# Patient Record
Sex: MALE | Race: WHITE | NOT HISPANIC OR LATINO | Employment: OTHER | URBAN - METROPOLITAN AREA
[De-identification: names, ages, dates, MRNs, and addresses within clinical notes are randomized per-mention and may not be internally consistent; named-entity substitution may affect disease eponyms.]

---

## 2017-01-09 ENCOUNTER — OFFICE VISIT (OUTPATIENT)
Dept: FAMILY MEDICINE | Facility: CLINIC | Age: 72
End: 2017-01-09
Payer: COMMERCIAL

## 2017-01-09 VITALS
HEART RATE: 88 BPM | DIASTOLIC BLOOD PRESSURE: 92 MMHG | BODY MASS INDEX: 33.68 KG/M2 | OXYGEN SATURATION: 94 % | SYSTOLIC BLOOD PRESSURE: 132 MMHG | TEMPERATURE: 97.1 F | WEIGHT: 224.8 LBS

## 2017-01-09 DIAGNOSIS — T50.905A ADVERSE DRUG EFFECT, INITIAL ENCOUNTER: ICD-10-CM

## 2017-01-09 DIAGNOSIS — J06.9 ACUTE URI: Primary | ICD-10-CM

## 2017-01-09 PROCEDURE — 99214 OFFICE O/P EST MOD 30 MIN: CPT | Performed by: PHYSICIAN ASSISTANT

## 2017-01-09 RX ORDER — BENZONATATE 100 MG/1
100 CAPSULE ORAL 3 TIMES DAILY PRN
Qty: 16 CAPSULE | Refills: 0 | Status: SHIPPED | OUTPATIENT
Start: 2017-01-09 | End: 2017-01-16

## 2017-01-09 ASSESSMENT — ENCOUNTER SYMPTOMS
DIARRHEA: 0
SORE THROAT: 1
ABDOMINAL PAIN: 0
NAUSEA: 0
HEADACHES: 0
FOCAL WEAKNESS: 0
CHILLS: 0
LOSS OF CONSCIOUSNESS: 0
SENSORY CHANGE: 0
VOMITING: 0
SHORTNESS OF BREATH: 0
FEVER: 0
COUGH: 1

## 2017-01-09 ASSESSMENT — PAIN SCALES - GENERAL: PAINLEVEL: MILD PAIN (2)

## 2017-01-09 NOTE — PROGRESS NOTES
SUBJECTIVE:                                                    Zurdo Dash is a 71 year old male who presents to clinic today for the following health issues:      RESPIRATORY SYMPTOMS      Duration: 1 week    Description  rhinorrhea, sore throat and productive cough    Severity: mild    Accompanying signs and symptoms: lightheadedness (not vertigo), especially when standing too fast    History (predisposing factors):  none    Precipitating or alleviating factors: None    Therapies tried and outcome:  oral decongestant and guaifenesin-not effective     Patient recently changed his medication for lantus to victoza- patient states his food isn't tasting right, loss of appetite     Additional complaints:  Patient recently changed his medication for lantus to victoza 5 days ago- patient states his food isn't tasting right, loss of appetite. He attributes this to the medication change but also notes it may be due to his illness      HPI additional notes: Zurdo presents today with   Chief Complaint   Patient presents with     URI   Denies CP, SOB, palpitations, syncope, constipation, N/V/D, abd pain, fever/chills, sore throat, ear pain/drainage, vision changes, focal weakness, and any other symptoms.       Chart Review:  History   Smoking status     Former Smoker -- 40 years     Types: Cigarettes     Quit date: 03/16/2007   Smokeless tobacco     Never Used     PHQ-9 SCORE 10/13/2011   Total Score 13     No flowsheet data found.         Patient Active Problem List   Diagnosis     Pain in limb     Diabetic neuropathy (H)     Hyperlipidemia LDL goal <100     Hypertension goal BP (blood pressure) < 140/90     obesity     ED (erectile dysfunction)     Hypogonadism     Advanced directives, counseling/discussion     Health Care Home     Type 2 diabetes mellitus, uncontrolled, with neuropathy (H)     RBBB (right bundle branch block)     Lumbago     Ex-smoker     Cataracts, both eyes     Family history of esophageal cancer      Gastroesophageal reflux disease, esophagitis presence not specified     Rheumatoid arthritis involving multiple sites with positive rheumatoid factor (H)     Past Surgical History   Procedure Laterality Date     Tonsillectomy       Hc incision tendon sheath finger  4/2009     r hand ring finger     Problem list, Medication list, Allergies, Medical/Social/Surg hx reviewed in [facility], updated as appropriate.   HPI      Review of Systems   Constitutional: Negative for fever and chills.        Appetite change   HENT: Positive for sore throat.         Rhinorrhea   Respiratory: Positive for cough. Negative for shortness of breath.    Cardiovascular: Negative for chest pain.   Gastrointestinal: Negative for nausea, vomiting, abdominal pain and diarrhea.   Musculoskeletal: Negative for joint pain.   Neurological: Negative for sensory change, focal weakness, loss of consciousness and headaches.        Lightheadedness   All other systems reviewed and are negative.        Physical Exam   Constitutional: He is oriented to person, place, and time and well-developed, well-nourished, and in no distress.   HENT:   Head: Normocephalic and atraumatic.   Right Ear: Tympanic membrane, external ear and ear canal normal.   Left Ear: Tympanic membrane, external ear and ear canal normal.   Nose: Rhinorrhea present. No sinus tenderness.   Mouth/Throat: Uvula is midline, oropharynx is clear and moist and mucous membranes are normal.   Eyes: EOM are normal. Pupils are equal, round, and reactive to light.   Cardiovascular: Normal rate, regular rhythm and normal heart sounds.    Pulmonary/Chest: Effort normal and breath sounds normal.   Musculoskeletal: Normal range of motion.   Neurological: He is alert and oriented to person, place, and time. He has normal motor skills, normal strength and intact cranial nerves. Gait normal. Gait normal.   Skin: Skin is warm and dry.   Nursing note and vitals reviewed.    Vital Signs  /92 mmHg  Pulse  88  Temp(Src) 97.1  F (36.2  C) (Oral)  Wt 224 lb 12.8 oz (101.969 kg)  SpO2 94%   Body mass index is 33.68 kg/(m^2).    Diagnostic Test Results:  none     ASSESSMENT/PLAN:                                                          ICD-10-CM    1. Acute URI J06.9 benzonatate (TESSALON) 100 MG capsule   2. Adverse drug effect, initial encounter T88.7XXA    3. Type 2 diabetes mellitus, uncontrolled, with neuropathy (H) E11.40     E11.65    Lungs CTAB, afebrile, NAD. Unremarkable neuro exam. BP mildly elevated today, likely secondary to decongestant use. Suspect viral URI, supportive treatments discussed. Rx Tessalon perles.   Decreased appetite may be secondary to viral illness, may also be SE of Victoza. No abdominal pain, N/V, or constipation. Recommended pt f/u with PCP if decreased appetite persists once URI sx resolve.    I have discussed any lab or imaging results, the patient's diagnosis, and my plan of treatment with the patient and/or family. Patient is aware to come back in if with worsening symptoms or if no relief despite treatment plan.  Patient voiced understanding and had no further questions.       Follow Up: Return if symptoms worsen or fail to improve; if decreased appetite continues.    WATSON Mcadams, PA-C  [facility]

## 2017-01-09 NOTE — NURSING NOTE
"Chief Complaint   Patient presents with     URI       Initial /92 mmHg  Pulse 88  Temp(Src) 97.1  F (36.2  C) (Oral)  Wt 224 lb 12.8 oz (101.969 kg)  SpO2 94% Estimated body mass index is 33.68 kg/(m^2) as calculated from the following:    Height as of 12/2/16: 5' 8.5\" (1.74 m).    Weight as of this encounter: 224 lb 12.8 oz (101.969 kg).  BP completed using cuff size: usama Lazo CMA    "

## 2017-01-09 NOTE — MR AVS SNAPSHOT
After Visit Summary   1/9/2017    Zurdo Dash    MRN: [id number]           Patient Information     Date Of Birth          1945        Visit Information        Provider Department      1/9/2017 1:30 PM Loni Gibson PA-C [facility]        Today's Diagnoses     Acute URI    -  1     Adverse drug effect, initial encounter         Type 2 diabetes mellitus, uncontrolled, with neuropathy (H)           Care Instructions      Follow up with primary care in 3-4 days if symptoms have not improved. Also follow up with PCP if decreased appetite continues once your URI is improved. Return to clinic or go to ER if symptoms worsen.    Viral Upper Respiratory Illness (Adult)  You have a viral upper respiratory illness (URI), which is another term for the common cold. This illness is contagious during the first few days. It is spread through the air by coughing and sneezing. It may also be spread by direct contact (touching the sick person and then touching your own eyes, nose, or mouth). Frequent handwashing will decrease risk of spread. Most viral illnesses go away within 7 to 10 days with rest and simple home remedies. Sometimes the illness may last for several weeks. Antibiotics will not kill a virus, and they are generally not prescribed for this condition.    Home care    If symptoms are severe, rest at home for the first 2 to 3 days. When you resume activity, don't let yourself get too tired.    Avoid being exposed to cigarette smoke (yours or others ).    You may use acetaminophen or ibuprofen to control pain and fever, unless another medicine was prescribed. (Note: If you have chronic liver or kidney disease, have ever had a stomach ulcer or gastrointestinal bleeding, or are taking blood-thinning medicines, talk with your healthcare provider before using these medicines.) Aspirin should never be given to anyone under 18 years of age who is ill with a viral infection or fever.  It may cause severe liver or brain damage.    Your appetite may be poor, so a light diet is fine. Avoid dehydration by drinking 6 to 8 glasses of fluids per day (water, soft drinks, juices, tea, or soup). Extra fluids will help loosen secretions in the nose and lungs.    Over-the-counter cold medicines will not shorten the length of time you re sick, but they may be helpful for the following symptoms: cough, sore throat, and nasal and sinus congestion. (Note: Do not use decongestants if you have high blood pressure.)  Follow-up care  Follow up with your healthcare provider, or as advised.  When to seek medical advice  Call your healthcare provider right away if any of these occur:    Cough with lots of colored sputum (mucus)    Severe headache; face, neck, or ear pain    Difficulty swallowing due to throat pain    Fever of 100.4 F (38 C)  Call 911, or get immediate medical care  Call emergency services right away if any of these occur:    Chest pain, shortness of breath, wheezing, or difficulty breathing    Coughing up blood    Inability to swallow due to throat pain    6800-4132 [employer]. [address] [place] [address]. All rights reserved. This information is not intended as a substitute for professional medical care. Always follow your healthcare professional's instructions.              Follow-ups after your visit        Follow-up notes from your care team     Return if symptoms worsen or fail to improve; if decreased appetite continues.      Your next 10 appointments already scheduled     Jan 11, 2017 11:40 AM   Return Visit with MD [facility] [place] ([facility] [place])    [address]  [place] [address] [phone]              Who to contact     If you have questions or need follow up information about today's clinic visit or your schedule please contact [facility] [place] directly at [phone].  Normal or non-critical lab and  "imaging results will be communicated to you by MyChart, letter or phone within 4 business days after the clinic has received the results. If you do not hear from us within 7 days, please contact the clinic through [employer] or phone. If you have a critical or abnormal lab result, we will notify you by phone as soon as possible.  Submit refill requests through [employer] or call your pharmacy and they will forward the refill request to us. Please allow 3 business days for your refill to be completed.          Additional Information About Your Visit        [employer] Information     [employer]ient lets you send messages to your doctor, view your test results, renew your prescriptions, schedule appointments and more. To sign up, go to [email or web address] . Click on \"Log in\" on the left side of the screen, which will take you to the Welcome page. Then click on \"Sign up Now\" on the right side of the page.     You will be asked to enter the access code listed below, as well as some personal information. Please follow the directions to create your username and password.     Your access code is: [email or web address]  Expires: 1/10/2017 11:25 AM     Your access code will  in 90 days. If you need help or a new code, please call your [facility] clinic or [phone].        Care EveryWhere ID     This is your Care EveryWhere ID. This could be used by other organizations to access your [facility] medical records  [id number]        Your Vitals Were     Pulse Temperature Pulse Oximetry             88 97.1  F (36.2  C) (Oral) 94%          Blood Pressure from Last 3 Encounters:   17 132/92   16 124/78   16 160/94    Weight from Last 3 Encounters:   17 224 lb 12.8 oz (101.969 kg)   16 230 lb (104.327 kg)   16 228 lb 12.8 oz (103.783 kg)              Today, you had the following     No orders found for display         Today's Medication Changes          These changes are accurate as of: 17  2:07 PM.  If you " have any questions, ask your nurse or doctor.               Start taking these medicines.        Dose/Directions    benzonatate 100 MG capsule   Commonly known as:  TESSALON   Used for:  Acute URI   Started by:  Loni Gibson PA-C        Dose:  100 mg   Take 1 capsule (100 mg) by mouth 3 times daily as needed for cough   Quantity:  16 capsule   Refills:  0            Where to get your medicines      These medications were sent to [facility] [place], [place] - [address]  [address], [facility] [address]     Phone:  [phone]    - benzonatate 100 MG capsule             Primary Care Provider Office Phone # Fax #    Kapil Duckworth [phone]       [facility] [address] [address]        Thank you!     Thank you for choosing [facility]  for your care. Our goal is always to provide you with excellent care. Hearing back from our patients is one way we can continue to improve our services. Please take a few minutes to complete the written survey that you may receive in the mail after your visit with us. Thank you!             Your Updated Medication List - Protect others around you: Learn how to safely use, store and throw away your medicines at www.disposemymeds.org.          This list is accurate as of: 1/9/17  2:07 PM.  Always use your most recent med list.                   Brand Name Dispense Instructions for use    ACE/ARB NOT PRESCRIBED (INTENTIONAL)          aspirin 81 MG tablet      Take 1 tablet by mouth daily.       INDIA CONTOUR NEXT test strip   Generic drug:  blood glucose monitoring     100 each    TEST THREE TIMES A DAY OR AS DIRECTED       benzonatate 100 MG capsule    TESSALON    16 capsule    Take 1 capsule (100 mg) by mouth 3 times daily as needed for cough       CALCIUM 500 + D 500-200 MG-IU Tabs      take 2 tabs daily       clotrimazole-betamethasone cream    LOTRISONE    30 g     Apply topically 2 times daily       folic acid 1 MG tablet    FOLVITE    100 tablet    Take 1 tablet (1 mg) by mouth daily       glimepiride 4 MG tablet    AMARYL    90 tablet    Take 1 tablet (4 mg) by mouth every morning (before breakfast)       GLUCOSAMINE SULFATE PO          hydrochlorothiazide 25 MG tablet    HYDRODIURIL    90 tablet    TAKE ONE TABLET BY MOUTH EVERY DAY       HYDROcodone-acetaminophen 5-325 MG per tablet    NORCO    30 tablet    Take 1 tablet by mouth every 6 hours as needed for pain       hydroxychloroquine 200 MG tablet    PLAQUENIL    180 tablet    Take 1 tablet (200 mg) by mouth 2 times daily       insulin pen needle 31G X 6 MM    ULTICARE MINI    100 each    Use 1 daily or as directed.       liraglutide 18 MG/3ML soln    VICTOZA    6 mL    Inject 1.2 mg Subcutaneous daily       metFORMIN 500 MG tablet    GLUCOPHAGE    360 tablet    TAKE TWO TABLETS BY MOUTH TWICE A DAY WITH MEALS       methotrexate sodium 2.5 MG Tabs     120 tablet    Take 25 mg by mouth once a week . Take all 10 tablets on the same day of each week.       metoprolol 25 MG 24 hr tablet    TOPROL-XL    90 tablet    TAKE ONE TABLET BY MOUTH EVERY DAY       MICROLET LANCETS Misc     100 each    1 Device daily       MULTI-VITAMIN PO      None Entered       omeprazole 20 MG CR capsule    priLOSEC    90 capsule    Take 1 capsule (20 mg) by mouth daily       pravastatin 40 MG tablet    PRAVACHOL    90 tablet    TAKE ONE TABLET BY MOUTH EVERY DAY       sildenafil 100 MG cap/tab    VIAGRA    3 tablet    Take 0.5-1 tablets ( mg) by mouth daily as needed for erectile dysfunction Take 30 min to 4 hours before intercourse.  Never use with nitroglycerin, terazosin or doxazosin.       vitamin E 1000 UNIT capsule    TOCOPHEROL     1 tablet daily

## 2017-01-09 NOTE — Clinical Note
Edgar Ivey, It probably is the victoza causing the appetite changes-- it's one of the ways that it works, by decreasing appetite, less food in means less glucose to deal with, weight loss, lower sugars. I agree with your management plan-- I usually try to have patients stick with it for awhile-- it often improves Darleen

## 2017-01-09 NOTE — PATIENT INSTRUCTIONS
Follow up with primary care in 3-4 days if symptoms have not improved. Also follow up with PCP if decreased appetite continues once your URI is improved. Return to clinic or go to ER if symptoms worsen.    Viral Upper Respiratory Illness (Adult)  You have a viral upper respiratory illness (URI), which is another term for the common cold. This illness is contagious during the first few days. It is spread through the air by coughing and sneezing. It may also be spread by direct contact (touching the sick person and then touching your own eyes, nose, or mouth). Frequent handwashing will decrease risk of spread. Most viral illnesses go away within 7 to 10 days with rest and simple home remedies. Sometimes the illness may last for several weeks. Antibiotics will not kill a virus, and they are generally not prescribed for this condition.    Home care    If symptoms are severe, rest at home for the first 2 to 3 days. When you resume activity, don't let yourself get too tired.    Avoid being exposed to cigarette smoke (yours or others ).    You may use acetaminophen or ibuprofen to control pain and fever, unless another medicine was prescribed. (Note: If you have chronic liver or kidney disease, have ever had a stomach ulcer or gastrointestinal bleeding, or are taking blood-thinning medicines, talk with your healthcare provider before using these medicines.) Aspirin should never be given to anyone under 18 years of age who is ill with a viral infection or fever. It may cause severe liver or brain damage.    Your appetite may be poor, so a light diet is fine. Avoid dehydration by drinking 6 to 8 glasses of fluids per day (water, soft drinks, juices, tea, or soup). Extra fluids will help loosen secretions in the nose and lungs.    Over-the-counter cold medicines will not shorten the length of time you re sick, but they may be helpful for the following symptoms: cough, sore throat, and nasal and sinus congestion. (Note: Do  not use decongestants if you have high blood pressure.)  Follow-up care  Follow up with your healthcare provider, or as advised.  When to seek medical advice  Call your healthcare provider right away if any of these occur:    Cough with lots of colored sputum (mucus)    Severe headache; face, neck, or ear pain    Difficulty swallowing due to throat pain    Fever of 100.4 F (38 C)  Call 911, or get immediate medical care  Call emergency services right away if any of these occur:    Chest pain, shortness of breath, wheezing, or difficulty breathing    Coughing up blood    Inability to swallow due to throat pain    9551-7256 [employer]. [address] [place] [address]. All rights reserved. This information is not intended as a substitute for professional medical care. Always follow your healthcare professional's instructions.

## 2017-01-10 DIAGNOSIS — I10 HYPERTENSION GOAL BP (BLOOD PRESSURE) < 140/90: Primary | ICD-10-CM

## 2017-01-10 NOTE — TELEPHONE ENCOUNTER
HYDROCHLOROTHIAZIDE 25MG TABS      Last Written Prescription Date: 10/14/2016  Last Fill Quantity: 90, # refills: 0  Last Office Visit with G, P or [facility] prescribing provider: 01/09/2017   Next 5 appointments (look out 90 days)     Jan 11, 2017 11:40 AM   Return Visit with Albert Tompkins MD   [facility]    [address] [address]   [phone]                   POTASSIUM   Date Value Ref Range Status   05/03/2016 3.7 3.4 - 5.3 mmol/L Final     CREATININE   Date Value Ref Range Status   10/12/2016 0.98 0.66 - 1.25 mg/dL Final     BP Readings from Last 3 Encounters:   01/09/17 132/92   12/02/16 124/78   11/26/16 160/94     Suri Bell MA

## 2017-01-11 ENCOUNTER — TELEPHONE (OUTPATIENT)
Dept: RHEUMATOLOGY | Facility: CLINIC | Age: 72
End: 2017-01-11

## 2017-01-12 RX ORDER — HYDROCHLOROTHIAZIDE 25 MG/1
TABLET ORAL
Qty: 90 TABLET | Refills: 1 | Status: SHIPPED | OUTPATIENT
Start: 2017-01-12 | End: 2017-03-20

## 2017-01-12 NOTE — TELEPHONE ENCOUNTER
Prescription approved per [facility] Refill Protocol.  Sandhya Vasquse RN

## 2017-01-16 ENCOUNTER — RADIANT APPOINTMENT (OUTPATIENT)
Dept: GENERAL RADIOLOGY | Facility: CLINIC | Age: 72
End: 2017-01-16
Attending: FAMILY MEDICINE
Payer: COMMERCIAL

## 2017-01-16 ENCOUNTER — OFFICE VISIT (OUTPATIENT)
Dept: FAMILY MEDICINE | Facility: CLINIC | Age: 72
End: 2017-01-16
Payer: COMMERCIAL

## 2017-01-16 VITALS
DIASTOLIC BLOOD PRESSURE: 83 MMHG | SYSTOLIC BLOOD PRESSURE: 127 MMHG | WEIGHT: 222 LBS | TEMPERATURE: 99.1 F | HEART RATE: 83 BPM | OXYGEN SATURATION: 93 % | BODY MASS INDEX: 32.88 KG/M2 | HEIGHT: 69 IN

## 2017-01-16 DIAGNOSIS — Z87.891 PERSONAL HISTORY OF TOBACCO USE, PRESENTING HAZARDS TO HEALTH: ICD-10-CM

## 2017-01-16 DIAGNOSIS — R05.9 COUGHING: ICD-10-CM

## 2017-01-16 DIAGNOSIS — E11.42 DIABETIC POLYNEUROPATHY ASSOCIATED WITH TYPE 2 DIABETES MELLITUS (H): ICD-10-CM

## 2017-01-16 DIAGNOSIS — M05.79 RHEUMATOID ARTHRITIS INVOLVING MULTIPLE SITES WITH POSITIVE RHEUMATOID FACTOR (H): ICD-10-CM

## 2017-01-16 DIAGNOSIS — J01.90 ACUTE SINUSITIS, RECURRENCE NOT SPECIFIED, UNSPECIFIED LOCATION: Primary | ICD-10-CM

## 2017-01-16 LAB
FEF 25/75: NORMAL
FEF 25/75: NORMAL
FEV-1: NORMAL
FEV-1: NORMAL
FEV1/FVC: NORMAL
FEV1/FVC: NORMAL
FVC: NORMAL
FVC: NORMAL

## 2017-01-16 PROCEDURE — 99214 OFFICE O/P EST MOD 30 MIN: CPT | Mod: 25 | Performed by: FAMILY MEDICINE

## 2017-01-16 PROCEDURE — 71020 XR CHEST 2 VW: CPT

## 2017-01-16 PROCEDURE — 94010 BREATHING CAPACITY TEST: CPT | Performed by: FAMILY MEDICINE

## 2017-01-16 NOTE — PROGRESS NOTES
Quick Note:    Mail letter:  Your breathing test shows a restrictive pattern but no emphysema. Call or return to clinic as needed if these symptoms worsen or fail to improve as anticipated.   Dedra Romero MD    ______

## 2017-01-16 NOTE — Clinical Note
[address] [place] [address]    January 16, 2017    Zurdo Dash  [address]  [place] [address]          Dear Zurdo,  Your chest x-ray may show early pneumonia. Take the antibiotic as we discussed and follow-up in 1 to 2 weeks, or sooner if your symptoms are not improving.   Enclosed is a copy of your results.     Results for orders placed or performed in visit on 01/16/17   XR Chest 2 Views    Narrative    XR CHEST 2 VW 1/16/2017 2:48 PM    COMPARISON: 10/3/2016    HISTORY: Cough      Impression    IMPRESSION: Mildly enlarged cardiac silhouette is again seen. Mild  left retrocardiac airspace opacity, atelectasis versus consolidation.  No significant pleural effusion. No pneumothorax. Old healed left rib  fracture.    ROBERTO JOHNSON       If you have any questions or concerns, please call myself or my nurse at [phone].      Sincerely,      Dedra Romero MD /reynold

## 2017-01-16 NOTE — PATIENT INSTRUCTIONS
Call the imaging center at [phone] or [phone] to schedule your yearly lung cancer screening CT at your convenience.       [facility]    If you have any questions regarding to your visit please contact your care team:       Team Red:   Clinic Hours Telephone Number   Dr. Dedra Hu, NP   7am-7pm  Monday - Thursday   7am-5pm  Fridays  [phone]  (Appointment scheduling available 24/7)    Questions about your visit?   Team Line  [phone]   [facility] [place] [facility] 11am-9pm Monday-Friday Saturday-Sunday- 9am-5pm   [place] - 5pm-9pm Monday-Friday Saturday-Sunday- 9am-5pm  [phone] - [place]   [phone] - [place]       What options do I have for visits at the clinic other than the traditional office visit?  To expand how we care for you, many of our providers are utilizing electronic visits (e-visits) and telephone visits, when medically appropriate, for interactions with their patients rather than a visit in the clinic.   We also offer nurse visits for many medical concerns. Just like any other service, we will bill your insurance company for this type of visit based on time spent on the phone with your provider. Not all insurance companies cover these visits. Please check with your medical insurance if this type of visit is covered. You will be responsible for any charges that are not paid by your insurance.      E-visits via [employer]:  generally incur a $35.00 fee.  Telephone visits:  Time spent on the phone: *charged based on time that is spent on the phone in increments of 10 minutes. Estimated cost:   5-10 mins $30.00   11-20 mins. $59.00   21-30 mins. $85.00     Use [employer]t (secure email communication and access to your chart) to send your primary care provider a message or make an appointment. Ask someone on your Team how to sign up for [employer].  For a Price Quote for your services,  please call our Consumer Price Line at [phone].      As always, Thank you for trusting us with your health care needs!  Alyse LYMAN MA

## 2017-01-16 NOTE — MR AVS SNAPSHOT
After Visit Summary   1/16/2017    Zurdo Dash    MRN: [id number]           Patient Information     Date Of Birth          1945        Visit Information        Provider Department      1/16/2017 2:00 PM Dedra Romero MD [facility]        Today's Diagnoses     Acute sinusitis, recurrence not specified, unspecified location    -  1     Type 2 diabetes mellitus, uncontrolled, with neuropathy (H)         Rheumatoid arthritis involving multiple sites with positive rheumatoid factor (H)         Diabetic polyneuropathy associated with type 2 diabetes mellitus (H)         Personal history of tobacco use, presenting hazards to health         Coughing           Care Instructions    Call the imaging center at [phone] or [phone] to schedule your yearly lung cancer screening CT at your convenience.       [facility]    If you have any questions regarding to your visit please contact your care team:       Team Red:   Clinic Hours Telephone Number   Dr. Dedra Hu, NP   7am-7pm  Monday - Thursday   7am-5pm  Fridays  [phone]  (Appointment scheduling available 24/7)    Questions about your visit?   Team Line  [phone]   Urgent Care - [place] and [place] - 11am-9pm Monday-Friday Saturday-Sunday- 9am-5pm   [place] - 5pm-9pm Monday-Friday Saturday-Sunday- 9am-5pm  [phone] - [place]   [phone] - [place]       What options do I have for visits at the clinic other than the traditional office visit?  To expand how we care for you, many of our providers are utilizing electronic visits (e-visits) and telephone visits, when medically appropriate, for interactions with their patients rather than a visit in the clinic.   We also offer nurse visits for many medical concerns. Just like any other service, we will bill your insurance company for this type of visit based on time spent on the  phone with your provider. Not all insurance companies cover these visits. Please check with your medical insurance if this type of visit is covered. You will be responsible for any charges that are not paid by your insurance.      E-visits via Qritiqrt:  generally incur a $35.00 fee.  Telephone visits:  Time spent on the phone: *charged based on time that is spent on the phone in increments of 10 minutes. Estimated cost:   5-10 mins $30.00   11-20 mins. $59.00   21-30 mins. $85.00     Use Gogobeans (secure email communication and access to your chart) to send your primary care provider a message or make an appointment. Ask someone on your Team how to sign up for Gogobeans.  For a Price Quote for your services, please call our Deep-Secure Line at [phone].      As always, Thank you for trusting us with your health care needs!  Alyse LYMAN MA          Follow-ups after your visit        Follow-up notes from your care team     Return in about 6 weeks (around 2/27/2017) for diabetes (fasting labs up to one week prior).      Future tests that were ordered for you today     Open Future Orders        Priority Expected Expires Ordered    CT Chest Lung Cancer Scrn Low Dose wo Routine  1/16/2018 1/16/2017    XR Chest 2 Views Routine 1/16/2017 1/16/2018 1/16/2017    Spirometry, Breathing Capacity Routine  3/2/2017 1/16/2017            Who to contact     If you have questions or need follow up information about today's clinic visit or your schedule please contact [facility] directly at [phone].  Normal or non-critical lab and imaging results will be communicated to you by Xiami Radiohart, letter or phone within 4 business days after the clinic has received the results. If you do not hear from us within 7 days, please contact the clinic through Xiami Radiohart or phone. If you have a critical or abnormal lab result, we will notify you by phone as soon as possible.  Submit refill requests through Gogobeans or call your pharmacy  "and they will forward the refill request to us. Please allow 3 business days for your refill to be completed.          Additional Information About Your Visit        MyChart Information     MyToons lets you send messages to your doctor, view your test results, renew your prescriptions, schedule appointments and more. To sign up, go to [email or web address] . Click on \"Log in\" on the left side of the screen, which will take you to the Welcome page. Then click on \"Sign up Now\" on the right side of the page.     You will be asked to enter the access code listed below, as well as some personal information. Please follow the directions to create your username and password.     Your access code is: [email or web address]  Expires: 2017  2:30 PM     Your access code will  in 90 days. If you need help or a new code, please call your [facility] clinic or [phone].        Care EveryWhere ID     This is your Care EveryWhere ID. This could be used by other organizations to access your [facility] medical records  [id number]        Your Vitals Were     Pulse Temperature Height BMI (Body Mass Index) Pulse Oximetry       83 99.1  F (37.3  C) (Oral) 5' 8.5\" (1.74 m) 33.26 kg/m2 93%        Blood Pressure from Last 3 Encounters:   17 127/83   17 132/92   16 124/78    Weight from Last 3 Encounters:   17 222 lb (100.699 kg)   17 224 lb 12.8 oz (101.969 kg)   16 230 lb (104.327 kg)                 Today's Medication Changes          These changes are accurate as of: 17  2:30 PM.  If you have any questions, ask your nurse or doctor.               Start taking these medicines.        Dose/Directions    amoxicillin-clavulanate 875-125 MG per tablet   Commonly known as:  AUGMENTIN   Used for:  Acute sinusitis, recurrence not specified, unspecified location, Coughing   Started by:  Dedra Romero MD        Dose:  1 tablet   Take 1 tablet by mouth 2 times daily for 10 days   Quantity:  20 tablet "   Refills:  0            Where to get your medicines      These medications were sent to [facility] - [place], [place] - [address]  [address], [place] [address]     Phone:  [phone]    - amoxicillin-clavulanate 875-125 MG per tablet             Primary Care Provider Office Phone # Fax #    Kapil Duckworth [phone]       [facility] [address]  [place] [address]        Thank you!     Thank you for choosing [facility]  for your care. Our goal is always to provide you with excellent care. Hearing back from our patients is one way we can continue to improve our services. Please take a few minutes to complete the written survey that you may receive in the mail after your visit with us. Thank you!             Your Updated Medication List - Protect others around you: Learn how to safely use, store and throw away your medicines at www.disposemymeds.org.          This list is accurate as of: 1/16/17  2:30 PM.  Always use your most recent med list.                   Brand Name Dispense Instructions for use    ACE/ARB NOT PRESCRIBED (INTENTIONAL)          amoxicillin-clavulanate 875-125 MG per tablet    AUGMENTIN    20 tablet    Take 1 tablet by mouth 2 times daily for 10 days       aspirin 81 MG tablet      Take 1 tablet by mouth daily.       INDIA CONTOUR NEXT test strip   Generic drug:  blood glucose monitoring     100 each    TEST THREE TIMES A DAY OR AS DIRECTED       CALCIUM 500 + D 500-200 MG-IU Tabs      take 2 tabs daily       folic acid 1 MG tablet    FOLVITE    100 tablet    Take 1 tablet (1 mg) by mouth daily       glimepiride 4 MG tablet    AMARYL    90 tablet    Take 1 tablet (4 mg) by mouth every morning (before breakfast)       hydrochlorothiazide 25 MG tablet    HYDRODIURIL    90 tablet    TAKE ONE TABLET BY MOUTH EVERY DAY       HYDROcodone-acetaminophen 5-325 MG per tablet    NORCO    30 tablet    Take 1  tablet by mouth every 6 hours as needed for pain       hydroxychloroquine 200 MG tablet    PLAQUENIL    180 tablet    Take 1 tablet (200 mg) by mouth 2 times daily       insulin pen needle 31G X 6 MM    ULTICARE MINI    100 each    Use 1 daily or as directed.       liraglutide 18 MG/3ML soln    VICTOZA    6 mL    Inject 1.2 mg Subcutaneous daily       metFORMIN 500 MG tablet    GLUCOPHAGE    360 tablet    TAKE TWO TABLETS BY MOUTH TWICE A DAY WITH MEALS       methotrexate sodium 2.5 MG Tabs     120 tablet    Take 25 mg by mouth once a week . Take all 10 tablets on the same day of each week.       metoprolol 25 MG 24 hr tablet    TOPROL-XL    90 tablet    TAKE ONE TABLET BY MOUTH EVERY DAY       MICROLET LANCETS Misc     100 each    1 Device daily       MULTI-VITAMIN PO      None Entered       omeprazole 20 MG CR capsule    priLOSEC    90 capsule    Take 1 capsule (20 mg) by mouth daily       pravastatin 40 MG tablet    PRAVACHOL    90 tablet    TAKE ONE TABLET BY MOUTH EVERY DAY       vitamin E 1000 UNIT capsule    TOCOPHEROL     1 tablet daily

## 2017-01-16 NOTE — NURSING NOTE
"Chief Complaint   Patient presents with     URI       Initial /83 mmHg  Pulse 83  Temp(Src) 99.1  F (37.3  C) (Oral)  Ht 5' 8.5\" (1.74 m)  Wt 222 lb (100.699 kg)  BMI 33.26 kg/m2  SpO2 93% Estimated body mass index is 33.26 kg/(m^2) as calculated from the following:    Height as of this encounter: 5' 8.5\" (1.74 m).    Weight as of this encounter: 222 lb (100.699 kg).  BP completed using cuff size: jerome LYMAN MA      "

## 2017-01-16 NOTE — PROGRESS NOTES
Quick Note:    Mail letter:  Your chest x-ray may show early pneumonia. Take the antibiotic as we discussed and follow-up in 1 to 2 weeks, or sooner if your symptoms are not improving.     Dedra Romero MD    ______

## 2017-01-16 NOTE — Clinical Note
[facility]  [address] [place] [address]    January 16, 2017    Zurdo Dash  [address] [facility] [address]          Dear Zurdo,  Your breathing test shows a restrictive pattern but no emphysema. Call or return to clinic as needed if these symptoms worsen or fail to improve as anticipated.     Enclosed is a copy of your results.         If you have any questions or concerns, please call myself or my nurse at [phone].      Sincerely,        Dedra Romero MD/pb

## 2017-01-16 NOTE — PROGRESS NOTES
"  SUBJECTIVE:                                                    Zurdo Dash is a 71 year old male diabetic with history of tobacco abuse on DMARD for RA who presents to clinic today for the following health issues:    Acute Illness   Acute illness concerns: cough - productive - moderately severe   Onset: 2 1/2 weeks     Fever: no    Chills/Sweats: no    Headache (location?): no    Sinus Pressure:no    Conjunctivitis:  no    Ear Pain: no    Rhinorrhea: YES    Congestion: YES    Sore Throat: no     Cough: YES    Wheeze: YES    Decreased Appetite: YES    Nausea: no    Vomiting: no    Diarrhea:  no    Dysuria/Freq.: no    Fatigue/Achiness: YES - weak    Sick/Strep Exposure: no     Therapies Tried and outcome: mucinex  / tessalon      He denies history of COPD but has used an inhaler on occasion in the past. No exposures, chest pain, facial pain or pressure.     I have reviewed the patient's medical history in detail and updated the computerized patient record.     ROS:  C: NEGATIVE for fever, chills, change in weight  I: NEGATIVE for worrisome rashes, moles or lesions  E: NEGATIVE for vision changes or irritation  ENT/MOUTH: see HPI   RESP:as above  CV: NEGATIVE for chest pain, palpitations or peripheral edema  MUSCULOSKELETAL: RA   ENDOCRINE: diabetes     OBJECTIVE:                                                    /83 mmHg  Pulse 83  Temp(Src) 99.1  F (37.3  C) (Oral)  Ht 5' 8.5\" (1.74 m)  Wt 222 lb (100.699 kg)  BMI 33.26 kg/m2  SpO2 93%  Body mass index is 33.26 kg/(m^2).  GENERAL: alert, no distress and obese  EYES: Eyes grossly normal to inspection, PERRL and conjunctivae and sclerae normal  HENT: ear canals and TM's normal, nose and mouth without ulcers or lesions  NECK: no adenopathy, no asymmetry, masses, or scars and thyroid normal to palpation  RESP: lungs clear to auscultation - no rales, rhonchi or wheezes  CV: regular rate and rhythm, normal S1 S2, no S3 or S4, no murmur, click or rub, no " peripheral edema and peripheral pulses strong  MS: no gross musculoskeletal defects noted, no edema  PSYCH: mentation appears normal, affect normal/bright    Diagnostic Test Results:  Results for orders placed or performed in visit on 01/16/17   Spirometry, Breathing Capacity   Result Value Ref Range    FEV-1      FVC      FEV1/FVC      FEF 25/75     Spirometry, Breathing Capacity   Result Value Ref Range    FEV-1      FVC      FEV1/FVC      FEF 25/75          ASSESSMENT/PLAN:                                                    (J01.90) Acute sinusitis, recurrence not specified, unspecified location  (primary encounter diagnosis)  Comment: Differential diagnoses would include: pneumonia, post-viral     Plan: amoxicillin-clavulanate (AUGMENTIN) 875-125 MG         per tablet        Discussed risks and benefits of this medication. Call or return to clinic as needed if these symptoms worsen or fail to improve as anticipated.     (E11.40,  E11.65) Type 2 diabetes mellitus, uncontrolled, with neuropathy (H)  (E11.42) Diabetic polyneuropathy associated with type 2 diabetes mellitus (H)  Comment: recent medication change from Lantus to Victoza going well but too soon to see full results   Plan: follow-up with PCP in 4 to 6 weeks     (M05.79) Rheumatoid arthritis involving multiple sites with positive rheumatoid factor (H)  Comment: Well controlled with medications without side effects.   Plan: follow-up with rheumatology soon as planned     (Z87.891) Personal history of tobacco use, presenting hazards to health  Plan: CT Chest Lung Cancer Scrn Low Dose wo                     See Patient Instructions    Dedra Romero MD  [facility]

## 2017-01-18 NOTE — PROGRESS NOTES
Chart reviewed.  Encounter was reviewed with provider.  Patient was not examined by me.  Mia Sherwood MD

## 2017-01-23 DIAGNOSIS — E78.5 HYPERLIPIDEMIA LDL GOAL <100: Primary | ICD-10-CM

## 2017-01-23 RX ORDER — PRAVASTATIN SODIUM 40 MG
40 TABLET ORAL DAILY
Qty: 90 TABLET | Refills: 3 | Status: SHIPPED | OUTPATIENT
Start: 2017-01-23 | End: 2017-11-07

## 2017-01-23 NOTE — TELEPHONE ENCOUNTER
pravastatin (PRAVACHOL) 40 MG tablet     Last Written Prescription Date: 10/14/2016  Last Fill Quantity: 90, # refills: 0  Last Office Visit with [facility], [facility] or [facility] prescribing provider: 1/16/2017       CHOL      174   12/2/2016  HDL       39   12/2/2016  LDL       79   12/2/2016  LDL       89   9/30/2015  TRIG      282   12/2/2016  CHOLHDLRATIO      3.4   6/29/2015

## 2017-01-24 ENCOUNTER — RADIANT APPOINTMENT (OUTPATIENT)
Dept: CT IMAGING | Facility: CLINIC | Age: 72
End: 2017-01-24
Attending: FAMILY MEDICINE
Payer: COMMERCIAL

## 2017-01-24 DIAGNOSIS — Z87.891 PERSONAL HISTORY OF TOBACCO USE, PRESENTING HAZARDS TO HEALTH: ICD-10-CM

## 2017-01-24 DIAGNOSIS — R05.9 COUGHING: ICD-10-CM

## 2017-01-24 PROCEDURE — G0297 LDCT FOR LUNG CA SCREEN: HCPCS | Mod: TC

## 2017-01-24 NOTE — Clinical Note
[address] [place] [address]    January 25, 2017    Zurdo Dash  [address]  [place] [address]          Dear Zurdo,  Your results are good. No lung cancer was seen. Repeat CT is recommended in 1 year.   Enclosed is a copy of your results.     Results for orders placed or performed in visit on 01/24/17   CT Chest Lung Cancer Scrn Low Dose wo    Narrative    CT CHEST LUNG CANCER  SCREEN LOW DOSE  WITHOUT CONTRAST January 24, 2017 12:17 PM    HISTORY: Personal history of nicotine dependence. Cough.    TECHNIQUE: Scans obtained from the apices through the diaphragm  without IV contrast. Low dose CT chest technique was utilized.  Radiation dose for this scan was reduced using automated exposure  control, adjustment of the mA and/or kV according to patient size, or  iterative reconstruction technique.    COMPARISON: None.    FINDINGS:    Lungs: Calcified granuloma lateral right upper lobe is present on  series 3, image 109. Another is present in the medial right upper lobe  on image 140. A cluster of calcified granulomas is noted in the  posterior right lower lobe with the largest on image 299. Another is  present in the posterior left lower lobe on image 234. Lungs are  otherwise clear. No suspicious lung nodules. No infiltrate or  consolidation.    Additional findings: No pleural or pericardial fluid. Heart is normal  in size. Prominent pericardial fat pads are present. Esophagus is  unremarkable. Calcified mediastinal and right hilar lymph nodes are  present consistent with old granulomatous disease. No enlarged lymph  nodes are otherwise evident. Limited images upper abdomen are degraded  by beam hardening artifact due to the low dose technique. No gross  abnormality is appreciated. Degenerative spine changes are present.  Old healed lateral mid left rib fracture is present.      Impression    IMPRESSION:   1. ACR  "Assessment Category:  1: Negative. Recommend continued  screening low-dose chest CT in 12 months. .   2. Significant Incidental Finding(s):  Category S: No.   3. Evidence of old granulomatous disease.      Download the \"LungRADS Assessment Categories\" table at this site:   http://www.acr.org/Quality-Safety/Resources/LungRADS     DOLORES DOMINGUEZ MD       If you have any questions or concerns, please call myself or my nurse at [phone].      Sincerely,      Dedra Romero MD /pb    "

## 2017-01-25 ENCOUNTER — TELEPHONE (OUTPATIENT)
Dept: RHEUMATOLOGY | Facility: CLINIC | Age: 72
End: 2017-01-25

## 2017-01-25 NOTE — PROGRESS NOTES
Quick Note:    Mail letter:    Your results are good. No lung cancer was seen. Repeat CT is recommended in 1 year.     Dedra Romero MD    ______

## 2017-01-25 NOTE — TELEPHONE ENCOUNTER
Pt's right knee is swelling. Pt would like to discuss with a nurse. Please call pt at the above #.

## 2017-01-25 NOTE — TELEPHONE ENCOUNTER
Called and spoke to patient regarding right knee. Patient reports that he woke up this am and his right knee is painful to walk on and swollen. Patient reports that he usually sees Dr. Tompkins every 3 months but was unable to come to his last appointment d/t personal matters at home. He has an appointment on 2/16/17 with Dr. Tompkins. RN advised patient to see his PCP  Until he can get in with Dr. Tompkins if symptoms worsen. Also advised patient to take ibuprofen every 6 hours PRN and continue to ice as needed.. Patient agrees with plan.  Nel Swain RN

## 2017-01-26 ENCOUNTER — TELEPHONE (OUTPATIENT)
Dept: FAMILY MEDICINE | Facility: CLINIC | Age: 72
End: 2017-01-26

## 2017-01-26 NOTE — TELEPHONE ENCOUNTER
Reason for Call:  Other call back    Detailed comments: Patient states he is having problems with walking due to his right knee pains and some swelling, Please contact the patient to discuss further    Phone Number Patient can be reached at: Home number on file [phone] (home)    Best Time: today    Can we leave a detailed message on this number? YES    Call taken on 1/26/2017 at 9:02 AM by Low Barrios

## 2017-01-27 ENCOUNTER — OFFICE VISIT (OUTPATIENT)
Dept: FAMILY MEDICINE | Facility: CLINIC | Age: 72
End: 2017-01-27
Payer: COMMERCIAL

## 2017-01-27 ENCOUNTER — RADIANT APPOINTMENT (OUTPATIENT)
Dept: GENERAL RADIOLOGY | Facility: CLINIC | Age: 72
End: 2017-01-27
Attending: INTERNAL MEDICINE
Payer: COMMERCIAL

## 2017-01-27 VITALS
OXYGEN SATURATION: 92 % | HEART RATE: 100 BPM | WEIGHT: 217 LBS | TEMPERATURE: 96.7 F | HEIGHT: 69 IN | BODY MASS INDEX: 32.14 KG/M2

## 2017-01-27 DIAGNOSIS — M13.10 ACUTE MONOARTHRITIS: Primary | ICD-10-CM

## 2017-01-27 DIAGNOSIS — M13.10 ACUTE MONOARTHRITIS: ICD-10-CM

## 2017-01-27 LAB — URATE SERPL-MCNC: 5.6 MG/DL (ref 3.5–7.2)

## 2017-01-27 PROCEDURE — 36415 COLL VENOUS BLD VENIPUNCTURE: CPT | Performed by: INTERNAL MEDICINE

## 2017-01-27 PROCEDURE — 99213 OFFICE O/P EST LOW 20 MIN: CPT | Performed by: INTERNAL MEDICINE

## 2017-01-27 PROCEDURE — 73562 X-RAY EXAM OF KNEE 3: CPT | Mod: RT

## 2017-01-27 PROCEDURE — 84550 ASSAY OF BLOOD/URIC ACID: CPT | Performed by: INTERNAL MEDICINE

## 2017-01-27 PROCEDURE — 86140 C-REACTIVE PROTEIN: CPT | Performed by: INTERNAL MEDICINE

## 2017-01-27 RX ORDER — COLCHICINE 0.6 MG/1
0.6 CAPSULE ORAL
Qty: 14 CAPSULE | Refills: 1 | Status: SHIPPED | OUTPATIENT
Start: 2017-01-27 | End: 2017-02-09

## 2017-01-27 RX ORDER — HYDROCODONE BITARTRATE AND ACETAMINOPHEN 5; 325 MG/1; MG/1
1 TABLET ORAL EVERY 6 HOURS PRN
Qty: 30 TABLET | Refills: 0 | Status: SHIPPED | OUTPATIENT
Start: 2017-01-27 | End: 2017-06-23

## 2017-01-27 NOTE — PROGRESS NOTES
SUBJECTIVE:                                                    Zurdo Dash is a 71 year old male who presents to clinic today for the following health issues:    Acute monoarthritis     Wt Readings from Last 5 Encounters:   01/27/17 217 lb (98.431 kg)   01/16/17 222 lb (100.699 kg)   01/09/17 224 lb 12.8 oz (101.969 kg)   12/02/16 230 lb (104.327 kg)   11/26/16 228 lb 12.8 oz (103.783 kg)     Briefly we reviewed the new successful treatment with Liraglutide [ Victoza ] , since the last month . He's having weight loss and better blood glucose readings. He's not yet due for the hemoglobin a1c  [ diabetes test ] but will follow up for this down the road.    The reason for today's appointment is a new and focal problem, of an acute right knee mono-arthropathy. There's no history of trauma of any kind here. He woke up with a red and hot and swollen knee and his pain was severe, hard to walk on. Gradually over the last 4 days the pain has steadily subsided, but is still notable , his pain has come down from a 9-10/10 to a 5/10 pain. He has rheumatoid arthritis and sees Dr. Albert Tompkins, rheumatologist with [facility]  And is on methotrexate and hydroxychloroquine (PLAQUENIL) , but also has significant bilateral osteoarthritis with his knees and has been getting steroid injections regularly to his knees with Dr. Tompkins.    So there's a good question here which is what's the mechanism here? He has no fever or chills and no convincing evidence of a septic joint, I did not do a joint aspiration today although that could have been done to exclude gout flare-ups or infection, with his pain steadily improving this seemed unwarranted.  Musculoskeletal problem/pain      Duration: x 3-4 days     Description  Location: Right knee pain     Intensity:  moderate, severe    Accompanying signs and symptoms: weakness of the right knee, warmth, swelling and redness    History  Previous similar problem: no   Previous  evaluation:  none    Precipitating or alleviating factors:  Trauma or overuse: no   Aggravating factors include: standing, walking, climbing stairs, lifting, exercise and overuse    Therapies tried and outcome: cortisone shots in the knee in the past by [place]        Problem list and histories reviewed & adjusted, as indicated.  Additional history: as documented    Patient Active Problem List   Diagnosis     Pain in limb     Diabetic neuropathy (H)     Hyperlipidemia LDL goal <100     Hypertension goal BP (blood pressure) < 140/90     obesity     ED (erectile dysfunction)     Hypogonadism     Advanced directives, counseling/discussion     Health Care Home     Type 2 diabetes mellitus, uncontrolled, with neuropathy (H)     RBBB (right bundle branch block)     Lumbago     Ex-smoker     Cataracts, both eyes     Family history of esophageal cancer     Gastroesophageal reflux disease, esophagitis presence not specified     Rheumatoid arthritis involving multiple sites with positive rheumatoid factor (H)     Past Surgical History   Procedure Laterality Date     Tonsillectomy       Hc incision tendon sheath finger  4/2009     r hand ring finger       Social History   Substance Use Topics     Smoking status: Former Smoker -- 1.00 packs/day for 40 years     Types: Cigarettes     Quit date: 03/16/2007     Smokeless tobacco: Never Used     Alcohol Use: No     Family History   Problem Relation Age of Onset     CEREBROVASCULAR DISEASE Mother      Arthritis Mother      OSTEOPOROSIS Mother      Alzheimer Disease Father      Arthritis Father      CANCER Father      DIABETES Maternal Grandmother      Cardiovascular Maternal Grandmother      Hypertension No family hx of      Thyroid Disease No family hx of      Glaucoma No family hx of      Macular Degeneration No family hx of      CANCER Paternal Aunt      Other Cancer Brother          Current Outpatient Prescriptions   Medication Sig Dispense Refill     HYDROcodone-acetaminophen  (NORCO) 5-325 MG per tablet Take 1 tablet by mouth every 6 hours as needed for pain 30 tablet 0     Colchicine (MITIGARE) 0.6 MG CAPS Take 0.6 mg by mouth 2 times daily 14 capsule 1     pravastatin (PRAVACHOL) 40 MG tablet Take 1 tablet (40 mg) by mouth daily 90 tablet 3     hydrochlorothiazide (HYDRODIURIL) 25 MG tablet TAKE ONE TABLET BY MOUTH EVERY DAY 90 tablet 1     glimepiride (AMARYL) 4 MG tablet Take 1 tablet (4 mg) by mouth every morning (before breakfast) 90 tablet 1     liraglutide (VICTOZA) 18 MG/3ML soln Inject 1.2 mg Subcutaneous daily 6 mL 0     metFORMIN (GLUCOPHAGE) 500 MG tablet TAKE TWO TABLETS BY MOUTH TWICE A DAY WITH MEALS 360 tablet 0     insulin pen needle (ULTICARE MINI) 31G X 6 MM Use 1 daily or as directed. 100 each 1     hydroxychloroquine (PLAQUENIL) 200 MG tablet Take 1 tablet (200 mg) by mouth 2 times daily 180 tablet 1     methotrexate sodium 2.5 MG TABS Take 25 mg by mouth once a week . Take all 10 tablets on the same day of each week. 120 tablet 1     metoprolol (TOPROL-XL) 25 MG 24 hr tablet TAKE ONE TABLET BY MOUTH EVERY DAY 90 tablet 3     omeprazole (PRILOSEC) 20 MG capsule Take 1 capsule (20 mg) by mouth daily 90 capsule 2     INDIA CONTOUR NEXT test strip TEST THREE TIMES A DAY OR AS DIRECTED 100 each 3     folic acid (FOLVITE) 1 MG tablet Take 1 tablet (1 mg) by mouth daily 100 tablet 3     ACE/ARB NOT PRESCRIBED, INTENTIONAL,        MICROLET LANCETS MISC 1 Device daily 100 each 4     aspirin 81 MG tablet Take 1 tablet by mouth daily.  3     CALCIUM 500 + D 500-200 MG-IU OR TABS take 2 tabs daily       MULTI-VITAMIN OR None Entered       VITAMIN E 1000 UNIT OR CAPS 1 tablet daily       No Known Allergies  Recent Labs   Lab Test  12/02/16   1252  10/12/16   1227  08/05/16   1237  07/13/16   1231  06/24/16   1101  05/03/16   1147  04/07/16   1149  03/18/16   1511  09/30/15   1302   06/29/15   1213   08/07/14   1249   07/23/13   1016   A1C  9.8*   --   9.0*   --    --   6.9*    "--   8.6*  9.7*   --   11.8*   < >  7.0*   < >  7.0*   LDL  79   --    --    --    --    --    --    --   89   --   31   --   29   --   62   HDL  39*   --    --    --    --    --    --    --    --    --   37*   --   46   --   41   TRIG  282*   --    --    --    --    --    --    --    --    --   286*   --   270*   --   248*   ALT   --   40   --   44   --    --   35   --    --    < >   --    < >  52   < >   --    CR   --   0.98   --   1.02   --   1.07  1.38*  1.21   --    < >   --    < >  1.17   < >   --    GFRESTIMATED   --   75   --   72   --   68  51*  59*   --    < >   --    < >  62   < >   --    GFRESTBLACK   --   >90   GFR Calc     --   87   --   82  61  71   --    < >   --    < >  75   < >   --    POTASSIUM   --    --    --    --    --   3.7   --   3.9   --    < >   --    < >  3.6   < >   --    TSH   --    --    --    --   1.07   --    --    --    --    --    --    --    --    --   0.55    < > = values in this interval not displayed.      BP Readings from Last 3 Encounters:   01/16/17 127/83   01/09/17 132/92   12/02/16 124/78    Wt Readings from Last 3 Encounters:   01/27/17 217 lb (98.431 kg)   01/16/17 222 lb (100.699 kg)   01/09/17 224 lb 12.8 oz (101.969 kg)                  Labs reviewed in EPIC  Problem list, Medication list, Allergies, and Medical/Social/Surgical histories reviewed in [facility] and updated as appropriate.    ROS:  Constitutional, HEENT, cardiovascular, pulmonary, gi and gu systems are negative, except as otherwise noted.    OBJECTIVE:                                                    Pulse 100  Temp(Src) 96.7  F (35.9  C) (Oral)  Ht 5' 8.5\" (1.74 m)  Wt 217 lb (98.431 kg)  BMI 32.51 kg/m2  SpO2 92%  Body mass index is 32.51 kg/(m^2).  GENERAL APPEARANCE: healthy, alert and no distress  MS: the right knee is swollen compared to the left side and has tenderness to palpation , there's warmth to touch and this looks most consistent with gout although there's certainly " other possibilities   SKIN: no suspicious lesions or rashes, reddish discoloration but a lot less prominent now I am told the color has returned towards normal.    Diagnostic test results:  Diagnostic Test Results:  Orders Placed This Encounter   Procedures     XR Knee Right 3 Views     Uric acid     Erythrocyte sedimentation rate auto     CRP inflammation     CBC with platelets differential          ASSESSMENT/PLAN:                                                    1. Acute monoarthritis  -clinically I suspected gout to be most likely as he recalls being prescribed colchicine (COLCRYS) at a point in the distant past. However other possibilities including Calcium pyrophosphate dihydrate (CPPD) crystal deposition disease / pseudogout , versus osteoarthritis flare-up versus rheumatoid arthritis are all within the differential diagnosis. Patient has agreed to a short course of prednisone which is going to be helpful for virtually all of these diagnoses and he has follow up coming with Dr. Tompkins.  - Uric acid  - HYDROcodone-acetaminophen (NORCO) 5-325 MG per tablet; Take 1 tablet by mouth every 6 hours as needed for pain  Dispense: 30 tablet; Refill: 0  - XR Knee Right 3 Views; Future  - Colchicine (MITIGARE) 0.6 MG CAPS; Take 0.6 mg by mouth 2 times daily  Dispense: 14 capsule; Refill: 1      Follow up with Provider - approximately 2 months , when due for diabetes mellitus follow up      Kapil Duckworth MD  [facility]

## 2017-01-27 NOTE — MR AVS SNAPSHOT
After Visit Summary   1/27/2017    Zurdo Dash    MRN: [id number]           Patient Information     Date Of Birth          1945        Visit Information        Provider Department      1/27/2017 10:30 AM Kapil Duckworth MD [facility]        Today's Diagnoses     Acute monoarthritis    -  1     Rib pain on right side           Care Instructions    [facility]    If you have any questions regarding to your visit please contact your care team:       Team Red:   Clinic Hours Telephone Number   Dr. Dedra Hu, NP   7am-7pm  Monday - Thursday   7am-5pm  Fridays  [phone]  (Appointment scheduling available 24/7)    Questions about your visit?   Team Line  [phone]   Urgent Care - [place] and [facility] - 11am-9pm Monday-Friday Saturday-Sunday- 9am-5pm   [place] - 5pm-9pm Monday-Friday Saturday-Sunday- 9am-5pm  [phone] - [facility]  [phone] - [place]       What options do I have for visits at the clinic other than the traditional office visit?  To expand how we care for you, many of our providers are utilizing electronic visits (e-visits) and telephone visits, when medically appropriate, for interactions with their patients rather than a visit in the clinic.   We also offer nurse visits for many medical concerns. Just like any other service, we will bill your insurance company for this type of visit based on time spent on the phone with your provider. Not all insurance companies cover these visits. Please check with your medical insurance if this type of visit is covered. You will be responsible for any charges that are not paid by your insurance.      E-visits via SmartRx:  generally incur a $35.00 fee.  Telephone visits:  Time spent on the phone: *charged based on time that is spent on the phone in increments of 10 minutes. Estimated cost:   5-10 mins $30.00   11-20 mins. $59.00  "  21-30 mins. $85.00     Use [email or web address] (secure email communication and access to your chart) to send your primary care provider a message or make an appointment. Ask someone on your Team how to sign up for [employer].  For a Price Quote for your services, please call our Consumer Price Line at [phone].      As always, Thank you for trusting us with your health care needs!  Tim Hurtado          Follow-ups after your visit        Your next 10 appointments already scheduled     Feb 16, 2017  3:20 PM   Return Visit with Albert Tompkins MD   [facility]    [address] [address] [phone]              Who to contact     If you have questions or need follow up information about today's clinic visit or your schedule please contact [facility] directly at [phone].  Normal or non-critical lab and imaging results will be communicated to you by [email or web address], letter or phone within 4 business days after the clinic has received the results. If you do not hear from us within 7 days, please contact the clinic through [email or web address] or phone. If you have a critical or abnormal lab result, we will notify you by phone as soon as possible.  Submit refill requests through Bot[employer] or call your pharmacy and they will forward the refill request to us. Please allow 3 business days for your refill to be completed.          Additional Information About Your Visit        BotanoCap Information     [employer]Cap lets you send messages to your doctor, view your test results, renew your prescriptions, schedule appointments and more. To sign up, go to [email or web address] . Click on \"Log in\" on the left side of the screen, which will take you to the Welcome page. Then click on \"Sign up Now\" on the right side of the page.     You will be asked to enter the access code listed below, as well as some personal information. Please follow the directions to create your username and " "password.     Your access code is: [email or web address]  Expires: 2017  2:30 PM     Your access code will  in 90 days. If you need help or a new code, please call your [place] clinic or [phone].        Care EveryWhere ID     This is your Care EveryWhere ID. This could be used by other organizations to access your [place] medical records  [id number]        Your Vitals Were     Pulse Temperature Height BMI (Body Mass Index) Pulse Oximetry       100 96.7  F (35.9  C) (Oral) 5' 8.5\" (1.74 m) 32.51 kg/m2 92%        Blood Pressure from Last 3 Encounters:   17 127/83   17 132/92   16 124/78    Weight from Last 3 Encounters:   17 217 lb (98.431 kg)   17 222 lb (100.699 kg)   17 224 lb 12.8 oz (101.969 kg)              We Performed the Following     Uric acid          Where to get your medicines      Some of these will need a paper prescription and others can be bought over the counter.  Ask your nurse if you have questions.     Bring a paper prescription for each of these medications    - HYDROcodone-acetaminophen 5-325 MG per tablet       Primary Care Provider Office Phone # Fax #    Kapil Duckworth [phone]       [address] [address]        Thank you!     Thank you for choosing [facility]  for your care. Our goal is always to provide you with excellent care. Hearing back from our patients is one way we can continue to improve our services. Please take a few minutes to complete the written survey that you may receive in the mail after your visit with us. Thank you!             Your Updated Medication List - Protect others around you: Learn how to safely use, store and throw away your medicines at www.disposemymeds.org.          This list is accurate as of: 17 11:10 AM.  Always use your most recent med list.                   Brand Name Dispense Instructions for use    ACE/ARB NOT PRESCRIBED " (INTENTIONAL)          aspirin 81 MG tablet      Take 1 tablet by mouth daily.       INDIA CONTOUR NEXT test strip   Generic drug:  blood glucose monitoring     100 each    TEST THREE TIMES A DAY OR AS DIRECTED       CALCIUM 500 + D 500-200 MG-IU Tabs      take 2 tabs daily       folic acid 1 MG tablet    FOLVITE    100 tablet    Take 1 tablet (1 mg) by mouth daily       glimepiride 4 MG tablet    AMARYL    90 tablet    Take 1 tablet (4 mg) by mouth every morning (before breakfast)       hydrochlorothiazide 25 MG tablet    HYDRODIURIL    90 tablet    TAKE ONE TABLET BY MOUTH EVERY DAY       HYDROcodone-acetaminophen 5-325 MG per tablet    NORCO    30 tablet    Take 1 tablet by mouth every 6 hours as needed for pain       hydroxychloroquine 200 MG tablet    PLAQUENIL    180 tablet    Take 1 tablet (200 mg) by mouth 2 times daily       insulin pen needle 31G X 6 MM    ULTICARE MINI    100 each    Use 1 daily or as directed.       liraglutide 18 MG/3ML soln    VICTOZA    6 mL    Inject 1.2 mg Subcutaneous daily       metFORMIN 500 MG tablet    GLUCOPHAGE    360 tablet    TAKE TWO TABLETS BY MOUTH TWICE A DAY WITH MEALS       methotrexate sodium 2.5 MG Tabs     120 tablet    Take 25 mg by mouth once a week . Take all 10 tablets on the same day of each week.       metoprolol 25 MG 24 hr tablet    TOPROL-XL    90 tablet    TAKE ONE TABLET BY MOUTH EVERY DAY       MICROLET LANCETS Misc     100 each    1 Device daily       MULTI-VITAMIN PO      None Entered       omeprazole 20 MG CR capsule    priLOSEC    90 capsule    Take 1 capsule (20 mg) by mouth daily       pravastatin 40 MG tablet    PRAVACHOL    90 tablet    Take 1 tablet (40 mg) by mouth daily       vitamin E 1000 UNIT capsule    TOCOPHEROL     1 tablet daily

## 2017-01-27 NOTE — NURSING NOTE
"Chief Complaint   Patient presents with     Knee Pain       Initial Pulse 100  Temp(Src) 96.7  F (35.9  C) (Oral)  Ht 5' 8.5\" (1.74 m)  Wt 217 lb (98.431 kg)  BMI 32.51 kg/m2  SpO2 92% Estimated body mass index is 32.51 kg/(m^2) as calculated from the following:    Height as of this encounter: 5' 8.5\" (1.74 m).    Weight as of this encounter: 217 lb (98.431 kg).  BP completed using cuff size: jerome Hurtado      "

## 2017-01-27 NOTE — PATIENT INSTRUCTIONS
[facility]    If you have any questions regarding to your visit please contact your care team:       Team Red:   Clinic Hours Telephone Number   Dr. Dedra Hu, NP   7am-7pm  Monday - Thursday   7am-5pm  Fridays  [phone]  (Appointment scheduling available 24/7)    Questions about your visit?   Team Line  [phone]   Urgent Care - [place] - 11am-9pm Monday-Friday Saturday-Sunday- 9am-5pm   [place] - 5pm-9pm Monday-Friday Saturday-Sunday- 9am-5pm  [phone] - [place]   [phone] - [place]       What options do I have for visits at the clinic other than the traditional office visit?  To expand how we care for you, many of our providers are utilizing electronic visits (e-visits) and telephone visits, when medically appropriate, for interactions with their patients rather than a visit in the clinic.   We also offer nurse visits for many medical concerns. Just like any other service, we will bill your insurance company for this type of visit based on time spent on the phone with your provider. Not all insurance companies cover these visits. Please check with your medical insurance if this type of visit is covered. You will be responsible for any charges that are not paid by your insurance.      E-visits via PerfectSearch:  generally incur a $35.00 fee.  Telephone visits:  Time spent on the phone: *charged based on time that is spent on the phone in increments of 10 minutes. Estimated cost:   5-10 mins $30.00   11-20 mins. $59.00   21-30 mins. $85.00     Use Compumatrixt (secure email communication and access to your chart) to send your primary care provider a message or make an appointment. Ask someone on your Team how to sign up for [employer]Search.  For a Price Quote for your services, please call our Consumer Price Line at [phone].      As always, Thank you for trusting us with your health care needs!  Tim GARCIA  [place]

## 2017-01-30 LAB — CRP SERPL-MCNC: 189 MG/L (ref 0–8)

## 2017-01-30 NOTE — TELEPHONE ENCOUNTER
liraglutide (VICTOZA) 18 MG/3ML soln         Last Written Prescription Date: 12/9/2016  Last Fill Quantity: 6 mL, # refills: 0  Last Office Visit with G, P or [facility] prescribing provider:  1/27/2017   Next 5 appointments (look out 90 days)     Feb 16, 2017  3:20 PM   Return Visit with Albert Tompkins MD   [facility] ([place] [id number] [facility] [address]   [phone]                   BP Readings from Last 3 Encounters:   01/16/17 127/83   01/09/17 132/92   12/02/16 124/78     MICROL       59   6/24/2016  No results found for this basename: microalbumin  CREATININE   Date Value Ref Range Status   10/12/2016 0.98 0.66 - 1.25 mg/dL Final   ]  GFR ESTIMATE   Date Value Ref Range Status   10/12/2016 75 >60 mL/min/1.7m2 Final     Comment:     Non  GFR Calc   07/13/2016 72 >60 mL/min/1.7m2 Final     Comment:     Non  GFR Calc   05/03/2016 68 >60 mL/min/1.7m2 Final     Comment:     Non  GFR Calc     GFR ESTIMATE IF BLACK   Date Value Ref Range Status   10/12/2016 >90   GFR Calc   >60 mL/min/1.7m2 Final   07/13/2016 87 >60 mL/min/1.7m2 Final     Comment:      GFR Calc   05/03/2016 82 >60 mL/min/1.7m2 Final     Comment:      GFR Calc     CHOL      174   12/2/2016  HDL       39   12/2/2016  LDL       79   12/2/2016  LDL       89   9/30/2015  TRIG      282   12/2/2016  CHOLHDLRATIO      3.4   6/29/2015  AST       32   10/12/2016  ALT       40   10/12/2016  A1C      9.8   12/2/2016  A1C      9.0   8/5/2016  A1C      6.9   5/3/2016  A1C      8.6   3/18/2016  A1C      9.7   9/30/2015  POTASSIUM   Date Value Ref Range Status   05/03/2016 3.7 3.4 - 5.3 mmol/L Final

## 2017-01-31 RX ORDER — LIRAGLUTIDE 6 MG/ML
INJECTION SUBCUTANEOUS
Qty: 6 ML | Refills: 0 | Status: SHIPPED | OUTPATIENT
Start: 2017-01-31 | End: 2017-03-16

## 2017-01-31 NOTE — TELEPHONE ENCOUNTER
Prescription approved per [facility] Refill Protocol.  Patient due for visit in March.     Carline More RN

## 2017-02-01 NOTE — TELEPHONE ENCOUNTER
Bd pen needles         Last Written Prescription Date: 10/26/2016  Last Fill Quantity: 100, # refills: 1  Last Office Visit with [facility], [facility] or  Health prescribing provider:  01/27/2017    Next 5 appointments (look out 90 days)     Feb 16, 2017  3:20 PM   Return Visit with Albert Tompkins MD   [facility] ([facility])    [address] [id number] [phone]                   BP Readings from Last 3 Encounters:   01/16/17 127/83   01/09/17 132/92   12/02/16 124/78     MICROL       59   6/24/2016  No results found for this basename: microalbumin  CREATININE   Date Value Ref Range Status   10/12/2016 0.98 0.66 - 1.25 mg/dL Final   ]  GFR ESTIMATE   Date Value Ref Range Status   10/12/2016 75 >60 mL/min/1.7m2 Final     Comment:     Non  GFR Calc   07/13/2016 72 >60 mL/min/1.7m2 Final     Comment:     Non  GFR Calc   05/03/2016 68 >60 mL/min/1.7m2 Final     Comment:     Non  GFR Calc     GFR ESTIMATE IF BLACK   Date Value Ref Range Status   10/12/2016 >90   GFR Calc   >60 mL/min/1.7m2 Final   07/13/2016 87 >60 mL/min/1.7m2 Final     Comment:      GFR Calc   05/03/2016 82 >60 mL/min/1.7m2 Final     Comment:      GFR Calc     CHOL      174   12/2/2016  HDL       39   12/2/2016  LDL       79   12/2/2016  LDL       89   9/30/2015  TRIG      282   12/2/2016  CHOLHDLRATIO      3.4   6/29/2015  AST       32   10/12/2016  ALT       40   10/12/2016  A1C      9.8   12/2/2016  A1C      9.0   8/5/2016  A1C      6.9   5/3/2016  A1C      8.6   3/18/2016  A1C      9.7   9/30/2015  POTASSIUM   Date Value Ref Range Status   05/03/2016 3.7 3.4 - 5.3 mmol/L Final     Gladys Corcoran  Pharmacy Technician  [facility]  Ph# [phone]  Fax# [phone]

## 2017-02-01 NOTE — TELEPHONE ENCOUNTER
Prescription approved per [facility] Refill Protocol.  Jacobo Del Castillo RN

## 2017-02-06 ENCOUNTER — ALLIED HEALTH/NURSE VISIT (OUTPATIENT)
Dept: INTERNAL MEDICINE | Facility: CLINIC | Age: 72
End: 2017-02-06
Payer: COMMERCIAL

## 2017-02-06 ENCOUNTER — ALLIED HEALTH/NURSE VISIT (OUTPATIENT)
Dept: NURSING | Facility: CLINIC | Age: 72
End: 2017-02-06
Payer: COMMERCIAL

## 2017-02-06 VITALS — SYSTOLIC BLOOD PRESSURE: 132 MMHG | DIASTOLIC BLOOD PRESSURE: 88 MMHG | HEART RATE: 80 BPM

## 2017-02-06 VITALS — HEART RATE: 88 BPM | DIASTOLIC BLOOD PRESSURE: 98 MMHG | SYSTOLIC BLOOD PRESSURE: 138 MMHG

## 2017-02-06 DIAGNOSIS — I10 HTN (HYPERTENSION): Primary | ICD-10-CM

## 2017-02-06 DIAGNOSIS — I10 HYPERTENSION GOAL BP (BLOOD PRESSURE) < 140/90: Primary | ICD-10-CM

## 2017-02-06 PROCEDURE — 99207 ZZC NO CHARGE NURSE ONLY: CPT

## 2017-02-06 PROCEDURE — 99207 ZZC NO CHARGE NURSE ONLY: CPT | Performed by: INTERNAL MEDICINE

## 2017-02-06 NOTE — PATIENT INSTRUCTIONS
Zurdo Dash is a 71 year old male who comes in today for a Blood Pressure check because of elevated BP at dental appt.    BP Readings from Last 3 Encounters:   02/06/17 132/88   01/16/17 127/83   01/09/17 132/92          Patient is taking medication as prescribed  Patient is tolerating medications well.  Patient is not monitoring Blood Pressure at home.  Average readings if yes are n/a    Current complaints: none    Disposition: results routed to MD/ALTA Del Castillo RN

## 2017-02-06 NOTE — MR AVS SNAPSHOT
After Visit Summary   2/6/2017    Zurdo Dash    MRN: [id number]           Patient Information     Date Of Birth          1945        Visit Information        Provider Department      2/6/2017 3:00 PM CAR MANRIQUEZ [facility]        Care Instructions    Zurdo Dash is a 71 year old male who comes in today for a Blood Pressure check because of elevated BP at dental appt.    BP Readings from Last 3 Encounters:   02/06/17 132/88   01/16/17 127/83   01/09/17 132/92          Patient is taking medication as prescribed  Patient is tolerating medications well.  Patient is not monitoring Blood Pressure at home.  Average readings if yes are n/a    Current complaints: none    Disposition: results routed to MD/ALTA Del Castillo RN            Follow-ups after your visit        Your next 10 appointments already scheduled     Feb 09, 2017  4:10 PM   Office Visit with Kapil Duckworth MD   [facility] ([facility])    [address] [place] [id number] [phone]           Bring a current list of meds and any records pertaining to this visit.  For Physicals, please bring immunization records and any forms needing to be filled out.  Please arrive 10 minutes early to complete paperwork.            Feb 16, 2017  3:20 PM   Return Visit with Albert Tompkins MD   [facility] [facility] [id number] [phone]              Who to contact     If you have questions or need follow up information about today's clinic visit or your schedule please contact [facility] directly at [phone].  Normal or non-critical lab and imaging results will be communicated to you by MyChart, letter or phone within 4 business days after the clinic has received the results. If you do not hear from us within 7 days, please contact the clinic through MyChart or phone. If you have a critical or abnormal lab  "result, we will notify you by phone as soon as possible.  Submit refill requests through reQwip or call your pharmacy and they will forward the refill request to us. Please allow 3 business days for your refill to be completed.          Additional Information About Your Visit        [employer] Information     reQwip lets you send messages to your doctor, view your test results, renew your prescriptions, schedule appointments and more. To sign up, go to [email or web address] . Click on \"Log in\" on the left side of the screen, which will take you to the Welcome page. Then click on \"Sign up Now\" on the right side of the page.     You will be asked to enter the access code listed below, as well as some personal information. Please follow the directions to create your username and password.     Your access code is: [email or web address]  Expires: 2017  2:30 PM     Your access code will  in 90 days. If you need help or a new code, please call your [place] clinic or [phone].        Care EveryWhere ID     This is your Care EveryWhere ID. This could be used by other organizations to access your [place] medical records  [id number]        Your Vitals Were     Pulse                   80            Blood Pressure from Last 3 Encounters:   17 132/88   17 127/83   17 132/92    Weight from Last 3 Encounters:   17 217 lb (98.431 kg)   17 222 lb (100.699 kg)   17 224 lb 12.8 oz (101.969 kg)              Today, you had the following     No orders found for display       Primary Care Provider Office Phone # Fax #    Kapil Duckworth [phone]       [facility] [address] [place] [address]        Thank you!     Thank you for choosing [facility]  for your care. Our goal is always to provide you with excellent care. Hearing back from our patients is one way we can continue to improve our services. Please take a few minutes to complete the " written survey that you may receive in the mail after your visit with us. Thank you!             Your Updated Medication List - Protect others around you: Learn how to safely use, store and throw away your medicines at www.disposemymeds.org.          This list is accurate as of: 2/6/17  3:18 PM.  Always use your most recent med list.                   Brand Name Dispense Instructions for use    ACE/ARB NOT PRESCRIBED (INTENTIONAL)          aspirin 81 MG tablet      Take 1 tablet by mouth daily.       INDIA CONTOUR NEXT test strip   Generic drug:  blood glucose monitoring     100 each    TEST THREE TIMES A DAY OR AS DIRECTED       CALCIUM 500 + D 500-200 MG-IU Tabs      take 2 tabs daily       Colchicine 0.6 MG Caps    MITIGARE    14 capsule    Take 0.6 mg by mouth 2 times daily       folic acid 1 MG tablet    FOLVITE    100 tablet    Take 1 tablet (1 mg) by mouth daily       glimepiride 4 MG tablet    AMARYL    90 tablet    Take 1 tablet (4 mg) by mouth every morning (before breakfast)       hydrochlorothiazide 25 MG tablet    HYDRODIURIL    90 tablet    TAKE ONE TABLET BY MOUTH EVERY DAY       HYDROcodone-acetaminophen 5-325 MG per tablet    NORCO    30 tablet    Take 1 tablet by mouth every 6 hours as needed for pain       hydroxychloroquine 200 MG tablet    PLAQUENIL    180 tablet    Take 1 tablet (200 mg) by mouth 2 times daily       insulin pen needle 31G X 6 MM    ULTICARE MINI    100 each    Use 1 daily or as directed.       metFORMIN 500 MG tablet    GLUCOPHAGE    360 tablet    TAKE TWO TABLETS BY MOUTH TWICE A DAY WITH MEALS       methotrexate sodium 2.5 MG Tabs     120 tablet    Take 25 mg by mouth once a week . Take all 10 tablets on the same day of each week.       metoprolol 25 MG 24 hr tablet    TOPROL-XL    90 tablet    TAKE ONE TABLET BY MOUTH EVERY DAY       MICROLET LANCETS Misc     100 each    1 Device daily       MULTI-VITAMIN PO      None Entered       omeprazole 20 MG CR capsule    priLOSEC     90 capsule    Take 1 capsule (20 mg) by mouth daily       pravastatin 40 MG tablet    PRAVACHOL    90 tablet    Take 1 tablet (40 mg) by mouth daily       VICTOZA PEN 18 MG/3ML soln   Generic drug:  liraglutide     6 mL    INJECT 1.2MG SUBCUTANEOUSLY DAILY       vitamin E 1000 UNIT capsule    TOCOPHEROL     1 tablet daily

## 2017-02-06 NOTE — PROGRESS NOTES
Zurdo Dash is enrolled/participating in the retail pharmacy Blood Pressure Goals Achievement Program (BPGAP).  Zurdo Dash was evaluated at [facility] on February 6, 2017 at which time his blood pressure was:    BP Readings from Last 3 Encounters:   02/06/17 138/98   02/06/17 132/88   01/16/17 127/83     Reviewed lifestyle modifications for blood pressure control and reduction: including making healthy food choices, managing weight, getting regular exercise, smoking cessation, reducing alcohol consumption, monitoring blood pressure regularly.     Zurdo Dash is not experiencing symptoms.    Follow-Up: BP is not at goal.  Escorted to clinic for ancillary appointment.    Completed by: Sree Jane RPH  [facility]  [phone]

## 2017-02-06 NOTE — Clinical Note
BP check 138/98 and pulse of 88. He was seen at clinic for ancillary appoint,ham.  Needs to have BP under control for dental appointment in 2 weeks.  Will resched back in pharmacy 3/6/17.

## 2017-02-06 NOTE — NURSING NOTE
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    *Document pulse and BP  *Use new set of vitals button for multiple readings.  *Use extended vitals for orthostatic    Vitals as recorded, a regular cuff was used.    Patient is taking medication as prescribed  Patient is tolerating medications well.  Patient is not monitoring Blood Pressure at home.  Average readings if yes are n/a    Current complaints: none    Disposition: results routed to MD/AP    BP was good when rechecked in clinic. 130/92 rechecked 132/88 pulse 80  Wrote down BP for patient to bring to his dental appointment as requested    Jacobo Del Castillo RN

## 2017-02-09 ENCOUNTER — OFFICE VISIT (OUTPATIENT)
Dept: FAMILY MEDICINE | Facility: CLINIC | Age: 72
End: 2017-02-09
Payer: COMMERCIAL

## 2017-02-09 VITALS
OXYGEN SATURATION: 96 % | TEMPERATURE: 97.1 F | DIASTOLIC BLOOD PRESSURE: 102 MMHG | SYSTOLIC BLOOD PRESSURE: 150 MMHG | HEART RATE: 99 BPM | BODY MASS INDEX: 33.11 KG/M2 | WEIGHT: 221 LBS

## 2017-02-09 DIAGNOSIS — E11.42 DIABETIC POLYNEUROPATHY ASSOCIATED WITH TYPE 2 DIABETES MELLITUS (H): ICD-10-CM

## 2017-02-09 DIAGNOSIS — Z79.899 HIGH RISK MEDICATIONS (NOT ANTICOAGULANTS) LONG-TERM USE: ICD-10-CM

## 2017-02-09 LAB — HBA1C MFR BLD: 9.2 % (ref 4.3–6)

## 2017-02-09 PROCEDURE — 80048 BASIC METABOLIC PNL TOTAL CA: CPT | Performed by: INTERNAL MEDICINE

## 2017-02-09 PROCEDURE — 83036 HEMOGLOBIN GLYCOSYLATED A1C: CPT | Performed by: INTERNAL MEDICINE

## 2017-02-09 PROCEDURE — 36415 COLL VENOUS BLD VENIPUNCTURE: CPT | Performed by: INTERNAL MEDICINE

## 2017-02-09 PROCEDURE — 99213 OFFICE O/P EST LOW 20 MIN: CPT | Performed by: INTERNAL MEDICINE

## 2017-02-09 RX ORDER — HYDROXYCHLOROQUINE SULFATE 200 MG/1
200 TABLET, FILM COATED ORAL 2 TIMES DAILY
Qty: 180 TABLET | Refills: 1 | Status: SHIPPED | OUTPATIENT
Start: 2017-02-09 | End: 2017-05-18

## 2017-02-09 RX ORDER — LIRAGLUTIDE 6 MG/ML
INJECTION SUBCUTANEOUS
Qty: 6 ML | Refills: 0 | Status: CANCELLED | OUTPATIENT
Start: 2017-02-09

## 2017-02-09 RX ORDER — LISINOPRIL 5 MG/1
5 TABLET ORAL DAILY
Qty: 90 TABLET | Refills: 1 | Status: SHIPPED | OUTPATIENT
Start: 2017-02-09 | End: 2017-03-16

## 2017-02-09 RX ORDER — METHOTREXATE 2.5 MG/1
25 TABLET ORAL WEEKLY
Qty: 120 TABLET | Refills: 1 | Status: SHIPPED | OUTPATIENT
Start: 2017-02-09 | End: 2017-05-18

## 2017-02-09 RX ORDER — LIRAGLUTIDE 6 MG/ML
1.8 INJECTION SUBCUTANEOUS DAILY
Qty: 9 ML | Refills: 3 | Status: SHIPPED | OUTPATIENT
Start: 2017-02-09 | End: 2017-03-16

## 2017-02-09 ASSESSMENT — PAIN SCALES - GENERAL: PAINLEVEL: NO PAIN (0)

## 2017-02-09 NOTE — Clinical Note
Lets please make sure the patient was scheduled for an ancillary staff [medical assistant] office visit for a hypertension recheck in 2 weeks to a month.     Also he needs a copy of this office visit note

## 2017-02-09 NOTE — MR AVS SNAPSHOT
After Visit Summary   2/9/2017    Zurdo Dash    MRN: [id number]           Patient Information     Date Of Birth          1945        Visit Information        Provider Department      2/9/2017 4:10 PM Kapil Duckworth MD [facility]        Today's Diagnoses     Type 2 diabetes mellitus, uncontrolled, with neuropathy (H)    -  1     High risk medications (not anticoagulants) long-term use         Diabetic polyneuropathy associated with type 2 diabetes mellitus (H)           Care Instructions    [facility]    If you have any questions regarding to your visit please contact your care team:     Team Pink:   Clinic Hours Telephone Number   Internal Medicine:  Dr. Suze Zapata NP       7am-7pm  Monday - Thursday   7am-5pm  Fridays  [phone]  (Appointment scheduling available 24/7)    Questions about your visit?  Team Line  [phone]   [facility] [place] [facility] 11am-9pm Monday-Friday Saturday-Sunday- 9am-5pm   [place] - 5pm-9pm Monday-Friday Saturday-Sunday- 9am-5pm  [phone] - [place]   [phone] - [place]       What options do I have for visits at the clinic other than the traditional office visit?  To expand how we care for you, many of our providers are utilizing electronic visits (e-visits) and telephone visits, when medically appropriate, for interactions with their patients rather than a visit in the clinic.   We also offer nurse visits for many medical concerns. Just like any other service, we will bill your insurance company for this type of visit based on time spent on the phone with your provider. Not all insurance companies cover these visits. Please check with your medical insurance if this type of visit is covered. You will be responsible for any charges that are not paid by your insurance.      E-visits via [employer]:  generally incur a $35.00 fee.  Telephone visits:  Time  "spent on the phone: *charged based on time that is spent on the phone in increments of 10 minutes. Estimated cost:   5-10 mins $30.00   11-20 mins. $59.00   21-30 mins. $85.00   Use [employer] (secure email communication and access to your chart) to send your primary care provider a message or make an appointment. Ask someone on your Team how to sign up for The Training Room (TTR).    For a Price Quote for your services, please call our [employer] Price Line at [phone].    As always, Thank you for trusting us with your health care needs!    Amalia Murillo CMA          Follow-ups after your visit        Your next 10 appointments already scheduled     Feb 16, 2017  3:20 PM   Return Visit with Albert Tompkins MD   [facility] ([address] [id number] [phone]              Who to contact     If you have questions or need follow up information about today's clinic visit or your schedule please contact [facility] directly at [phone].  Normal or non-critical lab and imaging results will be communicated to you by MyChart, letter or phone within 4 business days after the clinic has received the results. If you do not hear from us within 7 days, please contact the clinic through [employer] or phone. If you have a critical or abnormal lab result, we will notify you by phone as soon as possible.  Submit refill requests through The Training Room (TTR) or call your pharmacy and they will forward the refill request to us. Please allow 3 business days for your refill to be completed.          Additional Information About Your Visit        The Training Room (TTR) Information     The Training Room (TTR) lets you send messages to your doctor, view your test results, renew your prescriptions, schedule appointments and more. To sign up, go to [email or web address] . Click on \"Log in\" on the left side of the screen, which will take you to the Welcome page. Then click on \"Sign up Now\" on the right side of the page. "     You will be asked to enter the access code listed below, as well as some personal information. Please follow the directions to create your username and password.     Your access code is: [email or web address]  Expires: 2017  2:30 PM     Your access code will  in 90 days. If you need help or a new code, please call your [place] clinic or [phone].        Care EveryWhere ID     This is your Care EveryWhere ID. This could be used by other organizations to access your [place] medical records  [id number]        Your Vitals Were     Pulse Temperature Pulse Oximetry             99 97.1  F (36.2  C) (Oral) 96%          Blood Pressure from Last 3 Encounters:   17 150/102   17 138/98   17 132/88    Weight from Last 3 Encounters:   17 221 lb (100.245 kg)   17 217 lb (98.431 kg)   17 222 lb (100.699 kg)              We Performed the Following     Basic metabolic panel     HEMOGLOBIN A1C          Today's Medication Changes          These changes are accurate as of: 17  5:08 PM.  If you have any questions, ask your nurse or doctor.               Start taking these medicines.        Dose/Directions    lisinopril 5 MG tablet   Commonly known as:  PRINIVIL/ZESTRIL   Used for:  Type 2 diabetes mellitus, uncontrolled, with neuropathy (H)   Started by:  Kapil Duckworth MD        Dose:  5 mg   Take 1 tablet (5 mg) by mouth daily   Quantity:  90 tablet   Refills:  1         These medicines have changed or have updated prescriptions.        Dose/Directions    metFORMIN 500 MG tablet   Commonly known as:  GLUCOPHAGE   This may have changed:  See the new instructions.   Used for:  Type 2 diabetes mellitus, uncontrolled, with neuropathy (H)   Changed by:  Kapil Duckworth MD        TAKE TWO TABLETS BY MOUTH TWICE A DAY WITH MEALS   Quantity:  360 tablet   Refills:  0       * VICTOZA PEN 18 MG/3ML soln   This may have changed:  Another medication with the same name was added. Make sure you  understand how and when to take each.   Used for:  Type 2 diabetes mellitus, uncontrolled, with neuropathy (H)   Generic drug:  liraglutide   Changed by:  Kapil Duckworth MD        INJECT 1.2MG SUBCUTANEOUSLY DAILY   Quantity:  6 mL   Refills:  0       * liraglutide 18 MG/3ML soln   Commonly known as:  VICTOZA   This may have changed:  You were already taking a medication with the same name, and this prescription was added. Make sure you understand how and when to take each.   Used for:  Type 2 diabetes mellitus, uncontrolled, with neuropathy (H)   Changed by:  Kapil Duckworth MD        Dose:  1.8 mg   Inject 1.8 mg Subcutaneous daily   Quantity:  9 mL   Refills:  3       * Notice:  This list has 2 medication(s) that are the same as other medications prescribed for you. Read the directions carefully, and ask your doctor or other care provider to review them with you.         Where to get your medicines      These medications were sent to [facility] [address] [address]     Phone:  [phone]    - hydroxychloroquine 200 MG tablet  - liraglutide 18 MG/3ML soln  - lisinopril 5 MG tablet  - metFORMIN 500 MG tablet  - methotrexate sodium 2.5 MG Tabs             Primary Care Provider Office Phone # Fax #    Kapil Duckworth [phone]       [address] [address]        Thank you!     Thank you for choosing [facility]  for your care. Our goal is always to provide you with excellent care. Hearing back from our patients is one way we can continue to improve our services. Please take a few minutes to complete the written survey that you may receive in the mail after your visit with us. Thank you!             Your Updated Medication List - Protect others around you: Learn how to safely use, store and throw away your medicines at www.disposemymeds.org.          This list is  accurate as of: 2/9/17  5:08 PM.  Always use your most recent med list.                   Brand Name Dispense Instructions for use    ACE/ARB NOT PRESCRIBED (INTENTIONAL)          aspirin 81 MG tablet      Take 1 tablet by mouth daily.       INDIA CONTOUR NEXT test strip   Generic drug:  blood glucose monitoring     100 each    TEST THREE TIMES A DAY OR AS DIRECTED       CALCIUM 500 + D 500-200 MG-IU Tabs      take 2 tabs daily       folic acid 1 MG tablet    FOLVITE    100 tablet    Take 1 tablet (1 mg) by mouth daily       glimepiride 4 MG tablet    AMARYL    90 tablet    Take 1 tablet (4 mg) by mouth every morning (before breakfast)       hydrochlorothiazide 25 MG tablet    HYDRODIURIL    90 tablet    TAKE ONE TABLET BY MOUTH EVERY DAY       HYDROcodone-acetaminophen 5-325 MG per tablet    NORCO    30 tablet    Take 1 tablet by mouth every 6 hours as needed for pain       hydroxychloroquine 200 MG tablet    PLAQUENIL    180 tablet    Take 1 tablet (200 mg) by mouth 2 times daily       insulin pen needle 31G X 6 MM    ULTICARE MINI    100 each    Use 1 daily or as directed.       lisinopril 5 MG tablet    PRINIVIL/ZESTRIL    90 tablet    Take 1 tablet (5 mg) by mouth daily       metFORMIN 500 MG tablet    GLUCOPHAGE    360 tablet    TAKE TWO TABLETS BY MOUTH TWICE A DAY WITH MEALS       methotrexate sodium 2.5 MG Tabs     120 tablet    Take 25 mg by mouth once a week . Take all 10 tablets on the same day of each week.       metoprolol 25 MG 24 hr tablet    TOPROL-XL    90 tablet    TAKE ONE TABLET BY MOUTH EVERY DAY       MICROLET LANCETS Misc     100 each    1 Device daily       MULTI-VITAMIN PO      None Entered       omeprazole 20 MG CR capsule    priLOSEC    90 capsule    Take 1 capsule (20 mg) by mouth daily       pravastatin 40 MG tablet    PRAVACHOL    90 tablet    Take 1 tablet (40 mg) by mouth daily       * VICTOZA PEN 18 MG/3ML soln   Generic drug:  liraglutide     6 mL    INJECT 1.2MG SUBCUTANEOUSLY  DAILY       * liraglutide 18 MG/3ML soln    VICTOZA    9 mL    Inject 1.8 mg Subcutaneous daily       vitamin E 1000 UNIT capsule    TOCOPHEROL     1 tablet daily       * Notice:  This list has 2 medication(s) that are the same as other medications prescribed for you. Read the directions carefully, and ask your doctor or other care provider to review them with you.

## 2017-02-09 NOTE — PATIENT INSTRUCTIONS
[facility]    If you have any questions regarding to your visit please contact your care team:     Team Pink:   Clinic Hours Telephone Number   Internal Medicine:  Dr. Suze Zapata NP       7am-7pm  Monday - Thursday   7am-5pm  Fridays  [phone]  (Appointment scheduling available 24/7)    Questions about your visit?  Team Line  [phone]   Urgent [facility] - [place] [facility] - 11am-9pm Monday-Friday Saturday-Sunday- 9am-5pm   [place] - 5pm-9pm Monday-Friday Saturday-Sunday- 9am-5pm  [phone] - [place]   [phone] - [place]       What options do I have for visits at the clinic other than the traditional office visit?  To expand how we care for you, many of our providers are utilizing electronic visits (e-visits) and telephone visits, when medically appropriate, for interactions with their patients rather than a visit in the clinic.   We also offer nurse visits for many medical concerns. Just like any other service, we will bill your insurance company for this type of visit based on time spent on the phone with your provider. Not all insurance companies cover these visits. Please check with your medical insurance if this type of visit is covered. You will be responsible for any charges that are not paid by your insurance.      E-visits via [employer]:  generally incur a $35.00 fee.  Telephone visits:  Time spent on the phone: *charged based on time that is spent on the phone in increments of 10 minutes. Estimated cost:   5-10 mins $30.00   11-20 mins. $59.00   21-30 mins. $85.00   Use [email or web address] (secure email communication and access to your chart) to send your primary care provider a message or make an appointment. Ask someone on your Team how to sign up for Interventional Imaging.    For a Price Quote for your services, please call our Consumer Price Line at [phone].    As always, Thank you for trusting us with your health care  needs!    Amalia Murillo, CMA

## 2017-02-09 NOTE — PROGRESS NOTES
SUBJECTIVE:                                                    Zurdo Dash is a 71 year old male who presents to clinic today for the following health issues:      Chief Complaint   Patient presents with     Other     Patient requesting Orthotic shoes for diabetes and arthritis.     Patient has been using specialized shoes for diabetic neuropathy for about 4 years now.We did a DIABETIC FOOT EXAM today and we noted that patient has anatomic abnormalities consistent with hammertoe bilateral and decreased  sensation consistent with diabetes neuropathy [ mild ], affected digits include all 5 bilateral , because of these problems and diagnoses he definitely needs specialized shoes for diabetic neuropathy.    Also he is concerned with his blood pressure, he went for the dental care and was turned away, because his blood pressure reading was too high.    Finally he has tolerated the Liraglutide [ Victoza ] without difficult and we agreed to push him up to the full 1.8 milligram dose today. He is leaving soon for [place] and will be gone for approximately 6 weeks or more.    BP Readings from Last 6 Encounters:   02/09/17 150/102   02/06/17 138/98   02/06/17 132/88   01/16/17 127/83   01/09/17 132/92   12/02/16 124/78         Problem list and histories reviewed & adjusted, as indicated.  Additional history: as documented    Patient Active Problem List   Diagnosis     Pain in limb     Diabetic neuropathy (H)     Hyperlipidemia LDL goal <100     Hypertension goal BP (blood pressure) < 140/90     obesity     ED (erectile dysfunction)     Hypogonadism     Advanced directives, counseling/discussion     Health Care Home     Type 2 diabetes mellitus, uncontrolled, with neuropathy (H)     RBBB (right bundle branch block)     Lumbago     Ex-smoker     Cataracts, both eyes     Family history of esophageal cancer     Gastroesophageal reflux disease, esophagitis presence not specified     Rheumatoid arthritis involving multiple  sites with positive rheumatoid factor (H)     Past Surgical History   Procedure Laterality Date     Tonsillectomy       Hc incision tendon sheath finger  4/2009     r hand ring finger       Social History   Substance Use Topics     Smoking status: Former Smoker -- 1.00 packs/day for 40 years     Types: Cigarettes     Quit date: 03/16/2007     Smokeless tobacco: Never Used     Alcohol Use: No     Family History   Problem Relation Age of Onset     CEREBROVASCULAR DISEASE Mother      Arthritis Mother      OSTEOPOROSIS Mother      Alzheimer Disease Father      Arthritis Father      CANCER Father      DIABETES Maternal Grandmother      Cardiovascular Maternal Grandmother      Hypertension No family hx of      Thyroid Disease No family hx of      Glaucoma No family hx of      Macular Degeneration No family hx of      CANCER Paternal Aunt      Other Cancer Brother          Current Outpatient Prescriptions   Medication Sig Dispense Refill     metFORMIN (GLUCOPHAGE) 500 MG tablet TAKE TWO TABLETS BY MOUTH TWICE A DAY WITH MEALS 360 tablet 0     hydroxychloroquine (PLAQUENIL) 200 MG tablet Take 1 tablet (200 mg) by mouth 2 times daily 180 tablet 1     methotrexate sodium 2.5 MG TABS Take 25 mg by mouth once a week . Take all 10 tablets on the same day of each week. 120 tablet 1     lisinopril (PRINIVIL/ZESTRIL) 5 MG tablet Take 1 tablet (5 mg) by mouth daily 90 tablet 1     liraglutide (VICTOZA) 18 MG/3ML soln Inject 1.8 mg Subcutaneous daily 9 mL 3     insulin pen needle (ULTICARE MINI) 31G X 6 MM Use 1 daily or as directed. 100 each 1     VICTOZA PEN 18 MG/3ML soln INJECT 1.2MG SUBCUTANEOUSLY DAILY 6 mL 0     HYDROcodone-acetaminophen (NORCO) 5-325 MG per tablet Take 1 tablet by mouth every 6 hours as needed for pain 30 tablet 0     pravastatin (PRAVACHOL) 40 MG tablet Take 1 tablet (40 mg) by mouth daily 90 tablet 3     hydrochlorothiazide (HYDRODIURIL) 25 MG tablet TAKE ONE TABLET BY MOUTH EVERY DAY 90 tablet 1      glimepiride (AMARYL) 4 MG tablet Take 1 tablet (4 mg) by mouth every morning (before breakfast) 90 tablet 1     metoprolol (TOPROL-XL) 25 MG 24 hr tablet TAKE ONE TABLET BY MOUTH EVERY DAY 90 tablet 3     omeprazole (PRILOSEC) 20 MG capsule Take 1 capsule (20 mg) by mouth daily 90 capsule 2     INDIA CONTOUR NEXT test strip TEST THREE TIMES A DAY OR AS DIRECTED 100 each 3     folic acid (FOLVITE) 1 MG tablet Take 1 tablet (1 mg) by mouth daily 100 tablet 3     MICROLET LANCETS MISC 1 Device daily 100 each 4     aspirin 81 MG tablet Take 1 tablet by mouth daily.  3     CALCIUM 500 + D 500-200 MG-IU OR TABS take 2 tabs daily       MULTI-VITAMIN OR None Entered       VITAMIN E 1000 UNIT OR CAPS 1 tablet daily       [DISCONTINUED] metFORMIN (GLUCOPHAGE) 500 MG tablet TAKE TWO TABLETS BY MOUTH TWICE A DAY WITH MEALS 360 tablet 0     [DISCONTINUED] methotrexate sodium 2.5 MG TABS Take 25 mg by mouth once a week . Take all 10 tablets on the same day of each week. 120 tablet 1     ACE/ARB NOT PRESCRIBED, INTENTIONAL,        No Known Allergies  Recent Labs   Lab Test  12/02/16   1252  10/12/16   1227  08/05/16   1237  07/13/16   1231  06/24/16   1101  05/03/16   1147  04/07/16   1149  03/18/16   1511  09/30/15   1302   06/29/15   1213   08/07/14   1249   07/23/13   1016   A1C  9.8*   --   9.0*   --    --   6.9*   --   8.6*  9.7*   --   11.8*   < >  7.0*   < >  7.0*   LDL  79   --    --    --    --    --    --    --   89   --   31   --   29   --   62   HDL  39*   --    --    --    --    --    --    --    --    --   37*   --   46   --   41   TRIG  282*   --    --    --    --    --    --    --    --    --   286*   --   270*   --   248*   ALT   --   40   --   44   --    --   35   --    --    < >   --    < >  52   < >   --    CR   --   0.98   --   1.02   --   1.07  1.38*  1.21   --    < >   --    < >  1.17   < >   --    GFRESTIMATED   --   75   --   72   --   68  51*  59*   --    < >   --    < >  62   < >   --    GFRESTBLACK   --    >90   GFR Calc     --   87   --   82  61  71   --    < >   --    < >  75   < >   --    POTASSIUM   --    --    --    --    --   3.7   --   3.9   --    < >   --    < >  3.6   < >   --    TSH   --    --    --    --   1.07   --    --    --    --    --    --    --    --    --   0.55    < > = values in this interval not displayed.      BP Readings from Last 3 Encounters:   02/09/17 150/102   02/06/17 138/98   02/06/17 132/88    Wt Readings from Last 3 Encounters:   02/09/17 221 lb (100.245 kg)   01/27/17 217 lb (98.431 kg)   01/16/17 222 lb (100.699 kg)                  Labs reviewed in EPIC  Problem list, Medication list, Allergies, and Medical/Social/Surgical histories reviewed in [facility] and updated as appropriate.    ROS:  Constitutional, HEENT, cardiovascular, pulmonary, gi and gu systems are negative, except as otherwise noted.    OBJECTIVE:                                                    /102 mmHg  Pulse 99  Temp(Src) 97.1  F (36.2  C) (Oral)  Wt 221 lb (100.245 kg)  SpO2 96%  Body mass index is 33.11 kg/(m^2).  GENERAL APPEARANCE: healthy, alert and no distress  DIABETIC FOOT EXAM: normal DP and PT pulses, no trophic changes or ulcerative lesions and reduced sensation at the tip of both feet, generally the end of the toes and a bit onto the tops. He also has anatomic pathology consistent with hammer toes bilateral.     Diagnostic test results:  Diagnostic Test Results:       ASSESSMENT/PLAN:                                                    1. Type 2 diabetes mellitus, uncontrolled, with neuropathy (H)  Increased dose of Liraglutide [ Victoza ] as detailed above   - metFORMIN (GLUCOPHAGE) 500 MG tablet; TAKE TWO TABLETS BY MOUTH TWICE A DAY WITH MEALS  Dispense: 360 tablet; Refill: 0  - HEMOGLOBIN A1C  - lisinopril (PRINIVIL/ZESTRIL) 5 MG tablet; Take 1 tablet (5 mg) by mouth daily  Dispense: 90 tablet; Refill: 1  - Basic metabolic panel  - liraglutide (VICTOZA) 18 MG/3ML soln; Inject  1.8 mg Subcutaneous daily  Dispense: 9 mL; Refill: 3  - we restarted low dosage of lisinopril today for improved blood pressure control and to meet quality of care for his kidneys / diabetes mellitus benchmarks      2. High risk medications (not anticoagulants) long-term use  Per rheumatologist   - hydroxychloroquine (PLAQUENIL) 200 MG tablet; Take 1 tablet (200 mg) by mouth 2 times daily  Dispense: 180 tablet; Refill: 1  - methotrexate sodium 2.5 MG TABS; Take 25 mg by mouth once a week . Take all 10 tablets on the same day of each week.  Dispense: 120 tablet; Refill: 1    3. Diabetic polyneuropathy associated with type 2 diabetes mellitus (H)  Indications for specialized shoes for diabetic neuropathy are present without question.       Follow up with Provider - follow up 6-12 weeks     Kapil Duckworth MD  [facility]

## 2017-02-09 NOTE — NURSING NOTE
"Chief Complaint   Patient presents with     Other     Patient requesting Orthotic shoes for diabetes and arthritis.       Initial /102 mmHg  Pulse 99  Temp(Src) 97.1  F (36.2  C) (Oral)  Wt 221 lb (100.245 kg)  SpO2 96% Estimated body mass index is 33.11 kg/(m^2) as calculated from the following:    Height as of 1/27/17: 5' 8.5\" (1.74 m).    Weight as of this encounter: 221 lb (100.245 kg).  Medication Reconciliation: complete        Amalia Murillo, BRANDI    "

## 2017-02-10 ENCOUNTER — TELEPHONE (OUTPATIENT)
Dept: FAMILY MEDICINE | Facility: CLINIC | Age: 72
End: 2017-02-10

## 2017-02-10 ENCOUNTER — TELEPHONE (OUTPATIENT)
Dept: INTERNAL MEDICINE | Facility: CLINIC | Age: 72
End: 2017-02-10

## 2017-02-10 LAB
ANION GAP SERPL CALCULATED.3IONS-SCNC: 10 MMOL/L (ref 3–14)
BUN SERPL-MCNC: 19 MG/DL (ref 7–30)
CALCIUM SERPL-MCNC: 8.8 MG/DL (ref 8.5–10.1)
CHLORIDE SERPL-SCNC: 103 MMOL/L (ref 94–109)
CO2 SERPL-SCNC: 23 MMOL/L (ref 20–32)
CREAT SERPL-MCNC: 1.2 MG/DL (ref 0.66–1.25)
GFR SERPL CREATININE-BSD FRML MDRD: 59 ML/MIN/1.7M2
GLUCOSE SERPL-MCNC: 181 MG/DL (ref 70–99)
POTASSIUM SERPL-SCNC: 4 MMOL/L (ref 3.4–5.3)
SODIUM SERPL-SCNC: 136 MMOL/L (ref 133–144)

## 2017-02-10 NOTE — TELEPHONE ENCOUNTER
Prior authorization required for : Methotrexate 2.5mg  Insurance plan: [employer]  Patient Id: [id number]  Help Desk Number: [phone]    Please fax over an alternative medication to the pharmacy or if you are going to pursue a prior authorization please contact the pharmacy and patient when approved. Thanks!    Thank you  Vy Pickett [facility] [place]  Phone: [phone]  Fax: [phone]

## 2017-02-10 NOTE — TELEPHONE ENCOUNTER
Reason for Call:  Other call back    Detailed comments: Patient would like the office notes from the visit dated 02/9/17 today at the clinic's  , please contact the patient discuss further,    Phone Number Patient can be reached at: Home number on file [phone] (home)    Best Time: today    Can we leave a detailed message on this number? YES    Call taken on 2/10/2017 at 1:52 PM by Low Barrios

## 2017-02-10 NOTE — TELEPHONE ENCOUNTER
Please contact the patient at [phone] as he will not be available at the home phone number    Thank you    Low Couch

## 2017-02-10 NOTE — TELEPHONE ENCOUNTER
See previous message.  Copy was mailed to patient.  Patient called and informed.  He is seeing the foot doctor on Tuesday and is afraid he won't receive this in time.  Copy of last office note at  for pick-up. Caren Telles,

## 2017-02-13 NOTE — TELEPHONE ENCOUNTER
Methotrexate is a disease modifying anti-rheumatologic drugs and prescribed by the rheumatologist. Please forward refill request     Kapil Duckworth MD

## 2017-02-13 NOTE — TELEPHONE ENCOUNTER
Received fax from pharmacy stating patient requires Prior Authorization for methotrexate sodium 2.5 MG TABS.     Insurance information:   Name: [employer]  Phone number: [phone]  ID number: [id number]    Initiate prior authorization or change medication?    If a prior authorization is to be initiated, please list the following:    -any medications the patient has tried and failed or any contraindications.  -is the patient currently on this medication, or has tried before?  -What is the diagnosis?  -Justification or other information that may be helpful.

## 2017-02-14 ENCOUNTER — OFFICE VISIT (OUTPATIENT)
Dept: FAMILY MEDICINE | Facility: CLINIC | Age: 72
End: 2017-02-14
Payer: COMMERCIAL

## 2017-02-14 ENCOUNTER — TELEPHONE (OUTPATIENT)
Dept: FAMILY MEDICINE | Facility: CLINIC | Age: 72
End: 2017-02-14

## 2017-02-14 VITALS
DIASTOLIC BLOOD PRESSURE: 64 MMHG | SYSTOLIC BLOOD PRESSURE: 92 MMHG | BODY MASS INDEX: 32.54 KG/M2 | WEIGHT: 217.2 LBS | OXYGEN SATURATION: 95 % | HEART RATE: 81 BPM | TEMPERATURE: 98.9 F

## 2017-02-14 DIAGNOSIS — I95.9 HYPOTENSION, UNSPECIFIED HYPOTENSION TYPE: ICD-10-CM

## 2017-02-14 DIAGNOSIS — R55 SYNCOPE, UNSPECIFIED SYNCOPE TYPE: Primary | ICD-10-CM

## 2017-02-14 PROCEDURE — 99214 OFFICE O/P EST MOD 30 MIN: CPT | Performed by: INTERNAL MEDICINE

## 2017-02-14 RX ORDER — OXYCODONE AND ACETAMINOPHEN 5; 325 MG/1; MG/1
1-2 TABLET ORAL
COMMUNITY
Start: 2017-02-13 | End: 2017-03-16

## 2017-02-14 ASSESSMENT — PAIN SCALES - GENERAL: PAINLEVEL: NO PAIN (0)

## 2017-02-14 NOTE — PROGRESS NOTES
SUBJECTIVE:                                                    Zurdo Dash is a 71 year old male who presents to clinic today for the following health issues:       Syncope, unspecified syncope type  Hypotension, unspecified hypotension type     Today is a post emergency room evaluation follow up office visit . For complete details please see care everywhere.     ED/UC Followup:    Facility:  [facility]  Date of visit: February 13, 2017  Reason for visit: slipped on ice than after getting up a walking fainted.  Current Status: Feeling weak, nausea and vomiting unsure if it related to hitting face or from percocet, and having rib pain.     The history is obtained both from patient, spouse and from review of documentation on care everywhere from [facility].    This all happened initially from having slipped on the ice , lost his balance and fell onto some grass. After his fall he got up and leaned against a car due to an ensuing \ feeling of mild lightheadedness , he then had also some slight nausea and grayed out and then fainted. Then they called 911. He had a good evaluation at the emergency room and they did a full workup with a negative head CT scan. There was no seizure and no focal neurological deficits. He had only a brief loss of consciousness . Because he hit his head and was in pain he was given some Percocet 5/325 tablets but he has avoided these except for a few yesterday, these bothered him with nausea and a clamminess and sweatiness feeling which suspected to be secondary to the opioid pain medication.     He continues to be not right and this took some puzzling over his history to get a sense of what's going on. His spouse feels he's talking slower then normal. He's got persistent rib pain from the fall. With sleep he tossed and turned and had poor quality of sleeping and he feels tired today. His initial blood pressure for the medical assistant was 92/64 which I repeated and got 90/60 so  this is definitely out of character for this patient.    BP Readings from Last 6 Encounters:   02/14/17 92/64   02/09/17 (!) 150/102   02/06/17 (!) 138/98   02/06/17 132/88   01/16/17 127/83   01/09/17 (!) 132/92     Recheck blood pressure is 90/60    Wt Readings from Last 5 Encounters:   02/14/17 217 lb 3.2 oz (98.5 kg)   02/09/17 221 lb (100.2 kg)   01/27/17 217 lb (98.4 kg)   01/16/17 222 lb (100.7 kg)   01/09/17 224 lb 12.8 oz (102 kg)     He has not had recent diarrhea or excessive sweating or apparent insensate loss of fluid / volume issues. However, he's been recently started on Liraglutide [ Victoza ] and only recently went up to the full therapeutic dose of 1.8 milligrams. This has really affected him with a drop in his appetite and he's lost 4 pounds in 4 days ! He's also not made an effort to drink enough fluid.  Otherwise again, no fever or chills or coughing or chest pain or other findings on review of systems.    Problem list and histories reviewed & adjusted, as indicated.  Additional history: as documented    Patient Active Problem List   Diagnosis     Pain in limb     Diabetic neuropathy (H)     Hyperlipidemia LDL goal <100     Hypertension goal BP (blood pressure) < 140/90     obesity     ED (erectile dysfunction)     Hypogonadism     Advanced directives, counseling/discussion     Health Care Home     Type 2 diabetes mellitus, uncontrolled, with neuropathy (H)     RBBB (right bundle branch block)     Lumbago     Ex-smoker     Cataracts, both eyes     Family history of esophageal cancer     Gastroesophageal reflux disease, esophagitis presence not specified     Rheumatoid arthritis involving multiple sites with positive rheumatoid factor (H)     Past Surgical History   Procedure Laterality Date     Tonsillectomy       Hc incision tendon sheath finger  4/2009     r hand ring finger       Social History   Substance Use Topics     Smoking status: Former Smoker     Packs/day: 1.00     Years: 40.00      Types: Cigarettes     Quit date: 3/16/2007     Smokeless tobacco: Never Used     Alcohol use No     Family History   Problem Relation Age of Onset     CEREBROVASCULAR DISEASE Mother      Arthritis Mother      OSTEOPOROSIS Mother      Alzheimer Disease Father      Arthritis Father      CANCER Father      DIABETES Maternal Grandmother      Cardiovascular Maternal Grandmother      Hypertension No family hx of      Thyroid Disease No family hx of      Glaucoma No family hx of      Macular Degeneration No family hx of      CANCER Paternal Aunt      Other Cancer Brother          Current Outpatient Prescriptions   Medication Sig Dispense Refill     oxyCODONE-acetaminophen (PERCOCET) 5-325 MG per tablet Take 1-2 tablets by mouth       metFORMIN (GLUCOPHAGE) 500 MG tablet TAKE TWO TABLETS BY MOUTH TWICE A DAY WITH MEALS 360 tablet 0     hydroxychloroquine (PLAQUENIL) 200 MG tablet Take 1 tablet (200 mg) by mouth 2 times daily 180 tablet 1     methotrexate sodium 2.5 MG TABS Take 25 mg by mouth once a week . Take all 10 tablets on the same day of each week. 120 tablet 1     lisinopril (PRINIVIL/ZESTRIL) 5 MG tablet Take 1 tablet (5 mg) by mouth daily 90 tablet 1     liraglutide (VICTOZA) 18 MG/3ML soln Inject 1.8 mg Subcutaneous daily 9 mL 3     insulin pen needle (ULTICARE MINI) 31G X 6 MM Use 1 daily or as directed. 100 each 1     VICTOZA PEN 18 MG/3ML soln INJECT 1.2MG SUBCUTANEOUSLY DAILY 6 mL 0     HYDROcodone-acetaminophen (NORCO) 5-325 MG per tablet Take 1 tablet by mouth every 6 hours as needed for pain 30 tablet 0     pravastatin (PRAVACHOL) 40 MG tablet Take 1 tablet (40 mg) by mouth daily 90 tablet 3     hydrochlorothiazide (HYDRODIURIL) 25 MG tablet TAKE ONE TABLET BY MOUTH EVERY DAY 90 tablet 1     glimepiride (AMARYL) 4 MG tablet Take 1 tablet (4 mg) by mouth every morning (before breakfast) 90 tablet 1     metoprolol (TOPROL-XL) 25 MG 24 hr tablet TAKE ONE TABLET BY MOUTH EVERY DAY 90 tablet 3     omeprazole  (PRILOSEC) 20 MG capsule Take 1 capsule (20 mg) by mouth daily 90 capsule 2     INDIA CONTOUR NEXT test strip TEST THREE TIMES A DAY OR AS DIRECTED 100 each 3     folic acid (FOLVITE) 1 MG tablet Take 1 tablet (1 mg) by mouth daily 100 tablet 3     ACE/ARB NOT PRESCRIBED, INTENTIONAL,        MICROLET LANCETS MISC 1 Device daily 100 each 4     aspirin 81 MG tablet Take 1 tablet by mouth daily.  3     CALCIUM 500 + D 500-200 MG-IU OR TABS take 2 tabs daily       MULTI-VITAMIN OR None Entered       VITAMIN E 1000 UNIT OR CAPS 1 tablet daily       No Known Allergies  Recent Labs   Lab Test  02/09/17   1710  12/02/16   1252  10/12/16   1227  08/05/16   1237  07/13/16   1231  06/24/16   1101  05/03/16   1147  04/07/16   1149   09/30/15   1302   06/29/15   1213   08/07/14   1249   07/23/13   1016   A1C  9.2*  9.8*   --   9.0*   --    --   6.9*   --    < >  9.7*   --   11.8*   < >  7.0*   < >  7.0*   LDL   --   79   --    --    --    --    --    --    --   89   --   31   --   29   --   62   HDL   --   39*   --    --    --    --    --    --    --    --    --   37*   --   46   --   41   TRIG   --   282*   --    --    --    --    --    --    --    --    --   286*   --   270*   --   248*   ALT   --    --   40   --   44   --    --   35   --    --    < >   --    < >  52   < >   --    CR  1.20   --   0.98   --   1.02   --   1.07  1.38*   < >   --    < >   --    < >  1.17   < >   --    GFRESTIMATED  59*   --   75   --   72   --   68  51*   < >   --    < >   --    < >  62   < >   --    GFRESTBLACK  72   --   >90   GFR Calc     --   87   --   82  61   < >   --    < >   --    < >  75   < >   --    POTASSIUM  4.0   --    --    --    --    --   3.7   --    < >   --    < >   --    < >  3.6   < >   --    TSH   --    --    --    --    --   1.07   --    --    --    --    --    --    --    --    --   0.55    < > = values in this interval not displayed.      BP Readings from Last 3 Encounters:   02/14/17 92/64   02/09/17 (!)  150/102   02/06/17 (!) 138/98    Wt Readings from Last 3 Encounters:   02/14/17 217 lb 3.2 oz (98.5 kg)   02/09/17 221 lb (100.2 kg)   01/27/17 217 lb (98.4 kg)                  Labs reviewed in EPIC  Problem list, Medication list, Allergies, and Medical/Social/Surgical histories reviewed in [facility] and updated as appropriate.    ROS:  Constitutional, neuro, ENT, endocrine, pulmonary, cardiac, gastrointestinal, genitourinary, musculoskeletal, integument and psychiatric systems are negative, except as otherwise noted.    OBJECTIVE:                                                    BP 92/64 (BP Location: Right arm)  Pulse 81  Temp 98.9  F (37.2  C) (Oral)  Wt 217 lb 3.2 oz (98.5 kg)  SpO2 95%  BMI 32.54 kg/m2  Body mass index is 32.54 kg/(m^2).  GENERAL APPEARANCE: healthy, alert and no distress, completely talkative and comfortable , appears his stated age, answers all questions appropriately, normal grooming and affect   EYES: Eyes grossly normal to inspection, PERRL and conjunctivae and sclerae normal  HENT: nose and mouth without ulcers or lesions  RESP: lungs clear to auscultation - no rales, rhonchi or wheezes  CV: regular rates and rhythm, normal S1 S2, no S3 or S4 and no murmur, click or rub  MS: extremities normal- no gross deformities noted  SKIN: no suspicious lesions or rashes  NEURO: Normal strength and tone, mentation intact and speech normal  PSYCH: mentation appears normal and affect normal/bright    Diagnostic test results:  Diagnostic Test Results:  No orders of the defined types were placed in this encounter.         ASSESSMENT/PLAN:                                                      (R55) Syncope, unspecified syncope type  (primary encounter diagnosis)  Comment: this patient had a syncopal episode which has all the earmarks of a blood pressure related problem and this would seem to be most likely secondary to fluid / volume issues , with his acute weight loss and a contributing factor of 3  medications for blood pressure , a recent adjustment from some elevated blood pressure readings numbers he had.  Plan: discussion. We stopped both the hydrochlorothiazide and his metoprolol today . We strongly encouraged increased fluid, 4-6 glasses of liquid per day . Recommended follow up depending on how things go, recheck blood pressure in a week    (I95.9) Hypotension, unspecified hypotension type  Comment: further follow up depending on how things go   Plan: follow up if any further episodes of fainting and / or dependent upon blood pressure readings     Follow up with Provider - as detailed above      Kapil Duckworth MD  [facility]    25 minutes was spent with the patient with greater than 50% in face-to-face discussion of disease process, treatment options and medicine management.

## 2017-02-14 NOTE — TELEPHONE ENCOUNTER
Zurdo Dash is a 71 year old male who calls with head injury.    NURSING ASSESSMENT:  Description:  Per patient, he slipped on ice yesterday and fell down. He was then feeling faint and passed out. Per patient, he fell face forward next to a car. Was seen at [facility] ED where they did some testing and imaging. No concerns found at the time. Patient was sent home with Percocet for the pain from injuries. He has been taking the percocet. Experienced Nausea this morning, no vomiting this morning. Nausea with vomiting x 2 after eating. He feels better after vomiting. He believes it is d/t the percocet and will stop taking this.  Onset/duration:  yesterday  Precip. factors:  fall  Associated symptoms:  See above. Denies any other symptoms such as vision changes, HA, confusion, lethargy, etc. Feels better now  Improves/worsens symptoms:  n/a  Pain scale (0-10)   0/10  LMP/preg/breast feeding:  n/a  Last exam/Treatment:  2/9/17  Allergies: No Known Allergies    MEDICATIONS:   Taking medication(s) as prescribed? N/A  Taking over the counter medication(s?) N/A  Any medication side effects? Not Applicable    Any barriers to taking medication(s) as prescribed?  N/A  Medication(s) improving/managing symptoms?  N/A  Medication reconciliation completed: N/A      NURSING PLAN: Nursing advice to patient to be seen today. Go to ED if sx suddenly worsens or new sx occur    RECOMMENDED DISPOSITION:  See above. Appointment made with Dr. Duckworth today  Will comply with recommendation: Yes  If further questions/concerns or if symptoms do not improve, worsen or new symptoms develop, call your PCP or [facility] as soon as possible.      Guideline used:  Telephone Triage Protocols for Nurses, Fourth Edition, Elisabeth Del Castillo RN

## 2017-02-14 NOTE — MR AVS SNAPSHOT
After Visit Summary   2/14/2017    Zurdo Dash    MRN: [id number]           Patient Information     Date Of Birth          1945        Visit Information        Provider Department      2/14/2017 2:50 PM Kapil Duckworth MD [facility] Instructions    Stop hydrochlorothiazide and the metoprolol  right now, and we need a blood pressure recheck in one week.    [facility]    If you have any questions regarding to your visit please contact your care team:     Team Pink:   Clinic Hours Telephone Number   Internal Medicine:  Dr. Suze Zapata NP       7am-7pm  Monday - Thursday   7am-5pm  Fridays  [phone]  (Appointment scheduling available 24/7)    Questions about your visit?  Team Line  [phone]   Urgent Care - [place] [facility] - 11am-9pm Monday-Friday Saturday-Sunday- 9am-5pm   [place] - 5pm-9pm Monday-Friday Saturday-Sunday- 9am-5pm  [phone] - [facility]  [phone] - [place]       What options do I have for visits at the clinic other than the traditional office visit?  To expand how we care for you, many of our providers are utilizing electronic visits (e-visits) and telephone visits, when medically appropriate, for interactions with their patients rather than a visit in the clinic.   We also offer nurse visits for many medical concerns. Just like any other service, we will bill your insurance company for this type of visit based on time spent on the phone with your provider. Not all insurance companies cover these visits. Please check with your medical insurance if this type of visit is covered. You will be responsible for any charges that are not paid by your insurance.      E-visits via Inbilin:  generally incur a $35.00 fee.  Telephone visits:  Time spent on the phone: *charged based on time that is spent on the phone in increments of 10 minutes. Estimated cost:   5-10 mins  $30.00   11-20 mins. $59.00   21-30 mins. $85.00   Use [employer]hart (secure email communication and access to your chart) to send your primary care provider a message or make an appointment. Ask someone on your Team how to sign up for Markkit.    For a Price Quote for your services, please call our [employer] Price Line at [phone].    As always, Thank you for trusting us with your health care needs!    Discharged by Conchita SANTILLAN CMA ([facility])          Follow-ups after your visit        Your next 10 appointments already scheduled     Feb 16, 2017  3:20 PM CST   Return Visit with Albert Tompkins MD   [facility] ([facility]    [address] [place], [place] [address] [address] [phone]            Feb 17, 2017 11:50 AM CST   Office Visit with Kapil Duckworth MD   [facility] ([facility])    [address] [place] [address]  [place] [address] [phone]           Bring a current list of meds and any records pertaining to this visit.  For Physicals, please bring immunization records and any forms needing to be filled out.  Please arrive 10 minutes early to complete paperwork.            Feb 22, 2017  1:30 PM CST   Nurse Only with FZ ANCILLARY   [facility] ([facility])    [address] [place] [place] [address] [address]   [phone]              Who to contact     If you have questions or need follow up information about today's clinic visit or your schedule please contact [facility] directly at [phone].  Normal or non-critical lab and imaging results will be communicated to you by [employer]hart, letter or phone within 4 business days after the clinic has received the results. If you do not hear from us within 7 days, please contact the clinic through [employer]hart or phone. If you have a critical or abnormal lab result, we will notify you by phone as soon as possible.  Submit refill requests through [employer] or call your pharmacy and they will  "forward the refill request to us. Please allow 3 business days for your refill to be completed.          Additional Information About Your Visit        MyChart Information     [email or web address] lets you send messages to your doctor, view your test results, renew your prescriptions, schedule appointments and more. To sign up, go to [email or web address] . Click on \"Log in\" on the left side of the screen, which will take you to the Welcome page. Then click on \"Sign up Now\" on the right side of the page.     You will be asked to enter the access code listed below, as well as some personal information. Please follow the directions to create your username and password.     Your access code is: [email or web address]  Expires: 2017  2:30 PM     Your access code will  in 90 days. If you need help or a new code, please call your [place] clinic or [phone].        Care EveryWhere ID     This is your Care EveryWhere ID. This could be used by other organizations to access your [place] medical records  [id number]        Your Vitals Were     Pulse Temperature Pulse Oximetry BMI (Body Mass Index)          81 98.9  F (37.2  C) (Oral) 95% 32.54 kg/m2         Blood Pressure from Last 3 Encounters:   17 92/64   17 (!) 150/102   17 (!) 138/98    Weight from Last 3 Encounters:   17 217 lb 3.2 oz (98.5 kg)   17 221 lb (100.2 kg)   17 217 lb (98.4 kg)              Today, you had the following     No orders found for display       Primary Care Provider Office Phone # Fax #    Kapil Duckworth [phone]       [facility] [address] [address]        Thank you!     Thank you for choosing [facility]  for your care. Our goal is always to provide you with excellent care. Hearing back from our patients is one way we can continue to improve our services. Please take a few minutes to complete the written survey that you may receive in the mail after " your visit with us. Thank you!             Your Updated Medication List - Protect others around you: Learn how to safely use, store and throw away your medicines at www.disposemymeds.org.          This list is accurate as of: 2/14/17  3:34 PM.  Always use your most recent med list.                   Brand Name Dispense Instructions for use    ACE/ARB NOT PRESCRIBED (INTENTIONAL)          aspirin 81 MG tablet      Take 1 tablet by mouth daily.       INDIA CONTOUR NEXT test strip   Generic drug:  blood glucose monitoring     100 each    TEST THREE TIMES A DAY OR AS DIRECTED       CALCIUM 500 + D 500-200 MG-IU Tabs      take 2 tabs daily       folic acid 1 MG tablet    FOLVITE    100 tablet    Take 1 tablet (1 mg) by mouth daily       glimepiride 4 MG tablet    AMARYL    90 tablet    Take 1 tablet (4 mg) by mouth every morning (before breakfast)       hydrochlorothiazide 25 MG tablet    HYDRODIURIL    90 tablet    TAKE ONE TABLET BY MOUTH EVERY DAY       HYDROcodone-acetaminophen 5-325 MG per tablet    NORCO    30 tablet    Take 1 tablet by mouth every 6 hours as needed for pain       hydroxychloroquine 200 MG tablet    PLAQUENIL    180 tablet    Take 1 tablet (200 mg) by mouth 2 times daily       insulin pen needle 31G X 6 MM    ULTICARE MINI    100 each    Use 1 daily or as directed.       lisinopril 5 MG tablet    PRINIVIL/ZESTRIL    90 tablet    Take 1 tablet (5 mg) by mouth daily       metFORMIN 500 MG tablet    GLUCOPHAGE    360 tablet    TAKE TWO TABLETS BY MOUTH TWICE A DAY WITH MEALS       methotrexate sodium 2.5 MG Tabs     120 tablet    Take 25 mg by mouth once a week . Take all 10 tablets on the same day of each week.       metoprolol 25 MG 24 hr tablet    TOPROL-XL    90 tablet    TAKE ONE TABLET BY MOUTH EVERY DAY       MICROLET LANCETS Misc     100 each    1 Device daily       MULTI-VITAMIN PO      None Entered       omeprazole 20 MG CR capsule    priLOSEC    90 capsule    Take 1 capsule (20 mg) by mouth  daily       oxyCODONE-acetaminophen 5-325 MG per tablet    PERCOCET     Take 1-2 tablets by mouth       pravastatin 40 MG tablet    PRAVACHOL    90 tablet    Take 1 tablet (40 mg) by mouth daily       * VICTOZA PEN 18 MG/3ML soln   Generic drug:  liraglutide     6 mL    INJECT 1.2MG SUBCUTANEOUSLY DAILY       * liraglutide 18 MG/3ML soln    VICTOZA    9 mL    Inject 1.8 mg Subcutaneous daily       vitamin E 1000 UNIT capsule    TOCOPHEROL     1 tablet daily       * Notice:  This list has 2 medication(s) that are the same as other medications prescribed for you. Read the directions carefully, and ask your doctor or other care provider to review them with you.

## 2017-02-14 NOTE — NURSING NOTE
"Chief Complaint   Patient presents with     ER F/U     [place] February 13, 2017, Syncope and facial injury       Initial BP 92/64 (BP Location: Right arm)  Pulse 81  Temp 98.9  F (37.2  C) (Oral)  Wt 217 lb 3.2 oz (98.5 kg)  SpO2 95%  BMI 32.54 kg/m2 Estimated body mass index is 32.54 kg/(m^2) as calculated from the following:    Height as of 1/27/17: 5' 8.5\" (1.74 m).    Weight as of this encounter: 217 lb 3.2 oz (98.5 kg).  Medication Reconciliation: complete    "

## 2017-02-14 NOTE — TELEPHONE ENCOUNTER
Reason for call:  Patient reporting a symptom    Symptom or request: Per Yoli patient had a fell face forward in the parking lot, fainted and now vomiting. Yoli states patient has a bruise on the (R) side of his face and was taking to the hospital, but she is concern that he's vomiting and possible concussion.    Duration (how long have symptoms been present): Started yesterday    Have you been treated for this before? Yes    Additional comments: na    Phone Number patient can be reached at:  Home number on file [phone] (home)    Best Time:  Triaging to RN    Can we leave a detailed message on this number:  YES    Call taken on 2/14/2017 at 12:48 PM by Renea Nolasco

## 2017-02-14 NOTE — PATIENT INSTRUCTIONS
Stop hydrochlorothiazide and the metoprolol  right now, and we need a blood pressure recheck in one week.    [facility]    If you have any questions regarding to your visit please contact your care team:     Team Pink:   Clinic Hours Telephone Number   Internal Medicine:  Dr. Suze Zapata NP       7am-7pm  Monday - Thursday   7am-5pm  Fridays  [phone]  (Appointment scheduling available 24/7)    Questions about your visit?  Team Line  [phone]   Urgent Care - [place] - 11am-9pm Monday-Friday Saturday-Sunday- 9am-5pm   [place] - 5pm-9pm Monday-Friday Saturday-Sunday- 9am-5pm  [phone] - [place]   [phone] - [place]       What options do I have for visits at the clinic other than the traditional office visit?  To expand how we care for you, many of our providers are utilizing electronic visits (e-visits) and telephone visits, when medically appropriate, for interactions with their patients rather than a visit in the clinic.   We also offer nurse visits for many medical concerns. Just like any other service, we will bill your insurance company for this type of visit based on time spent on the phone with your provider. Not all insurance companies cover these visits. Please check with your medical insurance if this type of visit is covered. You will be responsible for any charges that are not paid by your insurance.      E-visits via [employer]:  generally incur a $35.00 fee.  Telephone visits:  Time spent on the phone: *charged based on time that is spent on the phone in increments of 10 minutes. Estimated cost:   5-10 mins $30.00   11-20 mins. $59.00   21-30 mins. $85.00   Use [employer] (secure email communication and access to your chart) to send your primary care provider a message or make an appointment. Ask someone on your Team how to sign up for [employer].    For a Price Quote for your services, please call our  [employer] Line at [phone].    As always, Thank you for trusting us with your health care needs!    Discharged by Conchita SANTILLAN CMA ([facility])

## 2017-02-14 NOTE — TELEPHONE ENCOUNTER
Methotrexate has been approved through covermymeds from 1/15/2017 to 2/14/2020. Pharmacy has been called and notified. Eryn Espinoza CMA

## 2017-02-16 ENCOUNTER — OFFICE VISIT (OUTPATIENT)
Dept: RHEUMATOLOGY | Facility: CLINIC | Age: 72
End: 2017-02-16
Payer: COMMERCIAL

## 2017-02-16 ENCOUNTER — MEDICAL CORRESPONDENCE (OUTPATIENT)
Dept: HEALTH INFORMATION MANAGEMENT | Facility: CLINIC | Age: 72
End: 2017-02-16

## 2017-02-16 VITALS
WEIGHT: 214.8 LBS | DIASTOLIC BLOOD PRESSURE: 86 MMHG | HEART RATE: 122 BPM | HEIGHT: 69 IN | BODY MASS INDEX: 31.81 KG/M2 | SYSTOLIC BLOOD PRESSURE: 118 MMHG

## 2017-02-16 DIAGNOSIS — M05.79 RHEUMATOID ARTHRITIS INVOLVING MULTIPLE SITES WITH POSITIVE RHEUMATOID FACTOR (H): Primary | ICD-10-CM

## 2017-02-16 DIAGNOSIS — M17.0 PRIMARY OSTEOARTHRITIS OF BOTH KNEES: ICD-10-CM

## 2017-02-16 DIAGNOSIS — Z79.899 HIGH RISK MEDICATIONS (NOT ANTICOAGULANTS) LONG-TERM USE: ICD-10-CM

## 2017-02-16 PROCEDURE — 99213 OFFICE O/P EST LOW 20 MIN: CPT | Performed by: INTERNAL MEDICINE

## 2017-02-16 RX ORDER — FOLIC ACID 1 MG/1
1 TABLET ORAL DAILY
Qty: 100 TABLET | Refills: 3 | Status: SHIPPED | OUTPATIENT
Start: 2017-02-16 | End: 2017-05-18

## 2017-02-16 NOTE — NURSING NOTE
"Chief Complaint   Patient presents with     Arthritis     RA and osteroarthritis. No arthritic pain today. Fell in 2/13/17 and having body aches from fall.       Initial BP (!) 129/94 (BP Location: Left arm, Patient Position: Chair, Cuff Size: Adult Regular)  Pulse 122  Ht 1.74 m (5' 8.5\")  Wt 97.4 kg (214 lb 12.8 oz)  BMI 32.19 kg/m2 Estimated body mass index is 32.19 kg/(m^2) as calculated from the following:    Height as of this encounter: 1.74 m (5' 8.5\").    Weight as of this encounter: 97.4 kg (214 lb 12.8 oz).  BP completed using cuff size: regular         RAPID3 (0-30) Cumulative Score  20.7          RAPID3 Weighted Score (divide #4 by 3 and that is the weighted score)  6.9         "

## 2017-02-16 NOTE — MR AVS SNAPSHOT
After Visit Summary   2/16/2017    Zurdo Dash    MRN: [id number]           Patient Information     Date Of Birth          1945        Visit Information        Provider Department      2/16/2017 3:20 PM Albert Tompkins MD [facility]        Today's Diagnoses     Rheumatoid arthritis involving multiple sites with positive rheumatoid factor (H)    -  1    Primary osteoarthritis of both knees        High risk medications (not anticoagulants) long-term use           Follow-ups after your visit        Your next 10 appointments already scheduled     Feb 22, 2017  1:30 PM CST   Nurse Only with [facility]Z ANCILLARY   [facility]    [address] [address]   [phone]            May 15, 2017 11:00 AM CDT   LAB with FZ LAB   [facility])    [address] [address]   [phone]           Patient must bring picture ID.  Patient should be prepared to give a urine specimen  Please do not eat 10-12 hours before your appointment if you are coming in fasting for labs on lipids, cholesterol, or glucose (sugar).  Pregnant women should follow their Care Team instructions. Water with medications is okay. Do not drink coffee or other fluids.   If you have concerns about taking  your medications, please ask at office or if scheduling via [employer], send a message by clicking on Secure Messaging, Message Your Care Team.            May 18, 2017 11:40 AM CDT   Return Visit with Albert Tompkins MD   [facility]    [address] [address]   [phone]              Future tests that were ordered for you today     Open Future Orders        Priority Expected Expires Ordered    CBC with platelets differential Routine 5/13/2017 6/1/2017 2/16/2017    Creatinine Routine 5/13/2017 6/1/2017 2/16/2017    CRP inflammation Routine 5/13/2017  "2017    Erythrocyte sedimentation rate auto Routine 2017    Hepatic panel Routine 2017    Uric acid Routine 2017            Who to contact     If you have questions or need follow up information about today's clinic visit or your schedule please contact [facility] directly at [phone].  Normal or non-critical lab and imaging results will be communicated to you by MyChart, letter or phone within 4 business days after the clinic has received the results. If you do not hear from us within 7 days, please contact the clinic through [employer] or phone. If you have a critical or abnormal lab result, we will notify you by phone as soon as possible.  Submit refill requests through [employer] or call your pharmacy and they will forward the refill request to us. Please allow 3 business days for your refill to be completed.          Additional Information About Your Visit        [employer] Information     [employer] lets you send messages to your doctor, view your test results, renew your prescriptions, schedule appointments and more. To sign up, go to [email or web address] . Click on \"Log in\" on the left side of the screen, which will take you to the Welcome page. Then click on \"Sign up Now\" on the right side of the page.     You will be asked to enter the access code listed below, as well as some personal information. Please follow the directions to create your username and password.     Your access code is: [email or web address]  Expires: 2017  2:30 PM     Your access code will  in 90 days. If you need help or a new code, please call your [place] clinic or [phone].        Care EveryWhere ID     This is your Care EveryWhere ID. This could be used by other organizations to access your [place] medical records  [id number]        Your Vitals Were     Pulse Height BMI (Body Mass Index)             122 1.74 m (5' 8.5\") 32.19 " kg/m2          Blood Pressure from Last 3 Encounters:   02/16/17 118/86   02/14/17 92/64   02/09/17 (!) 150/102    Weight from Last 3 Encounters:   02/16/17 97.4 kg (214 lb 12.8 oz)   02/14/17 98.5 kg (217 lb 3.2 oz)   02/09/17 100.2 kg (221 lb)                 Where to get your medicines      These medications were sent to [facility]  [address]  [address], [place] [address]     Phone:  [phone]     folic acid 1 MG tablet          Primary Care Provider Office Phone # Fax #    Kapil Duckworth [phone]       [facility] [address] [address]        Thank you!     Thank you for choosing [facility]  for your care. Our goal is always to provide you with excellent care. Hearing back from our patients is one way we can continue to improve our services. Please take a few minutes to complete the written survey that you may receive in the mail after your visit with us. Thank you!             Your Updated Medication List - Protect others around you: Learn how to safely use, store and throw away your medicines at www.disposemymeds.org.          This list is accurate as of: 2/16/17  3:51 PM.  Always use your most recent med list.                   Brand Name Dispense Instructions for use    ACE/ARB NOT PRESCRIBED (INTENTIONAL)          aspirin 81 MG tablet      Take 1 tablet by mouth daily.       INDIA CONTOUR NEXT test strip   Generic drug:  blood glucose monitoring     100 each    TEST THREE TIMES A DAY OR AS DIRECTED       CALCIUM 500 + D 500-200 MG-IU Tabs      take 2 tabs daily       folic acid 1 MG tablet    FOLVITE    100 tablet    Take 1 tablet (1 mg) by mouth daily       glimepiride 4 MG tablet    AMARYL    90 tablet    Take 1 tablet (4 mg) by mouth every morning (before breakfast)       hydrochlorothiazide 25 MG tablet    HYDRODIURIL    90 tablet    TAKE ONE TABLET BY MOUTH EVERY DAY        HYDROcodone-acetaminophen 5-325 MG per tablet    NORCO    30 tablet    Take 1 tablet by mouth every 6 hours as needed for pain       hydroxychloroquine 200 MG tablet    PLAQUENIL    180 tablet    Take 1 tablet (200 mg) by mouth 2 times daily       insulin pen needle 31G X 6 MM    ULTICARE MINI    100 each    Use 1 daily or as directed.       lisinopril 5 MG tablet    PRINIVIL/ZESTRIL    90 tablet    Take 1 tablet (5 mg) by mouth daily       metFORMIN 500 MG tablet    GLUCOPHAGE    360 tablet    TAKE TWO TABLETS BY MOUTH TWICE A DAY WITH MEALS       methotrexate sodium 2.5 MG Tabs     120 tablet    Take 25 mg by mouth once a week . Take all 10 tablets on the same day of each week.       metoprolol 25 MG 24 hr tablet    TOPROL-XL    90 tablet    TAKE ONE TABLET BY MOUTH EVERY DAY       MICROLET LANCETS Misc     100 each    1 Device daily       MULTI-VITAMIN PO      None Entered       omeprazole 20 MG CR capsule    priLOSEC    90 capsule    Take 1 capsule (20 mg) by mouth daily       oxyCODONE-acetaminophen 5-325 MG per tablet    PERCOCET     Take 1-2 tablets by mouth Reported on 2/16/2017       pravastatin 40 MG tablet    PRAVACHOL    90 tablet    Take 1 tablet (40 mg) by mouth daily       * VICTOZA PEN 18 MG/3ML soln   Generic drug:  liraglutide     6 mL    INJECT 1.2MG SUBCUTANEOUSLY DAILY       * liraglutide 18 MG/3ML soln    VICTOZA    9 mL    Inject 1.8 mg Subcutaneous daily       vitamin E 1000 UNIT capsule    TOCOPHEROL     1 tablet daily       * Notice:  This list has 2 medication(s) that are the same as other medications prescribed for you. Read the directions carefully, and ask your doctor or other care provider to review them with you.

## 2017-02-16 NOTE — Clinical Note
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  I wish I was in town to drain it!    Albert

## 2017-02-16 NOTE — PROGRESS NOTES
Rheumatology Clinic Visit      Zurdo Dash MRN# [id number]   YOB: 1945 Age: 71 year old      Date of visit: 2/16/17   PCP: Dr. Kapil Duckworth       Chief Complaint   Patient presents with:  Arthritis: RA and osteroarthritis. No arthritic pain today. Fell in 2/13/17 and having body aches from fall.      Assessment and Plan     1. Seropositive nonerosive rheumatoid arthritis (, CCP 64): Currently on methotrexate 25 mg once weekly, folic acid 1 mg daily, and hydroxychloroquine 200 mg twice a day. Doing well today.   - Continue methotrexate 25 mg once weekly  - Continue folic acid 1 mg daily  - Continue hydroxychloroquine 200 mg twice a day; (ophthalmology tox monitoring to be done in November 2016, per patient)  - Labs 2-3 days prior to the next rheumatology clinic visit: CBC, Creatinine, Hepatic Panel, ESR, CRP, uric acid    2. Bilateral knee osteoarthritis: He has been receiving steroid injections approximately every 3-6 months with good control of his symptoms.  He has previously not interested in receiving injections such as Synvisc or Euflexxa, but he is now planning to go to an arthritis clinic where they do that injection with hopes that his knees will feel better.      3. 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 He was evaluated by Dr. Duckworth at that time. Reportedly he used colchicine in the past. I advised him to come in for joint arthrocentesis if this occurs again. Plan to recheck uric acid as noted in #1.    4. Hypertersion: Blood pressure checked this clinic visit. This is a reasonable blood pressure.    4. BMI 32.19, obesity    5. Vaccinations:   - Influenza: up to date  - [id number]: up to date  - [id number]: up to date  - Zostavax: up to date    Mr. Dash verbalized agreement with and understanding of the rational for the diagnosis and treatment plan.  All questions were answered to best of my ability and the patient's  satisfaction. Mr. Dash was advised to contact the clinic with any questions that may arise after the clinic visit.    Thank you for involving me in the care of the patient    Return to clinic: 3 months      HPI   Zurdo Dash is a 71 year old male with a medical history significant for hypertension, hyperlipidemia, diabetes, diabetic neuropathy, GERD, and rheumatoid arthritis who presents for f/u of RA and bilateral knee OA.     Today, Mr. Dash reports that he is doing well on his current medication regimen.   However, January was a rough month for him. In late January he had sudden onset unilateral knee swelling and pain that made ambulation difficult. He was seen by his PCP who was concerned for a crystalline arthropathy. Uric acid was normal at that time. The patient does not recall what was used to treat the flare, but records indicate that he was prescribed colchicine. He tells me that whenever the medication was, it worked for him. Then he had a terrible cold that took weeks to improve. Then more recently she fell, and scratched his arms, hands, and face. He still feels sore from the fall and is getting narcotics for pain relief. He plans to go to an arthritis clinic to have different types of medication injected into his knee, with hopes that it will help.    Denies fevers, chills, nausea, vomiting, constipation, diarrhea. No abdominal pain. No chest pain/pressure, palpitations, or shortness of breath. No oral or nasal sores. No neck pain. No rash. No LE swelling.      Tobacco: None  EtOH: None  Drugs: None    ROS   GEN: No fevers, chills, night sweats, fatigue, or weight change  SKIN: No rash. Sores from a recent fall.  HEENT:No epistaxis. No oral or nasal ulcers.  CV: No chest pain, pressure, palpitations, or dyspnea on exertion.  PULM: No SOB, wheeze, cough.  GI: No nausea, vomiting, constipation, diarrhea. No blood in stool. No abdominal pain.  : No blood in urine.  MSK: See HPI.  NEURO: No  numbness, tingling, or weakness.  ENDO: No heat/cold intolerance.  EXT: No LE swelling    Active Problem List     Patient Active Problem List   Diagnosis     Pain in limb     Diabetic neuropathy (H)     Hyperlipidemia LDL goal <100     Hypertension goal BP (blood pressure) < 140/90     obesity     ED (erectile dysfunction)     Hypogonadism     Advanced directives, counseling/discussion     Health Care Home     Type 2 diabetes mellitus, uncontrolled, with neuropathy (H)     RBBB (right bundle branch block)     Lumbago     Ex-smoker     Cataracts, both eyes     Family history of esophageal cancer     Gastroesophageal reflux disease, esophagitis presence not specified     Rheumatoid arthritis involving multiple sites with positive rheumatoid factor (H)     Past Medical History     Past Medical History   Diagnosis Date     Abnormal CT scan 03/2004     calcified lung granuloma     C. difficile diarrhea      H/O     Cataract 11/18/2011     Diabetic neuropathy (H)      mild, mostly soles and distal forefeet, worse on the left side.     Diverticulitis      ED (erectile dysfunction)      Ex-smoker      QUIT SMOKING FEB 2007     History of ETOH abuse      recovering, sober since 1997     Hyperlipidemia LDL goal <100      Hypertension goal BP (blood pressure) < 140/90      Hypogonadism      Obesity      PAD (peripheral artery disease) (H)      leg cramps, with exertion, no formal diagnosis of PAD and minimal if any symptoms at all.     RA (rheumatoid arthritis) (H)      Dr Bailon     Type 2 diabetes, HbA1c goal < 7% (H) 10/2008     A1C of 7.1 %      Past Surgical History     Past Surgical History   Procedure Laterality Date     Tonsillectomy       Hc incision tendon sheath finger  4/2009     r hand ring finger     Allergy   No Known Allergies  Current Medication List     Current Outpatient Prescriptions   Medication Sig     metFORMIN (GLUCOPHAGE) 500 MG tablet TAKE TWO TABLETS BY MOUTH TWICE A DAY WITH MEALS      hydroxychloroquine (PLAQUENIL) 200 MG tablet Take 1 tablet (200 mg) by mouth 2 times daily     methotrexate sodium 2.5 MG TABS Take 25 mg by mouth once a week . Take all 10 tablets on the same day of each week.     liraglutide (VICTOZA) 18 MG/3ML soln Inject 1.8 mg Subcutaneous daily     insulin pen needle (ULTICARE MINI) 31G X 6 MM Use 1 daily or as directed.     VICTOZA PEN 18 MG/3ML soln INJECT 1.2MG SUBCUTANEOUSLY DAILY     HYDROcodone-acetaminophen (NORCO) 5-325 MG per tablet Take 1 tablet by mouth every 6 hours as needed for pain     pravastatin (PRAVACHOL) 40 MG tablet Take 1 tablet (40 mg) by mouth daily     hydrochlorothiazide (HYDRODIURIL) 25 MG tablet TAKE ONE TABLET BY MOUTH EVERY DAY     glimepiride (AMARYL) 4 MG tablet Take 1 tablet (4 mg) by mouth every morning (before breakfast)     omeprazole (PRILOSEC) 20 MG capsule Take 1 capsule (20 mg) by mouth daily     INDIA CONTOUR NEXT test strip TEST THREE TIMES A DAY OR AS DIRECTED     folic acid (FOLVITE) 1 MG tablet Take 1 tablet (1 mg) by mouth daily     MICROLET LANCETS MISC 1 Device daily     aspirin 81 MG tablet Take 1 tablet by mouth daily.     CALCIUM 500 + D 500-200 MG-IU OR TABS take 2 tabs daily     MULTI-VITAMIN OR None Entered     VITAMIN E 1000 UNIT OR CAPS 1 tablet daily     oxyCODONE-acetaminophen (PERCOCET) 5-325 MG per tablet Take 1-2 tablets by mouth Reported on 2/16/2017     lisinopril (PRINIVIL/ZESTRIL) 5 MG tablet Take 1 tablet (5 mg) by mouth daily (Patient not taking: Reported on 2/16/2017)     metoprolol (TOPROL-XL) 25 MG 24 hr tablet TAKE ONE TABLET BY MOUTH EVERY DAY (Patient not taking: Reported on 2/16/2017)     ACE/ARB NOT PRESCRIBED, INTENTIONAL,      No current facility-administered medications for this visit.          Social History   See HPI    Family History     Family History   Problem Relation Age of Onset     CEREBROVASCULAR DISEASE Mother      Arthritis Mother      OSTEOPOROSIS Mother      Alzheimer Disease Father   "    Arthritis Father      CANCER Father      DIABETES Maternal Grandmother      Cardiovascular Maternal Grandmother      Hypertension No family hx of      Thyroid Disease No family hx of      Glaucoma No family hx of      Macular Degeneration No family hx of      CANCER Paternal Aunt      Other Cancer Brother        Physical Exam     Temp Readings from Last 3 Encounters:   02/14/17 98.9  F (37.2  C) (Oral)   02/09/17 97.1  F (36.2  C) (Oral)   01/27/17 96.7  F (35.9  C) (Oral)     BP Readings from Last 5 Encounters:   02/16/17 118/86   02/14/17 92/64   02/09/17 (!) 150/102   02/06/17 (!) 138/98   02/06/17 132/88     Pulse Readings from Last 1 Encounters:   02/16/17 122     Resp Readings from Last 1 Encounters:   04/07/16 16     Estimated body mass index is 32.19 kg/(m^2) as calculated from the following:    Height as of this encounter: 1.74 m (5' 8.5\").    Weight as of this encounter: 97.4 kg (214 lb 12.8 oz).    GEN: NAD, obese  HEENT: MMM. No oral lesions. EOMI. Anicteric, noninjected sclera  CV: S1, S2. RRR. No m/r/g.  PULM: CTA bilaterally. No w/c.  MSK: Bilateral second and third MCPs with increased prominence, but no tenderness to palpation or synovial swelling. Wrists and elbows without synovial swelling, increased warmth, tenderness to palpation, or overlying erythema.  Negative MCP squeeze.  Normal  strength. Bilateral shoulders nontender to palpation and without swelling. Hips nontender to direct palpation. Bilateral knees without effusion or tenderness to palpation; crepitation bilaterally. Ankles and feet without swelling or tenderness to palpation.. Negative MTP squeeze.    NEURO: UE and LE strengths 5/5 and equal bilaterally.   SKIN: No rash. Scratches/bruises on his upper extremities, nose, and just above his right eye  EXT: No LE edema  PSYCH: Alert. Appropriate.    Labs / Imaging (select studies)     9/28/1999  ([facility])    10/17/2013 Left knee synovial fluid at [facility]: WBC " 336, N 3%, No crystals    RF/CCP  Recent Labs   Lab Test  04/07/16   1149   CCPIGG  64*     CBC  Recent Labs   Lab Test  10/12/16   1227  07/13/16   1231  04/07/16   1149   WBC  6.2  7.1  6.9   RBC  4.66  4.48  4.77   HGB  14.5  14.1  14.6   HCT  42.9  40.4  42.6   MCV  92  90  89   RDW  14.0  15.1*  13.4   PLT  271  216  294   MCH  31.1  31.5  30.6   MCHC  33.8  34.9  34.3   NEUTROPHIL  72.2  80.0  65.1   LYMPH  16.0  11.7  16.8   MONOCYTE  9.4  6.2  14.9   EOSINOPHIL  1.3  1.4  2.2   BASOPHIL  1.1  0.7  1.0   ANEU  4.5  5.7  4.5   ALYM  1.0  0.8  1.2   SMITH  0.6  0.4  1.0   AEOS  0.1  0.1  0.2   ABAS  0.1  0.1  0.1     CMP  Recent Labs   Lab Test  02/09/17   1710  10/12/16   1227  07/13/16   1231  05/03/16   1147  04/07/16   1149  03/18/16   1511   NA  136   --    --   145*   --   141   POTASSIUM  4.0   --    --   3.7   --   3.9   CHLORIDE  103   --    --   112*   --   105   CO2  23   --    --   24   --   27   ANIONGAP  10   --    --   9   --   9   GLC  181*   --    --   148*   --   114*   BUN  19   --    --   18   --   23   CR  1.20  0.98  1.02  1.07  1.38*  1.21   GFRESTIMATED  59*  75  72  68  51*  59*   GFRESTBLACK  72  >90   GFR Calc    87  82  61  71   NEW  8.8   --    --   8.6   --   9.4   BILITOTAL   --   0.6  0.5   --   0.6   --    ALBUMIN   --   3.5  3.7   --   3.7   --    PROTTOTAL   --   7.2  6.9   --   7.3   --    ALKPHOS   --   54  43   --   55   --    AST   --   32  29   --   27   --    ALT   --   40  44   --   35   --      HgA1c  Recent Labs   Lab Test  02/09/17   1710  12/02/16   1252  08/05/16   1237   A1C  9.2*  9.8*  9.0*     Uric Acid  Recent Labs   Lab Test  01/27/17   1113   URIC  5.6     Iron Studies  Recent Labs   Lab Test  12/05/11   1200   IRON  30*   FEB  236*   IRONSAT  13*     Calcium/VitaminD  Recent Labs   Lab Test  02/09/17   1710  05/03/16   1147  03/18/16   1511   07/23/13   1016   12/05/11   1200   NEW  8.8  8.6  9.4   < >   --    < >  9.3   D3VIT   --    --     "--    --    --    --   30   VITDT   --    --    --    --   28*   --    --     < > = values in this interval not displayed.     ESR/CRP  Recent Labs   Lab Test  01/27/17   1113  10/12/16   1227  07/13/16   1231  04/07/16   1149   SED   --   13  10  24*   CRP  189.0*  10.0*  7.6  15.2*     TSH/T4  Recent Labs   Lab Test  06/24/16   1101  07/23/13   1016  02/03/11   1254   TSH  1.07  0.55  0.71     Lipid Panel  Recent Labs   Lab Test  12/02/16   1252  09/30/15   1302  06/29/15   1213  08/07/14   1249  07/23/13   1016   CHOL  174   --   125  129  153   TRIG  282*   --   286*  270*  248*   HDL  39*   --   37*  46  41   LDL  79  89  31  29  62   VLDL   --    --   57*  54*  50*   CHOLHDLRATIO   --    --   3.4  2.8  3.7   NHDL  135*   --    --    --    --      Hepatitis B  Recent Labs   Lab Test  04/07/16   1149   HBCAB  Nonreactive   HEPBANG  Nonreactive     Hepatitis C  Recent Labs   Lab Test  04/07/16   1149   HCVAB  Nonreactive   Assay performance characteristics have not been established for newborns,   infants, and children       HIV Screening  Recent Labs   Lab Test  04/07/16   1149   HIAGAB  Nonreactive   HIV-1 p24 Ag & HIV-1/HIV-2 Ab Not Detected       UA  Recent Labs   Lab Test  06/29/15   1206  07/24/12   1506   COLOR  Yellow  Yellow   APPEARANCE  Clear  Clear   URINEGLC  500*  >=1000*   URINEBILI  Negative  Negative   SG  >1.030  1.020   URINEPH  5.0  5.0   PROTEIN  Trace*  Negative   UROBILINOGEN  0.2  0.2   NITRITE  Negative  Negative   UBLD  Trace*  Negative   LEUKEST  Negative  Negative   WBCU  O - 2  O - 2   RBCU  O - 2  O - 2     Urine Microscopic  Recent Labs   Lab Test  06/29/15   1206  07/24/12   1506   WBCU  O - 2  O - 2   RBCU  O - 2  O - 2     \"MRI LEFT KNEE  10/08/2013    INDICATION: Left knee pain. Locking, catching and snapping. Weakness.  TECHNIQUE: Routine.  COMPARISON: None.    FINDINGS:  MEDIAL COMPARTMENT: Linear tearing involving the posterior horn and body of   the medial meniscus as seen " "on series 4: image 6-8. This is also seen on   series 5: image 9-10. Cartilage is thinned peripherally.    LATERAL COMPARTMENT: Lateral meniscus also demonstrates tearing in the   posterior horn on series 4: image 22. There is diffuse tearing in the body   of the meniscus which is horizontal as seen on series 5: image 9 and this   extends into the anterior horn. Cartilage is thinned.    PATELLOFEMORAL COMPARTMENT: Retropatellar cartilage demonstrates   full-thickness loss of cartilage over portions of the median ridge, lateral   facet and medial facet. Cartilage is better preserved over the lower pole   centrally. There is also full-thickness loss of cartilage in the lateral   and central trochlear groove. Patella is normally aligned. Retinaculum are   intact.    LIGAMENTS AND TENDONS: The anterior cruciate and posterior cruciate   ligaments are intact. The medial collateral ligament is intact. Lateral   collateral ligamentous complex and popliteus insertion are intact.   Quadriceps tendon and patellar tendon are intact.    BONES AND SOFT TISSUES: Moderate-sized joint effusion. No popliteal cyst.   No muscle edema or fracture.    CONCLUSION:  1. There is tearing of the medial meniscus involving the posterior horn and   body. Cartilage is thinned peripherally.  2. There is also tearing in the posterior horn and body of the lateral   meniscus which also extends into the anterior horn. Cartilage is also   thinned in the lateral compartment.  3. Moderate to severe osteoarthritis in the patellofemoral compartment with   full-thickness loss of cartilage over large portions of the retropatellar   surface and trochlear surface.  4. Joint effusion.    IF YOU ARE A PHYSICIAN AND HAVE QUESTIONS REGARDING THIS REPORT, PLEASE   CALL [phone].\"    \"X-RAY KNEES BILATERAL AP W/LAT/SUN/PA LEFT   Oct 08, 2013 09:51:59 AM     INDICATION: Pain and swelling   COMPARISON: None.      FINDINGS: Moderate narrowing lateral aspect of the " "patellofemoral   compartment with slight lateral subluxation of the patella. Medial and   lateral compartments are preserved. Moderate knee joint effusion and/or   synovitis. Enthesophyte formation distal quadriceps and proximal patellar   tendons. Extensive vascular calcifications.     AP view right knee demonstrates trace narrowing medial compartment joint   Space.\"    Immunization History     Immunization History   Administered Date(s) Administered     Influenza (High Dose) 3 valent vaccine 09/20/2012, 10/20/2015, 09/20/2016     Influenza (IIV3) 10/05/1999, 10/30/2008, 09/28/2011, 10/14/2013, 10/03/2014     Pneumococcal (PCV 13) 06/29/2015     Pneumococcal 23 valent 08/19/2010     TD (ADULT, 7+) 08/05/1997, 02/03/2011     Zoster vaccine, live 04/19/2010            Chart documentation done in part with Dragon Voice recognition Software. Although reviewed after completion, some word and grammatical error may remain.    Albert Tompkins MD  "

## 2017-02-16 NOTE — TELEPHONE ENCOUNTER
Prior Authorization Approval    Authorization Effective Date: 1/15/2017  Authorization Expiration Date: 2/14/2020  Medication: Methotrexate approved  Approved Dose/Quantity:   Reference #: [id number]   Insurance Company: [employer] [place] [phone] Fax [phone]  Expected CoPay: n/a     CoPay Card Available:      Foundation Assistance Needed:    Which Pharmacy is filling the prescription (Not needed for infusion/clinic administered):    Pharmacy Notified:    Patient Notified:

## 2017-02-22 ENCOUNTER — ALLIED HEALTH/NURSE VISIT (OUTPATIENT)
Dept: NURSING | Facility: CLINIC | Age: 72
End: 2017-02-22

## 2017-02-22 VITALS — SYSTOLIC BLOOD PRESSURE: 122 MMHG | HEART RATE: 84 BPM | DIASTOLIC BLOOD PRESSURE: 80 MMHG

## 2017-02-22 DIAGNOSIS — I10 HYPERTENSION GOAL BP (BLOOD PRESSURE) < 140/90: Primary | ICD-10-CM

## 2017-02-22 NOTE — Clinical Note
Appointment scheduled for BP check in one week. Patient only stopped medication one day ago. Per last office visit patient should return for BP check 1-2 weeks after stopping medication. Please call patient if this appointment is not needed.

## 2017-02-22 NOTE — MR AVS SNAPSHOT
After Visit Summary   2/22/2017    Zurdo Dash    MRN: [id number]           Patient Information     Date Of Birth          1945        Visit Information        Provider Department      2/22/2017 1:30 PM [facility]Z ANCILLARY [facility] [place]        Today's Diagnoses     Hypertension goal BP (blood pressure) < 140/90    -  1       Follow-ups after your visit        Your next 10 appointments already scheduled     Feb 28, 2017 12:00 PM CST   Nurse Only with  ANCILLARY   [facility]    [address] [place] [address]  [place] [address] [phone]            May 15, 2017 11:00 AM CDT   LAB with FZ LAB   [facility]    [address] [address] [phone]           Patient must bring picture ID.  Patient should be prepared to give a urine specimen  Please do not eat 10-12 hours before your appointment if you are coming in fasting for labs on lipids, cholesterol, or glucose (sugar).  Pregnant women should follow their Care Team instructions. Water with medications is okay. Do not drink coffee or other fluids.   If you have concerns about taking  your medications, please ask at office or if scheduling via [employer], send a message by clicking on Secure Messaging, Message Your Care Team.            May 18, 2017 11:40 AM CDT   Return Visit with Albert Tompkins MD   [facility] [place] ([facility])    [address]  [place] [address]   [phone]              Who to contact     If you have questions or need follow up information about today's clinic visit or your schedule please contact [facility] directly at [phone].  Normal or non-critical lab and imaging results will be communicated to you by MyChart, letter or phone within 4 business days after the clinic has received the results. If you do not hear from us within 7 days, please contact the clinic  "through [employer] or phone. If you have a critical or abnormal lab result, we will notify you by phone as soon as possible.  Submit refill requests through [employer] or call your pharmacy and they will forward the refill request to us. Please allow 3 business days for your refill to be completed.          Additional Information About Your Visit        [employer] Information     [employer] lets you send messages to your doctor, view your test results, renew your prescriptions, schedule appointments and more. To sign up, go to [email or web address] . Click on \"Log in\" on the left side of the screen, which will take you to the Welcome page. Then click on \"Sign up Now\" on the right side of the page.     You will be asked to enter the access code listed below, as well as some personal information. Please follow the directions to create your username and password.     Your access code is: [email or web address]  Expires: 2017  2:30 PM     Your access code will  in 90 days. If you need help or a new code, please call your [place] clinic or [phone].        Care EveryWhere ID     This is your Care EveryWhere ID. This could be used by other organizations to access your [place] medical records  [id number]        Your Vitals Were     Pulse                   84            Blood Pressure from Last 3 Encounters:   17 122/80   17 118/86   17 92/64    Weight from Last 3 Encounters:   17 214 lb 12.8 oz (97.4 kg)   17 217 lb 3.2 oz (98.5 kg)   17 221 lb (100.2 kg)              Today, you had the following     No orders found for display       Primary Care Provider Office Phone # Fax #    Kapil Duckworth [phone]       [facility] [address] [facility] [address]        Thank you!     Thank you for choosing [facility]  for your care. Our goal is always to provide you with excellent care. Hearing back from our patients is one way we can continue to " improve our services. Please take a few minutes to complete the written survey that you may receive in the mail after your visit with us. Thank you!             Your Updated Medication List - Protect others around you: Learn how to safely use, store and throw away your medicines at www.disposemymeds.org.          This list is accurate as of: 2/22/17  1:38 PM.  Always use your most recent med list.                   Brand Name Dispense Instructions for use    ACE/ARB NOT PRESCRIBED (INTENTIONAL)          aspirin 81 MG tablet      Take 1 tablet by mouth daily.       INDIA CONTOUR NEXT test strip   Generic drug:  blood glucose monitoring     100 each    TEST THREE TIMES A DAY OR AS DIRECTED       CALCIUM 500 + D 500-200 MG-IU Tabs      take 2 tabs daily       folic acid 1 MG tablet    FOLVITE    100 tablet    Take 1 tablet (1 mg) by mouth daily       glimepiride 4 MG tablet    AMARYL    90 tablet    Take 1 tablet (4 mg) by mouth every morning (before breakfast)       hydrochlorothiazide 25 MG tablet    HYDRODIURIL    90 tablet    TAKE ONE TABLET BY MOUTH EVERY DAY       HYDROcodone-acetaminophen 5-325 MG per tablet    NORCO    30 tablet    Take 1 tablet by mouth every 6 hours as needed for pain       hydroxychloroquine 200 MG tablet    PLAQUENIL    180 tablet    Take 1 tablet (200 mg) by mouth 2 times daily       insulin pen needle 31G X 6 MM    ULTICARE MINI    100 each    Use 1 daily or as directed.       lisinopril 5 MG tablet    PRINIVIL/ZESTRIL    90 tablet    Take 1 tablet (5 mg) by mouth daily       metFORMIN 500 MG tablet    GLUCOPHAGE    360 tablet    TAKE TWO TABLETS BY MOUTH TWICE A DAY WITH MEALS       methotrexate sodium 2.5 MG Tabs     120 tablet    Take 25 mg by mouth once a week . Take all 10 tablets on the same day of each week.       metoprolol 25 MG 24 hr tablet    TOPROL-XL    90 tablet    TAKE ONE TABLET BY MOUTH EVERY DAY       MICROLET LANCETS Misc     100 each    1 Device daily        MULTI-VITAMIN PO      None Entered       omeprazole 20 MG CR capsule    priLOSEC    90 capsule    Take 1 capsule (20 mg) by mouth daily       oxyCODONE-acetaminophen 5-325 MG per tablet    PERCOCET     Take 1-2 tablets by mouth Reported on 2/22/2017       pravastatin 40 MG tablet    PRAVACHOL    90 tablet    Take 1 tablet (40 mg) by mouth daily       * VICTOZA PEN 18 MG/3ML soln   Generic drug:  liraglutide     6 mL    INJECT 1.2MG SUBCUTANEOUSLY DAILY       * liraglutide 18 MG/3ML soln    VICTOZA    9 mL    Inject 1.8 mg Subcutaneous daily       vitamin E 1000 UNIT capsule    TOCOPHEROL     1 tablet daily       * Notice:  This list has 2 medication(s) that are the same as other medications prescribed for you. Read the directions carefully, and ask your doctor or other care provider to review them with you.

## 2017-02-22 NOTE — NURSING NOTE
Zurdo Dash is a 71 year old male who comes in today for a Blood Pressure check because patient stopped blood pressure medication yesterday.    Appointment scheduled for BP check in one week. Patient only stopped medication one day ago. Per last office visit patient should return for BP check 1-2 weeks after stopping medication. Please call patient if this appointment is not needed.    Vitals as recorded, a large cuff was used.  Patient is not monitoring Blood Pressure at home.    Current complaints: none    Disposition: results routed to MD/AP

## 2017-02-28 ENCOUNTER — ALLIED HEALTH/NURSE VISIT (OUTPATIENT)
Dept: NURSING | Facility: CLINIC | Age: 72
End: 2017-02-28

## 2017-02-28 VITALS — HEART RATE: 84 BPM | SYSTOLIC BLOOD PRESSURE: 132 MMHG | DIASTOLIC BLOOD PRESSURE: 92 MMHG

## 2017-02-28 DIAGNOSIS — I10 HYPERTENSION GOAL BP (BLOOD PRESSURE) < 140/90: Primary | ICD-10-CM

## 2017-02-28 NOTE — MR AVS SNAPSHOT
After Visit Summary   2/28/2017    Zurdo Dash    MRN: [id number]           Patient Information     Date Of Birth          1945        Visit Information        Provider Department      2/28/2017 12:00 PM CAR ANCILLARY [facility]        Today's Diagnoses     Hypertension goal BP (blood pressure) < 140/90    -  1      Care Instructions    Zurdo Dash is a 71 year old male who comes in today for a Blood Pressure check because of medication change.    Vitals as recorded, a regular cuff was used.    Patient is tolerating medications well.  Patient is not monitoring Blood Pressure at home.   Current complaints: none    Per last office visit patient was suppose to stop HCTZ and metoprolol, patient thinks that he may still be taking these and he stopped his Lisinopril instead. Patient will try to check his medication when he gets home. He is unsure if he will be able to tell which pills are which, he uses a pill box.  Patient can be reached today after 3 pm at [phone].    Disposition: results routed to MD/AP            Follow-ups after your visit        Your next 10 appointments already scheduled     May 15, 2017 11:00 AM CDT   LAB with CAR LAB   [facility] [place] ([facility])    [address] [id number] [phone]           Patient must bring picture ID.  Patient should be prepared to give a urine specimen  Please do not eat 10-12 hours before your appointment if you are coming in fasting for labs on lipids, cholesterol, or glucose (sugar).  Pregnant women should follow their Care Team instructions. Water with medications is okay. Do not drink coffee or other fluids.   If you have concerns about taking  your medications, please ask at office or if scheduling via [email or web address], send a message by clicking on Secure Messaging, Message Your Care Team.            May 18, 2017 11:40 AM CDT   Return Visit with Albert Tompkins MD   [facility]  [place] ([facility])    [address]  [place] [address] [phone]              Who to contact     If you have questions or need follow up information about today's clinic visit or your schedule please contact [facility] directly at [phone].  Normal or non-critical lab and imaging results will be communicated to you by MyChart, letter or phone within 4 business days after the clinic has received the results. If you do not hear from us within 7 days, please contact the clinic through MyChart or phone. If you have a critical or abnormal lab result, we will notify you by phone as soon as possible.  Submit refill requests through [employer] or call your pharmacy and they will forward the refill request to us. Please allow 3 business days for your refill to be completed.          Additional Information About Your Visit        [employer] Information     [employer] lets you send messages to your doctor, view your test results, renew your prescriptions, schedule appointments and more. To sign up, go to [email or web address] . Click on \"Log in\" on the left side of the screen, which will take you to the Welcome page. Then click on \"Sign up Now\" on the right side of the page.     You will be asked to enter the access code listed below, as well as some personal information. Please follow the directions to create your username and password.     Your access code is: [email or web address]  Expires: 2017  2:30 PM     Your access code will  in 90 days. If you need help or a new code, please call your [facility] or [phone].        Care EveryWhere ID     This is your Care EveryWhere ID. This could be used by other organizations to access your [place] medical records  [id number]        Your Vitals Were     Pulse                   84            Blood Pressure from Last 3 Encounters:   17 (!) 132/92   17 122/80   17 118/86    Weight from Last 3 Encounters: "   02/16/17 214 lb 12.8 oz (97.4 kg)   02/14/17 217 lb 3.2 oz (98.5 kg)   02/09/17 221 lb (100.2 kg)              Today, you had the following     No orders found for display       Primary Care Provider Office Phone # Fax #    Kapil Duckworth [phone]       [address] [address]        Thank you!     Thank you for choosing [facility]  for your care. Our goal is always to provide you with excellent care. Hearing back from our patients is one way we can continue to improve our services. Please take a few minutes to complete the written survey that you may receive in the mail after your visit with us. Thank you!             Your Updated Medication List - Protect others around you: Learn how to safely use, store and throw away your medicines at www.disposemymeds.org.          This list is accurate as of: 2/28/17  2:28 PM.  Always use your most recent med list.                   Brand Name Dispense Instructions for use    ACE/ARB NOT PRESCRIBED (INTENTIONAL)          aspirin 81 MG tablet      Take 1 tablet by mouth daily.       INDIA CONTOUR NEXT test strip   Generic drug:  blood glucose monitoring     100 each    TEST THREE TIMES A DAY OR AS DIRECTED       CALCIUM 500 + D 500-200 MG-IU Tabs      take 2 tabs daily       folic acid 1 MG tablet    FOLVITE    100 tablet    Take 1 tablet (1 mg) by mouth daily       glimepiride 4 MG tablet    AMARYL    90 tablet    Take 1 tablet (4 mg) by mouth every morning (before breakfast)       hydrochlorothiazide 25 MG tablet    HYDRODIURIL    90 tablet    TAKE ONE TABLET BY MOUTH EVERY DAY       HYDROcodone-acetaminophen 5-325 MG per tablet    NORCO    30 tablet    Take 1 tablet by mouth every 6 hours as needed for pain       hydroxychloroquine 200 MG tablet    PLAQUENIL    180 tablet    Take 1 tablet (200 mg) by mouth 2 times daily       insulin pen needle 31G X 6 MM    ULTICARE MINI    100 each    Use 1 daily  or as directed.       lisinopril 5 MG tablet    PRINIVIL/ZESTRIL    90 tablet    Take 1 tablet (5 mg) by mouth daily       metFORMIN 500 MG tablet    GLUCOPHAGE    360 tablet    TAKE TWO TABLETS BY MOUTH TWICE A DAY WITH MEALS       methotrexate sodium 2.5 MG Tabs     120 tablet    Take 25 mg by mouth once a week . Take all 10 tablets on the same day of each week.       metoprolol 25 MG 24 hr tablet    TOPROL-XL    90 tablet    TAKE ONE TABLET BY MOUTH EVERY DAY       MICROLET LANCETS Misc     100 each    1 Device daily       MULTI-VITAMIN PO      None Entered       omeprazole 20 MG CR capsule    priLOSEC    90 capsule    Take 1 capsule (20 mg) by mouth daily       oxyCODONE-acetaminophen 5-325 MG per tablet    PERCOCET     Take 1-2 tablets by mouth Reported on 2/22/2017       pravastatin 40 MG tablet    PRAVACHOL    90 tablet    Take 1 tablet (40 mg) by mouth daily       * VICTOZA PEN 18 MG/3ML soln   Generic drug:  liraglutide     6 mL    INJECT 1.2MG SUBCUTANEOUSLY DAILY       * liraglutide 18 MG/3ML soln    VICTOZA    9 mL    Inject 1.8 mg Subcutaneous daily       vitamin E 1000 UNIT capsule    TOCOPHEROL     1 tablet daily       * Notice:  This list has 2 medication(s) that are the same as other medications prescribed for you. Read the directions carefully, and ask your doctor or other care provider to review them with you.

## 2017-02-28 NOTE — PATIENT INSTRUCTIONS
Zurdo Dash is a 71 year old male who comes in today for a Blood Pressure check because of medication change.    Vitals as recorded, a regular cuff was used.    Patient is tolerating medications well.  Patient is not monitoring Blood Pressure at home.   Current complaints: none    Per last office visit patient was suppose to stop HCTZ and metoprolol, patient thinks that he may still be taking these and he stopped his Lisinopril instead. Patient will try to check his medication when he gets home. He is unsure if he will be able to tell which pills are which, he uses a pill box.  Patient can be reached today after 3 pm at [phone].    Disposition: results routed to MD/ALTA

## 2017-02-28 NOTE — Clinical Note
Zurdo Dash is a 71 year old male who comes in today for a Blood Pressure check because of medication change.  Vitals as recorded, a regular cuff was used.  Patient is tolerating medications well. Patient is not monitoring Blood Pressure at home.  Current complaints: none  Per last office visit patient was suppose to stop HCTZ and metoprolol, patient thinks that he may still be taking these and he stopped his Lisinopril instead. Patient will try to check his medication when he gets home. He is unsure if he will be able to tell which pills are which, he uses a pill box.  Patient can be reached today after 3 pm at [phone].  Disposition: results routed to MD/ALTA

## 2017-03-03 ENCOUNTER — OFFICE VISIT (OUTPATIENT)
Dept: FAMILY MEDICINE | Facility: CLINIC | Age: 72
End: 2017-03-03
Payer: COMMERCIAL

## 2017-03-03 VITALS
SYSTOLIC BLOOD PRESSURE: 120 MMHG | WEIGHT: 215 LBS | TEMPERATURE: 96.7 F | HEART RATE: 98 BPM | DIASTOLIC BLOOD PRESSURE: 82 MMHG | OXYGEN SATURATION: 99 % | BODY MASS INDEX: 31.84 KG/M2 | HEIGHT: 69 IN

## 2017-03-03 DIAGNOSIS — R07.89 CHEST WALL TENDERNESS: ICD-10-CM

## 2017-03-03 DIAGNOSIS — S00.81XA FACIAL ABRASION, INITIAL ENCOUNTER: ICD-10-CM

## 2017-03-03 DIAGNOSIS — R55 SYNCOPE, UNSPECIFIED SYNCOPE TYPE: Primary | ICD-10-CM

## 2017-03-03 DIAGNOSIS — R91.1 PULMONARY NODULE: ICD-10-CM

## 2017-03-03 DIAGNOSIS — R10.9 RIGHT SIDED ABDOMINAL PAIN: ICD-10-CM

## 2017-03-03 DIAGNOSIS — Z79.899 HIGH RISK MEDICATION USE: ICD-10-CM

## 2017-03-03 DIAGNOSIS — I10 HYPERTENSION GOAL BP (BLOOD PRESSURE) < 140/90: ICD-10-CM

## 2017-03-03 PROCEDURE — 99214 OFFICE O/P EST MOD 30 MIN: CPT | Performed by: FAMILY MEDICINE

## 2017-03-03 NOTE — NURSING NOTE
"Chief Complaint   Patient presents with     Finger     complains of pain, swelling, scabbed left 3rd finger / skinned 2 weeks ago        Initial /82 (BP Location: Left arm, Patient Position: Chair, Cuff Size: Adult Large)  Pulse 98  Temp 96.7  F (35.9  C) (Oral)  Ht 5' 8.5\" (1.74 m)  Wt 215 lb (97.5 kg)  SpO2 99%  BMI 32.22 kg/m2 Estimated body mass index is 32.22 kg/(m^2) as calculated from the following:    Height as of this encounter: 5' 8.5\" (1.74 m).    Weight as of this encounter: 215 lb (97.5 kg).  Medication Reconciliation: complete     Radha Walker MA      "

## 2017-03-03 NOTE — PROGRESS NOTES
SUBJECTIVE:                                                    Zurdo Dash is a 71 year old male who presents to clinic today for the following health issues:      Chief Complaint   Patient presents with     Finger     complains of pain, swelling, scabbed left 3rd finger / skinned 2 weeks ago          ED/UC Followup:    Facility:  [facility]  Date of visit: 2-14-17  Reason for visit: follow up   Current Status: stable      Fall from slipping on ice, initial encounter W00.9XXA   2. Syncope, unspecified syncope type R55   3. Facial abrasion, initial encounter S00.81XA   multiple   4. Chest wall tenderness R07.89 oxyCODONE-acetaminophen, 5-325 mg, (PERCOCET) 5-325 mg per tablet   5. Right sided abdominal pain R10.9    6-Pulmonary nodule  Problem list and histories reviewed & adjusted, as indicated.  Additional history: as documented  Pt is doing well  No Further syncope  Still has Right sided chest wall pain with movement  No cough or sob  Finger is healing  Face has healed up  Blood sugars are stable     Patient Active Problem List   Diagnosis     Pain in limb     Diabetic neuropathy (H)     Hyperlipidemia LDL goal <100     Hypertension goal BP (blood pressure) < 140/90     obesity     ED (erectile dysfunction)     Hypogonadism     Advanced directives, counseling/discussion     Health Care Home     Type 2 diabetes mellitus, uncontrolled, with neuropathy (H)     RBBB (right bundle branch block)     Lumbago     Ex-smoker     Cataracts, both eyes     Family history of esophageal cancer     Gastroesophageal reflux disease, esophagitis presence not specified     Rheumatoid arthritis involving multiple sites with positive rheumatoid factor (H)     Pulmonary nodule     Past Surgical History   Procedure Laterality Date     Tonsillectomy       Hc incision tendon sheath finger  4/2009     r hand ring finger       Social History   Substance Use Topics     Smoking status: Former Smoker     Packs/day: 1.00     Years:  40.00     Types: Cigarettes     Quit date: 3/16/2007     Smokeless tobacco: Never Used     Alcohol use No     Family History   Problem Relation Age of Onset     CEREBROVASCULAR DISEASE Mother      Arthritis Mother      OSTEOPOROSIS Mother      Alzheimer Disease Father      Arthritis Father      CANCER Father      DIABETES Maternal Grandmother      Cardiovascular Maternal Grandmother      Other Cancer Brother      CANCER Paternal Aunt      Hypertension No family hx of      Thyroid Disease No family hx of      Glaucoma No family hx of      Macular Degeneration No family hx of          Current Outpatient Prescriptions   Medication Sig Dispense Refill     folic acid (FOLVITE) 1 MG tablet Take 1 tablet (1 mg) by mouth daily 100 tablet 3     oxyCODONE-acetaminophen (PERCOCET) 5-325 MG per tablet Take 1-2 tablets by mouth Reported on 2/22/2017       metFORMIN (GLUCOPHAGE) 500 MG tablet TAKE TWO TABLETS BY MOUTH TWICE A DAY WITH MEALS 360 tablet 0     hydroxychloroquine (PLAQUENIL) 200 MG tablet Take 1 tablet (200 mg) by mouth 2 times daily 180 tablet 1     methotrexate sodium 2.5 MG TABS Take 25 mg by mouth once a week . Take all 10 tablets on the same day of each week. 120 tablet 1     lisinopril (PRINIVIL/ZESTRIL) 5 MG tablet Take 1 tablet (5 mg) by mouth daily 90 tablet 1     liraglutide (VICTOZA) 18 MG/3ML soln Inject 1.8 mg Subcutaneous daily 9 mL 3     insulin pen needle (ULTICARE MINI) 31G X 6 MM Use 1 daily or as directed. 100 each 1     VICTOZA PEN 18 MG/3ML soln INJECT 1.2MG SUBCUTANEOUSLY DAILY 6 mL 0     HYDROcodone-acetaminophen (NORCO) 5-325 MG per tablet Take 1 tablet by mouth every 6 hours as needed for pain 30 tablet 0     pravastatin (PRAVACHOL) 40 MG tablet Take 1 tablet (40 mg) by mouth daily 90 tablet 3     hydrochlorothiazide (HYDRODIURIL) 25 MG tablet TAKE ONE TABLET BY MOUTH EVERY DAY 90 tablet 1     glimepiride (AMARYL) 4 MG tablet Take 1 tablet (4 mg) by mouth every morning (before breakfast) 90  tablet 1     metoprolol (TOPROL-XL) 25 MG 24 hr tablet TAKE ONE TABLET BY MOUTH EVERY DAY 90 tablet 3     omeprazole (PRILOSEC) 20 MG capsule Take 1 capsule (20 mg) by mouth daily 90 capsule 2     INDIA CONTOUR NEXT test strip TEST THREE TIMES A DAY OR AS DIRECTED 100 each 3     ACE/ARB NOT PRESCRIBED, INTENTIONAL,        MICROLET LANCETS MISC 1 Device daily 100 each 4     aspirin 81 MG tablet Take 1 tablet by mouth daily.  3     CALCIUM 500 + D 500-200 MG-IU OR TABS take 2 tabs daily       MULTI-VITAMIN OR None Entered       VITAMIN E 1000 UNIT OR CAPS 1 tablet daily       No Known Allergies  Recent Labs   Lab Test  02/09/17   1710  12/02/16   1252  10/12/16   1227  08/05/16   1237  07/13/16   1231  06/24/16   1101  05/03/16   1147  04/07/16   1149   09/30/15   1302   06/29/15   1213   08/07/14   1249   07/23/13   1016   A1C  9.2*  9.8*   --   9.0*   --    --   6.9*   --    < >  9.7*   --   11.8*   < >  7.0*   < >  7.0*   LDL   --   79   --    --    --    --    --    --    --   89   --   31   --   29   --   62   HDL   --   39*   --    --    --    --    --    --    --    --    --   37*   --   46   --   41   TRIG   --   282*   --    --    --    --    --    --    --    --    --   286*   --   270*   --   248*   ALT   --    --   40   --   44   --    --   35   --    --    < >   --    < >  52   < >   --    CR  1.20   --   0.98   --   1.02   --   1.07  1.38*   < >   --    < >   --    < >  1.17   < >   --    GFRESTIMATED  59*   --   75   --   72   --   68  51*   < >   --    < >   --    < >  62   < >   --    GFRESTBLACK  72   --   >90   GFR Calc     --   87   --   82  61   < >   --    < >   --    < >  75   < >   --    POTASSIUM  4.0   --    --    --    --    --   3.7   --    < >   --    < >   --    < >  3.6   < >   --    TSH   --    --    --    --    --   1.07   --    --    --    --    --    --    --    --    --   0.55    < > = values in this interval not displayed.      BP Readings from Last 3 Encounters:  "  03/03/17 120/82   02/28/17 (!) 132/92   02/22/17 122/80    Wt Readings from Last 3 Encounters:   03/03/17 215 lb (97.5 kg)   02/16/17 214 lb 12.8 oz (97.4 kg)   02/14/17 217 lb 3.2 oz (98.5 kg)                  Labs reviewed in EPIC    Reviewed and updated as needed this visit by clinical staff  Tobacco  Allergies  Meds  Med Hx  Surg Hx  Fam Hx  Soc Hx      Reviewed and updated as needed this visit by Provider         ROS:  C: NEGATIVE for fever, chills, change in weight  E/M: NEGATIVE for ear, mouth and throat problems  R: NEGATIVE for significant cough or SOB  CV: NEGATIVE for chest pain, palpitations or peripheral edema  GI: NEGATIVE for nausea, abdominal pain, heartburn, or change in bowel habits  MUSCULOSKELETAL: still has some pain Right chest wall area  PSYCHIATRIC: NEGATIVE for changes in mood or affect    OBJECTIVE:                                                    /82 (BP Location: Left arm, Patient Position: Chair, Cuff Size: Adult Large)  Pulse 98  Temp 96.7  F (35.9  C) (Oral)  Ht 5' 8.5\" (1.74 m)  Wt 215 lb (97.5 kg)  SpO2 99%  BMI 32.22 kg/m2  Body mass index is 32.22 kg/(m^2).  GENERAL: healthy, alert and no distress  EYES: Eyes grossly normal to inspection, PERRL and conjunctivae and sclerae normal  HENT: ear canals and TM's normal, nose and mouth without ulcers or lesions  NECK: no adenopathy, no asymmetry, masses, or scars and thyroid normal to palpation  RESP: lungs clear to auscultation - no rales, rhonchi or wheezes  CV: regular rate and rhythm, normal S1 S2, no S3 or S4, no murmur, click or rub, no peripheral edema and peripheral pulses strong  ABDOMEN: soft, nontender, no hepatosplenomegaly, no masses and bowel sounds normal  MS: no gross musculoskeletal defects noted, no edema  SKIN: no suspicious lesions or rashes  NEURO: Normal strength and tone, mentation intact and speech normal  PSYCH: mentation appears normal, affect normal/bright  Mild tenderness Right chest wall " area  Finger is healing  ER notes and scans reviewed     Diagnostic Test Results:  Reviewed ER     ASSESSMENT/PLAN:                                                        1. Syncope, unspecified syncope type  No further episodes  Advised follow up if any further symptoms    2. Facial abrasion, initial encounter  healed    3. Chest wall tenderness  Advised Tylenol otc  It is getting better    4. Pulmonary nodule  Advised follow up CT scan chest 1 year  Pt is a ex smoker  Advised follow up Lung Nodule clinic as recommended in ER    5. Right sided abdominal pain  better    6. Type 2 diabetes mellitus, uncontrolled, with neuropathy (H)  Stable     7. Hypertension goal BP (blood pressure) < 140/90  controlled    8. High risk medication use  Pt has RA and doing well        Nichelle Hernandez MD  [facility]

## 2017-03-03 NOTE — MR AVS SNAPSHOT
After Visit Summary   3/3/2017    Zurdo Dash    MRN: [id number]           Patient Information     Date Of Birth          1945        Visit Information        Provider Department      3/3/2017 9:40 AM Nichelle Hernandez MD [facility]        Today's Diagnoses     Syncope, unspecified syncope type    -  1    Facial abrasion, initial encounter        Chest wall tenderness        Pulmonary nodule        Right sided abdominal pain          Care Instructions    You should have a follow up CT chest 1 year for a follow up Lung Nodule  Take care  Nichelle Hernandez MD    [facility]    If you have any questions regarding to your visit please contact your care team:       Team Red:   Clinic Hours Telephone Number   Dr. Dedra Hu, NP   7am-7pm  Monday - Thursday   7am-5pm  Fridays  [phone]  (Appointment scheduling available 24/7)    Questions about your visit?   Team Line  [phone]   [facility] [place] [facility] - 11am-9pm Monday-Friday Saturday-Sunday- 9am-5pm   [place] - 5pm-9pm Monday-Friday Saturday-Sunday- 9am-5pm  [phone] - [place]   [phone] - [place]       What options do I have for visits at the clinic other than the traditional office visit?  To expand how we care for you, many of our providers are utilizing electronic visits (e-visits) and telephone visits, when medically appropriate, for interactions with their patients rather than a visit in the clinic.   We also offer nurse visits for many medical concerns. Just like any other service, we will bill your insurance company for this type of visit based on time spent on the phone with your provider. Not all insurance companies cover these visits. Please check with your medical insurance if this type of visit is covered. You will be responsible for any charges that are not paid by your insurance.      E-visits via [employer]:   generally incur a $35.00 fee.  Telephone visits:  Time spent on the phone: *charged based on time that is spent on the phone in increments of 10 minutes. Estimated cost:   5-10 mins $30.00   11-20 mins. $59.00   21-30 mins. $85.00     Use [employer] (secure email communication and access to your chart) to send your primary care provider a message or make an appointment. Ask someone on your Team how to sign up for IPPLEXt.  For a Price Quote for your services, please call our HW Line at [phone].      As always, Thank you for trusting us with your health care needs!  Tim Hurtado          Follow-ups after your visit        Your next 10 appointments already scheduled     May 15, 2017 11:00 AM CDT   LAB with  LAB   [place] [facility] ([facility])    [address] [place], [place] [address] [address] [phone]           Patient must bring picture ID.  Patient should be prepared to give a urine specimen  Please do not eat 10-12 hours before your appointment if you are coming in fasting for labs on lipids, cholesterol, or glucose (sugar).  Pregnant women should follow their Care Team instructions. Water with medications is okay. Do not drink coffee or other fluids.   If you have concerns about taking  your medications, please ask at office or if scheduling via IPPLEXt, send a message by clicking on Secure Messaging, Message Your Care Team.            May 18, 2017 11:40 AM CDT   Return Visit with [facility] [place] [facility] ([facility]    [address] [place] [place] [address] [id number] [phone]              Who to contact     If you have questions or need follow up information about today's clinic visit or your schedule please contact [place] [facility] directly at [phone].  Normal or non-critical lab and imaging results will be communicated to you by MyChart, letter or phone within 4 business days after the clinic has received the  "results. If you do not hear from us within 7 days, please contact the clinic through [employer] or phone. If you have a critical or abnormal lab result, we will notify you by phone as soon as possible.  Submit refill requests through [employer] or call your pharmacy and they will forward the refill request to us. Please allow 3 business days for your refill to be completed.          Additional Information About Your Visit        CommitChangeharOligasis Information     Moo[employer]ella lets you send messages to your doctor, view your test results, renew your prescriptions, schedule appointments and more. To sign up, go to [email or web address] . Click on \"Log in\" on the left side of the screen, which will take you to the Welcome page. Then click on \"Sign up Now\" on the right side of the page.     You will be asked to enter the access code listed below, as well as some personal information. Please follow the directions to create your username and password.     Your access code is: [email or web address]  Expires: 2017  2:30 PM     Your access code will  in 90 days. If you need help or a new code, please call your [place] clinic or [phone].        Care EveryWhere ID     This is your Care EveryWhere ID. This could be used by other organizations to access your [place] medical records  [id number]        Your Vitals Were     Pulse Temperature Height Pulse Oximetry BMI (Body Mass Index)       98 96.7  F (35.9  C) (Oral) 5' 8.5\" (1.74 m) 99% 32.22 kg/m2        Blood Pressure from Last 3 Encounters:   17 120/82   17 (!) 132/92   17 122/80    Weight from Last 3 Encounters:   17 215 lb (97.5 kg)   17 214 lb 12.8 oz (97.4 kg)   17 217 lb 3.2 oz (98.5 kg)              Today, you had the following     No orders found for display       Primary Care Provider Office Phone # Fax #    Kapil Duckworth [phone]       [facility] [address] [place] [address]        Thank you!     " Thank you for choosing [facility]  for your care. Our goal is always to provide you with excellent care. Hearing back from our patients is one way we can continue to improve our services. Please take a few minutes to complete the written survey that you may receive in the mail after your visit with us. Thank you!             Your Updated Medication List - Protect others around you: Learn how to safely use, store and throw away your medicines at www.disposemymeds.org.          This list is accurate as of: 3/3/17 10:19 AM.  Always use your most recent med list.                   Brand Name Dispense Instructions for use    ACE/ARB NOT PRESCRIBED (INTENTIONAL)          aspirin 81 MG tablet      Take 1 tablet by mouth daily.       INDIA CONTOUR NEXT test strip   Generic drug:  blood glucose monitoring     100 each    TEST THREE TIMES A DAY OR AS DIRECTED       CALCIUM 500 + D 500-200 MG-IU Tabs      take 2 tabs daily       folic acid 1 MG tablet    FOLVITE    100 tablet    Take 1 tablet (1 mg) by mouth daily       glimepiride 4 MG tablet    AMARYL    90 tablet    Take 1 tablet (4 mg) by mouth every morning (before breakfast)       hydrochlorothiazide 25 MG tablet    HYDRODIURIL    90 tablet    TAKE ONE TABLET BY MOUTH EVERY DAY       HYDROcodone-acetaminophen 5-325 MG per tablet    NORCO    30 tablet    Take 1 tablet by mouth every 6 hours as needed for pain       hydroxychloroquine 200 MG tablet    PLAQUENIL    180 tablet    Take 1 tablet (200 mg) by mouth 2 times daily       insulin pen needle 31G X 6 MM    ULTICARE MINI    100 each    Use 1 daily or as directed.       lisinopril 5 MG tablet    PRINIVIL/ZESTRIL    90 tablet    Take 1 tablet (5 mg) by mouth daily       metFORMIN 500 MG tablet    GLUCOPHAGE    360 tablet    TAKE TWO TABLETS BY MOUTH TWICE A DAY WITH MEALS       methotrexate sodium 2.5 MG Tabs     120 tablet    Take 25 mg by mouth once a week . Take all 10 tablets on the same day of each  week.       metoprolol 25 MG 24 hr tablet    TOPROL-XL    90 tablet    TAKE ONE TABLET BY MOUTH EVERY DAY       MICROLET LANCETS Misc     100 each    1 Device daily       MULTI-VITAMIN PO      None Entered       omeprazole 20 MG CR capsule    priLOSEC    90 capsule    Take 1 capsule (20 mg) by mouth daily       oxyCODONE-acetaminophen 5-325 MG per tablet    PERCOCET     Take 1-2 tablets by mouth Reported on 2/22/2017       pravastatin 40 MG tablet    PRAVACHOL    90 tablet    Take 1 tablet (40 mg) by mouth daily       * VICTOZA PEN 18 MG/3ML soln   Generic drug:  liraglutide     6 mL    INJECT 1.2MG SUBCUTANEOUSLY DAILY       * liraglutide 18 MG/3ML soln    VICTOZA    9 mL    Inject 1.8 mg Subcutaneous daily       vitamin E 1000 UNIT capsule    TOCOPHEROL     1 tablet daily       * Notice:  This list has 2 medication(s) that are the same as other medications prescribed for you. Read the directions carefully, and ask your doctor or other care provider to review them with you.

## 2017-03-03 NOTE — PATIENT INSTRUCTIONS
You should have a follow up CT chest 1 year for a follow up Lung Nodule  Take care  Nichelle Hernandez MD    [facility]    If you have any questions regarding to your visit please contact your care team:       Team Red:   Clinic Hours Telephone Number   Dr. Dedra Hu, NP   7am-7pm  Monday - Thursday   7am-5pm  Fridays  [phone]  (Appointment scheduling available 24/7)    Questions about your visit?   Team Line  [phone]   Urgent Care - [place] - 11am-9pm Monday-Friday Saturday-Sunday- 9am-5pm   [place] - 5pm-9pm Monday-Friday Saturday-Sunday- 9am-5pm  [phone] - [facility]  [phone] - [place]       What options do I have for visits at the clinic other than the traditional office visit?  To expand how we care for you, many of our providers are utilizing electronic visits (e-visits) and telephone visits, when medically appropriate, for interactions with their patients rather than a visit in the clinic.   We also offer nurse visits for many medical concerns. Just like any other service, we will bill your insurance company for this type of visit based on time spent on the phone with your provider. Not all insurance companies cover these visits. Please check with your medical insurance if this type of visit is covered. You will be responsible for any charges that are not paid by your insurance.      E-visits via TBS:  generally incur a $35.00 fee.  Telephone visits:  Time spent on the phone: *charged based on time that is spent on the phone in increments of 10 minutes. Estimated cost:   5-10 mins $30.00   11-20 mins. $59.00   21-30 mins. $85.00     Use [employer] (secure email communication and access to your chart) to send your primary care provider a message or make an appointment. Ask someone on your Team how to sign up for TBS.  For a Price Quote for your services, please call our Consumer Price  Line at [phone].      As always, Thank you for trusting us with your health care needs!  Tim Hurtado

## 2017-03-08 DIAGNOSIS — S20.219S: Primary | ICD-10-CM

## 2017-03-08 RX ORDER — HYDROCODONE BITARTRATE AND ACETAMINOPHEN 5; 325 MG/1; MG/1
1 TABLET ORAL EVERY 6 HOURS PRN
Qty: 30 TABLET | Refills: 0 | Status: CANCELLED | OUTPATIENT
Start: 2017-03-08

## 2017-03-08 NOTE — TELEPHONE ENCOUNTER
Norco has been prescribed/refilled by Dr. Duckworth in the past.  Most recently written on 1/27/17 for monoarthritis    Left message on voicemail for patient to return call to RN hotline at # [phone].  Will need to ask patient what he is using the Norco for currently    Jacobo Del Castillo RN

## 2017-03-08 NOTE — TELEPHONE ENCOUNTER
RX [place] ([place]) reviewed:  reviewed- no concerns (did receive a 2 day supply of OXYCODONE- ACETAMINOPHEN 5- 325 from [facility].  This is listed as historical in [facility] (Received from: [facility])      [place] profile:  [email or web address]  Jacobo Del Castillo RN

## 2017-03-08 NOTE — TELEPHONE ENCOUNTER
I understand he is requesting Norco 5/325 (Hydrocodone Bitartrate and Acetaminophen) but we have no Chronic Pain Contract and I don't know where he has pain or why, that's what I meant by clinical context !    Kapil Duckworth MD

## 2017-03-08 NOTE — TELEPHONE ENCOUNTER
Reason for Call:  Other prescription    Detailed comments: Requesting refill on hydrocodone. Please call patient .     Phone Number Patient can be reached at: Home number on file [phone] (home)    Best Time: any    Can we leave a detailed message on this number? YES    Call taken on 3/8/2017 at 9:16 AM by Colleen Nunez

## 2017-03-08 NOTE — TELEPHONE ENCOUNTER
Spoke with patient he had a fall 3 weeks ago and was prescribed Percocet.   Patient states the Percocet made him sick and he had Norco left so he took that.  Patient states he fell face first and his ribs are very bruised.  Patient has pain with any movement rating 8/10 in his ribs.    Please advise   Carline More RN

## 2017-03-08 NOTE — TELEPHONE ENCOUNTER
Completing the chronic pain care package for this pain would be inappropriate since we have no chronic pain package with this patient.    We need more data and what is he requesting , please get more clinincal context    Kapil Duckworth MD

## 2017-03-08 NOTE — TELEPHONE ENCOUNTER
Controlled Substance Refill Request for HYDROcodone-acetaminophen (NORCO) 5-325 MG per tablet  Problem List Complete:  No     PROVIDER TO CONSIDER COMPLETION OF PROBLEM LIST AND OVERVIEW/CONTROLLED SUBSTANCE AGREEMENT    Last Written Prescription Date:  1/27/2017  Last Fill Quantity: 90,   # refills: 0    [facility] Office Visit with [facility] care provider: 3/3/2017    Future Office visit:   Next 5 appointments (look out 90 days)     May 18, 2017 11:40 AM CDT   Return Visit with Albert Tompkins MD   [facility] ([place] [id number] [facility]  [place] [address]   [phone]                  Controlled substance agreement on file: No.     Processing:  Patient will  in clinic   checked in past 6 months?  No, route to RN

## 2017-03-09 RX ORDER — HYDROCODONE BITARTRATE AND ACETAMINOPHEN 5; 325 MG/1; MG/1
1-2 TABLET ORAL EVERY 4 HOURS PRN
Qty: 28 TABLET | Refills: 0 | Status: SHIPPED | OUTPATIENT
Start: 2017-03-09 | End: 2018-03-12

## 2017-03-09 NOTE — TELEPHONE ENCOUNTER
Patient states he has a few tabs left, and would like a call back when this is addressed.   Carline More RN

## 2017-03-10 NOTE — TELEPHONE ENCOUNTER
Next 5 appointments (look out 90 days)     Mar 16, 2017  4:10 PM CDT   [facility]il [facility]    [address] [place] [id number]   [phone]            May 18, 2017 11:40 AM CDT   [facility] Visit with Albert Tompkins MD   [facility]    [address] [facility] [address]   [phone]                Walked paper prescription down to [facility]. Caitlin Antoine,

## 2017-03-15 NOTE — PROGRESS NOTES
SUBJECTIVE:                                                    Zurdo Dash is a 71 year old male who presents to clinic today for the following health issues:       Type 2 diabetes mellitus, uncontrolled, with neuropathy (H)  Rib pain on right side     Patient came in today to review status with diabetes mellitus and with requesting some pain medication     He's just continued to deal with rib pain from the fall he had, as well as an ecchymotic face roughly a month ago. This was a hard blow . This was February 13th , see care everywhere for emergency room evaluation. He has mostly mended at this point but still feels a poor sense of balance. His loss of consciousness remains unexplained . He did fall on the ice. He had some low blood pressure readings at that time and we temporarily stopped his anti-hypertensive medication but as I suspected, over last few weeks his blood pressure readings have returned to pretty much elevated and we have restarted these medications.     We also reviewed his home blood glucose monitoring ; since he was started on Liraglutide [ Victoza ] he's seen some fairly notable blood glucose reading improvements. He's very excited by this . There's not been any weight loss but he's only been on the medication for about 6 weeks. He's leaving for [place] and will be gone at the very least a month. Wants all refills to be set up.    Wt Readings from Last 5 Encounters:   03/16/17 221 lb 9.6 oz (100.5 kg)   03/03/17 215 lb (97.5 kg)   02/16/17 214 lb 12.8 oz (97.4 kg)   02/14/17 217 lb 3.2 oz (98.5 kg)   02/09/17 221 lb (100.2 kg)     No major weight loss however his appetite decreased greatly.    He takes a vitamin E supplement. Pros and cons reviewed       Lab Results   Component Value Date    A1C 9.2 02/09/2017    A1C 9.8 12/02/2016    A1C 9.0 08/05/2016    A1C 6.9 05/03/2016    A1C 8.6 03/18/2016     He started Liraglutide [ Victoza ] 1/2/2017 but only got to the full dose more recently      Blood pressure situation - he remained off of the lisinopril and the metoprolol ever since the 2/14/low blood pressure reading.    Needs every single medication refilled with Not to be refilled until patient calls      BP Readings from Last 6 Encounters:   03/16/17 (!) 148/92   03/03/17 120/82   02/28/17 (!) 132/92   02/22/17 122/80   02/16/17 118/86   02/14/17 92/64       Problem list and histories reviewed & adjusted, as indicated.  Additional history: as documented    Patient Active Problem List   Diagnosis     Pain in limb     Diabetic neuropathy (H)     Hyperlipidemia LDL goal <100     Hypertension goal BP (blood pressure) < 140/90     obesity     ED (erectile dysfunction)     Hypogonadism     Advanced directives, counseling/discussion     Health Care Home     Type 2 diabetes mellitus, uncontrolled, with neuropathy (H)     RBBB (right bundle branch block)     Lumbago     Ex-smoker     Cataracts, both eyes     Family history of esophageal cancer     Gastroesophageal reflux disease, esophagitis presence not specified     Rheumatoid arthritis involving multiple sites with positive rheumatoid factor (H)     Pulmonary nodule     High risk medication use     Past Surgical History   Procedure Laterality Date     Tonsillectomy       Hc incision tendon sheath finger  4/2009     r hand ring finger       Social History   Substance Use Topics     Smoking status: Former Smoker     Packs/day: 1.00     Years: 40.00     Types: Cigarettes     Quit date: 3/16/2007     Smokeless tobacco: Never Used     Alcohol use No     Family History   Problem Relation Age of Onset     CEREBROVASCULAR DISEASE Mother      Arthritis Mother      OSTEOPOROSIS Mother      Alzheimer Disease Father      Arthritis Father      CANCER Father      DIABETES Maternal Grandmother      Cardiovascular Maternal Grandmother      Other Cancer Brother      CANCER Paternal Aunt      Hypertension No family hx of      Thyroid Disease No family hx of      Glaucoma  No family hx of      Macular Degeneration No family hx of          Current Outpatient Prescriptions   Medication Sig Dispense Refill     liraglutide (VICTOZA PEN) 18 MG/3ML soln Inject 1.8 mg Subcutaneous daily 27 mL 1     metFORMIN (GLUCOPHAGE) 500 MG tablet TAKE TWO TABLETS BY MOUTH TWICE A DAY WITH MEALS 360 tablet 1     insulin pen needle (ULTICARE MINI) 31G X 6 MM Use 1 daily or as directed. 100 each 1     glimepiride (AMARYL) 4 MG tablet Take 1 tablet (4 mg) by mouth every morning (before breakfast) 90 tablet 1     lisinopril (PRINIVIL/ZESTRIL) 5 MG tablet Take 1 tablet (5 mg) by mouth daily 1 tablet 0     HYDROcodone-acetaminophen (NORCO) 5-325 MG per tablet Take 1-2 tablets by mouth every 4 hours as needed for moderate to severe pain maximum 4 tablet(s) per day 28 tablet 0     folic acid (FOLVITE) 1 MG tablet Take 1 tablet (1 mg) by mouth daily 100 tablet 3     hydroxychloroquine (PLAQUENIL) 200 MG tablet Take 1 tablet (200 mg) by mouth 2 times daily 180 tablet 1     methotrexate sodium 2.5 MG TABS Take 25 mg by mouth once a week . Take all 10 tablets on the same day of each week. 120 tablet 1     HYDROcodone-acetaminophen (NORCO) 5-325 MG per tablet Take 1 tablet by mouth every 6 hours as needed for pain 30 tablet 0     pravastatin (PRAVACHOL) 40 MG tablet Take 1 tablet (40 mg) by mouth daily 90 tablet 3     hydrochlorothiazide (HYDRODIURIL) 25 MG tablet TAKE ONE TABLET BY MOUTH EVERY DAY 90 tablet 1     omeprazole (PRILOSEC) 20 MG capsule Take 1 capsule (20 mg) by mouth daily 90 capsule 2     INDIA CONTOUR NEXT test strip TEST THREE TIMES A DAY OR AS DIRECTED 100 each 3     MICROLET LANCETS MISC 1 Device daily 100 each 4     aspirin 81 MG tablet Take 1 tablet by mouth daily.  3     CALCIUM 500 + D 500-200 MG-IU OR TABS take 2 tabs daily       MULTI-VITAMIN OR None Entered       VITAMIN E 1000 UNIT OR CAPS 1 tablet daily       ACE/ARB NOT PRESCRIBED, INTENTIONAL, Reported on 3/16/2017       No Known  Allergies  Recent Labs   Lab Test  02/09/17   1710  12/02/16   1252  10/12/16   1227  08/05/16   1237  07/13/16   1231  06/24/16   1101  05/03/16   1147  04/07/16   1149   09/30/15   1302   06/29/15   1213   08/07/14   1249   07/23/13   1016   A1C  9.2*  9.8*   --   9.0*   --    --   6.9*   --    < >  9.7*   --   11.8*   < >  7.0*   < >  7.0*   LDL   --   79   --    --    --    --    --    --    --   89   --   31   --   29   --   62   HDL   --   39*   --    --    --    --    --    --    --    --    --   37*   --   46   --   41   TRIG   --   282*   --    --    --    --    --    --    --    --    --   286*   --   270*   --   248*   ALT   --    --   40   --   44   --    --   35   --    --    < >   --    < >  52   < >   --    CR  1.20   --   0.98   --   1.02   --   1.07  1.38*   < >   --    < >   --    < >  1.17   < >   --    GFRESTIMATED  59*   --   75   --   72   --   68  51*   < >   --    < >   --    < >  62   < >   --    GFRESTBLACK  72   --   >90   GFR Calc     --   87   --   82  61   < >   --    < >   --    < >  75   < >   --    POTASSIUM  4.0   --    --    --    --    --   3.7   --    < >   --    < >   --    < >  3.6   < >   --    TSH   --    --    --    --    --   1.07   --    --    --    --    --    --    --    --    --   0.55    < > = values in this interval not displayed.      BP Readings from Last 3 Encounters:   03/16/17 (!) 148/92   03/03/17 120/82   02/28/17 (!) 132/92    Wt Readings from Last 3 Encounters:   03/16/17 221 lb 9.6 oz (100.5 kg)   03/03/17 215 lb (97.5 kg)   02/16/17 214 lb 12.8 oz (97.4 kg)                  Labs reviewed in EPIC    Reviewed and updated as needed this visit by clinical staff       Reviewed and updated as needed this visit by Provider         ROS:  Constitutional, HEENT, cardiovascular, pulmonary, gi and gu systems are negative, except as otherwise noted.    OBJECTIVE:                                                    BP (!) 148/92 (BP Location: Left arm,  Cuff Size: Adult Regular)  Pulse 99  Temp 98.6  F (37  C) (Oral)  Wt 221 lb 9.6 oz (100.5 kg)  SpO2 97%  BMI 33.2 kg/m2  Body mass index is 33.2 kg/(m^2).  GENERAL APPEARANCE: healthy, alert and no distress  EYES: Eyes grossly normal to inspection, PERRL and conjunctivae and sclerae normal  MS: extremities normal- no gross deformities noted  NEURO: Normal strength and tone, mentation intact and speech normal  PSYCH: mentation appears normal and affect normal/bright    Diagnostic test results:  Diagnostic Test Results:  No orders of the defined types were placed in this encounter.         ASSESSMENT/PLAN:                                                    1. Type 2 diabetes mellitus, uncontrolled, with neuropathy (H)  Medications refilled. Recheck in 3 months   - liraglutide (VICTOZA PEN) 18 MG/3ML soln; Inject 1.8 mg Subcutaneous daily  Dispense: 27 mL; Refill: 1  - metFORMIN (GLUCOPHAGE) 500 MG tablet; TAKE TWO TABLETS BY MOUTH TWICE A DAY WITH MEALS  Dispense: 360 tablet; Refill: 1  - insulin pen needle (ULTICARE MINI) 31G X 6 MM; Use 1 daily or as directed.  Dispense: 100 each; Refill: 1  - glimepiride (AMARYL) 4 MG tablet; Take 1 tablet (4 mg) by mouth every morning (before breakfast)  Dispense: 90 tablet; Refill: 1  - lisinopril (PRINIVIL/ZESTRIL) 5 MG tablet; Take 1 tablet (5 mg) by mouth daily  Dispense: 1 tablet; Refill: 0    2. Rib pain on right side  Additional pain medication was dispensed already. See previous telephone encounter. We reviewed just some basic principles about pain medication and why use of it needs to be limited as much as possible.      Follow up with Provider - as above      Kapil Duckworth MD  [facility]

## 2017-03-16 ENCOUNTER — OFFICE VISIT (OUTPATIENT)
Dept: FAMILY MEDICINE | Facility: CLINIC | Age: 72
End: 2017-03-16
Payer: COMMERCIAL

## 2017-03-16 VITALS
DIASTOLIC BLOOD PRESSURE: 92 MMHG | WEIGHT: 221.6 LBS | SYSTOLIC BLOOD PRESSURE: 148 MMHG | OXYGEN SATURATION: 97 % | BODY MASS INDEX: 33.2 KG/M2 | TEMPERATURE: 98.6 F | HEART RATE: 99 BPM

## 2017-03-16 DIAGNOSIS — R07.81 RIB PAIN ON RIGHT SIDE: Primary | ICD-10-CM

## 2017-03-16 PROCEDURE — 99214 OFFICE O/P EST MOD 30 MIN: CPT | Performed by: INTERNAL MEDICINE

## 2017-03-16 RX ORDER — GLIMEPIRIDE 4 MG/1
4 TABLET ORAL
Qty: 90 TABLET | Refills: 1 | Status: SHIPPED | OUTPATIENT
Start: 2017-03-16 | End: 2017-07-17

## 2017-03-16 RX ORDER — LIRAGLUTIDE 6 MG/ML
1.8 INJECTION SUBCUTANEOUS DAILY
Qty: 27 ML | Refills: 1 | Status: SHIPPED | OUTPATIENT
Start: 2017-03-16 | End: 2017-10-23

## 2017-03-16 RX ORDER — LISINOPRIL 5 MG/1
5 TABLET ORAL DAILY
Qty: 1 TABLET | Refills: 0
Start: 2017-03-16 | End: 2017-03-20

## 2017-03-16 ASSESSMENT — PAIN SCALES - GENERAL: PAINLEVEL: NO PAIN (0)

## 2017-03-16 NOTE — Clinical Note
"1. I need you to carefully go through his medication list and make certain Every single one of these medications is refilled, good for three months or more. We should add \" Not to be refilled until patient calls  \". If any questions Please huddle with me on this case on Monday.    2. please make sure the patient was scheduled for an ancillary staff [medical assistant] office visit for a hypertension recheck in 2 weeks to a month.   Well it is recommended  "

## 2017-03-16 NOTE — NURSING NOTE
"Chief Complaint   Patient presents with     Recheck Medication     Refills. Requesting 3 month supply     RECHECK     follow-up for right side rib pain due to fall       Initial BP (!) 148/92 (BP Location: Left arm, Cuff Size: Adult Regular)  Pulse 99  Temp 98.6  F (37  C) (Oral)  Wt 221 lb 9.6 oz (100.5 kg)  SpO2 97%  BMI 33.2 kg/m2 Estimated body mass index is 33.2 kg/(m^2) as calculated from the following:    Height as of 3/3/17: 5' 8.5\" (1.74 m).    Weight as of this encounter: 221 lb 9.6 oz (100.5 kg).  Medication Reconciliation: complete       Amalia Murillo, BRANDI      "

## 2017-03-16 NOTE — MR AVS SNAPSHOT
After Visit Summary   3/16/2017    Zurdo Dash    MRN: [id number]           Patient Information     Date Of Birth          1945        Visit Information        Provider Department      3/16/2017 4:10 PM Kapil Duckworth MD [facility]        Today's Diagnoses     Type 2 diabetes mellitus, uncontrolled, with neuropathy (H)          Care Instructions    [facility] ([facility])    If you have any questions regarding to your visit please contact your care team:     Team Pink:   Clinic Hours Telephone Number   Internal Medicine:  Dr. Suze Zapata NP       7am-7pm  Monday - Thursday   7am-5pm  Fridays  [phone]  (Appointment scheduling available 24/7)    Questions about your visit?  Team Line  [phone]   Urgent Care - [place] and [place] - 11am-9pm Monday-Friday Saturday-Sunday- 9am-5pm   [place] - 5pm-9pm Monday-Friday Saturday-Sunday- 9am-5pm  [phone] - [place]   [phone] - [place]       What options do I have for visits at the clinic other than the traditional office visit?  To expand how we care for you, many of our providers are utilizing electronic visits (e-visits) and telephone visits, when medically appropriate, for interactions with their patients rather than a visit in the clinic.   We also offer nurse visits for many medical concerns. Just like any other service, we will bill your insurance company for this type of visit based on time spent on the phone with your provider. Not all insurance companies cover these visits. Please check with your medical insurance if this type of visit is covered. You will be responsible for any charges that are not paid by your insurance.      E-visits via DocSea:  generally incur a $35.00 fee.  Telephone visits:  Time spent on the phone: *charged based on time that is spent on the phone in increments of 10 minutes. Estimated cost:   5-10 mins $30.00   11-20 mins.  $59.00   21-30 mins. $85.00   Use TicketForEvent (secure email communication and access to your chart) to send your primary care provider a message or make an appointment. Ask someone on your Team how to sign up for TicketForEvent.    For a Price Quote for your services, please call our Consumer Price Line at [phone].    As always, Thank you for trusting us with your health care needs!    Amalia Muirllo CMA          Follow-ups after your visit        Your next 10 appointments already scheduled     May 15, 2017 11:00 AM CDT   LAB with  LAB   [facility] ([facility]    [address] [place] [id number] [phone]           Patient must bring picture ID.  Patient should be prepared to give a urine specimen  Please do not eat 10-12 hours before your appointment if you are coming in fasting for labs on lipids, cholesterol, or glucose (sugar).  Pregnant women should follow their Care Team instructions. Water with medications is okay. Do not drink coffee or other fluids.   If you have concerns about taking  your medications, please ask at office or if scheduling via TicketForEvent, send a message by clicking on Secure Messaging, Message Your Care Team.            May 18, 2017 11:40 AM CDT   Return Visit with Albert Tompkins MD   [facility] ([facility])    [address] [place] [id number] [phone]              Who to contact     If you have questions or need follow up information about today's clinic visit or your schedule please contact [facility] directly at [phone].  Normal or non-critical lab and imaging results will be communicated to you by MyChart, letter or phone within 4 business days after the clinic has received the results. If you do not hear from us within 7 days, please contact the clinic through MyChart or phone. If you have a critical or abnormal lab result, we will notify you by phone as soon as possible.  Submit  "refill requests through Dumbstruck or call your pharmacy and they will forward the refill request to us. Please allow 3 business days for your refill to be completed.          Additional Information About Your Visit        FinalCADharAdvise Only Information     Dumbstruck lets you send messages to your doctor, view your test results, renew your prescriptions, schedule appointments and more. To sign up, go to [email or web address] . Click on \"Log in\" on the left side of the screen, which will take you to the Welcome page. Then click on \"Sign up Now\" on the right side of the page.     You will be asked to enter the access code listed below, as well as some personal information. Please follow the directions to create your username and password.     Your access code is: [email or web address]  Expires: 2017  3:30 PM     Your access code will  in 90 days. If you need help or a new code, please call your [place] clinic or [phone].        Care EveryWhere ID     This is your Care EveryWhere ID. This could be used by other organizations to access your [place] medical records  [id number]        Your Vitals Were     Pulse Temperature Pulse Oximetry BMI (Body Mass Index)          99 98.6  F (37  C) (Oral) 97% 33.2 kg/m2         Blood Pressure from Last 3 Encounters:   17 (!) 148/92   17 120/82   17 (!) 132/92    Weight from Last 3 Encounters:   17 221 lb 9.6 oz (100.5 kg)   17 215 lb (97.5 kg)   17 214 lb 12.8 oz (97.4 kg)              Today, you had the following     No orders found for display         Today's Medication Changes          These changes are accurate as of: 3/16/17  5:12 PM.  If you have any questions, ask your nurse or doctor.               These medicines have changed or have updated prescriptions.        Dose/Directions    liraglutide 18 MG/3ML soln   Commonly known as:  VICTOZA PEN   This may have changed:  See the new instructions.   Used for:  Type 2 diabetes mellitus, " uncontrolled, with neuropathy (H)   Changed by:  Kapil Duckworth MD        Dose:  1.8 mg   Inject 1.8 mg Subcutaneous daily   Quantity:  27 mL   Refills:  1            Where to get your medicines      These medications were sent to [facility] [place], [place] - [address]  [address], [place] [address]     Phone:  [phone]     glimepiride 4 MG tablet    insulin pen needle 31G X 6 MM    liraglutide 18 MG/3ML soln    metFORMIN 500 MG tablet         Some of these will need a paper prescription and others can be bought over the counter.  Ask your nurse if you have questions.     You don't need a prescription for these medications     lisinopril 5 MG tablet                Primary Care Provider Office Phone # Fax #    Kapil Duckworth [phone]       [facility] [address] [address]        Thank you!     Thank you for choosing [facility]  for your care. Our goal is always to provide you with excellent care. Hearing back from our patients is one way we can continue to improve our services. Please take a few minutes to complete the written survey that you may receive in the mail after your visit with us. Thank you!             Your Updated Medication List - Protect others around you: Learn how to safely use, store and throw away your medicines at www.disposemymeds.org.          This list is accurate as of: 3/16/17  5:12 PM.  Always use your most recent med list.                   Brand Name Dispense Instructions for use    ACE/ARB NOT PRESCRIBED (INTENTIONAL)      Reported on 3/16/2017       aspirin 81 MG tablet      Take 1 tablet by mouth daily.       INDIA CONTOUR NEXT test strip   Generic drug:  blood glucose monitoring     100 each    TEST THREE TIMES A DAY OR AS DIRECTED       CALCIUM 500 + D 500-200 MG-IU Tabs      take 2 tabs daily       folic acid 1 MG tablet    FOLVITE    100 tablet    Take 1 tablet (1 mg)  by mouth daily       glimepiride 4 MG tablet    AMARYL    90 tablet    Take 1 tablet (4 mg) by mouth every morning (before breakfast)       hydrochlorothiazide 25 MG tablet    HYDRODIURIL    90 tablet    TAKE ONE TABLET BY MOUTH EVERY DAY       * HYDROcodone-acetaminophen 5-325 MG per tablet    NORCO    30 tablet    Take 1 tablet by mouth every 6 hours as needed for pain       * HYDROcodone-acetaminophen 5-325 MG per tablet    NORCO    28 tablet    Take 1-2 tablets by mouth every 4 hours as needed for moderate to severe pain maximum 4 tablet(s) per day       hydroxychloroquine 200 MG tablet    PLAQUENIL    180 tablet    Take 1 tablet (200 mg) by mouth 2 times daily       insulin pen needle 31G X 6 MM    ULTICARE MINI    100 each    Use 1 daily or as directed.       liraglutide 18 MG/3ML soln    VICTOZA PEN    27 mL    Inject 1.8 mg Subcutaneous daily       lisinopril 5 MG tablet    PRINIVIL/ZESTRIL    1 tablet    Take 1 tablet (5 mg) by mouth daily       metFORMIN 500 MG tablet    GLUCOPHAGE    360 tablet    TAKE TWO TABLETS BY MOUTH TWICE A DAY WITH MEALS       methotrexate sodium 2.5 MG Tabs     120 tablet    Take 25 mg by mouth once a week . Take all 10 tablets on the same day of each week.       MICROLET LANCETS Misc     100 each    1 Device daily       MULTI-VITAMIN PO      None Entered       omeprazole 20 MG CR capsule    priLOSEC    90 capsule    Take 1 capsule (20 mg) by mouth daily       pravastatin 40 MG tablet    PRAVACHOL    90 tablet    Take 1 tablet (40 mg) by mouth daily       vitamin E 1000 UNIT capsule    TOCOPHEROL     1 tablet daily       * Notice:  This list has 2 medication(s) that are the same as other medications prescribed for you. Read the directions carefully, and ask your doctor or other care provider to review them with you.

## 2017-03-16 NOTE — PATIENT INSTRUCTIONS
[facility]    If you have any questions regarding to your visit please contact your care team:     Team Pink:   Clinic Hours Telephone Number   Internal Medicine:  Dr. Suze Zapata NP       7am-7pm  Monday - Thursday   7am-5pm  Fridays  [phone]  (Appointment scheduling available 24/7)    Questions about your visit?  Team Line  [phone]   Urgent Care - [place] and [facility] - 11am-9pm Monday-Friday Saturday-Sunday- 9am-5pm   [place] - 5pm-9pm Monday-Friday Saturday-Sunday- 9am-5pm  [phone] - [place]   [phone] - [place]       What options do I have for visits at the clinic other than the traditional office visit?  To expand how we care for you, many of our providers are utilizing electronic visits (e-visits) and telephone visits, when medically appropriate, for interactions with their patients rather than a visit in the clinic.   We also offer nurse visits for many medical concerns. Just like any other service, we will bill your insurance company for this type of visit based on time spent on the phone with your provider. Not all insurance companies cover these visits. Please check with your medical insurance if this type of visit is covered. You will be responsible for any charges that are not paid by your insurance.      E-visits via Comeet:  generally incur a $35.00 fee.  Telephone visits:  Time spent on the phone: *charged based on time that is spent on the phone in increments of 10 minutes. Estimated cost:   5-10 mins $30.00   11-20 mins. $59.00   21-30 mins. $85.00   Use Zeugma Systemst (secure email communication and access to your chart) to send your primary care provider a message or make an appointment. Ask someone on your Team how to sign up for Comeet.    For a Price Quote for your services, please call our Consumer Price Line at [phone].    As always, Thank you for trusting us with your health care  needs!    Amalia Murillo, CMA

## 2017-03-20 ENCOUNTER — TELEPHONE (OUTPATIENT)
Dept: FAMILY MEDICINE | Facility: CLINIC | Age: 72
End: 2017-03-20

## 2017-03-20 DIAGNOSIS — K21.9 GASTROESOPHAGEAL REFLUX DISEASE WITHOUT ESOPHAGITIS: ICD-10-CM

## 2017-03-20 DIAGNOSIS — I10 HYPERTENSION GOAL BP (BLOOD PRESSURE) < 140/90: ICD-10-CM

## 2017-03-20 RX ORDER — HYDROCHLOROTHIAZIDE 25 MG/1
25 TABLET ORAL DAILY
Qty: 90 TABLET | Refills: 1 | Status: SHIPPED | OUTPATIENT
Start: 2017-03-20 | End: 2017-08-14

## 2017-03-20 RX ORDER — LISINOPRIL 5 MG/1
5 TABLET ORAL DAILY
Qty: 90 TABLET | Refills: 1 | Status: SHIPPED | OUTPATIENT
Start: 2017-03-20 | End: 2017-08-14

## 2017-03-20 NOTE — TELEPHONE ENCOUNTER
Patient called.  They will be gone the entire month of April, leaving March 29th.  Ancillary blood pressure appointment scheduled for Friday, May 5th, at 11:30 a.m. Caren Telles,

## 2017-03-20 NOTE — TELEPHONE ENCOUNTER
"1. I need you to carefully go through his medication list and make certain every single one of these medications is refilled, good for three months or more. We should add \" not to be refilled until patient calls  \". If any questions please huddle with me on this case.      2. Please make sure the patient was scheduled for an ancillary staff (medical assistant) office visit for a hypertension recheck in 2 weeks to a month. Well it is recommended.  Kapil Duckworth MD  "

## 2017-04-27 ENCOUNTER — TELEPHONE (OUTPATIENT)
Dept: NURSING | Facility: CLINIC | Age: 72
End: 2017-04-27

## 2017-05-16 DIAGNOSIS — I10 ESSENTIAL HYPERTENSION WITH GOAL BLOOD PRESSURE LESS THAN 140/90: ICD-10-CM

## 2017-05-17 RX ORDER — HYDROCHLOROTHIAZIDE 25 MG/1
TABLET ORAL
Qty: 90 TABLET | Refills: 0
Start: 2017-05-17

## 2017-05-17 NOTE — TELEPHONE ENCOUNTER
Duplicate. Called and verified with pharmacy. Please close encounter.    Medication Detail         Disp Refills Start End ANITA     hydrochlorothiazide (HYDRODIURIL) 25 MG tablet 90 tablet 1 3/20/2017  No     Sig: Take 1 tablet (25 mg) by mouth daily     Class: E-Prescribe     Notes to Pharmacy: Do not fill until patient calls     Route: Oral     Order: [id number]     E-Prescribing Status: Receipt confirmed by pharmacy (3/20/2017  5:02 PM CDT)       Printout Tracking      External Result Report       Pharmacy      [facility] - [address]       Associated Diagnoses      Hypertension goal BP (blood pressure) < 140/90 [I10]         Suri Bell MA

## 2017-05-18 ENCOUNTER — OFFICE VISIT (OUTPATIENT)
Dept: RHEUMATOLOGY | Facility: CLINIC | Age: 72
End: 2017-05-18
Payer: COMMERCIAL

## 2017-05-18 VITALS
HEART RATE: 77 BPM | BODY MASS INDEX: 33.44 KG/M2 | TEMPERATURE: 96.3 F | SYSTOLIC BLOOD PRESSURE: 115 MMHG | DIASTOLIC BLOOD PRESSURE: 80 MMHG | OXYGEN SATURATION: 97 % | WEIGHT: 223.2 LBS

## 2017-05-18 DIAGNOSIS — Z79.899 HIGH RISK MEDICATIONS (NOT ANTICOAGULANTS) LONG-TERM USE: ICD-10-CM

## 2017-05-18 DIAGNOSIS — M05.79 RHEUMATOID ARTHRITIS INVOLVING MULTIPLE SITES WITH POSITIVE RHEUMATOID FACTOR (H): Primary | ICD-10-CM

## 2017-05-18 LAB
ALBUMIN SERPL-MCNC: 4.1 G/DL (ref 3.4–5)
ALP SERPL-CCNC: 44 U/L (ref 40–150)
ALT SERPL W P-5'-P-CCNC: 45 U/L (ref 0–70)
AST SERPL W P-5'-P-CCNC: 23 U/L (ref 0–45)
BASOPHILS # BLD AUTO: 0.1 10E9/L (ref 0–0.2)
BASOPHILS NFR BLD AUTO: 1 %
BILIRUB DIRECT SERPL-MCNC: 0.2 MG/DL (ref 0–0.2)
BILIRUB SERPL-MCNC: 0.7 MG/DL (ref 0.2–1.3)
CREAT SERPL-MCNC: 1.42 MG/DL (ref 0.66–1.25)
CRP SERPL-MCNC: 10.7 MG/L (ref 0–8)
DIFFERENTIAL METHOD BLD: NORMAL
EOSINOPHIL # BLD AUTO: 0.1 10E9/L (ref 0–0.7)
EOSINOPHIL NFR BLD AUTO: 0.7 %
ERYTHROCYTE [DISTWIDTH] IN BLOOD BY AUTOMATED COUNT: 14.4 % (ref 10–15)
ERYTHROCYTE [SEDIMENTATION RATE] IN BLOOD BY WESTERGREN METHOD: 10 MM/H (ref 0–20)
GFR SERPL CREATININE-BSD FRML MDRD: 49 ML/MIN/1.7M2
HCT VFR BLD AUTO: 43.9 % (ref 40–53)
HGB BLD-MCNC: 15.3 G/DL (ref 13.3–17.7)
LYMPHOCYTES # BLD AUTO: 1.2 10E9/L (ref 0.8–5.3)
LYMPHOCYTES NFR BLD AUTO: 13.3 %
MCH RBC QN AUTO: 31.7 PG (ref 26.5–33)
MCHC RBC AUTO-ENTMCNC: 34.9 G/DL (ref 31.5–36.5)
MCV RBC AUTO: 91 FL (ref 78–100)
MONOCYTES # BLD AUTO: 0.7 10E9/L (ref 0–1.3)
MONOCYTES NFR BLD AUTO: 8 %
NEUTROPHILS # BLD AUTO: 6.7 10E9/L (ref 1.6–8.3)
NEUTROPHILS NFR BLD AUTO: 77 %
PLATELET # BLD AUTO: 266 10E9/L (ref 150–450)
PROT SERPL-MCNC: 7.5 G/DL (ref 6.8–8.8)
RBC # BLD AUTO: 4.82 10E12/L (ref 4.4–5.9)
URATE SERPL-MCNC: 9.3 MG/DL (ref 3.5–7.2)
WBC # BLD AUTO: 8.7 10E9/L (ref 4–11)

## 2017-05-18 PROCEDURE — 36415 COLL VENOUS BLD VENIPUNCTURE: CPT | Performed by: INTERNAL MEDICINE

## 2017-05-18 PROCEDURE — 80076 HEPATIC FUNCTION PANEL: CPT | Performed by: INTERNAL MEDICINE

## 2017-05-18 PROCEDURE — 85025 COMPLETE CBC W/AUTO DIFF WBC: CPT | Performed by: INTERNAL MEDICINE

## 2017-05-18 PROCEDURE — 82565 ASSAY OF CREATININE: CPT | Performed by: INTERNAL MEDICINE

## 2017-05-18 PROCEDURE — 85652 RBC SED RATE AUTOMATED: CPT | Performed by: INTERNAL MEDICINE

## 2017-05-18 PROCEDURE — 84550 ASSAY OF BLOOD/URIC ACID: CPT | Performed by: INTERNAL MEDICINE

## 2017-05-18 PROCEDURE — 86140 C-REACTIVE PROTEIN: CPT | Performed by: INTERNAL MEDICINE

## 2017-05-18 PROCEDURE — 99213 OFFICE O/P EST LOW 20 MIN: CPT | Performed by: INTERNAL MEDICINE

## 2017-05-18 RX ORDER — METHOTREXATE 2.5 MG/1
25 TABLET ORAL WEEKLY
Qty: 120 TABLET | Refills: 1 | Status: SHIPPED | OUTPATIENT
Start: 2017-05-18 | End: 2017-11-08

## 2017-05-18 RX ORDER — FOLIC ACID 1 MG/1
1 TABLET ORAL DAILY
Qty: 100 TABLET | Refills: 3 | Status: SHIPPED | OUTPATIENT
Start: 2017-05-18 | End: 2017-11-08

## 2017-05-18 RX ORDER — HYDROXYCHLOROQUINE SULFATE 200 MG/1
200 TABLET, FILM COATED ORAL 2 TIMES DAILY
Qty: 180 TABLET | Refills: 1 | Status: SHIPPED | OUTPATIENT
Start: 2017-05-18 | End: 2017-11-08

## 2017-05-18 NOTE — NURSING NOTE
"Chief Complaint   Patient presents with     RECHECK     3mo follow up, pt states nothing has been bothering him lately.        Initial /80  Pulse 77  Temp 96.3  F (35.7  C) (Oral)  Wt 101.2 kg (223 lb 3.2 oz)  SpO2 97%  BMI 33.44 kg/m2 Estimated body mass index is 33.44 kg/(m^2) as calculated from the following:    Height as of 3/3/17: 1.74 m (5' 8.5\").    Weight as of this encounter: 101.2 kg (223 lb 3.2 oz).  BP completed using cuff size: regular         RAPID3 (0-30) Cumulative Score  5.3          RAPID3 Weighted Score (divide #4 by 3 and that is the weighted score)  1.8         "

## 2017-05-18 NOTE — MR AVS SNAPSHOT
After Visit Summary   5/18/2017    Zurdo Dash    MRN: [id number]           Patient Information     Date Of Birth          1945        Visit Information        Provider Department      5/18/2017 11:40 AM Albert Tompkins MD [facility]        Today's Diagnoses     Rheumatoid arthritis involving multiple sites with positive rheumatoid factor (H)    -  1    High risk medications (not anticoagulants) long-term use           Follow-ups after your visit        Your next 10 appointments already scheduled     Aug 09, 2017  1:20 PM CDT   Return Visit with MD [facility] ([facility] [place])    [address]  [place] [address]   [phone]              Future tests that were ordered for you today     Open Future Orders        Priority Expected Expires Ordered    CBC with platelets differential Routine 8/12/2017 8/31/2017 5/18/2017    Creatinine Routine 8/12/2017 8/31/2017 5/18/2017    Erythrocyte sedimentation rate auto Routine 8/12/2017 8/31/2017 5/18/2017    CRP inflammation Routine 8/12/2017 8/31/2017 5/18/2017    Hepatic panel Routine 8/12/2017 8/31/2017 5/18/2017            Who to contact     If you have questions or need follow up information about today's clinic visit or your schedule please contact [facility] directly at [phone].  Normal or non-critical lab and imaging results will be communicated to you by MyChart, letter or phone within 4 business days after the clinic has received the results. If you do not hear from us within 7 days, please contact the clinic through MyChart or phone. If you have a critical or abnormal lab result, we will notify you by phone as soon as possible.  Submit refill requests through [employer] or call your pharmacy and they will forward the refill request to us. Please allow 3 business days for your refill to be completed.          Additional Information About Your Visit        MyChart  "Information     [employer] lets you send messages to your doctor, view your test results, renew your prescriptions, schedule appointments and more. To sign up, go to [email or web address] . Click on \"Log in\" on the left side of the screen, which will take you to the Welcome page. Then click on \"Sign up Now\" on the right side of the page.     You will be asked to enter the access code listed below, as well as some personal information. Please follow the directions to create your username and password.     Your access code is: [email or web address]  Expires: 8/3/2017  1:21 PM     Your access code will  in 90 days. If you need help or a new code, please call your [place] clinic or [phone].        Care EveryWhere ID     This is your Care EveryWhere ID. This could be used by other organizations to access your [facility] medical records  [id number]        Your Vitals Were     Pulse Temperature Pulse Oximetry BMI (Body Mass Index)          77 96.3  F (35.7  C) (Oral) 97% 33.44 kg/m2         Blood Pressure from Last 3 Encounters:   17 115/80   17 (!) 148/92   17 120/82    Weight from Last 3 Encounters:   17 101.2 kg (223 lb 3.2 oz)   17 100.5 kg (221 lb 9.6 oz)   17 97.5 kg (215 lb)              We Performed the Following     CBC with platelets differential     Creatinine     CRP inflammation     Erythrocyte sedimentation rate auto     Hepatic panel     Uric acid          Where to get your medicines      These medications were sent to [facility]  [address] [place] [address]  [address], [place] [address]     Phone:  [phone]     folic acid 1 MG tablet    hydroxychloroquine 200 MG tablet    methotrexate sodium 2.5 MG Tabs          Primary Care Provider Office Phone # Fax #    Kapil Duckworth [phone]       [facility] [address]  [place] [address]        Thank you!     Thank you for choosing " [facility]  for your care. Our goal is always to provide you with excellent care. Hearing back from our patients is one way we can continue to improve our services. Please take a few minutes to complete the written survey that you may receive in the mail after your visit with us. Thank you!             Your Updated Medication List - Protect others around you: Learn how to safely use, store and throw away your medicines at www.disposemymeds.org.          This list is accurate as of: 5/18/17 12:17 PM.  Always use your most recent med list.                   Brand Name Dispense Instructions for use    ACE/ARB NOT PRESCRIBED (INTENTIONAL)      Reported on 3/16/2017       aspirin 81 MG tablet      Take 1 tablet by mouth daily.       blood glucose monitoring test strip    INDIA CONTOUR NEXT    300 each    TEST THREE TIMES A DAY OR AS DIRECTED       CALCIUM 500 + D 500-200 MG-IU Tabs      take 2 tabs daily       folic acid 1 MG tablet    FOLVITE    100 tablet    Take 1 tablet (1 mg) by mouth daily       glimepiride 4 MG tablet    AMARYL    90 tablet    Take 1 tablet (4 mg) by mouth every morning (before breakfast)       hydrochlorothiazide 25 MG tablet    HYDRODIURIL    90 tablet    Take 1 tablet (25 mg) by mouth daily       * HYDROcodone-acetaminophen 5-325 MG per tablet    NORCO    30 tablet    Take 1 tablet by mouth every 6 hours as needed for pain       * HYDROcodone-acetaminophen 5-325 MG per tablet    NORCO    28 tablet    Take 1-2 tablets by mouth every 4 hours as needed for moderate to severe pain maximum 4 tablet(s) per day       hydroxychloroquine 200 MG tablet    PLAQUENIL    180 tablet    Take 1 tablet (200 mg) by mouth 2 times daily       insulin pen needle 31G X 6 MM    ULTICARE MINI    100 each    Use 1 daily or as directed.       liraglutide 18 MG/3ML soln    VICTOZA PEN    27 mL    Inject 1.8 mg Subcutaneous daily       lisinopril 5 MG tablet    PRINIVIL/ZESTRIL    90 tablet    Take 1 tablet  (5 mg) by mouth daily       metFORMIN 500 MG tablet    GLUCOPHAGE    360 tablet    TAKE TWO TABLETS BY MOUTH TWICE A DAY WITH MEALS       methotrexate sodium 2.5 MG Tabs     120 tablet    Take 25 mg by mouth once a week . Take all 10 tablets on the same day of each week.       MICROLET LANCETS Misc     100 each    1 Device daily       MULTI-VITAMIN PO      None Entered       omeprazole 20 MG CR capsule    priLOSEC    90 capsule    Take 1 capsule (20 mg) by mouth daily       pravastatin 40 MG tablet    PRAVACHOL    90 tablet    Take 1 tablet (40 mg) by mouth daily       vitamin E 1000 UNIT capsule    TOCOPHEROL     1 tablet daily       * Notice:  This list has 2 medication(s) that are the same as other medications prescribed for you. Read the directions carefully, and ask your doctor or other care provider to review them with you.

## 2017-05-18 NOTE — PROGRESS NOTES
Rheumatology Clinic Visit      Zurdo Dash MRN# [id number]   YOB: 1945 Age: 72 year old      Date of visit: 5/18/17   PCP: Dr. Kapil Duckworth       Chief Complaint   Patient presents with:  RECHECK: 3mo follow up, pt states nothing has been bothering him lately.       Assessment and Plan     1. Seropositive nonerosive rheumatoid arthritis (, CCP 64): Currently on methotrexate 25 mg once weekly, folic acid 1 mg daily, and hydroxychloroquine 200 mg twice a day. Doing well today.   - Continue methotrexate 25 mg once weekly  - Continue folic acid 1 mg daily  - Continue hydroxychloroquine 200 mg twice a day; (ophthalmology tox monitoring to be done in November 2016, per patient)  - Labs today and 2-3 days prior to the next rheumatology clinic visit: CBC, Creatinine, Hepatic Panel, ESR, CRP    2. Bilateral knee osteoarthritis: He has been receiving steroid injections approximately every 3-6 months with good control of his symptoms.  He then went to an arthritis clinic where he received 3 injections in each knee that was helpful.  Doing well today.     3. 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 He was evaluated by Dr. Duckworth at that time. Reportedly he used colchicine in the past. I advised him to come in for joint arthrocentesis if this occurs again.   - Lab: uric acid    Mr. Dash verbalized agreement with and understanding of the rational for the diagnosis and treatment plan.  All questions were answered to best of my ability and the patient's satisfaction. Mr. Dash was advised to contact the clinic with any questions that may arise after the clinic visit.    Thank you for involving me in the care of the patient    Return to clinic: 3 months      HPI   Zurdo Dash is a 72 year old male with a medical history significant for hypertension, hyperlipidemia, diabetes, diabetic neuropathy, GERD, and rheumatoid arthritis who presents for f/u  of RA and bilateral knee OA.     Today, Mr. Dash reports that his arthritis is doing well. Some knee pain when he goes up and down stairs. He went to an arthritis clinic where they gave him 3 injections in each knee, but he does not know what medication was injected. No morning stiffness. He went to "[employer] and had a great time in his joints felt great while there.    Denies fevers, chills, nausea, vomiting, constipation, diarrhea. No abdominal pain. No chest pain/pressure, palpitations, or shortness of breath. No oral or nasal sores. No neck pain. No rash. No LE swelling.      Tobacco: None  EtOH: None  Drugs: None    ROS   GEN: No fevers, chills, night sweats, fatigue, or weight change  SKIN: No rash. Sores from a recent fall.  HEENT:No epistaxis. No oral or nasal ulcers.  CV: No chest pain, pressure, palpitations, or dyspnea on exertion.  PULM: No SOB, wheeze, cough.  GI: No nausea, vomiting, constipation, diarrhea. No blood in stool. No abdominal pain.  : No blood in urine.  MSK: See HPI.  NEURO: No numbness, tingling, or weakness.  ENDO: No heat/cold intolerance.  EXT: No LE swelling    Active Problem List     Patient Active Problem List   Diagnosis     Pain in limb     Diabetic neuropathy (H)     Hyperlipidemia LDL goal <100     Hypertension goal BP (blood pressure) < 140/90     obesity     ED (erectile dysfunction)     Hypogonadism     Advanced directives, counseling/discussion     Health Care Home     Type 2 diabetes mellitus, uncontrolled, with neuropathy (H)     RBBB (right bundle branch block)     Lumbago     Ex-smoker     Cataracts, both eyes     Family history of esophageal cancer     Gastroesophageal reflux disease, esophagitis presence not specified     Rheumatoid arthritis involving multiple sites with positive rheumatoid factor (H)     Pulmonary nodule     High risk medication use     Past Medical History     Past Medical History:   Diagnosis Date     Abnormal CT scan 03/2004    calcified lung  granuloma     C. difficile diarrhea     H/O     Cataract 11/18/2011     Diabetic neuropathy (H)     mild, mostly soles and distal forefeet, worse on the left side.     Diverticulitis      ED (erectile dysfunction)      Ex-smoker     QUIT SMOKING FEB 2007     History of ETOH abuse     recovering, sober since 1997     Hyperlipidemia LDL goal <100      Hypertension goal BP (blood pressure) < 140/90      Hypogonadism      Obesity      PAD (peripheral artery disease) (H)     leg cramps, with exertion, no formal diagnosis of PAD and minimal if any symptoms at all.     RA (rheumatoid arthritis) (H)     Dr Bailon     Type 2 diabetes, HbA1c goal < 7% (H) 10/2008    A1C of 7.1 %      Past Surgical History     Past Surgical History:   Procedure Laterality Date     HC INCISION TENDON SHEATH FINGER  4/2009    r hand ring finger     TONSILLECTOMY       Allergy   No Known Allergies  Current Medication List     Current Outpatient Prescriptions   Medication Sig     lisinopril (PRINIVIL/ZESTRIL) 5 MG tablet Take 1 tablet (5 mg) by mouth daily     blood glucose monitoring (INDIA CONTOUR NEXT) test strip TEST THREE TIMES A DAY OR AS DIRECTED     hydrochlorothiazide (HYDRODIURIL) 25 MG tablet Take 1 tablet (25 mg) by mouth daily     omeprazole (PRILOSEC) 20 MG CR capsule Take 1 capsule (20 mg) by mouth daily     liraglutide (VICTOZA PEN) 18 MG/3ML soln Inject 1.8 mg Subcutaneous daily     metFORMIN (GLUCOPHAGE) 500 MG tablet TAKE TWO TABLETS BY MOUTH TWICE A DAY WITH MEALS     insulin pen needle (ULTICARE MINI) 31G X 6 MM Use 1 daily or as directed.     glimepiride (AMARYL) 4 MG tablet Take 1 tablet (4 mg) by mouth every morning (before breakfast)     HYDROcodone-acetaminophen (NORCO) 5-325 MG per tablet Take 1-2 tablets by mouth every 4 hours as needed for moderate to severe pain maximum 4 tablet(s) per day     folic acid (FOLVITE) 1 MG tablet Take 1 tablet (1 mg) by mouth daily     hydroxychloroquine (PLAQUENIL) 200 MG tablet Take 1  "tablet (200 mg) by mouth 2 times daily     methotrexate sodium 2.5 MG TABS Take 25 mg by mouth once a week . Take all 10 tablets on the same day of each week.     HYDROcodone-acetaminophen (NORCO) 5-325 MG per tablet Take 1 tablet by mouth every 6 hours as needed for pain     pravastatin (PRAVACHOL) 40 MG tablet Take 1 tablet (40 mg) by mouth daily     ACE/ARB NOT PRESCRIBED, INTENTIONAL, Reported on 3/16/2017     MICROLET LANCETS MISC 1 Device daily     aspirin 81 MG tablet Take 1 tablet by mouth daily.     CALCIUM 500 + D 500-200 MG-IU OR TABS take 2 tabs daily     MULTI-VITAMIN OR None Entered     VITAMIN E 1000 UNIT OR CAPS 1 tablet daily     No current facility-administered medications for this visit.          Social History   See HPI    Family History     Family History   Problem Relation Age of Onset     CEREBROVASCULAR DISEASE Mother      Arthritis Mother      OSTEOPOROSIS Mother      Alzheimer Disease Father      Arthritis Father      CANCER Father      DIABETES Maternal Grandmother      Cardiovascular Maternal Grandmother      Other Cancer Brother      CANCER Paternal Aunt      Hypertension No family hx of      Thyroid Disease No family hx of      Glaucoma No family hx of      Macular Degeneration No family hx of        Physical Exam     Temp Readings from Last 3 Encounters:   05/18/17 96.3  F (35.7  C) (Oral)   03/16/17 98.6  F (37  C) (Oral)   03/03/17 96.7  F (35.9  C) (Oral)     BP Readings from Last 5 Encounters:   05/18/17 115/80   03/16/17 (!) 148/92   03/03/17 120/82   02/28/17 (!) 132/92   02/22/17 122/80     Pulse Readings from Last 1 Encounters:   05/18/17 77     Resp Readings from Last 1 Encounters:   04/07/16 16     Estimated body mass index is 33.44 kg/(m^2) as calculated from the following:    Height as of 3/3/17: 1.74 m (5' 8.5\").    Weight as of this encounter: 101.2 kg (223 lb 3.2 oz).    GEN: NAD, obese  HEENT: MMM. No oral lesions. EOMI. Anicteric, noninjected sclera  CV: S1, S2. RRR. " No m/r/g.  PULM: CTA bilaterally. No w/c.  MSK: Bilateral second and third MCPs with increased prominence, but no tenderness to palpation or synovial swelling. Wrists and elbows without synovial swelling, increased warmth, tenderness to palpation, or overlying erythema.  Negative MCP squeeze.  Normal  strength. Bilateral shoulders nontender to palpation and without swelling. Hips nontender to direct palpation. Bilateral knees without effusion or tenderness to palpation; crepitation bilaterally. Ankles and feet without swelling or tenderness to palpation. Negative MTP squeeze.    SKIN: No rash. bruises on his upper extremities  EXT: No LE edema  PSYCH: Alert. Appropriate.    Labs / Imaging (select studies)     9/28/1999  ([facility])    10/17/2013 Left knee synovial fluid at [facility]: , N 3%, No crystals  RF/CCP  Recent Labs   Lab Test  04/07/16   1149   CCPIGG  64*     CBC  Recent Labs   Lab Test  10/12/16   1227  07/13/16   1231  04/07/16   1149   WBC  6.2  7.1  6.9   RBC  4.66  4.48  4.77   HGB  14.5  14.1  14.6   HCT  42.9  40.4  42.6   MCV  92  90  89   RDW  14.0  15.1*  13.4   PLT  271  216  294   MCH  31.1  31.5  30.6   MCHC  33.8  34.9  34.3   NEUTROPHIL  72.2  80.0  65.1   LYMPH  16.0  11.7  16.8   MONOCYTE  9.4  6.2  14.9   EOSINOPHIL  1.3  1.4  2.2   BASOPHIL  1.1  0.7  1.0   ANEU  4.5  5.7  4.5   ALYM  1.0  0.8  1.2   SMITH  0.6  0.4  1.0   AEOS  0.1  0.1  0.2   ABAS  0.1  0.1  0.1     CMP  Recent Labs   Lab Test  02/09/17   1710  10/12/16   1227  07/13/16   1231  05/03/16   1147  04/07/16   1149  03/18/16   1511   NA  136   --    --   145*   --   141   POTASSIUM  4.0   --    --   3.7   --   3.9   CHLORIDE  103   --    --   112*   --   105   CO2  23   --    --   24   --   27   ANIONGAP  10   --    --   9   --   9   GLC  181*   --    --   148*   --   114*   BUN  19   --    --   18   --   23   CR  1.20  0.98  1.02  1.07  1.38*  1.21   GFRESTIMATED  59*  75  72  68  51*  59*    GFRESTBLACK  72  >90   GFR Calc    87  82  61  71   NEW  8.8   --    --   8.6   --   9.4   BILITOTAL   --   0.6  0.5   --   0.6   --    ALBUMIN   --   3.5  3.7   --   3.7   --    PROTTOTAL   --   7.2  6.9   --   7.3   --    ALKPHOS   --   54  43   --   55   --    AST   --   32  29   --   27   --    ALT   --   40  44   --   35   --      HgA1c  Recent Labs   Lab Test  02/09/17   1710  12/02/16   1252  08/05/16   1237   A1C  9.2*  9.8*  9.0*     Uric Acid  Recent Labs   Lab Test  01/27/17   1113   URIC  5.6     Iron Studies  Recent Labs   Lab Test  12/05/11   1200   IRON  30*   FEB  236*   IRONSAT  13*     Calcium/VitaminD  Recent Labs   Lab Test  02/09/17   1710  05/03/16   1147  03/18/16   1511   07/23/13   1016   12/05/11   1200   NEW  8.8  8.6  9.4   < >   --    < >  9.3   D3VIT   --    --    --    --    --    --   30   VITDT   --    --    --    --   28*   --    --     < > = values in this interval not displayed.     ESR/CRP  Recent Labs   Lab Test  01/27/17   1113  10/12/16   1227  07/13/16   1231  04/07/16   1149   SED   --   13  10  24*   CRP  189.0*  10.0*  7.6  15.2*     TSH/T4  Recent Labs   Lab Test  06/24/16   1101  07/23/13   1016  02/03/11   1254   TSH  1.07  0.55  0.71     Lipid Panel  Recent Labs   Lab Test  12/02/16   1252  09/30/15   1302  06/29/15   1213  08/07/14   1249  07/23/13   1016   CHOL  174   --   125  129  153   TRIG  282*   --   286*  270*  248*   HDL  39*   --   37*  46  41   LDL  79  89  31  29  62   VLDL   --    --   57*  54*  50*   CHOLHDLRATIO   --    --   3.4  2.8  3.7   NHDL  135*   --    --    --    --      Hepatitis B  Recent Labs   Lab Test  04/07/16   1149   HBCAB  Nonreactive   HEPBANG  Nonreactive     Hepatitis C  Recent Labs   Lab Test  04/07/16   1149   HCVAB  Nonreactive   Assay performance characteristics have not been established for newborns,   infants, and children       HIV Screening  Recent Labs   Lab Test  04/07/16   1149   HIAGAB  Nonreactive    "HIV-1 p24 Ag & HIV-1/HIV-2 Ab Not Detected       \"MRI LEFT KNEE  10/08/2013    INDICATION: Left knee pain. Locking, catching and snapping. Weakness.  TECHNIQUE: Routine.  COMPARISON: None.    FINDINGS:  MEDIAL COMPARTMENT: Linear tearing involving the posterior horn and body of   the medial meniscus as seen on series 4: image 6-8. This is also seen on   series 5: image 9-10. Cartilage is thinned peripherally.    LATERAL COMPARTMENT: Lateral meniscus also demonstrates tearing in the   posterior horn on series 4: image 22. There is diffuse tearing in the body   of the meniscus which is horizontal as seen on series 5: image 9 and this   extends into the anterior horn. Cartilage is thinned.    PATELLOFEMORAL COMPARTMENT: Retropatellar cartilage demonstrates   full-thickness loss of cartilage over portions of the median ridge, lateral   facet and medial facet. Cartilage is better preserved over the lower pole   centrally. There is also full-thickness loss of cartilage in the lateral   and central trochlear groove. Patella is normally aligned. Retinaculum are   intact.    LIGAMENTS AND TENDONS: The anterior cruciate and posterior cruciate   ligaments are intact. The medial collateral ligament is intact. Lateral   collateral ligamentous complex and popliteus insertion are intact.   Quadriceps tendon and patellar tendon are intact.    BONES AND SOFT TISSUES: Moderate-sized joint effusion. No popliteal cyst.   No muscle edema or fracture.    CONCLUSION:  1. There is tearing of the medial meniscus involving the posterior horn and   body. Cartilage is thinned peripherally.  2. There is also tearing in the posterior horn and body of the lateral   meniscus which also extends into the anterior horn. Cartilage is also   thinned in the lateral compartment.  3. Moderate to severe osteoarthritis in the patellofemoral compartment with   full-thickness loss of cartilage over large portions of the retropatellar   surface and trochlear " "surface.  4. Joint effusion.    IF YOU ARE A PHYSICIAN AND HAVE QUESTIONS REGARDING THIS REPORT, PLEASE   CALL [phone].\"    \"X-RAY KNEES BILATERAL AP W/LAT/SUN/PA LEFT   Oct 08, 2013 09:51:59 AM     INDICATION: Pain and swelling   COMPARISON: None.      FINDINGS: Moderate narrowing lateral aspect of the patellofemoral   compartment with slight lateral subluxation of the patella. Medial and   lateral compartments are preserved. Moderate knee joint effusion and/or   synovitis. Enthesophyte formation distal quadriceps and proximal patellar   tendons. Extensive vascular calcifications.     AP view right knee demonstrates trace narrowing medial compartment joint   Space.\"    Immunization History     Immunization History   Administered Date(s) Administered     Influenza (High Dose) 3 valent vaccine 09/20/2012, 10/20/2015, 09/20/2016     Influenza (IIV3) 10/05/1999, 10/30/2008, 09/28/2011, 10/14/2013, 10/03/2014     Pneumococcal (PCV 13) 06/29/2015     Pneumococcal 23 valent 08/19/2010     TD (ADULT, 7+) 08/05/1997, 02/03/2011     Zoster vaccine, live 04/19/2010            Chart documentation done in part with Dragon Voice recognition Software. Although reviewed after completion, some word and grammatical error may remain.    Albert Tompkins MD  "

## 2017-05-19 ENCOUNTER — TELEPHONE (OUTPATIENT)
Dept: NURSING | Facility: CLINIC | Age: 72
End: 2017-05-19

## 2017-05-23 ENCOUNTER — TELEPHONE (OUTPATIENT)
Dept: NURSING | Facility: CLINIC | Age: 72
End: 2017-05-23

## 2017-05-25 ENCOUNTER — OFFICE VISIT (OUTPATIENT)
Dept: OPTOMETRY | Facility: CLINIC | Age: 72
End: 2017-05-25
Payer: COMMERCIAL

## 2017-05-25 DIAGNOSIS — H43.812 PVD (POSTERIOR VITREOUS DETACHMENT), LEFT EYE: ICD-10-CM

## 2017-05-25 DIAGNOSIS — H52.03 HYPEROPIA OF BOTH EYES WITH ASTIGMATISM AND PRESBYOPIA: ICD-10-CM

## 2017-05-25 DIAGNOSIS — H52.203 HYPEROPIA OF BOTH EYES WITH ASTIGMATISM AND PRESBYOPIA: ICD-10-CM

## 2017-05-25 DIAGNOSIS — E11.9 TYPE 2 DIABETES MELLITUS WITHOUT RETINOPATHY (H): ICD-10-CM

## 2017-05-25 DIAGNOSIS — H26.9 CATARACTS, BOTH EYES: Primary | ICD-10-CM

## 2017-05-25 DIAGNOSIS — H52.4 HYPEROPIA OF BOTH EYES WITH ASTIGMATISM AND PRESBYOPIA: ICD-10-CM

## 2017-05-25 PROCEDURE — 99213 OFFICE O/P EST LOW 20 MIN: CPT | Performed by: OPTOMETRIST

## 2017-05-25 ASSESSMENT — SLIT LAMP EXAM - LIDS
COMMENTS: PAPILLOMA LOWER LID
COMMENTS: NORMAL

## 2017-05-25 ASSESSMENT — REFRACTION_WEARINGRX
OD_CYLINDER: +0.25
OS_VPRISM: 1.0
OD_AXIS: 170
OS_CYLINDER: +0.50
OS_VBASE: UP
OS_SPHERE: +1.50
OS_ADD: +2.50
OD_ADD: +2.50
OD_SPHERE: +1.75
OS_AXIS: 010

## 2017-05-25 ASSESSMENT — REFRACTION_MANIFEST
OS_AXIS: 010
OS_VPRISM: 1.0
OS_SPHERE: +1.50
OS_ADD: +2.50
OD_CYLINDER: +0.25
OD_ADD: +2.50
OS_CYLINDER: +0.50
OD_SPHERE: +1.75
OD_AXIS: 170

## 2017-05-25 ASSESSMENT — VISUAL ACUITY
OS_CC+: -1
METHOD: SNELLEN - LINEAR
CORRECTION_TYPE: GLASSES
OD_CC: 20/50
OS_CC: 20/20
OD_CC+: -1

## 2017-05-25 ASSESSMENT — EXTERNAL EXAM - RIGHT EYE: OD_EXAM: NORMAL

## 2017-05-25 ASSESSMENT — TONOMETRY
OS_IOP_MMHG: 17
IOP_METHOD: TONOPEN
OD_IOP_MMHG: 17

## 2017-05-25 ASSESSMENT — EXTERNAL EXAM - LEFT EYE: OS_EXAM: NORMAL

## 2017-05-25 ASSESSMENT — CUP TO DISC RATIO
OS_RATIO: 0.4
OD_RATIO: 0.4

## 2017-05-25 NOTE — PROGRESS NOTES
Chief Complaint   Patient presents with     Eye Problem           HPI    Affected eye(s):  Both   Symptoms:     Blurred vision      Duration:  1 day         Comments:  Bright genie day in [facility] patient had hard time seeing and when he put his sunglasses on it seemed difficult to focus. Patient didn't see any flashes of light. Patient says his eyes seem to be better now. Patient doesn't know if it was just the bright sun. Patient said the blurriness only lasted only a few hours then seemed back to normal.             Amy Arceo, OD     See Review Of Systems     HPI and ROS performed by Optometrist  scribed by Stacy Vega, Optometric Tech    Medical, surgical and family histories reviewed and updated 5/25/2017.         OBJECTIVE: See Ophthalmology exam    ASSESSMENT:    ICD-10-CM    1. Cataracts, both eyes H26.9    2. Type 2 diabetes mellitus without retinopathy (H) E11.9    3. PVD (posterior vitreous detachment), left eye H43.812    4. Hyperopia of both eyes with astigmatism and presbyopia H52.03     H52.203     H52.4       PLAN:  Rechecked the refraction and got the same prescription    I think the cataracts by have gotten a little worse and that does affect the vision somewhat, but they're still not bad enough where they need to be removed.    A posterior vitreous detachment is nothing to worry about.  You have a jelly like substance that fills the inside of the eye, as you get older, that gel shrinks a little and when it shrinks, it pulls away from the back of the eye.  When it pulls away you can see a floater, as time goes on, the floater may shrink down out of your line of sight and you won't notice it so often.  There is a little greater risk of developing a retinal detachment since there's nothing pressing against the retina, so if you start to notice flashes of light or sudden vision changes, return to the clinic as soon as possible, otherwise return as needed.    Return to clinic 1 year for  Comprehensive Vision Exam       Amy Arceo O.D.  [address]  [address]    [phone]

## 2017-05-25 NOTE — PATIENT INSTRUCTIONS
Rechecked the refraction and got the same prescription    I think the cataracts by have gotten a little worse and that does affect the vision somewhat, but they're still not bad enough where they need to be removed.    A posterior vitreous detachment is nothing to worry about.  You have a jelly like substance that fills the inside of the eye, as you get older, that gel shrinks a little and when it shrinks, it pulls away from the back of the eye.  When it pulls away you can see a floater, as time goes on, the floater may shrink down out of your line of sight and you won't notice it so often.  There is a little greater risk of developing a retinal detachment since there's nothing pressing against the retina, so if you start to notice flashes of light or sudden vision changes, return to the clinic as soon as possible, otherwise return as needed.    Return to clinic 1 year for Comprehensive Vision Exam       Amy Arceo O.D.  [facility] [place]  [place]  [address]    [phone]

## 2017-05-25 NOTE — MR AVS SNAPSHOT
After Visit Summary   5/25/2017    Zurdo Dash    MRN: [id number]           Patient Information     Date Of Birth          1945        Visit Information        Provider Department      5/25/2017 12:00 PM Amy Arceo OD [facility]        Today's Diagnoses     Cataracts, both eyes    -  1    Type 2 diabetes mellitus without retinopathy (H)        PVD (posterior vitreous detachment), left eye        Hyperopia of both eyes with astigmatism and presbyopia          Care Instructions    Rechecked the refraction and got the same prescription    I think the cataracts by have gotten a little worse and that does affect the vision somewhat, but they're still not bad enough where they need to be removed.    A posterior vitreous detachment is nothing to worry about.  You have a jelly like substance that fills the inside of the eye, as you get older, that gel shrinks a little and when it shrinks, it pulls away from the back of the eye.  When it pulls away you can see a floater, as time goes on, the floater may shrink down out of your line of sight and you won't notice it so often.  There is a little greater risk of developing a retinal detachment since there's nothing pressing against the retina, so if you start to notice flashes of light or sudden vision changes, return to the clinic as soon as possible, otherwise return as needed.    Return to clinic 1 year for Comprehensive Vision Exam       Amy Arceo O.D.  [facility]  [address]  [place]  [id number] [phone]              Follow-ups after your visit        Follow-up notes from your care team     Return in about 1 year (around 5/25/2018) for Eye Exam.      Your next 10 appointments already scheduled     Aug 09, 2017  1:20 PM CDT   Return Visit with Albert Tompkins MD   [facility] ([facility])    [address] [place] [address]  [place] [id number] [phone]             "  Who to contact     If you have questions or need follow up information about today's clinic visit or your schedule please contact [facility] [place] directly at [phone].  Normal or non-critical lab and imaging results will be communicated to you by MyChart, letter or phone within 4 business days after the clinic has received the results. If you do not hear from us within 7 days, please contact the clinic through MyChart or phone. If you have a critical or abnormal lab result, we will notify you by phone as soon as possible.  Submit refill requests through [employer] or call your pharmacy and they will forward the refill request to us. Please allow 3 business days for your refill to be completed.          Additional Information About Your Visit        [employer] Information     [employer] lets you send messages to your doctor, view your test results, renew your prescriptions, schedule appointments and more. To sign up, go to [email or web address] . Click on \"Log in\" on the left side of the screen, which will take you to the Welcome page. Then click on \"Sign up Now\" on the right side of the page.     You will be asked to enter the access code listed below, as well as some personal information. Please follow the directions to create your username and password.     Your access code is: [email or web address]  Expires: 8/3/2017  1:21 PM     Your access code will  in 90 days. If you need help or a new code, please call your [place] clinic or [phone].        Care EveryWhere ID     This is your Care EveryWhere ID. This could be used by other organizations to access your [place] medical records  [id number]         Blood Pressure from Last 3 Encounters:   17 115/80   17 (!) 148/92   17 120/82    Weight from Last 3 Encounters:   17 101.2 kg (223 lb 3.2 oz)   17 100.5 kg (221 lb 9.6 oz)   17 97.5 kg (215 lb)              Today, you had the following     No orders found for display "       Primary Care Provider Office Phone # Fax #    Kapil Duckworth [phone]       [address]  [place] [address]        Thank you!     Thank you for choosing [facility]  for your care. Our goal is always to provide you with excellent care. Hearing back from our patients is one way we can continue to improve our services. Please take a few minutes to complete the written survey that you may receive in the mail after your visit with us. Thank you!             Your Updated Medication List - Protect others around you: Learn how to safely use, store and throw away your medicines at www.disposemymeds.org.          This list is accurate as of: 5/25/17  1:24 PM.  Always use your most recent med list.                   Brand Name Dispense Instructions for use    ACE/ARB NOT PRESCRIBED (INTENTIONAL)      Reported on 3/16/2017       aspirin 81 MG tablet      Take 1 tablet by mouth daily.       blood glucose monitoring test strip    INDIA CONTOUR NEXT    300 each    TEST THREE TIMES A DAY OR AS DIRECTED       CALCIUM 500 + D 500-200 MG-IU Tabs      take 2 tabs daily       folic acid 1 MG tablet    FOLVITE    100 tablet    Take 1 tablet (1 mg) by mouth daily       glimepiride 4 MG tablet    AMARYL    90 tablet    Take 1 tablet (4 mg) by mouth every morning (before breakfast)       hydrochlorothiazide 25 MG tablet    HYDRODIURIL    90 tablet    Take 1 tablet (25 mg) by mouth daily       * HYDROcodone-acetaminophen 5-325 MG per tablet    NORCO    30 tablet    Take 1 tablet by mouth every 6 hours as needed for pain       * HYDROcodone-acetaminophen 5-325 MG per tablet    NORCO    28 tablet    Take 1-2 tablets by mouth every 4 hours as needed for moderate to severe pain maximum 4 tablet(s) per day       hydroxychloroquine 200 MG tablet    PLAQUENIL    180 tablet    Take 1 tablet (200 mg) by mouth 2 times daily       insulin pen needle 31G X 6 MM    TICA MINI     100 each    Use 1 daily or as directed.       liraglutide 18 MG/3ML soln    VICTOZA PEN    27 mL    Inject 1.8 mg Subcutaneous daily       lisinopril 5 MG tablet    PRINIVIL/ZESTRIL    90 tablet    Take 1 tablet (5 mg) by mouth daily       metFORMIN 500 MG tablet    GLUCOPHAGE    360 tablet    TAKE TWO TABLETS BY MOUTH TWICE A DAY WITH MEALS       methotrexate sodium 2.5 MG Tabs     120 tablet    Take 25 mg by mouth once a week . Take all 10 tablets on the same day of each week.       MICROLET LANCETS Misc     100 each    1 Device daily       MULTI-VITAMIN PO      None Entered       omeprazole 20 MG CR capsule    priLOSEC    90 capsule    Take 1 capsule (20 mg) by mouth daily       pravastatin 40 MG tablet    PRAVACHOL    90 tablet    Take 1 tablet (40 mg) by mouth daily       vitamin E 1000 UNIT capsule    TOCOPHEROL     1 tablet daily       * Notice:  This list has 2 medication(s) that are the same as other medications prescribed for you. Read the directions carefully, and ask your doctor or other care provider to review them with you.

## 2017-05-28 NOTE — PROGRESS NOTES
Rheumatology team: Please call to notify Mr. Dash that his labs showed mild renal insufficiency (refuced function).  Also, his uric acid was elevated at 9.3; and therefore the flare of his knee that he had previously was very possibly due to crystalline disease such gout. If he has recurrent flares due to gout, then other treatments may be considered for it; but for now we just monitor.      Albert Tompkins MD  5/28/2017 2:36 PM

## 2017-06-13 ENCOUNTER — TELEPHONE (OUTPATIENT)
Dept: FAMILY MEDICINE | Facility: CLINIC | Age: 72
End: 2017-06-13

## 2017-06-13 RX ORDER — LANCING DEVICE
EACH MISCELLANEOUS
Qty: 1 EACH | Refills: 0 | Status: SHIPPED | OUTPATIENT
Start: 2017-06-13 | End: 2018-03-12

## 2017-06-13 NOTE — TELEPHONE ENCOUNTER
Patient needs a new lancing device.  It is covered at no cost to the patient so could you please send a prescription for it?    Thank you  Vy Pickett [facility]  Phone: [phone]  Fax: [phone]

## 2017-06-14 ENCOUNTER — TELEPHONE (OUTPATIENT)
Dept: FAMILY MEDICINE | Facility: CLINIC | Age: 72
End: 2017-06-14

## 2017-06-14 NOTE — TELEPHONE ENCOUNTER
We just switched the patient's lancing device as he thought he was using the OneTouch Delica.  Patient is okay to switch to Delica but we need to get him some lancets as well.  Could you please send a prescription for OneTouch Delica Lancets?      Thank you  Vy Pickett [facility]  Phone: [phone]  Fax: [phone]

## 2017-06-19 ENCOUNTER — TELEPHONE (OUTPATIENT)
Dept: FAMILY MEDICINE | Facility: CLINIC | Age: 72
End: 2017-06-19

## 2017-06-19 NOTE — TELEPHONE ENCOUNTER
Reason for call:  Patient reporting a symptom    Symptom or request: Patient called to report that his blood sugar level couple of hours this morning was 40. Patient states he self-medicated and brought it up to 70.  Patient states he is lightheaded and would like a call back.    Duration (how long have symptoms been present): today    Have you been treated for this before? Yes    Additional comments: Na    Phone Number patient can be reached at:  Home number on file [phone] (home)    Best Time:  ASAP    Can we leave a detailed message on this number:  YES    Call taken on 6/19/2017 at 9:16 AM by Renea Nolasco

## 2017-06-19 NOTE — TELEPHONE ENCOUNTER
Have patient hold his glimepiride until he sees Kapil Duckworth MD.  He should call if he continues to have low blood sugars.    Elli Zapata, CNP

## 2017-06-19 NOTE — TELEPHONE ENCOUNTER
"Patient notified of Provider's message as written.  Patient verbalized understanding.  He stated that he has not eaten anything yet, \"I am going in for a weigh in, in 30 minutes, I'll eat something afterwards.\"  Explained that he will need to keep an eye on his BG and eat something if BG continues to be low as his health is more important.  He verbalized understanding      Jacobo Del Castillo RN    "

## 2017-06-19 NOTE — PROGRESS NOTES
"  SUBJECTIVE:                                                    Zurdo Dash is a 72-year-old male who presents to clinic today accompanied by their wife for the following health issues:    Patient presents with:  Recheck Medication: victoza,(any different options).   Weight Problem: Diet options    The patient is doing well today and expresses concern for DM2 symptoms. They recently joined the [employer] diet plan with a DM2 specific diet plan. The patient keeps vigilant track of blood sugars with spikes of high blood sugars in the 200's. Recent episode of blurry vision. Discussed high blood sugars as a putative cause of vision disturbances. Additionally the patient feels \"shaky\" on occasion attributed to DM2. Discussed speaking to diabetes mellitus specialists /  And / or Iban Borden, medication therapy management pharmacist for the patient as the medication that has been helping the patient is too expensive. We are going to try to get Liraglutide [ Victoza ] as a covered benefit      Patient is interested in generic Viagra for ED. No complaints with arthritis in hands, seeing Dr. Albert Tompkins, rheumatologist with [facility]   for his rheumatoid arthritis     Additionally the patient's wife asks about her blood sugars and medication changes that may be appropriate.     Lab Results   Component Value Date    A1C 9.2 02/09/2017    A1C 9.8 12/02/2016    A1C 9.0 08/05/2016    A1C 6.9 05/03/2016    A1C 8.6 03/18/2016     Problem list and histories reviewed & adjusted, as indicated.  Additional history: as documented    Patient Active Problem List   Diagnosis     Pain in limb     Diabetic neuropathy (H)     Hyperlipidemia LDL goal <100     Hypertension goal BP (blood pressure) < 140/90     obesity     ED (erectile dysfunction)     Hypogonadism     Advanced directives, counseling/discussion     Health Care Home     Type 2 diabetes mellitus, uncontrolled, with neuropathy (H)     RBBB (right bundle branch " block)     Lumbago     Ex-smoker     Cataracts, both eyes     Family history of esophageal cancer     Gastroesophageal reflux disease, esophagitis presence not specified     Rheumatoid arthritis involving multiple sites with positive rheumatoid factor (H)     Pulmonary nodule     High risk medication use     Past Surgical History:   Procedure Laterality Date     HC INCISION TENDON SHEATH FINGER  4/2009    r hand ring finger     TONSILLECTOMY         Social History   Substance Use Topics     Smoking status: Former Smoker     Packs/day: 1.00     Years: 40.00     Types: Cigarettes     Quit date: 3/16/2007     Smokeless tobacco: Never Used     Alcohol use No     Family History   Problem Relation Age of Onset     CEREBROVASCULAR DISEASE Mother      Arthritis Mother      OSTEOPOROSIS Mother      Alzheimer Disease Father      Arthritis Father      CANCER Father      DIABETES Maternal Grandmother      Cardiovascular Maternal Grandmother      Other Cancer Brother      CANCER Paternal Aunt      Hypertension No family hx of      Thyroid Disease No family hx of      Glaucoma No family hx of      Macular Degeneration No family hx of          Current Outpatient Prescriptions   Medication Sig Dispense Refill     blood glucose monitoring (ONE TOUCH DELICA) lancets Use to test blood sugars 3 times daily or as directed. 300 each 3     blood glucose (NO BRAND SPECIFIED) lancing device Use to test blood sugars 3 times daily or as directed. 1 each 0     folic acid (FOLVITE) 1 MG tablet Take 1 tablet (1 mg) by mouth daily 100 tablet 3     hydroxychloroquine (PLAQUENIL) 200 MG tablet Take 1 tablet (200 mg) by mouth 2 times daily 180 tablet 1     methotrexate sodium 2.5 MG TABS Take 25 mg by mouth once a week . Take all 10 tablets on the same day of each week. 120 tablet 1     lisinopril (PRINIVIL/ZESTRIL) 5 MG tablet Take 1 tablet (5 mg) by mouth daily 90 tablet 1     blood glucose monitoring (INDIA CONTOUR NEXT) test strip TEST THREE  TIMES A DAY OR AS DIRECTED 300 each 3     hydrochlorothiazide (HYDRODIURIL) 25 MG tablet Take 1 tablet (25 mg) by mouth daily 90 tablet 1     omeprazole (PRILOSEC) 20 MG CR capsule Take 1 capsule (20 mg) by mouth daily 90 capsule 3     liraglutide (VICTOZA PEN) 18 MG/3ML soln Inject 1.8 mg Subcutaneous daily 27 mL 1     metFORMIN (GLUCOPHAGE) 500 MG tablet TAKE TWO TABLETS BY MOUTH TWICE A DAY WITH MEALS 360 tablet 1     insulin pen needle (ULTIC[employer] MINI) 31G X 6 MM Use 1 daily or as directed. 100 each 1     HYDROcodone-acetaminophen (NORCO) 5-325 MG per tablet Take 1-2 tablets by mouth every 4 hours as needed for moderate to severe pain maximum 4 tablet(s) per day 28 tablet 0     pravastatin (PRAVACHOL) 40 MG tablet Take 1 tablet (40 mg) by mouth daily 90 tablet 3     MICROLET LANCETS MISC 1 Device daily 100 each 4     aspirin 81 MG tablet Take 1 tablet by mouth daily.  3     CALCIUM 500 + D 500-200 MG-IU OR TABS take 2 tabs daily       MULTI-VITAMIN OR None Entered       VITAMIN E 1000 UNIT OR CAPS 1 tablet daily       glimepiride (AMARYL) 4 MG tablet Take 1 tablet (4 mg) by mouth every morning (before breakfast) (Patient not taking: Reported on 6/23/2017) 90 tablet 1     Labs reviewed in EPIC    Reviewed and updated as needed this visit by clinical staff  Reviewed and updated as needed this visit by Provider    ROS:  Constitutional, HEENT, cardiovascular, pulmonary, GI, , musculoskeletal, neuro, skin, endocrine and psych systems are negative, except as otherwise noted.    This document serves as a record of the services and decisions personally performed and made by Kapil Duckworth MD. It was created on their behalf by Tyrone Ortez, a trained medical scribe. The creation of this document is based the provider's statements to the medical scribe.  Tyrone Ortez June 23, 2017 11:21 AM      OBJECTIVE:                                                    /64 (BP Location: Left arm, Patient Position: Chair, Cuff  Size: Adult Regular)  Pulse 63  Temp 97.1  F (36.2  C) (Oral)  Wt 97.3 kg (214 lb 9.6 oz)  SpO2 96%  BMI 32.16 kg/m2  Body mass index is 32.16 kg/(m^2).  GENERAL: healthy, alert and no distress, obese  EYES: Eyes grossly normal to inspection, PERRL and conjunctivae and sclerae normal  SKIN: no visible suspicious lesions or rashes  NEURO: mentation intact and speech normal  PSYCH: mentation appears normal, affect normal/bright    Diagnostic Test Results:  No results found for this or any previous visit (from the past 24 hour(s)).     ASSESSMENT/PLAN:                                                    (E11.40,  E11.65) Type 2 diabetes mellitus, uncontrolled, with neuropathy (H)  (primary encounter diagnosis)  Comment: I am highly focused to try to get Liraglutide [ Victoza ] as a covered benefit by any means necessary   Plan: HEMOGLOBIN A1C, Albumin Random Urine         Quantitative, Basic metabolic panel        Further follow up depending on the test results . Much of this appointment was in counseling and discussion  , 25 minutes + was spent with the patient with greater than 50% in face-to-face discussion of disease process, treatment options and medicine management.        Other issues reviewed     The information in this document, created by the medical scribe for me, accurately reflects the services I personally performed and the decisions made by me. I have reviewed and approved this document for accuracy.   MD Kapil Crane MD  [facility]    Time Estimate:  Time in: 11:23 AM  Time out: 11:50 AM

## 2017-06-19 NOTE — TELEPHONE ENCOUNTER
"Per patient, Dr. Duckworth switched his Lantus to Victoza.  He has an appointment with Dr. Duckworth on Friday 6/23/17 to discuss meds.  He stated that the Victoza is just getting too expensive.  He is currently on the Itzel Leo DM 2 diet to lose weight  Per patient, the combination of the Victoza 1.8mg and the Itzel Leo Diet is causing his BG to drop too low.  BG this morning was 40. Was feeling lightheaded  He took a couple of glucose tabs, rechecked BG after 1 hour was 70.  He then ate [employer] breakfast bar, ready mix shake, and 2 clementines for breakfast.  BG after breakfast was 60.  He asked if he should keep taking his glucose tabs.  Explained to him that this will only bring up his sugar temporarily.  Advised him to disregard the Itzel Leo diet today and eat something else.  Patient stated that he has some cereal and eggs he can eat.  Advised him to discuss his Itzel Leo diet with Dr. Duckworth as a consult is needed before he continues the diet    Patient wondering if he should drop his Victoza dose down until seen by Dr. Duckworth to discuss further  Please advise.  Not sure that patient will hold off on the Itzel Leo diet as he stated \"I have to order their food regardless\"    Jacobo Del Castillo RN                  "

## 2017-06-22 ENCOUNTER — ALLIED HEALTH/NURSE VISIT (OUTPATIENT)
Dept: INTERNAL MEDICINE | Facility: CLINIC | Age: 72
End: 2017-06-22
Payer: COMMERCIAL

## 2017-06-22 VITALS — DIASTOLIC BLOOD PRESSURE: 82 MMHG | SYSTOLIC BLOOD PRESSURE: 126 MMHG

## 2017-06-22 DIAGNOSIS — I10 HYPERTENSION GOAL BP (BLOOD PRESSURE) < 140/90: Primary | ICD-10-CM

## 2017-06-22 PROCEDURE — 99207 ZZC NO CHARGE NURSE ONLY: CPT | Performed by: INTERNAL MEDICINE

## 2017-06-22 NOTE — PROGRESS NOTES
Zurdo Dash is enrolled/participating in the retail pharmacy Blood Pressure Goals Achievement Program (BPGAP).  Zurdo Dash was evaluated at [facility] on June 22, 2017 at which time his blood pressure was:    BP Readings from Last 3 Encounters:   06/22/17 126/82   05/18/17 115/80   03/16/17 (!) 148/92     Reviewed lifestyle modifications for blood pressure control and reduction: including making healthy food choices, managing weight, getting regular exercise, smoking cessation, reducing alcohol consumption, monitoring blood pressure regularly.     Zurdo Dash is not experiencing symptoms.    Follow-Up: BP is at goal of < 140/90mmHg (patient 18+ years of age with or without diabetes).  Recommended follow-up at pharmacy in 6 months.           Zurdo Dash was evaluated for enrollment into the PGEN study today.    Patient eligible for enrollment:  No  Patient interested in enrollment:  No    Completed by: Aguilar Jones, PharmD.  [facility]

## 2017-06-22 NOTE — MR AVS SNAPSHOT
After Visit Summary   6/22/2017    Zurdo Dash    MRN: [id number]           Patient Information     Date Of Birth          1945        Visit Information        Provider Department      6/22/2017 12:38 PM Kapil Duckworth MD [facility]        Today's Diagnoses     Hypertension goal BP (blood pressure) < 140/90    -  1       Follow-ups after your visit        Your next 10 appointments already scheduled     Jun 23, 2017 11:10 AM CDT   Office Visit with Kapil Duckworth MD   [facility] ([facility])    [address]  [place] [address]   [phone]           Bring a current list of meds and any records pertaining to this visit.  For Physicals, please bring immunization records and any forms needing to be filled out.  Please arrive 10 minutes early to complete paperwork.            Jul 19, 2017 10:15 AM CDT   Diabetic Education with  DIABETIC ED RESOURCE   [facility] [place] ([facility])    [address] [address]   [phone]            Aug 09, 2017  1:20 PM CDT   Return Visit with Albert Tompkins MD   [facility] ([facility])    [address] [address]   [phone]              Who to contact     If you have questions or need follow up information about today's clinic visit or your schedule please contact [facility] directly at [phone].  Normal or non-critical lab and imaging results will be communicated to you by MyChart, letter or phone within 4 business days after the clinic has received the results. If you do not hear from us within 7 days, please contact the clinic through MyChart or phone. If you have a critical or abnormal lab result, we will notify you by phone as soon as possible.  Submit refill requests through [email or web address] or call your pharmacy and they will forward the refill request to us. Please allow 3 business days for your  "refill to be completed.          Additional Information About Your Visit        Reading Rainbowhart Information     UmBio lets you send messages to your doctor, view your test results, renew your prescriptions, schedule appointments and more. To sign up, go to [email or web address] . Click on \"Log in\" on the left side of the screen, which will take you to the Welcome page. Then click on \"Sign up Now\" on the right side of the page.     You will be asked to enter the access code listed below, as well as some personal information. Please follow the directions to create your username and password.     Your access code is: [email or web address]  Expires: 8/3/2017  1:21 PM     Your access code will  in 90 days. If you need help or a new code, please call your [place] clinic or [phone].        Care EveryWhere ID     This is your Care EveryWhere ID. This could be used by other organizations to access your [place] medical records  [id number]         Blood Pressure from Last 3 Encounters:   17 126/82   17 115/80   17 (!) 148/92    Weight from Last 3 Encounters:   17 223 lb 3.2 oz (101.2 kg)   17 221 lb 9.6 oz (100.5 kg)   17 215 lb (97.5 kg)              Today, you had the following     No orders found for display       Primary Care Provider Office Phone # Fax #    Kapil Duckworth [phone]       [address] [address]        Equal Access to Services     [facility] : Hadii art ku hadasho Sopremaali, waaxda luqadaha, qaybta kaalmada aranza becerra. So [facility] [phone].    ATENCIÓN: Si habla español, tiene a diallo disposición servicios gratuitos de asistencia lingüística. Llame al [phone].    We comply with applicable federal civil rights laws and [place] laws. We do not discriminate on the basis of race, color, national origin, age, disability sex, sexual orientation or gender identity.       "      Thank you!     Thank you for choosing [facility] [place]  for your care. Our goal is always to provide you with excellent care. Hearing back from our patients is one way we can continue to improve our services. Please take a few minutes to complete the written survey that you may receive in the mail after your visit with us. Thank you!             Your Updated Medication List - Protect others around you: Learn how to safely use, store and throw away your medicines at www.disposemymeds.org.          This list is accurate as of: 6/22/17 12:42 PM.  Always use your most recent med list.                   Brand Name Dispense Instructions for use Diagnosis    ACE/ARB NOT PRESCRIBED (INTENTIONAL)      Reported on 3/16/2017        aspirin 81 MG tablet      Take 1 tablet by mouth daily.    Type 2 diabetes, HbA1c goal < 7% (H)       blood glucose lancing device    no brand specified    1 each    Use to test blood sugars 3 times daily or as directed.    Type 2 diabetes mellitus, uncontrolled, with neuropathy (H)       blood glucose monitoring test strip    INDIA CONTOUR NEXT    300 each    TEST THREE TIMES A DAY OR AS DIRECTED    Type 2 diabetes mellitus, uncontrolled, with neuropathy (H)       CALCIUM 500 + D 500-200 MG-IU Tabs      take 2 tabs daily        folic acid 1 MG tablet    FOLVITE    100 tablet    Take 1 tablet (1 mg) by mouth daily    High risk medications (not anticoagulants) long-term use       glimepiride 4 MG tablet    AMARYL    90 tablet    Take 1 tablet (4 mg) by mouth every morning (before breakfast)    Type 2 diabetes mellitus, uncontrolled, with neuropathy (H)       hydrochlorothiazide 25 MG tablet    HYDRODIURIL    90 tablet    Take 1 tablet (25 mg) by mouth daily    Hypertension goal BP (blood pressure) < 140/90       * HYDROcodone-acetaminophen 5-325 MG per tablet    NORCO    30 tablet    Take 1 tablet by mouth every 6 hours as needed for pain    Acute monoarthritis       *  HYDROcodone-acetaminophen 5-325 MG per tablet    NORCO    28 tablet    Take 1-2 tablets by mouth every 4 hours as needed for moderate to severe pain maximum 4 tablet(s) per day    Bruised ribs, unspecified laterality, sequela       hydroxychloroquine 200 MG tablet    PLAQUENIL    180 tablet    Take 1 tablet (200 mg) by mouth 2 times daily    High risk medications (not anticoagulants) long-term use       insulin pen needle 31G X 6 MM    ULTICARE MINI    100 each    Use 1 daily or as directed.    Type 2 diabetes mellitus, uncontrolled, with neuropathy (H)       liraglutide 18 MG/3ML soln    VICTOZA PEN    27 mL    Inject 1.8 mg Subcutaneous daily    Type 2 diabetes mellitus, uncontrolled, with neuropathy (H)       lisinopril 5 MG tablet    PRINIVIL/ZESTRIL    90 tablet    Take 1 tablet (5 mg) by mouth daily    Type 2 diabetes mellitus, uncontrolled, with neuropathy (H)       metFORMIN 500 MG tablet    GLUCOPHAGE    360 tablet    TAKE TWO TABLETS BY MOUTH TWICE A DAY WITH MEALS    Type 2 diabetes mellitus, uncontrolled, with neuropathy (H)       methotrexate sodium 2.5 MG Tabs     120 tablet    Take 25 mg by mouth once a week . Take all 10 tablets on the same day of each week.    High risk medications (not anticoagulants) long-term use       * MICROLET LANCETS Misc     100 each    1 Device daily    Type 2 diabetes, HbA1C goal < 8% (H)       * blood glucose monitoring lancets     300 each    Use to test blood sugars 3 times daily or as directed.    Type 2 diabetes mellitus, uncontrolled, with neuropathy (H)       MULTI-VITAMIN PO      None Entered        omeprazole 20 MG CR capsule    priLOSEC    90 capsule    Take 1 capsule (20 mg) by mouth daily    Gastroesophageal reflux disease without esophagitis       pravastatin 40 MG tablet    PRAVACHOL    90 tablet    Take 1 tablet (40 mg) by mouth daily    Hyperlipidemia LDL goal <100       vitamin E 1000 UNIT capsule    TOCOPHEROL     1 tablet daily        * Notice:  This  list has 4 medication(s) that are the same as other medications prescribed for you. Read the directions carefully, and ask your doctor or other care provider to review them with you.

## 2017-06-23 ENCOUNTER — OFFICE VISIT (OUTPATIENT)
Dept: FAMILY MEDICINE | Facility: CLINIC | Age: 72
End: 2017-06-23
Payer: COMMERCIAL

## 2017-06-23 VITALS
TEMPERATURE: 97.1 F | SYSTOLIC BLOOD PRESSURE: 104 MMHG | OXYGEN SATURATION: 96 % | DIASTOLIC BLOOD PRESSURE: 64 MMHG | WEIGHT: 214.6 LBS | BODY MASS INDEX: 32.16 KG/M2 | HEART RATE: 63 BPM

## 2017-06-23 LAB
ANION GAP SERPL CALCULATED.3IONS-SCNC: 13 MMOL/L (ref 3–14)
BUN SERPL-MCNC: 35 MG/DL (ref 7–30)
CALCIUM SERPL-MCNC: 9.6 MG/DL (ref 8.5–10.1)
CHLORIDE SERPL-SCNC: 105 MMOL/L (ref 94–109)
CO2 SERPL-SCNC: 21 MMOL/L (ref 20–32)
CREAT SERPL-MCNC: 1.41 MG/DL (ref 0.66–1.25)
CREAT UR-MCNC: 146 MG/DL
GFR SERPL CREATININE-BSD FRML MDRD: 49 ML/MIN/1.7M2
GLUCOSE SERPL-MCNC: 101 MG/DL (ref 70–99)
HBA1C MFR BLD: 5.6 % (ref 4.3–6)
MICROALBUMIN UR-MCNC: 13 MG/L
MICROALBUMIN/CREAT UR: 8.63 MG/G CR (ref 0–17)
POTASSIUM SERPL-SCNC: 4.3 MMOL/L (ref 3.4–5.3)
SODIUM SERPL-SCNC: 139 MMOL/L (ref 133–144)

## 2017-06-23 PROCEDURE — 83036 HEMOGLOBIN GLYCOSYLATED A1C: CPT | Performed by: INTERNAL MEDICINE

## 2017-06-23 PROCEDURE — 99214 OFFICE O/P EST MOD 30 MIN: CPT | Performed by: INTERNAL MEDICINE

## 2017-06-23 PROCEDURE — 80048 BASIC METABOLIC PNL TOTAL CA: CPT | Performed by: INTERNAL MEDICINE

## 2017-06-23 PROCEDURE — 82043 UR ALBUMIN QUANTITATIVE: CPT | Performed by: INTERNAL MEDICINE

## 2017-06-23 PROCEDURE — 36415 COLL VENOUS BLD VENIPUNCTURE: CPT | Performed by: INTERNAL MEDICINE

## 2017-06-23 NOTE — LETTER
[address] [place] [address]    June 27, 2017    Zurdoaj Dash  [address] [facility]  [place] [address]          Dear Gretta Renner is a copy of your results. This is phenomenal. Things look great ! There remains persistent chronic kidney disease stage 3 without significant change from prior measurement.     Results for orders placed or performed in visit on 06/23/17   HEMOGLOBIN A1C   Result Value Ref Range    Hemoglobin A1C 5.6 4.3 - 6.0 %   Albumin Random Urine Quantitative   Result Value Ref Range    Creatinine Urine 146 mg/dL    Albumin Urine mg/L 13 mg/L    Albumin Urine mg/g Cr 8.63 0 - 17 mg/g Cr   Basic metabolic panel   Result Value Ref Range    Sodium 139 133 - 144 mmol/L    Potassium 4.3 3.4 - 5.3 mmol/L    Chloride 105 94 - 109 mmol/L    Carbon Dioxide 21 20 - 32 mmol/L    Anion Gap 13 3 - 14 mmol/L    Glucose 101 (H) 70 - 99 mg/dL    Urea Nitrogen 35 (H) 7 - 30 mg/dL    Creatinine 1.41 (H) 0.66 - 1.25 mg/dL    GFR Estimate 49 (L) >60 mL/min/1.7m2    GFR Estimate If Black 60 (L) >60 mL/min/1.7m2    Calcium 9.6 8.5 - 10.1 mg/dL     If you have any questions or concerns, please call myself or my nurse at [phone].    Sincerely,      Kapil Duckworth MD/inocencia

## 2017-06-23 NOTE — MR AVS SNAPSHOT
After Visit Summary   6/23/2017    Zurdo Dash    MRN: [id number]           Patient Information     Date Of Birth          1945        Visit Information        Provider Department      6/23/2017 11:10 AM Kapil Duckworth MD [facility]        Today's Diagnoses     Type 2 diabetes mellitus, uncontrolled, with neuropathy (H)    -  1      Care Instructions    [facility]    If you have any questions regarding to your visit please contact your care team:     Team Pink:   Clinic Hours Telephone Number   Internal Medicine:  Dr. Suze Zapata NP       7am-7pm  Monday - Thursday   7am-5pm  Fridays  [phone]  (Appointment scheduling available 24/7)    Questions about your visit?  Team Line  [phone]   Urgent Care - [place] and [place] - 11am-9pm Monday-Friday Saturday-Sunday- 9am-5pm   [place] - 5pm-9pm Monday-Friday Saturday-Sunday- 9am-5pm  [phone] - [place]   [phone] - [place]       What options do I have for visits at the clinic other than the traditional office visit?  To expand how we care for you, many of our providers are utilizing electronic visits (e-visits) and telephone visits, when medically appropriate, for interactions with their patients rather than a visit in the clinic.   We also offer nurse visits for many medical concerns. Just like any other service, we will bill your insurance company for this type of visit based on time spent on the phone with your provider. Not all insurance companies cover these visits. Please check with your medical insurance if this type of visit is covered. You will be responsible for any charges that are not paid by your insurance.      E-visits via Fitnet:  generally incur a $35.00 fee.  Telephone visits:  Time spent on the phone: *charged based on time that is spent on the phone in increments of 10 minutes. Estimated cost:   5-10 mins $30.00   11-20  "mins. $59.00   21-30 mins. $85.00   Use KXENhart (secure email communication and access to your chart) to send your primary care provider a message or make an appointment. Ask someone on your Team how to sign up for CroquetteLand.    For a Price Quote for your services, please call our Consumer Price Line at [phone].    As always, Thank you for trusting us with your health care needs!    LIZY/MA            Follow-ups after your visit        Your next 10 appointments already scheduled     Jul 19, 2017 10:15 AM CDT   Diabetic Education with  DIABETIC ED RESOURCE   [facility]    [address] [address]   [phone]            Aug 09, 2017  1:20 PM CDT   Return Visit with Albert Tompkins MD   [facility]    [address] [address]   [phone]              Who to contact     If you have questions or need follow up information about today's clinic visit or your schedule please contact [facility] directly at [phone].  Normal or non-critical lab and imaging results will be communicated to you by KXENhart, letter or phone within 4 business days after the clinic has received the results. If you do not hear from us within 7 days, please contact the clinic through KXENhart or phone. If you have a critical or abnormal lab result, we will notify you by phone as soon as possible.  Submit refill requests through CroquetteLand or call your pharmacy and they will forward the refill request to us. Please allow 3 business days for your refill to be completed.          Additional Information About Your Visit        CroquetteLand Information     CroquetteLand lets you send messages to your doctor, view your test results, renew your prescriptions, schedule appointments and more. To sign up, go to [email or web address] . Click on \"Log in\" on the left side of the screen, which will take you to the Welcome " "page. Then click on \"Sign up Now\" on the right side of the page.     You will be asked to enter the access code listed below, as well as some personal information. Please follow the directions to create your username and password.     Your access code is: [email or web address]  Expires: 8/3/2017  1:21 PM     Your access code will  in 90 days. If you need help or a new code, please call your [place] clinic or [phone].        Care EveryWhere ID     This is your Care EveryWhere ID. This could be used by other organizations to access your [place] medical records  [id number]        Your Vitals Were     Pulse Temperature Pulse Oximetry BMI (Body Mass Index)          63 97.1  F (36.2  C) (Oral) 96% 32.16 kg/m2         Blood Pressure from Last 3 Encounters:   17 104/64   17 126/82   17 115/80    Weight from Last 3 Encounters:   17 214 lb 9.6 oz (97.3 kg)   17 223 lb 3.2 oz (101.2 kg)   17 221 lb 9.6 oz (100.5 kg)              We Performed the Following     Albumin Random Urine Quantitative     Basic metabolic panel     HEMOGLOBIN A1C          Today's Medication Changes          These changes are accurate as of: 17 11:47 AM.  If you have any questions, ask your nurse or doctor.               These medicines have changed or have updated prescriptions.        Dose/Directions    HYDROcodone-acetaminophen 5-325 MG per tablet   Commonly known as:  NORCO   This may have changed:  Another medication with the same name was removed. Continue taking this medication, and follow the directions you see here.   Used for:  Bruised ribs, unspecified laterality, sequela   Changed by:  Kapil Duckworth MD        Dose:  1-2 tablet   Take 1-2 tablets by mouth every 4 hours as needed for moderate to severe pain maximum 4 tablet(s) per day   Quantity:  28 tablet   Refills:  0         Stop taking these medicines if you haven't already. Please contact your care team if you have questions.     ACE/ARB NOT " PRESCRIBED (INTENTIONAL)   Stopped by:  Kapil Duckworth MD                    Primary Care Provider Office Phone # Fax #    Kapil Duckworth [phone]       [address] [address]        Equal Access to Services     BRIALIONEL YEHUDA : Hadii aad ku hadasho Soomaali, waaxda luqadaha, qaybta kaalmada adeegyada, waxay idiin hayaan hola khayakaizabela laphyllis mckeon. So [facility] [phone].    ATENCIÓN: Si habla español, tiene a diallo disposición servicios gratuitos de asistencia lingüística. Llame al [phone].    We comply with applicable federal civil rights laws and [place] laws. We do not discriminate on the basis of race, color, national origin, age, disability sex, sexual orientation or gender identity.            Thank you!     Thank you for choosing [facility]  for your care. Our goal is always to provide you with excellent care. Hearing back from our patients is one way we can continue to improve our services. Please take a few minutes to complete the written survey that you may receive in the mail after your visit with us. Thank you!             Your Updated Medication List - Protect others around you: Learn how to safely use, store and throw away your medicines at www.disposemymeds.org.          This list is accurate as of: 6/23/17 11:47 AM.  Always use your most recent med list.                   Brand Name Dispense Instructions for use Diagnosis    aspirin 81 MG tablet      Take 1 tablet by mouth daily.    Type 2 diabetes, HbA1c goal < 7% (H)       blood glucose lancing device    no brand specified    1 each    Use to test blood sugars 3 times daily or as directed.    Type 2 diabetes mellitus, uncontrolled, with neuropathy (H)       blood glucose monitoring test strip    INDIA CONTOUR NEXT    300 each    TEST THREE TIMES A DAY OR AS DIRECTED    Type 2 diabetes mellitus, uncontrolled, with neuropathy (H)       CALCIUM 500 + D 500-200 MG-IU Tabs      take  2 tabs daily        folic acid 1 MG tablet    FOLVITE    100 tablet    Take 1 tablet (1 mg) by mouth daily    High risk medications (not anticoagulants) long-term use       glimepiride 4 MG tablet    AMARYL    90 tablet    Take 1 tablet (4 mg) by mouth every morning (before breakfast)    Type 2 diabetes mellitus, uncontrolled, with neuropathy (H)       hydrochlorothiazide 25 MG tablet    HYDRODIURIL    90 tablet    Take 1 tablet (25 mg) by mouth daily    Hypertension goal BP (blood pressure) < 140/90       HYDROcodone-acetaminophen 5-325 MG per tablet    NORCO    28 tablet    Take 1-2 tablets by mouth every 4 hours as needed for moderate to severe pain maximum 4 tablet(s) per day    Bruised ribs, unspecified laterality, sequela       hydroxychloroquine 200 MG tablet    PLAQUENIL    180 tablet    Take 1 tablet (200 mg) by mouth 2 times daily    High risk medications (not anticoagulants) long-term use       insulin pen needle 31G X 6 MM    ULTICARE MINI    100 each    Use 1 daily or as directed.    Type 2 diabetes mellitus, uncontrolled, with neuropathy (H)       liraglutide 18 MG/3ML soln    VICTOZA PEN    27 mL    Inject 1.8 mg Subcutaneous daily    Type 2 diabetes mellitus, uncontrolled, with neuropathy (H)       lisinopril 5 MG tablet    PRINIVIL/ZESTRIL    90 tablet    Take 1 tablet (5 mg) by mouth daily    Type 2 diabetes mellitus, uncontrolled, with neuropathy (H)       metFORMIN 500 MG tablet    GLUCOPHAGE    360 tablet    TAKE TWO TABLETS BY MOUTH TWICE A DAY WITH MEALS    Type 2 diabetes mellitus, uncontrolled, with neuropathy (H)       methotrexate sodium 2.5 MG Tabs     120 tablet    Take 25 mg by mouth once a week . Take all 10 tablets on the same day of each week.    High risk medications (not anticoagulants) long-term use       * MICROLET LANCETS Misc     100 each    1 Device daily    Type 2 diabetes, HbA1C goal < 8% (H)       * blood glucose monitoring lancets     300 each    Use to test blood sugars 3  times daily or as directed.    Type 2 diabetes mellitus, uncontrolled, with neuropathy (H)       MULTI-VITAMIN PO      None Entered        omeprazole 20 MG CR capsule    priLOSEC    90 capsule    Take 1 capsule (20 mg) by mouth daily    Gastroesophageal reflux disease without esophagitis       pravastatin 40 MG tablet    PRAVACHOL    90 tablet    Take 1 tablet (40 mg) by mouth daily    Hyperlipidemia LDL goal <100       vitamin E 1000 UNIT capsule    TOCOPHEROL     1 tablet daily        * Notice:  This list has 2 medication(s) that are the same as other medications prescribed for you. Read the directions carefully, and ask your doctor or other care provider to review them with you.

## 2017-06-23 NOTE — PATIENT INSTRUCTIONS
[facility] [place]    If you have any questions regarding to your visit please contact your care team:     Team Pink:   Clinic Hours Telephone Number   Internal Medicine:  Dr. Suze Zapata NP       7am-7pm  Monday - Thursday   7am-5pm  Fridays  [phone]  (Appointment scheduling available 24/7)    Questions about your visit?  Team Line  [phone]   [facility] [place] [facility] - 11am-9pm Monday-Friday Saturday-Sunday- 9am-5pm   [place] - 5pm-9pm Monday-Friday Saturday-Sunday- 9am-5pm  [phone] - [place]   [phone] - [place]       What options do I have for visits at the clinic other than the traditional office visit?  To expand how we care for you, many of our providers are utilizing electronic visits (e-visits) and telephone visits, when medically appropriate, for interactions with their patients rather than a visit in the clinic.   We also offer nurse visits for many medical concerns. Just like any other service, we will bill your insurance company for this type of visit based on time spent on the phone with your provider. Not all insurance companies cover these visits. Please check with your medical insurance if this type of visit is covered. You will be responsible for any charges that are not paid by your insurance.      E-visits via kubo financiero:  generally incur a $35.00 fee.  Telephone visits:  Time spent on the phone: *charged based on time that is spent on the phone in increments of 10 minutes. Estimated cost:   5-10 mins $30.00   11-20 mins. $59.00   21-30 mins. $85.00   Use ContaAzult (secure email communication and access to your chart) to send your primary care provider a message or make an appointment. Ask someone on your Team how to sign up for kubo financiero.    For a Price Quote for your services, please call our Consumer Price Line at [phone].    As always, Thank you for trusting us with your health care  needs!    AMAURI

## 2017-06-23 NOTE — NURSING NOTE
"Chief Complaint   Patient presents with     Recheck Medication     victoza,(any different options).      Weight Problem     Diet options       Initial /64 (BP Location: Left arm, Patient Position: Chair, Cuff Size: Adult Regular)  Pulse 63  Temp 97.1  F (36.2  C) (Oral)  Wt 214 lb 9.6 oz (97.3 kg)  SpO2 96%  BMI 32.16 kg/m2 Estimated body mass index is 32.16 kg/(m^2) as calculated from the following:    Height as of 3/3/17: 5' 8.5\" (1.74 m).    Weight as of this encounter: 214 lb 9.6 oz (97.3 kg).  Medication Reconciliation: complete   Conchita SANTILLAN CMA ([facility])      "

## 2017-07-17 ENCOUNTER — TELEPHONE (OUTPATIENT)
Dept: FAMILY MEDICINE | Facility: CLINIC | Age: 72
End: 2017-07-17

## 2017-07-17 ENCOUNTER — OFFICE VISIT (OUTPATIENT)
Dept: FAMILY MEDICINE | Facility: CLINIC | Age: 72
End: 2017-07-17
Payer: COMMERCIAL

## 2017-07-17 VITALS
BODY MASS INDEX: 31.32 KG/M2 | HEART RATE: 70 BPM | TEMPERATURE: 99.2 F | SYSTOLIC BLOOD PRESSURE: 90 MMHG | WEIGHT: 209 LBS | DIASTOLIC BLOOD PRESSURE: 64 MMHG | OXYGEN SATURATION: 97 %

## 2017-07-17 DIAGNOSIS — M54.2 NECK PAIN: ICD-10-CM

## 2017-07-17 DIAGNOSIS — I95.9 HYPOTENSION, UNSPECIFIED HYPOTENSION TYPE: ICD-10-CM

## 2017-07-17 DIAGNOSIS — M47.812 SPONDYLOSIS OF CERVICAL REGION WITHOUT MYELOPATHY OR RADICULOPATHY: ICD-10-CM

## 2017-07-17 DIAGNOSIS — M54.2 NECK PAIN: Primary | ICD-10-CM

## 2017-07-17 PROCEDURE — 99214 OFFICE O/P EST MOD 30 MIN: CPT | Performed by: INTERNAL MEDICINE

## 2017-07-17 RX ORDER — TRAMADOL HYDROCHLORIDE 50 MG/1
50-100 TABLET ORAL EVERY 6 HOURS PRN
Qty: 20 TABLET | Refills: 1 | Status: SHIPPED | OUTPATIENT
Start: 2017-07-17 | End: 2017-11-07

## 2017-07-17 RX ORDER — CYCLOBENZAPRINE HCL 10 MG
10 TABLET ORAL 3 TIMES DAILY PRN
Qty: 30 TABLET | Refills: 1 | Status: SHIPPED | OUTPATIENT
Start: 2017-07-17 | End: 2018-02-20

## 2017-07-17 RX ORDER — CYCLOBENZAPRINE HCL 10 MG
10 TABLET ORAL 3 TIMES DAILY PRN
Qty: 30 TABLET | Refills: 1 | Status: SHIPPED | OUTPATIENT
Start: 2017-07-17 | End: 2017-07-17

## 2017-07-17 RX ORDER — LISINOPRIL 2.5 MG/1
2.5 TABLET ORAL DAILY
Qty: 90 TABLET | Refills: 3 | Status: SHIPPED | OUTPATIENT
Start: 2017-07-17 | End: 2018-10-19

## 2017-07-17 ASSESSMENT — PAIN SCALES - GENERAL: PAINLEVEL: MODERATE PAIN (4)

## 2017-07-17 NOTE — MR AVS SNAPSHOT
After Visit Summary   7/17/2017    Zurdo Dash    MRN: [id number]           Patient Information     Date Of Birth          1945        Visit Information        Provider Department      7/17/2017 2:30 PM Kapil Duckworth MD [facility]        Today's Diagnoses     Neck pain    -  1    Hypotension, unspecified hypotension type          Care Instructions    - You can Follow up for a free blood pressure recheck in 2 weeks with a MA.    - Cut Lisinopril in half, to 2.5 MG.    - Do not take Hydrochlorothiazide or Glimepiride.    - Rest and push fluids.    - Claritin, Allegra, or Zyrtec for Allergies.    - Flonase for runny nose.    - For pain: ice, cream, Tylenol, Advil is okay for short term.        [facility]    If you have any questions regarding to your visit please contact your care team:     Team Pink:   Clinic Hours Telephone Number   Internal Medicine:  Dr. Suze Zapata NP       7am-7pm  Monday - Thursday   7am-5pm  Fridays  [phone]  (Appointment scheduling available 24/7)    Questions about your visit?  Team Line  [phone]   Urgent Care - [place] - 11am-9pm Monday-Friday Saturday-Sunday- 9am-5pm   [place] - 5pm-9pm Monday-Friday Saturday-Sunday- 9am-5pm  [phone] - [place]   [phone] - [place]       What options do I have for visits at the clinic other than the traditional office visit?  To expand how we care for you, many of our providers are utilizing electronic visits (e-visits) and telephone visits, when medically appropriate, for interactions with their patients rather than a visit in the clinic.   We also offer nurse visits for many medical concerns. Just like any other service, we will bill your insurance company for this type of visit based on time spent on the phone with your provider. Not all insurance companies cover these visits. Please check with your medical  insurance if this type of visit is covered. You will be responsible for any charges that are not paid by your insurance.      E-visits via [employer]:  generally incur a $35.00 fee.  Telephone visits:  Time spent on the phone: *charged based on time that is spent on the phone in increments of 10 minutes. Estimated cost:   5-10 mins $30.00   11-20 mins. $59.00   21-30 mins. $85.00   Use [employer] (secure email communication and access to your chart) to send your primary care provider a message or make an appointment. Ask someone on your Team how to sign up for [employer].    For a Price Quote for your services, please call our [employer] Line at [phone].    As always, Thank you for trusting us with your health care needs!    Discharged by Conchita SANTILLAN CMA ([facility])            Follow-ups after your visit        Your next 10 appointments already scheduled     Jul 19, 2017 10:15 AM CDT   Diabetic Education with  DIABETIC ED RESOURCE   [facility] [place] ([facility])    [address] [place] [id number]   [phone]            Aug 09, 2017  1:20 PM CDT   Return Visit with Albert Tompkins MD   [facility] ([facility])    [address] [place] [id number]   [phone]              Who to contact     If you have questions or need follow up information about today's clinic visit or your schedule please [facility] at [phone].  Normal or non-critical lab and imaging results will be communicated to you by [employer], letter or phone within 4 business days after the clinic has received the results. If you do not hear from us within 7 days, please contact the clinic through [employer] or phone. If you have a critical or abnormal lab result, we will notify you by phone as soon as possible.  Submit refill requests through [employer] or call your pharmacy and they will forward the refill request to us. Please allow 3 business days for  "your refill to be completed.          Additional Information About Your Visit        [employer] Information     [employer] lets you send messages to your doctor, view your test results, renew your prescriptions, schedule appointments and more. To sign up, go to [email or web address] . Click on \"Log in\" on the left side of the screen, which will take you to the Welcome page. Then click on \"Sign up Now\" on the right side of the page.     You will be asked to enter the access code listed below, as well as some personal information. Please follow the directions to create your username and password.     Your access code is: [email or web address]  Expires: 8/3/2017  1:21 PM     Your access code will  in 90 days. If you need help or a new code, please call your [facility] clinic or [phone].        Care EveryWhere ID     This is your Care EveryWhere ID. This could be used by other organizations to access your [facility] medical records  [id number]        Your Vitals Were     Pulse Temperature Pulse Oximetry BMI (Body Mass Index)          70 99.2  F (37.3  C) (Oral) 97% 31.32 kg/m2         Blood Pressure from Last 3 Encounters:   17 90/64   17 104/64   17 126/82    Weight from Last 3 Encounters:   17 209 lb (94.8 kg)   17 214 lb 9.6 oz (97.3 kg)   17 223 lb 3.2 oz (101.2 kg)              Today, you had the following     No orders found for display         Today's Medication Changes          These changes are accurate as of: 17  3:15 PM.  If you have any questions, ask your nurse or doctor.               Start taking these medicines.        Dose/Directions    cyclobenzaprine 10 MG tablet   Commonly known as:  FLEXERIL   Used for:  Neck pain   Started by:  Kapil Duckworth MD        Dose:  10 mg   Take 1 tablet (10 mg) by mouth 3 times daily as needed for muscle spasms   Quantity:  30 tablet   Refills:  1       traMADol 50 MG tablet   Commonly known as:  ULTRAM   Used for:  Neck pain "   Started by:  Kapil Duckworth MD        Dose:   mg   Take 1-2 tablets ( mg) by mouth every 6 hours as needed for pain maximum 3 tablet(s) per day   Quantity:  20 tablet   Refills:  1         These medicines have changed or have updated prescriptions.        Dose/Directions    * lisinopril 5 MG tablet   Commonly known as:  PRINIVIL/ZESTRIL   This may have changed:  Another medication with the same name was added. Make sure you understand how and when to take each.   Used for:  Type 2 diabetes mellitus, uncontrolled, with neuropathy (H)   Changed by:  Kapil Duckworth MD        Dose:  5 mg   Take 1 tablet (5 mg) by mouth daily   Quantity:  90 tablet   Refills:  1       * lisinopril 2.5 MG tablet   Commonly known as:  PRINIVIL/Zestril   This may have changed:  You were already taking a medication with the same name, and this prescription was added. Make sure you understand how and when to take each.   Used for:  Hypotension, unspecified hypotension type   Changed by:  Kapil Duckworth MD        Dose:  2.5 mg   Take 1 tablet (2.5 mg) by mouth daily   Quantity:  90 tablet   Refills:  3       * Notice:  This list has 2 medication(s) that are the same as other medications prescribed for you. Read the directions carefully, and ask your doctor or other care provider to review them with you.         Where to get your medicines      These medications were sent to [facility] - [address] [facility]  [address], [place] [address]     Phone:  [phone]     lisinopril 2.5 MG tablet         Some of these will need a paper prescription and others can be bought over the counter.  Ask your nurse if you have questions.     Bring a paper prescription for each of these medications     traMADol 50 MG tablet         Call your pharmacy to confirm that your medication is ready for pickup. It may take up to 24 hours for them to receive the prescription. If the prescription is not  ready within 3 business days, please contact your clinic or your provider.     We will let you know when these medications are ready. If you don't hear back within 3 business days, please contact us.     cyclobenzaprine 10 MG tablet                Primary Care Provider Office Phone # Fax #    Kapil Duckworth [phone]       [facility] [address] [facility] [address]        Equal Access to Services     TASHI BLACKMAN : Hadii aad ku hadasho Soomaali, waaxda luqadaha, qaybta kaalmada adeegyada, waxay idiin hayaan adeeg kharash la'nithin . So [facility] [phone].    ATENCIÓN: Si habla español, tiene a diallo disposición servicios gratuitos de asistencia lingüística. Llame al [phone].    We comply with applicable federal civil rights laws and [place] laws. We do not discriminate on the basis of race, color, national origin, age, disability sex, sexual orientation or gender identity.            Thank you!     Thank you for choosing [facility]  for your care. Our goal is always to provide you with excellent care. Hearing back from our patients is one way we can continue to improve our services. Please take a few minutes to complete the written survey that you may receive in the mail after your visit with us. Thank you!             Your Updated Medication List - Protect others around you: Learn how to safely use, store and throw away your medicines at www.disposemymeds.org.          This list is accurate as of: 7/17/17  3:15 PM.  Always use your most recent med list.                   Brand Name Dispense Instructions for use Diagnosis    aspirin 81 MG tablet      Take 1 tablet by mouth daily.    Type 2 diabetes, HbA1c goal < 7% (H)       blood glucose lancing device    no brand specified    1 each    Use to test blood sugars 3 times daily or as directed.    Type 2 diabetes mellitus, uncontrolled, with neuropathy (H)       blood glucose monitoring test strip    [employer] CONTOUR  NEXT    300 each    TEST THREE TIMES A DAY OR AS DIRECTED    Type 2 diabetes mellitus, uncontrolled, with neuropathy (H)       CALCIUM 500 + D 500-200 MG-IU Tabs      take 2 tabs daily        cyclobenzaprine 10 MG tablet    FLEXERIL    30 tablet    Take 1 tablet (10 mg) by mouth 3 times daily as needed for muscle spasms    Neck pain       folic acid 1 MG tablet    FOLVITE    100 tablet    Take 1 tablet (1 mg) by mouth daily    High risk medications (not anticoagulants) long-term use       glimepiride 4 MG tablet    AMARYL    90 tablet    Take 1 tablet (4 mg) by mouth every morning (before breakfast)    Type 2 diabetes mellitus, uncontrolled, with neuropathy (H)       hydrochlorothiazide 25 MG tablet    HYDRODIURIL    90 tablet    Take 1 tablet (25 mg) by mouth daily    Hypertension goal BP (blood pressure) < 140/90       HYDROcodone-acetaminophen 5-325 MG per tablet    NORCO    28 tablet    Take 1-2 tablets by mouth every 4 hours as needed for moderate to severe pain maximum 4 tablet(s) per day    Bruised ribs, unspecified laterality, sequela       hydroxychloroquine 200 MG tablet    PLAQUENIL    180 tablet    Take 1 tablet (200 mg) by mouth 2 times daily    High risk medications (not anticoagulants) long-term use       insulin pen needle 31G X 6 MM    ULTICARE MINI    100 each    Use 1 daily or as directed.    Type 2 diabetes mellitus, uncontrolled, with neuropathy (H)       liraglutide 18 MG/3ML soln    VICTOZA PEN    27 mL    Inject 1.8 mg Subcutaneous daily    Type 2 diabetes mellitus, uncontrolled, with neuropathy (H)       * lisinopril 5 MG tablet    PRINIVIL/ZESTRIL    90 tablet    Take 1 tablet (5 mg) by mouth daily    Type 2 diabetes mellitus, uncontrolled, with neuropathy (H)       * lisinopril 2.5 MG tablet    PRINIVIL/Zestril    90 tablet    Take 1 tablet (2.5 mg) by mouth daily    Hypotension, unspecified hypotension type       metFORMIN 500 MG tablet    GLUCOPHAGE    360 tablet    TAKE TWO TABLETS BY  MOUTH TWICE A DAY WITH MEALS    Type 2 diabetes mellitus, uncontrolled, with neuropathy (H)       methotrexate sodium 2.5 MG Tabs     120 tablet    Take 25 mg by mouth once a week . Take all 10 tablets on the same day of each week.    High risk medications (not anticoagulants) long-term use       * MICROLET LANCETS Misc     100 each    1 Device daily    Type 2 diabetes, HbA1C goal < 8% (H)       * blood glucose monitoring lancets     300 each    Use to test blood sugars 3 times daily or as directed.    Type 2 diabetes mellitus, uncontrolled, with neuropathy (H)       MULTI-VITAMIN PO      None Entered        omeprazole 20 MG CR capsule    priLOSEC    90 capsule    Take 1 capsule (20 mg) by mouth daily    Gastroesophageal reflux disease without esophagitis       pravastatin 40 MG tablet    PRAVACHOL    90 tablet    Take 1 tablet (40 mg) by mouth daily    Hyperlipidemia LDL goal <100       traMADol 50 MG tablet    ULTRAM    20 tablet    Take 1-2 tablets ( mg) by mouth every 6 hours as needed for pain maximum 3 tablet(s) per day    Neck pain       vitamin E 1000 UNIT capsule    TOCOPHEROL     1 tablet daily        * Notice:  This list has 4 medication(s) that are the same as other medications prescribed for you. Read the directions carefully, and ask your doctor or other care provider to review them with you.

## 2017-07-17 NOTE — TELEPHONE ENCOUNTER
"Patient was seen today in appt.  In [facility] the prescription is stuck in the system saying \"waiting for payer response\" so the prescription didn't send to us.  Could you please either send the prescription without the insurance interaction?  [employer] usually doesn't pay for muscle relaxers.  The patient would like to pick it up as soon as possible.     Thanks,    Thank you,  Sabrina Hicks, PharmD  [facility]  [phone]    "

## 2017-07-17 NOTE — TELEPHONE ENCOUNTER
Hard copy prescription generated  , please sent down to [facility] as soon as possible     Kapil Duckworth MD

## 2017-07-17 NOTE — PATIENT INSTRUCTIONS
- You can Follow up for a free blood pressure recheck in 2 weeks with a MA.    - Cut Lisinopril in half, to 2.5 MG.    - Do not take Hydrochlorothiazide or Glimepiride.    - Rest and push fluids.    - Claritin, Allegra, or Zyrtec for Allergies.    - Flonase for runny nose.    - For pain: ice, cream, Tylenol, Advil is okay for short term.        [facility]    If you have any questions regarding to your visit please contact your care team:     Team Pink:   Clinic Hours Telephone Number   Internal Medicine:  Dr. Suze Zapata, NP       7am-7pm  Monday - Thursday   7am-5pm  Fridays  [phone]  (Appointment scheduling available 24/7)    Questions about your visit?  Team Line  [phone]   Urgent Care - [place] - 11am-9pm Monday-Friday Saturday-Sunday- 9am-5pm   [place] - 5pm-9pm Monday-Friday Saturday-Sunday- 9am-5pm  [phone] - [place]   [phone] - [place]       What options do I have for visits at the clinic other than the traditional office visit?  To expand how we care for you, many of our providers are utilizing electronic visits (e-visits) and telephone visits, when medically appropriate, for interactions with their patients rather than a visit in the clinic.   We also offer nurse visits for many medical concerns. Just like any other service, we will bill your insurance company for this type of visit based on time spent on the phone with your provider. Not all insurance companies cover these visits. Please check with your medical insurance if this type of visit is covered. You will be responsible for any charges that are not paid by your insurance.      E-visits via [employer]:  generally incur a $35.00 fee.  Telephone visits:  Time spent on the phone: *charged based on time that is spent on the phone in increments of 10 minutes. Estimated cost:   5-10 mins $30.00   11-20 mins. $59.00   21-30 mins. $85.00   Use  MyChart (secure email communication and access to your chart) to send your primary care provider a message or make an appointment. Ask someone on your Team how to sign up for [employer].    For a Price Quote for your services, please call our [employer] Price Line at [phone].    As always, Thank you for trusting us with your health care needs!    Discharged by Conchita SANTILLAN CMA ([facility])

## 2017-07-17 NOTE — TELEPHONE ENCOUNTER
Flexeril prescription walked down to the [facility] pharmacy. Caren Telles,

## 2017-07-17 NOTE — PROGRESS NOTES
SUBJECTIVE:                                                    Zurdo Dash is a 72 year old male who presents to clinic today with his wife for the following health issues:    Diabetes/Hypoglycemia -- Patient notes he has had frequent low blood sugar readings over the last few weeks. He had a reading of 44 recently and ate ice cream and had glucose pills to get his sugars back up. He reports he has lost 20 pounds according to his scale at home. Started using Victoza recently which is the primary cause of weight loss .  Lab Results   Component Value Date    A1C 5.6 06/23/2017    A1C 9.2 02/09/2017    A1C 9.8 12/02/2016    A1C 9.0 08/05/2016    A1C 6.9 05/03/2016     Wt Readings from Last 5 Encounters:   07/17/17 94.8 kg (209 lb)   06/23/17 97.3 kg (214 lb 9.6 oz)   05/18/17 101.2 kg (223 lb 3.2 oz)   03/16/17 100.5 kg (221 lb 9.6 oz)   03/03/17 97.5 kg (215 lb)     Neck Pain -- Patient states he has had pain in the left base of his neck for the last week and a half. He cannot think of anything that would have caused this pain. He has tried Hydrocodone for pain, which has worked well for him. No clear precipitant     Hypotension -- He has felt dizzy over the last few days.  BP Readings from Last 3 Encounters:   07/17/17 90/64   06/23/17 104/64   06/22/17 126/82     Fatigue -- His wife notes he has been sleeping more recently. She also states he has had a cold for a few days. Reports he has had some post-nasal drip    Problem list and histories reviewed & adjusted, as indicated.  Additional history: as documented    Patient Active Problem List   Diagnosis     Pain in limb     Diabetic neuropathy (H)     Hyperlipidemia LDL goal <100     Hypertension goal BP (blood pressure) < 140/90     obesity     ED (erectile dysfunction)     Hypogonadism     Advanced directives, counseling/discussion     Health Care Home     Type 2 diabetes mellitus, uncontrolled, with neuropathy (H)     RBBB (right bundle branch block)      Lumbago     Ex-smoker     Cataracts, both eyes     Family history of esophageal cancer     Gastroesophageal reflux disease, esophagitis presence not specified     Rheumatoid arthritis involving multiple sites with positive rheumatoid factor (H)     Pulmonary nodule     High risk medication use     Past Surgical History:   Procedure Laterality Date     HC INCISION TENDON SHEATH FINGER  4/2009    r hand ring finger     TONSILLECTOMY         Social History   Substance Use Topics     Smoking status: Former Smoker     Packs/day: 1.00     Years: 40.00     Types: Cigarettes     Quit date: 3/16/2007     Smokeless tobacco: Never Used     Alcohol use No     Family History   Problem Relation Age of Onset     CEREBROVASCULAR DISEASE Mother      Arthritis Mother      OSTEOPOROSIS Mother      Alzheimer Disease Father      Arthritis Father      CANCER Father      DIABETES Maternal Grandmother      Cardiovascular Maternal Grandmother      Other Cancer Brother      CANCER Paternal Aunt      Hypertension No family hx of      Thyroid Disease No family hx of      Glaucoma No family hx of      Macular Degeneration No family hx of          Current Outpatient Prescriptions   Medication Sig Dispense Refill     lisinopril (PRINIVIL/ZESTRIL) 2.5 MG tablet Take 1 tablet (2.5 mg) by mouth daily 90 tablet 3     traMADol (ULTRAM) 50 MG tablet Take 1-2 tablets ( mg) by mouth every 6 hours as needed for pain maximum 3 tablet(s) per day 20 tablet 1     cyclobenzaprine (FLEXERIL) 10 MG tablet Take 1 tablet (10 mg) by mouth 3 times daily as needed for muscle spasms 30 tablet 1     blood glucose monitoring (ONE TOUCH DELICA) lancets Use to test blood sugars 3 times daily or as directed. 300 each 3     blood glucose (NO BRAND SPECIFIED) lancing device Use to test blood sugars 3 times daily or as directed. 1 each 0     folic acid (FOLVITE) 1 MG tablet Take 1 tablet (1 mg) by mouth daily 100 tablet 3     hydroxychloroquine (PLAQUENIL) 200 MG tablet  Take 1 tablet (200 mg) by mouth 2 times daily 180 tablet 1     methotrexate sodium 2.5 MG TABS Take 25 mg by mouth once a week . Take all 10 tablets on the same day of each week. 120 tablet 1     lisinopril (PRINIVIL/ZESTRIL) 5 MG tablet Take 1 tablet (5 mg) by mouth daily 90 tablet 1     blood glucose monitoring (INDIA CONTOUR NEXT) test strip TEST THREE TIMES A DAY OR AS DIRECTED 300 each 3     hydrochlorothiazide (HYDRODIURIL) 25 MG tablet Take 1 tablet (25 mg) by mouth daily 90 tablet 1     omeprazole (PRILOSEC) 20 MG CR capsule Take 1 capsule (20 mg) by mouth daily 90 capsule 3     liraglutide (VICTOZA PEN) 18 MG/3ML soln Inject 1.8 mg Subcutaneous daily 27 mL 1     metFORMIN (GLUCOPHAGE) 500 MG tablet TAKE TWO TABLETS BY MOUTH TWICE A DAY WITH MEALS 360 tablet 1     insulin pen needle (ULTICARE MINI) 31G X 6 MM Use 1 daily or as directed. 100 each 1     glimepiride (AMARYL) 4 MG tablet Take 1 tablet (4 mg) by mouth every morning (before breakfast) 90 tablet 1     HYDROcodone-acetaminophen (NORCO) 5-325 MG per tablet Take 1-2 tablets by mouth every 4 hours as needed for moderate to severe pain maximum 4 tablet(s) per day 28 tablet 0     pravastatin (PRAVACHOL) 40 MG tablet Take 1 tablet (40 mg) by mouth daily 90 tablet 3     MICROLET LANCETS MISC 1 Device daily 100 each 4     aspirin 81 MG tablet Take 1 tablet by mouth daily.  3     CALCIUM 500 + D 500-200 MG-IU OR TABS take 2 tabs daily       MULTI-VITAMIN OR None Entered       VITAMIN E 1000 UNIT OR CAPS 1 tablet daily       Labs reviewed in EPIC    Reviewed and updated as needed this visit by clinical staff  Tobacco  Allergies  Med Hx  Surg Hx  Fam Hx  Soc Hx      Reviewed and updated as needed this visit by Provider         ROS:  Constitutional, HEENT, cardiovascular, pulmonary, GI, , musculoskeletal, neuro, skin, endocrine and psych systems are negative, except as otherwise noted.    This document serves as a record of the services and decisions  personally performed and made by Kapil Duckworth MD. It was created on their behalf by Minh Coats, a trained medical scribe. The creation of this document is based the provider's statements to the medical scribe.  Minh Coats July 17, 2017 3:03 PM    OBJECTIVE:     BP 90/64  Pulse 70  Temp 99.2  F (37.3  C) (Oral)  Wt 94.8 kg (209 lb)  SpO2 97%  BMI 31.32 kg/m2  Body mass index is 31.32 kg/(m^2).  GENERAL: obese, alert and no distress, answers all questions appropriately, appropriate grooming and affect, appears stated age , appears washed out / fatigued.   EYES: Eyes grossly normal to inspection, PERRL and conjunctivae and sclerae normal  NECK: neck ROM R > L, better with rightward lateral rotation, significant with leftward, 5 degrees of left lateral rotation  , 10 degrees of right lateral rotation . Significant loss of range of motion bilateral with lateral bending . Dramatic decreased with neck extension , better with flexion  SKIN: no suspicious lesions or rashes to visible skin  NEURO: mentation intact and speech normal  PSYCH: mentation appears normal, affect normal/bright  LUNGS clear to auscultation bilateral    HEART within normal limits     Diagnostic Test Results:  Results for orders placed or performed in visit on 06/23/17   HEMOGLOBIN A1C   Result Value Ref Range    Hemoglobin A1C 5.6 4.3 - 6.0 %   Albumin Random Urine Quantitative   Result Value Ref Range    Creatinine Urine 146 mg/dL    Albumin Urine mg/L 13 mg/L    Albumin Urine mg/g Cr 8.63 0 - 17 mg/g Cr   Basic metabolic panel   Result Value Ref Range    Sodium 139 133 - 144 mmol/L    Potassium 4.3 3.4 - 5.3 mmol/L    Chloride 105 94 - 109 mmol/L    Carbon Dioxide 21 20 - 32 mmol/L    Anion Gap 13 3 - 14 mmol/L    Glucose 101 (H) 70 - 99 mg/dL    Urea Nitrogen 35 (H) 7 - 30 mg/dL    Creatinine 1.41 (H) 0.66 - 1.25 mg/dL    GFR Estimate 49 (L) >60 mL/min/1.7m2    GFR Estimate If Black 60 (L) >60 mL/min/1.7m2    Calcium 9.6 8.5 - 10.1  mg/dL     ASSESSMENT/PLAN:     (M54.2) Neck pain  (primary encounter diagnosis)  Comment: this patient has a flare-up of neck pain on top of evidence of significant cervical degenerative joint disease . No features of cervical radiculopathy fortunately  . Treatment is supportive care including ice, heat, acetaminophen as opposed to non-steroidal anti-inflammatory drugs and limited use of opioid pain medication, and muscle relaxers   Plan: traMADol (ULTRAM) 50 MG tablet, DISCONTINUED:         cyclobenzaprine (FLEXERIL) 10 MG tablet        Considered xrays but held off for now    (I95.9) Hypotension, unspecified hypotension type  Comment: he's run down. 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    - Cut Lisinopril in half, to 2.5 MG.    - Do not take Hydrochlorothiazide or Glimepiride.    - Rest and push fluids.    - Claritin, Allegra, or Zyrtec for Allergies.    - Flonase for runny nose.    - For pain: ice, cream, Tylenol, Advil is okay for short term.    The information in this document, created by the medical scribe for me, accurately reflects the services I personally performed and the decisions made by me. I have reviewed and approved this document for accuracy.   MD Kapil Crane MD  [facility]

## 2017-07-17 NOTE — NURSING NOTE
"Chief Complaint   Patient presents with     Pain     x 1.5 weeks left side base of neck     Diabetes     Low blood sugars x 2 weeks     Dizziness     x 2 days       Initial BP 90/64  Pulse 70  Temp 99.2  F (37.3  C) (Oral)  Wt 209 lb (94.8 kg)  SpO2 97%  BMI 31.32 kg/m2 Estimated body mass index is 31.32 kg/(m^2) as calculated from the following:    Height as of 3/3/17: 5' 8.5\" (1.74 m).    Weight as of this encounter: 209 lb (94.8 kg).  Medication Reconciliation: complete   LIZY/MA          "

## 2017-07-19 ENCOUNTER — ALLIED HEALTH/NURSE VISIT (OUTPATIENT)
Dept: EDUCATION SERVICES | Facility: CLINIC | Age: 72
End: 2017-07-19
Payer: COMMERCIAL

## 2017-07-19 ENCOUNTER — ALLIED HEALTH/NURSE VISIT (OUTPATIENT)
Dept: FAMILY MEDICINE | Facility: CLINIC | Age: 72
End: 2017-07-19

## 2017-07-19 VITALS — BODY MASS INDEX: 31.09 KG/M2 | WEIGHT: 207.5 LBS

## 2017-07-19 VITALS — DIASTOLIC BLOOD PRESSURE: 80 MMHG | HEART RATE: 61 BPM | SYSTOLIC BLOOD PRESSURE: 115 MMHG

## 2017-07-19 DIAGNOSIS — I10 HYPERTENSION GOAL BP (BLOOD PRESSURE) < 140/90: Primary | ICD-10-CM

## 2017-07-19 PROCEDURE — G0108 DIAB MANAGE TRN  PER INDIV: HCPCS

## 2017-07-19 NOTE — MR AVS SNAPSHOT
After Visit Summary   7/19/2017    Zurdo Dash    MRN: [id number]           Patient Information     Date Of Birth          1945        Visit Information        Provider Department      7/19/2017 2:31 PM [facility]        Today's Diagnoses     Hypertension goal BP (blood pressure) < 140/90    -  1       Follow-ups after your visit        Your next 10 appointments already scheduled     Aug 28, 2017  2:40 PM CDT   [facility] C[facility]   [facility]    [address]  [place] [address] [phone]           Bring a current list of meds and any records pertaining to this visit. For Physicals, please bring immunization records and any forms needing to be filled out. Please arrive 10 minutes early to complete paperwork.            Oct 25, 2017 10:15 AM CDT   Diabetic Education with  DIABETIC ED RESOURCE   [facility]    [address]  [place] [address]   [phone]            Oct 31, 2017 11:00 AM CDT   LAB with [facility]   [facility]    [address] [address] [phone]           Patient must bring picture ID. Patient should be prepared to give a urine specimen  Please do not eat 10-12 hours before your appointment if you are coming in fasting for labs on lipids, cholesterol, or glucose (sugar). Pregnant women should follow their Care Team instructions. Water with medications is okay. Do not drink coffee or other fluids. If you have concerns about taking  your medications, please ask at office or if scheduling via [email or web address], send a message by clicking on Secure Messaging, Message Your Care Team.            Nov 08, 2017  1:00 PM CST   Return Visit with Albert Tompkins MD   [facility] ([facility])    [address] [address]  "  [phone]              Who to contact     If you have questions or need follow up information about today's clinic visit or your schedule please contact [facility] directly at [phone].  Normal or non-critical lab and imaging results will be communicated to you by MyChart, letter or phone within 4 business days after the clinic has received the results. If you do not hear from us within 7 days, please contact the clinic through MyChart or phone. If you have a critical or abnormal lab result, we will notify you by phone as soon as possible.  Submit refill requests through [employer] or call your pharmacy and they will forward the refill request to us. Please allow 3 business days for your refill to be completed.          Additional Information About Your Visit        [email or web address] Information     [employer] lets you send messages to your doctor, view your test results, renew your prescriptions, schedule appointments and more. To sign up, go to [email or web address] . Click on \"Log in\" on the left side of the screen, which will take you to the Welcome page. Then click on \"Sign up Now\" on the right side of the page.     You will be asked to enter the access code listed below, as well as some personal information. Please follow the directions to create your username and password.     Your access code is: [email or web address]  Expires: 2017  2:00 PM     Your access code will  in 90 days. If you need help or a new code, please call your [place] clinic or [phone].        Care EveryWhere ID     This is your Care EveryWhere ID. This could be used by other organizations to access your [place] medical records  [id number]        Your Vitals Were     Pulse                   61            Blood Pressure from Last 3 Encounters:   17 108/66   17 109/72   17 115/80    Weight from Last 3 Encounters:   17 199 lb (90.3 kg)   17 200 lb (90.7 kg)   17 207 lb 8 oz (94.1 kg)    "           Today, you had the following     No orders found for display       Primary Care Provider Office Phone # Fax #    Kapil Duckworth [phone] [address] [address]        Equal Access to Services     BRIALIONEL YEHUDA : Hadii art ku hadladio Soomaali, waaxda luqadaha, qaybta kaalmada adeegyada, aranza guerreron hola del angel lastanjovanni mckeon. So [facility] [phone].    ATENCIÓN: Si habla español, tiene a diallo disposición servicios gratuitos de asistencia lingüística. Llame al [phone].    We comply with applicable federal civil rights laws and [place] laws. We do not discriminate on the basis of race, color, national origin, age, disability sex, sexual orientation or gender identity.            Thank you!     Thank you for choosing [facility]  for your care. Our goal is always to provide you with excellent care. Hearing back from our patients is one way we can continue to improve our services. Please take a few minutes to complete the written survey that you may receive in the mail after your visit with us. Thank you!             Your Updated Medication List - Protect others around you: Learn how to safely use, store and throw away your medicines at www.disposemymeds.org.          This list is accurate as of: 7/19/17 11:59 PM.  Always use your most recent med list.                   Brand Name Dispense Instructions for use Diagnosis    aspirin 81 MG tablet      Take 1 tablet by mouth daily.    Type 2 diabetes, HbA1c goal < 7% (H)       blood glucose lancing device    no brand specified    1 each    Use to test blood sugars 3 times daily or as directed.    Type 2 diabetes mellitus, uncontrolled, with neuropathy (H)       blood glucose monitoring test strip    INDIA CONTOUR NEXT    300 each    TEST THREE TIMES A DAY OR AS DIRECTED    Type 2 diabetes mellitus, uncontrolled, with neuropathy (H)       CALCIUM 500 + D 500-200 MG-IU Tabs      take 2 tabs daily         cyclobenzaprine 10 MG tablet    FLEXERIL    30 tablet    Take 1 tablet (10 mg) by mouth 3 times daily as needed for muscle spasms    Neck pain       folic acid 1 MG tablet    FOLVITE    100 tablet    Take 1 tablet (1 mg) by mouth daily    High risk medications (not anticoagulants) long-term use       hydrochlorothiazide 25 MG tablet    HYDRODIURIL    90 tablet    Take 1 tablet (25 mg) by mouth daily    Hypertension goal BP (blood pressure) < 140/90       HYDROcodone-acetaminophen 5-325 MG per tablet    NORCO    28 tablet    Take 1-2 tablets by mouth every 4 hours as needed for moderate to severe pain maximum 4 tablet(s) per day    Bruised ribs, unspecified laterality, sequela       hydroxychloroquine 200 MG tablet    PLAQUENIL    180 tablet    Take 1 tablet (200 mg) by mouth 2 times daily    High risk medications (not anticoagulants) long-term use       insulin pen needle 31G X 6 MM    ULTICARE MINI    100 each    Use 1 daily or as directed.    Type 2 diabetes mellitus, uncontrolled, with neuropathy (H)       liraglutide 18 MG/3ML soln    VICTOZA PEN    27 mL    Inject 1.8 mg Subcutaneous daily    Type 2 diabetes mellitus, uncontrolled, with neuropathy (H)       * lisinopril 5 MG tablet    PRINIVIL/ZESTRIL    90 tablet    Take 1 tablet (5 mg) by mouth daily    Type 2 diabetes mellitus, uncontrolled, with neuropathy (H)       * lisinopril 2.5 MG tablet    PRINIVIL/Zestril    90 tablet    Take 1 tablet (2.5 mg) by mouth daily    Hypotension, unspecified hypotension type       metFORMIN 500 MG tablet    GLUCOPHAGE    360 tablet    TAKE TWO TABLETS BY MOUTH TWICE A DAY WITH MEALS    Type 2 diabetes mellitus, uncontrolled, with neuropathy (H)       methotrexate sodium 2.5 MG Tabs     120 tablet    Take 25 mg by mouth once a week . Take all 10 tablets on the same day of each week.    High risk medications (not anticoagulants) long-term use       * MICROLET LANCETS Misc     100 each    1 Device daily    Type 2 diabetes,  HbA1C goal < 8% (H)       * blood glucose monitoring lancets     300 each    Use to test blood sugars 3 times daily or as directed.    Type 2 diabetes mellitus, uncontrolled, with neuropathy (H)       MULTI-VITAMIN PO      None Entered        omeprazole 20 MG CR capsule    priLOSEC    90 capsule    Take 1 capsule (20 mg) by mouth daily    Gastroesophageal reflux disease without esophagitis       pravastatin 40 MG tablet    PRAVACHOL    90 tablet    Take 1 tablet (40 mg) by mouth daily    Hyperlipidemia LDL goal <100       traMADol 50 MG tablet    ULTRAM    20 tablet    Take 1-2 tablets ( mg) by mouth every 6 hours as needed for pain maximum 3 tablet(s) per day    Neck pain       vitamin E 1000 UNIT capsule    TOCOPHEROL     1 tablet daily        * Notice:  This list has 4 medication(s) that are the same as other medications prescribed for you. Read the directions carefully, and ask your doctor or other care provider to review them with you.

## 2017-07-19 NOTE — Clinical Note
LILIANA, Blood pressure in pharmacy today 115/80 pulse 61, pt reports he recently d/c'd lisinopril and hctz due to hypotension

## 2017-07-19 NOTE — PROGRESS NOTES
Zurdo Dash is enrolled/participating in the retail pharmacy Blood Pressure Goals Achievement Program (BPGAP).  Zurdo Dash was evaluated at [facility] on July 19, 2017 at which time his blood pressure was:    BP Readings from Last 3 Encounters:   07/19/17 115/80   07/17/17 90/64   06/23/17 104/64     Reviewed lifestyle modifications for blood pressure control and reduction: including making healthy food choices, managing weight, getting regular exercise, smoking cessation, reducing alcohol consumption, monitoring blood pressure regularly.     Zurdo Dash is not experiencing symptoms.    Follow-Up: BP is at goal of < 140/90mmHg (patient 18+ years of age with or without diabetes).  Recommended follow-up at pharmacy in 6 months.     Recommendation to Provider pt reports:recently discontinued lisinopril and hctz due to hypotension    Zurdo Dash was evaluated for enrollment into the PGEN study today.    Patient eligible for enrollment:  No  Patient interested in enrollment:  No    Completed by: Bertha Lei RPh  [facility]  Phone [phone]  Fax      [phone]

## 2017-07-19 NOTE — MR AVS SNAPSHOT
After Visit Summary   7/19/2017    Zurdo Dash    MRN: [id number]           Patient Information     Date Of Birth          1945        Visit Information        Provider Department      7/19/2017 10:15 AM  DIABETIC ED RESOURCE [facility]        Care Instructions    Continue your Itzel Leo and exercise daily.  Call if concerns.          Follow-ups after your visit        Your next 10 appointments already scheduled     Aug 01, 2017 11:00 AM CDT   Nurse Only with [facility] ANCILLARY   [facility] ([facility]    [address]  [place] [address]   [phone]            Aug 09, 2017  1:20 PM CDT   Return Visit with Albert Tompkins MD   [facility] ([facility])    [address]  [place] [address]   [phone]            Oct 25, 2017 10:15 AM CDT   Diabetic Education with  DIABETIC ED RESOURCE   [facility] ([facility])    [address]  [place] [address]   [phone]              Who to contact     If you have questions or need follow up information about today's clinic visit or your schedule please contact [facility] directly at [phone].  Normal or non-critical lab and imaging results will be communicated to you by Fantrotterhart, letter or phone within 4 business days after the clinic has received the results. If you do not hear from us within 7 days, please contact the clinic through Fantrotterhart or phone. If you have a critical or abnormal lab result, we will notify you by phone as soon as possible.  Submit refill requests through [facility] [employer] or call your pharmacy and they will forward the refill request to us. Please allow 3 business days for your refill to be completed.          Additional Information About Your Visit        Offsite Care Resources Information     [facility] lets you send messages to your doctor, view your test results, renew your prescriptions, schedule  "appointments and more. To sign up, go to [email or web address] . Click on \"Log in\" on the left side of the screen, which will take you to the Welcome page. Then click on \"Sign up Now\" on the right side of the page.     You will be asked to enter the access code listed below, as well as some personal information. Please follow the directions to create your username and password.     Your access code is: [email or web address]  Expires: 8/3/2017  1:21 PM     Your access code will  in 90 days. If you need help or a new code, please call your [facility] clinic or [phone].        Care EveryWhere ID     This is your Care EveryWhere ID. This could be used by other organizations to access your [facility] medical records  [id number]        Your Vitals Were     BMI (Body Mass Index)                   31.09 kg/m2            Blood Pressure from Last 3 Encounters:   17 90/64   17 104/64   17 126/82    Weight from Last 3 Encounters:   17 207 lb 8 oz (94.1 kg)   17 209 lb (94.8 kg)   17 214 lb 9.6 oz (97.3 kg)              Today, you had the following     No orders found for display       Primary Care Provider Office Phone # Fax #    Kapil Duckworth [phone]       [address] [address]        Equal Access to Services     TASHI BLACKMAN : Hadii aad ku hadasho Soomaali, waaxda luqadaha, qaybta kaalmada adeegyada, waxay vipul zhong . So [facility] [phone].    ATENCIÓN: Si habla español, tiene a diallo disposición servicios gratuitos de asistencia lingüística. Llame al [phone].    We comply with applicable federal civil rights laws and [place] laws. We do not discriminate on the basis of race, color, national origin, age, disability sex, sexual orientation or gender identity.            Thank you!     Thank you for choosing [facility]  for your care. Our goal is always to provide you with excellent " care. Hearing back from our patients is one way we can continue to improve our services. Please take a few minutes to complete the written survey that you may receive in the mail after your visit with us. Thank you!             Your Updated Medication List - Protect others around you: Learn how to safely use, store and throw away your medicines at www.disposemymeds.org.          This list is accurate as of: 7/19/17 11:08 AM.  Always use your most recent med list.                   Brand Name Dispense Instructions for use Diagnosis    aspirin 81 MG tablet      Take 1 tablet by mouth daily.    Type 2 diabetes, HbA1c goal < 7% (H)       blood glucose lancing device    no brand specified    1 each    Use to test blood sugars 3 times daily or as directed.    Type 2 diabetes mellitus, uncontrolled, with neuropathy (H)       blood glucose monitoring test strip    INDIA CONTOUR NEXT    300 each    TEST THREE TIMES A DAY OR AS DIRECTED    Type 2 diabetes mellitus, uncontrolled, with neuropathy (H)       CALCIUM 500 + D 500-200 MG-IU Tabs      take 2 tabs daily        cyclobenzaprine 10 MG tablet    FLEXERIL    30 tablet    Take 1 tablet (10 mg) by mouth 3 times daily as needed for muscle spasms    Neck pain       folic acid 1 MG tablet    FOLVITE    100 tablet    Take 1 tablet (1 mg) by mouth daily    High risk medications (not anticoagulants) long-term use       hydrochlorothiazide 25 MG tablet    HYDRODIURIL    90 tablet    Take 1 tablet (25 mg) by mouth daily    Hypertension goal BP (blood pressure) < 140/90       HYDROcodone-acetaminophen 5-325 MG per tablet    NORCO    28 tablet    Take 1-2 tablets by mouth every 4 hours as needed for moderate to severe pain maximum 4 tablet(s) per day    Bruised ribs, unspecified laterality, sequela       hydroxychloroquine 200 MG tablet    PLAQUENIL    180 tablet    Take 1 tablet (200 mg) by mouth 2 times daily    High risk medications (not anticoagulants) long-term use       insulin  pen needle 31G X 6 MM    ULTICARE MINI    100 each    Use 1 daily or as directed.    Type 2 diabetes mellitus, uncontrolled, with neuropathy (H)       liraglutide 18 MG/3ML soln    VICTOZA PEN    27 mL    Inject 1.8 mg Subcutaneous daily    Type 2 diabetes mellitus, uncontrolled, with neuropathy (H)       * lisinopril 5 MG tablet    PRINIVIL/ZESTRIL    90 tablet    Take 1 tablet (5 mg) by mouth daily    Type 2 diabetes mellitus, uncontrolled, with neuropathy (H)       * lisinopril 2.5 MG tablet    PRINIVIL/Zestril    90 tablet    Take 1 tablet (2.5 mg) by mouth daily    Hypotension, unspecified hypotension type       metFORMIN 500 MG tablet    GLUCOPHAGE    360 tablet    TAKE TWO TABLETS BY MOUTH TWICE A DAY WITH MEALS    Type 2 diabetes mellitus, uncontrolled, with neuropathy (H)       methotrexate sodium 2.5 MG Tabs     120 tablet    Take 25 mg by mouth once a week . Take all 10 tablets on the same day of each week.    High risk medications (not anticoagulants) long-term use       * MICROLET LANCETS Misc     100 each    1 Device daily    Type 2 diabetes, HbA1C goal < 8% (H)       * blood glucose monitoring lancets     300 each    Use to test blood sugars 3 times daily or as directed.    Type 2 diabetes mellitus, uncontrolled, with neuropathy (H)       MULTI-VITAMIN PO      None Entered        omeprazole 20 MG CR capsule    priLOSEC    90 capsule    Take 1 capsule (20 mg) by mouth daily    Gastroesophageal reflux disease without esophagitis       pravastatin 40 MG tablet    PRAVACHOL    90 tablet    Take 1 tablet (40 mg) by mouth daily    Hyperlipidemia LDL goal <100       traMADol 50 MG tablet    ULTRAM    20 tablet    Take 1-2 tablets ( mg) by mouth every 6 hours as needed for pain maximum 3 tablet(s) per day    Neck pain       vitamin E 1000 UNIT capsule    TOCOPHEROL     1 tablet daily        * Notice:  This list has 4 medication(s) that are the same as other medications prescribed for you. Read the  directions carefully, and ask your doctor or other care provider to review them with you.

## 2017-07-19 NOTE — PROGRESS NOTES
Diabetes Self Management Training: Individual Review Visit    Zurdo Dash presents today for education and evaluation of glucose control related to Type 2 diabetes.    He is accompanied by spouse    Patient's diabetes management related comments/concerns: My blood glucose levels were too low as well as my BP.  My medications were decreased.    Patient's emotional response to diabetes: expresses readiness to learn    Patient would like this visit to be focused around the following diabetes-related behaviors and goals: Assistance with making lifestyle changes, Taking Medication and Problem Solving    ASSESSMENT:  Patient Problem List and Family Medical History reviewed for relevant medical history, current medical status, and diabetes risk factors.  Patient has been following the Room n House meal plan since 5/16/17 and has lost 23 lbs since last Oct.  He started Victoza in March 2017.  He has not had any low blood glucose levels since stopping his Amaryl last Monday.  He continues to be active with walking the dog daily.    Current Diabetes Management per Patient:  Taking diabetes medications?   yes:     Diabetes Medication(s)     Biguanides Sig    metFORMIN (GLUCOPHAGE) 500 MG tablet TAKE TWO TABLETS BY MOUTH TWICE A DAY WITH MEALS    Incretin Mimetic Agents (GLP-1 Receptor Agonists) Sig    liraglutide (VICTOZA PEN) 18 MG/3ML soln Inject 1.8 mg Subcutaneous daily          *Abbreviated insulin dose documentation key: Insulin Trade Name (skkyemtja-rtisf-jedjnf-bedtime) - i.e. Humalog 5-5-5-0 (Humalog 5 units at breakfast, 5 units at lunch, and 5 units at dinner).    Past Diabetes Education: Yes    Patient glucose self monitoring as follows: one time daily.   BG meter: Contour Next EZ meter  BG results: fasting glucose- 97,86, 62,58,104,93     BG values are: In goal or too low for the last 1 week.  Patient's most recent   Lab Results   Component Value Date    A1C 5.6 06/23/2017    is meeting goal of  "<7.0    Nutrition:  Patient 1500 calories of Itzel Bailey meal plan    Breakfast - 8 am  Lunch - 1 pm   Dinner - 6-7 pm   Snacks - between meals and hs    Beverages: Milk 1% or skim and Water 4 bottles per day    Cultural/Episcopal diet restrictions: No     Biggest Challenge to Healthy Eating: doing well with this meal plan as food is frozen and it just has to be put it in the microwave and they are not hungry between meals.  He is doing this with his wife.  Also states that enough sweets are provided in the meal plan to meet his desire for sweets.    Physical Activity:    Type: walks 3/4 mile each day  Duration: 20-30 minutes    Diabetes Complications:  Is off of his Amaryl and blood glucose levels are not too low any longer.    Vitals:  Wt 207 lb 8 oz (94.1 kg)  BMI 31.09 kg/m2  Estimated body mass index is 31.32 kg/(m^2) as calculated from the following:    Height as of 3/3/17: 5' 8.5\" (1.74 m).    Weight as of 7/17/17: 209 lb (94.8 kg).   Last 3 BP:   BP Readings from Last 3 Encounters:   07/17/17 90/64   06/23/17 104/64   06/22/17 126/82       History   Smoking Status     Former Smoker     Packs/day: 1.00     Years: 40.00     Types: Cigarettes     Quit date: 3/16/2007   Smokeless Tobacco     Never Used       Labs:  Lab Results   Component Value Date    A1C 5.6 06/23/2017     Lab Results   Component Value Date     06/23/2017     Lab Results   Component Value Date    LDL 79 12/02/2016     HDL Cholesterol   Date Value Ref Range Status   12/02/2016 39 (L) >39 mg/dL Final   ]  GFR Estimate   Date Value Ref Range Status   06/23/2017 49 (L) >60 mL/min/1.7m2 Final     Comment:     Non  GFR Calc     GFR Estimate If Black   Date Value Ref Range Status   06/23/2017 60 (L) >60 mL/min/1.7m2 Final     Comment:      GFR Calc     Lab Results   Component Value Date    CR 1.41 06/23/2017     No results found for: MICROALBUMIN    Socio/Economic Considerations:    Support system: " spouse/significant other    Health Beliefs and Attitudes:   Patient Activation Measure Survey Score:  HATTIE Score (Last Two) 7/23/2013   HATTIE Raw Score 35   Activation Score 45.2   HATTIE Level 1       Stage of Change: ACTION (Actively working towards change)      Diabetes knowledge and skills assessment:     Patient is knowledgeable in diabetes management concepts related to: Monitoring    Patient needs further education on the following diabetes management concepts: Monitoring, Taking Medication and Problem Solving    Barriers to Learning Assessment: No Barriers identified    Based on learning assessment above, most appropriate setting for further diabetes education would be: Individual setting.    INTERVENTION:   Education provided today on:  AADE Self-Care Behaviors:  Being Active: relationship to blood glucose  Monitoring: log and interpret results and individual blood glucose targets  Taking Medication: action of prescribed medication  Problem Solving: low blood glucose - causes, signs/symptoms, treatment and prevention    Opportunities for ongoing education and support in diabetes-self management were discussed.    Pt verbalized understanding of concepts discussed and recommendations provided today.       Education Materials Provided:  BG log book   Patient given new Kisha lancing device as his was not working.    PLAN:  See Patient Instructions for co-developed, patient-stated behavior change goals.  AVS printed and provided to patient today.    FOLLOW-UP:  Chart routed to referring provider.  Follow up with diabetes educator in 3 months for blood glucose review and prevention of complications.  Ongoing plan for education and support: follow up with pharmacy today for BP check.    Gogo Shaver RN  BSN CDE    Time Spent: 60 minutes  Encounter Type: Individual    Any diabetes medication dose changes were made via the CDE Protocol and Collaborative Practice Agreement with the patient's referring provider. A copy of  this encounter was shared with the provider.

## 2017-08-09 ENCOUNTER — OFFICE VISIT (OUTPATIENT)
Dept: RHEUMATOLOGY | Facility: CLINIC | Age: 72
End: 2017-08-09
Payer: COMMERCIAL

## 2017-08-09 VITALS
TEMPERATURE: 97.9 F | DIASTOLIC BLOOD PRESSURE: 72 MMHG | BODY MASS INDEX: 29.62 KG/M2 | SYSTOLIC BLOOD PRESSURE: 109 MMHG | HEIGHT: 69 IN | WEIGHT: 200 LBS | HEART RATE: 59 BPM | OXYGEN SATURATION: 96 %

## 2017-08-09 DIAGNOSIS — Z79.899 HIGH RISK MEDICATIONS (NOT ANTICOAGULANTS) LONG-TERM USE: ICD-10-CM

## 2017-08-09 DIAGNOSIS — M05.79 RHEUMATOID ARTHRITIS INVOLVING MULTIPLE SITES WITH POSITIVE RHEUMATOID FACTOR (H): Primary | ICD-10-CM

## 2017-08-09 LAB
ALBUMIN SERPL-MCNC: 3.7 G/DL (ref 3.4–5)
ALP SERPL-CCNC: 49 U/L (ref 40–150)
ALT SERPL W P-5'-P-CCNC: 29 U/L (ref 0–70)
AST SERPL W P-5'-P-CCNC: 18 U/L (ref 0–45)
BASOPHILS # BLD AUTO: 0.1 10E9/L (ref 0–0.2)
BASOPHILS NFR BLD AUTO: 0.7 %
BILIRUB DIRECT SERPL-MCNC: 0.2 MG/DL (ref 0–0.2)
BILIRUB SERPL-MCNC: 0.6 MG/DL (ref 0.2–1.3)
CREAT SERPL-MCNC: 1.17 MG/DL (ref 0.66–1.25)
CRP SERPL-MCNC: 9.7 MG/L (ref 0–8)
DIFFERENTIAL METHOD BLD: ABNORMAL
EOSINOPHIL # BLD AUTO: 0.1 10E9/L (ref 0–0.7)
EOSINOPHIL NFR BLD AUTO: 1 %
ERYTHROCYTE [DISTWIDTH] IN BLOOD BY AUTOMATED COUNT: 14.2 % (ref 10–15)
ERYTHROCYTE [SEDIMENTATION RATE] IN BLOOD BY WESTERGREN METHOD: 19 MM/H (ref 0–20)
GFR SERPL CREATININE-BSD FRML MDRD: 61 ML/MIN/1.7M2
HCT VFR BLD AUTO: 39.3 % (ref 40–53)
HGB BLD-MCNC: 13.6 G/DL (ref 13.3–17.7)
LYMPHOCYTES # BLD AUTO: 0.9 10E9/L (ref 0.8–5.3)
LYMPHOCYTES NFR BLD AUTO: 11.9 %
MCH RBC QN AUTO: 32.1 PG (ref 26.5–33)
MCHC RBC AUTO-ENTMCNC: 34.6 G/DL (ref 31.5–36.5)
MCV RBC AUTO: 93 FL (ref 78–100)
MONOCYTES # BLD AUTO: 0.7 10E9/L (ref 0–1.3)
MONOCYTES NFR BLD AUTO: 10.4 %
NEUTROPHILS # BLD AUTO: 5.4 10E9/L (ref 1.6–8.3)
NEUTROPHILS NFR BLD AUTO: 76 %
PLATELET # BLD AUTO: 257 10E9/L (ref 150–450)
PROT SERPL-MCNC: 7.1 G/DL (ref 6.8–8.8)
RBC # BLD AUTO: 4.24 10E12/L (ref 4.4–5.9)
WBC # BLD AUTO: 7.1 10E9/L (ref 4–11)

## 2017-08-09 PROCEDURE — 86140 C-REACTIVE PROTEIN: CPT | Performed by: INTERNAL MEDICINE

## 2017-08-09 PROCEDURE — 36415 COLL VENOUS BLD VENIPUNCTURE: CPT | Performed by: INTERNAL MEDICINE

## 2017-08-09 PROCEDURE — 82565 ASSAY OF CREATININE: CPT | Performed by: INTERNAL MEDICINE

## 2017-08-09 PROCEDURE — 85025 COMPLETE CBC W/AUTO DIFF WBC: CPT | Performed by: INTERNAL MEDICINE

## 2017-08-09 PROCEDURE — 80076 HEPATIC FUNCTION PANEL: CPT | Performed by: INTERNAL MEDICINE

## 2017-08-09 PROCEDURE — 85652 RBC SED RATE AUTOMATED: CPT | Performed by: INTERNAL MEDICINE

## 2017-08-09 PROCEDURE — 99213 OFFICE O/P EST LOW 20 MIN: CPT | Performed by: INTERNAL MEDICINE

## 2017-08-09 NOTE — NURSING NOTE
"Chief Complaint   Patient presents with     Arthritis     RA, patient states his knees have been bothering him       Initial /72 (BP Location: Left arm, Patient Position: Chair, Cuff Size: Adult Regular)  Pulse 59  Temp 97.9  F (36.6  C) (Oral)  Ht 1.74 m (5' 8.5\")  Wt 90.7 kg (200 lb)  SpO2 96%  BMI 29.97 kg/m2 Estimated body mass index is 29.97 kg/(m^2) as calculated from the following:    Height as of this encounter: 1.74 m (5' 8.5\").    Weight as of this encounter: 90.7 kg (200 lb).  BP completed using cuff size: regular         RAPID3 (0-30) Cumulative Score            RAPID3 Weighted Score (divide #4 by 3 and that is the weighted score)           "

## 2017-08-09 NOTE — MR AVS SNAPSHOT
After Visit Summary   8/9/2017    Zurdo Dash    MRN: [id number]           Patient Information     Date Of Birth          1945        Visit Information        Provider Department      8/9/2017 1:20 PM Albert Tompkins MD [facility]        Today's Diagnoses     Rheumatoid arthritis involving multiple sites with positive rheumatoid factor (H)    -  1    High risk medications (not anticoagulants) long-term use           Follow-ups after your visit        Your next 10 appointments already scheduled     Oct 25, 2017 10:15 AM CDT   Diabetic Education with  DIABETIC ED RESOURCE   [facility] ([facility])    [address] [place] [address] [address]   [phone]            Oct 31, 2017 11:00 AM CDT   LAB with  LAB   [facility])    [address] [place] [address]   [phone]           Patient must bring picture ID. Patient should be prepared to give a urine specimen  Please do not eat 10-12 hours before your appointment if you are coming in fasting for labs on lipids, cholesterol, or glucose (sugar). Pregnant women should follow their Care Team instructions. Water with medications is okay. Do not drink coffee or other fluids. If you have concerns about taking  your medications, please ask at office or if scheduling via [employer], send a message by clicking on Secure Messaging, Message Your Care Team.            Nov 08, 2017  1:00 PM CST   Return Visit with Albert Tompkins MD   [facility] ([facility])    [address] [address]   [phone]              Future tests that were ordered for you today     Open Future Orders        Priority Expected Expires Ordered    CBC with platelets differential Routine 11/3/2017 11/22/2017 8/9/2017    Creatinine Routine 11/3/2017 11/22/2017 8/9/2017    Hepatic panel Routine 11/3/2017 11/22/2017 8/9/2017    CRP  "inflammation Routine 11/3/2017 2017 2017    Erythrocyte sedimentation rate auto Routine 11/3/2017 2017 2017            Who to contact     If you have questions or need follow up information about today's clinic visit or your schedule please contact [facility] directly at [phone].  Normal or non-critical lab and imaging results will be communicated to you by MyChart, letter or phone within 4 business days after the clinic has received the results. If you do not hear from us within 7 days, please contact the clinic through MapHazardlyhart or phone. If you have a critical or abnormal lab result, we will notify you by phone as soon as possible.  Submit refill requests through [employer]rose or call your pharmacy and they will forward the refill request to us. Please allow 3 business days for your refill to be completed.          Additional Information About Your Visit        centrose Information     centrose lets you send messages to your doctor, view your test results, renew your prescriptions, schedule appointments and more. To sign up, go to [email or web address] . Click on \"Log in\" on the left side of the screen, which will take you to the Welcome page. Then click on \"Sign up Now\" on the right side of the page.     You will be asked to enter the access code listed below, as well as some personal information. Please follow the directions to create your username and password.     Your access code is: [email or web address]  Expires: 2017  2:00 PM     Your access code will  in 90 days. If you need help or a new code, please call your [facility] clinic or [phone].        Care EveryWhere ID     This is your Care EveryWhere ID. This could be used by other organizations to access your [facility] medical records  [id number]        Your Vitals Were     Pulse Temperature Height Pulse Oximetry BMI (Body Mass Index)       59 97.9  F (36.6  C) (Oral) 1.74 m (5' 8.5\") 96% 29.97 kg/m2        Blood " Pressure from Last 3 Encounters:   08/09/17 109/72   07/19/17 115/80   07/17/17 90/64    Weight from Last 3 Encounters:   08/09/17 90.7 kg (200 lb)   07/19/17 94.1 kg (207 lb 8 oz)   07/17/17 94.8 kg (209 lb)              We Performed the Following     CBC with platelets differential     Creatinine     CRP inflammation     Erythrocyte sedimentation rate auto     Hepatic panel        Primary Care Provider Office Phone # Fax #    Kapil Duckworth [phone] [address] [facility] [address]        Equal Access to Services     [facility]: Hadii aad ku hadasho Soomaali, waaxda luqadaha, qaybta kaalmada adeegyada, aranza zhong . So [facility] [phone].    ATENCIÓN: Si habla español, tiene a diallo disposición servicios gratuitos de asistencia lingüística. Llame al [phone].    We comply with applicable federal civil rights laws and [place] laws. We do not discriminate on the basis of race, color, national origin, age, disability sex, sexual orientation or gender identity.            Thank you!     Thank you for choosing [facility]  for your care. Our goal is always to provide you with excellent care. Hearing back from our patients is one way we can continue to improve our services. Please take a few minutes to complete the written survey that you may receive in the mail after your visit with us. Thank you!             Your Updated Medication List - Protect others around you: Learn how to safely use, store and throw away your medicines at www.disposemymeds.org.          This list is accurate as of: 8/9/17  2:00 PM.  Always use your most recent med list.                   Brand Name Dispense Instructions for use Diagnosis    aspirin 81 MG tablet      Take 1 tablet by mouth daily.    Type 2 diabetes, HbA1c goal < 7% (H)       blood glucose lancing device    no brand specified    1 each    Use to test blood sugars 3 times daily or as directed.    Type 2  diabetes mellitus, uncontrolled, with neuropathy (H)       blood glucose monitoring test strip    INDIA CONTOUR NEXT    300 each    TEST THREE TIMES A DAY OR AS DIRECTED    Type 2 diabetes mellitus, uncontrolled, with neuropathy (H)       CALCIUM 500 + D 500-200 MG-IU Tabs      take 2 tabs daily        cyclobenzaprine 10 MG tablet    FLEXERIL    30 tablet    Take 1 tablet (10 mg) by mouth 3 times daily as needed for muscle spasms    Neck pain       folic acid 1 MG tablet    FOLVITE    100 tablet    Take 1 tablet (1 mg) by mouth daily    High risk medications (not anticoagulants) long-term use       hydrochlorothiazide 25 MG tablet    HYDRODIURIL    90 tablet    Take 1 tablet (25 mg) by mouth daily    Hypertension goal BP (blood pressure) < 140/90       HYDROcodone-acetaminophen 5-325 MG per tablet    NORCO    28 tablet    Take 1-2 tablets by mouth every 4 hours as needed for moderate to severe pain maximum 4 tablet(s) per day    Bruised ribs, unspecified laterality, sequela       hydroxychloroquine 200 MG tablet    PLAQUENIL    180 tablet    Take 1 tablet (200 mg) by mouth 2 times daily    High risk medications (not anticoagulants) long-term use       insulin pen needle 31G X 6 MM    ULTICARE MINI    100 each    Use 1 daily or as directed.    Type 2 diabetes mellitus, uncontrolled, with neuropathy (H)       liraglutide 18 MG/3ML soln    VICTOZA PEN    27 mL    Inject 1.8 mg Subcutaneous daily    Type 2 diabetes mellitus, uncontrolled, with neuropathy (H)       * lisinopril 5 MG tablet    PRINIVIL/ZESTRIL    90 tablet    Take 1 tablet (5 mg) by mouth daily    Type 2 diabetes mellitus, uncontrolled, with neuropathy (H)       * lisinopril 2.5 MG tablet    PRINIVIL/Zestril    90 tablet    Take 1 tablet (2.5 mg) by mouth daily    Hypotension, unspecified hypotension type       metFORMIN 500 MG tablet    GLUCOPHAGE    360 tablet    TAKE TWO TABLETS BY MOUTH TWICE A DAY WITH MEALS    Type 2 diabetes mellitus, uncontrolled,  with neuropathy (H)       methotrexate sodium 2.5 MG Tabs     120 tablet    Take 25 mg by mouth once a week . Take all 10 tablets on the same day of each week.    High risk medications (not anticoagulants) long-term use       * MICROLET LANCETS Misc     100 each    1 Device daily    Type 2 diabetes, HbA1C goal < 8% (H)       * blood glucose monitoring lancets     300 each    Use to test blood sugars 3 times daily or as directed.    Type 2 diabetes mellitus, uncontrolled, with neuropathy (H)       MULTI-VITAMIN PO      None Entered        omeprazole 20 MG CR capsule    priLOSEC    90 capsule    Take 1 capsule (20 mg) by mouth daily    Gastroesophageal reflux disease without esophagitis       pravastatin 40 MG tablet    PRAVACHOL    90 tablet    Take 1 tablet (40 mg) by mouth daily    Hyperlipidemia LDL goal <100       traMADol 50 MG tablet    ULTRAM    20 tablet    Take 1-2 tablets ( mg) by mouth every 6 hours as needed for pain maximum 3 tablet(s) per day    Neck pain       vitamin E 1000 UNIT capsule    TOCOPHEROL     1 tablet daily        * Notice:  This list has 4 medication(s) that are the same as other medications prescribed for you. Read the directions carefully, and ask your doctor or other care provider to review them with you.

## 2017-08-09 NOTE — PROGRESS NOTES
Rheumatology Clinic Visit      Zurdo Dash MRN# [id number]   YOB: 1945 Age: 72 year old      Date of visit: 8/09/17   PCP: Dr. Kapil Duckworth       Chief Complaint   Patient presents with:  Arthritis: RA, patient states his knees have been bothering him      Assessment and Plan     1. Seropositive nonerosive rheumatoid arthritis (, CCP 64): Currently on methotrexate 25 mg once weekly, folic acid 1 mg daily, and hydroxychloroquine 200 mg twice a day. Doing well today.   - Continue methotrexate 25 mg once weekly  - Continue folic acid 1 mg daily  - Continue hydroxychloroquine 200 mg twice a day; (ophthalmology tox monitoring to be done in November 2016, per patient)  - Labs today and 2-3 days prior to the next rheumatology clinic visit: CBC, Creatinine, Hepatic Panel, ESR, CRP    2. Bilateral knee osteoarthritis: He has been receiving steroid injections approximately every 3-6 months with good control of his symptoms.  He then went to an arthritis clinic where he received 3 injections in each knee that was helpful.  Doing well today.     3. 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 He was evaluated by Dr. Duckworth at that time. Reportedly he used colchicine in the past. I advised him to come in for joint arthrocentesis if this occurs again. Recheck of uric acid previously was elevated.    Mr. Dash verbalized agreement with and understanding of the rational for the diagnosis and treatment plan.  All questions were answered to best of my ability and the patient's satisfaction. Mr. Dash was advised to contact the clinic with any questions that may arise after the clinic visit.    Thank you for involving me in the care of the patient    Return to clinic: 3 months      HPI   Zurdo Dash is a 72 year old male with a medical history significant for hypertension, hyperlipidemia, diabetes, diabetic neuropathy, GERD, and rheumatoid arthritis who  presents for f/u of RA and bilateral knee OA.     Today, Mr. Dash reports that his arthritis is doing well. Some knee pain when he goes up and down the stairs; the injections given at an outside arthritis clinic were helpful but he still has knee pain. He plans to follow up with the arthritis clinic for this issue. No morning stiffness. No gelling phenomenon.     Denies fevers, chills, nausea, vomiting, constipation, diarrhea. No abdominal pain. No chest pain/pressure, palpitations, or shortness of breath. No oral or nasal sores. No neck pain. No rash. No LE swelling.      Tobacco: None  EtOH: None  Drugs: None    ROS   GEN: No fevers, chills, night sweats, fatigue, or weight change  SKIN: No rash. Sores from a recent fall.  HEENT:No epistaxis. No oral or nasal ulcers.  CV: No chest pain, pressure, palpitations, or dyspnea on exertion.  PULM: No SOB, wheeze, cough.  GI: No nausea, vomiting, constipation, diarrhea. No blood in stool. No abdominal pain.  : No blood in urine.  MSK: See HPI.  NEURO: No numbness, tingling, or weakness.  ENDO: No heat/cold intolerance.  EXT: No LE swelling    Active Problem List     Patient Active Problem List   Diagnosis     Pain in limb     Diabetic neuropathy (H)     Hyperlipidemia LDL goal <100     Hypertension goal BP (blood pressure) < 140/90     obesity     ED (erectile dysfunction)     Hypogonadism     Advanced directives, counseling/discussion     Health Care Home     Type 2 diabetes mellitus, uncontrolled, with neuropathy (H)     RBBB (right bundle branch block)     Lumbago     Ex-smoker     Cataracts, both eyes     Family history of esophageal cancer     Gastroesophageal reflux disease, esophagitis presence not specified     Rheumatoid arthritis involving multiple sites with positive rheumatoid factor (H)     Pulmonary nodule     High risk medication use     Spondylosis of cervical region without myelopathy or radiculopathy     Past Medical History     Past Medical History:    Diagnosis Date     Abnormal CT scan 03/2004    calcified lung granuloma     C. difficile diarrhea     H/O     Cataract 11/18/2011     Diabetic neuropathy (H)     mild, mostly soles and distal forefeet, worse on the left side.     Diverticulitis      ED (erectile dysfunction)      Ex-smoker     QUIT SMOKING FEB 2007     History of ETOH abuse     recovering, sober since 1997     Hyperlipidemia LDL goal <100      Hypertension goal BP (blood pressure) < 140/90      Hypogonadism      Obesity      PAD (peripheral artery disease) (H)     leg cramps, with exertion, no formal diagnosis of PAD and minimal if any symptoms at all.     RA (rheumatoid arthritis) (H)     Dr Bailon     Type 2 diabetes, HbA1c goal < 7% (H) 10/2008    A1C of 7.1 %      Past Surgical History     Past Surgical History:   Procedure Laterality Date     HC INCISION TENDON SHEATH FINGER  4/2009    r hand ring finger     TONSILLECTOMY       Allergy   No Known Allergies  Current Medication List     Current Outpatient Prescriptions   Medication Sig     lisinopril (PRINIVIL/ZESTRIL) 2.5 MG tablet Take 1 tablet (2.5 mg) by mouth daily     traMADol (ULTRAM) 50 MG tablet Take 1-2 tablets ( mg) by mouth every 6 hours as needed for pain maximum 3 tablet(s) per day     cyclobenzaprine (FLEXERIL) 10 MG tablet Take 1 tablet (10 mg) by mouth 3 times daily as needed for muscle spasms     blood glucose monitoring (ONE TOUCH DELICA) lancets Use to test blood sugars 3 times daily or as directed.     blood glucose (NO BRAND SPECIFIED) lancing device Use to test blood sugars 3 times daily or as directed.     folic acid (FOLVITE) 1 MG tablet Take 1 tablet (1 mg) by mouth daily     hydroxychloroquine (PLAQUENIL) 200 MG tablet Take 1 tablet (200 mg) by mouth 2 times daily     methotrexate sodium 2.5 MG TABS Take 25 mg by mouth once a week . Take all 10 tablets on the same day of each week.     blood glucose monitoring (Graceful Tables CONTOUR NEXT) test strip TEST THREE TIMES A  DAY OR AS DIRECTED     omeprazole (PRILOSEC) 20 MG CR capsule Take 1 capsule (20 mg) by mouth daily     liraglutide (VICTOZA PEN) 18 MG/3ML soln Inject 1.8 mg Subcutaneous daily     metFORMIN (GLUCOPHAGE) 500 MG tablet TAKE TWO TABLETS BY MOUTH TWICE A DAY WITH MEALS     insulin pen needle (ULTICARE MINI) 31G X 6 MM Use 1 daily or as directed.     HYDROcodone-acetaminophen (NORCO) 5-325 MG per tablet Take 1-2 tablets by mouth every 4 hours as needed for moderate to severe pain maximum 4 tablet(s) per day     pravastatin (PRAVACHOL) 40 MG tablet Take 1 tablet (40 mg) by mouth daily     MICROLET LANCETS MISC 1 Device daily     aspirin 81 MG tablet Take 1 tablet by mouth daily.     CALCIUM 500 + D 500-200 MG-IU OR TABS take 2 tabs daily     MULTI-VITAMIN OR None Entered     lisinopril (PRINIVIL/ZESTRIL) 5 MG tablet Take 1 tablet (5 mg) by mouth daily (Patient not taking: Reported on 7/19/2017)     hydrochlorothiazide (HYDRODIURIL) 25 MG tablet Take 1 tablet (25 mg) by mouth daily (Patient not taking: Reported on 7/19/2017)     VITAMIN E 1000 UNIT OR CAPS 1 tablet daily     No current facility-administered medications for this visit.          Social History   See HPI    Family History     Family History   Problem Relation Age of Onset     CEREBROVASCULAR DISEASE Mother      Arthritis Mother      OSTEOPOROSIS Mother      Alzheimer Disease Father      Arthritis Father      CANCER Father      DIABETES Maternal Grandmother      Cardiovascular Maternal Grandmother      Other Cancer Brother      CANCER Paternal Aunt      Hypertension No family hx of      Thyroid Disease No family hx of      Glaucoma No family hx of      Macular Degeneration No family hx of        Physical Exam     Temp Readings from Last 3 Encounters:   08/09/17 97.9  F (36.6  C) (Oral)   07/17/17 99.2  F (37.3  C) (Oral)   06/23/17 97.1  F (36.2  C) (Oral)     BP Readings from Last 5 Encounters:   08/09/17 109/72   07/19/17 115/80   07/17/17 90/64   06/23/17  "104/64   06/22/17 126/82     Pulse Readings from Last 1 Encounters:   08/09/17 59     Resp Readings from Last 1 Encounters:   04/07/16 16     Estimated body mass index is 29.97 kg/(m^2) as calculated from the following:    Height as of this encounter: 1.74 m (5' 8.5\").    Weight as of this encounter: 90.7 kg (200 lb).    GEN: NAD, obese  HEENT: MMM. No oral lesions. EOMI. Anicteric, noninjected sclera  CV: S1, S2. RRR. No m/r/g.  PULM: CTA bilaterally. No w/c.  MSK: Bilateral second and third MCPs with increased prominence, but no tenderness to palpation or synovial swelling. Wrists, elbows, shoulders, ankles, and MTPs without swelling or tenderness to palpation. Bilateral knees with medial joint line tenderness and crepitation but no effusion or increased warmth. Hips nontender to direct palpation.   SKIN: No rash. bruises on his upper extremities that are covered with cosmetics  EXT: No LE edema  PSYCH: Alert. Appropriate.    Labs / Imaging (select studies)     [facility]    10/17/2013 Left knee synovial fluid at [facility]: , N 3%, No crystals    RF/CCP  Recent Labs   Lab Test  04/07/16   1149   CCPIGG  64*     CBC  Recent Labs   Lab Test  05/18/17   1218  10/12/16   1227  07/13/16   1231   WBC  8.7  6.2  7.1   RBC  4.82  4.66  4.48   HGB  15.3  14.5  14.1   HCT  43.9  42.9  40.4   MCV  91  92  90   RDW  14.4  14.0  15.1*   PLT  266  271  216   MCH  31.7  31.1  31.5   MCHC  34.9  33.8  34.9   NEUTROPHIL  77.0  72.2  80.0   LYMPH  13.3  16.0  11.7   MONOCYTE  8.0  9.4  6.2   EOSINOPHIL  0.7  1.3  1.4   BASOPHIL  1.0  1.1  0.7   ANEU  6.7  4.5  5.7   ALYM  1.2  1.0  0.8   SMITH  0.7  0.6  0.4   AEOS  0.1  0.1  0.1   ABAS  0.1  0.1  0.1     CMP  Recent Labs   Lab Test  06/23/17   1156  05/18/17   1218  02/09/17   1710  10/12/16   1227  07/13/16   1231  05/03/16   1147  04/07/16   1149  03/18/16   1511   08/27/15   1230   03/30/15   1506   NA  139   --   136   --    --   145*   --   " 141   --   139   --   133   POTASSIUM  4.3   --   4.0   --    --   3.7   --   3.9   --   4.1   --   4.0   CHLORIDE  105   --   103   --    --   112*   --   105   --   104   --   100   CO2  21   --   23   --    --   24   --   27   --   27   --   27   ANIONGAP  13   --   10   --    --   9   --   9   --   8   --   6   GLC  101*   --   181*   --    --   148*   --   114*   --   149*   --   374*   BUN  35*   --   19   --    --   18   --   23   --   20   --   23   CR  1.41*  1.42*  1.20  0.98  1.02  1.07  1.38*  1.21   < >  1.04   < >  1.23   GFRESTIMATED  49*  49*  59*  75  72  68  51*  59*   < >  70   < >  58*   GFRESTBLACK  60*  59*  72  >90   GFR Calc    87  82  61  71   < >  85   < >  70   NEW  9.6   --   8.8   --    --   8.6   --   9.4   --   9.2   --   9.0   BILITOTAL   --   0.7   --   0.6  0.5   --   0.6   --    --   0.6   --    --    ALBUMIN   --   4.1   --   3.5  3.7   --   3.7   --    --   3.6   --    --    PROTTOTAL   --   7.5   --   7.2  6.9   --   7.3   --    --   6.8   --    --    ALKPHOS   --   44   --   54  43   --   55   --    --   44   --    --    AST   --   23   --   32  29   --   27   --    --   22   < >   --    ALT   --   45   --   40  44   --   35   --    --   36   < >   --     < > = values in this interval not displayed.     HgA1c  Recent Labs   Lab Test  06/23/17   1156  02/09/17   1710  12/02/16   1252   A1C  5.6  9.2*  9.8*     Uric Acid  Recent Labs   Lab Test  05/18/17   1218  01/27/17   1113   URIC  9.3*  5.6     Calcium/VitaminD  Recent Labs   Lab Test  06/23/17   1156  02/09/17   1710  05/03/16   1147   07/23/13   1016   12/05/11   1200   NEW  9.6  8.8  8.6   < >   --    < >  9.3   D3VIT   --    --    --    --    --    --   30   VITDT   --    --    --    --   28*   --    --     < > = values in this interval not displayed.     ESR/CRP  Recent Labs   Lab Test  05/18/17   1218  01/27/17   1113  10/12/16   1227  07/13/16   1231   SED  10   --   13  10   CRP  10.7*  189.0*  10.0*   "7.6     TSH/T4  Recent Labs   Lab Test  06/24/16   1101  07/23/13   1016  02/03/11   1254   TSH  1.07  0.55  0.71     Lipid Panel  Recent Labs   Lab Test  12/02/16   1252  09/30/15   1302  06/29/15   1213  08/07/14   1249  07/23/13   1016   CHOL  174   --   125  129  153   TRIG  282*   --   286*  270*  248*   HDL  39*   --   37*  46  41   LDL  79  89  31  29  62   VLDL   --    --   57*  54*  50*   CHOLHDLRATIO   --    --   3.4  2.8  3.7   NHDL  135*   --    --    --    --      Hepatitis B  Recent Labs   Lab Test  04/07/16   1149   HBCAB  Nonreactive   HEPBANG  Nonreactive     Hepatitis C  Recent Labs   Lab Test  04/07/16   1149   HCVAB  Nonreactive   Assay performance characteristics have not been established for newborns,   infants, and children       HIV Screening  Recent Labs   Lab Test  04/07/16   1149   HIAGAB  Nonreactive   HIV-1 p24 Ag & HIV-1/HIV-2 Ab Not Detected       \"MRI LEFT KNEE  10/08/2013    INDICATION: Left knee pain. Locking, catching and snapping. Weakness.  TECHNIQUE: Routine.  COMPARISON: None.    FINDINGS:  MEDIAL COMPARTMENT: Linear tearing involving the posterior horn and body of   the medial meniscus as seen on series 4: image 6-8. This is also seen on   series 5: image 9-10. Cartilage is thinned peripherally.    LATERAL COMPARTMENT: Lateral meniscus also demonstrates tearing in the   posterior horn on series 4: image 22. There is diffuse tearing in the body   of the meniscus which is horizontal as seen on series 5: image 9 and this   extends into the anterior horn. Cartilage is thinned.    PATELLOFEMORAL COMPARTMENT: Retropatellar cartilage demonstrates   full-thickness loss of cartilage over portions of the median ridge, lateral   facet and medial facet. Cartilage is better preserved over the lower pole   centrally. There is also full-thickness loss of cartilage in the lateral   and central trochlear groove. Patella is normally aligned. Retinaculum are   intact.    LIGAMENTS AND TENDONS: " "The anterior cruciate and posterior cruciate   ligaments are intact. The medial collateral ligament is intact. Lateral   collateral ligamentous complex and popliteus insertion are intact.   Quadriceps tendon and patellar tendon are intact.    BONES AND SOFT TISSUES: Moderate-sized joint effusion. No popliteal cyst.   No muscle edema or fracture.    CONCLUSION:  1. There is tearing of the medial meniscus involving the posterior horn and   body. Cartilage is thinned peripherally.  2. There is also tearing in the posterior horn and body of the lateral   meniscus which also extends into the anterior horn. Cartilage is also   thinned in the lateral compartment.  3. Moderate to severe osteoarthritis in the patellofemoral compartment with   full-thickness loss of cartilage over large portions of the retropatellar   surface and trochlear surface.  4. Joint effusion.    IF YOU ARE A PHYSICIAN AND HAVE QUESTIONS REGARDING THIS REPORT, PLEASE   CALL [phone].\"    \"X-RAY KNEES BILATERAL AP W/LAT/SUN/PA LEFT   Oct 08, 2013 09:51:59 AM     INDICATION: Pain and swelling   COMPARISON: None.      FINDINGS: Moderate narrowing lateral aspect of the patellofemoral   compartment with slight lateral subluxation of the patella. Medial and   lateral compartments are preserved. Moderate knee joint effusion and/or   synovitis. Enthesophyte formation distal quadriceps and proximal patellar   tendons. Extensive vascular calcifications.     AP view right knee demonstrates trace narrowing medial compartment joint   Space.\"    Immunization History     Immunization History   Administered Date(s) Administered     Influenza (High Dose) 3 valent vaccine 09/20/2012, 10/20/2015, 09/20/2016     Influenza (IIV3) 10/05/1999, 10/30/2008, 09/28/2011, 10/14/2013, 10/03/2014     Pneumococcal (PCV 13) 06/29/2015     Pneumococcal 23 valent 08/19/2010     TD (ADULT, 7+) 08/05/1997, 02/03/2011     Zoster vaccine, live 04/19/2010            Chart documentation " done in part with Dragon Voice recognition Software. Although reviewed after completion, some word and grammatical error may remain.    Albert Tompkins MD

## 2017-08-10 NOTE — PROGRESS NOTES
Rheumatology team: Please call to inform Mr. Dash that his labs do not show evidence for medication toxicity.    Albert Tompkins MD  8/10/2017 5:56 PM

## 2017-08-14 ENCOUNTER — OFFICE VISIT (OUTPATIENT)
Dept: INTERNAL MEDICINE | Facility: CLINIC | Age: 72
End: 2017-08-14
Payer: COMMERCIAL

## 2017-08-14 VITALS
DIASTOLIC BLOOD PRESSURE: 66 MMHG | TEMPERATURE: 97.3 F | WEIGHT: 199 LBS | SYSTOLIC BLOOD PRESSURE: 108 MMHG | HEART RATE: 63 BPM | OXYGEN SATURATION: 97 % | BODY MASS INDEX: 29.82 KG/M2

## 2017-08-14 DIAGNOSIS — J20.9 ACUTE BRONCHITIS, UNSPECIFIED ORGANISM: Primary | ICD-10-CM

## 2017-08-14 PROCEDURE — 99213 OFFICE O/P EST LOW 20 MIN: CPT | Performed by: INTERNAL MEDICINE

## 2017-08-14 RX ORDER — AZITHROMYCIN 250 MG/1
TABLET, FILM COATED ORAL
Qty: 6 TABLET | Refills: 0 | Status: SHIPPED | OUTPATIENT
Start: 2017-08-14 | End: 2017-08-28

## 2017-08-14 RX ORDER — PREDNISONE 20 MG/1
20 TABLET ORAL 2 TIMES DAILY
Qty: 10 TABLET | Refills: 0 | Status: SHIPPED | OUTPATIENT
Start: 2017-08-14 | End: 2017-08-28

## 2017-08-14 NOTE — PROGRESS NOTES
"  SUBJECTIVE:                                                    Zurdo Dash is a 72 year old male who presents to clinic today for the following health issues:    Cough -- Patient states he has had a cough and a \"tickle\" in his throat for the last 3 weeks. Describes his cough as productive and intermittent. He does not believe it is getting better. He has been compliant with 2.5 MG Lisinopril. Has history of smoking. Patient has been using Lozenges and Ricola, which help mildly. Denies wheezing,SOB, and recent illness. With some additional discussions it is apparent to me that this patient absolutely has a classic recurrence of bronchitis generally 3 times a year and this is into the territory of a chronic bronchitis clinical presentation . This is an ex-smoker and while a set of spirometry was done less then a year ago and was not clearly a chronic obstructive pulmonary disease picture, I continue to suspect a component of smoking related lung disease     Recurring symptoms, we may see the patient about this 2-3 times a year.    Diabetes -- He notes he has been compliant with Victoza. He has also been doing a Itzel Leo diet. His weight loss is fantastic and his hemoglobin a1c  [ diabetes test ] is superb along with blood glucose readings   Lab Results   Component Value Date    A1C 5.6 06/23/2017    A1C 9.2 02/09/2017    A1C 9.8 12/02/2016    A1C 9.0 08/05/2016    A1C 6.9 05/03/2016     Hypertension -- No longer taking Hydrochlorothiazide.   BP Readings from Last 3 Encounters:   08/14/17 108/66   08/09/17 109/72   07/19/17 115/80     Problem list and histories reviewed & adjusted, as indicated.  Additional history: as documented    Patient Active Problem List   Diagnosis     Pain in limb     Diabetic neuropathy (H)     Hyperlipidemia LDL goal <100     Hypertension goal BP (blood pressure) < 140/90     obesity     ED (erectile dysfunction)     Hypogonadism     Advanced directives, counseling/discussion     " Health Care Home     Type 2 diabetes mellitus, uncontrolled, with neuropathy (H)     RBBB (right bundle branch block)     Lumbago     Ex-smoker     Cataracts, both eyes     Family history of esophageal cancer     Gastroesophageal reflux disease, esophagitis presence not specified     Rheumatoid arthritis involving multiple sites with positive rheumatoid factor (H)     Pulmonary nodule     High risk medication use     Spondylosis of cervical region without myelopathy or radiculopathy     Past Surgical History:   Procedure Laterality Date     HC INCISION TENDON SHEATH FINGER  4/2009    r hand ring finger     TONSILLECTOMY         Social History   Substance Use Topics     Smoking status: Former Smoker     Packs/day: 1.00     Years: 40.00     Types: Cigarettes     Quit date: 3/16/2007     Smokeless tobacco: Never Used     Alcohol use No     Family History   Problem Relation Age of Onset     CEREBROVASCULAR DISEASE Mother      Arthritis Mother      OSTEOPOROSIS Mother      Alzheimer Disease Father      Arthritis Father      CANCER Father      DIABETES Maternal Grandmother      Cardiovascular Maternal Grandmother      Other Cancer Brother      CANCER Paternal Aunt      Hypertension No family hx of      Thyroid Disease No family hx of      Glaucoma No family hx of      Macular Degeneration No family hx of          Current Outpatient Prescriptions   Medication Sig Dispense Refill     azithromycin (ZITHROMAX) 250 MG tablet Two tablets first day, then one tablet daily for four days. 6 tablet 0     predniSONE (DELTASONE) 20 MG tablet Take 1 tablet (20 mg) by mouth 2 times daily 10 tablet 0     traMADol (ULTRAM) 50 MG tablet Take 1-2 tablets ( mg) by mouth every 6 hours as needed for pain maximum 3 tablet(s) per day 20 tablet 1     cyclobenzaprine (FLEXERIL) 10 MG tablet Take 1 tablet (10 mg) by mouth 3 times daily as needed for muscle spasms 30 tablet 1     blood glucose monitoring (ONE TOUCH DELICA) lancets Use to test  blood sugars 3 times daily or as directed. 300 each 3     blood glucose (NO BRAND SPECIFIED) lancing device Use to test blood sugars 3 times daily or as directed. 1 each 0     folic acid (FOLVITE) 1 MG tablet Take 1 tablet (1 mg) by mouth daily 100 tablet 3     hydroxychloroquine (PLAQUENIL) 200 MG tablet Take 1 tablet (200 mg) by mouth 2 times daily 180 tablet 1     methotrexate sodium 2.5 MG TABS Take 25 mg by mouth once a week . Take all 10 tablets on the same day of each week. 120 tablet 1     blood glucose monitoring (INDIA CONTOUR NEXT) test strip TEST THREE TIMES A DAY OR AS DIRECTED 300 each 3     omeprazole (PRILOSEC) 20 MG CR capsule Take 1 capsule (20 mg) by mouth daily 90 capsule 3     liraglutide (VICTOZA PEN) 18 MG/3ML soln Inject 1.8 mg Subcutaneous daily 27 mL 1     metFORMIN (GLUCOPHAGE) 500 MG tablet TAKE TWO TABLETS BY MOUTH TWICE A DAY WITH MEALS 360 tablet 1     insulin pen needle (ULTICARE MINI) 31G X 6 MM Use 1 daily or as directed. 100 each 1     HYDROcodone-acetaminophen (NORCO) 5-325 MG per tablet Take 1-2 tablets by mouth every 4 hours as needed for moderate to severe pain maximum 4 tablet(s) per day 28 tablet 0     pravastatin (PRAVACHOL) 40 MG tablet Take 1 tablet (40 mg) by mouth daily 90 tablet 3     MICROLET LANCETS MISC 1 Device daily 100 each 4     aspirin 81 MG tablet Take 1 tablet by mouth daily.  3     CALCIUM 500 + D 500-200 MG-IU OR TABS take 2 tabs daily       MULTI-VITAMIN OR None Entered       VITAMIN E 1000 UNIT OR CAPS 1 tablet daily       lisinopril (PRINIVIL/ZESTRIL) 2.5 MG tablet Take 1 tablet (2.5 mg) by mouth daily (Patient not taking: Reported on 8/14/2017) 90 tablet 3     [DISCONTINUED] lisinopril (PRINIVIL/ZESTRIL) 5 MG tablet Take 1 tablet (5 mg) by mouth daily (Patient not taking: Reported on 8/14/2017) 90 tablet 1     Labs reviewed in EPIC      Reviewed and updated as needed this visit by clinical staffTobacco  Allergies  Meds  Med Hx  Surg Hx  Fam Hx  Soc  Hx      Reviewed and updated as needed this visit by Provider         ROS:  Constitutional, HEENT, cardiovascular, pulmonary, GI, , musculoskeletal, neuro, skin, endocrine and psych systems are negative, except as otherwise noted.    This document serves as a record of the services and decisions personally performed and made by Kapil Duckworth MD. It was created on their behalf by Minh Coats, a trained medical scribe. The creation of this document is based the provider's statements to the medical scribe.  Minh Coats August 14, 2017 12:07 PM    OBJECTIVE:   /66  Pulse 63  Temp 97.3  F (36.3  C) (Oral)  Wt 90.3 kg (199 lb)  SpO2 97%  BMI 29.82 kg/m2  Body mass index is 29.82 kg/(m^2).  GENERAL: healthy, alert and no distress, answers all questions appropriately, appropriate grooming and affect, appears stated age  EYES: Eyes grossly normal to inspection, EOMI, conjunctivae and sclerae normal  HENT: ear canals and TM's normal, nose and mouth without ulcers or lesions  NECK: no adenopathy, no asymmetry, masses, or scars and thyroid normal to palpation  RESP: lungs clear to auscultation - no rales, rhonchi or wheezes, mild congestion   CV: regular rate and rhythm, normal S1 S2, no S3 or S4, no murmur, click or rub, no peripheral edema and peripheral pulses strong  SKIN: no suspicious lesions or rashes to visible skin   NEURO: mentation intact and speech normal  PSYCH: mentation appears normal, affect normal/bright    Diagnostic Test Results:  Results for orders placed or performed in visit on 08/09/17   CBC with platelets differential   Result Value Ref Range    WBC 7.1 4.0 - 11.0 10e9/L    RBC Count 4.24 (L) 4.4 - 5.9 10e12/L    Hemoglobin 13.6 13.3 - 17.7 g/dL    Hematocrit 39.3 (L) 40.0 - 53.0 %    MCV 93 78 - 100 fl    MCH 32.1 26.5 - 33.0 pg    MCHC 34.6 31.5 - 36.5 g/dL    RDW 14.2 10.0 - 15.0 %    Platelet Count 257 150 - 450 10e9/L    Diff Method Automated Method     % Neutrophils 76.0 %    %  Lymphocytes 11.9 %    % Monocytes 10.4 %    % Eosinophils 1.0 %    % Basophils 0.7 %    Absolute Neutrophil 5.4 1.6 - 8.3 10e9/L    Absolute Lymphocytes 0.9 0.8 - 5.3 10e9/L    Absolute Monocytes 0.7 0.0 - 1.3 10e9/L    Absolute Eosinophils 0.1 0.0 - 0.7 10e9/L    Absolute Basophils 0.1 0.0 - 0.2 10e9/L   Creatinine   Result Value Ref Range    Creatinine 1.17 0.66 - 1.25 mg/dL    GFR Estimate 61 >60 mL/min/1.7m2    GFR Estimate If Black 74 >60 mL/min/1.7m2   Erythrocyte sedimentation rate auto   Result Value Ref Range    Sed Rate 19 0 - 20 mm/h   CRP inflammation   Result Value Ref Range    CRP Inflammation 9.7 (H) 0.0 - 8.0 mg/L   Hepatic panel   Result Value Ref Range    Bilirubin Direct 0.2 0.0 - 0.2 mg/dL    Bilirubin Total 0.6 0.2 - 1.3 mg/dL    Albumin 3.7 3.4 - 5.0 g/dL    Protein Total 7.1 6.8 - 8.8 g/dL    Alkaline Phosphatase 49 40 - 150 U/L    ALT 29 0 - 70 U/L    AST 18 0 - 45 U/L     ASSESSMENT/PLAN:     (J20.9) Acute bronchitis, unspecified organism  (primary encounter diagnosis)  Comment: I am highly suspicious this is a chronic bronchitis and I feel the benefit from prednisone is something we want to pay close attention to. Further follow up depending on how things go   Plan: azithromycin (ZITHROMAX) 250 MG tablet,         predniSONE (DELTASONE) 20 MG tablet             Patient Instructions     - Let me know how you are feeling in about a week.    - Let me know if these symptoms reoccur.    - You can also try Mucinex.      [facility]    If you have any questions regarding to your visit please contact your care team:     Team Pink:   Clinic Hours Telephone Number   Internal Medicine:  Dr. Suze Zapata NP       7am-7pm  Monday - Thursday   7am-5pm  Fridays  [phone]  (Appointment scheduling available 24/7)    Questions about your visit?  Team Line  [phone]   Urgent Care - [place] and [facility] - 11am-9pm  Monday-Friday Saturday-Sunday- 9am-5pm   [place] - 5pm-9pm Monday-Friday Saturday-Sunday- 9am-5pm  [phone] - [place]   [phone] - [place]       What options do I have for visits at the clinic other than the traditional office visit?  To expand how we care for you, many of our providers are utilizing electronic visits (e-visits) and telephone visits, when medically appropriate, for interactions with their patients rather than a visit in the clinic.   We also offer nurse visits for many medical concerns. Just like any other service, we will bill your insurance company for this type of visit based on time spent on the phone with your provider. Not all insurance companies cover these visits. Please check with your medical insurance if this type of visit is covered. You will be responsible for any charges that are not paid by your insurance.      E-visits via [employer]:  generally incur a $35.00 fee.  Telephone visits:  Time spent on the phone: *charged based on time that is spent on the phone in increments of 10 minutes. Estimated cost:   5-10 mins $30.00   11-20 mins. $59.00   21-30 mins. $85.00   Use BLOVEShart (secure email communication and access to your chart) to send your primary care provider a message or make an appointment. Ask someone on your Team how to sign up for [employer].    For a Price Quote for your services, please call our Consumer Price Line at [phone].    As always, Thank you for trusting us with your health care needs!    Discharged by Conchita SANTILLAN CMA ([facility])      The information in this document, created by the medical scribe for me, accurately reflects the services I personally performed and the decisions made by me. I have reviewed and approved this document for accuracy.   MD Kapil Crane MD  [facility]

## 2017-08-14 NOTE — MR AVS SNAPSHOT
After Visit Summary   8/14/2017    Zurdo Dash    MRN: [id number]           Patient Information     Date Of Birth          1945        Visit Information        Provider Department      8/14/2017 11:50 AM Kapil Duckworth MD [facility]        Today's Diagnoses     Acute bronchitis, unspecified organism    -  1      Care Instructions    - Let me know how you are feeling in about a week.    - Let me know if these symptoms reoccur.    - You can also try Mucinex.      [facility]    If you have any questions regarding to your visit please contact your care team:     Team Pink:   Clinic Hours Telephone Number   Internal Medicine:  Dr. Suze Zapata NP       7am-7pm  Monday - Thursday   7am-5pm  Fridays  [phone]  (Appointment scheduling available 24/7)    Questions about your visit?  Team Line  [phone]   Urgent Care - [place] - 11am-9pm Monday-Friday Saturday-Sunday- 9am-5pm   [place] - 5pm-9pm Monday-Friday Saturday-Sunday- 9am-5pm  [phone] - [place]   [phone] - [place]       What options do I have for visits at the clinic other than the traditional office visit?  To expand how we care for you, many of our providers are utilizing electronic visits (e-visits) and telephone visits, when medically appropriate, for interactions with their patients rather than a visit in the clinic.   We also offer nurse visits for many medical concerns. Just like any other service, we will bill your insurance company for this type of visit based on time spent on the phone with your provider. Not all insurance companies cover these visits. Please check with your medical insurance if this type of visit is covered. You will be responsible for any charges that are not paid by your insurance.      E-visits via [employer]:  generally incur a $35.00 fee.  Telephone visits:  Time spent on the phone: *charged  based on time that is spent on the phone in increments of 10 minutes. Estimated cost:   5-10 mins $30.00   11-20 mins. $59.00   21-30 mins. $85.00   Use Well[employer] (secure email communication and access to your chart) to send your primary care provider a message or make an appointment. Ask someone on your Team how to sign up for [employer].    For a Price Quote for your services, please call our Consumer Price Line at [phone].    As always, Thank you for trusting us with your health care needs!    Discharged by Conchita SANTILLAN CMA ([facility])            Follow-ups after your visit        Your next 10 appointments already scheduled     Oct 25, 2017 10:15 AM CDT   Diabetic Education with  DIABETIC ED RESOURCE   [facility] ([facility])    [address]  [place] [address]   [phone]            Oct 31, 2017 11:00 AM CDT   LAB with  LAB   [facility] ([facility])    [address]  [place] [address] [phone]           Patient must bring picture ID. Patient should be prepared to give a urine specimen  Please do not eat 10-12 hours before your appointment if you are coming in fasting for labs on lipids, cholesterol, or glucose (sugar). Pregnant women should follow their Care Team instructions. Water with medications is okay. Do not drink coffee or other fluids. If you have concerns about taking  your medications, please ask at office or if scheduling via Xola, send a message by clicking on Secure Messaging, Message Your Care Team.            Nov 08, 2017  1:00 PM CST   Return Visit with MD [facility] ([facility])    [address]  [place] [address] [phone]              Who to contact     If you have questions or need follow up information about today's clinic visit or your schedule please contact [facility] directly at [phone].  Normal or non-critical lab and  "imaging results will be communicated to you by MyChart, letter or phone within 4 business days after the clinic has received the results. If you do not hear from us within 7 days, please contact the clinic through [employer] or phone. If you have a critical or abnormal lab result, we will notify you by phone as soon as possible.  Submit refill requests through Stripe or call your pharmacy and they will forward the refill request to us. Please allow 3 business days for your refill to be completed.          Additional Information About Your Visit        [employer] Information     Stripe lets you send messages to your doctor, view your test results, renew your prescriptions, schedule appointments and more. To sign up, go to [email or web address] . Click on \"Log in\" on the left side of the screen, which will take you to the Welcome page. Then click on \"Sign up Now\" on the right side of the page.     You will be asked to enter the access code listed below, as well as some personal information. Please follow the directions to create your username and password.     Your access code is: [email or web address]  Expires: 2017  2:00 PM     Your access code will  in 90 days. If you need help or a new code, please call your [facility] clinic or [phone].        Care EveryWhere ID     This is your Care EveryWhere ID. This could be used by other organizations to access your [facility] medical records  [id number]        Your Vitals Were     Pulse Temperature Pulse Oximetry BMI (Body Mass Index)          63 97.3  F (36.3  C) (Oral) 97% 29.82 kg/m2         Blood Pressure from Last 3 Encounters:   17 108/66   17 109/72   17 115/80    Weight from Last 3 Encounters:   17 199 lb (90.3 kg)   17 200 lb (90.7 kg)   17 207 lb 8 oz (94.1 kg)              Today, you had the following     No orders found for display         Today's Medication Changes          These changes are accurate as of: 17 12:17 " PM.  If you have any questions, ask your nurse or doctor.               Start taking these medicines.        Dose/Directions    azithromycin 250 MG tablet   Commonly known as:  ZITHROMAX   Used for:  Acute bronchitis, unspecified organism   Started by:  Kapil Duckworth MD        Two tablets first day, then one tablet daily for four days.   Quantity:  6 tablet   Refills:  0       predniSONE 20 MG tablet   Commonly known as:  DELTASONE   Used for:  Acute bronchitis, unspecified organism   Started by:  Kapil Duckworth MD        Dose:  20 mg   Take 1 tablet (20 mg) by mouth 2 times daily   Quantity:  10 tablet   Refills:  0         These medicines have changed or have updated prescriptions.        Dose/Directions    lisinopril 2.5 MG tablet   Commonly known as:  PRINIVIL/Zestril   This may have changed:  Another medication with the same name was removed. Continue taking this medication, and follow the directions you see here.   Used for:  Hypotension, unspecified hypotension type   Changed by:  Kapil Duckworth MD        Dose:  2.5 mg   Take 1 tablet (2.5 mg) by mouth daily   Quantity:  90 tablet   Refills:  3         Stop taking these medicines if you haven't already. Please contact your care team if you have questions.     hydrochlorothiazide 25 MG tablet   Commonly known as:  HYDRODIURIL   Stopped by:  Kapil Duckworth MD                Where to get your medicines      [facility], [facility] [address]     Phone:  [phone]     azithromycin 250 MG tablet    predniSONE 20 MG tablet                Primary Care Provider Office Phone # Fax #    Kapil Duckworth [phone]       [address] [place] [address]        Equal Access to Services     LIONEL BLACKMAN : Deniz Garcia, jens melo, aranza padilla. So [facility]  [phone].    ATENCIÓN: Si kalia goss, tiene a diallo disposición servicios gratuitos de asistencia lingüística. Nitesh sheldon [phone].    We comply with applicable federal civil rights laws and [place] laws. We do not discriminate on the basis of race, color, national origin, age, disability sex, sexual orientation or gender identity.            Thank you!     Thank you for choosing [facility]  for your care. Our goal is always to provide you with excellent care. Hearing back from our patients is one way we can continue to improve our services. Please take a few minutes to complete the written survey that you may receive in the mail after your visit with us. Thank you!             Your Updated Medication List - Protect others around you: Learn how to safely use, store and throw away your medicines at www.disposemymeds.org.          This list is accurate as of: 8/14/17 12:17 PM.  Always use your most recent med list.                   Brand Name Dispense Instructions for use Diagnosis    aspirin 81 MG tablet      Take 1 tablet by mouth daily.    Type 2 diabetes, HbA1c goal < 7% (H)       azithromycin 250 MG tablet    ZITHROMAX    6 tablet    Two tablets first day, then one tablet daily for four days.    Acute bronchitis, unspecified organism       blood glucose lancing device    no brand specified    1 each    Use to test blood sugars 3 times daily or as directed.    Type 2 diabetes mellitus, uncontrolled, with neuropathy (H)       blood glucose monitoring test strip    INDIA CONTOUR NEXT    300 each    TEST THREE TIMES A DAY OR AS DIRECTED    Type 2 diabetes mellitus, uncontrolled, with neuropathy (H)       CALCIUM 500 + D 500-200 MG-IU Tabs      take 2 tabs daily        cyclobenzaprine 10 MG tablet    FLEXERIL    30 tablet    Take 1 tablet (10 mg) by mouth 3 times daily as needed for muscle spasms    Neck pain       folic acid 1 MG tablet    FOLVITE    100 tablet    Take 1 tablet (1 mg) by mouth  daily    High risk medications (not anticoagulants) long-term use       HYDROcodone-acetaminophen 5-325 MG per tablet    NORCO    28 tablet    Take 1-2 tablets by mouth every 4 hours as needed for moderate to severe pain maximum 4 tablet(s) per day    Bruised ribs, unspecified laterality, sequela       hydroxychloroquine 200 MG tablet    PLAQUENIL    180 tablet    Take 1 tablet (200 mg) by mouth 2 times daily    High risk medications (not anticoagulants) long-term use       insulin pen needle 31G X 6 MM    ULTICARE MINI    100 each    Use 1 daily or as directed.    Type 2 diabetes mellitus, uncontrolled, with neuropathy (H)       liraglutide 18 MG/3ML soln    VICTOZA PEN    27 mL    Inject 1.8 mg Subcutaneous daily    Type 2 diabetes mellitus, uncontrolled, with neuropathy (H)       lisinopril 2.5 MG tablet    PRINIVIL/Zestril    90 tablet    Take 1 tablet (2.5 mg) by mouth daily    Hypotension, unspecified hypotension type       metFORMIN 500 MG tablet    GLUCOPHAGE    360 tablet    TAKE TWO TABLETS BY MOUTH TWICE A DAY WITH MEALS    Type 2 diabetes mellitus, uncontrolled, with neuropathy (H)       methotrexate sodium 2.5 MG Tabs     120 tablet    Take 25 mg by mouth once a week . Take all 10 tablets on the same day of each week.    High risk medications (not anticoagulants) long-term use       * MICROLET LANCETS Misc     100 each    1 Device daily    Type 2 diabetes, HbA1C goal < 8% (H)       * blood glucose monitoring lancets     300 each    Use to test blood sugars 3 times daily or as directed.    Type 2 diabetes mellitus, uncontrolled, with neuropathy (H)       MULTI-VITAMIN PO      None Entered        omeprazole 20 MG CR capsule    priLOSEC    90 capsule    Take 1 capsule (20 mg) by mouth daily    Gastroesophageal reflux disease without esophagitis       pravastatin 40 MG tablet    PRAVACHOL    90 tablet    Take 1 tablet (40 mg) by mouth daily    Hyperlipidemia LDL goal <100       predniSONE 20 MG tablet     DELTASONE    10 tablet    Take 1 tablet (20 mg) by mouth 2 times daily    Acute bronchitis, unspecified organism       traMADol 50 MG tablet    ULTRAM    20 tablet    Take 1-2 tablets ( mg) by mouth every 6 hours as needed for pain maximum 3 tablet(s) per day    Neck pain       vitamin E 1000 UNIT capsule    TOCOPHEROL     1 tablet daily        * Notice:  This list has 2 medication(s) that are the same as other medications prescribed for you. Read the directions carefully, and ask your doctor or other care provider to review them with you.

## 2017-08-14 NOTE — NURSING NOTE
"Chief Complaint   Patient presents with     URI     x3 weeks, not going away        Initial Pulse 63  Temp 97.3  F (36.3  C) (Oral)  Wt 199 lb (90.3 kg)  SpO2 97%  BMI 29.82 kg/m2 Estimated body mass index is 29.82 kg/(m^2) as calculated from the following:    Height as of 8/9/17: 5' 8.5\" (1.74 m).    Weight as of this encounter: 199 lb (90.3 kg).  Medication Reconciliation: complete   Conchita SANTILLAN CMA ([facility])      "

## 2017-08-14 NOTE — PATIENT INSTRUCTIONS
- Let me know how you are feeling in about a week.    - Let me know if these symptoms reoccur.    - You can also try Mucinex.      [facility]    If you have any questions regarding to your visit please contact your care team:     Team Pink:   Clinic Hours Telephone Number   Internal Medicine:  Dr. Suze Zapata, NP       7am-7pm  Monday - Thursday   7am-5pm  Fridays  [phone]  (Appointment scheduling available 24/7)    Questions about your visit?  Team Line  [phone]   [facility] [place] [facility] 11am-9pm Monday-Friday Saturday-Sunday- 9am-5pm   [place] - 5pm-9pm Monday-Friday Saturday-Sunday- 9am-5pm  [phone] - [place]   [phone] - [place]       What options do I have for visits at the clinic other than the traditional office visit?  To expand how we care for you, many of our providers are utilizing electronic visits (e-visits) and telephone visits, when medically appropriate, for interactions with their patients rather than a visit in the clinic.   We also offer nurse visits for many medical concerns. Just like any other service, we will bill your insurance company for this type of visit based on time spent on the phone with your provider. Not all insurance companies cover these visits. Please check with your medical insurance if this type of visit is covered. You will be responsible for any charges that are not paid by your insurance.      E-visits via TourRadar:  generally incur a $35.00 fee.  Telephone visits:  Time spent on the phone: *charged based on time that is spent on the phone in increments of 10 minutes. Estimated cost:   5-10 mins $30.00   11-20 mins. $59.00   21-30 mins. $85.00   Use TourRadar (secure email communication and access to your chart) to send your primary care provider a message or make an appointment. Ask someone on your Team how to sign up for TourRadar.    For a Price Quote for your  services, please call our Consumer Price Line at [phone].    As always, Thank you for trusting us with your health care needs!    Discharged by Conchita SANTILLAN CMA ([facility])

## 2017-08-28 ENCOUNTER — RADIANT APPOINTMENT (OUTPATIENT)
Dept: GENERAL RADIOLOGY | Facility: CLINIC | Age: 72
End: 2017-08-28
Attending: NURSE PRACTITIONER
Payer: COMMERCIAL

## 2017-08-28 ENCOUNTER — OFFICE VISIT (OUTPATIENT)
Dept: INTERNAL MEDICINE | Facility: CLINIC | Age: 72
End: 2017-08-28
Payer: COMMERCIAL

## 2017-08-28 VITALS
OXYGEN SATURATION: 97 % | HEART RATE: 65 BPM | WEIGHT: 197.4 LBS | TEMPERATURE: 97.6 F | BODY MASS INDEX: 29.58 KG/M2 | SYSTOLIC BLOOD PRESSURE: 90 MMHG | DIASTOLIC BLOOD PRESSURE: 70 MMHG

## 2017-08-28 DIAGNOSIS — I10 HYPERTENSION GOAL BP (BLOOD PRESSURE) < 140/90: ICD-10-CM

## 2017-08-28 DIAGNOSIS — E11.42 TYPE 2 DIABETES MELLITUS WITH DIABETIC POLYNEUROPATHY, WITH LONG-TERM CURRENT USE OF INSULIN (H): ICD-10-CM

## 2017-08-28 DIAGNOSIS — J01.90 ACUTE SINUSITIS WITH SYMPTOMS > 10 DAYS: Primary | ICD-10-CM

## 2017-08-28 DIAGNOSIS — J20.9 ACUTE BRONCHITIS WITH SYMPTOMS > 10 DAYS: ICD-10-CM

## 2017-08-28 DIAGNOSIS — Z79.4 TYPE 2 DIABETES MELLITUS WITH DIABETIC POLYNEUROPATHY, WITH LONG-TERM CURRENT USE OF INSULIN (H): ICD-10-CM

## 2017-08-28 PROCEDURE — 71020 XR CHEST 2 VW: CPT

## 2017-08-28 PROCEDURE — 99213 OFFICE O/P EST LOW 20 MIN: CPT | Performed by: NURSE PRACTITIONER

## 2017-08-28 RX ORDER — DOXYCYCLINE 100 MG/1
100 CAPSULE ORAL 2 TIMES DAILY
Qty: 14 CAPSULE | Refills: 0 | Status: SHIPPED | OUTPATIENT
Start: 2017-08-28 | End: 2017-09-04

## 2017-08-28 ASSESSMENT — PAIN SCALES - GENERAL: PAINLEVEL: NO PAIN (0)

## 2017-08-28 NOTE — PROGRESS NOTES
SUBJECTIVE:   Zurdo Dash is a 72 year old male who presents to clinic today for the following health issues:    Chief Complaint   Patient presents with     URI     intermittent ongoing issue for x2 weeks.     Forms     DMV       ENT Symptoms             Symptoms: cc Present Absent Comment   Fever/Chills   x    Fatigue   x    Muscle Aches   x    Eye Irritation   x    Sneezing  x     Nasal Blair/Drg  x     Sinus Pressure/Pain  x  Headache when coughing   Loss of smell   x    Dental pain   x    Sore Throat  x     Swollen Glands   x    Ear Pain/Fullness  x  Left ear   Cough  x  Minimally productive   Wheeze   x    Chest Pain  x  With coughing.   Shortness of breath   x    Rash   x    Other   x      Symptom duration:  x2 weeks   Symptom severity:  moderate-severe   Treatments tried:  zpack, cough drops   Contacts:  none     Symptoms initially improved after azithromycin, prednisone for a week.  Patient now estimates he's had symptoms for 3-4 days.    Patient forgot his form for the DMV.  Patient denies any episodes of syncope, hypoglycemia.    Patient notes his blood pressure has been low at home intermittently.   He denies any symptoms of dizziness, chest pain.    Problem list and histories reviewed & adjusted, as indicated.  Additional history: as documented    Patient Active Problem List   Diagnosis     Pain in limb     Diabetic neuropathy (H)     Hyperlipidemia LDL goal <100     Hypertension goal BP (blood pressure) < 140/90     obesity     ED (erectile dysfunction)     Hypogonadism     Advanced directives, counseling/discussion     Health Care Home     Type 2 diabetes mellitus with diabetic polyneuropathy, with long-term current use of insulin (H)     RBBB (right bundle branch block)     Lumbago     Ex-smoker     Cataracts, both eyes     Family history of esophageal cancer     Gastroesophageal reflux disease, esophagitis presence not specified     Rheumatoid arthritis involving multiple sites with positive  rheumatoid factor (H)     Pulmonary nodule     High risk medication use     Spondylosis of cervical region without myelopathy or radiculopathy     Past Surgical History:   Procedure Laterality Date     HC INCISION TENDON SHEATH FINGER  4/2009    r hand ring finger     TONSILLECTOMY         Social History   Substance Use Topics     Smoking status: Former Smoker     Packs/day: 1.00     Years: 40.00     Types: Cigarettes     Quit date: 3/16/2007     Smokeless tobacco: Never Used     Alcohol use No     Family History   Problem Relation Age of Onset     CEREBROVASCULAR DISEASE Mother      Arthritis Mother      OSTEOPOROSIS Mother      Alzheimer Disease Father      Arthritis Father      CANCER Father      DIABETES Maternal Grandmother      Cardiovascular Maternal Grandmother      Other Cancer Brother      CANCER Paternal Aunt      Hypertension No family hx of      Thyroid Disease No family hx of      Glaucoma No family hx of      Macular Degeneration No family hx of          Current Outpatient Prescriptions   Medication Sig Dispense Refill     metFORMIN (GLUCOPHAGE) 500 MG tablet TAKE TWO TABLETS BY MOUTH TWICE A DAY WITH MEALS 360 tablet 1     doxycycline Monohydrate 100 MG CAPS Take 1 capsule (100 mg) by mouth 2 times daily for 7 days 14 capsule 0     lisinopril (PRINIVIL/ZESTRIL) 2.5 MG tablet Take 1 tablet (2.5 mg) by mouth daily 90 tablet 3     traMADol (ULTRAM) 50 MG tablet Take 1-2 tablets ( mg) by mouth every 6 hours as needed for pain maximum 3 tablet(s) per day 20 tablet 1     blood glucose monitoring (ONE TOUCH DELICA) lancets Use to test blood sugars 3 times daily or as directed. 300 each 3     blood glucose (NO BRAND SPECIFIED) lancing device Use to test blood sugars 3 times daily or as directed. 1 each 0     folic acid (FOLVITE) 1 MG tablet Take 1 tablet (1 mg) by mouth daily 100 tablet 3     hydroxychloroquine (PLAQUENIL) 200 MG tablet Take 1 tablet (200 mg) by mouth 2 times daily 180 tablet 1      methotrexate sodium 2.5 MG TABS Take 25 mg by mouth once a week . Take all 10 tablets on the same day of each week. 120 tablet 1     blood glucose monitoring (INDIA CONTOUR NEXT) test strip TEST THREE TIMES A DAY OR AS DIRECTED 300 each 3     omeprazole (PRILOSEC) 20 MG CR capsule Take 1 capsule (20 mg) by mouth daily 90 capsule 3     liraglutide (VICTOZA PEN) 18 MG/3ML soln Inject 1.8 mg Subcutaneous daily 27 mL 1     insulin pen needle (ULTICARE MINI) 31G X 6 MM Use 1 daily or as directed. 100 each 1     HYDROcodone-acetaminophen (NORCO) 5-325 MG per tablet Take 1-2 tablets by mouth every 4 hours as needed for moderate to severe pain maximum 4 tablet(s) per day 28 tablet 0     pravastatin (PRAVACHOL) 40 MG tablet Take 1 tablet (40 mg) by mouth daily 90 tablet 3     MICROLET LANCETS MISC 1 Device daily 100 each 4     aspirin 81 MG tablet Take 1 tablet by mouth daily.  3     CALCIUM 500 + D 500-200 MG-IU OR TABS take 2 tabs daily       MULTI-VITAMIN OR None Entered       cyclobenzaprine (FLEXERIL) 10 MG tablet Take 1 tablet (10 mg) by mouth 3 times daily as needed for muscle spasms 30 tablet 1     [DISCONTINUED] metFORMIN (GLUCOPHAGE) 500 MG tablet TAKE TWO TABLETS BY MOUTH TWICE A DAY WITH MEALS 360 tablet 1     No Known Allergies  BP Readings from Last 3 Encounters:   08/28/17 90/70   08/14/17 108/66   08/09/17 109/72    Wt Readings from Last 3 Encounters:   08/28/17 197 lb 6.4 oz (89.5 kg)   08/14/17 199 lb (90.3 kg)   08/09/17 200 lb (90.7 kg)                  Labs reviewed in EPIC        Reviewed and updated as needed this visit by clinical staff     Reviewed and updated as needed this visit by Provider         ROS:  Constitutional, HEENT, cardiovascular, pulmonary, gi and gu systems are negative, except as otherwise noted.      OBJECTIVE:   BP 90/70 (BP Location: Left arm, Cuff Size: Adult Regular)  Pulse 65  Temp 97.6  F (36.4  C) (Oral)  Wt 197 lb 6.4 oz (89.5 kg)  SpO2 97%  BMI 29.58 kg/m2  Body mass  index is 29.58 kg/(m^2).  GENERAL: healthy, alert and no distress  EYES: Eyes grossly normal to inspection, PERRL and conjunctivae and sclerae normal  HENT: ear canals and TM's normal, nose and mouth without ulcers or lesions  NECK: no adenopathy, no asymmetry, masses, or scars and thyroid normal to palpation  RESP: rhonchi bibasilar  CV: regular rate and rhythm, normal S1 S2, no S3 or S4, no murmur, click or rub, no peripheral edema and peripheral pulses strong  MS: no gross musculoskeletal defects noted, no edema    Diagnostic Test Results:  pending    ASSESSMENT/PLAN:     1. Acute sinusitis with symptoms > 10 days    - doxycycline Monohydrate 100 MG CAPS; Take 1 capsule (100 mg) by mouth 2 times daily for 7 days  Dispense: 14 capsule; Refill: 0    2. Acute bronchitis with symptoms > 10 days  Will check CXR given length of symptoms.  - doxycycline Monohydrate 100 MG CAPS; Take 1 capsule (100 mg) by mouth 2 times daily for 7 days  Dispense: 14 capsule; Refill: 0  - XR Chest 2 Views; Future    3. Type 2 diabetes mellitus with diabetic polyneuropathy, with long-term current use of insulin (H)  Will await DMV form.  - metFORMIN (GLUCOPHAGE) 500 MG tablet; TAKE TWO TABLETS BY MOUTH TWICE A DAY WITH MEALS  Dispense: 360 tablet; Refill: 1    4. Hypertension goal BP (blood pressure) < 140/90  Patient to monitor at home and if SBP is consistently less than 100, he should contact clinic to have lisinopril discontinued.      FUTURE APPOINTMENTS:       - Follow-up for annual visit or as needed    MANASA Ventura CNP  [facility]

## 2017-08-28 NOTE — NURSING NOTE
"Chief Complaint   Patient presents with     URI     intermittent ongoing issue for x2 weeks.     Forms     DMV       Initial BP 90/70 (BP Location: Left arm, Cuff Size: Adult Regular)  Pulse 65  Temp 97.6  F (36.4  C) (Oral)  Wt 197 lb 6.4 oz (89.5 kg)  SpO2 97%  BMI 29.58 kg/m2 Estimated body mass index is 29.58 kg/(m^2) as calculated from the following:    Height as of 8/9/17: 5' 8.5\" (1.74 m).    Weight as of this encounter: 197 lb 6.4 oz (89.5 kg).  Medication Reconciliation: complete       Amalia Murillo CMA      "

## 2017-08-28 NOTE — MR AVS SNAPSHOT
After Visit Summary   8/28/2017    Zurdo Dash    MRN: [id number]           Patient Information     Date Of Birth          1945        Visit Information        Provider Department      8/28/2017 2:40 PM Elli Zapata APRN [facility]3]        Today's Diagnoses     Acute sinusitis with symptoms > 10 days    -  1    Acute bronchitis with symptoms > 10 days        Type 2 diabetes mellitus with diabetic polyneuropathy, with long-term current use of insulin (H)        Hypertension goal BP (blood pressure) < 140/90          Care Instructions    [facility]    If you have any questions regarding to your visit please contact your care team:     Team Pink:   Clinic Hours Telephone Number   Internal Medicine:  Dr. Suze Zapata, NP       7am-7pm  Monday - Thursday   7am-5pm  Fridays  [phone]  (Appointment scheduling available 24/7)    Questions about your visit?  Team Line  [phone]   Urgent Care - [place] - 11am-9pm Monday-Friday Saturday-Sunday- 9am-5pm   [place] - 5pm-9pm Monday-Friday Saturday-Sunday- 9am-5pm  [phone] - [place]   [phone] - [place]       What options do I have for visits at the clinic other than the traditional office visit?  To expand how we care for you, many of our providers are utilizing electronic visits (e-visits) and telephone visits, when medically appropriate, for interactions with their patients rather than a visit in the clinic.   We also offer nurse visits for many medical concerns. Just like any other service, we will bill your insurance company for this type of visit based on time spent on the phone with your provider. Not all insurance companies cover these visits. Please check with your medical insurance if this type of visit is covered. You will be responsible for any charges that are not paid by your insurance.      E-visits via  Foodorohart:  generally incur a $35.00 fee.  Telephone visits:  Time spent on the phone: *charged based on time that is spent on the phone in increments of 10 minutes. Estimated cost:   5-10 mins $30.00   11-20 mins. $59.00   21-30 mins. $85.00   Use Foodorohart (secure email communication and access to your chart) to send your primary care provider a message or make an appointment. Ask someone on your Team how to sign up for [employer] Store.    For a Price Quote for your services, please call our Consumer Price Line at [phone].    As always, Thank you for trusting us with your health care needs!    Amalia Murillo CMA            Follow-ups after your visit        Your next 10 appointments already scheduled     Oct 25, 2017 10:15 AM CDT   Diabetic Education with  DIABETIC ED RESOURCE   [facility]    [address] [place] [id number]   [phone]            Oct 31, 2017 11:00 AM CDT   LAB with  LAB   [facility] ([facility])    [address] [place] [id number]   [phone]           Patient must bring picture ID. Patient should be prepared to give a urine specimen  Please do not eat 10-12 hours before your appointment if you are coming in fasting for labs on lipids, cholesterol, or glucose (sugar). Pregnant women should follow their Care Team instructions. Water with medications is okay. Do not drink coffee or other fluids. If you have concerns about taking  your medications, please ask at office or if scheduling via Genesantt, send a message by clicking on Secure Messaging, Message Your Care Team.            Nov 08, 2017  1:00 PM CST   Return Visit with Albert Tompkins MD   [facility] ([facility])    [address] [address]   [phone]              Future tests that were ordered for you today     Open Future Orders        Priority Expected Expires Ordered    XR Chest 2 Views Routine  "2017            Who to contact     If you have questions or need follow up information about today's clinic visit or your schedule please contact [facility] directly at [phone].  Normal or non-critical lab and imaging results will be communicated to you by MyChart, letter or phone within 4 business days after the clinic has received the results. If you do not hear from us within 7 days, please contact the clinic through TVU Networkshart or phone. If you have a critical or abnormal lab result, we will notify you by phone as soon as possible.  Submit refill requests through CoderBuddy or call your pharmacy and they will forward the refill request to us. Please allow 3 business days for your refill to be completed.          Additional Information About Your Visit        [employer] Information     CoderBuddy lets you send messages to your doctor, view your test results, renew your prescriptions, schedule appointments and more. To sign up, go to [email or web address] . Click on \"Log in\" on the left side of the screen, which will take you to the Welcome page. Then click on \"Sign up Now\" on the right side of the page.     You will be asked to enter the access code listed below, as well as some personal information. Please follow the directions to create your username and password.     Your access code is: [email or web address]  Expires: 2017  2:00 PM     Your access code will  in 90 days. If you need help or a new code, please call your [place] clinic or [phone].        Care EveryWhere ID     This is your Care EveryWhere ID. This could be used by other organizations to access your [place] medical records  [id number]        Your Vitals Were     Pulse Temperature Pulse Oximetry BMI (Body Mass Index)          65 97.6  F (36.4  C) (Oral) 97% 29.58 kg/m2         Blood Pressure from Last 3 Encounters:   17 90/70   17 108/66   17 109/72    Weight from Last 3 Encounters: "   08/28/17 197 lb 6.4 oz (89.5 kg)   08/14/17 199 lb (90.3 kg)   08/09/17 200 lb (90.7 kg)                 Today's Medication Changes          These changes are accurate as of: 8/28/17  3:19 PM.  If you have any questions, ask your nurse or doctor.               Start taking these medicines.        Dose/Directions    doxycycline Monohydrate 100 MG Caps   Used for:  Acute sinusitis with symptoms > 10 days, Acute bronchitis with symptoms > 10 days   Started by:  Elli Zapata APRN CNP        Dose:  100 mg   Take 1 capsule (100 mg) by mouth 2 times daily for 7 days   Quantity:  14 capsule   Refills:  0            Where to get your medicines      These medications were sent to [facility] [place]  [place] - [address]  [address], [facility] [address]     Phone:  [phone]     doxycycline Monohydrate 100 MG Caps    metFORMIN 500 MG tablet                Primary Care Provider Office Phone # Fax #    Kapil Duckworth [phone] [address] [address]        Equal Access to Services     [facility]: Hadii art ku hadasho Soomaali, waaxda luqadaha, qaybta kaalmada adeegyada, aranza zhong . So [facility] [phone].    ATENCIÓN: Si habla español, tiene a diallo disposición servicios gratuitos de asistencia lingüística. Llame al [phone].    We comply with applicable federal civil rights laws and [place] laws. We do not discriminate on the basis of race, color, national origin, age, disability sex, sexual orientation or gender identity.            Thank you!     Thank you for choosing [facility]  for your care. Our goal is always to provide you with excellent care. Hearing back from our patients is one way we can continue to improve our services. Please take a few minutes to complete the written survey that you may receive in the mail after your visit with us. Thank you!             Your  Updated Medication List - Protect others around you: Learn how to safely use, store and throw away your medicines at www.disposemymeds.org.          This list is accurate as of: 8/28/17  3:19 PM.  Always use your most recent med list.                   Brand Name Dispense Instructions for use Diagnosis    aspirin 81 MG tablet      Take 1 tablet by mouth daily.    Type 2 diabetes, HbA1c goal < 7% (H)       blood glucose lancing device    no brand specified    1 each    Use to test blood sugars 3 times daily or as directed.    Type 2 diabetes mellitus, uncontrolled, with neuropathy (H)       blood glucose monitoring test strip    INDIA CONTOUR NEXT    300 each    TEST THREE TIMES A DAY OR AS DIRECTED    Type 2 diabetes mellitus, uncontrolled, with neuropathy (H)       CALCIUM 500 + D 500-200 MG-IU Tabs      take 2 tabs daily        cyclobenzaprine 10 MG tablet    FLEXERIL    30 tablet    Take 1 tablet (10 mg) by mouth 3 times daily as needed for muscle spasms    Neck pain       doxycycline Monohydrate 100 MG Caps     14 capsule    Take 1 capsule (100 mg) by mouth 2 times daily for 7 days    Acute sinusitis with symptoms > 10 days, Acute bronchitis with symptoms > 10 days       folic acid 1 MG tablet    FOLVITE    100 tablet    Take 1 tablet (1 mg) by mouth daily    High risk medications (not anticoagulants) long-term use       HYDROcodone-acetaminophen 5-325 MG per tablet    NORCO    28 tablet    Take 1-2 tablets by mouth every 4 hours as needed for moderate to severe pain maximum 4 tablet(s) per day    Bruised ribs, unspecified laterality, sequela       hydroxychloroquine 200 MG tablet    PLAQUENIL    180 tablet    Take 1 tablet (200 mg) by mouth 2 times daily    High risk medications (not anticoagulants) long-term use       insulin pen needle 31G X 6 MM    ULTICARE MINI    100 each    Use 1 daily or as directed.    Type 2 diabetes mellitus, uncontrolled, with neuropathy (H)       liraglutide 18 MG/3ML soln     VICTOZA PEN    27 mL    Inject 1.8 mg Subcutaneous daily    Type 2 diabetes mellitus, uncontrolled, with neuropathy (H)       lisinopril 2.5 MG tablet    PRINIVIL/Zestril    90 tablet    Take 1 tablet (2.5 mg) by mouth daily    Hypotension, unspecified hypotension type       metFORMIN 500 MG tablet    GLUCOPHAGE    360 tablet    TAKE TWO TABLETS BY MOUTH TWICE A DAY WITH MEALS    Type 2 diabetes mellitus with diabetic polyneuropathy, with long-term current use of insulin (H)       methotrexate sodium 2.5 MG Tabs     120 tablet    Take 25 mg by mouth once a week . Take all 10 tablets on the same day of each week.    High risk medications (not anticoagulants) long-term use       * MICROLET LANCETS Misc     100 each    1 Device daily    Type 2 diabetes, HbA1C goal < 8% (H)       * blood glucose monitoring lancets     300 each    Use to test blood sugars 3 times daily or as directed.    Type 2 diabetes mellitus, uncontrolled, with neuropathy (H)       MULTI-VITAMIN PO      None Entered        omeprazole 20 MG CR capsule    priLOSEC    90 capsule    Take 1 capsule (20 mg) by mouth daily    Gastroesophageal reflux disease without esophagitis       pravastatin 40 MG tablet    PRAVACHOL    90 tablet    Take 1 tablet (40 mg) by mouth daily    Hyperlipidemia LDL goal <100       traMADol 50 MG tablet    ULTRAM    20 tablet    Take 1-2 tablets ( mg) by mouth every 6 hours as needed for pain maximum 3 tablet(s) per day    Neck pain       * Notice:  This list has 2 medication(s) that are the same as other medications prescribed for you. Read the directions carefully, and ask your doctor or other care provider to review them with you.

## 2017-08-28 NOTE — LETTER
[facility]  [address] [place] [address]    August 31, 2017    Zurdo Dash  [address]  [place] [address]          Dear Zurdo,    No acute findings on x-ray.  It does look like you may have actually suffered rib fractures last October to your right ribs, which were not picked up on previous x-rays.  The appear to be healed at this time.     If you have any questions please feel free to contact [phone] or myself via Embedded Chat    Enclosed is a copy of your results.     Results for orders placed or performed in visit on 08/28/17   XR Chest 2 Views    Narrative    CHEST TWO VIEWS  8/28/2017 3:53 PM     HISTORY: 72-year-old with acute bronchitis.       Impression    IMPRESSION: Since January 16, 2017, heart size remains normal. Opacity  adjacent to the left heart border thought to be pericardial fat pad.  No pleural effusion, pneumothorax, or abnormal area of consolidation.  Calcified lymph nodes in the right hilum as well as right lung.  Suspect healed lateral right rib fractures, new since previous exam.    PRASANTH MORALES MD       If you have any questions or concerns, please call myself or my nurse at [phone].      Sincerely,        Elli Zapata CNP/FIGUEROA

## 2017-08-29 ENCOUNTER — TELEPHONE (OUTPATIENT)
Dept: INTERNAL MEDICINE | Facility: CLINIC | Age: 72
End: 2017-08-29

## 2017-08-29 NOTE — TELEPHONE ENCOUNTER
[place] Department of Public Safety-Insulin Treated Diabetes Mellitus Report received and given to Dr. Duckworth to complete and sign. Caren Telles,

## 2017-08-29 NOTE — TELEPHONE ENCOUNTER
Reason for Call:  Form, our goal is to have forms completed with 72 hours, however, some forms may require a visit or additional information.    Type of letter, form or note:  [place] Dept of Public [employer]    Who is the form from?: Patient    Where did the form come from: Patient or family brought in       What clinic location was the form placed at?: [facility]    Where the form was placed: 's Box    What number is listed as a contact on the form?: [phone] Fax       Additional comments: Please call Zurdo when done [phone]    Call taken on 8/29/2017 at 12:21 PM by Anika Espinoza

## 2017-08-30 NOTE — TELEPHONE ENCOUNTER
Form completed, signed and faxed to [phone].  Patient called and wife informed. Copy mailed to patient's home.  Caren Telles,

## 2017-08-30 NOTE — PROGRESS NOTES
Dear Zurdo,    Your recent test results are attached.      No acute findings on x-ray.  It does look like you may have actually suffered rib fractures last October to your right ribs, which were not picked up on previous x-rays.  The appear to be healed at this time.    If you have any questions please feel free to contact [phone] or myself via [employer].    Sincerely,  Elli Zapata, CNP
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
## 2017-09-06 ENCOUNTER — OFFICE VISIT (OUTPATIENT)
Dept: INTERNAL MEDICINE | Facility: CLINIC | Age: 72
End: 2017-09-06
Payer: COMMERCIAL

## 2017-09-06 VITALS
SYSTOLIC BLOOD PRESSURE: 110 MMHG | BODY MASS INDEX: 28.58 KG/M2 | HEIGHT: 69 IN | OXYGEN SATURATION: 97 % | HEART RATE: 69 BPM | TEMPERATURE: 96.9 F | DIASTOLIC BLOOD PRESSURE: 82 MMHG | WEIGHT: 193 LBS

## 2017-09-06 DIAGNOSIS — J20.9 ACUTE BRONCHITIS, UNSPECIFIED ORGANISM: ICD-10-CM

## 2017-09-06 DIAGNOSIS — J01.90 ACUTE SINUSITIS WITH SYMPTOMS > 10 DAYS: Primary | ICD-10-CM

## 2017-09-06 PROCEDURE — 99213 OFFICE O/P EST LOW 20 MIN: CPT | Performed by: NURSE PRACTITIONER

## 2017-09-06 PROCEDURE — 99207 C PAF COMPLETED  NO CHARGE: CPT | Performed by: NURSE PRACTITIONER

## 2017-09-06 RX ORDER — CEFDINIR 300 MG/1
300 CAPSULE ORAL 2 TIMES DAILY
Qty: 20 CAPSULE | Refills: 0 | Status: SHIPPED | OUTPATIENT
Start: 2017-09-06 | End: 2017-11-07

## 2017-09-06 RX ORDER — ALBUTEROL SULFATE 90 UG/1
2 AEROSOL, METERED RESPIRATORY (INHALATION) EVERY 4 HOURS PRN
Qty: 1 INHALER | Refills: 0 | Status: SHIPPED | OUTPATIENT
Start: 2017-09-06 | End: 2018-03-09

## 2017-09-06 NOTE — MR AVS SNAPSHOT
"              After Visit Summary   9/6/2017    Zurdo Dash    MRN: [id number]           Patient Information     Date Of Birth          1945        Visit Information        Provider Department      9/6/2017 1:40 PM Elli Zapata APRN [facility]        Today's Diagnoses     Acute sinusitis with symptoms > 10 days    -  1    Acute bronchitis, unspecified organism          Care Instructions    Hold methotrexate while on antibiotic.      Using an Inhaler  Your healthcare provider may prescribe medicine that you breathe in using a metered-dose inhaler (MDI). An inhaler sends a measured amount of medicine in a fine mist.  Step 1:    Shake the inhaler and remove the cap.    Take a deep breath and let it out.  Step 2:    Close your lips around the end of the inhaler mouthpiece. Or if you were told to use the \"open-mouth\" method, hold the inhaler 1 to 2 inches from your mouth.  Step 3:    Breathe in slowly and deeply as you press down on the inhaler to release the medicine.    Inhale fully.  Step 4:    Hold your breath for a count of 10, or as long as you can comfortably.    Then breathe out slowly through your mouth.    Repeat these steps for each puff of medicine prescribed.             Important    If the inhaler is being used for the first time or has not been used for a while, prime it as directed by the product maker. You can find important information about the medicine in the package insert. This is the paper that comes with the medicine.    If you use more than one inhaler, make sure you know which one to use first.    Your healthcare provider or pharmacist can show you how to use your inhaler the right way. Even if you think you are using it the right way, it is still a good idea to check.   Date Last Reviewed: 10/1/2016    9889-9421 [employer] [address] [place] [address], [place] [address]. All rights reserved. This information is not intended as a substitute for " professional medical care. Always follow your healthcare professional's instructions.      [facility]    If you have any questions regarding to your visit please contact your care team:     Team Pink:   Clinic Hours Telephone Number   Internal Medicine:  Dr. Suze Zapata, NP       7am-7pm  Monday - Thursday   7am-5pm  Fridays  [phone]  (Appointment scheduling available 24/7)    Questions about your visit?  Team Line  [phone]   Urgent Care - [place] - 11am-9pm Monday-Friday Saturday-Sunday- 9am-5pm   [place] - 5pm-9pm Monday-Friday Saturday-Sunday- 9am-5pm  [phone] - [place]   [phone] - [place]       What options do I have for visits at the clinic other than the traditional office visit?  To expand how we care for you, many of our providers are utilizing electronic visits (e-visits) and telephone visits, when medically appropriate, for interactions with their patients rather than a visit in the clinic.   We also offer nurse visits for many medical concerns. Just like any other service, we will bill your insurance company for this type of visit based on time spent on the phone with your provider. Not all insurance companies cover these visits. Please check with your medical insurance if this type of visit is covered. You will be responsible for any charges that are not paid by your insurance.      E-visits via stylemarks:  generally incur a $35.00 fee.  Telephone visits:  Time spent on the phone: *charged based on time that is spent on the phone in increments of 10 minutes. Estimated cost:   5-10 mins $30.00   11-20 mins. $59.00   21-30 mins. $85.00   Use [employer] (secure email communication and access to your chart) to send your primary care provider a message or make an appointment. Ask someone on your Team how to sign up for stylemarks.    For a Price Quote for your services, please call our Consumer Price Line at  [phone].    As always, Thank you for trusting us with your health care needs!    Discharged by Conchita SANTILLAN CMA ([facility])            Follow-ups after your visit        Your next 10 appointments already scheduled     Oct 25, 2017 10:15 AM CDT   Diabetic Education with [facility] ED RESOURCE   [facility]    [address] [place] [address]  [place] [address]   [phone]            Oct 31, 2017 11:00 AM CDT   LAB with [facility] LAB   [facility]    [address] [place] [id number]   [phone]           Patient must bring picture ID. Patient should be prepared to give a urine specimen  Please do not eat 10-12 hours before your appointment if you are coming in fasting for labs on lipids, cholesterol, or glucose (sugar). Pregnant women should follow their Care Team instructions. Water with medications is okay. Do not drink coffee or other fluids. If you have concerns about taking  your medications, please ask at office or if scheduling via MOG, send a message by clicking on Secure Messaging, Message Your Care Team.            Nov 08, 2017  1:00 PM CST   [facility] Visit with Albert Tompkins MD   [facility]    [address] [place] [id number]   [phone]              Who to contact     If you have questions or need follow up information about today's clinic visit or your schedule please contact [facility] directly at [phone].  Normal or non-critical lab and imaging results will be communicated to you by MyChart, letter or phone within 4 business days after the clinic has received the results. If you do not hear from us within 7 days, please contact the clinic through [employer] or phone. If you have a critical or abnormal lab result, we will notify you by phone as soon as possible.  Submit refill requests through MOG or call your pharmacy and they will forward the  "refill request to us. Please allow 3 business days for your refill to be completed.          Additional Information About Your Visit        [facility] Information     [employer] lets you send messages to your doctor, view your test results, renew your prescriptions, schedule appointments and more. To sign up, go to [email or web address] . Click on \"Log in\" on the left side of the screen, which will take you to the Welcome page. Then click on \"Sign up Now\" on the right side of the page.     You will be asked to enter the access code listed below, as well as some personal information. Please follow the directions to create your username and password.     Your access code is: [email or web address]  Expires: 2017  2:00 PM     Your access code will  in 90 days. If you need help or a new code, please call your [place] clinic or [phone].        Care EveryWhere ID     This is your Care EveryWhere ID. This could be used by other organizations to access your [place] medical records  [id number]        Your Vitals Were     Pulse Temperature Height Pulse Oximetry BMI (Body Mass Index)       69 96.9  F (36.1  C) (Oral) 5' 8.5\" (1.74 m) 97% 28.92 kg/m2        Blood Pressure from Last 3 Encounters:   17 110/82   17 90/70   17 108/66    Weight from Last 3 Encounters:   17 193 lb (87.5 kg)   17 197 lb 6.4 oz (89.5 kg)   17 199 lb (90.3 kg)              We Performed the Following     PAF COMPLETED          Today's Medication Changes          These changes are accurate as of: 17  2:13 PM.  If you have any questions, ask your nurse or doctor.               Start taking these medicines.        Dose/Directions    albuterol 108 (90 BASE) MCG/ACT Inhaler   Commonly known as:  PROAIR HFA/PROVENTIL HFA/VENTOLIN HFA   Used for:  Acute bronchitis, unspecified organism   Started by:  Elli Zapata APRN CNP        Dose:  2 puff   Inhale 2 puffs into the lungs every 4 hours as needed for " other (coughing)   Quantity:  1 Inhaler   Refills:  0       cefdinir 300 MG capsule   Commonly known as:  OMNICEF   Used for:  Acute sinusitis with symptoms > 10 days, Acute bronchitis, unspecified organism   Started by:  Elli Zapata APRN CNP        Dose:  300 mg   Take 1 capsule (300 mg) by mouth 2 times daily   Quantity:  20 capsule   Refills:  0            Where to get your medicines      These medications were sent [place] [facility] [place] - [address]  [address], [facility] [address]     Phone:  [phone]     albuterol 108 (90 BASE) MCG/ACT Inhaler    cefdinir 300 MG capsule                Primary Care Provider Office Phone # Fax #    Kapil Duckworth [phone] [address] [address]        Equal Access to Services     [facility]: Hadii art brown hadasho Soomaali, waaxda luqadaha, qaybta kaalmada adeegyada, waxsena matthew haynithin zhong . So [facility] [phone].    ATENCIÓN: Si habla español, tiene a diallo disposición servicios gratuitos de asistencia lingüística. Llame al [phone].    We comply with applicable federal civil rights laws and [place] laws. We do not discriminate on the basis of race, color, national origin, age, disability sex, sexual orientation or gender identity.            Thank you!     Thank you for choosing [facility]  for your care. Our goal is always to provide you with excellent care. Hearing back from our patients is one way we can continue to improve our services. Please take a few minutes to complete the written survey that you may receive in the mail after your visit with us. Thank you!             Your Updated Medication List - Protect others around you: Learn how to safely use, store and throw away your medicines at www.disposemymeds.org.          This list is accurate as of: 9/6/17  2:13 PM.  Always use your most recent med list.                    Brand Name Dispense Instructions for use Diagnosis    albuterol 108 (90 BASE) MCG/ACT Inhaler    PROAIR HFA/PROVENTIL HFA/VENTOLIN HFA    1 Inhaler    Inhale 2 puffs into the lungs every 4 hours as needed for other (coughing)    Acute bronchitis, unspecified organism       aspirin 81 MG tablet      Take 1 tablet by mouth daily.    Type 2 diabetes, HbA1c goal < 7% (H)       blood glucose lancing device    no brand specified    1 each    Use to test blood sugars 3 times daily or as directed.    Type 2 diabetes mellitus, uncontrolled, with neuropathy (H)       blood glucose monitoring test strip    INDIA CONTOUR NEXT    300 each    TEST THREE TIMES A DAY OR AS DIRECTED    Type 2 diabetes mellitus, uncontrolled, with neuropathy (H)       CALCIUM 500 + D 500-200 MG-IU Tabs      take 2 tabs daily        cefdinir 300 MG capsule    OMNICEF    20 capsule    Take 1 capsule (300 mg) by mouth 2 times daily    Acute sinusitis with symptoms > 10 days, Acute bronchitis, unspecified organism       cyclobenzaprine 10 MG tablet    FLEXERIL    30 tablet    Take 1 tablet (10 mg) by mouth 3 times daily as needed for muscle spasms    Neck pain       folic acid 1 MG tablet    FOLVITE    100 tablet    Take 1 tablet (1 mg) by mouth daily    High risk medications (not anticoagulants) long-term use       HYDROcodone-acetaminophen 5-325 MG per tablet    NORCO    28 tablet    Take 1-2 tablets by mouth every 4 hours as needed for moderate to severe pain maximum 4 tablet(s) per day    Bruised ribs, unspecified laterality, sequela       hydroxychloroquine 200 MG tablet    PLAQUENIL    180 tablet    Take 1 tablet (200 mg) by mouth 2 times daily    High risk medications (not anticoagulants) long-term use       insulin pen needle 31G X 6 MM    ULTICARE MINI    100 each    Use 1 daily or as directed.    Type 2 diabetes mellitus, uncontrolled, with neuropathy (H)       liraglutide 18 MG/3ML soln    VICTOZA PEN    27 mL    Inject 1.8 mg Subcutaneous  daily    Type 2 diabetes mellitus, uncontrolled, with neuropathy (H)       lisinopril 2.5 MG tablet    PRINIVIL/Zestril    90 tablet    Take 1 tablet (2.5 mg) by mouth daily    Hypotension, unspecified hypotension type       metFORMIN 500 MG tablet    GLUCOPHAGE    360 tablet    TAKE TWO TABLETS BY MOUTH TWICE A DAY WITH MEALS    Type 2 diabetes mellitus with diabetic polyneuropathy, with long-term current use of insulin (H)       methotrexate sodium 2.5 MG Tabs     120 tablet    Take 25 mg by mouth once a week . Take all 10 tablets on the same day of each week.    High risk medications (not anticoagulants) long-term use       * MICROLET LANCETS Misc     100 each    1 Device daily    Type 2 diabetes, HbA1C goal < 8% (H)       * blood glucose monitoring lancets     300 each    Use to test blood sugars 3 times daily or as directed.    Type 2 diabetes mellitus, uncontrolled, with neuropathy (H)       MULTI-VITAMIN PO      None Entered        omeprazole 20 MG CR capsule    priLOSEC    90 capsule    Take 1 capsule (20 mg) by mouth daily    Gastroesophageal reflux disease without esophagitis       pravastatin 40 MG tablet    PRAVACHOL    90 tablet    Take 1 tablet (40 mg) by mouth daily    Hyperlipidemia LDL goal <100       traMADol 50 MG tablet    ULTRAM    20 tablet    Take 1-2 tablets ( mg) by mouth every 6 hours as needed for pain maximum 3 tablet(s) per day    Neck pain       * Notice:  This list has 2 medication(s) that are the same as other medications prescribed for you. Read the directions carefully, and ask your doctor or other care provider to review them with you.

## 2017-09-06 NOTE — PATIENT INSTRUCTIONS
"Hold methotrexate while on antibiotic.      Using an Inhaler  Your healthcare provider may prescribe medicine that you breathe in using a metered-dose inhaler (MDI). An inhaler sends a measured amount of medicine in a fine mist.  Step 1:    Shake the inhaler and remove the cap.    Take a deep breath and let it out.  Step 2:    Close your lips around the end of the inhaler mouthpiece. Or if you were told to use the \"open-mouth\" method, hold the inhaler 1 to 2 inches from your mouth.  Step 3:    Breathe in slowly and deeply as you press down on the inhaler to release the medicine.    Inhale fully.  Step 4:    Hold your breath for a count of 10, or as long as you can comfortably.    Then breathe out slowly through your mouth.    Repeat these steps for each puff of medicine prescribed.             Important    If the inhaler is being used for the first time or has not been used for a while, prime it as directed by the product maker. You can find important information about the medicine in the package insert. This is the paper that comes with the medicine.    If you use more than one inhaler, make sure you know which one to use first.    Your healthcare provider or pharmacist can show you how to use your inhaler the right way. Even if you think you are using it the right way, it is still a good idea to check.   Date Last Reviewed: 10/1/2016    2341-8337 [employer]. [address] [place] [address]. All rights reserved. This information is not intended as a substitute for professional medical care. Always follow your healthcare professional's instructions.      [facility]    If you have any questions regarding to your visit please contact your care team:     Team Pink:   Clinic Hours Telephone Number   Internal Medicine:  Dr. Suze Zapata NP       7am-7pm  Monday - Thursday   7am-5pm  Fridays  [phone]  (Appointment scheduling available " 24/7)    Questions about your visit?  Team Line  [phone]   Urgent Care - [place] and [place] - 11am-9pm Monday-Friday Saturday-Sunday- 9am-5pm   [place] - 5pm-9pm Monday-Friday Saturday-Sunday- 9am-5pm  [phone] - [place]   [phone] - [place]       What options do I have for visits at the clinic other than the traditional office visit?  To expand how we care for you, many of our providers are utilizing electronic visits (e-visits) and telephone visits, when medically appropriate, for interactions with their patients rather than a visit in the clinic.   We also offer nurse visits for many medical concerns. Just like any other service, we will bill your insurance company for this type of visit based on time spent on the phone with your provider. Not all insurance companies cover these visits. Please check with your medical insurance if this type of visit is covered. You will be responsible for any charges that are not paid by your insurance.      E-visits via GasBuddyhart:  generally incur a $35.00 fee.  Telephone visits:  Time spent on the phone: *charged based on time that is spent on the phone in increments of 10 minutes. Estimated cost:   5-10 mins $30.00   11-20 mins. $59.00   21-30 mins. $85.00   Use GasBuddyhart (secure email communication and access to your chart) to send your primary care provider a message or make an appointment. Ask someone on your Team how to sign up for Net 263.    For a Price Quote for your services, please call our Consumer Price Line at [phone].    As always, Thank you for trusting us with your health care needs!    Discharged by Conchita SANTILLAN CMA ([facility])

## 2017-09-06 NOTE — NURSING NOTE
"Chief Complaint   Patient presents with     RECHECK     cough, congestion, sore throat, will not go away        Initial Pulse 69  Temp 96.9  F (36.1  C) (Oral)  Ht 5' 8.5\" (1.74 m)  Wt 193 lb (87.5 kg)  SpO2 97%  BMI 28.92 kg/m2 Estimated body mass index is 28.92 kg/(m^2) as calculated from the following:    Height as of this encounter: 5' 8.5\" (1.74 m).    Weight as of this encounter: 193 lb (87.5 kg).  Medication Reconciliation: complete   Conchita SANTILLAN CMA ([facility])      "

## 2017-09-27 ENCOUNTER — TELEPHONE (OUTPATIENT)
Dept: INTERNAL MEDICINE | Facility: CLINIC | Age: 72
End: 2017-09-27

## 2017-09-27 NOTE — TELEPHONE ENCOUNTER
Received message from PCP to update med list- discontinue Amaryl.  Unable to locate on med list.  Sandhya Vasques RN

## 2017-10-23 DIAGNOSIS — E78.5 HYPERLIPIDEMIA LDL GOAL <100: Primary | ICD-10-CM

## 2017-10-23 NOTE — TELEPHONE ENCOUNTER
Reason for Call:  Other prescription    Detailed comments: Patient called to request a refill for the prescription Victoza   Please call the [facility] @ [phone] as the patient is on an payment assistance program, A call has to be made in order to refill this script, if any questions please contact the patient,    Phone Number Patient can be reached at: Home number on file [phone] (home)    Best Time: today     Can we leave a detailed message on this number? YES    Call taken on 10/23/2017 at 12:35 PM by Low Barrios

## 2017-10-25 ENCOUNTER — ALLIED HEALTH/NURSE VISIT (OUTPATIENT)
Dept: EDUCATION SERVICES | Facility: CLINIC | Age: 72
End: 2017-10-25
Payer: COMMERCIAL

## 2017-10-25 VITALS — BODY MASS INDEX: 28.17 KG/M2 | WEIGHT: 188 LBS

## 2017-10-25 DIAGNOSIS — Z79.4 TYPE 2 DIABETES MELLITUS WITH DIABETIC POLYNEUROPATHY, WITH LONG-TERM CURRENT USE OF INSULIN (H): Primary | ICD-10-CM

## 2017-10-25 DIAGNOSIS — E11.42 TYPE 2 DIABETES MELLITUS WITH DIABETIC POLYNEUROPATHY, WITH LONG-TERM CURRENT USE OF INSULIN (H): Primary | ICD-10-CM

## 2017-10-25 PROCEDURE — G0108 DIAB MANAGE TRN  PER INDIV: HCPCS

## 2017-10-25 RX ORDER — LIRAGLUTIDE 6 MG/ML
1.8 INJECTION SUBCUTANEOUS DAILY
Qty: 27 ML | Refills: 0 | Status: SHIPPED | OUTPATIENT
Start: 2017-10-25 | End: 2017-11-07

## 2017-10-25 NOTE — MR AVS SNAPSHOT
After Visit Summary   10/25/2017    Zurdo Dash    MRN: [id number]           Patient Information     Date Of Birth          1945        Visit Information        Provider Department      10/25/2017 10:15 AM  DIABETIC ED RESOURCE [facility]        Care Instructions    Recommend to continue your walking as you have been and increase gradually as you are able.  Follow up with MD as he requests.  Call if concerns.              Follow-ups after your visit        Your next 10 appointments already scheduled     Oct 31, 2017 11:00 AM CDT   LAB with [facility]   [facility] ([facility])    [address] [address]   [phone]           Patient must bring picture ID. Patient should be prepared to give a urine specimen  Please do not eat 10-12 hours before your appointment if you are coming in fasting for labs on lipids, cholesterol, or glucose (sugar). Pregnant women should follow their Care Team instructions. Water with medications is okay. Do not drink coffee or other fluids. If you have concerns about taking  your medications, please ask at office or if scheduling via [employer], send a message by clicking on Secure Messaging, Message Your Care Team.            Nov 08, 2017  1:00 PM CST   Return Visit with Albert Tompkins MD   [facility] ([facility])    [address] [id number] [phone]              Who to contact     If you have questions or need follow up information about today's clinic visit or your schedule please contact [facility] directly at [phone].  Normal or non-critical lab and imaging results will be communicated to you by MyChart, letter or phone within 4 business days after the clinic has received the results. If you do not hear from us within 7 days, please contact the clinic through 4Soilshart or phone. If you have a critical or abnormal lab result, we will  "notify you by phone as soon as possible.  Submit refill requests through Eat or call your pharmacy and they will forward the refill request to us. Please allow 3 business days for your refill to be completed.          Additional Information About Your Visit        ShomoLivehart Information     Eat lets you send messages to your doctor, view your test results, renew your prescriptions, schedule appointments and more. To sign up, go to [email or web address] . Click on \"Log in\" on the left side of the screen, which will take you to the Welcome page. Then click on \"Sign up Now\" on the right side of the page.     You will be asked to enter the access code listed below, as well as some personal information. Please follow the directions to create your username and password.     Your access code is: [email or web address]  Expires: 2017  2:00 PM     Your access code will  in 90 days. If you need help or a new code, please call your [facility] clinic or [phone].        Care EveryWhere ID     This is your Care EveryWhere ID. This could be used by other organizations to access your [facility] medical records  [id number]        Your Vitals Were     BMI (Body Mass Index)                   28.17 kg/m2            Blood Pressure from Last 3 Encounters:   17 110/82   17 90/70   17 108/66    Weight from Last 3 Encounters:   10/25/17 188 lb (85.3 kg)   17 193 lb (87.5 kg)   17 197 lb 6.4 oz (89.5 kg)              Today, you had the following     No orders found for display       Primary Care Provider Office Phone # Fax #    Kapil Duckworth [phone] [address] [facility] [place] [address]        Equal Access to Services     [facility] YEHUDA : Deniz Garcia, watorida luqadaha, qaybta kaalmadenver becerra, aranza mckeon. So [facility] [phone].    ATENCIÓN: Si habla español, tiene a diallo disposición servicios gratuitos de asistencia " lingüísticaYessica Dowling al [phone].    We comply with applicable federal civil rights laws and [place] laws. We do not discriminate on the basis of race, color, national origin, age, disability, sex, sexual orientation, or gender identity.            Thank you!     Thank you for choosing [facility]  for your care. Our goal is always to provide you with excellent care. Hearing back from our patients is one way we can continue to improve our services. Please take a few minutes to complete the written survey that you may receive in the mail after your visit with us. Thank you!             Your Updated Medication List - Protect others around you: Learn how to safely use, store and throw away your medicines at www.disposemymeds.org.          This list is accurate as of: 10/25/17 11:13 AM.  Always use your most recent med list.                   Brand Name Dispense Instructions for use Diagnosis    albuterol 108 (90 BASE) MCG/ACT Inhaler    PROAIR HFA/PROVENTIL HFA/VENTOLIN HFA    1 Inhaler    Inhale 2 puffs into the lungs every 4 hours as needed for other (coughing)    Acute bronchitis, unspecified organism       aspirin 81 MG tablet      Take 1 tablet by mouth daily.    Type 2 diabetes, HbA1c goal < 7% (H)       blood glucose lancing device    no brand specified    1 each    Use to test blood sugars 3 times daily or as directed.    Type 2 diabetes mellitus, uncontrolled, with neuropathy (H)       blood glucose monitoring test strip    INDIA CONTOUR NEXT    300 each    TEST THREE TIMES A DAY OR AS DIRECTED    Type 2 diabetes mellitus, uncontrolled, with neuropathy (H)       CALCIUM 500 + D 500-200 MG-IU Tabs      take 2 tabs daily        cefdinir 300 MG capsule    OMNICEF    20 capsule    Take 1 capsule (300 mg) by mouth 2 times daily    Acute sinusitis with symptoms > 10 days, Acute bronchitis, unspecified organism       cyclobenzaprine 10 MG tablet    FLEXERIL    30 tablet    Take 1 tablet (10 mg) by  mouth 3 times daily as needed for muscle spasms    Neck pain       folic acid 1 MG tablet    FOLVITE    100 tablet    Take 1 tablet (1 mg) by mouth daily    High risk medications (not anticoagulants) long-term use       HYDROcodone-acetaminophen 5-325 MG per tablet    NORCO    28 tablet    Take 1-2 tablets by mouth every 4 hours as needed for moderate to severe pain maximum 4 tablet(s) per day    Bruised ribs, unspecified laterality, sequela       hydroxychloroquine 200 MG tablet    PLAQUENIL    180 tablet    Take 1 tablet (200 mg) by mouth 2 times daily    High risk medications (not anticoagulants) long-term use       insulin pen needle 31G X 6 MM    ULTICARE MINI    100 each    Use 1 daily or as directed.    Type 2 diabetes mellitus, uncontrolled, with neuropathy (H)       liraglutide 18 MG/3ML soln    VICTOZA PEN    27 mL    Inject 1.8 mg Subcutaneous daily    Type 2 diabetes mellitus, uncontrolled, with neuropathy (H)       lisinopril 2.5 MG tablet    PRINIVIL/Zestril    90 tablet    Take 1 tablet (2.5 mg) by mouth daily    Hypotension, unspecified hypotension type       metFORMIN 500 MG tablet    GLUCOPHAGE    360 tablet    TAKE TWO TABLETS BY MOUTH TWICE A DAY WITH MEALS    Type 2 diabetes mellitus with diabetic polyneuropathy, with long-term current use of insulin (H)       methotrexate sodium 2.5 MG Tabs     120 tablet    Take 25 mg by mouth once a week . Take all 10 tablets on the same day of each week.    High risk medications (not anticoagulants) long-term use       * MICROLET LANCETS Misc     100 each    1 Device daily    Type 2 diabetes, HbA1C goal < 8% (H)       * blood glucose monitoring lancets     300 each    Use to test blood sugars 3 times daily or as directed.    Type 2 diabetes mellitus, uncontrolled, with neuropathy (H)       MULTI-VITAMIN PO      None Entered        omeprazole 20 MG CR capsule    priLOSEC    90 capsule    Take 1 capsule (20 mg) by mouth daily    Gastroesophageal reflux disease  without esophagitis       pravastatin 40 MG tablet    PRAVACHOL    90 tablet    Take 1 tablet (40 mg) by mouth daily    Hyperlipidemia LDL goal <100       traMADol 50 MG tablet    ULTRAM    20 tablet    Take 1-2 tablets ( mg) by mouth every 6 hours as needed for pain maximum 3 tablet(s) per day    Neck pain       * Notice:  This list has 2 medication(s) that are the same as other medications prescribed for you. Read the directions carefully, and ask your doctor or other care provider to review them with you.

## 2017-10-25 NOTE — PROGRESS NOTES
Diabetes Self Management Training: Follow-up Visit    Zurdo Dash presents today for education and evaluation of glucose control related to Type 2 diabetes.    He is accompanied by self    Patient's diabetes management related comments/concerns: I have lost quite a bit of weight since you saw me last.    Patient would like this visit to be focused around the following diabetes-related behaviors and goals: Reducing Risks and Healthy Coping    ASSESSMENT:  Patient Problem List reviewed for relevant medical history and current medical status.  I feel great.  I think that when I was so heavy, it made me less stable when I was walking.  I was so top heavy. I fell often. I think I will be more stable now.  I am happy that I am losing weight.  Patient discussed that maybe he could stop the Victoza at the beginning of 2018 as he is eating sensibly and having blood glucose values in the 90's.  He will discuss this with his PCP  He would like to continue Victoza thru 2017.  He reports that Itzel Bailey has set a weight goal for him of 175 lbs.    Current Diabetes Management per Patient:  Taking diabetes medications?   yes:     Diabetes Medication(s)     Biguanides Sig    metFORMIN (GLUCOPHAGE) 500 MG tablet TAKE TWO TABLETS BY MOUTH TWICE A DAY WITH MEALS    Incretin Mimetic Agents (GLP-1 Receptor Agonists) Sig    liraglutide (VICTOZA PEN) 18 MG/3ML soln Inject 1.8 mg Subcutaneous daily          *Abbreviated insulin dose documentation key: Insulin Trade Name (gordzhbke-jedyg-txmbga-bedtime) - i.e. Humalog 5-5-5-0 (Humalog 5 units at breakfast, 5 units at lunch, and 5 units at dinner).    Patient glucose self monitoring as follows: 1 times daily.   BG meter: Contour Next EZ meter  BG results: fasting glucose- 91, 109, 92, 94, 112, 95      BG values are: In goal  Patient's most recent   Lab Results   Component Value Date    A1C 5.6 06/23/2017    is meeting goal of <7.0    Nutrition:  Patient is following Itzel Bailey diet  "since 5/15/17.    Physical Activity:    Type: walking the dogs daily.  Plans to add an additional walk each day with [place]    Diabetes Complications:  discussed  Acute Complications: At risk for hypoglycemia? no  Chronic Complications:   nephropathy  Neuropathy- patient wears shoes until he goes to bed. Reports he has hammer toes and gets new shoes yearly with a deep toe box.  Chronic Complication Prevention: discussed yearly eye exam, q 6 months dental visit, daily foot check, yearly cholesterol panel, yearly micro albumin, B/P goals, A1C goals     Vitals:  Wt 188 lb (85.3 kg)  BMI 28.17 kg/m2  Estimated body mass index is 28.92 kg/(m^2) as calculated from the following:    Height as of 9/6/17: 5' 8.5\" (1.74 m).    Weight as of 9/6/17: 193 lb (87.5 kg).   Last 3 BP:   BP Readings from Last 3 Encounters:   09/06/17 110/82   08/28/17 90/70   08/14/17 108/66       History   Smoking Status     Former Smoker     Packs/day: 1.00     Years: 40.00     Types: Cigarettes     Quit date: 3/16/2007   Smokeless Tobacco     Never Used       Labs:  Lab Results   Component Value Date    A1C 5.6 06/23/2017     Lab Results   Component Value Date     06/23/2017     Lab Results   Component Value Date    LDL 79 12/02/2016     HDL Cholesterol   Date Value Ref Range Status   12/02/2016 39 (L) >39 mg/dL Final   ]  GFR Estimate   Date Value Ref Range Status   08/09/2017 61 >60 mL/min/1.7m2 Final     Comment:     Non  GFR Calc     GFR Estimate If Black   Date Value Ref Range Status   08/09/2017 74 >60 mL/min/1.7m2 Final     Comment:      GFR Calc     Lab Results   Component Value Date    CR 1.17 08/09/2017     No results found for: MICROALBUMIN    Health Beliefs and Attitudes:   Patient Activation Measure Survey Score:  HATTIE Score (Last Two) 7/23/2013   HATTIE Raw Score 35   Activation Score 45.2   HATTIE Level 1       Stage of Change: MAINTENANCE (Working to maintain " change, with risk of relapse)    Diabetes knowledge and skills assessment:     Patient is knowledgeable in diabetes management concepts related to: Healthy Eating, Being Active, Monitoring and Taking Medication    Patient needs further education on the following diabetes management concepts: Problem Solving, Reducing Risks and Healthy Coping    Barriers to Learning Assessment: No Barriers identified    Based on learning assessment above, most appropriate setting for further diabetes education would be: Individual setting.    INTERVENTION:    Education provided today on:  AADE Self-Care Behaviors:  Problem Solving: high blood glucose - causes, signs/symptoms, treatment and prevention and when to call health care provider  Reducing Risks: major complications of diabetes, prevention, early diagnostic measures and treatment of complications, foot care, appropriate dental care, annual eye exam, A1C - goals, relating to blood glucose levels, how often to check, lipids levels and goals and blood pressure and goals  Healthy Coping: benefits of making appropriate lifestyle changes    Opportunities for ongoing education and support in diabetes-self management were discussed.    Pt verbalized understanding of concepts discussed and recommendations provided today.       Education Materials Provided:  No new materials provided today    PLAN:  See Patient Instructions for co-developed, patient-stated behavior change goals.  AVS printed and provided to patient today.    FOLLOW-UP:  Follow-up as needed.   Chart routed to referring provider.    Ongoing plan for education and support: Follow-up with primary care provider    Gogo Shaver RN  BSN CDE    Time Spent: 60 minutes  Encounter Type: Individual    Any diabetes medication dose changes were made via the CDE Protocol and Collaborative Practice Agreement with the patient's referring provider. A copy of this encounter was shared with the provider.

## 2017-10-25 NOTE — PATIENT INSTRUCTIONS
Recommend to continue your walking as you have been and increase gradually as you are able.  Follow up with MD as he requests.  Call if concerns.

## 2017-10-25 NOTE — TELEPHONE ENCOUNTER
RN placed call to [facility]. Let them know patient needs a refill of Victoza. They can fill for #90day supply and patients enrollment ends on 11/30/2017.    Milla Myles RN

## 2017-10-25 NOTE — TELEPHONE ENCOUNTER
RN called and discussed information and instructions below with patient. Patient verbalized understanding and agreed to plan. Patient will be coming in on October 31st for lab work for , would alos like to do any lab work per . Will be seeing  on 11/7/17 for DM check. Would like cholesterol checked. Due for A1c. Orders pended.    Any other labs needed? Routign to  to order.     Milla Myles RN

## 2017-10-31 DIAGNOSIS — E78.5 HYPERLIPIDEMIA LDL GOAL <100: ICD-10-CM

## 2017-10-31 DIAGNOSIS — M05.79 RHEUMATOID ARTHRITIS INVOLVING MULTIPLE SITES WITH POSITIVE RHEUMATOID FACTOR (H): ICD-10-CM

## 2017-10-31 LAB
ALBUMIN SERPL-MCNC: 3.9 G/DL (ref 3.4–5)
ALP SERPL-CCNC: 51 U/L (ref 40–150)
ALT SERPL W P-5'-P-CCNC: 27 U/L (ref 0–70)
AST SERPL W P-5'-P-CCNC: 17 U/L (ref 0–45)
BASOPHILS # BLD AUTO: 0 10E9/L (ref 0–0.2)
BASOPHILS NFR BLD AUTO: 0.6 %
BILIRUB DIRECT SERPL-MCNC: 0.1 MG/DL (ref 0–0.2)
BILIRUB SERPL-MCNC: 0.5 MG/DL (ref 0.2–1.3)
CHOLEST SERPL-MCNC: 127 MG/DL
CREAT SERPL-MCNC: 1.08 MG/DL (ref 0.66–1.25)
CRP SERPL-MCNC: 4.6 MG/L (ref 0–8)
DIFFERENTIAL METHOD BLD: NORMAL
EOSINOPHIL # BLD AUTO: 0.1 10E9/L (ref 0–0.7)
EOSINOPHIL NFR BLD AUTO: 1.4 %
ERYTHROCYTE [DISTWIDTH] IN BLOOD BY AUTOMATED COUNT: 13.9 % (ref 10–15)
ERYTHROCYTE [SEDIMENTATION RATE] IN BLOOD BY WESTERGREN METHOD: 12 MM/H (ref 0–20)
GFR SERPL CREATININE-BSD FRML MDRD: 67 ML/MIN/1.7M2
HBA1C MFR BLD: 5.1 % (ref 4.3–6)
HCT VFR BLD AUTO: 43.3 % (ref 40–53)
HDLC SERPL-MCNC: 48 MG/DL
HGB BLD-MCNC: 14.8 G/DL (ref 13.3–17.7)
LDLC SERPL CALC-MCNC: 47 MG/DL
LYMPHOCYTES # BLD AUTO: 1 10E9/L (ref 0.8–5.3)
LYMPHOCYTES NFR BLD AUTO: 15 %
MCH RBC QN AUTO: 31.1 PG (ref 26.5–33)
MCHC RBC AUTO-ENTMCNC: 34.2 G/DL (ref 31.5–36.5)
MCV RBC AUTO: 91 FL (ref 78–100)
MONOCYTES # BLD AUTO: 0.9 10E9/L (ref 0–1.3)
MONOCYTES NFR BLD AUTO: 13 %
NEUTROPHILS # BLD AUTO: 4.6 10E9/L (ref 1.6–8.3)
NEUTROPHILS NFR BLD AUTO: 70 %
NONHDLC SERPL-MCNC: 79 MG/DL
PLATELET # BLD AUTO: 227 10E9/L (ref 150–450)
PROT SERPL-MCNC: 7.1 G/DL (ref 6.8–8.8)
RBC # BLD AUTO: 4.76 10E12/L (ref 4.4–5.9)
TRIGL SERPL-MCNC: 159 MG/DL
WBC # BLD AUTO: 6.5 10E9/L (ref 4–11)

## 2017-10-31 PROCEDURE — 86140 C-REACTIVE PROTEIN: CPT | Performed by: INTERNAL MEDICINE

## 2017-10-31 PROCEDURE — 80076 HEPATIC FUNCTION PANEL: CPT | Performed by: INTERNAL MEDICINE

## 2017-10-31 PROCEDURE — 85025 COMPLETE CBC W/AUTO DIFF WBC: CPT | Performed by: INTERNAL MEDICINE

## 2017-10-31 PROCEDURE — 80061 LIPID PANEL: CPT | Performed by: INTERNAL MEDICINE

## 2017-10-31 PROCEDURE — 82565 ASSAY OF CREATININE: CPT | Performed by: INTERNAL MEDICINE

## 2017-10-31 PROCEDURE — 36415 COLL VENOUS BLD VENIPUNCTURE: CPT | Performed by: INTERNAL MEDICINE

## 2017-10-31 PROCEDURE — 85652 RBC SED RATE AUTOMATED: CPT | Performed by: INTERNAL MEDICINE

## 2017-10-31 PROCEDURE — 83036 HEMOGLOBIN GLYCOSYLATED A1C: CPT | Performed by: INTERNAL MEDICINE

## 2017-11-02 NOTE — PROGRESS NOTES
SUBJECTIVE:   Zurdo Dash is a 72 year old male who presents to clinic today for the following health issues:      Diabetes -- Patient states he feels generally well currently. He notes his bilateral halluces are hypersensitive and this is consistent with a diabetes neuropathy condition. This symptom has been present for a long time. He saw Dr. Aaron Dent and had a portion of his toenail cut off which seemed to help briefly. When he gets in bed the sensation bothers him but it does not wake him up while sleeping. He is curious about what his blood glucose levels would look like without Victoza. His blood glucose levels are normally in the 90s, but occasionally hit the 100s.  Lab Results   Component Value Date    A1C 5.1 10/31/2017    A1C 5.6 06/23/2017    A1C 9.2 02/09/2017    A1C 9.8 12/02/2016    A1C 9.0 08/05/2016     Erectile Dysfunction -- Patient is curious if there is a generic medication for erectile dysfunction. We discussed this in detail. Even with the new Revatio (Sildenafil Citrate) he can't afford this    Rheumatoid Arthritis -- He notes he follows up with Dr. Albert Tompkins tomorrow.    Hypertension --  BP Readings from Last 3 Encounters:   11/07/17 102/68   09/06/17 110/82   08/28/17 90/70     Hyperlipidemia --  Recent Labs   Lab Test  10/31/17   1105  12/02/16   1252   06/29/15   1213  08/07/14   1249   CHOL  127  174   --   125  129   HDL  48  39*   --   37*  46   LDL  47  79   < >  31  29   TRIG  159*  282*   --   286*  270*   CHOLHDLRATIO   --    --    --   3.4  2.8    < > = values in this interval not displayed.     Problem list and histories reviewed & adjusted, as indicated.  Additional history: as documented    Patient Active Problem List   Diagnosis     Pain in limb     Hyperlipidemia LDL goal <100     Hypertension goal BP (blood pressure) < 140/90     obesity     Hypogonadism     Advanced directives, counseling/discussion     Health Care Home     Type 2 diabetes mellitus with  "diabetic polyneuropathy, with long-term current use of insulin (H)     RBBB (right bundle branch block)     Lumbago     Ex-smoker     Cataracts, both eyes     Family history of esophageal cancer     Gastroesophageal reflux disease, esophagitis presence not specified     Rheumatoid arthritis involving multiple sites with positive rheumatoid factor (H)     Pulmonary nodule     High risk medication use     Spondylosis of cervical region without myelopathy or radiculopathy     Erectile dysfunction, unspecified erectile dysfunction type     Past Surgical History:   Procedure Laterality Date     HC INCISION TENDON SHEATH FINGER  4/2009    r hand ring finger     TONSILLECTOMY         Social History   Substance Use Topics     Smoking status: Former Smoker     Packs/day: 1.00     Years: 40.00     Types: Cigarettes     Quit date: 3/16/2007     Smokeless tobacco: Never Used     Alcohol use No     Family History   Problem Relation Age of Onset     CEREBROVASCULAR DISEASE Mother      Arthritis Mother      OSTEOPOROSIS Mother      Alzheimer Disease Father      Arthritis Father      CANCER Father      DIABETES Maternal Grandmother      Cardiovascular Maternal Grandmother      Other Cancer Brother      CANCER Paternal Aunt      Hypertension No family hx of      Thyroid Disease No family hx of      Glaucoma No family hx of      Macular Degeneration No family hx of          Current Outpatient Prescriptions   Medication Sig Dispense Refill     metFORMIN (GLUCOPHAGE) 500 MG tablet Take 1 tablet (500 mg) by mouth 2 times daily (with meals) TAKE TWO TABLETS BY MOUTH TWICE A DAY WITH MEALS 1 tablet 0     pravastatin (PRAVACHOL) 40 MG tablet Take 0.5 tablets (20 mg) by mouth every other day 1 tablet 0     sildenafil (REVATIO) 20 MG tablet \" take between 2-4 tablets at a time for planned sexual activity\" , maximum 5 pills per day 30 tablet 0     liraglutide (VICTOZA PEN) 18 MG/3ML soln Inject 1.8 mg Subcutaneous daily 27 mL 0     albuterol " (PROAIR HFA/PROVENTIL HFA/VENTOLIN HFA) 108 (90 BASE) MCG/ACT Inhaler Inhale 2 puffs into the lungs every 4 hours as needed for other (coughing) 1 Inhaler 0     cyclobenzaprine (FLEXERIL) 10 MG tablet Take 1 tablet (10 mg) by mouth 3 times daily as needed for muscle spasms 30 tablet 1     blood glucose monitoring (ONE TOUCH DELICA) lancets Use to test blood sugars 3 times daily or as directed. 300 each 3     blood glucose (NO BRAND SPECIFIED) lancing device Use to test blood sugars 3 times daily or as directed. 1 each 0     folic acid (FOLVITE) 1 MG tablet Take 1 tablet (1 mg) by mouth daily 100 tablet 3     hydroxychloroquine (PLAQUENIL) 200 MG tablet Take 1 tablet (200 mg) by mouth 2 times daily 180 tablet 1     methotrexate sodium 2.5 MG TABS Take 25 mg by mouth once a week . Take all 10 tablets on the same day of each week. 120 tablet 1     blood glucose monitoring (INDIA CONTOUR NEXT) test strip TEST THREE TIMES A DAY OR AS DIRECTED 300 each 3     omeprazole (PRILOSEC) 20 MG CR capsule Take 1 capsule (20 mg) by mouth daily 90 capsule 3     insulin pen needle (ULTICARE MINI) 31G X 6 MM Use 1 daily or as directed. 100 each 1     HYDROcodone-acetaminophen (NORCO) 5-325 MG per tablet Take 1-2 tablets by mouth every 4 hours as needed for moderate to severe pain maximum 4 tablet(s) per day 28 tablet 0     MICROLET LANCETS MISC 1 Device daily 100 each 4     aspirin 81 MG tablet Take 1 tablet by mouth daily.  3     [DISCONTINUED] metFORMIN (GLUCOPHAGE) 500 MG tablet TAKE TWO TABLETS BY MOUTH TWICE A DAY WITH MEALS 360 tablet 1     lisinopril (PRINIVIL/ZESTRIL) 2.5 MG tablet Take 1 tablet (2.5 mg) by mouth daily (Patient not taking: Reported on 11/7/2017) 90 tablet 3     [DISCONTINUED] pravastatin (PRAVACHOL) 40 MG tablet Take 1 tablet (40 mg) by mouth daily 90 tablet 3     CALCIUM 500 + D 500-200 MG-IU OR TABS take 2 tabs daily       MULTI-VITAMIN OR None Entered       Labs reviewed in EPIC      Reviewed and updated as  "needed this visit by clinical staffTobacco  Allergies  Meds  Med Hx  Surg Hx  Fam Hx  Soc Hx      Reviewed and updated as needed this visit by Provider         ROS:  Constitutional, HEENT, cardiovascular, pulmonary, GI, , musculoskeletal, neuro, skin, endocrine and psych systems are negative, except as otherwise noted.    This document serves as a record of the services and decisions personally performed and made by Kapil Duckworth MD. It was created on their behalf by Minh Coats, a trained medical scribe. The creation of this document is based the provider's statements to the medical scribe.  Minh Coats November 7, 2017 11:21 AM    OBJECTIVE:   /68  Pulse 57  Temp 98  F (36.7  C) (Oral)  Ht 1.74 m (5' 8.5\")  Wt 85.3 kg (188 lb)  SpO2 100%  BMI 28.17 kg/m2  Body mass index is 28.17 kg/(m^2).  GENERAL: healthy, alert and no distress  EYES: Eyes grossly normal to inspection, EOMI, conjunctivae and sclerae normal  SKIN: no suspicious lesions or rashes to visible skin   NEURO: mentation intact and speech normal  PSYCH: mentation appears normal, affect normal/bright    Diagnostic Test Results:  Results for orders placed or performed in visit on 10/31/17   CBC with platelets differential   Result Value Ref Range    WBC 6.5 4.0 - 11.0 10e9/L    RBC Count 4.76 4.4 - 5.9 10e12/L    Hemoglobin 14.8 13.3 - 17.7 g/dL    Hematocrit 43.3 40.0 - 53.0 %    MCV 91 78 - 100 fl    MCH 31.1 26.5 - 33.0 pg    MCHC 34.2 31.5 - 36.5 g/dL    RDW 13.9 10.0 - 15.0 %    Platelet Count 227 150 - 450 10e9/L    Diff Method Automated Method     % Neutrophils 70.0 %    % Lymphocytes 15.0 %    % Monocytes 13.0 %    % Eosinophils 1.4 %    % Basophils 0.6 %    Absolute Neutrophil 4.6 1.6 - 8.3 10e9/L    Absolute Lymphocytes 1.0 0.8 - 5.3 10e9/L    Absolute Monocytes 0.9 0.0 - 1.3 10e9/L    Absolute Eosinophils 0.1 0.0 - 0.7 10e9/L    Absolute Basophils 0.0 0.0 - 0.2 10e9/L   Creatinine   Result Value Ref Range    " Creatinine 1.08 0.66 - 1.25 mg/dL    GFR Estimate 67 >60 mL/min/1.7m2    GFR Estimate If Black 81 >60 mL/min/1.7m2   Hepatic panel   Result Value Ref Range    Bilirubin Direct 0.1 0.0 - 0.2 mg/dL    Bilirubin Total 0.5 0.2 - 1.3 mg/dL    Albumin 3.9 3.4 - 5.0 g/dL    Protein Total 7.1 6.8 - 8.8 g/dL    Alkaline Phosphatase 51 40 - 150 U/L    ALT 27 0 - 70 U/L    AST 17 0 - 45 U/L   CRP inflammation   Result Value Ref Range    CRP Inflammation 4.6 0.0 - 8.0 mg/L   Erythrocyte sedimentation rate auto   Result Value Ref Range    Sed Rate 12 0 - 20 mm/h   Lipid panel reflex to direct LDL Fasting   Result Value Ref Range    Cholesterol 127 <200 mg/dL    Triglycerides 159 (H) <150 mg/dL    HDL Cholesterol 48 >39 mg/dL    LDL Cholesterol Calculated 47 <100 mg/dL    Non HDL Cholesterol 79 <130 mg/dL   **A1C FUTURE anytime   Result Value Ref Range    Hemoglobin A1C 5.1 4.3 - 6.0 %     ASSESSMENT/PLAN:     (Z23) Need for prophylactic vaccination and inoculation against influenza  (primary encounter diagnosis)  Comment: administered   Plan: as above     (E78.5) Hyperlipidemia LDL goal <100  Comment: we reduced his dose to a half dose every other day   Plan: pravastatin (PRAVACHOL) 40 MG tablet            (E11.42) Diabetic polyneuropathy associated with type 2 diabetes mellitus (H)  Comment: stable phase of chronic illness   Plan: Self foot examinations are emphasized , treatment reviewed     (I10) Hypertension goal BP (blood pressure) < 140/90  Comment: controlled within acceptable limits   Plan: continue current plan of care      (E11.42,  Z79.4) Type 2 diabetes mellitus with diabetic polyneuropathy, with long-term current use of insulin (H)  Comment: I decreased metformin ( Glucophage) down to just 500 milligrams 2 times a day   Plan: metFORMIN (GLUCOPHAGE) 500 MG tablet        The other separate issue was his Liraglutide [ Victoza ] . There is absolutely without a doubt a patient here who had a miracle outcome from this  medication. Now with his weight way way down and his metabolic syndrome virtually cured, the question becomes what should we do at this point ? Initially with this patient I considered stopping completely the medication but after curbside consult with an obesity medicine specialist MD I now feel we should continue this medication at a lower dose, going down from the 1.2 milligram to the 0.6 milligram dose for maintainence of weight loss . Will review new information with patient     (N52.9) Erectile dysfunction, unspecified erectile dysfunction type  Comment: he will consider getting the new medication   Plan: sildenafil (REVATIO) 20 MG tablet              Patient Instructions   - I will discuss your case with Cathryn Ware.    - You can stop Victoza for 3 months.    - Decrease Glucophage [Metformin] to 2 tablets a day (1000 MG per day).    - Look up Neurontin [Gabapentin].    - Follow up with me in 3-6 months.    The information in this document, created by the medical scribe for me, accurately reflects the services I personally performed and the decisions made by me. I have reviewed and approved this document for accuracy.   MD Kapil Crane MD  [facility]

## 2017-11-07 ENCOUNTER — TELEPHONE (OUTPATIENT)
Dept: INTERNAL MEDICINE | Facility: CLINIC | Age: 72
End: 2017-11-07

## 2017-11-07 ENCOUNTER — OFFICE VISIT (OUTPATIENT)
Dept: INTERNAL MEDICINE | Facility: CLINIC | Age: 72
End: 2017-11-07
Payer: COMMERCIAL

## 2017-11-07 VITALS
HEIGHT: 69 IN | TEMPERATURE: 98 F | OXYGEN SATURATION: 100 % | DIASTOLIC BLOOD PRESSURE: 68 MMHG | WEIGHT: 188 LBS | HEART RATE: 57 BPM | BODY MASS INDEX: 27.85 KG/M2 | SYSTOLIC BLOOD PRESSURE: 102 MMHG

## 2017-11-07 DIAGNOSIS — Z79.4 TYPE 2 DIABETES MELLITUS WITH DIABETIC POLYNEUROPATHY, WITH LONG-TERM CURRENT USE OF INSULIN (H): ICD-10-CM

## 2017-11-07 DIAGNOSIS — I10 HYPERTENSION GOAL BP (BLOOD PRESSURE) < 140/90: ICD-10-CM

## 2017-11-07 DIAGNOSIS — E11.42 DIABETIC POLYNEUROPATHY ASSOCIATED WITH TYPE 2 DIABETES MELLITUS (H): ICD-10-CM

## 2017-11-07 DIAGNOSIS — Z23 NEED FOR PROPHYLACTIC VACCINATION AND INOCULATION AGAINST INFLUENZA: Primary | ICD-10-CM

## 2017-11-07 DIAGNOSIS — E11.42 TYPE 2 DIABETES MELLITUS WITH DIABETIC POLYNEUROPATHY, WITH LONG-TERM CURRENT USE OF INSULIN (H): ICD-10-CM

## 2017-11-07 DIAGNOSIS — E78.5 HYPERLIPIDEMIA LDL GOAL <100: ICD-10-CM

## 2017-11-07 DIAGNOSIS — N52.9 ERECTILE DYSFUNCTION, UNSPECIFIED ERECTILE DYSFUNCTION TYPE: ICD-10-CM

## 2017-11-07 PROCEDURE — 99214 OFFICE O/P EST MOD 30 MIN: CPT | Performed by: INTERNAL MEDICINE

## 2017-11-07 RX ORDER — PRAVASTATIN SODIUM 40 MG
20 TABLET ORAL EVERY OTHER DAY
Qty: 1 TABLET | Refills: 0
Start: 2017-11-07 | End: 2019-02-14

## 2017-11-07 RX ORDER — LIRAGLUTIDE 6 MG/ML
1.2 INJECTION SUBCUTANEOUS DAILY
Qty: 9 ML | Refills: 1 | Status: SHIPPED | OUTPATIENT
Start: 2017-11-07 | End: 2018-02-09

## 2017-11-07 RX ORDER — SILDENAFIL CITRATE 20 MG/1
TABLET ORAL
Qty: 30 TABLET | Refills: 0 | Status: SHIPPED | OUTPATIENT
Start: 2017-11-07 | End: 2018-03-29

## 2017-11-07 ASSESSMENT — PAIN SCALES - GENERAL: PAINLEVEL: NO PAIN (0)

## 2017-11-07 NOTE — NURSING NOTE
"Chief Complaint   Patient presents with     Diabetes       Initial /68  Pulse 57  Temp 98  F (36.7  C) (Oral)  Ht 5' 8.5\" (1.74 m)  Wt 188 lb (85.3 kg)  SpO2 100%  BMI 28.17 kg/m2 Estimated body mass index is 28.17 kg/(m^2) as calculated from the following:    Height as of this encounter: 5' 8.5\" (1.74 m).    Weight as of this encounter: 188 lb (85.3 kg).  Medication Reconciliation: complete     Orquidea Whitlock MA       "

## 2017-11-07 NOTE — PATIENT INSTRUCTIONS
- I will discuss your case with Cathrynfabrizio Wrae.    - You can stop Victoza for 3 months.    - Decrease Glucophage [Metformin] to 2 tablets a day (1000 MG per day).    - Look up Neurontin [Gabapentin].    - Follow up with me in 3-6 months.    [facility]    If you have any questions regarding to your visit please contact your care team:     Team Pink:   Clinic Hours Telephone Number   Internal Medicine:  Dr. Suze Zapata NP       7am-7pm  Monday - Thursday   7am-5pm  Fridays  [phone]  (Appointment scheduling available 24/7)    Questions about your visit?  Team Line  [phone]   Urgent Care - [place] and [place] - 11am-9pm Monday-Friday Saturday-Sunday- 9am-5pm   [place] - 5pm-9pm Monday-Friday Saturday-Sunday- 9am-5pm  [phone] - [place]   [phone] - [place]       What options do I have for visits at the clinic other than the traditional office visit?  To expand how we care for you, many of our providers are utilizing electronic visits (e-visits) and telephone visits, when medically appropriate, for interactions with their patients rather than a visit in the clinic.   We also offer nurse visits for many medical concerns. Just like any other service, we will bill your insurance company for this type of visit based on time spent on the phone with your provider. Not all insurance companies cover these visits. Please check with your medical insurance if this type of visit is covered. You will be responsible for any charges that are not paid by your insurance.      E-visits via amprice:  generally incur a $35.00 fee.  Telephone visits:  Time spent on the phone: *charged based on time that is spent on the phone in increments of 10 minutes. Estimated cost:   5-10 mins $30.00   11-20 mins. $59.00   21-30 mins. $85.00   Use amprice (secure email communication and access to your chart) to send your primary care provider a message or make  an appointment. Ask someone on your Team how to sign up for [employer].    For a Price Quote for your services, please call our Consumer Price Line at [phone].    As always, Thank you for trusting us with your health care needs!    Discharged By:  RUPA DYE

## 2017-11-07 NOTE — TELEPHONE ENCOUNTER
Message  Received: Today       Kapil Duckworth MD  P Fz Rn Triage Pool                   I want patient to know that I reviewed his case with an obesity medicine specialist MD who suggests that without a doubt the Liraglutide [ Victoza ] is the reason this patient lost the weight and if we stop the Liraglutide [ Victoza ] completely it's almost certain that weight will come back and we absolutely do not want this. What is suggested is to keep patient on a reduced dosage of Liraglutide [ Victoza ] for weight loss. It's no longer used just for diabetes mellitus but also just for weight loss. We can go down to the 1.2 for a month and then down to the 0.6 dose. I want to help patient get these supplies. Please let me know if patient has questions for me or how to proceed. Maybe involve Cathryn Ware, [facility], the [facility] pharmacist

## 2017-11-07 NOTE — Clinical Note
I want patient to know that I reviewed his case with an obesity medicine specialist MD who suggests that without a doubt the Liraglutide [ Victoza ] is the reason this patient lost the weight and if we stop the Liraglutide [ Victoza ] completely it's almost certain that weight will come back and we absolutely do not want this. What is suggested is to keep patient on a reduced dosage of Liraglutide [ Victoza ] for weight loss. It's no longer used just for diabetes mellitus but also just for weight loss. We can go down to the 1.2 for a month and then down to the 0.6 dose. I want to help patient get these supplies. Please let me know if patient has questions for me or how to proceed. Maybe involve Cathryn Ware RPH, the [facility] pharmacist

## 2017-11-07 NOTE — MR AVS SNAPSHOT
After Visit Summary   11/7/2017    Zurdo Dash    MRN: [id number]           Patient Information     Date Of Birth          1945        Visit Information        Provider Department      11/7/2017 10:50 AM Kapil Duckworth MD [facility]        Today's Diagnoses     Need for prophylactic vaccination and inoculation against influenza    -  1    Hyperlipidemia LDL goal <100        Diabetic polyneuropathy associated with type 2 diabetes mellitus (H)        Hypertension goal BP (blood pressure) < 140/90        Type 2 diabetes mellitus with diabetic polyneuropathy, with long-term current use of insulin (H)        Erectile dysfunction, unspecified erectile dysfunction type          Care Instructions    - I will discuss your case with Cathryn Ware.    - You can stop Victoza for 3 months.    - Decrease Glucophage [Metformin] to 2 tablets a day (1000 MG per day).    - Look up Neurontin [Gabapentin].    - Follow up with me in 3-6 months.    [facility]    If you have any questions regarding to your visit please contact your care team:     Team Pink:   Clinic Hours Telephone Number   Internal Medicine:  Dr. Suze Zapata NP       7am-7pm  Monday - Thursday   7am-5pm  Fridays  [phone]  (Appointment scheduling available 24/7)    Questions about your visit?  Team Line  [phone]   Urgent Care - [place] and [place] - 11am-9pm Monday-Friday Saturday-Sunday- 9am-5pm   [place] - 5pm-9pm Monday-Friday Saturday-Sunday- 9am-5pm  [phone] - Sujey CHAPMAN  [phone] - [place]       What options do I have for visits at the clinic other than the traditional office visit?  To expand how we care for you, many of our providers are utilizing electronic visits (e-visits) and telephone visits, when medically appropriate, for interactions with their patients rather than a visit in the clinic.   We also offer nurse visits for many  medical concerns. Just like any other service, we will bill your insurance company for this type of visit based on time spent on the phone with your provider. Not all insurance companies cover these visits. Please check with your medical insurance if this type of visit is covered. You will be responsible for any charges that are not paid by your insurance.      E-visits via [employer]:  generally incur a $35.00 fee.  Telephone visits:  Time spent on the phone: *charged based on time that is spent on the phone in increments of 10 minutes. Estimated cost:   5-10 mins $30.00   11-20 mins. $59.00   21-30 mins. $85.00   Use Me[employer] (secure email communication and access to your chart) to send your primary care provider a message or make an appointment. Ask someone on your Team how to sign up for MeUndies.    For a Price Quote for your services, please call our [employer] Line at [phone].    As always, Thank you for trusting us with your health care needs!    Discharged By:  RUPA DYE            Follow-ups after your visit        Your next 10 appointments already scheduled     Nov 08, 2017  1:00 PM CST   Return Visit with Albert Tompkins MD   [facility] ([facility])    [address] [place] [id number] [phone]              Who to contact     If you have questions or need follow up information about today's clinic visit or your schedule please contact [facility] directly at [phone].  Normal or non-critical lab and imaging results will be communicated to you by MyChart, letter or phone within 4 business days after the clinic has received the results. If you do not hear from us within 7 days, please contact the clinic through [employer] or phone. If you have a critical or abnormal lab result, we will notify you by phone as soon as possible.  Submit refill requests through [employer] or call your pharmacy and they will forward the refill request to us. Please allow 3  "business days for your refill to be completed.          Additional Information About Your Visit        MyChart Information     [employer] lets you send messages to your doctor, view your test results, renew your prescriptions, schedule appointments and more. To sign up, go to [email or web address] . Click on \"Log in\" on the left side of the screen, which will take you to the Welcome page. Then click on \"Sign up Now\" on the right side of the page.     You will be asked to enter the access code listed below, as well as some personal information. Please follow the directions to create your username and password.     Your access code is: [email or web address]  Expires: 2017  1:00 PM     Your access code will  in 90 days. If you need help or a new code, please call your [place] clinic or [phone].        Care EveryWhere ID     This is your Care EveryWhere ID. This could be used by other organizations to access your [place] medical records  [id number]        Your Vitals Were     Pulse Temperature Height Pulse Oximetry BMI (Body Mass Index)       57 98  F (36.7  C) (Oral) 5' 8.5\" (1.74 m) 100% 28.17 kg/m2        Blood Pressure from Last 3 Encounters:   17 102/68   17 110/82   17 90/70    Weight from Last 3 Encounters:   17 188 lb (85.3 kg)   10/25/17 188 lb (85.3 kg)   17 193 lb (87.5 kg)              Today, you had the following     No orders found for display         Today's Medication Changes          These changes are accurate as of: 17 11:38 AM.  If you have any questions, ask your nurse or doctor.               Start taking these medicines.        Dose/Directions    sildenafil 20 MG tablet   Commonly known as:  REVATIO   Used for:  Erectile dysfunction, unspecified erectile dysfunction type   Started by:  Kapil Duckworth MD        \" take between 2-4 tablets at a time for planned sexual activity\" , maximum 5 pills per day   Quantity:  30 tablet   Refills:  0         These " medicines have changed or have updated prescriptions.        Dose/Directions    metFORMIN 500 MG tablet   Commonly known as:  GLUCOPHAGE   This may have changed:    - how much to take  - how to take this  - when to take this   Used for:  Type 2 diabetes mellitus with diabetic polyneuropathy, with long-term current use of insulin (H)   Changed by:  Kapil Duckworth MD        Dose:  500 mg   Take 1 tablet (500 mg) by mouth 2 times daily (with meals) TAKE TWO TABLETS BY MOUTH TWICE A DAY WITH MEALS   Quantity:  1 tablet   Refills:  0       pravastatin 40 MG tablet   Commonly known as:  PRAVACHOL   This may have changed:    - how much to take  - when to take this   Used for:  Hyperlipidemia LDL goal <100   Changed by:  Kapil Duckworth MD        Dose:  20 mg   Take 0.5 tablets (20 mg) by mouth every other day   Quantity:  1 tablet   Refills:  0         Stop taking these medicines if you haven't already. Please contact your care team if you have questions.     cefdinir 300 MG capsule   Commonly known as:  OMNICEF   Stopped by:  Kapil Duckworth MD           traMADol 50 MG tablet   Commonly known as:  ULTRAM   Stopped by:  Kapil Duckworth MD                Where to get your medicines      Some of these will need a paper prescription and others can be bought over the counter.  Ask your nurse if you have questions.     Bring a paper prescription for each of these medications     sildenafil 20 MG tablet       You don't need a prescription for these medications     metFORMIN 500 MG tablet    pravastatin 40 MG tablet                Primary Care Provider Office Phone # Fax #    Kapil Duckworth [phone] [address] [facility] [address]        Equal Access to Services     [facility] : Deniz Garcia, waaxda luqadaha, qaybta kaalmada hernan, aranza mckeon. So [facility] [phone].    ATENCIÓN: Si habla español, tiene a diallo disposición servicios gratuitos de  asistencia lingüística. Nitesh al [phone].    We comply with applicable federal civil rights laws and [place] laws. We do not discriminate on the basis of race, color, national origin, age, disability, sex, sexual orientation, or gender identity.            Thank you!     Thank you for choosing [facility]  for your care. Our goal is always to provide you with excellent care. Hearing back from our patients is one way we can continue to improve our services. Please take a few minutes to complete the written survey that you may receive in the mail after your visit with us. Thank you!             Your Updated Medication List - Protect others around you: Learn how to safely use, store and throw away your medicines at www.disposemymeds.org.          This list is accurate as of: 11/7/17 11:38 AM.  Always use your most recent med list.                   Brand Name Dispense Instructions for use Diagnosis    albuterol 108 (90 BASE) MCG/ACT Inhaler    PROAIR HFA/PROVENTIL HFA/VENTOLIN HFA    1 Inhaler    Inhale 2 puffs into the lungs every 4 hours as needed for other (coughing)    Acute bronchitis, unspecified organism       aspirin 81 MG tablet      Take 1 tablet by mouth daily.    Type 2 diabetes, HbA1c goal < 7% (H)       blood glucose lancing device    no brand specified    1 each    Use to test blood sugars 3 times daily or as directed.    Type 2 diabetes mellitus, uncontrolled, with neuropathy (H)       blood glucose monitoring test strip    INDIA CONTOUR NEXT    300 each    TEST THREE TIMES A DAY OR AS DIRECTED    Type 2 diabetes mellitus, uncontrolled, with neuropathy (H)       CALCIUM 500 + D 500-200 MG-IU Tabs      take 2 tabs daily        cyclobenzaprine 10 MG tablet    FLEXERIL    30 tablet    Take 1 tablet (10 mg) by mouth 3 times daily as needed for muscle spasms    Neck pain       folic acid 1 MG tablet    FOLVITE    100 tablet    Take 1 tablet (1 mg) by mouth daily    High risk medications  (not anticoagulants) long-term use       HYDROcodone-acetaminophen 5-325 MG per tablet    NORCO    28 tablet    Take 1-2 tablets by mouth every 4 hours as needed for moderate to severe pain maximum 4 tablet(s) per day    Bruised ribs, unspecified laterality, sequela       hydroxychloroquine 200 MG tablet    PLAQUENIL    180 tablet    Take 1 tablet (200 mg) by mouth 2 times daily    High risk medications (not anticoagulants) long-term use       insulin pen needle 31G X 6 MM    ULTICARE MINI    100 each    Use 1 daily or as directed.    Type 2 diabetes mellitus, uncontrolled, with neuropathy (H)       liraglutide 18 MG/3ML soln    VICTOZA PEN    27 mL    Inject 1.8 mg Subcutaneous daily    Type 2 diabetes mellitus, uncontrolled, with neuropathy (H)       lisinopril 2.5 MG tablet    PRINIVIL/Zestril    90 tablet    Take 1 tablet (2.5 mg) by mouth daily    Hypotension, unspecified hypotension type       metFORMIN 500 MG tablet    GLUCOPHAGE    1 tablet    Take 1 tablet (500 mg) by mouth 2 times daily (with meals) TAKE TWO TABLETS BY MOUTH TWICE A DAY WITH MEALS    Type 2 diabetes mellitus with diabetic polyneuropathy, with long-term current use of insulin (H)       methotrexate sodium 2.5 MG Tabs     120 tablet    Take 25 mg by mouth once a week . Take all 10 tablets on the same day of each week.    High risk medications (not anticoagulants) long-term use       * MICROLET LANCETS Misc     100 each    1 Device daily    Type 2 diabetes, HbA1C goal < 8% (H)       * blood glucose monitoring lancets     300 each    Use to test blood sugars 3 times daily or as directed.    Type 2 diabetes mellitus, uncontrolled, with neuropathy (H)       MULTI-VITAMIN PO      None Entered        omeprazole 20 MG CR capsule    priLOSEC    90 capsule    Take 1 capsule (20 mg) by mouth daily    Gastroesophageal reflux disease without esophagitis       pravastatin 40 MG tablet    PRAVACHOL    1 tablet    Take 0.5 tablets (20 mg) by mouth every  "other day    Hyperlipidemia LDL goal <100       sildenafil 20 MG tablet    REVATIO    30 tablet    \" take between 2-4 tablets at a time for planned sexual activity\" , maximum 5 pills per day    Erectile dysfunction, unspecified erectile dysfunction type       * Notice:  This list has 2 medication(s) that are the same as other medications prescribed for you. Read the directions carefully, and ask your doctor or other care provider to review them with you.      "

## 2017-11-07 NOTE — TELEPHONE ENCOUNTER
Patient notified of Provider's message as written.  He stated that he and his wife had joined [employer] weight loss program.  His wife had lost weight too and she is not on Victoza.    Explained to him that it could be a combination of the med and his weight loss program.  Explained that Victoza is a med commonly used by our weight management doctor.  Patient verbalized understanding.  He agreed to try decreasing dose and staying on it for weight management    Prescription sent    Jacobo Del Castillo RN

## 2017-11-08 ENCOUNTER — OFFICE VISIT (OUTPATIENT)
Dept: RHEUMATOLOGY | Facility: CLINIC | Age: 72
End: 2017-11-08
Payer: COMMERCIAL

## 2017-11-08 VITALS
BODY MASS INDEX: 28.11 KG/M2 | SYSTOLIC BLOOD PRESSURE: 124 MMHG | HEIGHT: 69 IN | WEIGHT: 189.8 LBS | DIASTOLIC BLOOD PRESSURE: 72 MMHG | HEART RATE: 72 BPM

## 2017-11-08 DIAGNOSIS — M05.79 RHEUMATOID ARTHRITIS INVOLVING MULTIPLE SITES WITH POSITIVE RHEUMATOID FACTOR (H): Primary | ICD-10-CM

## 2017-11-08 DIAGNOSIS — Z79.899 HIGH RISK MEDICATIONS (NOT ANTICOAGULANTS) LONG-TERM USE: ICD-10-CM

## 2017-11-08 PROCEDURE — 99213 OFFICE O/P EST LOW 20 MIN: CPT | Performed by: INTERNAL MEDICINE

## 2017-11-08 RX ORDER — HYDROXYCHLOROQUINE SULFATE 200 MG/1
200 TABLET, FILM COATED ORAL 2 TIMES DAILY
Qty: 180 TABLET | Refills: 1 | Status: SHIPPED | OUTPATIENT
Start: 2017-11-08 | End: 2018-05-17

## 2017-11-08 RX ORDER — FOLIC ACID 1 MG/1
1 TABLET ORAL DAILY
Qty: 100 TABLET | Refills: 3 | Status: SHIPPED | OUTPATIENT
Start: 2017-11-08 | End: 2018-05-17

## 2017-11-08 RX ORDER — METHOTREXATE 2.5 MG/1
25 TABLET ORAL WEEKLY
Qty: 120 TABLET | Refills: 1 | Status: SHIPPED | OUTPATIENT
Start: 2017-11-08 | End: 2018-02-07

## 2017-11-08 NOTE — NURSING NOTE
"Chief Complaint   Patient presents with     Arthritis     RA, patient states he is feeling ok, not to much pain or stiffness. Both big toes hurt.       Initial Pulse 72  Ht 1.74 m (5' 8.5\")  Wt 86.1 kg (189 lb 12.8 oz)  BMI 28.44 kg/m2 Estimated body mass index is 28.44 kg/(m^2) as calculated from the following:    Height as of this encounter: 1.74 m (5' 8.5\").    Weight as of this encounter: 86.1 kg (189 lb 12.8 oz).  BP completed using cuff size: large         RAPID3 (0-30) Cumulative Score  2.3          RAPID3 Weighted Score (divide #4 by 3 and that is the weighted score)  0.77         "

## 2017-11-08 NOTE — PROGRESS NOTES
Rheumatology Clinic Visit      Zurdo Dash MRN# [id number]   YOB: 1945 Age: 72 year old      Date of visit: 11/08/17   PCP: Dr. Kapil Duckworth       Chief Complaint   Patient presents with:  Arthritis: RA, patient states he is feeling ok, not to much pain or stiffness. Both big toes hurt.      Assessment and Plan     1. Seropositive nonerosive rheumatoid arthritis (, CCP 64): Currently on methotrexate 25 mg once weekly, folic acid 1 mg daily, and hydroxychloroquine 200 mg twice a day. Doing well today.   - Continue methotrexate 25 mg once weekly  - Continue folic acid 1 mg daily  - Continue hydroxychloroquine 200 mg twice a day; ophthalmology tox monitoring still needs to be done and a referral to ophthalmology is being given today.  - Labs 2-3 days prior to the next rheumatology clinic visit: CBC, Creatinine, Hepatic Panel, ESR, CRP    2. Bilateral knee osteoarthritis: He has been receiving steroid injections approximately every 3-6 months with good control of his symptoms.  He then went to an arthritis clinic where he received 3 injections in each knee that was helpful.  Symptoms are consistent with patellofemoral syndrome and he is considering having Synvisc injections. He may come to this clinic or to the other clinic where he received the other injections; he is going to check with his insurance if it is covered     3. 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 He was evaluated by Dr. Duckworth at that time. Reportedly he used colchicine in the past. I advised him to come in for joint arthrocentesis if this occurs again. Recheck of uric acid previously was elevated.    Mr. Dash verbalized agreement with and understanding of the rational for the diagnosis and treatment plan.  All questions were answered to best of my ability and the patient's satisfaction. Mr. Dash was advised to contact the clinic with any questions that may  arise after the clinic visit.    Thank you for involving me in the care of the patient    Return to clinic: 3 months      HPI   Zurdo Dash is a 72 year old male with a medical history significant for hypertension, hyperlipidemia, diabetes, diabetic neuropathy, GERD, and rheumatoid arthritis who presents for f/u of RA and bilateral knee OA.     Today, Mr. Dash reports that his arthritis is doing well. Knee pain when he goes up and down the stairs; the injections given at an outside arthritis clinic were helpful but have lost their effect. He is considering getting Synvisc injections but is not sure if it is covered by his insurance. No gelling phenomenon. No morning stiffness.     Denies fevers, chills, nausea, vomiting, constipation, diarrhea. No abdominal pain. No chest pain/pressure, palpitations, or shortness of breath. No oral or nasal sores. No neck pain. No rash. No LE swelling.      Tobacco: None  EtOH: None  Drugs: None    ROS   GEN: No fevers, chills, or night sweats; intentionally losing weight on the Elysia diet  SKIN: No rash. Sores from a recent fall.  HEENT:No epistaxis. No oral or nasal ulcers.  CV: No chest pain, pressure, palpitations, or dyspnea on exertion.  PULM: No SOB, wheeze, cough.  GI: No nausea, vomiting, constipation, diarrhea. No blood in stool. No abdominal pain.  : No blood in urine.  MSK: See HPI.  NEURO: No numbness, tingling, or weakness.  EXT: No LE swelling    Active Problem List     Patient Active Problem List   Diagnosis     Pain in limb     Hyperlipidemia LDL goal <100     Hypertension goal BP (blood pressure) < 140/90     obesity     Hypogonadism     Advanced directives, counseling/discussion     Health Care Home     Type 2 diabetes mellitus with diabetic polyneuropathy, with long-term current use of insulin (H)     RBBB (right bundle branch block)     Lumbago     Ex-smoker     Cataracts, both eyes     Family history of esophageal cancer     Gastroesophageal reflux  disease, esophagitis presence not specified     Rheumatoid arthritis involving multiple sites with positive rheumatoid factor (H)     Pulmonary nodule     High risk medication use     Spondylosis of cervical region without myelopathy or radiculopathy     Erectile dysfunction, unspecified erectile dysfunction type     Past Medical History     Past Medical History:   Diagnosis Date     Abnormal CT scan 03/2004    calcified lung granuloma     C. difficile diarrhea     H/O     Cataract 11/18/2011     Diabetic neuropathy (H)     mild, mostly soles and distal forefeet, worse on the left side.     Diverticulitis      ED (erectile dysfunction)      Ex-smoker     QUIT SMOKING FEB 2007     History of ETOH abuse     recovering, sober since 1997     Hyperlipidemia LDL goal <100      Hypertension goal BP (blood pressure) < 140/90      Hypogonadism      Obesity      PAD (peripheral artery disease) (H)     leg cramps, with exertion, no formal diagnosis of PAD and minimal if any symptoms at all.     RA (rheumatoid arthritis) (H)     Dr Bailon     Type 2 diabetes, HbA1c goal < 7% (H) 10/2008    A1C of 7.1 %      Past Surgical History     Past Surgical History:   Procedure Laterality Date     HC INCISION TENDON SHEATH FINGER  4/2009    r hand ring finger     TONSILLECTOMY       Allergy   No Known Allergies  Current Medication List     Current Outpatient Prescriptions   Medication Sig     hydroxychloroquine (PLAQUENIL) 200 MG tablet Take 1 tablet (200 mg) by mouth 2 times daily     methotrexate sodium 2.5 MG TABS Take 25 mg by mouth once a week . Take all 10 tablets on the same day of each week.     folic acid (FOLVITE) 1 MG tablet Take 1 tablet (1 mg) by mouth daily     metFORMIN (GLUCOPHAGE) 500 MG tablet Take 1 tablet (500 mg) by mouth 2 times daily (with meals) TAKE TWO TABLETS BY MOUTH TWICE A DAY WITH MEALS     pravastatin (PRAVACHOL) 40 MG tablet Take 0.5 tablets (20 mg) by mouth every other day     sildenafil (REVATIO) 20 MG  "tablet \" take between 2-4 tablets at a time for planned sexual activity\" , maximum 5 pills per day     liraglutide (VICTOZA PEN) 18 MG/3ML soln Inject 1.2 mg Subcutaneous daily For 1 month, then decrease dose to 0.6mg daily and stay on this dose     insulin pen needle (ULTICARE MINI) 31G X 6 MM Use 1 daily or as directed.     albuterol (PROAIR HFA/PROVENTIL HFA/VENTOLIN HFA) 108 (90 BASE) MCG/ACT Inhaler Inhale 2 puffs into the lungs every 4 hours as needed for other (coughing)     lisinopril (PRINIVIL/ZESTRIL) 2.5 MG tablet Take 1 tablet (2.5 mg) by mouth daily     cyclobenzaprine (FLEXERIL) 10 MG tablet Take 1 tablet (10 mg) by mouth 3 times daily as needed for muscle spasms     blood glucose monitoring (ONE TOUCH DELICA) lancets Use to test blood sugars 3 times daily or as directed.     blood glucose (NO BRAND SPECIFIED) lancing device Use to test blood sugars 3 times daily or as directed.     blood glucose monitoring (intelworks CONTOUR NEXT) test strip TEST THREE TIMES A DAY OR AS DIRECTED     omeprazole (PRILOSEC) 20 MG CR capsule Take 1 capsule (20 mg) by mouth daily     HYDROcodone-acetaminophen (NORCO) 5-325 MG per tablet Take 1-2 tablets by mouth every 4 hours as needed for moderate to severe pain maximum 4 tablet(s) per day     MICROLET LANCETS MISC 1 Device daily     aspirin 81 MG tablet Take 1 tablet by mouth daily.     CALCIUM 500 + D 500-200 MG-IU OR TABS take 2 tabs daily     MULTI-VITAMIN OR None Entered     [DISCONTINUED] methotrexate sodium 2.5 MG TABS Take 25 mg by mouth once a week . Take all 10 tablets on the same day of each week.     No current facility-administered medications for this visit.          Social History   See HPI    Family History     Family History   Problem Relation Age of Onset     CEREBROVASCULAR DISEASE Mother      Arthritis Mother      OSTEOPOROSIS Mother      Alzheimer Disease Father      Arthritis Father      CANCER Father      DIABETES Maternal Grandmother      " "Cardiovascular Maternal Grandmother      Other Cancer Brother      CANCER Paternal Aunt      Hypertension No family hx of      Thyroid Disease No family hx of      Glaucoma No family hx of      Macular Degeneration No family hx of        Physical Exam     Temp Readings from Last 3 Encounters:   11/07/17 98  F (36.7  C) (Oral)   09/06/17 96.9  F (36.1  C) (Oral)   08/28/17 97.6  F (36.4  C) (Oral)     BP Readings from Last 5 Encounters:   11/08/17 124/72   11/07/17 102/68   09/06/17 110/82   08/28/17 90/70   08/14/17 108/66     Pulse Readings from Last 1 Encounters:   11/08/17 72     Resp Readings from Last 1 Encounters:   04/07/16 16     Estimated body mass index is 28.44 kg/(m^2) as calculated from the following:    Height as of this encounter: 1.74 m (5' 8.5\").    Weight as of this encounter: 86.1 kg (189 lb 12.8 oz).    GEN: NAD, obese  HEENT: MMM. No oral lesions. Anicteric, noninjected sclera  CV: S1, S2. RRR. No m/r/g.  PULM: CTA bilaterally. No w/c.  MSK: Bilateral second and third MCPs with increased prominence, but no tenderness to palpation or synovial swelling. Wrists, elbows, shoulders, ankles, and MTPs without swelling or tenderness to palpation. Bilateral knees with medial joint line tenderness and crepitation but no effusion or increased warmth. Hips nontender to direct palpation.   SKIN: No rash.   EXT: No LE edema  PSYCH: Alert. Appropriate.    Labs / Imaging (select studies)     9/28/1999  ([facility])    10/17/2013 Left knee synovial fluid at [facility]: , N 3%, No crystals    RF/CCP  Recent Labs   Lab Test  04/07/16   1149   CCPIGG  64*     CBC  Recent Labs   Lab Test  10/31/17   1105  08/09/17   1408  05/18/17   1218   WBC  6.5  7.1  8.7   RBC  4.76  4.24*  4.82   HGB  14.8  13.6  15.3   HCT  43.3  39.3*  43.9   MCV  91  93  91   RDW  13.9  14.2  14.4   PLT  227  257  266   MCH  31.1  32.1  31.7   MCHC  34.2  34.6  34.9   NEUTROPHIL  70.0  76.0  77.0   LYMPH  15.0  11.9  " 13.3   MONOCYTE  13.0  10.4  8.0   EOSINOPHIL  1.4  1.0  0.7   BASOPHIL  0.6  0.7  1.0   ANEU  4.6  5.4  6.7   ALYM  1.0  0.9  1.2   SMITH  0.9  0.7  0.7   AEOS  0.1  0.1  0.1   ABAS  0.0  0.1  0.1     CMP  Recent Labs   Lab Test  10/31/17   1105  08/09/17   1408  06/23/17   1156  05/18/17   1218  02/09/17   1710  10/12/16   1227   05/03/16   1147   03/18/16   1511   08/27/15   1230   03/30/15   1506   NA   --    --   139   --   136   --    --   145*   --   141   --   139   --   133   POTASSIUM   --    --   4.3   --   4.0   --    --   3.7   --   3.9   --   4.1   --   4.0   CHLORIDE   --    --   105   --   103   --    --   112*   --   105   --   104   --   100   CO2   --    --   21   --   23   --    --   24   --   27   --   27   --   27   ANIONGAP   --    --   13   --   10   --    --   9   --   9   --   8   --   6   GLC   --    --   101*   --   181*   --    --   148*   --   114*   --   149*   --   374*   BUN   --    --   35*   --   19   --    --   18   --   23   --   20   --   23   CR  1.08  1.17  1.41*  1.42*  1.20  0.98   < >  1.07   < >  1.21   < >  1.04   < >  1.23   GFRESTIMATED  67  61  49*  49*  59*  75   < >  68   < >  59*   < >  70   < >  58*   GFRESTBLACK  81  74  60*  59*  72  >90   GFR Calc     < >  82   < >  71   < >  85   < >  70   NEW   --    --   9.6   --   8.8   --    --   8.6   --   9.4   --   9.2   --   9.0   BILITOTAL  0.5  0.6   --   0.7   --   0.6   < >   --    < >   --    --   0.6   --    --    ALBUMIN  3.9  3.7   --   4.1   --   3.5   < >   --    < >   --    --   3.6   --    --    PROTTOTAL  7.1  7.1   --   7.5   --   7.2   < >   --    < >   --    --   6.8   --    --    ALKPHOS  51  49   --   44   --   54   < >   --    < >   --    --   44   --    --    AST  17  18   --   23   --   32   < >   --    < >   --    --   22   < >   --    ALT  27  29   --   45   --   40   < >   --    < >   --    --   36   < >   --     < > = values in this interval not displayed.     HgA1c  Recent Labs    Lab Test  10/31/17   1105  06/23/17   1156  02/09/17   1710   A1C  5.1  5.6  9.2*     Uric Acid  Recent Labs   Lab Test  05/18/17   1218  01/27/17   1113   URIC  9.3*  5.6     Calcium/VitaminD  Recent Labs   Lab Test  06/23/17   1156  02/09/17   1710  05/03/16   1147   07/23/13   1016   12/05/11   1200   NEW  9.6  8.8  8.6   < >   --    < >  9.3   D3VIT   --    --    --    --    --    --   30   VITDT   --    --    --    --   28*   --    --     < > = values in this interval not displayed.     ESR/CRP  Recent Labs   Lab Test  10/31/17   1105  08/09/17   1408  05/18/17   1218   SED  12  19  10   CRP  4.6  9.7*  10.7*     TSH/T4  Recent Labs   Lab Test  06/24/16   1101  07/23/13   1016  02/03/11   1254   TSH  1.07  0.55  0.71     Lipid Panel  Recent Labs   Lab Test  10/31/17   1105  12/02/16   1252  09/30/15   1302  06/29/15   1213  08/07/14   1249  07/23/13   1016   CHOL  127  174   --   125  129  153   TRIG  159*  282*   --   286*  270*  248*   HDL  48  39*   --   37*  46  41   LDL  47  79  89  31  29  62   VLDL   --    --    --   57*  54*  50*   CHOLHDLRATIO   --    --    --   3.4  2.8  3.7   NHDL  79  135*   --    --    --    --      Hepatitis B  Recent Labs   Lab Test  04/07/16   1149   HBCAB  Nonreactive   HEPBANG  Nonreactive     Hepatitis C  Recent Labs   Lab Test  04/07/16   1149   HCVAB  Nonreactive   Assay performance characteristics have not been established for newborns,   infants, and children       HIV Screening  Recent Labs   Lab Test  04/07/16   1149   HIAGAB  Nonreactive   HIV-1 p24 Ag & HIV-1/HIV-2 Ab Not Detected       UA  Recent Labs   Lab Test  06/29/15   1206  07/24/12   1506   COLOR  Yellow  Yellow   APPEARANCE  Clear  Clear   URINEGLC  500*  >=1000*   URINEBILI  Negative  Negative   SG  >1.030  1.020   URINEPH  5.0  5.0   PROTEIN  Trace*  Negative   UROBILINOGEN  0.2  0.2   NITRITE  Negative  Negative   UBLD  Trace*  Negative   LEUKEST  Negative  Negative   WBCU  O - 2  O - 2   RBCU  O - 2  O  "- 2     Urine Microscopic  Recent Labs   Lab Test  06/29/15   1206  07/24/12   1506   WBCU  O - 2  O - 2   RBCU  O - 2  O - 2     Urine Protein  Recent Labs   Lab Test  06/23/17   1150  06/24/16   1110  06/29/15   1206   UCRR  146  125  62     \"MRI LEFT KNEE  10/08/2013    INDICATION: Left knee pain. Locking, catching and snapping. Weakness.  TECHNIQUE: Routine.  COMPARISON: None.    FINDINGS:  MEDIAL COMPARTMENT: Linear tearing involving the posterior horn and body of   the medial meniscus as seen on series 4: image 6-8. This is also seen on   series 5: image 9-10. Cartilage is thinned peripherally.    LATERAL COMPARTMENT: Lateral meniscus also demonstrates tearing in the   posterior horn on series 4: image 22. There is diffuse tearing in the body   of the meniscus which is horizontal as seen on series 5: image 9 and this   extends into the anterior horn. Cartilage is thinned.    PATELLOFEMORAL COMPARTMENT: Retropatellar cartilage demonstrates   full-thickness loss of cartilage over portions of the median ridge, lateral   facet and medial facet. Cartilage is better preserved over the lower pole   centrally. There is also full-thickness loss of cartilage in the lateral   and central trochlear groove. Patella is normally aligned. Retinaculum are   intact.    LIGAMENTS AND TENDONS: The anterior cruciate and posterior cruciate   ligaments are intact. The medial collateral ligament is intact. Lateral   collateral ligamentous complex and popliteus insertion are intact.   Quadriceps tendon and patellar tendon are intact.    BONES AND SOFT TISSUES: Moderate-sized joint effusion. No popliteal cyst.   No muscle edema or fracture.    CONCLUSION:  1. There is tearing of the medial meniscus involving the posterior horn and   body. Cartilage is thinned peripherally.  2. There is also tearing in the posterior horn and body of the lateral   meniscus which also extends into the anterior horn. Cartilage is also   thinned in the " "lateral compartment.  3. Moderate to severe osteoarthritis in the patellofemoral compartment with   full-thickness loss of cartilage over large portions of the retropatellar   surface and trochlear surface.  4. Joint effusion.    IF YOU ARE A PHYSICIAN AND HAVE QUESTIONS REGARDING THIS REPORT, PLEASE   CALL [phone].\"    \"X-RAY KNEES BILATERAL AP W/LAT/SUN/PA LEFT   Oct 08, 2013 09:51:59 AM     INDICATION: Pain and swelling   COMPARISON: None.      FINDINGS: Moderate narrowing lateral aspect of the patellofemoral   compartment with slight lateral subluxation of the patella. Medial and   lateral compartments are preserved. Moderate knee joint effusion and/or   synovitis. Enthesophyte formation distal quadriceps and proximal patellar   tendons. Extensive vascular calcifications.     AP view right knee demonstrates trace narrowing medial compartment joint   Space.\"    Immunization History     Immunization History   Administered Date(s) Administered     Influenza (High Dose) 3 valent vaccine 09/20/2012, 10/20/2015, 09/20/2016, 08/28/2017     Influenza (IIV3) 10/05/1999, 10/30/2008, 09/28/2011, 10/14/2013, 10/03/2014     Pneumococcal (PCV 13) 06/29/2015     Pneumococcal 23 valent 08/19/2010     TD (ADULT, 7+) 08/05/1997, 02/03/2011     Zoster vaccine, live 04/19/2010            Chart documentation done in part with Dragon Voice recognition Software. Although reviewed after completion, some word and grammatical error may remain.    Albert Tompkins MD  "

## 2017-11-08 NOTE — MR AVS SNAPSHOT
After Visit Summary   11/8/2017    Zurdo Dash    MRN: [id number]           Patient Information     Date Of Birth          1945        Visit Information        Provider Department      11/8/2017 1:00 PM Albert Tompkins MD [facility]        Today's Diagnoses     Rheumatoid arthritis involving multiple sites with positive rheumatoid factor (H)    -  1    High risk medications (not anticoagulants) long-term use           Follow-ups after your visit        Additional Services     OPHTHALMOLOGY ADULT REFERRAL       Your provider has referred you to:  FMG: [facility] [phone]   [email or web address]    Reason for referral: Hydroxychloroquine (plaquenil) toxicity monitoring.     Please be aware that coverage of these services is subject to the terms and limitations of your health insurance plan.  Call member services at your health plan with any benefit or coverage questions.      Please bring the following to your appointment:  >>   Any x-rays, CTs or MRIs which have been performed.  Contact the facility where they were done to arrange for  prior to your scheduled appointment.  Any new CT, MRI or other procedures ordered by your specialist must be performed at a [place] facility or coordinated by your clinic's referral office.    >>   List of current medications   >>   This referral request   >>   Any documents/labs given to you for this referral                  Follow-up notes from your care team     Return in about 3 months (around 2/8/2018) for f/u RA.      Your next 10 appointments already scheduled     Feb 06, 2018 10:30 AM CST   LAB with FZ LAB   [facility] ([facility])    [address]  [place] [address] [phone]           Please do not eat 10-12 hours before your appointment if you are coming in fasting for labs on lipids, cholesterol, or glucose (sugar). This does not apply to  "pregnant women. Water, hot tea and black coffee (with nothing added) are okay. Do not drink other fluids, diet soda or chew gum.            Feb 07, 2018  1:00 PM CST   Return Visit with Albert Tompkins MD   [facility] [place] ([facility] [place])    [address] [place] [address]  [place] [address]   [phone]              Future tests that were ordered for you today     Open Future Orders        Priority Expected Expires Ordered    CBC with platelets differential Routine 2/2/2018 2/21/2018 11/8/2017    Creatinine Routine 2/2/2018 2/21/2018 11/8/2017    Erythrocyte sedimentation rate auto Routine 2/2/2018 2/21/2018 11/8/2017    CRP inflammation Routine 2/2/2018 2/21/2018 11/8/2017    Hepatic panel Routine 2/2/2018 2/21/2018 11/8/2017            Who to contact     If you have questions or need follow up information about today's clinic visit or your schedule please contact [facility] directly at [phone].  Normal or non-critical lab and imaging results will be communicated to you by [employer], letter or phone within 4 business days after the clinic has received the results. If you do not hear from us within 7 days, please contact the clinic through Oncodesignt or phone. If you have a critical or abnormal lab result, we will notify you by phone as soon as possible.  Submit refill requests through Pieceable or call your pharmacy and they will forward the refill request to us. Please allow 3 business days for your refill to be completed.          Additional Information About Your Visit        Pieceable Information     Pieceable lets you send messages to your doctor, view your test results, renew your prescriptions, schedule appointments and more. To sign up, go to [email or web address] . Click on \"Log in\" on the left side of the screen, which will take you to the Welcome page. Then click on \"Sign up Now\" on the right side of the page.     You will be asked to enter the access code listed below, as well " "as some personal information. Please follow the directions to create your username and password.     Your access code is: [email or web address]  Expires: 2018  1:56 PM     Your access code will  in 90 days. If you need help or a new code, please call your [place] clinic or [phone].        Care EveryWhere ID     This is your Care EveryWhere ID. This could be used by other organizations to access your [facility] medical records  [id number]        Your Vitals Were     Pulse Height BMI (Body Mass Index)             72 1.74 m (5' 8.5\") 28.44 kg/m2          Blood Pressure from Last 3 Encounters:   17 102/68   17 110/82   17 90/70    Weight from Last 3 Encounters:   17 86.1 kg (189 lb 12.8 oz)   17 85.3 kg (188 lb)   10/25/17 85.3 kg (188 lb)              We Performed the Following     OPHTHALMOLOGY ADULT REFERRAL          Where to get your medicines      These medications were sent to [facility] - [address] [place] [address]  [address], [place] [address]     Phone:  [phone]     folic acid 1 MG tablet    hydroxychloroquine 200 MG tablet    methotrexate sodium 2.5 MG Tabs          Primary Care Provider Office Phone # Fax #    Kapil Duckworth [phone] [address]  [facility] [address]        Equal Access to Services     [facility]: Hadii aad ku hadasho Soomaali, waaxda luqadaha, qaybta kaalmada adeegyada, waxay idikeith mckeon. So [facility] [phone].    ATENCIÓN: Si habla español, tiene a diallo disposición servicios gratuitos de asistencia lingüística. Llame al [phone].    We comply with applicable federal civil rights laws and [place] laws. We do not discriminate on the basis of race, color, national origin, age, disability, sex, sexual orientation, or gender identity.            Thank you!     Thank you for choosing [facility]  for your care. Our goal is always to " provide you with excellent care. Hearing back from our patients is one way we can continue to improve our services. Please take a few minutes to complete the written survey that you may receive in the mail after your visit with us. Thank you!             Your Updated Medication List - Protect others around you: Learn how to safely use, store and throw away your medicines at www.disposemymeds.org.          This list is accurate as of: 11/8/17  1:56 PM.  Always use your most recent med list.                   Brand Name Dispense Instructions for use Diagnosis    albuterol 108 (90 BASE) MCG/ACT Inhaler    PROAIR HFA/PROVENTIL HFA/VENTOLIN HFA    1 Inhaler    Inhale 2 puffs into the lungs every 4 hours as needed for other (coughing)    Acute bronchitis, unspecified organism       aspirin 81 MG tablet      Take 1 tablet by mouth daily.    Type 2 diabetes, HbA1c goal < 7% (H)       blood glucose lancing device    no brand specified    1 each    Use to test blood sugars 3 times daily or as directed.    Type 2 diabetes mellitus, uncontrolled, with neuropathy (H)       blood glucose monitoring test strip    INDIA CONTOUR NEXT    300 each    TEST THREE TIMES A DAY OR AS DIRECTED    Type 2 diabetes mellitus, uncontrolled, with neuropathy (H)       CALCIUM 500 + D 500-200 MG-IU Tabs      take 2 tabs daily        cyclobenzaprine 10 MG tablet    FLEXERIL    30 tablet    Take 1 tablet (10 mg) by mouth 3 times daily as needed for muscle spasms    Neck pain       folic acid 1 MG tablet    FOLVITE    100 tablet    Take 1 tablet (1 mg) by mouth daily    Rheumatoid arthritis involving multiple sites with positive rheumatoid factor (H)       HYDROcodone-acetaminophen 5-325 MG per tablet    NORCO    28 tablet    Take 1-2 tablets by mouth every 4 hours as needed for moderate to severe pain maximum 4 tablet(s) per day    Bruised ribs, unspecified laterality, sequela       hydroxychloroquine 200 MG tablet    PLAQUENIL    180 tablet    Take  "1 tablet (200 mg) by mouth 2 times daily    Rheumatoid arthritis involving multiple sites with positive rheumatoid factor (H)       insulin pen needle 31G X 6 MM    ULTICARE MINI    100 each    Use 1 daily or as directed.    Type 2 diabetes mellitus, uncontrolled, with neuropathy (H)       liraglutide 18 MG/3ML soln    VICTOZA PEN    9 mL    Inject 1.2 mg Subcutaneous daily For 1 month, then decrease dose to 0.6mg daily and stay on this dose    Type 2 diabetes mellitus, uncontrolled, with neuropathy (H)       lisinopril 2.5 MG tablet    PRINIVIL/Zestril    90 tablet    Take 1 tablet (2.5 mg) by mouth daily    Hypotension, unspecified hypotension type       metFORMIN 500 MG tablet    GLUCOPHAGE    1 tablet    Take 1 tablet (500 mg) by mouth 2 times daily (with meals) TAKE TWO TABLETS BY MOUTH TWICE A DAY WITH MEALS    Type 2 diabetes mellitus with diabetic polyneuropathy, with long-term current use of insulin (H)       methotrexate sodium 2.5 MG Tabs     120 tablet    Take 25 mg by mouth once a week . Take all 10 tablets on the same day of each week.    Rheumatoid arthritis involving multiple sites with positive rheumatoid factor (H)       * MICROLET LANCETS Misc     100 each    1 Device daily    Type 2 diabetes, HbA1C goal < 8% (H)       * blood glucose monitoring lancets     300 each    Use to test blood sugars 3 times daily or as directed.    Type 2 diabetes mellitus, uncontrolled, with neuropathy (H)       MULTI-VITAMIN PO      None Entered        omeprazole 20 MG CR capsule    priLOSEC    90 capsule    Take 1 capsule (20 mg) by mouth daily    Gastroesophageal reflux disease without esophagitis       pravastatin 40 MG tablet    PRAVACHOL    1 tablet    Take 0.5 tablets (20 mg) by mouth every other day    Hyperlipidemia LDL goal <100       sildenafil 20 MG tablet    REVATIO    30 tablet    \" take between 2-4 tablets at a time for planned sexual activity\" , maximum 5 pills per day    Erectile dysfunction, " unspecified erectile dysfunction type       * Notice:  This list has 2 medication(s) that are the same as other medications prescribed for you. Read the directions carefully, and ask your doctor or other care provider to review them with you.

## 2017-12-11 DIAGNOSIS — I10 HYPERTENSION GOAL BP (BLOOD PRESSURE) < 140/90: ICD-10-CM

## 2017-12-11 RX ORDER — METOPROLOL SUCCINATE 25 MG/1
TABLET, EXTENDED RELEASE ORAL
Qty: 90 TABLET | Refills: 3 | Status: SHIPPED | OUTPATIENT
Start: 2017-12-11 | End: 2018-12-27

## 2017-12-11 NOTE — TELEPHONE ENCOUNTER
metoprolol (TOPROL-XL) 25 MG 24 hr tablet       Last Written Prescription Date:  9/15/2016  Last Fill Quantity: 90,   # refills: 3  Last Office Visit: 11/7/2017  Future Office visit:    Next 5 appointments (look out 90 days)     Feb 07, 2018  1:00 PM CST   Return Visit with Albert Tompkins MD   [facility] [place] ([facility])    [address]  [place] [address]   [phone]                   Routing refill request to provider for review/approval because:  Drug not active on patient's medication list

## 2018-01-12 ENCOUNTER — OFFICE VISIT (OUTPATIENT)
Dept: OPTOMETRY | Facility: CLINIC | Age: 73
End: 2018-01-12
Payer: COMMERCIAL

## 2018-01-12 DIAGNOSIS — H52.4 HYPEROPIA OF BOTH EYES WITH ASTIGMATISM AND PRESBYOPIA: ICD-10-CM

## 2018-01-12 DIAGNOSIS — E11.9 TYPE 2 DIABETES MELLITUS WITHOUT RETINOPATHY (H): Primary | ICD-10-CM

## 2018-01-12 DIAGNOSIS — H43.812 PVD (POSTERIOR VITREOUS DETACHMENT), LEFT EYE: ICD-10-CM

## 2018-01-12 DIAGNOSIS — H25.813 COMBINED FORMS OF AGE-RELATED CATARACT OF BOTH EYES: ICD-10-CM

## 2018-01-12 DIAGNOSIS — H52.03 HYPEROPIA OF BOTH EYES WITH ASTIGMATISM AND PRESBYOPIA: ICD-10-CM

## 2018-01-12 DIAGNOSIS — H52.203 HYPEROPIA OF BOTH EYES WITH ASTIGMATISM AND PRESBYOPIA: ICD-10-CM

## 2018-01-12 PROCEDURE — 92014 COMPRE OPH EXAM EST PT 1/>: CPT | Performed by: OPTOMETRIST

## 2018-01-12 PROCEDURE — 92015 DETERMINE REFRACTIVE STATE: CPT | Performed by: OPTOMETRIST

## 2018-01-12 ASSESSMENT — VISUAL ACUITY
OD_SC: 20/60
OS_CC: 20/30 -2
OD_CC+: -1
CORRECTION_TYPE: GLASSES
OS_SC: 20/200
OD_CC: 20/30 -2
OD_SC: 20/200
OD_CC: 20/20
OD_SC+: -1
OS_SC: 20/60
OS_CC: 20/20
METHOD: SNELLEN - LINEAR

## 2018-01-12 ASSESSMENT — REFRACTION_WEARINGRX
OD_SPHERE: +1.75
OS_VBASE: UP
SPECS_TYPE: PAL
OS_SPHERE: +1.50
OS_AXIS: 022
OD_ADD: +2.50
OD_AXIS: 152
OS_CYLINDER: +0.50
OD_CYLINDER: +0.50
OS_VPRISM: 1.0
OS_ADD: +2.50

## 2018-01-12 ASSESSMENT — SLIT LAMP EXAM - LIDS
COMMENTS: NORMAL
COMMENTS: PAPILLOMA LOWER LID

## 2018-01-12 ASSESSMENT — REFRACTION_MANIFEST
OD_ADD: +2.50
OD_AXIS: 015
OS_SPHERE: +2.00
OS_VPRISM: 1.0
OS_AXIS: 010
OD_SPHERE: +2.25
OS_ADD: +2.50
OS_CYLINDER: +0.25
OD_CYLINDER: +0.50

## 2018-01-12 ASSESSMENT — REFRACTION_FINALRX: OS_VPRISM: 1.0

## 2018-01-12 ASSESSMENT — CONF VISUAL FIELD
OD_NORMAL: 1
OS_NORMAL: 1

## 2018-01-12 ASSESSMENT — EXTERNAL EXAM - LEFT EYE: OS_EXAM: NORMAL

## 2018-01-12 ASSESSMENT — CUP TO DISC RATIO
OD_RATIO: 0.3
OS_RATIO: 0.4

## 2018-01-12 ASSESSMENT — TONOMETRY
IOP_METHOD: APPLANATION
OS_IOP_MMHG: 20
OD_IOP_MMHG: 20

## 2018-01-12 ASSESSMENT — EXTERNAL EXAM - RIGHT EYE: OD_EXAM: NORMAL

## 2018-01-12 NOTE — MR AVS SNAPSHOT
After Visit Summary   1/12/2018    Zurdo Dash    MRN: [id number]           Patient Information     Date Of Birth          1945        Visit Information        Provider Department      1/12/2018 11:20 AM Amy Arceo OD [facility]        Today's Diagnoses     Type 2 diabetes mellitus without retinopathy (H)    -  1    Combined forms of age-related cataract of both eyes        PVD (posterior vitreous detachment), left eye        Hyperopia of both eyes with astigmatism and presbyopia          Care Instructions        Monitor, Keep blood sugar under control  Sent letter to primary care provider regarding diabetes    Monitor cataracts by having yearly exams.  Wear sunglasses when outside.    A posterior vitreous detachment is nothing to worry about.  You have a jelly like substance that fills the inside of the eye, as you get older, that gel shrinks a little and when it shrinks, it pulls away from the back of the eye.  When it pulls away you can see a floater, as time goes on, the floater may shrink down out of your line of sight and you won't notice it so often.  There is a little greater risk of developing a retinal detachment since there's nothing pressing against the retina, so if you start to notice flashes of light or sudden vision changes, return to the clinic as soon as possible, otherwise return as needed.    A final glasses prescription was given.  Allow time for adaptation.  The glasses may cause dizziness and affect depth perception for awhile.  Return to clinic 1 year for Comprehensive Vision Exam      Amy Arceo O.D  [address]  [place]  [address]    [phone]                Follow-ups after your visit        Follow-up notes from your care team     Return in about 1 year (around 1/12/2019) for Eye Exam.      Your next 10 appointments already scheduled     Feb 06, 2018 10:30 AM CST   LAB with CAR LAB   Jose [facility]  "[facility] [place] ([facility])    [address]  [place] [address]   [phone]           Please do not eat 10-12 hours before your appointment if you are coming in fasting for labs on lipids, cholesterol, or glucose (sugar). This does not apply to pregnant women. Water, hot tea and black coffee (with nothing added) are okay. Do not drink other fluids, diet soda or chew gum.            Feb 07, 2018  1:00 PM CST   Return Visit with Albert Tompkins MD   [facility] ([facility])    [address]  [place] [address]   [phone]              Who to contact     If you have questions or need follow up information about today's clinic visit or your schedule please contact [facility] directly at [phone].  Normal or non-critical lab and imaging results will be communicated to you by MyChart, letter or phone within 4 business days after the clinic has received the results. If you do not hear from us within 7 days, please contact the clinic through IV Diagnosticshart or phone. If you have a critical or abnormal lab result, we will notify you by phone as soon as possible.  Submit refill requests through [employer] or call your pharmacy and they will forward the refill request to us. Please allow 3 business days for your refill to be completed.          Additional Information About Your Visit        [employer] Information     [employer] lets you send messages to your doctor, view your test results, renew your prescriptions, schedule appointments and more. To sign up, go to [email or web address] . Click on \"Log in\" on the left side of the screen, which will take you to the Welcome page. Then click on \"Sign up Now\" on the right side of the page.     You will be asked to enter the access code listed below, as well as some personal information. Please follow the directions to create your username and password.     Your access code is: [email or web address]  Expires: 2/6/2018  " 1:56 PM     Your access code will  in 90 days. If you need help or a new code, please call your [place] clinic or [phone].        Care EveryWhere ID     This is your Care EveryWhere ID. This could be used by other organizations to access your [place] medical records  [id number]         Blood Pressure from Last 3 Encounters:   17 124/72   17 102/68   17 110/82    Weight from Last 3 Encounters:   17 86.1 kg (189 lb 12.8 oz)   17 85.3 kg (188 lb)   10/25/17 85.3 kg (188 lb)              We Performed the Following     EYE EXAM (SIMPLE-NONBILLABLE)     REFRACTION        Primary Care Provider Office Phone # Fax #    Kapil Duckworth [phone] [address] [place] [address]        Equal Access to Services     [facility] : Hadii aad ku hadasho Soomaali, waaxda luqadaha, qaybta kaalmada adeegyada, waxay idiin haynorahn hola zhong . So [facility] [phone].    ATENCIÓN: Si habla español, tiene a diallo disposición servicios gratuitos de asistencia lingüística. Llame al [phone].    We comply with applicable federal civil rights laws and [place] laws. We do not discriminate on the basis of race, color, national origin, age, disability, sex, sexual orientation, or gender identity.            Thank you!     Thank you for choosing [facility]  for your care. Our goal is always to provide you with excellent care. Hearing back from our patients is one way we can continue to improve our services. Please take a few minutes to complete the written survey that you may receive in the mail after your visit with us. Thank you!             Your Updated Medication List - Protect others around you: Learn how to safely use, store and throw away your medicines at www.disposemymeds.org.          This list is accurate as of: 18 12:41 PM.  Always use your most recent med list.                   Brand Name Dispense Instructions for use Diagnosis     albuterol 108 (90 BASE) MCG/ACT Inhaler    PROAIR HFA/PROVENTIL HFA/VENTOLIN HFA    1 Inhaler    Inhale 2 puffs into the lungs every 4 hours as needed for other (coughing)    Acute bronchitis, unspecified organism       aspirin 81 MG tablet      Take 1 tablet by mouth daily.    Type 2 diabetes, HbA1c goal < 7% (H)       blood glucose lancing device    no brand specified    1 each    Use to test blood sugars 3 times daily or as directed.    Type 2 diabetes mellitus, uncontrolled, with neuropathy (H)       blood glucose monitoring test strip    INDIA CONTOUR NEXT    300 each    TEST THREE TIMES A DAY OR AS DIRECTED    Type 2 diabetes mellitus, uncontrolled, with neuropathy (H)       CALCIUM 500 + D 500-200 MG-IU Tabs      take 2 tabs daily        cyclobenzaprine 10 MG tablet    FLEXERIL    30 tablet    Take 1 tablet (10 mg) by mouth 3 times daily as needed for muscle spasms    Neck pain       folic acid 1 MG tablet    FOLVITE    100 tablet    Take 1 tablet (1 mg) by mouth daily    Rheumatoid arthritis involving multiple sites with positive rheumatoid factor (H)       HYDROcodone-acetaminophen 5-325 MG per tablet    NORCO    28 tablet    Take 1-2 tablets by mouth every 4 hours as needed for moderate to severe pain maximum 4 tablet(s) per day    Bruised ribs, unspecified laterality, sequela       hydroxychloroquine 200 MG tablet    PLAQUENIL    180 tablet    Take 1 tablet (200 mg) by mouth 2 times daily    Rheumatoid arthritis involving multiple sites with positive rheumatoid factor (H)       insulin pen needle 31G X 6 MM    ULTICARE MINI    100 each    Use 1 daily or as directed.    Type 2 diabetes mellitus, uncontrolled, with neuropathy (H)       liraglutide 18 MG/3ML soln    VICTOZA PEN    9 mL    Inject 1.2 mg Subcutaneous daily For 1 month, then decrease dose to 0.6mg daily and stay on this dose    Type 2 diabetes mellitus, uncontrolled, with neuropathy (H)       lisinopril 2.5 MG tablet    PRINIVIL/Zestril     "90 tablet    Take 1 tablet (2.5 mg) by mouth daily    Hypotension, unspecified hypotension type       metFORMIN 500 MG tablet    GLUCOPHAGE    1 tablet    Take 1 tablet (500 mg) by mouth 2 times daily (with meals) TAKE TWO TABLETS BY MOUTH TWICE A DAY WITH MEALS    Type 2 diabetes mellitus with diabetic polyneuropathy, with long-term current use of insulin (H)       methotrexate sodium 2.5 MG Tabs     120 tablet    Take 25 mg by mouth once a week . Take all 10 tablets on the same day of each week.    Rheumatoid arthritis involving multiple sites with positive rheumatoid factor (H)       metoprolol succinate 25 MG 24 hr tablet    TOPROL-XL    90 tablet    TAKE ONE TABLET BY MOUTH EVERY DAY    Hypertension goal BP (blood pressure) < 140/90       * MICROLET LANCETS Misc     100 each    1 Device daily    Type 2 diabetes, HbA1C goal < 8% (H)       * blood glucose monitoring lancets     300 each    Use to test blood sugars 3 times daily or as directed.    Type 2 diabetes mellitus, uncontrolled, with neuropathy (H)       MULTI-VITAMIN PO      None Entered        omeprazole 20 MG CR capsule    priLOSEC    90 capsule    Take 1 capsule (20 mg) by mouth daily    Gastroesophageal reflux disease without esophagitis       pravastatin 40 MG tablet    PRAVACHOL    1 tablet    Take 0.5 tablets (20 mg) by mouth every other day    Hyperlipidemia LDL goal <100       sildenafil 20 MG tablet    REVATIO    30 tablet    \" take between 2-4 tablets at a time for planned sexual activity\" , maximum 5 pills per day    Erectile dysfunction, unspecified erectile dysfunction type       * Notice:  This list has 2 medication(s) that are the same as other medications prescribed for you. Read the directions carefully, and ask your doctor or other care provider to review them with you.      "

## 2018-01-12 NOTE — PATIENT INSTRUCTIONS
Monitor, Keep blood sugar under control  Sent letter to primary care provider regarding diabetes    Monitor cataracts by having yearly exams.  Wear sunglasses when outside.    A posterior vitreous detachment is nothing to worry about.  You have a jelly like substance that fills the inside of the eye, as you get older, that gel shrinks a little and when it shrinks, it pulls away from the back of the eye.  When it pulls away you can see a floater, as time goes on, the floater may shrink down out of your line of sight and you won't notice it so often.  There is a little greater risk of developing a retinal detachment since there's nothing pressing against the retina, so if you start to notice flashes of light or sudden vision changes, return to the clinic as soon as possible, otherwise return as needed.    A final glasses prescription was given.  Allow time for adaptation.  The glasses may cause dizziness and affect depth perception for awhile.  Return to clinic 1 year for Comprehensive Vision Exam      Amy Arceo O.D  [facility]  [address] [place] [address]. [place]  [place]  [address]    [phone]

## 2018-01-12 NOTE — LETTER
[facility]  Optometry Department      1/12/2018    Re:  Zurdo Dash  1945   [id number]    Dear Dr. Duckworth,    Your patient was seen in our office on 1/12/2018 for a dilated diabetic eye exam.      Findings:    No Retinopathy  OU - Both  Cataracts both eyes   Posterior Vitreous Detachment left eye     Comments:  Zurdo should return for a comprehensive eye exam in 1 year.    Sincerely,        Amy Arceo O.D  [facility] - [address]  [place]  [address]    [phone]

## 2018-01-12 NOTE — PROGRESS NOTES
Chief Complaint   Patient presents with     Eye Exam For Diabetes     Accompanied by self  Lab Results   Component Value Date    A1C 5.1 10/31/2017    A1C 5.6 06/23/2017    A1C 9.2 02/09/2017    A1C 9.8 12/02/2016    A1C 9.0 08/05/2016       Last Eye Exam: 1 year ago  Dilated Previously: Yes    What are you currently using to see?  glasses    Distance Vision Acuity: Satisfied with vision    Near Vision Acuity: Satisfied with vision while reading  with glasses    Eye Comfort: good  Do you use eye drops? : No  Occupation or Hobbies: retired    Stacy Vega, ScaleMP     Medical, surgical and family histories reviewed and updated 1/12/2018.       OBJECTIVE: See Ophthalmology exam    ASSESSMENT:    ICD-10-CM    1. Type 2 diabetes mellitus without retinopathy (H) E11.9 EYE EXAM (SIMPLE-NONBILLABLE)   2. Combined forms of age-related cataract of both eyes H25.813 EYE EXAM (SIMPLE-NONBILLABLE)   3. PVD (posterior vitreous detachment), left eye H43.812 EYE EXAM (SIMPLE-NONBILLABLE)   4. Hyperopia of both eyes with astigmatism and presbyopia H52.03 EYE EXAM (SIMPLE-NONBILLABLE)    H52.203 REFRACTION    H52.4       PLAN:  Monitor, Keep blood sugar under control  Sent letter to primary care provider regarding diabetes.  Zurdo Dash aware  eye exam results will be sent to Kapil Duckworth.    Monitor cataracts by having yearly exams.  Wear sunglasses when outside.    A posterior vitreous detachment is nothing to worry about.  You have a jelly like substance that fills the inside of the eye, as you get older, that gel shrinks a little and when it shrinks, it pulls away from the back of the eye.  When it pulls away you can see a floater, as time goes on, the floater may shrink down out of your line of sight and you won't notice it so often.  There is a little greater risk of developing a retinal detachment since there's nothing pressing against the retina, so if you start to notice flashes of light or sudden vision  changes, return to the clinic as soon as possible, otherwise return as needed.    A final glasses prescription was given.  Allow time for adaptation.  The glasses may cause dizziness and affect depth perception for awhile.  Return to clinic 1 year for Comprehensive Vision Exam      Amy Arceo O.D  [address]  [place]  [address]    [phone]

## 2018-02-06 DIAGNOSIS — Z79.899 HIGH RISK MEDICATIONS (NOT ANTICOAGULANTS) LONG-TERM USE: ICD-10-CM

## 2018-02-06 DIAGNOSIS — M05.79 RHEUMATOID ARTHRITIS INVOLVING MULTIPLE SITES WITH POSITIVE RHEUMATOID FACTOR (H): ICD-10-CM

## 2018-02-06 LAB
ALBUMIN SERPL-MCNC: 3.6 G/DL (ref 3.4–5)
ALP SERPL-CCNC: 53 U/L (ref 40–150)
ALT SERPL W P-5'-P-CCNC: 22 U/L (ref 0–70)
AST SERPL W P-5'-P-CCNC: 10 U/L (ref 0–45)
BASOPHILS # BLD AUTO: 0 10E9/L (ref 0–0.2)
BASOPHILS NFR BLD AUTO: 0.4 %
BILIRUB DIRECT SERPL-MCNC: 0.1 MG/DL (ref 0–0.2)
BILIRUB SERPL-MCNC: 0.4 MG/DL (ref 0.2–1.3)
CREAT SERPL-MCNC: 1.07 MG/DL (ref 0.66–1.25)
CRP SERPL-MCNC: 4.7 MG/L (ref 0–8)
DIFFERENTIAL METHOD BLD: NORMAL
EOSINOPHIL # BLD AUTO: 0.1 10E9/L (ref 0–0.7)
EOSINOPHIL NFR BLD AUTO: 1.1 %
ERYTHROCYTE [DISTWIDTH] IN BLOOD BY AUTOMATED COUNT: 14.4 % (ref 10–15)
ERYTHROCYTE [SEDIMENTATION RATE] IN BLOOD BY WESTERGREN METHOD: 11 MM/H (ref 0–20)
GFR SERPL CREATININE-BSD FRML MDRD: 68 ML/MIN/1.7M2
HCT VFR BLD AUTO: 43.1 % (ref 40–53)
HGB BLD-MCNC: 14.4 G/DL (ref 13.3–17.7)
LYMPHOCYTES # BLD AUTO: 0.9 10E9/L (ref 0.8–5.3)
LYMPHOCYTES NFR BLD AUTO: 11 %
MCH RBC QN AUTO: 30.9 PG (ref 26.5–33)
MCHC RBC AUTO-ENTMCNC: 33.4 G/DL (ref 31.5–36.5)
MCV RBC AUTO: 93 FL (ref 78–100)
MONOCYTES # BLD AUTO: 0.8 10E9/L (ref 0–1.3)
MONOCYTES NFR BLD AUTO: 10.3 %
NEUTROPHILS # BLD AUTO: 6.1 10E9/L (ref 1.6–8.3)
NEUTROPHILS NFR BLD AUTO: 77.2 %
PLATELET # BLD AUTO: 218 10E9/L (ref 150–450)
PROT SERPL-MCNC: 6.7 G/DL (ref 6.8–8.8)
RBC # BLD AUTO: 4.66 10E12/L (ref 4.4–5.9)
WBC # BLD AUTO: 7.9 10E9/L (ref 4–11)

## 2018-02-06 PROCEDURE — 80076 HEPATIC FUNCTION PANEL: CPT | Performed by: INTERNAL MEDICINE

## 2018-02-06 PROCEDURE — 82565 ASSAY OF CREATININE: CPT | Performed by: INTERNAL MEDICINE

## 2018-02-06 PROCEDURE — 86140 C-REACTIVE PROTEIN: CPT | Performed by: INTERNAL MEDICINE

## 2018-02-06 PROCEDURE — 36415 COLL VENOUS BLD VENIPUNCTURE: CPT | Performed by: INTERNAL MEDICINE

## 2018-02-06 PROCEDURE — 85652 RBC SED RATE AUTOMATED: CPT | Performed by: INTERNAL MEDICINE

## 2018-02-06 PROCEDURE — 85025 COMPLETE CBC W/AUTO DIFF WBC: CPT | Performed by: INTERNAL MEDICINE

## 2018-02-07 ENCOUNTER — OFFICE VISIT (OUTPATIENT)
Dept: RHEUMATOLOGY | Facility: CLINIC | Age: 73
End: 2018-02-07
Payer: COMMERCIAL

## 2018-02-07 VITALS
SYSTOLIC BLOOD PRESSURE: 110 MMHG | BODY MASS INDEX: 28.71 KG/M2 | HEIGHT: 69 IN | OXYGEN SATURATION: 95 % | HEART RATE: 57 BPM | WEIGHT: 193.8 LBS | DIASTOLIC BLOOD PRESSURE: 67 MMHG

## 2018-02-07 DIAGNOSIS — M22.2X1 PATELLOFEMORAL SYNDROME OF BOTH KNEES: ICD-10-CM

## 2018-02-07 DIAGNOSIS — M05.79 RHEUMATOID ARTHRITIS INVOLVING MULTIPLE SITES WITH POSITIVE RHEUMATOID FACTOR (H): Primary | ICD-10-CM

## 2018-02-07 DIAGNOSIS — M22.2X2 PATELLOFEMORAL SYNDROME OF BOTH KNEES: ICD-10-CM

## 2018-02-07 DIAGNOSIS — Z79.899 HIGH RISK MEDICATIONS (NOT ANTICOAGULANTS) LONG-TERM USE: ICD-10-CM

## 2018-02-07 PROCEDURE — 99213 OFFICE O/P EST LOW 20 MIN: CPT | Mod: 25 | Performed by: INTERNAL MEDICINE

## 2018-02-07 PROCEDURE — 20610 DRAIN/INJ JOINT/BURSA W/O US: CPT | Mod: 50 | Performed by: INTERNAL MEDICINE

## 2018-02-07 RX ORDER — TRIAMCINOLONE ACETONIDE 40 MG/ML
40 INJECTION, SUSPENSION INTRA-ARTICULAR; INTRAMUSCULAR ONCE
Qty: 1 ML | Refills: 0 | OUTPATIENT
Start: 2018-02-07 | End: 2018-02-07

## 2018-02-07 RX ORDER — METHOTREXATE 2.5 MG/1
25 TABLET ORAL WEEKLY
Qty: 120 TABLET | Refills: 2 | Status: SHIPPED | OUTPATIENT
Start: 2018-02-07 | End: 2018-09-13

## 2018-02-07 NOTE — NURSING NOTE
"Chief Complaint   Patient presents with     Arthritis     RA, patient states his toes hurt.       Initial /67 (BP Location: Left arm, Patient Position: Chair, Cuff Size: Adult Regular)  Pulse 57  Ht 1.74 m (5' 8.5\")  Wt 87.9 kg (193 lb 12.8 oz)  SpO2 95%  BMI 29.04 kg/m2 Estimated body mass index is 29.04 kg/(m^2) as calculated from the following:    Height as of this encounter: 1.74 m (5' 8.5\").    Weight as of this encounter: 87.9 kg (193 lb 12.8 oz).  BP completed using cuff size: regular         RAPID3 (0-30) Cumulative Score  4.8          RAPID3 Weighted Score (divide #4 by 3 and that is the weighted score)  1.6         "

## 2018-02-07 NOTE — PROGRESS NOTES
Rheumatology Clinic Visit      Zurdo Dash MRN# [id number]   YOB: 1945 Age: 72 year old      Date of visit: 2/07/18   PCP: Dr. Kapil Duckworth     Chief Complaint   Patient presents with:  Arthritis: RA, patient states his toes hurt.    Assessment and Plan     1. Seropositive nonerosive rheumatoid arthritis (, CCP 64): Currently on methotrexate 25 mg once weekly, folic acid 1 mg daily, and hydroxychloroquine 200 mg twice a day. Doing well today.  Continue current medications.  Encouraged him to get his eye exam for Plaquenil toxicity monitoring.  - Continue methotrexate 25 mg once weekly  - Continue folic acid 1 mg daily  - Continue hydroxychloroquine 200 mg twice a day; ophthalmology exam pending  - Labs 2-3 days prior to the next rheumatology clinic visit: CBC, Creatinine, Hepatic Panel, ESR, CRP    2. Bilateral knee osteoarthritis: Was receiving steroid injections approximately every 3-6 months with good control of his symptoms.  He then went to an outside arthritis clinic where he received 3 injections in the knee that was helpful but did not return to the clinic.  He would like to have a steroid injection of his knee again today.  Note that he has not had a steroid injection of his knees for at least 6 months.    3. 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 He was evaluated by Dr. Duckworth at that time. Reportedly he used colchicine in the past. I advised him to come in for joint arthrocentesis if this occurs again. Recheck of uric acid previously was elevated.    Mr. Dash verbalized agreement with and understanding of the rational for the diagnosis and treatment plan.  All questions were answered to best of my ability and the patient's satisfaction. Mr. Dash was advised to contact the clinic with any questions that may arise after the clinic visit.    Thank you for involving me in the care of the patient    Return to clinic:  3 months      HPI   Zurdo Dash is a 72 year old male with a medical history significant for hypertension, hyperlipidemia, diabetes, diabetic neuropathy, GERD, and rheumatoid arthritis who presents for f/u of RA and bilateral knee OA.     Today, Mr. Dash reports that his arthritis is doing well.  Bilateral knee pain if he goes up or down the stairs.  He would like to restart having steroid injections here as they have been helpful for 3-6 months each time that he gets them.  Occasional pain in his MCPs but otherwise doing well.  Currently without morning stiffness.  No gelling phenomenon.       Denies fevers, chills, nausea, vomiting, constipation, diarrhea. No abdominal pain. No chest pain/pressure, palpitations, or shortness of breath. No oral or nasal sores. No neck pain. No rash. No LE swelling.      Tobacco: None  EtOH: None  Drugs: None    ROS   GEN: No fevers, chills, or night sweats; intentionally losing weight on the [place] diet  SKIN: No rash. Sores from a recent fall.  HEENT:No epistaxis. No oral or nasal ulcers.  CV: No chest pain, pressure, palpitations, or dyspnea on exertion.  PULM: No SOB, wheeze, cough.  GI: No nausea, vomiting, constipation, diarrhea. No blood in stool. No abdominal pain.  : No blood in urine.  MSK: See HPI.  NEURO: No numbness, tingling, or weakness.  EXT: No LE swelling    Active Problem List     Patient Active Problem List   Diagnosis     Pain in limb     Hyperlipidemia LDL goal <100     Hypertension goal BP (blood pressure) < 140/90     obesity     Hypogonadism     Advanced directives, counseling/discussion     Health Care Home     Type 2 diabetes mellitus with diabetic polyneuropathy, with long-term current use of insulin (H)     RBBB (right bundle branch block)     Lumbago     Ex-smoker     Cataracts, both eyes     Family history of esophageal cancer     Gastroesophageal reflux disease, esophagitis presence not specified     Rheumatoid arthritis involving multiple  sites with positive rheumatoid factor (H)     Pulmonary nodule     High risk medication use     Spondylosis of cervical region without myelopathy or radiculopathy     Erectile dysfunction, unspecified erectile dysfunction type     Type 2 diabetes mellitus without retinopathy (H)     Combined forms of age-related cataract of both eyes     PVD (posterior vitreous detachment), left eye     Past Medical History     Past Medical History:   Diagnosis Date     Abnormal CT scan 03/2004    calcified lung granuloma     C. difficile diarrhea     H/O     Cataract 11/18/2011     Diabetic neuropathy (H)     mild, mostly soles and distal forefeet, worse on the left side.     Diverticulitis      ED (erectile dysfunction)      Ex-smoker     QUIT SMOKING FEB 2007     History of ETOH abuse     recovering, sober since 1997     Hyperlipidemia LDL goal <100      Hypertension goal BP (blood pressure) < 140/90      Hypogonadism      Obesity      PAD (peripheral artery disease) (H)     leg cramps, with exertion, no formal diagnosis of PAD and minimal if any symptoms at all.     RA (rheumatoid arthritis) (H)     Dr Bailon     Type 2 diabetes, HbA1c goal < 7% (H) 10/2008    A1C of 7.1 %      Past Surgical History     Past Surgical History:   Procedure Laterality Date     HC INCISION TENDON SHEATH FINGER  4/2009    r hand ring finger     TONSILLECTOMY       Allergy   No Known Allergies  Current Medication List     Current Outpatient Prescriptions   Medication Sig     methotrexate sodium 2.5 MG TABS Take 25 mg by mouth once a week . Take all 10 tablets on the same day of each week.     hydroxychloroquine (PLAQUENIL) 200 MG tablet Take 1 tablet (200 mg) by mouth 2 times daily     folic acid (FOLVITE) 1 MG tablet Take 1 tablet (1 mg) by mouth daily     metFORMIN (GLUCOPHAGE) 500 MG tablet Take 1 tablet (500 mg) by mouth 2 times daily (with meals) TAKE TWO TABLETS BY MOUTH TWICE A DAY WITH MEALS     pravastatin (PRAVACHOL) 40 MG tablet Take 0.5  "tablets (20 mg) by mouth every other day     sildenafil (REVATIO) 20 MG tablet \" take between 2-4 tablets at a time for planned sexual activity\" , maximum 5 pills per day     liraglutide (VICTOZA PEN) 18 MG/3ML soln Inject 1.2 mg Subcutaneous daily For 1 month, then decrease dose to 0.6mg daily and stay on this dose     albuterol (PROAIR HFA/PROVENTIL HFA/VENTOLIN HFA) 108 (90 BASE) MCG/ACT Inhaler Inhale 2 puffs into the lungs every 4 hours as needed for other (coughing)     lisinopril (PRINIVIL/ZESTRIL) 2.5 MG tablet Take 1 tablet (2.5 mg) by mouth daily     omeprazole (PRILOSEC) 20 MG CR capsule Take 1 capsule (20 mg) by mouth daily     HYDROcodone-acetaminophen (NORCO) 5-325 MG per tablet Take 1-2 tablets by mouth every 4 hours as needed for moderate to severe pain maximum 4 tablet(s) per day     aspirin 81 MG tablet Take 1 tablet by mouth daily.     metoprolol (TOPROL-XL) 25 MG 24 hr tablet TAKE ONE TABLET BY MOUTH EVERY DAY     [DISCONTINUED] methotrexate sodium 2.5 MG TABS Take 25 mg by mouth once a week . Take all 10 tablets on the same day of each week.     insulin pen needle (ULTICARE MINI) 31G X 6 MM Use 1 daily or as directed.     cyclobenzaprine (FLEXERIL) 10 MG tablet Take 1 tablet (10 mg) by mouth 3 times daily as needed for muscle spasms (Patient not taking: Reported on 2/7/2018)     blood glucose monitoring (ONE TOUCH DELICA) lancets Use to test blood sugars 3 times daily or as directed.     blood glucose (NO BRAND SPECIFIED) lancing device Use to test blood sugars 3 times daily or as directed.     blood glucose monitoring (INDIA CONTOUR NEXT) test strip TEST THREE TIMES A DAY OR AS DIRECTED     MICROLET LANCETS MISC 1 Device daily     CALCIUM 500 + D 500-200 MG-IU OR TABS take 2 tabs daily     MULTI-VITAMIN OR None Entered     No current facility-administered medications for this visit.          Social History   See HPI    Family History     Family History   Problem Relation Age of Onset     " "CEREBROVASCULAR DISEASE Mother      Arthritis Mother      OSTEOPOROSIS Mother      Alzheimer Disease Father      Arthritis Father      CANCER Father      DIABETES Maternal Grandmother      Cardiovascular Maternal Grandmother      Other Cancer Brother      CANCER Paternal Aunt      Hypertension No family hx of      Thyroid Disease No family hx of      Glaucoma No family hx of      Macular Degeneration No family hx of        Physical Exam     Temp Readings from Last 3 Encounters:   11/07/17 98  F (36.7  C) (Oral)   09/06/17 96.9  F (36.1  C) (Oral)   08/28/17 97.6  F (36.4  C) (Oral)     BP Readings from Last 5 Encounters:   02/07/18 110/67   11/08/17 124/72   11/07/17 102/68   09/06/17 110/82   08/28/17 90/70     Pulse Readings from Last 1 Encounters:   02/07/18 57     Resp Readings from Last 1 Encounters:   04/07/16 16     Estimated body mass index is 29.04 kg/(m^2) as calculated from the following:    Height as of this encounter: 1.74 m (5' 8.5\").    Weight as of this encounter: 87.9 kg (193 lb 12.8 oz).    GEN: NAD, obese  HEENT: MMM. No oral lesions. Anicteric, noninjected sclera  CV: S1, S2. RRR. No m/r/g.  PULM: CTA bilaterally. No w/c.  MSK: Bilateral second and third MCPs with increased prominence, but no tenderness to palpation or synovial swelling. Wrists, elbows, shoulders, ankles, and MTPs without swelling or tenderness to palpation. Bilateral knees with medial joint line tenderness and crepitation but no effusion or increased warmth. Hips nontender to direct palpation.   SKIN: No rash.   EXT: No LE edema  PSYCH: Alert. Appropriate.    Labs / Imaging (select studies)     9/28/1999  ([facility])    10/17/[facility] , N 3%, No crystals  RF/CCP  Recent Labs   Lab Test  04/07/16   1149   CCPIGG  64*     CBC  Recent Labs   Lab Test  02/06/18   1037  10/31/17   1105  08/09/17   1408   WBC  7.9  6.5  7.1   RBC  4.66  4.76  4.24*   HGB  14.4  14.8  13.6 "   HCT  43.1  43.3  39.3*   MCV  93  91  93   RDW  14.4  13.9  14.2   PLT  218  227  257   MCH  30.9  31.1  32.1   MCHC  33.4  34.2  34.6   NEUTROPHIL  77.2  70.0  76.0   LYMPH  11.0  15.0  11.9   MONOCYTE  10.3  13.0  10.4   EOSINOPHIL  1.1  1.4  1.0   BASOPHIL  0.4  0.6  0.7   ANEU  6.1  4.6  5.4   ALYM  0.9  1.0  0.9   SMITH  0.8  0.9  0.7   AEOS  0.1  0.1  0.1   ABAS  0.0  0.0  0.1     CMP  Recent Labs   Lab Test  02/06/18   1037  10/31/17   1105  08/09/17   1408  06/23/17   1156  05/18/17   1218  02/09/17   1710   05/03/16   1147   03/18/16   1511   08/27/15   1230   03/30/15   1506   NA   --    --    --   139   --   136   --   145*   --   141   --   139   --   133   POTASSIUM   --    --    --   4.3   --   4.0   --   3.7   --   3.9   --   4.1   --   4.0   CHLORIDE   --    --    --   105   --   103   --   112*   --   105   --   104   --   100   CO2   --    --    --   21   --   23   --   24   --   27   --   27   --   27   ANIONGAP   --    --    --   13   --   10   --   9   --   9   --   8   --   6   GLC   --    --    --   101*   --   181*   --   148*   --   114*   --   149*   --   374*   BUN   --    --    --   35*   --   19   --   18   --   23   --   20   --   23   CR  1.07  1.08  1.17  1.41*  1.42*  1.20   < >  1.07   < >  1.21   < >  1.04   < >  1.23   GFRESTIMATED  68  67  61  49*  49*  59*   < >  68   < >  59*   < >  70   < >  58*   GFRESTBLACK  82  81  74  60*  59*  72   < >  82   < >  71   < >  85   < >  70   NEW   --    --    --   9.6   --   8.8   --   8.6   --   9.4   --   9.2   --   9.0   BILITOTAL  0.4  0.5  0.6   --   0.7   --    < >   --    < >   --    --   0.6   --    --    ALBUMIN  3.6  3.9  3.7   --   4.1   --    < >   --    < >   --    --   3.6   --    --    PROTTOTAL  6.7*  7.1  7.1   --   7.5   --    < >   --    < >   --    --   6.8   --    --    ALKPHOS  53  51  49   --   44   --    < >   --    < >   --    --   44   --    --    AST  10  17  18   --   23   --    < >   --    < >   --    --   22   <  ">   --    ALT  22  27  29   --   45   --    < >   --    < >   --    --   36   < >   --     < > = values in this interval not displayed.     HgA1c  Recent Labs   Lab Test  10/31/17   1105  06/23/17   1156  02/09/17   1710   A1C  5.1  5.6  9.2*     Uric Acid  Recent Labs   Lab Test  05/18/17   1218  01/27/17   1113   URIC  9.3*  5.6     Calcium/VitaminD  Recent Labs   Lab Test  06/23/17   1156  02/09/17   1710  05/03/16   1147   07/23/13   1016   12/05/11   1200   NEW  9.6  8.8  8.6   < >   --    < >  9.3   D3VIT   --    --    --    --    --    --   30   VITDT   --    --    --    --   28*   --    --     < > = values in this interval not displayed.     ESR/CRP  Recent Labs   Lab Test  02/06/18   1037  10/31/17   1105  08/09/17   1408   SED  11  12  19   CRP  4.7  4.6  9.7*     TSH/T4  Recent Labs   Lab Test  06/24/16   1101  07/23/13   1016  02/03/11   1254   TSH  1.07  0.55  0.71     Hepatitis B  Recent Labs   Lab Test  04/07/16   1149   HBCAB  Nonreactive   HEPBANG  Nonreactive     Hepatitis C  Recent Labs   Lab Test  04/07/16   1149   HCVAB  Nonreactive   Assay performance characteristics have not been established for newborns,   infants, and children       HIV Screening  Recent Labs   Lab Test  04/07/16   1149   HIAGAB  Nonreactive   HIV-1 p24 Ag & HIV-1/HIV-2 Ab Not Detected       \"MRI LEFT KNEE  10/08/2013    INDICATION: Left knee pain. Locking, catching and snapping. Weakness.  TECHNIQUE: Routine.  COMPARISON: None.    FINDINGS:  MEDIAL COMPARTMENT: Linear tearing involving the posterior horn and body of   the medial meniscus as seen on series 4: image 6-8. This is also seen on   series 5: image 9-10. Cartilage is thinned peripherally.    LATERAL COMPARTMENT: Lateral meniscus also demonstrates tearing in the   posterior horn on series 4: image 22. There is diffuse tearing in the body   of the meniscus which is horizontal as seen on series 5: image 9 and this   extends into the anterior horn. Cartilage is " "thinned.    PATELLOFEMORAL COMPARTMENT: Retropatellar cartilage demonstrates   full-thickness loss of cartilage over portions of the median ridge, lateral   facet and medial facet. Cartilage is better preserved over the lower pole   centrally. There is also full-thickness loss of cartilage in the lateral   and central trochlear groove. Patella is normally aligned. Retinaculum are   intact.    LIGAMENTS AND TENDONS: The anterior cruciate and posterior cruciate   ligaments are intact. The medial collateral ligament is intact. Lateral   collateral ligamentous complex and popliteus insertion are intact.   Quadriceps tendon and patellar tendon are intact.    BONES AND SOFT TISSUES: Moderate-sized joint effusion. No popliteal cyst.   No muscle edema or fracture.    CONCLUSION:  1. There is tearing of the medial meniscus involving the posterior horn and   body. Cartilage is thinned peripherally.  2. There is also tearing in the posterior horn and body of the lateral   meniscus which also extends into the anterior horn. Cartilage is also   thinned in the lateral compartment.  3. Moderate to severe osteoarthritis in the patellofemoral compartment with   full-thickness loss of cartilage over large portions of the retropatellar   surface and trochlear surface.  4. Joint effusion.    IF YOU ARE A PHYSICIAN AND HAVE QUESTIONS REGARDING THIS REPORT, PLEASE   CALL [phone].\"    \"X-RAY KNEES BILATERAL AP W/LAT/SUN/PA LEFT   Oct 08, 2013 09:51:59 AM     INDICATION: Pain and swelling   COMPARISON: None.      FINDINGS: Moderate narrowing lateral aspect of the patellofemoral   compartment with slight lateral subluxation of the patella. Medial and   lateral compartments are preserved. Moderate knee joint effusion and/or   synovitis. Enthesophyte formation distal quadriceps and proximal patellar   tendons. Extensive vascular calcifications.     AP view right knee demonstrates trace narrowing medial compartment joint " "  Space.\"    Immunization History     Immunization History   Administered Date(s) Administered     Influenza (High Dose) 3 valent vaccine 09/20/2012, 10/20/2015, 09/20/2016, 08/28/2017     Influenza (IIV3) PF 10/05/1999, 10/30/2008, 09/28/2011, 10/14/2013, 10/03/2014     Pneumo Conj 13-V (2010&after) 06/29/2015     Pneumococcal 23 valent 08/19/2010     TD (ADULT, 7+) 08/05/1997, 02/03/2011     Zoster vaccine, live 04/19/2010       Procedure     Procedure: Steroid injections of the bilateral knees  Indication: Pain, bilateral patellofemoral syndrome    The procedure was explained in detail. Risks including infection, pain, structural damage such as cartilage damage and tendon rupture, fat atrophy, skin hyper-/hypo-pigmentation, and medication reaction was explained. The need for rest of the affected joint for one week after the procedure was explained.  The option of not doing the procedure was also provided. All questions were answered and the patient consented to the procedure.     A time-out was performed and the correct patient, procedure, and laterality were verified.    The right knee was examined and location for injection was identified - anterior medial. The area was cleaned with chlorhexidine, twice.  Ethyl chloride was then used for topical anaesthetic.  Then a mixture of lidocaine 1% 2 mL and Kenalog 40mg was injected into the intra-articular space.     The left knee was examined and location for injection was identified - anterior medial. The area was cleaned with chlorhexidine, twice.  Ethyl chloride was then used for topical anaesthetic.  Then a mixture of lidocaine 1% 2 mL and Kenalog 40mg was injected into the intra-articular space.     The patient tolerated the procedure well. No complications.    MEDICATION: Kenalog 40 mg  LOT #: [id number]  : [employer]  EXPIRATION DATE: 02/01/2019  NDC#: 2260-3279-08     MEDICATION: Kenalog 40 mg  LOT #: [id number]  : [employer]  EXPIRATION DATE: 02/01/2019  NDC#: 6168-1665-99    1% Lidocaine  : [employer]  Lot #: [id number]  EXPIRATION DATE: 05/01/2019  NDC: 5419-1371-43         Chart documentation done in part with Dragon Voice recognition Software. Although reviewed after completion, some word and grammatical error may remain.    Albert Tompkins MD

## 2018-02-07 NOTE — NURSING NOTE
The following medication was given:     MEDICATION: Kenalog 40 mg  SITE: Right knee  DOSE: 1 ml  LOT #: [id number]  :  [employer]  EXPIRATION DATE:  02/01/2019  NDC#: 5017-1982-75     MEDICATION: Kenalog 40 mg  SITE: Right knee  DOSE: 1 ml  LOT #: [id number]  :  [employer]  EXPIRATION DATE:  02/01/2019  NDC#: 2479-8460-37    1% Lidocaine  : [employer]  Lot #: [id number]  EXPIRATION DATE: 05/01/2019  NDC: 7261-2993-51

## 2018-02-07 NOTE — MR AVS SNAPSHOT
After Visit Summary   2/7/2018    Zurdo Dash    MRN: [id number]           Patient Information     Date Of Birth          1945        Visit Information        Provider Department      2/7/2018 1:00 PM Albert Tompkins MD [facility]        Today's Diagnoses     Rheumatoid arthritis involving multiple sites with positive rheumatoid factor (H)    -  1    Patellofemoral syndrome of both knees        High risk medications (not anticoagulants) long-term use           Follow-ups after your visit        Your next 10 appointments already scheduled     May 15, 2018  1:15 PM CDT   LAB with  LAB   [facility] [facility] ([facility])    [address] [facility] [address]   [phone]           Please do not eat 10-12 hours before your appointment if you are coming in fasting for labs on lipids, cholesterol, or glucose (sugar). This does not apply to pregnant women. Water, hot tea and black coffee (with nothing added) are okay. Do not drink other fluids, diet soda or chew gum.            May 17, 2018  1:40 PM CDT   Return Visit with MD [facility] ([facility])    [address] [address]   [phone]              Future tests that were ordered for you today     Open Future Orders        Priority Expected Expires Ordered    CBC with platelets differential Routine 5/4/2018 5/23/2018 2/7/2018    Creatinine Routine 5/4/2018 5/23/2018 2/7/2018    Hepatic panel Routine 5/4/2018 5/23/2018 2/7/2018            Who to contact     If you have questions or need follow up information about today's clinic visit or your schedule please contact [facility] directly at [phone].  Normal or non-critical lab and imaging results will be communicated to you by MyChart, letter or phone within 4 business days after the clinic has received the results. If you do not hear from us within 7 days, please contact  "the clinic through DEMANDIThart or phone. If you have a critical or abnormal lab result, we will notify you by phone as soon as possible.  Submit refill requests through "[employer]" or call your pharmacy and they will forward the refill request to us. Please allow 3 business days for your refill to be completed.          Additional Information About Your Visit        DEMANDIThart Information     "[employer]" lets you send messages to your doctor, view your test results, renew your prescriptions, schedule appointments and more. To sign up, go to [email or web address]" . Click on \"Log in\" on the left side of the screen, which will take you to the Welcome page. Then click on \"Sign up Now\" on the right side of the page.     You will be asked to enter the access code listed below, as well as some personal information. Please follow the directions to create your username and password.     Your access code is: [email or web address]  Expires: 2018  2:08 PM     Your access code will  in 90 days. If you need help or a new code, please call your [facility] clinic or [phone].        Care EveryWhere ID     This is your Care EveryWhere ID. This could be used by other organizations to access your [facility] medical records  [id number]        Your Vitals Were     Pulse Height Pulse Oximetry BMI (Body Mass Index)          57 1.74 m (5' 8.5\") 95% 29.04 kg/m2         Blood Pressure from Last 3 Encounters:   18 110/67   17 124/72   17 102/68    Weight from Last 3 Encounters:   18 87.9 kg (193 lb 12.8 oz)   17 86.1 kg (189 lb 12.8 oz)   17 85.3 kg (188 lb)                 Where to get your medicines      These medications were sent to [facility] - [address]  [address], [place] [address]     Phone:  [phone]     methotrexate sodium 2.5 MG Tabs          Primary Care Provider Office Phone # Fax #    Kapil Duckworth [phone]       " [address] [facility] [address]        Equal Access to Services     BRIALIONEL YEHUDA : Hadii art brown haddajuan Sophil, waaxda luqadaha, qaybta kasegundodenver simentalcricketdenver, waxay idiin haynorahjovanni barryayakaizabela mckeon. So [facility] [phone].    ATENCIÓN: Si habla español, tiene a diallo disposición servicios gratuitos de asistencia lingüística. [facility] [phone].    We comply with applicable federal civil rights laws and [place] laws. We do not discriminate on the basis of race, color, national origin, age, disability, sex, sexual orientation, or gender identity.            Thank you!     Thank you for choosing [facility]  for your care. Our goal is always to provide you with excellent care. Hearing back from our patients is one way we can continue to improve our services. Please take a few minutes to complete the written survey that you may receive in the mail after your visit with us. Thank you!             Your Updated Medication List - Protect others around you: Learn how to safely use, store and throw away your medicines at www.disposemymeds.org.          This list is accurate as of 2/7/18  2:08 PM.  Always use your most recent med list.                   Brand Name Dispense Instructions for use Diagnosis    albuterol 108 (90 BASE) MCG/ACT Inhaler    PROAIR HFA/PROVENTIL HFA/VENTOLIN HFA    1 Inhaler    Inhale 2 puffs into the lungs every 4 hours as needed for other (coughing)    Acute bronchitis, unspecified organism       aspirin 81 MG tablet      Take 1 tablet by mouth daily.    Type 2 diabetes, HbA1c goal < 7% (H)       blood glucose lancing device    no brand specified    1 each    Use to test blood sugars 3 times daily or as directed.    Type 2 diabetes mellitus, uncontrolled, with neuropathy (H)       blood glucose monitoring test strip    INDIA CONTOUR NEXT    300 each    TEST THREE TIMES A DAY OR AS DIRECTED    Type 2 diabetes mellitus, uncontrolled, with neuropathy (H)       CALCIUM 500 + D  500-200 MG-IU Tabs      take 2 tabs daily        cyclobenzaprine 10 MG tablet    FLEXERIL    30 tablet    Take 1 tablet (10 mg) by mouth 3 times daily as needed for muscle spasms    Neck pain       folic acid 1 MG tablet    FOLVITE    100 tablet    Take 1 tablet (1 mg) by mouth daily    Rheumatoid arthritis involving multiple sites with positive rheumatoid factor (H)       HYDROcodone-acetaminophen 5-325 MG per tablet    NORCO    28 tablet    Take 1-2 tablets by mouth every 4 hours as needed for moderate to severe pain maximum 4 tablet(s) per day    Bruised ribs, unspecified laterality, sequela       hydroxychloroquine 200 MG tablet    PLAQUENIL    180 tablet    Take 1 tablet (200 mg) by mouth 2 times daily    Rheumatoid arthritis involving multiple sites with positive rheumatoid factor (H)       insulin pen needle 31G X 6 MM    ULTICARE MINI    100 each    Use 1 daily or as directed.    Type 2 diabetes mellitus, uncontrolled, with neuropathy (H)       liraglutide 18 MG/3ML soln    VICTOZA PEN    9 mL    Inject 1.2 mg Subcutaneous daily For 1 month, then decrease dose to 0.6mg daily and stay on this dose    Type 2 diabetes mellitus, uncontrolled, with neuropathy (H)       lisinopril 2.5 MG tablet    PRINIVIL/Zestril    90 tablet    Take 1 tablet (2.5 mg) by mouth daily    Hypotension, unspecified hypotension type       metFORMIN 500 MG tablet    GLUCOPHAGE    1 tablet    Take 1 tablet (500 mg) by mouth 2 times daily (with meals) TAKE TWO TABLETS BY MOUTH TWICE A DAY WITH MEALS    Type 2 diabetes mellitus with diabetic polyneuropathy, with long-term current use of insulin (H)       methotrexate sodium 2.5 MG Tabs     120 tablet    Take 25 mg by mouth once a week . Take all 10 tablets on the same day of each week.    Rheumatoid arthritis involving multiple sites with positive rheumatoid factor (H)       metoprolol succinate 25 MG 24 hr tablet    TOPROL-XL    90 tablet    TAKE ONE TABLET BY MOUTH EVERY DAY     "Hypertension goal BP (blood pressure) < 140/90       * MICROLET LANCETS Misc     100 each    1 Device daily    Type 2 diabetes, HbA1C goal < 8% (H)       * blood glucose monitoring lancets     300 each    Use to test blood sugars 3 times daily or as directed.    Type 2 diabetes mellitus, uncontrolled, with neuropathy (H)       MULTI-VITAMIN PO      None Entered        omeprazole 20 MG CR capsule    priLOSEC    90 capsule    Take 1 capsule (20 mg) by mouth daily    Gastroesophageal reflux disease without esophagitis       pravastatin 40 MG tablet    PRAVACHOL    1 tablet    Take 0.5 tablets (20 mg) by mouth every other day    Hyperlipidemia LDL goal <100       sildenafil 20 MG tablet    REVATIO    30 tablet    \" take between 2-4 tablets at a time for planned sexual activity\" , maximum 5 pills per day    Erectile dysfunction, unspecified erectile dysfunction type       * Notice:  This list has 2 medication(s) that are the same as other medications prescribed for you. Read the directions carefully, and ask your doctor or other care provider to review them with you.      "

## 2018-02-08 ENCOUNTER — TELEPHONE (OUTPATIENT)
Dept: INTERNAL MEDICINE | Facility: CLINIC | Age: 73
End: 2018-02-08

## 2018-02-08 NOTE — TELEPHONE ENCOUNTER
Actually , a lisinopril dose of 1.25 milligrams is going to be difficult to create. 2.5 is the smallest dosage form they make.    I suggest staying at 2.5 milligram dose !    Kapil Duckworth MD

## 2018-02-08 NOTE — TELEPHONE ENCOUNTER
Patient reports he has been instructed to decrease his lisinopril 2.5mg daily, to 1/2 tablet (1.25mg) daily. He believes this was in November. Can you confirm? We are working with adherence with our patients, and this helps us keep the pt on track.  Thank you  Bertha Lei, [facility]  Phone [phone]  Fax      [phone]

## 2018-02-09 RX ORDER — LIRAGLUTIDE 6 MG/ML
0.6 INJECTION SUBCUTANEOUS DAILY
Qty: 9 ML | Refills: 0 | Status: SHIPPED | OUTPATIENT
Start: 2018-02-09 | End: 2018-09-27

## 2018-02-20 ENCOUNTER — OFFICE VISIT (OUTPATIENT)
Dept: FAMILY MEDICINE | Facility: CLINIC | Age: 73
End: 2018-02-20
Payer: COMMERCIAL

## 2018-02-20 VITALS
OXYGEN SATURATION: 95 % | TEMPERATURE: 96.9 F | BODY MASS INDEX: 27.25 KG/M2 | HEART RATE: 73 BPM | RESPIRATION RATE: 16 BRPM | SYSTOLIC BLOOD PRESSURE: 106 MMHG | DIASTOLIC BLOOD PRESSURE: 72 MMHG | HEIGHT: 69 IN | WEIGHT: 184 LBS

## 2018-02-20 DIAGNOSIS — I10 HYPERTENSION GOAL BP (BLOOD PRESSURE) < 140/90: ICD-10-CM

## 2018-02-20 DIAGNOSIS — E11.42 TYPE 2 DIABETES MELLITUS WITH DIABETIC POLYNEUROPATHY, WITH LONG-TERM CURRENT USE OF INSULIN (H): Primary | ICD-10-CM

## 2018-02-20 DIAGNOSIS — R91.1 PULMONARY NODULE: ICD-10-CM

## 2018-02-20 DIAGNOSIS — D12.6 TUBULOVILLOUS ADENOMA OF COLON: ICD-10-CM

## 2018-02-20 DIAGNOSIS — M54.2 NECK PAIN: ICD-10-CM

## 2018-02-20 DIAGNOSIS — Z79.4 TYPE 2 DIABETES MELLITUS WITH DIABETIC POLYNEUROPATHY, WITH LONG-TERM CURRENT USE OF INSULIN (H): Primary | ICD-10-CM

## 2018-02-20 DIAGNOSIS — E78.5 HYPERLIPIDEMIA LDL GOAL <100: ICD-10-CM

## 2018-02-20 DIAGNOSIS — R05.9 COUGH: ICD-10-CM

## 2018-02-20 DIAGNOSIS — E66.01 MORBID OBESITY (H): ICD-10-CM

## 2018-02-20 PROCEDURE — 99207 C FOOT EXAM  NO CHARGE: CPT | Performed by: INTERNAL MEDICINE

## 2018-02-20 PROCEDURE — 99214 OFFICE O/P EST MOD 30 MIN: CPT | Performed by: INTERNAL MEDICINE

## 2018-02-20 RX ORDER — CODEINE PHOSPHATE AND GUAIFENESIN 10; 100 MG/5ML; MG/5ML
1 SOLUTION ORAL EVERY 4 HOURS PRN
Qty: 120 ML | Refills: 0 | Status: SHIPPED | OUTPATIENT
Start: 2018-02-20 | End: 2019-02-14

## 2018-02-20 RX ORDER — TIZANIDINE 2 MG/1
2 TABLET ORAL 3 TIMES DAILY PRN
Qty: 90 TABLET | Refills: 0 | Status: SHIPPED | OUTPATIENT
Start: 2018-02-20 | End: 2018-03-09

## 2018-02-20 NOTE — PATIENT INSTRUCTIONS
At your visit today, we discussed your risk for falls and preventive options.    Fall Prevention  Falls often occur due to slipping, tripping or losing your balance. Millions of people fall every year and injure themselves. Here are ways to reduce your risk of falling again.    Think about your fall, was there anything that caused your fall that can be fixed, removed, or replaced?    Make your home safe by keeping walkways clear of objects you may trip over.    Use non-slip pads under rugs. Do not use area rugs or small throw rugs.    Use non-slip mats in bathtubs and showers.    Install handrails and lights on staircases.    Do not walk in poorly lit areas.    Do not stand on chairs or wobbly ladders.    Use caution when reaching overhead or looking upward. This position can cause a loss of balance.    Be sure your shoes fit properly, have non-slip bottoms and are in good condition.     Wear shoes both inside and out. Avoid going barefoot or wearing slippers.    Be cautious when going up and down stairs, curbs, and when walking on uneven sidewalks.    If your balance is poor, consider using a cane or walker.    If your fall was related to alcohol use, stop or limit alcohol intake.     If your fall was related to use of sleeping medicines, talk to your doctor about this. You may need to reduce your dosage at bedtime if you awaken during the night to go to the bathroom.      To reduce the need for nighttime bathroom trips:    Avoid drinking fluids for several hours before going to bed    Empty your bladder before going to bed    Men can keep a urinal at the bedside    Stay as active as you can. Balance, flexibility, strength, and endurance all come from exercise. They all play a role in preventing falls. Ask your healthcare provider which types of activity are right for you.    Get your vision checked on a regular basis.    If you have pets, know where they are before you stand up or walk so you don't trip over  them.    Use night lights.  Date Last Reviewed: 11/5/2015 2000-2017 [employer]. [address] [place] [address], [place] [address]. All rights reserved. This information is not intended as a substitute for professional medical care. Always follow your healthcare professional's instructions.      Wt Readings from Last 5 Encounters:   02/20/18 184 lb (83.5 kg)   02/07/18 193 lb 12.8 oz (87.9 kg)   11/08/17 189 lb 12.8 oz (86.1 kg)   11/07/17 188 lb (85.3 kg)   10/25/17 188 lb (85.3 kg)     Recommended using Zyrtec and Mucinex. Take Mucinex twice a day, and Zyrtec once a day. These will decrease the amount of mucous draining into your upper airway. Prescribed a cough medicine with Codeine. Recommended to take this at bedtime, but can take during the day as needed    Schedule your colonoscopy.     Follow-up for diabetes re-check in 1 month. Have your blood work done a few days before.     [facility]    If you have any questions regarding to your visit please contact your care team:     Team Pink:   Clinic Hours Telephone Number   Internal Medicine:  Dr. Suze Zapata, NP       7am-7pm  Monday - Thursday   7am-5pm  Fridays  [phone]  (Appointment scheduling available 24/7)    Questions about your visit?  Team Line  [phone]   Urgent Care - [place] - 11am-9pm Monday-Friday Saturday-Sunday- 9am-5pm   [place] - 5pm-9pm Monday-Friday Saturday-Sunday- 9am-5pm  [phone] - [place]   [phone] - [place]       What options do I have for visits at the clinic other than the traditional office visit?  To expand how we care for you, many of our providers are utilizing electronic visits (e-visits) and telephone visits, when medically appropriate, for interactions with their patients rather than a visit in the clinic.   We also offer nurse visits for many medical concerns. Just like any other service, we will bill your  insurance company for this type of visit based on time spent on the phone with your provider. Not all insurance companies cover these visits. Please check with your medical insurance if this type of visit is covered. You will be responsible for any charges that are not paid by your insurance.      E-visits via [employer]tic LLChart:  generally incur a $35.00 fee.  Telephone visits:  Time spent on the phone: *charged based on time that is spent on the phone in increments of 10 minutes. Estimated cost:   5-10 mins $30.00   11-20 mins. $59.00   21-30 mins. $85.00   Use Tablo (secure email communication and access to your chart) to send your primary care provider a message or make an appointment. Ask someone on your Team how to sign up for Tablo.    For a Price Quote for your services, please call our Consumer Price Line at [phone].    As always, Thank you for trusting us with your health care needs!    Conchita SANTILLAN CMA ([facility])

## 2018-02-20 NOTE — PROGRESS NOTES
SUBJECTIVE:     Zurdo Dash is a 72 year old male who presents to clinic today for the following health issues:    Medication Follow-up  His Flexeril is not strong enough for his current musculoskeletal pain. He currently is taking that along with some OTC pain medications and it resolves his pain enough.     He is also looking to get some diabetic shoes made. They help him with his diabetic neuropathy, and he also has associated foot deformities that would be benefited by wearing special shoes. He is currently using Victoza, which has been controlling his diabetes very well as well as causing pronounced weight loss     His orthotics will be made by [employer], needs to have this note / prescription for durable medical equipment or supplies sent over to them. (Fax)    Tim Jernigan, Certified   [email or web address]  Phone: [phone]  Fax: [phone]  [address] [place] [address]      Hemoglobin A1C   Date Value Ref Range Status   10/31/2017 5.1 4.3 - 6.0 % Final   06/23/2017 5.6 4.3 - 6.0 % Final   02/09/2017 9.2 (H) 4.3 - 6.0 % Final   12/02/2016 9.8 (H) 4.3 - 6.0 % Final     Wt Readings from Last 5 Encounters:   02/20/18 83.5 kg (184 lb)   02/07/18 87.9 kg (193 lb 12.8 oz)   11/08/17 86.1 kg (189 lb 12.8 oz)   11/07/17 85.3 kg (188 lb)   10/25/17 85.3 kg (188 lb)       RESPIRATORY SYMPTOMS      Duration: 2 weeks    Description  cough    Severity: severe    Accompanying signs and symptoms: Coughing wakes him up at night    History (predisposing factors):  Denies any influenza exposure    Precipitating or alleviating factors: Worsened when laying down    Therapies tried and outcome:  none    He has had bronchitis in the past and is worried that this may be bronchitis again. The cough has been severe for about 2 weeks now.       Problem list and histories reviewed & adjusted, as indicated.  Additional history: as documented    Patient Active Problem List   Diagnosis     Pain  in limb     Hyperlipidemia LDL goal <100     Hypertension goal BP (blood pressure) < 140/90     obesity     Hypogonadism     Advanced directives, counseling/discussion     Health Care Home     Type 2 diabetes mellitus with diabetic polyneuropathy, with long-term current use of insulin (H)     RBBB (right bundle branch block)     Lumbago     Ex-smoker     Cataracts, both eyes     Family history of esophageal cancer     Gastroesophageal reflux disease, esophagitis presence not specified     Rheumatoid arthritis involving multiple sites with positive rheumatoid factor (H)     Pulmonary nodule     High risk medication use     Spondylosis of cervical region without myelopathy or radiculopathy     Erectile dysfunction, unspecified erectile dysfunction type     Type 2 diabetes mellitus without retinopathy (H)     Combined forms of age-related cataract of both eyes     PVD (posterior vitreous detachment), left eye     Past Surgical History:   Procedure Laterality Date     HC INCISION TENDON SHEATH FINGER  4/2009    r hand ring finger     TONSILLECTOMY         Social History   Substance Use Topics     Smoking status: Former Smoker     Packs/day: 1.00     Years: 40.00     Types: Cigarettes     Quit date: 3/16/2007     Smokeless tobacco: Never Used     Alcohol use No     Family History   Problem Relation Age of Onset     CEREBROVASCULAR DISEASE Mother      Arthritis Mother      OSTEOPOROSIS Mother      Alzheimer Disease Father      Arthritis Father      CANCER Father      DIABETES Maternal Grandmother      Cardiovascular Maternal Grandmother      Other Cancer Brother      CANCER Paternal Aunt      Hypertension No family hx of      Thyroid Disease No family hx of      Glaucoma No family hx of      Macular Degeneration No family hx of          Current Outpatient Prescriptions   Medication Sig Dispense Refill     liraglutide (VICTOZA PEN) 18 MG/3ML soln Inject 0.6 mg Subcutaneous daily 9 mL 0     methotrexate sodium 2.5 MG TABS  "Take 25 mg by mouth once a week . Take all 10 tablets on the same day of each week. 120 tablet 2     metoprolol (TOPROL-XL) 25 MG 24 hr tablet TAKE ONE TABLET BY MOUTH EVERY DAY 90 tablet 3     hydroxychloroquine (PLAQUENIL) 200 MG tablet Take 1 tablet (200 mg) by mouth 2 times daily 180 tablet 1     folic acid (FOLVITE) 1 MG tablet Take 1 tablet (1 mg) by mouth daily 100 tablet 3     metFORMIN (GLUCOPHAGE) 500 MG tablet Take 1 tablet (500 mg) by mouth 2 times daily (with meals) TAKE TWO TABLETS BY MOUTH TWICE A DAY WITH MEALS 1 tablet 0     pravastatin (PRAVACHOL) 40 MG tablet Take 0.5 tablets (20 mg) by mouth every other day 1 tablet 0     sildenafil (REVATIO) 20 MG tablet \" take between 2-4 tablets at a time for planned sexual activity\" , maximum 5 pills per day 30 tablet 0     insulin pen needle (ULTICARE MINI) 31G X 6 MM Use 1 daily or as directed. 100 each 1     albuterol (PROAIR HFA/PROVENTIL HFA/VENTOLIN HFA) 108 (90 BASE) MCG/ACT Inhaler Inhale 2 puffs into the lungs every 4 hours as needed for other (coughing) 1 Inhaler 0     lisinopril (PRINIVIL/ZESTRIL) 2.5 MG tablet Take 1 tablet (2.5 mg) by mouth daily 90 tablet 3     cyclobenzaprine (FLEXERIL) 10 MG tablet Take 1 tablet (10 mg) by mouth 3 times daily as needed for muscle spasms 30 tablet 1     blood glucose monitoring (ONE TOUCH DELICA) lancets Use to test blood sugars 3 times daily or as directed. 300 each 3     blood glucose (NO BRAND SPECIFIED) lancing device Use to test blood sugars 3 times daily or as directed. 1 each 0     blood glucose monitoring (INDIA CONTOUR NEXT) test strip TEST THREE TIMES A DAY OR AS DIRECTED 300 each 3     omeprazole (PRILOSEC) 20 MG CR capsule Take 1 capsule (20 mg) by mouth daily 90 capsule 3     HYDROcodone-acetaminophen (NORCO) 5-325 MG per tablet Take 1-2 tablets by mouth every 4 hours as needed for moderate to severe pain maximum 4 tablet(s) per day 28 tablet 0     MICROLET LANCETS MISC 1 Device daily 100 each 4 " "    aspirin 81 MG tablet Take 1 tablet by mouth daily.  3     CALCIUM 500 + D 500-200 MG-IU OR TABS take 2 tabs daily       MULTI-VITAMIN OR None Entered         Reviewed and updated as needed this visit by clinical staff  Tobacco  Allergies  Meds  Med Hx  Surg Hx  Fam Hx  Soc Hx      Reviewed and updated as needed this visit by Provider       ROS:  Constitutional, HEENT, cardiovascular, pulmonary, GI, , musculoskeletal, neuro, skin, endocrine and psych systems are negative, except as otherwise noted.    This document serves as a record of the services and decisions personally performed and made by Kapil Duckworth MD. It was created on his behalf by Poly Arenas, a trained medical scribe. The creation of this document is based on the provider's statements to the medical scribe.  Poly Arenas February 20, 2018 12:05 PM      OBJECTIVE:     /72  Pulse 73  Temp 96.9  F (36.1  C) (Oral)  Resp 16  Ht 1.74 m (5' 8.5\")  Wt 83.5 kg (184 lb)  SpO2 95%  BMI 27.57 kg/m2  Body mass index is 27.57 kg/(m^2).  GENERAL: healthy, alert and no distress, appears stated age, well groomed  HENT: ear canals and TM's normal, nose and mouth without ulcers or lesions, no sinus/facial pressure, post nasal drip  NECK: no adenopathy, no asymmetry, masses, or scars and thyroid normal to palpation  RESP: patient coughs occaisionally, lungs clear to auscultation - no rales, rhonchi or wheezes  CV: regular rate and rhythm, normal S1 S2, no S3 or S4, no murmur, click or rub, no peripheral edema and peripheral pulses strong  DIABETIC FOOT: Evidence of mild diabetic neuropathy. Circulation normal, sensation normal to 10 gm monofilament, skin warm and dry, arthritic changes of feet evident.   MS: no gross musculoskeletal defects noted, no edema  SKIN: no suspicious lesions or rashes to visible skin  NEURO: Normal strength and tone, mentation intact and speech normal  PSYCH: mentation appears normal, affect normal/bright    Diagnostic " Test Results:  No results found for this or any previous visit (from the past 24 hour(s)).    ASSESSMENT/PLAN:       (E11.42,  Z79.4) Type 2 diabetes mellitus with diabetic polyneuropathy, with long-term current use of insulin (H)  (primary encounter diagnosis)  Comment: he continues only with 0.6 milligrams of Liraglutide [ Victoza ]   Plan: FOOT EXAM        Follow-up for diabetes re-check in 1 month. Have blood work done a few days before.     (R91.1) Pulmonary nodule  Comment: this patient has according to Fleischner society pulmonary nodule recommendations , no need for routine annual low dose CT for scanning high risk pt for lung cancer   Plan: as above     (M54.2) Neck pain  Comment: we will try adding Zanaflex (Tizanidine)   Plan: tiZANidine (ZANAFLEX) 2 MG tablet            (R05) Cough  Comment: Recommended starting Zyrtec once a day and Mucinex twice a day. Prescribed a Robitussin cough suppressant with codeine, take at bedtime and during the day if needed.   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  Plan: GASTROENTEROLOGY ADULT REF PROCEDURE ONLY         Other; [facility] [phone]            (E78.5) Hyperlipidemia LDL goal <100  Comment:   Lab Results   Component Value Date    CHOL 127 10/31/2017     Lab Results   Component Value Date    HDL 48 10/31/2017     Lab Results   Component Value Date    LDL 47 10/31/2017     Lab Results   Component Value Date    TRIG 159 10/31/2017     Lab Results   Component Value Date    CHOLHDLRATIO 3.4 06/29/2015       Plan: not due for recheck right now    (I10) Hypertension goal BP (blood pressure) < 140/90  Comment:   BP Readings from Last 3 Encounters:   02/20/18 106/72   02/07/18 110/67   11/08/17 124/72     Plan: continue current plan of care       (E66.01) obesity  Comment:  as detailed above   Plan: as above       The information in this document, created by the medical scribe for me, accurately reflects the services I personally performed and the decisions made by me. I have reviewed and approved this document for accuracy.   MD Kapil Crane MD  [facility]    Chart note addendum : I am speaking with this patient during his wife's office visit. He's interested in Ozempic  [semaglutide] and I agreed to send in a prescription for this medication so patient can check on    1. Coverage through health insurance    2. What is his cost ?    Kapil Duckworth MD

## 2018-02-20 NOTE — MR AVS SNAPSHOT
After Visit Summary   2/20/2018    Zurdo Dash    MRN: [id number]           Patient Information     Date Of Birth          1945        Visit Information        Provider Department      2/20/2018 11:30 AM Kapil Duckworth MD [facility]        Today's Diagnoses     Type 2 diabetes mellitus with diabetic polyneuropathy, with long-term current use of insulin (H)    -  1    Pulmonary nodule        Neck pain        Cough        Tubulovillous adenoma of colon        Hyperlipidemia LDL goal <100        Hypertension goal BP (blood pressure) < 140/90        obesity          Care Instructions      At your visit today, we discussed your risk for falls and preventive options.    Fall Prevention  Falls often occur due to slipping, tripping or losing your balance. Millions of people fall every year and injure themselves. Here are ways to reduce your risk of falling again.    Think about your fall, was there anything that caused your fall that can be fixed, removed, or replaced?    Make your home safe by keeping walkways clear of objects you may trip over.    Use non-slip pads under rugs. Do not use area rugs or small throw rugs.    Use non-slip mats in bathtubs and showers.    Install handrails and lights on staircases.    Do not walk in poorly lit areas.    Do not stand on chairs or wobbly ladders.    Use caution when reaching overhead or looking upward. This position can cause a loss of balance.    Be sure your shoes fit properly, have non-slip bottoms and are in good condition.     Wear shoes both inside and out. Avoid going barefoot or wearing slippers.    Be cautious when going up and down stairs, curbs, and when walking on uneven sidewalks.    If your balance is poor, consider using a cane or walker.    If your fall was related to alcohol use, stop or limit alcohol intake.     If your fall was related to use of sleeping medicines, talk to your doctor about this. You may need to reduce your  dosage at bedtime if you awaken during the night to go to the bathroom.      To reduce the need for nighttime bathroom trips:    Avoid drinking fluids for several hours before going to bed    Empty your bladder before going to bed    Men can keep a urinal at the bedside    Stay as active as you can. Balance, flexibility, strength, and endurance all come from exercise. They all play a role in preventing falls. Ask your healthcare provider which types of activity are right for you.    Get your vision checked on a regular basis.    If you have pets, know where they are before you stand up or walk so you don't trip over them.    Use night lights.  Date Last Reviewed: 11/5/2015 2000-2017 [employer]. [address] [place] [address], [place] [id number]. All rights reserved. This information is not intended as a substitute for professional medical care. Always follow your healthcare professional's instructions.      Wt Readings from Last 5 Encounters:   02/20/18 184 lb (83.5 kg)   02/07/18 193 lb 12.8 oz (87.9 kg)   11/08/17 189 lb 12.8 oz (86.1 kg)   11/07/17 188 lb (85.3 kg)   10/25/17 188 lb (85.3 kg)     Recommended using Zyrtec and Mucinex. Take Mucinex twice a day, and Zyrtec once a day. These will decrease the amount of mucous draining into your upper airway. Prescribed a cough medicine with Codeine. Recommended to take this at bedtime, but can take during the     Schedule your colonoscopy.     Follow-up for diabetes re-check in 1 month. Have your blood work down a few days before.     [facility]    If you have any questions regarding to your visit please contact your care team:     Team Pink:   Clinic Hours Telephone Number   Internal Medicine:  Dr. Suze Zapata NP       7am-7pm  Monday - Thursday   7am-5pm  Fridays  [phone]  (Appointment scheduling available 24/7)    Questions about your visit?  Team Line  [phone]   Urgent Care - [place]  and [place] - 11am-9pm Monday-Friday Saturday-Sunday- 9am-5pm   [place] - 5pm-9pm Monday-Friday Saturday-Sunday- 9am-5pm  [phone] - Su[place]  [phone] - [place]       What options do I have for visits at the clinic other than the traditional office visit?  To expand how we care for you, many of our providers are utilizing electronic visits (e-visits) and telephone visits, when medically appropriate, for interactions with their patients rather than a visit in the clinic.   We also offer nurse visits for many medical concerns. Just like any other service, we will bill your insurance company for this type of visit based on time spent on the phone with your provider. Not all insurance companies cover these visits. Please check with your medical insurance if this type of visit is covered. You will be responsible for any charges that are not paid by your insurance.      E-visits via [employer]:  generally incur a $35.00 fee.  Telephone visits:  Time spent on the phone: *charged based on time that is spent on the phone in increments of 10 minutes. Estimated cost:   5-10 mins $30.00   11-20 mins. $59.00   21-30 mins. $85.00   Use [employer] (secure email communication and access to your chart) to send your primary care provider a message or make an appointment. Ask someone on your Team how to sign up for [employer].    For a Price Quote for your services, please call our Consumer Price Line at [phone].    As always, Thank you for trusting us with your health care needs!    Conchita SANTILLAN CMA ([facility])            Follow-ups after your visit        Additional Services     GASTROENTEROLOGY ADULT REF PROCEDURE ONLY Other; [place] [facility] [phone]       Last Lab Result: Creatinine (mg/dL)       Date                     Value                 02/06/2018               1.07             ----------  Body mass index is 27.57 kg/(m^2).     Needed:  No  Language:  English    Patient will be contacted to schedule  procedure.     Please be aware that coverage of these services is subject to the terms and limitations of your health insurance plan.  Call member services at your health plan with any benefit or coverage questions.  Any procedures must be performed at a [facility] facility OR coordinated by your clinic's referral office.    Please bring the following with you to your appointment:    (1) Any X-Rays, CTs or MRIs which have been performed.  Contact the facility where they were done to arrange for  prior to your scheduled appointment.    (2) List of current medications   (3) This referral request   (4) Any documents/labs given to you for this referral                  Your next 10 appointments already scheduled     Mar 12, 2018 10:50 AM CDT   Office Visit with Kapil Duckworth MD   [facility] ([facility]    [address] [place] [place] [address] [address] [phone]           Bring a current list of meds and any records pertaining to this visit. For Physicals, please bring immunization records and any forms needing to be filled out. Please arrive 10 minutes early to complete paperwork.            May 15, 2018  1:15 PM CDT   LAB with CAR LAB   [facility] ([facility])    [address] [facility] [id number] [phone]           Please do not eat 10-12 hours before your appointment if you are coming in fasting for labs on lipids, cholesterol, or glucose (sugar). This does not apply to pregnant women. Water, hot tea and black coffee (with nothing added) are okay. Do not drink other fluids, diet soda or chew gum.            May 17, 2018  1:40 PM CDT   Return Visit with Albert Tompkins MD   [facility] ([facility])    [address] [facility] [address]   [phone]              Who to contact     If you have questions or need follow up information about today's clinic visit or your schedule please contact [facility] directly at [phone].  Normal or non-critical lab and imaging results will be communicated to you by MyChart, letter or phone within 4 business days after the clinic has received the results. If you do not hear from us within 7 days, please contact the clinic through [employer] or phone. If you have a critical or abnormal lab result, we will notify you by phone as soon as possible.  Submit refill requests through [employer] or call your pharmacy and they will forward the refill request to us. Please allow 3 business days for your refill to be completed.          Additional Information About Your Visit        [employer] Information     [employer] lets you send messages to your doctor, view your test results, renew your prescriptions, schedule appointments and more. To sign up, go to [email or web address] . Click on \"Log in\" on the left side of the screen, which will take you to the Welcome page. Then click on \"Sign up Now\" on the right side of the page.     You will be asked to enter the access code listed below, as well as some personal information. Please follow the directions to create your username and password.     Your access code is: [email or web address]  Expires: 2018  2:08 PM     Your access code will  in 90 days. If you need help or a new code, please call your [place] clinic or [phone].        Care EveryWhere ID     This is your Care EveryWhere ID. This could be used by other organizations to access your [place] medical records  [id number]        Your Vitals Were     Pulse Temperature Respirations Height Pulse Oximetry BMI (Body Mass Index)    73 96.9  F (36.1  C) (Oral) 16 5' 8.5\" (1.74 m) 95% 27.57 kg/m2       Blood Pressure from Last 3 Encounters:   18 106/72   18 110/67   17 124/72    Weight from Last 3 Encounters:   18 184 lb (83.5 kg)   18 193 lb 12.8 oz (87.9 kg)   17 189 lb 12.8 oz (86.1 kg)              We Performed the Following     FOOT " EXAM     GASTROENTEROLOGY ADULT REF PROCEDURE ONLY Other; [facility] [phone]          Today's Medication Changes          These changes are accurate as of 2/20/18 12:28 PM.  If you have any questions, ask your nurse or doctor.               Start taking these medicines.        Dose/Directions    guaiFENesin-codeine 100-10 MG/5ML Soln solution   Commonly known as:  ROBITUSSIN AC   Used for:  Cough   Started by:  Kapil Duckworth MD        Dose:  1 tsp.   Take 5 mLs by mouth every 4 hours as needed for cough   Quantity:  120 mL   Refills:  0       tiZANidine 2 MG tablet   Commonly known as:  ZANAFLEX   Used for:  Neck pain   Started by:  Kapil Duckworth MD        Dose:  2 mg   Take 1 tablet (2 mg) by mouth 3 times daily as needed for muscle spasms   Quantity:  90 tablet   Refills:  0         Stop taking these medicines if you haven't already. Please contact your care team if you have questions.     cyclobenzaprine 10 MG tablet   Commonly known as:  FLEXERIL   Stopped by:  Kapil Duckworth MD                Where to get your medicines      These medications were sent to [facility]  GORDY Couch  [address] [place] [address]  [address] [facility] [address], [place] [address]     Phone:  [phone]     tiZANidine 2 MG tablet         Some of these will need a paper prescription and others can be bought over the counter.  Ask your nurse if you have questions.     Bring a paper prescription for each of these medications     guaiFENesin-codeine 100-10 MG/5ML Soln solution                Primary Care Provider Office Phone # Fax #    Kapil Duckworth [phone] [address] [facility]  [facility] [address]        Equal Access to Services     [facility]: Hadii art brown hadasho Sophil, waaxda luqadaha, qaybta kaalmada adeegyada, aranza zhong . So [facility] [phone].    ATENCIÓN: Si habla español, tiene a diallo disposición servicios gratuitos de asistencia lingüística. Llame al  [phone].    We comply with applicable federal civil rights laws and [place] laws. We do not discriminate on the basis of race, color, national origin, age, disability, sex, sexual orientation, or gender identity.            Thank you!     Thank you for choosing [facility]  for your care. Our goal is always to provide you with excellent care. Hearing back from our patients is one way we can continue to improve our services. Please take a few minutes to complete the written survey that you may receive in the mail after your visit with us. Thank you!             Your Updated Medication List - Protect others around you: Learn how to safely use, store and throw away your medicines at www.disposemymeds.org.          This list is accurate as of 2/20/18 12:28 PM.  Always use your most recent med list.                   Brand Name Dispense Instructions for use Diagnosis    albuterol 108 (90 BASE) MCG/ACT Inhaler    PROAIR HFA/PROVENTIL HFA/VENTOLIN HFA    1 Inhaler    Inhale 2 puffs into the lungs every 4 hours as needed for other (coughing)    Acute bronchitis, unspecified organism       aspirin 81 MG tablet      Take 1 tablet by mouth daily.    Type 2 diabetes, HbA1c goal < 7% (H)       blood glucose lancing device    no brand specified    1 each    Use to test blood sugars 3 times daily or as directed.    Type 2 diabetes mellitus, uncontrolled, with neuropathy (H)       blood glucose monitoring test strip    INDIA CONTOUR NEXT    300 each    TEST THREE TIMES A DAY OR AS DIRECTED    Type 2 diabetes mellitus, uncontrolled, with neuropathy (H)       CALCIUM 500 + D 500-200 MG-IU Tabs      take 2 tabs daily        folic acid 1 MG tablet    FOLVITE    100 tablet    Take 1 tablet (1 mg) by mouth daily    Rheumatoid arthritis involving multiple sites with positive rheumatoid factor (H)       guaiFENesin-codeine 100-10 MG/5ML Soln solution    ROBITUSSIN AC    120 mL    Take 5 mLs by mouth every 4 hours as  needed for cough    Cough       HYDROcodone-acetaminophen 5-325 MG per tablet    NORCO    28 tablet    Take 1-2 tablets by mouth every 4 hours as needed for moderate to severe pain maximum 4 tablet(s) per day    Bruised ribs, unspecified laterality, sequela       hydroxychloroquine 200 MG tablet    PLAQUENIL    180 tablet    Take 1 tablet (200 mg) by mouth 2 times daily    Rheumatoid arthritis involving multiple sites with positive rheumatoid factor (H)       insulin pen needle 31G X 6 MM    ULTICARE MINI    100 each    Use 1 daily or as directed.    Type 2 diabetes mellitus, uncontrolled, with neuropathy (H)       liraglutide 18 MG/3ML soln    VICTOZA PEN    9 mL    Inject 0.6 mg Subcutaneous daily    Type 2 diabetes mellitus, uncontrolled, with neuropathy (H)       lisinopril 2.5 MG tablet    PRINIVIL/Zestril    90 tablet    Take 1 tablet (2.5 mg) by mouth daily    Hypotension, unspecified hypotension type       metFORMIN 500 MG tablet    GLUCOPHAGE    1 tablet    Take 1 tablet (500 mg) by mouth 2 times daily (with meals) TAKE TWO TABLETS BY MOUTH TWICE A DAY WITH MEALS    Type 2 diabetes mellitus with diabetic polyneuropathy, with long-term current use of insulin (H)       methotrexate sodium 2.5 MG Tabs     120 tablet    Take 25 mg by mouth once a week . Take all 10 tablets on the same day of each week.    Rheumatoid arthritis involving multiple sites with positive rheumatoid factor (H)       metoprolol succinate 25 MG 24 hr tablet    TOPROL-XL    90 tablet    TAKE ONE TABLET BY MOUTH EVERY DAY    Hypertension goal BP (blood pressure) < 140/90       * MICROLET LANCETS Misc     100 each    1 Device daily    Type 2 diabetes, HbA1C goal < 8% (H)       * blood glucose monitoring lancets     300 each    Use to test blood sugars 3 times daily or as directed.    Type 2 diabetes mellitus, uncontrolled, with neuropathy (H)       MULTI-VITAMIN PO      None Entered        omeprazole 20 MG CR capsule    priLOSEC    90  "capsule    Take 1 capsule (20 mg) by mouth daily    Gastroesophageal reflux disease without esophagitis       pravastatin 40 MG tablet    PRAVACHOL    1 tablet    Take 0.5 tablets (20 mg) by mouth every other day    Hyperlipidemia LDL goal <100       sildenafil 20 MG tablet    REVATIO    30 tablet    \" take between 2-4 tablets at a time for planned sexual activity\" , maximum 5 pills per day    Erectile dysfunction, unspecified erectile dysfunction type       tiZANidine 2 MG tablet    ZANAFLEX    90 tablet    Take 1 tablet (2 mg) by mouth 3 times daily as needed for muscle spasms    Neck pain       * Notice:  This list has 2 medication(s) that are the same as other medications prescribed for you. Read the directions carefully, and ask your doctor or other care provider to review them with you.      "

## 2018-02-28 ENCOUNTER — TELEPHONE (OUTPATIENT)
Dept: INTERNAL MEDICINE | Facility: CLINIC | Age: 73
End: 2018-02-28

## 2018-02-28 DIAGNOSIS — E11.42 TYPE 2 DIABETES MELLITUS WITH DIABETIC POLYNEUROPATHY, WITH LONG-TERM CURRENT USE OF INSULIN (H): Primary | ICD-10-CM

## 2018-02-28 DIAGNOSIS — E11.42 DIABETIC POLYNEUROPATHY ASSOCIATED WITH TYPE 2 DIABETES MELLITUS (H): ICD-10-CM

## 2018-02-28 DIAGNOSIS — Z79.4 TYPE 2 DIABETES MELLITUS WITH DIABETIC POLYNEUROPATHY, WITH LONG-TERM CURRENT USE OF INSULIN (H): Primary | ICD-10-CM

## 2018-02-28 NOTE — TELEPHONE ENCOUNTER
Patient stopped by  and was asking about order for orthotics.    Per Dr. Duckworth's office note on 2-20-18:    His orthotics will be made by [facility], needs to have this note / prescription for durable medical equipment or supplies sent over to them. (Fax)     Tim Jernigan, Certified   [email or web address]  Phone: [phone]  Fax: [phone]  [address] [place] [address]    Routing to Dr. Duckworth to write up DME. Once faxed, patient would like call to let him know it has been done.  Caren Telles,

## 2018-03-01 NOTE — TELEPHONE ENCOUNTER
Prescription for durable medical equipment or supplies completed, Prescription sent to team pink huddle room       Kapil Duckworth MD

## 2018-03-05 ENCOUNTER — DOCUMENTATION ONLY (OUTPATIENT)
Dept: LAB | Facility: CLINIC | Age: 73
End: 2018-03-05

## 2018-03-05 DIAGNOSIS — Z79.4 TYPE 2 DIABETES MELLITUS WITH DIABETIC POLYNEUROPATHY, WITH LONG-TERM CURRENT USE OF INSULIN (H): ICD-10-CM

## 2018-03-05 DIAGNOSIS — Z79.4 TYPE 2 DIABETES MELLITUS WITH DIABETIC POLYNEUROPATHY, WITH LONG-TERM CURRENT USE OF INSULIN (H): Primary | ICD-10-CM

## 2018-03-05 DIAGNOSIS — E11.42 TYPE 2 DIABETES MELLITUS WITH DIABETIC POLYNEUROPATHY, WITH LONG-TERM CURRENT USE OF INSULIN (H): Primary | ICD-10-CM

## 2018-03-05 DIAGNOSIS — E11.42 TYPE 2 DIABETES MELLITUS WITH DIABETIC POLYNEUROPATHY, WITH LONG-TERM CURRENT USE OF INSULIN (H): ICD-10-CM

## 2018-03-05 LAB
ANION GAP SERPL CALCULATED.3IONS-SCNC: 9 MMOL/L (ref 3–14)
BUN SERPL-MCNC: 21 MG/DL (ref 7–30)
CALCIUM SERPL-MCNC: 9.5 MG/DL (ref 8.5–10.1)
CHLORIDE SERPL-SCNC: 108 MMOL/L (ref 94–109)
CO2 SERPL-SCNC: 23 MMOL/L (ref 20–32)
CREAT SERPL-MCNC: 0.96 MG/DL (ref 0.66–1.25)
GFR SERPL CREATININE-BSD FRML MDRD: 77 ML/MIN/1.7M2
GLUCOSE SERPL-MCNC: 72 MG/DL (ref 70–99)
HBA1C MFR BLD: 5.9 % (ref 4.3–6)
POTASSIUM SERPL-SCNC: 5.5 MMOL/L (ref 3.4–5.3)
SODIUM SERPL-SCNC: 140 MMOL/L (ref 133–144)

## 2018-03-05 PROCEDURE — 80048 BASIC METABOLIC PNL TOTAL CA: CPT | Performed by: INTERNAL MEDICINE

## 2018-03-05 PROCEDURE — 36415 COLL VENOUS BLD VENIPUNCTURE: CPT | Performed by: INTERNAL MEDICINE

## 2018-03-05 PROCEDURE — 83036 HEMOGLOBIN GLYCOSYLATED A1C: CPT | Performed by: INTERNAL MEDICINE

## 2018-03-05 NOTE — PROGRESS NOTES
Please review, associate diagnosis and sign laboratory orders for patient upcoming appointment on 3/05/18. Thank you.      CAR LAZARO

## 2018-03-06 ENCOUNTER — TELEPHONE (OUTPATIENT)
Dept: INTERNAL MEDICINE | Facility: CLINIC | Age: 73
End: 2018-03-06

## 2018-03-06 NOTE — TELEPHONE ENCOUNTER
Spoke with patient.   Results note read as written.   Patient verbalized understanding.   No further questions at this time.     Joana Moraes RN

## 2018-03-06 NOTE — TELEPHONE ENCOUNTER
Voice mail left for patient to call RN hotline at [phone].      Notes Recorded by Kapil Duckworth MD on 3/5/2018 at 8:58 PM  This slightly elevated potassium is not right but it's not enough to get worried yet. We need patient to avoid high potassium foods and push fluids.    A partial list of foods rich in potassium include       Apricots, Avocados, Bananas, Beets, Brussel sprouts, Cantaloupe, Dates and Figs, Kiwi, Lima beans, honeydew Melons, Milk, Nectarines, Oranges or Orange juice, pears,  peanuts, potatoes, prunes, raisins, spinach, tomatoes or tomato products, squash, or yogurt    Carline More RN

## 2018-03-08 ENCOUNTER — TRANSFERRED RECORDS (OUTPATIENT)
Dept: HEALTH INFORMATION MANAGEMENT | Facility: CLINIC | Age: 73
End: 2018-03-08

## 2018-03-09 RX ORDER — HYDROXYCHLOROQUINE SULFATE 200 MG/1
200 TABLET, FILM COATED ORAL 2 TIMES DAILY
Qty: 180 TABLET | Refills: 1 | Status: CANCELLED | OUTPATIENT
Start: 2018-03-09

## 2018-03-09 NOTE — PROGRESS NOTES
SUBJECTIVE:   Zurdo Dash is a 72 year old male who presents to clinic today with his wife for the following health issues:    Patient presents to clinic today for diabetes check although at this point he's on nothing for treatment then half dose of metformin ( Glucophage) and his hemoglobin a1c  [ diabetes test ] was below 6.5% for almost a year .    Colonoscopy - He had a colonoscopy done on Thursday with [facility] Coreb[facility] and found two small polyps. They originally told him to follow up in 5 years but changed it to 3 years after looking at the results more. Last time he went was 3 years ago.    Diabetes - Patient brought in his blood glucose log. His blood glucose has been in the 130s. Over a year ago, his numbers were in the 300s. Needs a different glucometer and test strips because [employer] does not cover it. Wonders if he can cut down on Metformin eventually because he has decreased in the past.    Lab Results   Component Value Date    A1C 5.9 03/05/2018    A1C 5.1 10/31/2017    A1C 5.6 06/23/2017    A1C 9.2 02/09/2017    A1C 9.8 12/02/2016     Wt Readings from Last 5 Encounters:   03/12/18 86.6 kg (191 lb)   02/20/18 83.5 kg (184 lb)   02/07/18 87.9 kg (193 lb 12.8 oz)   11/08/17 86.1 kg (189 lb 12.8 oz)   11/07/17 85.3 kg (188 lb)     Elevated potassium - His labs showed a marginally elevated potassium. Wonders if he should eat less bananas and oranges because he likes eating those fruits.    Arthritis - He says he needs Norco refilled for pain in his hands from arthritis. Acetaminophen does not alleviate his pain and he was told not to take ibuprofen. He does not like using Norco because it makes him itchy. His use of this medication is exceedingly limited and he does not need a Chronic Pain Contract     Additional notes  He is not taking multivitamins and calcium anymore.   Uses his inhaler once in a while.  He has a CT for lung cancer screening this week.  Continues walking every day.  He has had a reported  heart murmur since childhood.       Problem list and histories reviewed & adjusted, as indicated.  Additional history: as documented    Patient Active Problem List   Diagnosis     Pain in limb     Hyperlipidemia LDL goal <100     Hypertension goal BP (blood pressure) < 140/90     obesity     Hypogonadism     Advanced directives, counseling/discussion     Health Care Home     Type 2 diabetes mellitus with diabetic polyneuropathy, with long-term current use of insulin (H)     RBBB (right bundle branch block)     Lumbago     Ex-smoker     Cataracts, both eyes     Family history of esophageal cancer     Gastroesophageal reflux disease, esophagitis presence not specified     Rheumatoid arthritis involving multiple sites with positive rheumatoid factor (H)     Pulmonary nodule     High risk medication use     Spondylosis of cervical region without myelopathy or radiculopathy     Erectile dysfunction, unspecified erectile dysfunction type     Type 2 diabetes mellitus without retinopathy (H)     Combined forms of age-related cataract of both eyes     PVD (posterior vitreous detachment), left eye     Tubulovillous adenoma of colon     Diabetic polyneuropathy associated with type 2 diabetes mellitus (H)     Past Surgical History:   Procedure Laterality Date     HC INCISION TENDON SHEATH FINGER  4/2009    r hand ring finger     TONSILLECTOMY         Social History   Substance Use Topics     Smoking status: Former Smoker     Packs/day: 1.00     Years: 40.00     Types: Cigarettes     Quit date: 3/16/2007     Smokeless tobacco: Never Used     Alcohol use No     Family History   Problem Relation Age of Onset     CEREBROVASCULAR DISEASE Mother      Arthritis Mother      OSTEOPOROSIS Mother      Alzheimer Disease Father      Arthritis Father      CANCER Father      DIABETES Maternal Grandmother      Cardiovascular Maternal Grandmother      Other Cancer Brother      CANCER Paternal Aunt      Hypertension No family hx of      Thyroid  "Disease No family hx of      Glaucoma No family hx of      Macular Degeneration No family hx of          Current Outpatient Prescriptions   Medication Sig Dispense Refill     order for DME Equipment being ordered: specialized shoes for diabetic neuropathy     His orthotics will be made by [employer]      Tim Jernigan, Certified   [email or web address]  Phone: [phone]  Fax: [phone]  [address] [place] [address] 1 Device 0     tiZANidine (ZANAFLEX) 2 MG tablet Take 1 tablet (2 mg) by mouth 3 times daily as needed for muscle spasms 90 tablet 0     guaiFENesin-codeine (ROBITUSSIN AC) 100-10 MG/5ML SOLN solution Take 5 mLs by mouth every 4 hours as needed for cough 120 mL 0     liraglutide (VICTOZA PEN) 18 MG/3ML soln Inject 0.6 mg Subcutaneous daily 9 mL 0     methotrexate sodium 2.5 MG TABS Take 25 mg by mouth once a week . Take all 10 tablets on the same day of each week. 120 tablet 2     metoprolol (TOPROL-XL) 25 MG 24 hr tablet TAKE ONE TABLET BY MOUTH EVERY DAY 90 tablet 3     hydroxychloroquine (PLAQUENIL) 200 MG tablet Take 1 tablet (200 mg) by mouth 2 times daily 180 tablet 1     folic acid (FOLVITE) 1 MG tablet Take 1 tablet (1 mg) by mouth daily 100 tablet 3     metFORMIN (GLUCOPHAGE) 500 MG tablet Take 1 tablet (500 mg) by mouth 2 times daily (with meals) TAKE TWO TABLETS BY MOUTH TWICE A DAY WITH MEALS 1 tablet 0     pravastatin (PRAVACHOL) 40 MG tablet Take 0.5 tablets (20 mg) by mouth every other day 1 tablet 0     sildenafil (REVATIO) 20 MG tablet \" take between 2-4 tablets at a time for planned sexual activity\" , maximum 5 pills per day 30 tablet 0     insulin pen needle (ULTICARE MINI) 31G X 6 MM Use 1 daily or as directed. 100 each 1     albuterol (PROAIR HFA/PROVENTIL HFA/VENTOLIN HFA) 108 (90 BASE) MCG/ACT Inhaler Inhale 2 puffs into the lungs every 4 hours as needed for other (coughing) 1 Inhaler 0     lisinopril (PRINIVIL/ZESTRIL) 2.5 MG tablet Take 1 " "tablet (2.5 mg) by mouth daily 90 tablet 3     blood glucose monitoring (ONE TOUCH DELICA) lancets Use to test blood sugars 3 times daily or as directed. 300 each 3     blood glucose (NO BRAND SPECIFIED) lancing device Use to test blood sugars 3 times daily or as directed. 1 each 0     blood glucose monitoring (INDIA CONTOUR NEXT) test strip TEST THREE TIMES A DAY OR AS DIRECTED 300 each 3     omeprazole (PRILOSEC) 20 MG CR capsule Take 1 capsule (20 mg) by mouth daily 90 capsule 3     HYDROcodone-acetaminophen (NORCO) 5-325 MG per tablet Take 1-2 tablets by mouth every 4 hours as needed for moderate to severe pain maximum 4 tablet(s) per day 28 tablet 0     MICROLET LANCETS MISC 1 Device daily 100 each 4     aspirin 81 MG tablet Take 1 tablet by mouth daily.  3     CALCIUM 500 + D 500-200 MG-IU OR TABS take 2 tabs daily       MULTI-VITAMIN OR None Entered       Labs reviewed in EPIC    Reviewed and updated as needed this visit by clinical staff  Tobacco  Allergies  Meds       Reviewed and updated as needed this visit by Provider         ROS:  Constitutional, HEENT, cardiovascular, pulmonary, GI, , musculoskeletal, neuro, skin, endocrine and psych systems are negative, except as otherwise noted.    This document serves as a record of the services and decisions personally performed and made by Kapil Duckworth MD. It was created on his/her behalf by Brandon Boyd, trained medical scribe. The creation of this document is based the provider's statements to the medical scribes.    Margaret Boyd 11:12 AM, March 12, 2018    OBJECTIVE:     /74 (BP Location: Left arm, Patient Position: Chair, Cuff Size: Adult Large)  Pulse 53  Temp 96.5  F (35.8  C) (Oral)  Resp 18  Ht 1.74 m (5' 8.5\")  Wt 86.6 kg (191 lb)  SpO2 98%  BMI 28.62 kg/m2  Body mass index is 28.62 kg/(m^2).  GENERAL: healthy, alert and no distress, appears his stated age, answers all questions appropriately, talkative and appropriate, normal " grooming and affect   RESP: lungs clear to auscultation - no rales, rhonchi or wheezes  CV: regular rate and rhythm, normal S1 S2, no S3 or S4, no murmur that I appreciated, no click or rub, no significant peripheral edema and peripheral pulses strong  NEURO: Normal strength and tone, mentation intact and speech normal  PSYCH: mentation appears normal, affect normal/bright    Diagnostic Test Results:  Results for orders placed or performed in visit on 03/05/18   Hemoglobin A1c   Result Value Ref Range    Hemoglobin A1C 5.9 4.3 - 6.0 %   **Basic metabolic panel FUTURE anytime   Result Value Ref Range    Sodium 140 133 - 144 mmol/L    Potassium 5.5 (H) 3.4 - 5.3 mmol/L    Chloride 108 94 - 109 mmol/L    Carbon Dioxide 23 20 - 32 mmol/L    Anion Gap 9 3 - 14 mmol/L    Glucose 72 70 - 99 mg/dL    Urea Nitrogen 21 7 - 30 mg/dL    Creatinine 0.96 0.66 - 1.25 mg/dL    GFR Estimate 77 >60 mL/min/1.7m2    GFR Estimate If Black >90 >60 mL/min/1.7m2    Calcium 9.5 8.5 - 10.1 mg/dL       ASSESSMENT/PLAN:   1. Type 2 diabetes mellitus, uncontrolled, with neuropathy  He's had the hemoglobin a1c  [ diabetes test ] climb up from 5.1% to 5.9% which is not worrisome however I don't feel we should choose to completely stop the metformin ( Glucophage) yet. Recheck everything in 6 months.  - blood glucose monitoring (INDIA CONTOUR NEXT) test strip; TEST THREE TIMES A DAY OR AS DIRECTED, dispense covered brand  Dispense: 300 each; Refill: 1  - insulin pen needle (ULTICARE MINI) 31G X 6 MM; Use 1 daily or as directed.  Dispense: 100 each; Refill: 1  - **Basic metabolic panel FUTURE anytime; Future    2. Acute bronchitis, unspecified organism  Occasional use of beta agonist therapy   - albuterol (PROAIR HFA/PROVENTIL HFA/VENTOLIN HFA) 108 (90 BASE) MCG/ACT Inhaler; Inhale 2 puffs into the lungs every 4 hours as needed for other (coughing)  Dispense: 1 Inhaler; Refill: 1    3. Rheumatoid arthritis involving multiple sites with positive  rheumatoid factor (H)  Limited refill   - HYDROcodone-acetaminophen (NORCO) 5-325 MG per tablet; Take 1-2 tablets by mouth every 4 hours as needed for moderate to severe pain maximum 4 tablet(s) per day  Dispense: 28 tablet; Refill: 0    4. Gastroesophageal reflux disease without esophagitis  Refills provided   - omeprazole (PRILOSEC) 20 MG CR capsule; Take 1 capsule (20 mg) by mouth daily  Dispense: 90 capsule; Refill: 1    5. Neck pain  Refills provided   - tiZANidine (ZANAFLEX) 2 MG tablet; Take 1 tablet (2 mg) by mouth 3 times daily as needed for muscle spasms  Dispense: 90 tablet; Refill: 1    6. Screen for colon cancer  Noted as a point of historical importance , see documentation from [facility] scanned into [facility] electronic medical records      7. High risk medication use  He is not prohibited from using non-steroidal anti-inflammatory drugs because he's not on coumadin and his gfr [ glomerular filtration rate ] is greater then 60    8. Pulmonary nodule  Will get annual low dose CT for scanning high risk patient  for lung cancer       Patient Instructions   Morning blood glucose should be less than 125. Two hours after a meal, blood glucose should be below 180.    Have your potassium rechecked in 2 weeks.        The information in this document, created by the medical scribe, Brandon Boyd, for me, accurately reflects the services I personally performed and the decisions made by me. I have reviewed and approved this document for accuracy prior to leaving the patient care area.    Kapil Duckworth MD  [facility]

## 2018-03-12 ENCOUNTER — OFFICE VISIT (OUTPATIENT)
Dept: FAMILY MEDICINE | Facility: CLINIC | Age: 73
End: 2018-03-12
Payer: COMMERCIAL

## 2018-03-12 ENCOUNTER — TELEPHONE (OUTPATIENT)
Dept: FAMILY MEDICINE | Facility: CLINIC | Age: 73
End: 2018-03-12

## 2018-03-12 VITALS
SYSTOLIC BLOOD PRESSURE: 130 MMHG | DIASTOLIC BLOOD PRESSURE: 74 MMHG | OXYGEN SATURATION: 98 % | HEIGHT: 69 IN | WEIGHT: 191 LBS | RESPIRATION RATE: 18 BRPM | TEMPERATURE: 96.5 F | HEART RATE: 53 BPM | BODY MASS INDEX: 28.29 KG/M2

## 2018-03-12 DIAGNOSIS — R91.1 PULMONARY NODULE: ICD-10-CM

## 2018-03-12 DIAGNOSIS — J20.9 ACUTE BRONCHITIS, UNSPECIFIED ORGANISM: ICD-10-CM

## 2018-03-12 DIAGNOSIS — Z12.11 SCREEN FOR COLON CANCER: ICD-10-CM

## 2018-03-12 DIAGNOSIS — M54.2 NECK PAIN: ICD-10-CM

## 2018-03-12 DIAGNOSIS — K21.9 GASTROESOPHAGEAL REFLUX DISEASE WITHOUT ESOPHAGITIS: ICD-10-CM

## 2018-03-12 DIAGNOSIS — Z79.899 HIGH RISK MEDICATION USE: ICD-10-CM

## 2018-03-12 DIAGNOSIS — M05.79 RHEUMATOID ARTHRITIS INVOLVING MULTIPLE SITES WITH POSITIVE RHEUMATOID FACTOR (H): ICD-10-CM

## 2018-03-12 PROCEDURE — 99214 OFFICE O/P EST MOD 30 MIN: CPT | Performed by: INTERNAL MEDICINE

## 2018-03-12 RX ORDER — TIZANIDINE 2 MG/1
2 TABLET ORAL 3 TIMES DAILY PRN
Qty: 90 TABLET | Refills: 1 | Status: SHIPPED | OUTPATIENT
Start: 2018-03-12 | End: 2022-03-17

## 2018-03-12 RX ORDER — HYDROCODONE BITARTRATE AND ACETAMINOPHEN 5; 325 MG/1; MG/1
1-2 TABLET ORAL EVERY 4 HOURS PRN
Qty: 28 TABLET | Refills: 0 | Status: SHIPPED | OUTPATIENT
Start: 2018-03-12 | End: 2018-09-25

## 2018-03-12 RX ORDER — LANCING DEVICE
EACH MISCELLANEOUS
Qty: 1 EACH | Refills: 0 | Status: SHIPPED | OUTPATIENT
Start: 2018-03-12 | End: 2021-02-17

## 2018-03-12 RX ORDER — ALBUTEROL SULFATE 90 UG/1
2 AEROSOL, METERED RESPIRATORY (INHALATION) EVERY 4 HOURS PRN
Qty: 1 INHALER | Refills: 1 | Status: SHIPPED | OUTPATIENT
Start: 2018-03-12 | End: 2020-12-15

## 2018-03-12 ASSESSMENT — PAIN SCALES - GENERAL: PAINLEVEL: NO PAIN (0)

## 2018-03-12 NOTE — TELEPHONE ENCOUNTER
We can do a prior authorization or prescribe a new meter, lancets and test strips for accu-chek       Prior Authorization Retail Medication Request    Medication/Dose: sheree contour next test strips  ICD code (if different than what is on RX): E11.40 and E11.65  Previously Tried and Failed:  Rationale:    Insurance Name: [employer] part d   Insurance ID: [id number]  [phone]      Pharmacy Information (if different than what is on RX)  Name: [facility]   Phone:[phone]

## 2018-03-12 NOTE — MR AVS SNAPSHOT
After Visit Summary   3/12/2018    Zurdo Dash    MRN: [id number]           Patient Information     Date Of Birth          1945        Visit Information        Provider Department      3/12/2018 10:50 AM Kapil Duckworth MD [facility]        Today's Diagnoses     Type 2 diabetes mellitus, uncontrolled, with neuropathy    -  1    Acute bronchitis, unspecified organism        Rheumatoid arthritis involving multiple sites with positive rheumatoid factor (H)        Gastroesophageal reflux disease without esophagitis        Neck pain        Screen for colon cancer        High risk medication use        Pulmonary nodule          Care Instructions    Morning blood glucose should be less than 125. Two hours after a meal, blood glucose should be below 180.    Have your potassium rechecked in 2 weeks.      Brooke Vo [facility]             Follow-ups after your visit        Your next 10 appointments already scheduled     May 15, 2018  1:15 PM CDT   LAB with  LAB   [facility] ([facility]    [address] [place] [id number]   [phone]           Please do not eat 10-12 hours before your appointment if you are coming in fasting for labs on lipids, cholesterol, or glucose (sugar). This does not apply to pregnant women. Water, hot tea and black coffee (with nothing added) are okay. Do not drink other fluids, diet soda or chew gum.            May 17, 2018  1:40 PM CDT   Return Visit with Albert Tompkins MD   [facility] ([facility]    [address] [place] [id number]   [phone]              Future tests that were ordered for you today     Open Future Orders        Priority Expected Expires Ordered    **Basic metabolic panel FUTURE anytime Routine 3/12/2018 3/12/2019 3/12/2018            Who to contact     If you have questions or need follow up information about today's clinic visit or your schedule please  "contact [facility] directly at [phone].  Normal or non-critical lab and imaging results will be communicated to you by MyChart, letter or phone within 4 business days after the clinic has received the results. If you do not hear from us within 7 days, please contact the clinic through MyChart or phone. If you have a critical or abnormal lab result, we will notify you by phone as soon as possible.  Submit refill requests through [employer] or call your pharmacy and they will forward the refill request to us. Please allow 3 business days for your refill to be completed.          Additional Information About Your Visit        [employer] Information     [employer]band[employer] lets you send messages to your doctor, view your test results, renew your prescriptions, schedule appointments and more. To sign up, go to [email or web address] . Click on \"Log in\" on the left side of the screen, which will take you to the Welcome page. Then click on \"Sign up Now\" on the right side of the page.     You will be asked to enter the access code listed below, as well as some personal information. Please follow the directions to create your username and password.     Your access code is: [email or web address]  Expires: 2018  3:08 PM     Your access code will  in 90 days. If you need help or a new code, please call your [facility] clinic or [phone].        Care EveryWhere ID     This is your Care EveryWhere ID. This could be used by other organizations to access your [facility] medical records  [id number]        Your Vitals Were     Pulse Temperature Respirations Height Pulse Oximetry BMI (Body Mass Index)    53 96.5  F (35.8  C) (Oral) 18 5' 8.5\" (1.74 m) 98% 28.62 kg/m2       Blood Pressure from Last 3 Encounters:   18 130/74   18 106/72   18 110/67    Weight from Last 3 Encounters:   18 191 lb (86.6 kg)   18 184 lb (83.5 kg)   18 193 lb 12.8 oz (87.9 kg)                 Today's Medication Changes "          These changes are accurate as of 3/12/18 11:31 AM.  If you have any questions, ask your nurse or doctor.               These medicines have changed or have updated prescriptions.        Dose/Directions    blood glucose monitoring test strip   Commonly known as:  INDIA CONTOUR NEXT   This may have changed:  additional instructions   Used for:  Type 2 diabetes mellitus, uncontrolled, with neuropathy (H)   Changed by:  Kapil Duckworth MD        TEST THREE TIMES A DAY OR AS DIRECTED, dispense covered brand   Quantity:  300 each   Refills:  1            Where to get your medicines      These medications were sent to [facility] [place] - [address]  [address], [place] [address]     Phone:  [phone]     albuterol 108 (90 BASE) MCG/ACT Inhaler    blood glucose monitoring test strip    insulin pen needle 31G X 6 MM    omeprazole 20 MG CR capsule    tiZANidine 2 MG tablet         Some of these will need a paper prescription and others can be bought over the counter.  Ask your nurse if you have questions.     Bring a paper prescription for each of these medications     HYDROcodone-acetaminophen 5-325 MG per tablet                Primary Care Provider Office Phone # Fax #    Kapil Duckworth [phone] [address] [address]        Equal Access to Services     LIONEL BLACKMAN : Hadii art ku hadasho Soomaali, waaxda luqadaha, qaybta kaalmada adeegyada, waxay vipul haynithin mckeon. So [facility] [phone].    ATENCIÓN: Si habla español, tiene a diallo disposición servicios gratuitos de asistencia lingüística. Llame al [phone].    We comply with applicable federal civil rights laws and [place] laws. We do not discriminate on the basis of race, color, national origin, age, disability, sex, sexual orientation, or gender identity.            Thank you!     Thank you for choosing [facility]  for your care.  Our goal is always to provide you with excellent care. Hearing back from our patients is one way we can continue to improve our services. Please take a few minutes to complete the written survey that you may receive in the mail after your visit with us. Thank you!             Your Updated Medication List - Protect others around you: Learn how to safely use, store and throw away your medicines at www.disposemymeds.org.          This list is accurate as of 3/12/18 11:31 AM.  Always use your most recent med list.                   Brand Name Dispense Instructions for use Diagnosis    albuterol 108 (90 BASE) MCG/ACT Inhaler    PROAIR HFA/PROVENTIL HFA/VENTOLIN HFA    1 Inhaler    Inhale 2 puffs into the lungs every 4 hours as needed for other (coughing)    Acute bronchitis, unspecified organism       aspirin 81 MG tablet      Take 1 tablet by mouth daily.    Type 2 diabetes, HbA1c goal < 7% (H)       blood glucose lancing device    no brand specified    1 each    Use to test blood sugars 3 times daily or as directed.    Type 2 diabetes mellitus, uncontrolled, with neuropathy (H)       blood glucose monitoring test strip    INDIA CONTOUR NEXT    300 each    TEST THREE TIMES A DAY OR AS DIRECTED, dispense covered brand    Type 2 diabetes mellitus, uncontrolled, with neuropathy (H)       CALCIUM 500 + D 500-200 MG-IU Tabs      take 2 tabs daily        folic acid 1 MG tablet    FOLVITE    100 tablet    Take 1 tablet (1 mg) by mouth daily    Rheumatoid arthritis involving multiple sites with positive rheumatoid factor (H)       guaiFENesin-codeine 100-10 MG/5ML Soln solution    ROBITUSSIN AC    120 mL    Take 5 mLs by mouth every 4 hours as needed for cough    Cough       HYDROcodone-acetaminophen 5-325 MG per tablet    NORCO    28 tablet    Take 1-2 tablets by mouth every 4 hours as needed for moderate to severe pain maximum 4 tablet(s) per day    Rheumatoid arthritis involving multiple sites with positive rheumatoid factor  (H)       hydroxychloroquine 200 MG tablet    PLAQUENIL    180 tablet    Take 1 tablet (200 mg) by mouth 2 times daily    Rheumatoid arthritis involving multiple sites with positive rheumatoid factor (H)       insulin pen needle 31G X 6 MM    ULTICARE MINI    100 each    Use 1 daily or as directed.    Type 2 diabetes mellitus, uncontrolled, with neuropathy (H)       liraglutide 18 MG/3ML soln    VICTOZA PEN    9 mL    Inject 0.6 mg Subcutaneous daily    Type 2 diabetes mellitus, uncontrolled, with neuropathy (H)       lisinopril 2.5 MG tablet    PRINIVIL/Zestril    90 tablet    Take 1 tablet (2.5 mg) by mouth daily    Hypotension, unspecified hypotension type       metFORMIN 500 MG tablet    GLUCOPHAGE    1 tablet    Take 1 tablet (500 mg) by mouth 2 times daily (with meals) TAKE TWO TABLETS BY MOUTH TWICE A DAY WITH MEALS    Type 2 diabetes mellitus with diabetic polyneuropathy, with long-term current use of insulin (H)       methotrexate sodium 2.5 MG Tabs     120 tablet    Take 25 mg by mouth once a week . Take all 10 tablets on the same day of each week.    Rheumatoid arthritis involving multiple sites with positive rheumatoid factor (H)       metoprolol succinate 25 MG 24 hr tablet    TOPROL-XL    90 tablet    TAKE ONE TABLET BY MOUTH EVERY DAY    Hypertension goal BP (blood pressure) < 140/90       * MICROLET LANCETS Misc     100 each    1 Device daily    Type 2 diabetes, HbA1C goal < 8% (H)       * blood glucose monitoring lancets     300 each    Use to test blood sugars 3 times daily or as directed.    Type 2 diabetes mellitus, uncontrolled, with neuropathy (H)       MULTI-VITAMIN PO      None Entered        omeprazole 20 MG CR capsule    priLOSEC    90 capsule    Take 1 capsule (20 mg) by mouth daily    Gastroesophageal reflux disease without esophagitis       order for DME     1 Device    Equipment being ordered: specialized shoes for diabetic neuropathy   His orthotics will be made by [employer] ??  "Tim Jernigan, Certified  [email or web address] Phone: [phone] Fax: [phone] [address] [place] [address]    Type 2 diabetes mellitus with diabetic polyneuropathy, with long-term current use of insulin (H), Diabetic polyneuropathy associated with type 2 diabetes mellitus (H)       pravastatin 40 MG tablet    PRAVACHOL    1 tablet    Take 0.5 tablets (20 mg) by mouth every other day    Hyperlipidemia LDL goal <100       sildenafil 20 MG tablet    REVATIO    30 tablet    \" take between 2-4 tablets at a time for planned sexual activity\" , maximum 5 pills per day    Erectile dysfunction, unspecified erectile dysfunction type       tiZANidine 2 MG tablet    ZANAFLEX    90 tablet    Take 1 tablet (2 mg) by mouth 3 times daily as needed for muscle spasms    Neck pain       * Notice:  This list has 2 medication(s) that are the same as other medications prescribed for you. Read the directions carefully, and ask your doctor or other care provider to review them with you.      "

## 2018-03-12 NOTE — PATIENT INSTRUCTIONS
Morning blood glucose should be less than 125. Two hours after a meal, blood glucose should be below 180.    Have your potassium rechecked in 2 weeks.      Brooke Vo CMA

## 2018-03-16 NOTE — TELEPHONE ENCOUNTER
Prior Authorization Not Needed per Insurance    Medication: blood glucose monitoring (INDIA CONTOUR NEXT) test strip-PA not needed  Insurance Company: [employer] - Phone [phone] Fax [phone]  Expected CoPay:      Pharmacy Filling the Rx: [facility] [place] [place] - [address]  Pharmacy Notified: Yes  Patient Notified: No    Per call to pharmacy, they filled the Accu-Chek brand on 3/12/18.

## 2018-03-26 LAB
ANION GAP SERPL CALCULATED.3IONS-SCNC: 9 MMOL/L (ref 3–14)
BUN SERPL-MCNC: 20 MG/DL (ref 7–30)
CALCIUM SERPL-MCNC: 8.8 MG/DL (ref 8.5–10.1)
CHLORIDE SERPL-SCNC: 107 MMOL/L (ref 94–109)
CO2 SERPL-SCNC: 23 MMOL/L (ref 20–32)
CREAT SERPL-MCNC: 1.01 MG/DL (ref 0.66–1.25)
GFR SERPL CREATININE-BSD FRML MDRD: 72 ML/MIN/1.7M2
GLUCOSE SERPL-MCNC: 84 MG/DL (ref 70–99)
POTASSIUM SERPL-SCNC: 4.6 MMOL/L (ref 3.4–5.3)
SODIUM SERPL-SCNC: 139 MMOL/L (ref 133–144)

## 2018-03-26 PROCEDURE — 80048 BASIC METABOLIC PNL TOTAL CA: CPT | Performed by: INTERNAL MEDICINE

## 2018-03-26 PROCEDURE — 36415 COLL VENOUS BLD VENIPUNCTURE: CPT | Performed by: INTERNAL MEDICINE

## 2018-03-29 ENCOUNTER — OFFICE VISIT (OUTPATIENT)
Dept: FAMILY MEDICINE | Facility: CLINIC | Age: 73
End: 2018-03-29
Payer: COMMERCIAL

## 2018-03-29 VITALS
RESPIRATION RATE: 18 BRPM | DIASTOLIC BLOOD PRESSURE: 80 MMHG | TEMPERATURE: 97.1 F | SYSTOLIC BLOOD PRESSURE: 124 MMHG | BODY MASS INDEX: 29.52 KG/M2 | HEART RATE: 71 BPM | OXYGEN SATURATION: 96 % | WEIGHT: 197 LBS

## 2018-03-29 DIAGNOSIS — E11.42 DIABETIC POLYNEUROPATHY ASSOCIATED WITH TYPE 2 DIABETES MELLITUS (H): ICD-10-CM

## 2018-03-29 DIAGNOSIS — M05.79 RHEUMATOID ARTHRITIS INVOLVING MULTIPLE SITES WITH POSITIVE RHEUMATOID FACTOR (H): ICD-10-CM

## 2018-03-29 DIAGNOSIS — E11.621 DIABETIC ULCER OF TOE OF LEFT FOOT ASSOCIATED WITH TYPE 2 DIABETES MELLITUS, LIMITED TO BREAKDOWN OF SKIN (H): Primary | ICD-10-CM

## 2018-03-29 DIAGNOSIS — L97.521 DIABETIC ULCER OF TOE OF LEFT FOOT ASSOCIATED WITH TYPE 2 DIABETES MELLITUS, LIMITED TO BREAKDOWN OF SKIN (H): Primary | ICD-10-CM

## 2018-03-29 DIAGNOSIS — Z12.11 SCREEN FOR COLON CANCER: ICD-10-CM

## 2018-03-29 PROCEDURE — 99214 OFFICE O/P EST MOD 30 MIN: CPT | Performed by: INTERNAL MEDICINE

## 2018-03-29 RX ORDER — CETIRIZINE HYDROCHLORIDE 10 MG/1
10 TABLET ORAL AT BEDTIME
COMMUNITY
End: 2023-08-03

## 2018-03-29 RX ORDER — HYDROXYCHLOROQUINE SULFATE 200 MG/1
200 TABLET, FILM COATED ORAL 2 TIMES DAILY
Qty: 180 TABLET | Refills: 1 | Status: CANCELLED | OUTPATIENT
Start: 2018-03-29

## 2018-03-29 RX ORDER — CEPHALEXIN 500 MG/1
500 CAPSULE ORAL 3 TIMES DAILY
Qty: 30 CAPSULE | Refills: 0 | Status: SHIPPED | OUTPATIENT
Start: 2018-03-29 | End: 2018-06-07

## 2018-03-29 RX ORDER — MUPIROCIN 20 MG/G
OINTMENT TOPICAL 3 TIMES DAILY
Qty: 22 G | Refills: 1 | Status: SHIPPED | OUTPATIENT
Start: 2018-03-29 | End: 2019-02-14

## 2018-03-29 ASSESSMENT — PAIN SCALES - GENERAL: PAINLEVEL: MODERATE PAIN (4)

## 2018-03-29 NOTE — NURSING NOTE
"Chief Complaint   Patient presents with     Infection     possible infection on middle left toenail x1 week     Other     update that patient had colonoscopy 3/1/18.       Initial /80 (BP Location: Left arm, Cuff Size: Adult Regular)  Pulse 71  Temp 97.1  F (36.2  C) (Oral)  Resp 18  Wt 197 lb (89.4 kg)  SpO2 96%  BMI 29.52 kg/m2 Estimated body mass index is 29.52 kg/(m^2) as calculated from the following:    Height as of 3/12/18: 5' 8.5\" (1.74 m).    Weight as of this encounter: 197 lb (89.4 kg).  Medication Reconciliation: complete       Amalia Murillo CMA    "

## 2018-03-29 NOTE — PROGRESS NOTES
SUBJECTIVE:   Zurdo Dash is a 73 year old male who presents to clinic today for the following health issues:    Patient presents to clinic today for toe pain.    Toe pain - His left middle toe is sore. One week ago after he showered, he cut his toenail but think he might have clipped into his toe. It is painful when he walks. Wants to know if his toe is infected. He has history of neuropathy and follows up with Dr. Dent yearly. He is getting new diabetic shoes tomorrow and has not had serious foot problems for a year.     Additional notes:  Wants to know if his potassium levels are normal after the recheck.      Problem list and histories reviewed & adjusted, as indicated.  Additional history: as documented    Patient Active Problem List   Diagnosis     Pain in limb     Hyperlipidemia LDL goal <100     Hypertension goal BP (blood pressure) < 140/90     obesity     Hypogonadism     Advanced directives, counseling/discussion     Health Care Home     Type 2 diabetes mellitus with diabetic polyneuropathy, with long-term current use of insulin (H)     RBBB (right bundle branch block)     Lumbago     Ex-smoker     Cataracts, both eyes     Family history of esophageal cancer     Gastroesophageal reflux disease, esophagitis presence not specified     Rheumatoid arthritis involving multiple sites with positive rheumatoid factor (H)     Pulmonary nodule     High risk medication use     Spondylosis of cervical region without myelopathy or radiculopathy     Erectile dysfunction, unspecified erectile dysfunction type     Type 2 diabetes mellitus without retinopathy (H)     Combined forms of age-related cataract of both eyes     PVD (posterior vitreous detachment), left eye     Tubulovillous adenoma of colon     Diabetic polyneuropathy associated with type 2 diabetes mellitus (H)     Past Surgical History:   Procedure Laterality Date     COLONOSCOPY  03/01/2018    [facility]     HC INCISION TENDON SHEATH FINGER  4/2009    bakari  hand ring finger     TONSILLECTOMY         Social History   Substance Use Topics     Smoking status: Former Smoker     Packs/day: 1.00     Years: 40.00     Types: Cigarettes     Quit date: 3/16/2007     Smokeless tobacco: Never Used     Alcohol use No     Family History   Problem Relation Age of Onset     CEREBROVASCULAR DISEASE Mother      Arthritis Mother      OSTEOPOROSIS Mother      Alzheimer Disease Father      Arthritis Father      CANCER Father      DIABETES Maternal Grandmother      Cardiovascular Maternal Grandmother      Other Cancer Brother      CANCER Paternal Aunt      Hypertension No family hx of      Thyroid Disease No family hx of      Glaucoma No family hx of      Macular Degeneration No family hx of          Current Outpatient Prescriptions   Medication Sig Dispense Refill     cetirizine (ZYRTEC) 10 MG tablet Take 10 mg by mouth At Bedtime       cephALEXin (KEFLEX) 500 MG capsule Take 1 capsule (500 mg) by mouth 3 times daily 30 capsule 0     mupirocin (BACTROBAN) 2 % ointment Apply topically 3 times daily for 5 days 22 g 1     albuterol (PROAIR HFA/PROVENTIL HFA/VENTOLIN HFA) 108 (90 BASE) MCG/ACT Inhaler Inhale 2 puffs into the lungs every 4 hours as needed for other (coughing) 1 Inhaler 1     blood glucose monitoring (INDIA CONTOUR NEXT) test strip TEST THREE TIMES A DAY OR AS DIRECTED, dispense covered brand 300 each 1     insulin pen needle (ULTICARE MINI) 31G X 6 MM Use 1 daily or as directed. 100 each 1     omeprazole (PRILOSEC) 20 MG CR capsule Take 1 capsule (20 mg) by mouth daily 90 capsule 1     tiZANidine (ZANAFLEX) 2 MG tablet Take 1 tablet (2 mg) by mouth 3 times daily as needed for muscle spasms 90 tablet 1     HYDROcodone-acetaminophen (NORCO) 5-325 MG per tablet Take 1-2 tablets by mouth every 4 hours as needed for moderate to severe pain maximum 4 tablet(s) per day 28 tablet 0     blood glucose (NO BRAND SPECIFIED) lancing device Use to test blood sugars 3 times daily or as  directed. 1 each 0     order for DME Equipment being ordered: glucometer 1 Device 0     order for DME Equipment being ordered: specialized shoes for diabetic neuropathy     His orthotics will be made by [employer]      Tim Jernigan, Certified   [email or web address]  Phone: [phone]  Fax: [phone]  [address] [place] [address] 1 Device 0     guaiFENesin-codeine (ROBITUSSIN AC) 100-10 MG/5ML SOLN solution Take 5 mLs by mouth every 4 hours as needed for cough 120 mL 0     liraglutide (VICTOZA PEN) 18 MG/3ML soln Inject 0.6 mg Subcutaneous daily 9 mL 0     methotrexate sodium 2.5 MG TABS Take 25 mg by mouth once a week . Take all 10 tablets on the same day of each week. 120 tablet 2     metoprolol (TOPROL-XL) 25 MG 24 hr tablet TAKE ONE TABLET BY MOUTH EVERY DAY 90 tablet 3     hydroxychloroquine (PLAQUENIL) 200 MG tablet Take 1 tablet (200 mg) by mouth 2 times daily 180 tablet 1     folic acid (FOLVITE) 1 MG tablet Take 1 tablet (1 mg) by mouth daily 100 tablet 3     metFORMIN (GLUCOPHAGE) 500 MG tablet Take 1 tablet (500 mg) by mouth 2 times daily (with meals) TAKE TWO TABLETS BY MOUTH TWICE A DAY WITH MEALS 1 tablet 0     pravastatin (PRAVACHOL) 40 MG tablet Take 0.5 tablets (20 mg) by mouth every other day 1 tablet 0     lisinopril (PRINIVIL/ZESTRIL) 2.5 MG tablet Take 1 tablet (2.5 mg) by mouth daily 90 tablet 3     blood glucose monitoring (ONE TOUCH DELICA) lancets Use to test blood sugars 3 times daily or as directed. 300 each 3     MICROLET LANCETS MISC 1 Device daily 100 each 4     aspirin 81 MG tablet Take 1 tablet by mouth daily.  3     Labs reviewed in EPIC    Reviewed and updated as needed this visit by clinical staff  Tobacco  Allergies  Meds  Med Hx  Surg Hx  Fam Hx  Soc Hx      Reviewed and updated as needed this visit by Provider         ROS:  Constitutional, HEENT, cardiovascular, pulmonary, GI, , musculoskeletal, neuro, skin, endocrine and psych  systems are negative, except as otherwise noted.    This document serves as a record of the services and decisions personally performed and made by Kapil Duckworth MD. It was created on his/her behalf by Tim Artis, trained medical scribe. The creation of this document is based the provider's statements to the medical scribes.    Scribe Tim Artis 3:48 PM, March 29, 2018    OBJECTIVE:   /80 (BP Location: Left arm, Cuff Size: Adult Regular)  Pulse 71  Temp 97.1  F (36.2  C) (Oral)  Resp 18  Wt 89.4 kg (197 lb)  SpO2 96%  BMI 29.52 kg/m2  Body mass index is 29.52 kg/(m^2).  GENERAL: healthy, alert and no distress  FOOT: He has a circular ulcer 5mm in diameter on his left second toe that is not purulent or warm to touch.   Other portion of the toe shows redness without any convincing evidence of extension of cellulitis . Positive dorsalis pedis pulses present , evidence of peripheral neuropathy .   NEURO: Normal strength and tone, mentation intact and speech normal  PSYCH: mentation appears normal, affect normal/bright    Diagnostic Test Results:  Orders Placed This Encounter   Procedures     PODIATRY/FOOT & ANKLE SURGERY REFERRAL         ASSESSMENT/PLAN:   1. Diabetic ulcer of toe of left foot associated with type 2 diabetes mellitus, limited to breakdown of skin (H)  This diabetic foot ulcer is indeed quite small but it is still concerning to me . Diabetic foot ulceration can quickly escalate into huge problems. Treatment aggressively with antibiotics and mupirocin 2% cream [ bactroban ] and follow up a Dr. Aaron Dent, podiatrist   - PODIATRY/FOOT & ANKLE SURGERY REFERRAL  - cephALEXin (KEFLEX) 500 MG capsule; Take 1 capsule (500 mg) by mouth 3 times daily  Dispense: 30 capsule; Refill: 0  - mupirocin (BACTROBAN) 2 % ointment; Apply topically 3 times daily for 5 days  Dispense: 22 g; Refill: 1    2. Rheumatoid arthritis involving multiple sites with positive rheumatoid factor (H)      3. Screen  for colon cancer      4. Diabetic polyneuropathy associated with type 2 diabetes mellitus (H)  A contributing factor to diabetic foot ulceration       Patient Instructions   Follow up with Dr. Dent in 10 days.    Apply bacitracin and Bactroban twice daily and keep it covered. Monitor your toe for oozing, more pain, or more swelling.              The information in this document, created by the medical scribe, Tim Artis, for me, accurately reflects the services I personally performed and the decisions made by me. I have reviewed and approved this document for accuracy prior to leaving the patient care area.    Kapil Duckworth MD  [facility]

## 2018-03-29 NOTE — MR AVS SNAPSHOT
After Visit Summary   3/29/2018    Zurdo Dash    MRN: [id number]           Patient Information     Date Of Birth          1945        Visit Information        Provider Department      3/29/2018 3:30 PM Kapil Duckworth MD [facility]        Today's Diagnoses     Diabetic ulcer of toe of left foot associated with type 2 diabetes mellitus, limited to breakdown of skin (H)    -  1    Rheumatoid arthritis involving multiple sites with positive rheumatoid factor (H)        Screen for colon cancer        Diabetic polyneuropathy associated with type 2 diabetes mellitus (H)          Care Instructions    Follow up with Dr. Dent in 10 days.    Apply bacitracin and Bactroban twice daily and keep it covered. Monitor your toe for oozing, more pain, or more swelling.      [facility]    If you have any questions regarding to your visit please contact your care team:     Team Pink:   Clinic Hours Telephone Number   Internal Medicine:  Dr. Suze Zapata NP       7am-7pm  Monday - Thursday   7am-5pm  Fridays  [phone]  (Appointment scheduling available 24/7)    Questions about your visit?  Team Line  [phone]   Urgent Care - [facility] - 11am-9pm Monday-Friday Saturday-Sunday- 9am-5pm   [facility] - 5pm-9pm Monday-Friday Saturday-Sunday- 9am-5pm  [phone] - [facility]   [phone] - [facility]       What options do I have for visits at the clinic other than the traditional office visit?  To expand how we care for you, many of our providers are utilizing electronic visits (e-visits) and telephone visits, when medically appropriate, for interactions with their patients rather than a visit in the clinic.   We also offer nurse visits for many medical concerns. Just like any other service, we will bill your insurance company for this type of visit based on time spent on the phone with your provider. Not all  insurance companies cover these visits. Please check with your medical insurance if this type of visit is covered. You will be responsible for any charges that are not paid by your insurance.      E-visits via [employer]:  generally incur a $35.00 fee.  Telephone visits:  Time spent on the phone: *charged based on time that is spent on the phone in increments of 10 minutes. Estimated cost:   5-10 mins $30.00   11-20 mins. $59.00   21-30 mins. $85.00   Use [employer] (secure email communication and access to your chart) to send your primary care provider a message or make an appointment. Ask someone on your Team how to sign up for [employer].    For a Price Quote for your services, please call our [employer] Price Line at [phone].    As always, Thank you for trusting us with your health care needs!    Amalia Murillo CMA          Follow-ups after your visit        Additional Services     PODIATRY/FOOT & ANKLE SURGERY REFERRAL       Your provider has referred you to: [facility]: [facility] [phone]   [email or web address]    Please be aware that coverage of these services is subject to the terms and limitations of your health insurance plan.  Call member services at your health plan with any benefit or coverage questions.      Please bring the following to your appointment:  >>   Any x-rays, CTs or MRIs which have been performed.  Contact the facility where they were done to arrange for  prior to your scheduled appointment.    >>   List of current medications   >>   This referral request   >>   Any documents/labs given to you for this referral                  Your next 10 appointments already scheduled     May 15, 2018  1:15 PM CDT   LAB with [facility]   [facility]    [address] [facility] [id number] [phone]           Please do not eat 10-12 hours before your appointment if you are coming in fasting for labs on lipids,  "cholesterol, or glucose (sugar). This does not apply to pregnant women. Water, hot tea and black coffee (with nothing added) are okay. Do not drink other fluids, diet soda or chew gum.            May 17, 2018  1:40 PM CDT   Return Visit with Albert Tompkins MD   [facility] [place] ([facility])    [address]  [place] [id number] [phone]              Who to contact     If you have questions or need follow up information about today's clinic visit or your schedule please contact [facility] directly at [phone].  Normal or non-critical lab and imaging results will be communicated to you by MyChart, letter or phone within 4 business days after the clinic has received the results. If you do not hear from us within 7 days, please contact the clinic through MyChart or phone. If you have a critical or abnormal lab result, we will notify you by phone as soon as possible.  Submit refill requests through [employer] or call your pharmacy and they will forward the refill request to us. Please allow 3 business days for your refill to be completed.          Additional Information About Your Visit        MyChart Information     [employer] lets you send messages to your doctor, view your test results, renew your prescriptions, schedule appointments and more. To sign up, go to [email or web address] . Click on \"Log in\" on the left side of the screen, which will take you to the Welcome page. Then click on \"Sign up Now\" on the right side of the page.     You will be asked to enter the access code listed below, as well as some personal information. Please follow the directions to create your username and password.     Your access code is: [email or web address]  Expires: 2018  3:08 PM     Your access code will  in 90 days. If you need help or a new code, please call your [place] clinic or [phone].        Care EveryWhere ID     This is your Care EveryWhere ID. This could be used by " other organizations to access your [place] medical records  [id number]        Your Vitals Were     Pulse Temperature Respirations Pulse Oximetry BMI (Body Mass Index)       71 97.1  F (36.2  C) (Oral) 18 96% 29.52 kg/m2        Blood Pressure from Last 3 Encounters:   03/29/18 124/80   03/12/18 130/74   02/20/18 106/72    Weight from Last 3 Encounters:   03/29/18 197 lb (89.4 kg)   03/12/18 191 lb (86.6 kg)   02/20/18 184 lb (83.5 kg)              We Performed the Following     PODIATRY/FOOT & ANKLE SURGERY REFERRAL          Today's Medication Changes          These changes are accurate as of 3/29/18  4:14 PM.  If you have any questions, ask your nurse or doctor.               Start taking these medicines.        Dose/Directions    cephALEXin 500 MG capsule   Commonly known as:  KEFLEX   Used for:  Diabetic ulcer of toe of left foot associated with type 2 diabetes mellitus, limited to breakdown of skin (H)   Started by:  Kapil Duckworth MD        Dose:  500 mg   Take 1 capsule (500 mg) by mouth 3 times daily   Quantity:  30 capsule   Refills:  0       mupirocin 2 % ointment   Commonly known as:  BACTROBAN   Used for:  Diabetic ulcer of toe of left foot associated with type 2 diabetes mellitus, limited to breakdown of skin (H)   Started by:  Kapil Duckworth MD        Apply topically 3 times daily for 5 days   Quantity:  22 g   Refills:  1            Where to get your medicines      These medications were sent to [facility] [place]  [facility] - [address]  [address], [place] [address]     Phone:  [phone]     cephALEXin 500 MG capsule    mupirocin 2 % ointment                Primary Care Provider Office Phone # Fax #    Kapil Duckworth [phone] [address]  [place] [address]        Equal Access to Services     TASHI BLACKMAN AH: Deniz sawanto Soomaali, waaxda luqadaha, qaybta kaalmada adedevang, aranza zhong  ah. So [facility] [phone].    ATENCIÓN: Si kalia goss, tiene a diallo disposición servicios gratuitos de asistencia lingüística. Nitesh sheldon [phone].    We comply with applicable federal civil rights laws and [place] laws. We do not discriminate on the basis of race, color, national origin, age, disability, sex, sexual orientation, or gender identity.            Thank you!     Thank you for choosing [facility]  for your care. Our goal is always to provide you with excellent care. Hearing back from our patients is one way we can continue to improve our services. Please take a few minutes to complete the written survey that you may receive in the mail after your visit with us. Thank you!             Your Updated Medication List - Protect others around you: Learn how to safely use, store and throw away your medicines at www.disposemymeds.org.          This list is accurate as of 3/29/18  4:14 PM.  Always use your most recent med list.                   Brand Name Dispense Instructions for use Diagnosis    albuterol 108 (90 BASE) MCG/ACT Inhaler    PROAIR HFA/PROVENTIL HFA/VENTOLIN HFA    1 Inhaler    Inhale 2 puffs into the lungs every 4 hours as needed for other (coughing)    Acute bronchitis, unspecified organism       aspirin 81 MG tablet      Take 1 tablet by mouth daily.    Type 2 diabetes, HbA1c goal < 7% (H)       blood glucose lancing device    no brand specified    1 each    Use to test blood sugars 3 times daily or as directed.    Type 2 diabetes mellitus, uncontrolled, with neuropathy (H)       blood glucose monitoring test strip    INDIA CONTOUR NEXT    300 each    TEST THREE TIMES A DAY OR AS DIRECTED, dispense covered brand    Type 2 diabetes mellitus, uncontrolled, with neuropathy (H)       cephALEXin 500 MG capsule    KEFLEX    30 capsule    Take 1 capsule (500 mg) by mouth 3 times daily    Diabetic ulcer of toe of left foot associated with type 2 diabetes mellitus, limited to breakdown  of skin (H)       cetirizine 10 MG tablet    zyrTEC     Take 10 mg by mouth At Bedtime        folic acid 1 MG tablet    FOLVITE    100 tablet    Take 1 tablet (1 mg) by mouth daily    Rheumatoid arthritis involving multiple sites with positive rheumatoid factor (H)       guaiFENesin-codeine 100-10 MG/5ML Soln solution    ROBITUSSIN AC    120 mL    Take 5 mLs by mouth every 4 hours as needed for cough    Cough       HYDROcodone-acetaminophen 5-325 MG per tablet    NORCO    28 tablet    Take 1-2 tablets by mouth every 4 hours as needed for moderate to severe pain maximum 4 tablet(s) per day    Rheumatoid arthritis involving multiple sites with positive rheumatoid factor (H)       hydroxychloroquine 200 MG tablet    PLAQUENIL    180 tablet    Take 1 tablet (200 mg) by mouth 2 times daily    Rheumatoid arthritis involving multiple sites with positive rheumatoid factor (H)       insulin pen needle 31G X 6 MM    ULTICARE MINI    100 each    Use 1 daily or as directed.    Type 2 diabetes mellitus, uncontrolled, with neuropathy (H)       liraglutide 18 MG/3ML soln    VICTOZA PEN    9 mL    Inject 0.6 mg Subcutaneous daily    Type 2 diabetes mellitus, uncontrolled, with neuropathy (H)       lisinopril 2.5 MG tablet    PRINIVIL/Zestril    90 tablet    Take 1 tablet (2.5 mg) by mouth daily    Hypotension, unspecified hypotension type       metFORMIN 500 MG tablet    GLUCOPHAGE    1 tablet    Take 1 tablet (500 mg) by mouth 2 times daily (with meals) TAKE TWO TABLETS BY MOUTH TWICE A DAY WITH MEALS    Type 2 diabetes mellitus with diabetic polyneuropathy, with long-term current use of insulin (H)       methotrexate sodium 2.5 MG Tabs     120 tablet    Take 25 mg by mouth once a week . Take all 10 tablets on the same day of each week.    Rheumatoid arthritis involving multiple sites with positive rheumatoid factor (H)       metoprolol succinate 25 MG 24 hr tablet    TOPROL-XL    90 tablet    TAKE ONE TABLET BY MOUTH EVERY DAY     Hypertension goal BP (blood pressure) < 140/90       * MICROLET LANCETS Misc     100 each    1 Device daily    Type 2 diabetes, HbA1C goal < 8% (H)       * blood glucose monitoring lancets     300 each    Use to test blood sugars 3 times daily or as directed.    Type 2 diabetes mellitus, uncontrolled, with neuropathy (H)       mupirocin 2 % ointment    BACTROBAN    22 g    Apply topically 3 times daily for 5 days    Diabetic ulcer of toe of left foot associated with type 2 diabetes mellitus, limited to breakdown of skin (H)       omeprazole 20 MG CR capsule    priLOSEC    90 capsule    Take 1 capsule (20 mg) by mouth daily    Gastroesophageal reflux disease without esophagitis       * order for DME     1 Device    Equipment being ordered: specialized shoes for diabetic neuropathy   His orthotics will be made by [employer] ?? Tim Jernigan, Certified  [email or web address] Phone: [phone] Fax: [phone] [address] [place] [address]    Type 2 diabetes mellitus with diabetic polyneuropathy, with long-term current use of insulin (H), Diabetic polyneuropathy associated with type 2 diabetes mellitus (H)       * order for DME     1 Device    Equipment being ordered: glucometer    Type 2 diabetes mellitus, uncontrolled, with neuropathy (H)       pravastatin 40 MG tablet    PRAVACHOL    1 tablet    Take 0.5 tablets (20 mg) by mouth every other day    Hyperlipidemia LDL goal <100       tiZANidine 2 MG tablet    ZANAFLEX    90 tablet    Take 1 tablet (2 mg) by mouth 3 times daily as needed for muscle spasms    Neck pain       * Notice:  This list has 4 medication(s) that are the same as other medications prescribed for you. Read the directions carefully, and ask your doctor or other care provider to review them with you.

## 2018-03-29 NOTE — PROGRESS NOTES
Wants to know if his potassium levels are normal after the recheck.    His left middle toe is sore. One week ago after he showered, he cut his toenail but think he might have clipped into his toe. It is painful when he walks. Wants to know if his toe is infected. He has history of neuropathy and follows up with Dr. Dent yearly. He is getting new diabetic shoes tomorrow and has not had serious foot problems for a year.     He has a circular ulcer 5mm in diameter on his left second toe that is not purulent or warm to touch.   Other toe shows redness.

## 2018-03-29 NOTE — PATIENT INSTRUCTIONS
Follow up with Dr. Dent in 10 days.    Apply bacitracin and Bactroban twice daily and keep it covered. Monitor your toe for oozing, more pain, or more swelling.      [facility]    If you have any questions regarding to your visit please contact your care team:     Team Pink:   Clinic Hours Telephone Number   Internal Medicine:  Dr. Suze Zapata NP       7am-7pm  Monday - Thursday   7am-5pm  Fridays  [phone]  (Appointment scheduling available 24/7)    Questions about your visit?  Team Line  [phone]   [facility] [place] [facility] [facility] 11am-9pm Monday-Friday Saturday-Sunday- 9am-5pm   [place] - 5pm-9pm Monday-Friday Saturday-Sunday- 9am-5pm  [phone] - [place]   [phone] - [place]       What options do I have for visits at the clinic other than the traditional office visit?  To expand how we care for you, many of our providers are utilizing electronic visits (e-visits) and telephone visits, when medically appropriate, for interactions with their patients rather than a visit in the clinic.   We also offer nurse visits for many medical concerns. Just like any other service, we will bill your insurance company for this type of visit based on time spent on the phone with your provider. Not all insurance companies cover these visits. Please check with your medical insurance if this type of visit is covered. You will be responsible for any charges that are not paid by your insurance.      E-visits via [employer]:  generally incur a $35.00 fee.  Telephone visits:  Time spent on the phone: *charged based on time that is spent on the phone in increments of 10 minutes. Estimated cost:   5-10 mins $30.00   11-20 mins. $59.00   21-30 mins. $85.00   Use [employer] (secure email communication and access to your chart) to send your primary care provider a message or make an appointment. Ask someone on your Team how to sign up for  [place].    For a Price Quote for your services, please call our Consumer Price Line at [phone].    As always, Thank you for trusting us with your health care needs!    Amalia Murillo CMA

## 2018-04-05 ENCOUNTER — OFFICE VISIT (OUTPATIENT)
Dept: PODIATRY | Facility: CLINIC | Age: 73
End: 2018-04-05
Payer: COMMERCIAL

## 2018-04-05 VITALS
DIASTOLIC BLOOD PRESSURE: 76 MMHG | WEIGHT: 197 LBS | HEART RATE: 52 BPM | BODY MASS INDEX: 29.52 KG/M2 | SYSTOLIC BLOOD PRESSURE: 136 MMHG

## 2018-04-05 DIAGNOSIS — B35.1 DERMATOPHYTOSIS OF NAIL: ICD-10-CM

## 2018-04-05 DIAGNOSIS — E11.42 DIABETIC POLYNEUROPATHY ASSOCIATED WITH TYPE 2 DIABETES MELLITUS (H): Primary | ICD-10-CM

## 2018-04-05 DIAGNOSIS — E11.621 DIABETIC ULCER OF TOE OF LEFT FOOT ASSOCIATED WITH TYPE 2 DIABETES MELLITUS, UNSPECIFIED ULCER STAGE (H): ICD-10-CM

## 2018-04-05 DIAGNOSIS — L97.529 DIABETIC ULCER OF TOE OF LEFT FOOT ASSOCIATED WITH TYPE 2 DIABETES MELLITUS, UNSPECIFIED ULCER STAGE (H): ICD-10-CM

## 2018-04-05 DIAGNOSIS — L84 CORNS AND CALLOSITIES: ICD-10-CM

## 2018-04-05 PROCEDURE — 11720 DEBRIDE NAIL 1-5: CPT | Mod: 59 | Performed by: PODIATRIST

## 2018-04-05 PROCEDURE — 11055 PARING/CUTG B9 HYPRKER LES 1: CPT | Mod: 51 | Performed by: PODIATRIST

## 2018-04-05 PROCEDURE — 99214 OFFICE O/P EST MOD 30 MIN: CPT | Mod: 25 | Performed by: PODIATRIST

## 2018-04-05 NOTE — MR AVS SNAPSHOT
After Visit Summary   4/5/2018    Zurdo Dash    MRN: [id number]           Patient Information     Date Of Birth          1945        Visit Information        Provider Department      4/5/2018 12:15 PM Aaron Dent DPM [facility]        Today's Diagnoses     Diabetic polyneuropathy associated with type 2 diabetes mellitus (H)    -  1    Diabetic ulcer of toe of left foot associated with type 2 diabetes mellitus, unspecified ulcer stage (H)        Corns and callosities        Dermatophytosis of nail          Care Instructions    Weight management plan: Patient was referred to their PCP to discuss a diet and exercise plan.            Follow-ups after your visit        Your next 10 appointments already scheduled     Apr 12, 2018 12:15 PM CDT   Return Visit with Aaron Dent DPM   [facility] [place] [facility]    [address]  [place] [address] [phone]            May 15, 2018  1:15 PM CDT   LAB with  LAB   [facility] [place] ([facility] [place])    [address]  [place] [address] [phone]           Please do not eat 10-12 hours before your appointment if you are coming in fasting for labs on lipids, cholesterol, or glucose (sugar). This does not apply to pregnant women. Water, hot tea and black coffee (with nothing added) are okay. Do not drink other fluids, diet soda or chew gum.            May 17, 2018  1:40 PM CDT   Return Visit with Albert Tompkins MD   [facility] [place] ([facility] [place])    [address]  [place] [address] [phone]              Who to contact     If you have questions or need follow up information about today's clinic visit or your schedule please contact [facility] [place] directly at [phone].  Normal or non-critical lab and imaging results will be communicated to you by MyChart, letter or phone within 4 business days after the clinic  "has received the results. If you do not hear from us within 7 days, please contact the clinic through [employer] or phone. If you have a critical or abnormal lab result, we will notify you by phone as soon as possible.  Submit refill requests through [employer] or call your pharmacy and they will forward the refill request to us. Please allow 3 business days for your refill to be completed.          Additional Information About Your Visit        LTG Exam Prep [employer] Information     [employer] lets you send messages to your doctor, view your test results, renew your prescriptions, schedule appointments and more. To sign up, go to [email or web address] . Click on \"Log in\" on the left side of the screen, which will take you to the Welcome page. Then click on \"Sign up Now\" on the right side of the page.     You will be asked to enter the access code listed below, as well as some personal information. Please follow the directions to create your username and password.     Your access code is: [email or web address]  Expires: 2018  3:08 PM     Your access code will  in 90 days. If you need help or a new code, please call your [facility] clinic or [phone].        Care EveryWhere ID     This is your Care EveryWhere ID. This could be used by other organizations to access your [facility] medical records  [id number]        Your Vitals Were     Pulse BMI (Body Mass Index)                52 29.52 kg/m2           Blood Pressure from Last 3 Encounters:   18 136/76   18 124/80   18 130/74    Weight from Last 3 Encounters:   18 197 lb (89.4 kg)   18 197 lb (89.4 kg)   18 191 lb (86.6 kg)              We Performed the Following     DEBRIDEMENT OF NAIL(S), 1-5     TRIM HYPERKERATOTIC SKIN LESION, ONE        Primary Care Provider Office Phone # Fax #    Kapil Duckworth [phone] [address] [place] [address]        Equal Access to Services     TASHI BLACKMAN AH: Deniz nuñez " Stephaniephil, loanda luqadaha, qaybta kaмарина becerra, aranza simental jhonnymarii lastanjovanni linda. So [facility] [phone].    ATENCIÓN: Si kalia goss, tiene a diallo disposición servicios gratuitos de asistencia lingüística. Nitesh al [phone].    We comply with applicable federal civil rights laws and [place] laws. We do not discriminate on the basis of race, color, national origin, age, disability, sex, sexual orientation, or gender identity.            Thank you!     Thank you for choosing [facility]  for your care. Our goal is always to provide you with excellent care. Hearing back from our patients is one way we can continue to improve our services. Please take a few minutes to complete the written survey that you may receive in the mail after your visit with us. Thank you!             Your Updated Medication List - Protect others around you: Learn how to safely use, store and throw away your medicines at www.disposemymeds.org.          This list is accurate as of 4/5/18  2:37 PM.  Always use your most recent med list.                   Brand Name Dispense Instructions for use Diagnosis    albuterol 108 (90 BASE) MCG/ACT Inhaler    PROAIR HFA/PROVENTIL HFA/VENTOLIN HFA    1 Inhaler    Inhale 2 puffs into the lungs every 4 hours as needed for other (coughing)    Acute bronchitis, unspecified organism       aspirin 81 MG tablet      Take 1 tablet by mouth daily.    Type 2 diabetes, HbA1c goal < 7% (H)       blood glucose lancing device    no brand specified    1 each    Use to test blood sugars 3 times daily or as directed.    Type 2 diabetes mellitus, uncontrolled, with neuropathy (H)       blood glucose monitoring test strip    INDIA CONTOUR NEXT    300 each    TEST THREE TIMES A DAY OR AS DIRECTED, dispense covered brand    Type 2 diabetes mellitus, uncontrolled, with neuropathy (H)       cephALEXin 500 MG capsule    KEFLEX    30 capsule    Take 1 capsule (500 mg) by mouth 3 times daily     Diabetic ulcer of toe of left foot associated with type 2 diabetes mellitus, limited to breakdown of skin (H)       cetirizine 10 MG tablet    zyrTEC     Take 10 mg by mouth At Bedtime        folic acid 1 MG tablet    FOLVITE    100 tablet    Take 1 tablet (1 mg) by mouth daily    Rheumatoid arthritis involving multiple sites with positive rheumatoid factor (H)       guaiFENesin-codeine 100-10 MG/5ML Soln solution    ROBITUSSIN AC    120 mL    Take 5 mLs by mouth every 4 hours as needed for cough    Cough       HYDROcodone-acetaminophen 5-325 MG per tablet    NORCO    28 tablet    Take 1-2 tablets by mouth every 4 hours as needed for moderate to severe pain maximum 4 tablet(s) per day    Rheumatoid arthritis involving multiple sites with positive rheumatoid factor (H)       hydroxychloroquine 200 MG tablet    PLAQUENIL    180 tablet    Take 1 tablet (200 mg) by mouth 2 times daily    Rheumatoid arthritis involving multiple sites with positive rheumatoid factor (H)       insulin pen needle 31G X 6 MM    ULTICARE MINI    100 each    Use 1 daily or as directed.    Type 2 diabetes mellitus, uncontrolled, with neuropathy (H)       liraglutide 18 MG/3ML soln    VICTOZA PEN    9 mL    Inject 0.6 mg Subcutaneous daily    Type 2 diabetes mellitus, uncontrolled, with neuropathy (H)       lisinopril 2.5 MG tablet    PRINIVIL/Zestril    90 tablet    Take 1 tablet (2.5 mg) by mouth daily    Hypotension, unspecified hypotension type       metFORMIN 500 MG tablet    GLUCOPHAGE    1 tablet    Take 1 tablet (500 mg) by mouth 2 times daily (with meals) TAKE TWO TABLETS BY MOUTH TWICE A DAY WITH MEALS    Type 2 diabetes mellitus with diabetic polyneuropathy, with long-term current use of insulin (H)       methotrexate sodium 2.5 MG Tabs     120 tablet    Take 25 mg by mouth once a week . Take all 10 tablets on the same day of each week.    Rheumatoid arthritis involving multiple sites with positive rheumatoid factor (H)        metoprolol succinate 25 MG 24 hr tablet    TOPROL-XL    90 tablet    TAKE ONE TABLET BY MOUTH EVERY DAY    Hypertension goal BP (blood pressure) < 140/90       * MICROLET LANCETS Misc     100 each    1 Device daily    Type 2 diabetes, HbA1C goal < 8% (H)       * blood glucose monitoring lancets     300 each    Use to test blood sugars 3 times daily or as directed.    Type 2 diabetes mellitus, uncontrolled, with neuropathy (H)       omeprazole 20 MG CR capsule    priLOSEC    90 capsule    Take 1 capsule (20 mg) by mouth daily    Gastroesophageal reflux disease without esophagitis       * order for DME     1 Device    Equipment being ordered: specialized shoes for diabetic neuropathy   His orthotics will be made by [employer] ?? Tim Jernigan, Certified  [email or web address] Phone: [phone] Fax: [phone] [address] [place] [address]    Type 2 diabetes mellitus with diabetic polyneuropathy, with long-term current use of insulin (H), Diabetic polyneuropathy associated with type 2 diabetes mellitus (H)       * order for DME     1 Device    Equipment being ordered: glucometer    Type 2 diabetes mellitus, uncontrolled, with neuropathy (H)       pravastatin 40 MG tablet    PRAVACHOL    1 tablet    Take 0.5 tablets (20 mg) by mouth every other day    Hyperlipidemia LDL goal <100       tiZANidine 2 MG tablet    ZANAFLEX    90 tablet    Take 1 tablet (2 mg) by mouth 3 times daily as needed for muscle spasms    Neck pain       * Notice:  This list has 4 medication(s) that are the same as other medications prescribed for you. Read the directions carefully, and ask your doctor or other care provider to review them with you.

## 2018-04-05 NOTE — LETTER
4/5/2018         RE: Zurdo Dash  [address] [facility] [address]        Dear Colleague,    Thank you for referring your patient, Zurdo Dash, to the [facility]. Please see a copy of my visit note below.    Subjective:    Pt is seen today in consult from Dr. Duckworth with the c/c of an ulcer left third toe.  This has been problematic for 2 week(s).   States this started when he trimmed his toenail and he thinks he may have cut the end of the toe.  He has some pain with this.  Denies drainage, erythema or edema.  Slightly tender upon long periods of standing/ambulating.  Aggravated by activity and relieved by rest.  He is retired.  40 pack year history of smoking and quit in 2007.  He has diabetes and numbness and tingling in his feet.  Rheumatoid arthritis.  He was started on Keflex by Dr. Voss.  Primary CARE last seen 3/26/2018.    ROS:  A 10-point review of systems was performed and is positive for that noted in the HPI and as seen below.  All other areas are negative.        No Known Allergies    Current Outpatient Prescriptions   Medication Sig Dispense Refill     cetirizine (ZYRTEC) 10 MG tablet Take 10 mg by mouth At Bedtime       cephALEXin (KEFLEX) 500 MG capsule Take 1 capsule (500 mg) by mouth 3 times daily 30 capsule 0     albuterol (PROAIR HFA/PROVENTIL HFA/VENTOLIN HFA) 108 (90 BASE) MCG/ACT Inhaler Inhale 2 puffs into the lungs every 4 hours as needed for other (coughing) 1 Inhaler 1     blood glucose monitoring (INDIA CONTOUR NEXT) test strip TEST THREE TIMES A DAY OR AS DIRECTED, dispense covered brand 300 each 1     insulin pen needle (ULTICARE MINI) 31G X 6 MM Use 1 daily or as directed. 100 each 1     omeprazole (PRILOSEC) 20 MG CR capsule Take 1 capsule (20 mg) by mouth daily 90 capsule 1     tiZANidine (ZANAFLEX) 2 MG tablet Take 1 tablet (2 mg) by mouth 3 times daily as needed for muscle spasms 90 tablet 1     HYDROcodone-acetaminophen (NORCO) 5-325 MG per tablet  Take 1-2 tablets by mouth every 4 hours as needed for moderate to severe pain maximum 4 tablet(s) per day 28 tablet 0     blood glucose (NO BRAND SPECIFIED) lancing device Use to test blood sugars 3 times daily or as directed. 1 each 0     order for DME Equipment being ordered: glucometer 1 Device 0     order for DME Equipment being ordered: specialized shoes for diabetic neuropathy     His orthotics will be made by [employer]      Tim Jernigan, Certified   [email or web address]  Phone: [phone]  Fax: [phone]  [address] [place] [address] 1 Device 0     guaiFENesin-codeine (ROBITUSSIN AC) 100-10 MG/5ML SOLN solution Take 5 mLs by mouth every 4 hours as needed for cough 120 mL 0     liraglutide (VICTOZA PEN) 18 MG/3ML soln Inject 0.6 mg Subcutaneous daily 9 mL 0     methotrexate sodium 2.5 MG TABS Take 25 mg by mouth once a week . Take all 10 tablets on the same day of each week. 120 tablet 2     metoprolol (TOPROL-XL) 25 MG 24 hr tablet TAKE ONE TABLET BY MOUTH EVERY DAY 90 tablet 3     hydroxychloroquine (PLAQUENIL) 200 MG tablet Take 1 tablet (200 mg) by mouth 2 times daily 180 tablet 1     folic acid (FOLVITE) 1 MG tablet Take 1 tablet (1 mg) by mouth daily 100 tablet 3     metFORMIN (GLUCOPHAGE) 500 MG tablet Take 1 tablet (500 mg) by mouth 2 times daily (with meals) TAKE TWO TABLETS BY MOUTH TWICE A DAY WITH MEALS 1 tablet 0     pravastatin (PRAVACHOL) 40 MG tablet Take 0.5 tablets (20 mg) by mouth every other day 1 tablet 0     lisinopril (PRINIVIL/ZESTRIL) 2.5 MG tablet Take 1 tablet (2.5 mg) by mouth daily 90 tablet 3     blood glucose monitoring (ONE TOUCH DELICA) lancets Use to test blood sugars 3 times daily or as directed. 300 each 3     MICROLET LANCETS MISC 1 Device daily 100 each 4     aspirin 81 MG tablet Take 1 tablet by mouth daily.  3       Patient Active Problem List   Diagnosis     Pain in limb     Hyperlipidemia LDL goal <100     Hypertension goal BP  (blood pressure) < 140/90     obesity     Hypogonadism     Advanced directives, counseling/discussion     Health Care Home     Type 2 diabetes mellitus with diabetic polyneuropathy, with long-term current use of insulin (H)     RBBB (right bundle branch block)     Lumbago     Ex-smoker     Cataracts, both eyes     Family history of esophageal cancer     Gastroesophageal reflux disease, esophagitis presence not specified     Rheumatoid arthritis involving multiple sites with positive rheumatoid factor (H)     Pulmonary nodule     High risk medication use     Spondylosis of cervical region without myelopathy or radiculopathy     Erectile dysfunction, unspecified erectile dysfunction type     Type 2 diabetes mellitus without retinopathy (H)     Combined forms of age-related cataract of both eyes     PVD (posterior vitreous detachment), left eye     Tubulovillous adenoma of colon     Diabetic polyneuropathy associated with type 2 diabetes mellitus (H)       Past Medical History:   Diagnosis Date     Abnormal CT scan 03/2004    calcified lung granuloma     C. difficile diarrhea     H/O     Cataract 11/18/2011     Diabetic neuropathy (H)     mild, mostly soles and distal forefeet, worse on the left side.     Diverticulitis      ED (erectile dysfunction)      Ex-smoker     QUIT SMOKING FEB 2007     History of ETOH abuse     recovering, sober since 1997     Hyperlipidemia LDL goal <100      Hypertension goal BP (blood pressure) < 140/90      Hypogonadism      Obesity      PAD (peripheral artery disease) (H)     leg cramps, with exertion, no formal diagnosis of PAD and minimal if any symptoms at all.     RA (rheumatoid arthritis) (H)     Dr Bailon     Type 2 diabetes, HbA1c goal < 7% (H) 10/2008    A1C of 7.1 %        Past Surgical History:   Procedure Laterality Date     COLONOSCOPY  03/01/2018    [place] [facility]     HC INCISION TENDON SHEATH FINGER  4/2009    r hand ring finger     TONSILLECTOMY         Family History   Problem  Relation Age of Onset     CEREBROVASCULAR DISEASE Mother      Arthritis Mother      OSTEOPOROSIS Mother      Alzheimer Disease Father      Arthritis Father      CANCER Father      DIABETES Maternal Grandmother      Cardiovascular Maternal Grandmother      Other Cancer Brother      CANCER Paternal Aunt      Hypertension No family hx of      Thyroid Disease No family hx of      Glaucoma No family hx of      Macular Degeneration No family hx of        Social History   Substance Use Topics     Smoking status: Former Smoker     Packs/day: 1.00     Years: 40.00     Types: Cigarettes     Quit date: 3/16/2007     Smokeless tobacco: Never Used     Alcohol use No         Exam:    Vitals: /76  Pulse 52  Wt 197 lb (89.4 kg)  BMI 29.52 kg/m2  BMI: Body mass index is 29.52 kg/(m^2).  Height: Data Unavailable    Constitutional/ general:  Pt is in no apparent distress, appears well-nourished.  Cooperative with history and physical exam.     Psych:  The patient answered questions appropriately.  Normal affect.  Seems to have reasonable expectations, in terms of treatment.     Eyes:  Visual scanning/ tracking without deficit.     Ears:  Response to auditory stimuli is normal.  negative hearing aid devices.  Auricles in proper alignment.     Lymphatic:  Popliteal lymph nodes not enlarged.     Lungs:  Non labored breathing, non labored speech. No cough.  No audible wheezing. Even, quiet breathing.       Vascular: Lateral ankle edema and varicosities noted.  Difficult to palpate tibial pulses.  Dorsalis pedis weakly palpable.  Capillary refill time less than 3 seconds in all digits.  No hair growth noted on skin.    Neuro:  Alert and oriented x 3. Coordinated gait.  Light touch sensation is intact to the L4, L5, S1 distributions. No obvious deficits.  No evidence of neurological-based weakness, spasticity, or contracture in the lower extremities.  Monofilament absent to midfoot.      Derm: Skin thin shiny atrophic with no hair  growth.     Musculoskeletal:    Lower extremity muscle strength is normal.  Patient is ambulatory without an assistive device or brace.  Pronated arch with weightbearing.  All lesser toes are hammered and stiff.  Generally his feet are stiff with a decreased range of motion bilaterally.    An ulcer is located on the end of the left third toe.  Measures 5 x 4 mm in diameter.  Base is granulation tissue.  It is surrounded by callus and close to the thick mycotic nail.  No surrounding erythema, edema, or crepitus with palpation.  No sinus tracts, purulence or odor.  No drainage noted.    A:  Diabetes mellitus with peripheral neuropathy and LOPS  Left third toe ulcer  Callus  Onychomycosis    P:  Discussed the cause of this with the patient.  Wound care instructions given.  Dispensed budin splint to offload this.  Callus debrided with a fifteen blade.  Mycotic nail manually debrided with a .  Patient to minimize activities.  Had long talk about how too much walking or pressure could make this worse.  He will minimize activities until this is healed.  Discussed signs of infection the patient to call me asap if he sees any of these.  Thank you for allowing me participate in the care of this patient.    .    Aaron Dent DPM, FACFAS            Again, thank you for allowing me to participate in the care of your patient.        Sincerely,        Aaron Dent DPM

## 2018-04-05 NOTE — PROGRESS NOTES
Subjective:    Pt is seen today in consult from Dr. Duckworth with the c/c of an ulcer left third toe.  This has been problematic for 2 week(s).   States this started when he trimmed his toenail and he thinks he may have cut the end of the toe.  He has some pain with this.  Denies drainage, erythema or edema.  Slightly tender upon long periods of standing/ambulating.  Aggravated by activity and relieved by rest.  He is retired.  40 pack year history of smoking and quit in 2007.  He has diabetes and numbness and tingling in his feet.  Rheumatoid arthritis.  He was started on Keflex by Dr. Voss.  Primary CARE last seen 3/26/2018.    ROS:  A 10-point review of systems was performed and is positive for that noted in the HPI and as seen below.  All other areas are negative.        No Known Allergies    Current Outpatient Prescriptions   Medication Sig Dispense Refill     cetirizine (ZYRTEC) 10 MG tablet Take 10 mg by mouth At Bedtime       cephALEXin (KEFLEX) 500 MG capsule Take 1 capsule (500 mg) by mouth 3 times daily 30 capsule 0     albuterol (PROAIR HFA/PROVENTIL HFA/VENTOLIN HFA) 108 (90 BASE) MCG/ACT Inhaler Inhale 2 puffs into the lungs every 4 hours as needed for other (coughing) 1 Inhaler 1     blood glucose monitoring (INDIA CONTOUR NEXT) test strip TEST THREE TIMES A DAY OR AS DIRECTED, dispense covered brand 300 each 1     insulin pen needle (ULTICARE MINI) 31G X 6 MM Use 1 daily or as directed. 100 each 1     omeprazole (PRILOSEC) 20 MG CR capsule Take 1 capsule (20 mg) by mouth daily 90 capsule 1     tiZANidine (ZANAFLEX) 2 MG tablet Take 1 tablet (2 mg) by mouth 3 times daily as needed for muscle spasms 90 tablet 1     HYDROcodone-acetaminophen (NORCO) 5-325 MG per tablet Take 1-2 tablets by mouth every 4 hours as needed for moderate to severe pain maximum 4 tablet(s) per day 28 tablet 0     blood glucose (NO BRAND SPECIFIED) lancing device Use to test blood sugars 3 times daily or as directed. 1 each 0      order for DME Equipment being ordered: glucometer 1 Device 0     order for DME Equipment being ordered: specialized shoes for diabetic neuropathy     His orthotics will be made by [facility]      Tim Jernigan, Certified   [email or web address]  Phone: [phone]  Fax: [phone]  [address] [place] [address] 1 Device 0     guaiFENesin-codeine (ROBITUSSIN AC) 100-10 MG/5ML SOLN solution Take 5 mLs by mouth every 4 hours as needed for cough 120 mL 0     liraglutide (VICTOZA PEN) 18 MG/3ML soln Inject 0.6 mg Subcutaneous daily 9 mL 0     methotrexate sodium 2.5 MG TABS Take 25 mg by mouth once a week . Take all 10 tablets on the same day of each week. 120 tablet 2     metoprolol (TOPROL-XL) 25 MG 24 hr tablet TAKE ONE TABLET BY MOUTH EVERY DAY 90 tablet 3     hydroxychloroquine (PLAQUENIL) 200 MG tablet Take 1 tablet (200 mg) by mouth 2 times daily 180 tablet 1     folic acid (FOLVITE) 1 MG tablet Take 1 tablet (1 mg) by mouth daily 100 tablet 3     metFORMIN (GLUCOPHAGE) 500 MG tablet Take 1 tablet (500 mg) by mouth 2 times daily (with meals) TAKE TWO TABLETS BY MOUTH TWICE A DAY WITH MEALS 1 tablet 0     pravastatin (PRAVACHOL) 40 MG tablet Take 0.5 tablets (20 mg) by mouth every other day 1 tablet 0     lisinopril (PRINIVIL/ZESTRIL) 2.5 MG tablet Take 1 tablet (2.5 mg) by mouth daily 90 tablet 3     blood glucose monitoring (ONE TOUCH DELICA) lancets Use to test blood sugars 3 times daily or as directed. 300 each 3     MICROLET LANCETS MISC 1 Device daily 100 each 4     aspirin 81 MG tablet Take 1 tablet by mouth daily.  3       Patient Active Problem List   Diagnosis     Pain in limb     Hyperlipidemia LDL goal <100     Hypertension goal BP (blood pressure) < 140/90     obesity     Hypogonadism     Advanced directives, counseling/discussion     Health Care Home     Type 2 diabetes mellitus with diabetic polyneuropathy, with long-term current use of insulin (H)     RBBB (right  bundle branch block)     Lumbago     Ex-smoker     Cataracts, both eyes     Family history of esophageal cancer     Gastroesophageal reflux disease, esophagitis presence not specified     Rheumatoid arthritis involving multiple sites with positive rheumatoid factor (H)     Pulmonary nodule     High risk medication use     Spondylosis of cervical region without myelopathy or radiculopathy     Erectile dysfunction, unspecified erectile dysfunction type     Type 2 diabetes mellitus without retinopathy (H)     Combined forms of age-related cataract of both eyes     PVD (posterior vitreous detachment), left eye     Tubulovillous adenoma of colon     Diabetic polyneuropathy associated with type 2 diabetes mellitus (H)       Past Medical History:   Diagnosis Date     Abnormal CT scan 03/2004    calcified lung granuloma     C. difficile diarrhea     H/O     Cataract 11/18/2011     Diabetic neuropathy (H)     mild, mostly soles and distal forefeet, worse on the left side.     Diverticulitis      ED (erectile dysfunction)      Ex-smoker     QUIT SMOKING FEB 2007     History of ETOH abuse     recovering, sober since 1997     Hyperlipidemia LDL goal <100      Hypertension goal BP (blood pressure) < 140/90      Hypogonadism      Obesity      PAD (peripheral artery disease) (H)     leg cramps, with exertion, no formal diagnosis of PAD and minimal if any symptoms at all.     RA (rheumatoid arthritis) (H)     Dr Bailon     Type 2 diabetes, HbA1c goal < 7% (H) 10/2008    A1C of 7.1 %        Past Surgical History:   Procedure Laterality Date     COLONOSCOPY  03/01/2018    [facility]     HC INCISION TENDON SHEATH FINGER  4/2009    r hand ring finger     TONSILLECTOMY         Family History   Problem Relation Age of Onset     CEREBROVASCULAR DISEASE Mother      Arthritis Mother      OSTEOPOROSIS Mother      Alzheimer Disease Father      Arthritis Father      CANCER Father      DIABETES Maternal Grandmother      Cardiovascular Maternal  Grandmother      Other Cancer Brother      CANCER Paternal Aunt      Hypertension No family hx of      Thyroid Disease No family hx of      Glaucoma No family hx of      Macular Degeneration No family hx of        Social History   Substance Use Topics     Smoking status: Former Smoker     Packs/day: 1.00     Years: 40.00     Types: Cigarettes     Quit date: 3/16/2007     Smokeless tobacco: Never Used     Alcohol use No         Exam:    Vitals: /76  Pulse 52  Wt 197 lb (89.4 kg)  BMI 29.52 kg/m2  BMI: Body mass index is 29.52 kg/(m^2).  Height: Data Unavailable    Constitutional/ general:  Pt is in no apparent distress, appears well-nourished.  Cooperative with history and physical exam.     Psych:  The patient answered questions appropriately.  Normal affect.  Seems to have reasonable expectations, in terms of treatment.     Eyes:  Visual scanning/ tracking without deficit.     Ears:  Response to auditory stimuli is normal.  negative hearing aid devices.  Auricles in proper alignment.     Lymphatic:  Popliteal lymph nodes not enlarged.     Lungs:  Non labored breathing, non labored speech. No cough.  No audible wheezing. Even, quiet breathing.       Vascular: Lateral ankle edema and varicosities noted.  Difficult to palpate tibial pulses.  Dorsalis pedis weakly palpable.  Capillary refill time less than 3 seconds in all digits.  No hair growth noted on skin.    Neuro:  Alert and oriented x 3. Coordinated gait.  Light touch sensation is intact to the L4, L5, S1 distributions. No obvious deficits.  No evidence of neurological-based weakness, spasticity, or contracture in the lower extremities.  Monofilament absent to midfoot.      Derm: Skin thin shiny atrophic with no hair growth.     Musculoskeletal:    Lower extremity muscle strength is normal.  Patient is ambulatory without an assistive device or brace.  Pronated arch with weightbearing.  All lesser toes are hammered and stiff.  Generally his feet are  stiff with a decreased range of motion bilaterally.    An ulcer is located on the end of the left third toe.  Measures 5 x 4 mm in diameter.  Base is granulation tissue.  It is surrounded by callus and close to the thick mycotic nail.  No surrounding erythema, edema, or crepitus with palpation.  No sinus tracts, purulence or odor.  No drainage noted.    A:  Diabetes mellitus with peripheral neuropathy and LOPS  Left third toe ulcer  Callus  Onychomycosis    P:  Discussed the cause of this with the patient.  Wound care instructions given.  Dispensed budin splint to offload this.  Callus debrided with a fifteen blade.  Mycotic nail manually debrided with a .  Patient to minimize activities.  Had long talk about how too much walking or pressure could make this worse.  He will minimize activities until this is healed.  Discussed signs of infection the patient to call me asap if he sees any of these.  Thank you for allowing me participate in the care of this patient.    .    Aaron Dent DPM, FACFAS

## 2018-04-12 ENCOUNTER — OFFICE VISIT (OUTPATIENT)
Dept: PODIATRY | Facility: CLINIC | Age: 73
End: 2018-04-12
Payer: COMMERCIAL

## 2018-04-12 VITALS
SYSTOLIC BLOOD PRESSURE: 116 MMHG | HEART RATE: 56 BPM | BODY MASS INDEX: 29.52 KG/M2 | WEIGHT: 197 LBS | DIASTOLIC BLOOD PRESSURE: 70 MMHG

## 2018-04-12 DIAGNOSIS — L97.529 DIABETIC ULCER OF TOE OF LEFT FOOT ASSOCIATED WITH TYPE 2 DIABETES MELLITUS, UNSPECIFIED ULCER STAGE (H): ICD-10-CM

## 2018-04-12 DIAGNOSIS — E11.621 DIABETIC ULCER OF TOE OF LEFT FOOT ASSOCIATED WITH TYPE 2 DIABETES MELLITUS, UNSPECIFIED ULCER STAGE (H): ICD-10-CM

## 2018-04-12 DIAGNOSIS — E11.42 DIABETIC POLYNEUROPATHY ASSOCIATED WITH TYPE 2 DIABETES MELLITUS (H): Primary | ICD-10-CM

## 2018-04-12 PROCEDURE — 99213 OFFICE O/P EST LOW 20 MIN: CPT | Performed by: PODIATRIST

## 2018-04-12 NOTE — LETTER
4/12/2018         RE: Zurdo Dash  [address] [place] [id number]        Dear Colleague,    Thank you for referring your patient, Zurdo Dash, to the [facility]. Please see a copy of my visit note below.    Subjective:    4/5/18  Pt is seen today in consult from Dr. Duckworth with the c/c of an ulcer left third toe.  This has been problematic for 2 week(s).   States this started when he trimmed his toenail and he thinks he may have cut the end of the toe.  He has some pain with this.  Denies drainage, erythema or edema.  Slightly tender upon long periods of standing/ambulating.  Aggravated by activity and relieved by rest.  He is retired.  40 pack year history of smoking and quit in 2007.  He has diabetes and numbness and tingling in his feet.  Rheumatoid arthritis.  He was started on Keflex by Dr. Voss.  Primary CARE last seen 3/26/2018.    4/12/18 patient dressing foot twice a day with antibiotic ointment and Band-Aid.  Is using the crest pad at all times.  He is wearing a shoe on his foot at all times and just got new shoes.  A slight drainage on the Band-Aid.  He will finish his Keflex and a few days.  He denies any purulence or odor fever chills    ROS:  See above.         No Known Allergies    Current Outpatient Prescriptions   Medication Sig Dispense Refill     cetirizine (ZYRTEC) 10 MG tablet Take 10 mg by mouth At Bedtime       cephALEXin (KEFLEX) 500 MG capsule Take 1 capsule (500 mg) by mouth 3 times daily 30 capsule 0     albuterol (PROAIR HFA/PROVENTIL HFA/VENTOLIN HFA) 108 (90 BASE) MCG/ACT Inhaler Inhale 2 puffs into the lungs every 4 hours as needed for other (coughing) 1 Inhaler 1     blood glucose monitoring (INDIA CONTOUR NEXT) test strip TEST THREE TIMES A DAY OR AS DIRECTED, dispense covered brand 300 each 1     insulin pen needle (ULTICARE MINI) 31G X 6 MM Use 1 daily or as directed. 100 each 1     omeprazole (PRILOSEC) 20 MG CR capsule Take 1 capsule (20 mg) by  mouth daily 90 capsule 1     tiZANidine (ZANAFLEX) 2 MG tablet Take 1 tablet (2 mg) by mouth 3 times daily as needed for muscle spasms 90 tablet 1     HYDROcodone-acetaminophen (NORCO) 5-325 MG per tablet Take 1-2 tablets by mouth every 4 hours as needed for moderate to severe pain maximum 4 tablet(s) per day 28 tablet 0     blood glucose (NO BRAND SPECIFIED) lancing device Use to test blood sugars 3 times daily or as directed. 1 each 0     order for DME Equipment being ordered: glucometer 1 Device 0     order for DME Equipment being ordered: specialized shoes for diabetic neuropathy     His orthotics will be made by [employer]      Tim Jernigan, Certified   [email or web address]  Phone: [phone]  Fax: [phone]  [address] [place] [address] 1 Device 0     guaiFENesin-codeine (ROBITUSSIN AC) 100-10 MG/5ML SOLN solution Take 5 mLs by mouth every 4 hours as needed for cough 120 mL 0     liraglutide (VICTOZA PEN) 18 MG/3ML soln Inject 0.6 mg Subcutaneous daily 9 mL 0     methotrexate sodium 2.5 MG TABS Take 25 mg by mouth once a week . Take all 10 tablets on the same day of each week. 120 tablet 2     metoprolol (TOPROL-XL) 25 MG 24 hr tablet TAKE ONE TABLET BY MOUTH EVERY DAY 90 tablet 3     hydroxychloroquine (PLAQUENIL) 200 MG tablet Take 1 tablet (200 mg) by mouth 2 times daily 180 tablet 1     folic acid (FOLVITE) 1 MG tablet Take 1 tablet (1 mg) by mouth daily 100 tablet 3     metFORMIN (GLUCOPHAGE) 500 MG tablet Take 1 tablet (500 mg) by mouth 2 times daily (with meals) TAKE TWO TABLETS BY MOUTH TWICE A DAY WITH MEALS 1 tablet 0     pravastatin (PRAVACHOL) 40 MG tablet Take 0.5 tablets (20 mg) by mouth every other day 1 tablet 0     lisinopril (PRINIVIL/ZESTRIL) 2.5 MG tablet Take 1 tablet (2.5 mg) by mouth daily 90 tablet 3     blood glucose monitoring (ONE TOUCH DELICA) lancets Use to test blood sugars 3 times daily or as directed. 300 each 3     MICROLET LANCETS MIS 1  Device daily 100 each 4     aspirin 81 MG tablet Take 1 tablet by mouth daily.  3       Patient Active Problem List   Diagnosis     Pain in limb     Hyperlipidemia LDL goal <100     Hypertension goal BP (blood pressure) < 140/90     obesity     Hypogonadism     Advanced directives, counseling/discussion     Health Care Home     Type 2 diabetes mellitus with diabetic polyneuropathy, with long-term current use of insulin (H)     RBBB (right bundle branch block)     Lumbago     Ex-smoker     Cataracts, both eyes     Family history of esophageal cancer     Gastroesophageal reflux disease, esophagitis presence not specified     Rheumatoid arthritis involving multiple sites with positive rheumatoid factor (H)     Pulmonary nodule     High risk medication use     Spondylosis of cervical region without myelopathy or radiculopathy     Erectile dysfunction, unspecified erectile dysfunction type     Type 2 diabetes mellitus without retinopathy (H)     Combined forms of age-related cataract of both eyes     PVD (posterior vitreous detachment), left eye     Tubulovillous adenoma of colon     Diabetic polyneuropathy associated with type 2 diabetes mellitus (H)       Past Medical History:   Diagnosis Date     Abnormal CT scan 03/2004    calcified lung granuloma     C. difficile diarrhea     H/O     Cataract 11/18/2011     Diabetic neuropathy (H)     mild, mostly soles and distal forefeet, worse on the left side.     Diverticulitis      ED (erectile dysfunction)      Ex-smoker     QUIT SMOKING FEB 2007     History of ETOH abuse     recovering, sober since 1997     Hyperlipidemia LDL goal <100      Hypertension goal BP (blood pressure) < 140/90      Hypogonadism      Obesity      PAD (peripheral artery disease) (H)     leg cramps, with exertion, no formal diagnosis of PAD and minimal if any symptoms at all.     RA (rheumatoid arthritis) (H)     Dr Bailon     Type 2 diabetes, HbA1c goal < 7% (H) 10/2008    A1C of 7.1 %        Past  Surgical History:   Procedure Laterality Date     COLONOSCOPY  03/01/2018    [facility]     HC INCISION TENDON SHEATH FINGER  4/2009    r hand ring finger     TONSILLECTOMY         Family History   Problem Relation Age of Onset     CEREBROVASCULAR DISEASE Mother      Arthritis Mother      OSTEOPOROSIS Mother      Alzheimer Disease Father      Arthritis Father      CANCER Father      DIABETES Maternal Grandmother      Cardiovascular Maternal Grandmother      Other Cancer Brother      CANCER Paternal Aunt      Hypertension No family hx of      Thyroid Disease No family hx of      Glaucoma No family hx of      Macular Degeneration No family hx of        Social History   Substance Use Topics     Smoking status: Former Smoker     Packs/day: 1.00     Years: 40.00     Types: Cigarettes     Quit date: 3/16/2007     Smokeless tobacco: Never Used     Alcohol use No         Exam:    Vitals: /70  Pulse 56  Wt 197 lb (89.4 kg)  BMI 29.52 kg/m2  BMI: Body mass index is 29.52 kg/(m^2).  Height: Data Unavailable    Constitutional/ general:  Pt is in no apparent distress, appears well-nourished.  Cooperative with history and physical exam.     Psych:  The patient answered questions appropriately.  Normal affect.  Seems to have reasonable expectations, in terms of treatment.     Eyes:  Visual scanning/ tracking without deficit.     Ears:  Response to auditory stimuli is normal.  negative hearing aid devices.  Auricles in proper alignment.     Lymphatic:  Popliteal lymph nodes not enlarged.     Lungs:  Non labored breathing, non labored speech. No cough.  No audible wheezing. Even, quiet breathing.       Vascular: Lateral ankle edema and varicosities noted.  Difficult to palpate tibial pulses.  Dorsalis pedis weakly palpable.  Capillary refill time less than 3 seconds in all digits.  No hair growth noted on skin.    Neuro:  Alert and oriented x 3. Coordinated gait.  Light touch sensation is intact to the L4, L5, S1 distributions.  No obvious deficits.  No evidence of neurological-based weakness, spasticity, or contracture in the lower extremities.  Monofilament absent to midfoot.      Derm: Skin thin shiny atrophic with no hair growth.     Musculoskeletal:    Lower extremity muscle strength is normal.  Patient is ambulatory without an assistive device or brace.  Pronated arch with weightbearing.  All lesser toes are hammered and stiff.  Generally his feet are stiff with a decreased range of motion bilaterally.    An ulcer is located on the end of the left third toe.  In the past this measured 5 x 4 mm in diameter and today it measures 4 x 3 mm.  Base is granulation tissue.    No surrounding erythema, edema, or crepitus with palpation.  No sinus tracts, purulence or odor.  No drainage noted.    A:  Diabetes mellitus with peripheral neuropathy and LOPS  Left third toe ulcer      P: Explained to patient that wound is smaller.  No clinical signs of infection and will stop his antibiotics when done.  The augmented the crest pad.  He will remove this if problematic.  Patient to minimize activities.  Had long talk about how too much walking or pressure could make this worse.    Discussed signs of infection the patient to call me asap if he sees any of these.  RTC in 1 week.  .    Aaron Dent DPM, FACFAS            Again, thank you for allowing me to participate in the care of your patient.        Sincerely,        Aaron Dent DPM

## 2018-04-12 NOTE — MR AVS SNAPSHOT
After Visit Summary   4/12/2018    Zurdo Dash    MRN: [id number]           Patient Information     Date Of Birth          1945        Visit Information        Provider Department      4/12/2018 12:15 PM Aaron Dent DPM [facility] [place]        Today's Diagnoses     Diabetic polyneuropathy associated with type 2 diabetes mellitus (H)    -  1    Diabetic ulcer of toe of left foot associated with type 2 diabetes mellitus, unspecified ulcer stage (H)          Care Instructions    Weight management plan: Patient was referred to their PCP to discuss a diet and exercise plan.            Follow-ups after your visit        Your next 10 appointments already scheduled     Apr 19, 2018 12:15 PM CDT   Return Visit with Aaron Dent DPM   [facility] [place] ([facility])    [address]  [place] [address] [phone]            May 15, 2018  1:15 PM CDT   LAB with [facility]   [facility] [place] ([facility])    [address]  [place] [address]   [phone]           Please do not eat 10-12 hours before your appointment if you are coming in fasting for labs on lipids, cholesterol, or glucose (sugar). This does not apply to pregnant women. Water, hot tea and black coffee (with nothing added) are okay. Do not drink other fluids, diet soda or chew gum.            May 17, 2018  1:40 PM CDT   Return Visit with Albert Tompkins MD   [facility] [place] ([facility])    [address]  [place] [address]   [phone]              Who to contact     If you have questions or need follow up information about today's clinic visit or your schedule please contact [facility] [place] directly at [phone].  Normal or non-critical lab and imaging results will be communicated to you by MyChart, letter or phone within 4 business days after the clinic has received the results. If you do not hear from us within  "7 days, please contact the clinic through [employer] or phone. If you have a critical or abnormal lab result, we will notify you by phone as soon as possible.  Submit refill requests through [employer] or call your pharmacy and they will forward the refill request to us. Please allow 3 business days for your refill to be completed.          Additional Information About Your Visit        [employer] Information     [employer] lets you send messages to your doctor, view your test results, renew your prescriptions, schedule appointments and more. To sign up, go to [email or web address] . Click on \"Log in\" on the left side of the screen, which will take you to the Welcome page. Then click on \"Sign up Now\" on the right side of the page.     You will be asked to enter the access code listed below, as well as some personal information. Please follow the directions to create your username and password.     Your access code is: [email or web address]  Expires: 2018  3:08 PM     Your access code will  in 90 days. If you need help or a new code, please call your [place] clinic or [phone].        Care EveryWhere ID     This is your Care EveryWhere ID. This could be used by other organizations to access your [place] medical records  [id number]        Your Vitals Were     Pulse BMI (Body Mass Index)                56 29.52 kg/m2           Blood Pressure from Last 3 Encounters:   18 116/70   18 136/76   18 124/80    Weight from Last 3 Encounters:   18 197 lb (89.4 kg)   18 197 lb (89.4 kg)   18 197 lb (89.4 kg)              Today, you had the following     No orders found for display       Primary Care Provider Office Phone # Fax #    Kapil Duckworth [phone] [address] [place] [address]        Equal Access to Services     TASHI BLACKMAN AH: Hadii aad ku hadasho Soomaali, waaxda luqadaha, qaybta kaalmada adeegyadenver, aranza zhong . So [facility] " [phone].    ATENCIÓN: Si kalia goss, tiene a diallo disposición servicios gratuitos de asistencia lingüística. Nitesh sheldon [phone].    We comply with applicable federal civil rights laws and [place] laws. We do not discriminate on the basis of race, color, national origin, age, disability, sex, sexual orientation, or gender identity.            Thank you!     Thank you for choosing [facility] [place]  for your care. Our goal is always to provide you with excellent care. Hearing back from our patients is one way we can continue to improve our services. Please take a few minutes to complete the written survey that you may receive in the mail after your visit with us. Thank you!             Your Updated Medication List - Protect others around you: Learn how to safely use, store and throw away your medicines at www.disposemymeds.org.          This list is accurate as of 4/12/18  2:14 PM.  Always use your most recent med list.                   Brand Name Dispense Instructions for use Diagnosis    albuterol 108 (90 Base) MCG/ACT Inhaler    PROAIR HFA/PROVENTIL HFA/VENTOLIN HFA    1 Inhaler    Inhale 2 puffs into the lungs every 4 hours as needed for other (coughing)    Acute bronchitis, unspecified organism       aspirin 81 MG tablet      Take 1 tablet by mouth daily.    Type 2 diabetes, HbA1c goal < 7% (H)       blood glucose lancing device    no brand specified    1 each    Use to test blood sugars 3 times daily or as directed.    Type 2 diabetes mellitus, uncontrolled, with neuropathy (H)       blood glucose monitoring test strip    INDIA CONTOUR NEXT    300 each    TEST THREE TIMES A DAY OR AS DIRECTED, dispense covered brand    Type 2 diabetes mellitus, uncontrolled, with neuropathy (H)       cephALEXin 500 MG capsule    KEFLEX    30 capsule    Take 1 capsule (500 mg) by mouth 3 times daily    Diabetic ulcer of toe of left foot associated with type 2 diabetes mellitus, limited to breakdown of skin (H)        cetirizine 10 MG tablet    zyrTEC     Take 10 mg by mouth At Bedtime        folic acid 1 MG tablet    FOLVITE    100 tablet    Take 1 tablet (1 mg) by mouth daily    Rheumatoid arthritis involving multiple sites with positive rheumatoid factor (H)       guaiFENesin-codeine 100-10 MG/5ML Soln solution    ROBITUSSIN AC    120 mL    Take 5 mLs by mouth every 4 hours as needed for cough    Cough       HYDROcodone-acetaminophen 5-325 MG per tablet    NORCO    28 tablet    Take 1-2 tablets by mouth every 4 hours as needed for moderate to severe pain maximum 4 tablet(s) per day    Rheumatoid arthritis involving multiple sites with positive rheumatoid factor (H)       hydroxychloroquine 200 MG tablet    PLAQUENIL    180 tablet    Take 1 tablet (200 mg) by mouth 2 times daily    Rheumatoid arthritis involving multiple sites with positive rheumatoid factor (H)       insulin pen needle 31G X 6 MM    ULTICARE MINI    100 each    Use 1 daily or as directed.    Type 2 diabetes mellitus, uncontrolled, with neuropathy (H)       liraglutide 18 MG/3ML soln    VICTOZA PEN    9 mL    Inject 0.6 mg Subcutaneous daily    Type 2 diabetes mellitus, uncontrolled, with neuropathy (H)       lisinopril 2.5 MG tablet    PRINIVIL/Zestril    90 tablet    Take 1 tablet (2.5 mg) by mouth daily    Hypotension, unspecified hypotension type       metFORMIN 500 MG tablet    GLUCOPHAGE    1 tablet    Take 1 tablet (500 mg) by mouth 2 times daily (with meals) TAKE TWO TABLETS BY MOUTH TWICE A DAY WITH MEALS    Type 2 diabetes mellitus with diabetic polyneuropathy, with long-term current use of insulin (H)       methotrexate sodium 2.5 MG Tabs     120 tablet    Take 25 mg by mouth once a week . Take all 10 tablets on the same day of each week.    Rheumatoid arthritis involving multiple sites with positive rheumatoid factor (H)       metoprolol succinate 25 MG 24 hr tablet    TOPROL-XL    90 tablet    TAKE ONE TABLET BY MOUTH EVERY DAY     Hypertension goal BP (blood pressure) < 140/90       * MICROLET LANCETS Misc     100 each    1 Device daily    Type 2 diabetes, HbA1C goal < 8% (H)       * blood glucose monitoring lancets     300 each    Use to test blood sugars 3 times daily or as directed.    Type 2 diabetes mellitus, uncontrolled, with neuropathy (H)       omeprazole 20 MG CR capsule    priLOSEC    90 capsule    Take 1 capsule (20 mg) by mouth daily    Gastroesophageal reflux disease without esophagitis       * order for DME     1 Device    Equipment being ordered: specialized shoes for diabetic neuropathy   His orthotics will be made by [employer] ?? Tim Jernigan, Certified  [email or web address] Phone: [phone] Fax: [phone] [address] [place] [address]    Type 2 diabetes mellitus with diabetic polyneuropathy, with long-term current use of insulin (H), Diabetic polyneuropathy associated with type 2 diabetes mellitus (H)       * order for DME     1 Device    Equipment being ordered: glucometer    Type 2 diabetes mellitus, uncontrolled, with neuropathy (H)       pravastatin 40 MG tablet    PRAVACHOL    1 tablet    Take 0.5 tablets (20 mg) by mouth every other day    Hyperlipidemia LDL goal <100       tiZANidine 2 MG tablet    ZANAFLEX    90 tablet    Take 1 tablet (2 mg) by mouth 3 times daily as needed for muscle spasms    Neck pain       * Notice:  This list has 4 medication(s) that are the same as other medications prescribed for you. Read the directions carefully, and ask your doctor or other care provider to review them with you.

## 2018-04-12 NOTE — PROGRESS NOTES
Subjective:    4/5/18  Pt is seen today in consult from Dr. Duckworth with the c/c of an ulcer left third toe.  This has been problematic for 2 week(s).   States this started when he trimmed his toenail and he thinks he may have cut the end of the toe.  He has some pain with this.  Denies drainage, erythema or edema.  Slightly tender upon long periods of standing/ambulating.  Aggravated by activity and relieved by rest.  He is retired.  40 pack year history of smoking and quit in 2007.  He has diabetes and numbness and tingling in his feet.  Rheumatoid arthritis.  He was started on Keflex by Dr. Voss.  Primary CARE last seen 3/26/2018.    4/12/18 patient dressing foot twice a day with antibiotic ointment and Band-Aid.  Is using the crest pad at all times.  He is wearing a shoe on his foot at all times and just got new shoes.  A slight drainage on the Band-Aid.  He will finish his Keflex and a few days.  He denies any purulence or odor fever chills    ROS:  See above.         No Known Allergies    Current Outpatient Prescriptions   Medication Sig Dispense Refill     cetirizine (ZYRTEC) 10 MG tablet Take 10 mg by mouth At Bedtime       cephALEXin (KEFLEX) 500 MG capsule Take 1 capsule (500 mg) by mouth 3 times daily 30 capsule 0     albuterol (PROAIR HFA/PROVENTIL HFA/VENTOLIN HFA) 108 (90 BASE) MCG/ACT Inhaler Inhale 2 puffs into the lungs every 4 hours as needed for other (coughing) 1 Inhaler 1     blood glucose monitoring (INDIA CONTOUR NEXT) test strip TEST THREE TIMES A DAY OR AS DIRECTED, dispense covered brand 300 each 1     insulin pen needle (ULTICARE MINI) 31G X 6 MM Use 1 daily or as directed. 100 each 1     omeprazole (PRILOSEC) 20 MG CR capsule Take 1 capsule (20 mg) by mouth daily 90 capsule 1     tiZANidine (ZANAFLEX) 2 MG tablet Take 1 tablet (2 mg) by mouth 3 times daily as needed for muscle spasms 90 tablet 1     HYDROcodone-acetaminophen (NORCO) 5-325 MG per tablet Take 1-2 tablets by mouth every 4  hours as needed for moderate to severe pain maximum 4 tablet(s) per day 28 tablet 0     blood glucose (NO BRAND SPECIFIED) lancing device Use to test blood sugars 3 times daily or as directed. 1 each 0     order for DME Equipment being ordered: glucometer 1 Device 0     order for DME Equipment being ordered: specialized shoes for diabetic neuropathy     His orthotics will be made by [employer]      Tim Jernigan, Certified   [email or web address]  Phone: [phone]  Fax: [phone]  [address] [place] [address] 1 Device 0     guaiFENesin-codeine (ROBITUSSIN AC) 100-10 MG/5ML SOLN solution Take 5 mLs by mouth every 4 hours as needed for cough 120 mL 0     liraglutide (VICTOZA PEN) 18 MG/3ML soln Inject 0.6 mg Subcutaneous daily 9 mL 0     methotrexate sodium 2.5 MG TABS Take 25 mg by mouth once a week . Take all 10 tablets on the same day of each week. 120 tablet 2     metoprolol (TOPROL-XL) 25 MG 24 hr tablet TAKE ONE TABLET BY MOUTH EVERY DAY 90 tablet 3     hydroxychloroquine (PLAQUENIL) 200 MG tablet Take 1 tablet (200 mg) by mouth 2 times daily 180 tablet 1     folic acid (FOLVITE) 1 MG tablet Take 1 tablet (1 mg) by mouth daily 100 tablet 3     metFORMIN (GLUCOPHAGE) 500 MG tablet Take 1 tablet (500 mg) by mouth 2 times daily (with meals) TAKE TWO TABLETS BY MOUTH TWICE A DAY WITH MEALS 1 tablet 0     pravastatin (PRAVACHOL) 40 MG tablet Take 0.5 tablets (20 mg) by mouth every other day 1 tablet 0     lisinopril (PRINIVIL/ZESTRIL) 2.5 MG tablet Take 1 tablet (2.5 mg) by mouth daily 90 tablet 3     blood glucose monitoring (ONE TOUCH DELICA) lancets Use to test blood sugars 3 times daily or as directed. 300 each 3     MICROLET LANCETS MISC 1 Device daily 100 each 4     aspirin 81 MG tablet Take 1 tablet by mouth daily.  3       Patient Active Problem List   Diagnosis     Pain in limb     Hyperlipidemia LDL goal <100     Hypertension goal BP (blood pressure) < 140/90     obesity      Hypogonadism     Advanced directives, counseling/discussion     Health Care Home     Type 2 diabetes mellitus with diabetic polyneuropathy, with long-term current use of insulin (H)     RBBB (right bundle branch block)     Lumbago     Ex-smoker     Cataracts, both eyes     Family history of esophageal cancer     Gastroesophageal reflux disease, esophagitis presence not specified     Rheumatoid arthritis involving multiple sites with positive rheumatoid factor (H)     Pulmonary nodule     High risk medication use     Spondylosis of cervical region without myelopathy or radiculopathy     Erectile dysfunction, unspecified erectile dysfunction type     Type 2 diabetes mellitus without retinopathy (H)     Combined forms of age-related cataract of both eyes     PVD (posterior vitreous detachment), left eye     Tubulovillous adenoma of colon     Diabetic polyneuropathy associated with type 2 diabetes mellitus (H)       Past Medical History:   Diagnosis Date     Abnormal CT scan 03/2004    calcified lung granuloma     C. difficile diarrhea     H/O     Cataract 11/18/2011     Diabetic neuropathy (H)     mild, mostly soles and distal forefeet, worse on the left side.     Diverticulitis      ED (erectile dysfunction)      Ex-smoker     QUIT SMOKING FEB 2007     History of ETOH abuse     recovering, sober since 1997     Hyperlipidemia LDL goal <100      Hypertension goal BP (blood pressure) < 140/90      Hypogonadism      Obesity      PAD (peripheral artery disease) (H)     leg cramps, with exertion, no formal diagnosis of PAD and minimal if any symptoms at all.     RA (rheumatoid arthritis) (H)     Dr Bailon     Type 2 diabetes, HbA1c goal < 7% (H) 10/2008    A1C of 7.1 %        Past Surgical History:   Procedure Laterality Date     COLONOSCOPY  03/01/2018    [place] [facility]     HC INCISION TENDON SHEATH FINGER  4/2009    r hand ring finger     TONSILLECTOMY         Family History   Problem Relation Age of Onset      CEREBROVASCULAR DISEASE Mother      Arthritis Mother      OSTEOPOROSIS Mother      Alzheimer Disease Father      Arthritis Father      CANCER Father      DIABETES Maternal Grandmother      Cardiovascular Maternal Grandmother      Other Cancer Brother      CANCER Paternal Aunt      Hypertension No family hx of      Thyroid Disease No family hx of      Glaucoma No family hx of      Macular Degeneration No family hx of        Social History   Substance Use Topics     Smoking status: Former Smoker     Packs/day: 1.00     Years: 40.00     Types: Cigarettes     Quit date: 3/16/2007     Smokeless tobacco: Never Used     Alcohol use No         Exam:    Vitals: /70  Pulse 56  Wt 197 lb (89.4 kg)  BMI 29.52 kg/m2  BMI: Body mass index is 29.52 kg/(m^2).  Height: Data Unavailable    Constitutional/ general:  Pt is in no apparent distress, appears well-nourished.  Cooperative with history and physical exam.     Psych:  The patient answered questions appropriately.  Normal affect.  Seems to have reasonable expectations, in terms of treatment.     Eyes:  Visual scanning/ tracking without deficit.     Ears:  Response to auditory stimuli is normal.  negative hearing aid devices.  Auricles in proper alignment.     Lymphatic:  Popliteal lymph nodes not enlarged.     Lungs:  Non labored breathing, non labored speech. No cough.  No audible wheezing. Even, quiet breathing.       Vascular: Lateral ankle edema and varicosities noted.  Difficult to palpate tibial pulses.  Dorsalis pedis weakly palpable.  Capillary refill time less than 3 seconds in all digits.  No hair growth noted on skin.    Neuro:  Alert and oriented x 3. Coordinated gait.  Light touch sensation is intact to the L4, L5, S1 distributions. No obvious deficits.  No evidence of neurological-based weakness, spasticity, or contracture in the lower extremities.  Monofilament absent to midfoot.      Derm: Skin thin shiny atrophic with no hair growth.      Musculoskeletal:    Lower extremity muscle strength is normal.  Patient is ambulatory without an assistive device or brace.  Pronated arch with weightbearing.  All lesser toes are hammered and stiff.  Generally his feet are stiff with a decreased range of motion bilaterally.    An ulcer is located on the end of the left third toe.  In the past this measured 5 x 4 mm in diameter and today it measures 4 x 3 mm.  Base is granulation tissue.    No surrounding erythema, edema, or crepitus with palpation.  No sinus tracts, purulence or odor.  No drainage noted.    A:  Diabetes mellitus with peripheral neuropathy and LOPS  Left third toe ulcer      P: Explained to patient that wound is smaller.  No clinical signs of infection and will stop his antibiotics when done.  The augmented the crest pad.  He will remove this if problematic.  Patient to minimize activities.  Had long talk about how too much walking or pressure could make this worse.    Discussed signs of infection the patient to call me asap if he sees any of these.  RTC in 1 week.  .    Aaron Dent DPM, FACFAS

## 2018-04-19 ENCOUNTER — OFFICE VISIT (OUTPATIENT)
Dept: PODIATRY | Facility: CLINIC | Age: 73
End: 2018-04-19
Payer: COMMERCIAL

## 2018-04-19 VITALS
SYSTOLIC BLOOD PRESSURE: 136 MMHG | HEART RATE: 58 BPM | DIASTOLIC BLOOD PRESSURE: 78 MMHG | BODY MASS INDEX: 29.82 KG/M2 | WEIGHT: 199 LBS

## 2018-04-19 DIAGNOSIS — E11.9 TYPE 2 DIABETES MELLITUS WITHOUT RETINOPATHY (H): Primary | ICD-10-CM

## 2018-04-19 DIAGNOSIS — L97.529 DIABETIC ULCER OF TOE OF LEFT FOOT ASSOCIATED WITH TYPE 2 DIABETES MELLITUS, UNSPECIFIED ULCER STAGE (H): ICD-10-CM

## 2018-04-19 DIAGNOSIS — E11.621 DIABETIC ULCER OF TOE OF LEFT FOOT ASSOCIATED WITH TYPE 2 DIABETES MELLITUS, UNSPECIFIED ULCER STAGE (H): ICD-10-CM

## 2018-04-19 PROCEDURE — 99213 OFFICE O/P EST LOW 20 MIN: CPT | Performed by: PODIATRIST

## 2018-04-19 NOTE — PATIENT INSTRUCTIONS
We wish you continued good healing. If you have any questions or concerns, please do not hesitate to contact us at [phone]    Please remember to call and schedule a follow up appointment if one was recommended at your earliest convenience.   PODIATRY CLINIC HOURS  TELEPHONE NUMBER    Dr. Aaron Dent D.P.M [facility]    Clinics:  [facility]    [facility] Pat[facility]   Tuesday 1PM-6PM  [place]  Wednesday 7AM-2PM  [facility]  Thursday 10AM-6PM  [place]  Friday 7AM-3PM  [place]  Specialty schedulers:   [phone] to make an appointment with any Specialty Provider.        Urgent Care locations:    [facility] Monday-Friday 5 pm - 9 pm. Saturday-Sunday 9 am -5pm    Monday-Friday 11 am - 9 pm Saturday 9 am - 5 pm     Monday-Sunday 12 noon-8PM [phone]     If you need a medication refill, please contact us you may need lab work and/or a follow up visit prior to your refill (i.e. Antifungal medications).    [employer] (secure e-mail communication and access to your chart) to send a message or to make an appointment.    If MRI needed please call [facility] Zheng at [phone]        Weight management plan: Patient was referred to their PCP to discuss a diet and exercise plan.     Patient to follow up with Primary Care provider regarding elevated blood pressure.

## 2018-04-19 NOTE — PROGRESS NOTES
Subjective:    4/5/18  Pt is seen today in consult from Dr. Duckworth with the c/c of an ulcer left third toe.  This has been problematic for 2 week(s).   States this started when he trimmed his toenail and he thinks he may have cut the end of the toe.  He has some pain with this.  Denies drainage, erythema or edema.  Slightly tender upon long periods of standing/ambulating.  Aggravated by activity and relieved by rest.  He is retired.  40 pack year history of smoking and quit in 2007.  He has diabetes and numbness and tingling in his feet.  Rheumatoid arthritis.  He was started on Keflex by Dr. Voss.  Primary CARE last seen 3/26/2018.    4/12/18 patient dressing foot twice a day with antibiotic ointment and Band-Aid.  Is using the crest pad at all times.  He is wearing a shoe on his foot at all times and just got new shoes.  A slight drainage on the Band-Aid.  He will finish his Keflex and a few days.  He denies any purulence or odor fever chills    4/19/18 for the last 2 days no drainage from toe on dressing.  He is wearing a crest pad at all times while walking.  He is dressing this daily with a Band-Aid and antibiotic ointment.  He denies any purulence odor or erythema.    ROS:  See above.         No Known Allergies    Current Outpatient Prescriptions   Medication Sig Dispense Refill     albuterol (PROAIR HFA/PROVENTIL HFA/VENTOLIN HFA) 108 (90 BASE) MCG/ACT Inhaler Inhale 2 puffs into the lungs every 4 hours as needed for other (coughing) 1 Inhaler 1     aspirin 81 MG tablet Take 1 tablet by mouth daily.  3     blood glucose (NO BRAND SPECIFIED) lancing device Use to test blood sugars 3 times daily or as directed. 1 each 0     blood glucose monitoring (INDIA CONTOUR NEXT) test strip TEST THREE TIMES A DAY OR AS DIRECTED, dispense covered brand 300 each 1     blood glucose monitoring (ONE TOUCH DELICA) lancets Use to test blood sugars 3 times daily or as directed. 300 each 3     cephALEXin (KEFLEX) 500 MG capsule  Take 1 capsule (500 mg) by mouth 3 times daily 30 capsule 0     cetirizine (ZYRTEC) 10 MG tablet Take 10 mg by mouth At Bedtime       folic acid (FOLVITE) 1 MG tablet Take 1 tablet (1 mg) by mouth daily 100 tablet 3     guaiFENesin-codeine (ROBITUSSIN AC) 100-10 MG/5ML SOLN solution Take 5 mLs by mouth every 4 hours as needed for cough 120 mL 0     HYDROcodone-acetaminophen (NORCO) 5-325 MG per tablet Take 1-2 tablets by mouth every 4 hours as needed for moderate to severe pain maximum 4 tablet(s) per day 28 tablet 0     hydroxychloroquine (PLAQUENIL) 200 MG tablet Take 1 tablet (200 mg) by mouth 2 times daily 180 tablet 1     insulin pen needle (ULTICARE MINI) 31G X 6 MM Use 1 daily or as directed. 100 each 1     liraglutide (VICTOZA PEN) 18 MG/3ML soln Inject 0.6 mg Subcutaneous daily 9 mL 0     lisinopril (PRINIVIL/ZESTRIL) 2.5 MG tablet Take 1 tablet (2.5 mg) by mouth daily 90 tablet 3     metFORMIN (GLUCOPHAGE) 500 MG tablet Take 1 tablet (500 mg) by mouth 2 times daily (with meals) TAKE TWO TABLETS BY MOUTH TWICE A DAY WITH MEALS 1 tablet 0     methotrexate sodium 2.5 MG TABS Take 25 mg by mouth once a week . Take all 10 tablets on the same day of each week. 120 tablet 2     metoprolol (TOPROL-XL) 25 MG 24 hr tablet TAKE ONE TABLET BY MOUTH EVERY DAY 90 tablet 3     MICROLET LANCETS MISC 1 Device daily 100 each 4     omeprazole (PRILOSEC) 20 MG CR capsule Take 1 capsule (20 mg) by mouth daily 90 capsule 1     order for DME Equipment being ordered: specialized shoes for diabetic neuropathy     His orthotics will be made by [employer]      Tim Jernigan, Certified   [email or web address]  Phone: [phone]  Fax: [phone]  [address] [place] [address] 1 Device 0     order for DME Equipment being ordered: glucometer 1 Device 0     pravastatin (PRAVACHOL) 40 MG tablet Take 0.5 tablets (20 mg) by mouth every other day 1 tablet 0     tiZANidine (ZANAFLEX) 2 MG tablet Take 1 tablet  (2 mg) by mouth 3 times daily as needed for muscle spasms 90 tablet 1       Patient Active Problem List   Diagnosis     Pain in limb     Hyperlipidemia LDL goal <100     Hypertension goal BP (blood pressure) < 140/90     obesity     Hypogonadism     Advanced directives, counseling/discussion     Health Care Home     Type 2 diabetes mellitus with diabetic polyneuropathy, with long-term current use of insulin (H)     RBBB (right bundle branch block)     Lumbago     Ex-smoker     Cataracts, both eyes     Family history of esophageal cancer     Gastroesophageal reflux disease, esophagitis presence not specified     Rheumatoid arthritis involving multiple sites with positive rheumatoid factor (H)     Pulmonary nodule     High risk medication use     Spondylosis of cervical region without myelopathy or radiculopathy     Erectile dysfunction, unspecified erectile dysfunction type     Type 2 diabetes mellitus without retinopathy (H)     Combined forms of age-related cataract of both eyes     PVD (posterior vitreous detachment), left eye     Tubulovillous adenoma of colon     Diabetic polyneuropathy associated with type 2 diabetes mellitus (H)       Past Medical History:   Diagnosis Date     Abnormal CT scan 03/2004    calcified lung granuloma     C. difficile diarrhea     H/O     Cataract 11/18/2011     Diabetic neuropathy (H)     mild, mostly soles and distal forefeet, worse on the left side.     Diverticulitis      ED (erectile dysfunction)      Ex-smoker     QUIT SMOKING FEB 2007     History of ETOH abuse     recovering, sober since 1997     Hyperlipidemia LDL goal <100      Hypertension goal BP (blood pressure) < 140/90      Hypogonadism      Obesity      PAD (peripheral artery disease) (H)     leg cramps, with exertion, no formal diagnosis of PAD and minimal if any symptoms at all.     RA (rheumatoid arthritis) (H)     Dr Bailon     Type 2 diabetes, HbA1c goal < 7% (H) 10/2008    A1C of 7.1 %        Past Surgical  History:   Procedure Laterality Date     COLONOSCOPY  03/01/2018    [facility]     HC INCISION TENDON SHEATH FINGER  4/2009    r hand ring finger     TONSILLECTOMY         Family History   Problem Relation Age of Onset     CEREBROVASCULAR DISEASE Mother      Arthritis Mother      OSTEOPOROSIS Mother      Alzheimer Disease Father      Arthritis Father      CANCER Father      DIABETES Maternal Grandmother      Cardiovascular Maternal Grandmother      Other Cancer Brother      CANCER Paternal Aunt      Hypertension No family hx of      Thyroid Disease No family hx of      Glaucoma No family hx of      Macular Degeneration No family hx of        Social History   Substance Use Topics     Smoking status: Former Smoker     Packs/day: 1.00     Years: 40.00     Types: Cigarettes     Quit date: 3/16/2007     Smokeless tobacco: Never Used     Alcohol use No         Exam:    Vitals: /78  Pulse 58  Wt 199 lb (90.3 kg)  BMI 29.82 kg/m2  BMI: Body mass index is 29.82 kg/(m^2).  Height: Data Unavailable    Constitutional/ general:  Pt is in no apparent distress, appears well-nourished.  Cooperative with history and physical exam.     Psych:  The patient answered questions appropriately.  Normal affect.  Seems to have reasonable expectations, in terms of treatment.     Eyes:  Visual scanning/ tracking without deficit.     Ears:  Response to auditory stimuli is normal.  negative hearing aid devices.  Auricles in proper alignment.     Lymphatic:  Popliteal lymph nodes not enlarged.     Lungs:  Non labored breathing, non labored speech. No cough.  No audible wheezing. Even, quiet breathing.       Vascular: Lateral ankle edema and varicosities noted.  Difficult to palpate tibial pulses.  Dorsalis pedis weakly palpable.  Capillary refill time less than 3 seconds in all digits.  No hair growth noted on skin.    Neuro:  Alert and oriented x 3. Coordinated gait.  Light touch sensation is intact to the L4, L5, S1 distributions. No  obvious deficits.  No evidence of neurological-based weakness, spasticity, or contracture in the lower extremities.  Monofilament absent to midfoot.      Derm: Skin thin shiny atrophic with no hair growth.     Musculoskeletal:    Lower extremity muscle strength is normal.  Patient is ambulatory without an assistive device or brace.  Pronated arch with weightbearing.  All lesser toes are hammered and stiff.  Generally his feet are stiff with a decreased range of motion bilaterally.    An ulcer is located on the end of the left third toe.  In the past this measured 5 x 4 mm, 4 x 3 mm in diameter and today it measures 1 x 0.5 mm.  Base is granulation tissue.    No surrounding erythema, edema, or crepitus with palpation.  No sinus tracts, purulence or odor.  No drainage noted.    A:  Diabetes mellitus with peripheral neuropathy and LOPS  Left third toe ulcer      P: Explained to patient that wound is smaller and is almost healed.  Once there is no drainage he will stop dressing this.  He will continue to wear the augmented the crest pad or at least a month.  RTC as needed  .    Aaron Dent DPM, FACFAS

## 2018-04-19 NOTE — MR AVS SNAPSHOT
After Visit Summary   4/19/2018    Zurdo Dash    MRN: [id number]           Patient Information     Date Of Birth          1945        Visit Information        Provider Department      4/19/2018 12:15 PM Aaron Dent, DPM [facility]        Care Instructions    We wish you continued good healing. If you have any questions or concerns, please do not hesitate to contact us at [phone]    Please remember to call and schedule a follow up appointment if one was recommended at your earliest convenience.   PODIATRY CLINIC HOURS  TELEPHONE NUMBER    Dr. Aaron DAVISONPPEDRITO FAC FAS    Clinics:  [facility]    [facility]   Tuesday 1PM-6PM  [place]  Wednesday 7AM-2PM  [place]  Thursday 10AM-6PM  [place]  Friday 7AM-3PM  [place]  Specialty schedulers:   [phone] to make an appointment with any Specialty Provider.        Urgent Care locations:    [facility] Monday-Friday 5 pm - 9 pm. Saturday-Sunday 9 am -5pm    Monday-Friday 11 am - 9 pm Saturday 9 am - 5 pm     Monday-Sunday 12 noon-8PM [phone]     If you need a medication refill, please contact us you may need lab work and/or a follow up visit prior to your refill (i.e. Antifungal medications).    MyChart (secure e-mail communication and access to your chart) to send a message or to make an appointment.    If MRI needed please call [place] [facility] at [phone]        Weight management plan: Patient was referred to their PCP to discuss a diet and exercise plan.     Patient to follow up with Primary Care provider regarding elevated blood pressure.              Follow-ups after your visit        Your next 10 appointments already scheduled     May 15, 2018  1:15 PM CDT   LAB with CAR LAB   [facility] ([facility]    [address] [facility] [address]  "[place] [address]   [phone]           Please do not eat 10-12 hours before your appointment if you are coming in fasting for labs on lipids, cholesterol, or glucose (sugar). This does not apply to pregnant women. Water, hot tea and black coffee (with nothing added) are okay. Do not drink other fluids, diet soda or chew gum.            May 17, 2018  1:40 PM CDT   Return Visit with Albert Tompkins MD   [facility] ([facility])    [address] [place] [address] [place] [address]   [phone]              Who to contact     If you have questions or need follow up information about today's clinic visit or your schedule please contact [facility] directly at [phone].  Normal or non-critical lab and imaging results will be communicated to you by MyChart, letter or phone within 4 business days after the clinic has received the results. If you do not hear from us within 7 days, please contact the clinic through MyChart or phone. If you have a critical or abnormal lab result, we will notify you by phone as soon as possible.  Submit refill requests through [employer] or call your pharmacy and they will forward the refill request to us. Please allow 3 business days for your refill to be completed.          Additional Information About Your Visit        MyChart Information     [employer] lets you send messages to your doctor, view your test results, renew your prescriptions, schedule appointments and more. To sign up, go to [email or web address] . Click on \"Log in\" on the left side of the screen, which will take you to the Welcome page. Then click on \"Sign up Now\" on the right side of the page.     You will be asked to enter the access code listed below, as well as some personal information. Please follow the directions to create your username and password.     Your access code is: [email or web address]  Expires: 2018  3:08 PM     Your access code will  in 90 days. If you need " help or a new code, please call your [facility] clinic or [phone].        Care EveryWhere ID     This is your Care EveryWhere ID. This could be used by other organizations to access your [facility] medical records  [id number]        Your Vitals Were     Pulse BMI (Body Mass Index)                58 29.82 kg/m2           Blood Pressure from Last 3 Encounters:   04/19/18 136/78   04/12/18 116/70   04/05/18 136/76    Weight from Last 3 Encounters:   04/19/18 199 lb (90.3 kg)   04/12/18 197 lb (89.4 kg)   04/05/18 197 lb (89.4 kg)              Today, you had the following     No orders found for display       Primary Care Provider Office Phone # Fax #    Kapil Duckworth [phone]       [address] [facility] [address]        Equal Access to Services     [facility]: Hadii aad ku hadasho Sophil, waaxda luqadaha, qaybta kaalmada adeegyada, aranza matthew haynithin zhong . So [facility] [phone].    ATENCIÓN: Si habla español, tiene a diallo disposición servicios gratuitos de asistencia lingüística. Angelame al [phone].    We comply with applicable federal civil rights laws and [place] laws. We do not discriminate on the basis of race, color, national origin, age, disability, sex, sexual orientation, or gender identity.            Thank you!     Thank you for choosing [facility]  for your care. Our goal is always to provide you with excellent care. Hearing back from our patients is one way we can continue to improve our services. Please take a few minutes to complete the written survey that you may receive in the mail after your visit with us. Thank you!             Your Updated Medication List - Protect others around you: Learn how to safely use, store and throw away your medicines at www.disposemymeds.org.          This list is accurate as of 4/19/18 12:26 PM.  Always use your most recent med list.                   Brand Name Dispense Instructions for use Diagnosis     albuterol 108 (90 Base) MCG/ACT Inhaler    PROAIR HFA/PROVENTIL HFA/VENTOLIN HFA    1 Inhaler    Inhale 2 puffs into the lungs every 4 hours as needed for other (coughing)    Acute bronchitis, unspecified organism       aspirin 81 MG tablet      Take 1 tablet by mouth daily.    Type 2 diabetes, HbA1c goal < 7% (H)       blood glucose lancing device    no brand specified    1 each    Use to test blood sugars 3 times daily or as directed.    Type 2 diabetes mellitus, uncontrolled, with neuropathy (H)       blood glucose monitoring test strip    INDIA CONTOUR NEXT    300 each    TEST THREE TIMES A DAY OR AS DIRECTED, dispense covered brand    Type 2 diabetes mellitus, uncontrolled, with neuropathy (H)       cephALEXin 500 MG capsule    KEFLEX    30 capsule    Take 1 capsule (500 mg) by mouth 3 times daily    Diabetic ulcer of toe of left foot associated with type 2 diabetes mellitus, limited to breakdown of skin (H)       cetirizine 10 MG tablet    zyrTEC     Take 10 mg by mouth At Bedtime        folic acid 1 MG tablet    FOLVITE    100 tablet    Take 1 tablet (1 mg) by mouth daily    Rheumatoid arthritis involving multiple sites with positive rheumatoid factor (H)       guaiFENesin-codeine 100-10 MG/5ML Soln solution    ROBITUSSIN AC    120 mL    Take 5 mLs by mouth every 4 hours as needed for cough    Cough       HYDROcodone-acetaminophen 5-325 MG per tablet    NORCO    28 tablet    Take 1-2 tablets by mouth every 4 hours as needed for moderate to severe pain maximum 4 tablet(s) per day    Rheumatoid arthritis involving multiple sites with positive rheumatoid factor (H)       hydroxychloroquine 200 MG tablet    PLAQUENIL    180 tablet    Take 1 tablet (200 mg) by mouth 2 times daily    Rheumatoid arthritis involving multiple sites with positive rheumatoid factor (H)       insulin pen needle 31G X 6 MM    ULTICARE MINI    100 each    Use 1 daily or as directed.    Type 2 diabetes mellitus, uncontrolled, with  neuropathy (H)       liraglutide 18 MG/3ML soln    VICTOZA PEN    9 mL    Inject 0.6 mg Subcutaneous daily    Type 2 diabetes mellitus, uncontrolled, with neuropathy (H)       lisinopril 2.5 MG tablet    PRINIVIL/Zestril    90 tablet    Take 1 tablet (2.5 mg) by mouth daily    Hypotension, unspecified hypotension type       metFORMIN 500 MG tablet    GLUCOPHAGE    1 tablet    Take 1 tablet (500 mg) by mouth 2 times daily (with meals) TAKE TWO TABLETS BY MOUTH TWICE A DAY WITH MEALS    Type 2 diabetes mellitus with diabetic polyneuropathy, with long-term current use of insulin (H)       methotrexate sodium 2.5 MG Tabs     120 tablet    Take 25 mg by mouth once a week . Take all 10 tablets on the same day of each week.    Rheumatoid arthritis involving multiple sites with positive rheumatoid factor (H)       metoprolol succinate 25 MG 24 hr tablet    TOPROL-XL    90 tablet    TAKE ONE TABLET BY MOUTH EVERY DAY    Hypertension goal BP (blood pressure) < 140/90       * MICROLET LANCETS Misc     100 each    1 Device daily    Type 2 diabetes, HbA1C goal < 8% (H)       * blood glucose monitoring lancets     300 each    Use to test blood sugars 3 times daily or as directed.    Type 2 diabetes mellitus, uncontrolled, with neuropathy (H)       omeprazole 20 MG CR capsule    priLOSEC    90 capsule    Take 1 capsule (20 mg) by mouth daily    Gastroesophageal reflux disease without esophagitis       * order for DME     1 Device    Equipment being ordered: specialized shoes for diabetic neuropathy   His orthotics will be made by [employer] ?? Tim Jernigan, Certified  [email or web address] Phone: [phone] Fax: [phone] [address] [place] [address]    Type 2 diabetes mellitus with diabetic polyneuropathy, with long-term current use of insulin (H), Diabetic polyneuropathy associated with type 2 diabetes mellitus (H)       * order for DME     1 Device    Equipment being ordered: glucometer     Type 2 diabetes mellitus, uncontrolled, with neuropathy (H)       pravastatin 40 MG tablet    PRAVACHOL    1 tablet    Take 0.5 tablets (20 mg) by mouth every other day    Hyperlipidemia LDL goal <100       tiZANidine 2 MG tablet    ZANAFLEX    90 tablet    Take 1 tablet (2 mg) by mouth 3 times daily as needed for muscle spasms    Neck pain       * Notice:  This list has 4 medication(s) that are the same as other medications prescribed for you. Read the directions carefully, and ask your doctor or other care provider to review them with you.

## 2018-04-19 NOTE — LETTER
4/19/2018         RE: Zurdo Dash  [address] [place] [id number]        Dear Colleague,    Thank you for referring your patient, Zurdo Dash, to the [facility]. Please see a copy of my visit note below.    Subjective:    4/5/18  Pt is seen today in consult from Dr. Duckworth with the c/c of an ulcer left third toe.  This has been problematic for 2 week(s).   States this started when he trimmed his toenail and he thinks he may have cut the end of the toe.  He has some pain with this.  Denies drainage, erythema or edema.  Slightly tender upon long periods of standing/ambulating.  Aggravated by activity and relieved by rest.  He is retired.  40 pack year history of smoking and quit in 2007.  He has diabetes and numbness and tingling in his feet.  Rheumatoid arthritis.  He was started on Keflex by Dr. Voss.  Primary CARE last seen 3/26/2018.    4/12/18 patient dressing foot twice a day with antibiotic ointment and Band-Aid.  Is using the crest pad at all times.  He is wearing a shoe on his foot at all times and just got new shoes.  A slight drainage on the Band-Aid.  He will finish his Keflex and a few days.  He denies any purulence or odor fever chills    4/19/18 for the last 2 days no drainage from toe on dressing.  He is wearing a crest pad at all times while walking.  He is dressing this daily with a Band-Aid and antibiotic ointment.  He denies any purulence odor or erythema.    ROS:  See above.         No Known Allergies    Current Outpatient Prescriptions   Medication Sig Dispense Refill     albuterol (PROAIR HFA/PROVENTIL HFA/VENTOLIN HFA) 108 (90 BASE) MCG/ACT Inhaler Inhale 2 puffs into the lungs every 4 hours as needed for other (coughing) 1 Inhaler 1     aspirin 81 MG tablet Take 1 tablet by mouth daily.  3     blood glucose (NO BRAND SPECIFIED) lancing device Use to test blood sugars 3 times daily or as directed. 1 each 0     blood glucose monitoring (INDIA CONTOUR NEXT) test  strip TEST THREE TIMES A DAY OR AS DIRECTED, dispense covered brand 300 each 1     blood glucose monitoring (ONE TOUCH DELICA) lancets Use to test blood sugars 3 times daily or as directed. 300 each 3     cephALEXin (KEFLEX) 500 MG capsule Take 1 capsule (500 mg) by mouth 3 times daily 30 capsule 0     cetirizine (ZYRTEC) 10 MG tablet Take 10 mg by mouth At Bedtime       folic acid (FOLVITE) 1 MG tablet Take 1 tablet (1 mg) by mouth daily 100 tablet 3     guaiFENesin-codeine (ROBITUSSIN AC) 100-10 MG/5ML SOLN solution Take 5 mLs by mouth every 4 hours as needed for cough 120 mL 0     HYDROcodone-acetaminophen (NORCO) 5-325 MG per tablet Take 1-2 tablets by mouth every 4 hours as needed for moderate to severe pain maximum 4 tablet(s) per day 28 tablet 0     hydroxychloroquine (PLAQUENIL) 200 MG tablet Take 1 tablet (200 mg) by mouth 2 times daily 180 tablet 1     insulin pen needle (ULTICARE MINI) 31G X 6 MM Use 1 daily or as directed. 100 each 1     liraglutide (VICTOZA PEN) 18 MG/3ML soln Inject 0.6 mg Subcutaneous daily 9 mL 0     lisinopril (PRINIVIL/ZESTRIL) 2.5 MG tablet Take 1 tablet (2.5 mg) by mouth daily 90 tablet 3     metFORMIN (GLUCOPHAGE) 500 MG tablet Take 1 tablet (500 mg) by mouth 2 times daily (with meals) TAKE TWO TABLETS BY MOUTH TWICE A DAY WITH MEALS 1 tablet 0     methotrexate sodium 2.5 MG TABS Take 25 mg by mouth once a week . Take all 10 tablets on the same day of each week. 120 tablet 2     metoprolol (TOPROL-XL) 25 MG 24 hr tablet TAKE ONE TABLET BY MOUTH EVERY DAY 90 tablet 3     MICROLET LANCETS MISC 1 Device daily 100 each 4     omeprazole (PRILOSEC) 20 MG CR capsule Take 1 capsule (20 mg) by mouth daily 90 capsule 1     order for DME Equipment being ordered: specialized shoes for diabetic neuropathy     His orthotics will be made by [employer]      Tim Jernigan, Certified   [email or web address]  Phone: [phone]  Fax: [phone]  [address] [facility] [place] [address] 1 Device 0     order for DME Equipment being ordered: glucometer 1 Device 0     pravastatin (PRAVACHOL) 40 MG tablet Take 0.5 tablets (20 mg) by mouth every other day 1 tablet 0     tiZANidine (ZANAFLEX) 2 MG tablet Take 1 tablet (2 mg) by mouth 3 times daily as needed for muscle spasms 90 tablet 1       Patient Active Problem List   Diagnosis     Pain in limb     Hyperlipidemia LDL goal <100     Hypertension goal BP (blood pressure) < 140/90     obesity     Hypogonadism     Advanced directives, counseling/discussion     Health Care Home     Type 2 diabetes mellitus with diabetic polyneuropathy, with long-term current use of insulin (H)     RBBB (right bundle branch block)     Lumbago     Ex-smoker     Cataracts, both eyes     Family history of esophageal cancer     Gastroesophageal reflux disease, esophagitis presence not specified     Rheumatoid arthritis involving multiple sites with positive rheumatoid factor (H)     Pulmonary nodule     High risk medication use     Spondylosis of cervical region without myelopathy or radiculopathy     Erectile dysfunction, unspecified erectile dysfunction type     Type 2 diabetes mellitus without retinopathy (H)     Combined forms of age-related cataract of both eyes     PVD (posterior vitreous detachment), left eye     Tubulovillous adenoma of colon     Diabetic polyneuropathy associated with type 2 diabetes mellitus (H)       Past Medical History:   Diagnosis Date     Abnormal CT scan 03/2004    calcified lung granuloma     C. difficile diarrhea     H/O     Cataract 11/18/2011     Diabetic neuropathy (H)     mild, mostly soles and distal forefeet, worse on the left side.     Diverticulitis      ED (erectile dysfunction)      Ex-smoker     QUIT SMOKING FEB 2007     History of ETOH abuse     recovering, sober since 1997     Hyperlipidemia LDL goal <100      Hypertension goal BP (blood pressure) < 140/90      Hypogonadism      Obesity      PAD (peripheral artery  disease) (H)     leg cramps, with exertion, no formal diagnosis of PAD and minimal if any symptoms at all.     RA (rheumatoid arthritis) (H)     Dr Bailon     Type 2 diabetes, HbA1c goal < 7% (H) 10/2008    A1C of 7.1 %        Past Surgical History:   Procedure Laterality Date     COLONOSCOPY  03/01/2018    [facility]     HC INCISION TENDON SHEATH FINGER  4/2009    r hand ring finger     TONSILLECTOMY         Family History   Problem Relation Age of Onset     CEREBROVASCULAR DISEASE Mother      Arthritis Mother      OSTEOPOROSIS Mother      Alzheimer Disease Father      Arthritis Father      CANCER Father      DIABETES Maternal Grandmother      Cardiovascular Maternal Grandmother      Other Cancer Brother      CANCER Paternal Aunt      Hypertension No family hx of      Thyroid Disease No family hx of      Glaucoma No family hx of      Macular Degeneration No family hx of        Social History   Substance Use Topics     Smoking status: Former Smoker     Packs/day: 1.00     Years: 40.00     Types: Cigarettes     Quit date: 3/16/2007     Smokeless tobacco: Never Used     Alcohol use No         Exam:    Vitals: /78  Pulse 58  Wt 199 lb (90.3 kg)  BMI 29.82 kg/m2  BMI: Body mass index is 29.82 kg/(m^2).  Height: Data Unavailable    Constitutional/ general:  Pt is in no apparent distress, appears well-nourished.  Cooperative with history and physical exam.     Psych:  The patient answered questions appropriately.  Normal affect.  Seems to have reasonable expectations, in terms of treatment.     Eyes:  Visual scanning/ tracking without deficit.     Ears:  Response to auditory stimuli is normal.  negative hearing aid devices.  Auricles in proper alignment.     Lymphatic:  Popliteal lymph nodes not enlarged.     Lungs:  Non labored breathing, non labored speech. No cough.  No audible wheezing. Even, quiet breathing.       Vascular: Lateral ankle edema and varicosities noted.  Difficult to palpate tibial pulses.   Dorsalis pedis weakly palpable.  Capillary refill time less than 3 seconds in all digits.  No hair growth noted on skin.    Neuro:  Alert and oriented x 3. Coordinated gait.  Light touch sensation is intact to the L4, L5, S1 distributions. No obvious deficits.  No evidence of neurological-based weakness, spasticity, or contracture in the lower extremities.  Monofilament absent to midfoot.      Derm: Skin thin shiny atrophic with no hair growth.     Musculoskeletal:    Lower extremity muscle strength is normal.  Patient is ambulatory without an assistive device or brace.  Pronated arch with weightbearing.  All lesser toes are hammered and stiff.  Generally his feet are stiff with a decreased range of motion bilaterally.    An ulcer is located on the end of the left third toe.  In the past this measured 5 x 4 mm, 4 x 3 mm in diameter and today it measures 1 x 0.5 mm.  Base is granulation tissue.    No surrounding erythema, edema, or crepitus with palpation.  No sinus tracts, purulence or odor.  No drainage noted.    A:  Diabetes mellitus with peripheral neuropathy and LOPS  Left third toe ulcer      P: Explained to patient that wound is smaller and is almost healed.  Once there is no drainage he will stop dressing this.  He will continue to wear the augmented the crest pad or at least a month.  RTC as needed  .    Aaron Dent DPM, FACFAS            Again, thank you for allowing me to participate in the care of your patient.        Sincerely,        Aaron Dent DPM

## 2018-05-15 DIAGNOSIS — Z79.899 HIGH RISK MEDICATIONS (NOT ANTICOAGULANTS) LONG-TERM USE: ICD-10-CM

## 2018-05-15 DIAGNOSIS — M05.79 RHEUMATOID ARTHRITIS INVOLVING MULTIPLE SITES WITH POSITIVE RHEUMATOID FACTOR (H): ICD-10-CM

## 2018-05-15 LAB
ALBUMIN SERPL-MCNC: 3.6 G/DL (ref 3.4–5)
ALP SERPL-CCNC: 49 U/L (ref 40–150)
ALT SERPL W P-5'-P-CCNC: 30 U/L (ref 0–70)
AST SERPL W P-5'-P-CCNC: 27 U/L (ref 0–45)
BASOPHILS # BLD AUTO: 0 10E9/L (ref 0–0.2)
BASOPHILS NFR BLD AUTO: 0.5 %
BILIRUB DIRECT SERPL-MCNC: 0.2 MG/DL (ref 0–0.2)
BILIRUB SERPL-MCNC: 0.6 MG/DL (ref 0.2–1.3)
CREAT SERPL-MCNC: 1.04 MG/DL (ref 0.66–1.25)
DIFFERENTIAL METHOD BLD: ABNORMAL
EOSINOPHIL # BLD AUTO: 0.1 10E9/L (ref 0–0.7)
EOSINOPHIL NFR BLD AUTO: 1.7 %
ERYTHROCYTE [DISTWIDTH] IN BLOOD BY AUTOMATED COUNT: 15.5 % (ref 10–15)
GFR SERPL CREATININE-BSD FRML MDRD: 70 ML/MIN/1.7M2
HCT VFR BLD AUTO: 41.4 % (ref 40–53)
HGB BLD-MCNC: 14.1 G/DL (ref 13.3–17.7)
LYMPHOCYTES # BLD AUTO: 1 10E9/L (ref 0.8–5.3)
LYMPHOCYTES NFR BLD AUTO: 12.8 %
MCH RBC QN AUTO: 31.2 PG (ref 26.5–33)
MCHC RBC AUTO-ENTMCNC: 34.1 G/DL (ref 31.5–36.5)
MCV RBC AUTO: 92 FL (ref 78–100)
MONOCYTES # BLD AUTO: 1.1 10E9/L (ref 0–1.3)
MONOCYTES NFR BLD AUTO: 14.5 %
NEUTROPHILS # BLD AUTO: 5.6 10E9/L (ref 1.6–8.3)
NEUTROPHILS NFR BLD AUTO: 70.5 %
PLATELET # BLD AUTO: 225 10E9/L (ref 150–450)
PROT SERPL-MCNC: 6.6 G/DL (ref 6.8–8.8)
RBC # BLD AUTO: 4.52 10E12/L (ref 4.4–5.9)
WBC # BLD AUTO: 7.9 10E9/L (ref 4–11)

## 2018-05-15 PROCEDURE — 85025 COMPLETE CBC W/AUTO DIFF WBC: CPT | Performed by: INTERNAL MEDICINE

## 2018-05-15 PROCEDURE — 36415 COLL VENOUS BLD VENIPUNCTURE: CPT | Performed by: INTERNAL MEDICINE

## 2018-05-15 PROCEDURE — 82565 ASSAY OF CREATININE: CPT | Performed by: INTERNAL MEDICINE

## 2018-05-15 PROCEDURE — 80076 HEPATIC FUNCTION PANEL: CPT | Performed by: INTERNAL MEDICINE

## 2018-05-17 ENCOUNTER — OFFICE VISIT (OUTPATIENT)
Dept: RHEUMATOLOGY | Facility: CLINIC | Age: 73
End: 2018-05-17
Payer: COMMERCIAL

## 2018-05-17 VITALS
WEIGHT: 204 LBS | RESPIRATION RATE: 16 BRPM | HEART RATE: 53 BPM | BODY MASS INDEX: 30.21 KG/M2 | HEIGHT: 69 IN | SYSTOLIC BLOOD PRESSURE: 132 MMHG | OXYGEN SATURATION: 96 % | DIASTOLIC BLOOD PRESSURE: 80 MMHG

## 2018-05-17 DIAGNOSIS — M17.0 PRIMARY OSTEOARTHRITIS OF BOTH KNEES: ICD-10-CM

## 2018-05-17 DIAGNOSIS — Z79.899 HIGH RISK MEDICATION USE: ICD-10-CM

## 2018-05-17 DIAGNOSIS — M05.79 RHEUMATOID ARTHRITIS INVOLVING MULTIPLE SITES WITH POSITIVE RHEUMATOID FACTOR (H): Primary | ICD-10-CM

## 2018-05-17 PROCEDURE — 99213 OFFICE O/P EST LOW 20 MIN: CPT | Mod: 25 | Performed by: INTERNAL MEDICINE

## 2018-05-17 PROCEDURE — 20610 DRAIN/INJ JOINT/BURSA W/O US: CPT | Mod: 50 | Performed by: INTERNAL MEDICINE

## 2018-05-17 RX ORDER — HYDROXYCHLOROQUINE SULFATE 200 MG/1
200 TABLET, FILM COATED ORAL 2 TIMES DAILY
Qty: 180 TABLET | Refills: 1 | Status: SHIPPED | OUTPATIENT
Start: 2018-05-17 | End: 2018-09-13

## 2018-05-17 RX ORDER — TRIAMCINOLONE ACETONIDE 40 MG/ML
40 INJECTION, SUSPENSION INTRA-ARTICULAR; INTRAMUSCULAR ONCE
Qty: 1 ML | Refills: 0 | OUTPATIENT
Start: 2018-05-17 | End: 2019-02-14

## 2018-05-17 RX ORDER — FOLIC ACID 1 MG/1
1 TABLET ORAL DAILY
Qty: 100 TABLET | Refills: 3 | Status: SHIPPED | OUTPATIENT
Start: 2018-05-17 | End: 2019-07-26

## 2018-05-17 NOTE — PATIENT INSTRUCTIONS
Rheumatology    Dr. Albert Tompkins         [facility]   (Monday)  [address]  [place] [address]       [facility]   (Tuesday)  [address]  [place] [address]    [facility]   (Wed., Thurs., and Friday)  [address] [place] [address]    Phone number: [phone]  Thank you for choosing [facility].  Eryn Espinoza CMA

## 2018-05-17 NOTE — PROGRESS NOTES
Rheumatology Clinic Visit      Zurdo Dash MRN# [id number]   YOB: 1945 Age: 73 year old      Date of visit: 5/17/18   PCP: Dr. Kapil Duckworth     Chief Complaint   Patient presents with:  Arthritis: RA, patient is doing pretty good. Patient has stiff neck once in awhile. Sometimes neck does not want to turn. Knees are hurting, would like injections today. last done on 2/7/2017    Assessment and Plan     1. Seropositive nonerosive rheumatoid arthritis (, CCP 64): Currently on methotrexate 25 mg once weekly, folic acid 1 mg daily, and hydroxychloroquine 200 mg twice a day. Doing well today.  Continue current medications.  Encouraged him to get his eye exam for Plaquenil toxicity monitoring.  - Continue methotrexate 25 mg once weekly  - Continue folic acid 1 mg daily  - Continue hydroxychloroquine 200 mg twice a day; ophthalmology exam pending  - Ophthalmology referral for hydroxychloroquine toxicity monitoring; asked that he call today to schedule  - Labs 2-3 days prior to the next rheumatology clinic visit: CBC, Creatinine, Hepatic Panel, ESR, CRP    2. Bilateral knee osteoarthritis: Was receiving steroid injections approximately every 3-6 months with good control of his symptoms.  Repeat steroid injections today as documented in the procedure section.    3. 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 He was evaluated by Dr. Duckworth at that time. Reportedly he used colchicine in the past. I advised him to come in for joint arthrocentesis if this occurs again. Recheck of uric acid previously was elevated.    Mr. Dash verbalized agreement with and understanding of the rational for the diagnosis and treatment plan.  All questions were answered to best of my ability and the patient's satisfaction. Mr. Dash was advised to contact the clinic with any questions that may arise after the clinic visit.    Thank you for involving me in the  care of the patient    Return to clinic: 3 months      HPI   Zurdo Dash is a 73 year old male with a medical history significant for hypertension, hyperlipidemia, diabetes, diabetic neuropathy, GERD, and rheumatoid arthritis who presents for f/u of RA and bilateral knee OA.     Today, Mr. Dash reports that his arthritis is doing well.  Bilateral knee pain if he goes up or down the stairs and he would like to have repeat steroid injections today.  No morning stiffness.  No gelling phenomenon.  No joint swelling.       Denies fevers, chills, nausea, vomiting, constipation, diarrhea. No abdominal pain. No chest pain/pressure, palpitations, or shortness of breath. No oral or nasal sores. No neck pain. No rash. No LE swelling.      Tobacco: None  EtOH: None  Drugs: None    ROS   GEN: No fevers, chills, or night sweats; intentionally losing weight on the [employer] diet  SKIN: No rash. Sores from a recent fall.  HEENT: No oral or nasal ulcers.  CV: No chest pain, pressure, palpitations, or dyspnea on exertion.  PULM: No SOB, wheeze, cough.  GI: No nausea, vomiting, constipation, diarrhea. No blood in stool. No abdominal pain.  : No blood in urine.  MSK: See HPI.  NEURO: No numbness, tingling, or weakness.  EXT: No LE swelling    Active Problem List     Patient Active Problem List   Diagnosis     Pain in limb     Hyperlipidemia LDL goal <100     Hypertension goal BP (blood pressure) < 140/90     obesity     Hypogonadism     Advanced directives, counseling/discussion     Health Care Home     Type 2 diabetes mellitus with diabetic polyneuropathy, with long-term current use of insulin (H)     RBBB (right bundle branch block)     Lumbago     Ex-smoker     Cataracts, both eyes     Family history of esophageal cancer     Gastroesophageal reflux disease, esophagitis presence not specified     Rheumatoid arthritis involving multiple sites with positive rheumatoid factor (H)     Pulmonary nodule     High risk medication  use     Spondylosis of cervical region without myelopathy or radiculopathy     Erectile dysfunction, unspecified erectile dysfunction type     Type 2 diabetes mellitus without retinopathy (H)     Combined forms of age-related cataract of both eyes     PVD (posterior vitreous detachment), left eye     Tubulovillous adenoma of colon     Diabetic polyneuropathy associated with type 2 diabetes mellitus (H)     Past Medical History     Past Medical History:   Diagnosis Date     Abnormal CT scan 03/2004    calcified lung granuloma     C. difficile diarrhea     H/O     Cataract 11/18/2011     Diabetic neuropathy (H)     mild, mostly soles and distal forefeet, worse on the left side.     Diverticulitis      ED (erectile dysfunction)      Ex-smoker     QUIT SMOKING FEB 2007     History of ETOH abuse     recovering, sober since 1997     Hyperlipidemia LDL goal <100      Hypertension goal BP (blood pressure) < 140/90      Hypogonadism      Obesity      PAD (peripheral artery disease) (H)     leg cramps, with exertion, no formal diagnosis of PAD and minimal if any symptoms at all.     RA (rheumatoid arthritis) (H)     Dr Bailon     Type 2 diabetes, HbA1c goal < 7% (H) 10/2008    A1C of 7.1 %      Past Surgical History     Past Surgical History:   Procedure Laterality Date     COLONOSCOPY  03/01/2018    [facility]     HC INCISION TENDON SHEATH FINGER  4/2009    r hand ring finger     TONSILLECTOMY       Allergy   No Known Allergies  Current Medication List     Current Outpatient Prescriptions   Medication Sig     albuterol (PROAIR HFA/PROVENTIL HFA/VENTOLIN HFA) 108 (90 BASE) MCG/ACT Inhaler Inhale 2 puffs into the lungs every 4 hours as needed for other (coughing)     aspirin 81 MG tablet Take 1 tablet by mouth daily.     blood glucose (NO BRAND SPECIFIED) lancing device Use to test blood sugars 3 times daily or as directed.     blood glucose monitoring (INDIA CONTOUR NEXT) test strip TEST THREE TIMES A DAY OR AS DIRECTED,  dispense covered brand     blood glucose monitoring (ONE TOUCH DELICA) lancets Use to test blood sugars 3 times daily or as directed.     cephALEXin (KEFLEX) 500 MG capsule Take 1 capsule (500 mg) by mouth 3 times daily     cetirizine (ZYRTEC) 10 MG tablet Take 10 mg by mouth At Bedtime     folic acid (FOLVITE) 1 MG tablet Take 1 tablet (1 mg) by mouth daily     guaiFENesin-codeine (ROBITUSSIN AC) 100-10 MG/5ML SOLN solution Take 5 mLs by mouth every 4 hours as needed for cough     HYDROcodone-acetaminophen (NORCO) 5-325 MG per tablet Take 1-2 tablets by mouth every 4 hours as needed for moderate to severe pain maximum 4 tablet(s) per day     hydroxychloroquine (PLAQUENIL) 200 MG tablet Take 1 tablet (200 mg) by mouth 2 times daily     insulin pen needle (ULTICARE MINI) 31G X 6 MM Use 1 daily or as directed.     liraglutide (VICTOZA PEN) 18 MG/3ML soln Inject 0.6 mg Subcutaneous daily     lisinopril (PRINIVIL/ZESTRIL) 2.5 MG tablet Take 1 tablet (2.5 mg) by mouth daily     metFORMIN (GLUCOPHAGE) 500 MG tablet Take 1 tablet (500 mg) by mouth 2 times daily (with meals) TAKE TWO TABLETS BY MOUTH TWICE A DAY WITH MEALS     methotrexate sodium 2.5 MG TABS Take 25 mg by mouth once a week . Take all 10 tablets on the same day of each week.     metoprolol (TOPROL-XL) 25 MG 24 hr tablet TAKE ONE TABLET BY MOUTH EVERY DAY     MICROLET LANCETS MISC 1 Device daily     omeprazole (PRILOSEC) 20 MG CR capsule Take 1 capsule (20 mg) by mouth daily     order for DME Equipment being ordered: specialized shoes for diabetic neuropathy     His orthotics will be made by [employer]      Tim Jernigan, Certified   [email or web address]  Phone: [phone]  Fax: [phone]  [address] [place] [address]     order for DME Equipment being ordered: glucometer     pravastatin (PRAVACHOL) 40 MG tablet Take 0.5 tablets (20 mg) by mouth every other day     tiZANidine (ZANAFLEX) 2 MG tablet Take 1 tablet (2 mg) by  "mouth 3 times daily as needed for muscle spasms     No current facility-administered medications for this visit.          Social History   See HPI    Family History     Family History   Problem Relation Age of Onset     CEREBROVASCULAR DISEASE Mother      Arthritis Mother      OSTEOPOROSIS Mother      Alzheimer Disease Father      Arthritis Father      CANCER Father      DIABETES Maternal Grandmother      Cardiovascular Maternal Grandmother      Other Cancer Brother      CANCER Paternal Aunt      Hypertension No family hx of      Thyroid Disease No family hx of      Glaucoma No family hx of      Macular Degeneration No family hx of        Physical Exam     Temp Readings from Last 3 Encounters:   03/29/18 97.1  F (36.2  C) (Oral)   03/12/18 96.5  F (35.8  C) (Oral)   02/20/18 96.9  F (36.1  C) (Oral)     BP Readings from Last 5 Encounters:   05/17/18 132/80   04/19/18 136/78   04/12/18 116/70   04/05/18 136/76   03/29/18 124/80     Pulse Readings from Last 1 Encounters:   05/17/18 53     Resp Readings from Last 1 Encounters:   05/17/18 16     Estimated body mass index is 30.57 kg/(m^2) as calculated from the following:    Height as of this encounter: 1.74 m (5' 8.5\").    Weight as of this encounter: 92.5 kg (204 lb).    GEN: NAD, obese  HEENT: MMM. No oral lesions. Anicteric, noninjected sclera  CV: S1, S2. RRR. No m/r/g.  PULM: CTA bilaterally. No w/c.  MSK: Bilateral second and third MCPs with increased prominence, but no tenderness to palpation or synovial swelling. Wrists, elbows, shoulders, ankles, and MTPs without swelling or tenderness to palpation. Bilateral knees with medial joint line tenderness and crepitation but no effusion or increased warmth. Hips nontender to direct palpation.   SKIN: No rash.  A few cuts and bruises on his forearms bilaterally  EXT: No LE edema  PSYCH: Alert. Appropriate.    Labs / Imaging (select studies)     9/28/1999  ([facility])    10/17/2013 Left knee synovial fluid " at [facility]: , N 3%, No crystals    RF/CCP  Recent Labs   Lab Test  04/07/16   1149   CCPIGG  64*     CBC  Recent Labs   Lab Test  05/15/18   1136  02/06/18   1037  10/31/17   1105   WBC  7.9  7.9  6.5   RBC  4.52  4.66  4.76   HGB  14.1  14.4  14.8   HCT  41.4  43.1  43.3   MCV  92  93  91   RDW  15.5*  14.4  13.9   PLT  225  218  227   MCH  31.2  30.9  31.1   MCHC  34.1  33.4  34.2   NEUTROPHIL  70.5  77.2  70.0   LYMPH  12.8  11.0  15.0   MONOCYTE  14.5  10.3  13.0   EOSINOPHIL  1.7  1.1  1.4   BASOPHIL  0.5  0.4  0.6   ANEU  5.6  6.1  4.6   ALYM  1.0  0.9  1.0   SMITH  1.1  0.8  0.9   AEOS  0.1  0.1  0.1   ABAS  0.0  0.0  0.0     CMP  Recent Labs   Lab Test  05/15/18   1136  03/26/18   1130  03/05/18   1315  02/06/18   1037  10/31/17   1105   06/23/17   1156   NA   --   139  140   --    --    --   139   POTASSIUM   --   4.6  5.5*   --    --    --   4.3   CHLORIDE   --   107  108   --    --    --   105   CO2   --   23  23   --    --    --   21   ANIONGAP   --   9  9   --    --    --   13   GLC   --   84  72   --    --    --   101*   BUN   --   20  21   --    --    --   35*   CR  1.04  1.01  0.96  1.07  1.08   < >  1.41*   GFRESTIMATED  70  72  77  68  67   < >  49*   GFRESTBLACK  84  87  >90  82  81   < >  60*   NEW   --   8.8  9.5   --    --    --   9.6   BILITOTAL  0.6   --    --   0.4  0.5   < >   --    ALBUMIN  3.6   --    --   3.6  3.9   < >   --    PROTTOTAL  6.6*   --    --   6.7*  7.1   < >   --    ALKPHOS  49   --    --   53  51   < >   --    AST  27   --    --   10  17   < >   --    ALT  30   --    --   22  27   < >   --     < > = values in this interval not displayed.     Calcium/VitaminD  Recent Labs   Lab Test  03/26/18   1130  03/05/18   1315  06/23/17   1156   07/23/13   1016   12/05/11   1200   NEW  8.8  9.5  9.6   < >   --    < >  9.3   D3VIT   --    --    --    --    --    --   30   VITDT   --    --    --    --   28*   --    --     < > = values in this interval not displayed.  "    ESR/CRP  Recent Labs   Lab Test  02/06/18   1037  10/31/17   1105  08/09/17   1408   SED  11  12  19   CRP  4.7  4.6  9.7*     Hepatitis B  Recent Labs   Lab Test  04/07/16   1149   HBCAB  Nonreactive   HEPBANG  Nonreactive     Hepatitis C  Recent Labs   Lab Test  04/07/16   1149   HCVAB  Nonreactive   Assay performance characteristics have not been established for newborns,   infants, and children       HIV Screening  Recent Labs   Lab Test  04/07/16   1149   HIAGAB  Nonreactive   HIV-1 p24 Ag & HIV-1/HIV-2 Ab Not Detected         \"MRI LEFT KNEE  10/08/2013    INDICATION: Left knee pain. Locking, catching and snapping. Weakness.  TECHNIQUE: Routine.  COMPARISON: None.    FINDINGS:  MEDIAL COMPARTMENT: Linear tearing involving the posterior horn and body of   the medial meniscus as seen on series 4: image 6-8. This is also seen on   series 5: image 9-10. Cartilage is thinned peripherally.    LATERAL COMPARTMENT: Lateral meniscus also demonstrates tearing in the   posterior horn on series 4: image 22. There is diffuse tearing in the body   of the meniscus which is horizontal as seen on series 5: image 9 and this   extends into the anterior horn. Cartilage is thinned.    PATELLOFEMORAL COMPARTMENT: Retropatellar cartilage demonstrates   full-thickness loss of cartilage over portions of the median ridge, lateral   facet and medial facet. Cartilage is better preserved over the lower pole   centrally. There is also full-thickness loss of cartilage in the lateral   and central trochlear groove. Patella is normally aligned. Retinaculum are   intact.    LIGAMENTS AND TENDONS: The anterior cruciate and posterior cruciate   ligaments are intact. The medial collateral ligament is intact. Lateral   collateral ligamentous complex and popliteus insertion are intact.   Quadriceps tendon and patellar tendon are intact.    BONES AND SOFT TISSUES: Moderate-sized joint effusion. No popliteal cyst.   No muscle edema or " "fracture.    CONCLUSION:  1. There is tearing of the medial meniscus involving the posterior horn and   body. Cartilage is thinned peripherally.  2. There is also tearing in the posterior horn and body of the lateral   meniscus which also extends into the anterior horn. Cartilage is also   thinned in the lateral compartment.  3. Moderate to severe osteoarthritis in the patellofemoral compartment with   full-thickness loss of cartilage over large portions of the retropatellar   surface and trochlear surface.  4. Joint effusion.    IF YOU ARE A PHYSICIAN AND HAVE QUESTIONS REGARDING THIS REPORT, PLEASE   CALL [phone].\"    \"X-RAY KNEES BILATERAL AP W/LAT/SUN/PA LEFT   Oct 08, 2013 09:51:59 AM     INDICATION: Pain and swelling   COMPARISON: None.      FINDINGS: Moderate narrowing lateral aspect of the patellofemoral   compartment with slight lateral subluxation of the patella. Medial and   lateral compartments are preserved. Moderate knee joint effusion and/or   synovitis. Enthesophyte formation distal quadriceps and proximal patellar   tendons. Extensive vascular calcifications.     AP view right knee demonstrates trace narrowing medial compartment joint   Space.\"    Immunization History     Immunization History   Administered Date(s) Administered     Influenza (High Dose) 3 valent vaccine 09/20/2012, 10/20/2015, 09/20/2016, 08/28/2017     Influenza (IIV3) PF 10/05/1999, 10/30/2008, 09/28/2011, 10/14/2013, 10/03/2014     Pneumo Conj 13-V (2010&after) 06/29/2015     Pneumococcal 23 valent 08/19/2010     TD (ADULT, 7+) 08/05/1997, 02/03/2011     Zoster vaccine, live 04/19/2010       Procedure     Procedure: Steroid injections of the bilateral knees  Indication: Pain, bilateral patellofemoral syndrome    The procedure was explained in detail. Risks including infection, pain, structural damage such as cartilage damage and tendon rupture, fat atrophy, skin hyper-/hypo-pigmentation, and medication reaction was " explained. The need for rest of the affected joint for one week after the procedure was explained.  The option of not doing the procedure was also provided. All questions were answered and the patient consented to the procedure.     A time-out was performed and the correct patient, procedure, and laterality were verified.    The right knee was examined and location for injection was identified - anterior medial. The area was cleaned with chlorhexidine, twice.  Ethyl chloride was then used for topical anaesthetic.  Then a mixture of lidocaine 1% 2 mL and Kenalog 40mg was injected into the intra-articular space.     The left knee was examined and location for injection was identified - anterior medial. The area was cleaned with chlorhexidine, twice.  Ethyl chloride was then used for topical anaesthetic.  Then a mixture of lidocaine 1% 2 mL and Kenalog 40mg was injected into the intra-articular space.     The patient tolerated the procedure well. No complications.    MEDICATION: Kenalog 40 mg  LOT #: [id number]  : [employer]  EXPIRATION DATE: 04/01/2019  NDC#: 9359-2990-57     MEDICATION: Kenalog 40 mg  LOT #: [id number]  : [employer]  EXPIRATION DATE: 04/01/2019  NDC#: 8513-0872-08     1% Lidocaine  : Hospira  Lot #: [id number]  EXPIRATION DATE: 10/01/2019  NDC: 2029-8088-54         Chart documentation done in part with Dragon Voice recognition Software. Although reviewed after completion, some word and grammatical error may remain.    Albert Tompkins MD

## 2018-05-17 NOTE — NURSING NOTE
The following medication was given:     MEDICATION: Kenalog 40 mg  SITE: Right knee  DOSE: 1 ml  LOT #: [id number]  :  Brand Thunder  EXPIRATION DATE:  04/01/2019  NDC#: 5428-6698-96     MEDICATION: Kenalog 40 mg  SITE: Left knee  DOSE: 1 ml  LOT #: [id number]  :  Brand Thunder  EXPIRATION DATE:  04/01/2019  NDC#: 1206-1720-34     1% Lidocaine  : Hospira  Lot #: [id number]  EXPIRATION DATE: 10/01/2019  NDC: 7716-6416-91

## 2018-05-17 NOTE — MR AVS SNAPSHOT
After Visit Summary   5/17/2018    Zurdo Dash    MRN: [id number]           Patient Information     Date Of Birth          1945        Visit Information        Provider Department      5/17/2018 1:40 PM Albert Tompkins MD [facility]        Today's Diagnoses     Rheumatoid arthritis involving multiple sites with positive rheumatoid factor (H)    -  1    High risk medication use        Primary osteoarthritis of both knees          Care Instructions    Rheumatology    Dr. Albert Lanier [facility]   (Monday)  [address]  [place] [address]       [facility]   (Tuesday)  [address]  [place] [address]    [facility]   (Wed., Thurs., and Friday)  [address] [place] [address]    Phone number: [phone]  Thank you for choosing [place].  Eryn Espinoza CMA            Follow-ups after your visit        Additional Services     OPHTHALMOLOGY ADULT REFERRAL       Your provider has referred you to:  FMG: [facility] [phone]   [email or web address]    Reason for referral: Hydroxychloroquine (plaquenil) toxicity monitoring.     Please be aware that coverage of these services is subject to the terms and limitations of your health insurance plan.  Call member services at your health plan with any benefit or coverage questions.      Please bring the following to your appointment:  >>   Any x-rays, CTs or MRIs which have been performed.  Contact the facility where they were done to arrange for  prior to your scheduled appointment.  Any new CT, MRI or other procedures ordered by your specialist must be performed at a [facility] or coordinated by your clinic's referral office.    >>   List of current medications   >>   This referral request   >>   Any documents/labs given to you for this referral                  Your next 10 appointments already scheduled     Sep 10, 2018 11:15  AM CDT   LAB with [facility]   [facility] [place] ([facility])    [address]  [place] [address] [phone]           Please do not eat 10-12 hours before your appointment if you are coming in fasting for labs on lipids, cholesterol, or glucose (sugar). This does not apply to pregnant women. Water, hot tea and black coffee (with nothing added) are okay. Do not drink other fluids, diet soda or chew gum.            Sep 13, 2018 11:20 AM CDT   Return Visit with Albert Tompkins MD   [facility] [place] ([facility])    [address]  [place] [address] [phone]              Future tests that were ordered for you today     Open Future Orders        Priority Expected Expires Ordered    CBC with platelets differential Routine 8/11/2018 9/14/2018 5/17/2018    Creatinine Routine 8/11/2018 9/14/2018 5/17/2018    Erythrocyte sedimentation rate auto Routine 8/11/2018 9/14/2018 5/17/2018    CRP inflammation Routine 8/11/2018 9/14/2018 5/17/2018    Hepatic panel Routine 8/11/2018 9/14/2018 5/17/2018            Who to contact     If you have questions or need follow up information about today's clinic visit or your schedule please contact [facility] directly at [phone].  Normal or non-critical lab and imaging results will be communicated to you by Way2Payhart, letter or phone within 4 business days after the clinic has received the results. If you do not hear from us within 7 days, please contact the clinic through [employer] or phone. If you have a critical or abnormal lab result, we will notify you by phone as soon as possible.  Submit refill requests through ZipZap or call your pharmacy and they will forward the refill request to us. Please allow 3 business days for your refill to be completed.          Additional Information About Your Visit        ZipZap Information     ZipZap lets you send messages to your doctor, view your test results, renew  "your prescriptions, schedule appointments and more. To sign up, go to [email or web address] . Click on \"Log in\" on the left side of the screen, which will take you to the Welcome page. Then click on \"Sign up Now\" on the right side of the page.     You will be asked to enter the access code listed below, as well as some personal information. Please follow the directions to create your username and password.     Your access code is: [email or web address]  Expires: 8/15/2018  2:54 PM     Your access code will  in 90 days. If you need help or a new code, please call your [place] clinic or [phone].        Care EveryWhere ID     This is your Care EveryWhere ID. This could be used by other organizations to access your [place] medical records  [id number]        Your Vitals Were     Pulse Respirations Height Pulse Oximetry BMI (Body Mass Index)       53 16 1.74 m (5' 8.5\") 96% 30.57 kg/m2        Blood Pressure from Last 3 Encounters:   18 132/80   18 136/78   18 116/70    Weight from Last 3 Encounters:   18 92.5 kg (204 lb)   18 90.3 kg (199 lb)   18 89.4 kg (197 lb)              We Performed the Following     OPHTHALMOLOGY ADULT REFERRAL          Where to get your medicines      These medications were sent to [facility] [place]  GORDY Couch - [address]  [address], [place] [address]     Phone:  [phone]     folic acid 1 MG tablet    hydroxychloroquine 200 MG tablet          Primary Care Provider Office Phone # Fax #    Kapil Duckworth [phone] [address]  [place] [address]        Equal Access to Services     TASHI BLACKMAN : Deniz Garcia, jens melo, qalindata kaaranza lloyd. [facility] [phone].    ATENCIÓN: Si habla español, tiene a diallo disposición servicios gratuitos de asistencia lingüística. Llame al [phone].    We comply with " applicable federal civil rights laws and [place] laws. We do not discriminate on the basis of race, color, national origin, age, disability, sex, sexual orientation, or gender identity.            Thank you!     Thank you for choosing [facility]  for your care. Our goal is always to provide you with excellent care. Hearing back from our patients is one way we can continue to improve our services. Please take a few minutes to complete the written survey that you may receive in the mail after your visit with us. Thank you!             Your Updated Medication List - Protect others around you: Learn how to safely use, store and throw away your medicines at www.disposemymeds.org.          This list is accurate as of 5/17/18  2:55 PM.  Always use your most recent med list.                   Brand Name Dispense Instructions for use Diagnosis    albuterol 108 (90 Base) MCG/ACT Inhaler    PROAIR HFA/PROVENTIL HFA/VENTOLIN HFA    1 Inhaler    Inhale 2 puffs into the lungs every 4 hours as needed for other (coughing)    Acute bronchitis, unspecified organism       aspirin 81 MG tablet      Take 1 tablet by mouth daily.    Type 2 diabetes, HbA1c goal < 7% (H)       blood glucose lancing device    no brand specified    1 each    Use to test blood sugars 3 times daily or as directed.    Type 2 diabetes mellitus, uncontrolled, with neuropathy (H)       blood glucose monitoring test strip    INDIA CONTOUR NEXT    300 each    TEST THREE TIMES A DAY OR AS DIRECTED, dispense covered brand    Type 2 diabetes mellitus, uncontrolled, with neuropathy (H)       cephALEXin 500 MG capsule    KEFLEX    30 capsule    Take 1 capsule (500 mg) by mouth 3 times daily    Diabetic ulcer of toe of left foot associated with type 2 diabetes mellitus, limited to breakdown of skin (H)       cetirizine 10 MG tablet    zyrTEC     Take 10 mg by mouth At Bedtime        folic acid 1 MG tablet    FOLVITE    100 tablet    Take 1 tablet (1 mg) by  mouth daily    Rheumatoid arthritis involving multiple sites with positive rheumatoid factor (H)       guaiFENesin-codeine 100-10 MG/5ML Soln solution    ROBITUSSIN AC    120 mL    Take 5 mLs by mouth every 4 hours as needed for cough    Cough       HYDROcodone-acetaminophen 5-325 MG per tablet    NORCO    28 tablet    Take 1-2 tablets by mouth every 4 hours as needed for moderate to severe pain maximum 4 tablet(s) per day    Rheumatoid arthritis involving multiple sites with positive rheumatoid factor (H)       hydroxychloroquine 200 MG tablet    PLAQUENIL    180 tablet    Take 1 tablet (200 mg) by mouth 2 times daily    Rheumatoid arthritis involving multiple sites with positive rheumatoid factor (H)       insulin pen needle 31G X 6 MM    ULTICARE MINI    100 each    Use 1 daily or as directed.    Type 2 diabetes mellitus, uncontrolled, with neuropathy (H)       liraglutide 18 MG/3ML soln    VICTOZA PEN    9 mL    Inject 0.6 mg Subcutaneous daily    Type 2 diabetes mellitus, uncontrolled, with neuropathy (H)       lisinopril 2.5 MG tablet    PRINIVIL/Zestril    90 tablet    Take 1 tablet (2.5 mg) by mouth daily    Hypotension, unspecified hypotension type       metFORMIN 500 MG tablet    GLUCOPHAGE    1 tablet    Take 1 tablet (500 mg) by mouth 2 times daily (with meals) TAKE TWO TABLETS BY MOUTH TWICE A DAY WITH MEALS    Type 2 diabetes mellitus with diabetic polyneuropathy, with long-term current use of insulin (H)       methotrexate sodium 2.5 MG Tabs     120 tablet    Take 25 mg by mouth once a week . Take all 10 tablets on the same day of each week.    Rheumatoid arthritis involving multiple sites with positive rheumatoid factor (H)       metoprolol succinate 25 MG 24 hr tablet    TOPROL-XL    90 tablet    TAKE ONE TABLET BY MOUTH EVERY DAY    Hypertension goal BP (blood pressure) < 140/90       * MICROLET LANCETS Misc     100 each    1 Device daily    Type 2 diabetes, HbA1C goal < 8% (H)       * blood  glucose monitoring lancets     300 each    Use to test blood sugars 3 times daily or as directed.    Type 2 diabetes mellitus, uncontrolled, with neuropathy (H)       omeprazole 20 MG CR capsule    priLOSEC    90 capsule    Take 1 capsule (20 mg) by mouth daily    Gastroesophageal reflux disease without esophagitis       * order for DME     1 Device    Equipment being ordered: specialized shoes for diabetic neuropathy   His orthotics will be made by [employer] ?? Tim Jernigan, Certified  [email or web address] Phone: [phone] Fax: [phone] [address] [place] [address]    Type 2 diabetes mellitus with diabetic polyneuropathy, with long-term current use of insulin (H), Diabetic polyneuropathy associated with type 2 diabetes mellitus (H)       * order for DME     1 Device    Equipment being ordered: glucometer    Type 2 diabetes mellitus, uncontrolled, with neuropathy (H)       pravastatin 40 MG tablet    PRAVACHOL    1 tablet    Take 0.5 tablets (20 mg) by mouth every other day    Hyperlipidemia LDL goal <100       tiZANidine 2 MG tablet    ZANAFLEX    90 tablet    Take 1 tablet (2 mg) by mouth 3 times daily as needed for muscle spasms    Neck pain       * Notice:  This list has 4 medication(s) that are the same as other medications prescribed for you. Read the directions carefully, and ask your doctor or other care provider to review them with you.

## 2018-05-17 NOTE — NURSING NOTE
"Chief Complaint   Patient presents with     Arthritis     RA, patient is doing pretty good. Patient has stiff neck once in awhile. Sometimes neck does not want to turn. Knees are hurting, would like injections today. last done on 2/7/2017       Initial /80  Pulse 53  Resp 16  Ht 1.74 m (5' 8.5\")  Wt 92.5 kg (204 lb)  SpO2 96%  BMI 30.57 kg/m2 Estimated body mass index is 30.57 kg/(m^2) as calculated from the following:    Height as of this encounter: 1.74 m (5' 8.5\").    Weight as of this encounter: 92.5 kg (204 lb).  BP completed using cuff size: large         RAPID3 (0-30) Cumulative Score  1.3          RAPID3 Weighted Score (divide #4 by 3 and that is the weighted score)  0.43         "

## 2018-06-07 ENCOUNTER — OFFICE VISIT (OUTPATIENT)
Dept: PODIATRY | Facility: CLINIC | Age: 73
End: 2018-06-07
Payer: COMMERCIAL

## 2018-06-07 VITALS
WEIGHT: 214 LBS | SYSTOLIC BLOOD PRESSURE: 114 MMHG | HEART RATE: 56 BPM | BODY MASS INDEX: 32.07 KG/M2 | DIASTOLIC BLOOD PRESSURE: 74 MMHG

## 2018-06-07 DIAGNOSIS — E11.621 DIABETIC ULCER OF TOE OF LEFT FOOT ASSOCIATED WITH TYPE 2 DIABETES MELLITUS, UNSPECIFIED ULCER STAGE (H): ICD-10-CM

## 2018-06-07 DIAGNOSIS — E11.9 TYPE 2 DIABETES MELLITUS WITHOUT RETINOPATHY (H): Primary | ICD-10-CM

## 2018-06-07 DIAGNOSIS — L97.529 DIABETIC ULCER OF TOE OF LEFT FOOT ASSOCIATED WITH TYPE 2 DIABETES MELLITUS, UNSPECIFIED ULCER STAGE (H): ICD-10-CM

## 2018-06-07 PROCEDURE — 99213 OFFICE O/P EST LOW 20 MIN: CPT | Performed by: PODIATRIST

## 2018-06-07 NOTE — PROGRESS NOTES
Subjective:    4/5/18  Pt is seen today in consult from Dr. Duckworth with the c/c of an ulcer left third toe.  This has been problematic for 2 week(s).   States this started when he trimmed his toenail and he thinks he may have cut the end of the toe.  He has some pain with this.  Denies drainage, erythema or edema.  Slightly tender upon long periods of standing/ambulating.  Aggravated by activity and relieved by rest.  He is retired.  40 pack year history of smoking and quit in 2007.  He has diabetes and numbness and tingling in his feet.  Rheumatoid arthritis.  He was started on Keflex by Dr. Voss.  Primary CARE last seen 3/26/2018.    4/12/18 patient dressing foot twice a day with antibiotic ointment and Band-Aid.  Is using the crest pad at all times.  He is wearing a shoe on his foot at all times and just got new shoes.  A slight drainage on the Band-Aid.  He will finish his Keflex and a few days.  He denies any purulence or odor fever chills    4/19/18 for the last 2 days no drainage from toe on dressing.  He is wearing a crest pad at all times while walking.  He is dressing this daily with a Band-Aid and antibiotic ointment.  He denies any purulence odor or erythema.    6/7/18 he is using crest pad.  States he has a hard time keeping this under his third toe.  He has rare drainage from the end of his left third toe.  Dressing with a Band-Aid.  States if he tightens crest pad to much toe gets red and painful.    ROS: Denies purulence odor fever and chills.       No Known Allergies    Current Outpatient Prescriptions   Medication Sig Dispense Refill     albuterol (PROAIR HFA/PROVENTIL HFA/VENTOLIN HFA) 108 (90 BASE) MCG/ACT Inhaler Inhale 2 puffs into the lungs every 4 hours as needed for other (coughing) 1 Inhaler 1     aspirin 81 MG tablet Take 1 tablet by mouth daily.  3     blood glucose (NO BRAND SPECIFIED) lancing device Use to test blood sugars 3 times daily or as directed. 1 each 0     blood glucose  monitoring (INDIA CONTOUR NEXT) test strip TEST THREE TIMES A DAY OR AS DIRECTED, dispense covered brand 300 each 1     blood glucose monitoring (ONE TOUCH DELICA) lancets Use to test blood sugars 3 times daily or as directed. 300 each 3     cetirizine (ZYRTEC) 10 MG tablet Take 10 mg by mouth At Bedtime       folic acid (FOLVITE) 1 MG tablet Take 1 tablet (1 mg) by mouth daily 100 tablet 3     guaiFENesin-codeine (ROBITUSSIN AC) 100-10 MG/5ML SOLN solution Take 5 mLs by mouth every 4 hours as needed for cough 120 mL 0     HYDROcodone-acetaminophen (NORCO) 5-325 MG per tablet Take 1-2 tablets by mouth every 4 hours as needed for moderate to severe pain maximum 4 tablet(s) per day 28 tablet 0     hydroxychloroquine (PLAQUENIL) 200 MG tablet Take 1 tablet (200 mg) by mouth 2 times daily 180 tablet 1     insulin pen needle (ULTICARE MINI) 31G X 6 MM Use 1 daily or as directed. 100 each 1     liraglutide (VICTOZA PEN) 18 MG/3ML soln Inject 0.6 mg Subcutaneous daily 9 mL 0     lisinopril (PRINIVIL/ZESTRIL) 2.5 MG tablet Take 1 tablet (2.5 mg) by mouth daily 90 tablet 3     metFORMIN (GLUCOPHAGE) 500 MG tablet Take 1 tablet (500 mg) by mouth 2 times daily (with meals) TAKE TWO TABLETS BY MOUTH TWICE A DAY WITH MEALS 1 tablet 0     methotrexate sodium 2.5 MG TABS Take 25 mg by mouth once a week . Take all 10 tablets on the same day of each week. 120 tablet 2     metoprolol (TOPROL-XL) 25 MG 24 hr tablet TAKE ONE TABLET BY MOUTH EVERY DAY 90 tablet 3     MICROLET LANCETS MISC 1 Device daily 100 each 4     omeprazole (PRILOSEC) 20 MG CR capsule Take 1 capsule (20 mg) by mouth daily 90 capsule 1     order for DME Equipment being ordered: glucometer 1 Device 0     order for DME Equipment being ordered: specialized shoes for diabetic neuropathy     His orthotics will be made by [employer]      Tim Jernigan, Certified   [email or web address]  Phone: [phone]  Fax: [phone]  [address] [place] [address] 1 Device 0     pravastatin (PRAVACHOL) 40 MG tablet Take 0.5 tablets (20 mg) by mouth every other day 1 tablet 0     tiZANidine (ZANAFLEX) 2 MG tablet Take 1 tablet (2 mg) by mouth 3 times daily as needed for muscle spasms 90 tablet 1       Patient Active Problem List   Diagnosis     Pain in limb     Hyperlipidemia LDL goal <100     Hypertension goal BP (blood pressure) < 140/90     obesity     Hypogonadism     Advanced directives, counseling/discussion     Health Care Home     Type 2 diabetes mellitus with diabetic polyneuropathy, with long-term current use of insulin (H)     RBBB (right bundle branch block)     Lumbago     Ex-smoker     Cataracts, both eyes     Family history of esophageal cancer     Gastroesophageal reflux disease, esophagitis presence not specified     Rheumatoid arthritis involving multiple sites with positive rheumatoid factor (H)     Pulmonary nodule     High risk medication use     Spondylosis of cervical region without myelopathy or radiculopathy     Erectile dysfunction, unspecified erectile dysfunction type     Type 2 diabetes mellitus without retinopathy (H)     Combined forms of age-related cataract of both eyes     PVD (posterior vitreous detachment), left eye     Tubulovillous adenoma of colon     Diabetic polyneuropathy associated with type 2 diabetes mellitus (H)       Past Medical History:   Diagnosis Date     Abnormal CT scan 03/2004    calcified lung granuloma     C. difficile diarrhea     H/O     Cataract 11/18/2011     Diabetic neuropathy (H)     mild, mostly soles and distal forefeet, worse on the left side.     Diverticulitis      ED (erectile dysfunction)      Ex-smoker     QUIT SMOKING FEB 2007     History of ETOH abuse     recovering, sober since 1997     Hyperlipidemia LDL goal <100      Hypertension goal BP (blood pressure) < 140/90      Hypogonadism      Obesity      PAD (peripheral artery disease) (H)     leg cramps, with exertion, no formal diagnosis  of PAD and minimal if any symptoms at all.     RA (rheumatoid arthritis) (H)     Dr Bailno     Type 2 diabetes, HbA1c goal < 7% (H) 10/2008    A1C of 7.1 %        Past Surgical History:   Procedure Laterality Date     COLONOSCOPY  03/01/2018    [place] [facility]     HC INCISION TENDON SHEATH FINGER  4/2009    r hand ring finger     TONSILLECTOMY         Family History   Problem Relation Age of Onset     CEREBROVASCULAR DISEASE Mother      Arthritis Mother      OSTEOPOROSIS Mother      Alzheimer Disease Father      Arthritis Father      CANCER Father      DIABETES Maternal Grandmother      Cardiovascular Maternal Grandmother      Other Cancer Brother      CANCER Paternal Aunt      Hypertension No family hx of      Thyroid Disease No family hx of      Glaucoma No family hx of      Macular Degeneration No family hx of        Social History   Substance Use Topics     Smoking status: Former Smoker     Packs/day: 1.00     Years: 40.00     Types: Cigarettes     Quit date: 3/16/2007     Smokeless tobacco: Never Used     Alcohol use No         Exam:    Vitals: /74  Pulse 56  Wt 214 lb (97.1 kg)  BMI 32.07 kg/m2  BMI: Body mass index is 32.07 kg/(m^2).  Height: Data Unavailable    Constitutional/ general:  Pt is in no apparent distress, appears well-nourished.  Cooperative with history and physical exam.     Psych:  The patient answered questions appropriately.  Normal affect.  Seems to have reasonable expectations, in terms of treatment.     Eyes:  Visual scanning/ tracking without deficit.     Ears:  Response to auditory stimuli is normal.  negative hearing aid devices.  Auricles in proper alignment.     Lymphatic:  Popliteal lymph nodes not enlarged.     Lungs:  Non labored breathing, non labored speech. No cough.  No audible wheezing. Even, quiet breathing.       Vascular: Lateral ankle edema and varicosities noted.  Difficult to palpate tibial pulses.  Dorsalis pedis weakly palpable.  Capillary refill time less than 3  seconds in all digits.  No hair growth noted on skin.    Neuro:  Alert and oriented x 3. Coordinated gait.  Light touch sensation is intact to the L4, L5, S1 distributions. No obvious deficits.  No evidence of neurological-based weakness, spasticity, or contracture in the lower extremities.  Monofilament absent to midfoot.      Derm: Skin thin shiny atrophic with no hair growth.     Musculoskeletal:    Lower extremity muscle strength is normal.  Patient is ambulatory without an assistive device or brace.  Pronated arch with weightbearing.  All lesser toes are hammered and stiff.  Generally his feet are stiff with a decreased range of motion bilaterally.    An ulcer is located on the end of the left third toe.  In the past this measured 5 x 4 mm, 4 x 3 mm in diameter and today it measures 0.5 x 0.5 mm.  Wound very small and almost healed.    No surrounding erythema, edema, or crepitus with palpation.  No sinus tracts, purulence or odor.  No drainage noted.    A:  Diabetes mellitus with peripheral neuropathy and LOPS  Left third toe ulcer  Left third hammertoe      P: Explained to patient that wound is smaller and is almost healed.   He will continue to wear the augmented the crest pad, but will sling this to 4th toe to keep mole skin on crest pad under third toe.  RTC prn.    .    Aaron Dent DPM, FACFAS

## 2018-06-07 NOTE — PATIENT INSTRUCTIONS
We wish you continued good healing. If you have any questions or concerns, please do not hesitate to contact us at [phone]    Please remember to call and schedule a follow up appointment if one was recommended at your earliest convenience.   PODIATRY CLINIC HOURS  TELEPHONE NUMBER    Dr. Aaron Dent D.P.[facility]    Clinics:  [facility]    Fay Patel [facility]   Tuesday 1PM-6PM  [place]  Wednesday 7AM-2PM  [facility]  Thursday 10AM-6PM  [place]  Friday 7AM-3PM  [place]  Specialty schedulers:   [phone] to make an appointment with any Specialty Provider.        Urgent Care locations:    [facility] Monday-Friday 5 pm - 9 pm. Saturday-Sunday 9 am -5pm    Monday-Friday 11 am - 9 pm Saturday 9 am - 5 pm     Monday-Sunday 12 noon-8PM [phone]     If you need a medication refill, please contact us you may need lab work and/or a follow up visit prior to your refill (i.e. Antifungal medications).    Zyncdt (secure e-mail communication and access to your chart) to send a message or to make an appointment.    If MRI needed please call [place] [facility] at [phone]        Weight management plan: Patient was referred to their PCP to discuss a diet and exercise plan.     Patient to follow up with Primary Care provider regarding elevated blood pressure.

## 2018-06-07 NOTE — LETTER
6/7/2018         RE: Zurdo Dash  [address] [place] [id number]        Dear Colleague,    Thank you for referring your patient, Zurdo Dash, to the [facility]. Please see a copy of my visit note below.    Subjective:    4/5/18  Pt is seen today in consult from Dr. Duckworth with the c/c of an ulcer left third toe.  This has been problematic for 2 week(s).   States this started when he trimmed his toenail and he thinks he may have cut the end of the toe.  He has some pain with this.  Denies drainage, erythema or edema.  Slightly tender upon long periods of standing/ambulating.  Aggravated by activity and relieved by rest.  He is retired.  40 pack year history of smoking and quit in 2007.  He has diabetes and numbness and tingling in his feet.  Rheumatoid arthritis.  He was started on Keflex by Dr. Voss.  Primary CARE last seen 3/26/2018.    4/12/18 patient dressing foot twice a day with antibiotic ointment and Band-Aid.  Is using the crest pad at all times.  He is wearing a shoe on his foot at all times and just got new shoes.  A slight drainage on the Band-Aid.  He will finish his Keflex and a few days.  He denies any purulence or odor fever chills    4/19/18 for the last 2 days no drainage from toe on dressing.  He is wearing a crest pad at all times while walking.  He is dressing this daily with a Band-Aid and antibiotic ointment.  He denies any purulence odor or erythema.    6/7/18 he is using crest pad.  States he has a hard time keeping this under his third toe.  He has rare drainage from the end of his left third toe.  Dressing with a Band-Aid.  States if he tightens crest pad to much toe gets red and painful.    ROS: Denies purulence odor fever and chills.       No Known Allergies    Current Outpatient Prescriptions   Medication Sig Dispense Refill     albuterol (PROAIR HFA/PROVENTIL HFA/VENTOLIN HFA) 108 (90 BASE) MCG/ACT Inhaler Inhale 2 puffs into the lungs every 4 hours as  needed for other (coughing) 1 Inhaler 1     aspirin 81 MG tablet Take 1 tablet by mouth daily.  3     blood glucose (NO BRAND SPECIFIED) lancing device Use to test blood sugars 3 times daily or as directed. 1 each 0     blood glucose monitoring (INDIA CONTOUR NEXT) test strip TEST THREE TIMES A DAY OR AS DIRECTED, dispense covered brand 300 each 1     blood glucose monitoring (ONE TOUCH DELICA) lancets Use to test blood sugars 3 times daily or as directed. 300 each 3     cetirizine (ZYRTEC) 10 MG tablet Take 10 mg by mouth At Bedtime       folic acid (FOLVITE) 1 MG tablet Take 1 tablet (1 mg) by mouth daily 100 tablet 3     guaiFENesin-codeine (ROBITUSSIN AC) 100-10 MG/5ML SOLN solution Take 5 mLs by mouth every 4 hours as needed for cough 120 mL 0     HYDROcodone-acetaminophen (NORCO) 5-325 MG per tablet Take 1-2 tablets by mouth every 4 hours as needed for moderate to severe pain maximum 4 tablet(s) per day 28 tablet 0     hydroxychloroquine (PLAQUENIL) 200 MG tablet Take 1 tablet (200 mg) by mouth 2 times daily 180 tablet 1     insulin pen needle (ULTICARE MINI) 31G X 6 MM Use 1 daily or as directed. 100 each 1     liraglutide (VICTOZA PEN) 18 MG/3ML soln Inject 0.6 mg Subcutaneous daily 9 mL 0     lisinopril (PRINIVIL/ZESTRIL) 2.5 MG tablet Take 1 tablet (2.5 mg) by mouth daily 90 tablet 3     metFORMIN (GLUCOPHAGE) 500 MG tablet Take 1 tablet (500 mg) by mouth 2 times daily (with meals) TAKE TWO TABLETS BY MOUTH TWICE A DAY WITH MEALS 1 tablet 0     methotrexate sodium 2.5 MG TABS Take 25 mg by mouth once a week . Take all 10 tablets on the same day of each week. 120 tablet 2     metoprolol (TOPROL-XL) 25 MG 24 hr tablet TAKE ONE TABLET BY MOUTH EVERY DAY 90 tablet 3     MICROLET LANCETS MISC 1 Device daily 100 each 4     omeprazole (PRILOSEC) 20 MG CR capsule Take 1 capsule (20 mg) by mouth daily 90 capsule 1     order for DME Equipment being ordered: glucometer 1 Device 0     order for DME Equipment being  ordered: specialized shoes for diabetic neuropathy     His orthotics will be made by [employer]      Tim Jernigan, Certified   [email or web address]  Phone: [phone]  Fax: [phone]  [address] [place] [address] 1 Device 0     pravastatin (PRAVACHOL) 40 MG tablet Take 0.5 tablets (20 mg) by mouth every other day 1 tablet 0     tiZANidine (ZANAFLEX) 2 MG tablet Take 1 tablet (2 mg) by mouth 3 times daily as needed for muscle spasms 90 tablet 1       Patient Active Problem List   Diagnosis     Pain in limb     Hyperlipidemia LDL goal <100     Hypertension goal BP (blood pressure) < 140/90     obesity     Hypogonadism     Advanced directives, counseling/discussion     Health Care Home     Type 2 diabetes mellitus with diabetic polyneuropathy, with long-term current use of insulin (H)     RBBB (right bundle branch block)     Lumbago     Ex-smoker     Cataracts, both eyes     Family history of esophageal cancer     Gastroesophageal reflux disease, esophagitis presence not specified     Rheumatoid arthritis involving multiple sites with positive rheumatoid factor (H)     Pulmonary nodule     High risk medication use     Spondylosis of cervical region without myelopathy or radiculopathy     Erectile dysfunction, unspecified erectile dysfunction type     Type 2 diabetes mellitus without retinopathy (H)     Combined forms of age-related cataract of both eyes     PVD (posterior vitreous detachment), left eye     Tubulovillous adenoma of colon     Diabetic polyneuropathy associated with type 2 diabetes mellitus (H)       Past Medical History:   Diagnosis Date     Abnormal CT scan 03/2004    calcified lung granuloma     C. difficile diarrhea     H/O     Cataract 11/18/2011     Diabetic neuropathy (H)     mild, mostly soles and distal forefeet, worse on the left side.     Diverticulitis      ED (erectile dysfunction)      Ex-smoker     QUIT SMOKING FEB 2007     History of ETOH abuse      recovering, sober since 1997     Hyperlipidemia LDL goal <100      Hypertension goal BP (blood pressure) < 140/90      Hypogonadism      Obesity      PAD (peripheral artery disease) (H)     leg cramps, with exertion, no formal diagnosis of PAD and minimal if any symptoms at all.     RA (rheumatoid arthritis) (H)     Dr Bailon     Type 2 diabetes, HbA1c goal < 7% (H) 10/2008    A1C of 7.1 %        Past Surgical History:   Procedure Laterality Date     COLONOSCOPY  03/01/2018    [facility]     HC INCISION TENDON SHEATH FINGER  4/2009    r hand ring finger     TONSILLECTOMY         Family History   Problem Relation Age of Onset     CEREBROVASCULAR DISEASE Mother      Arthritis Mother      OSTEOPOROSIS Mother      Alzheimer Disease Father      Arthritis Father      CANCER Father      DIABETES Maternal Grandmother      Cardiovascular Maternal Grandmother      Other Cancer Brother      CANCER Paternal Aunt      Hypertension No family hx of      Thyroid Disease No family hx of      Glaucoma No family hx of      Macular Degeneration No family hx of        Social History   Substance Use Topics     Smoking status: Former Smoker     Packs/day: 1.00     Years: 40.00     Types: Cigarettes     Quit date: 3/16/2007     Smokeless tobacco: Never Used     Alcohol use No         Exam:    Vitals: /74  Pulse 56  Wt 214 lb (97.1 kg)  BMI 32.07 kg/m2  BMI: Body mass index is 32.07 kg/(m^2).  Height: Data Unavailable    Constitutional/ general:  Pt is in no apparent distress, appears well-nourished.  Cooperative with history and physical exam.     Psych:  The patient answered questions appropriately.  Normal affect.  Seems to have reasonable expectations, in terms of treatment.     Eyes:  Visual scanning/ tracking without deficit.     Ears:  Response to auditory stimuli is normal.  negative hearing aid devices.  Auricles in proper alignment.     Lymphatic:  Popliteal lymph nodes not enlarged.     Lungs:  Non labored breathing, non  labored speech. No cough.  No audible wheezing. Even, quiet breathing.       Vascular: Lateral ankle edema and varicosities noted.  Difficult to palpate tibial pulses.  Dorsalis pedis weakly palpable.  Capillary refill time less than 3 seconds in all digits.  No hair growth noted on skin.    Neuro:  Alert and oriented x 3. Coordinated gait.  Light touch sensation is intact to the L4, L5, S1 distributions. No obvious deficits.  No evidence of neurological-based weakness, spasticity, or contracture in the lower extremities.  Monofilament absent to midfoot.      Derm: Skin thin shiny atrophic with no hair growth.     Musculoskeletal:    Lower extremity muscle strength is normal.  Patient is ambulatory without an assistive device or brace.  Pronated arch with weightbearing.  All lesser toes are hammered and stiff.  Generally his feet are stiff with a decreased range of motion bilaterally.    An ulcer is located on the end of the left third toe.  In the past this measured 5 x 4 mm, 4 x 3 mm in diameter and today it measures 0.5 x 0.5 mm.  Wound very small and almost healed.    No surrounding erythema, edema, or crepitus with palpation.  No sinus tracts, purulence or odor.  No drainage noted.    A:  Diabetes mellitus with peripheral neuropathy and LOPS  Left third toe ulcer  Left third hammertoe      P: Explained to patient that wound is smaller and is almost healed.   He will continue to wear the augmented the crest pad, but will sling this to 4th toe to keep mole skin on crest pad under third toe.  RTC prn.    .    Aaron Dent DPM, FACFAS            Again, thank you for allowing me to participate in the care of your patient.        Sincerely,        Aaron Dent DPM

## 2018-06-07 NOTE — MR AVS SNAPSHOT
After Visit Summary   6/7/2018    Zurdo Dash    MRN: [id number]           Patient Information     Date Of Birth          1945        Visit Information        Provider Department      6/7/2018 12:45 PM Aaron Dent, DPM [facility]        Care Instructions    We wish you continued good healing. If you have any questions or concerns, please do not hesitate to contact us at [phone]    Please remember to call and schedule a follow up appointment if one was recommended at your earliest convenience.   PODIATRY CLINIC HOURS  TELEPHONE NUMBER    Dr. Aaron DAVISONPPEDRITO FAC FAS    Clinics:  [facility]    Fay Patel [facility]   Tuesday 1PM-6PM  [place]  Wednesday 7AM-2PM  [place]  Thursday 10AM-6PM  [place]  Friday 7AM-3PM  [place]  Specialty schedulers:   [phone] to make an appointment with any Specialty Provider.        Urgent Care locations:    [facility] Monday-Friday 5 pm - 9 pm. Saturday-Sunday 9 am -5pm    Monday-Friday 11 am - 9 pm Saturday 9 am - 5 pm     Monday-Sunday 12 noon-8PM [phone]     If you need a medication refill, please contact us you may need lab work and/or a follow up visit prior to your refill (i.e. Antifungal medications).    MyChart (secure e-mail communication and access to your chart) to send a message or to make an appointment.    If MRI needed please call [place] Zheng at [phone]        Weight management plan: Patient was referred to their PCP to discuss a diet and exercise plan.     Patient to follow up with Primary Care provider regarding elevated blood pressure.              Follow-ups after your visit        Your next 10 appointments already scheduled     Sylvain 15, 2018  1:15 PM CDT   New Visit with Vicki Zheng MD   [facility] ([facility] [address]  [place] [address]   [phone]            Sep 10, 2018 11:15 AM CDT   LAB with [facility]   [facility] ([facility])    [address] [facility] [facility]  [place] [address]   [phone]           Please do not eat 10-12 hours before your appointment if you are coming in fasting for labs on lipids, cholesterol, or glucose (sugar). This does not apply to pregnant women. Water, hot tea and black coffee (with nothing added) are okay. Do not drink other fluids, diet soda or chew gum.            Sep 13, 2018 11:20 AM CDT   Return Visit with Albert Tompkins MD   [facility] ([facility])    [address] [facility] [facility]  [place] [address]   [phone]              Who to contact     If you have questions or need follow up information about today's clinic visit or your schedule please contact [facility] directly at [phone].  Normal or non-critical lab and imaging results will be communicated to you by Marinelayerhart, letter or phone within 4 business days after the clinic has received the results. If you do not hear from us within 7 days, please contact the clinic through [employer] or phone. If you have a critical or abnormal lab result, we will notify you by phone as soon as possible.  Submit refill requests through [employer] or call your pharmacy and they will forward the refill request to us. Please allow 3 business days for your refill to be completed.          Additional Information About Your Visit        [employer] Information     [employer] lets you send messages to your doctor, view your test results, renew your prescriptions, schedule appointments and more. To sign up, go to [email or web address] . Click on \"Log in\" on the left side of the screen, which will take you to the Welcome page. Then click on \"Sign up Now\" on the right side of the page.     You will be asked to enter the access code listed below, as well as some personal information. " Please follow the directions to create your username and password.     Your access code is: [email or web address]  Expires: 8/15/2018  2:54 PM     Your access code will  in 90 days. If you need help or a new code, please call your [place] clinic or [phone].        Care EveryWhere ID     This is your Care EveryWhere ID. This could be used by other organizations to access your [facility] medical records  [id number]        Your Vitals Were     Pulse BMI (Body Mass Index)                56 32.07 kg/m2           Blood Pressure from Last 3 Encounters:   18 114/74   18 132/80   18 136/78    Weight from Last 3 Encounters:   18 214 lb (97.1 kg)   18 204 lb (92.5 kg)   18 199 lb (90.3 kg)              Today, you had the following     No orders found for display       Primary Care Provider Office Phone # Fax #    Kapil Duckworth [phone]       [address] [facility] [address]        Equal Access to Services     [facility] HospitalMARCOS : Hadii aad ku hadasho Soomaali, waaxda luqadaha, qaybta kaalmada adenailayada, aranza zhong . So [facility] [phone].    ATENCIÓN: Si habla español, tiene a diallo disposición servicios gratuitos de asistencia lingüística. [facility] [phone].    We comply with applicable federal civil rights laws and [place] laws. We do not discriminate on the basis of race, color, national origin, age, disability, sex, sexual orientation, or gender identity.            Thank you!     Thank you for choosing [facility]  for your care. Our goal is always to provide you with excellent care. Hearing back from our patients is one way we can continue to improve our services. Please take a few minutes to complete the written survey that you may receive in the mail after your visit with us. Thank you!             Your Updated Medication List - Protect others around you: Learn how to safely use, store and throw away your medicines  at www.disposemymeds.org.          This list is accurate as of 6/7/18 12:51 PM.  Always use your most recent med list.                   Brand Name Dispense Instructions for use Diagnosis    albuterol 108 (90 Base) MCG/ACT Inhaler    PROAIR HFA/PROVENTIL HFA/VENTOLIN HFA    1 Inhaler    Inhale 2 puffs into the lungs every 4 hours as needed for other (coughing)    Acute bronchitis, unspecified organism       aspirin 81 MG tablet      Take 1 tablet by mouth daily.    Type 2 diabetes, HbA1c goal < 7% (H)       blood glucose lancing device    no brand specified    1 each    Use to test blood sugars 3 times daily or as directed.    Type 2 diabetes mellitus, uncontrolled, with neuropathy (H)       blood glucose monitoring test strip    INDIA CONTOUR NEXT    300 each    TEST THREE TIMES A DAY OR AS DIRECTED, dispense covered brand    Type 2 diabetes mellitus, uncontrolled, with neuropathy (H)       cetirizine 10 MG tablet    zyrTEC     Take 10 mg by mouth At Bedtime        folic acid 1 MG tablet    FOLVITE    100 tablet    Take 1 tablet (1 mg) by mouth daily    Rheumatoid arthritis involving multiple sites with positive rheumatoid factor (H)       guaiFENesin-codeine 100-10 MG/5ML Soln solution    ROBITUSSIN AC    120 mL    Take 5 mLs by mouth every 4 hours as needed for cough    Cough       HYDROcodone-acetaminophen 5-325 MG per tablet    NORCO    28 tablet    Take 1-2 tablets by mouth every 4 hours as needed for moderate to severe pain maximum 4 tablet(s) per day    Rheumatoid arthritis involving multiple sites with positive rheumatoid factor (H)       hydroxychloroquine 200 MG tablet    PLAQUENIL    180 tablet    Take 1 tablet (200 mg) by mouth 2 times daily    Rheumatoid arthritis involving multiple sites with positive rheumatoid factor (H)       insulin pen needle 31G X 6 MM    ULTICARE MINI    100 each    Use 1 daily or as directed.    Type 2 diabetes mellitus, uncontrolled, with neuropathy (H)       liraglutide 18  MG/3ML soln    VICTOZA PEN    9 mL    Inject 0.6 mg Subcutaneous daily    Type 2 diabetes mellitus, uncontrolled, with neuropathy (H)       lisinopril 2.5 MG tablet    PRINIVIL/Zestril    90 tablet    Take 1 tablet (2.5 mg) by mouth daily    Hypotension, unspecified hypotension type       metFORMIN 500 MG tablet    GLUCOPHAGE    1 tablet    Take 1 tablet (500 mg) by mouth 2 times daily (with meals) TAKE TWO TABLETS BY MOUTH TWICE A DAY WITH MEALS    Type 2 diabetes mellitus with diabetic polyneuropathy, with long-term current use of insulin (H)       methotrexate sodium 2.5 MG Tabs     120 tablet    Take 25 mg by mouth once a week . Take all 10 tablets on the same day of each week.    Rheumatoid arthritis involving multiple sites with positive rheumatoid factor (H)       metoprolol succinate 25 MG 24 hr tablet    TOPROL-XL    90 tablet    TAKE ONE TABLET BY MOUTH EVERY DAY    Hypertension goal BP (blood pressure) < 140/90       * MICROLET LANCETS Misc     100 each    1 Device daily    Type 2 diabetes, HbA1C goal < 8% (H)       * blood glucose monitoring lancets     300 each    Use to test blood sugars 3 times daily or as directed.    Type 2 diabetes mellitus, uncontrolled, with neuropathy (H)       omeprazole 20 MG CR capsule    priLOSEC    90 capsule    Take 1 capsule (20 mg) by mouth daily    Gastroesophageal reflux disease without esophagitis       * order for DME     1 Device    Equipment being ordered: specialized shoes for diabetic neuropathy   His orthotics will be made by [employer] ?? Tim Jernigan, Certified  [email or web address] Phone: [phone] Fax: [phone] [address] [place] [address]    Type 2 diabetes mellitus with diabetic polyneuropathy, with long-term current use of insulin (H), Diabetic polyneuropathy associated with type 2 diabetes mellitus (H)       * order for DME     1 Device    Equipment being ordered: glucometer    Type 2 diabetes mellitus,  uncontrolled, with neuropathy (H)       pravastatin 40 MG tablet    PRAVACHOL    1 tablet    Take 0.5 tablets (20 mg) by mouth every other day    Hyperlipidemia LDL goal <100       tiZANidine 2 MG tablet    ZANAFLEX    90 tablet    Take 1 tablet (2 mg) by mouth 3 times daily as needed for muscle spasms    Neck pain       * Notice:  This list has 4 medication(s) that are the same as other medications prescribed for you. Read the directions carefully, and ask your doctor or other care provider to review them with you.

## 2018-06-15 ENCOUNTER — OFFICE VISIT (OUTPATIENT)
Dept: OPHTHALMOLOGY | Facility: CLINIC | Age: 73
End: 2018-06-15
Payer: COMMERCIAL

## 2018-06-15 DIAGNOSIS — H25.813 COMBINED FORMS OF AGE-RELATED CATARACT OF BOTH EYES: ICD-10-CM

## 2018-06-15 DIAGNOSIS — M05.79 RHEUMATOID ARTHRITIS INVOLVING MULTIPLE SITES WITH POSITIVE RHEUMATOID FACTOR (H): ICD-10-CM

## 2018-06-15 DIAGNOSIS — Z79.899 HIGH RISK MEDICATION USE: Primary | ICD-10-CM

## 2018-06-15 PROCEDURE — 92134 CPTRZ OPH DX IMG PST SGM RTA: CPT | Performed by: STUDENT IN AN ORGANIZED HEALTH CARE EDUCATION/TRAINING PROGRAM

## 2018-06-15 PROCEDURE — 92083 EXTENDED VISUAL FIELD XM: CPT | Performed by: STUDENT IN AN ORGANIZED HEALTH CARE EDUCATION/TRAINING PROGRAM

## 2018-06-15 PROCEDURE — 92012 INTRM OPH EXAM EST PATIENT: CPT | Performed by: STUDENT IN AN ORGANIZED HEALTH CARE EDUCATION/TRAINING PROGRAM

## 2018-06-15 ASSESSMENT — SLIT LAMP EXAM - LIDS
COMMENTS: PAPILLOMA LOWER LID
COMMENTS: NORMAL

## 2018-06-15 ASSESSMENT — VISUAL ACUITY
METHOD: SNELLEN - LINEAR
OS_CC: 20/25
OD_CC+: -1
CORRECTION_TYPE: GLASSES
OS_CC+: -3
OD_CC: 20/25

## 2018-06-15 ASSESSMENT — EXTERNAL EXAM - LEFT EYE: OS_EXAM: NORMAL

## 2018-06-15 ASSESSMENT — EXTERNAL EXAM - RIGHT EYE: OD_EXAM: NORMAL

## 2018-06-15 NOTE — LETTER
6/15/2018         RE: Zurdo Dash  [address] [place] [id number]        Dear Colleague,    Thank you for referring your patient, Zurdo Dash, to the [facility].    Normal Plaquenil eye tests today.  Please see a copy of my visit note below.     Current Eye Medications:  None     Subjective:  Here for VF and OCT today for plaquenil 1-12-18 was last complete with Dr. Arceo.  Has been taking Plaquenil 200 mg BID, but unsure how long he has been taking it. Maybe 2012?  Was told by Dr. Arceo that he has cataracts wondering if they are bad enough.  Sometimes vision will blur and clear with blink.   Also wife is with today, would like to know if you received a note from Dr. Jerad dolan about herself.  Yoli Dash      Objective:  See Ophthalmology Exam.      Assessment:  Zurdo Dash is a 73 year old male who presents with:   Encounter Diagnoses   Name Primary?     High risk medication use Macular OCT and Kidd visual field (HVF) 10-2 within normal limits both eyes   About 6 years on Plaquenil      Rheumatoid arthritis involving multiple sites with positive rheumatoid factor (H)      Combined forms of age-related cataract of both eyes        Plan:  Continuing monitoring for Plaquenil eye issues annually.    Vicki Zheng MD  [phone]           Again, thank you for allowing me to participate in the care of your patient.        Sincerely,        Vicki Zheng MD

## 2018-06-15 NOTE — MR AVS SNAPSHOT
After Visit Summary   6/15/2018    Zurdo Dash    MRN: [id number]           Patient Information     Date Of Birth          1945        Visit Information        Provider Department      6/15/2018 1:15 PM Vicki Zheng MD [facility] [place]        Today's Diagnoses     Combined forms of age-related cataract of both eyes    -  1      Care Instructions    Continuing monitoring for Plaquenil eye issues annually.    Vicki Zheng MD  [phone]                Follow-ups after your visit        Follow-up notes from your care team     Return in about 7 months (around 1/15/2019) for Complete Exam.      Your next 10 appointments already scheduled     Sep 10, 2018 11:15 AM CDT   LAB with  LAB   [facility] [place] ([facility])    [address]  [place] [address] [phone]           Please do not eat 10-12 hours before your appointment if you are coming in fasting for labs on lipids, cholesterol, or glucose (sugar). This does not apply to pregnant women. Water, hot tea and black coffee (with nothing added) are okay. Do not drink other fluids, diet soda or chew gum.            Sep 13, 2018 11:20 AM CDT   Return Visit with Albert Tompkins MD   [facility] [place] ([facility])    [address] [place] [address]  [place] [id number] [phone]              Who to contact     If you have questions or need follow up information about today's clinic visit or your schedule please contact [facility] [place] directly at [phone].  Normal or non-critical lab and imaging results will be communicated to you by MyChart, letter or phone within 4 business days after the clinic has received the results. If you do not hear from us within 7 days, please contact the clinic through MyChart or phone. If you have a critical or abnormal lab result, we will notify you by phone as soon as possible.  Submit refill requests through [employer] or call your  "pharmacy and they will forward the refill request to us. Please allow 3 business days for your refill to be completed.          Additional Information About Your Visit        MyChart Information     [employer] lets you send messages to your doctor, view your test results, renew your prescriptions, schedule appointments and more. To sign up, go to [email or web address] . Click on \"Log in\" on the left side of the screen, which will take you to the Welcome page. Then click on \"Sign up Now\" on the right side of the page.     You will be asked to enter the access code listed below, as well as some personal information. Please follow the directions to create your username and password.     Your access code is: [email or web address]  Expires: 8/15/2018  2:54 PM     Your access code will  in 90 days. If you need help or a new code, please call your [facility] clinic or [phone].        Care EveryWhere ID     This is your Care EveryWhere ID. This could be used by other organizations to access your [facility] medical records  [id number]         Blood Pressure from Last 3 Encounters:   18 114/74   18 132/80   18 136/78    Weight from Last 3 Encounters:   18 97.1 kg (214 lb)   18 92.5 kg (204 lb)   18 90.3 kg (199 lb)              Today, you had the following     No orders found for display       Primary Care Provider Office Phone # Fax #    Kapil Duckworth [phone] [address] [address]        Equal Access to Services     [facility] : Hadii art Garcia, waaxda luqadaha, qaybta raymondalaranza ochoa. So [facility] [phone].    ATENCIÓN: Si habla español, tiene a diallo disposición servicios gratuitos de asistencia lingüística. Llame al [phone].    We comply with applicable federal civil rights laws and [place] laws. We do not discriminate on the basis of race, color, national origin, age, " disability, sex, sexual orientation, or gender identity.            Thank you!     Thank you for choosing [facility]  for your care. Our goal is always to provide you with excellent care. Hearing back from our patients is one way we can continue to improve our services. Please take a few minutes to complete the written survey that you may receive in the mail after your visit with us. Thank you!             Your Updated Medication List - Protect others around you: Learn how to safely use, store and throw away your medicines at www.disposemymeds.org.          This list is accurate as of 6/15/18  2:34 PM.  Always use your most recent med list.                   Brand Name Dispense Instructions for use Diagnosis    albuterol 108 (90 Base) MCG/ACT Inhaler    PROAIR HFA/PROVENTIL HFA/VENTOLIN HFA    1 Inhaler    Inhale 2 puffs into the lungs every 4 hours as needed for other (coughing)    Acute bronchitis, unspecified organism       aspirin 81 MG tablet      Take 1 tablet by mouth daily.    Type 2 diabetes, HbA1c goal < 7% (H)       blood glucose lancing device    no brand specified    1 each    Use to test blood sugars 3 times daily or as directed.    Type 2 diabetes mellitus, uncontrolled, with neuropathy (H)       blood glucose monitoring test strip    INDIA CONTOUR NEXT    300 each    TEST THREE TIMES A DAY OR AS DIRECTED, dispense covered brand    Type 2 diabetes mellitus, uncontrolled, with neuropathy (H)       cetirizine 10 MG tablet    zyrTEC     Take 10 mg by mouth At Bedtime        folic acid 1 MG tablet    FOLVITE    100 tablet    Take 1 tablet (1 mg) by mouth daily    Rheumatoid arthritis involving multiple sites with positive rheumatoid factor (H)       guaiFENesin-codeine 100-10 MG/5ML Soln solution    ROBITUSSIN AC    120 mL    Take 5 mLs by mouth every 4 hours as needed for cough    Cough       HYDROcodone-acetaminophen 5-325 MG per tablet    NORCO    28 tablet    Take 1-2 tablets by mouth  every 4 hours as needed for moderate to severe pain maximum 4 tablet(s) per day    Rheumatoid arthritis involving multiple sites with positive rheumatoid factor (H)       hydroxychloroquine 200 MG tablet    PLAQUENIL    180 tablet    Take 1 tablet (200 mg) by mouth 2 times daily    Rheumatoid arthritis involving multiple sites with positive rheumatoid factor (H)       insulin pen needle 31G X 6 MM    ULTICARE MINI    100 each    Use 1 daily or as directed.    Type 2 diabetes mellitus, uncontrolled, with neuropathy (H)       liraglutide 18 MG/3ML soln    VICTOZA PEN    9 mL    Inject 0.6 mg Subcutaneous daily    Type 2 diabetes mellitus, uncontrolled, with neuropathy (H)       lisinopril 2.5 MG tablet    PRINIVIL/Zestril    90 tablet    Take 1 tablet (2.5 mg) by mouth daily    Hypotension, unspecified hypotension type       metFORMIN 500 MG tablet    GLUCOPHAGE    1 tablet    Take 1 tablet (500 mg) by mouth 2 times daily (with meals) TAKE TWO TABLETS BY MOUTH TWICE A DAY WITH MEALS    Type 2 diabetes mellitus with diabetic polyneuropathy, with long-term current use of insulin (H)       methotrexate sodium 2.5 MG Tabs     120 tablet    Take 25 mg by mouth once a week . Take all 10 tablets on the same day of each week.    Rheumatoid arthritis involving multiple sites with positive rheumatoid factor (H)       metoprolol succinate 25 MG 24 hr tablet    TOPROL-XL    90 tablet    TAKE ONE TABLET BY MOUTH EVERY DAY    Hypertension goal BP (blood pressure) < 140/90       * MICROLET LANCETS Misc     100 each    1 Device daily    Type 2 diabetes, HbA1C goal < 8% (H)       * blood glucose monitoring lancets     300 each    Use to test blood sugars 3 times daily or as directed.    Type 2 diabetes mellitus, uncontrolled, with neuropathy (H)       omeprazole 20 MG CR capsule    priLOSEC    90 capsule    Take 1 capsule (20 mg) by mouth daily    Gastroesophageal reflux disease without esophagitis       * order for DME     1 Device     Equipment being ordered: specialized shoes for diabetic neuropathy   His orthotics will be made by [employer] ?? Tim Jernigan, Certified  [email or web address] Phone: [phone] Fax: [phone] [address] [place] [address]    Type 2 diabetes mellitus with diabetic polyneuropathy, with long-term current use of insulin (H), Diabetic polyneuropathy associated with type 2 diabetes mellitus (H)       * order for DME     1 Device    Equipment being ordered: glucometer    Type 2 diabetes mellitus, uncontrolled, with neuropathy (H)       pravastatin 40 MG tablet    PRAVACHOL    1 tablet    Take 0.5 tablets (20 mg) by mouth every other day    Hyperlipidemia LDL goal <100       tiZANidine 2 MG tablet    ZANAFLEX    90 tablet    Take 1 tablet (2 mg) by mouth 3 times daily as needed for muscle spasms    Neck pain       * Notice:  This list has 4 medication(s) that are the same as other medications prescribed for you. Read the directions carefully, and ask your doctor or other care provider to review them with you.

## 2018-06-15 NOTE — PROGRESS NOTES
Current Eye Medications:  None     Subjective:  Here for VF and OCT today for plaquenil 1-12-18 was last complete with Dr. Arceo.  Has been taking Plaquenil 200 mg BID, but unsure how long he has been taking it. Maybe 2012?  Was told by Dr. Arceo that he has cataracts wondering if they are bad enough.  Sometimes vision will blur and clear with blink.   Also wife is with today, would like to know if you received a note from Dr. Jerad dolan about herself.  Yoli Dash      Objective:  See Ophthalmology Exam.      Assessment:  Zurdo Dash is a 73 year old male who presents with:   Encounter Diagnoses   Name Primary?     High risk medication use Macular OCT and Kidd visual field (HVF) 10-2 within normal limits both eyes   About 6 years on Plaquenil      Rheumatoid arthritis involving multiple sites with positive rheumatoid factor (H)      Combined forms of age-related cataract of both eyes        Plan:  Continuing monitoring for Plaquenil eye issues annually.    Vicki Zheng MD  [phone]

## 2018-07-12 ENCOUNTER — OFFICE VISIT (OUTPATIENT)
Dept: PODIATRY | Facility: CLINIC | Age: 73
End: 2018-07-12
Payer: COMMERCIAL

## 2018-07-12 VITALS
DIASTOLIC BLOOD PRESSURE: 80 MMHG | HEART RATE: 58 BPM | SYSTOLIC BLOOD PRESSURE: 150 MMHG | WEIGHT: 215 LBS | BODY MASS INDEX: 32.22 KG/M2

## 2018-07-12 DIAGNOSIS — E11.9 TYPE 2 DIABETES MELLITUS WITHOUT RETINOPATHY (H): Primary | ICD-10-CM

## 2018-07-12 DIAGNOSIS — L60.0 INGROWING NAIL: ICD-10-CM

## 2018-07-12 DIAGNOSIS — B35.1 DERMATOPHYTOSIS OF NAIL: ICD-10-CM

## 2018-07-12 DIAGNOSIS — L84 CORNS AND CALLOSITIES: ICD-10-CM

## 2018-07-12 PROCEDURE — 11720 DEBRIDE NAIL 1-5: CPT | Mod: 51 | Performed by: PODIATRIST

## 2018-07-12 PROCEDURE — 99214 OFFICE O/P EST MOD 30 MIN: CPT | Mod: 25 | Performed by: PODIATRIST

## 2018-07-12 PROCEDURE — 11055 PARING/CUTG B9 HYPRKER LES 1: CPT | Mod: 59 | Performed by: PODIATRIST

## 2018-07-12 PROCEDURE — 11730 AVULSION NAIL PLATE SIMPLE 1: CPT | Mod: TA | Performed by: PODIATRIST

## 2018-07-12 NOTE — PROGRESS NOTES
Subjective:    Pt is seen today with the chief complaint of painful left hallux lateral border.  This has been problematic for 2 week(s).   Scribes as burning pain.  Aggravated by activity and relieved by rest.  No history of trauma.  Denies drainage, erythema or edema.  Slightly tender upon long periods of standing/ambulating.  Aggravated by activity and relieved by rest.  He is retired.  40 pack year history of smoking and quit in 2007.  He has diabetes and numbness and tingling in his feet.  Rheumatoid arthritis.    Primary care is Dr. Duckworth last seen 3/29/2018.      ROS: Denies purulence odor fever and chills.       No Known Allergies    Current Outpatient Prescriptions   Medication Sig Dispense Refill     albuterol (PROAIR HFA/PROVENTIL HFA/VENTOLIN HFA) 108 (90 BASE) MCG/ACT Inhaler Inhale 2 puffs into the lungs every 4 hours as needed for other (coughing) 1 Inhaler 1     aspirin 81 MG tablet Take 1 tablet by mouth daily.  3     blood glucose (NO BRAND SPECIFIED) lancing device Use to test blood sugars 3 times daily or as directed. 1 each 0     blood glucose monitoring (INDIA CONTOUR NEXT) test strip TEST THREE TIMES A DAY OR AS DIRECTED, dispense covered brand 300 each 1     blood glucose monitoring (ONE TOUCH DELICA) lancets Use to test blood sugars 3 times daily or as directed. 300 each 3     cetirizine (ZYRTEC) 10 MG tablet Take 10 mg by mouth At Bedtime       folic acid (FOLVITE) 1 MG tablet Take 1 tablet (1 mg) by mouth daily 100 tablet 3     guaiFENesin-codeine (ROBITUSSIN AC) 100-10 MG/5ML SOLN solution Take 5 mLs by mouth every 4 hours as needed for cough 120 mL 0     HYDROcodone-acetaminophen (NORCO) 5-325 MG per tablet Take 1-2 tablets by mouth every 4 hours as needed for moderate to severe pain maximum 4 tablet(s) per day 28 tablet 0     hydroxychloroquine (PLAQUENIL) 200 MG tablet Take 1 tablet (200 mg) by mouth 2 times daily 180 tablet 1     insulin pen needle (ULTICARE MINI) 31G X 6 MM Use 1  daily or as directed. 100 each 1     INSUPEN ULTRAFIN 31G X 6 MM USE TO TEST BLOOD SUGAR ONCE A DAY OR AS DIRECTED 100 each 0     liraglutide (VICTOZA PEN) 18 MG/3ML soln Inject 0.6 mg Subcutaneous daily 9 mL 0     lisinopril (PRINIVIL/ZESTRIL) 2.5 MG tablet Take 1 tablet (2.5 mg) by mouth daily 90 tablet 3     metFORMIN (GLUCOPHAGE) 500 MG tablet Take 1 tablet (500 mg) by mouth 2 times daily (with meals) TAKE TWO TABLETS BY MOUTH TWICE A DAY WITH MEALS 1 tablet 0     methotrexate sodium 2.5 MG TABS Take 25 mg by mouth once a week . Take all 10 tablets on the same day of each week. 120 tablet 2     metoprolol (TOPROL-XL) 25 MG 24 hr tablet TAKE ONE TABLET BY MOUTH EVERY DAY 90 tablet 3     MICROLET LANCETS MISC 1 Device daily 100 each 4     omeprazole (PRILOSEC) 20 MG CR capsule Take 1 capsule (20 mg) by mouth daily 90 capsule 1     order for DME Equipment being ordered: glucometer 1 Device 0     order for DME Equipment being ordered: specialized shoes for diabetic neuropathy     His orthotics will be made by [employer]      Tim Jernigan, Certified   [email or web address]  Phone: [phone]  Fax: [phone]  [address] [place] [address] 1 Device 0     pravastatin (PRAVACHOL) 40 MG tablet Take 0.5 tablets (20 mg) by mouth every other day 1 tablet 0     tiZANidine (ZANAFLEX) 2 MG tablet Take 1 tablet (2 mg) by mouth 3 times daily as needed for muscle spasms 90 tablet 1       Patient Active Problem List   Diagnosis     Pain in limb     Hyperlipidemia LDL goal <100     Hypertension goal BP (blood pressure) < 140/90     obesity     Hypogonadism     Advanced directives, counseling/discussion     Health Care Home     Type 2 diabetes mellitus with diabetic polyneuropathy, with long-term current use of insulin (H)     RBBB (right bundle branch block)     Lumbago     Ex-smoker     Cataracts, both eyes     Family history of esophageal cancer     Gastroesophageal reflux disease, esophagitis  presence not specified     Rheumatoid arthritis involving multiple sites with positive rheumatoid factor (H)     Pulmonary nodule     High risk medication use     Spondylosis of cervical region without myelopathy or radiculopathy     Erectile dysfunction, unspecified erectile dysfunction type     Type 2 diabetes mellitus without retinopathy (H)     Combined forms of age-related cataract of both eyes     PVD (posterior vitreous detachment), left eye     Tubulovillous adenoma of colon     Diabetic polyneuropathy associated with type 2 diabetes mellitus (H)       Past Medical History:   Diagnosis Date     Abnormal CT scan 03/2004    calcified lung granuloma     C. difficile diarrhea     H/O     Cataract 11/18/2011     Diabetic neuropathy (H)     mild, mostly soles and distal forefeet, worse on the left side.     Diverticulitis      ED (erectile dysfunction)      Ex-smoker     QUIT SMOKING FEB 2007     History of ETOH abuse     recovering, sober since 1997     Hyperlipidemia LDL goal <100      Hypertension goal BP (blood pressure) < 140/90      Hypogonadism      Obesity      PAD (peripheral artery disease) (H)     leg cramps, with exertion, no formal diagnosis of PAD and minimal if any symptoms at all.     RA (rheumatoid arthritis) (H)     Dr Bailon     Type 2 diabetes, HbA1c goal < 7% (H) 10/2008    A1C of 7.1 %        Past Surgical History:   Procedure Laterality Date     COLONOSCOPY  03/01/2018    [facility]     HC INCISION TENDON SHEATH FINGER  4/2009    r hand ring finger     TONSILLECTOMY         Family History   Problem Relation Age of Onset     Cerebrovascular Disease Mother      Arthritis Mother      Osteoperosis Mother      Alzheimer Disease Father      Arthritis Father      Cancer Father      Diabetes Maternal Grandmother      Cardiovascular Maternal Grandmother      Other Cancer Brother      Cancer Paternal Aunt      Hypertension No family hx of      Thyroid Disease No family hx of      Glaucoma No family hx of       Macular Degeneration No family hx of        Social History   Substance Use Topics     Smoking status: Former Smoker     Packs/day: 1.00     Years: 40.00     Types: Cigarettes     Quit date: 3/16/2007     Smokeless tobacco: Never Used     Alcohol use No         Exam:    Vitals: Noted    Constitutional/ general:  Pt is in no apparent distress, appears well-nourished.  Cooperative with history and physical exam.     Psych:  The patient answered questions appropriately.  Normal affect.  Seems to have reasonable expectations, in terms of treatment.     Eyes:  Visual scanning/ tracking without deficit.     Ears:  Response to auditory stimuli is normal.  negative hearing aid devices.  Auricles in proper alignment.     Lymphatic:  Popliteal lymph nodes not enlarged.     Lungs:  Non labored breathing, non labored speech. No cough.  No audible wheezing. Even, quiet breathing.       Vascular: Lateral ankle edema and varicosities noted.  Difficult to palpate tibial pulses.  Dorsalis pedis weakly palpable.  Capillary refill time less than 3 seconds in all digits.  No hair growth noted on skin.    Neuro:  Alert and oriented x 3. Coordinated gait.  Light touch sensation is intact to the L4, L5, S1 distributions. No obvious deficits.  No evidence of neurological-based weakness, spasticity, or contracture in the lower extremities.  Monofilament absent to midfoot.      Derm: Skin thin shiny atrophic with no hair growth.     Musculoskeletal:    Lower extremity muscle strength is normal.  Patient is ambulatory without an assistive device or brace.  Pronated arch with weightbearing.  All lesser toes are hammered and stiff.  Generally his feet are stiff with a decreased range of motion bilaterally.    A callus is noted at the end of the left third toe.  All left foot nails mycotic.  Left hallux lateral border has erythema edema and slight drainage.  With trimming out nail underlying tissue slightly irritated.  No purulence odor or  sinus tracts.      A:  Diabetes mellitus with peripheral neuropathy and LOPS  Left third toe callus  Onychomycosis  Left hallux ingrown nail      P:   Calluses debrided with a fifteen blade.  Mycotic nails manually debrided with a .   Discussed etiology and treatment options with the patient regarding ingrown nail.  Risks and benefits of partial temporary nail removal were discussed in detail.  Obtained verbal consent and no block needed.   Sterile prep and avulsed the lateral border and placed dry sterile dressing.  Cappillary refill time wnl.  Pt tolerated procedure well.  Wound care instructions given and RTC PRN.    .    Aaron Dent DPM, FACFAS

## 2018-07-12 NOTE — LETTER
7/12/2018         RE: Zurdo Dash  [address] [place] [id number]        Dear Colleague,    Thank you for referring your patient, Zurdo Dash, to the [facility]. Please see a copy of my visit note below.    Subjective:    4/5/18  Pt is seen today in consult from Dr. Duckworth with the c/c of a painful left hallux.  This has been problematic for 2 week(s).   Scribes as burning pain.  Aggravated by activity and relieved by rest.  No history of trauma.  Denies drainage, erythema or edema.  Slightly tender upon long periods of standing/ambulating.  Aggravated by activity and relieved by rest.  He is retired.  40 pack year history of smoking and quit in 2007.  He has diabetes and numbness and tingling in his feet.  Rheumatoid arthritis.    Primary care is Dr. Duckworth last seen 3/29/2018.      ROS: Denies purulence odor fever and chills.       No Known Allergies    Current Outpatient Prescriptions   Medication Sig Dispense Refill     albuterol (PROAIR HFA/PROVENTIL HFA/VENTOLIN HFA) 108 (90 BASE) MCG/ACT Inhaler Inhale 2 puffs into the lungs every 4 hours as needed for other (coughing) 1 Inhaler 1     aspirin 81 MG tablet Take 1 tablet by mouth daily.  3     blood glucose (NO BRAND SPECIFIED) lancing device Use to test blood sugars 3 times daily or as directed. 1 each 0     blood glucose monitoring (INDIA CONTOUR NEXT) test strip TEST THREE TIMES A DAY OR AS DIRECTED, dispense covered brand 300 each 1     blood glucose monitoring (ONE TOUCH DELICA) lancets Use to test blood sugars 3 times daily or as directed. 300 each 3     cetirizine (ZYRTEC) 10 MG tablet Take 10 mg by mouth At Bedtime       folic acid (FOLVITE) 1 MG tablet Take 1 tablet (1 mg) by mouth daily 100 tablet 3     guaiFENesin-codeine (ROBITUSSIN AC) 100-10 MG/5ML SOLN solution Take 5 mLs by mouth every 4 hours as needed for cough 120 mL 0     HYDROcodone-acetaminophen (NORCO) 5-325 MG per tablet Take 1-2 tablets by mouth every 4  hours as needed for moderate to severe pain maximum 4 tablet(s) per day 28 tablet 0     hydroxychloroquine (PLAQUENIL) 200 MG tablet Take 1 tablet (200 mg) by mouth 2 times daily 180 tablet 1     insulin pen needle (ULTICARE MINI) 31G X 6 MM Use 1 daily or as directed. 100 each 1     INSUPEN ULTRAFIN 31G X 6 MM USE TO TEST BLOOD SUGAR ONCE A DAY OR AS DIRECTED 100 each 0     liraglutide (VICTOZA PEN) 18 MG/3ML soln Inject 0.6 mg Subcutaneous daily 9 mL 0     lisinopril (PRINIVIL/ZESTRIL) 2.5 MG tablet Take 1 tablet (2.5 mg) by mouth daily 90 tablet 3     metFORMIN (GLUCOPHAGE) 500 MG tablet Take 1 tablet (500 mg) by mouth 2 times daily (with meals) TAKE TWO TABLETS BY MOUTH TWICE A DAY WITH MEALS 1 tablet 0     methotrexate sodium 2.5 MG TABS Take 25 mg by mouth once a week . Take all 10 tablets on the same day of each week. 120 tablet 2     metoprolol (TOPROL-XL) 25 MG 24 hr tablet TAKE ONE TABLET BY MOUTH EVERY DAY 90 tablet 3     MICROLET LANCETS MISC 1 Device daily 100 each 4     omeprazole (PRILOSEC) 20 MG CR capsule Take 1 capsule (20 mg) by mouth daily 90 capsule 1     order for DME Equipment being ordered: glucometer 1 Device 0     order for DME Equipment being ordered: specialized shoes for diabetic neuropathy     His orthotics will be made by [employer]      Tim Jernigan, Certified   [email or web address]  Phone: [phone]  Fax: [phone]  [address] [place] [address] 1 Device 0     pravastatin (PRAVACHOL) 40 MG tablet Take 0.5 tablets (20 mg) by mouth every other day 1 tablet 0     tiZANidine (ZANAFLEX) 2 MG tablet Take 1 tablet (2 mg) by mouth 3 times daily as needed for muscle spasms 90 tablet 1       Patient Active Problem List   Diagnosis     Pain in limb     Hyperlipidemia LDL goal <100     Hypertension goal BP (blood pressure) < 140/90     obesity     Hypogonadism     Advanced directives, counseling/discussion     Health Care Home     Type 2 diabetes mellitus  with diabetic polyneuropathy, with long-term current use of insulin (H)     RBBB (right bundle branch block)     Lumbago     Ex-smoker     Cataracts, both eyes     Family history of esophageal cancer     Gastroesophageal reflux disease, esophagitis presence not specified     Rheumatoid arthritis involving multiple sites with positive rheumatoid factor (H)     Pulmonary nodule     High risk medication use     Spondylosis of cervical region without myelopathy or radiculopathy     Erectile dysfunction, unspecified erectile dysfunction type     Type 2 diabetes mellitus without retinopathy (H)     Combined forms of age-related cataract of both eyes     PVD (posterior vitreous detachment), left eye     Tubulovillous adenoma of colon     Diabetic polyneuropathy associated with type 2 diabetes mellitus (H)       Past Medical History:   Diagnosis Date     Abnormal CT scan 03/2004    calcified lung granuloma     C. difficile diarrhea     H/O     Cataract 11/18/2011     Diabetic neuropathy (H)     mild, mostly soles and distal forefeet, worse on the left side.     Diverticulitis      ED (erectile dysfunction)      Ex-smoker     QUIT SMOKING FEB 2007     History of ETOH abuse     recovering, sober since 1997     Hyperlipidemia LDL goal <100      Hypertension goal BP (blood pressure) < 140/90      Hypogonadism      Obesity      PAD (peripheral artery disease) (H)     leg cramps, with exertion, no formal diagnosis of PAD and minimal if any symptoms at all.     RA (rheumatoid arthritis) (H)     Dr Bailon     Type 2 diabetes, HbA1c goal < 7% (H) 10/2008    A1C of 7.1 %        Past Surgical History:   Procedure Laterality Date     COLONOSCOPY  03/01/2018    [facility]     HC INCISION TENDON SHEATH FINGER  4/2009    r hand ring finger     TONSILLECTOMY         Family History   Problem Relation Age of Onset     Cerebrovascular Disease Mother      Arthritis Mother      Osteoperosis Mother      Alzheimer Disease Father      Arthritis  Father      Cancer Father      Diabetes Maternal Grandmother      Cardiovascular Maternal Grandmother      Other Cancer Brother      Cancer Paternal Aunt      Hypertension No family hx of      Thyroid Disease No family hx of      Glaucoma No family hx of      Macular Degeneration No family hx of        Social History   Substance Use Topics     Smoking status: Former Smoker     Packs/day: 1.00     Years: 40.00     Types: Cigarettes     Quit date: 3/16/2007     Smokeless tobacco: Never Used     Alcohol use No         Exam:    Vitals: Noted    Constitutional/ general:  Pt is in no apparent distress, appears well-nourished.  Cooperative with history and physical exam.     Psych:  The patient answered questions appropriately.  Normal affect.  Seems to have reasonable expectations, in terms of treatment.     Eyes:  Visual scanning/ tracking without deficit.     Ears:  Response to auditory stimuli is normal.  negative hearing aid devices.  Auricles in proper alignment.     Lymphatic:  Popliteal lymph nodes not enlarged.     Lungs:  Non labored breathing, non labored speech. No cough.  No audible wheezing. Even, quiet breathing.       Vascular: Lateral ankle edema and varicosities noted.  Difficult to palpate tibial pulses.  Dorsalis pedis weakly palpable.  Capillary refill time less than 3 seconds in all digits.  No hair growth noted on skin.    Neuro:  Alert and oriented x 3. Coordinated gait.  Light touch sensation is intact to the L4, L5, S1 distributions. No obvious deficits.  No evidence of neurological-based weakness, spasticity, or contracture in the lower extremities.  Monofilament absent to midfoot.      Derm: Skin thin shiny atrophic with no hair growth.     Musculoskeletal:    Lower extremity muscle strength is normal.  Patient is ambulatory without an assistive device or brace.  Pronated arch with weightbearing.  All lesser toes are hammered and stiff.  Generally his feet are stiff with a decreased range of  motion bilaterally.    A callus is noted at the end of the left third toe.  All left foot nails mycotic.  Left hallux lateral border has erythema edema and slight drainage.  With trimming out nail underlying tissue slightly irritated.  No purulence odor or sinus tracts.      A:  Diabetes mellitus with peripheral neuropathy and LOPS  Left third toe callus  Onychomycosis  Left hallux ingrown nail      P:   Calluses debrided with a fifteen blade.  Mycotic nails manually debrided with a .   Discussed etiology and treatment options with the patient regarding ingrown nail.  Risks and benefits of partial temporary nail removal were discussed in detail.  Obtained verbal consent and no block needed.   Sterile prep and avulsed the lateral border and placed dry sterile dressing.  Cappillary refill time wnl.  Pt tolerated procedure well.  Wound care instructions given and RTC PRN.    .    Aaron Dent DPM, FACFAS            Again, thank you for allowing me to participate in the care of your patient.        Sincerely,        Aaron Dent DPM

## 2018-07-12 NOTE — PATIENT INSTRUCTIONS
We wish you continued good healing. If you have any questions or concerns, please do not hesitate to contact us at [phone]    Please remember to call and schedule a follow up appointment if one was recommended at your earliest convenience.   PODIATRY CLINIC HOURS  TELEPHONE NUMBER    Dr. Aaron Dent D.P.M [facility]    Clinics:  [facility]    Fay Patel [facility]   Tuesday 1PM-6PM  [place]  Wednesday 7AM-2PM  [facility]  Thursday 10AM-6PM  [place]  Friday 7AM-3PM  [place]  Specialty schedulers:   [phone] to make an appointment with any Specialty Provider.        Urgent Care locations:    [facility] Monday-Friday 5 pm - 9 pm. Saturday-Sunday 9 am -5pm    Monday-Friday 11 am - 9 pm Saturday 9 am - 5 pm     Monday-Sunday 12 noon-8PM [phone]     If you need a medication refill, please contact us you may need lab work and/or a follow up visit prior to your refill (i.e. Antifungal medications).    [employer] (secure e-mail communication and access to your chart) to send a message or to make an appointment.    If MRI needed please call Yannick Zheng at [phone]        Weight management plan: Patient was referred to their PCP to discuss a diet and exercise plan.   Patient to follow up with Primary Care provider regarding elevated blood pressure.

## 2018-07-12 NOTE — MR AVS SNAPSHOT
After Visit Summary   7/12/2018    Zurdo Dash    MRN: [id number]           Patient Information     Date Of Birth          1945        Visit Information        Provider Department      7/12/2018 1:00 PM Aaron Dent, DPM [facility]        Care Instructions    We wish you continued good healing. If you have any questions or concerns, please do not hesitate to contact us at [phone]    Please remember to call and schedule a follow up appointment if one was recommended at your earliest convenience.   PODIATRY CLINIC HOURS  TELEPHONE NUMBER    Dr. Aaron DAVISONPPEDRITO FAC FAS    Clinics:  [facility]    Fay Patel [facility]   Tuesday 1PM-6PM  [place]  Wednesday 7AM-2PM  [place]  Thursday 10AM-6PM  [place]  Friday 7AM-3PM  [place]  Specialty schedulers:   [phone] to make an appointment with any Specialty Provider.        Urgent Care locations:    [facility] Monday-Friday 5 pm - 9 pm. Saturday-Sunday 9 am -5pm    Monday-Friday 11 am - 9 pm Saturday 9 am - 5 pm     Monday-Sunday 12 noon-8PM [phone]     If you need a medication refill, please contact us you may need lab work and/or a follow up visit prior to your refill (i.e. Antifungal medications).    MyChart (secure e-mail communication and access to your chart) to send a message or to make an appointment.    If MRI needed please call Yannick Zheng at [phone]        Weight management plan: Patient was referred to their PCP to discuss a diet and exercise plan.   Patient to follow up with Primary Care provider regarding elevated blood pressure.              Follow-ups after your visit        Your next 10 appointments already scheduled     Sep 10, 2018 11:15 AM CDT   LAB with [facility]   [facility] ([facility]    [address]  [place] [address]   [phone]           Please do not eat 10-12 hours before your appointment if you are coming in fasting for labs on lipids, cholesterol, or glucose (sugar). This does not apply to pregnant women. Water, hot tea and black coffee (with nothing added) are okay. Do not drink other fluids, diet soda or chew gum.            Sep 13, 2018 11:20 AM CDT   Return Visit with Albert Tompkins MD   [facility] ([facility])    [address] [place] [address]  [place] [address] [phone]              Who to contact     If you have questions or need follow up information about today's clinic visit or your schedule please contact [facility] directly at [phone].  Normal or non-critical lab and imaging results will be communicated to you by MyChart, letter or phone within 4 business days after the clinic has received the results. If you do not hear from us within 7 days, please contact the clinic through MyChart or phone. If you have a critical or abnormal lab result, we will notify you by phone as soon as possible.  Submit refill requests through [employer] or call your pharmacy and they will forward the refill request to us. Please allow 3 business days for your refill to be completed.          Additional Information About Your Visit        Care EveryWhere ID     This is your Care EveryWhere ID. This could be used by other organizations to access your [facility] medical records  [id number]        Your Vitals Were     Pulse BMI (Body Mass Index)                58 32.22 kg/m2           Blood Pressure from Last 3 Encounters:   07/12/18 150/80   06/07/18 114/74   05/17/18 132/80    Weight from Last 3 Encounters:   07/12/18 215 lb (97.5 kg)   06/07/18 214 lb (97.1 kg)   05/17/18 204 lb (92.5 kg)              Today, you had the following     No orders found for display       Primary Care Provider Office Phone # Fax #    Kapil Duckworth [phone]        [address] [facility] [address]        Equal Access to Services     BRIALIONEL YEHUDA : Hadii art brown haddajuan Sophil, waaxda luqadaha, qaybta kaмарина holacricketdenver, aranza matthew yolandajovanni barryayakaizabela mckeon. So [facility] [phone].    ATENCIÓN: Si habla español, tiene a diallo disposición servicios gratuitos de asistencia lingüística. [facility] [phone].    We comply with applicable federal civil rights laws and [place] laws. We do not discriminate on the basis of race, color, national origin, age, disability, sex, sexual orientation, or gender identity.            Thank you!     Thank you for choosing [facility]  for your care. Our goal is always to provide you with excellent care. Hearing back from our patients is one way we can continue to improve our services. Please take a few minutes to complete the written survey that you may receive in the mail after your visit with us. Thank you!             Your Updated Medication List - Protect others around you: Learn how to safely use, store and throw away your medicines at www.disposemymeds.org.          This list is accurate as of 7/12/18  1:01 PM.  Always use your most recent med list.                   Brand Name Dispense Instructions for use Diagnosis    albuterol 108 (90 Base) MCG/ACT Inhaler    PROAIR HFA/PROVENTIL HFA/VENTOLIN HFA    1 Inhaler    Inhale 2 puffs into the lungs every 4 hours as needed for other (coughing)    Acute bronchitis, unspecified organism       aspirin 81 MG tablet      Take 1 tablet by mouth daily.    Type 2 diabetes, HbA1c goal < 7% (H)       blood glucose lancing device    no brand specified    1 each    Use to test blood sugars 3 times daily or as directed.    Type 2 diabetes mellitus, uncontrolled, with neuropathy (H)       blood glucose monitoring test strip    INDIA CONTOUR NEXT    300 each    TEST THREE TIMES A DAY OR AS DIRECTED, dispense covered brand    Type 2 diabetes mellitus, uncontrolled, with neuropathy (H)        cetirizine 10 MG tablet    zyrTEC     Take 10 mg by mouth At Bedtime        folic acid 1 MG tablet    FOLVITE    100 tablet    Take 1 tablet (1 mg) by mouth daily    Rheumatoid arthritis involving multiple sites with positive rheumatoid factor (H)       guaiFENesin-codeine 100-10 MG/5ML Soln solution    ROBITUSSIN AC    120 mL    Take 5 mLs by mouth every 4 hours as needed for cough    Cough       HYDROcodone-acetaminophen 5-325 MG per tablet    NORCO    28 tablet    Take 1-2 tablets by mouth every 4 hours as needed for moderate to severe pain maximum 4 tablet(s) per day    Rheumatoid arthritis involving multiple sites with positive rheumatoid factor (H)       hydroxychloroquine 200 MG tablet    PLAQUENIL    180 tablet    Take 1 tablet (200 mg) by mouth 2 times daily    Rheumatoid arthritis involving multiple sites with positive rheumatoid factor (H)       * insulin pen needle 31G X 6 MM    ULTICARE MINI    100 each    Use 1 daily or as directed.    Type 2 diabetes mellitus, uncontrolled, with neuropathy (H)       * INSUPEN ULTRAFIN 31G X 6 MM   Generic drug:  insulin pen needle     100 each    USE TO TEST BLOOD SUGAR ONCE A DAY OR AS DIRECTED    Type 2 diabetes mellitus, uncontrolled, with neuropathy (H)       liraglutide 18 MG/3ML soln    VICTOZA PEN    9 mL    Inject 0.6 mg Subcutaneous daily    Type 2 diabetes mellitus, uncontrolled, with neuropathy (H)       lisinopril 2.5 MG tablet    PRINIVIL/Zestril    90 tablet    Take 1 tablet (2.5 mg) by mouth daily    Hypotension, unspecified hypotension type       metFORMIN 500 MG tablet    GLUCOPHAGE    1 tablet    Take 1 tablet (500 mg) by mouth 2 times daily (with meals) TAKE TWO TABLETS BY MOUTH TWICE A DAY WITH MEALS    Type 2 diabetes mellitus with diabetic polyneuropathy, with long-term current use of insulin (H)       methotrexate sodium 2.5 MG Tabs     120 tablet    Take 25 mg by mouth once a week . Take all 10 tablets on the same day of each week.     Rheumatoid arthritis involving multiple sites with positive rheumatoid factor (H)       metoprolol succinate 25 MG 24 hr tablet    TOPROL-XL    90 tablet    TAKE ONE TABLET BY MOUTH EVERY DAY    Hypertension goal BP (blood pressure) < 140/90       * MICROLET LANCETS Misc     100 each    1 Device daily    Type 2 diabetes, HbA1C goal < 8% (H)       * blood glucose monitoring lancets     300 each    Use to test blood sugars 3 times daily or as directed.    Type 2 diabetes mellitus, uncontrolled, with neuropathy (H)       omeprazole 20 MG CR capsule    priLOSEC    90 capsule    Take 1 capsule (20 mg) by mouth daily    Gastroesophageal reflux disease without esophagitis       * order for DME     1 Device    Equipment being ordered: specialized shoes for diabetic neuropathy   His orthotics will be made by [employer] ?? Tim Jernigan, Certified  [email or web address] Phone: [phone] Fax: [phone] [address] [place] [address]    Type 2 diabetes mellitus with diabetic polyneuropathy, with long-term current use of insulin (H), Diabetic polyneuropathy associated with type 2 diabetes mellitus (H)       * order for DME     1 Device    Equipment being ordered: glucometer    Type 2 diabetes mellitus, uncontrolled, with neuropathy (H)       pravastatin 40 MG tablet    PRAVACHOL    1 tablet    Take 0.5 tablets (20 mg) by mouth every other day    Hyperlipidemia LDL goal <100       tiZANidine 2 MG tablet    ZANAFLEX    90 tablet    Take 1 tablet (2 mg) by mouth 3 times daily as needed for muscle spasms    Neck pain       * Notice:  This list has 6 medication(s) that are the same as other medications prescribed for you. Read the directions carefully, and ask your doctor or other care provider to review them with you.

## 2018-08-17 DIAGNOSIS — E11.42 TYPE 2 DIABETES MELLITUS WITH DIABETIC POLYNEUROPATHY, WITH LONG-TERM CURRENT USE OF INSULIN (H): ICD-10-CM

## 2018-08-17 DIAGNOSIS — Z79.4 TYPE 2 DIABETES MELLITUS WITH DIABETIC POLYNEUROPATHY, WITH LONG-TERM CURRENT USE OF INSULIN (H): ICD-10-CM

## 2018-08-17 NOTE — TELEPHONE ENCOUNTER
Patient states that he is down to one tablet twice daily instead of two tablets twice daily.     Thank you,  Sabrina Hicks, PharmD  [facility]  [phone]

## 2018-09-10 DIAGNOSIS — Z79.899 HIGH RISK MEDICATION USE: ICD-10-CM

## 2018-09-10 DIAGNOSIS — M05.79 RHEUMATOID ARTHRITIS INVOLVING MULTIPLE SITES WITH POSITIVE RHEUMATOID FACTOR (H): ICD-10-CM

## 2018-09-10 LAB
ALBUMIN SERPL-MCNC: 3.6 G/DL (ref 3.4–5)
ALP SERPL-CCNC: 47 U/L (ref 40–150)
ALT SERPL W P-5'-P-CCNC: 26 U/L (ref 0–70)
AST SERPL W P-5'-P-CCNC: 18 U/L (ref 0–45)
BASOPHILS # BLD AUTO: 0.1 10E9/L (ref 0–0.2)
BASOPHILS NFR BLD AUTO: 1.2 %
BILIRUB DIRECT SERPL-MCNC: 0.1 MG/DL (ref 0–0.2)
BILIRUB SERPL-MCNC: 0.6 MG/DL (ref 0.2–1.3)
CREAT SERPL-MCNC: 1.19 MG/DL (ref 0.66–1.25)
CRP SERPL-MCNC: 4.6 MG/L (ref 0–8)
DIFFERENTIAL METHOD BLD: NORMAL
EOSINOPHIL # BLD AUTO: 0.1 10E9/L (ref 0–0.7)
EOSINOPHIL NFR BLD AUTO: 1.8 %
ERYTHROCYTE [DISTWIDTH] IN BLOOD BY AUTOMATED COUNT: 14.2 % (ref 10–15)
ERYTHROCYTE [SEDIMENTATION RATE] IN BLOOD BY WESTERGREN METHOD: 9 MM/H (ref 0–20)
GFR SERPL CREATININE-BSD FRML MDRD: 60 ML/MIN/1.7M2
HCT VFR BLD AUTO: 40.7 % (ref 40–53)
HGB BLD-MCNC: 14 G/DL (ref 13.3–17.7)
LYMPHOCYTES # BLD AUTO: 1 10E9/L (ref 0.8–5.3)
LYMPHOCYTES NFR BLD AUTO: 17.2 %
MCH RBC QN AUTO: 31.5 PG (ref 26.5–33)
MCHC RBC AUTO-ENTMCNC: 34.4 G/DL (ref 31.5–36.5)
MCV RBC AUTO: 92 FL (ref 78–100)
MONOCYTES # BLD AUTO: 0.8 10E9/L (ref 0–1.3)
MONOCYTES NFR BLD AUTO: 14.1 %
NEUTROPHILS # BLD AUTO: 3.7 10E9/L (ref 1.6–8.3)
NEUTROPHILS NFR BLD AUTO: 65.7 %
PLATELET # BLD AUTO: 211 10E9/L (ref 150–450)
PROT SERPL-MCNC: 6.7 G/DL (ref 6.8–8.8)
RBC # BLD AUTO: 4.45 10E12/L (ref 4.4–5.9)
WBC # BLD AUTO: 5.7 10E9/L (ref 4–11)

## 2018-09-10 PROCEDURE — 85025 COMPLETE CBC W/AUTO DIFF WBC: CPT | Performed by: INTERNAL MEDICINE

## 2018-09-10 PROCEDURE — 82565 ASSAY OF CREATININE: CPT | Performed by: INTERNAL MEDICINE

## 2018-09-10 PROCEDURE — 86140 C-REACTIVE PROTEIN: CPT | Performed by: INTERNAL MEDICINE

## 2018-09-10 PROCEDURE — 80076 HEPATIC FUNCTION PANEL: CPT | Performed by: INTERNAL MEDICINE

## 2018-09-10 PROCEDURE — 36415 COLL VENOUS BLD VENIPUNCTURE: CPT | Performed by: INTERNAL MEDICINE

## 2018-09-10 PROCEDURE — 85652 RBC SED RATE AUTOMATED: CPT | Performed by: INTERNAL MEDICINE

## 2018-09-13 ENCOUNTER — OFFICE VISIT (OUTPATIENT)
Dept: RHEUMATOLOGY | Facility: CLINIC | Age: 73
End: 2018-09-13
Payer: COMMERCIAL

## 2018-09-13 VITALS
WEIGHT: 210.4 LBS | RESPIRATION RATE: 16 BRPM | SYSTOLIC BLOOD PRESSURE: 121 MMHG | OXYGEN SATURATION: 94 % | HEIGHT: 69 IN | HEART RATE: 57 BPM | DIASTOLIC BLOOD PRESSURE: 75 MMHG | BODY MASS INDEX: 31.16 KG/M2

## 2018-09-13 DIAGNOSIS — M17.0 PRIMARY OSTEOARTHRITIS OF BOTH KNEES: ICD-10-CM

## 2018-09-13 DIAGNOSIS — M05.79 RHEUMATOID ARTHRITIS INVOLVING MULTIPLE SITES WITH POSITIVE RHEUMATOID FACTOR (H): Primary | ICD-10-CM

## 2018-09-13 PROCEDURE — 20610 DRAIN/INJ JOINT/BURSA W/O US: CPT | Mod: 50 | Performed by: INTERNAL MEDICINE

## 2018-09-13 PROCEDURE — 99213 OFFICE O/P EST LOW 20 MIN: CPT | Mod: 25 | Performed by: INTERNAL MEDICINE

## 2018-09-13 RX ORDER — METHOTREXATE 2.5 MG/1
25 TABLET ORAL WEEKLY
Qty: 120 TABLET | Refills: 2 | Status: SHIPPED | OUTPATIENT
Start: 2018-09-13 | End: 2019-05-15

## 2018-09-13 RX ORDER — HYDROXYCHLOROQUINE SULFATE 200 MG/1
200 TABLET, FILM COATED ORAL 2 TIMES DAILY
Qty: 180 TABLET | Refills: 3 | Status: SHIPPED | OUTPATIENT
Start: 2018-09-13 | End: 2019-05-15

## 2018-09-13 NOTE — NURSING NOTE
The following medication was given:     MEDICATION: Triamcinolone 40 mg  SITE: Right knee  DOSE: 1 ml  LOT #: [id number]  :  Ex[employer]ia  EXPIRATION DATE:  01/01/2020  NDC#: [address]-8    MEDICATION: Triamcinolone 40 mg  SITE: Left knee  DOSE: 1 ml  LOT #: [id number]  :  [employer]ia  EXPIRATION DATE:  01/01/2020  NDC#: [address]-3    1% Lidocaine  : Hospira  Lot #:-[id number]   EXPIRATION DATE: 06/01/2020  NDC: 2911-1942-28

## 2018-09-13 NOTE — PROGRESS NOTES
Rheumatology Clinic Visit      Zurdo Dash MRN# [id number]   YOB: 1945 Age: 73 year old      Date of visit: 9/13/18   PCP: Dr. Kapil Duckworth     Chief Complaint   Patient presents with:  Arthritis: RA, would like injections in bilateral knee    Assessment and Plan     1. Seropositive nonerosive rheumatoid arthritis (, CCP 64): Currently on methotrexate 25 mg once weekly, folic acid 1 mg daily, and hydroxychloroquine 200 mg twice a day. Doing well today.  Continue current medications.  RA stable.  - Continue methotrexate 25 mg once weekly  - Continue folic acid 1 mg daily  - Continue hydroxychloroquine 200 mg twice a day (last eye exam: 6/15/18)  - Ophthalmology referral for hydroxychloroquine toxicity monitoring; asked that he call today to schedule  - Labs 2-3 days prior to the next rheumatology clinic visit: CBC, Creatinine, Hepatic Panel, ESR, CRP    2. Bilateral knee osteoarthritis: Was receiving steroid injections approximately every 3-6 months with good control of his symptoms.  Repeat steroid injections today as documented in the procedure section.  Encouraged quadricep strengthening.    3. 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 He was evaluated by Dr. Duckworth at that time. Reportedly he used colchicine in the past. I advised him to come in for joint arthrocentesis if this occurs again. Recheck of uric acid previously was elevated.    Mr. Dash verbalized agreement with and understanding of the rational for the diagnosis and treatment plan.  All questions were answered to best of my ability and the patient's satisfaction. Mr. Dash was advised to contact the clinic with any questions that may arise after the clinic visit.    Thank you for involving me in the care of the patient    Return to clinic: 3 months      HPI   Zurdo Dash is a 73 year old male with a medical history significant for hypertension,  hyperlipidemia, diabetes, diabetic neuropathy, GERD, and rheumatoid arthritis who presents for f/u of RA and bilateral knee OA.     Today, Mr. Dash reports that his arthritis is doing well.  Bilateral knee pain if he goes up or down the stairs and he would like to have repeat steroid injections today as they have been very effective in the past and he does not want to have knee replacement surgery yet.  No morning stiffness.  No gelling phenomenon.  No joint swelling.       Denies fevers, chills, nausea, vomiting, constipation, diarrhea. No abdominal pain. No chest pain/pressure, palpitations, or shortness of breath. No oral or nasal sores. No neck pain. No rash. No LE swelling.      Tobacco: None  EtOH: None  Drugs: None    ROS   GEN: No fevers, chills, or night sweats; intentionally losing weight on the Ruth Kunstadter â€“ [employer] diet  SKIN: No rash. Sores from a recent fall.  HEENT: No oral or nasal ulcers.  CV: No chest pain, pressure, palpitations, or dyspnea on exertion.  PULM: No SOB, wheeze, cough.  GI: No nausea, vomiting, constipation, diarrhea. No blood in stool. No abdominal pain.  : No blood in urine.  MSK: See HPI.  NEURO: No numbness, tingling, or weakness.  EXT: No LE swelling    Active Problem List     Patient Active Problem List   Diagnosis     Pain in limb     Hyperlipidemia LDL goal <100     Hypertension goal BP (blood pressure) < 140/90     obesity     Hypogonadism     Advanced directives, counseling/discussion     Health Care Home     Type 2 diabetes mellitus with diabetic polyneuropathy, with long-term current use of insulin (H)     RBBB (right bundle branch block)     Lumbago     Ex-smoker     Cataracts, both eyes     Family history of esophageal cancer     Gastroesophageal reflux disease, esophagitis presence not specified     Rheumatoid arthritis involving multiple sites with positive rheumatoid factor (H)     Pulmonary nodule     High risk medication use     Spondylosis of cervical region without  myelopathy or radiculopathy     Erectile dysfunction, unspecified erectile dysfunction type     Type 2 diabetes mellitus without retinopathy (H)     Combined forms of age-related cataract of both eyes     PVD (posterior vitreous detachment), left eye     Tubulovillous adenoma of colon     Diabetic polyneuropathy associated with type 2 diabetes mellitus (H)     Past Medical History     Past Medical History:   Diagnosis Date     Abnormal CT scan 03/2004    calcified lung granuloma     C. difficile diarrhea     H/O     Cataract 11/18/2011     Diabetic neuropathy (H)     mild, mostly soles and distal forefeet, worse on the left side.     Diverticulitis      ED (erectile dysfunction)      Ex-smoker     QUIT SMOKING FEB 2007     History of ETOH abuse     recovering, sober since 1997     Hyperlipidemia LDL goal <100      Hypertension goal BP (blood pressure) < 140/90      Hypogonadism      Obesity      PAD (peripheral artery disease) (H)     leg cramps, with exertion, no formal diagnosis of PAD and minimal if any symptoms at all.     RA (rheumatoid arthritis) (H)     Dr Bailon     Type 2 diabetes, HbA1c goal < 7% (H) 10/2008    A1C of 7.1 %      Past Surgical History     Past Surgical History:   Procedure Laterality Date     COLONOSCOPY  03/01/2018    [facility]     HC INCISION TENDON SHEATH FINGER  4/2009    r hand ring finger     TONSILLECTOMY       Allergy   No Known Allergies  Current Medication List     Current Outpatient Prescriptions   Medication Sig     albuterol (PROAIR HFA/PROVENTIL HFA/VENTOLIN HFA) 108 (90 BASE) MCG/ACT Inhaler Inhale 2 puffs into the lungs every 4 hours as needed for other (coughing)     aspirin 81 MG tablet Take 1 tablet by mouth daily.     cetirizine (ZYRTEC) 10 MG tablet Take 10 mg by mouth At Bedtime     folic acid (FOLVITE) 1 MG tablet Take 1 tablet (1 mg) by mouth daily     guaiFENesin-codeine (ROBITUSSIN AC) 100-10 MG/5ML SOLN solution Take 5 mLs by mouth every 4 hours as needed for cough      HYDROcodone-acetaminophen (NORCO) 5-325 MG per tablet Take 1-2 tablets by mouth every 4 hours as needed for moderate to severe pain maximum 4 tablet(s) per day     hydroxychloroquine (PLAQUENIL) 200 MG tablet Take 1 tablet (200 mg) by mouth 2 times daily     insulin pen needle (ULTICARE MINI) 31G X 6 MM Use 1 daily or as directed.     INSUPEN ULTRAFIN 31G X 6 MM USE TO TEST BLOOD SUGAR ONCE A DAY OR AS DIRECTED     liraglutide (VICTOZA PEN) 18 MG/3ML soln Inject 0.6 mg Subcutaneous daily     lisinopril (PRINIVIL/ZESTRIL) 2.5 MG tablet Take 1 tablet (2.5 mg) by mouth daily     metFORMIN (GLUCOPHAGE) 500 MG tablet Take 1 tablet (500 mg) by mouth 2 times daily (with meals)     metFORMIN (GLUCOPHAGE) 500 MG tablet Take 1 tablet (500 mg) by mouth 2 times daily (with meals) TAKE TWO TABLETS BY MOUTH TWICE A DAY WITH MEALS     methotrexate sodium 2.5 MG TABS Take 25 mg by mouth once a week . Take all 10 tablets on the same day of each week.     metoprolol (TOPROL-XL) 25 MG 24 hr tablet TAKE ONE TABLET BY MOUTH EVERY DAY     MICROLET LANCETS MISC 1 Device daily     omeprazole (PRILOSEC) 20 MG CR capsule Take 1 capsule (20 mg) by mouth daily     pravastatin (PRAVACHOL) 40 MG tablet Take 0.5 tablets (20 mg) by mouth every other day     tiZANidine (ZANAFLEX) 2 MG tablet Take 1 tablet (2 mg) by mouth 3 times daily as needed for muscle spasms     blood glucose (NO BRAND SPECIFIED) lancing device Use to test blood sugars 3 times daily or as directed.     blood glucose monitoring (INDIA CONTOUR NEXT) test strip TEST THREE TIMES A DAY OR AS DIRECTED, dispense covered brand     blood glucose monitoring (ONE TOUCH DELICA) lancets Use to test blood sugars 3 times daily or as directed.     order for DME Equipment being ordered: glucometer     order for DME Equipment being ordered: specialized shoes for diabetic neuropathy     His orthotics will be made by [facility]      Tim Jernigan, Certified  "[email or web address]  Phone: [phone]  Fax: [phone]  [address] [place] [address]     No current facility-administered medications for this visit.          Social History   See HPI    Family History     Family History   Problem Relation Age of Onset     Cerebrovascular Disease Mother      Arthritis Mother      Osteoporosis Mother      Alzheimer Disease Father      Arthritis Father      Cancer Father      Diabetes Maternal Grandmother      Cardiovascular Maternal Grandmother      Other Cancer Brother      Cancer Paternal Aunt      Hypertension No family hx of      Thyroid Disease No family hx of      Glaucoma No family hx of      Macular Degeneration No family hx of        Physical Exam     Temp Readings from Last 3 Encounters:   03/29/18 97.1  F (36.2  C) (Oral)   03/12/18 96.5  F (35.8  C) (Oral)   02/20/18 96.9  F (36.1  C) (Oral)     BP Readings from Last 5 Encounters:   09/13/18 121/75   07/12/18 150/80   06/07/18 114/74   05/17/18 132/80   04/19/18 136/78     Pulse Readings from Last 1 Encounters:   09/13/18 57     Resp Readings from Last 1 Encounters:   09/13/18 16     Estimated body mass index is 31.53 kg/(m^2) as calculated from the following:    Height as of this encounter: 1.74 m (5' 8.5\").    Weight as of this encounter: 95.4 kg (210 lb 6.4 oz).    GEN: NAD, obese  HEENT: MMM. No oral lesions. Anicteric, noninjected sclera  CV: S1, S2. RRR. No m/r/g.  PULM: CTA bilaterally. No w/c.  MSK: Bilateral second and third MCPs with increased prominence, but no tenderness to palpation or synovial swelling. Wrists, elbows, shoulders, ankles, and MTPs without swelling or tenderness to palpation. Bilateral knees with medial joint line tenderness and crepitation but no effusion or increased warmth. Hips nontender to direct palpation.   SKIN: No rash.  A few cuts and bruises on his forearms bilaterally  EXT: No LE edema  PSYCH: Alert. Appropriate.    Labs / Imaging (select studies) "     9/28/1999  ([facility])    10/17/2013 [facility] , N 3%, No crystals    RF/CCP  Recent Labs   Lab Test  04/07/16   1149   CCPIGG  64*     CBC  Recent Labs   Lab Test  09/10/18   1104  05/15/18   1136  02/06/18   1037   WBC  5.7  7.9  7.9   RBC  4.45  4.52  4.66   HGB  14.0  14.1  14.4   HCT  40.7  41.4  43.1   MCV  92  92  93   RDW  14.2  15.5*  14.4   PLT  211  225  218   MCH  31.5  31.2  30.9   MCHC  34.4  34.1  33.4   NEUTROPHIL  65.7  70.5  77.2   LYMPH  17.2  12.8  11.0   MONOCYTE  14.1  14.5  10.3   EOSINOPHIL  1.8  1.7  1.1   BASOPHIL  1.2  0.5  0.4   ANEU  3.7  5.6  6.1   ALYM  1.0  1.0  0.9   SMITH  0.8  1.1  0.8   AEOS  0.1  0.1  0.1   ABAS  0.1  0.0  0.0     CMP  Recent Labs   Lab Test  09/10/18   1104  05/15/18   1136  03/26/18   1130  03/05/18   1315  02/06/18   1037   06/23/17   1156   NA   --    --   139  140   --    --   139   POTASSIUM   --    --   4.6  5.5*   --    --   4.3   CHLORIDE   --    --   107  108   --    --   105   CO2   --    --   23  23   --    --   21   ANIONGAP   --    --   9  9   --    --   13   GLC   --    --   84  72   --    --   101*   BUN   --    --   20  21   --    --   35*   CR  1.19  1.04  1.01  0.96  1.07   < >  1.41*   GFRESTIMATED  60*  70  72  77  68   < >  49*   GFRESTBLACK  72  84  87  >90  82   < >  60*   NEW   --    --   8.8  9.5   --    --   9.6   BILITOTAL  0.6  0.6   --    --   0.4   < >   --    ALBUMIN  3.6  3.6   --    --   3.6   < >   --    PROTTOTAL  6.7*  6.6*   --    --   6.7*   < >   --    ALKPHOS  47  49   --    --   53   < >   --    AST  18  27   --    --   10   < >   --    ALT  26  30   --    --   22   < >   --     < > = values in this interval not displayed.     Calcium/VitaminD  Recent Labs   Lab Test  03/26/18   1130  03/05/18   1315  06/23/17   1156   07/23/13   1016   12/05/11   1200   NEW  8.8  9.5  9.6   < >   --    < >  9.3   D3VIT   --    --    --    --    --    --   30   VITDT   --    --    --  "   --   28*   --    --     < > = values in this interval not displayed.     ESR/CRP  Recent Labs   Lab Test  09/10/18   1104  02/06/18   1037  10/31/17   1105   SED  9  11  12   CRP  4.6  4.7  4.6     Hepatitis B  Recent Labs   Lab Test  04/07/16   1149   HBCAB  Nonreactive   HEPBANG  Nonreactive     Hepatitis C  Recent Labs   Lab Test  04/07/16   1149   HCVAB  Nonreactive   Assay performance characteristics have not been established for newborns,   infants, and children       HIV Screening  Recent Labs   Lab Test  04/07/16   1149   HIAGAB  Nonreactive   HIV-1 p24 Ag & HIV-1/HIV-2 Ab Not Detected         \"MRI LEFT KNEE  10/08/2013    INDICATION: Left knee pain. Locking, catching and snapping. Weakness.  TECHNIQUE: Routine.  COMPARISON: None.    FINDINGS:  MEDIAL COMPARTMENT: Linear tearing involving the posterior horn and body of   the medial meniscus as seen on series 4: image 6-8. This is also seen on   series 5: image 9-10. Cartilage is thinned peripherally.    LATERAL COMPARTMENT: Lateral meniscus also demonstrates tearing in the   posterior horn on series 4: image 22. There is diffuse tearing in the body   of the meniscus which is horizontal as seen on series 5: image 9 and this   extends into the anterior horn. Cartilage is thinned.    PATELLOFEMORAL COMPARTMENT: Retropatellar cartilage demonstrates   full-thickness loss of cartilage over portions of the median ridge, lateral   facet and medial facet. Cartilage is better preserved over the lower pole   centrally. There is also full-thickness loss of cartilage in the lateral   and central trochlear groove. Patella is normally aligned. Retinaculum are   intact.    LIGAMENTS AND TENDONS: The anterior cruciate and posterior cruciate   ligaments are intact. The medial collateral ligament is intact. Lateral   collateral ligamentous complex and popliteus insertion are intact.   Quadriceps tendon and patellar tendon are intact.    BONES AND SOFT TISSUES: " "Moderate-sized joint effusion. No popliteal cyst.   No muscle edema or fracture.    CONCLUSION:  1. There is tearing of the medial meniscus involving the posterior horn and   body. Cartilage is thinned peripherally.  2. There is also tearing in the posterior horn and body of the lateral   meniscus which also extends into the anterior horn. Cartilage is also   thinned in the lateral compartment.  3. Moderate to severe osteoarthritis in the patellofemoral compartment with   full-thickness loss of cartilage over large portions of the retropatellar   surface and trochlear surface.  4. Joint effusion.    IF YOU ARE A PHYSICIAN AND HAVE QUESTIONS REGARDING THIS REPORT, PLEASE   CALL [phone].\"    \"X-RAY KNEES BILATERAL AP W/LAT/SUN/PA LEFT   Oct 08, 2013 09:51:59 AM     INDICATION: Pain and swelling   COMPARISON: None.      FINDINGS: Moderate narrowing lateral aspect of the patellofemoral   compartment with slight lateral subluxation of the patella. Medial and   lateral compartments are preserved. Moderate knee joint effusion and/or   synovitis. Enthesophyte formation distal quadriceps and proximal patellar   tendons. Extensive vascular calcifications.     AP view right knee demonstrates trace narrowing medial compartment joint   Space.\"    Immunization History     Immunization History   Administered Date(s) Administered     Influenza (High Dose) 3 valent vaccine 09/20/2012, 10/20/2015, 09/20/2016, 08/28/2017     Influenza (IIV3) PF 10/05/1999, 10/30/2008, 09/28/2011, 10/14/2013, 10/03/2014     Pneumo Conj 13-V (2010&after) 06/29/2015     Pneumococcal 23 valent 08/19/2010     TD (ADULT, 7+) 08/05/1997, 02/03/2011     Zoster vaccine, live 04/19/2010       Procedure     Procedure: Steroid injections of the bilateral knees  Indication: Pain, bilateral patellofemoral syndrome    The procedure was explained in detail. Risks including infection, pain, structural damage such as cartilage damage and tendon rupture, fat " atrophy, skin hyper-/hypo-pigmentation, and medication reaction was explained. The need for rest of the affected joint for one week after the procedure was explained.  The option of not doing the procedure was also provided. All questions were answered and the patient consented to the procedure.     A time-out was performed and the correct patient, procedure, and laterality were verified.    The right knee was examined and location for injection was identified - anterior medial. The area was cleaned with chlorhexidine, twice.  Ethyl chloride was then used for topical anaesthetic.  Then a mixture of lidocaine 1% 2 mL and Kenalog 40mg was injected into the intra-articular space.     The left knee was examined and location for injection was identified - anterior medial. The area was cleaned with chlorhexidine, twice.  Ethyl chloride was then used for topical anaesthetic.  Then a mixture of lidocaine 1% 2 mL and Kenalog 40mg was injected into the intra-articular space.     The patient tolerated the procedure well. No complications.    MEDICATION: Triamcinolone 40 mg  LOT #: [id number]  : [employer]  EXPIRATION DATE: 01/01/2020  NDC#: [address]-3    MEDICATION: Triamcinolone 40 mg  LOT #: [id number]  : [employer]  EXPIRATION DATE: 01/01/2020  NDC#: [address]-1    1% Lidocaine  : Hosp[employer]  Lot #:-[id number]   EXPIRATION DATE: 06/01/2020  NDC: 5762-6460-16         Chart documentation done in part with Dragon Voice recognition Software. Although reviewed after completion, some word and grammatical error may remain.    Albert Tompkins MD

## 2018-09-13 NOTE — MR AVS SNAPSHOT
After Visit Summary   9/13/2018    Zurdo Dash    MRN: [id number]           Patient Information     Date Of Birth          1945        Visit Information        Provider Department      9/13/2018 11:20 AM Albert Tompkins MD [facility]        Today's Diagnoses     Rheumatoid arthritis involving multiple sites with positive rheumatoid factor (H)    -  1    Primary osteoarthritis of both knees          Care Instructions    Rheumatology    Dr. Albert Tompkins         [facility]   (Monday)  [address]  [place] [address]       [facility]   (Tuesday)  [address] [place] [address]    [facility]   (Wed., Thurs., and Friday)  [address]  [place] [address]    Phone number: [phone]  Thank you for choosing [facility].  Eryn Espinoza CMA            Follow-ups after your visit        Your next 10 appointments already scheduled     Dec 13, 2018 11:15 AM CST   LAB with  LAB   [facility] ([facility])    [address] [address] [phone]           Please do not eat 10-12 hours before your appointment if you are coming in fasting for labs on lipids, cholesterol, or glucose (sugar). This does not apply to pregnant women. Water, hot tea and black coffee (with nothing added) are okay. Do not drink other fluids, diet soda or chew gum.            Jan 23, 2019 11:40 AM CST   Return Visit with Albert Tompkins MD   [facility] ([facility])    [address] [address] [phone]              Future tests that were ordered for you today     Open Future Orders        Priority Expected Expires Ordered    CBC with platelets differential Routine 12/8/2018 1/11/2019 9/13/2018    Creatinine Routine 12/8/2018 1/11/2019 9/13/2018    Erythrocyte sedimentation rate auto Routine 12/8/2018 1/11/2019 9/13/2018    CRP inflammation Routine 12/8/2018 1/11/2019  "9/13/2018    Hepatic panel Routine 12/8/2018 1/11/2019 9/13/2018            Who to contact     If you have questions or need follow up information about today's clinic visit or your schedule please contact [facility] directly at [phone].  Normal or non-critical lab and imaging results will be communicated to you by MyChart, letter or phone within 4 business days after the clinic has received the results. If you do not hear from us within 7 days, please contact the clinic through MyChart or phone. If you have a critical or abnormal lab result, we will notify you by phone as soon as possible.  Submit refill requests through [employer] or call your pharmacy and they will forward the refill request to us. Please allow 3 business days for your refill to be completed.          Additional Information About Your Visit        Care EveryWhere ID     This is your Care EveryWhere ID. This could be used by other organizations to access your [facility] medical records  [id number]        Your Vitals Were     Pulse Respirations Height Pulse Oximetry BMI (Body Mass Index)       57 16 1.74 m (5' 8.5\") 94% 31.53 kg/m2        Blood Pressure from Last 3 Encounters:   09/13/18 121/75   07/12/18 150/80   06/07/18 114/74    Weight from Last 3 Encounters:   09/13/18 95.4 kg (210 lb 6.4 oz)   07/12/18 97.5 kg (215 lb)   06/07/18 97.1 kg (214 lb)                 Where to get your medicines      These medications were sent to [facility] - [address]  [address], [place] [address]     Phone:  [phone]     hydroxychloroquine 200 MG tablet    methotrexate sodium 2.5 MG Tabs          Primary Care Provider Office Phone # Fax #    Kapil Duckworth [phone] [address] [place] [address]        Equal Access to Services     TASHI BLACKMAN AH: Hadii art ku hadasho Soomaali, waaxda luqadaha, qaybta kaalchristopher becerra, aranza simental " bean zhong ah. So [facility] [phone].    ATENCIÓN: Si kalia goss, tiene a diallo disposición servicios gratuitos de asistencia lingüística. Nitesh sheldon [phone].    We comply with applicable federal civil rights laws and [place] laws. We do not discriminate on the basis of race, color, national origin, age, disability, sex, sexual orientation, or gender identity.            Thank you!     Thank you for choosing [facility]  for your care. Our goal is always to provide you with excellent care. Hearing back from our patients is one way we can continue to improve our services. Please take a few minutes to complete the written survey that you may receive in the mail after your visit with us. Thank you!             Your Updated Medication List - Protect others around you: Learn how to safely use, store and throw away your medicines at www.disposemymeds.org.          This list is accurate as of 9/13/18 12:00 PM.  Always use your most recent med list.                   Brand Name Dispense Instructions for use Diagnosis    albuterol 108 (90 Base) MCG/ACT inhaler    PROAIR HFA/PROVENTIL HFA/VENTOLIN HFA    1 Inhaler    Inhale 2 puffs into the lungs every 4 hours as needed for other (coughing)    Acute bronchitis, unspecified organism       aspirin 81 MG tablet      Take 1 tablet by mouth daily.    Type 2 diabetes, HbA1c goal < 7% (H)       blood glucose lancing device    no brand specified    1 each    Use to test blood sugars 3 times daily or as directed.    Type 2 diabetes mellitus, uncontrolled, with neuropathy (H)       blood glucose monitoring test strip    INDIA CONTOUR NEXT    300 each    TEST THREE TIMES A DAY OR AS DIRECTED, dispense covered brand    Type 2 diabetes mellitus, uncontrolled, with neuropathy (H)       cetirizine 10 MG tablet    zyrTEC     Take 10 mg by mouth At Bedtime        folic acid 1 MG tablet    FOLVITE    100 tablet    Take 1 tablet (1 mg) by mouth daily    Rheumatoid arthritis  involving multiple sites with positive rheumatoid factor (H)       guaiFENesin-codeine 100-10 MG/5ML Soln solution    ROBITUSSIN AC    120 mL    Take 5 mLs by mouth every 4 hours as needed for cough    Cough       HYDROcodone-acetaminophen 5-325 MG per tablet    NORCO    28 tablet    Take 1-2 tablets by mouth every 4 hours as needed for moderate to severe pain maximum 4 tablet(s) per day    Rheumatoid arthritis involving multiple sites with positive rheumatoid factor (H)       hydroxychloroquine 200 MG tablet    PLAQUENIL    180 tablet    Take 1 tablet (200 mg) by mouth 2 times daily    Rheumatoid arthritis involving multiple sites with positive rheumatoid factor (H)       * insulin pen needle 31G X 6 MM    ULTICARE MINI    100 each    Use 1 daily or as directed.    Type 2 diabetes mellitus, uncontrolled, with neuropathy (H)       * INSUPEN ULTRAFIN 31G X 6 MM   Generic drug:  insulin pen needle     100 each    USE TO TEST BLOOD SUGAR ONCE A DAY OR AS DIRECTED    Type 2 diabetes mellitus, uncontrolled, with neuropathy (H)       liraglutide 18 MG/3ML soln    VICTOZA PEN    9 mL    Inject 0.6 mg Subcutaneous daily    Type 2 diabetes mellitus, uncontrolled, with neuropathy (H)       lisinopril 2.5 MG tablet    PRINIVIL/Zestril    90 tablet    Take 1 tablet (2.5 mg) by mouth daily    Hypotension, unspecified hypotension type       * metFORMIN 500 MG tablet    GLUCOPHAGE    1 tablet    Take 1 tablet (500 mg) by mouth 2 times daily (with meals) TAKE TWO TABLETS BY MOUTH TWICE A DAY WITH MEALS    Type 2 diabetes mellitus with diabetic polyneuropathy, with long-term current use of insulin (H)       * metFORMIN 500 MG tablet    GLUCOPHAGE    180 tablet    Take 1 tablet (500 mg) by mouth 2 times daily (with meals)    Type 2 diabetes mellitus with diabetic polyneuropathy, with long-term current use of insulin (H)       methotrexate sodium 2.5 MG Tabs     120 tablet    Take 10 tablets (25 mg) by mouth once a week . Take all 10  tablets on the same day of each week.    Rheumatoid arthritis involving multiple sites with positive rheumatoid factor (H)       metoprolol succinate 25 MG 24 hr tablet    TOPROL-XL    90 tablet    TAKE ONE TABLET BY MOUTH EVERY DAY    Hypertension goal BP (blood pressure) < 140/90       * MICROLET LANCETS Misc     100 each    1 Device daily    Type 2 diabetes, HbA1C goal < 8% (H)       * blood glucose monitoring lancets     300 each    Use to test blood sugars 3 times daily or as directed.    Type 2 diabetes mellitus, uncontrolled, with neuropathy (H)       omeprazole 20 MG CR capsule    priLOSEC    90 capsule    Take 1 capsule (20 mg) by mouth daily    Gastroesophageal reflux disease without esophagitis       * order for DME     1 Device    Equipment being ordered: specialized shoes for diabetic neuropathy   His orthotics will be made by [employer] ?? Tim Jernigan, Certified  [email or web address] Phone: [phone] Fax: [phone] [address] [place] [address]    Type 2 diabetes mellitus with diabetic polyneuropathy, with long-term current use of insulin (H), Diabetic polyneuropathy associated with type 2 diabetes mellitus (H)       * order for DME     1 Device    Equipment being ordered: glucometer    Type 2 diabetes mellitus, uncontrolled, with neuropathy (H)       pravastatin 40 MG tablet    PRAVACHOL    1 tablet    Take 0.5 tablets (20 mg) by mouth every other day    Hyperlipidemia LDL goal <100       tiZANidine 2 MG tablet    ZANAFLEX    90 tablet    Take 1 tablet (2 mg) by mouth 3 times daily as needed for muscle spasms    Neck pain       * Notice:  This list has 8 medication(s) that are the same as other medications prescribed for you. Read the directions carefully, and ask your doctor or other care provider to review them with you.

## 2018-09-13 NOTE — NURSING NOTE
"Chief Complaint   Patient presents with     Arthritis     RA, would like injections in bilateral knee       Initial /75 (BP Location: Left arm, Patient Position: Sitting, Cuff Size: Adult Regular)  Pulse 57  Resp 16  Ht 1.74 m (5' 8.5\")  Wt 95.4 kg (210 lb 6.4 oz)  SpO2 94%  BMI 31.53 kg/m2 Estimated body mass index is 31.53 kg/(m^2) as calculated from the following:    Height as of this encounter: 1.74 m (5' 8.5\").    Weight as of this encounter: 95.4 kg (210 lb 6.4 oz).  BP completed using cuff size: regular         RAPID3 (0-30) Cumulative Score  1.0          RAPID3 Weighted Score (divide #4 by 3 and that is the weighted score)  0.3         "

## 2018-09-13 NOTE — PATIENT INSTRUCTIONS
Rheumatology    Dr. Albert Tompkins         [facility]   (Monday)  [address]  [place] [address]       [facility]   (Tuesday)  [address]  [place] [address]    [facility]   (Wed., Thurs., and Friday)  [address] [place] [address]    Phone number: [phone]  Thank you for choosing [facility].  Eryn Espinoza CMA

## 2018-09-19 ENCOUNTER — OFFICE VISIT (OUTPATIENT)
Dept: OPHTHALMOLOGY | Facility: CLINIC | Age: 73
End: 2018-09-19
Payer: COMMERCIAL

## 2018-09-19 DIAGNOSIS — H00.15 CHALAZION OF LEFT LOWER EYELID: Primary | ICD-10-CM

## 2018-09-19 PROCEDURE — 92012 INTRM OPH EXAM EST PATIENT: CPT | Performed by: STUDENT IN AN ORGANIZED HEALTH CARE EDUCATION/TRAINING PROGRAM

## 2018-09-19 ASSESSMENT — VISUAL ACUITY
OD_CC: 20/30
OS_CC+: -2
CORRECTION_TYPE: GLASSES
METHOD: SNELLEN - LINEAR
OD_CC+: -1
OS_CC: 20/30

## 2018-09-19 ASSESSMENT — EXTERNAL EXAM - RIGHT EYE: OD_EXAM: NORMAL

## 2018-09-19 ASSESSMENT — EXTERNAL EXAM - LEFT EYE: OS_EXAM: NORMAL

## 2018-09-19 ASSESSMENT — SLIT LAMP EXAM - LIDS: COMMENTS: NORMAL

## 2018-09-19 NOTE — LETTER
9/19/2018         RE: Zurdo Dash  [address] [place] [id number]        Dear Colleague,    Thank you for referring your patient, Zurdo Dash, to the [facility]. Please see a copy of my visit note below.     Current Eye Medications:  Visine three times a day left eye      Subjective:  Here for left eye bump on the lower lid.  Has been getting worse and more itchy for the last four days.  Has a black and blue lily under the eye as well. Wants to rub it and then it starts to hurt. (Here with wife Yoli and asked to have you look at the eye)     Objective:  See Ophthalmology Exam.      Assessment:  Zurdo Dash is a 73 year old male who presents with:   Encounter Diagnosis   Name Primary?     Chalazion of left lower eyelid        Plan:  1. Apply a hot compress for 15 minutes at least 4 times a day. Use a microwaveable rice or gel heat pack.  This will reduce the swelling and soften the hardened oils blocking the duct.   2. Massage the area gently after applying the compress to promote drainage.  Do not try to pop or squeeze the chalazion.  3. Once a day, with eyes closed, clean your eyelids with baby shampoo to help  reduce clogging of the duct, as well as help prevent recurrences.  If the bump does not resolve with hot packing after a few weeks, we may be able to  incise and drain the bump.  Vicki Zheng MD  [phone]                  Again, thank you for allowing me to participate in the care of your patient.        Sincerely,        Vicki Zheng MD

## 2018-09-19 NOTE — PATIENT INSTRUCTIONS
Chalazion  There are tiny oil glands in the upper eyelid near the eyelashes. They help lubricate the eyes.   If these glands become blocked, a small hard lump forms in the upper eyelid, called a chalazion.   The lump can take several weeks to grow, causing pain and tenderness, sensitivity to light, and increased tearing.    Home Care  1. Apply a hot compress for 15 minutes at least 4 times a day. Use a microwaveable rice or gel heat pack.  This will reduce the swelling and soften the hardened oils blocking the duct.   2. Massage the area gently after applying the compress to promote drainage.  Do not try to pop or squeeze the chalazion.  3. Once a day, with eyes closed, clean your eyelids with baby shampoo to help  reduce clogging of the duct, as well as help prevent recurrences.  If the bump does not resolve with hot packing after a few weeks, we may be able to  incise and drain the bump or inject steroids into the bump.

## 2018-09-19 NOTE — MR AVS SNAPSHOT
After Visit Summary   9/19/2018    Zurdo Dash    MRN: [id number]           Patient Information     Date Of Birth          1945        Visit Information        Provider Department      9/19/2018 1:30 PM Vicki Zheng MD [facility]        Care Instructions    Chalazion  There are tiny oil glands in the upper eyelid near the eyelashes. They help lubricate the eyes.   If these glands become blocked, a small hard lump forms in the upper eyelid, called a chalazion.   The lump can take several weeks to grow, causing pain and tenderness, sensitivity to light, and increased tearing.    Home Care  1. Apply a hot compress for 15 minutes at least 4 times a day. Use a microwaveable rice or gel heat pack.  This will reduce the swelling and soften the hardened oils blocking the duct.   2. Massage the area gently after applying the compress to promote drainage.  Do not try to pop or squeeze the chalazion.  3. Once a day, with eyes closed, clean your eyelids with baby shampoo to help  reduce clogging of the duct, as well as help prevent recurrences.  If the bump does not resolve with hot packing after a few weeks, we may be able to  incise and drain the bump or inject steroids into the bump.                    Follow-ups after your visit        Follow-up notes from your care team     Return if symptoms worsen or fail to improve, for Procedure - end of PM session.      Your next 10 appointments already scheduled     Dec 13, 2018 11:15 AM CST   LAB with FZ LAB   [facility] ([facility])    [address] [facility] [address] [address] [phone]           Please do not eat 10-12 hours before your appointment if you are coming in fasting for labs on lipids, cholesterol, or glucose (sugar). This does not apply to pregnant women. Water, hot tea and black coffee (with nothing added) are okay. Do not drink other fluids, diet soda or chew gum.            Jan 23, 2019  11:40 AM CST   Return Visit with Albert Tompkins MD   [facility] ([facility])    [address] [address] [phone]              Who to contact     If you have questions or need follow up information about today's clinic visit or your schedule please contact [facility] directly at [phone].  Normal or non-critical lab and imaging results will be communicated to you by MyChart, letter or phone within 4 business days after the clinic has received the results. If you do not hear from us within 7 days, please contact the clinic through MyChart or phone. If you have a critical or abnormal lab result, we will notify you by phone as soon as possible.  Submit refill requests through [employer] or call your pharmacy and they will forward the refill request to us. Please allow 3 business days for your refill to be completed.          Additional Information About Your Visit        Care EveryWhere ID     This is your Care EveryWhere ID. This could be used by other organizations to access your [facility] medical records  [id number]         Blood Pressure from Last 3 Encounters:   09/13/18 121/75   07/12/18 150/80   06/07/18 114/74    Weight from Last 3 Encounters:   09/13/18 95.4 kg (210 lb 6.4 oz)   07/12/18 97.5 kg (215 lb)   06/07/18 97.1 kg (214 lb)              Today, you had the following     No orders found for display       Primary Care Provider Office Phone # Fax #    Kapil Duckworth [phone]       [address]  [place] [address]        Equal Access to Services     [facility] : Hadii aad ku hadasho Soomaali, waaxda luqadaha, qaybta kaalmada adenailayada, aranza mckeon. So [facility] [phone].    ATENCIÓN: Si habla español, tiene a daillo disposición servicios gratuitos de asistencia lingüística. Angelame al [phone].    We comply with applicable federal civil rights laws and [place] laws. We do not  discriminate on the basis of race, color, national origin, age, disability, sex, sexual orientation, or gender identity.            Thank you!     Thank you for choosing [facility]  for your care. Our goal is always to provide you with excellent care. Hearing back from our patients is one way we can continue to improve our services. Please take a few minutes to complete the written survey that you may receive in the mail after your visit with us. Thank you!             Your Updated Medication List - Protect others around you: Learn how to safely use, store and throw away your medicines at www.disposemymeds.org.          This list is accurate as of 9/19/18  2:16 PM.  Always use your most recent med list.                   Brand Name Dispense Instructions for use Diagnosis    albuterol 108 (90 Base) MCG/ACT inhaler    PROAIR HFA/PROVENTIL HFA/VENTOLIN HFA    1 Inhaler    Inhale 2 puffs into the lungs every 4 hours as needed for other (coughing)    Acute bronchitis, unspecified organism       aspirin 81 MG tablet      Take 1 tablet by mouth daily.    Type 2 diabetes, HbA1c goal < 7% (H)       blood glucose lancing device    no brand specified    1 each    Use to test blood sugars 3 times daily or as directed.    Type 2 diabetes mellitus, uncontrolled, with neuropathy (H)       blood glucose monitoring test strip    INDIA CONTOUR NEXT    300 each    TEST THREE TIMES A DAY OR AS DIRECTED, dispense covered brand    Type 2 diabetes mellitus, uncontrolled, with neuropathy (H)       cetirizine 10 MG tablet    zyrTEC     Take 10 mg by mouth At Bedtime        folic acid 1 MG tablet    FOLVITE    100 tablet    Take 1 tablet (1 mg) by mouth daily    Rheumatoid arthritis involving multiple sites with positive rheumatoid factor (H)       guaiFENesin-codeine 100-10 MG/5ML Soln solution    ROBITUSSIN AC    120 mL    Take 5 mLs by mouth every 4 hours as needed for cough    Cough       HYDROcodone-acetaminophen 5-325 MG  per tablet    NORCO    28 tablet    Take 1-2 tablets by mouth every 4 hours as needed for moderate to severe pain maximum 4 tablet(s) per day    Rheumatoid arthritis involving multiple sites with positive rheumatoid factor (H)       hydroxychloroquine 200 MG tablet    PLAQUENIL    180 tablet    Take 1 tablet (200 mg) by mouth 2 times daily    Rheumatoid arthritis involving multiple sites with positive rheumatoid factor (H)       * insulin pen needle 31G X 6 MM    ULTICARE MINI    100 each    Use 1 daily or as directed.    Type 2 diabetes mellitus, uncontrolled, with neuropathy (H)       * INSUPEN ULTRAFIN 31G X 6 MM   Generic drug:  insulin pen needle     100 each    USE TO TEST BLOOD SUGAR ONCE A DAY OR AS DIRECTED    Type 2 diabetes mellitus, uncontrolled, with neuropathy (H)       liraglutide 18 MG/3ML soln    VICTOZA PEN    9 mL    Inject 0.6 mg Subcutaneous daily    Type 2 diabetes mellitus, uncontrolled, with neuropathy (H)       lisinopril 2.5 MG tablet    PRINIVIL/Zestril    90 tablet    Take 1 tablet (2.5 mg) by mouth daily    Hypotension, unspecified hypotension type       * metFORMIN 500 MG tablet    GLUCOPHAGE    1 tablet    Take 1 tablet (500 mg) by mouth 2 times daily (with meals) TAKE TWO TABLETS BY MOUTH TWICE A DAY WITH MEALS    Type 2 diabetes mellitus with diabetic polyneuropathy, with long-term current use of insulin (H)       * metFORMIN 500 MG tablet    GLUCOPHAGE    180 tablet    Take 1 tablet (500 mg) by mouth 2 times daily (with meals)    Type 2 diabetes mellitus with diabetic polyneuropathy, with long-term current use of insulin (H)       methotrexate sodium 2.5 MG Tabs     120 tablet    Take 10 tablets (25 mg) by mouth once a week . Take all 10 tablets on the same day of each week.    Rheumatoid arthritis involving multiple sites with positive rheumatoid factor (H)       metoprolol succinate 25 MG 24 hr tablet    TOPROL-XL    90 tablet    TAKE ONE TABLET BY MOUTH EVERY DAY    Hypertension  goal BP (blood pressure) < 140/90       * MICROLET LANCETS Misc     100 each    1 Device daily    Type 2 diabetes, HbA1C goal < 8% (H)       * blood glucose monitoring lancets     300 each    Use to test blood sugars 3 times daily or as directed.    Type 2 diabetes mellitus, uncontrolled, with neuropathy (H)       omeprazole 20 MG CR capsule    priLOSEC    90 capsule    Take 1 capsule (20 mg) by mouth daily    Gastroesophageal reflux disease without esophagitis       * order for DME     1 Device    Equipment being ordered: specialized shoes for diabetic neuropathy   His orthotics will be made by [employer] ?? Tim Jernigan, Certified  [email or web address] Phone: [phone] Fax: [phone] [address] [place] [address]    Type 2 diabetes mellitus with diabetic polyneuropathy, with long-term current use of insulin (H), Diabetic polyneuropathy associated with type 2 diabetes mellitus (H)       * order for DME     1 Device    Equipment being ordered: glucometer    Type 2 diabetes mellitus, uncontrolled, with neuropathy (H)       pravastatin 40 MG tablet    PRAVACHOL    1 tablet    Take 0.5 tablets (20 mg) by mouth every other day    Hyperlipidemia LDL goal <100       tiZANidine 2 MG tablet    ZANAFLEX    90 tablet    Take 1 tablet (2 mg) by mouth 3 times daily as needed for muscle spasms    Neck pain       * Notice:  This list has 8 medication(s) that are the same as other medications prescribed for you. Read the directions carefully, and ask your doctor or other care provider to review them with you.

## 2018-09-19 NOTE — PROGRESS NOTES
Current Eye Medications:  Visine three times a day left eye      Subjective:  Here for left eye bump on the lower lid.  Has been getting worse and more itchy for the last four days.  Has a black and blue lily under the eye as well. Wants to rub it and then it starts to hurt. (Here with wife Yoli and asked to have you look at the eye)     Objective:  See Ophthalmology Exam.      Assessment:  Zurdo Dash is a 73 year old male who presents with:   Encounter Diagnosis   Name Primary?     Chalazion of left lower eyelid        Plan:  1. Apply a hot compress for 15 minutes at least 4 times a day. Use a microwaveable rice or gel heat pack.  This will reduce the swelling and soften the hardened oils blocking the duct.   2. Massage the area gently after applying the compress to promote drainage.  Do not try to pop or squeeze the chalazion.  3. Once a day, with eyes closed, clean your eyelids with baby shampoo to help  reduce clogging of the duct, as well as help prevent recurrences.  If the bump does not resolve with hot packing after a few weeks, we may be able to  incise and drain the bump.  Vicki Zheng MD  [phone]

## 2018-09-24 ENCOUNTER — OFFICE VISIT (OUTPATIENT)
Dept: FAMILY MEDICINE | Facility: CLINIC | Age: 73
End: 2018-09-24
Payer: COMMERCIAL

## 2018-09-24 DIAGNOSIS — Z23 NEED FOR PROPHYLACTIC VACCINATION AND INOCULATION AGAINST INFLUENZA: Primary | ICD-10-CM

## 2018-09-24 PROCEDURE — G0008 ADMIN INFLUENZA VIRUS VAC: HCPCS | Performed by: INTERNAL MEDICINE

## 2018-09-24 PROCEDURE — 99207 ZZC NO CHARGE NURSE ONLY: CPT | Performed by: INTERNAL MEDICINE

## 2018-09-24 PROCEDURE — 90662 IIV NO PRSV INCREASED AG IM: CPT | Performed by: INTERNAL MEDICINE

## 2018-09-24 NOTE — PROGRESS NOTES
SUBJECTIVE:   Zurdo Dash is a 73 year old male who presents to clinic today for the following health issues:    Recurrent Lumbosacral Sprain without Sciatica  Zurdo reports that he often has a flare up of pain that lasts 4-5 days and then disappears. He says that when he was diagnosed with some sort of disk disease in his 20's. He describes it as an aching pain and sometimes a shooting pain. Sitting down mitigates his pain and its exacerbated by lying down and sitting up. He denies pain in his legs and buttocks. His most recent flare has not gone away, which he notes began when he reached down to grab something from his shopping cart. He believes he's had previous X-rays during flare ups with no explanations for his pain.     Diabetes  Zurdo is interested in using a different GLP1 inhibitor to help control diabetes and weight loss. He is interested to try Ozempic  [semaglutide]  He had been reducing his Liraglutide [ Victoza ] dose down to the 0.6 milligram dose and has noticed his blood sugars rising. Over the last year his diabetes has been under fabulous control, yet he has had issues with weight loss. He has since increased back to the 1.2 milligram dose of Liraglutide [ Victoza ]     Lab Results   Component Value Date    A1C 5.9 03/05/2018    A1C 5.1 10/31/2017    A1C 5.6 06/23/2017    A1C 9.2 02/09/2017    A1C 9.8 12/02/2016       Problem list and histories reviewed & adjusted, as indicated.  Additional history: as documented    Patient Active Problem List   Diagnosis     Pain in limb     Hyperlipidemia LDL goal <100     Hypertension goal BP (blood pressure) < 140/90     obesity     Hypogonadism     Advanced directives, counseling/discussion     Health Care Home     Type 2 diabetes mellitus with diabetic polyneuropathy, with long-term current use of insulin (H)     RBBB (right bundle branch block)     Lumbago     Ex-smoker     Cataracts, both eyes     Family history of esophageal cancer      Gastroesophageal reflux disease, esophagitis presence not specified     Rheumatoid arthritis involving multiple sites with positive rheumatoid factor (H)     Pulmonary nodule     High risk medication use     Spondylosis of cervical region without myelopathy or radiculopathy     Erectile dysfunction, unspecified erectile dysfunction type     Type 2 diabetes mellitus without retinopathy (H)     Combined forms of age-related cataract of both eyes     PVD (posterior vitreous detachment), left eye     Tubulovillous adenoma of colon     Diabetic polyneuropathy associated with type 2 diabetes mellitus (H)     Past Surgical History:   Procedure Laterality Date     COLONOSCOPY  03/01/2018    [facility]     HC INCISION TENDON SHEATH FINGER  4/2009    r hand ring finger     TONSILLECTOMY         Social History   Substance Use Topics     Smoking status: Former Smoker     Packs/day: 1.00     Years: 40.00     Types: Cigarettes     Quit date: 3/16/2007     Smokeless tobacco: Never Used     Alcohol use No     Family History   Problem Relation Age of Onset     Cerebrovascular Disease Mother      Arthritis Mother      Osteoporosis Mother      Alzheimer Disease Father      Arthritis Father      Cancer Father      Diabetes Maternal Grandmother      Cardiovascular Maternal Grandmother      Other Cancer Brother      Cancer Paternal Aunt      Hypertension No family hx of      Thyroid Disease No family hx of      Glaucoma No family hx of      Macular Degeneration No family hx of          BP Readings from Last 3 Encounters:   09/25/18 126/76   09/13/18 121/75   07/12/18 150/80    Wt Readings from Last 3 Encounters:   09/25/18 93.4 kg (206 lb)   09/13/18 95.4 kg (210 lb 6.4 oz)   07/12/18 97.5 kg (215 lb)        Reviewed and updated as needed this visit by clinical staff       Reviewed and updated as needed this visit by Provider       ROS:  Constitutional, HEENT, cardiovascular, pulmonary, GI, , musculoskeletal, neuro, skin, endocrine and  psych systems are negative, except as otherwise noted.    This document serves as a record of the services and decisions personally performed and made by Kapil Duckworth MD. It was created on his behalf by Marcos Ayon, a trained medical scribe. The creation of this document is based on the provider's statements to the medical scribe.  Marcos Ayon 12:25 PM September 25, 2018    OBJECTIVE:     /76  Pulse 77  Temp 97.2  F (36.2  C) (Oral)  Resp 16  Wt 93.4 kg (206 lb)  SpO2 96%  BMI 30.87 kg/m2  Body mass index is 30.87 kg/(m^2).  GENERAL: healthy, alert and no distress  EYES: Eyes grossly normal to inspection, PERRL and conjunctivae and sclerae normal  MS: negative straight leg rises, tenderness to palpation with the lumbar paraspinous muscles   SKIN: no suspicious lesions or rashes to visible skin  NEURO: Normal strength and tone, mentation intact and speech normal  PSYCH: mentation appears normal, affect normal/bright    Diagnostic Test Results:  No results found for this or any previous visit (from the past 24 hour(s)).    ASSESSMENT/PLAN:     (M54.5,  G89.29) Chronic bilateral low back pain without sciatica  (primary encounter diagnosis)  Comment: this is benign biomechanical musculoskeletal back pain with benign features   Plan: supportive measures reviewed . See patient after-visit summary     (Z23) Need for prophylactic vaccination and inoculation against influenza  Comment:   Plan:     (M05.79) Rheumatoid arthritis involving multiple sites with positive rheumatoid factor (H)  Comment: refills provided , limited number of pills  Plan: HYDROcodone-acetaminophen (NORCO) 5-325 MG per         tablet            (E11.42,  Z79.4) Type 2 diabetes mellitus with diabetic polyneuropathy, with long-term current use of insulin (H)  Comment: issues reviewed    Plan: HEMOGLOBIN A1C, Albumin Random Urine         Quantitative with Creat Ratio, TSH WITH FREE T4        REFLEX        Further follow up 3 months  regarding diabetes mellitus       See Patient Instructions    The information in this document, created by the medical scribe for me, accurately reflects the services I personally performed and the decisions made by me. I have reviewed and approved this document for accuracy prior to leaving the patient care area.  September 25, 2018 12:25 PM    Kapil Duckworth MD  [facility]

## 2018-09-24 NOTE — MR AVS SNAPSHOT
After Visit Summary   9/24/2018    Zurdo Dash    MRN: [id number]           Patient Information     Date Of Birth          1945        Visit Information        Provider Department      9/24/2018 10:30 AM Kapil Duckworth MD [facility]        Today's Diagnoses     Need for prophylactic vaccination and inoculation against influenza    -  1       Follow-ups after your visit        Your next 10 appointments already scheduled     Sep 25, 2018 12:10 PM CDT   Office Visit with Kapil Duckworth MD   [facility]    [address] [address]   [phone]           Bring a current list of meds and any records pertaining to this visit. For Physicals, please bring immunization records and any forms needing to be filled out. Please arrive 10 minutes early to complete paperwork.            Dec 13, 2018 11:15 AM CST   LAB with [facility]   [facility])    [address] [address]   [phone]           Please do not eat 10-12 hours before your appointment if you are coming in fasting for labs on lipids, cholesterol, or glucose (sugar). This does not apply to pregnant women. Water, hot tea and black coffee (with nothing added) are okay. Do not drink other fluids, diet soda or chew gum.            Jan 23, 2019 11:40 AM CST   Return Visit with Albert Tompkins MD   [facility] ([facility])    [address] [address]   [phone]              Who to contact     If you have questions or need follow up information about today's clinic visit or your schedule please contact [facility] directly at [phone].  Normal or non-critical lab and imaging results will be communicated to you by MyChart, letter or phone within 4 business days after the clinic has received the results. If you do not hear from us within 7 days, please contact  the clinic through [employer] or phone. If you have a critical or abnormal lab result, we will notify you by phone as soon as possible.  Submit refill requests through [employer] or call your pharmacy and they will forward the refill request to us. Please allow 3 business days for your refill to be completed.          Additional Information About Your Visit        Care EveryWhere ID     This is your Care EveryWhere ID. This could be used by other organizations to access your [facility] medical records  [id number]         Blood Pressure from Last 3 Encounters:   09/13/18 121/75   07/12/18 150/80   06/07/18 114/74    Weight from Last 3 Encounters:   09/13/18 210 lb 6.4 oz (95.4 kg)   07/12/18 215 lb (97.5 kg)   06/07/18 214 lb (97.1 kg)              We Performed the Following     FLU VACCINE, INCREASED ANTIGEN, PRESV FREE, AGE 65+ [08831]     Vaccine Administration, Initial [89354]        Primary Care Provider Office Phone # Fax #    Kapil Duckworth [phone] [address] [address]        Equal Access to Services     [facility]: Hadii aad ku hadasho Sopremaali, waaxda luqadaha, qaybta kaalmada adenailayadenver, aranza zhong . So [facility] [phone].    ATENCIÓN: Si habla español, tiene a diallo disposición servicios gratuitos de asistencia lingüística. [facility] [phone].    We comply with applicable federal civil rights laws and [place] laws. We do not discriminate on the basis of race, color, national origin, age, disability, sex, sexual orientation, or gender identity.            Thank you!     Thank you for choosing [facility]  for your care. Our goal is always to provide you with excellent care. Hearing back from our patients is one way we can continue to improve our services. Please take a few minutes to complete the written survey that you may receive in the mail after your visit with us. Thank you!             Your Updated Medication List -  Protect others around you: Learn how to safely use, store and throw away your medicines at www.disposemymeds.org.          This list is accurate as of 9/24/18  4:13 PM.  Always use your most recent med list.                   Brand Name Dispense Instructions for use Diagnosis    albuterol 108 (90 Base) MCG/ACT inhaler    PROAIR HFA/PROVENTIL HFA/VENTOLIN HFA    1 Inhaler    Inhale 2 puffs into the lungs every 4 hours as needed for other (coughing)    Acute bronchitis, unspecified organism       aspirin 81 MG tablet      Take 1 tablet by mouth daily.    Type 2 diabetes, HbA1c goal < 7% (H)       blood glucose lancing device    no brand specified    1 each    Use to test blood sugars 3 times daily or as directed.    Type 2 diabetes mellitus, uncontrolled, with neuropathy (H)       blood glucose monitoring test strip    INDIA CONTOUR NEXT    300 each    TEST THREE TIMES A DAY OR AS DIRECTED, dispense covered brand    Type 2 diabetes mellitus, uncontrolled, with neuropathy (H)       cetirizine 10 MG tablet    zyrTEC     Take 10 mg by mouth At Bedtime        folic acid 1 MG tablet    FOLVITE    100 tablet    Take 1 tablet (1 mg) by mouth daily    Rheumatoid arthritis involving multiple sites with positive rheumatoid factor (H)       guaiFENesin-codeine 100-10 MG/5ML Soln solution    ROBITUSSIN AC    120 mL    Take 5 mLs by mouth every 4 hours as needed for cough    Cough       HYDROcodone-acetaminophen 5-325 MG per tablet    NORCO    28 tablet    Take 1-2 tablets by mouth every 4 hours as needed for moderate to severe pain maximum 4 tablet(s) per day    Rheumatoid arthritis involving multiple sites with positive rheumatoid factor (H)       hydroxychloroquine 200 MG tablet    PLAQUENIL    180 tablet    Take 1 tablet (200 mg) by mouth 2 times daily    Rheumatoid arthritis involving multiple sites with positive rheumatoid factor (H)       * insulin pen needle 31G X 6 MM    ULTICARE MINI    100 each    Use 1 daily or as  directed.    Type 2 diabetes mellitus, uncontrolled, with neuropathy (H)       * INSUPEN ULTRAFIN 31G X 6 MM   Generic drug:  insulin pen needle     100 each    USE TO TEST BLOOD SUGAR ONCE A DAY OR AS DIRECTED    Type 2 diabetes mellitus, uncontrolled, with neuropathy (H)       liraglutide 18 MG/3ML soln    VICTOZA PEN    9 mL    Inject 0.6 mg Subcutaneous daily    Type 2 diabetes mellitus, uncontrolled, with neuropathy (H)       lisinopril 2.5 MG tablet    PRINIVIL/Zestril    90 tablet    Take 1 tablet (2.5 mg) by mouth daily    Hypotension, unspecified hypotension type       * metFORMIN 500 MG tablet    GLUCOPHAGE    1 tablet    Take 1 tablet (500 mg) by mouth 2 times daily (with meals) TAKE TWO TABLETS BY MOUTH TWICE A DAY WITH MEALS    Type 2 diabetes mellitus with diabetic polyneuropathy, with long-term current use of insulin (H)       * metFORMIN 500 MG tablet    GLUCOPHAGE    180 tablet    Take 1 tablet (500 mg) by mouth 2 times daily (with meals)    Type 2 diabetes mellitus with diabetic polyneuropathy, with long-term current use of insulin (H)       methotrexate sodium 2.5 MG Tabs     120 tablet    Take 10 tablets (25 mg) by mouth once a week . Take all 10 tablets on the same day of each week.    Rheumatoid arthritis involving multiple sites with positive rheumatoid factor (H)       metoprolol succinate 25 MG 24 hr tablet    TOPROL-XL    90 tablet    TAKE ONE TABLET BY MOUTH EVERY DAY    Hypertension goal BP (blood pressure) < 140/90       * MICROLET LANCETS Misc     100 each    1 Device daily    Type 2 diabetes, HbA1C goal < 8% (H)       * blood glucose monitoring lancets     300 each    Use to test blood sugars 3 times daily or as directed.    Type 2 diabetes mellitus, uncontrolled, with neuropathy (H)       omeprazole 20 MG CR capsule    priLOSEC    90 capsule    Take 1 capsule (20 mg) by mouth daily    Gastroesophageal reflux disease without esophagitis       * order for DME     1 Device    Equipment  being ordered: specialized shoes for diabetic neuropathy   His orthotics will be made by [employer] ?? Tim Jernigan, Certified  [email or web address] Phone: [phone] Fax: [phone] [address] [place] [address]    Type 2 diabetes mellitus with diabetic polyneuropathy, with long-term current use of insulin (H), Diabetic polyneuropathy associated with type 2 diabetes mellitus (H)       * order for DME     1 Device    Equipment being ordered: glucometer    Type 2 diabetes mellitus, uncontrolled, with neuropathy (H)       pravastatin 40 MG tablet    PRAVACHOL    1 tablet    Take 0.5 tablets (20 mg) by mouth every other day    Hyperlipidemia LDL goal <100       Semaglutide 0.25 or 0.5 MG/DOSE Sopn    OZEMPIC    1.5 mL    Inject 0.25 Doses Subcutaneous once a week    Type 2 diabetes mellitus with diabetic polyneuropathy, with long-term current use of insulin (H)       tiZANidine 2 MG tablet    ZANAFLEX    90 tablet    Take 1 tablet (2 mg) by mouth 3 times daily as needed for muscle spasms    Neck pain       * Notice:  This list has 8 medication(s) that are the same as other medications prescribed for you. Read the directions carefully, and ask your doctor or other care provider to review them with you.

## 2018-09-24 NOTE — PROGRESS NOTES
Injectable Influenza Immunization Documentation    1.  Is the person to be vaccinated sick today?   No    2. Does the person to be vaccinated have an allergy to a component   of the vaccine?   No  Egg Allergy Algorithm Link    3. Has the person to be vaccinated ever had a serious reaction   to influenza vaccine in the past?   No    4. Has the person to be vaccinated ever had Guillain-Barré syndrome?   No    Form completed by Conchita SANTILLAN CMA ([facility])

## 2018-09-25 ENCOUNTER — OFFICE VISIT (OUTPATIENT)
Dept: FAMILY MEDICINE | Facility: CLINIC | Age: 73
End: 2018-09-25
Payer: COMMERCIAL

## 2018-09-25 VITALS
OXYGEN SATURATION: 96 % | HEART RATE: 77 BPM | WEIGHT: 206 LBS | DIASTOLIC BLOOD PRESSURE: 76 MMHG | RESPIRATION RATE: 16 BRPM | SYSTOLIC BLOOD PRESSURE: 126 MMHG | BODY MASS INDEX: 30.87 KG/M2 | TEMPERATURE: 97.2 F

## 2018-09-25 DIAGNOSIS — M54.50 CHRONIC BILATERAL LOW BACK PAIN WITHOUT SCIATICA: Primary | ICD-10-CM

## 2018-09-25 DIAGNOSIS — G89.29 CHRONIC BILATERAL LOW BACK PAIN WITHOUT SCIATICA: Primary | ICD-10-CM

## 2018-09-25 DIAGNOSIS — M05.79 RHEUMATOID ARTHRITIS INVOLVING MULTIPLE SITES WITH POSITIVE RHEUMATOID FACTOR (H): ICD-10-CM

## 2018-09-25 DIAGNOSIS — Z79.4 TYPE 2 DIABETES MELLITUS WITH DIABETIC POLYNEUROPATHY, WITH LONG-TERM CURRENT USE OF INSULIN (H): ICD-10-CM

## 2018-09-25 DIAGNOSIS — Z23 NEED FOR PROPHYLACTIC VACCINATION AND INOCULATION AGAINST INFLUENZA: ICD-10-CM

## 2018-09-25 DIAGNOSIS — E11.42 TYPE 2 DIABETES MELLITUS WITH DIABETIC POLYNEUROPATHY, WITH LONG-TERM CURRENT USE OF INSULIN (H): ICD-10-CM

## 2018-09-25 LAB
CREAT UR-MCNC: 143 MG/DL
HBA1C MFR BLD: 8.4 % (ref 0–5.6)
MICROALBUMIN UR-MCNC: 72 MG/L
MICROALBUMIN/CREAT UR: 50.14 MG/G CR (ref 0–17)
TSH SERPL DL<=0.005 MIU/L-ACNC: 1.44 MU/L (ref 0.4–4)

## 2018-09-25 PROCEDURE — 36415 COLL VENOUS BLD VENIPUNCTURE: CPT | Performed by: INTERNAL MEDICINE

## 2018-09-25 PROCEDURE — 99213 OFFICE O/P EST LOW 20 MIN: CPT | Performed by: INTERNAL MEDICINE

## 2018-09-25 PROCEDURE — 84443 ASSAY THYROID STIM HORMONE: CPT | Performed by: INTERNAL MEDICINE

## 2018-09-25 PROCEDURE — 83036 HEMOGLOBIN GLYCOSYLATED A1C: CPT | Performed by: INTERNAL MEDICINE

## 2018-09-25 PROCEDURE — 82043 UR ALBUMIN QUANTITATIVE: CPT | Performed by: INTERNAL MEDICINE

## 2018-09-25 RX ORDER — TRIAMCINOLONE ACETONIDE 40 MG/ML
40 INJECTION, SUSPENSION INTRA-ARTICULAR; INTRAMUSCULAR ONCE
Qty: 1 ML | Refills: 0 | OUTPATIENT
Start: 2018-09-25 | End: 2019-02-14

## 2018-09-25 RX ORDER — HYDROCODONE BITARTRATE AND ACETAMINOPHEN 5; 325 MG/1; MG/1
1-2 TABLET ORAL EVERY 4 HOURS PRN
Qty: 28 TABLET | Refills: 0 | Status: SHIPPED | OUTPATIENT
Start: 2018-09-25 | End: 2020-02-27

## 2018-09-25 NOTE — MR AVS SNAPSHOT
After Visit Summary   9/25/2018    Zurdo Dash    MRN: [id number]           Patient Information     Date Of Birth          1945        Visit Information        Provider Department      9/25/2018 12:10 PM Kapil Duckworth MD [facility]        Today's Diagnoses     Chronic bilateral low back pain without sciatica    -  1    Need for prophylactic vaccination and inoculation against influenza        Rheumatoid arthritis involving multiple sites with positive rheumatoid factor (H)        Type 2 diabetes mellitus with diabetic polyneuropathy, with long-term current use of insulin (H)          Care Instructions    REST. Avoid any possible movements that would trigger back pain flare.    When lying down be in the most comfortable position to allow the muscle to heal.    Try ICE and alternate with heat.    Try a deep heating cream a few times a day.    Use ibuprofen or tylenol as needed.    [facility]    If you have any questions regarding to your visit please contact your care team:     Team Pink:   Clinic Hours Telephone Number   Internal Medicine:  Dr. Suze Zapata NP 7am-7pm  Monday - Thursday   7am-5pm  Fridays  [phone]  (Appointment scheduling available 24/7)   Urgent Care - [place] - 11am-9pm Monday-Friday Saturday-Sunday- 9am-5pm   [place] - 5pm-9pm Monday-Friday Saturday-Sunday- 9am-5pm  [phone] - [place]  [phone] - [place]       What options do I have for a visit other than an office visit? We offer electronic visits (e-visits) and telephone visits, when medically appropriate.  Please check with your medical insurance to see if these types of visits are covered, as you will be responsible for any charges that are not paid by your insurance.      You can use iPawn (secure electronic communication) to access to your chart, send your primary care provider a message, or make an  appointment. Ask a team member how to get started.     For a price quote for your services, please call our Consumer Price Line at [phone] or our Imaging Cost estimation line at [phone] (for imaging tests).  Conchita SANTILLAN CMA ([facility])            Follow-ups after your visit        Your next 10 appointments already scheduled     Dec 13, 2018 11:15 AM CST   [facility]    [address] [id number] [phone]           Please do not eat 10-12 hours before your appointment if you are coming in fasting for labs on lipids, cholesterol, or glucose (sugar). This does not apply to pregnant women. Water, hot tea and black coffee (with nothing added) are okay. Do not drink other fluids, diet soda or chew gum.            Jan 23, 2019 11:40 AM CST   Return Visit with Albert Tompkins MD   [facility]    [address] [id number] [phone]              Who to contact     If you have questions or need follow up information about today's clinic visit or your schedule please contact [facility] directly at [phone].  Normal or non-critical lab and imaging results will be communicated to you by MyChart, letter or phone within 4 business days after the clinic has received the results. If you do not hear from us within 7 days, please contact the clinic through MyChart or phone. If you have a critical or abnormal lab result, we will notify you by phone as soon as possible.  Submit refill requests through pfwaterworks or call your pharmacy and they will forward the refill request to us. Please allow 3 business days for your refill to be completed.          Additional Information About Your Visit        Care EveryWhere ID     This is your Care EveryWhere ID. This could be used by other organizations to access your [place] medical records  [id number]        Your Vitals Were      Pulse Temperature Respirations Pulse Oximetry BMI (Body Mass Index)       77 97.2  F (36.2  C) (Oral) 16 96% 30.87 kg/m2        Blood Pressure from Last 3 Encounters:   09/25/18 126/76   09/13/18 121/75   07/12/18 150/80    Weight from Last 3 Encounters:   09/25/18 206 lb (93.4 kg)   09/13/18 210 lb 6.4 oz (95.4 kg)   07/12/18 215 lb (97.5 kg)              We Performed the Following     Albumin Random Urine Quantitative with Creat Ratio     HEMOGLOBIN A1C     TSH WITH FREE T4 REFLEX          Where to get your medicines      Some of these will need a paper prescription and others can be bought over the counter.  Ask your nurse if you have questions.     Bring a paper prescription for each of these medications     HYDROcodone-acetaminophen 5-325 MG per tablet         Information about OPIOIDS     PRESCRIPTION OPIOIDS: WHAT YOU NEED TO KNOW   We gave you an opioid (narcotic) pain medicine. It is important to manage your pain, but opioids are not always the best choice. You should first try all the other options your care team gave you. Take this medicine for as short a time (and as few doses) as possible.    Some activities can increase your pain, such as bandage changes or therapy sessions. It may help to take your pain medicine 30 to 60 minutes before these activities. Reduce your stress by getting enough sleep, working on hobbies you enjoy and practicing relaxation or meditation. Talk to your care team about ways to manage your pain beyond prescription opioids.    These medicines have risks:    DO NOT drive when on new or higher doses of pain medicine. These medicines can affect your alertness and reaction times, and you could be arrested for driving under the influence (DUI). If you need to use opioids long-term, talk to your care team about driving.    DO NOT operate heavy machinery    DO NOT do any other dangerous activities while taking these medicines.    DO NOT drink any alcohol while taking these medicines.      If the opioid prescribed includes acetaminophen, DO NOT take with any other medicines that contain acetaminophen. Read all labels carefully. Look for the word  acetaminophen  or  Tylenol.  Ask your pharmacist if you have questions or are unsure.    You can get addicted to pain medicines, especially if you have a history of addiction (chemical, alcohol or substance dependence). Talk to your care team about ways to reduce this risk.    All opioids tend to cause constipation. Drink plenty of water and eat foods that have a lot of fiber, such as fruits, vegetables, prune juice, apple juice and high-fiber cereal. Take a laxative (Miralax, milk of magnesia, Colace, Senna) if you don t move your bowels at least every other day. Other side effects include upset stomach, sleepiness, dizziness, throwing up, tolerance (needing more of the medicine to have the same effect), physical dependence and slowed breathing.    Store your pills in a secure place, locked if possible. We will not replace any lost or stolen medicine. If you don t finish your medicine, please throw away (dispose) as directed by your pharmacist. The [place] Pollution Control Agency has more information about safe disposal: https://www.pca.[facility].[place].us/living-green/managing-unwanted-medications         Primary Care Provider Office Phone # Fax #    Kapil Duckworth [phone] [address] [address]        Equal Access to Services     TASHI BLACKMAN : Hadmor sawanto Sophil, waaxda luqadaha, qaybta kaalmada aranza becerra. So [facility] [phone].    ATENCIÓN: Si habla español, tiene a diallo disposición servicios gratuitos de asistencia lingüística. Nitesh sheldon [phone].    We comply with applicable federal civil rights laws and [place] laws. We do not discriminate on the basis of race, color, national origin, age, disability, sex, sexual orientation, or gender identity.             Thank you!     Thank you for choosing [facility]  for your care. Our goal is always to provide you with excellent care. Hearing back from our patients is one way we can continue to improve our services. Please take a few minutes to complete the written survey that you may receive in the mail after your visit with us. Thank you!             Your Updated Medication List - Protect others around you: Learn how to safely use, store and throw away your medicines at www.disposemymeds.org.          This list is accurate as of 9/25/18 12:40 PM.  Always use your most recent med list.                   Brand Name Dispense Instructions for use Diagnosis    albuterol 108 (90 Base) MCG/ACT inhaler    PROAIR HFA/PROVENTIL HFA/VENTOLIN HFA    1 Inhaler    Inhale 2 puffs into the lungs every 4 hours as needed for other (coughing)    Acute bronchitis, unspecified organism       aspirin 81 MG tablet      Take 1 tablet by mouth daily.    Type 2 diabetes, HbA1c goal < 7% (H)       blood glucose lancing device    no brand specified    1 each    Use to test blood sugars 3 times daily or as directed.    Type 2 diabetes mellitus, uncontrolled, with neuropathy (H)       blood glucose monitoring test strip    INDIA CONTOUR NEXT    300 each    TEST THREE TIMES A DAY OR AS DIRECTED, dispense covered brand    Type 2 diabetes mellitus, uncontrolled, with neuropathy (H)       cetirizine 10 MG tablet    zyrTEC     Take 10 mg by mouth At Bedtime        folic acid 1 MG tablet    FOLVITE    100 tablet    Take 1 tablet (1 mg) by mouth daily    Rheumatoid arthritis involving multiple sites with positive rheumatoid factor (H)       guaiFENesin-codeine 100-10 MG/5ML Soln solution    ROBITUSSIN AC    120 mL    Take 5 mLs by mouth every 4 hours as needed for cough    Cough       HYDROcodone-acetaminophen 5-325 MG per tablet    NORCO    28 tablet    Take 1-2 tablets by mouth every 4 hours as needed for moderate to severe pain maximum 4  tablet(s) per day    Rheumatoid arthritis involving multiple sites with positive rheumatoid factor (H)       hydroxychloroquine 200 MG tablet    PLAQUENIL    180 tablet    Take 1 tablet (200 mg) by mouth 2 times daily    Rheumatoid arthritis involving multiple sites with positive rheumatoid factor (H)       * insulin pen needle 31G X 6 MM    ULTICARE MINI    100 each    Use 1 daily or as directed.    Type 2 diabetes mellitus, uncontrolled, with neuropathy (H)       * INSUPEN ULTRAFIN 31G X 6 MM   Generic drug:  insulin pen needle     100 each    USE TO TEST BLOOD SUGAR ONCE A DAY OR AS DIRECTED    Type 2 diabetes mellitus, uncontrolled, with neuropathy (H)       liraglutide 18 MG/3ML soln    VICTOZA PEN    9 mL    Inject 0.6 mg Subcutaneous daily    Type 2 diabetes mellitus, uncontrolled, with neuropathy (H)       lisinopril 2.5 MG tablet    PRINIVIL/Zestril    90 tablet    Take 1 tablet (2.5 mg) by mouth daily    Hypotension, unspecified hypotension type       * metFORMIN 500 MG tablet    GLUCOPHAGE    1 tablet    Take 1 tablet (500 mg) by mouth 2 times daily (with meals) TAKE TWO TABLETS BY MOUTH TWICE A DAY WITH MEALS    Type 2 diabetes mellitus with diabetic polyneuropathy, with long-term current use of insulin (H)       * metFORMIN 500 MG tablet    GLUCOPHAGE    180 tablet    Take 1 tablet (500 mg) by mouth 2 times daily (with meals)    Type 2 diabetes mellitus with diabetic polyneuropathy, with long-term current use of insulin (H)       methotrexate sodium 2.5 MG Tabs     120 tablet    Take 10 tablets (25 mg) by mouth once a week . Take all 10 tablets on the same day of each week.    Rheumatoid arthritis involving multiple sites with positive rheumatoid factor (H)       metoprolol succinate 25 MG 24 hr tablet    TOPROL-XL    90 tablet    TAKE ONE TABLET BY MOUTH EVERY DAY    Hypertension goal BP (blood pressure) < 140/90       * MICROLET LANCETS Misc     100 each    1 Device daily    Type 2 diabetes, HbA1C  goal < 8% (H)       * blood glucose monitoring lancets     300 each    Use to test blood sugars 3 times daily or as directed.    Type 2 diabetes mellitus, uncontrolled, with neuropathy (H)       omeprazole 20 MG CR capsule    priLOSEC    90 capsule    Take 1 capsule (20 mg) by mouth daily    Gastroesophageal reflux disease without esophagitis       * order for DME     1 Device    Equipment being ordered: specialized shoes for diabetic neuropathy   His orthotics will be made by [employer] ?? Tim Jernigan, Certified  [email or web address] Phone: [phone] Fax: [phone] [address] [place] [address]    Type 2 diabetes mellitus with diabetic polyneuropathy, with long-term current use of insulin (H), Diabetic polyneuropathy associated with type 2 diabetes mellitus (H)       * order for DME     1 Device    Equipment being ordered: glucometer    Type 2 diabetes mellitus, uncontrolled, with neuropathy (H)       pravastatin 40 MG tablet    PRAVACHOL    1 tablet    Take 0.5 tablets (20 mg) by mouth every other day    Hyperlipidemia LDL goal <100       Semaglutide 0.25 or 0.5 MG/DOSE Sopn    OZEMPIC    1.5 mL    Inject 0.25 Doses Subcutaneous once a week    Type 2 diabetes mellitus with diabetic polyneuropathy, with long-term current use of insulin (H)       tiZANidine 2 MG tablet    ZANAFLEX    90 tablet    Take 1 tablet (2 mg) by mouth 3 times daily as needed for muscle spasms    Neck pain       * Notice:  This list has 8 medication(s) that are the same as other medications prescribed for you. Read the directions carefully, and ask your doctor or other care provider to review them with you.

## 2018-09-25 NOTE — PATIENT INSTRUCTIONS
REST. Avoid any possible movements that would trigger back pain flare.    When lying down be in the most comfortable position to allow the muscle to heal.    Try ICE and alternate with heat.    Try a deep heating cream a few times a day.    Use ibuprofen or tylenol as needed.    [facility]    If you have any questions regarding to your visit please contact your care team:     Team Pink:   Clinic Hours Telephone Number   Internal Medicine:  Dr. Suze Zapata NP 7am-7pm  Monday - Thursday   7am-5pm  Fridays  [phone]  (Appointment scheduling available 24/7)   Urgent Care - [place] and [facility] - 11am-9pm Monday-Friday Saturday-Sunday- 9am-5pm   [place] - 5pm-9pm Monday-Friday Saturday-Sunday- 9am-5pm  [phone] - [place]  [phone] - [place]       What options do I have for a visit other than an office visit? We offer electronic visits (e-visits) and telephone visits, when medically appropriate.  Please check with your medical insurance to see if these types of visits are covered, as you will be responsible for any charges that are not paid by your insurance.      You can use Flux (secure electronic communication) to access to your chart, send your primary care provider a message, or make an appointment. Ask a team member how to get started.     For a price quote for your services, please call our Consumer Price Line at [phone] or our Imaging Cost estimation line at [phone] (for imaging tests).  Conchita SANTILLAN CMA ([facility])

## 2018-09-26 ENCOUNTER — TELEPHONE (OUTPATIENT)
Dept: INTERNAL MEDICINE | Facility: CLINIC | Age: 73
End: 2018-09-26

## 2018-09-26 NOTE — TELEPHONE ENCOUNTER
Left message on voicemail for patient to return call to RN hotline at # [phone].    Notes Recorded by Kapil Duckworth MD on 9/25/2018 at 9:36 PM  Zurdo    You were absolutely correct that your diabetes mellitus is returned to it's prior level of activity and is now consistent with out of control diabetes mellitus. As we discussed at your appointment , it makes the most sense to me for you to get back onto the full dose of Liraglutide [ Victoza ] at the 1.8 milligram dose. You've said you wanted to go back to this dose, complete your reserves and then be switched to Ozempic  [semaglutide] . I am ok with this but technically wanted to make sure you know if you asked me my opinion about best would be to stay with what we know works for you. The other laboratory studies are ok.    If you do choose to go with Ozempic  [semaglutide] here's the way we start this up    Semaglutide dosing information  Usual Adult Dose of Ozempic for Diabetes Type 2:  Initial dose: 0.25 mg subcutaneously once a week for 4 weeks  -After 4 weeks, the dose should be increased to 0.5 mg subcutaneously once a week  Maintenance dose: If additional glycemic control is needed after 4 weeks of 0.5 mg once a week, may increase dose to 1 mg subcutaneously once a week  Maximum dose: 1 mg once a week    Please let me know if patient has questions for me or if he needs prescriptions sent in. Either way a recheck appointment in 3 months is recommended.    Kapil Del Castillo RN

## 2018-09-27 RX ORDER — LIRAGLUTIDE 6 MG/ML
1.8 INJECTION SUBCUTANEOUS DAILY
COMMUNITY
Start: 2018-09-27 | End: 2019-02-14

## 2018-09-27 NOTE — TELEPHONE ENCOUNTER
Patient notified of Provider's message as written.  Patient verbalized understanding.  He stated that he cannot afford Victoza so will need to switch to ozempic when done with victoza supply at home    Please review prescription alfie'd up for Ozempic (Semaglutide) and send to [facility] pharmacy  (will local print and need signature as the instruction is too long to eprescribe)    Jacobo Del Castillo RN

## 2018-10-12 ENCOUNTER — TELEPHONE (OUTPATIENT)
Dept: FAMILY MEDICINE | Facility: CLINIC | Age: 73
End: 2018-10-12

## 2018-10-12 NOTE — TELEPHONE ENCOUNTER
Panel Management Review      Patient has the following on his problem list:     Diabetes    ASA: Passed    Last A1C  Lab Results   Component Value Date    A1C 8.4 09/25/2018    A1C 5.9 03/05/2018    A1C 5.1 10/31/2017    A1C 5.6 06/23/2017    A1C 9.2 02/09/2017     A1C tested: FAILED    Last LDL:    Lab Results   Component Value Date    CHOL 127 10/31/2017     Lab Results   Component Value Date    HDL 48 10/31/2017     Lab Results   Component Value Date    LDL 47 10/31/2017     Lab Results   Component Value Date    TRIG 159 10/31/2017     Lab Results   Component Value Date    CHOLHDLRATIO 3.4 06/29/2015     Lab Results   Component Value Date    NHDL 79 10/31/2017       Is the patient on a Statin? YES             Is the patient on Aspirin? YES    Medications     HMG CoA Reductase Inhibitors    pravastatin (PRAVACHOL) 40 MG tablet    Salicylates    aspirin 81 MG tablet          Last three blood pressure readings:  BP Readings from Last 3 Encounters:   09/25/18 126/76   09/13/18 121/75   07/12/18 150/80       Date of last diabetes office visit: 09/25/2018     Tobacco History:     History   Smoking Status     Former Smoker     Packs/day: 1.00     Years: 40.00     Types: Cigarettes     Quit date: 3/16/2007   Smokeless Tobacco     Never Used           Composite cancer screening  Chart review shows that this patient is due/due soon for the following None  Summary:    Patient is due/failing the following:   A1C, LDL and PHQ9    Action needed:   Patient needs office visit for Diabetes and a1c check in 3 months.    Type of outreach:    none    Questions for provider review:    None                                                                                                                                    Conchita SANTILLAN CMA ([facility])       Chart routed to none .

## 2018-10-19 DIAGNOSIS — I95.9 HYPOTENSION, UNSPECIFIED HYPOTENSION TYPE: ICD-10-CM

## 2018-10-19 DIAGNOSIS — K21.9 GASTROESOPHAGEAL REFLUX DISEASE WITHOUT ESOPHAGITIS: ICD-10-CM

## 2018-10-19 RX ORDER — LISINOPRIL 2.5 MG/1
TABLET ORAL
Qty: 90 TABLET | Refills: 1 | Status: SHIPPED | OUTPATIENT
Start: 2018-10-19 | End: 2019-04-18

## 2018-10-19 NOTE — TELEPHONE ENCOUNTER
Prescription approved per [facility] Refill Protocol.    Carline More RN

## 2018-11-26 ENCOUNTER — TELEPHONE (OUTPATIENT)
Dept: FAMILY MEDICINE | Facility: CLINIC | Age: 73
End: 2018-11-26

## 2018-11-26 DIAGNOSIS — E11.42 TYPE 2 DIABETES MELLITUS WITH DIABETIC POLYNEUROPATHY, WITHOUT LONG-TERM CURRENT USE OF INSULIN (H): Primary | ICD-10-CM

## 2018-11-26 NOTE — TELEPHONE ENCOUNTER
Ozempic  [semaglutide] is a (glucagon-like peptide-1) GLP-1 agonist . We have covered all these issues and medication choices before    Alternative choices are    1. Exenatide [Byetta ] or Bydureon (exenatide)   2. Liraglutide [ Victoza ]   3. Trulicity (dulaglutide)     There are a few others that I know less about and I don't think those are any cheaper. He can also ask his pharmacy or consult his insurance plan terms to find out his left of coverage    Kapil Duckworth MD

## 2018-11-26 NOTE — TELEPHONE ENCOUNTER
Patient was on Victoza but that was too expensive so he switched to Ozempic    Per patient, Ozempic is covered, it is just his copay is too much.  He was on the Ozempic 0.25mg dose x 2 weeks then moved up to the 0.5mg dose for 2 weeks.   He was planning on going to the 1mg dose after 2 weeks but stated that would require him to double up on the 0.5mg which will double his copay  Advised him that he was to be on each dose for at least 4 weeks  Advised him that the 1mg dose would be a separate prescription for the 1mg pen and the increase to the 1mg dose would only be if additional glycemic control is needed  Patient stated that his BG results are finally under 200 but still in the high 100's   He believes he would need to go to the 1mg dose  Explained that the cost might be different since it is a different prescription.    Patient would like a prescription for the 1mg pens sent to check on cost  Prescription alfie'd up  Please advise    Jacobo Del Castillo RN

## 2018-11-26 NOTE — TELEPHONE ENCOUNTER
Reason for Call:  Other call back    Detailed comments: patient is calling regarding questions on his medication ( ozempic) patient is noting that this medication is too expense and would like something else ordered.Please call and advise Thank you     Phone Number Patient can be reached at: Home number on file [phone] (home)    Best Time: any    Can we leave a detailed message on this number? YES    Call taken on 11/26/2018 at 7:32 AM by Margaux Kemp

## 2018-11-28 DIAGNOSIS — E78.5 HYPERLIPIDEMIA LDL GOAL <100: ICD-10-CM

## 2018-11-28 RX ORDER — PRAVASTATIN SODIUM 40 MG
TABLET ORAL
Qty: 90 TABLET | Refills: 0 | Status: SHIPPED | OUTPATIENT
Start: 2018-11-28 | End: 2019-02-27

## 2018-11-28 NOTE — TELEPHONE ENCOUNTER
Routing refill request to provider for review/approval because:  Labs not current:  Lipid panel    Molly Martinez RN

## 2018-11-29 NOTE — TELEPHONE ENCOUNTER
Lab Results   Component Value Date    CHOL 127 10/31/2017     Lab Results   Component Value Date    HDL 48 10/31/2017     Lab Results   Component Value Date    LDL 47 10/31/2017     Lab Results   Component Value Date    TRIG 159 10/31/2017     Lab Results   Component Value Date    CHOLHDLRATIO 3.4 06/29/2015     Due for follow up  , this can safely wait until next office visit     Kapil Duckworth MD

## 2018-12-19 DIAGNOSIS — M05.79 RHEUMATOID ARTHRITIS INVOLVING MULTIPLE SITES WITH POSITIVE RHEUMATOID FACTOR (H): ICD-10-CM

## 2018-12-19 LAB
ALBUMIN SERPL-MCNC: 3.4 G/DL (ref 3.4–5)
ALP SERPL-CCNC: 49 U/L (ref 40–150)
ALT SERPL W P-5'-P-CCNC: 33 U/L (ref 0–70)
AST SERPL W P-5'-P-CCNC: 25 U/L (ref 0–45)
BASOPHILS # BLD AUTO: 0.1 10E9/L (ref 0–0.2)
BASOPHILS NFR BLD AUTO: 1.5 %
BILIRUB DIRECT SERPL-MCNC: 0.1 MG/DL (ref 0–0.2)
BILIRUB SERPL-MCNC: 0.6 MG/DL (ref 0.2–1.3)
CREAT SERPL-MCNC: 1.07 MG/DL (ref 0.66–1.25)
CRP SERPL-MCNC: 11.6 MG/L (ref 0–8)
DIFFERENTIAL METHOD BLD: ABNORMAL
EOSINOPHIL # BLD AUTO: 0.1 10E9/L (ref 0–0.7)
EOSINOPHIL NFR BLD AUTO: 1.3 %
ERYTHROCYTE [DISTWIDTH] IN BLOOD BY AUTOMATED COUNT: 14.9 % (ref 10–15)
ERYTHROCYTE [SEDIMENTATION RATE] IN BLOOD BY WESTERGREN METHOD: 10 MM/H (ref 0–20)
GFR SERPL CREATININE-BSD FRML MDRD: 68 ML/MIN/{1.73_M2}
HCT VFR BLD AUTO: 40.9 % (ref 40–53)
HGB BLD-MCNC: 13.9 G/DL (ref 13.3–17.7)
LYMPHOCYTES # BLD AUTO: 0.8 10E9/L (ref 0.8–5.3)
LYMPHOCYTES NFR BLD AUTO: 15.5 %
MCH RBC QN AUTO: 31.7 PG (ref 26.5–33)
MCHC RBC AUTO-ENTMCNC: 34 G/DL (ref 31.5–36.5)
MCV RBC AUTO: 93 FL (ref 78–100)
MONOCYTES # BLD AUTO: 0.6 10E9/L (ref 0–1.3)
MONOCYTES NFR BLD AUTO: 11.3 %
NEUTROPHILS # BLD AUTO: 3.7 10E9/L (ref 1.6–8.3)
NEUTROPHILS NFR BLD AUTO: 70.4 %
PLATELET # BLD AUTO: 240 10E9/L (ref 150–450)
PROT SERPL-MCNC: 6.6 G/DL (ref 6.8–8.8)
RBC # BLD AUTO: 4.39 10E12/L (ref 4.4–5.9)
WBC # BLD AUTO: 5.2 10E9/L (ref 4–11)

## 2018-12-19 PROCEDURE — 36415 COLL VENOUS BLD VENIPUNCTURE: CPT | Performed by: INTERNAL MEDICINE

## 2018-12-19 PROCEDURE — 82565 ASSAY OF CREATININE: CPT | Performed by: INTERNAL MEDICINE

## 2018-12-19 PROCEDURE — 85025 COMPLETE CBC W/AUTO DIFF WBC: CPT | Performed by: INTERNAL MEDICINE

## 2018-12-19 PROCEDURE — 80076 HEPATIC FUNCTION PANEL: CPT | Performed by: INTERNAL MEDICINE

## 2018-12-19 PROCEDURE — 86140 C-REACTIVE PROTEIN: CPT | Performed by: INTERNAL MEDICINE

## 2018-12-19 PROCEDURE — 85652 RBC SED RATE AUTOMATED: CPT | Performed by: INTERNAL MEDICINE

## 2018-12-21 ENCOUNTER — OFFICE VISIT (OUTPATIENT)
Dept: OPHTHALMOLOGY | Facility: CLINIC | Age: 73
End: 2018-12-21
Payer: COMMERCIAL

## 2018-12-21 DIAGNOSIS — H25.813 COMBINED FORMS OF AGE-RELATED CATARACT OF BOTH EYES: ICD-10-CM

## 2018-12-21 DIAGNOSIS — G43.109 MIGRAINE EQUIVALENT: Primary | ICD-10-CM

## 2018-12-21 PROCEDURE — 92012 INTRM OPH EXAM EST PATIENT: CPT | Performed by: OPHTHALMOLOGY

## 2018-12-21 ASSESSMENT — VISUAL ACUITY
OS_PH_CC: 20/25
OD_PH_CC: 20/25
CORRECTION_TYPE: GLASSES
OD_CC: 20/40
OD_CC+: -2
OS_PH_CC+: -1
OS_CC: 20/30
OD_PH_CC+: -2
METHOD: SNELLEN - LINEAR

## 2018-12-21 ASSESSMENT — TONOMETRY
OS_IOP_MMHG: 16
OD_IOP_MMHG: 14
IOP_METHOD: APPLANATION

## 2018-12-21 NOTE — LETTER
12/21/2018         RE: Zurdo Dash  [address] [place] [id number]        Dear Colleague,    Thank you for referring your patient, Zurdo Dash, to the [facility]. Please see a copy of my visit note below.     Current Eye Medications:  none     Subjective:  Here for left eye. This am, noticed some flashes off to the side, blurred spots on the temporal side. DM, didn't check sugars this am, had a caramel roll. They have been running in the 200s, currently switching DM meds from Victozia to Ozempic. Last A1c was 8.4 on 9-25-18    Describes probable migraine equivalent     Objective:  See Ophthalmology Exam.       Assessment:  Migraine equivalent.  Probably visually significant cataract left eye > right eye.      Plan:  Reassured regarding probable migraine equivalent.  Return visit one month for a complete exam with me.  Justin Avendano M.D.  [phone]        Again, thank you for allowing me to participate in the care of your patient.        Sincerely,        Justin Avendano MD

## 2018-12-21 NOTE — PROGRESS NOTES
Current Eye Medications:  none     Subjective:  Here for left eye. This am, noticed some flashes off to the side, blurred spots on the temporal side. DM, didn't check sugars this am, had a caramel roll. They have been running in the 200s, currently switching DM meds from Victozia to Ozempic. Last A1c was 8.4 on 9-25-18    Describes probable migraine equivalent     Objective:  See Ophthalmology Exam.       Assessment:  Migraine equivalent.  Probably visually significant cataract left eye > right eye.      Plan:  Reassured regarding probable migraine equivalent.  Return visit one month for a complete exam with me.  Justin Avendano M.D.  [phone]

## 2018-12-21 NOTE — PATIENT INSTRUCTIONS
Reassured regarding probable migraine equivalent.  Return visit one month for a complete exam with me.  Justin Avendano M.D.  [phone]

## 2018-12-22 PROBLEM — G43.109 MIGRAINE EQUIVALENT: Status: ACTIVE | Noted: 2018-12-22

## 2018-12-22 ASSESSMENT — EXTERNAL EXAM - LEFT EYE: OS_EXAM: NORMAL

## 2018-12-22 ASSESSMENT — CUP TO DISC RATIO
OD_RATIO: 0.3
OS_RATIO: 0.2

## 2018-12-22 ASSESSMENT — SLIT LAMP EXAM - LIDS: COMMENTS: NORMAL

## 2018-12-22 ASSESSMENT — EXTERNAL EXAM - RIGHT EYE: OD_EXAM: NORMAL

## 2018-12-27 DIAGNOSIS — I10 HYPERTENSION GOAL BP (BLOOD PRESSURE) < 140/90: ICD-10-CM

## 2018-12-28 RX ORDER — METOPROLOL SUCCINATE 25 MG/1
TABLET, EXTENDED RELEASE ORAL
Qty: 90 TABLET | Refills: 1 | Status: SHIPPED | OUTPATIENT
Start: 2018-12-28 | End: 2019-07-26

## 2019-01-07 DIAGNOSIS — I10 HYPERTENSION GOAL BP (BLOOD PRESSURE) < 140/90: ICD-10-CM

## 2019-01-07 RX ORDER — METOPROLOL SUCCINATE 25 MG/1
25 TABLET, EXTENDED RELEASE ORAL DAILY
Qty: 90 TABLET | Refills: 1 | Status: CANCELLED | OUTPATIENT
Start: 2019-01-07

## 2019-01-09 NOTE — TELEPHONE ENCOUNTER
Spoke to pharmacy and confirmed duplicate request.    metoprolol succinate ER (TOPROL-XL) 25 MG 24 hr tablet 90 tablet 1 12/28/2018  No   Sig: TAKE ONE TABLET BY MOUTH EVERY DAY   Sent to pharmacy as: metoprolol succinate ER (TOPROL-XL) 25 MG 24 hr tablet   Class: E-Prescribe   Order: [id number]   E-Prescribing Status: Receipt confirmed by pharmacy (12/28/2018 12:06 PM CST)       Mia RAMSEY CMA ([facility])

## 2019-01-14 ENCOUNTER — OFFICE VISIT (OUTPATIENT)
Dept: FAMILY MEDICINE | Facility: CLINIC | Age: 74
End: 2019-01-14
Payer: COMMERCIAL

## 2019-01-14 VITALS
RESPIRATION RATE: 18 BRPM | BODY MASS INDEX: 31.1 KG/M2 | SYSTOLIC BLOOD PRESSURE: 126 MMHG | WEIGHT: 210 LBS | TEMPERATURE: 97.1 F | OXYGEN SATURATION: 97 % | HEART RATE: 72 BPM | HEIGHT: 69 IN | DIASTOLIC BLOOD PRESSURE: 78 MMHG

## 2019-01-14 DIAGNOSIS — E78.5 HYPERLIPIDEMIA LDL GOAL <100: Primary | ICD-10-CM

## 2019-01-14 DIAGNOSIS — K21.9 GASTROESOPHAGEAL REFLUX DISEASE, ESOPHAGITIS PRESENCE NOT SPECIFIED: ICD-10-CM

## 2019-01-14 DIAGNOSIS — R14.2 FLATULENCE, ERUCTATION AND GAS PAIN: ICD-10-CM

## 2019-01-14 DIAGNOSIS — Z23 NEED FOR SHINGLES VACCINE: ICD-10-CM

## 2019-01-14 DIAGNOSIS — R09.82 POST-NASAL DRIP: ICD-10-CM

## 2019-01-14 DIAGNOSIS — E11.42 TYPE 2 DIABETES MELLITUS WITH DIABETIC POLYNEUROPATHY, WITHOUT LONG-TERM CURRENT USE OF INSULIN (H): ICD-10-CM

## 2019-01-14 DIAGNOSIS — R14.3 FLATULENCE, ERUCTATION AND GAS PAIN: ICD-10-CM

## 2019-01-14 DIAGNOSIS — Z80.0 FAMILY HISTORY OF ESOPHAGEAL CANCER: ICD-10-CM

## 2019-01-14 DIAGNOSIS — I10 HYPERTENSION GOAL BP (BLOOD PRESSURE) < 140/90: ICD-10-CM

## 2019-01-14 DIAGNOSIS — R14.1 FLATULENCE, ERUCTATION AND GAS PAIN: ICD-10-CM

## 2019-01-14 LAB
BASOPHILS # BLD AUTO: 0.1 10E9/L (ref 0–0.2)
BASOPHILS NFR BLD AUTO: 0.9 %
DIFFERENTIAL METHOD BLD: NORMAL
EOSINOPHIL # BLD AUTO: 0.1 10E9/L (ref 0–0.7)
EOSINOPHIL NFR BLD AUTO: 1 %
ERYTHROCYTE [DISTWIDTH] IN BLOOD BY AUTOMATED COUNT: 14.9 % (ref 10–15)
ERYTHROCYTE [SEDIMENTATION RATE] IN BLOOD BY WESTERGREN METHOD: 13 MM/H (ref 0–20)
HBA1C MFR BLD: 7.1 % (ref 0–5.6)
HCT VFR BLD AUTO: 44.3 % (ref 40–53)
HGB BLD-MCNC: 14.9 G/DL (ref 13.3–17.7)
LYMPHOCYTES # BLD AUTO: 1.3 10E9/L (ref 0.8–5.3)
LYMPHOCYTES NFR BLD AUTO: 14.5 %
MCH RBC QN AUTO: 31.4 PG (ref 26.5–33)
MCHC RBC AUTO-ENTMCNC: 33.6 G/DL (ref 31.5–36.5)
MCV RBC AUTO: 94 FL (ref 78–100)
MONOCYTES # BLD AUTO: 1.1 10E9/L (ref 0–1.3)
MONOCYTES NFR BLD AUTO: 11.8 %
NEUTROPHILS # BLD AUTO: 6.5 10E9/L (ref 1.6–8.3)
NEUTROPHILS NFR BLD AUTO: 71.8 %
PLATELET # BLD AUTO: 303 10E9/L (ref 150–450)
RBC # BLD AUTO: 4.74 10E12/L (ref 4.4–5.9)
WBC # BLD AUTO: 9 10E9/L (ref 4–11)

## 2019-01-14 PROCEDURE — 80061 LIPID PANEL: CPT | Performed by: INTERNAL MEDICINE

## 2019-01-14 PROCEDURE — 80053 COMPREHEN METABOLIC PANEL: CPT | Performed by: INTERNAL MEDICINE

## 2019-01-14 PROCEDURE — 85025 COMPLETE CBC W/AUTO DIFF WBC: CPT | Performed by: INTERNAL MEDICINE

## 2019-01-14 PROCEDURE — 36415 COLL VENOUS BLD VENIPUNCTURE: CPT | Performed by: INTERNAL MEDICINE

## 2019-01-14 PROCEDURE — 85652 RBC SED RATE AUTOMATED: CPT | Performed by: INTERNAL MEDICINE

## 2019-01-14 PROCEDURE — 83036 HEMOGLOBIN GLYCOSYLATED A1C: CPT | Performed by: INTERNAL MEDICINE

## 2019-01-14 PROCEDURE — 86140 C-REACTIVE PROTEIN: CPT | Performed by: INTERNAL MEDICINE

## 2019-01-14 PROCEDURE — 99214 OFFICE O/P EST MOD 30 MIN: CPT | Performed by: INTERNAL MEDICINE

## 2019-01-14 RX ORDER — OMEPRAZOLE 40 MG/1
40 CAPSULE, DELAYED RELEASE ORAL DAILY
Qty: 90 CAPSULE | Refills: 1 | Status: SHIPPED | OUTPATIENT
Start: 2019-01-14 | End: 2019-08-23

## 2019-01-14 ASSESSMENT — MIFFLIN-ST. JEOR: SCORE: 1674.99

## 2019-01-14 NOTE — PROGRESS NOTES
SUBJECTIVE:   Zurdo Dash is a 73 year old male who presents to clinic today for the following health issues:        Chief Complaint   Patient presents with     URI     x1 months, cough runny nose, cough      Gastrophageal Reflux     getting worse lately ever since decreasing dose down to 1 tab a daily     Overdue for lipids , good candidate for the new ShingRx vaccination against herpes zoster   Due for hemoglobin a1c  [ diabetes test ] . As visit progressed today many concerns arise       Hyperlipidemia LDL goal <100  Hypertension goal BP (blood pressure) < 140/90  Type 2 diabetes mellitus with diabetic polyneuropathy, without long-term current use of insulin (H)  Need for shingles vaccine  Gastroesophageal reflux disease, esophagitis presence not specified  Flatulence, eructation and gas pain  Family history of esophageal cancer  Post-nasal drip     Patient fills me in; his brother  from esophageal cancer and patient admits to concern. He's had new gastroesophageal reflux disease symptoms roughly for a month to 6 weeks primarily at bedtime. Using Tums chewable tablets and [place] Plus Cold [tylenol, chlorpheniramine, et al ] in addition to using Omeprazole [ Prilosec ] 20 milligrams a day. Never had an esphagogastroduodenoscopy done. His gastroesophageal reflux disease history stretches back as far back as 10+ years. No odynophagia or dysphagia. His abdomen is also feeling full and generalized soreness with excessive gassiness / abdominal bloating     He feels he has a festering cold. Not really sick though, mainly just postnasal drip with whitish sputum   And not other infection symptoms     Patient is taking Ozempic  [semaglutide] right now for diabetes mellitus. One additional possibility here is in point of fact, the Ozempic  [semaglutide] itself and side effects although patient took Liraglutide [ Victoza ] before for a long long time without difficulties     Wt Readings from Last 5 Encounters:    19 95.3 kg (210 lb)   18 93.4 kg (206 lb)   18 95.4 kg (210 lb 6.4 oz)   18 97.5 kg (215 lb)   18 97.1 kg (214 lb)         Problem list and histories reviewed & adjusted, as indicated.  Additional history: as documented    Patient Active Problem List   Diagnosis     Pain in limb     Hyperlipidemia LDL goal <100     Hypertension goal BP (blood pressure) < 140/90     obesity     Hypogonadism     Advanced directives, counseling/discussion     Health Care Home     Type 2 diabetes mellitus with diabetic polyneuropathy, without long-term current use of insulin (H)     RBBB (right bundle branch block)     Ex-smoker     Cataracts, both eyes     Family history of esophageal cancer     Gastroesophageal reflux disease, esophagitis presence not specified     Rheumatoid arthritis involving multiple sites with positive rheumatoid factor (H)     Pulmonary nodule     High risk medication use     Spondylosis of cervical region without myelopathy or radiculopathy     Erectile dysfunction, unspecified erectile dysfunction type     Type 2 diabetes mellitus without retinopathy (H)     Combined forms of age-related cataract of both eyes     PVD (posterior vitreous detachment), left eye     Tubulovillous adenoma of colon     Diabetic polyneuropathy associated with type 2 diabetes mellitus (H)     Chronic bilateral low back pain without sciatica     Migraine equivalent     Past Surgical History:   Procedure Laterality Date     COLONOSCOPY  2018    [facility]     HC INCISION TENDON SHEATH FINGER  2009    r hand ring finger     TONSILLECTOMY         Social History     Tobacco Use     Smoking status: Former Smoker     Packs/day: 1.00     Years: 40.00     Pack years: 40.00     Types: Cigarettes     Last attempt to quit: 3/16/2007     Years since quittin.8     Smokeless tobacco: Never Used   Substance Use Topics     Alcohol use: No     Alcohol/week: 0.0 oz     Family History   Problem Relation Age of  Onset     Cerebrovascular Disease Mother      Arthritis Mother      Osteoporosis Mother      Alzheimer Disease Father      Arthritis Father      Cancer Father      Diabetes Maternal Grandmother      Cardiovascular Maternal Grandmother      Other Cancer Brother      Cancer Paternal Aunt      Hypertension No family hx of      Thyroid Disease No family hx of      Glaucoma No family hx of      Macular Degeneration No family hx of          Current Outpatient Medications   Medication Sig Dispense Refill     albuterol (PROAIR HFA/PROVENTIL HFA/VENTOLIN HFA) 108 (90 BASE) MCG/ACT Inhaler Inhale 2 puffs into the lungs every 4 hours as needed for other (coughing) 1 Inhaler 1     aspirin 81 MG tablet Take 1 tablet by mouth daily.  3     blood glucose (NO BRAND SPECIFIED) lancing device Use to test blood sugars 3 times daily or as directed. 1 each 0     blood glucose monitoring (INDIA CONTOUR NEXT) test strip TEST THREE TIMES A DAY OR AS DIRECTED, dispense covered brand 300 each 1     blood glucose monitoring (ONE TOUCH DELICA) lancets Use to test blood sugars 3 times daily or as directed. 300 each 3     cetirizine (ZYRTEC) 10 MG tablet Take 10 mg by mouth At Bedtime       folic acid (FOLVITE) 1 MG tablet Take 1 tablet (1 mg) by mouth daily 100 tablet 3     guaiFENesin-codeine (ROBITUSSIN AC) 100-10 MG/5ML SOLN solution Take 5 mLs by mouth every 4 hours as needed for cough 120 mL 0     HYDROcodone-acetaminophen (NORCO) 5-325 MG per tablet Take 1-2 tablets by mouth every 4 hours as needed for moderate to severe pain maximum 4 tablet(s) per day 28 tablet 0     hydroxychloroquine (PLAQUENIL) 200 MG tablet Take 1 tablet (200 mg) by mouth 2 times daily 180 tablet 3     insulin pen needle (ULTICARE MINI) 31G X 6 MM Use 1 daily or as directed. 100 each 1     INSUPEN ULTRAFIN 31G X 6 MM USE TO TEST BLOOD SUGAR ONCE A DAY OR AS DIRECTED 100 each 0     liraglutide (VICTOZA PEN) 18 MG/3ML soln Inject 1.8 mg Subcutaneous daily Until out of  current supply then ok to switch to Ozempic (Semaglutide)       lisinopril (PRINIVIL/ZESTRIL) 2.5 MG tablet TAKE ONE TABLET BY MOUTH EVERY DAY 90 tablet 1     metFORMIN (GLUCOPHAGE) 500 MG tablet Take 1 tablet (500 mg) by mouth 2 times daily (with meals) 180 tablet 1     metFORMIN (GLUCOPHAGE) 500 MG tablet Take 1 tablet (500 mg) by mouth 2 times daily (with meals) TAKE TWO TABLETS BY MOUTH TWICE A DAY WITH MEALS 1 tablet 0     methotrexate sodium 2.5 MG TABS Take 10 tablets (25 mg) by mouth once a week . Take all 10 tablets on the same day of each week. 120 tablet 2     metoprolol succinate ER (TOPROL-XL) 25 MG 24 hr tablet TAKE ONE TABLET BY MOUTH EVERY DAY 90 tablet 1     MICROLET LANCETS MISC 1 Device daily 100 each 4     omeprazole (PRILOSEC) 40 MG DR capsule Take 1 capsule (40 mg) by mouth daily 90 capsule 1     order for DME Equipment being ordered: glucometer 1 Device 0     order for DME Equipment being ordered: specialized shoes for diabetic neuropathy     His orthotics will be made by [employer]      Tim Jernigan, Certified   [email or web address]  Phone: [phone]  Fax: [phone]  [address] [place] [address] 1 Device 0     pravastatin (PRAVACHOL) 40 MG tablet TAKE ONE TABLET BY MOUTH EVERY DAY 90 tablet 0     pravastatin (PRAVACHOL) 40 MG tablet Take 0.5 tablets (20 mg) by mouth every other day 1 tablet 0     Semaglutide (OZEMPIC) 0.25 or 0.5 MG/DOSE SOPN Inject 0.25 mg Subcutaneous once a week for 4 weeks. Then increased to 0.5 mg once a week. If additional glycemic control is needed after 4 weeks of 0.5 mg, may increase dose to 1 mg once a week. Max dose: 1 mg once a week 1.5 mL 2     semaglutide (OZEMPIC) 1 MG/DOSE pen Inject 1 mg Subcutaneous every 7 days 3 mL 1     tiZANidine (ZANAFLEX) 2 MG tablet Take 1 tablet (2 mg) by mouth 3 times daily as needed for muscle spasms 90 tablet 1     No Known Allergies  Recent Labs   Lab Test 12/19/18  1123 09/25/18  1246  "09/10/18  1104 05/15/18  1136 03/26/18  1130 03/05/18  1315  10/31/17  1105  12/02/16  1252  06/24/16  1101  09/30/15  1302  06/29/15  1213   A1C  --  8.4*  --   --   --  5.9  --  5.1   < > 9.8*   < >  --    < > 9.7*  --  11.8*   LDL  --   --   --   --   --   --   --  47  --  79  --   --   --  89  --  31   HDL  --   --   --   --   --   --   --  48  --  39*  --   --   --   --   --  37*   TRIG  --   --   --   --   --   --   --  159*  --  282*  --   --   --   --   --  286*   ALT 33  --  26 30  --   --    < > 27   < >  --    < >  --    < >  --    < >  --    CR 1.07  --  1.19 1.04 1.01 0.96   < > 1.08   < >  --    < >  --    < >  --    < >  --    GFRESTIMATED 68  --  60* 70 72 77   < > 67   < >  --    < >  --    < >  --    < >  --    GFRESTBLACK 78  --  72 84 87 >90   < > 81   < >  --    < >  --    < >  --    < >  --    POTASSIUM  --   --   --   --  4.6 5.5*  --   --    < >  --   --   --    < >  --    < >  --    TSH  --  1.44  --   --   --   --   --   --   --   --   --  1.07  --   --   --   --     < > = values in this interval not displayed.      BP Readings from Last 3 Encounters:   01/14/19 126/78   09/25/18 126/76   09/13/18 121/75    Wt Readings from Last 3 Encounters:   01/14/19 95.3 kg (210 lb)   09/25/18 93.4 kg (206 lb)   09/13/18 95.4 kg (210 lb 6.4 oz)                  Labs reviewed in EPIC    Reviewed and updated as needed this visit by clinical staff       Reviewed and updated as needed this visit by Provider         ROS:  Constitutional, HEENT, cardiovascular, pulmonary, gi and gu systems are negative, except as otherwise noted.    OBJECTIVE:                                                    /78   Pulse 72   Temp 97.1  F (36.2  C) (Oral)   Resp 18   Ht 1.74 m (5' 8.5\")   Wt 95.3 kg (210 lb)   SpO2 97%   BMI 31.47 kg/m    Body mass index is 31.47 kg/m .  GENERAL APPEARANCE: healthy, alert and no distress  EYES: Eyes grossly normal to inspection, PERRL and conjunctivae and sclerae normal  HENT: " ear canals and TM's normal and nose and mouth without ulcers or lesions  NECK: no adenopathy, no asymmetry, masses, or scars and thyroid normal to palpation  RESP: lungs clear to auscultation - no rales, rhonchi or wheezes  CV: regular rates and rhythm, normal S1 S2, no S3 or S4 and no murmur, click or rub  ABDOMEN: soft, nontender, without hepatosplenomegaly or masses and bowel sounds normal    Diagnostic test results:  Diagnostic Test Results:  Orders Placed This Encounter   Procedures     XR Upper GI     Hemoglobin A1c     Lipid panel reflex to direct LDL Non-fasting     Erythrocyte sedimentation rate auto     CRP inflammation     Comprehensive metabolic panel     CBC with platelets differential          ASSESSMENT/PLAN:                                                    1. Hyperlipidemia LDL goal <100  Recheck   - Lipid panel reflex to direct LDL Non-fasting    2. Hypertension goal BP (blood pressure) < 140/90  Controlled within acceptable limits     3. Type 2 diabetes mellitus with diabetic polyneuropathy, without long-term current use of insulin (H)  Recheck hemoglobin a1c  [ diabetes test ] , has only been on full dose Ozempic  [semaglutide] for 2 weeks  - Hemoglobin A1c    4. Need for shingles vaccine  Postponed     5. Gastroesophageal reflux disease, esophagitis presence not specified  Patient is very concerned and further workup is appropriate . Further follow up depending on the test results   - XR Upper GI; Future  - Erythrocyte sedimentation rate auto  - CRP inflammation  - Comprehensive metabolic panel  - CBC with platelets differential  - omeprazole (PRILOSEC) 40 MG DR capsule; Take 1 capsule (40 mg) by mouth daily  Dispense: 90 capsule; Refill: 1    6. Flatulence, eructation and gas pain    - XR Upper GI; Future  - Erythrocyte sedimentation rate auto  - CRP inflammation  - Comprehensive metabolic panel  - CBC with platelets differential    7. Family history of esophageal cancer  As above     8.  Post-nasal drip  Simply no evidence of infection and I did recommend a posture of observation  And further follow up depending on how things go over the next few weeks       Follow up with Provider - as above      Kapil Duckworth MD  [facility]

## 2019-01-14 NOTE — PROGRESS NOTES
Overdue for lipids , good candidate for the new ShingRx vaccination against herpes zoster   Due for hemoglobin a1c  [ diabetes test ]

## 2019-01-14 NOTE — LETTER
[address] [place] [address]    January 16, 2019    Zurdo Dash  [address] [facility]  [place] [address]          Dear Zurdo,    All of these tests are within acceptable limits. Things look OK !     Enclosed is a copy of your results.     Results for orders placed or performed in visit on 01/14/19   Hemoglobin A1c   Result Value Ref Range    Hemoglobin A1C 7.1 (H) 0 - 5.6 %   Lipid panel reflex to direct LDL Non-fasting   Result Value Ref Range    Cholesterol 147 <200 mg/dL    Triglycerides 253 (H) <150 mg/dL    HDL Cholesterol 33 (L) >39 mg/dL    LDL Cholesterol Calculated 63 <100 mg/dL    Non HDL Cholesterol 114 <130 mg/dL   Erythrocyte sedimentation rate auto   Result Value Ref Range    Sed Rate 13 0 - 20 mm/h   CRP inflammation   Result Value Ref Range    CRP Inflammation 9.8 (H) 0.0 - 8.0 mg/L   Comprehensive metabolic panel   Result Value Ref Range    Sodium 141 133 - 144 mmol/L    Potassium 4.2 3.4 - 5.3 mmol/L    Chloride 108 94 - 109 mmol/L    Carbon Dioxide 24 20 - 32 mmol/L    Anion Gap 9 3 - 14 mmol/L    Glucose 142 (H) 70 - 99 mg/dL    Urea Nitrogen 22 7 - 30 mg/dL    Creatinine 1.33 (H) 0.66 - 1.25 mg/dL    GFR Estimate 52 (L) >60 mL/min/[1.73_m2]    GFR Estimate If Black 60 (L) >60 mL/min/[1.73_m2]    Calcium 9.2 8.5 - 10.1 mg/dL    Bilirubin Total 0.3 0.2 - 1.3 mg/dL    Albumin 3.6 3.4 - 5.0 g/dL    Protein Total 7.0 6.8 - 8.8 g/dL    Alkaline Phosphatase 51 40 - 150 U/L    ALT 32 0 - 70 U/L    AST 22 0 - 45 U/L   CBC with platelets differential   Result Value Ref Range    WBC 9.0 4.0 - 11.0 10e9/L    RBC Count 4.74 4.4 - 5.9 10e12/L    Hemoglobin 14.9 13.3 - 17.7 g/dL    Hematocrit 44.3 40.0 - 53.0 %    MCV 94 78 - 100 fl    MCH 31.4 26.5 - 33.0 pg    MCHC 33.6 31.5 - 36.5 g/dL    RDW 14.9 10.0 - 15.0 %    Platelet Count 303 150 - 450 10e9/L    % Neutrophils 71.8 %    % Lymphocytes 14.5 %    % Monocytes 11.8  %    % Eosinophils 1.0 %    % Basophils 0.9 %    Absolute Neutrophil 6.5 1.6 - 8.3 10e9/L    Absolute Lymphocytes 1.3 0.8 - 5.3 10e9/L    Absolute Monocytes 1.1 0.0 - 1.3 10e9/L    Absolute Eosinophils 0.1 0.0 - 0.7 10e9/L    Absolute Basophils 0.1 0.0 - 0.2 10e9/L    Diff Method Automated Method        If you have any questions or concerns, please call myself or my nurse at [phone].      Sincerely,        Kapil Duckworth MD/ayesha

## 2019-01-15 LAB
ALBUMIN SERPL-MCNC: 3.6 G/DL (ref 3.4–5)
ALP SERPL-CCNC: 51 U/L (ref 40–150)
ALT SERPL W P-5'-P-CCNC: 32 U/L (ref 0–70)
ANION GAP SERPL CALCULATED.3IONS-SCNC: 9 MMOL/L (ref 3–14)
AST SERPL W P-5'-P-CCNC: 22 U/L (ref 0–45)
BILIRUB SERPL-MCNC: 0.3 MG/DL (ref 0.2–1.3)
BUN SERPL-MCNC: 22 MG/DL (ref 7–30)
CALCIUM SERPL-MCNC: 9.2 MG/DL (ref 8.5–10.1)
CHLORIDE SERPL-SCNC: 108 MMOL/L (ref 94–109)
CHOLEST SERPL-MCNC: 147 MG/DL
CO2 SERPL-SCNC: 24 MMOL/L (ref 20–32)
CREAT SERPL-MCNC: 1.33 MG/DL (ref 0.66–1.25)
CRP SERPL-MCNC: 9.8 MG/L (ref 0–8)
GFR SERPL CREATININE-BSD FRML MDRD: 52 ML/MIN/{1.73_M2}
GLUCOSE SERPL-MCNC: 142 MG/DL (ref 70–99)
HDLC SERPL-MCNC: 33 MG/DL
LDLC SERPL CALC-MCNC: 63 MG/DL
NONHDLC SERPL-MCNC: 114 MG/DL
POTASSIUM SERPL-SCNC: 4.2 MMOL/L (ref 3.4–5.3)
PROT SERPL-MCNC: 7 G/DL (ref 6.8–8.8)
SODIUM SERPL-SCNC: 141 MMOL/L (ref 133–144)
TRIGL SERPL-MCNC: 253 MG/DL

## 2019-01-23 ENCOUNTER — OFFICE VISIT (OUTPATIENT)
Dept: RHEUMATOLOGY | Facility: CLINIC | Age: 74
End: 2019-01-23
Payer: COMMERCIAL

## 2019-01-23 VITALS
SYSTOLIC BLOOD PRESSURE: 126 MMHG | OXYGEN SATURATION: 94 % | HEART RATE: 102 BPM | BODY MASS INDEX: 31.13 KG/M2 | DIASTOLIC BLOOD PRESSURE: 83 MMHG | HEIGHT: 69 IN | WEIGHT: 210.2 LBS

## 2019-01-23 DIAGNOSIS — Z79.899 HIGH RISK MEDICATION USE: ICD-10-CM

## 2019-01-23 DIAGNOSIS — M22.2X1 BILATERAL PATELLOFEMORAL SYNDROME: ICD-10-CM

## 2019-01-23 DIAGNOSIS — M22.2X2 BILATERAL PATELLOFEMORAL SYNDROME: ICD-10-CM

## 2019-01-23 DIAGNOSIS — M05.79 RHEUMATOID ARTHRITIS INVOLVING MULTIPLE SITES WITH POSITIVE RHEUMATOID FACTOR (H): Primary | ICD-10-CM

## 2019-01-23 PROCEDURE — 99213 OFFICE O/P EST LOW 20 MIN: CPT | Mod: 25 | Performed by: INTERNAL MEDICINE

## 2019-01-23 PROCEDURE — 20610 DRAIN/INJ JOINT/BURSA W/O US: CPT | Mod: 50 | Performed by: INTERNAL MEDICINE

## 2019-01-23 RX ORDER — TRIAMCINOLONE ACETONIDE 40 MG/ML
40 INJECTION, SUSPENSION INTRA-ARTICULAR; INTRAMUSCULAR ONCE
Status: COMPLETED | OUTPATIENT
Start: 2019-01-23 | End: 2019-01-23

## 2019-01-23 RX ADMIN — TRIAMCINOLONE ACETONIDE 40 MG: 40 INJECTION, SUSPENSION INTRA-ARTICULAR; INTRAMUSCULAR at 12:00

## 2019-01-23 ASSESSMENT — MIFFLIN-ST. JEOR: SCORE: 1675.9

## 2019-01-23 NOTE — PROGRESS NOTES
Rheumatology Clinic Visit      Zurdo Dash MRN# [id number]   YOB: 1945 Age: 73 year old      Date of visit: 1/23/19   PCP: Dr. Kapil Duckworth     Chief Complaint   Patient presents with:  Arthritis: RA, bilateral knee pain    Assessment and Plan     1. Seropositive nonerosive rheumatoid arthritis (, CCP 64): Currently on methotrexate 25 mg once weekly, folic acid 1 mg daily, and hydroxychloroquine 200 mg twice a day. Doing well today.  Continue current medications.  RA stable.  - Continue methotrexate 25 mg once weekly  - Continue folic acid 1 mg daily  - Continue hydroxychloroquine 200 mg twice a day (last eye exam: 6/15/18)  - Ophthalmology referral for hydroxychloroquine toxicity monitoring; asked that he call today to schedule  - Labs 2-3 days prior to the next rheumatology clinic visit: CBC, Creatinine, Hepatic Panel, ESR, CRP    2. Bilateral knee osteoarthritis / patellofemoral syndrome: Was receiving steroid injections approximately every 3-6 months with good control of his symptoms.  Repeat steroid injections today as documented in the procedure section. Physical therapy referral.  Advised that he could consider seeing orthopedic surgery as well.    - PT referral    3. 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 He was evaluated by Dr. Duckworth at that time. Reportedly he used colchicine in the past. I advised him to come in for joint arthrocentesis if this occurs again. Recheck of uric acid previously was elevated.    Mr. Dash verbalized agreement with and understanding of the rational for the diagnosis and treatment plan.  All questions were answered to best of my ability and the patient's satisfaction. Mr. Dash was advised to contact the clinic with any questions that may arise after the clinic visit.    Thank you for involving me in the care of the patient    Return to clinic: 3-4 months      HPI   Zurdo Dash is a  73 year old male with a medical history significant for hypertension, hyperlipidemia, diabetes, diabetic neuropathy, GERD, and rheumatoid arthritis who presents for f/u of RA and bilateral knee OA.     Today, Mr. Dash reports that his arthritis is doing well.  Still with bilateral knee pain he would like to have steroid injections again as they have been effective.  He would like to go to physical therapy.  The pain is worse with going up and down stairs, worse with activity, and improves with rest.  Other joints are doing great.  Tolerating medications well.  No other joint pain.  No morning stiffness.  No gelling phenomenon.       Denies fevers, chills, nausea, vomiting, constipation, diarrhea. No abdominal pain. No chest pain/pressure, palpitations, or shortness of breath. No oral or nasal sores. No neck pain. No rash. No LE swelling.      Tobacco: None  EtOH: None  Drugs: None    ROS   GEN: No fevers, chills, or night sweats; intentionally losing weight on the [employer] diet  SKIN: No rash. Sores from a recent fall.  HEENT: No oral or nasal ulcers.  CV: No chest pain, pressure, palpitations, or dyspnea on exertion.  PULM: No SOB, wheeze, cough.  GI: No nausea, vomiting, constipation, diarrhea. No blood in stool. No abdominal pain.  : No blood in urine.  MSK: See HPI.  NEURO: No numbness, tingling, or weakness.  EXT: No LE swelling    Active Problem List     Patient Active Problem List   Diagnosis     Pain in limb     Hyperlipidemia LDL goal <100     Hypertension goal BP (blood pressure) < 140/90     obesity     Hypogonadism     Advanced directives, counseling/discussion     Health Care Home     Type 2 diabetes mellitus with diabetic polyneuropathy, without long-term current use of insulin (H)     RBBB (right bundle branch block)     Ex-smoker     Cataracts, both eyes     Family history of esophageal cancer     Gastroesophageal reflux disease, esophagitis presence not specified     Rheumatoid arthritis  involving multiple sites with positive rheumatoid factor (H)     Pulmonary nodule     High risk medication use     Spondylosis of cervical region without myelopathy or radiculopathy     Erectile dysfunction, unspecified erectile dysfunction type     Type 2 diabetes mellitus without retinopathy (H)     Combined forms of age-related cataract of both eyes     PVD (posterior vitreous detachment), left eye     Tubulovillous adenoma of colon     Diabetic polyneuropathy associated with type 2 diabetes mellitus (H)     Chronic bilateral low back pain without sciatica     Migraine equivalent     Past Medical History     Past Medical History:   Diagnosis Date     Abnormal CT scan 03/2004    calcified lung granuloma     C. difficile diarrhea     H/O     Cataract 11/18/2011     Diabetic neuropathy (H)     mild, mostly soles and distal forefeet, worse on the left side.     Diverticulitis      ED (erectile dysfunction)      Ex-smoker     QUIT SMOKING FEB 2007     History of ETOH abuse     recovering, sober since 1997     Hyperlipidemia LDL goal <100      Hypertension goal BP (blood pressure) < 140/90      Hypogonadism      Obesity      PAD (peripheral artery disease) (H)     leg cramps, with exertion, no formal diagnosis of PAD and minimal if any symptoms at all.     RA (rheumatoid arthritis) (H)     Dr Bailon     Type 2 diabetes, HbA1c goal < 7% (H) 10/2008    A1C of 7.1 %      Past Surgical History     Past Surgical History:   Procedure Laterality Date     COLONOSCOPY  03/01/2018    [facility]     HC INCISION TENDON SHEATH FINGER  4/2009    r hand ring finger     TONSILLECTOMY       Allergy   No Known Allergies  Current Medication List     Current Outpatient Medications   Medication Sig     albuterol (PROAIR HFA/PROVENTIL HFA/VENTOLIN HFA) 108 (90 BASE) MCG/ACT Inhaler Inhale 2 puffs into the lungs every 4 hours as needed for other (coughing)     aspirin 81 MG tablet Take 1 tablet by mouth daily.     blood glucose (NO BRAND  SPECIFIED) lancing device Use to test blood sugars 3 times daily or as directed.     blood glucose monitoring (INDIA CONTOUR NEXT) test strip TEST THREE TIMES A DAY OR AS DIRECTED, dispense covered brand     blood glucose monitoring (ONE TOUCH DELICA) lancets Use to test blood sugars 3 times daily or as directed.     cetirizine (ZYRTEC) 10 MG tablet Take 10 mg by mouth At Bedtime     folic acid (FOLVITE) 1 MG tablet Take 1 tablet (1 mg) by mouth daily     guaiFENesin-codeine (ROBITUSSIN AC) 100-10 MG/5ML SOLN solution Take 5 mLs by mouth every 4 hours as needed for cough     HYDROcodone-acetaminophen (NORCO) 5-325 MG per tablet Take 1-2 tablets by mouth every 4 hours as needed for moderate to severe pain maximum 4 tablet(s) per day     hydroxychloroquine (PLAQUENIL) 200 MG tablet Take 1 tablet (200 mg) by mouth 2 times daily     insulin pen needle (ULTICARE MINI) 31G X 6 MM Use 1 daily or as directed.     INSUPEN ULTRAFIN 31G X 6 MM USE TO TEST BLOOD SUGAR ONCE A DAY OR AS DIRECTED     liraglutide (VICTOZA PEN) 18 MG/3ML soln Inject 1.8 mg Subcutaneous daily Until out of current supply then ok to switch to Ozempic (Semaglutide)     lisinopril (PRINIVIL/ZESTRIL) 2.5 MG tablet TAKE ONE TABLET BY MOUTH EVERY DAY     metFORMIN (GLUCOPHAGE) 500 MG tablet Take 1 tablet (500 mg) by mouth 2 times daily (with meals)     metFORMIN (GLUCOPHAGE) 500 MG tablet Take 1 tablet (500 mg) by mouth 2 times daily (with meals) TAKE TWO TABLETS BY MOUTH TWICE A DAY WITH MEALS     methotrexate sodium 2.5 MG TABS Take 10 tablets (25 mg) by mouth once a week . Take all 10 tablets on the same day of each week.     metoprolol succinate ER (TOPROL-XL) 25 MG 24 hr tablet TAKE ONE TABLET BY MOUTH EVERY DAY     MICROLET LANCETS MISC 1 Device daily     omeprazole (PRILOSEC) 40 MG DR capsule Take 1 capsule (40 mg) by mouth daily     order for DME Equipment being ordered: glucometer     order for DME Equipment being ordered: specialized shoes for  diabetic neuropathy     His orthotics will be made by [employer]      Tim Jernigan, Certified   [email or web address]  Phone: [phone]  Fax: [phone]  [address] [id number]     pravastatin (PRAVACHOL) 40 MG tablet TAKE ONE TABLET BY MOUTH EVERY DAY     pravastatin (PRAVACHOL) 40 MG tablet Take 0.5 tablets (20 mg) by mouth every other day     Semaglutide (OZEMPIC) 0.25 or 0.5 MG/DOSE SOPN Inject 0.25 mg Subcutaneous once a week for 4 weeks. Then increased to 0.5 mg once a week. If additional glycemic control is needed after 4 weeks of 0.5 mg, may increase dose to 1 mg once a week. Max dose: 1 mg once a week     semaglutide (OZEMPIC) 1 MG/DOSE pen Inject 1 mg Subcutaneous every 7 days     tiZANidine (ZANAFLEX) 2 MG tablet Take 1 tablet (2 mg) by mouth 3 times daily as needed for muscle spasms     No current facility-administered medications for this visit.          Social History   See HPI    Family History     Family History   Problem Relation Age of Onset     Cerebrovascular Disease Mother      Arthritis Mother      Osteoporosis Mother      Alzheimer Disease Father      Arthritis Father      Cancer Father      Diabetes Maternal Grandmother      Cardiovascular Maternal Grandmother      Other Cancer Brother      Cancer Paternal Aunt      Hypertension No family hx of      Thyroid Disease No family hx of      Glaucoma No family hx of      Macular Degeneration No family hx of        Physical Exam     Temp Readings from Last 3 Encounters:   01/14/19 97.1  F (36.2  C) (Oral)   09/25/18 97.2  F (36.2  C) (Oral)   03/29/18 97.1  F (36.2  C) (Oral)     BP Readings from Last 5 Encounters:   01/23/19 126/83   01/14/19 126/78   09/25/18 126/76   09/13/18 121/75   07/12/18 150/80     Pulse Readings from Last 1 Encounters:   01/23/19 102     Resp Readings from Last 1 Encounters:   01/14/19 18     Estimated body mass index is 31.5 kg/m  as calculated from the following:    Height as of  "this encounter: 1.74 m (5' 8.5\").    Weight as of this encounter: 95.3 kg (210 lb 3.2 oz).    GEN: NAD, obese  HEENT: MMM. No oral lesions. Anicteric, noninjected sclera  CV: S1, S2. RRR. No m/r/g.  PULM: CTA bilaterally. No w/c.  MSK: Bilateral second and third MCPs with increased prominence, but no tenderness to palpation or synovial swelling. Wrists, elbows, shoulders, ankles, and MTPs without swelling or tenderness to palpation. Bilateral knees with medial joint line tenderness and crepitation but no effusion or increased warmth. Hips nontender to direct palpation.   SKIN: No rash.  A few cuts and bruises on his forearms bilaterally  EXT: No LE edema  PSYCH: Alert. Appropriate.    Labs / Imaging (select studies)     9/28/1999  ([facility])    10/17/2013 Left knee syn[facility] fluid at [facility]: , N 3%, No crystals  RF/CCP  Recent Labs   Lab Test 04/07/16  1149   CCPIGG 64*     CBC  Recent Labs   Lab Test 01/14/19  1716 12/19/18  1123 09/10/18  1104   WBC 9.0 5.2 5.7   RBC 4.74 4.39* 4.45   HGB 14.9 13.9 14.0   HCT 44.3 40.9 40.7   MCV 94 93 92   RDW 14.9 14.9 14.2    240 211   MCH 31.4 31.7 31.5   MCHC 33.6 34.0 34.4   NEUTROPHIL 71.8 70.4 65.7   LYMPH 14.5 15.5 17.2   MONOCYTE 11.8 11.3 14.1   EOSINOPHIL 1.0 1.3 1.8   BASOPHIL 0.9 1.5 1.2   ANEU 6.5 3.7 3.7   ALYM 1.3 0.8 1.0   SMITH 1.1 0.6 0.8   AEOS 0.1 0.1 0.1   ABAS 0.1 0.1 0.1     CMP  Recent Labs   Lab Test 01/14/19  1716 12/19/18  1123 09/10/18  1104  03/26/18  1130 03/05/18  1315     --   --   --  139 140   POTASSIUM 4.2  --   --   --  4.6 5.5*   CHLORIDE 108  --   --   --  107 108   CO2 24  --   --   --  23 23   ANIONGAP 9  --   --   --  9 9   *  --   --   --  84 72   BUN 22  --   --   --  20 21   CR 1.33* 1.07 1.19   < > 1.01 0.96   GFRESTIMATED 52* 68 60*   < > 72 77   GFRESTBLACK 60* 78 72   < > 87 >90   NEW 9.2  --   --   --  8.8 9.5   BILITOTAL 0.3 0.6 0.6   < >  --   --    ALBUMIN 3.6 3.4 3.6   < >  --   -- " "   PROTTOTAL 7.0 6.6* 6.7*   < >  --   --    ALKPHOS 51 49 47   < >  --   --    AST 22 25 18   < >  --   --    ALT 32 33 26   < >  --   --     < > = values in this interval not displayed.     Calcium/VitaminD  Recent Labs   Lab Test 01/14/19  1716 03/26/18  1130 03/05/18  1315  07/23/13  1016  12/05/11  1200   NEW 9.2 8.8 9.5   < >  --    < > 9.3   D3VIT  --   --   --   --   --   --  30   VITDT  --   --   --   --  28*  --   --     < > = values in this interval not displayed.     ESR/CRP  Recent Labs   Lab Test 01/14/19  1716 12/19/18  1123 09/10/18  1104   SED 13 10 9   CRP 9.8* 11.6* 4.6     Hepatitis B  Recent Labs   Lab Test 04/07/16  1149   HBCAB Nonreactive   HEPBANG Nonreactive     Hepatitis C  Recent Labs   Lab Test 04/07/16  1149   HCVAB Nonreactive   Assay performance characteristics have not been established for newborns,   infants, and children       HIV Screening  Recent Labs   Lab Test 04/07/16  1149   HIAGAB Nonreactive   HIV-1 p24 Ag & HIV-1/HIV-2 Ab Not Detected       \"MRI LEFT KNEE  10/08/2013    INDICATION: Left knee pain. Locking, catching and snapping. Weakness.  TECHNIQUE: Routine.  COMPARISON: None.    FINDINGS:  MEDIAL COMPARTMENT: Linear tearing involving the posterior horn and body of   the medial meniscus as seen on series 4: image 6-8. This is also seen on   series 5: image 9-10. Cartilage is thinned peripherally.    LATERAL COMPARTMENT: Lateral meniscus also demonstrates tearing in the   posterior horn on series 4: image 22. There is diffuse tearing in the body   of the meniscus which is horizontal as seen on series 5: image 9 and this   extends into the anterior horn. Cartilage is thinned.    PATELLOFEMORAL COMPARTMENT: Retropatellar cartilage demonstrates   full-thickness loss of cartilage over portions of the median ridge, lateral   facet and medial facet. Cartilage is better preserved over the lower pole   centrally. There is also full-thickness loss of cartilage in the lateral   and " "central trochlear groove. Patella is normally aligned. Retinaculum are   intact.    LIGAMENTS AND TENDONS: The anterior cruciate and posterior cruciate   ligaments are intact. The medial collateral ligament is intact. Lateral   collateral ligamentous complex and popliteus insertion are intact.   Quadriceps tendon and patellar tendon are intact.    BONES AND SOFT TISSUES: Moderate-sized joint effusion. No popliteal cyst.   No muscle edema or fracture.    CONCLUSION:  1. There is tearing of the medial meniscus involving the posterior horn and   body. Cartilage is thinned peripherally.  2. There is also tearing in the posterior horn and body of the lateral   meniscus which also extends into the anterior horn. Cartilage is also   thinned in the lateral compartment.  3. Moderate to severe osteoarthritis in the patellofemoral compartment with   full-thickness loss of cartilage over large portions of the retropatellar   surface and trochlear surface.  4. Joint effusion.    IF YOU ARE A PHYSICIAN AND HAVE QUESTIONS REGARDING THIS REPORT, PLEASE   CALL [phone].\"    \"X-RAY KNEES BILATERAL AP W/LAT/SUN/PA LEFT   Oct 08, 2013 09:51:59 AM     INDICATION: Pain and swelling   COMPARISON: None.      FINDINGS: Moderate narrowing lateral aspect of the patellofemoral   compartment with slight lateral subluxation of the patella. Medial and   lateral compartments are preserved. Moderate knee joint effusion and/or   synovitis. Enthesophyte formation distal quadriceps and proximal patellar   tendons. Extensive vascular calcifications.     AP view right knee demonstrates trace narrowing medial compartment joint   Space.\"    Immunization History     Immunization History   Administered Date(s) Administered     Influenza (High Dose) 3 valent vaccine 09/20/2012, 10/20/2015, 09/20/2016, 08/28/2017, 09/24/2018     Influenza (IIV3) PF 10/05/1999, 10/30/2008, 09/28/2011, 10/14/2013, 10/03/2014     Pneumo Conj 13-V (2010&after) 06/29/2015     " Pneumococcal 23 valent 08/19/2010     TD (ADULT, 7+) 08/05/1997, 02/03/2011     Zoster vaccine, live 04/19/2010       Procedure     Procedure: Steroid injections of the bilateral knees  Indication: Pain, bilateral patellofemoral syndrome    The procedure was explained in detail. Risks including infection, pain, structural damage such as cartilage damage and tendon rupture, fat atrophy, skin hyper-/hypo-pigmentation, and medication reaction was explained. The need for rest of the affected joint for one week after the procedure was explained.  The option of not doing the procedure was also provided. All questions were answered and the patient consented to the procedure.     A time-out was performed and the correct patient, procedure, and laterality were verified.    The right knee was examined and location for injection was identified - anterior medial. The area was cleaned with chlorhexidine, twice.  Ethyl chloride was then used for topical anaesthetic.  Then a mixture of lidocaine 1% 2 mL and Kenalog 40mg was injected into the intra-articular space.     The left knee was examined and location for injection was identified - anterior medial. The area was cleaned with chlorhexidine, twice.  Ethyl chloride was then used for topical anaesthetic.  Then a mixture of lidocaine 1% 2 mL and Kenalog 40mg was injected into the intra-articular space.     The patient tolerated the procedure well. No complications.    MEDICATION: Triamcinolone 40 mg  LOT #: [id number]  :  [employer]  EXPIRATION DATE:  01/2020  NDC#: [address]-5     MEDICATION: Triamcinolone 40 mg  LOT #: [id number]  :  [employer]  EXPIRATION DATE:  01/2020  NDC#: [address]-9     1% Lidocaine  : [employer]  Lot #: -[id number]  EXPIRATION DATE: 1 Dec 2019  NDC: 0158-7917-98             Chart documentation done in part with Dragon Voice recognition Software. Although reviewed after completion, some word and grammatical error  may remain.    Albert Tompkins MD

## 2019-01-23 NOTE — NURSING NOTE
RAPID3 (0-30) Cumulative Score  6.8          RAPID3 Weighted Score (divide #4 by 3 and that is the weighted score)  2.26     Eryn Espinoza [facility] Rheumatology  1/23/2019 11:18 AM

## 2019-01-23 NOTE — NURSING NOTE
The following medication was given:     MEDICATION: Triamcinolone 40 mg  SITE: Right knee  DOSE: 1 ml  LOT #: [id number]  :  [employer]  EXPIRATION DATE:  01/2020  NDC#: [address]-5    MEDICATION: Triamcinolone 40 mg  SITE: Left knee  DOSE: 1 ml  LOT #: [id number]  :  [employer]  EXPIRATION DATE:  01/2020  NDC#: [address]-6    1% Lidocaine  : Hospira  Lot #: -[id number]  EXPIRATION DATE: 1 Dec 2019  NDC: 3981-9302-98

## 2019-01-25 ENCOUNTER — THERAPY VISIT (OUTPATIENT)
Dept: PHYSICAL THERAPY | Facility: CLINIC | Age: 74
End: 2019-01-25
Payer: COMMERCIAL

## 2019-01-25 DIAGNOSIS — M22.2X2 BILATERAL PATELLOFEMORAL SYNDROME: ICD-10-CM

## 2019-01-25 DIAGNOSIS — M22.2X1 BILATERAL PATELLOFEMORAL SYNDROME: ICD-10-CM

## 2019-01-25 PROCEDURE — 97161 PT EVAL LOW COMPLEX 20 MIN: CPT | Mod: GP | Performed by: PHYSICAL THERAPIST

## 2019-01-25 PROCEDURE — 97110 THERAPEUTIC EXERCISES: CPT | Mod: GP | Performed by: PHYSICAL THERAPIST

## 2019-01-25 PROCEDURE — G8979 MOBILITY GOAL STATUS: HCPCS | Mod: GP | Performed by: PHYSICAL THERAPIST

## 2019-01-25 PROCEDURE — G8978 MOBILITY CURRENT STATUS: HCPCS | Mod: GP | Performed by: PHYSICAL THERAPIST

## 2019-01-25 ASSESSMENT — ACTIVITIES OF DAILY LIVING (ADL)
HOW_WOULD_YOU_RATE_THE_CURRENT_FUNCTION_OF_YOUR_KNEE_DURING_YOUR_USUAL_DAILY_ACTIVITIES_ON_A_SCALE_FROM_0_TO_100_WITH_100_BEING_YOUR_LEVEL_OF_KNEE_FUNCTION_PRIOR_TO_YOUR_INJURY_AND_0_BEING_THE_INABILITY_TO_PERFORM_ANY_OF_YOUR_USUAL_DAILY_ACTIVITIES?: 50
RAW_SCORE: 37
GIVING WAY, BUCKLING OR SHIFTING OF KNEE: THE SYMPTOM AFFECTS MY ACTIVITY MODERATELY
WEAKNESS: THE SYMPTOM AFFECTS MY ACTIVITY MODERATELY
KNEEL ON THE FRONT OF YOUR KNEE: I AM UNABLE TO DO THE ACTIVITY
HOW_WOULD_YOU_RATE_THE_OVERALL_FUNCTION_OF_YOUR_KNEE_DURING_YOUR_USUAL_DAILY_ACTIVITIES?: ABNORMAL
SQUAT: I AM UNABLE TO DO THE ACTIVITY
SWELLING: I DO NOT HAVE THE SYMPTOM
KNEE_ACTIVITY_OF_DAILY_LIVING_SUM: 37
STAND: ACTIVITY IS NOT DIFFICULT
SIT WITH YOUR KNEE BENT: ACTIVITY IS NOT DIFFICULT
LIMPING: I DO NOT HAVE THE SYMPTOM
RISE FROM A CHAIR: ACTIVITY IS FAIRLY DIFFICULT
AS_A_RESULT_OF_YOUR_KNEE_INJURY,_HOW_WOULD_YOU_RATE_YOUR_CURRENT_LEVEL_OF_DAILY_ACTIVITY?: NEARLY NORMAL
GO UP STAIRS: ACTIVITY IS VERY DIFFICULT
WALK: ACTIVITY IS MINIMALLY DIFFICULT
GO DOWN STAIRS: ACTIVITY IS FAIRLY DIFFICULT
STIFFNESS: THE SYMPTOM AFFECTS MY ACTIVITY MODERATELY
PAIN: THE SYMPTOM AFFECTS MY ACTIVITY MODERATELY
KNEE_ACTIVITY_OF_DAILY_LIVING_SCORE: 52.86

## 2019-01-25 NOTE — PROGRESS NOTES
[facility] Initial Evaluation  Subjective:  Pt reports ongoing knee pain, both Left>Rt. He had injections this week w/ little help so far.  He wants exs and therapy to help avoid surgery.       The history is provided by the patient.   Zurdo Dash is a 73 year old male with a bilateral knees condition.  Condition occurred with:  Degenerative joint disease.  Condition occurred: at home.  This is a chronic condition     Patient reports pain:  In the joint, anterior and sub patellar.    Pain is described as stabbing, sharp and aching and is intermittent and reported as 4/10.  Associated symptoms:  Loss of motion/stiffness and loss of strength. Pain is the same all the time.  Symptoms are exacerbated by descending stairs, ascending stairs, bending/squatting, walking, transfers and kneeling and relieved by nothing.  Since onset symptoms are gradually worsening.  Special tests:  X-ray.      General health as reported by patient is good.  Pertinent medical history includes:  High blood pressure, overweight, osteoarthritis and diabetes.      Current medications:  High blood pressure medication and pain medication.  Current occupation is Retired .        Barriers include:  None as reported by the patient.    Red flags:  None as reported by the patient.                        Objective:  System                                                Knee Evaluation:  ROM:  Strength wnl knee: -4/5 unable to rise from chair   AROM      Extension:  Left: 0    Right:  0  Flexion: Left: +125    Right: +125          Ligament Testing:  Normal                Special Tests:   Left knee positive for the following special tests:  Patellar Compression; Azeem's and IT Band Friction  Right knee positive for the following tests:  Patellar Compression; Azeem's and IT Band Friction  Palpation:    Left knee tenderness present at:  Patellar Tendon; IT Band and Patellar Inferior  Right knee tenderness present at:  Patellar Tendon;  IT Band and Patellar Inferior  Edema:  Normal      Functional Testing:  : needed hands to rise from chair                   General     ROS    Assessment/Plan:    Patient is a 73 year old male with both sides knee complaints.    Patient has the following significant findings with corresponding treatment plan.                Diagnosis 1:  Knee OA   Pain -  hot/cold therapy, US, manual therapy, self management and home program  Decreased ROM/flexibility - manual therapy, therapeutic exercise and home program  Decreased strength - therapeutic exercise and therapeutic activities  Decreased proprioception - neuro re-education and therapeutic activities  Impaired gait - gait training  Impaired muscle performance - neuro re-education  Decreased function - therapeutic activities    Therapy Evaluation Codes:   1) History comprised of:   Personal factors that impact the plan of care:      Coping style, Overall behavior pattern and Past/current experiences.    Comorbidity factors that impact the plan of care are:      Diabetes, High blood pressure, Osteoarthritis, Overweight and Weakness.     Medications impacting care: High blood pressure and Pain.  2) Examination of Body Systems comprised of:   Body structures and functions that impact the plan of care:      Knee.   Activity limitations that impact the plan of care are:      Dressing, Lifting, Sitting, Squatting/kneeling, Standing and Walking.  3) Clinical presentation characteristics are:   Stable/Uncomplicated.  4) Decision-Making    Low complexity using standardized patient assessment instrument and/or measureable assessment of functional outcome.  Cumulative Therapy Evaluation is: Low complexity.    Previous and current functional limitations:  (See Goal Flow Sheet for this information)    Short term and Long term goals: (See Goal Flow Sheet for this information)     Communication ability:  Patient appears to be able to clearly communicate and understand verbal and  written communication and follow directions correctly.  Treatment Explanation - The following has been discussed with the patient:   RX ordered/plan of care  Anticipated outcomes  Possible risks and side effects  This patient would benefit from PT intervention to resume normal activities.   Rehab potential is good.    Frequency:  1 X week, once daily  Duration:  for 8 weeks  Discharge Plan:  Achieve all LTG.  Independent in home treatment program.  Reach maximal therapeutic benefit.    Please refer to the daily flowsheet for treatment today, total treatment time and time spent performing 1:1 timed codes.

## 2019-01-25 NOTE — LETTER
[facility]  [address] [facility]  [address]  [place] [address]  [phone]    2019    Re: Zurdo Dash   :   1945  MRN:  [id number]   REFERRING PHYSICIAN:   MD ANH Ramirez PT  Date of Initial Evaluation:  2019  Visits:  Rxs Used: 1  Reason for Referral:  Bilateral patellofemoral syndrome    EVALUATION SUMMARY    [facility] Initial Evaluation  Subjective:  Pt reports ongoing knee pain, both Left>Rt. He had injections this week w/ little help so far.  He wants exs and therapy to help avoid surgery.   The history is provided by the patient.   Zurdo Dash is a 73 year old male with a bilateral knees condition.  Condition occurred with:  Degenerative joint disease.  Condition occurred: at home.  This is a chronic condition     Patient reports pain:  In the joint, anterior and sub patellar.    Pain is described as stabbing, sharp and aching and is intermittent and reported as 4/10.  Associated symptoms:  Loss of motion/stiffness and loss of strength. Pain is the same all the time.  Symptoms are exacerbated by descending stairs, ascending stairs, bending/squatting, walking, transfers and kneeling and relieved by nothing.  Since onset symptoms are gradually worsening.  Special tests:  X-ray.      General health as reported by patient is good.  Pertinent medical history includes:  High blood pressure, overweight, osteoarthritis and diabetes.      Current medications:  High blood pressure medication and pain medication.  Current occupation is Retired .        Barriers include:  None as reported by the patient.  Red flags:  None as reported by the patient.    Objective:  Knee Evaluation:  ROM:  Strength wnl knee: -4/5 unable to rise from chair   AROM  Extension:  Left: 0    Right:  0  Flexion: Left: +125    Right: +125    Ligament Testing:  Normal            Re: Zurdo Dash   :   1945      Special Tests:   Left knee positive for the  following special tests:  Patellar Compression; Azeem's and IT Band Friction  Right knee positive for the following tests:  Patellar Compression; Azeem's and IT Band Friction    Palpation:    Left knee tenderness present at:  Patellar Tendon; IT Band and Patellar Inferior  Right knee tenderness present at:  Patellar Tendon; IT Band and Patellar Inferior    Edema:  Normal  Functional Testing:  : needed hands to rise from chair     Assessment/Plan:    Patient is a 73 year old male with both sides knee complaints.    Patient has the following significant findings with corresponding treatment plan.                Diagnosis 1:  Knee OA   Pain -  hot/cold therapy, US, manual therapy, self management and home program  Decreased ROM/flexibility - manual therapy, therapeutic exercise and home program  Decreased strength - therapeutic exercise and therapeutic activities  Decreased proprioception - neuro re-education and therapeutic activities  Impaired gait - gait training  Impaired muscle performance - neuro re-education  Decreased function - therapeutic activities    Therapy Evaluation Codes:   1) History comprised of:   Personal factors that impact the plan of care:      Coping style, Overall behavior pattern and Past/current experiences.    Comorbidity factors that impact the plan of care are:      Diabetes, High blood pressure, Osteoarthritis, Overweight and Weakness.     Medications impacting care: High blood pressure and Pain.  2) Examination of Body Systems comprised of:   Body structures and functions that impact the plan of care:      Knee.   Activity limitations that impact the plan of care are:      Dressing, Lifting, Sitting, Squatting/kneeling, Standing and Walking.  3) Clinical presentation characteristics are:   Stable/Uncomplicated.  4) Decision-Making    Low complexity using standardized patient assessment instrument and/or measureable assessment of functional outcome.  Cumulative Therapy Evaluation is: Low  complexity.    Previous and current functional limitations:  (See Goal Flow Sheet for this information)    Short term and Long term goals: (See Goal Flow Sheet for this information)     Communication ability:  Patient appears to be able to clearly communicate and understand verbal and written communication and follow directions correctly.  Re: Zurdo Dash   :   1945    Treatment Explanation - The following has been discussed with the patient:   RX ordered/plan of care  Anticipated outcomes  Possible risks and side effects  This patient would benefit from PT intervention to resume normal activities.   Rehab potential is good.    Frequency:  1 X week, once daily  Duration:  for 8 weeks  Discharge Plan:  Achieve all LTG.  Independent in home treatment program.  Reach maximal therapeutic benefit.    Please refer to the daily flowsheet for treatment today, total treatment time and time spent performing 1:1 timed codes.         Thank you for your referral.    INQUIRIES  Therapist: Oscar John PT   [facility]  [address] [facility]  [address]  [place] [address]  Phone: [phone]  Fax: [phone]

## 2019-01-25 NOTE — PROGRESS NOTES
[facility] Initial Evaluation  Subjective:  Pt reports years of gradually worsening knee pain and weakness. Pt is ref'd by Dr Tompkins.        The history is provided by the patient.   Zurdo Dash is a 73 year old male with a bilateral knees condition.  Condition occurred with:  Degenerative joint disease.  Condition occurred: at home.  This is a chronic condition                                                                         Objective:  System    Physical Exam    General     ROS    Assessment/Plan:    {REHAB NOTES:071302}

## 2019-01-28 ENCOUNTER — ANCILLARY PROCEDURE (OUTPATIENT)
Dept: GENERAL RADIOLOGY | Facility: CLINIC | Age: 74
End: 2019-01-28
Payer: COMMERCIAL

## 2019-01-28 DIAGNOSIS — R14.1 FLATULENCE, ERUCTATION AND GAS PAIN: ICD-10-CM

## 2019-01-28 DIAGNOSIS — R14.3 FLATULENCE, ERUCTATION AND GAS PAIN: ICD-10-CM

## 2019-01-28 DIAGNOSIS — K21.9 GASTROESOPHAGEAL REFLUX DISEASE, ESOPHAGITIS PRESENCE NOT SPECIFIED: ICD-10-CM

## 2019-01-28 DIAGNOSIS — R14.2 FLATULENCE, ERUCTATION AND GAS PAIN: ICD-10-CM

## 2019-01-28 PROCEDURE — 74240 X-RAY XM UPR GI TRC 1CNTRST: CPT

## 2019-02-01 ENCOUNTER — THERAPY VISIT (OUTPATIENT)
Dept: PHYSICAL THERAPY | Facility: CLINIC | Age: 74
End: 2019-02-01
Payer: COMMERCIAL

## 2019-02-01 DIAGNOSIS — M17.0 OSTEOARTHRITIS OF BOTH KNEES, UNSPECIFIED OSTEOARTHRITIS TYPE: Primary | ICD-10-CM

## 2019-02-01 PROCEDURE — 97530 THERAPEUTIC ACTIVITIES: CPT | Mod: GP | Performed by: PHYSICAL THERAPIST

## 2019-02-01 PROCEDURE — 97110 THERAPEUTIC EXERCISES: CPT | Mod: GP | Performed by: PHYSICAL THERAPIST

## 2019-02-07 ENCOUNTER — OFFICE VISIT (OUTPATIENT)
Dept: OPHTHALMOLOGY | Facility: CLINIC | Age: 74
End: 2019-02-07
Payer: COMMERCIAL

## 2019-02-07 ENCOUNTER — TELEPHONE (OUTPATIENT)
Dept: OPHTHALMOLOGY | Facility: CLINIC | Age: 74
End: 2019-02-07

## 2019-02-07 DIAGNOSIS — H52.4 PRESBYOPIA: ICD-10-CM

## 2019-02-07 DIAGNOSIS — E11.9 TYPE 2 DIABETES MELLITUS WITHOUT RETINOPATHY (H): Primary | ICD-10-CM

## 2019-02-07 DIAGNOSIS — H43.812 POSTERIOR VITREOUS DETACHMENT OF LEFT EYE: ICD-10-CM

## 2019-02-07 DIAGNOSIS — H25.813 COMBINED FORMS OF AGE-RELATED CATARACT OF BOTH EYES: ICD-10-CM

## 2019-02-07 PROCEDURE — 92014 COMPRE OPH EXAM EST PT 1/>: CPT | Performed by: OPHTHALMOLOGY

## 2019-02-07 PROCEDURE — 92015 DETERMINE REFRACTIVE STATE: CPT | Performed by: OPHTHALMOLOGY

## 2019-02-07 ASSESSMENT — SLIT LAMP EXAM - LIDS: COMMENTS: 1+ DERMATOCHALASIS

## 2019-02-07 ASSESSMENT — VISUAL ACUITY
OS_BAT_LOW: 20/40-1
OS_PH_CC+: -2
OD_BAT_LOW: 20/25-1
METHOD: SNELLEN - LINEAR
OS_CC: 20/60
OS_BAT_HIGH: 20/100
OD_BAT_HIGH: 20/60-1
OD_BAT_MED: 20/40
OS_BAT_MED: 20/50-1
OD_CC+: -2
CORRECTION_TYPE: GLASSES
OD_PH_CC: 20/25
OS_PH_CC: 20/30
OD_CC: 20/40

## 2019-02-07 ASSESSMENT — REFRACTION_MANIFEST
OD_ADD: +2.75
OD_AXIS: 029
OS_CYLINDER: +0.75
OS_ADD: +2.75
OD_SPHERE: +0.75
OD_CYLINDER: +1.00
OS_AXIS: 050
OS_SPHERE: +0.75

## 2019-02-07 ASSESSMENT — REFRACTION_WEARINGRX
OD_CYLINDER: +0.50
OD_ADD: +2.50
OD_SPHERE: +1.75
OS_SPHERE: +1.50
OD_AXIS: 152
OS_CYLINDER: +0.50
OS_AXIS: 022
OS_VBASE: UP
SPECS_TYPE: PAL
OS_VPRISM: 1.0
OS_ADD: +2.50

## 2019-02-07 ASSESSMENT — TONOMETRY
OS_IOP_MMHG: 18
IOP_METHOD: APPLANATION
OD_IOP_MMHG: 17

## 2019-02-07 ASSESSMENT — CUP TO DISC RATIO
OD_RATIO: 0.3
OS_RATIO: 0.2

## 2019-02-07 NOTE — PATIENT INSTRUCTIONS
Offered cataract surgery left eye at anytime. Call Jesus RANDOLPH @ [phone] to schedule.    Jsutin Avendano M.D.  [phone]

## 2019-02-07 NOTE — TELEPHONE ENCOUNTER
Type of surgery: Cataract Extraction with Intraocular Lens Implant Left Eye CPT code 78416  Combined forms of age-related cataract of both eyes [H25.813]   Location of surgery: Other: [facility]  Date and time of surgery: 02/18/2019 @ 2:00pm  Surgeon: Dr. Avendano  Pre-Op Appt Date: 02/14/2019  Post-Op Appt Date: 02/19/2019   Packet sent out: Yes  Pre-cert/Authorization completed:  [facility] for [facility].  No prior auth required per [facility] site list.     Date: 02/07/2019    Insurance is current.

## 2019-02-07 NOTE — PROGRESS NOTES
Current Eye Medications: none     Subjective:  Here for complete today. DM, last a1c was 7.1 on 1-14-19.  Would like to see if he qualifies for cataract surgery. Noticing lately that he is seeing shadows to the right and also misjudging curbs. Very hard for him to drive at night.  Also noticing that the upper eyelids are starting to droop.  Blood sugars are around 115-200.     RH but left eye dominant (per patient and demonstrated in clinic).  GFT.    Lab Results   Component Value Date    A1C 7.1 01/14/2019    A1C 8.4 09/25/2018    A1C 5.9 03/05/2018    A1C 5.1 10/31/2017    A1C 5.6 06/23/2017      Objective:  See Ophthalmology Exam.       Assessment:  Visually significant cataract left eye > right eye.  No diabetic retinopathy.      ICD-10-CM    1. Type 2 diabetes mellitus without retinopathy (H) E11.9 EYE EXAM (SIMPLE-NONBILLABLE)   2. Combined forms of age-related cataract, mod, of both eyes H25.813 Sonia-Operative Worksheet   3. Posterior vitreous detachment of left eye H43.812    4. Presbyopia H52.4 REFRACTIVE STATUS        Plan:  Will proceed with cataract surgery left eye first, probably right eye thereafter.  Patient also interested in eyelid surgery in future - mentioned AM.  Justin Avendano M.D.  [phone]    See Patient Instructions.

## 2019-02-07 NOTE — LETTER
2/7/2019         RE: Zurdo Dash  [address] [place] [id number]        Dear Colleague,    Thank you for referring your patient, Zurdo Dash, to the [facility]. Please see a copy of my visit note below.     Current Eye Medications: none     Subjective:  Here for complete today. DM, last a1c was 7.1 on 1-14-19.  Would like to see if he qualifies for cataract surgery. Noticing lately that he is seeing shadows to the right and also misjudging curbs. Very hard for him to drive at night.  Also noticing that the upper eyelids are starting to droop.  Blood sugars are around 115-200.     RH but left eye dominant (per patient and demonstrated in clinic).  GFT.    Lab Results   Component Value Date    A1C 7.1 01/14/2019    A1C 8.4 09/25/2018    A1C 5.9 03/05/2018    A1C 5.1 10/31/2017    A1C 5.6 06/23/2017      Objective:  See Ophthalmology Exam.       Assessment:  Visually significant cataract left eye > right eye.  No diabetic retinopathy.      ICD-10-CM    1. Type 2 diabetes mellitus without retinopathy (H) E11.9 EYE EXAM (SIMPLE-NONBILLABLE)   2. Combined forms of age-related cataract, mod, of both eyes H25.813 Sonia-Operative Worksheet   3. Posterior vitreous detachment of left eye H43.812    4. Presbyopia H52.4 REFRACTIVE STATUS        Plan:  Will proceed with cataract surgery left eye first, probably right eye thereafter.  Patient also interested in eyelid surgery in future - mentioned AM.  Justin Avendano M.D.  [phone]    See Patient Instructions.         Again, thank you for allowing me to participate in the care of your patient.        Sincerely,        Justin Avendano MD

## 2019-02-08 ENCOUNTER — THERAPY VISIT (OUTPATIENT)
Dept: PHYSICAL THERAPY | Facility: CLINIC | Age: 74
End: 2019-02-08
Payer: COMMERCIAL

## 2019-02-08 DIAGNOSIS — M17.0 OSTEOARTHRITIS OF BOTH KNEES, UNSPECIFIED OSTEOARTHRITIS TYPE: ICD-10-CM

## 2019-02-08 PROCEDURE — 97110 THERAPEUTIC EXERCISES: CPT | Mod: GP | Performed by: PHYSICAL THERAPIST

## 2019-02-08 PROCEDURE — 97530 THERAPEUTIC ACTIVITIES: CPT | Mod: GP | Performed by: PHYSICAL THERAPIST

## 2019-02-09 PROBLEM — H25.813 COMBINED FORMS OF AGE-RELATED CATARACT OF BOTH EYES: Status: ACTIVE | Noted: 2019-02-09

## 2019-02-09 PROBLEM — H43.812 POSTERIOR VITREOUS DETACHMENT OF LEFT EYE: Status: ACTIVE | Noted: 2019-02-09

## 2019-02-13 ENCOUNTER — OFFICE VISIT (OUTPATIENT)
Dept: OPHTHALMOLOGY | Facility: CLINIC | Age: 74
End: 2019-02-13
Payer: COMMERCIAL

## 2019-02-13 DIAGNOSIS — H25.813 COMBINED FORMS OF AGE-RELATED CATARACT OF BOTH EYES: Primary | ICD-10-CM

## 2019-02-13 DIAGNOSIS — E11.42 TYPE 2 DIABETES MELLITUS WITH DIABETIC POLYNEUROPATHY, WITHOUT LONG-TERM CURRENT USE OF INSULIN (H): ICD-10-CM

## 2019-02-13 PROCEDURE — 92012 INTRM OPH EXAM EST PATIENT: CPT | Performed by: OPHTHALMOLOGY

## 2019-02-13 PROCEDURE — 92136 OPHTHALMIC BIOMETRY: CPT | Mod: TC | Performed by: OPHTHALMOLOGY

## 2019-02-13 RX ORDER — PREDNISOLONE ACETATE 10 MG/ML
1 SUSPENSION/ DROPS OPHTHALMIC 3 TIMES DAILY
Qty: 5 ML | Refills: 0 | Status: SHIPPED | OUTPATIENT
Start: 2019-02-19 | End: 2019-04-09

## 2019-02-13 RX ORDER — KETOROLAC TROMETHAMINE 5 MG/ML
1 SOLUTION OPHTHALMIC 3 TIMES DAILY
Qty: 5 ML | Refills: 0 | Status: SHIPPED | OUTPATIENT
Start: 2019-02-19 | End: 2019-04-09

## 2019-02-13 ASSESSMENT — SLIT LAMP EXAM - LIDS: COMMENTS: 1+ DERMATOCHALASIS

## 2019-02-13 ASSESSMENT — VISUAL ACUITY
OD_CC+: -1
OS_CC: 20/60
METHOD: SNELLEN - LINEAR
OS_CC+: -1
CORRECTION_TYPE: GLASSES
OD_CC: 20/40

## 2019-02-13 ASSESSMENT — KERATOMETRY
OD_K2POWER_DIOPTERS: 43.75
OD_AXISANGLE_DEGREES: 092
OS_K2POWER_DIOPTERS: 43.25
OD_K1POWER_DIOPTERS: 43.12
OS_K1POWER_DIOPTERS: 42.12
OS_AXISANGLE_DEGREES: 092

## 2019-02-13 NOTE — PROGRESS NOTES
Current Eye Medications:       Subjective:  Patient here for pre-op cataract surgery, left eye, scheduled for 2-18-19.  History and Physical tomorrow.  Consent signed.      Objective:  See Ophthalmology Exam.       Assessment: Visually significant cataract left eye.      Plan: Plan Kelman phacoemulsification/ posterior chamber lens left eye local standby.  Risks, benefits, complications, and alternatives discussed with patient including possibility of limitations from coexistent eye disease and loss of vision.  Target refraction and multifocal lens options discussed.  Patient understands and consents.  Justin Avendano M.D.       See Patient Instructions.

## 2019-02-13 NOTE — LETTER
2/13/2019         RE: Zurdo Dash  [address] [place] [phone]        Dear Colleague,    Thank you for referring your patient, Zurdo Dash, to the [facility]. Please see a copy of my visit note below.     Current Eye Medications:       Subjective:  Patient here for pre-op cataract surgery, left eye, scheduled for 2-18-19.  History and Physical tomorrow.  Consent signed.      Objective:  See Ophthalmology Exam.       Assessment: Visually significant cataract left eye.      Plan: Plan Kelman phacoemulsification/ posterior chamber lens left eye local standby.  Risks, benefits, complications, and alternatives discussed with patient including possibility of limitations from coexistent eye disease and loss of vision.  Target refraction and multifocal lens options discussed.  Patient understands and consents.  Justin Avendano M.D.       See Patient Instructions.         Again, thank you for allowing me to participate in the care of your patient.        Sincerely,        Justin Avendano MD

## 2019-02-13 NOTE — PATIENT INSTRUCTIONS
PRE-OP CATARACT INSTRUCTIONS    *You will be picking up 2 eye drop bottles from your pharmacy.  You will use these the day after surgery.    *No solid food after midnight.    *Clear liquids: coffee (no cream), tea, water, and jello are OK before 8 A.M.  You may take your regular pills with this.    *If you are taking glaucoma drops, continue as usual.    Justin Avendano M.D.  [phone]

## 2019-02-14 ENCOUNTER — OFFICE VISIT (OUTPATIENT)
Dept: FAMILY MEDICINE | Facility: CLINIC | Age: 74
End: 2019-02-14
Payer: COMMERCIAL

## 2019-02-14 VITALS
DIASTOLIC BLOOD PRESSURE: 68 MMHG | BODY MASS INDEX: 30.9 KG/M2 | HEIGHT: 69 IN | TEMPERATURE: 96.6 F | RESPIRATION RATE: 20 BRPM | OXYGEN SATURATION: 96 % | HEART RATE: 89 BPM | WEIGHT: 208.6 LBS | SYSTOLIC BLOOD PRESSURE: 112 MMHG

## 2019-02-14 DIAGNOSIS — H25.813 COMBINED FORMS OF AGE-RELATED CATARACT OF BOTH EYES: ICD-10-CM

## 2019-02-14 DIAGNOSIS — Z01.818 PREOP GENERAL PHYSICAL EXAM: Primary | ICD-10-CM

## 2019-02-14 PROCEDURE — 99214 OFFICE O/P EST MOD 30 MIN: CPT | Performed by: NURSE PRACTITIONER

## 2019-02-14 ASSESSMENT — ANXIETY QUESTIONNAIRES
2. NOT BEING ABLE TO STOP OR CONTROL WORRYING: NOT AT ALL
7. FEELING AFRAID AS IF SOMETHING AWFUL MIGHT HAPPEN: NOT AT ALL
3. WORRYING TOO MUCH ABOUT DIFFERENT THINGS: NOT AT ALL
6. BECOMING EASILY ANNOYED OR IRRITABLE: NOT AT ALL
5. BEING SO RESTLESS THAT IT IS HARD TO SIT STILL: NOT AT ALL
1. FEELING NERVOUS, ANXIOUS, OR ON EDGE: NOT AT ALL
IF YOU CHECKED OFF ANY PROBLEMS ON THIS QUESTIONNAIRE, HOW DIFFICULT HAVE THESE PROBLEMS MADE IT FOR YOU TO DO YOUR WORK, TAKE CARE OF THINGS AT HOME, OR GET ALONG WITH OTHER PEOPLE: NOT DIFFICULT AT ALL
GAD7 TOTAL SCORE: 0

## 2019-02-14 ASSESSMENT — MIFFLIN-ST. JEOR: SCORE: 1668.64

## 2019-02-14 ASSESSMENT — PAIN SCALES - GENERAL: PAINLEVEL: NO PAIN (0)

## 2019-02-14 ASSESSMENT — PATIENT HEALTH QUESTIONNAIRE - PHQ9
5. POOR APPETITE OR OVEREATING: NOT AT ALL
SUM OF ALL RESPONSES TO PHQ QUESTIONS 1-9: 0

## 2019-02-14 NOTE — PATIENT INSTRUCTIONS
Stop aspirin 5-7 days before surgery.   Hold metformin the morning of surgery.  Okay to take your other normal medications with a small sip of water on the morning of surgery.    Before Your Surgery      Call your surgeon if there is any change in your health. This includes signs of a cold or flu (such as a sore throat, runny nose, cough, rash or fever).    Do not smoke, drink alcohol or take over the counter medicine (unless your surgeon or primary care doctor tells you to) for the 24 hours before and after surgery.    If you take prescribed drugs: Follow your doctor s orders about which medicines to take and which to stop until after surgery.    Eating and drinking prior to surgery: follow the instructions from your surgeon    Take a shower or bath the night before surgery. Use the soap your surgeon gave you to gently clean your skin. If you do not have soap from your surgeon, use your regular soap. Do not shave or scrub the surgery site.  Wear clean pajamas and have clean sheets on your bed.     [facility]    If you have any questions regarding to your visit please contact your care team:     Team Pink:   Clinic Hours Telephone Number   Internal Medicine:  Dr. Suze Zapata, NP 7am-7pm  Monday - Thursday   7am-5pm  Fridays  [phone]  (Appointment scheduling available 24/7)   Urgent Care - [place] - 11am-9pm Monday-Friday Saturday-Sunday- 9am-5pm   [place] - 5pm-9pm Monday-Friday Saturday-Sunday- 9am-5pm  [phone] - [place]  [phone] - [place]       What options do I have for a visit other than an office visit? We offer electronic visits (e-visits) and telephone visits, when medically appropriate.  Please check with your medical insurance to see if these types of visits are covered, as you will be responsible for any charges that are not paid by your insurance.      You can use MyChart (secure electronic  communication) to access to your chart, send your primary care provider a message, or make an appointment. Ask a team member how to get started.     For a price quote for your services, please call our Consumer Price Line at [phone] or our Imaging Cost estimation line at [phone] (for imaging tests).  Brooke LÓPEZ CMA

## 2019-02-14 NOTE — PROGRESS NOTES
[facility]  [address] [place] [id number]  [phone]  Dept: [phone]    PRE-OP EVALUATION:  Today's date: 2019    Zurdo Dash (: 1945) presents for pre-operative evaluation assessment as requested by Dr. Avendano.  He requires evaluation and anesthesia risk assessment prior to undergoing surgery/procedure for treatment of Both eyes .    Proposed Surgery/ Procedure: Cataract  Date of Surgery/ Procedure: 19  Time of Surgery/ Procedure: 2:00  Hospital/Surgical Facility: [facility]  Fax number for surgical facility:  Primary Physician: Kapil Duckworth  Type of Anesthesia Anticipated: General    Patient has a Health Care Directive or Living Will:  NO    1. NO - Do you have a history of heart attack, stroke, stent, bypass or surgery on an artery in the head, neck, heart or legs?  2. NO - Do you ever have any pain or discomfort in your chest?  3. NO - Do you have a history of  Heart Failure?  4. NO - Are you troubled by shortness of breath when: walking on the level, up a slight hill or at night?  5. YES - DO YOU CURRENTLY HAVE A COLD, BRONCHITIS OR OTHER RESPIRATORY INFECTION? A slight cold, improving  6. YES - DO YOU HAVE A COUGH, SHORTNESS OF BREATH OR WHEEZING? Cough, improving  7. NO - Do you sometimes get pains in the calves of your legs when you walk?  8. NO - Do you or anyone in your family have previous history of blood clots?  9. NO - Do you or does anyone in your family have a serious bleeding problem such as prolonged bleeding following surgeries or cuts?  10. NO - Have you ever had problems with anemia or been told to take iron pills?  11. NO - Have you had any abnormal blood loss such as black, tarry or bloody stools, or abnormal vaginal bleeding?  12. NO - Have you ever had a blood transfusion?  13. NO - Have you or any of your relatives ever had problems with anesthesia?  14. YES - DO YOU HAVE SLEEP APNEA, EXCESSIVE SNORING OR DAYTIME  DROWSINESS? snoring  15. NO - Do you have any prosthetic heart valves?  16. NO - Do you have prosthetic joints?  17. NO - Is there any chance that you may be pregnant?    Elli Zapata CNP     HPI:     HPI related to upcoming procedure: Patient will undergo left eye cataract removal to improve vision.      DIABETES - Patient has a longstanding history of DiabetesType Type II . Patient is being treated with oral agents and denies significant side effects. Control has been good. Complicating factors include but are not limited to: hypertension and hyperlipidemia.                                                                                                                            .  HYPERLIPIDEMIA - Patient has a long history of significant Hyperlipidemia requiring medication for treatment with recent good control. Patient reports no problems or side effects with the medication.                                                                                                                                                       .  HYPERTENSION - Patient has longstanding history of HTN , currently denies any symptoms referable to elevated blood pressure. Specifically denies chest pain, palpitations, dyspnea, orthopnea, PND or peripheral edema. Blood pressure readings have been in normal range. Current medication regimen is as listed below. Patient denies any side effects of medication.                                                                                                                                                                                          .    MEDICAL HISTORY:     Patient Active Problem List    Diagnosis Date Noted     Posterior vitreous detachment of left eye 02/09/2019     Priority: Medium     Combined forms of age-related cataract, mod, of both eyes 02/09/2019     Priority: Medium     Osteoarthritis of both knees, unspecified osteoarthritis type 02/08/2019     Priority: Medium      Migraine equivalent 12/22/2018     Priority: Medium     Chronic bilateral low back pain without sciatica 09/25/2018     Priority: Medium     Diabetic polyneuropathy associated with type 2 diabetes mellitus (H) 02/28/2018     Priority: Medium     Tubulovillous adenoma of colon 02/20/2018     Priority: Medium     Type 2 diabetes mellitus without retinopathy (H) 01/12/2018     Priority: Medium     Erectile dysfunction, unspecified erectile dysfunction type 11/07/2017     Priority: Medium     Spondylosis of cervical region without myelopathy or radiculopathy 07/17/2017     Priority: Medium     Pulmonary nodule 03/03/2017     Priority: Medium     Needs follow up CT chest 1 year 2/2018       High risk medication use 03/03/2017     Priority: Medium     Rheumatoid arthritis involving multiple sites with positive rheumatoid factor (H) 03/18/2016     Priority: Medium     Family history of esophageal cancer 12/14/2015     Priority: Medium     Gastroesophageal reflux disease, esophagitis presence not specified 12/14/2015     Priority: Medium     Ex-smoker 10/20/2015     Priority: Medium     RBBB (right bundle branch block) 10/24/2013     Priority: Medium     Noted on ECG 10/19/2013 . See medical record scanned into Epic electronic medical records         Type 2 diabetes mellitus with diabetic polyneuropathy, without long-term current use of insulin (H) 02/28/2013     Priority: Medium     A1C      6.9   5/3/2016  A1C      8.6   3/18/2016  A1C      9.7   9/30/2015  A1C     11.8   6/29/2015  A1C     10.7   3/30/2015         Health Care Home 12/12/2012     Priority: Medium     Alin Espinoza RN,[phone]   [facility]          DX V65.8 REPLACED WITH [id number] HEALTH CARE HOME (04/08/2013)       Advanced directives, counseling/discussion 03/29/2011     Priority: Medium     Patient states has Advance Directive and will bring in a copy to clinic.         Hypertension goal BP (blood pressure) < 140/90       Priority: Medium     obesity      Priority: Medium     Hypogonadism      Priority: Medium     Hyperlipidemia LDL goal <100 10/31/2010     Priority: Medium     Pain in limb      Priority: Medium     Had some leg cramps with features consistent with vascular claudication. See ankle brachial index tests and vascular surgery consultation from 2/16/2011 in media section of Epic electronic medical records ; the key point is that subsequently , evaluations found zero evidence of     Ischemic vascular disease          Past Medical History:   Diagnosis Date     Abnormal CT scan 03/2004    calcified lung granuloma     C. difficile diarrhea     H/O     Cataract 11/18/2011     Diabetic neuropathy (H)     mild, mostly soles and distal forefeet, worse on the left side.     Diverticulitis      ED (erectile dysfunction)      Ex-smoker     QUIT SMOKING FEB 2007     History of ETOH abuse     recovering, sober since 1997     Hyperlipidemia LDL goal <100      Hypertension goal BP (blood pressure) < 140/90      Hypogonadism      Obesity      PAD (peripheral artery disease) (H)     leg cramps, with exertion, no formal diagnosis of PAD and minimal if any symptoms at all.     RA (rheumatoid arthritis) (H)     Dr Bailon     Type 2 diabetes, HbA1c goal < 7% (H) 10/2008    A1C of 7.1 %      Past Surgical History:   Procedure Laterality Date     COLONOSCOPY  03/01/2018    [facility]     HC INCISION TENDON SHEATH FINGER  4/2009    r hand ring finger     TONSILLECTOMY       Current Outpatient Medications   Medication Sig Dispense Refill     albuterol (PROAIR HFA/PROVENTIL HFA/VENTOLIN HFA) 108 (90 BASE) MCG/ACT Inhaler Inhale 2 puffs into the lungs every 4 hours as needed for other (coughing) 1 Inhaler 1     aspirin 81 MG tablet Take 1 tablet by mouth daily.  3     blood glucose (NO BRAND SPECIFIED) lancing device Use to test blood sugars 3 times daily or as directed. 1 each 0     blood glucose monitoring (INDIA CONTOUR NEXT) test strip TEST  THREE TIMES A DAY OR AS DIRECTED, dispense covered brand 300 each 1     blood glucose monitoring (ONE TOUCH DELICA) lancets Use to test blood sugars 3 times daily or as directed. 300 each 3     cetirizine (ZYRTEC) 10 MG tablet Take 10 mg by mouth At Bedtime       folic acid (FOLVITE) 1 MG tablet Take 1 tablet (1 mg) by mouth daily 100 tablet 3     HYDROcodone-acetaminophen (NORCO) 5-325 MG per tablet Take 1-2 tablets by mouth every 4 hours as needed for moderate to severe pain maximum 4 tablet(s) per day 28 tablet 0     hydroxychloroquine (PLAQUENIL) 200 MG tablet Take 1 tablet (200 mg) by mouth 2 times daily 180 tablet 3     [START ON 2/19/2019] ketorolac (ACULAR) 0.5 % ophthalmic solution Place 1 drop Into the left eye 3 times daily 5 mL 0     lisinopril (PRINIVIL/ZESTRIL) 2.5 MG tablet TAKE ONE TABLET BY MOUTH EVERY DAY 90 tablet 1     metFORMIN (GLUCOPHAGE) 500 MG tablet Take 1 tablet (500 mg) by mouth 2 times daily (with meals) 180 tablet 1     methotrexate sodium 2.5 MG TABS Take 10 tablets (25 mg) by mouth once a week . Take all 10 tablets on the same day of each week. 120 tablet 2     metoprolol succinate ER (TOPROL-XL) 25 MG 24 hr tablet TAKE ONE TABLET BY MOUTH EVERY DAY 90 tablet 1     omeprazole (PRILOSEC) 40 MG DR capsule Take 1 capsule (40 mg) by mouth daily 90 capsule 1     order for DME Equipment being ordered: glucometer 1 Device 0     order for DME Equipment being ordered: specialized shoes for diabetic neuropathy     His orthotics will be made by [employer]      Tim Jernigan, Certified   [email or web address]  Phone: [phone]  Fax: [phone]  [address] [place] [address] 1 Device 0     pravastatin (PRAVACHOL) 40 MG tablet TAKE ONE TABLET BY MOUTH EVERY DAY 90 tablet 0     [START ON 2/19/2019] prednisoLONE acetate (PRED FORTE) 1 % ophthalmic suspension Place 1 drop Into the left eye 3 times daily 5 mL 0     semaglutide (OZEMPIC) 1 MG/DOSE pen Inject 1 mg  "Subcutaneous every 7 days 3 mL 2     tiZANidine (ZANAFLEX) 2 MG tablet Take 1 tablet (2 mg) by mouth 3 times daily as needed for muscle spasms 90 tablet 1     OTC products: None, except as noted above    No Known Allergies   Latex Allergy: NO    Social History     Tobacco Use     Smoking status: Former Smoker     Packs/day: 1.00     Years: 40.00     Pack years: 40.00     Types: Cigarettes     Last attempt to quit: 3/16/2007     Years since quittin.9     Smokeless tobacco: Never Used   Substance Use Topics     Alcohol use: No     Alcohol/week: 0.0 oz     History   Drug Use No       REVIEW OF SYSTEMS:   Constitutional, neuro, ENT, endocrine, pulmonary, cardiac, gastrointestinal, genitourinary, musculoskeletal, integument and psychiatric systems are negative, except as otherwise noted.    EXAM:   /68   Pulse 89   Temp 96.6  F (35.9  C) (Oral)   Resp 20   Ht 1.74 m (5' 8.5\")   Wt 94.6 kg (208 lb 9.6 oz)   SpO2 96%   BMI 31.26 kg/m      GENERAL APPEARANCE: healthy, alert and no distress     EYES: EOMI,  PERRL     HENT: ear canals and TM's normal and nose and mouth without ulcers or lesions     NECK: no adenopathy, no asymmetry, masses, or scars and thyroid normal to palpation     RESP: lungs clear to auscultation - no rales, rhonchi or wheezes     CV: regular rates and rhythm, normal S1 S2, no S3 or S4 and no murmur, click or rub     ABDOMEN:  soft, nontender, no HSM or masses and bowel sounds normal     MS: extremities normal- no gross deformities noted, no evidence of inflammation in joints, FROM in all extremities.     SKIN: no suspicious lesions or rashes     NEURO: Normal strength and tone, sensory exam grossly normal, mentation intact and speech normal     PSYCH: mentation appears normal. and affect normal/bright     LYMPHATICS: No cervical adenopathy    DIAGNOSTICS:   No labs or EKG required for low risk surgery (cataract, skin procedure, breast biopsy, etc)    Recent Labs   Lab Test " 01/14/19  1716 12/19/18  1123 09/25/18  1246  03/26/18  1130  02/03/11  1254   HGB 14.9 13.9  --    < >  --    < > 14.4    240  --    < >  --    < > 238   INR  --   --   --   --   --   --  1.00     --   --   --  139   < > 132*   POTASSIUM 4.2  --   --   --  4.6   < > 4.6   CR 1.33* 1.07  --    < > 1.01   < > 1.16   A1C 7.1*  --  8.4*  --   --    < > 8.9*    < > = values in this interval not displayed.        IMPRESSION:   Reason for surgery/procedure: Cataract removal  Diagnosis/reason for consult: Management of comorbid conditions and preoperative exam.      The proposed surgical procedure is considered LOW risk.    REVISED CARDIAC RISK INDEX  The patient has the following serious cardiovascular risks for perioperative complications such as (MI, PE, VFib and 3  AV Block):  No serious cardiac risks  INTERPRETATION: 0 risks: Class I (very low risk - 0.4% complication rate)    The patient has the following additional risks for perioperative complications:  High tolerance to opioid analgesics due to chronic hydrocodone use      ICD-10-CM    1. Preop general physical exam Z01.818    2. Combined forms of age-related cataract, mod, of both eyes H25.813        RECOMMENDATIONS:       Cardiovascular Risk  Performs 4 METs exercise without symptoms (Light housework (dusting, washing dishes) and Climb a flight of stairs) .   Patient is already on a Beta Blocker. Continue Betablocker therapy after surgery, using Beta blocker order set as necessary for NPO status.      --Patient is to take all scheduled medications on the day of surgery EXCEPT for modifications listed below.    Diabetes Medication Use  -----Hold usual oral and non-insulin diabetic meds (e.g. Metformin, Actos, Glipizide) while NPO.       Anticoagulant or Antiplatelet Medication Use  ASPIRIN: Discontinue ASA 7-10 days prior to procedure to reduce bleeding risk.  It should be resumed post-operatively.        APPROVAL GIVEN to proceed with proposed  procedure, without further diagnostic evaluation       Signed Electronically by: MANASA Ventura CNP    Copy of this evaluation report is provided to requesting physician.    [facility] Preop Guidelines    Revised Cardiac Risk Index

## 2019-02-15 ENCOUNTER — THERAPY VISIT (OUTPATIENT)
Dept: PHYSICAL THERAPY | Facility: CLINIC | Age: 74
End: 2019-02-15
Payer: COMMERCIAL

## 2019-02-15 DIAGNOSIS — M17.0 OSTEOARTHRITIS OF BOTH KNEES, UNSPECIFIED OSTEOARTHRITIS TYPE: ICD-10-CM

## 2019-02-15 PROCEDURE — 97530 THERAPEUTIC ACTIVITIES: CPT | Mod: GP | Performed by: PHYSICAL THERAPIST

## 2019-02-15 PROCEDURE — 97110 THERAPEUTIC EXERCISES: CPT | Mod: GP | Performed by: PHYSICAL THERAPIST

## 2019-02-15 ASSESSMENT — ANXIETY QUESTIONNAIRES: GAD7 TOTAL SCORE: 0

## 2019-02-18 DIAGNOSIS — E11.42 TYPE 2 DIABETES MELLITUS WITH DIABETIC POLYNEUROPATHY, WITH LONG-TERM CURRENT USE OF INSULIN (H): ICD-10-CM

## 2019-02-18 DIAGNOSIS — Z79.4 TYPE 2 DIABETES MELLITUS WITH DIABETIC POLYNEUROPATHY, WITH LONG-TERM CURRENT USE OF INSULIN (H): ICD-10-CM

## 2019-02-18 PROBLEM — Z96.1 PSEUDOPHAKIA: Status: ACTIVE | Noted: 2019-02-18

## 2019-02-18 PROBLEM — H25.811 COMBINED FORMS OF AGE-RELATED CATARACT OF RIGHT EYE: Status: ACTIVE | Noted: 2019-02-18

## 2019-02-19 ENCOUNTER — OFFICE VISIT (OUTPATIENT)
Dept: OPHTHALMOLOGY | Facility: CLINIC | Age: 74
End: 2019-02-19
Payer: COMMERCIAL

## 2019-02-19 DIAGNOSIS — Z96.1 PSEUDOPHAKIA: Primary | ICD-10-CM

## 2019-02-19 PROCEDURE — 99024 POSTOP FOLLOW-UP VISIT: CPT | Performed by: OPHTHALMOLOGY

## 2019-02-19 ASSESSMENT — SLIT LAMP EXAM - LIDS: COMMENTS: 1+ DERMATOCHALASIS

## 2019-02-19 ASSESSMENT — VISUAL ACUITY
OS_SC+: -1
OS_SC: 20/20
METHOD: SNELLEN - LINEAR

## 2019-02-19 ASSESSMENT — TONOMETRY
IOP_METHOD: APPLANATION
OS_IOP_MMHG: 24

## 2019-02-19 NOTE — PATIENT INSTRUCTIONS
POST-OP CATARACT INSTRUCTIONS    Use the following drops in the left eye:    Prednisolone (pink or white cap) 3 times a day  Ketorolac (gray cap) 3 times a day    ~Wait at least 5 minutes between drops.    ~Wear eye shield at night for one week.    ~Do not exert yourself to the point that you are red in the face for one week.    ~If your vision worsens, eye becomes increasingly red, or becomes painful, call [phone].    ~If you are taking glaucoma medications, continue as usual.    ~OK to resume aspirin and/or other blood thinners.    Justin Avendano M.D.

## 2019-02-19 NOTE — PROGRESS NOTES
Current Eye Medications: none     Subjective: here for PO1 left eye, very bright.  Told him not to shovel this week, he has grandson that can help him that lives two blocks away. Seeing double when the patch was still on the left eye today     Objective:  See Ophthalmology Exam.       Assessment:  Doing well status/post Kelman phacoemulsification/ posterior chamber lens left eye.      Plan:   See Patient Instructions.

## 2019-02-19 NOTE — LETTER
2/19/2019         RE: Zurdo Dash  [address] [place] [phone]        Dear Colleague,    Thank you for referring your patient, Zurdo Dash, to the [facility]. Please see a copy of my visit note below.     Current Eye Medications: none     Subjective: here for PO1 left eye, very bright.  Told him not to shovel this week, he has grandson that can help him that lives two blocks away. Seeing double when the patch was still on the left eye today     Objective:  See Ophthalmology Exam.       Assessment:  Doing well status/post Kelman phacoemulsification/ posterior chamber lens left eye.      Plan:   See Patient Instructions.         Again, thank you for allowing me to participate in the care of your patient.        Sincerely,        Justin Avendano MD

## 2019-02-22 ENCOUNTER — THERAPY VISIT (OUTPATIENT)
Dept: PHYSICAL THERAPY | Facility: CLINIC | Age: 74
End: 2019-02-22
Payer: COMMERCIAL

## 2019-02-22 DIAGNOSIS — M17.0 OSTEOARTHRITIS OF BOTH KNEES, UNSPECIFIED OSTEOARTHRITIS TYPE: ICD-10-CM

## 2019-02-22 PROCEDURE — 97530 THERAPEUTIC ACTIVITIES: CPT | Mod: GP | Performed by: PHYSICAL THERAPIST

## 2019-02-22 PROCEDURE — 97110 THERAPEUTIC EXERCISES: CPT | Mod: GP | Performed by: PHYSICAL THERAPIST

## 2019-02-26 ENCOUNTER — OFFICE VISIT (OUTPATIENT)
Dept: OPHTHALMOLOGY | Facility: CLINIC | Age: 74
End: 2019-02-26
Payer: COMMERCIAL

## 2019-02-26 ENCOUNTER — TELEPHONE (OUTPATIENT)
Dept: OPHTHALMOLOGY | Facility: CLINIC | Age: 74
End: 2019-02-26

## 2019-02-26 DIAGNOSIS — Z96.1 PSEUDOPHAKIA: Primary | ICD-10-CM

## 2019-02-26 DIAGNOSIS — H25.811 COMBINED FORMS OF AGE-RELATED CATARACT OF RIGHT EYE: ICD-10-CM

## 2019-02-26 PROCEDURE — 92012 INTRM OPH EXAM EST PATIENT: CPT | Mod: 24 | Performed by: OPHTHALMOLOGY

## 2019-02-26 PROCEDURE — 92136 OPHTHALMIC BIOMETRY: CPT | Mod: 26 | Performed by: OPHTHALMOLOGY

## 2019-02-26 RX ORDER — PREDNISOLONE ACETATE 10 MG/ML
1 SUSPENSION/ DROPS OPHTHALMIC 3 TIMES DAILY
Qty: 5 ML | Refills: 0 | Status: SHIPPED | OUTPATIENT
Start: 2019-03-12 | End: 2019-07-10

## 2019-02-26 RX ORDER — KETOROLAC TROMETHAMINE 5 MG/ML
1 SOLUTION OPHTHALMIC 3 TIMES DAILY
Qty: 5 ML | Refills: 0 | Status: SHIPPED | OUTPATIENT
Start: 2019-03-12 | End: 2019-07-10

## 2019-02-26 ASSESSMENT — VISUAL ACUITY
METHOD: SNELLEN - LINEAR
OS_SC: 20/15
OS_SC+: -1

## 2019-02-26 ASSESSMENT — TONOMETRY
IOP_METHOD: APPLANATION
OS_IOP_MMHG: 16

## 2019-02-26 ASSESSMENT — SLIT LAMP EXAM - LIDS: COMMENTS: 1+ DERMATOCHALASIS

## 2019-02-26 ASSESSMENT — REFRACTION_MANIFEST
OS_AXIS: 142
OS_CYLINDER: +0.50
OS_SPHERE: PLANO

## 2019-02-26 NOTE — LETTER
2/26/2019         RE: Zurdo Dash  [address] [place] [phone]        Dear Colleague,    Thank you for referring your patient, Zurdo Dash, to the [facility]. Please see a copy of my visit note below.     Current Eye Medications:  Ketorolac and Prednisolone 1% left eye three times a day.       Subjective:  Status/Post Kelman Phacoemulsification with Posterior Chamber Lens left eye:  2-18-19.  Vision is very bright and clear in his left eye.  He is looking forward to having surgery in his left eye.       Objective:  See Ophthalmology Exam.       Assessment:  Doing well status/post Kelman phacoemulsification/ posterior chamber lens left eye.  Visually significant cataract right eye.      Plan:  Patient wishes to proceed with cataract surgery right eye; will schedule.  Justin Avendano M.D.  [phone]    See Patient Instructions.         Again, thank you for allowing me to participate in the care of your patient.        Sincerely,        Justin Avendano MD

## 2019-02-26 NOTE — PROGRESS NOTES
Current Eye Medications:  Ketorolac and Prednisolone 1% left eye three times a day.       Subjective:  Status/Post Kelman Phacoemulsification with Posterior Chamber Lens left eye:  2-18-19.  Vision is very bright and clear in his left eye.  He is looking forward to having surgery in his left eye.       Objective:  See Ophthalmology Exam.       Assessment:  Doing well status/post Kelman phacoemulsification/ posterior chamber lens left eye.  Visually significant cataract right eye.      Plan:  Patient wishes to proceed with cataract surgery right eye; will schedule.  Justin Avendano M.D.  [phone]    See Patient Instructions.

## 2019-02-26 NOTE — PATIENT INSTRUCTIONS
POST-OP CATARACT INSTRUCTIONS LEFT EYE    1)   Continue Ketorolac (gray) and prednisolone (pink) three times daily until each is gone.    2)   Stop shield one week following surgery.    3)   No eye rubbing.    4)   Return one month for final exam.     PRE-OP CATARACT INSTRUCTIONS RIGHT EYE    *You will be picking up 2 eye drop bottles from your pharmacy.  You will use these the day after surgery.    *No solid food after midnight.    *Clear liquids: coffee (no cream), tea, water, and jello are OK before 7:45 A.M.  You may take your regular pills with this.    *If you are taking glaucoma drops, continue as usual.    Justin Avendano M.D.  [phone]

## 2019-02-26 NOTE — TELEPHONE ENCOUNTER
Type of surgery: Cataract Extraction with Intraocular Lens Implant Right Eye CPT code 29505  Combined forms of age-related cataract, mod, of right eye [H25.811]   Location of surgery: Other: [facility]  Date and time of surgery: 03/11/2019 @ 2:45pm  Surgeon: Dr. Avendano  Pre-Op Appt Date: 02/14/2019  Post-Op Appt Date: 03/12/2019   Packet sent out: Yes  Pre-cert/Authorization completed:  No prior auth required per [employer] list.   Date: 02/26/2019     Insurance valid.

## 2019-02-27 ENCOUNTER — TELEPHONE (OUTPATIENT)
Dept: FAMILY MEDICINE | Facility: CLINIC | Age: 74
End: 2019-02-27

## 2019-02-27 DIAGNOSIS — E78.5 HYPERLIPIDEMIA LDL GOAL <100: ICD-10-CM

## 2019-02-27 RX ORDER — PRAVASTATIN SODIUM 40 MG
20 TABLET ORAL EVERY OTHER DAY
Qty: 23 TABLET | Refills: 0 | Status: SHIPPED | OUTPATIENT
Start: 2019-02-27 | End: 2019-11-04

## 2019-02-27 NOTE — TELEPHONE ENCOUNTER
Lm on Kayce'e's VM with updates on prescription- ok for patient to continue on Pravachol 40mg 1/2 tab every other day  Prescription updated    Requested that she call back with questions    Jacobo Del Castillo RN

## 2019-02-27 NOTE — TELEPHONE ENCOUNTER
Reason for call:  Other   Patient called regarding (reason for call): call back  Additional comments:  from [facility] calling to verify dosage for medication - pravastatin (PRAVACHOL) 40 MG tablet please call back.    Phone number to reach patient:  Other phone number:  Kaur @ [phone]*    Best Time:  ASAP    Can we leave a detailed message on this number?  YES

## 2019-02-27 NOTE — TELEPHONE ENCOUNTER
Pt informed Kaur HARRIS with [employer] that he is taking Pravachol 40mg 1/2 tab every other day. Should he increase back up to the 40mg daily dose? No notes stating to go to the lower dose. Please advise.     Mia Artis RN  [facility]

## 2019-02-27 NOTE — TELEPHONE ENCOUNTER
Left message on Kaur's voicemail explaining that Pravachol is 40mg daily.  Asked that she return call to RN hotline at # [phone] if there were any questions/concerns    Jacobo Del Castillo RN

## 2019-03-01 ENCOUNTER — THERAPY VISIT (OUTPATIENT)
Dept: PHYSICAL THERAPY | Facility: CLINIC | Age: 74
End: 2019-03-01
Payer: COMMERCIAL

## 2019-03-01 DIAGNOSIS — M17.0 OSTEOARTHRITIS OF BOTH KNEES, UNSPECIFIED OSTEOARTHRITIS TYPE: ICD-10-CM

## 2019-03-01 PROCEDURE — 97110 THERAPEUTIC EXERCISES: CPT | Mod: GP | Performed by: PHYSICAL THERAPIST

## 2019-03-01 PROCEDURE — 97140 MANUAL THERAPY 1/> REGIONS: CPT | Mod: GP | Performed by: PHYSICAL THERAPIST

## 2019-03-01 PROCEDURE — 97530 THERAPEUTIC ACTIVITIES: CPT | Mod: GP | Performed by: PHYSICAL THERAPIST

## 2019-03-07 ENCOUNTER — THERAPY VISIT (OUTPATIENT)
Dept: PHYSICAL THERAPY | Facility: CLINIC | Age: 74
End: 2019-03-07
Payer: COMMERCIAL

## 2019-03-07 DIAGNOSIS — M17.0 OSTEOARTHRITIS OF BOTH KNEES, UNSPECIFIED OSTEOARTHRITIS TYPE: ICD-10-CM

## 2019-03-07 PROCEDURE — 97530 THERAPEUTIC ACTIVITIES: CPT | Mod: GP | Performed by: PHYSICAL THERAPY ASSISTANT

## 2019-03-07 PROCEDURE — 97110 THERAPEUTIC EXERCISES: CPT | Mod: GP | Performed by: PHYSICAL THERAPY ASSISTANT

## 2019-03-12 ENCOUNTER — OFFICE VISIT (OUTPATIENT)
Dept: OPHTHALMOLOGY | Facility: CLINIC | Age: 74
End: 2019-03-12
Payer: COMMERCIAL

## 2019-03-12 DIAGNOSIS — Z96.1 PSEUDOPHAKIA: Primary | ICD-10-CM

## 2019-03-12 PROCEDURE — 99024 POSTOP FOLLOW-UP VISIT: CPT | Performed by: OPHTHALMOLOGY

## 2019-03-12 RX ORDER — PREDNISOLONE ACETATE 1 %
1 SUSPENSION, DROPS(FINAL DOSAGE FORM)(ML) OPHTHALMIC (EYE) 3 TIMES DAILY
COMMUNITY
End: 2019-04-09

## 2019-03-12 RX ORDER — PREDNISOLONE ACETATE 10 MG/ML
1 SUSPENSION/ DROPS OPHTHALMIC 3 TIMES DAILY
COMMUNITY
End: 2019-04-09

## 2019-03-12 ASSESSMENT — REFRACTION_MANIFEST
OS_SPHERE: PLANO
OS_ADD: +2.50
OS_AXIS: 172
OS_CYLINDER: +0.50

## 2019-03-12 ASSESSMENT — TONOMETRY
OD_IOP_MMHG: 21
IOP_METHOD: APPLANATION
OS_IOP_MMHG: 14

## 2019-03-12 ASSESSMENT — VISUAL ACUITY
OD_SC: 20/20
METHOD: SNELLEN - LINEAR
OS_SC: 20/20

## 2019-03-12 ASSESSMENT — SLIT LAMP EXAM - LIDS: COMMENTS: 1+ DERMATOCHALASIS

## 2019-03-12 NOTE — PATIENT INSTRUCTIONS
POST-OP CATARACT INSTRUCTIONS    Use the following drops in the right eye:    Prednisolone (pink or white cap) 3 times a day  Ketorolac (gray cap) 3 times a day    ~Wait at least 5 minutes between drops.    ~Wear eye shield at night for one week.    ~Do not exert yourself to the point that you are red in the face for one week.    ~If your vision worsens, eye becomes increasingly red, or becomes painful, call [phone].    ~If you are taking glaucoma medications, continue as usual.    ~OK to resume aspirin and/or other blood thinners.    Continue same medication in the left eye until gone.

## 2019-03-12 NOTE — LETTER
3/12/2019         RE: Zurdo Dash  [address] [place] [phone]        Dear Colleague,    Thank you for referring your patient, Zurdo Dash, to the [facility]. Please see a copy of my visit note below.     Current Eye Medications:  Prednisolone and Ketorolac three times a day both eyes, will be starting right eye today.     Subjective:  Po1, KPE right eye 3/11/19. KPE left eye 2/18/19. Patient slept well last night. Vision is OK right eye, much brighter. Vision is doing well left eye. No eye pain or discomfort in either eye.      Objective:  See Ophthalmology Exam.       Assessment: Doing well status/post Kelman phacoemulsification/ posterior chamber lens right eye.      Plan:   See Patient Instructions.           Again, thank you for allowing me to participate in the care of your patient.        Sincerely,        Justin Avendano MD

## 2019-03-12 NOTE — PROGRESS NOTES
Current Eye Medications:  Prednisolone and Ketorolac three times a day both eyes, will be starting right eye today.     Subjective:  Po1, KPE right eye 3/11/19. KPE left eye 2/18/19. Patient slept well last night. Vision is OK right eye, much brighter. Vision is doing well left eye. No eye pain or discomfort in either eye.      Objective:  See Ophthalmology Exam.       Assessment: Doing well status/post Kelman phacoemulsification/ posterior chamber lens right eye.      Plan:   See Patient Instructions.

## 2019-03-13 NOTE — PROGRESS NOTES
SUBJECTIVE:   Zurdo Dash is a 73 year old male who presents to clinic today for the following health issues:       Screening for diabetic peripheral neuropathy  Type 2 diabetes mellitus with diabetic polyneuropathy, without long-term current use of insulin (H)  Diabetic polyneuropathy associated with type 2 diabetes mellitus (H)  Toe infection  Screening for prostate cancer     Last appointment with me was 1-  Preventative healthcare maintenance goals including urine toxicology screen, ShingRx vaccination against herpes zoster, Medicare Annual Wellness Visit , annual low dose CT for scanning high risk pt for lung cancer , DIABETIC FOOT EXAM     Lab Results   Component Value Date    A1C 7.1 01/14/2019    A1C 8.4 09/25/2018    A1C 5.9 03/05/2018    A1C 5.1 10/31/2017    A1C 5.6 06/23/2017     IMPRESSION: from chest CT scan 1-  1. ACR Assessment Category:  1: Negative. Recommend continued  screening low-dose chest CT in 12 months. .   2. Significant Incidental Finding(s):  Category S: No.   3. Evidence of old granulomatous disease.       Infection of left great toe  Patient reports symptoms of severe soreness to touch of the left big toe, which have been present for more than a year. This toe he says feels like something needs to burst. He plans to follow-up with scheduled podiatry consultation with Dr. Dent of podiatry next week. He is currently using diabetic shoes for diabetes neuropathy symptoms.     Incision and drainage: there was an obvious pus collection around the medial edge of the toenail. With the affected area cleaned with alcohol. We used a number 11 scapel used to make a stab incision.  Purlent drainage was removed.  Appropraite wound care dressing applied.  Pt tolerated preocedure well. Some evidence of infection present and so it is a timely thing that he is having further follow up with Dr. Aaron Dent, podiatrist . We also did formal monofilament testing and determine there's  definitely loss of peripheral sensation     Additional Information   Zurdo has requested to talk about prostate cancer screening. He has symptoms of nocturia x1 a night on average, and slower urine flow which began about 5 years ago. He has also requested to discuss screening options for pancreatic cancer. Denies family history of pancreatic cancer.     Problem list and histories reviewed & adjusted, as indicated.  Additional history: as documented    Patient Active Problem List   Diagnosis     Pain in limb     Hyperlipidemia LDL goal <100     Hypertension goal BP (blood pressure) < 140/90     obesity     Hypogonadism     Advanced directives, counseling/discussion     Health Care Home     Type 2 diabetes mellitus with diabetic polyneuropathy, without long-term current use of insulin (H)     RBBB (right bundle branch block)     Ex-smoker     Family history of esophageal cancer     Gastroesophageal reflux disease, esophagitis presence not specified     Rheumatoid arthritis involving multiple sites with positive rheumatoid factor (H)     Pulmonary nodule     High risk medication use     Spondylosis of cervical region without myelopathy or radiculopathy     Erectile dysfunction, unspecified erectile dysfunction type     Type 2 diabetes mellitus without retinopathy (H)     Tubulovillous adenoma of colon     Diabetic polyneuropathy associated with type 2 diabetes mellitus (H)     Chronic bilateral low back pain without sciatica     Migraine equivalent     Osteoarthritis of both knees, unspecified osteoarthritis type     Posterior vitreous detachment of left eye     Pseudophakia, ou     Past Surgical History:   Procedure Laterality Date     CATARACT IOL, RT/LT       COL[facility]OSCOPY  03/01/2018    [facility]     HC INCISION TENDON SHEATH FINGER  4/2009    r hand ring finger     PHACOEMULSIFICATION WITH STANDARD INTRAOCULAR LENS IMPLANT  02/2019; 3/2019    left eye; right eye     TONSILLECTOMY         Social History     Tobacco Use  "    Smoking status: Former Smoker     Packs/day: 1.00     Years: 40.00     Pack years: 40.00     Types: Cigarettes     Last attempt to quit: 3/16/2007     Years since quittin.0     Smokeless tobacco: Never Used   Substance Use Topics     Alcohol use: No     Alcohol/week: 0.0 oz     Family History   Problem Relation Age of Onset     Cerebrovascular Disease Mother      Arthritis Mother      Osteoporosis Mother      Alzheimer Disease Father      Arthritis Father      Cancer Father      Diabetes Maternal Grandmother      Cardiovascular Maternal Grandmother      Other Cancer Brother      Cancer Paternal Aunt      Hypertension No family hx of      Thyroid Disease No family hx of      Glaucoma No family hx of      Macular Degeneration No family hx of          BP Readings from Last 3 Encounters:   03/15/19 114/74   19 112/68   19 126/83    Wt Readings from Last 3 Encounters:   03/15/19 93.4 kg (206 lb)   19 94.6 kg (208 lb 9.6 oz)   19 95.3 kg (210 lb 3.2 oz)        Reviewed and updated as needed this visit by clinical staff       Reviewed and updated as needed this visit by Provider       ROS:  Constitutional, HEENT, cardiovascular, pulmonary, GI, , musculoskeletal, neuro, skin, endocrine and psych systems are negative, except as otherwise noted.    This document serves as a record of the services and decisions personally performed and made by Kapil Duckworth MD. It was created on his behalf by Marcos Ayon, a trained medical scribe. The creation of this document is based on the provider's statements to the medical scribe.  Marcos Ayon 10:06 AM March 15, 2019    OBJECTIVE:     /74   Pulse 76   Temp 97.8  F (36.6  C) (Oral)   Resp 20   Ht 1.74 m (5' 8.5\")   Wt 93.4 kg (206 lb)   SpO2 98%   BMI 30.87 kg/m    Body mass index is 30.87 kg/m .  GENERAL: healthy, alert and no distress  EYES: Eyes grossly normal to inspection, PERRL and conjunctivae and sclerae normal  MS: Left foot " big toe has a slight amount of warmth, but is clearly fluid congested, monofilament test performed and he has evidence of bilateral neuropathy, he has onychomycosis with both feet as well   SKIN: no suspicious lesions or rashes to visible skin   NEURO: Normal strength and tone, mentation intact and speech normal  PSYCH: mentation appears normal, affect normal/bright    Diagnostic Test Results:  No results found for this or any previous visit (from the past 24 hour(s)).    ASSESSMENT/PLAN:     (Z13.89) Screening for diabetic peripheral neuropathy  (primary encounter diagnosis)  Comment:   Plan: FOOT EXAM  NO CHARGE [34149.114]            (E11.42) Type 2 diabetes mellitus with diabetic polyneuropathy, without long-term current use of insulin (H)  Comment: a prescription for durable medical equipment or supplies for specialized shoes for diabetic neuropathy is provided. He has absolutely positive evidence of some diabetes neuropathy and qualifies for these shoes  Plan: order for DME            (E11.42) Diabetic polyneuropathy associated with type 2 diabetes mellitus (H)  Comment: as detailed above   Plan: order for DME            (L08.9) Toe infection  Comment: additional treatment with antibiotics and wound care instructions detailed   Plan: cephALEXin (KEFLEX) 500 MG capsule            (Z12.5) Screening for prostate cancer  Comment: routine screening   Plan: PSA, screen        Patient also had questions about screening  For pancreatic cancer which led to an interesting discussion . As far as I am aware there's no truly effective screening  Strategy for pancreatic cancer at this point       See Patient Instructions    The information in this document, created by the medical scribe for me, accurately reflects the services I personally performed and the decisions made by me. I have reviewed and approved this document for accuracy prior to leaving the patient care area.  March 15, 2019 10:06 AM    Kapil Duckworth  MD  [facility]

## 2019-03-15 ENCOUNTER — OFFICE VISIT (OUTPATIENT)
Dept: FAMILY MEDICINE | Facility: CLINIC | Age: 74
End: 2019-03-15
Payer: COMMERCIAL

## 2019-03-15 VITALS
WEIGHT: 206 LBS | SYSTOLIC BLOOD PRESSURE: 114 MMHG | OXYGEN SATURATION: 98 % | RESPIRATION RATE: 20 BRPM | DIASTOLIC BLOOD PRESSURE: 74 MMHG | TEMPERATURE: 97.8 F | HEIGHT: 69 IN | BODY MASS INDEX: 30.51 KG/M2 | HEART RATE: 76 BPM

## 2019-03-15 DIAGNOSIS — Z13.89 SCREENING FOR DIABETIC PERIPHERAL NEUROPATHY: Primary | ICD-10-CM

## 2019-03-15 DIAGNOSIS — Z12.5 SCREENING FOR PROSTATE CANCER: ICD-10-CM

## 2019-03-15 DIAGNOSIS — L08.9 TOE INFECTION: ICD-10-CM

## 2019-03-15 DIAGNOSIS — E11.42 DIABETIC POLYNEUROPATHY ASSOCIATED WITH TYPE 2 DIABETES MELLITUS (H): ICD-10-CM

## 2019-03-15 DIAGNOSIS — E11.42 TYPE 2 DIABETES MELLITUS WITH DIABETIC POLYNEUROPATHY, WITHOUT LONG-TERM CURRENT USE OF INSULIN (H): ICD-10-CM

## 2019-03-15 PROCEDURE — 99214 OFFICE O/P EST MOD 30 MIN: CPT | Performed by: INTERNAL MEDICINE

## 2019-03-15 PROCEDURE — 99207 C FOOT EXAM  NO CHARGE: CPT | Performed by: INTERNAL MEDICINE

## 2019-03-15 RX ORDER — CEPHALEXIN 500 MG/1
500 CAPSULE ORAL 3 TIMES DAILY
Qty: 30 CAPSULE | Refills: 0 | Status: SHIPPED | OUTPATIENT
Start: 2019-03-15 | End: 2019-08-06

## 2019-03-15 ASSESSMENT — PAIN SCALES - GENERAL: PAINLEVEL: MODERATE PAIN (5)

## 2019-03-15 ASSESSMENT — MIFFLIN-ST. JEOR: SCORE: 1656.85

## 2019-03-15 NOTE — PATIENT INSTRUCTIONS
1) We did an incision and drainage today of the left big toe, we are prescribing antibiotic. Keep an eye on this toe for lingering signs of infection    2) Follow-up with Dr. Dent of podiatry

## 2019-03-18 ENCOUNTER — THERAPY VISIT (OUTPATIENT)
Dept: PHYSICAL THERAPY | Facility: CLINIC | Age: 74
End: 2019-03-18
Payer: COMMERCIAL

## 2019-03-18 DIAGNOSIS — M17.0 OSTEOARTHRITIS OF BOTH KNEES, UNSPECIFIED OSTEOARTHRITIS TYPE: ICD-10-CM

## 2019-03-18 PROCEDURE — 97530 THERAPEUTIC ACTIVITIES: CPT | Mod: GP | Performed by: PHYSICAL THERAPIST

## 2019-03-18 PROCEDURE — 97110 THERAPEUTIC EXERCISES: CPT | Mod: GP | Performed by: PHYSICAL THERAPIST

## 2019-03-19 ENCOUNTER — OFFICE VISIT (OUTPATIENT)
Dept: OPHTHALMOLOGY | Facility: CLINIC | Age: 74
End: 2019-03-19
Payer: COMMERCIAL

## 2019-03-19 DIAGNOSIS — Z96.1 PSEUDOPHAKIA: Primary | ICD-10-CM

## 2019-03-19 PROCEDURE — 99024 POSTOP FOLLOW-UP VISIT: CPT | Performed by: OPHTHALMOLOGY

## 2019-03-19 ASSESSMENT — SLIT LAMP EXAM - LIDS: COMMENTS: 1+ DERMATOCHALASIS

## 2019-03-19 ASSESSMENT — TONOMETRY
IOP_METHOD: APPLANATION
OD_IOP_MMHG: 15

## 2019-03-19 ASSESSMENT — REFRACTION_MANIFEST
OD_SPHERE: -0.50
OD_CYLINDER: SPHERE

## 2019-03-19 ASSESSMENT — VISUAL ACUITY
METHOD: SNELLEN - LINEAR
OS_SC+: -3
OD_SC+: +2
OD_SC: 20/20
OS_SC: 20/15

## 2019-03-19 NOTE — PROGRESS NOTES
Current Eye Medications:  Ketorolac and Prednisolone 1% both eyes three times a day.       Subjective:  Status/Post Kelman Phacoemulsification with Posterior Chamber Lens right eye:  3-11-19.   Distance vision is good, but he is having some difficulty seeing small print (without correction).  Colors are very vivid.      Objective:  See Ophthalmology Exam.       Assessment:  Doing well status/post Kelman phacoemulsification/ posterior chamber lens right eye.      Plan:   See Patient Instructions.

## 2019-03-19 NOTE — PATIENT INSTRUCTIONS
1) Continue all drops three times daily until gone.    2)  Stop using shield after one week one week after surgery.    3)  Return for final exam as scheduled in about one month.    4)  Can try using some over the counter half-readers (+2.25) for up close vision prior to final exam.     Justin Avendano M.D.  [phone]

## 2019-03-19 NOTE — LETTER
3/19/2019         RE: Zurdo Dash  [address] [place] [id number]        Dear Colleague,    Thank you for referring your patient, Zurdo Dash, to the [facility]. Please see a copy of my visit note below.     Current Eye Medications:  Ketorolac and Prednisolone 1% both eyes three times a day.       Subjective:  Status/Post Kelman Phacoemulsification with Posterior Chamber Lens right eye:  3-11-19.   Distance vision is good, but he is having some difficulty seeing small print (without correction).  Colors are very vivid.      Objective:  See Ophthalmology Exam.       Assessment:  Doing well status/post Kelman phacoemulsification/ posterior chamber lens right eye.      Plan:   See Patient Instructions.         Again, thank you for allowing me to participate in the care of your patient.        Sincerely,        Justin Avendano MD

## 2019-03-21 ENCOUNTER — OFFICE VISIT (OUTPATIENT)
Dept: PODIATRY | Facility: CLINIC | Age: 74
End: 2019-03-21
Payer: COMMERCIAL

## 2019-03-21 ENCOUNTER — MEDICAL CORRESPONDENCE (OUTPATIENT)
Dept: HEALTH INFORMATION MANAGEMENT | Facility: CLINIC | Age: 74
End: 2019-03-21

## 2019-03-21 ENCOUNTER — TELEPHONE (OUTPATIENT)
Dept: FAMILY MEDICINE | Facility: CLINIC | Age: 74
End: 2019-03-21

## 2019-03-21 VITALS
OXYGEN SATURATION: 98 % | DIASTOLIC BLOOD PRESSURE: 101 MMHG | WEIGHT: 208.2 LBS | HEIGHT: 69 IN | RESPIRATION RATE: 16 BRPM | SYSTOLIC BLOOD PRESSURE: 162 MMHG | BODY MASS INDEX: 30.84 KG/M2 | HEART RATE: 59 BPM

## 2019-03-21 DIAGNOSIS — L60.0 INGROWING NAIL: ICD-10-CM

## 2019-03-21 DIAGNOSIS — E11.9 TYPE 2 DIABETES MELLITUS WITHOUT RETINOPATHY (H): Primary | ICD-10-CM

## 2019-03-21 DIAGNOSIS — L84 CORNS AND CALLOSITIES: ICD-10-CM

## 2019-03-21 DIAGNOSIS — B35.1 DERMATOPHYTOSIS OF NAIL: ICD-10-CM

## 2019-03-21 PROCEDURE — 11721 DEBRIDE NAIL 6 OR MORE: CPT | Mod: 59 | Performed by: PODIATRIST

## 2019-03-21 PROCEDURE — 11055 PARING/CUTG B9 HYPRKER LES 1: CPT | Mod: Q8 | Performed by: PODIATRIST

## 2019-03-21 PROCEDURE — 99213 OFFICE O/P EST LOW 20 MIN: CPT | Mod: 25 | Performed by: PODIATRIST

## 2019-03-21 ASSESSMENT — PAIN SCALES - GENERAL: PAINLEVEL: MILD PAIN (3)

## 2019-03-21 ASSESSMENT — MIFFLIN-ST. JEOR: SCORE: 1666.83

## 2019-03-21 NOTE — LETTER
3/21/2019         RE: Zurdo Dash  [address] [place] [id number]        Dear Colleague,    Thank you for referring your patient, Zurdo Dash, to the [facility]. Please see a copy of my visit note below.    Subjective:    Patient seen today for diabetic foot exam.  I have not seen the patient in approximately 9 months.  He has a history of a wound on his left third toe.  This has resolved and is still wearing a crest pad.  He saw Dr. Rivers last week.  He had a paronychia on his left hallux medial border.  This was incised and the purulence was drained and is taking an antibiotic.  He says he has no more pain here now.  He denies any drainage.  He denies any fever or chills at all.  He is getting new diabetic shoes soon.  He has no other new complaints.    ROS: See above       No Known Allergies    Current Outpatient Medications   Medication Sig Dispense Refill     albuterol (PROAIR HFA/PROVENTIL HFA/VENTOLIN HFA) 108 (90 BASE) MCG/ACT Inhaler Inhale 2 puffs into the lungs every 4 hours as needed for other (coughing) 1 Inhaler 1     aspirin 81 MG tablet Take 1 tablet by mouth daily.  3     blood glucose (NO BRAND SPECIFIED) lancing device Use to test blood sugars 3 times daily or as directed. 1 each 0     blood glucose monitoring (INDIA CONTOUR NEXT) test strip TEST THREE TIMES A DAY OR AS DIRECTED, dispense covered brand 300 each 1     blood glucose monitoring (ONE TOUCH DELICA) lancets Use to test blood sugars 3 times daily or as directed. 300 each 3     cephALEXin (KEFLEX) 500 MG capsule Take 1 capsule (500 mg) by mouth 3 times daily 30 capsule 0     cetirizine (ZYRTEC) 10 MG tablet Take 10 mg by mouth At Bedtime       folic acid (FOLVITE) 1 MG tablet Take 1 tablet (1 mg) by mouth daily 100 tablet 3     HYDROcodone-acetaminophen (NORCO) 5-325 MG per tablet Take 1-2 tablets by mouth every 4 hours as needed for moderate to severe pain maximum 4 tablet(s) per day 28 tablet 0      hydroxychloroquine (PLAQUENIL) 200 MG tablet Take 1 tablet (200 mg) by mouth 2 times daily 180 tablet 3     ketorolac (ACULAR) 0.5 % ophthalmic solution Place 1 drop into the right eye 3 times daily 5 mL 0     ketorolac (ACULAR) 0.5 % ophthalmic solution Place 1 drop Into the left eye 3 times daily 5 mL 0     lisinopril (PRINIVIL/ZESTRIL) 2.5 MG tablet TAKE ONE TABLET BY MOUTH EVERY DAY 90 tablet 1     metFORMIN (GLUCOPHAGE) 500 MG tablet TAKE ONE TABLET BY MOUTH TWICE A DAY WITH MEALS 180 tablet 1     methotrexate sodium 2.5 MG TABS Take 10 tablets (25 mg) by mouth once a week . Take all 10 tablets on the same day of each week. 120 tablet 2     metoprolol succinate ER (TOPROL-XL) 25 MG 24 hr tablet TAKE ONE TABLET BY MOUTH EVERY DAY 90 tablet 1     omeprazole (PRILOSEC) 40 MG DR capsule Take 1 capsule (40 mg) by mouth daily 90 capsule 1     order for DME Equipment being ordered: specialized shoes for diabetic neuropathy as well as all supplies provided for annual treatment of diabetes neuropathy 1 Device 0     order for DME Equipment being ordered: glucometer 1 Device 0     order for DME Equipment being ordered: specialized shoes for diabetic neuropathy     His orthotics will be made by [employer]      Tim Jernigan, Certified   [email or web address]  Phone: [phone]  Fax: [phone]  [address] [place] [address] 1 Device 0     pravastatin (PRAVACHOL) 40 MG tablet Take 0.5 tablets (20 mg) by mouth every other day 23 tablet 0     prednisoLONE acetate (PRED FORTE) 1 % ophthalmic suspension Place 1 drop into the right eye 3 times daily       prednisoLONE acetate (PRED FORTE) 1 % ophthalmic suspension Place 1 drop into the right eye 3 times daily 5 mL 0     prednisoLONE acetate (PRED FORTE) 1 % ophthalmic suspension Place 1 drop Into the left eye 3 times daily 5 mL 0     prednisoLONE acetate, patient own, no charge, (PRED FORTE) 1 % ophthalmic suspension Place 1 drop Into the left  eye 3 times daily       semaglutide (OZEMPIC) 1 MG/DOSE pen Inject 1 mg Subcutaneous every 7 days 3 mL 2     tiZANidine (ZANAFLEX) 2 MG tablet Take 1 tablet (2 mg) by mouth 3 times daily as needed for muscle spasms 90 tablet 1       Patient Active Problem List   Diagnosis     Pain in limb     Hyperlipidemia LDL goal <100     Hypertension goal BP (blood pressure) < 140/90     obesity     Hypogonadism     Advanced directives, counseling/discussion     Health Care Home     Type 2 diabetes mellitus with diabetic polyneuropathy, without long-term current use of insulin (H)     RBBB (right bundle branch block)     Ex-smoker     Family history of esophageal cancer     Gastroesophageal reflux disease, esophagitis presence not specified     Rheumatoid arthritis involving multiple sites with positive rheumatoid factor (H)     Pulmonary nodule     High risk medication use     Spondylosis of cervical region without myelopathy or radiculopathy     Erectile dysfunction, unspecified erectile dysfunction type     Type 2 diabetes mellitus without retinopathy (H)     Tubulovillous adenoma of colon     Diabetic polyneuropathy associated with type 2 diabetes mellitus (H)     Chronic bilateral low back pain without sciatica     Migraine equivalent     Osteoarthritis of both knees, unspecified osteoarthritis type     Posterior vitreous detachment of left eye     Pseudophakia, ou       Past Medical History:   Diagnosis Date     Abnormal CT scan 03/2004    calcified lung granuloma     C. difficile diarrhea     H/O     Cataract 11/18/2011     Diabetic neuropathy (H)     mild, mostly soles and distal forefeet, worse on the left side.     Diverticulitis      ED (erectile dysfunction)      Ex-smoker     QUIT SMOKING FEB 2007     History of ETOH abuse     recovering, sober since 1997     Hyperlipidemia LDL goal <100      Hypertension goal BP (blood pressure) < 140/90      Hypogonadism      Obesity      PAD (peripheral artery disease) (H)      leg cramps, with exertion, no formal diagnosis of PAD and minimal if any symptoms at all.     RA (rheumatoid arthritis) (H)     Dr Bailon     Type 2 diabetes, HbA1c goal < 7% (H) 10/2008    A1C of 7.1 %        Past Surgical History:   Procedure Laterality Date     CATARACT IOL, RT/LT       [facility]  2018    [facility]     HC INCISION TENDON SHEATH FINGER  2009    r hand ring finger     PHACOEMULSIFICATION WITH STANDARD INTRAOCULAR LENS IMPLANT  2019; 3/2019    left eye; right eye     TONSILLECTOMY         Family History   Problem Relation Age of Onset     Cerebrovascular Disease Mother      Arthritis Mother      Osteoporosis Mother      Alzheimer Disease Father      Arthritis Father      Cancer Father      Diabetes Maternal Grandmother      Cardiovascular Maternal Grandmother      Other Cancer Brother      Cancer Paternal Aunt      Hypertension No family hx of      Thyroid Disease No family hx of      Glaucoma No family hx of      Macular Degeneration No family hx of        Social History     Tobacco Use     Smoking status: Former Smoker     Packs/day: 1.00     Years: 40.00     Pack years: 40.00     Types: Cigarettes     Last attempt to quit: 3/16/2007     Years since quittin.0     Smokeless tobacco: Never Used   Substance Use Topics     Alcohol use: No     Alcohol/week: 0.0 oz         Exam:    Vitals: There were no vitals taken for this visit.  BMI: There is no height or weight on file to calculate BMI.  Height: Data Unavailable    Constitutional/ general:  Pt is in no apparent distress, appears well-nourished.  Cooperative with history and physical exam.     Psych:  The patient answered questions appropriately.  Normal affect.  Seems to have reasonable expectations, in terms of treatment.     Eyes:  Visual scanning/ tracking without deficit.     Ears:  Response to auditory stimuli is normal.  negative hearing aid devices.  Auricles in proper alignment.     Lymphatic:  Popliteal lymph nodes not  enlarged.     Lungs:  Non labored breathing, non labored speech. No cough.  No audible wheezing. Even, quiet breathing.       Vascular: Lateral ankle edema and varicosities noted.  Difficult to palpate tibial pulses.  Dorsalis pedis weakly palpable.  Capillary refill time less than 3 seconds in all digits.  No hair growth noted on skin.    Neuro:  Alert and oriented x 3. Coordinated gait.  Light touch sensation is intact to the L4, L5, S1 distributions. No obvious deficits.  No evidence of neurological-based weakness, spasticity, or contracture in the lower extremities.  Monofilament absent to midfoot.      Derm: Skin thin shiny atrophic with no hair growth.  All hallux nails mycotic.  Callus right hallux distal medial.      Musculoskeletal:    Lower extremity muscle strength is normal.  Patient is ambulatory without an assistive device or brace.  Pronated arch with weightbearing.  All lesser toes are hammered and stiff.  Generally his feet are stiff with a decreased range of motion bilaterally.  Left hallux medial border resolving blister noted.  With underlying evaluation of this skin is healthy.  There is no erythema edema.  There is no sinus tracts purulence or odor.    A:  Diabetes mellitus with peripheral neuropathy and LOPS  Left hallux paronychia  Onychomycosis   Callus       P: Explained to patient that paronychia seems to be resolved and medial border blister is almost healed.   He will continue biotics until done and just watch this.  Patient will make sure his shoes are not too tight.  Mycotic nails manually debrided with a .  Callus debrided with a fifteen blade.   RTC prn.        Aaron Dent DPM, FACFAS            Again, thank you for allowing me to participate in the care of your patient.        Sincerely,        Aaron Dent DPM

## 2019-03-21 NOTE — PATIENT INSTRUCTIONS
Weight management plan: Patient was referred to their PCP to discuss a diet and exercise plan.   Zurdo to follow up with Primary Care provider regarding elevated blood pressure.    We wish you continued good healing. If you have any questions or concerns, please do not hesitate to contact us at [phone]    Please remember to call and schedule a follow up appointment if one was recommended at your earliest convenience.   PODIATRY CLINIC HOURS  TELEPHONE NUMBER    Dr. Aaron Dent D.P.M FAC FAS    Clinics:  [place]  [place]  [facility]    [facility]   Tuesday 1PM-6PM  [place]  Wednesday 7AM-2PM  [place]  Thursday 10AM-6PM  [place]  Friday 7AM-3PM  [place]  Specialty schedulers:   [phone] to make an appointment with any Specialty Provider.        Urgent Care locations:    [facility] Monday-Friday 5 pm - 9 pm. Saturday-Sunday 9 am -5pm    Monday-Friday 11 am - 9 pm Saturday 9 am - 5 pm     Monday-Sunday 12 noon-8PM [phone]     If you need a medication refill, please contact us you may need lab work and/or a follow up visit prior to your refill (i.e. Antifungal medications).    [employer] (secure e-mail communication and access to your chart) to send a message or to make an appointment.    If MRI needed please call Yannick Zheng at [phone]

## 2019-03-21 NOTE — TELEPHONE ENCOUNTER
Reason for call:  Other   Patient called regarding (reason for call): call back  Additional comments: [facility]oni [facility] states statement of certification is not complete [place] one of the reasons for the diabetic shoes and refax to [facility]. Fax [phone]    Phone number to reach patient:  Other phone number:  [phone] Francia*    Best Time:  ASAP    Can we leave a detailed message on this number?  YES

## 2019-03-21 NOTE — PROGRESS NOTES
Subjective:    Patient seen today for diabetic foot exam.  I have not seen the patient in approximately 9 months.  He has a history of a wound on his left third toe.  This has resolved and is still wearing a crest pad.  He saw Dr. Rivers last week.  He had a paronychia on his left hallux medial border.  This was incised and the purulence was drained and is taking an antibiotic.  He says he has no more pain here now.  He denies any drainage.  He denies any fever or chills at all.  He is getting new diabetic shoes soon.  He has no other new complaints.    ROS: See above       No Known Allergies    Current Outpatient Medications   Medication Sig Dispense Refill     albuterol (PROAIR HFA/PROVENTIL HFA/VENTOLIN HFA) 108 (90 BASE) MCG/ACT Inhaler Inhale 2 puffs into the lungs every 4 hours as needed for other (coughing) 1 Inhaler 1     aspirin 81 MG tablet Take 1 tablet by mouth daily.  3     blood glucose (NO BRAND SPECIFIED) lancing device Use to test blood sugars 3 times daily or as directed. 1 each 0     blood glucose monitoring (INDIA CONTOUR NEXT) test strip TEST THREE TIMES A DAY OR AS DIRECTED, dispense covered brand 300 each 1     blood glucose monitoring (ONE TOUCH DELICA) lancets Use to test blood sugars 3 times daily or as directed. 300 each 3     cephALEXin (KEFLEX) 500 MG capsule Take 1 capsule (500 mg) by mouth 3 times daily 30 capsule 0     cetirizine (ZYRTEC) 10 MG tablet Take 10 mg by mouth At Bedtime       folic acid (FOLVITE) 1 MG tablet Take 1 tablet (1 mg) by mouth daily 100 tablet 3     HYDROcodone-acetaminophen (NORCO) 5-325 MG per tablet Take 1-2 tablets by mouth every 4 hours as needed for moderate to severe pain maximum 4 tablet(s) per day 28 tablet 0     hydroxychloroquine (PLAQUENIL) 200 MG tablet Take 1 tablet (200 mg) by mouth 2 times daily 180 tablet 3     ketorolac (ACULAR) 0.5 % ophthalmic solution Place 1 drop into the right eye 3 times daily 5 mL 0     ketorolac (ACULAR) 0.5 %  ophthalmic solution Place 1 drop Into the left eye 3 times daily 5 mL 0     lisinopril (PRINIVIL/ZESTRIL) 2.5 MG tablet TAKE ONE TABLET BY MOUTH EVERY DAY 90 tablet 1     metFORMIN (GLUCOPHAGE) 500 MG tablet TAKE ONE TABLET BY MOUTH TWICE A DAY WITH MEALS 180 tablet 1     methotrexate sodium 2.5 MG TABS Take 10 tablets (25 mg) by mouth once a week . Take all 10 tablets on the same day of each week. 120 tablet 2     metoprolol succinate ER (TOPROL-XL) 25 MG 24 hr tablet TAKE ONE TABLET BY MOUTH EVERY DAY 90 tablet 1     omeprazole (PRILOSEC) 40 MG DR capsule Take 1 capsule (40 mg) by mouth daily 90 capsule 1     order for DME Equipment being ordered: specialized shoes for diabetic neuropathy as well as all supplies provided for annual treatment of diabetes neuropathy 1 Device 0     order for DME Equipment being ordered: glucometer 1 Device 0     order for DME Equipment being ordered: specialized shoes for diabetic neuropathy     His orthotics will be made by [facility] [employer]      Tim Jernigan, Certified   [email or web address]  Phone: [phone]  Fax: [phone]  [address] [place] [address] 1 Device 0     pravastatin (PRAVACHOL) 40 MG tablet Take 0.5 tablets (20 mg) by mouth every other day 23 tablet 0     prednisoLONE acetate (PRED FORTE) 1 % ophthalmic suspension Place 1 drop into the right eye 3 times daily       prednisoLONE acetate (PRED FORTE) 1 % ophthalmic suspension Place 1 drop into the right eye 3 times daily 5 mL 0     prednisoLONE acetate (PRED FORTE) 1 % ophthalmic suspension Place 1 drop Into the left eye 3 times daily 5 mL 0     prednisoLONE acetate, patient own, no charge, (PRED FORTE) 1 % ophthalmic suspension Place 1 drop Into the left eye 3 times daily       semaglutide (OZEMPIC) 1 MG/DOSE pen Inject 1 mg Subcutaneous every 7 days 3 mL 2     tiZANidine (ZANAFLEX) 2 MG tablet Take 1 tablet (2 mg) by mouth 3 times daily as needed for muscle spasms 90 tablet 1        Patient Active Problem List   Diagnosis     Pain in limb     Hyperlipidemia LDL goal <100     Hypertension goal BP (blood pressure) < 140/90     obesity     Hypogonadism     Advanced directives, counseling/discussion     Health Care Home     Type 2 diabetes mellitus with diabetic polyneuropathy, without long-term current use of insulin (H)     RBBB (right bundle branch block)     Ex-smoker     Family history of esophageal cancer     Gastroesophageal reflux disease, esophagitis presence not specified     Rheumatoid arthritis involving multiple sites with positive rheumatoid factor (H)     Pulmonary nodule     High risk medication use     Spondylosis of cervical region without myelopathy or radiculopathy     Erectile dysfunction, unspecified erectile dysfunction type     Type 2 diabetes mellitus without retinopathy (H)     Tubulovillous adenoma of colon     Diabetic polyneuropathy associated with type 2 diabetes mellitus (H)     Chronic bilateral low back pain without sciatica     Migraine equivalent     Osteoarthritis of both knees, unspecified osteoarthritis type     Posterior vitreous detachment of left eye     Pseudophakia, ou       Past Medical History:   Diagnosis Date     Abnormal CT scan 03/2004    calcified lung granuloma     C. difficile diarrhea     H/O     Cataract 11/18/2011     Diabetic neuropathy (H)     mild, mostly soles and distal forefeet, worse on the left side.     Diverticulitis      ED (erectile dysfunction)      Ex-smoker     QUIT SMOKING FEB 2007     History of ETOH abuse     recovering, sober since 1997     Hyperlipidemia LDL goal <100      Hypertension goal BP (blood pressure) < 140/90      Hypogonadism      Obesity      PAD (peripheral artery disease) (H)     leg cramps, with exertion, no formal diagnosis of PAD and minimal if any symptoms at all.     RA (rheumatoid arthritis) (H)     Dr Bailon     Type 2 diabetes, HbA1c goal < 7% (H) 10/2008    A1C of 7.1 %        Past Surgical  History:   Procedure Laterality Date     CATARACT IOL, RT/LT       COLONOSCOPY  2018    [facility]     HC INCISION TENDON SHEATH FINGER  2009    r hand ring finger     PHACOEMULSIFICATION WITH STANDARD INTRAOCULAR LENS IMPLANT  2019; 3/2019    left eye; right eye     TONSILLECTOMY         Family History   Problem Relation Age of Onset     Cerebrovascular Disease Mother      Arthritis Mother      Osteoporosis Mother      Alzheimer Disease Father      Arthritis Father      Cancer Father      Diabetes Maternal Grandmother      Cardiovascular Maternal Grandmother      Other Cancer Brother      Cancer Paternal Aunt      Hypertension No family hx of      Thyroid Disease No family hx of      Glaucoma No family hx of      Macular Degeneration No family hx of        Social History     Tobacco Use     Smoking status: Former Smoker     Packs/day: 1.00     Years: 40.00     Pack years: 40.00     Types: Cigarettes     Last attempt to quit: 3/16/2007     Years since quittin.0     Smokeless tobacco: Never Used   Substance Use Topics     Alcohol use: No     Alcohol/week: 0.0 oz         Exam:    Vitals: There were no vitals taken for this visit.  BMI: There is no height or weight on file to calculate BMI.  Height: Data Unavailable    Constitutional/ general:  Pt is in no apparent distress, appears well-nourished.  Cooperative with history and physical exam.     Psych:  The patient answered questions appropriately.  Normal affect.  Seems to have reasonable expectations, in terms of treatment.     Eyes:  Visual scanning/ tracking without deficit.     Ears:  Response to auditory stimuli is normal.  negative hearing aid devices.  Auricles in proper alignment.     Lymphatic:  Popliteal lymph nodes not enlarged.     Lungs:  Non labored breathing, non labored speech. No cough.  No audible wheezing. Even, quiet breathing.       Vascular: Lateral ankle edema and varicosities noted.  Difficult to palpate tibial pulses.  Dorsalis  pedis weakly palpable.  Capillary refill time less than 3 seconds in all digits.  No hair growth noted on skin.    Neuro:  Alert and oriented x 3. Coordinated gait.  Light touch sensation is intact to the L4, L5, S1 distributions. No obvious deficits.  No evidence of neurological-based weakness, spasticity, or contracture in the lower extremities.  Monofilament absent to midfoot.      Derm: Skin thin shiny atrophic with no hair growth.  All hallux nails mycotic.  Callus right hallux distal medial.      Musculoskeletal:    Lower extremity muscle strength is normal.  Patient is ambulatory without an assistive device or brace.  Pronated arch with weightbearing.  All lesser toes are hammered and stiff.  Generally his feet are stiff with a decreased range of motion bilaterally.  Left hallux medial border resolving blister noted.  With underlying evaluation of this skin is healthy.  There is no erythema edema.  There is no sinus tracts purulence or odor.    A:  Diabetes mellitus with peripheral neuropathy and LOPS  Left hallux paronychia  Onychomycosis   Callus       P: Explained to patient that paronychia seems to be resolved and medial border blister is almost healed.   He will continue biotics until done and just watch this.  Patient will make sure his shoes are not too tight.  Mycotic nails manually debrided with a .  Callus debrided with a fifteen blade.   RTC prn.        Aaron Dent DPM, FACFAS

## 2019-03-22 NOTE — TELEPHONE ENCOUNTER
Form received and given to Dr. Duckworth to review.      Dr. Duckworth completed form (circled the answers as follows:)    1.  This patient has diabetes mellitus  C.  History of pre-ulcerative callus  D.  Peripheral neuropathy with evidence of callus formation  F.  Poor circulation  3.  I am treating this patient under a comprehensive plan of care for his/her diabetes.  4.  This patient needs special shoes (depth or custom-molded shoes) because of his/her diabetes.    Form faxed back to [facility] at [phone].  Caren Telles,

## 2019-03-25 ENCOUNTER — DOCUMENTATION ONLY (OUTPATIENT)
Dept: OPHTHALMOLOGY | Facility: CLINIC | Age: 74
End: 2019-03-25

## 2019-03-27 ENCOUNTER — THERAPY VISIT (OUTPATIENT)
Dept: PHYSICAL THERAPY | Facility: CLINIC | Age: 74
End: 2019-03-27
Payer: COMMERCIAL

## 2019-03-27 DIAGNOSIS — M17.0 OSTEOARTHRITIS OF BOTH KNEES, UNSPECIFIED OSTEOARTHRITIS TYPE: ICD-10-CM

## 2019-03-27 PROCEDURE — 97110 THERAPEUTIC EXERCISES: CPT | Mod: GP | Performed by: PHYSICAL THERAPIST

## 2019-03-27 PROCEDURE — 97530 THERAPEUTIC ACTIVITIES: CPT | Mod: GP | Performed by: PHYSICAL THERAPIST

## 2019-04-05 ENCOUNTER — THERAPY VISIT (OUTPATIENT)
Dept: PHYSICAL THERAPY | Facility: CLINIC | Age: 74
End: 2019-04-05
Payer: COMMERCIAL

## 2019-04-05 DIAGNOSIS — M17.0 OSTEOARTHRITIS OF BOTH KNEES, UNSPECIFIED OSTEOARTHRITIS TYPE: ICD-10-CM

## 2019-04-05 PROCEDURE — 97110 THERAPEUTIC EXERCISES: CPT | Mod: GP | Performed by: PHYSICAL THERAPIST

## 2019-04-05 PROCEDURE — 97530 THERAPEUTIC ACTIVITIES: CPT | Mod: GP | Performed by: PHYSICAL THERAPIST

## 2019-04-09 ENCOUNTER — APPOINTMENT (OUTPATIENT)
Dept: OPTOMETRY | Facility: CLINIC | Age: 74
End: 2019-04-09
Payer: COMMERCIAL

## 2019-04-09 ENCOUNTER — OFFICE VISIT (OUTPATIENT)
Dept: OPHTHALMOLOGY | Facility: CLINIC | Age: 74
End: 2019-04-09
Payer: COMMERCIAL

## 2019-04-09 DIAGNOSIS — Z96.1 PSEUDOPHAKIA: Primary | ICD-10-CM

## 2019-04-09 PROCEDURE — V2784 LENS POLYCARB OR EQUAL: HCPCS | Mod: LT | Performed by: OPHTHALMOLOGY

## 2019-04-09 PROCEDURE — V2025 EYEGLASSES DELUX FRAMES: HCPCS | Mod: GA | Performed by: OPHTHALMOLOGY

## 2019-04-09 PROCEDURE — V2744 TINT PHOTOCHROMATIC LENS/ES: HCPCS | Mod: LT | Performed by: OPHTHALMOLOGY

## 2019-04-09 PROCEDURE — V2750 ANTI-REFLECTIVE COATING: HCPCS | Mod: LT | Performed by: OPHTHALMOLOGY

## 2019-04-09 PROCEDURE — V2020 VISION SVCS FRAMES PURCHASES: HCPCS | Performed by: OPHTHALMOLOGY

## 2019-04-09 PROCEDURE — 99024 POSTOP FOLLOW-UP VISIT: CPT | Performed by: OPHTHALMOLOGY

## 2019-04-09 PROCEDURE — V2300 LENS SPHERE TRIFOCAL 4.00D: HCPCS | Mod: LT | Performed by: OPHTHALMOLOGY

## 2019-04-09 ASSESSMENT — SLIT LAMP EXAM - LIDS: COMMENTS: 1+ DERMATOCHALASIS

## 2019-04-09 ASSESSMENT — REFRACTION_MANIFEST
OS_ADD: +2.50
OD_AXIS: 031
OD_ADD: +2.50
OD_SPHERE: -0.75
OS_CYLINDER: +0.25
OS_SPHERE: -0.25
OD_CYLINDER: +0.25
OS_AXIS: 159

## 2019-04-09 ASSESSMENT — VISUAL ACUITY
OD_SC+: +1
OD_SC: 20/30
OS_SC: 20/20
METHOD: SNELLEN - LINEAR

## 2019-04-09 ASSESSMENT — REFRACTION_WEARINGRX
OS_CYLINDER: SPHERE
OD_CYLINDER: SPHERE
OS_SPHERE: +2.50
SPECS_TYPE: OTC READERS
OD_SPHERE: +2.50

## 2019-04-09 ASSESSMENT — CUP TO DISC RATIO: OD_RATIO: 0.3

## 2019-04-09 ASSESSMENT — TONOMETRY
IOP_METHOD: APPLANATION
OD_IOP_MMHG: 16

## 2019-04-09 NOTE — PATIENT INSTRUCTIONS
1)  Discontinue all drops, unless used prior to cataract surgery.    2)   Fill Rx for new glasses or drugstore readers.    3)   Return to clinic in three months for a pressure check or earlier if problems should   arise.    Justin Avendano M.D.

## 2019-04-09 NOTE — PROGRESS NOTES
Current Eye Medications:  Used drops yesterday once right eye.     Subjective:  Here for final MR right eye. Not always focusing well in the distance, thinks he may need glasses     Objective:  See Ophthalmology Exam.       Assessment:  Doing well status/post Kelman phacoemulsification/ posterior chamber lens both eyes.      Plan:   See Patient Instructions.

## 2019-04-09 NOTE — LETTER
4/9/2019         RE: Zurdo Dash  [address] [place] [id number]        Dear Colleague,    Thank you for referring your patient, Zurdo Dash, to the [facility]. Please see a copy of my visit note below.     Current Eye Medications:  Used drops yesterday once right eye.     Subjective:  Here for final MR right eye. Not always focusing well in the distance, thinks he may need glasses     Objective:  See Ophthalmology Exam.       Assessment:  Doing well status/post Kelman phacoemulsification/ posterior chamber lens both eyes.      Plan:   See Patient Instructions.         Again, thank you for allowing me to participate in the care of your patient.        Sincerely,        Justin Avendano MD

## 2019-04-12 ENCOUNTER — THERAPY VISIT (OUTPATIENT)
Dept: PHYSICAL THERAPY | Facility: CLINIC | Age: 74
End: 2019-04-12
Payer: COMMERCIAL

## 2019-04-12 DIAGNOSIS — M17.0 OSTEOARTHRITIS OF BOTH KNEES, UNSPECIFIED OSTEOARTHRITIS TYPE: ICD-10-CM

## 2019-04-12 PROCEDURE — 97530 THERAPEUTIC ACTIVITIES: CPT | Mod: GP | Performed by: PHYSICAL THERAPIST

## 2019-04-12 PROCEDURE — 97110 THERAPEUTIC EXERCISES: CPT | Mod: GP | Performed by: PHYSICAL THERAPIST

## 2019-04-18 ENCOUNTER — THERAPY VISIT (OUTPATIENT)
Dept: PHYSICAL THERAPY | Facility: CLINIC | Age: 74
End: 2019-04-18
Payer: COMMERCIAL

## 2019-04-18 DIAGNOSIS — Z79.899 HIGH RISK MEDICATION USE: ICD-10-CM

## 2019-04-18 DIAGNOSIS — M17.0 OSTEOARTHRITIS OF BOTH KNEES, UNSPECIFIED OSTEOARTHRITIS TYPE: ICD-10-CM

## 2019-04-18 DIAGNOSIS — Z12.5 SCREENING FOR PROSTATE CANCER: ICD-10-CM

## 2019-04-18 DIAGNOSIS — I95.9 HYPOTENSION, UNSPECIFIED HYPOTENSION TYPE: ICD-10-CM

## 2019-04-18 DIAGNOSIS — M05.79 RHEUMATOID ARTHRITIS INVOLVING MULTIPLE SITES WITH POSITIVE RHEUMATOID FACTOR (H): ICD-10-CM

## 2019-04-18 LAB
ALBUMIN SERPL-MCNC: 3.7 G/DL (ref 3.4–5)
ALP SERPL-CCNC: 45 U/L (ref 40–150)
ALT SERPL W P-5'-P-CCNC: 45 U/L (ref 0–70)
AST SERPL W P-5'-P-CCNC: 22 U/L (ref 0–45)
BASOPHILS # BLD AUTO: 0.1 10E9/L (ref 0–0.2)
BASOPHILS NFR BLD AUTO: 1.3 %
BILIRUB DIRECT SERPL-MCNC: 0.2 MG/DL (ref 0–0.2)
BILIRUB SERPL-MCNC: 0.6 MG/DL (ref 0.2–1.3)
CREAT SERPL-MCNC: 1.12 MG/DL (ref 0.66–1.25)
CRP SERPL-MCNC: 7.2 MG/L (ref 0–8)
DIFFERENTIAL METHOD BLD: NORMAL
EOSINOPHIL # BLD AUTO: 0.1 10E9/L (ref 0–0.7)
EOSINOPHIL NFR BLD AUTO: 1 %
ERYTHROCYTE [DISTWIDTH] IN BLOOD BY AUTOMATED COUNT: 14.5 % (ref 10–15)
ERYTHROCYTE [SEDIMENTATION RATE] IN BLOOD BY WESTERGREN METHOD: 9 MM/H (ref 0–20)
GFR SERPL CREATININE-BSD FRML MDRD: 64 ML/MIN/{1.73_M2}
HCT VFR BLD AUTO: 43.2 % (ref 40–53)
HGB BLD-MCNC: 14.6 G/DL (ref 13.3–17.7)
LYMPHOCYTES # BLD AUTO: 0.9 10E9/L (ref 0.8–5.3)
LYMPHOCYTES NFR BLD AUTO: 13.2 %
MCH RBC QN AUTO: 31.5 PG (ref 26.5–33)
MCHC RBC AUTO-ENTMCNC: 33.8 G/DL (ref 31.5–36.5)
MCV RBC AUTO: 93 FL (ref 78–100)
MONOCYTES # BLD AUTO: 0.7 10E9/L (ref 0–1.3)
MONOCYTES NFR BLD AUTO: 10.4 %
NEUTROPHILS # BLD AUTO: 5.3 10E9/L (ref 1.6–8.3)
NEUTROPHILS NFR BLD AUTO: 74.1 %
PLATELET # BLD AUTO: 229 10E9/L (ref 150–450)
PROT SERPL-MCNC: 7 G/DL (ref 6.8–8.8)
PSA SERPL-ACNC: 0.66 UG/L (ref 0–4)
RBC # BLD AUTO: 4.63 10E12/L (ref 4.4–5.9)
WBC # BLD AUTO: 7.1 10E9/L (ref 4–11)

## 2019-04-18 PROCEDURE — 36415 COLL VENOUS BLD VENIPUNCTURE: CPT | Performed by: INTERNAL MEDICINE

## 2019-04-18 PROCEDURE — 97110 THERAPEUTIC EXERCISES: CPT | Mod: GP | Performed by: PHYSICAL THERAPIST

## 2019-04-18 PROCEDURE — 80076 HEPATIC FUNCTION PANEL: CPT | Performed by: INTERNAL MEDICINE

## 2019-04-18 PROCEDURE — 82565 ASSAY OF CREATININE: CPT | Performed by: INTERNAL MEDICINE

## 2019-04-18 PROCEDURE — 97530 THERAPEUTIC ACTIVITIES: CPT | Mod: GP | Performed by: PHYSICAL THERAPIST

## 2019-04-18 PROCEDURE — G0103 PSA SCREENING: HCPCS | Performed by: INTERNAL MEDICINE

## 2019-04-18 PROCEDURE — 85025 COMPLETE CBC W/AUTO DIFF WBC: CPT | Performed by: INTERNAL MEDICINE

## 2019-04-18 PROCEDURE — 86140 C-REACTIVE PROTEIN: CPT | Performed by: INTERNAL MEDICINE

## 2019-04-18 PROCEDURE — 85652 RBC SED RATE AUTOMATED: CPT | Performed by: INTERNAL MEDICINE

## 2019-04-18 NOTE — LETTER
[address] [place] [address]    April 19, 2019    Zurdo Dash  [address] [facility]  [place] [address]          Dear Zurdo,    Your labs are normal.     Please let me know if you have any questions    Enclosed is a copy of your results.     Results for orders placed or performed in visit on 04/18/19   **Prostate spec antigen screen FUTURE 1yr   Result Value Ref Range    PSA 0.66 0 - 4 ug/L   Hepatic panel   Result Value Ref Range    Bilirubin Direct 0.2 0.0 - 0.2 mg/dL    Bilirubin Total 0.6 0.2 - 1.3 mg/dL    Albumin 3.7 3.4 - 5.0 g/dL    Protein Total 7.0 6.8 - 8.8 g/dL    Alkaline Phosphatase 45 40 - 150 U/L    ALT 45 0 - 70 U/L    AST 22 0 - 45 U/L   CRP inflammation   Result Value Ref Range    CRP Inflammation 7.2 0.0 - 8.0 mg/L   Erythrocyte sedimentation rate auto   Result Value Ref Range    Sed Rate 9 0 - 20 mm/h   Creatinine   Result Value Ref Range    Creatinine 1.12 0.66 - 1.25 mg/dL    GFR Estimate 64 >60 mL/min/[1.73_m2]    GFR Estimate If Black 74 >60 mL/min/[1.73_m2]   CBC with platelets differential   Result Value Ref Range    WBC 7.1 4.0 - 11.0 10e9/L    RBC Count 4.63 4.4 - 5.9 10e12/L    Hemoglobin 14.6 13.3 - 17.7 g/dL    Hematocrit 43.2 40.0 - 53.0 %    MCV 93 78 - 100 fl    MCH 31.5 26.5 - 33.0 pg    MCHC 33.8 31.5 - 36.5 g/dL    RDW 14.5 10.0 - 15.0 %    Platelet Count 229 150 - 450 10e9/L    % Neutrophils 74.1 %    % Lymphocytes 13.2 %    % Monocytes 10.4 %    % Eosinophils 1.0 %    % Basophils 1.3 %    Absolute Neutrophil 5.3 1.6 - 8.3 10e9/L    Absolute Lymphocytes 0.9 0.8 - 5.3 10e9/L    Absolute Monocytes 0.7 0.0 - 1.3 10e9/L    Absolute Eosinophils 0.1 0.0 - 0.7 10e9/L    Absolute Basophils 0.1 0.0 - 0.2 10e9/L    Diff Method Automated Method        If you have any questions or concerns, please call myself or my nurse at [phone].      Sincerely,        Albert Tompkins MD /hodge

## 2019-04-18 NOTE — LETTER
[address] [place] [address]    April 19, 2019    Zurdo Dash  [address] [facility]  [place] [address]          Dear Zurdo,    All of these tests are within acceptable limits. Within normal limits prostate specific antigen     Enclosed is a copy of your results.     Results for orders placed or performed in visit on 04/18/19   **Prostate spec antigen screen FUTURE 1yr   Result Value Ref Range    PSA 0.66 0 - 4 ug/L   Hepatic panel   Result Value Ref Range    Bilirubin Direct 0.2 0.0 - 0.2 mg/dL    Bilirubin Total 0.6 0.2 - 1.3 mg/dL    Albumin 3.7 3.4 - 5.0 g/dL    Protein Total 7.0 6.8 - 8.8 g/dL    Alkaline Phosphatase 45 40 - 150 U/L    ALT 45 0 - 70 U/L    AST 22 0 - 45 U/L   CRP inflammation   Result Value Ref Range    CRP Inflammation 7.2 0.0 - 8.0 mg/L   Erythrocyte sedimentation rate auto   Result Value Ref Range    Sed Rate 9 0 - 20 mm/h   Creatinine   Result Value Ref Range    Creatinine 1.12 0.66 - 1.25 mg/dL    GFR Estimate 64 >60 mL/min/[1.73_m2]    GFR Estimate If Black 74 >60 mL/min/[1.73_m2]   CBC with platelets differential   Result Value Ref Range    WBC 7.1 4.0 - 11.0 10e9/L    RBC Count 4.63 4.4 - 5.9 10e12/L    Hemoglobin 14.6 13.3 - 17.7 g/dL    Hematocrit 43.2 40.0 - 53.0 %    MCV 93 78 - 100 fl    MCH 31.5 26.5 - 33.0 pg    MCHC 33.8 31.5 - 36.5 g/dL    RDW 14.5 10.0 - 15.0 %    Platelet Count 229 150 - 450 10e9/L    % Neutrophils 74.1 %    % Lymphocytes 13.2 %    % Monocytes 10.4 %    % Eosinophils 1.0 %    % Basophils 1.3 %    Absolute Neutrophil 5.3 1.6 - 8.3 10e9/L    Absolute Lymphocytes 0.9 0.8 - 5.3 10e9/L    Absolute Monocytes 0.7 0.0 - 1.3 10e9/L    Absolute Eosinophils 0.1 0.0 - 0.7 10e9/L    Absolute Basophils 0.1 0.0 - 0.2 10e9/L    Diff Method Automated Method        If you have any questions or concerns, please call myself or my nurse at [phone].      Sincerely,        Kapil Duckworth  MD/hodge

## 2019-04-19 RX ORDER — LISINOPRIL 2.5 MG/1
TABLET ORAL
Qty: 90 TABLET | Refills: 1 | Status: SHIPPED | OUTPATIENT
Start: 2019-04-19 | End: 2019-10-16

## 2019-04-19 NOTE — RESULT ENCOUNTER NOTE
"Please mail Mr. Dash his results with the following message.    \"Mr. Dash,    Your labs are normal.    Please let me know if you have any questions.    Sincerely,  Albert Tompkins MD  4/18/2019 11:12 PM\"  "

## 2019-04-19 NOTE — TELEPHONE ENCOUNTER
"Routing refill request to provider for review/approval because:  Labs out of range:  BP    Requested Prescriptions   Pending Prescriptions Disp Refills     lisinopril (PRINIVIL/ZESTRIL) 2.5 MG tablet [Pharmacy Med Name: LISINOPRIL 2.5MG TABS] 90 tablet 1     Sig: TAKE ONE TABLET BY MOUTH EVERY DAY       ACE Inhibitors (Including Combos) Protocol Failed - 4/18/2019  4:46 PM        Failed - Blood pressure under 140/90 in past 12 months     BP Readings from Last 3 Encounters:   03/21/19 (!) 162/101   03/15/19 114/74   02/14/19 112/68                 Passed - Recent (12 mo) or future (30 days) visit within the authorizing provider's specialty     Patient had office visit in the last 12 months or has a visit in the next 30 days with authorizing provider or within the authorizing provider's specialty.  See \"Patient Info\" tab in inbasket, or \"Choose Columns\" in Meds & Orders section of the refill encounter.              Passed - Medication is active on med list        Passed - Patient is age 18 or older        Passed - Normal serum creatinine on file in past 12 months     Recent Labs   Lab Test 04/18/19  1108  08/27/15   CR 1.12   < >  --    CRPOC  --   --  1.2    < > = values in this interval not displayed.             Passed - Normal serum potassium on file in past 12 months     Recent Labs   Lab Test 01/14/19  1716   POTASSIUM 4.2             Molly Martinez RN  "

## 2019-04-29 ENCOUNTER — THERAPY VISIT (OUTPATIENT)
Dept: PHYSICAL THERAPY | Facility: CLINIC | Age: 74
End: 2019-04-29
Payer: COMMERCIAL

## 2019-04-29 DIAGNOSIS — M17.0 OSTEOARTHRITIS OF BOTH KNEES, UNSPECIFIED OSTEOARTHRITIS TYPE: ICD-10-CM

## 2019-04-29 PROCEDURE — 97530 THERAPEUTIC ACTIVITIES: CPT | Mod: GP | Performed by: PHYSICAL THERAPIST

## 2019-04-29 PROCEDURE — 97110 THERAPEUTIC EXERCISES: CPT | Mod: GP | Performed by: PHYSICAL THERAPIST

## 2019-05-06 ENCOUNTER — THERAPY VISIT (OUTPATIENT)
Dept: PHYSICAL THERAPY | Facility: CLINIC | Age: 74
End: 2019-05-06
Payer: COMMERCIAL

## 2019-05-06 DIAGNOSIS — M17.0 OSTEOARTHRITIS OF BOTH KNEES, UNSPECIFIED OSTEOARTHRITIS TYPE: ICD-10-CM

## 2019-05-06 PROCEDURE — 97530 THERAPEUTIC ACTIVITIES: CPT | Mod: GP | Performed by: PHYSICAL THERAPIST

## 2019-05-06 PROCEDURE — 97110 THERAPEUTIC EXERCISES: CPT | Mod: GP | Performed by: PHYSICAL THERAPIST

## 2019-05-10 DIAGNOSIS — E11.42 TYPE 2 DIABETES MELLITUS WITH DIABETIC POLYNEUROPATHY, WITHOUT LONG-TERM CURRENT USE OF INSULIN (H): ICD-10-CM

## 2019-05-10 NOTE — TELEPHONE ENCOUNTER
Controlled Substance Refill Request for semaglutide (OZEMPIC) 1 MG/DOSE pen  Problem List Complete:  No     PROVIDER TO CONSIDER COMPLETION OF PROBLEM LIST AND OVERVIEW/CONTROLLED SUBSTANCE AGREEMENT    Last Written Prescription Date:  2/13/2019  Last Fill Quantity: 3,   # refills: 2    THE MOST RECENT OFFICE VISIT MUST BE WITHIN THE PAST 3 MONTHS. AT LEAST ONE FACE TO FACE VISIT MUST OCCUR EVERY 6 MONTHS. ADDITIONAL VISITS CAN BE VIRTUAL.  (THIS STATEMENT SHOULD BE DELETED.)    [facility] Office Visit with [facility] [place]: 3/15/2019    Future Office visit:   Next 5 appointments (look out 90 days)    May 15, 2019  1:00 PM CDT  [facility] [address] [place] [id number]  [phone]   Jul 10, 2019 11:00 AM CDT  Return Visit with Justin Avendano MD  [facility] ([address] [address]  [phone]          Controlled substance agreement:   Encounter-Level CSA:    There are no encounter-level csa.     Patient-Level CSA:    There are no patient-level csa.         Last Urine Drug Screen: No results found for: CDAUT, No results found for: COMDAT, No results found for: THC13, PCP13, COC13, MAMP13, OPI13, AMP13, BZO13, TCA13, MTD13, BAR13, OXY13, PPX13, BUP13         [email or web address]       checked in past 3 months?  No, route to RN

## 2019-05-13 RX ORDER — SEMAGLUTIDE 1.34 MG/ML
INJECTION, SOLUTION SUBCUTANEOUS
Qty: 3 ML | Refills: 2 | Status: SHIPPED | OUTPATIENT
Start: 2019-05-13 | End: 2019-11-04

## 2019-05-13 NOTE — TELEPHONE ENCOUNTER
Routing refill request to provider for review/approval because:  Drug not on the G refill protocol     Requested Prescriptions   Pending Prescriptions Disp Refills     OZEMPIC 1 MG/DOSE pen [Pharmacy Med Name: OZEMPIC 1MG/DOSE (2 PENS/BOX)]  Last Written Prescription Date:  2/13/19  Last Fill Quantity: 3 mL,  # refills: 2   Last office visit: 3/15/2019 with prescribing provider:     Future Office Visit:   Next 5 appointments (look out 90 days)    May 15, 2019  1:00 PM CDT  [facility] [address] [place] [id number]  [phone]   Jul 10, 2019 11:00 AM CDT  Return Visit with Justin Avendano MD  [facility] ([address] [address]  [phone]        3 mL 2     Sig: INJECT 1MG SUBCUTANEOUSLY EVERY 7 DAYS       There is no refill protocol information for this order        Becka Dacosta RN - BC

## 2019-05-15 ENCOUNTER — OFFICE VISIT (OUTPATIENT)
Dept: RHEUMATOLOGY | Facility: CLINIC | Age: 74
End: 2019-05-15
Payer: COMMERCIAL

## 2019-05-15 VITALS
OXYGEN SATURATION: 96 % | TEMPERATURE: 97.4 F | SYSTOLIC BLOOD PRESSURE: 110 MMHG | WEIGHT: 215 LBS | BODY MASS INDEX: 32.22 KG/M2 | HEART RATE: 64 BPM | DIASTOLIC BLOOD PRESSURE: 70 MMHG

## 2019-05-15 DIAGNOSIS — E66.811 OBESITY (BMI 30.0-34.9): ICD-10-CM

## 2019-05-15 DIAGNOSIS — Z79.899 HIGH RISK MEDICATIONS (NOT ANTICOAGULANTS) LONG-TERM USE: ICD-10-CM

## 2019-05-15 DIAGNOSIS — M22.2X2 BILATERAL PATELLOFEMORAL SYNDROME: ICD-10-CM

## 2019-05-15 DIAGNOSIS — M05.79 RHEUMATOID ARTHRITIS INVOLVING MULTIPLE SITES WITH POSITIVE RHEUMATOID FACTOR (H): Primary | ICD-10-CM

## 2019-05-15 DIAGNOSIS — M22.2X1 BILATERAL PATELLOFEMORAL SYNDROME: ICD-10-CM

## 2019-05-15 PROCEDURE — 20610 DRAIN/INJ JOINT/BURSA W/O US: CPT | Mod: 50 | Performed by: INTERNAL MEDICINE

## 2019-05-15 PROCEDURE — 99214 OFFICE O/P EST MOD 30 MIN: CPT | Mod: 25 | Performed by: INTERNAL MEDICINE

## 2019-05-15 RX ORDER — HYDROXYCHLOROQUINE SULFATE 200 MG/1
200 TABLET, FILM COATED ORAL 2 TIMES DAILY
Qty: 180 TABLET | Refills: 3 | Status: SHIPPED | OUTPATIENT
Start: 2019-05-15 | End: 2020-01-15

## 2019-05-15 RX ORDER — TRIAMCINOLONE ACETONIDE 40 MG/ML
40 INJECTION, SUSPENSION INTRA-ARTICULAR; INTRAMUSCULAR ONCE
Status: COMPLETED | OUTPATIENT
Start: 2019-05-15 | End: 2019-05-15

## 2019-05-15 RX ORDER — METHOTREXATE 2.5 MG/1
20 TABLET ORAL WEEKLY
Qty: 96 TABLET | Refills: 2 | Status: SHIPPED | OUTPATIENT
Start: 2019-05-15 | End: 2019-09-18

## 2019-05-15 RX ADMIN — TRIAMCINOLONE ACETONIDE 40 MG: 40 INJECTION, SUSPENSION INTRA-ARTICULAR; INTRAMUSCULAR at 13:36

## 2019-05-15 ASSESSMENT — PAIN SCALES - GENERAL: PAINLEVEL: MILD PAIN (3)

## 2019-05-15 NOTE — PROGRESS NOTES
Hospitalist Daily Progress Note      Date of Service: 2/22/2017  Attending: Rl Eaton MD  ADMIT DATE: 2/21/2017   CC:   Chief Complaint   Patient presents with   • Abdominal Pain     ASSESSMENT and PLAN  1. Abd pain with ileocecal mass, ileocolic mesenteric adenopathy, weight loss, concern for malignancy. Has hx of tubovillous adenomatous polyps. GI is consulted, recommendation appreciated. Keep patient NPO, IVF, pain control with dilaudid. Antinausea meds PRN.     2. HTN, continue home meds  3. Cholelithiasis.  4. Nonobstructing left nephrolithiasis.  5. Tiny nodules at the lung bases,   6. Thyroid cancer. Originally diagnosed in 1984 and detail on original pathology and treatment around initial diagnosis is not available but based on history patient had near total thyroidectomy and even needed external radiation at that time.  7. Post surgical hypothyroidism.   8. Diabetes, type 2, well controlled as outpatient with HbA1C 6.0 in October last year. SSI.   9. Dyslipidemia. On statin.   10. Generalized weakness, PT/OT  11. DVT prophylaxis, lovenox and SCD  12. Likely DC tomorrow if OK with GI    CODE STATUS:   Full Resuscitation    S: The patient had no acute overnight events.still has abd pain 2-3/10.  Denies headache, chest pain, shortness of breath, or nausea.  Allergies, medication. PMH, PSH and FH reviewed.  O:   Vital 24 Hour Range Most Recent Value   Temperature Temp  Min: 97.6 °F (36.4 °C)  Max: 98.3 °F (36.8 °C) 97.6 °F (36.4 °C)   Pulse Pulse  Min: 66  Max: 97 76   Respiratory Resp  Min: 17  Max: 20 20   Blood Pressure BP  Min: 124/64  Max: 211/94 174/77   Pulse Oximetry SpO2  Min: 95 %  Max: 100 %    O2 No Data Recorded      Vital Most Recent Value First Value   Weight 109.8 kg Weight: 109.8 kg   Height 5' 9\" (175.3 cm) Height: 5' 9\" (175.3 cm)       Last StoolOccurrence:  (last BM 2/17 per patient) (02/21/17 1924)  Physical Exam   General - Patient is in no acute distress.    CV - S1, S2, RRR. No murmurs,  Rheumatology Clinic Visit      Zurdo Dash MRN# [id number]   YOB: 1945 Age: 74 year old      Date of visit: 5/15/19   PCP: Dr. Kapil Duckworth     Chief Complaint   Patient presents with:  RECHECK: 4 month follow up.  Typical knee pain    Assessment and Plan     1. Seropositive nonerosive rheumatoid arthritis (, CCP 64): Currently on methotrexate 25 mg once weekly, folic acid 1 mg daily, and hydroxychloroquine 200 mg twice a day. Doing well today with regard to rheumatoid arthritis.  He continues to have easy bruising on his arms; this potentially could be methotrexate and/or aspirin related.  Given that he is doing well with regard to rheumatoid arthritis, will reduce methotrexate from 25 mg once weekly to 20 mg once weekly.  - Reduce methotrexate from 25 mg once weekly; to 20 mg once weekly  - Continue folic acid 1 mg daily  - Continue hydroxychloroquine 200 mg twice a day (last eye exam: 6/15/18; previously referred him to ophthalmology again and asked that he schedule toxicity monitoring eye exam again today)  - Labs in mid July: CBC, Creatinine, Hepatic Panel, ESR, CRP    2. Bilateral knee osteoarthritis / patellofemoral syndrome: Was receiving steroid injections approximately every 3-6 months with good control of his symptoms.  Repeat steroid injections today as documented in the procedure section.  Physical therapy has been beneficial and overall his knee pain has been reduced     3. BMI 32.22: He would like to see a nutritionist.  - Nutrition referral    Mr. Dash verbalized agreement with and understanding of the rational for the diagnosis and treatment plan.  All questions were answered to best of my ability and the patient's satisfaction. Mr. Dash was advised to contact the clinic with any questions that may arise after the clinic visit.    Thank you for involving me in the care of the patient    Return to clinic: 3-4 months      HPI   Zurdo Dash is a 74 year old male with a  gallops or rubs. No LE edema  Pulm - Good respiratory effort. Lungs are clear to auscultation bilaterally.  Abd - Soft, diffuse tender and non-distended. Normoactive bowel sounds. No hepatosplenomegaly.  Skin - Warm and well perfused. No lesions.  Neuro - CNs II-XII are grossly intact.   PSY-AAOX3.    ------------------------------------------------------------------------------------------------------------------------------------------------  Scheduled Medications   • sodium chloride (PF)  2 mL Injection Once   • aspirin  81 mg Oral Daily   • atenolol  50 mg Oral Daily   • calcitRIOL  0.25 mcg Oral 4x Daily   • hydrOXYzine  50 mg Oral Daily   • levothyroxine  150 mcg Oral QAM AC   • losartan  50 mg Oral Daily   • simvastatin  10 mg Oral Nightly   • enoxaparin (LOVENOX) injection  40 mg Subcutaneous Q24H   • insulin regular (human)   Subcutaneous 4 times per day     Laboratory Results:    Recent Labs  Lab 02/22/17  0708 02/21/17  1436 02/16/17  1157   SODIUM 142 142 142   POTASSIUM 4.1 3.4 3.3*   CHLORIDE 106 104 102   CO2 26 29 31   BUN 14 11 11   CREATININE 1.02* 1.13* 1.12*   GLUCOSE 151* 180* 141*   ALBUMIN  --  3.1* 3.2*   AST  --  17 21   BILIRUBIN  --  1.2* 0.7   TSH 1.339  --  6.527*       Recent Labs  Lab 02/22/17  0708 02/21/17  1436 02/16/17  1157   WBC 11.8* 12.0* 12.6*   HGB 12.9 14.8 13.8   HCT 40.8 45.4 43.5    376 399     No results found  No results found for: CPK   No results found for: MMB  No results found for: MYOGLOBIN    Imaging:  XR Abdomen Series   Final Result   IMPRESSION:      Dilated loops of small bowel measuring up to 5.5 cm suggestive of ileus   versus small bowel obstruction.      No acute cardiopulmonary process           Ran MD Angelito, MD    2/22/2017       medical history significant for hypertension, hyperlipidemia, diabetes, diabetic neuropathy, GERD, and rheumatoid arthritis who presents for f/u of RA and bilateral knee OA.     Today, Mr. Dash reports that his arthritis is doing well.  Tolerating medications well.  Knee pain has reduced to a 3/10 since he has been doing physical therapy but he would like to have steroid injections today because he improved so much with them.  He would also like to see a nutritionist because of being overweight.  Bruising on his forearms that he says has been an issue for many years.  He says that the bruising bothers him and sometimes he has to put make-up on.    Denies fevers, chills, nausea, vomiting, constipation, diarrhea. No abdominal pain. No chest pain/pressure, palpitations, or shortness of breath. No oral or nasal sores. No neck pain. No rash. No LE swelling.      Tobacco: None  EtOH: None  Drugs: None    ROS   GEN: No fevers, chills, or night sweats; intentionally losing weight on the Urge diet  SKIN: No rash. Sores from a recent fall.  HEENT: No oral or nasal ulcers.  CV: No chest pain, pressure, palpitations, or dyspnea on exertion.  PULM: No SOB, wheeze, cough.  GI: No nausea, vomiting, constipation, diarrhea. No blood in stool. No abdominal pain.  : No blood in urine.  MSK: See HPI.  NEURO: No numbness, tingling, or weakness.  EXT: No LE swelling    Active Problem List     Patient Active Problem List   Diagnosis     Pain in limb     Hyperlipidemia LDL goal <100     Hypertension goal BP (blood pressure) < 140/90     obesity     Hypogonadism     Advanced directives, counseling/discussion     Health Care Home     Type 2 diabetes mellitus with diabetic polyneuropathy, without long-term current use of insulin (H)     RBBB (right bundle branch block)     Ex-smoker     Family history of esophageal cancer     Gastroesophageal reflux disease, esophagitis presence not specified     Rheumatoid arthritis involving  multiple sites with positive rheumatoid factor (H)     Pulmonary nodule     High risk medication use     Spondylosis of cervical region without myelopathy or radiculopathy     Erectile dysfunction, unspecified erectile dysfunction type     Type 2 diabetes mellitus without retinopathy (H)     Tubulovillous adenoma of colon     Diabetic polyneuropathy associated with type 2 diabetes mellitus (H)     Chronic bilateral low back pain without sciatica     Migraine equivalent     Osteoarthritis of both knees, unspecified osteoarthritis type     Posterior vitreous detachment of left eye     Pseudophakia, ou     Past Medical History     Past Medical History:   Diagnosis Date     Abnormal CT scan 03/2004    calcified lung granuloma     C. difficile diarrhea     H/O     Cataract 11/18/2011     Diabetic neuropathy (H)     mild, mostly soles and distal forefeet, worse on the left side.     Diverticulitis      ED (erectile dysfunction)      Ex-smoker     QUIT SMOKING FEB 2007     History of ETOH abuse     recovering, sober since 1997     Hyperlipidemia LDL goal <100      Hypertension goal BP (blood pressure) < 140/90      Hypogonadism      Obesity      PAD (peripheral artery disease) (H)     leg cramps, with exertion, no formal diagnosis of PAD and minimal if any symptoms at all.     RA (rheumatoid arthritis) (H)     Dr Bailon     Type 2 diabetes, HbA1c goal < 7% (H) 10/2008    A1C of 7.1 %      Past Surgical History     Past Surgical History:   Procedure Laterality Date     CATARACT IOL, RT/LT       COLONOSCOPY  03/01/2018    [facility]     HC INCISION TENDON SHEATH FINGER  4/2009    r hand ring finger     PHACOEMULSIFICATION WITH STANDARD INTRAOCULAR LENS IMPLANT  02/2019; 3/2019    left eye; right eye     TONSILLECTOMY       Allergy   No Known Allergies  Current Medication List     Current Outpatient Medications   Medication Sig     albuterol (PROAIR HFA/PROVENTIL HFA/VENTOLIN HFA) 108 (90 BASE) MCG/ACT Inhaler Inhale 2 puffs  into the lungs every 4 hours as needed for other (coughing)     aspirin 81 MG tablet Take 1 tablet by mouth daily.     blood glucose (ACCU-CHEK DAYANARA PLUS) test strip TEST THREE TIMES A DAY OR AS DIRECTED     blood glucose (NO BRAND SPECIFIED) lancing device Use to test blood sugars 3 times daily or as directed.     blood glucose monitoring (ONE TOUCH DELICA) lancets Use to test blood sugars 3 times daily or as directed.     cephALEXin (KEFLEX) 500 MG capsule Take 1 capsule (500 mg) by mouth 3 times daily     cetirizine (ZYRTEC) 10 MG tablet Take 10 mg by mouth At Bedtime     folic acid (FOLVITE) 1 MG tablet Take 1 tablet (1 mg) by mouth daily     HYDROcodone-acetaminophen (NORCO) 5-325 MG per tablet Take 1-2 tablets by mouth every 4 hours as needed for moderate to severe pain maximum 4 tablet(s) per day     hydroxychloroquine (PLAQUENIL) 200 MG tablet Take 1 tablet (200 mg) by mouth 2 times daily     ketorolac (ACULAR) 0.5 % ophthalmic solution Place 1 drop into the right eye 3 times daily     lisinopril (PRINIVIL/ZESTRIL) 2.5 MG tablet TAKE ONE TABLET BY MOUTH EVERY DAY     metFORMIN (GLUCOPHAGE) 500 MG tablet TAKE ONE TABLET BY MOUTH TWICE A DAY WITH MEALS     methotrexate sodium 2.5 MG TABS Take 8 tablets (20 mg) by mouth once a week . Take all 8 tablets on the same day of each week.     metoprolol succinate ER (TOPROL-XL) 25 MG 24 hr tablet TAKE ONE TABLET BY MOUTH EVERY DAY     omeprazole (PRILOSEC) 40 MG DR capsule Take 1 capsule (40 mg) by mouth daily     order for DME Equipment being ordered: specialized shoes for diabetic neuropathy as well as all supplies provided for annual treatment of diabetes neuropathy     order for DME Equipment being ordered: glucometer     order for DME Equipment being ordered: specialized shoes for diabetic neuropathy     His orthotics will be made by [facility]      Tim Jernigan, Certified   [email or web address]  Phone: [phone]  Fax: [phone] [place] [address]     OZEMPIC 1 MG/DOSE pen INJECT 1MG SUBCUTANEOUSLY EVERY 7 DAYS     pravastatin (PRAVACHOL) 40 MG tablet Take 0.5 tablets (20 mg) by mouth every other day     prednisoLONE acetate (PRED FORTE) 1 % ophthalmic suspension Place 1 drop into the right eye 3 times daily     tiZANidine (ZANAFLEX) 2 MG tablet Take 1 tablet (2 mg) by mouth 3 times daily as needed for muscle spasms     No current facility-administered medications for this visit.          Social History   See HPI    Family History     Family History   Problem Relation Age of Onset     Cerebrovascular Disease Mother      Arthritis Mother      Osteoporosis Mother      Alzheimer Disease Father      Arthritis Father      Cancer Father      Diabetes Maternal Grandmother      Cardiovascular Maternal Grandmother      Other Cancer Brother      Cancer Paternal Aunt      Hypertension No family hx of      Thyroid Disease No family hx of      Glaucoma No family hx of      Macular Degeneration No family hx of        Physical Exam     Temp Readings from Last 3 Encounters:   05/15/19 97.4  F (36.3  C) (Oral)   03/15/19 97.8  F (36.6  C) (Oral)   02/14/19 96.6  F (35.9  C) (Oral)     BP Readings from Last 5 Encounters:   05/15/19 144/84   03/21/19 (!) 162/101   03/15/19 114/74   02/14/19 112/68   01/23/19 126/83     Pulse Readings from Last 1 Encounters:   05/15/19 64     Resp Readings from Last 1 Encounters:   03/21/19 16     Estimated body mass index is 32.22 kg/m  as calculated from the following:    Height as of 3/21/19: 1.74 m (5' 8.5\").    Weight as of this encounter: 97.5 kg (215 lb).    GEN: NAD, obese  HEENT: MMM. No oral lesions. Anicteric, noninjected sclera  CV: S1, S2. RRR. No m/r/g.  PULM: CTA bilaterally. No w/c.  MSK: MCPs, PIPs, wrists, elbows, shoulders, and ankles without swelling or tenderness to palpation.  Negative MCP and MTP squeeze.  Knees with crepitation and medial joint line tenderness but no effusion or " increased warmth  SKIN: No rash.  Bruises on his forearms bilaterally  EXT: No LE edema  PSYCH: Alert. Appropriate.    Labs / Imaging (select studies)     [facility]    10/17/2013 Left knee synovial fluid at [facility]: , N 3%, No crystals    RF/CCP  Recent Labs   Lab Test 04/07/16  1149   CCPIGG 64*     CBC  Recent Labs   Lab Test 04/18/19  1108 01/14/19  1716 12/19/18  1123   WBC 7.1 9.0 5.2   RBC 4.63 4.74 4.39*   HGB 14.6 14.9 13.9   HCT 43.2 44.3 40.9   MCV 93 94 93   RDW 14.5 14.9 14.9    303 240   MCH 31.5 31.4 31.7   MCHC 33.8 33.6 34.0   NEUTROPHIL 74.1 71.8 70.4   LYMPH 13.2 14.5 15.5   MONOCYTE 10.4 11.8 11.3   EOSINOPHIL 1.0 1.0 1.3   BASOPHIL 1.3 0.9 1.5   ANEU 5.3 6.5 3.7   ALYM 0.9 1.3 0.8   SMITH 0.7 1.1 0.6   AEOS 0.1 0.1 0.1   ABAS 0.1 0.1 0.1     CMP  Recent Labs   Lab Test 04/18/19  1108 01/14/19  1716 12/19/18  1123  03/26/18  1130 03/05/18  1315   NA  --  141  --   --  139 140   POTASSIUM  --  4.2  --   --  4.6 5.5*   CHLORIDE  --  108  --   --  107 108   CO2  --  24  --   --  23 23   ANIONGAP  --  9  --   --  9 9   GLC  --  142*  --   --  84 72   BUN  --  22  --   --  20 21   CR 1.12 1.33* 1.07   < > 1.01 0.96   GFRESTIMATED 64 52* 68   < > 72 77   GFRESTBLACK 74 60* 78   < > 87 >90   NEW  --  9.2  --   --  8.8 9.5   BILITOTAL 0.6 0.3 0.6   < >  --   --    ALBUMIN 3.7 3.6 3.4   < >  --   --    PROTTOTAL 7.0 7.0 6.6*   < >  --   --    ALKPHOS 45 51 49   < >  --   --    AST 22 22 25   < >  --   --    ALT 45 32 33   < >  --   --     < > = values in this interval not displayed.     Calcium/VitaminD  Recent Labs   Lab Test 01/14/19  1716 03/26/18  1130 03/05/18  1315  07/23/13  1016  12/05/11  1200   NEW 9.2 8.8 9.5   < >  --    < > 9.3   D3VIT  --   --   --   --   --   --  30   VITDT  --   --   --   --  28*  --   --     < > = values in this interval not displayed.     ESR/CRP  Recent Labs   Lab Test 04/18/19  1108 01/14/19  1716 12/19/18  1123   SED 9 13 10  "  CRP 7.2 9.8* 11.6*     Hepatitis B  Recent Labs   Lab Test 04/07/16  1149   HBCAB Nonreactive   HEPBANG Nonreactive     Hepatitis C  Recent Labs   Lab Test 04/07/16  1149   HCVAB Nonreactive   Assay performance characteristics have not been established for newborns,   infants, and children       HIV Screening  Recent Labs   Lab Test 04/07/16  1149   HIAGAB Nonreactive   HIV-1 p24 Ag & HIV-1/HIV-2 Ab Not Detected         \"MRI LEFT KNEE  10/08/2013    INDICATION: Left knee pain. Locking, catching and snapping. Weakness.  TECHNIQUE: Routine.  COMPARISON: None.    FINDINGS:  MEDIAL COMPARTMENT: Linear tearing involving the posterior horn and body of   the medial meniscus as seen on series 4: image 6-8. This is also seen on   series 5: image 9-10. Cartilage is thinned peripherally.    LATERAL COMPARTMENT: Lateral meniscus also demonstrates tearing in the   posterior horn on series 4: image 22. There is diffuse tearing in the body   of the meniscus which is horizontal as seen on series 5: image 9 and this   extends into the anterior horn. Cartilage is thinned.    PATELLOFEMORAL COMPARTMENT: Retropatellar cartilage demonstrates   full-thickness loss of cartilage over portions of the median ridge, lateral   facet and medial facet. Cartilage is better preserved over the lower pole   centrally. There is also full-thickness loss of cartilage in the lateral   and central trochlear groove. Patella is normally aligned. Retinaculum are   intact.    LIGAMENTS AND TENDONS: The anterior cruciate and posterior cruciate   ligaments are intact. The medial collateral ligament is intact. Lateral   collateral ligamentous complex and popliteus insertion are intact.   Quadriceps tendon and patellar tendon are intact.    BONES AND SOFT TISSUES: Moderate-sized joint effusion. No popliteal cyst.   No muscle edema or fracture.    CONCLUSION:  1. There is tearing of the medial meniscus involving the posterior horn and   body. Cartilage is " "thinned peripherally.  2. There is also tearing in the posterior horn and body of the lateral   meniscus which also extends into the anterior horn. Cartilage is also   thinned in the lateral compartment.  3. Moderate to severe osteoarthritis in the patellofemoral compartment with   full-thickness loss of cartilage over large portions of the retropatellar   surface and trochlear surface.  4. Joint effusion.    IF YOU ARE A PHYSICIAN AND HAVE QUESTIONS REGARDING THIS REPORT, PLEASE   CALL [phone].\"    \"X-RAY KNEES BILATERAL AP W/LAT/SUN/PA LEFT   Oct 08, 2013 09:51:59 AM     INDICATION: Pain and swelling   COMPARISON: None.      FINDINGS: Moderate narrowing lateral aspect of the patellofemoral   compartment with slight lateral subluxation of the patella. Medial and   lateral compartments are preserved. Moderate knee joint effusion and/or   synovitis. Enthesophyte formation distal quadriceps and proximal patellar   tendons. Extensive vascular calcifications.     AP view right knee demonstrates trace narrowing medial compartment joint   Space.\"    Immunization History     Immunization History   Administered Date(s) Administered     Influenza (High Dose) 3 valent vaccine 09/20/2012, 10/20/2015, 09/20/2016, 08/28/2017, 09/24/2018     Influenza (IIV3) PF 10/05/1999, 10/30/2008, 09/28/2011, 10/14/2013, 10/03/2014     Pneumo Conj 13-V (2010&after) 06/29/2015     Pneumococcal 23 valent 08/19/2010     TD (ADULT, 7+) 08/05/1997, 02/03/2011     Zoster vaccine, live 04/19/2010       Procedure     Procedure: Steroid injections of the bilateral knees  Indication: Pain, bilateral patellofemoral syndrome    The procedure was explained in detail. Risks including infection, pain, structural damage such as cartilage damage and tendon rupture, fat atrophy, skin hyper-/hypo-pigmentation, and medication reaction was explained. The need for rest of the affected joint for one week after the procedure was explained.  The option of not " doing the procedure was also provided. All questions were answered and the patient consented to the procedure.     A time-out was performed and the correct patient, procedure, and laterality were verified.    The right knee was examined and location for injection was identified - anterior medial. The area was cleaned with chlorhexidine, twice.  Ethyl chloride was then used for topical anaesthetic.  Then a mixture of lidocaine 1% 2 mL and Kenalog 40mg was injected into the intra-articular space.     The left knee was examined and location for injection was identified - anterior medial. The area was cleaned with chlorhexidine, twice.  Ethyl chloride was then used for topical anaesthetic.  Then a mixture of lidocaine 1% 2 mL and Kenalog 40mg was injected into the intra-articular space.     The patient tolerated the procedure well. No complications.    MEDICATION: Triamcinolone 40 mg  LOT #: [id number]  :  E-Duction  EXPIRATION DATE:  11/2020  NDC#: [address]-9    MEDICATION: Triamcinolone 40 mg  LOT #: [id number]  :  E-Duction  EXPIRATION DATE:  11/2020  NDC#: [address]-3         Chart documentation done in part with Dragon Voice recognition Software. Although reviewed after completion, some word and grammatical error may remain.    Albert Tompkins MD

## 2019-05-15 NOTE — NURSING NOTE
RAPID3 (0-30) Cumulative Score  6.3          RAPID3 Weighted Score (divide #4 by 3 and that is the weighted score)  2.1         Joanne Agudelo, CMA  5/15/2019  1:04 PM

## 2019-05-16 ENCOUNTER — THERAPY VISIT (OUTPATIENT)
Dept: PHYSICAL THERAPY | Facility: CLINIC | Age: 74
End: 2019-05-16
Payer: COMMERCIAL

## 2019-05-16 DIAGNOSIS — M17.0 OSTEOARTHRITIS OF BOTH KNEES, UNSPECIFIED OSTEOARTHRITIS TYPE: ICD-10-CM

## 2019-05-16 PROCEDURE — 97110 THERAPEUTIC EXERCISES: CPT | Mod: GP

## 2019-05-16 PROCEDURE — 97530 THERAPEUTIC ACTIVITIES: CPT | Mod: GP

## 2019-05-23 ENCOUNTER — THERAPY VISIT (OUTPATIENT)
Dept: PHYSICAL THERAPY | Facility: CLINIC | Age: 74
End: 2019-05-23
Payer: COMMERCIAL

## 2019-05-23 DIAGNOSIS — M17.0 OSTEOARTHRITIS OF BOTH KNEES, UNSPECIFIED OSTEOARTHRITIS TYPE: ICD-10-CM

## 2019-05-23 PROCEDURE — 97110 THERAPEUTIC EXERCISES: CPT | Mod: GP

## 2019-05-23 PROCEDURE — 97530 THERAPEUTIC ACTIVITIES: CPT | Mod: GP

## 2019-05-30 ENCOUNTER — THERAPY VISIT (OUTPATIENT)
Dept: PHYSICAL THERAPY | Facility: CLINIC | Age: 74
End: 2019-05-30
Payer: COMMERCIAL

## 2019-05-30 DIAGNOSIS — M17.0 OSTEOARTHRITIS OF BOTH KNEES, UNSPECIFIED OSTEOARTHRITIS TYPE: ICD-10-CM

## 2019-05-30 PROCEDURE — 97530 THERAPEUTIC ACTIVITIES: CPT | Mod: GP | Performed by: PHYSICAL THERAPIST

## 2019-05-30 PROCEDURE — 97112 NEUROMUSCULAR REEDUCATION: CPT | Mod: GP | Performed by: PHYSICAL THERAPIST

## 2019-05-30 PROCEDURE — 97110 THERAPEUTIC EXERCISES: CPT | Mod: GP | Performed by: PHYSICAL THERAPIST

## 2019-06-14 ENCOUNTER — THERAPY VISIT (OUTPATIENT)
Dept: PHYSICAL THERAPY | Facility: CLINIC | Age: 74
End: 2019-06-14
Payer: COMMERCIAL

## 2019-06-14 DIAGNOSIS — M17.0 OSTEOARTHRITIS OF BOTH KNEES, UNSPECIFIED OSTEOARTHRITIS TYPE: ICD-10-CM

## 2019-06-14 PROCEDURE — 97530 THERAPEUTIC ACTIVITIES: CPT | Mod: GP | Performed by: PHYSICAL THERAPIST

## 2019-06-14 PROCEDURE — 97110 THERAPEUTIC EXERCISES: CPT | Mod: GP | Performed by: PHYSICAL THERAPIST

## 2019-06-26 ENCOUNTER — OFFICE VISIT (OUTPATIENT)
Dept: NUTRITION | Facility: CLINIC | Age: 74
End: 2019-06-26
Payer: COMMERCIAL

## 2019-06-26 VITALS — WEIGHT: 212.5 LBS | BODY MASS INDEX: 31.84 KG/M2

## 2019-06-26 DIAGNOSIS — E66.01 MORBID OBESITY (H): ICD-10-CM

## 2019-06-26 DIAGNOSIS — E11.42 TYPE 2 DIABETES MELLITUS WITH DIABETIC POLYNEUROPATHY, WITHOUT LONG-TERM CURRENT USE OF INSULIN (H): Primary | ICD-10-CM

## 2019-06-26 PROCEDURE — 97802 MEDICAL NUTRITION INDIV IN: CPT

## 2019-06-26 NOTE — PATIENT INSTRUCTIONS
1) have a healthy snack in between meals - Fruit, yogurt, cheese sticks, vegetables  2) Limit fried foods for lunch and dinner to 2 times/week  3) Eat more fish- 1 time/week     Stacy Styles RD, LD, CDE

## 2019-06-26 NOTE — PROGRESS NOTES
"Medical Nutrition Therapy  Visit Type:Initial assessment and intervention    Zurdo Dash presents today for MNT and education related to weight management.   He is accompanied by self and spouse.     ASSESSMENT:   Patient comments/concerns relating to nutrition: Zurdo states that his weight is \"spiraling out of control.\" He was on Itzel Leo in 2017 but it got too expensive. Zurdo tells writer that \"he knows what to eat\" he just has a hard time putting it into practice.  He thinks that he needs to start snacking on more fruit and yogurt and not eating as much fried food.     NUTRITION HISTORY:    Breakfast: 1 cup of coffee, coffee cake, bowl of cereal OR 1 cup of coffee, coffee cake with toast OR oatmeal cranberry cookie OR 1 bowl of rice krispies  Lunch: cheese curds, md. Diet coke OR 2 eggs, hashbrowns, 2 pieces of vogel and 1 pancake OR cheeseburger with a cup of cheetos, diet coke  Dinner: Margot's - 1 cup of chili and a double cheese burger with md. Diet coke and 4 oreo cookies OR Margot's Double stack  OR chicken noodle soup with tuna sandwich and 3 oreos  Snacks: cherry pie OR DQ md simons and pecan sundae  Beverages: Pop (Diet) 0-2/day, Coffee 3 cups with spelnda/day and Water all day    Misses meals? Skips breakfast 2-3 times/week  Eats out:  4-7 meals/per week     Previous diet education:  Yes, he has worked with [facility] in diabetes education    Food allergies/intolerances: none     Diet is high in: calories, carbs and fat (saturated)  Diet is low in: fiber, fruits, protein and vegetables    EXERCISE: moderate regular exercise program which includes walking his dogs- 3/4 of a mile every day and has physical therapy (once every 2 weeks plus exercises at home- every other day)    SOCIO/ECONOMIC:   Lives with: self and spouse and dogs- Kaleigh and Janine    MEDICATIONS:  Current Outpatient Medications   Medication     albuterol (PROAIR HFA/PROVENTIL HFA/VENTOLIN HFA) 108 (90 BASE) MCG/ACT Inhaler     aspirin 81 " MG tablet     blood glucose (ACCU-CHEK DAYANARA PLUS) test strip     blood glucose (NO BRAND SPECIFIED) lancing device     blood glucose monitoring (ONE TOUCH DELICA) lancets     cephALEXin (KEFLEX) 500 MG capsule     cetirizine (ZYRTEC) 10 MG tablet     folic acid (FOLVITE) 1 MG tablet     HYDROcodone-acetaminophen (NORCO) 5-325 MG per tablet     hydroxychloroquine (PLAQUENIL) 200 MG tablet     ketorolac (ACULAR) 0.5 % ophthalmic solution     lisinopril (PRINIVIL/ZESTRIL) 2.5 MG tablet     metFORMIN (GLUCOPHAGE) 500 MG tablet     methotrexate sodium 2.5 MG TABS     metoprolol succinate ER (TOPROL-XL) 25 MG 24 hr tablet     omeprazole (PRILOSEC) 40 MG DR capsule     order for DME     order for DME     order for DME     OZEMPIC 1 MG/DOSE pen     pravastatin (PRAVACHOL) 40 MG tablet     prednisoLONE acetate (PRED FORTE) 1 % ophthalmic suspension     tiZANidine (ZANAFLEX) 2 MG tablet     No current facility-administered medications for this visit.        LABS:  Last Basic Metabolic Panel:  Lab Results   Component Value Date     01/14/2019      Lab Results   Component Value Date    POTASSIUM 4.2 01/14/2019     Lab Results   Component Value Date    CHLORIDE 108 01/14/2019     Lab Results   Component Value Date    NEW 9.2 01/14/2019     Lab Results   Component Value Date    CO2 24 01/14/2019     Lab Results   Component Value Date    BUN 22 01/14/2019     Lab Results   Component Value Date    CR 1.12 04/18/2019     Lab Results   Component Value Date     01/14/2019       ANTHROPOMETRICS:  Vitals: Wt 96.4 kg (212 lb 8 oz)   BMI 31.84 kg/m    Body mass index is 31.84 kg/m .      Wt Readings from Last 5 Encounters:   05/15/19 97.5 kg (215 lb)   03/21/19 94.4 kg (208 lb 3.2 oz)   03/15/19 93.4 kg (206 lb)   02/14/19 94.6 kg (208 lb 9.6 oz)   01/23/19 95.3 kg (210 lb 3.2 oz)       Weight Change: was steady around 208 the last year.    He was down to 180-190 pounds with Itzel Bailey and then gained it back     ESTIMATED  KCAL REQUIREMENTS:  2013 kcal per day for weight maintenance    NUTRITION DIAGNOSIS: Overweight/Obesity related to sedentary lifestyle and predicted excessive calorie intake as evidenced by BMI of 31/84    NUTRITION INTERVENTION:  Education given to support: general nutrition guidelines, weight reduction, carb counting, dining out/special occasions, fiber, behavior modification and portion control  Education Materials Provided: My Plate Planner/Choose My Plate  Motivational Interviewing    PATIENT'S BEHAVIOR CHANGE GOALS:   See Patient Instructions for patient stated behavior change goals. AVS was printed and given to patient at today's appointment.    MONITOR / EVALUATE:  RD will monitor/evaluate:  Beliefs and attitudes related to food  Food / Beverage / Nutrient intake   Progress toward meeting stated nutrition-related goals  Readiness to change nutrition-related behaviors  Weight change    FOLLOW-UP:  Follow-up appointment scheduled on August 2nd    Stacy Styles, JOHN, LD, CDE  Time spent in minutes: 70  Encounter: Individual

## 2019-07-07 DIAGNOSIS — E11.42 TYPE 2 DIABETES MELLITUS WITH DIABETIC POLYNEUROPATHY, WITHOUT LONG-TERM CURRENT USE OF INSULIN (H): Primary | ICD-10-CM

## 2019-07-10 ENCOUNTER — OFFICE VISIT (OUTPATIENT)
Dept: OPHTHALMOLOGY | Facility: CLINIC | Age: 74
End: 2019-07-10
Payer: COMMERCIAL

## 2019-07-10 DIAGNOSIS — Z96.1 PSEUDOPHAKIA: Primary | ICD-10-CM

## 2019-07-10 DIAGNOSIS — H02.834 DERMATOCHALASIS OF BOTH UPPER EYELIDS: ICD-10-CM

## 2019-07-10 DIAGNOSIS — H02.9 EYELID LESION: ICD-10-CM

## 2019-07-10 DIAGNOSIS — H02.831 DERMATOCHALASIS OF BOTH UPPER EYELIDS: ICD-10-CM

## 2019-07-10 PROCEDURE — 99213 OFFICE O/P EST LOW 20 MIN: CPT | Performed by: OPHTHALMOLOGY

## 2019-07-10 ASSESSMENT — REFRACTION_WEARINGRX
OS_ADD: +2.50
SPECS_TYPE: PAL
OS_SPHERE: -0.25
OD_CYLINDER: +0.25
OD_AXIS: 033
OD_ADD: +2.50
OS_CYLINDER: +0.50
OD_SPHERE: -0.75
OS_AXIS: 143

## 2019-07-10 ASSESSMENT — VISUAL ACUITY
METHOD: SNELLEN - LINEAR
OS_CC: 20/20
OD_CC: 20/20
CORRECTION_TYPE: GLASSES
OD_CC+: -2

## 2019-07-10 ASSESSMENT — EXTERNAL EXAM - RIGHT EYE: OD_EXAM: PROLAPSED FAT PADS: UPPER, 2+ BROW PTOSIS

## 2019-07-10 ASSESSMENT — TONOMETRY
OD_IOP_MMHG: 13
OS_IOP_MMHG: 12
IOP_METHOD: APPLANATION

## 2019-07-10 ASSESSMENT — SLIT LAMP EXAM - LIDS: COMMENTS: 1+ DERMATOCHALASIS

## 2019-07-10 NOTE — PATIENT INSTRUCTIONS
Possible clouding of posterior capsule both eyes discussed.   Referral to Dr. Janet Garcia - suspicious lesion left lower lid and upper lid dermatochalasis, ptosis, and brow ptosis.  Call in March 2020 for an appointment in July 2020 for Complete Exam    Dr. Avendano [phone]

## 2019-07-10 NOTE — PROGRESS NOTES
Current Eye Medications:  none     Subjective:  3 month follow up for IOP check. Vision is doing well both eyes. Likes he can watch TV with out glasses. Itchy left eye for last 3 days, now little red left eye. Not having any eye pain in either eye.      Objective:  See Ophthalmology Exam.       Assessment:  Doing well status/post Kelman phacoemulsification/ posterior chamber lens both eyes.      Plan:  Possible clouding of posterior capsule both eyes discussed.   Referral to Dr. Janet Garcia - suspicious lesion left lower lid and upper lid dermatochalasis, ptosis, and brow ptosis.  Call in March 2020 for an appointment in July 2020 for Complete Exam    Dr. Avendano [phone]

## 2019-07-10 NOTE — LETTER
7/10/2019         RE: Zurdo Dash  [address] [place] [id number]        Dear Colleague,    Thank you for referring your patient, Zurdo Dash, to the [facility]. Please see a copy of my visit note below.     Current Eye Medications:  none     Subjective:  3 month follow up for IOP check. Vision is doing well both eyes. Likes he can watch TV with out glasses. Itchy left eye for last 3 days, now little red left eye. Not having any eye pain in either eye.      Objective:  See Ophthalmology Exam.       Assessment:  Doing well status/post Kelman phacoemulsification/ posterior chamber lens both eyes.      Plan:  Possible clouding of posterior capsule both eyes discussed.   Referral to Dr. Janet Garcia - suspicious lesion left lower lid and upper lid dermatochalasis, ptosis, and brow ptosis.  Call in March 2020 for an appointment in July 2020 for Complete Exam    Dr. Avendano [phone]         Again, thank you for allowing me to participate in the care of your patient.        Sincerely,        Justin Avendano MD

## 2019-07-11 ENCOUNTER — THERAPY VISIT (OUTPATIENT)
Dept: PHYSICAL THERAPY | Facility: CLINIC | Age: 74
End: 2019-07-11
Payer: COMMERCIAL

## 2019-07-11 DIAGNOSIS — M17.0 OSTEOARTHRITIS OF BOTH KNEES, UNSPECIFIED OSTEOARTHRITIS TYPE: ICD-10-CM

## 2019-07-11 PROCEDURE — 97110 THERAPEUTIC EXERCISES: CPT | Mod: GP | Performed by: PHYSICAL THERAPIST

## 2019-07-11 PROCEDURE — 97530 THERAPEUTIC ACTIVITIES: CPT | Mod: GP | Performed by: PHYSICAL THERAPIST

## 2019-07-16 DIAGNOSIS — M05.79 RHEUMATOID ARTHRITIS INVOLVING MULTIPLE SITES WITH POSITIVE RHEUMATOID FACTOR (H): ICD-10-CM

## 2019-07-16 LAB
ALBUMIN SERPL-MCNC: 3.7 G/DL (ref 3.4–5)
ALP SERPL-CCNC: 42 U/L (ref 40–150)
ALT SERPL W P-5'-P-CCNC: 42 U/L (ref 0–70)
AST SERPL W P-5'-P-CCNC: 29 U/L (ref 0–45)
BASOPHILS # BLD AUTO: 0.1 10E9/L (ref 0–0.2)
BASOPHILS NFR BLD AUTO: 0.6 %
BILIRUB DIRECT SERPL-MCNC: 0.1 MG/DL (ref 0–0.2)
BILIRUB SERPL-MCNC: 0.5 MG/DL (ref 0.2–1.3)
CREAT SERPL-MCNC: 1.17 MG/DL (ref 0.66–1.25)
CRP SERPL-MCNC: 4.2 MG/L (ref 0–8)
DIFFERENTIAL METHOD BLD: NORMAL
EOSINOPHIL # BLD AUTO: 0.1 10E9/L (ref 0–0.7)
EOSINOPHIL NFR BLD AUTO: 0.6 %
ERYTHROCYTE [DISTWIDTH] IN BLOOD BY AUTOMATED COUNT: 14.8 % (ref 10–15)
ERYTHROCYTE [SEDIMENTATION RATE] IN BLOOD BY WESTERGREN METHOD: 8 MM/H (ref 0–20)
GFR SERPL CREATININE-BSD FRML MDRD: 61 ML/MIN/{1.73_M2}
HCT VFR BLD AUTO: 40.7 % (ref 40–53)
HGB BLD-MCNC: 13.9 G/DL (ref 13.3–17.7)
LYMPHOCYTES # BLD AUTO: 0.9 10E9/L (ref 0.8–5.3)
LYMPHOCYTES NFR BLD AUTO: 12.2 %
MCH RBC QN AUTO: 31.5 PG (ref 26.5–33)
MCHC RBC AUTO-ENTMCNC: 34.2 G/DL (ref 31.5–36.5)
MCV RBC AUTO: 92 FL (ref 78–100)
MONOCYTES # BLD AUTO: 0.9 10E9/L (ref 0–1.3)
MONOCYTES NFR BLD AUTO: 11.9 %
NEUTROPHILS # BLD AUTO: 5.8 10E9/L (ref 1.6–8.3)
NEUTROPHILS NFR BLD AUTO: 74.7 %
PLATELET # BLD AUTO: 206 10E9/L (ref 150–450)
PROT SERPL-MCNC: 6.5 G/DL (ref 6.8–8.8)
RBC # BLD AUTO: 4.41 10E12/L (ref 4.4–5.9)
WBC # BLD AUTO: 7.7 10E9/L (ref 4–11)

## 2019-07-16 PROCEDURE — 85652 RBC SED RATE AUTOMATED: CPT | Performed by: INTERNAL MEDICINE

## 2019-07-16 PROCEDURE — 85025 COMPLETE CBC W/AUTO DIFF WBC: CPT | Performed by: INTERNAL MEDICINE

## 2019-07-16 PROCEDURE — 80076 HEPATIC FUNCTION PANEL: CPT | Performed by: INTERNAL MEDICINE

## 2019-07-16 PROCEDURE — 36415 COLL VENOUS BLD VENIPUNCTURE: CPT | Performed by: INTERNAL MEDICINE

## 2019-07-16 PROCEDURE — 86140 C-REACTIVE PROTEIN: CPT | Performed by: INTERNAL MEDICINE

## 2019-07-16 PROCEDURE — 82565 ASSAY OF CREATININE: CPT | Performed by: INTERNAL MEDICINE

## 2019-07-17 ENCOUNTER — PATIENT OUTREACH (OUTPATIENT)
Dept: NURSING | Facility: CLINIC | Age: 74
End: 2019-07-17

## 2019-07-17 ENCOUNTER — OFFICE VISIT (OUTPATIENT)
Dept: OPHTHALMOLOGY | Facility: CLINIC | Age: 74
End: 2019-07-17
Payer: COMMERCIAL

## 2019-07-17 DIAGNOSIS — H02.834 DERMATOCHALASIS OF BOTH UPPER EYELIDS: Primary | ICD-10-CM

## 2019-07-17 DIAGNOSIS — H02.831 DERMATOCHALASIS OF BOTH UPPER EYELIDS: Primary | ICD-10-CM

## 2019-07-17 DIAGNOSIS — H02.403 INVOLUTIONAL PTOSIS, ACQUIRED, BILATERAL: ICD-10-CM

## 2019-07-17 DIAGNOSIS — H02.9 EYELID LESION: ICD-10-CM

## 2019-07-17 DIAGNOSIS — Z96.1 PSEUDOPHAKIA: ICD-10-CM

## 2019-07-17 PROCEDURE — 92285 EXTERNAL OCULAR PHOTOGRAPHY: CPT | Performed by: OPHTHALMOLOGY

## 2019-07-17 PROCEDURE — 92081 LIMITED VISUAL FIELD XM: CPT | Performed by: OPHTHALMOLOGY

## 2019-07-17 PROCEDURE — 99213 OFFICE O/P EST LOW 20 MIN: CPT | Performed by: OPHTHALMOLOGY

## 2019-07-17 ASSESSMENT — VISUAL ACUITY
OD_CC+: -1
OS_CC: 20/20
OD_CC: 20/25
CORRECTION_TYPE: GLASSES
METHOD: SNELLEN - LINEAR

## 2019-07-17 ASSESSMENT — SLIT LAMP EXAM - LIDS: COMMENTS: HEAVY DERMATOCHALASIS RESTING ON LASHES, TRUE PTOSIS

## 2019-07-17 ASSESSMENT — ENCOUNTER SYMPTOMS: JOINT SWELLING: 1

## 2019-07-17 NOTE — NURSING NOTE
Patient presents with:  Droopy Eye Lid Evaluation: Zurdo Dash presents for Dermatochasis evaluation referred By Dr. Hernandez.   Eye Problem Both Eyes: LLL Papilloma per Dr. Hernandez.      Referring Provider:  Justin Avendano MD  [address] [place] [address]        Mala Mcintyre COT

## 2019-07-17 NOTE — PROGRESS NOTES
"Oculoplastic Clinic New Patient    Patient: Zurdo Dash MRN# [id number]   YOB: 1945 Age: 74 year old   Date of Visit: Jul 17, 2019    CC: Droopy eyelids obstructing vision.              HPI:     Chief Complaint(s) and History of Present Illness(es)     Droopy Eye Lid Evaluation     Laterality: right upper lid and left upper lid    Onset: chronic    Duration: 3 years    Timing: throughout the day    Course: gradually worsening    Associated signs and symptoms: blurred vision (\"Trouble seeing above and   seems like a shadow over eyes\" )    Pain scale: 0/10    Comments: Zurdo Dash presents for Dermatochasis evaluation referred By   Dr. Hernandez.            Eye Problem Both Eyes     Laterality: left eye    Frequency: constantly    Associated symptoms: Itching temporal corner. Tends to the corner.     Treatments tried: artificial tears and ointment (AT's 1-2 x day and   Vaseline LLL at bedtime.  )    Pain scale: 0/10    Comments: LLL Papilloma per Dr. Hernandez.         Zurdo Dash is a 74 year old male who has noted gradual onset of droopy eyelids over the past years. The droopy eyelid is interfering with activities of daily living including driving, and reading. The patient denies double vision, variability of the eyelid position, or dry eye symptoms. Also has a \"sore\" on left lower lid, he cannot see it but Dr. Avendano noticed it. He is unsure if growing. No history of cutaneous malignancy.     EXAM:     MRD1: 1 right eye and 0 left eye   Dermatochalasis with excess skin touching eyelashes   Aponeurotic ptosis both eyes  Prolapsed nasal fat pad  2 mm lid margin nodule possibly minimal madarosis left lower lid    VISUAL FIELD:  Right eye untaped:30 degrees Right eye taped:60 degrees  Left eye untaped:10 degrees Left eye taped:60 degrees    Assessment & Plan     Zurdo Dash is a 74 year old male with the following diagnoses:   1. Dermatochalasis of both upper eyelids    2. Pseudophakia, ou  "   3. Involutional ptosis, acquired, bilateral    4. Eyelid lesion         Both upper eyelid blepharoplasty (NF) and Bilateral ptosis repair MMCR 8.5 right eye and 9.5 left eye left lower lid biopsy    We did discuss that the left lower lid could be residual chalazion versus early basal cell carcinoma. If basal cell carcinoma, will require mohs and recon at a later date.     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  I personally obtained the chief complaint(s) and history of present illness.  I confirmed and edited as necessary the review of systems, past medical/surgical history, family history, social history, and examination findings as documented by others; and I examined the patient myself.  I personally reviewed the relevant tests, images, and reports as documented above.  I formulated and edited as necessary the assessment and plan and discussed the findings and management plan with the patient and family. - Janet Garcia MD        Today with Zurdo Dash, I reviewed the indications, risks, benefits, and alternatives of the proposed surgical procedure including, but not limited to, failure obtain the desired result  and need for additional surgery, bleeding, infection, loss of vision, loss of the eye, and the remote possibility of permanent damage to any organ system or death with the use of anesthesia.  I provided multiple opportunities for the questions, answered all questions to the best of my ability, and confirmed that my answers and my discussion were understood.

## 2019-07-17 NOTE — LETTER
" 2019         RE:  :  MRN: Zurdo Dash  1945  [id number]     Dear Dr. Avendano,    Thank you for asking me to see your patient, Zurdo Dash, for an oculoplastic   consultation.  My assessment and plan are below.  For further details, please see my attached clinic note.                HPI:     Chief Complaint(s) and History of Present Illness(es)     Droopy Eye Lid Evaluation     Laterality: right upper lid and left upper lid    Onset: chronic    Duration: 3 years    Timing: throughout the day    Course: gradually worsening    Associated signs and symptoms: blurred vision (\"Trouble seeing above and   seems like a shadow over eyes\" )    Pain scale: 0/10    Comments: Zurdo Dash presents for Dermatochasis evaluation referred By   Dr. Hernandez.            Eye Problem Both Eyes     Laterality: left eye    Frequency: constantly    Associated symptoms: Itching temporal corner. Tends to the corner.     Treatments tried: artificial tears and ointment (AT's 1-2 x day and   Vaseline LLL at bedtime.  )    Pain scale: 0/10    Comments: LLL Papilloma per Dr. Hernandez.         Zurdo Dash is a 74 year old male who has noted gradual onset of droopy eyelids over the past years. The droopy eyelid is interfering with activities of daily living including driving, and reading. The patient denies double vision, variability of the eyelid position, or dry eye symptoms. Also has a \"sore\" on left lower lid, he cannot see it but Dr. Avendano noticed it. He is unsure if growing. No history of cutaneous malignancy.     EXAM:     MRD1: 1 right eye and 0 left eye   Dermatochalasis with excess skin touching eyelashes   Aponeurotic ptosis both eyes  Prolapsed nasal fat pad  2 mm lid margin nodule possibly minimal madarosis left lower lid    VISUAL FIELD:  Right eye untaped:30 degrees Right eye taped:60 degrees  Left eye untaped:10 degrees Left eye taped:60 degrees    Assessment & Plan     Zurdo Dash is a 74 year old male " with the following diagnoses:   1. Dermatochalasis of both upper eyelids    2. Pseudophakia, ou    3. Involutional ptosis, acquired, bilateral    4. Eyelid lesion         Both upper eyelid blepharoplasty  and Bilateral ptosis repair MMCR 8.5 right eye and 9.5 left eye left lower lid biopsy    We did discuss that the left lower lid could be residual chalazion versus early basal cell carcinoma. If basal cell carcinoma, will require mohs and recon at a later date.     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      Sincerely,    Janet Garcia MD  Department of Ophthalmology and Visual Neurosciences  [facility]    CC: Justin Avendano MD  [address] [place] [id number]  VIA In Basket

## 2019-07-25 DIAGNOSIS — M05.79 RHEUMATOID ARTHRITIS INVOLVING MULTIPLE SITES WITH POSITIVE RHEUMATOID FACTOR (H): ICD-10-CM

## 2019-07-26 DIAGNOSIS — I10 HYPERTENSION GOAL BP (BLOOD PRESSURE) < 140/90: ICD-10-CM

## 2019-07-26 RX ORDER — FOLIC ACID 1 MG/1
1 TABLET ORAL DAILY
Qty: 100 TABLET | Refills: 2 | Status: SHIPPED | OUTPATIENT
Start: 2019-07-26 | End: 2019-09-18

## 2019-07-26 RX ORDER — METOPROLOL SUCCINATE 25 MG/1
TABLET, EXTENDED RELEASE ORAL
Qty: 90 TABLET | Refills: 1 | Status: SHIPPED | OUTPATIENT
Start: 2019-07-26 | End: 2019-11-04

## 2019-08-02 ENCOUNTER — ALLIED HEALTH/NURSE VISIT (OUTPATIENT)
Dept: EDUCATION SERVICES | Facility: CLINIC | Age: 74
End: 2019-08-02
Payer: COMMERCIAL

## 2019-08-02 VITALS — WEIGHT: 217 LBS | BODY MASS INDEX: 32.51 KG/M2

## 2019-08-02 DIAGNOSIS — Z79.4 TYPE 2 DIABETES MELLITUS WITH DIABETIC POLYNEUROPATHY, WITH LONG-TERM CURRENT USE OF INSULIN (H): ICD-10-CM

## 2019-08-02 DIAGNOSIS — E11.42 TYPE 2 DIABETES MELLITUS WITH DIABETIC POLYNEUROPATHY, WITH LONG-TERM CURRENT USE OF INSULIN (H): ICD-10-CM

## 2019-08-02 PROCEDURE — G0108 DIAB MANAGE TRN  PER INDIV: HCPCS

## 2019-08-02 NOTE — PROGRESS NOTES
"Diabetes Self-Management Education & Support    Diabetes Education Self Management & Training    SUBJECTIVE/OBJECTIVE:  Presents for: Individual review  Accompanied by: Self  Diabetes education in the past 24mo: No  Focus of Visit: Taking Medication  Diabetes type: Type 2  Diabetes management related comments/concerns: Zurdo mentions that he has applied for the [employer] prescription assistance program and has had some back and forth with them and is waiting to hear back.   Transportation concerns: No  Other concerns:: None  Cultural Influences/Ethnic Background:  American    Diabetes Symptoms & Complications  Blurred vision: No  Fatigue: No  Neuropathy: Yes  Foot ulcerations: Yes(sore on toe, will have checked out next week with Dr. Duckworth )  Polydipsia: No  Polyphagia: No  Polyuria: No  Visual change: No  Weight loss: No  Slow healing wounds: Yes(sore on toes)  Recent Infection(s): No  Autonomic neuropathy: No  CVA: No  Heart disease: No  Nephropathy: No  Peripheral neuropathy: No  Peripheral Vascular Disease: No  Retinopathy: No  Sexual dysfunction: No    Patient Problem List and Family Medical History reviewed for relevant medical history, current medical status, and diabetes risk factors.    Vitals:  Wt 98.4 kg (217 lb)   BMI 32.51 kg/m    Estimated body mass index is 32.51 kg/m  as calculated from the following:    Height as of 3/21/19: 1.74 m (5' 8.5\").    Weight as of this encounter: 98.4 kg (217 lb).   Last 3 BP:   BP Readings from Last 3 Encounters:   05/15/19 110/70   03/21/19 (!) 162/101   03/15/19 114/74       History   Smoking Status     Former Smoker     Packs/day: 1.00     Years: 40.00     Types: Cigarettes     Quit date: 3/16/2007   Smokeless Tobacco     Never Used       Labs:  Lab Results   Component Value Date    A1C 7.1 01/14/2019     Lab Results   Component Value Date     01/14/2019     Lab Results   Component Value Date    LDL 63 01/14/2019     HDL Cholesterol   Date Value Ref Range Status "   01/14/2019 33 (L) >39 mg/dL Final   ]  GFR Estimate   Date Value Ref Range Status   07/16/2019 61 >60 mL/min/[1.73_m2] Final     Comment:     Non  GFR Calc  Starting 12/18/2018, serum creatinine based estimated GFR (eGFR) will be   calculated using the Chronic Kidney Disease Epidemiology Collaboration   (CKD-EPI) equation.       GFR Estimate If Black   Date Value Ref Range Status   07/16/2019 70 >60 mL/min/[1.73_m2] Final     Comment:      GFR Calc  Starting 12/18/2018, serum creatinine based estimated GFR (eGFR) will be   calculated using the Chronic Kidney Disease Epidemiology Collaboration   (CKD-EPI) equation.       Lab Results   Component Value Date    CR 1.17 07/16/2019     No results found for: MICROALBUMIN    Healthy Eating  Healthy Eating Assessed Today: Yes  Cultural/Lutheran diet restrictions?: No  Meals include: Breakfast, Lunch, Dinner, Snacks  Has patient met with a dietitian in the past?: No    Being Active  Being Active Assessed Today: Yes  Exercise:: Yes  Days per week of moderate to strenuous exercise (like a brisk walk): 7  On average, minutes per day of exercise at this level: 30  Exercise Minutes per Week: 210    Monitoring  Monitoring Assessed Today: Yes  Did patient bring glucose meter to appointment? : No(brought log sheets)    Date Breakfast    Before   8/2 190   8/1 126   7/31 125   7/30 138   7/29 114   7/28 180   7/27 135   7/26 190   7/25 125   7/24 163   7/23 117       Taking Medications  Diabetes Medication(s)     Biguanides       metFORMIN (GLUCOPHAGE) 500 MG tablet    Take 1 tablet in the morning, and 2 tablets at night (1500 mg total/day)    Incretin Mimetic Agents (GLP-1 Receptor Agonists)       OZEMPIC 1 MG/DOSE pen    INJECT 1MG SUBCUTANEOUSLY EVERY 7 DAYS          Taking Medication Assessed Today: Yes  Current Treatments: Non-insulin Injectables, Oral Agent (monotherapy)  Problems taking diabetes medications regularly?: No  Diabetes medication  side effects?: No  Treatment Compliance: All of the time    Problem Solving  Problem Solving Assessed Today: No    Reducing Risks  Reducing Risks Assessed Today: No    Healthy Coping  Healthy Coping Assessed Today: No  Patient Activation Measure Survey Score:  HATTIE Score (Last Two) 7/23/2013   HATTIE Raw Score 35   Activation Score 45.2   HATTIE Level 1       ASSESSMENT:  Zurdo has had a magallon with [employer] to apply for their prescription assistance program for Ozempic.  Zurdo states that him and his wife are doing better with healthy eating and have been having healthy snacks in between meals.  They are still working on eating out less.      Zurdo has had 54% of fasting BG above goal in the last 2 weeks, recommend increasing Metformin    Patient's most recent   Lab Results   Component Value Date    A1C 7.1 01/14/2019    is not meeting goal of <7.0    INTERVENTION:   Diabetes knowledge and skills assessment:     Patient is knowledgeable in diabetes management concepts related to: Healthy Eating, Being Active, Monitoring and Taking Medication    Patient needs further education on the following diabetes management concepts: Healthy Eating, Being Active, Monitoring, Taking Medication and Problem Solving    Based on learning assessment above, most appropriate setting for further diabetes education would be: Individual setting.    Education provided today on:  AADE Self-Care Behaviors:  Healthy Eating: carbohydrate counting, consistency in amount, composition, and timing of food intake, weight reduction, eating out, portion control and plate planning method  Monitoring: purpose, log and interpret results and individual blood glucose targets  Taking Medication: action of prescribed medication    Opportunities for ongoing education and support in diabetes-self management were discussed.    Pt verbalized understanding of concepts discussed and recommendations provided today.       Education Materials Provided:  No new  materials provided today    PLAN:  Increase in oral medications:  Metformin: 1,000 mg/day --> 1500 mg/day    See Patient Instructions for co-developed, patient-stated behavior change goals.  AVS printed and provided to patient today. See Follow-Up section for recommended follow-up.    Stacy Styles RD, LD, CDE  Time Spent: 45 minutes  Encounter Type: Individual    Any diabetes medication dose changes were made via the CDE Protocol and Collaborative Practice Agreement with the patient's referring provider. A copy of this encounter was shared with the provider.

## 2019-08-02 NOTE — PATIENT INSTRUCTIONS
Start taking 3 metformin tablets/day, take 2 in the morning and 1 in the evening    Diabetes Support Resources:  Websites: American Association of Diabetes Educators Participant Resources: www.diabeteseducator.org/living-with-diabetes   American Diabetes Association: www.diabetes.org  Diabetes Together 2 Goal: http://phedkpnj5cisd.org     Bring blood glucose meter and logbook with you to all doctor and follow-up appointments.    Diabetes Education Telephone Visit Follow-up:    We realize your time is valuable and your health is important! We offer a convenient Telephone Visit follow up! It s a quick way to check in for a medication dose adjustment without having to come back to clinic as soon.    Telephone Visits are often covered by insurance. Please check with your insurance plan to see if this type of visit is covered. If not, the cost is less expensive than an office visit:      Up to 10 minutes (Code 18596): $30    11-20 minutes (Code 95686): $59    More than 20 minutes (Code 95314): $85    Talk with your Diabetes Educator if you want to learn more.      [facility]:  For Your Diabetes Education and Nutrition Appointments Call:  [phone]   For Diabetes Education or Nutrition Related Questions:   Phone: [phone]  E-mail: [email or web address]  Fax: [phone]   If you need a medication refill please contact your pharmacy. Please allow 3 business days for your refills to be completed.

## 2019-08-06 ENCOUNTER — OFFICE VISIT (OUTPATIENT)
Dept: FAMILY MEDICINE | Facility: CLINIC | Age: 74
End: 2019-08-06
Payer: COMMERCIAL

## 2019-08-06 VITALS
RESPIRATION RATE: 20 BRPM | DIASTOLIC BLOOD PRESSURE: 82 MMHG | OXYGEN SATURATION: 97 % | SYSTOLIC BLOOD PRESSURE: 118 MMHG | HEIGHT: 69 IN | BODY MASS INDEX: 31.96 KG/M2 | WEIGHT: 215.8 LBS | TEMPERATURE: 97 F | HEART RATE: 64 BPM

## 2019-08-06 DIAGNOSIS — E11.42 TYPE 2 DIABETES MELLITUS WITH DIABETIC POLYNEUROPATHY, WITHOUT LONG-TERM CURRENT USE OF INSULIN (H): ICD-10-CM

## 2019-08-06 DIAGNOSIS — Z01.818 PREOP GENERAL PHYSICAL EXAM: Primary | ICD-10-CM

## 2019-08-06 DIAGNOSIS — E66.01 MORBID OBESITY (H): ICD-10-CM

## 2019-08-06 LAB — HBA1C MFR BLD: 6.4 % (ref 0–5.6)

## 2019-08-06 PROCEDURE — 99207 C PAF COMPLETED  NO CHARGE: CPT | Performed by: INTERNAL MEDICINE

## 2019-08-06 PROCEDURE — 36415 COLL VENOUS BLD VENIPUNCTURE: CPT | Performed by: INTERNAL MEDICINE

## 2019-08-06 PROCEDURE — 83036 HEMOGLOBIN GLYCOSYLATED A1C: CPT | Performed by: INTERNAL MEDICINE

## 2019-08-06 PROCEDURE — 99214 OFFICE O/P EST MOD 30 MIN: CPT | Performed by: INTERNAL MEDICINE

## 2019-08-06 ASSESSMENT — PAIN SCALES - GENERAL: PAINLEVEL: NO PAIN (0)

## 2019-08-06 ASSESSMENT — MIFFLIN-ST. JEOR: SCORE: 1696.3

## 2019-08-06 NOTE — PROGRESS NOTES
[facility]  [address] [place] [id number]  [phone]  Dept: [phone]    PRE-OP EVALUATION:  Today's date: 2019    Zurdo Dash (: 1945) presents for pre-operative evaluation assessment as requested by Dr. Garcia.  He requires evaluation and anesthesia risk assessment prior to undergoing surgery/procedure for treatment of both eye lids .    Proposed Surgery/ Procedure: Bilateral upper eyelidblepharoplasty  Date of Surgery/ Procedure: 19  Time of Surgery/ Procedure: 9:00  Hospital/Surgical Facility: [facility]  Fax number for surgical facility:   Primary Physician: Kapil Duckworth  Type of Anesthesia Anticipated: General    Patient has a Health Care Directive or Living Will:  NO    1. NO - Do you have a history of heart attack, stroke, stent, bypass or surgery on an artery in the head, neck, heart or legs?  2. NO - Do you ever have any pain or discomfort in your chest?  3. NO - Do you have a history of  Heart Failure?  4. NO - Are you troubled by shortness of breath when: walking on the level, up a slight hill or at night?  5. NO - Do you currently have a cold, bronchitis or other respiratory infection?  6. NO - Do you have a cough, shortness of breath or wheezing?  7. NO - Do you sometimes get pains in the calves of your legs when you walk?  8. NO - Do you or anyone in your family have previous history of blood clots?  9. NO - Do you or does anyone in your family have a serious bleeding problem such as prolonged bleeding following surgeries or cuts?  10. NO - Have you ever had problems with anemia or been told to take iron pills?  11. NO - Have you had any abnormal blood loss such as black, tarry or bloody stools, or abnormal vaginal bleeding?  12. NO - Have you ever had a blood transfusion?  13. NO - Have you or any of your relatives ever had problems with anesthesia?  14. NO - Do you have sleep apnea, excessive snoring or daytime drowsiness?  15.  NO - Do you have any prosthetic heart valves?  16. NO - Do you have prosthetic joints?  17. NO - Is there any chance that you may be pregnant?    HPI:     HPI related to upcoming procedure:     His vision is impaired due to the fact that his eyelids are hanging too far down.     Diabetes Mellitus Type 2   He reports that he is taking metformin two pills at night and one in the morning for a total of 1500mg. Was previously taking 1000mg daily. Blood glucose readings are often in the 130 range in the mornings. He has rarely sees numbers over 200 these days. He has noted some recent weight gain and over the last few years his weight has ranged between 246 and 184. His most recent weight gain has occurred over the last 1.5 years and has gained about 30 lbs. We plan to recheck hemoglobin A1C test today and discuss results at next visit.     Lab Results   Component Value Date    A1C 7.1 01/14/2019    A1C 8.4 09/25/2018    A1C 5.9 03/05/2018    A1C 5.1 10/31/2017    A1C 5.6 06/23/2017     Wt Readings from Last 5 Encounters:   08/06/19 97.9 kg (215 lb 12.8 oz)   08/02/19 98.4 kg (217 lb)   06/26/19 96.4 kg (212 lb 8 oz)   05/15/19 97.5 kg (215 lb)   03/21/19 94.4 kg (208 lb 3.2 oz)     Balance  He reports that he has noted a decrease in balance and coordination. He has ceased physical therapy visits due to the cost.     See problem list for active medical problems.  Problems all longstanding and stable, except as noted/documented.  See ROS for pertinent symptoms related to these conditions.    Medications list reviewed with patient today.     MEDICAL HISTORY:     Patient Active Problem List    Diagnosis Date Noted     Eyelid lesion, LLL 07/10/2019     Priority: Medium     Dermatochalasis of both upper eyelids 07/10/2019     Priority: Medium     Pseudophakia, ou 03/12/2019     Priority: Medium     Posterior vitreous detachment of left eye 02/09/2019     Priority: Medium     Osteoarthritis of both knees, unspecified  osteoarthritis type 02/08/2019     Priority: Medium     Migraine equivalent 12/22/2018     Priority: Medium     Chronic bilateral low back pain without sciatica 09/25/2018     Priority: Medium     Diabetic polyneuropathy associated with type 2 diabetes mellitus (H) 02/28/2018     Priority: Medium     Tubulovillous adenoma of colon 02/20/2018     Priority: Medium     Type 2 diabetes mellitus without retinopathy (H) 01/12/2018     Priority: Medium     Erectile dysfunction, unspecified erectile dysfunction type 11/07/2017     Priority: Medium     Spondylosis of cervical region without myelopathy or radiculopathy 07/17/2017     Priority: Medium     Pulmonary nodule 03/03/2017     Priority: Medium     Needs follow up CT chest 1 year 2/2018       High risk medication use 03/03/2017     Priority: Medium     Rheumatoid arthritis involving multiple sites with positive rheumatoid factor (H) 03/18/2016     Priority: Medium     Family history of esophageal cancer 12/14/2015     Priority: Medium     Gastroesophageal reflux disease, esophagitis presence not specified 12/14/2015     Priority: Medium     Ex-smoker 10/20/2015     Priority: Medium     RBBB (right bundle branch block) 10/24/2013     Priority: Medium     Noted on ECG 10/19/2013 . See medical record scanned into Epic electronic medical records         Type 2 diabetes mellitus with diabetic polyneuropathy, without long-term current use of insulin (H) 02/28/2013     Priority: Medium     A1C      6.9   5/3/2016  A1C      8.6   3/18/2016  A1C      9.7   9/30/2015  A1C     11.8   6/29/2015  A1C     10.7   3/30/2015         Health Care Home 12/12/2012     Priority: Medium     Alin Espinoza RN,[phone]   A / [facility]          DX V65.8 REPLACED WITH [id number] HEALTH CARE HOME (04/08/2013)       Advanced directives, counseling/discussion 03/29/2011     Priority: Medium     Patient states has Advance Directive and will bring in a copy to  clinic.         Hypertension goal BP (blood pressure) < 140/90      Priority: Medium     obesity      Priority: Medium     Hypogonadism      Priority: Medium     Hyperlipidemia LDL goal <100 10/31/2010     Priority: Medium     Pain in limb      Priority: Medium     Had some leg cramps with features consistent with vascular claudication. See ankle brachial index tests and vascular surgery consultation from 2/16/2011 in media section of Epic electronic medical records ; the key point is that subsequently , evaluations found zero evidence of     Ischemic vascular disease          Past Medical History:   Diagnosis Date     Abnormal CT scan 03/2004    calcified lung granuloma     C. difficile diarrhea     H/O     Cataract 11/18/2011     Diabetic neuropathy (H)     mild, mostly soles and distal forefeet, worse on the left side.     Diverticulitis      ED (erectile dysfunction)      Ex-smoker     QUIT SMOKING FEB 2007     History of ETOH abuse     recovering, sober since 1997     Hyperlipidemia LDL goal <100      Hypertension goal BP (blood pressure) < 140/90      Hypogonadism      Obesity      PAD (peripheral artery disease) (H)     leg cramps, with exertion, no formal diagnosis of PAD and minimal if any symptoms at all.     RA (rheumatoid arthritis) (H)     Dr Bailon     Type 2 diabetes, HbA1c goal < 7% (H) 10/2008    A1C of 7.1 %      Past Surgical History:   Procedure Laterality Date     CATARACT IOL, RT/LT       COLONOSCOPY  03/01/2018    [facility]     HC INCISION TENDON SHEATH FINGER  4/2009    r hand ring finger     PHACOEMULSIFICATION WITH STANDARD INTRAOCULAR LENS IMPLANT  02/2019; 3/2019    left eye; right eye     TONSILLECTOMY       Current Outpatient Medications   Medication Sig Dispense Refill     albuterol (PROAIR HFA/PROVENTIL HFA/VENTOLIN HFA) 108 (90 BASE) MCG/ACT Inhaler Inhale 2 puffs into the lungs every 4 hours as needed for other (coughing) 1 Inhaler 1     aspirin 81 MG tablet Take 1 tablet by mouth  daily.  3     blood glucose (NO BRAND SPECIFIED) lancing device Use to test blood sugars 3 times daily or as directed. 1 each 0     blood glucose monitoring (ONE TOUCH DELICA) lancets Use to test blood sugars 3 times daily or as directed. 300 each 3     cetirizine (ZYRTEC) 10 MG tablet Take 10 mg by mouth At Bedtime       folic acid (FOLVITE) 1 MG tablet Take 1 tablet (1 mg) by mouth daily 100 tablet 2     HYDROcodone-acetaminophen (NORCO) 5-325 MG per tablet Take 1-2 tablets by mouth every 4 hours as needed for moderate to severe pain maximum 4 tablet(s) per day 28 tablet 0     hydroxychloroquine (PLAQUENIL) 200 MG tablet Take 1 tablet (200 mg) by mouth 2 times daily 180 tablet 3     lisinopril (PRINIVIL/ZESTRIL) 2.5 MG tablet TAKE ONE TABLET BY MOUTH EVERY DAY 90 tablet 1     metFORMIN (GLUCOPHAGE) 500 MG tablet Take 1 tablet in the morning, and 2 tablets at night (1500 mg total/day) 180 tablet 1     methotrexate sodium 2.5 MG TABS Take 8 tablets (20 mg) by mouth once a week . Take all 8 tablets on the same day of each week. 96 tablet 2     metoprolol succinate ER (TOPROL-XL) 25 MG 24 hr tablet TAKE ONE TABLET BY MOUTH EVERY DAY 90 tablet 1     omeprazole (PRILOSEC) 40 MG DR capsule Take 1 capsule (40 mg) by mouth daily 90 capsule 1     order for DME Equipment being ordered: specialized shoes for diabetic neuropathy as well as all supplies provided for annual treatment of diabetes neuropathy 1 Device 0     order for DME Equipment being ordered: glucometer 1 Device 0     order for DME Equipment being ordered: specialized shoes for diabetic neuropathy     His orthotics will be made by [employer]      Tim Jernigan, Certified   [email or web address]  Phone: [phone]  Fax: [phone]   [place] [address] 1 Device 0     OZEMPIC 1 MG/DOSE pen INJECT 1MG SUBCUTANEOUSLY EVERY 7 DAYS 3 mL 2     pravastatin (PRAVACHOL) 40 MG tablet Take 0.5 tablets (20 mg) by mouth every other  "day  tablet 0     tiZANidine (ZANAFLEX) 2 MG tablet Take 1 tablet (2 mg) by mouth 3 times daily as needed for muscle spasms 90 tablet 1     blood glucose (ACCU-CHEK DAYANARA PLUS) test strip TEST THREE TIMES A DAY OR AS DIRECTED 300 each 1     OTC products: Aspirin    No Known Allergies   Latex Allergy: NO    Social History     Tobacco Use     Smoking status: Former Smoker     Packs/day: 1.00     Years: 40.00     Pack years: 40.00     Types: Cigarettes     Last attempt to quit: 3/16/2007     Years since quittin.4     Smokeless tobacco: Never Used   Substance Use Topics     Alcohol use: No     Alcohol/week: 0.0 oz     History   Drug Use No     REVIEW OF SYSTEMS:   Constitutional, neuro, ENT, endocrine, pulmonary, cardiac, gastrointestinal, genitourinary, musculoskeletal, integument and psychiatric systems are negative, except as otherwise noted.    This document serves as a record of the services and decisions personally performed and made by Kapil Duckworth MD. It was created on his behalf by Marcos Ayon, a trained medical scribe. The creation of this document is based on the provider's statements to the medical scribe.  Marcos Ayon 11:26 AM 2019    EXAM:   /82   Pulse 64   Temp 97  F (36.1  C) (Oral)   Resp 20   Ht 1.74 m (5' 8.5\")   Wt 97.9 kg (215 lb 12.8 oz)   SpO2 97%   BMI 32.34 kg/m      GENERAL APPEARANCE: healthy, alert and no distress     EYES: EOMI,  PERRL     HENT: ear canals and TM's normal and nose and mouth without ulcers or lesions     NECK: no adenopathy, no asymmetry, masses, or scars and thyroid normal to palpation     RESP: lungs clear to auscultation - no rales, rhonchi or wheezes     CV: regular rates and rhythm, normal S1 S2, no S3 or S4 and no murmur, click or rub     ABDOMEN:  soft, nontender, no HSM or masses and bowel sounds normal     MS: extremities normal- no gross deformities note     SKIN: no suspicious lesions or rashes to visible skin      NEURO: " Normal strength and tone, sensory exam grossly normal, mentation intact and speech normal     PSYCH: mentation appears normal. and affect normal/bright     LYMPHATICS: No cervical adenopathy    DIAGNOSTICS:   Hemoglobin A1C    Recent Labs   Lab Test 07/16/19  1207 04/18/19  1108 01/14/19  1716  09/25/18  1246  03/26/18  1130   HGB 13.9 14.6 14.9   < >  --    < >  --     229 303   < >  --    < >  --    NA  --   --  141  --   --   --  139   POTASSIUM  --   --  4.2  --   --   --  4.6   CR 1.17 1.12 1.33*   < >  --    < > 1.01   A1C  --   --  7.1*  --  8.4*  --   --     < > = values in this interval not displayed.     IMPRESSION:   Reason for surgery/procedure: Bilateral upper eyelidblepharoplasty  Diagnosis/reason for consult: preoperative evaluation of fitness for surgery and proposed anesthesia    The proposed surgical procedure is considered LOW risk.    REVISED CARDIAC RISK INDEX  The patient has the following serious cardiovascular risks for perioperative complications such as (MI, PE, VFib and 3  AV Block):  No serious cardiac risks  INTERPRETATION: 0 risks: Class I (very low risk - 0.4% complication rate)    The patient has the following additional risks for perioperative complications:  No identified additional risks      ICD-10-CM    1. Preop general physical exam Z01.818        RECOMMENDATIONS:     --Patient is to take all scheduled medications on the day of surgery EXCEPT for modifications listed below.    Hold aspirin 5-7 days prior to surgery.  Methotrexate should be a non issue if still taken on sundays.     APPROVAL GIVEN to proceed with proposed procedure, without further diagnostic evaluation     The information in this document, created by the medical scribe for me, accurately reflects the services I personally performed and the decisions made by me. I have reviewed and approved this document for accuracy prior to leaving the patient care area.  August 6, 2019 11:32 AM    Signed Electronically  by: Kapil Duckworth MD    Copy of this evaluation report is provided to requesting physician.    [place] Preop Guidelines    Revised Cardiac Risk Index

## 2019-08-06 NOTE — PATIENT INSTRUCTIONS
1) Follow-up with Dr. Tompkins regarding the discrepancy between the current dose and the dose we have listed for your Plaquenil medication he is providing you.    2) We will measure the hemoglobin A1C test today.    3) --Patient is to take all scheduled medications on the day of surgery EXCEPT for modifications listed below.    Hold aspirin 5-7 days prior to surgery.  Methotrexate should be a non issue if still taken on sundays.     Before Your Surgery      Call your surgeon if there is any change in your health. This includes signs of a cold or flu (such as a sore throat, runny nose, cough, rash or fever).    Do not smoke, drink alcohol or take over the counter medicine (unless your surgeon or primary care doctor tells you to) for the 24 hours before and after surgery.    If you take prescribed drugs: Follow your doctor s orders about which medicines to take and which to stop until after surgery.    Eating and drinking prior to surgery: follow the instructions from your surgeon    Take a shower or bath the night before surgery. Use the soap your surgeon gave you to gently clean your skin. If you do not have soap from your surgeon, use your regular soap. Do not shave or scrub the surgery site.  Wear clean pajamas and have clean sheets on your bed.

## 2019-08-06 NOTE — LETTER
[facility]  [address] [place] [address]. [place] [address]    August 6, 2019    Zurdo Dash  [address] [facility] [place] [address]          Dear Zurdo,    Contrary to what I thought we might see, this hemoglobin a1c  [ diabetes test ] is wonderful !     Enclosed is a copy of your results.     Results for orders placed or performed in visit on 08/06/19   HEMOGLOBIN A1C   Result Value Ref Range    Hemoglobin A1C 6.4 (H) 0 - 5.6 %       If you have any questions or concerns, please call myself or my nurse at [phone].      Sincerely,        Kapil Duckworth MD/ayesha

## 2019-08-09 ENCOUNTER — TELEPHONE (OUTPATIENT)
Dept: FAMILY MEDICINE | Facility: CLINIC | Age: 74
End: 2019-08-09

## 2019-08-09 NOTE — TELEPHONE ENCOUNTER
Reason for Call:  Other call back    Detailed comments: Patient wants to know if Dr Duckworth has any samples of OZEMPIC 1 MG/DOSE pen and if so can he get a few due to his prescription not being in.    Phone Number Patient can be reached at: Home number on file [phone] (home)    Best Time: any    Can we leave a detailed message on this number? YES    Call taken on 8/9/2019 at 2:40 PM by Agatha Pinon

## 2019-08-12 NOTE — TELEPHONE ENCOUNTER
Spoke to patient and told him we don't do med samples   Carlos Manuel Bell CMA on 8/12/2019 at 11:57 AM

## 2019-08-16 ENCOUNTER — ANESTHESIA (OUTPATIENT)
Dept: SURGERY | Facility: CLINIC | Age: 74
End: 2019-08-16
Payer: COMMERCIAL

## 2019-08-16 ENCOUNTER — ANESTHESIA EVENT (OUTPATIENT)
Dept: SURGERY | Facility: CLINIC | Age: 74
End: 2019-08-16
Payer: COMMERCIAL

## 2019-08-16 ENCOUNTER — HOSPITAL ENCOUNTER (OUTPATIENT)
Facility: CLINIC | Age: 74
Discharge: HOME | End: 2019-08-16
Attending: OPHTHALMOLOGY | Admitting: OPHTHALMOLOGY
Payer: COMMERCIAL

## 2019-08-16 VITALS
RESPIRATION RATE: 12 BRPM | OXYGEN SATURATION: 96 % | SYSTOLIC BLOOD PRESSURE: 141 MMHG | DIASTOLIC BLOOD PRESSURE: 93 MMHG | BODY MASS INDEX: 32.08 KG/M2 | TEMPERATURE: 96.8 F | WEIGHT: 214.1 LBS | HEART RATE: 57 BPM

## 2019-08-16 DIAGNOSIS — Z98.890 POSTOPERATIVE EYE STATE: Primary | ICD-10-CM

## 2019-08-16 LAB — GLUCOSE BLDC GLUCOMTR-MCNC: 122 MG/DL (ref 70–99)

## 2019-08-16 PROCEDURE — 37000008 ZZH ANESTHESIA TECHNICAL FEE, 1ST 30 MIN: Performed by: OPHTHALMOLOGY

## 2019-08-16 PROCEDURE — 36000056 ZZH SURGERY LEVEL 3 1ST 30 MIN: Performed by: OPHTHALMOLOGY

## 2019-08-16 PROCEDURE — 37000009 ZZH ANESTHESIA TECHNICAL FEE, EACH ADDTL 15 MIN: Performed by: OPHTHALMOLOGY

## 2019-08-16 PROCEDURE — 25000125 ZZHC RX 250: Performed by: OPHTHALMOLOGY

## 2019-08-16 PROCEDURE — 25000128 H RX IP 250 OP 636: Performed by: OPHTHALMOLOGY

## 2019-08-16 PROCEDURE — 82962 GLUCOSE BLOOD TEST: CPT

## 2019-08-16 PROCEDURE — 88305 TISSUE EXAM BY PATHOLOGIST: CPT | Performed by: OPHTHALMOLOGY

## 2019-08-16 PROCEDURE — 88305 TISSUE EXAM BY PATHOLOGIST: CPT | Mod: 26 | Performed by: OPHTHALMOLOGY

## 2019-08-16 PROCEDURE — 25800030 ZZH RX IP 258 OP 636: Performed by: NURSE ANESTHETIST, CERTIFIED REGISTERED

## 2019-08-16 PROCEDURE — 27210794 ZZH OR GENERAL SUPPLY STERILE: Performed by: OPHTHALMOLOGY

## 2019-08-16 PROCEDURE — 40000170 ZZH STATISTIC PRE-PROCEDURE ASSESSMENT II: Performed by: OPHTHALMOLOGY

## 2019-08-16 PROCEDURE — 25000128 H RX IP 250 OP 636: Performed by: NURSE ANESTHETIST, CERTIFIED REGISTERED

## 2019-08-16 PROCEDURE — 71000012 ZZH RECOVERY PHASE 1 LEVEL 1 FIRST HR: Performed by: OPHTHALMOLOGY

## 2019-08-16 PROCEDURE — 36000058 ZZH SURGERY LEVEL 3 EA 15 ADDTL MIN: Performed by: OPHTHALMOLOGY

## 2019-08-16 PROCEDURE — 25000125 ZZHC RX 250: Performed by: NURSE ANESTHETIST, CERTIFIED REGISTERED

## 2019-08-16 PROCEDURE — 71000027 ZZH RECOVERY PHASE 2 EACH 15 MINS: Performed by: OPHTHALMOLOGY

## 2019-08-16 RX ORDER — ERYTHROMYCIN 5 MG/G
OINTMENT OPHTHALMIC
Qty: 3.5 G | Refills: 0 | Status: SHIPPED | OUTPATIENT
Start: 2019-08-16 | End: 2019-11-04

## 2019-08-16 RX ORDER — FENTANYL CITRATE 50 UG/ML
INJECTION, SOLUTION INTRAMUSCULAR; INTRAVENOUS PRN
Status: DISCONTINUED | OUTPATIENT
Start: 2019-08-16 | End: 2019-08-16

## 2019-08-16 RX ORDER — SODIUM CHLORIDE, SODIUM LACTATE, POTASSIUM CHLORIDE, CALCIUM CHLORIDE 600; 310; 30; 20 MG/100ML; MG/100ML; MG/100ML; MG/100ML
INJECTION, SOLUTION INTRAVENOUS CONTINUOUS PRN
Status: DISCONTINUED | OUTPATIENT
Start: 2019-08-16 | End: 2019-08-16

## 2019-08-16 RX ORDER — PROPOFOL 10 MG/ML
INJECTION, EMULSION INTRAVENOUS PRN
Status: DISCONTINUED | OUTPATIENT
Start: 2019-08-16 | End: 2019-08-16

## 2019-08-16 RX ORDER — TETRACAINE HYDROCHLORIDE 5 MG/ML
SOLUTION OPHTHALMIC PRN
Status: DISCONTINUED | OUTPATIENT
Start: 2019-08-16 | End: 2019-08-16 | Stop reason: HOSPADM

## 2019-08-16 RX ORDER — ONDANSETRON 2 MG/ML
INJECTION INTRAMUSCULAR; INTRAVENOUS PRN
Status: DISCONTINUED | OUTPATIENT
Start: 2019-08-16 | End: 2019-08-16

## 2019-08-16 RX ORDER — LIDOCAINE HYDROCHLORIDE 20 MG/ML
INJECTION, SOLUTION INFILTRATION; PERINEURAL PRN
Status: DISCONTINUED | OUTPATIENT
Start: 2019-08-16 | End: 2019-08-16

## 2019-08-16 RX ORDER — NEOMYCIN SULFATE, POLYMYXIN B SULFATE AND DEXAMETHASONE 3.5; 10000; 1 MG/ML; [USP'U]/ML; MG/ML
1 SUSPENSION/ DROPS OPHTHALMIC 4 TIMES DAILY
Qty: 5 ML | Refills: 0 | Status: SHIPPED | OUTPATIENT
Start: 2019-08-16 | End: 2019-11-04

## 2019-08-16 RX ORDER — ERYTHROMYCIN 5 MG/G
OINTMENT OPHTHALMIC PRN
Status: DISCONTINUED | OUTPATIENT
Start: 2019-08-16 | End: 2019-08-16 | Stop reason: HOSPADM

## 2019-08-16 RX ADMIN — FENTANYL CITRATE 50 MCG: 50 INJECTION, SOLUTION INTRAMUSCULAR; INTRAVENOUS at 10:14

## 2019-08-16 RX ADMIN — ONDANSETRON 4 MG: 2 INJECTION INTRAMUSCULAR; INTRAVENOUS at 10:21

## 2019-08-16 RX ADMIN — SODIUM CHLORIDE, POTASSIUM CHLORIDE, SODIUM LACTATE AND CALCIUM CHLORIDE: 600; 310; 30; 20 INJECTION, SOLUTION INTRAVENOUS at 10:12

## 2019-08-16 RX ADMIN — MIDAZOLAM 1 MG: 1 INJECTION INTRAMUSCULAR; INTRAVENOUS at 10:14

## 2019-08-16 RX ADMIN — PROPOFOL 30 MG: 10 INJECTION, EMULSION INTRAVENOUS at 10:25

## 2019-08-16 RX ADMIN — LIDOCAINE HYDROCHLORIDE 40 MG: 20 INJECTION, SOLUTION INFILTRATION; PERINEURAL at 10:18

## 2019-08-16 RX ADMIN — PROPOFOL 30 MG: 10 INJECTION, EMULSION INTRAVENOUS at 10:23

## 2019-08-16 ASSESSMENT — LIFESTYLE VARIABLES: TOBACCO_USE: 1

## 2019-08-16 NOTE — DISCHARGE INSTRUCTIONS
Post-operative Instructions  Ophthalmic Plastic and Reconstructive Surgery    Janet Gracia M.D.     All instructions apply to the operated eye(s) or eyelid(s).    Wound care and personal care  ? If a patch or bandage has been placed, please leave this in place until seen by your physician. Ensure that the bandage does not get wet when you take a shower.  ? Apply ice compresses 15 minutes of every hour while awake for 2 days. As long as there is no further bleeding, after two days, switch to warm water compresses for five minutes, four times a day until seen by your physician. Instead of warm water compresses, you can use a cup of dry uncooked rice in a clean cotton sock. Place sock in microwave for 30 seconds to one minute. Next place the warm sock in a plastic bag, and place it over a clean warm wet washcloth over operated eye.    ? You may shower or wash your hair the day after surgery. Do not go swimming for at least 2 weeks to prevent contamination of your wounds.  ? Do not apply make-up to the eyes or eyelids for 2 weeks after surgery.  ? Expect some swelling, bruising, black eye (even into the lower eyelids and cheeks). Also expect serum caking, crusting and discharge from the eye and/or incisions. A small amount of surface bleeding, and depending on the type of surgery, bleeding from the inside of the eyelid, is normal for the first 48 hours.  ? Avoid straining, bending at the waist, or lifting more than 15 pounds for 10 days. Activities that raise your blood pressure can worsen swelling, cause bleeding, and breaking of sutures. Like wise, sleeping with your head slightly elevated for the first several days can help swelling resolve more quickly.   ? Do continue to ambulate (walk) as you normally would - being sedentary after surgery can cause blood clots.   ? Your eye(s) and eyelid(s) may be painful and tender. This is normal after surgery.      Contact information and follow-up  ? Return to the Eye  Clinic for a follow-up appointment with your physician as scheduled. If no appointment has been scheduled:   - [facility] eye clinic: [phone] for an appointment with Dr. Garcia within 1 to 2 weeks from your date of surgery.   -  [facility] eye clinic: [phone] for an appointment with Dr. Garcia within 1 to 2 weeks from your date of surgery.     ? For severe pain, bleeding, or loss of vision, call the [facility] at [phone] or [facility] at [phone].     After hours or on weekends and holidays, call [phone] and ask to speak with the ophthalmologist on call.    An on call person can be reached after hours for concerns. The on call doctor should not call in medication refill requests after hours or on weekends, so please plan accordingly. An effort has been made to provide adequate pain medications following every surgery, and refills will not be provided in most instances. Narcotic pain medications cannot be called in.     Activity restrictions and driving  ? Avoid heavy lifting, bending, exercise or strenuous activity for 1 week after surgery.  You may resume other activities and return to work as tolerated.  ? You may not resume driving if you are using narcotic pain medications (such as Norco, Percocet, Tylenol #3).    Medications  ? Restart all regular home medications and eye drops. If you take Plavix or  Aspirin on a regular basis, wait for 72 hours after your surgery before restarting these in order to decrease the risk of bleeding complications.  ? Avoid aspirin and aspirin-like medications (Motrin, Aleve, Ibuprofen, [place] etc) for 72 hours to reduce the risk of bleeding. You may take Tylenol (acetaminophen) for pain.  ? In addition to your home medications, take the following post-operative medications as prescribed by your physician.    ? Apply antibiotic ointment to all sutures three times a day, and into  the operated eye(s) at night.  ? Instill eye drops 3 times a day for 10 days.   ? In many cases, postoperative discomfort can be managed with Tylenol alone. If narcotic pain medication was prescribed, take pain pills at prescribed frequency as needed for pain.    ? WARNING: Most prescription pain medications contain Tylenol  (acetaminophen). You must not take more than 4,000 mg of acetaminophen per  24-hour period. This is equivalent to 6 tablets of Darvocet, 12 tablets of Norco, Percocet or Tylenol #3. If you take other over-the-counter medications containing acetaminophen, you must take the amount of acetaminophen into account and reduce the number of prescribed pain pills accordingly.  ? Prescribed narcotic medications may make you drowsy. You must not drive a car, operate heavy machinery or drink alcohol while taking them.  ? Prescribed narcotic pain medications may cause constipation and nausea. Take them with some food to prevent a stomach upset.    Same Day Surgery Discharge Instructions for  Sedation and General Anesthesia       It's not unusual to feel dizzy, light-headed or faint for up to 24 hours after surgery or while taking pain medication.  If you have these symptoms: sit for a few minutes before standing and have someone assist you when you get up to walk or use the bathroom.      You should rest and relax for the next 24 hours. We recommend you make arrangements to have an adult stay with you for at least 24 hours after your discharge.  Avoid hazardous and strenuous activity.      DO NOT DRIVE any vehicle or operate mechanical equipment for 24 hours following the end of your surgery.  Even though you may feel normal, your reactions may be affected by the medication you have received.      Do not drink alcoholic beverages for 24 hours following surgery.       Slowly progress to your regular diet as you feel able. It's not unusual to feel nauseated and/or vomit after receiving anesthesia.  If you  develop these symptoms, drink clear liquids (apple juice, ginger ale, broth, 7-up, etc. ) until you feel better.  If your nausea and vomiting persists for 24 hours, please notify your surgeon.        All narcotic pain medications, along with inactivity and anesthesia, can cause constipation. Drinking plenty of liquids and increasing fiber intake will help.      For any questions of a medical nature, call your surgeon.      Do not make important decisions for 24 hours.      If you had general anesthesia, you may have a sore throat for a couple of days related to the breathing tube used during surgery.  You may use Cepacol lozenges to help with this discomfort.  If it worsens or if you develop a fever, contact your surgeon.       If you feel your pain is not well managed with the pain medications prescribed by your surgeon, please contact your surgeon's office to let them know so they can address your concerns.           **If you have questions or concerns about your procedure,   call Dr. Garcia at [phone]**

## 2019-08-16 NOTE — ANESTHESIA POSTPROCEDURE EVALUATION
Patient: Zurdo Dash    Procedure(s):  BILATERAL UPPER EYELID BLEPHAROPLASTY AND BILATERAL PTOSIS REPAIR  LEFT LOWER EYELID BIOPSY    Diagnosis:DERMATOCHALSIS AND PTOSIS  Diagnosis Additional Information: No value filed.    Anesthesia Type:  MAC    Note:  Anesthesia Post Evaluation    Patient location during evaluation: PACU  Patient participation: Able to fully participate in evaluation  Level of consciousness: awake and alert  Pain management: adequate  Airway patency: patent  Cardiovascular status: acceptable and hemodynamically stable  Respiratory status: acceptable and unassisted  Hydration status: acceptable  PONV: none             Last vitals:  Vitals:    08/16/19 1053 08/16/19 1100 08/16/19 1108   BP:  (!) 143/91 (!) 137/91   Pulse: 57  57   Resp: 11 15 18   Temp:      SpO2: 95% 94% 93%         Electronically Signed By: Kieran Spencer DO  August 16, 2019  11:21 AM

## 2019-08-16 NOTE — ANESTHESIA PREPROCEDURE EVALUATION
Anesthesia Pre-Procedure Evaluation    Patient: Zurdo Dash   MRN: [id number] : 1945          Preoperative Diagnosis: DERMATOCHALSIS AND PTOSIS    Procedure(s):  BILATERAL UPPER EYELID BLEPHAROPLASTY AND BILATERAL PTOSIS REPAIR  LEFT LOWER EYELID BIOPSY    Past Medical History:   Diagnosis Date     Abnormal CT scan 2004    calcified lung granuloma     C. difficile diarrhea     H/O     Cataract 2011     Diabetic neuropathy (H)     mild, mostly soles and distal forefeet, worse on the left side.     Diverticulitis      ED (erectile dysfunction)      Ex-smoker     QUIT SMOKING 2007     History of ETOH abuse     recovering, sober since      Hyperlipidemia LDL goal <100      Hypertension goal BP (blood pressure) < 140/90      Hypogonadism      Obesity      PAD (peripheral artery disease) (H)     leg cramps, with exertion, no formal diagnosis of PAD and minimal if any symptoms at all.     RA (rheumatoid arthritis) (H)     Dr Bailon     Type 2 diabetes, HbA1c goal < 7% (H) 10/2008    A1C of 7.1 %      Past Surgical History:   Procedure Laterality Date     CATARACT IOL, RT/LT       COLONOSCOPY  2018    [facility]     HC INCISION TENDON SHEATH FINGER  2009    r hand ring finger     PHACOEMULSIFICATION WITH STANDARD INTRAOCULAR LENS IMPLANT  2019; 3/2019    left eye; right eye     TONSILLECTOMY         Anesthesia Evaluation     . Pt has had prior anesthetic.     No history of anesthetic complications          ROS/MED HX    ENT/Pulmonary: Comment: ptosis    (+)tobacco use, Past use , . .    Neurologic:       Cardiovascular:     (+) hypertension-Peripheral Vascular Disease---. : . . . :. .       METS/Exercise Tolerance:     Hematologic:         Musculoskeletal:   (+)  other musculoskeletal- Rheumatoid arthritis       GI/Hepatic:         Renal/Genitourinary:         Endo:     (+) type II DM Last HgA1c: 7.1 Diabetic complications: neuropathy, Obesity, .      Psychiatric: Comment: History of ETOH  "abuse, sober since 1997    (+) psychiatric history other (comment)      Infectious Disease:         Malignancy:         Other:                          Physical Exam  Normal systems: cardiovascular and pulmonary    Airway   Mallampati: II    Dental   (+) upper dentures and lower dentures    Cardiovascular       Pulmonary             Lab Results   Component Value Date    WBC 7.7 07/16/2019    HGB 13.9 07/16/2019    HCT 40.7 07/16/2019     07/16/2019    CRP 4.2 07/16/2019    SED 8 07/16/2019     01/14/2019    POTASSIUM 4.2 01/14/2019    CHLORIDE 108 01/14/2019    CO2 24 01/14/2019    BUN 22 01/14/2019    CR 1.17 07/16/2019     (H) 01/14/2019    NEW 9.2 01/14/2019    PHOS 3.5 07/23/2013    MAG 2.1 10/21/2013    ALBUMIN 3.7 07/16/2019    PROTTOTAL 6.5 (L) 07/16/2019    ALT 42 07/16/2019    AST 29 07/16/2019    ALKPHOS 42 07/16/2019    BILITOTAL 0.5 07/16/2019    INR 1.00 02/03/2011    TSH 1.44 09/25/2018       Preop Vitals  BP Readings from Last 3 Encounters:   08/16/19 (!) 170/90   08/06/19 118/82   05/15/19 110/70    Pulse Readings from Last 3 Encounters:   08/16/19 61   08/06/19 64   05/15/19 64      Resp Readings from Last 3 Encounters:   08/16/19 18   08/06/19 20   03/21/19 16    SpO2 Readings from Last 3 Encounters:   08/16/19 97%   08/06/19 97%   05/15/19 96%      Temp Readings from Last 1 Encounters:   08/16/19 36  C (96.8  F) (Oral)    Ht Readings from Last 1 Encounters:   08/06/19 1.74 m (5' 8.5\")      Wt Readings from Last 1 Encounters:   08/16/19 97.1 kg (214 lb 1.6 oz)    Estimated body mass index is 32.08 kg/m  as calculated from the following:    Height as of 8/6/19: 1.74 m (5' 8.5\").    Weight as of this encounter: 97.1 kg (214 lb 1.6 oz).       Anesthesia Plan      History & Physical Review  History and physical reviewed and following examination; no interval change.    ASA Status:  3 .    NPO Status:  > 8 hours    Plan for MAC Reason for MAC:  Procedure to face, neck, head or " breast  PONV prophylaxis:  Ondansetron (or other 5HT-3)       Postoperative Care  Postoperative pain management:  IV analgesics and Oral pain medications.      Consents  Anesthetic plan, risks, benefits and alternatives discussed with:  Patient..                 Kieran Spencer DO

## 2019-08-16 NOTE — ANESTHESIA CARE TRANSFER NOTE
Patient: Zurdo Dash    Procedure(s):  BILATERAL UPPER EYELID BLEPHAROPLASTY AND BILATERAL PTOSIS REPAIR  LEFT LOWER EYELID BIOPSY    Diagnosis: DERMATOCHALSIS AND PTOSIS  Diagnosis Additional Information: No value filed.    Anesthesia Type:   MAC     Note:  Airway :Room Air  Patient transferred to:PACU  Comments: VSS on Room Air. Talking and sitting up in chair. Report given to RN before transfer of pt care.Handoff Report: Identifed the Patient, Identified the Reponsible Provider, Reviewed the pertinent medical history, Discussed the surgical course, Reviewed Intra-OP anesthesia mangement and issues during anesthesia, Set expectations for post-procedure period and Allowed opportunity for questions and acknowledgement of understanding      Vitals: (Last set prior to Anesthesia Care Transfer)    CRNA VITALS  8/16/2019 1020 - 8/16/2019 1055      8/16/2019             Resp Rate (set):  10                Electronically Signed By: MANASA Alegria CRNA  August 16, 2019  10:55 AM

## 2019-08-16 NOTE — BRIEF OP NOTE
[facility] Brief Operative Note    Pre-operative diagnosis: DERMATOCHALSIS AND PTOSIS   Post-operative diagnosis Same   Procedure: Procedure(s):  BILATERAL UPPER EYELID BLEPHAROPLASTY AND BILATERAL PTOSIS REPAIR  LEFT LOWER EYELID BIOPSY   Surgeon(s): Surgeon(s) and Role:     * Janet Garcia MD - Primary   Estimated blood loss: 2cc    Specimens: ID Type Source Tests Collected by Time Destination   A : Left lower eyelid lesion Tissue Lower Eyelid SURGICAL PATHOLOGY EXAM Janet Garcia MD 8/16/2019 10:32 AM       Findings: Bilateral upper lid ptosis and dermatochalasis     Risa Freed MD  Oculoplastic Surgery Fellow

## 2019-08-16 NOTE — OP NOTE
PREOPERATIVE DIAGNOSES:   1. Bilateral upper eyelid dermatochalasis.   2. Bilateral upper eyelid ptosis.   3. Left lower eyelid lesion.  POSTOPERATIVE DIAGNOSES:   1. Bilateral upper eyelid dermatochalasis.   2. Bilateral upper eyelid ptosis.   3. Left lower eyelid lesion.  PROCEDURES:  1. Bilateral upper eyelid ptosis repair by external levator resection.  2. Bilateral upper eyelid blepharoplasty.  3. Left lower eyelid biopsy.  SURGEON: Janet Garcia MD.  ASSISTANT: Risa Freed MD  ANESTHESIA: Monitored with local infiltration of a 50/50 mixture of 2% lidocaine with epinephrine and 0.5% Marcaine.   COMPLICATIONS: None.   ESTIMATED BLOOD LOSS: 3 mL.   SPECIMEN:   ID Type Source Tests Collected by Time Destination   A : Left lower eyelid lesion Tissue Lower Eyelid SURGICAL PATHOLOGY EXAM Janet Garcia MD 8/16/2019 10:32 AM      HISTORY: Zurdo Dash presented with upper eyelid drooping secondary to dermatochalasis and ptosis of the upper eyelids leading to blockage of the superior visual field and interfering with activities of daily living. Additionally he had a left lower eyelid lesion that was concerning for basal cell carcinoma. After the risks, benefits and alternatives to the proposed procedure were explained, informed consent was obtained.   PROCEDURE: The patient was brought to the operating room and placed supine on the operating table. IV sedation was given. Each upper lid crease and the excess upper eyelid skin  was marked in a blepharoplasty ellipse with a marking pen.  These areas were all infiltrated with local anesthetic and  was prepped and draped in the typical sterile fashion for oculoplastic surgery. Attention was directed to the right side. The skin was incised following the marked lines. The skin flap was excised with a high temperature cautery. Hemostasis was obtained with high temperature cautery. The orbicularis and orbital septum were opened horizontally and levator  aponeurosis identified and dissected from the superior tarsal border. A strip of pretarsal orbicularis was removed. A 5-0 Mersilene suture was placed in a horizontal mattress fashion taking a partial thickness bite of the superior tarsal border, bringing each end of the double armed suture underneath the levator aponeurosis and tied in a temporary fashion. Attention was directed to the left side where the same procedure was performed. The sutures were adjusted for symmetry then tied in a permanent fashion.  Each skin incision was closed with a running 6-0 plain gut suture. Attention was directed to the left lower eyelid lesion. The lesion was elevated with toothed forceps and excised at its base with sharp Iris scissors. Hemostasis was obtained with thermal cautery. The lesion was sent in formalin. Ophthalmic antibiotic ointment was applied to the incisions and into both eyes. The patient tolerated the procedure well and left the operating room in stable condition.   Janet Garcia MD

## 2019-08-19 ENCOUNTER — TELEPHONE (OUTPATIENT)
Dept: OPHTHALMOLOGY | Facility: CLINIC | Age: 74
End: 2019-08-19

## 2019-08-19 LAB — COPATH REPORT: NORMAL

## 2019-08-19 NOTE — TELEPHONE ENCOUNTER
Health Call Center    Phone Message    May a detailed message be left on voicemail: yes    Reason for Call: Medication Refill Request    Has the patient contacted the pharmacy for the refill? Yes   Name of medication being requested: neomycin-polymyxin-dexamethasone (MAXITROL) 3.5-17027-6.1 SUSP ophthalmic susp, erythromycin (ROMYCIN) 5 MG/GM ophthalmic ointment  Provider who prescribed the medication: Dr. Gonzales  Pharmacy: [facility] [place] - [address]  Date medication is needed: asap         Action Taken: Message routed to:  [facility] ([facility] eye clinic

## 2019-08-21 ENCOUNTER — CARE COORDINATION (OUTPATIENT)
Dept: OPHTHALMOLOGY | Facility: CLINIC | Age: 74
End: 2019-08-21

## 2019-08-21 ENCOUNTER — OFFICE VISIT (OUTPATIENT)
Dept: OPHTHALMOLOGY | Facility: CLINIC | Age: 74
End: 2019-08-21

## 2019-08-21 ENCOUNTER — OFFICE VISIT (OUTPATIENT)
Dept: OPHTHALMOLOGY | Facility: CLINIC | Age: 74
End: 2019-08-21
Payer: COMMERCIAL

## 2019-08-21 DIAGNOSIS — H02.831 DERMATOCHALASIS OF BOTH UPPER EYELIDS: Primary | ICD-10-CM

## 2019-08-21 DIAGNOSIS — L57.0 ACTINIC KERATOSIS: ICD-10-CM

## 2019-08-21 DIAGNOSIS — H02.834 DERMATOCHALASIS OF BOTH UPPER EYELIDS: Primary | ICD-10-CM

## 2019-08-21 DIAGNOSIS — H02.403 INVOLUTIONAL PTOSIS, ACQUIRED, BILATERAL: ICD-10-CM

## 2019-08-21 PROCEDURE — 99024 POSTOP FOLLOW-UP VISIT: CPT | Performed by: OPHTHALMOLOGY

## 2019-08-21 ASSESSMENT — VISUAL ACUITY
OS_CC: 20/15
OD_CC: 20/30
CORRECTION_TYPE: GLASSES
METHOD: SNELLEN - LINEAR
OS_CC+: -2

## 2019-08-21 NOTE — PROGRESS NOTES
Chief Complaint(s) and History of Present Illness(es)     Post Op (Ophthalmology) Both Eyes     Laterality: both eyes    Treatments tried: eye drops and ointment (Maxitrol QID  each eye, EEC TID   each eye and Inner lid at night.  Stopped using ice  2 days ago.- Has not   started warm compresses. )    Pain scale: 0/10    Comments: BILATERAL UPPER EYELID BLEPHAROPLASTY AND BILATERAL PTOSIS   REPAIR and LLL lesion removal 08/16/2019 Lots of itching.               Zurdo Dash is status post bilateral levator advancement ptosis repair and left lower lid biopsy   Incision(s) healing well.  The lid(s)  are  in excellent position.    Path removed: AK    I have recommended:  * Continue antibiotic ointment or bland lubricating ointment (eg vaseline or aquaphor) to the incision site at bedtime.   * Warm soaks 3-4x daily until all edema and ecchymoses resolve  * Return to clinic in 2 months     See Dr. Lewis (Derm) for management of left lower lid actinic keratosis.     Attending Physician Attestation:  Complete documentation of historical and exam elements from today's encounter can be found in the full encounter summary report (not reduplicated in this progress note).  I personally obtained the chief complaint(s) and history of present illness.  I confirmed and edited as necessary the review of systems, past medical/surgical history, family history, social history, and examination findings as documented by others; and I examined the patient myself.  I personally reviewed the relevant tests, images, and reports as documented above.  I formulated and edited as necessary the assessment and plan and discussed the findings and management plan with the patient and family. - Janet Garcia MD

## 2019-08-21 NOTE — NURSING NOTE
Patient presents with:  Post Op (Ophthalmology) Both Eyes: BILATERAL UPPER EYELID BLEPHAROPLASTY AND BILATERAL PTOSIS REPAIR and LLL lesion removal 08/16/2019 Lots of itching.       Lab Results   Component Value Date    A1C 6.4 08/06/2019    A1C 7.1 01/14/2019    A1C 8.4 09/25/2018       Referring Provider:  No referring provider defined for this encounter.        Mala Mcintyre COT

## 2019-08-23 DIAGNOSIS — K21.9 GASTROESOPHAGEAL REFLUX DISEASE, ESOPHAGITIS PRESENCE NOT SPECIFIED: ICD-10-CM

## 2019-08-24 RX ORDER — OMEPRAZOLE 40 MG/1
CAPSULE, DELAYED RELEASE ORAL
Qty: 90 CAPSULE | Refills: 1 | Status: SHIPPED | OUTPATIENT
Start: 2019-08-24 | End: 2019-11-04

## 2019-08-24 NOTE — TELEPHONE ENCOUNTER
Prescription approved per [facility] Refill Protocol.  Sandhya Vasques RN

## 2019-08-26 ENCOUNTER — OFFICE VISIT (OUTPATIENT)
Dept: DERMATOLOGY | Facility: CLINIC | Age: 74
End: 2019-08-26
Payer: COMMERCIAL

## 2019-08-26 DIAGNOSIS — L91.8 INFLAMED ACROCHORDON: ICD-10-CM

## 2019-08-26 DIAGNOSIS — L57.0 ACTINIC KERATOSIS: Primary | ICD-10-CM

## 2019-08-26 PROCEDURE — 99202 OFFICE O/P NEW SF 15 MIN: CPT | Mod: 25 | Performed by: DERMATOLOGY

## 2019-08-26 PROCEDURE — 17000 DESTRUCT PREMALG LESION: CPT | Mod: 59 | Performed by: DERMATOLOGY

## 2019-08-26 PROCEDURE — 17003 DESTRUCT PREMALG LES 2-14: CPT | Performed by: DERMATOLOGY

## 2019-08-26 PROCEDURE — 11200 RMVL SKIN TAGS UP TO&INC 15: CPT | Performed by: DERMATOLOGY

## 2019-08-26 ASSESSMENT — PAIN SCALES - GENERAL: PAINLEVEL: NO PAIN (0)

## 2019-08-26 NOTE — NURSING NOTE
Zurdo Dash's goals for this visit include:   Chief Complaint   Patient presents with     Skin Check     no areas of concern hx AK     RECHECK     AK left lower eyelid       He requests these members of his care team be copied on today's visit information:     PCP: Kapil Duckworth    Referring Provider:  No referring provider defined for this encounter.    There were no vitals taken for this visit.    Do you need any medication refills at today's visit? Shawnee Valentino LPN

## 2019-08-26 NOTE — PATIENT INSTRUCTIONS
Bruising suggestions:    Pineapple and kiwi contain an enzyme called bromelain that helps resolve bruising     Herbal supplement called Arnica [place].         Cryotherapy    What is it?    Use of a very cold liquid, such as liquid nitrogen, to freeze and destroy abnormal skin cells that need to be removed    What should I expect?    Tenderness and redness    A small blister that might grow and fill with dark purple blood. There may be crusting.    More than one treatment may be needed if the lesions do not go away.    How do I care for the treated area?    Gently wash the area with your hands when bathing.    Use a thin layer of Vaseline to help with healing. You may use a Band-Aid.     The area should heal within 7-10 days and may leave behind a pink or lighter color.     Do not use an antibiotic or Neosporin ointment.     You may take acetaminophen (Tylenol) for pain.     Call your Doctor if you have:    Severe pain    Signs of infection (warmth, redness, cloudy yellow drainage, and or a bad smell)    Questions or concerns    Who should I call with questions?       [facility]: [phone]       [facility]: [phone]       For urgent needs outside of business hours call the [facility] at [phone]        and ask for the dermatology resident on call

## 2019-08-26 NOTE — LETTER
8/26/2019         RE: Zurdo Dash  [address] [place] [id number]        Dear Colleague,    Thank you for referring your patient, Zurdo Dash, to the [facility]. Please see a copy of my visit note below.    [facility] Dermatology Note      Dermatology Problem List:  1. Actinic keratosis, s/p cryo   - left lower eyelid s/p biopsy 8/16/19     Encounter Date: Aug 26, 2019    CC:  Chief Complaint   Patient presents with     Skin Check     no areas of concern hx AK     RECHECK     AK left lower eyelid         History of Present Illness:  Mr. Zurdo Dash is a 74 year old male who presents today for a skin check. This is the patients first visit in our dermatology clinic. The patient was seen by Dr. Garcia for eyelid surgery on 8/16/19 when a biopsy was also taken on the left lower eyelid. The pathology results showed actinic keratosis. He is still healing from his eyelid surgery. He has residual bruising on his face from this, but also notes bruising on his arms. He takes aspirin daily. He denies personal or family history of skin cancer. Otherwise, the patient reports no painful, bleeding, nonhealing, or pruritic lesions, and denies new or changing moles.      Past Medical History:   Patient Active Problem List   Diagnosis     Pain in limb     Hyperlipidemia LDL goal <100     Hypertension goal BP (blood pressure) < 140/90     obesity     Hypogonadism     Advanced directives, counseling/discussion     Health Care Home     Type 2 diabetes mellitus with diabetic polyneuropathy, without long-term current use of insulin (H)     RBBB (right bundle branch block)     Ex-smoker     Family history of esophageal cancer     Gastroesophageal reflux disease, esophagitis presence not specified     Rheumatoid arthritis involving multiple sites with positive rheumatoid factor (H)     Pulmonary nodule     High risk medication use     Spondylosis of cervical region without  myelopathy or radiculopathy     Erectile dysfunction, unspecified erectile dysfunction type     Type 2 diabetes mellitus without retinopathy (H)     Tubulovillous adenoma of colon     Diabetic polyneuropathy associated with type 2 diabetes mellitus (H)     Chronic bilateral low back pain without sciatica     Migraine equivalent     Osteoarthritis of both knees, unspecified osteoarthritis type     Posterior vitreous detachment of left eye     Pseudophakia, ou     Eyelid lesion, LLL     Dermatochalasis of both upper eyelids     Past Medical History:   Diagnosis Date     Abnormal CT scan 03/2004    calcified lung granuloma     C. difficile diarrhea     H/O     Cataract 11/18/2011     Diabetic neuropathy (H)     mild, mostly soles and distal forefeet, worse on the left side.     Diverticulitis      ED (erectile dysfunction)      Ex-smoker     QUIT SMOKING FEB 2007     History of ETOH abuse     recovering, sober since 1997     Hyperlipidemia LDL goal <100      Hypertension goal BP (blood pressure) < 140/90      Hypogonadism      Obesity      PAD (peripheral artery disease) (H)     leg cramps, with exertion, no formal diagnosis of PAD and minimal if any symptoms at all.     RA (rheumatoid arthritis) (H)     Dr Bailon     Type 2 diabetes, HbA1c goal < 7% (H) 10/2008    A1C of 7.1 %      Past Surgical History:   Procedure Laterality Date     CATARACT IOL, RT/LT       COLONOSCOPY  03/01/2018    [facility]     COMBINED REPAIR PTOSIS WITH BLEPHAROPLASTY BILATERAL Bilateral 8/16/2019    Procedure: BILATERAL UPPER EYELID BLEPHAROPLASTY AND BILATERAL PTOSIS REPAIR;  Surgeon: Janet Garcia MD;  Location: [facility]     EXCISE LESION EYELID Left 8/16/2019    Procedure: LEFT LOWER EYELID BIOPSY;  Surgeon: Janet Garcia MD;  Location: [facility] OR     HC INCISION TENDON SHEATH FINGER  4/2009    r hand ring finger     PHACOEMULSIFICATION WITH STANDARD INTRAOCULAR LENS IMPLANT  02/2019; 3/2019    left eye; right eye     TONSILLECTOMY          Social History:  Patient reports that he quit smoking about 12 years ago. His smoking use included cigarettes. He has a 40.00 pack-year smoking history. He has never used smokeless tobacco. He reports that he does not drink alcohol or use drugs.    Family History:  Family History   Problem Relation Age of Onset     Cerebrovascular Disease Mother      Arthritis Mother      Osteoporosis Mother      Alzheimer Disease Father      Arthritis Father      Cancer Father      Diabetes Maternal Grandmother      Cardiovascular Maternal Grandmother      Other Cancer Brother      Cancer Paternal Aunt      Hypertension No family hx of      Thyroid Disease No family hx of      Glaucoma No family hx of      Macular Degeneration No family hx of    No family history of skin cancer.     Medications:  Current Outpatient Medications   Medication Sig Dispense Refill     aspirin 81 MG tablet Take 1 tablet by mouth daily.  3     blood glucose (ACCU-CHEK DAYANARA PLUS) test strip TEST THREE TIMES A DAY OR AS DIRECTED 300 each 1     blood glucose (NO BRAND SPECIFIED) lancing device Use to test blood sugars 3 times daily or as directed. 1 each 0     blood glucose monitoring (ONE TOUCH DELICA) lancets Use to test blood sugars 3 times daily or as directed. 300 each 3     cetirizine (ZYRTEC) 10 MG tablet Take 10 mg by mouth At Bedtime       erythromycin (ROMYCIN) 5 MG/GM ophthalmic ointment Apply small amount to incision sites three times daily for 7 days, then apply to inner lower lid of operative eye(s) at bedtime for 7 days, as directed per physician instructions. 3.5 g 0     folic acid (FOLVITE) 1 MG tablet Take 1 tablet (1 mg) by mouth daily 100 tablet 2     hydroxychloroquine (PLAQUENIL) 200 MG tablet Take 1 tablet (200 mg) by mouth 2 times daily 180 tablet 3     lisinopril (PRINIVIL/ZESTRIL) 2.5 MG tablet TAKE ONE TABLET BY MOUTH EVERY DAY 90 tablet 1     metFORMIN (GLUCOPHAGE) 500 MG tablet Take 1 tablet in the morning, and 2 tablets  at night (1500 mg total/day) 180 tablet 1     methotrexate sodium 2.5 MG TABS Take 8 tablets (20 mg) by mouth once a week . Take all 8 tablets on the same day of each week. 96 tablet 2     metoprolol succinate ER (TOPROL-XL) 25 MG 24 hr tablet TAKE ONE TABLET BY MOUTH EVERY DAY 90 tablet 1     omeprazole (PRILOSEC) 40 MG DR capsule TAKE ONE CAPSULE BY MOUTH ONCE DAILY 90 capsule 1     order for DME Equipment being ordered: specialized shoes for diabetic neuropathy as well as all supplies provided for annual treatment of diabetes neuropathy 1 Device 0     order for DME Equipment being ordered: glucometer 1 Device 0     order for DME Equipment being ordered: specialized shoes for diabetic neuropathy     His orthotics will be made by [employer]      Tim Jernigan, Certified   [email or web address]  Phone: [phone]  Fax: [phone] [address] [place] [address] 1 Device 0     OZEMPIC 1 MG/DOSE pen INJECT 1MG SUBCUTANEOUSLY EVERY 7 DAYS 3 mL 2     pravastatin (PRAVACHOL) 40 MG tablet Take 0.5 tablets (20 mg) by mouth every other day 23 tablet 0     tiZANidine (ZANAFLEX) 2 MG tablet Take 1 tablet (2 mg) by mouth 3 times daily as needed for muscle spasms 90 tablet 1     albuterol (PROAIR HFA/PROVENTIL HFA/VENTOLIN HFA) 108 (90 BASE) MCG/ACT Inhaler Inhale 2 puffs into the lungs every 4 hours as needed for other (coughing) (Patient not taking: Reported on 8/26/2019) 1 Inhaler 1     HYDROcodone-acetaminophen (NORCO) 5-325 MG per tablet Take 1-2 tablets by mouth every 4 hours as needed for moderate to severe pain maximum 4 tablet(s) per day (Patient not taking: Reported on 8/26/2019) 28 tablet 0     neomycin-polymyxin-dexamethasone (MAXITROL) 3.5-03690-3.1 SUSP ophthalmic susp Place 1 drop into both eyes 4 times daily (Patient not taking: Reported on 8/26/2019) 5 mL 0       No Known Allergies    Review of Systems:  -Skin Establ Pt: The patient denies any new rash, pruritus, or lesions that  are symptomatic, changing or bleeding, except as per HPI.  -Constitutional: The patient is feeling generally well.    Physical exam:  Vitals: There were no vitals taken for this visit.  GEN: This is a well developed, well-nourished male in no acute distress, in a pleasant mood.    SKIN: Total skin excluding the undergarment areas was performed. The exam included the head/face, neck, both arms, chest, back, abdomen, both legs, digits and/or nails.   - Irregularly bordered purple patches on the extensor forearms and bilateral cheeks.   - Erythematous macules with overyling adherent scale on the left lower eyelid, right eyebrow, and right nasal bridge.  - Appropriately healing linear scars on the upper eyelids. Some yellowish crust and sutures present. No significant erythema or edema.   - Skin colored pedunculated papules with erythema on the bilateral axillae.   - No other lesions of concern on areas examined.           Impression/Plan:  1. Actinic keratosis - left lower eyelid, right eyebrow, and right nasal bridge    Precancerous nature reviewed.  Cryotherapy procedure note: After verbal consent and discussion of risks and benefits including but no limited to dyspigmentation/scar, blister, and pain, 3 were treated with 1-2mm freeze border for 2 cycles with liquid nitrogen. Post cryotherapy instructions were provided.     2. Appropriately healing surgical sites - bilateral upper eyelids s/p blepharoplasty     Recommended application of petroleum jelly until sutures resolve.     3. Inflamed acrochordons - bilateral axillae  Cryotherapy procedure note: After verbal consent and discussion of risks and benefits including but no limited to dyspigmentation/scar, blister, and pain, 2 were treated with 1-2mm freeze border for 2 cycles with liquid nitrogen. Post cryotherapy instructions were provided.     4. Purpura - bilateral cheeks and forearms     Recommended pineapple and kiwi for enzyme bromelain.     Recommended  Arnica montana supplement.     Follow-up in 6 weeks for recheck.       Staff Involved:    Scribe Disclosure  I, Hamzah Mart, am serving as a scribe to document services personally performed by Dr. Jr Lewis DO, based on data collection and the provider's statements to me.     Provider Disclosure:   The documentation recorded by the scribe accurately reflects the services I personally performed and the decisions made by me.  I personally performed the procedures today.    Jr Lewis DO    Department of Dermatology  [facility]: Phone: [phone], Fax:[phone]  [facility]: Phone: [phone], Fax: [phone]        Again, thank you for allowing me to participate in the care of your patient.        Sincerely,        Jr Lewis MD

## 2019-08-26 NOTE — PROGRESS NOTES
[facility] Dermatology Note      Dermatology Problem List:  1. Actinic keratosis, s/p cryo   - left lower eyelid s/p biopsy 8/16/19     Encounter Date: Aug 26, 2019    CC:  Chief Complaint   Patient presents with     Skin Check     no areas of concern hx AK     RECHECK     AK left lower eyelid         History of Present Illness:  Mr. Zurdo Dash is a 74 year old male who presents today for a skin check. This is the patients first visit in our dermatology clinic. The patient was seen by Dr. Garcia for eyelid surgery on 8/16/19 when a biopsy was also taken on the left lower eyelid. The pathology results showed actinic keratosis. He is still healing from his eyelid surgery. He has residual bruising on his face from this, but also notes bruising on his arms. He takes aspirin daily. He denies personal or family history of skin cancer. Otherwise, the patient reports no painful, bleeding, nonhealing, or pruritic lesions, and denies new or changing moles.      Past Medical History:   Patient Active Problem List   Diagnosis     Pain in limb     Hyperlipidemia LDL goal <100     Hypertension goal BP (blood pressure) < 140/90     obesity     Hypogonadism     Advanced directives, counseling/discussion     Health Care Home     Type 2 diabetes mellitus with diabetic polyneuropathy, without long-term current use of insulin (H)     RBBB (right bundle branch block)     Ex-smoker     Family history of esophageal cancer     Gastroesophageal reflux disease, esophagitis presence not specified     Rheumatoid arthritis involving multiple sites with positive rheumatoid factor (H)     Pulmonary nodule     High risk medication use     Spondylosis of cervical region without myelopathy or radiculopathy     Erectile dysfunction, unspecified erectile dysfunction type     Type 2 diabetes mellitus without retinopathy (H)     Tubulovillous adenoma of colon     Diabetic polyneuropathy associated with type 2 diabetes  mellitus (H)     Chronic bilateral low back pain without sciatica     Migraine equivalent     Osteoarthritis of both knees, unspecified osteoarthritis type     Posterior vitreous detachment of left eye     Pseudophakia, ou     Eyelid lesion, LLL     Dermatochalasis of both upper eyelids     Past Medical History:   Diagnosis Date     Abnormal CT scan 03/2004    calcified lung granuloma     C. difficile diarrhea     H/O     Cataract 11/18/2011     Diabetic neuropathy (H)     mild, mostly soles and distal forefeet, worse on the left side.     Diverticulitis      ED (erectile dysfunction)      Ex-smoker     QUIT SMOKING FEB 2007     History of ETOH abuse     recovering, sober since 1997     Hyperlipidemia LDL goal <100      Hypertension goal BP (blood pressure) < 140/90      Hypogonadism      Obesity      PAD (peripheral artery disease) (H)     leg cramps, with exertion, no formal diagnosis of PAD and minimal if any symptoms at all.     RA (rheumatoid arthritis) (H)     Dr Bailon     Type 2 diabetes, HbA1c goal < 7% (H) 10/2008    A1C of 7.1 %      Past Surgical History:   Procedure Laterality Date     CATARACT IOL, RT/LT       COLONOSCOPY  03/01/2018    [facility]     COMBINED REPAIR PTOSIS WITH BLEPHAROPLASTY BILATERAL Bilateral 8/16/2019    Procedure: BILATERAL UPPER EYELID BLEPHAROPLASTY AND BILATERAL PTOSIS REPAIR;  Surgeon: Janet Garcia MD;  Location: [facility]     EXCISE LESION EYELID Left 8/16/2019    Procedure: LEFT LOWER EYELID BIOPSY;  Surgeon: Janet Garcia MD;  Location: [facility] OR     HC INCISION TENDON SHEATH FINGER  4/2009    r hand ring finger     PHACOEMULSIFICATION WITH STANDARD INTRAOCULAR LENS IMPLANT  02/2019; 3/2019    left eye; right eye     TONSILLECTOMY         Social History:  Patient reports that he quit smoking about 12 years ago. His smoking use included cigarettes. He has a 40.00 pack-year smoking history. He has never used smokeless tobacco. He reports that he does not drink alcohol or use  drugs.    Family History:  Family History   Problem Relation Age of Onset     Cerebrovascular Disease Mother      Arthritis Mother      Osteoporosis Mother      Alzheimer Disease Father      Arthritis Father      Cancer Father      Diabetes Maternal Grandmother      Cardiovascular Maternal Grandmother      Other Cancer Brother      Cancer Paternal Aunt      Hypertension No family hx of      Thyroid Disease No family hx of      Glaucoma No family hx of      Macular Degeneration No family hx of    No family history of skin cancer.     Medications:  Current Outpatient Medications   Medication Sig Dispense Refill     aspirin 81 MG tablet Take 1 tablet by mouth daily.  3     blood glucose (ACCU-CHEK DAYANARA PLUS) test strip TEST THREE TIMES A DAY OR AS DIRECTED 300 each 1     blood glucose (NO BRAND SPECIFIED) lancing device Use to test blood sugars 3 times daily or as directed. 1 each 0     blood glucose monitoring (ONE TOUCH DELICA) lancets Use to test blood sugars 3 times daily or as directed. 300 each 3     cetirizine (ZYRTEC) 10 MG tablet Take 10 mg by mouth At Bedtime       erythromycin (ROMYCIN) 5 MG/GM ophthalmic ointment Apply small amount to incision sites three times daily for 7 days, then apply to inner lower lid of operative eye(s) at bedtime for 7 days, as directed per physician instructions. 3.5 g 0     folic acid (FOLVITE) 1 MG tablet Take 1 tablet (1 mg) by mouth daily 100 tablet 2     hydroxychloroquine (PLAQUENIL) 200 MG tablet Take 1 tablet (200 mg) by mouth 2 times daily 180 tablet 3     lisinopril (PRINIVIL/ZESTRIL) 2.5 MG tablet TAKE ONE TABLET BY MOUTH EVERY DAY 90 tablet 1     metFORMIN (GLUCOPHAGE) 500 MG tablet Take 1 tablet in the morning, and 2 tablets at night (1500 mg total/day) 180 tablet 1     methotrexate sodium 2.5 MG TABS Take 8 tablets (20 mg) by mouth once a week . Take all 8 tablets on the same day of each week. 96 tablet 2     metoprolol succinate ER (TOPROL-XL) 25 MG 24 hr tablet  TAKE ONE TABLET BY MOUTH EVERY DAY 90 tablet 1     omeprazole (PRILOSEC) 40 MG DR capsule TAKE ONE CAPSULE BY MOUTH ONCE DAILY 90 capsule 1     order for DME Equipment being ordered: specialized shoes for diabetic neuropathy as well as all supplies provided for annual treatment of diabetes neuropathy 1 Device 0     order for DME Equipment being ordered: glucometer 1 Device 0     order for DME Equipment being ordered: specialized shoes for diabetic neuropathy     His orthotics will be made by [employer]      Tim Jernigan, Certified   [email or web address]  Phone: [phone]  Fax: [phone] [address] [place] [address] 1 Device 0     OZEMPIC 1 MG/DOSE pen INJECT 1MG SUBCUTANEOUSLY EVERY 7 DAYS 3 mL 2     pravastatin (PRAVACHOL) 40 MG tablet Take 0.5 tablets (20 mg) by mouth every other day 23 tablet 0     tiZANidine (ZANAFLEX) 2 MG tablet Take 1 tablet (2 mg) by mouth 3 times daily as needed for muscle spasms 90 tablet 1     albuterol (PROAIR HFA/PROVENTIL HFA/VENTOLIN HFA) 108 (90 BASE) MCG/ACT Inhaler Inhale 2 puffs into the lungs every 4 hours as needed for other (coughing) (Patient not taking: Reported on 8/26/2019) 1 Inhaler 1     HYDROcodone-acetaminophen (NORCO) 5-325 MG per tablet Take 1-2 tablets by mouth every 4 hours as needed for moderate to severe pain maximum 4 tablet(s) per day (Patient not taking: Reported on 8/26/2019) 28 tablet 0     neomycin-polymyxin-dexamethasone (MAXITROL) 3.5-20477-7.1 SUSP ophthalmic susp Place 1 drop into both eyes 4 times daily (Patient not taking: Reported on 8/26/2019) 5 mL 0       No Known Allergies    Review of Systems:  -Skin Establ Pt: The patient denies any new rash, pruritus, or lesions that are symptomatic, changing or bleeding, except as per HPI.  -Constitutional: The patient is feeling generally well.    Physical exam:  Vitals: There were no vitals taken for this visit.  GEN: This is a well developed, well-nourished male in no  acute distress, in a pleasant mood.    SKIN: Total skin excluding the undergarment areas was performed. The exam included the head/face, neck, both arms, chest, back, abdomen, both legs, digits and/or nails.   - Irregularly bordered purple patches on the extensor forearms and bilateral cheeks.   - Erythematous macules with overyling adherent scale on the left lower eyelid, right eyebrow, and right nasal bridge.  - Appropriately healing linear scars on the upper eyelids. Some yellowish crust and sutures present. No significant erythema or edema.   - Skin colored pedunculated papules with erythema on the bilateral axillae.   - No other lesions of concern on areas examined.           Impression/Plan:  1. Actinic keratosis - left lower eyelid, right eyebrow, and right nasal bridge    Precancerous nature reviewed.  Cryotherapy procedure note: After verbal consent and discussion of risks and benefits including but no limited to dyspigmentation/scar, blister, and pain, 3 were treated with 1-2mm freeze border for 2 cycles with liquid nitrogen. Post cryotherapy instructions were provided.     2. Appropriately healing surgical sites - bilateral upper eyelids s/p blepharoplasty     Recommended application of petroleum jelly until sutures resolve.     3. Inflamed acrochordons - bilateral axillae  Cryotherapy procedure note: After verbal consent and discussion of risks and benefits including but no limited to dyspigmentation/scar, blister, and pain, 2 were treated with 1-2mm freeze border for 2 cycles with liquid nitrogen. Post cryotherapy instructions were provided.     4. Purpura - bilateral cheeks and forearms     Recommended pineapple and kiwi for enzyme bromelain.     Recommended Arnica [place] supplement.     Follow-up in 6 weeks for recheck.       Staff Involved:    Scribe Disclosure  I, Hamzah Mart am serving as a scribe to document services personally performed by Dr. Jr Lewis DO, based on data collection  and the provider's statements to me.     Provider Disclosure:   The documentation recorded by the scribe accurately reflects the services I personally performed and the decisions made by me.  I personally performed the procedures today.    Jr Lewis DO    Department of Dermatology  [facility]: Phone: [phone], Fax:[phone]  [facility]: Phone: [phone], Fax: [phone]

## 2019-09-18 ENCOUNTER — OFFICE VISIT (OUTPATIENT)
Dept: RHEUMATOLOGY | Facility: CLINIC | Age: 74
End: 2019-09-18
Payer: COMMERCIAL

## 2019-09-18 VITALS
BODY MASS INDEX: 32.08 KG/M2 | HEIGHT: 69 IN | DIASTOLIC BLOOD PRESSURE: 76 MMHG | HEART RATE: 74 BPM | SYSTOLIC BLOOD PRESSURE: 122 MMHG | OXYGEN SATURATION: 96 %

## 2019-09-18 DIAGNOSIS — M17.0 OSTEOARTHRITIS OF BOTH KNEES, UNSPECIFIED OSTEOARTHRITIS TYPE: ICD-10-CM

## 2019-09-18 DIAGNOSIS — Z79.899 HIGH RISK MEDICATIONS (NOT ANTICOAGULANTS) LONG-TERM USE: ICD-10-CM

## 2019-09-18 DIAGNOSIS — Z23 NEED FOR PROPHYLACTIC VACCINATION AND INOCULATION AGAINST INFLUENZA: ICD-10-CM

## 2019-09-18 DIAGNOSIS — M05.79 RHEUMATOID ARTHRITIS INVOLVING MULTIPLE SITES WITH POSITIVE RHEUMATOID FACTOR (H): Primary | ICD-10-CM

## 2019-09-18 PROCEDURE — G0008 ADMIN INFLUENZA VIRUS VAC: HCPCS | Performed by: INTERNAL MEDICINE

## 2019-09-18 PROCEDURE — 99213 OFFICE O/P EST LOW 20 MIN: CPT | Mod: 25 | Performed by: INTERNAL MEDICINE

## 2019-09-18 PROCEDURE — 20610 DRAIN/INJ JOINT/BURSA W/O US: CPT | Mod: 50 | Performed by: INTERNAL MEDICINE

## 2019-09-18 PROCEDURE — 90662 IIV NO PRSV INCREASED AG IM: CPT | Performed by: INTERNAL MEDICINE

## 2019-09-18 RX ORDER — METHOTREXATE 2.5 MG/1
20 TABLET ORAL WEEKLY
Qty: 96 TABLET | Refills: 1 | Status: SHIPPED | OUTPATIENT
Start: 2019-09-18 | End: 2020-01-15

## 2019-09-18 RX ORDER — TRIAMCINOLONE ACETONIDE 40 MG/ML
40 INJECTION, SUSPENSION INTRA-ARTICULAR; INTRAMUSCULAR ONCE
Status: COMPLETED | OUTPATIENT
Start: 2019-09-18 | End: 2019-09-18

## 2019-09-18 RX ORDER — FOLIC ACID 1 MG/1
1 TABLET ORAL DAILY
Qty: 100 TABLET | Refills: 2 | Status: SHIPPED | OUTPATIENT
Start: 2019-09-18 | End: 2020-08-26

## 2019-09-18 RX ADMIN — TRIAMCINOLONE ACETONIDE 40 MG: 40 INJECTION, SUSPENSION INTRA-ARTICULAR; INTRAMUSCULAR at 14:40

## 2019-09-18 NOTE — PATIENT INSTRUCTIONS
Rheumatology    Dr. Albert Tompkins         [facility]   (Monday)  [address]  [place] [address]       [facility]   (Tuesday)  [address]  [place] [address]    [facility]   (Wed., Thurs., and Friday)  [address] [place] [address]    Phone number: [phone]  Thank you for choosing [facility].  Eryn Espinoza CMA     will see Dr. Soto for cardiac clearance pending

## 2019-09-18 NOTE — PROGRESS NOTES
Rheumatology Clinic Visit      Zurdo Dash MRN# [id number]   YOB: 1945 Age: 74 year old      Date of visit: 9/18/19   PCP: Dr. Kapil Duckworth     Chief Complaint   Patient presents with:  Arthritis: RA. Patient has pain in his back.    Assessment and Plan     1. Seropositive nonerosive rheumatoid arthritis (, CCP 64): Currently on methotrexate 25 mg once weekly, folic acid 1 mg daily, and hydroxychloroquine 200 mg twice a day. Doing well today with regard to rheumatoid arthritis.  He continues to have easy bruising on his arms; this potentially could be methotrexate and/or aspirin related.  Given that he is doing well with regard to rheumatoid arthritis, MTX was reduced to 20mg once weekly but he actually only reduced to 22.5mg wkly; no worsening symptoms so will reduce again to 20mg once weekly.   - Reduce methotrexate from 22.5 mg once weekly; to 20 mg once weekly  - Continue folic acid 1 mg daily  - Continue hydroxychloroquine 200 mg twice a day (last eye exam: 6/15/18; previously referred him to ophthalmology again and asked that he schedule toxicity monitoring eye exam again today)  - Labs every 3 months: CBC, Creatinine, Hepatic Panel    2. Bilateral knee osteoarthritis / patellofemoral syndrome: Was receiving steroid injections approximately every 3-6 months with good control of his symptoms.  Repeat steroid injections today as documented in the procedure section.  Physical therapy has been beneficial and overall his knee pain has been reduced     3. Obesity: encouraged weight loss and discussed the health benefits    Mr. Dash verbalized agreement with and understanding of the rational for the diagnosis and treatment plan.  All questions were answered to best of my ability and the patient's satisfaction. Mr. Dash was advised to contact the clinic with any questions that may arise after the clinic visit.    Thank you for involving me in the care of the patient    Return to clinic: 3-4  months      HPI   Zurdo Dash is a 74 year old male with a medical history significant for hypertension, hyperlipidemia, diabetes, diabetic neuropathy, GERD, and rheumatoid arthritis who presents for f/u of RA and bilateral knee OA.     Today, Mr. Dash reports that his arthritis is doing well.  Tolerating medications.  He says that he only reduce methotrexate to 22.5 mg once weekly instead of 20 mg once weekly.  No worsening symptoms since methotrexate dose was reduced.  Morning stiffness for no more than 5-10 minutes.  Bilateral knees are still an issue; worse with activity and improves with rest; overall doing much better but would like to have steroid injections today because he says he does so much better after them.     Denies fevers, chills, nausea, vomiting, constipation, diarrhea. No abdominal pain. No chest pain/pressure, palpitations, or shortness of breath. No oral or nasal sores. No neck pain. No rash. No LE swelling.      Tobacco: None  EtOH: None  Drugs: None    ROS   GEN: No fevers, chills, or night sweats; intentionally losing weight on the [employer] diet  SKIN: No rash. Sores from a recent fall.  HEENT: No oral or nasal ulcers.  CV: No chest pain, pressure, palpitations, or dyspnea on exertion.  PULM: No SOB, wheeze, cough.  GI: No nausea, vomiting, constipation, diarrhea. No blood in stool. No abdominal pain.  : No blood in urine.  MSK: See HPI.  NEURO: No numbness, tingling, or weakness.  EXT: No LE swelling  PSYCH: Negative    Active Problem List     Patient Active Problem List   Diagnosis     Pain in limb     Hyperlipidemia LDL goal <100     Hypertension goal BP (blood pressure) < 140/90     obesity     Hypogonadism     Advanced directives, counseling/discussion     Health Care Home     Type 2 diabetes mellitus with diabetic polyneuropathy, without long-term current use of insulin (H)     RBBB (right bundle branch block)     Ex-smoker     Family history of esophageal cancer      Gastroesophageal reflux disease, esophagitis presence not specified     Rheumatoid arthritis involving multiple sites with positive rheumatoid factor (H)     Pulmonary nodule     High risk medication use     Spondylosis of cervical region without myelopathy or radiculopathy     Erectile dysfunction, unspecified erectile dysfunction type     Type 2 diabetes mellitus without retinopathy (H)     Tubulovillous adenoma of colon     Diabetic polyneuropathy associated with type 2 diabetes mellitus (H)     Chronic bilateral low back pain without sciatica     Migraine equivalent     Osteoarthritis of both knees, unspecified osteoarthritis type     Posterior vitreous detachment of left eye     Pseudophakia, ou     Eyelid lesion, LLL     Dermatochalasis of both upper eyelids     Past Medical History     Past Medical History:   Diagnosis Date     Abnormal CT scan 03/2004    calcified lung granuloma     C. difficile diarrhea     H/O     Cataract 11/18/2011     Diabetic neuropathy (H)     mild, mostly soles and distal forefeet, worse on the left side.     Diverticulitis      ED (erectile dysfunction)      Ex-smoker     QUIT SMOKING FEB 2007     History of ETOH abuse     recovering, sober since 1997     Hyperlipidemia LDL goal <100      Hypertension goal BP (blood pressure) < 140/90      Hypogonadism      Obesity      PAD (peripheral artery disease) (H)     leg cramps, with exertion, no formal diagnosis of PAD and minimal if any symptoms at all.     RA (rheumatoid arthritis) (H)     Dr Bailon     Type 2 diabetes, HbA1c goal < 7% (H) 10/2008    A1C of 7.1 %      Past Surgical History     Past Surgical History:   Procedure Laterality Date     CATARACT IOL, RT/LT       COLONOSCOPY  03/01/2018    [facility]     COMBINED REPAIR PTOSIS WITH BLEPHAROPLASTY BILATERAL Bilateral 8/16/2019    Procedure: BILATERAL UPPER EYELID BLEPHAROPLASTY AND BILATERAL PTOSIS REPAIR;  Surgeon: Janet Garcia MD;  Location: [facility] OR     EXCISE LESION EYELID  Left 8/16/2019    Procedure: LEFT LOWER EYELID BIOPSY;  Surgeon: Janet Garcia MD;  Location: [facility] OR     HC INCISION TENDON SHEATH FINGER  4/2009    r hand ring finger     PHACOEMULSIFICATION WITH STANDARD INTRAOCULAR LENS IMPLANT  02/2019; 3/2019    left eye; right eye     TONSILLECTOMY       Allergy   No Known Allergies  Current Medication List     Current Outpatient Medications   Medication Sig     aspirin 81 MG tablet Take 1 tablet by mouth daily.     blood glucose (ACCU-CHEK DAYANARA PLUS) test strip TEST THREE TIMES A DAY OR AS DIRECTED     blood glucose (NO BRAND SPECIFIED) lancing device Use to test blood sugars 3 times daily or as directed.     blood glucose monitoring (ONE TOUCH DELICA) lancets Use to test blood sugars 3 times daily or as directed.     cetirizine (ZYRTEC) 10 MG tablet Take 10 mg by mouth At Bedtime     erythromycin (ROMYCIN) 5 MG/GM ophthalmic ointment Apply small amount to incision sites three times daily for 7 days, then apply to inner lower lid of operative eye(s) at bedtime for 7 days, as directed per physician instructions.     folic acid (FOLVITE) 1 MG tablet Take 1 tablet (1 mg) by mouth daily     hydroxychloroquine (PLAQUENIL) 200 MG tablet Take 1 tablet (200 mg) by mouth 2 times daily     lisinopril (PRINIVIL/ZESTRIL) 2.5 MG tablet TAKE ONE TABLET BY MOUTH EVERY DAY     metFORMIN (GLUCOPHAGE) 500 MG tablet Take 1 tablet in the morning, and 2 tablets at night (1500 mg total/day)     methotrexate sodium 2.5 MG TABS Take 8 tablets (20 mg) by mouth once a week . Take all 8 tablets on the same day of each week.     metoprolol succinate ER (TOPROL-XL) 25 MG 24 hr tablet TAKE ONE TABLET BY MOUTH EVERY DAY     omeprazole (PRILOSEC) 40 MG DR capsule TAKE ONE CAPSULE BY MOUTH ONCE DAILY     order for DME Equipment being ordered: specialized shoes for diabetic neuropathy as well as all supplies provided for annual treatment of diabetes neuropathy     order for DME Equipment being ordered:  glucometer     order for DME Equipment being ordered: specialized shoes for diabetic neuropathy     His orthotics will be made by [email or web address]      Tim Jernigan, Certified   [email or web address]  Phone: [phone]  Fax: [phone] [address] [place] [address]     OZEMPIC 1 MG/DOSE pen INJECT 1MG SUBCUTANEOUSLY EVERY 7 DAYS     pravastatin (PRAVACHOL) 40 MG tablet Take 0.5 tablets (20 mg) by mouth every other day     tiZANidine (ZANAFLEX) 2 MG tablet Take 1 tablet (2 mg) by mouth 3 times daily as needed for muscle spasms     albuterol (PROAIR HFA/PROVENTIL HFA/VENTOLIN HFA) 108 (90 BASE) MCG/ACT Inhaler Inhale 2 puffs into the lungs every 4 hours as needed for other (coughing) (Patient not taking: Reported on 8/26/2019)     HYDROcodone-acetaminophen (NORCO) 5-325 MG per tablet Take 1-2 tablets by mouth every 4 hours as needed for moderate to severe pain maximum 4 tablet(s) per day (Patient not taking: Reported on 8/26/2019)     neomycin-polymyxin-dexamethasone (MAXITROL) 3.5-72045-2.1 SUSP ophthalmic susp Place 1 drop into both eyes 4 times daily (Patient not taking: Reported on 8/26/2019)     No current facility-administered medications for this visit.          Social History   See HPI    Family History     Family History   Problem Relation Age of Onset     Cerebrovascular Disease Mother      Arthritis Mother      Osteoporosis Mother      Alzheimer Disease Father      Arthritis Father      Cancer Father      Diabetes Maternal Grandmother      Cardiovascular Maternal Grandmother      Other Cancer Brother      Cancer Paternal Aunt      Hypertension No family hx of      Thyroid Disease No family hx of      Glaucoma No family hx of      Macular Degeneration No family hx of        Physical Exam     Temp Readings from Last 3 Encounters:   08/16/19 96.8  F (36  C) (Oral)   08/06/19 97  F (36.1  C) (Oral)   05/15/19 97.4  F (36.3  C) (Oral)     BP Readings from Last 5 Encounters:  "  09/18/19 122/76   08/16/19 (!) 141/93   08/06/19 118/82   05/15/19 110/70   03/21/19 (!) 162/101     Pulse Readings from Last 1 Encounters:   09/18/19 74     Resp Readings from Last 1 Encounters:   08/16/19 12     Estimated body mass index is 32.08 kg/m  as calculated from the following:    Height as of this encounter: 1.74 m (5' 8.5\").    Weight as of 8/16/19: 97.1 kg (214 lb 1.6 oz).    GEN: NAD, obese  HEENT: MMM. No oral lesions. Anicteric, noninjected sclera  CV: S1, S2. RRR. No m/r/g.  PULM: CTA bilaterally. No w/c.  MSK: MCPs, PIPs, wrists, elbows, shoulders, and ankles without swelling or tenderness to palpation.  Negative MCP and MTP squeeze.  Knees with crepitation and medial joint line tenderness but no effusion or increased warmth  SKIN: No rash.   EXT: No LE edema  PSYCH: Alert. Appropriate.    Labs / Imaging (select studies)     9/28/1999  ([facility])    10/17/2013 [facility] knee syn[facility] fluid at [facility]: , N 3%, No crystals    RF/CCP  Recent Labs   Lab Test 04/07/16  1149   CCPIGG 64*     CBC  Recent Labs   Lab Test 07/16/19  1207 04/18/19  1108 01/14/19  1716   WBC 7.7 7.1 9.0   RBC 4.41 4.63 4.74   HGB 13.9 14.6 14.9   HCT 40.7 43.2 44.3   MCV 92 93 94   RDW 14.8 14.5 14.9    229 303   MCH 31.5 31.5 31.4   MCHC 34.2 33.8 33.6   NEUTROPHIL 74.7 74.1 71.8   LYMPH 12.2 13.2 14.5   MONOCYTE 11.9 10.4 11.8   EOSINOPHIL 0.6 1.0 1.0   BASOPHIL 0.6 1.3 0.9   ANEU 5.8 5.3 6.5   ALYM 0.9 0.9 1.3   SMITH 0.9 0.7 1.1   AEOS 0.1 0.1 0.1   ABAS 0.1 0.1 0.1     CMP  Recent Labs   Lab Test 07/16/19  1207 04/18/19  1108 01/14/19  1716  03/26/18  1130 03/05/18  1315   NA  --   --  141  --  139 140   POTASSIUM  --   --  4.2  --  4.6 5.5*   CHLORIDE  --   --  108  --  107 108   CO2  --   --  24  --  23 23   ANIONGAP  --   --  9  --  9 9   GLC  --   --  142*  --  84 72   BUN  --   --  22  --  20 21   CR 1.17 1.12 1.33*   < > 1.01 0.96   GFRESTIMATED 61 64 52*   < > 72 77   GFRESTBLACK 70 74 " "60*   < > 87 >90   NEW  --   --  9.2  --  8.8 9.5   BILITOTAL 0.5 0.6 0.3   < >  --   --    ALBUMIN 3.7 3.7 3.6   < >  --   --    PROTTOTAL 6.5* 7.0 7.0   < >  --   --    ALKPHOS 42 45 51   < >  --   --    AST 29 22 22   < >  --   --    ALT 42 45 32   < >  --   --     < > = values in this interval not displayed.     Calcium/VitaminD  Recent Labs   Lab Test 01/14/19  1716 03/26/18  1130 03/05/18  1315  07/23/13  1016  12/05/11  1200   NEW 9.2 8.8 9.5   < >  --    < > 9.3   D3VIT  --   --   --   --   --   --  30   VITDT  --   --   --   --  28*  --   --     < > = values in this interval not displayed.     ESR/CRP  Recent Labs   Lab Test 07/16/19  1207 04/18/19  1108 01/14/19  1716   SED 8 9 13   CRP 4.2 7.2 9.8*     Hepatitis B  Recent Labs   Lab Test 04/07/16  1149   HBCAB Nonreactive   HEPBANG Nonreactive     Hepatitis C  Recent Labs   Lab Test 04/07/16  1149   HCVAB Nonreactive   Assay performance characteristics have not been established for newborns,   infants, and children       HIV Screening  Recent Labs   Lab Test 04/07/16  1149   HIAGAB Nonreactive   HIV-1 p24 Ag & HIV-1/HIV-2 Ab Not Detected           \"MRI LEFT KNEE  10/08/2013    INDICATION: Left knee pain. Locking, catching and snapping. Weakness.  TECHNIQUE: Routine.  COMPARISON: None.    FINDINGS:  MEDIAL COMPARTMENT: Linear tearing involving the posterior horn and body of   the medial meniscus as seen on series 4: image 6-8. This is also seen on   series 5: image 9-10. Cartilage is thinned peripherally.    LATERAL COMPARTMENT: Lateral meniscus also demonstrates tearing in the   posterior horn on series 4: image 22. There is diffuse tearing in the body   of the meniscus which is horizontal as seen on series 5: image 9 and this   extends into the anterior horn. Cartilage is thinned.    PATELLOFEMORAL COMPARTMENT: Retropatellar cartilage demonstrates   full-thickness loss of cartilage over portions of the median ridge, lateral   facet and medial facet. " "Cartilage is better preserved over the lower pole   centrally. There is also full-thickness loss of cartilage in the lateral   and central trochlear groove. Patella is normally aligned. Retinaculum are   intact.    LIGAMENTS AND TENDONS: The anterior cruciate and posterior cruciate   ligaments are intact. The medial collateral ligament is intact. Lateral   collateral ligamentous complex and popliteus insertion are intact.   Quadriceps tendon and patellar tendon are intact.    BONES AND SOFT TISSUES: Moderate-sized joint effusion. No popliteal cyst.   No muscle edema or fracture.    CONCLUSION:  1. There is tearing of the medial meniscus involving the posterior horn and   body. Cartilage is thinned peripherally.  2. There is also tearing in the posterior horn and body of the lateral   meniscus which also extends into the anterior horn. Cartilage is also   thinned in the lateral compartment.  3. Moderate to severe osteoarthritis in the patellofemoral compartment with   full-thickness loss of cartilage over large portions of the retropatellar   surface and trochlear surface.  4. Joint effusion.    IF YOU ARE A PHYSICIAN AND HAVE QUESTIONS REGARDING THIS REPORT, PLEASE   CALL [phone].\"    \"X-RAY KNEES BILATERAL AP W/LAT/SUN/PA LEFT   Oct 08, 2013 09:51:59 AM     INDICATION: Pain and swelling   COMPARISON: None.      FINDINGS: Moderate narrowing lateral aspect of the patellofemoral   compartment with slight lateral subluxation of the patella. Medial and   lateral compartments are preserved. Moderate knee joint effusion and/or   synovitis. Enthesophyte formation distal quadriceps and proximal patellar   tendons. Extensive vascular calcifications.     AP view right knee demonstrates trace narrowing medial compartment joint   Space.\"    Immunization History     Immunization History   Administered Date(s) Administered     Influenza (High Dose) 3 valent vaccine 09/20/2012, 10/20/2015, 09/20/2016, 08/28/2017, 09/24/2018 "     Influenza (IIV3) PF 10/05/1999, 10/30/2008, 09/28/2011, 10/14/2013, 10/03/2014     Pneumo Conj 13-V (2010&after) 06/29/2015     Pneumococcal 23 valent 08/19/2010     TD (ADULT, 7+) 08/05/1997, 02/03/2011     Zoster vaccine, live 04/19/2010       Procedure     Procedure: Steroid injections of the bilateral knees  Indication: Pain, bilateral patellofemoral syndrome    The procedure was explained in detail. Risks including infection, pain, structural damage such as cartilage damage and tendon rupture, fat atrophy, skin hyper-/hypo-pigmentation, and medication reaction was explained. The need for rest of the affected joint for one week after the procedure was explained.  The option of not doing the procedure was also provided. All questions were answered and the patient consented to the procedure.     A time-out was performed and the correct patient, procedure, and laterality were verified.    The right knee was examined and location for injection was identified - anterior medial. The area was cleaned with chlorhexidine, twice.  Ethyl chloride was then used for topical anaesthetic.  Then a mixture of lidocaine 1% 2 mL and Kenalog 40mg was injected into the intra-articular space.     The left knee was examined and location for injection was identified - anterior medial. The area was cleaned with chlorhexidine, twice.  Ethyl chloride was then used for topical anaesthetic.  Then a mixture of lidocaine 1% 2 mL and Kenalog 40mg was injected into the intra-articular space.     The patient tolerated the procedure well. No complications.    MEDICATION: Triamcinolone 40 mg  LOT #: [id number]  :  [employer]  EXPIRATION DATE:  01/2021  NDC#: [address]-9     MEDICATION: Triamcinolone 40 mg  LOT #: [id number]  :  [employer]  EXPIRATION DATE:  01/2021  NDC#: [address]-6     1% Lidocaine  :  CAPPT[employer]  Lot #:  [id number]  Expiration date:  08/2020  NDC: 8476-0880-80          Chart documentation done in part with Dragon Voice recognition Software. Although reviewed after completion, some word and grammatical error may remain.    Albert Tompkins MD

## 2019-09-18 NOTE — NURSING NOTE
The following medication was given:     MEDICATION: Triamcinolone 40 mg  SITE: Right knee  DOSE: 1 ml  LOT #: [id number]  :  [employer]  EXPIRATION DATE:  01/2021  NDC#: [address]-8  Consent signed at:  1:38PM    MEDICATION: Triamcinolone 40 mg  SITE: Left knee  DOSE: 1 ml  LOT #: [id number]  :  [employer]  EXPIRATION DATE:  01/2021  NDC#: [address]-2    1% Lidocaine  :  [employer]  Lot #:  [id number]  Expiration date:  08/2020  NDC: 7570-5651-58

## 2019-09-18 NOTE — NURSING NOTE
RAPID3 (0-30) Cumulative Score  6.3          RAPID3 Weighted Score (divide #4 by 3 and that is the weighted score)  2.1     Eryn Espinoza [facility]  9/18/2019 1:08 PM

## 2019-10-16 DIAGNOSIS — I95.9 HYPOTENSION, UNSPECIFIED HYPOTENSION TYPE: ICD-10-CM

## 2019-10-16 DIAGNOSIS — M05.79 RHEUMATOID ARTHRITIS INVOLVING MULTIPLE SITES WITH POSITIVE RHEUMATOID FACTOR (H): ICD-10-CM

## 2019-10-16 LAB
ALBUMIN SERPL-MCNC: 3.5 G/DL (ref 3.4–5)
ALP SERPL-CCNC: 45 U/L (ref 40–150)
ALT SERPL W P-5'-P-CCNC: 39 U/L (ref 0–70)
AST SERPL W P-5'-P-CCNC: 23 U/L (ref 0–45)
BASOPHILS # BLD AUTO: 0 10E9/L (ref 0–0.2)
BASOPHILS NFR BLD AUTO: 0.7 %
BILIRUB DIRECT SERPL-MCNC: 0.1 MG/DL (ref 0–0.2)
BILIRUB SERPL-MCNC: 0.5 MG/DL (ref 0.2–1.3)
CREAT SERPL-MCNC: 1.09 MG/DL (ref 0.66–1.25)
DIFFERENTIAL METHOD BLD: NORMAL
EOSINOPHIL # BLD AUTO: 0 10E9/L (ref 0–0.7)
EOSINOPHIL NFR BLD AUTO: 0.7 %
ERYTHROCYTE [DISTWIDTH] IN BLOOD BY AUTOMATED COUNT: 14.1 % (ref 10–15)
GFR SERPL CREATININE-BSD FRML MDRD: 66 ML/MIN/{1.73_M2}
HCT VFR BLD AUTO: 43.4 % (ref 40–53)
HGB BLD-MCNC: 14.5 G/DL (ref 13.3–17.7)
LYMPHOCYTES # BLD AUTO: 0.8 10E9/L (ref 0.8–5.3)
LYMPHOCYTES NFR BLD AUTO: 13.9 %
MCH RBC QN AUTO: 31 PG (ref 26.5–33)
MCHC RBC AUTO-ENTMCNC: 33.4 G/DL (ref 31.5–36.5)
MCV RBC AUTO: 93 FL (ref 78–100)
MONOCYTES # BLD AUTO: 0.5 10E9/L (ref 0–1.3)
MONOCYTES NFR BLD AUTO: 8.1 %
NEUTROPHILS # BLD AUTO: 4.5 10E9/L (ref 1.6–8.3)
NEUTROPHILS NFR BLD AUTO: 76.6 %
PLATELET # BLD AUTO: 175 10E9/L (ref 150–450)
PROT SERPL-MCNC: 6.8 G/DL (ref 6.8–8.8)
RBC # BLD AUTO: 4.67 10E12/L (ref 4.4–5.9)
WBC # BLD AUTO: 5.9 10E9/L (ref 4–11)

## 2019-10-16 PROCEDURE — 82565 ASSAY OF CREATININE: CPT | Performed by: INTERNAL MEDICINE

## 2019-10-16 PROCEDURE — 85025 COMPLETE CBC W/AUTO DIFF WBC: CPT | Performed by: INTERNAL MEDICINE

## 2019-10-16 PROCEDURE — 36415 COLL VENOUS BLD VENIPUNCTURE: CPT | Performed by: INTERNAL MEDICINE

## 2019-10-16 PROCEDURE — 80076 HEPATIC FUNCTION PANEL: CPT | Performed by: INTERNAL MEDICINE

## 2019-10-16 NOTE — LETTER
[address] [place] [address]    October 17, 2019    Zurdo Dash  [address] [facility]  [place] [address]          Dear Zurdo,    Your labs are normal.     Please let me know if you have any questions.    Enclosed is a copy of your results.     Results for orders placed or performed in visit on 10/16/19   Hepatic panel   Result Value Ref Range    Bilirubin Direct 0.1 0.0 - 0.2 mg/dL    Bilirubin Total 0.5 0.2 - 1.3 mg/dL    Albumin 3.5 3.4 - 5.0 g/dL    Protein Total 6.8 6.8 - 8.8 g/dL    Alkaline Phosphatase 45 40 - 150 U/L    ALT 39 0 - 70 U/L    AST 23 0 - 45 U/L   Creatinine   Result Value Ref Range    Creatinine 1.09 0.66 - 1.25 mg/dL    GFR Estimate 66 >60 mL/min/[1.73_m2]    GFR Estimate If Black 76 >60 mL/min/[1.73_m2]   CBC with platelets differential   Result Value Ref Range    WBC 5.9 4.0 - 11.0 10e9/L    RBC Count 4.67 4.4 - 5.9 10e12/L    Hemoglobin 14.5 13.3 - 17.7 g/dL    Hematocrit 43.4 40.0 - 53.0 %    MCV 93 78 - 100 fl    MCH 31.0 26.5 - 33.0 pg    MCHC 33.4 31.5 - 36.5 g/dL    RDW 14.1 10.0 - 15.0 %    Platelet Count 175 150 - 450 10e9/L    Diff Method Automated Method     % Neutrophils 76.6 %    % Lymphocytes 13.9 %    % Monocytes 8.1 %    % Eosinophils 0.7 %    % Basophils 0.7 %    Absolute Neutrophil 4.5 1.6 - 8.3 10e9/L    Absolute Lymphocytes 0.8 0.8 - 5.3 10e9/L    Absolute Monocytes 0.5 0.0 - 1.3 10e9/L    Absolute Eosinophils 0.0 0.0 - 0.7 10e9/L    Absolute Basophils 0.0 0.0 - 0.2 10e9/L       If you have any questions or concerns, please call myself or my nurse at [phone].      Sincerely,      Albert Tompkins MD/ayesha

## 2019-10-16 NOTE — RESULT ENCOUNTER NOTE
"Please mail Mr. Dash his results with the following message.    \"Mr. Dash,    Your labs are normal.    Please let me know if you have any questions.    Sincerely,  Albert Tompkins MD  10/16/2019 5:25 PM\"  "

## 2019-10-18 RX ORDER — LISINOPRIL 2.5 MG/1
TABLET ORAL
Qty: 90 TABLET | Refills: 0 | Status: SHIPPED | OUTPATIENT
Start: 2019-10-18 | End: 2019-11-04

## 2019-10-23 ENCOUNTER — TELEPHONE (OUTPATIENT)
Dept: DERMATOLOGY | Facility: CLINIC | Age: 74
End: 2019-10-23

## 2019-10-23 ENCOUNTER — OFFICE VISIT (OUTPATIENT)
Dept: OPHTHALMOLOGY | Facility: CLINIC | Age: 74
End: 2019-10-23
Payer: COMMERCIAL

## 2019-10-23 DIAGNOSIS — H02.403 INVOLUTIONAL PTOSIS, ACQUIRED, BILATERAL: ICD-10-CM

## 2019-10-23 DIAGNOSIS — H02.834 DERMATOCHALASIS OF BOTH UPPER EYELIDS: Primary | ICD-10-CM

## 2019-10-23 DIAGNOSIS — H02.831 DERMATOCHALASIS OF BOTH UPPER EYELIDS: Primary | ICD-10-CM

## 2019-10-23 DIAGNOSIS — L57.0 ACTINIC KERATOSIS: ICD-10-CM

## 2019-10-23 PROCEDURE — 99024 POSTOP FOLLOW-UP VISIT: CPT | Performed by: OPHTHALMOLOGY

## 2019-10-23 NOTE — PROGRESS NOTES
Chief Complaint(s) and History of Present Illness(es)     Post Op (Ophthalmology) Both Eyes     Laterality: both eyes    Onset: 2 months ago    Pain scale: 0/10              Comments     BILATERAL UPPER EYELID BLEPHAROPLASTY AND BILATERAL PTOSIS   REPAIR Bilateral   LEFT LOWER EYELID BIOPSY Left   8/16/2019  Doing well, no pain, happy with results so far.  Not using any drops or   ointments at this time.    Joanne FRANKEL Soren, COT 11:10 AM 10/23/2019                    Zurdo Dash is about 2 months status post left lower lid biopsy, bilateral upper eyelid blepharoplasty  and bilateral levator advancement ptosis repair.  He is doing well. 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 Underwent cryotherapy with Dr. Lewis. Needs to schedule his f/u (was suppose to see at 6 weeks).    He will follow up with me on an as needed basis.    Complete documentation of historical and exam elements from today's encounter can be found in the full encounter summary report (not reduplicated in this progress note). I personally obtained the chief complaint(s) and history of present illness.  I confirmed and edited as necessary the review of systems, past medical/surgical history, family history, social history, and examination findings as documented by others; and I examined the patient myself. I personally reviewed the relevant tests, images, and reports as documented above. I formulated and edited as necessary the assessment and plan and discussed the findings and management plan with the patient and family. - Janet Garcia MD

## 2019-10-23 NOTE — TELEPHONE ENCOUNTER
[facility] Call Center    Phone Message    May a detailed message be left on voicemail: yes    Reason for Call: Patient called and would like to reschedule his visit with Dr. Lewis.  He will wait for a call back.  Please advise.  Thank you.     Action Taken: Message routed to:  Adult Clinics: Dermatology p [phone]

## 2019-10-23 NOTE — TELEPHONE ENCOUNTER
Writer left a voice message on Zurdo Dash's phone to call back to discuss scheduling. Clinic number provided to call back.    Brigitte Ramos LPN

## 2019-10-24 ENCOUNTER — OFFICE VISIT (OUTPATIENT)
Dept: PODIATRY | Facility: CLINIC | Age: 74
End: 2019-10-24
Payer: COMMERCIAL

## 2019-10-24 VITALS — DIASTOLIC BLOOD PRESSURE: 76 MMHG | SYSTOLIC BLOOD PRESSURE: 118 MMHG | WEIGHT: 213 LBS | BODY MASS INDEX: 31.92 KG/M2

## 2019-10-24 DIAGNOSIS — B35.1 DERMATOPHYTOSIS OF NAIL: ICD-10-CM

## 2019-10-24 DIAGNOSIS — L30.8 OTHER ECZEMA: Primary | ICD-10-CM

## 2019-10-24 DIAGNOSIS — L84 CORNS AND CALLOSITIES: ICD-10-CM

## 2019-10-24 DIAGNOSIS — E11.51 TYPE II DIABETES MELLITUS WITH PERIPHERAL ARTERY DISEASE (H): ICD-10-CM

## 2019-10-24 PROCEDURE — 99213 OFFICE O/P EST LOW 20 MIN: CPT | Mod: 25 | Performed by: PODIATRIST

## 2019-10-24 PROCEDURE — 11056 PARNG/CUTG B9 HYPRKR LES 2-4: CPT | Mod: Q8 | Performed by: PODIATRIST

## 2019-10-24 PROCEDURE — 11721 DEBRIDE NAIL 6 OR MORE: CPT | Mod: 59 | Performed by: PODIATRIST

## 2019-10-24 RX ORDER — CLOBETASOL PROPIONATE 0.5 MG/G
OINTMENT TOPICAL 2 TIMES DAILY
Qty: 30 G | Refills: 0 | Status: SHIPPED | OUTPATIENT
Start: 2019-10-24 | End: 2020-12-15

## 2019-10-24 NOTE — LETTER
10/24/2019         RE: Zurdo Dash  [address] [place] [id number]        Dear Colleague,    Thank you for referring your patient, Zurdo Dash, to the [facility]. Please see a copy of my visit note below.    Subjective:    Patient seen today for diabetic foot exam.    He has a history of a wound on his left third toe.  This has resolved and is still wearing a crest pad.    He states he is not having problems with this and he would like a new pad for this toe.  He complains of a rash on his left medial ankle.  Is had this for about 1 year.  Is quite itchy.  States in general he has dry skin on his feet.  He denies any drainage purulence odor or increased edema on his feet.  The patient has peripheral neuropathy.  He is retired.    ROS: See above       No Known Allergies    Current Outpatient Medications   Medication Sig Dispense Refill     clobetasol (TEMOVATE) 0.05 % external ointment Apply topically 2 times daily 30 g 0     albuterol (PROAIR HFA/PROVENTIL HFA/VENTOLIN HFA) 108 (90 BASE) MCG/ACT Inhaler Inhale 2 puffs into the lungs every 4 hours as needed for other (coughing) (Patient not taking: Reported on 8/26/2019) 1 Inhaler 1     aspirin 81 MG tablet Take 1 tablet by mouth daily.  3     blood glucose (ACCU-CHEK DAYANARA PLUS) test strip TEST THREE TIMES A DAY OR AS DIRECTED 300 each 1     blood glucose (NO BRAND SPECIFIED) lancing device Use to test blood sugars 3 times daily or as directed. 1 each 0     blood glucose monitoring (ONE TOUCH DELICA) lancets Use to test blood sugars 3 times daily or as directed. 300 each 3     cetirizine (ZYRTEC) 10 MG tablet Take 10 mg by mouth At Bedtime       erythromycin (ROMYCIN) 5 MG/GM ophthalmic ointment Apply small amount to incision sites three times daily for 7 days, then apply to inner lower lid of operative eye(s) at bedtime for 7 days, as directed per physician instructions. 3.5 g 0     folic acid (FOLVITE) 1 MG tablet Take 1 tablet (1  mg) by mouth daily 100 tablet 2     HYDROcodone-acetaminophen (NORCO) 5-325 MG per tablet Take 1-2 tablets by mouth every 4 hours as needed for moderate to severe pain maximum 4 tablet(s) per day (Patient not taking: Reported on 8/26/2019) 28 tablet 0     hydroxychloroquine (PLAQUENIL) 200 MG tablet Take 1 tablet (200 mg) by mouth 2 times daily 180 tablet 3     lisinopril (PRINIVIL/ZESTRIL) 2.5 MG tablet TAKE ONE TABLET BY MOUTH ONCE DAILY 90 tablet 0     metFORMIN (GLUCOPHAGE) 500 MG tablet Take 1 tablet in the morning, and 2 tablets at night (1500 mg total/day) 180 tablet 1     methotrexate sodium 2.5 MG TABS Take 8 tablets (20 mg) by mouth once a week . Take all 8 tablets on the same day of each week. 96 tablet 1     metoprolol succinate ER (TOPROL-XL) 25 MG 24 hr tablet TAKE ONE TABLET BY MOUTH EVERY DAY 90 tablet 1     neomycin-polymyxin-dexamethasone (MAXITROL) 3.5-69255-2.1 SUSP ophthalmic susp Place 1 drop into both eyes 4 times daily (Patient not taking: Reported on 8/26/2019) 5 mL 0     omeprazole (PRILOSEC) 40 MG DR capsule TAKE ONE CAPSULE BY MOUTH ONCE DAILY 90 capsule 1     order for DME Equipment being ordered: specialized shoes for diabetic neuropathy as well as all supplies provided for annual treatment of diabetes neuropathy 1 Device 0     order for DME Equipment being ordered: glucometer 1 Device 0     order for DME Equipment being ordered: specialized shoes for diabetic neuropathy     His orthotics will be made by [employer]      Tim Jernigan, Certified   [email or web address]  Phone: [phone]  Fax: [phone]   [place] [address] 1 Device 0     OZEMPIC 1 MG/DOSE pen INJECT 1MG SUBCUTANEOUSLY EVERY 7 DAYS 3 mL 2     pravastatin (PRAVACHOL) 40 MG tablet Take 0.5 tablets (20 mg) by mouth every other day 23 tablet 0     tiZANidine (ZANAFLEX) 2 MG tablet Take 1 tablet (2 mg) by mouth 3 times daily as needed for muscle spasms 90 tablet 1       Patient  Active Problem List   Diagnosis     Pain in limb     Hyperlipidemia LDL goal <100     Hypertension goal BP (blood pressure) < 140/90     obesity     Hypogonadism     Advanced directives, counseling/discussion     Health Care Home     Type 2 diabetes mellitus with diabetic polyneuropathy, without long-term current use of insulin (H)     RBBB (right bundle branch block)     Ex-smoker     Family history of esophageal cancer     Gastroesophageal reflux disease, esophagitis presence not specified     Rheumatoid arthritis involving multiple sites with positive rheumatoid factor (H)     Pulmonary nodule     High risk medication use     Spondylosis of cervical region without myelopathy or radiculopathy     Erectile dysfunction, unspecified erectile dysfunction type     Type 2 diabetes mellitus without retinopathy (H)     Tubulovillous adenoma of colon     Diabetic polyneuropathy associated with type 2 diabetes mellitus (H)     Chronic bilateral low back pain without sciatica     Migraine equivalent     Osteoarthritis of both knees, unspecified osteoarthritis type     Posterior vitreous detachment of left eye     Pseudophakia, ou     Eyelid lesion, LLL     Dermatochalasis of both upper eyelids     Bradycardia     Primary osteoarthritis of both knees     Syncope     Trigger finger, acquired       Past Medical History:   Diagnosis Date     Abnormal CT scan 03/2004    calcified lung granuloma     C. difficile diarrhea     H/O     Cataract 11/18/2011     Diabetic neuropathy (H)     mild, mostly soles and distal forefeet, worse on the left side.     Diverticulitis      ED (erectile dysfunction)      Ex-smoker     QUIT SMOKING FEB 2007     History of ETOH abuse     recovering, sober since 1997     Hyperlipidemia LDL goal <100      Hypertension goal BP (blood pressure) < 140/90      Hypogonadism      Obesity      PAD (peripheral artery disease) (H)     leg cramps, with exertion, no formal diagnosis of PAD and minimal if any  symptoms at all.     RA (rheumatoid arthritis) (H)     Dr Bailon     Type 2 diabetes, HbA1c goal < 7% (H) 10/2008    A1C of 7.1 %        Past Surgical History:   Procedure Laterality Date     CATARACT IOL, RT/LT       COLONOSCOPY  2018    [facility]     COMBINED REPAIR PTOSIS WITH BLEPHAROPLASTY BILATERAL Bilateral 2019    Procedure: BILATERAL UPPER EYELID BLEPHAROPLASTY AND BILATERAL PTOSIS REPAIR;  Surgeon: Janet Garcia MD;  Location: [facility]     EXCISE LESION EYELID Left 2019    Procedure: LEFT LOWER EYELID BIOPSY;  Surgeon: Janet Garcia MD;  Location: [facility] OR     HC INCISION TENDON SHEATH FINGER  2009    r hand ring finger     PHACOEMULSIFICATION WITH STANDARD INTRAOCULAR LENS IMPLANT  2019; 3/2019    left eye; right eye     TONSILLECTOMY         Family History   Problem Relation Age of Onset     Cerebrovascular Disease Mother      Arthritis Mother      Osteoporosis Mother      Alzheimer Disease Father      Arthritis Father      Cancer Father      Diabetes Maternal Grandmother      Cardiovascular Maternal Grandmother      Other Cancer Brother      Cancer Paternal Aunt      Hypertension No family hx of      Thyroid Disease No family hx of      Glaucoma No family hx of      Macular Degeneration No family hx of        Social History     Tobacco Use     Smoking status: Former Smoker     Packs/day: 1.00     Years: 40.00     Pack years: 40.00     Types: Cigarettes     Last attempt to quit: 3/16/2007     Years since quittin.6     Smokeless tobacco: Never Used   Substance Use Topics     Alcohol use: No     Alcohol/week: 0.0 standard drinks         Exam:    Vitals: /76   Wt 96.6 kg (213 lb)   BMI 31.92 kg/m     BMI: Body mass index is 31.92 kg/m .  Height: Data Unavailable    Constitutional/ general:  Pt is in no apparent distress, appears well-nourished.  Cooperative with history and physical exam.     Psych:  The patient answered questions appropriately.  Normal affect.  Seems to  have reasonable expectations, in terms of treatment.     Eyes:  Visual scanning/ tracking without deficit.     Ears:  Response to auditory stimuli is normal.  negative hearing aid devices.  Auricles in proper alignment.     Lymphatic:  Popliteal lymph nodes not enlarged.     Lungs:  Non labored breathing, non labored speech. No cough.  No audible wheezing. Even, quiet breathing.       Vascular: Lateral ankle edema and varicosities noted.  Difficult to palpate tibial pulses.  Dorsalis pedis weakly palpable.  Capillary refill time less than 3 seconds in all digits.  No hair growth noted on skin.    Neuro:  Alert and oriented x 3. Coordinated gait.  Light touch sensation is intact to the L4, L5, S1 distributions. No obvious deficits.  No evidence of neurological-based weakness, spasticity, or contracture in the lower extremities.  Monofilament absent to midfoot.      Derm: Skin thin shiny atrophic with no hair growth.  All hallux nails mycotic.  Callus right hallux distal medial and end of left third toe.  Underlying tissue healthy.  He has a dry patch of skin on his left ankle medial between the medial malleoli and the Achilles.  There is no vesicles.  There is no open wounds.    Musculoskeletal:    Lower extremity muscle strength is normal.  Patient is ambulatory without an assistive device or brace.  Pronated arch with weightbearing.  All lesser toes are hammered and stiff.  Generally his feet are stiff with a decreased range of motion bilaterally.      A:  Diabetes mellitus with peripheral neuropathy and LOPS  Left ankle dyshidrotic eczema  Onychomycosis   Calluses x 2       P: Discussed because of dyshidrotic eczema.  Discussed strategies and keeping feet moist and using occlusive topical just after bathing.  Prescription written for steroid to put on exam until this resolves.  He will not use this any longer than 2 weeks and use sparingly.  Patient instructed to watch his feet daily.  Mycotic nails manually  debrided with a .  Calluses debrided with a fifteen blade.   RTC prn.        Aaron Dent DPM, FACFAS            Again, thank you for allowing me to participate in the care of your patient.        Sincerely,        Aaron Dent DPM

## 2019-10-24 NOTE — PATIENT INSTRUCTIONS
Weight management plan: Patient was referred to their PCP to discuss a diet and exercise plan.  We wish you continued good healing. If you have any questions or concerns, please do not hesitate to contact us at [phone]    Please remember to call and schedule a follow up appointment if one was recommended at your earliest convenience.   PODIATRY CLINIC HOURS  TELEPHONE NUMBER    Dr. Aaron Dent D.P.M [facility]    Clinics:  [facility]    [facility] Pat[facility]   Tuesday 1PM-6PM  [place]  Wednesday 7AM-2PM  [facility]  Thursday 10AM-6PM  [place]  Friday 7AM-3PM  [place]  Specialty schedulers:   [phone] to make an appointment with any Specialty Provider.        Urgent Care locations:    [facility] Monday-Friday 5 pm - 9 pm. Saturday-Sunday 9 am -5pm    Monday-Friday 11 am - 9 pm Saturday 9 am - 5 pm     Monday-Sunday 12 noon-8PM [phone]     If you need a medication refill, please contact us you may need lab work and/or a follow up visit prior to your refill (i.e. Antifungal medications).    [employer] (secure e-mail communication and access to your chart) to send a message or to make an appointment.    If MRI needed please call [place] Zheng at [phone]

## 2019-10-24 NOTE — PROGRESS NOTES
Subjective:    Patient seen today for diabetic foot exam.    He has a history of a wound on his left third toe.  This has resolved and is still wearing a crest pad.    He states he is not having problems with this and he would like a new pad for this toe.  He complains of a rash on his left medial ankle.  Is had this for about 1 year.  Is quite itchy.  States in general he has dry skin on his feet.  He denies any drainage purulence odor or increased edema on his feet.  The patient has peripheral neuropathy.  He is retired.    ROS: See above       No Known Allergies    Current Outpatient Medications   Medication Sig Dispense Refill     clobetasol (TEMOVATE) 0.05 % external ointment Apply topically 2 times daily 30 g 0     albuterol (PROAIR HFA/PROVENTIL HFA/VENTOLIN HFA) 108 (90 BASE) MCG/ACT Inhaler Inhale 2 puffs into the lungs every 4 hours as needed for other (coughing) (Patient not taking: Reported on 8/26/2019) 1 Inhaler 1     aspirin 81 MG tablet Take 1 tablet by mouth daily.  3     blood glucose (ACCU-CHEK DAYANARA PLUS) test strip TEST THREE TIMES A DAY OR AS DIRECTED 300 each 1     blood glucose (NO BRAND SPECIFIED) lancing device Use to test blood sugars 3 times daily or as directed. 1 each 0     blood glucose monitoring (ONE TOUCH DELICA) lancets Use to test blood sugars 3 times daily or as directed. 300 each 3     cetirizine (ZYRTEC) 10 MG tablet Take 10 mg by mouth At Bedtime       erythromycin (ROMYCIN) 5 MG/GM ophthalmic ointment Apply small amount to incision sites three times daily for 7 days, then apply to inner lower lid of operative eye(s) at bedtime for 7 days, as directed per physician instructions. 3.5 g 0     folic acid (FOLVITE) 1 MG tablet Take 1 tablet (1 mg) by mouth daily 100 tablet 2     HYDROcodone-acetaminophen (NORCO) 5-325 MG per tablet Take 1-2 tablets by mouth every 4 hours as needed for moderate to severe pain maximum 4 tablet(s) per day (Patient not taking: Reported on 8/26/2019)  28 tablet 0     hydroxychloroquine (PLAQUENIL) 200 MG tablet Take 1 tablet (200 mg) by mouth 2 times daily 180 tablet 3     lisinopril (PRINIVIL/ZESTRIL) 2.5 MG tablet TAKE ONE TABLET BY MOUTH ONCE DAILY 90 tablet 0     metFORMIN (GLUCOPHAGE) 500 MG tablet Take 1 tablet in the morning, and 2 tablets at night (1500 mg total/day) 180 tablet 1     methotrexate sodium 2.5 MG TABS Take 8 tablets (20 mg) by mouth once a week . Take all 8 tablets on the same day of each week. 96 tablet 1     metoprolol succinate ER (TOPROL-XL) 25 MG 24 hr tablet TAKE ONE TABLET BY MOUTH EVERY DAY 90 tablet 1     neomycin-polymyxin-dexamethasone (MAXITROL) 3.5-97316-0.1 SUSP ophthalmic susp Place 1 drop into both eyes 4 times daily (Patient not taking: Reported on 8/26/2019) 5 mL 0     omeprazole (PRILOSEC) 40 MG DR capsule TAKE ONE CAPSULE BY MOUTH ONCE DAILY 90 capsule 1     order for DME Equipment being ordered: specialized shoes for diabetic neuropathy as well as all supplies provided for annual treatment of diabetes neuropathy 1 Device 0     order for DME Equipment being ordered: glucometer 1 Device 0     order for DME Equipment being ordered: specialized shoes for diabetic neuropathy     His orthotics will be made by [employer]      Tim Jernigan, Certified   [email or web address]  Phone: [phone]  Fax: [phone]  [address] [place] [address] 1 Device 0     OZEMPIC 1 MG/DOSE pen INJECT 1MG SUBCUTANEOUSLY EVERY 7 DAYS 3 mL 2     pravastatin (PRAVACHOL) 40 MG tablet Take 0.5 tablets (20 mg) by mouth every other day 23 tablet 0     tiZANidine (ZANAFLEX) 2 MG tablet Take 1 tablet (2 mg) by mouth 3 times daily as needed for muscle spasms 90 tablet 1       Patient Active Problem List   Diagnosis     Pain in limb     Hyperlipidemia LDL goal <100     Hypertension goal BP (blood pressure) < 140/90     obesity     Hypogonadism     Advanced directives, counseling/discussion     Health Care Home     Type 2  diabetes mellitus with diabetic polyneuropathy, without long-term current use of insulin (H)     RBBB (right bundle branch block)     Ex-smoker     Family history of esophageal cancer     Gastroesophageal reflux disease, esophagitis presence not specified     Rheumatoid arthritis involving multiple sites with positive rheumatoid factor (H)     Pulmonary nodule     High risk medication use     Spondylosis of cervical region without myelopathy or radiculopathy     Erectile dysfunction, unspecified erectile dysfunction type     Type 2 diabetes mellitus without retinopathy (H)     Tubulovillous adenoma of colon     Diabetic polyneuropathy associated with type 2 diabetes mellitus (H)     Chronic bilateral low back pain without sciatica     Migraine equivalent     Osteoarthritis of both knees, unspecified osteoarthritis type     Posterior vitreous detachment of left eye     Pseudophakia, ou     Eyelid lesion, LLL     Dermatochalasis of both upper eyelids     Bradycardia     Primary osteoarthritis of both knees     Syncope     Trigger finger, acquired       Past Medical History:   Diagnosis Date     Abnormal CT scan 03/2004    calcified lung granuloma     C. difficile diarrhea     H/O     Cataract 11/18/2011     Diabetic neuropathy (H)     mild, mostly soles and distal forefeet, worse on the left side.     Diverticulitis      ED (erectile dysfunction)      Ex-smoker     QUIT SMOKING FEB 2007     History of ETOH abuse     recovering, sober since 1997     Hyperlipidemia LDL goal <100      Hypertension goal BP (blood pressure) < 140/90      Hypogonadism      Obesity      PAD (peripheral artery disease) (H)     leg cramps, with exertion, no formal diagnosis of PAD and minimal if any symptoms at all.     RA (rheumatoid arthritis) (H)     Dr Bailon     Type 2 diabetes, HbA1c goal < 7% (H) 10/2008    A1C of 7.1 %        Past Surgical History:   Procedure Laterality Date     CATARACT IOL, RT/LT       COLONOSCOPY  03/01/2018     [facility]     COMBINED REPAIR PTOSIS WITH BLEPHAROPLASTY BILATERAL Bilateral 2019    Procedure: BILATERAL UPPER EYELID BLEPHAROPLASTY AND BILATERAL PTOSIS REPAIR;  Surgeon: Janet Garcia MD;  Location: [facility]     EXCISE LESION EYELID Left 2019    Procedure: LEFT LOWER EYELID BIOPSY;  Surgeon: Janet Garcia MD;  Location: [facility]     HC INCISION TENDON SHEATH FINGER  2009    r hand ring finger     PHACOEMULSIFICATION WITH STANDARD INTRAOCULAR LENS IMPLANT  2019; 3/2019    left eye; right eye     TONSILLECTOMY         Family History   Problem Relation Age of Onset     Cerebrovascular Disease Mother      Arthritis Mother      Osteoporosis Mother      Alzheimer Disease Father      Arthritis Father      Cancer Father      Diabetes Maternal Grandmother      Cardiovascular Maternal Grandmother      Other Cancer Brother      Cancer Paternal Aunt      Hypertension No family hx of      Thyroid Disease No family hx of      Glaucoma No family hx of      Macular Degeneration No family hx of        Social History     Tobacco Use     Smoking status: Former Smoker     Packs/day: 1.00     Years: 40.00     Pack years: 40.00     Types: Cigarettes     Last attempt to quit: 3/16/2007     Years since quittin.6     Smokeless tobacco: Never Used   Substance Use Topics     Alcohol use: No     Alcohol/week: 0.0 standard drinks         Exam:    Vitals: /76   Wt 96.6 kg (213 lb)   BMI 31.92 kg/m    BMI: Body mass index is 31.92 kg/m .  Height: Data Unavailable    Constitutional/ general:  Pt is in no apparent distress, appears well-nourished.  Cooperative with history and physical exam.     Psych:  The patient answered questions appropriately.  Normal affect.  Seems to have reasonable expectations, in terms of treatment.     Eyes:  Visual scanning/ tracking without deficit.     Ears:  Response to auditory stimuli is normal.  negative hearing aid devices.  Auricles in proper alignment.     Lymphatic:  Popliteal  lymph nodes not enlarged.     Lungs:  Non labored breathing, non labored speech. No cough.  No audible wheezing. Even, quiet breathing.       Vascular: Lateral ankle edema and varicosities noted.  Difficult to palpate tibial pulses.  Dorsalis pedis weakly palpable.  Capillary refill time less than 3 seconds in all digits.  No hair growth noted on skin.    Neuro:  Alert and oriented x 3. Coordinated gait.  Light touch sensation is intact to the L4, L5, S1 distributions. No obvious deficits.  No evidence of neurological-based weakness, spasticity, or contracture in the lower extremities.  Monofilament absent to midfoot.      Derm: Skin thin shiny atrophic with no hair growth.  All hallux nails mycotic.  Callus right hallux distal medial and end of left third toe.  Underlying tissue healthy.  He has a dry patch of skin on his left ankle medial between the medial malleoli and the Achilles.  There is no vesicles.  There is no open wounds.    Musculoskeletal:    Lower extremity muscle strength is normal.  Patient is ambulatory without an assistive device or brace.  Pronated arch with weightbearing.  All lesser toes are hammered and stiff.  Generally his feet are stiff with a decreased range of motion bilaterally.      A:  Diabetes mellitus with peripheral neuropathy and LOPS  Left ankle dyshidrotic eczema  Onychomycosis   Calluses x 2       P: Discussed because of dyshidrotic eczema.  Discussed strategies and keeping feet moist and using occlusive topical just after bathing.  Prescription written for steroid to put on exam until this resolves.  He will not use this any longer than 2 weeks and use sparingly.  Patient instructed to watch his feet daily.  Mycotic nails manually debrided with a .  Calluses debrided with a fifteen blade.   RTC prn.        Aaron Dent DPM, FACFAS

## 2019-10-31 PROBLEM — M17.0 OSTEOARTHRITIS OF BOTH KNEES, UNSPECIFIED OSTEOARTHRITIS TYPE: Status: RESOLVED | Noted: 2019-02-08 | Resolved: 2019-10-31

## 2019-10-31 NOTE — PROGRESS NOTES
Subjective:  HPI                    Objective:  System    Physical Exam    General     ROS    Assessment/Plan:    DISCHARGE REPORT    Progress reporting period is from 1.25 to 10.31.19.     SUBJECTIVE  Subjective: rising easier, cont exs every other day    Current Pain level: 1/10   Initial Pain level: 6/10   Changes in function: Yes, see goal flow sheet for change in function   Adverse reactions: None;   ,     Patient has failed to return to therapy so current objective findings are unknown.    OBJECTIVE  Objective: incr'd reps all exs and tolerated well       ASSESSMENT/PLAN  Updated problem list and treatment plan: Diagnosis 1:  bilat knee pain     STG/LTGs have been met or progress has been made towards goals:  Yes, see last entry   Assessment of Progress: Patient has not returned to therapy.  Current status is unknown and discharge G code cannot be reported.  Self Management Plans:  Patient has been instructed in a home treatment program.  Patient  has been instructed in self management of symptoms.    Zurdo continues to require the following intervention to meet STG and LTG's:   The patient failed to complete scheduled/ordered appointments so current information is unknown.  We will discharge this patient from PT.    Recommendations:      Please refer to the daily flowsheet for treatment today, total treatment time and time spent performing 1:1 timed codes.

## 2019-11-04 ENCOUNTER — OFFICE VISIT (OUTPATIENT)
Dept: INTERNAL MEDICINE | Facility: CLINIC | Age: 74
End: 2019-11-04
Payer: COMMERCIAL

## 2019-11-04 ENCOUNTER — ANCILLARY PROCEDURE (OUTPATIENT)
Dept: GENERAL RADIOLOGY | Facility: CLINIC | Age: 74
End: 2019-11-04
Attending: INTERNAL MEDICINE
Payer: COMMERCIAL

## 2019-11-04 ENCOUNTER — OFFICE VISIT (OUTPATIENT)
Dept: DERMATOLOGY | Facility: CLINIC | Age: 74
End: 2019-11-04
Payer: COMMERCIAL

## 2019-11-04 VITALS
BODY MASS INDEX: 32.66 KG/M2 | WEIGHT: 218 LBS | TEMPERATURE: 97.8 F | SYSTOLIC BLOOD PRESSURE: 142 MMHG | OXYGEN SATURATION: 97 % | RESPIRATION RATE: 18 BRPM | HEART RATE: 64 BPM | DIASTOLIC BLOOD PRESSURE: 72 MMHG

## 2019-11-04 DIAGNOSIS — M25.561 CHRONIC PAIN OF BOTH KNEES: ICD-10-CM

## 2019-11-04 DIAGNOSIS — G89.29 CHRONIC PAIN OF BOTH KNEES: ICD-10-CM

## 2019-11-04 DIAGNOSIS — L57.0 AK (ACTINIC KERATOSIS): Primary | ICD-10-CM

## 2019-11-04 DIAGNOSIS — K21.9 GASTROESOPHAGEAL REFLUX DISEASE, ESOPHAGITIS PRESENCE NOT SPECIFIED: ICD-10-CM

## 2019-11-04 DIAGNOSIS — E11.42 TYPE 2 DIABETES MELLITUS WITH DIABETIC POLYNEUROPATHY, WITHOUT LONG-TERM CURRENT USE OF INSULIN (H): ICD-10-CM

## 2019-11-04 DIAGNOSIS — M25.562 CHRONIC PAIN OF BOTH KNEES: ICD-10-CM

## 2019-11-04 DIAGNOSIS — M05.79 RHEUMATOID ARTHRITIS INVOLVING MULTIPLE SITES WITH POSITIVE RHEUMATOID FACTOR (H): ICD-10-CM

## 2019-11-04 DIAGNOSIS — I10 HYPERTENSION GOAL BP (BLOOD PRESSURE) < 140/90: ICD-10-CM

## 2019-11-04 DIAGNOSIS — R29.6 FALLS FREQUENTLY: Primary | ICD-10-CM

## 2019-11-04 DIAGNOSIS — E11.42 DIABETIC POLYNEUROPATHY ASSOCIATED WITH TYPE 2 DIABETES MELLITUS (H): ICD-10-CM

## 2019-11-04 DIAGNOSIS — E78.5 HYPERLIPIDEMIA LDL GOAL <100: ICD-10-CM

## 2019-11-04 LAB
CREAT UR-MCNC: 204 MG/DL
MICROALBUMIN UR-MCNC: 96 MG/L
MICROALBUMIN/CREAT UR: 47.11 MG/G CR (ref 0–17)

## 2019-11-04 PROCEDURE — 73560 X-RAY EXAM OF KNEE 1 OR 2: CPT | Mod: LT

## 2019-11-04 PROCEDURE — 99214 OFFICE O/P EST MOD 30 MIN: CPT | Performed by: INTERNAL MEDICINE

## 2019-11-04 PROCEDURE — 17000 DESTRUCT PREMALG LESION: CPT | Performed by: DERMATOLOGY

## 2019-11-04 PROCEDURE — 82043 UR ALBUMIN QUANTITATIVE: CPT | Performed by: INTERNAL MEDICINE

## 2019-11-04 RX ORDER — METOPROLOL SUCCINATE 25 MG/1
25 TABLET, EXTENDED RELEASE ORAL DAILY
Qty: 90 TABLET | Refills: 1 | Status: SHIPPED | OUTPATIENT
Start: 2019-11-04 | End: 2020-03-30

## 2019-11-04 RX ORDER — LISINOPRIL 2.5 MG/1
2.5 TABLET ORAL DAILY
Qty: 90 TABLET | Refills: 1 | Status: SHIPPED | OUTPATIENT
Start: 2019-11-04 | End: 2020-03-30

## 2019-11-04 RX ORDER — PRAVASTATIN SODIUM 40 MG
20 TABLET ORAL EVERY OTHER DAY
Qty: 23 TABLET | Refills: 0 | Status: SHIPPED | OUTPATIENT
Start: 2019-11-04 | End: 2020-03-30

## 2019-11-04 RX ORDER — OMEPRAZOLE 40 MG/1
CAPSULE, DELAYED RELEASE ORAL
Qty: 90 CAPSULE | Refills: 1 | Status: SHIPPED | OUTPATIENT
Start: 2019-11-04 | End: 2020-03-30

## 2019-11-04 ASSESSMENT — PAIN SCALES - GENERAL: PAINLEVEL: NO PAIN (0)

## 2019-11-04 NOTE — PATIENT INSTRUCTIONS
1) I recommended using a cane if you want to have something to help get up if you were to fall. This can also help to avoid falls.     2) we will do some x-rays of the knees and see how things are going here.     3) If blood pressure is still up today then come back in the next month for a free  blood pressure recheck with the medical assistant     4) Try to incorporate 30-45 minutes of cardiopulmonary fitness at least 3 days a week or more.

## 2019-11-04 NOTE — PROGRESS NOTES
Subjective     Zurdo Dash is a 74 year old male who presents to clinic today for the following health issues:       Falls frequently  Rheumatoid arthritis involving multiple sites with positive rheumatoid factor (H)  Diabetic polyneuropathy associated with type 2 diabetes mellitus (H)  Hypertension goal BP (blood pressure) < 140/90  Hyperlipidemia LDL goal <100  Chronic pain of both knees  Type 2 diabetes mellitus with diabetic polyneuropathy, without long-term current use of insulin (H)  Gastroesophageal reflux disease, esophagitis presence not specified     HPI   Diabetes Mellitus Type 2   Has been using Ozempic. He takes blood glucose readings at home often ranging between 140-150 consistently. On occasion is 110-120 and usually no higher than 180.     Lab Results   Component Value Date    A1C 6.4 08/06/2019    A1C 7.1 01/14/2019    A1C 8.4 09/25/2018    A1C 5.9 03/05/2018    A1C 5.1 10/31/2017     Back Pain   Has been using a massage chair with heat which didn't seem to give relief.     Foot Pain, Itchiness  Had follow-up with Dr. Dent. After having toenails clipped this seemed to resolve some and was given a cream for the additional symptoms he has with the feet including itchiness.     Knee Pain, Falls  Wants to put off total knee arthroplasty. Following with Dr. Tompkins for his rheumatoid arthritis and received steroid injections with the knees bilaterally. Has been very careful about his footing when walking across ice. Has still managed to fall on the ice about once a year. He feels that his knees are weak. We agreed it would be appropriate to get his knee xrays today     Phlegm production  Reports that he sometimes has to work pretty hard to clear his phlegm. Patient is a non smoker.     Vision  Since cataract surgery he has been very pleased with his vision. Often not noting that he needs glasses these days.     Patient Active Problem List   Diagnosis     Pain in limb     Hyperlipidemia LDL goal  <100     Hypertension goal BP (blood pressure) < 140/90     obesity     Hypogonadism     Advanced directives, counseling/discussion     Health Care Home     Type 2 diabetes mellitus with diabetic polyneuropathy, without long-term current use of insulin (H)     RBBB (right bundle branch block)     Ex-smoker     Family history of esophageal cancer     Gastroesophageal reflux disease, esophagitis presence not specified     Rheumatoid arthritis involving multiple sites with positive rheumatoid factor (H)     Pulmonary nodule     High risk medication use     Spondylosis of cervical region without myelopathy or radiculopathy     Erectile dysfunction, unspecified erectile dysfunction type     Type 2 diabetes mellitus without retinopathy (H)     Tubulovillous adenoma of colon     Diabetic polyneuropathy associated with type 2 diabetes mellitus (H)     Chronic bilateral low back pain without sciatica     Migraine equivalent     Posterior vitreous detachment of left eye     Pseudophakia, ou     Eyelid lesion, LLL     Dermatochalasis of both upper eyelids     Bradycardia     Primary osteoarthritis of both knees     Syncope     Trigger finger, acquired     Past Surgical History:   Procedure Laterality Date     CATARACT IOL, RT/LT       COLONOSCOPY  03/01/2018    [facility]     COMBINED REPAIR PTOSIS WITH BLEPHAROPLASTY BILATERAL Bilateral 8/16/2019    Procedure: BILATERAL UPPER EYELID BLEPHAROPLASTY AND BILATERAL PTOSIS REPAIR;  Surgeon: Janet Garcia MD;  Location: [facility]     EXCISE LESION EYELID Left 8/16/2019    Procedure: LEFT LOWER EYELID BIOPSY;  Surgeon: Janet Garcia MD;  Location: [facility] OR     HC INCISION TENDON SHEATH FINGER  4/2009    r hand ring finger     PHACOEMULSIFICATION WITH STANDARD INTRAOCULAR LENS IMPLANT  02/2019; 3/2019    left eye; right eye     TONSILLECTOMY         Social History     Tobacco Use     Smoking status: Former Smoker     Packs/day: 1.00     Years: 40.00     Pack years: 40.00     Types:  Cigarettes     Last attempt to quit: 3/16/2007     Years since quittin.6     Smokeless tobacco: Never Used   Substance Use Topics     Alcohol use: No     Alcohol/week: 0.0 standard drinks     Family History   Problem Relation Age of Onset     Cerebrovascular Disease Mother      Arthritis Mother      Osteoporosis Mother      Alzheimer Disease Father      Arthritis Father      Cancer Father      Diabetes Maternal Grandmother      Cardiovascular Maternal Grandmother      Other Cancer Brother      Cancer Paternal Aunt      Hypertension No family hx of      Thyroid Disease No family hx of      Glaucoma No family hx of      Macular Degeneration No family hx of          BP Readings from Last 3 Encounters:   19 (!) 146/70   10/24/19 118/76   19 122/76    Wt Readings from Last 3 Encounters:   19 98.9 kg (218 lb)   10/24/19 96.6 kg (213 lb)   19 97.1 kg (214 lb 1.6 oz)        Reviewed and updated as needed this visit by Provider       Review of Systems   ROS COMP: Constitutional, HEENT, cardiovascular, pulmonary, GI, , musculoskeletal, neuro, skin, endocrine and psych systems are negative, except as otherwise noted.    This document serves as a record of the services and decisions personally performed and made by Kapil Duckworth MD. It was created on his behalf by Marcos Ayon, a trained medical scribe. The creation of this document is based on the provider's statements to the medical scribe.  Marcos Ayon 11:45 AM 2019      Objective    BP (!) 146/70   Pulse 64   Temp 97.8  F (36.6  C) (Oral)   Resp 18   Wt 98.9 kg (218 lb)   SpO2 97%   BMI 32.66 kg/m    Body mass index is 32.66 kg/m .  Physical Exam   GENERAL: healthy, alert and no distress  EYES: Eyes grossly normal to inspection, PERRL and conjunctivae and sclerae normal  HENT: ear canals and TM's normal, nose and mouth without ulcers or lesions  NECK: no adenopathy, no asymmetry, masses, or scars and thyroid normal to  palpation  RESP: lungs clear to auscultation - no rales, rhonchi or wheezes  CV: regular rate and rhythm, normal S1 S2, no S3 or S4, no murmur, click or rub, no peripheral edema and peripheral pulses strong  ABDOMEN: soft, nontender, no hepatosplenomegaly, no masses and bowel sounds normal  MS: no gross musculoskeletal defects noted, no edema  SKIN: no suspicious lesions or rashes to visible skin   NEURO: Normal strength and tone, mentation intact and speech normal  PSYCH: mentation appears normal, affect normal/bright      Assessment & Plan     (R29.6) Falls frequently  (primary encounter diagnosis)  Comment: typically at least once a year. Not using cane/walker. Has fallen before and unable to get up on his own.  Plan: recommended to use a cane. Consider comprehensive work with [facility] for gait safety and strengthening referral but patient is uninterested . I really forcefully reminded patient of need for structured exercise program     (M05.79) Rheumatoid arthritis involving multiple sites with positive rheumatoid factor (H)  Comment: following with rheumatology. Also getting steroid injections with the knees with limited benefit.  Plan: follow-up with rheumatology as planned.    (E11.42) Diabetic polyneuropathy associated with type 2 diabetes mellitus (H)  Comment: see most recent previous office visit with Dr. Aaron Dent, podiatrist   Plan:     (I10) Hypertension goal BP (blood pressure) < 140/90  Comment: still elevated after recheck  Plan: metoprolol succinate ER (TOPROL-XL) 25 MG 24 hr        tablet        Follow-up in 2-4 weeks for blood pressure recheck.    (E78.5) Hyperlipidemia LDL goal <100  Comment: historically at goal   Plan: pravastatin (PRAVACHOL) 40 MG tablet        Continue current treatment     (M25.561,  M25.562,  G89.29) Chronic pain of both knees  Comment: as above   Plan: XR Knee Bilateral 1/2 Views        As above     (E11.42) Type 2 diabetes mellitus with  "diabetic polyneuropathy, without long-term current use of insulin (H)  Comment: well controlled with current treatment since starting Ozempic.  Plan: Albumin Random Urine Quantitative with Creat         Ratio, blood glucose (ACCU-CHEK DAYANARA PLUS)         test strip, lisinopril (PRINIVIL/ZESTRIL) 2.5         MG tablet, metFORMIN (GLUCOPHAGE) 500 MG         tablet, semaglutide (OZEMPIC, 1 MG/DOSE,) 2         MG/1.5ML pen        Continue current plan of care.    (K21.9) Gastroesophageal reflux disease, esophagitis presence not specified  Comment: well controlled with current medication   Plan: omeprazole (PRILOSEC) 40 MG DR capsule        Continue current treatment        BMI:   Estimated body mass index is 32.66 kg/m  as calculated from the following:    Height as of 9/18/19: 1.74 m (5' 8.5\").    Weight as of this encounter: 98.9 kg (218 lb).   Weight management plan: Discussed healthy diet and exercise guidelines    Return in about 3 months (around 2/4/2020) for Lab Work, Routine Visit.    The information in this document, created by the medical scribe for me, accurately reflects the services I personally performed and the decisions made by me. I have reviewed and approved this document for accuracy prior to leaving the patient care area.  November 4, 2019 12:12 PM     Kapil Duckworth MD  [facility]        "

## 2019-11-04 NOTE — LETTER
[facility]  [address] [place] [address]    November 5, 2019    Zurod Dash  [address] [place] [address]          Dear Zurdo,    All of these tests are within acceptable limits , things look good !     Enclosed is a copy of your results.     Results for orders placed or performed in visit on 11/04/19   Albumin Random Urine Quantitative with Creat Ratio     Status: Abnormal   Result Value Ref Range    Creatinine Urine 204 mg/dL    Albumin Urine mg/L 96 mg/L    Albumin Urine mg/g Cr 47.11 (H) 0 - 17 mg/g Cr       If you have any questions or concerns, please call myself or my nurse at [phone].      Sincerely,        Kapil Duckworth MD/ayesha

## 2019-11-04 NOTE — PROGRESS NOTES
[facility] Dermatology Note      Dermatology Problem List:  1. Actinic keratosis, s/p cryo   - left lower eyelid s/p biopsy 8/16/19     Encounter Date: Nov 4, 2019    CC:  Chief Complaint   Patient presents with     RECHECK     AK left lower eyelid         History of Present Illness:  Mr. Zurdo Dash is a 74 year old male who presents today for a spot check. The patient was last seen on 8/26/19 when 3 actinic keratoses were treated with cryotherapy, including on the left lower eyelid, right eyebrow, and right nasal bridge. Today he reports that these sites seem to have resolved after the last visit. Denies any flakes, grittiness, or other symptoms. He has not noticed any other lesions of concern. Denies any painful, bleeding, growing, or non-resolving lesions. Otherwise in normal state of health. No other skin concerns today.       Past Medical History:   Patient Active Problem List   Diagnosis     Pain in limb     Hyperlipidemia LDL goal <100     Hypertension goal BP (blood pressure) < 140/90     obesity     Hypogonadism     Advanced directives, counseling/discussion     Health Care Home     Type 2 diabetes mellitus with diabetic polyneuropathy, without long-term current use of insulin (H)     RBBB (right bundle branch block)     Ex-smoker     Family history of esophageal cancer     Gastroesophageal reflux disease, esophagitis presence not specified     Rheumatoid arthritis involving multiple sites with positive rheumatoid factor (H)     Pulmonary nodule     High risk medication use     Spondylosis of cervical region without myelopathy or radiculopathy     Erectile dysfunction, unspecified erectile dysfunction type     Type 2 diabetes mellitus without retinopathy (H)     Tubulovillous adenoma of colon     Diabetic polyneuropathy associated with type 2 diabetes mellitus (H)     Chronic bilateral low back pain without sciatica     Migraine equivalent     Posterior vitreous detachment of left  eye     Pseudophakia, ou     Eyelid lesion, LLL     Dermatochalasis of both upper eyelids     Bradycardia     Primary osteoarthritis of both knees     Syncope     Trigger finger, acquired     Past Medical History:   Diagnosis Date     Abnormal CT scan 03/2004    calcified lung granuloma     C. difficile diarrhea     H/O     Cataract 11/18/2011     Diabetic neuropathy (H)     mild, mostly soles and distal forefeet, worse on the left side.     Diverticulitis      ED (erectile dysfunction)      Ex-smoker     QUIT SMOKING FEB 2007     History of ETOH abuse     recovering, sober since 1997     Hyperlipidemia LDL goal <100      Hypertension goal BP (blood pressure) < 140/90      Hypogonadism      Obesity      PAD (peripheral artery disease) (H)     leg cramps, with exertion, no formal diagnosis of PAD and minimal if any symptoms at all.     RA (rheumatoid arthritis) (H)     Dr Bailon     Type 2 diabetes, HbA1c goal < 7% (H) 10/2008    A1C of 7.1 %      Past Surgical History:   Procedure Laterality Date     CATARACT IOL, RT/LT       COLONOSCOPY  03/01/2018    [facility]     COMBINED REPAIR PTOSIS WITH BLEPHAROPLASTY BILATERAL Bilateral 8/16/2019    Procedure: BILATERAL UPPER EYELID BLEPHAROPLASTY AND BILATERAL PTOSIS REPAIR;  Surgeon: Janet Garcia MD;  Location: [facility] OR     EXCISE LESION EYELID Left 8/16/2019    Procedure: LEFT LOWER EYELID BIOPSY;  Surgeon: Janet Garcia MD;  Location: [facility] OR     HC INCISION TENDON SHEATH FINGER  4/2009    r hand ring finger     PHACOEMULSIFICATION WITH STANDARD INTRAOCULAR LENS IMPLANT  02/2019; 3/2019    left eye; right eye     TONSILLECTOMY         Social History:  Patient reports that he quit smoking about 12 years ago. His smoking use included cigarettes. He has a 40.00 pack-year smoking history. He has never used smokeless tobacco. He reports that he does not drink alcohol or use drugs.    Family History:  Family History   Problem Relation Age of Onset     Cerebrovascular  Disease Mother      Arthritis Mother      Osteoporosis Mother      Alzheimer Disease Father      Arthritis Father      Cancer Father      Diabetes Maternal Grandmother      Cardiovascular Maternal Grandmother      Other Cancer Brother      Cancer Paternal Aunt      Hypertension No family hx of      Thyroid Disease No family hx of      Glaucoma No family hx of      Macular Degeneration No family hx of    No family history of skin cancer.     Medications:  Current Outpatient Medications   Medication Sig Dispense Refill     albuterol (PROAIR HFA/PROVENTIL HFA/VENTOLIN HFA) 108 (90 BASE) MCG/ACT Inhaler Inhale 2 puffs into the lungs every 4 hours as needed for other (coughing) 1 Inhaler 1     blood glucose (ACCU-CHEK DAYANARA PLUS) test strip TEST THREE TIMES A DAY OR AS DIRECTED 300 each 1     blood glucose (NO BRAND SPECIFIED) lancing device Use to test blood sugars 3 times daily or as directed. 1 each 0     blood glucose monitoring (ONE TOUCH DELICA) lancets Use to test blood sugars 3 times daily or as directed. 300 each 3     cetirizine (ZYRTEC) 10 MG tablet Take 10 mg by mouth At Bedtime       clobetasol (TEMOVATE) 0.05 % external ointment Apply topically 2 times daily 30 g 0     folic acid (FOLVITE) 1 MG tablet Take 1 tablet (1 mg) by mouth daily 100 tablet 2     hydroxychloroquine (PLAQUENIL) 200 MG tablet Take 1 tablet (200 mg) by mouth 2 times daily 180 tablet 3     lisinopril (PRINIVIL/ZESTRIL) 2.5 MG tablet Take 1 tablet (2.5 mg) by mouth daily 90 tablet 1     metFORMIN (GLUCOPHAGE) 500 MG tablet Take 1 tablet in the morning, and 2 tablets at night (1500 mg total/day) 180 tablet 1     methotrexate sodium 2.5 MG TABS Take 8 tablets (20 mg) by mouth once a week . Take all 8 tablets on the same day of each week. 96 tablet 1     metoprolol succinate ER (TOPROL-XL) 25 MG 24 hr tablet Take 1 tablet (25 mg) by mouth daily 90 tablet 1     omeprazole (PRILOSEC) 40 MG DR capsule TAKE ONE CAPSULE BY MOUTH ONCE DAILY 90  capsule 1     order for DME Equipment being ordered: specialized shoes for diabetic neuropathy as well as all supplies provided for annual treatment of diabetes neuropathy 1 Device 0     order for DME Equipment being ordered: glucometer 1 Device 0     order for DME Equipment being ordered: specialized shoes for diabetic neuropathy     His orthotics will be made by [employer] [employer]      Tim Jernigan, Certified   [email or web address]  Phone: [phone]  Fax: [phone]  [address] [place] [address] 1 Device 0     pravastatin (PRAVACHOL) 40 MG tablet Take 0.5 tablets (20 mg) by mouth every other day 23 tablet 0     semaglutide (OZEMPIC, 1 MG/DOSE,) 2 MG/1.5ML pen INJECT 1MG SUBCUTANEOUSLY EVERY 7 DAYS 3 mL 2     tiZANidine (ZANAFLEX) 2 MG tablet Take 1 tablet (2 mg) by mouth 3 times daily as needed for muscle spasms 90 tablet 1     aspirin 81 MG tablet Take 1 tablet by mouth daily.  3     HYDROcodone-acetaminophen (NORCO) 5-325 MG per tablet Take 1-2 tablets by mouth every 4 hours as needed for moderate to severe pain maximum 4 tablet(s) per day (Patient not taking: Reported on 11/4/2019) 28 tablet 0       No Known Allergies    Review of Systems:  -Skin Establ Pt: The patient denies any new rash, pruritus, or lesions that are symptomatic, changing or bleeding, except as per HPI.  -Constitutional: The patient is feeling generally well.    Physical exam:  Vitals: There were no vitals taken for this visit.  GEN: This is a well developed, well-nourished male in no acute distress, in a pleasant mood.    SKIN: Focused examination of the face was performed.  - Erythematous macules with overyling adherent scale on the central forehead.  - No scale or erythema at previously treated sites.   - No other lesions of concern on areas examined.         Impression/Plan:  1. Actinic keratosis    central forehead - treated with LN2, see procedure note below.    Left eyelid margin AK treated last visit with  LN2 appears resolved. Site photographed today.       Precancerous nature reviewed.  Cryotherapy procedure note: After verbal consent and discussion of risks and benefits including but no limited to dyspigmentation/scar, blister, and pain, 1 was treated with 1-2mm freeze border for 2 cycles with liquid nitrogen. Post cryotherapy instructions were provided.     Follow-up in August 2020 for skin exam, earlier as needed.     Staff Involved:    Scribe Disclosure  I, Hamzah Mart, am serving as a scribe to document services personally performed by Dr. Jr Lewis DO, based on data collection and the provider's statements to me.     Provider Disclosure:   The documentation recorded by the scribe accurately reflects the services I personally performed and the decisions made by me.  I personally performed the procedures today.    Jr Lewis DO    Department of Dermatology  [facility]: Phone: [phone], Fax:[phone]  [facility]: Phone: [phone], Fax: [phone]

## 2019-11-04 NOTE — LETTER
11/4/2019         RE: Zurdo Dash  [address] [place] [id number]        Dear Colleague,    Thank you for referring your patient, Zurdo Dash, to the [facility]. Please see a copy of my visit note below.    [facility] Dermatology Note      Dermatology Problem List:  1. Actinic keratosis, s/p cryo   - left lower eyelid s/p biopsy 8/16/19     Encounter Date: Nov 4, 2019    CC:  Chief Complaint   Patient presents with     RECHECK     AK left lower eyelid         History of Present Illness:  Mr. Zurdo Dash is a 74 year old male who presents today for a spot check. The patient was last seen on 8/26/19 when 3 actinic keratoses were treated with cryotherapy, including on the left lower eyelid, right eyebrow, and right nasal bridge. Today he reports that these sites seem to have resolved after the last visit. Denies any flakes, grittiness, or other symptoms. He has not noticed any other lesions of concern. Denies any painful, bleeding, growing, or non-resolving lesions. Otherwise in normal state of health. No other skin concerns today.       Past Medical History:   Patient Active Problem List   Diagnosis     Pain in limb     Hyperlipidemia LDL goal <100     Hypertension goal BP (blood pressure) < 140/90     obesity     Hypogonadism     Advanced directives, counseling/discussion     Health Care Home     Type 2 diabetes mellitus with diabetic polyneuropathy, without long-term current use of insulin (H)     RBBB (right bundle branch block)     Ex-smoker     Family history of esophageal cancer     Gastroesophageal reflux disease, esophagitis presence not specified     Rheumatoid arthritis involving multiple sites with positive rheumatoid factor (H)     Pulmonary nodule     High risk medication use     Spondylosis of cervical region without myelopathy or radiculopathy     Erectile dysfunction, unspecified erectile dysfunction type     Type 2 diabetes mellitus without  retinopathy (H)     Tubulovillous adenoma of colon     Diabetic polyneuropathy associated with type 2 diabetes mellitus (H)     Chronic bilateral low back pain without sciatica     Migraine equivalent     Posterior vitreous detachment of left eye     Pseudophakia, ou     Eyelid lesion, LLL     Dermatochalasis of both upper eyelids     Bradycardia     Primary osteoarthritis of both knees     Syncope     Trigger finger, acquired     Past Medical History:   Diagnosis Date     Abnormal CT scan 03/2004    calcified lung granuloma     C. difficile diarrhea     H/O     Cataract 11/18/2011     Diabetic neuropathy (H)     mild, mostly soles and distal forefeet, worse on the left side.     Diverticulitis      ED (erectile dysfunction)      Ex-smoker     QUIT SMOKING FEB 2007     History of ETOH abuse     recovering, sober since 1997     Hyperlipidemia LDL goal <100      Hypertension goal BP (blood pressure) < 140/90      Hypogonadism      Obesity      PAD (peripheral artery disease) (H)     leg cramps, with exertion, no formal diagnosis of PAD and minimal if any symptoms at all.     RA (rheumatoid arthritis) (H)     Dr Bailon     Type 2 diabetes, HbA1c goal < 7% (H) 10/2008    A1C of 7.1 %      Past Surgical History:   Procedure Laterality Date     CATARACT IOL, RT/LT       COLONOSCOPY  03/01/2018    [facility]     COMBINED REPAIR PTOSIS WITH BLEPHAROPLASTY BILATERAL Bilateral 8/16/2019    Procedure: BILATERAL UPPER EYELID BLEPHAROPLASTY AND BILATERAL PTOSIS REPAIR;  Surgeon: Janet Garcia MD;  Location: [facility] OR     EXCISE LESION EYELID Left 8/16/2019    Procedure: LEFT LOWER EYELID BIOPSY;  Surgeon: Janet Garcia MD;  Location: [facility] OR     HC INCISION TENDON SHEATH FINGER  4/2009    r hand ring finger     PHACOEMULSIFICATION WITH STANDARD INTRAOCULAR LENS IMPLANT  02/2019; 3/2019    left eye; right eye     TONSILLECTOMY         Social History:  Patient reports that he quit smoking about 12 years ago. His smoking use  included cigarettes. He has a 40.00 pack-year smoking history. He has never used smokeless tobacco. He reports that he does not drink alcohol or use drugs.    Family History:  Family History   Problem Relation Age of Onset     Cerebrovascular Disease Mother      Arthritis Mother      Osteoporosis Mother      Alzheimer Disease Father      Arthritis Father      Cancer Father      Diabetes Maternal Grandmother      Cardiovascular Maternal Grandmother      Other Cancer Brother      Cancer Paternal Aunt      Hypertension No family hx of      Thyroid Disease No family hx of      Glaucoma No family hx of      Macular Degeneration No family hx of    No family history of skin cancer.     Medications:  Current Outpatient Medications   Medication Sig Dispense Refill     albuterol (PROAIR HFA/PROVENTIL HFA/VENTOLIN HFA) 108 (90 BASE) MCG/ACT Inhaler Inhale 2 puffs into the lungs every 4 hours as needed for other (coughing) 1 Inhaler 1     blood glucose (ACCU-CHEK DAYANARA PLUS) test strip TEST THREE TIMES A DAY OR AS DIRECTED 300 each 1     blood glucose (NO BRAND SPECIFIED) lancing device Use to test blood sugars 3 times daily or as directed. 1 each 0     blood glucose monitoring (ONE TOUCH DELICA) lancets Use to test blood sugars 3 times daily or as directed. 300 each 3     cetirizine (ZYRTEC) 10 MG tablet Take 10 mg by mouth At Bedtime       clobetasol (TEMOVATE) 0.05 % external ointment Apply topically 2 times daily 30 g 0     folic acid (FOLVITE) 1 MG tablet Take 1 tablet (1 mg) by mouth daily 100 tablet 2     hydroxychloroquine (PLAQUENIL) 200 MG tablet Take 1 tablet (200 mg) by mouth 2 times daily 180 tablet 3     lisinopril (PRINIVIL/ZESTRIL) 2.5 MG tablet Take 1 tablet (2.5 mg) by mouth daily 90 tablet 1     metFORMIN (GLUCOPHAGE) 500 MG tablet Take 1 tablet in the morning, and 2 tablets at night (1500 mg total/day) 180 tablet 1     methotrexate sodium 2.5 MG TABS Take 8 tablets (20 mg) by mouth once a week . Take all 8  tablets on the same day of each week. 96 tablet 1     metoprolol succinate ER (TOPROL-XL) 25 MG 24 hr tablet Take 1 tablet (25 mg) by mouth daily 90 tablet 1     omeprazole (PRILOSEC) 40 MG DR capsule TAKE ONE CAPSULE BY MOUTH ONCE DAILY 90 capsule 1     order for DME Equipment being ordered: specialized shoes for diabetic neuropathy as well as all supplies provided for annual treatment of diabetes neuropathy 1 Device 0     order for DME Equipment being ordered: glucometer 1 Device 0     order for DME Equipment being ordered: specialized shoes for diabetic neuropathy     His orthotics will be made by [employer]      Tim Jernigan, Certified   [email or web address]  Phone: [phone]  Fax: [phone] [address] [place] [address] 1 Device 0     pravastatin (PRAVACHOL) 40 MG tablet Take 0.5 tablets (20 mg) by mouth every other day 23 tablet 0     semaglutide (OZEMPIC, 1 MG/DOSE,) 2 MG/1.5ML pen INJECT 1MG SUBCUTANEOUSLY EVERY 7 DAYS 3 mL 2     tiZANidine (ZANAFLEX) 2 MG tablet Take 1 tablet (2 mg) by mouth 3 times daily as needed for muscle spasms 90 tablet 1     aspirin 81 MG tablet Take 1 tablet by mouth daily.  3     HYDROcodone-acetaminophen (NORCO) 5-325 MG per tablet Take 1-2 tablets by mouth every 4 hours as needed for moderate to severe pain maximum 4 tablet(s) per day (Patient not taking: Reported on 11/4/2019) 28 tablet 0       No Known Allergies    Review of Systems:  -Skin Establ Pt: The patient denies any new rash, pruritus, or lesions that are symptomatic, changing or bleeding, except as per HPI.  -Constitutional: The patient is feeling generally well.    Physical exam:  Vitals: There were no vitals taken for this visit.  GEN: This is a well developed, well-nourished male in no acute distress, in a pleasant mood.    SKIN: Focused examination of the face was performed.  - Erythematous macules with overyling adherent scale on the central forehead.  - No scale or erythema  at previously treated sites.   - No other lesions of concern on areas examined.         Impression/Plan:  1. Actinic keratosis    central forehead - treated with LN2, see procedure note below.    Left eyelid margin AK treated last visit with LN2 appears resolved. Site photographed today.       Precancerous nature reviewed.  Cryotherapy procedure note: After verbal consent and discussion of risks and benefits including but no limited to dyspigmentation/scar, blister, and pain, 1 was treated with 1-2mm freeze border for 2 cycles with liquid nitrogen. Post cryotherapy instructions were provided.     Follow-up in August 2020 for skin exam, earlier as needed.     Staff Involved:    Scribe Disclosure  I, Hamzah Mart, am serving as a scribe to document services personally performed by Dr. Jr Lewis DO, based on data collection and the provider's statements to me.     Provider Disclosure:   The documentation recorded by the scribe accurately reflects the services I personally performed and the decisions made by me.  I personally performed the procedures today.    Jr Lewis DO    Department of Dermatology  [facility]: Phone: [phone], Fax:[phone]  [facility]: Phone: [phone], Fax: [phone]         Again, thank you for allowing me to participate in the care of your patient.        Sincerely,        Jr Lewis MD

## 2019-11-19 ENCOUNTER — TELEPHONE (OUTPATIENT)
Dept: INTERNAL MEDICINE | Facility: CLINIC | Age: 74
End: 2019-11-19

## 2019-11-19 NOTE — TELEPHONE ENCOUNTER
Panel Management Review      Patient has the following on his problem list:     Diabetes    ASA: Passed    Last A1C  Lab Results   Component Value Date    A1C 6.4 08/06/2019    A1C 7.1 01/14/2019    A1C 8.4 09/25/2018    A1C 5.9 03/05/2018    A1C 5.1 10/31/2017     A1C tested: FAILED    Last LDL:    Lab Results   Component Value Date    CHOL 147 01/14/2019     Lab Results   Component Value Date    HDL 33 01/14/2019     Lab Results   Component Value Date    LDL 63 01/14/2019     Lab Results   Component Value Date    TRIG 253 01/14/2019     Lab Results   Component Value Date    CHOLHDLRATIO 3.4 06/29/2015     Lab Results   Component Value Date    NHDL 114 01/14/2019       Is the patient on a Statin? YES             Is the patient on Aspirin? YES    Medications     HMG CoA Reductase Inhibitors     pravastatin (PRAVACHOL) 40 MG tablet       Salicylates     aspirin 81 MG tablet             Last three blood pressure readings:  BP Readings from Last 3 Encounters:   11/04/19 (!) 142/72   10/24/19 118/76   09/18/19 122/76       Date of last diabetes office visit: 11/04/2019     Tobacco History:     History   Smoking Status     Former Smoker     Packs/day: 1.00     Years: 40.00     Types: Cigarettes     Quit date: 3/16/2007   Smokeless Tobacco     Never Used         Hypertension   Last three blood pressure readings:  BP Readings from Last 3 Encounters:   11/04/19 (!) 142/72   10/24/19 118/76   09/18/19 122/76     Blood pressure: FAILED    HTN Guidelines:  Less than 140/90      Composite cancer screening  Chart review shows that this patient is due/due soon for the following None  Summary:    Patient is due/failing the following:   Urine drug, zoster, wellness, lung cancer screening, a1c    Action needed:   None patient to come back 02/04/2020    Type of outreach:    none    Questions for provider review:    None                                                                                                                                     Conchita MAHONEY CMA ([facility])        Chart routed to none .

## 2019-11-25 ENCOUNTER — ALLIED HEALTH/NURSE VISIT (OUTPATIENT)
Dept: EDUCATION SERVICES | Facility: CLINIC | Age: 74
End: 2019-11-25
Payer: COMMERCIAL

## 2019-11-25 VITALS — BODY MASS INDEX: 32.07 KG/M2 | WEIGHT: 214 LBS

## 2019-11-25 DIAGNOSIS — Z79.4 TYPE 2 DIABETES MELLITUS WITH DIABETIC POLYNEUROPATHY, WITH LONG-TERM CURRENT USE OF INSULIN (H): Primary | ICD-10-CM

## 2019-11-25 DIAGNOSIS — E11.42 TYPE 2 DIABETES MELLITUS WITH DIABETIC POLYNEUROPATHY, WITH LONG-TERM CURRENT USE OF INSULIN (H): Primary | ICD-10-CM

## 2019-11-25 PROCEDURE — G0108 DIAB MANAGE TRN  PER INDIV: HCPCS

## 2019-11-25 NOTE — PROGRESS NOTES
"Diabetes Self-Management Education & Support    Diabetes Education Self Management & Training    SUBJECTIVE/OBJECTIVE:  Presents for: Individual review  Accompanied by: Self  Diabetes education in the past 24mo: Yes  Focus of Visit: Taking Medication  Diabetes type: Type 2  Diabetes management related comments/concerns: Zurdo states that his blood sugar has been better, he decreased his Metformin dose from 3 to 2 tablets/day   Transportation concerns: No  Other concerns:: None  Cultural Influences/Ethnic Background:  American    Diabetes Symptoms & Complications  Blurred vision: No  Fatigue: No  Neuropathy: Yes  Foot ulcerations: Yes(He thinks he has an infection on his left toe, inside of his nail. He has been putting some cream on it.  )  Polydipsia: No  Polyphagia: No  Polyuria: No  Visual change: No  Weight loss: No  Slow healing wounds: Yes(sore on toes)  Recent Infection(s): No  Weight trend: Decreasing steadily  Autonomic neuropathy: No  CVA: No  Heart disease: No  Nephropathy: No  Peripheral neuropathy: No  Peripheral Vascular Disease: No  Retinopathy: No  Sexual dysfunction: No    Patient Problem List and Family Medical History reviewed for relevant medical history, current medical status, and diabetes risk factors.    Vitals:  Wt 97.1 kg (214 lb)   BMI 32.07 kg/m    Estimated body mass index is 32.07 kg/m  as calculated from the following:    Height as of 9/18/19: 1.74 m (5' 8.5\").    Weight as of this encounter: 97.1 kg (214 lb).   Last 3 BP:   BP Readings from Last 3 Encounters:   11/04/19 (!) 142/72   10/24/19 118/76   09/18/19 122/76       History   Smoking Status     Former Smoker     Packs/day: 1.00     Years: 40.00     Types: Cigarettes     Quit date: 3/16/2007   Smokeless Tobacco     Never Used       Labs:  Lab Results   Component Value Date    A1C 6.4 08/06/2019     Lab Results   Component Value Date     01/14/2019     Lab Results   Component Value Date    LDL 63 01/14/2019     HDL " Cholesterol   Date Value Ref Range Status   2019 33 (L) >39 mg/dL Final   ]  GFR Estimate   Date Value Ref Range Status   10/16/2019 66 >60 mL/min/[1.73_m2] Final     Comment:     Non  GFR Calc  Starting 2018, serum creatinine based estimated GFR (eGFR) will be   calculated using the Chronic Kidney Disease Epidemiology Collaboration   (CKD-EPI) equation.       GFR Estimate If Black   Date Value Ref Range Status   10/16/2019 76 >60 mL/min/[1.73_m2] Final     Comment:      GFR Calc  Starting 2018, serum creatinine based estimated GFR (eGFR) will be   calculated using the Chronic Kidney Disease Epidemiology Collaboration   (CKD-EPI) equation.       Lab Results   Component Value Date    CR 1.09 10/16/2019     No results found for: MICROALBUMIN    Healthy Eating  Healthy Eating Assessed Today: Yes  Cultural/Scientology diet restrictions?: No  Patient on a regular basis: Eats 3 meals a day  Meals include: Breakfast, Lunch, Dinner, Snacks  Breakfast: 10:30-11: apple fritter OR oatmeal OR breakfast cereal with splenda (1% milk) OR fried egg sandwich OR pancakes   Lunch: [employer] -  or [employer] salad   Dinner: chicken noodle soup with crackers   Beverages: Water, Diet soda, Coffee(1-2 cups of coffee)  Has patient met with a dietitian in the past?: No    Being Active  Being Active Assessed Today: Yes  Exercise:: Yes  Days per week of moderate to strenuous exercise (like a brisk walk): 7  On average, minutes per day of exercise at this level: 30  Exercise Minutes per Week: 210    Monitoring  Monitoring Assessed Today: Yes  Did patient bring glucose meter to appointment? : No  Home Glucose (Sugar) Monitorin-2 times per day    Fasting: usually less than 150 mg/dl    Taking Medications  Diabetes Medication(s)     Biguanides       metFORMIN (GLUCOPHAGE) 500 MG tablet    Take 1 tablet in the morning, and 2 tablets at night (1500 mg total/day)    Incretin Mimetic Agents (GLP-1  Receptor Agonists)       semaglutide (OZEMPIC, 1 MG/DOSE,) 2 MG/1.5ML pen    INJECT 1MG SUBCUTANEOUSLY EVERY 7 DAYS          Taking Medication Assessed Today: Yes  Current Treatments: Non-insulin Injectables, Oral Agent (monotherapy)  Problems taking diabetes medications regularly?: No  Diabetes medication side effects?: No  Treatment Compliance: All of the time    Problem Solving  Problem Solving Assessed Today: No    Reducing Risks  Reducing Risks Assessed Today: No    Healthy Coping  Healthy Coping Assessed Today: No  Patient Activation Measure Survey Score:  HATTIE Score (Last Two) 7/23/2013   HATTIE Raw Score 35   Activation Score 45.2   HATTIE Level 1       ASSESSMENT:  Zurdo states that his BG have been mostly in goal, he feels that the Ozempic is working very well for him. He has been skipping meals and this is something that he is working on. He continues to walk everyday with his dogs. Today we discuss the importance of regular meals and consistent carbohydrate intake.     Goals Addressed as of 11/26/2019 at 8:28 AM        Patient Stated      Healthy Eating (pt-stated)     Added 11/25/19 by Stacy Styles RD     My Goal: I will eat 3 meals/day at least 5 days/week     What I need to meet my goal: follow the meal plan provided     I plan to meet my goal by this date: next diabetes education visit            Patient's most recent   Lab Results   Component Value Date    A1C 6.4 08/06/2019    is meeting goal of <7.0    INTERVENTION:   Diabetes knowledge and skills assessment:     Patient is knowledgeable in diabetes management concepts related to: Healthy Eating, Being Active, Monitoring and Taking Medication    Patient needs further education on the following diabetes management concepts: Healthy Eating, Being Active, Monitoring, Taking Medication, Problem Solving, Reducing Risks and Healthy Coping    Based on learning assessment above, most appropriate setting for further diabetes education would be: Individual  setting.    Education provided today on:  AADE Self-Care Behaviors:  Diabetes Pathophysiology  Healthy Eating: carbohydrate counting, consistency in amount, composition, and timing of food intake, weight reduction, portion control and plate planning method  Being Active: relationship to blood glucose, describe appropriate activity program and precautions to take    Opportunities for ongoing education and support in diabetes-self management were discussed.    Pt verbalized understanding of concepts discussed and recommendations provided today.       Education Materials Provided:  No new materials provided today    PLAN:  Continue with current medication plan.   Work on eating regular meals    See Patient Instructions for co-developed, patient-stated behavior change goals.  AVS printed and provided to patient today. See Follow-Up section for recommended follow-up.    Stacy Styles RD, LD, CDE  Time Spent: 60 minutes  Encounter Type: Individual    Any diabetes medication dose changes were made via the CDE Protocol and Collaborative Practice Agreement with the patient's referring provider. A copy of this encounter was shared with the provider.

## 2019-11-25 NOTE — PATIENT INSTRUCTIONS
Diabetes Support Resources:  Websites: American Association of Diabetes Educators Participant Resources: www.diabeteseducator.org/living-with-diabetes   American Diabetes Association: www.diabetes.org  Diabetes Together 2 Goal: http://vhzfabkp8ikce.org     Bring blood glucose meter and logbook with you to all doctor and follow-up appointments.    Diabetes Education Telephone Visit Follow-up:    We realize your time is valuable and your health is important! We offer a convenient Telephone Visit follow up! It s a quick way to check in for a medication dose adjustment without having to come back to clinic as soon.    Telephone Visits are often covered by insurance. Please check with your insurance plan to see if this type of visit is covered. If not, the cost is less expensive than an office visit:      Up to 10 minutes (Code 79573): $30    11-20 minutes (Code 37553): $59    More than 20 minutes (Code 57669): $85    Talk with your Diabetes Educator if you want to learn more.      [facility]:  For Your Diabetes Education and Nutrition Appointments Call:  [phone]   For Diabetes Education or Nutrition Related Questions:   Phone: [phone]  E-mail: [email or web address]  Fax: [phone]   If you need a medication refill please contact your pharmacy. Please allow 3 business days for your refills to be completed.  Stayc Styles, JOHN, LD, CDE

## 2019-11-25 NOTE — Clinical Note
SOFY Renner forgot his meter, but states that his blood sugars have been within goal. Stacy Styles, RD, LD, CDE

## 2019-12-30 ENCOUNTER — TELEPHONE (OUTPATIENT)
Dept: INTERNAL MEDICINE | Facility: CLINIC | Age: 74
End: 2019-12-30

## 2019-12-30 ENCOUNTER — ALLIED HEALTH/NURSE VISIT (OUTPATIENT)
Dept: NURSING | Facility: CLINIC | Age: 74
End: 2019-12-30
Payer: COMMERCIAL

## 2019-12-30 ENCOUNTER — ALLIED HEALTH/NURSE VISIT (OUTPATIENT)
Dept: FAMILY MEDICINE | Facility: CLINIC | Age: 74
End: 2019-12-30

## 2019-12-30 VITALS — DIASTOLIC BLOOD PRESSURE: 94 MMHG | SYSTOLIC BLOOD PRESSURE: 180 MMHG

## 2019-12-30 VITALS — HEART RATE: 70 BPM | DIASTOLIC BLOOD PRESSURE: 80 MMHG | SYSTOLIC BLOOD PRESSURE: 176 MMHG

## 2019-12-30 DIAGNOSIS — I10 HTN (HYPERTENSION): Primary | ICD-10-CM

## 2019-12-30 DIAGNOSIS — Z01.30 BP CHECK: Primary | ICD-10-CM

## 2019-12-30 PROCEDURE — 99207 ZZC NO CHARGE NURSE ONLY: CPT | Performed by: INTERNAL MEDICINE

## 2019-12-30 NOTE — Clinical Note
Routing message to PCP for review because BP checked at pharmacy is above goal. Recommended patient to follow-up with PCP.  PCP please close this encounter.180/94 sending to clinic for ancillary, patient just came from the dentist after having 2 teeth [phone]      [phone]

## 2019-12-30 NOTE — TELEPHONE ENCOUNTER
Please see notes from Allied Health/Nursing Visit today/triage assessment for HTN.   Pt has OV with [facility] on Friday. Please advise if pt should continue with monitoring his BP and monitor for any changes in symptoms at this time, or if any adjustment in his BP medication is needed/recommended.

## 2019-12-30 NOTE — PROGRESS NOTES
Pt presented for elevated BP reading after having a tooth extraction done at the dentist today. Pt states that his BP was high in dental office both before and after tooth extraction. Pt was given novocaine before teeth were pulled. Reports current pain level as 5/10. He will  and take pain medications sent to pharmacy by his dentist today.    BP taken at pharmacy was 180/94  BP taken by nurse 176/80 (via manual BP reading), pulse 70.  BP Goal:< 140/90 mmHg    Pt currently taking lisinopril and metoprolol. Pt is not consistent with when he takes. He uses a pill rubén/box, sets up his medications for two weeks at a time. He will try to take his blood pressure medication the same time during the day from now on.   Pt is sure that he took lisinopril this morning, not sure about metoprolol though.  Writer assessed facial symmetry is even. Pt's strength in both hands even. No slurred speech or difficulty speaking noted.    Pt declines the following symptoms:  Headache  Blurred vision or vision changes   Numbness/tingling in any extremities  Weakness in extremities or one side of the body  Chest pain  Difficulty breathing     Pt will go home and monitor symptoms. Writer advised pt to call us ASAP if any changes in symptoms. Pt has upcoming appt with Dr. Duckworth on Friday. Pt will come back to pharmacy tomorrow to have his BP re-checked. Will check with PCP to see if any changes are recommended in plan of care before OV on Friday.    BP Readings from Last 6 Encounters:   12/30/19 (!) 180/94   11/04/19 (!) 142/72   10/24/19 118/76   09/18/19 122/76   08/16/19 (!) 141/93   08/06/19 118/82

## 2019-12-31 NOTE — TELEPHONE ENCOUNTER
Patient called RN hotline to notify that he has picked up a home BP cuff from [employer] so he can check his BP at home. He will check his BP tonight and tomorrow morning and call if it remains elevated.

## 2020-01-01 ENCOUNTER — TRANSFERRED RECORDS (OUTPATIENT)
Dept: MULTI SPECIALTY CLINIC | Facility: CLINIC | Age: 75
End: 2020-01-01

## 2020-01-01 LAB — RETINOPATHY: NORMAL

## 2020-01-02 ENCOUNTER — OFFICE VISIT (OUTPATIENT)
Dept: DERMATOLOGY | Facility: CLINIC | Age: 75
End: 2020-01-02
Payer: COMMERCIAL

## 2020-01-02 DIAGNOSIS — L57.0 AK (ACTINIC KERATOSIS): Primary | ICD-10-CM

## 2020-01-02 PROCEDURE — 99212 OFFICE O/P EST SF 10 MIN: CPT | Mod: 25 | Performed by: DERMATOLOGY

## 2020-01-02 PROCEDURE — 17000 DESTRUCT PREMALG LESION: CPT | Performed by: DERMATOLOGY

## 2020-01-02 ASSESSMENT — PAIN SCALES - GENERAL: PAINLEVEL: NO PAIN (0)

## 2020-01-02 NOTE — NURSING NOTE
Zurdo Dash's goals for this visit include:   Chief Complaint   Patient presents with     Skin Check     area of concern left cheek and other areas of concern on face and left ear hx AK       He requests these members of his care team be copied on today's visit information:     PCP: Kapil Duckworth    Referring Provider:  No referring provider defined for this encounter.    There were no vitals taken for this visit.    Do you need any medication refills at today's visit? Shawnee Valentino LPN

## 2020-01-02 NOTE — PATIENT INSTRUCTIONS
Recommended pineapple and kiwi - enzyme bromelain in these fruits helps with bruising.        Cryotherapy    What is it?    Use of a very cold liquid, such as liquid nitrogen, to freeze and destroy abnormal skin cells that need to be removed    What should I expect?    Tenderness and redness    A small blister that might grow and fill with dark purple blood. There may be crusting.    More than one treatment may be needed if the lesions do not go away.    How do I care for the treated area?    Gently wash the area with your hands when bathing.    Use a thin layer of Vaseline to help with healing. You may use a Band-Aid.     The area should heal within 7-10 days and may leave behind a pink or lighter color.     Do not use an antibiotic or Neosporin ointment.     You may take acetaminophen (Tylenol) for pain.     Call your Doctor if you have:    Severe pain    Signs of infection (warmth, redness, cloudy yellow drainage, and or a bad smell)    Questions or concerns    Who should I call with questions?       [facility]: [phone]       [facility]: [phone]       For urgent needs outside of business hours call the [facility] at [phone]        and ask for the dermatology resident on call

## 2020-01-02 NOTE — LETTER
1/2/2020         RE: Zurdo Dash  [address] [place] [id number]        Dear Colleague,    Thank you for referring your patient, Zurdo Dash, to the [facility]. Please see a copy of my visit note below.    [facility] Dermatology Note      Dermatology Problem List:  1. Actinic keratosis, s/p cryo   - left lower eyelid s/p biopsy 8/16/19     Encounter Date: Jan 2, 2020    CC:  Chief Complaint   Patient presents with     Skin Check     area of concern left cheek and other areas of concern on face and left ear hx AK         History of Present Illness:  Mr. Zurdo Dash is a 74 year old male who presents today for a skin exam. He was last seen on 11/4/19 when one actinic keratosis was frozen on the central forehead. Today he reports a persistent lesion in front of his left earlobe. Reports a scaly lesion on his left cheek. He says this does not seem to heal. Also asks about bruising on his forearms. Denies any painful, bleeding, growing, or non-resolving lesions. Otherwise in normal state of health. No other skin concerns today.       Past Medical History:   Patient Active Problem List   Diagnosis     Pain in limb     Hyperlipidemia LDL goal <100     Hypertension goal BP (blood pressure) < 140/90     obesity     Hypogonadism     Advanced directives, counseling/discussion     Health Care Home     Type 2 diabetes mellitus with diabetic polyneuropathy, without long-term current use of insulin (H)     RBBB (right bundle branch block)     Ex-smoker     Family history of esophageal cancer     Gastroesophageal reflux disease, esophagitis presence not specified     Rheumatoid arthritis involving multiple sites with positive rheumatoid factor (H)     Pulmonary nodule     High risk medication use     Spondylosis of cervical region without myelopathy or radiculopathy     Erectile dysfunction, unspecified erectile dysfunction type     Type 2 diabetes mellitus without  retinopathy (H)     Tubulovillous adenoma of colon     Diabetic polyneuropathy associated with type 2 diabetes mellitus (H)     Chronic bilateral low back pain without sciatica     Migraine equivalent     Posterior vitreous detachment of left eye     Pseudophakia, ou     Eyelid lesion, LLL     Dermatochalasis of both upper eyelids     Bradycardia     Primary osteoarthritis of both knees     Syncope     Trigger finger, acquired     Past Medical History:   Diagnosis Date     Abnormal CT scan 03/2004    calcified lung granuloma     C. difficile diarrhea     H/O     Cataract 11/18/2011     Diabetic neuropathy (H)     mild, mostly soles and distal forefeet, worse on the left side.     Diverticulitis      ED (erectile dysfunction)      Ex-smoker     QUIT SMOKING FEB 2007     History of ETOH abuse     recovering, sober since 1997     Hyperlipidemia LDL goal <100      Hypertension goal BP (blood pressure) < 140/90      Hypogonadism      Obesity      PAD (peripheral artery disease) (H)     leg cramps, with exertion, no formal diagnosis of PAD and minimal if any symptoms at all.     RA (rheumatoid arthritis) (H)     Dr Bailon     Type 2 diabetes, HbA1c goal < 7% (H) 10/2008    A1C of 7.1 %      Past Surgical History:   Procedure Laterality Date     CATARACT IOL, RT/LT       COLONOSCOPY  03/01/2018    [facility]     COMBINED REPAIR PTOSIS WITH BLEPHAROPLASTY BILATERAL Bilateral 8/16/2019    Procedure: BILATERAL UPPER EYELID BLEPHAROPLASTY AND BILATERAL PTOSIS REPAIR;  Surgeon: Janet Garcia MD;  Location: [facility]     EXCISE LESION EYELID Left 8/16/2019    Procedure: LEFT LOWER EYELID BIOPSY;  Surgeon: Janet Garcia MD;  Location: [facility] OR     HC INCISION TENDON SHEATH FINGER  4/2009    r hand ring finger     PHACOEMULSIFICATION WITH STANDARD INTRAOCULAR LENS IMPLANT  02/2019; 3/2019    left eye; right eye     TONSILLECTOMY         Social History:  Patient reports that he quit smoking about 12 years ago. His smoking use  included cigarettes. He has a 40.00 pack-year smoking history. He has never used smokeless tobacco. He reports that he does not drink alcohol or use drugs.    Family History:  Family History   Problem Relation Age of Onset     Cerebrovascular Disease Mother      Arthritis Mother      Osteoporosis Mother      Alzheimer Disease Father      Arthritis Father      Cancer Father      Diabetes Maternal Grandmother      Cardiovascular Maternal Grandmother      Other Cancer Brother      Cancer Paternal Aunt      Hypertension No family hx of      Thyroid Disease No family hx of      Glaucoma No family hx of      Macular Degeneration No family hx of    No family history of skin cancer.     Medications:  Current Outpatient Medications   Medication Sig Dispense Refill     albuterol (PROAIR HFA/PROVENTIL HFA/VENTOLIN HFA) 108 (90 BASE) MCG/ACT Inhaler Inhale 2 puffs into the lungs every 4 hours as needed for other (coughing) 1 Inhaler 1     aspirin 81 MG tablet Take 1 tablet by mouth daily.  3     blood glucose (ACCU-CHEK DAYANARA PLUS) test strip TEST THREE TIMES A DAY OR AS DIRECTED 300 each 1     blood glucose (NO BRAND SPECIFIED) lancing device Use to test blood sugars 3 times daily or as directed. 1 each 0     blood glucose monitoring (ONE TOUCH DELICA) lancets Use to test blood sugars 3 times daily or as directed. 300 each 3     cetirizine (ZYRTEC) 10 MG tablet Take 10 mg by mouth At Bedtime       clobetasol (TEMOVATE) 0.05 % external ointment Apply topically 2 times daily 30 g 0     folic acid (FOLVITE) 1 MG tablet Take 1 tablet (1 mg) by mouth daily 100 tablet 2     hydroxychloroquine (PLAQUENIL) 200 MG tablet Take 1 tablet (200 mg) by mouth 2 times daily 180 tablet 3     lisinopril (PRINIVIL/ZESTRIL) 2.5 MG tablet Take 1 tablet (2.5 mg) by mouth daily 90 tablet 1     metFORMIN (GLUCOPHAGE) 500 MG tablet Take 1 tablet in the morning, and 2 tablets at night (1500 mg total/day) 180 tablet 1     methotrexate sodium 2.5 MG TABS  Take 8 tablets (20 mg) by mouth once a week . Take all 8 tablets on the same day of each week. 96 tablet 1     metoprolol succinate ER (TOPROL-XL) 25 MG 24 hr tablet Take 1 tablet (25 mg) by mouth daily 90 tablet 1     omeprazole (PRILOSEC) 40 MG DR capsule TAKE ONE CAPSULE BY MOUTH ONCE DAILY 90 capsule 1     order for DME Equipment being ordered: specialized shoes for diabetic neuropathy as well as all supplies provided for annual treatment of diabetes neuropathy 1 Device 0     order for DME Equipment being ordered: glucometer 1 Device 0     order for DME Equipment being ordered: specialized shoes for diabetic neuropathy     His orthotics will be made by [employer]      Tim Jernigan, Certified   [email or web address]  Phone: [phone]  Fax: [phone]  [address] [place] [address] 1 Device 0     pravastatin (PRAVACHOL) 40 MG tablet Take 0.5 tablets (20 mg) by mouth every other day 23 tablet 0     semaglutide (OZEMPIC, 1 MG/DOSE,) 2 MG/1.5ML pen INJECT 1MG SUBCUTANEOUSLY EVERY 7 DAYS 3 mL 2     tiZANidine (ZANAFLEX) 2 MG tablet Take 1 tablet (2 mg) by mouth 3 times daily as needed for muscle spasms 90 tablet 1     HYDROcodone-acetaminophen (NORCO) 5-325 MG per tablet Take 1-2 tablets by mouth every 4 hours as needed for moderate to severe pain maximum 4 tablet(s) per day (Patient not taking: Reported on 11/4/2019) 28 tablet 0       No Known Allergies    Review of Systems:  -Skin Establ Pt: The patient denies any new rash, pruritus, or lesions that are symptomatic, changing or bleeding, except as per HPI.  -Constitutional: The patient is feeling generally well.    Physical exam:  Vitals: There were no vitals taken for this visit.  GEN: This is a well developed, well-nourished male in no acute distress, in a pleasant mood.    SKIN: Total skin excluding the undergarment areas was performed. The exam included the head/face, neck, both arms, chest, back, abdomen, both legs, digits  and/or nails.   - Erythematous macule with overyling adherent scale on the left cheek.   - Irregularly bordered purple patches on the extensor forearms.   - Waxy stuck on tan to brown papule on the right cheek.   - No other lesions of concern on areas examined.       Impression/Plan:  1.  Actinic keratosis - left cheek   - Cryotherapy procedure note: After verbal consent and discussion of risks and benefits including but no limited to dyspigmentation/scar, blister, and pain, 1 was treated with 1-2mm freeze border for 2 cycles with liquid nitrogen. Post cryotherapy instructions were provided.   - Left lower eyelid previously biopsied and treated with LN2. Scar and slight erosion remain, but improved from last visit. Will check again in 6 months.       2.  Seborrheic keratosis - right cheek   - Patient reassured of the benign nature of these lesions.  - No further intervention required. Patient to report changes.    3.  Purpura - bilateral cheeks and forearms   - likely result of background rosacea and senile purpura.   - Recommended fresh pineapple and kiwi for enzyme bromelain.  - avoid trauma  - could consider PDL treatment for facial telangectasia.      Follow-up in August 2020 for re-check of left lower eyelid, earlier as needed.     Staff Involved:    Scribe Disclosure  I, Hamzah Stephenson, am serving as a scribe to document services personally performed by Dr. Jr Lewis DO, based on data collection and the provider's statements to me.     Provider Disclosure:   The documentation recorded by the scribe accurately reflects the services I personally performed and the decisions made by me.  I personally performed the procedures today.    Jr Lewis DO    Department of Dermatology  [facility]: Phone: [phone], Fax:[phone]  [facility]: Phone: [phone], Fax:  [phone]    Again, thank you for allowing me to participate in the care of your patient.        Sincerely,        Jr Lewis MD

## 2020-01-02 NOTE — PROGRESS NOTES
[facility] Dermatology Note      Dermatology Problem List:  1. Actinic keratosis, s/p cryo   - left lower eyelid s/p biopsy 8/16/19     Encounter Date: Jan 2, 2020    CC:  Chief Complaint   Patient presents with     Skin Check     area of concern left cheek and other areas of concern on face and left ear hx AK         History of Present Illness:  Mr. Zurdo Dash is a 74 year old male who presents today for a skin exam. He was last seen on 11/4/19 when one actinic keratosis was frozen on the central forehead. Today he reports a persistent lesion in front of his left earlobe. Reports a scaly lesion on his left cheek. He says this does not seem to heal. Also asks about bruising on his forearms. Denies any painful, bleeding, growing, or non-resolving lesions. Otherwise in normal state of health. No other skin concerns today.       Past Medical History:   Patient Active Problem List   Diagnosis     Pain in limb     Hyperlipidemia LDL goal <100     Hypertension goal BP (blood pressure) < 140/90     obesity     Hypogonadism     Advanced directives, counseling/discussion     Health Care Home     Type 2 diabetes mellitus with diabetic polyneuropathy, without long-term current use of insulin (H)     RBBB (right bundle branch block)     Ex-smoker     Family history of esophageal cancer     Gastroesophageal reflux disease, esophagitis presence not specified     Rheumatoid arthritis involving multiple sites with positive rheumatoid factor (H)     Pulmonary nodule     High risk medication use     Spondylosis of cervical region without myelopathy or radiculopathy     Erectile dysfunction, unspecified erectile dysfunction type     Type 2 diabetes mellitus without retinopathy (H)     Tubulovillous adenoma of colon     Diabetic polyneuropathy associated with type 2 diabetes mellitus (H)     Chronic bilateral low back pain without sciatica     Migraine equivalent     Posterior vitreous detachment of left eye      Pseudophakia, ou     Eyelid lesion, LLL     Dermatochalasis of both upper eyelids     Bradycardia     Primary osteoarthritis of both knees     Syncope     Trigger finger, acquired     Past Medical History:   Diagnosis Date     Abnormal CT scan 03/2004    calcified lung granuloma     C. difficile diarrhea     H/O     Cataract 11/18/2011     Diabetic neuropathy (H)     mild, mostly soles and distal forefeet, worse on the left side.     Diverticulitis      ED (erectile dysfunction)      Ex-smoker     QUIT SMOKING FEB 2007     History of ETOH abuse     recovering, sober since 1997     Hyperlipidemia LDL goal <100      Hypertension goal BP (blood pressure) < 140/90      Hypogonadism      Obesity      PAD (peripheral artery disease) (H)     leg cramps, with exertion, no formal diagnosis of PAD and minimal if any symptoms at all.     RA (rheumatoid arthritis) (H)     Dr Bailon     Type 2 diabetes, HbA1c goal < 7% (H) 10/2008    A1C of 7.1 %      Past Surgical History:   Procedure Laterality Date     CATARACT IOL, RT/LT       COLONOSCOPY  03/01/2018    [facility]     COMBINED REPAIR PTOSIS WITH BLEPHAROPLASTY BILATERAL Bilateral 8/16/2019    Procedure: BILATERAL UPPER EYELID BLEPHAROPLASTY AND BILATERAL PTOSIS REPAIR;  Surgeon: Janet Garcia MD;  Location: [facility]     EXCISE LESION EYELID Left 8/16/2019    Procedure: LEFT LOWER EYELID BIOPSY;  Surgeon: Janet Garcia MD;  Location: [facility] OR     HC INCISION TENDON SHEATH FINGER  4/2009    r hand ring finger     PHACOEMULSIFICATION WITH STANDARD INTRAOCULAR LENS IMPLANT  02/2019; 3/2019    left eye; right eye     TONSILLECTOMY         Social History:  Patient reports that he quit smoking about 12 years ago. His smoking use included cigarettes. He has a 40.00 pack-year smoking history. He has never used smokeless tobacco. He reports that he does not drink alcohol or use drugs.    Family History:  Family History   Problem Relation Age of Onset     Cerebrovascular Disease  Mother      Arthritis Mother      Osteoporosis Mother      Alzheimer Disease Father      Arthritis Father      Cancer Father      Diabetes Maternal Grandmother      Cardiovascular Maternal Grandmother      Other Cancer Brother      Cancer Paternal Aunt      Hypertension No family hx of      Thyroid Disease No family hx of      Glaucoma No family hx of      Macular Degeneration No family hx of    No family history of skin cancer.     Medications:  Current Outpatient Medications   Medication Sig Dispense Refill     albuterol (PROAIR HFA/PROVENTIL HFA/VENTOLIN HFA) 108 (90 BASE) MCG/ACT Inhaler Inhale 2 puffs into the lungs every 4 hours as needed for other (coughing) 1 Inhaler 1     aspirin 81 MG tablet Take 1 tablet by mouth daily.  3     blood glucose (ACCU-CHEK DAYANARA PLUS) test strip TEST THREE TIMES A DAY OR AS DIRECTED 300 each 1     blood glucose (NO BRAND SPECIFIED) lancing device Use to test blood sugars 3 times daily or as directed. 1 each 0     blood glucose monitoring (ONE TOUCH DELICA) lancets Use to test blood sugars 3 times daily or as directed. 300 each 3     cetirizine (ZYRTEC) 10 MG tablet Take 10 mg by mouth At Bedtime       clobetasol (TEMOVATE) 0.05 % external ointment Apply topically 2 times daily 30 g 0     folic acid (FOLVITE) 1 MG tablet Take 1 tablet (1 mg) by mouth daily 100 tablet 2     hydroxychloroquine (PLAQUENIL) 200 MG tablet Take 1 tablet (200 mg) by mouth 2 times daily 180 tablet 3     lisinopril (PRINIVIL/ZESTRIL) 2.5 MG tablet Take 1 tablet (2.5 mg) by mouth daily 90 tablet 1     metFORMIN (GLUCOPHAGE) 500 MG tablet Take 1 tablet in the morning, and 2 tablets at night (1500 mg total/day) 180 tablet 1     methotrexate sodium 2.5 MG TABS Take 8 tablets (20 mg) by mouth once a week . Take all 8 tablets on the same day of each week. 96 tablet 1     metoprolol succinate ER (TOPROL-XL) 25 MG 24 hr tablet Take 1 tablet (25 mg) by mouth daily 90 tablet 1     omeprazole (PRILOSEC) 40 MG   capsule TAKE ONE CAPSULE BY MOUTH ONCE DAILY 90 capsule 1     order for DME Equipment being ordered: specialized shoes for diabetic neuropathy as well as all supplies provided for annual treatment of diabetes neuropathy 1 Device 0     order for DME Equipment being ordered: glucometer 1 Device 0     order for DME Equipment being ordered: specialized shoes for diabetic neuropathy     His orthotics will be made by [employer]      Tim Jernigan, Certified   [email or web address]  Phone: [phone]  Fax: [phone]  [address] [place] [address] 1 Device 0     pravastatin (PRAVACHOL) 40 MG tablet Take 0.5 tablets (20 mg) by mouth every other day 23 tablet 0     semaglutide (OZEMPIC, 1 MG/DOSE,) 2 MG/1.5ML pen INJECT 1MG SUBCUTANEOUSLY EVERY 7 DAYS 3 mL 2     tiZANidine (ZANAFLEX) 2 MG tablet Take 1 tablet (2 mg) by mouth 3 times daily as needed for muscle spasms 90 tablet 1     HYDROcodone-acetaminophen (NORCO) 5-325 MG per tablet Take 1-2 tablets by mouth every 4 hours as needed for moderate to severe pain maximum 4 tablet(s) per day (Patient not taking: Reported on 11/4/2019) 28 tablet 0       No Known Allergies    Review of Systems:  -Skin Establ Pt: The patient denies any new rash, pruritus, or lesions that are symptomatic, changing or bleeding, except as per HPI.  -Constitutional: The patient is feeling generally well.    Physical exam:  Vitals: There were no vitals taken for this visit.  GEN: This is a well developed, well-nourished male in no acute distress, in a pleasant mood.    SKIN: Total skin excluding the undergarment areas was performed. The exam included the head/face, neck, both arms, chest, back, abdomen, both legs, digits and/or nails.   - Erythematous macule with overyling adherent scale on the left cheek.   - Irregularly bordered purple patches on the extensor forearms.   - Waxy stuck on tan to brown papule on the right cheek.   - No other lesions of concern on areas  examined.       Impression/Plan:  1.  Actinic keratosis - left cheek   - Cryotherapy procedure note: After verbal consent and discussion of risks and benefits including but no limited to dyspigmentation/scar, blister, and pain, 1 was treated with 1-2mm freeze border for 2 cycles with liquid nitrogen. Post cryotherapy instructions were provided.   - Left lower eyelid previously biopsied and treated with LN2. Scar and slight erosion remain, but improved from last visit. Will check again in 6 months.       2.  Seborrheic keratosis - right cheek   - Patient reassured of the benign nature of these lesions.  - No further intervention required. Patient to report changes.    3.  Purpura - bilateral cheeks and forearms   - likely result of background rosacea and senile purpura.   - Recommended fresh pineapple and kiwi for enzyme bromelain.  - avoid trauma  - could consider PDL treatment for facial telangectasia.      Follow-up in August 2020 for re-check of left lower eyelid, earlier as needed.     Staff Involved:    Scribe Disclosure  I, Hamzah Stephenson, am serving as a scribe to document services personally performed by Dr. Jr Lewis DO, based on data collection and the provider's statements to me.     Provider Disclosure:   The documentation recorded by the scribe accurately reflects the services I personally performed and the decisions made by me.  I personally performed the procedures today.    Jr Lewis DO    Department of Dermatology  [facility]: Phone: [phone], Fax:[phone]  [facility]: Phone: [phone], Fax: [phone]

## 2020-01-03 ENCOUNTER — OFFICE VISIT (OUTPATIENT)
Dept: INTERNAL MEDICINE | Facility: CLINIC | Age: 75
End: 2020-01-03
Payer: COMMERCIAL

## 2020-01-03 VITALS
SYSTOLIC BLOOD PRESSURE: 126 MMHG | RESPIRATION RATE: 18 BRPM | OXYGEN SATURATION: 98 % | TEMPERATURE: 96.7 F | HEART RATE: 56 BPM | WEIGHT: 217 LBS | HEIGHT: 68 IN | BODY MASS INDEX: 32.89 KG/M2 | DIASTOLIC BLOOD PRESSURE: 86 MMHG

## 2020-01-03 DIAGNOSIS — I10 HYPERTENSION GOAL BP (BLOOD PRESSURE) < 140/90: ICD-10-CM

## 2020-01-03 DIAGNOSIS — R06.83 SNORING: ICD-10-CM

## 2020-01-03 DIAGNOSIS — H93.13 TINNITUS, BILATERAL: ICD-10-CM

## 2020-01-03 DIAGNOSIS — E11.42 TYPE 2 DIABETES MELLITUS WITH DIABETIC POLYNEUROPATHY, WITHOUT LONG-TERM CURRENT USE OF INSULIN (H): ICD-10-CM

## 2020-01-03 DIAGNOSIS — M54.50 CHRONIC BILATERAL LOW BACK PAIN WITHOUT SCIATICA: ICD-10-CM

## 2020-01-03 DIAGNOSIS — G89.29 CHRONIC BILATERAL LOW BACK PAIN WITHOUT SCIATICA: ICD-10-CM

## 2020-01-03 DIAGNOSIS — Z23 NEED FOR SHINGLES VACCINE: ICD-10-CM

## 2020-01-03 DIAGNOSIS — E78.5 HYPERLIPIDEMIA LDL GOAL <100: ICD-10-CM

## 2020-01-03 DIAGNOSIS — Z87.891 EX-SMOKER: ICD-10-CM

## 2020-01-03 DIAGNOSIS — Z00.00 MEDICARE ANNUAL WELLNESS VISIT, SUBSEQUENT: Primary | ICD-10-CM

## 2020-01-03 LAB
CHOLEST SERPL-MCNC: 139 MG/DL
HBA1C MFR BLD: 6 % (ref 0–5.6)
HDLC SERPL-MCNC: 43 MG/DL
LDLC SERPL CALC-MCNC: 75 MG/DL
NONHDLC SERPL-MCNC: 96 MG/DL
TRIGL SERPL-MCNC: 107 MG/DL

## 2020-01-03 PROCEDURE — G0438 PPPS, INITIAL VISIT: HCPCS | Performed by: INTERNAL MEDICINE

## 2020-01-03 PROCEDURE — 36415 COLL VENOUS BLD VENIPUNCTURE: CPT | Performed by: INTERNAL MEDICINE

## 2020-01-03 PROCEDURE — 99214 OFFICE O/P EST MOD 30 MIN: CPT | Mod: 25 | Performed by: INTERNAL MEDICINE

## 2020-01-03 PROCEDURE — 83036 HEMOGLOBIN GLYCOSYLATED A1C: CPT | Performed by: INTERNAL MEDICINE

## 2020-01-03 PROCEDURE — 80061 LIPID PANEL: CPT | Performed by: INTERNAL MEDICINE

## 2020-01-03 ASSESSMENT — ENCOUNTER SYMPTOMS
HEADACHES: 0
DIZZINESS: 0
ABDOMINAL PAIN: 0
DIARRHEA: 1
FREQUENCY: 0
SHORTNESS OF BREATH: 0
EYE PAIN: 0
JOINT SWELLING: 1
NAUSEA: 0
MYALGIAS: 0
NERVOUS/ANXIOUS: 0
PALPITATIONS: 0
SORE THROAT: 0
HEMATOCHEZIA: 0
WEAKNESS: 0
COUGH: 1
DYSURIA: 0
HEARTBURN: 0
ARTHRALGIAS: 1
HEMATURIA: 0
FEVER: 0
CHILLS: 0
PARESTHESIAS: 0
CONSTIPATION: 0

## 2020-01-03 ASSESSMENT — MIFFLIN-ST. JEOR: SCORE: 1693.81

## 2020-01-03 ASSESSMENT — ACTIVITIES OF DAILY LIVING (ADL): CURRENT_FUNCTION: NO ASSISTANCE NEEDED

## 2020-01-03 NOTE — PATIENT INSTRUCTIONS
If wanting to get hearing evaluated for possible hearing aids then follow-up with our audiology department.     I think that it would be a good idea to have a sleep study to rule out Obstructive Sleep Apnea given your report of the symptoms today. I placed the referral and so just call to schedule this appointment.     Due to your smoking history you would qualify for an annual low dose chest CT scan if desired. I think that this isn't such a bad idea for you.     Today we discussed and advised the new shingles vaccine (ShingRx) which you can get at any pharmacy. You will then follow-up 2-6 months later for a second booster. Check with your pharmacist for insurance coverage on this vaccine.    Your sugars are on the low side. Make sure not to take the Amaryl (glimepiride). You can continue with the Ozempic and metformin.     We will be completing lab work and follow-up with any abnormal results via phone and if they are normal you could expect the results to be mailed.     Your blood pressure was probably elevated due to acute pain symptoms and appears okay today. I'm not going to suggest any additional treatment in regards to blood pressure medications at this time.

## 2020-01-03 NOTE — LETTER
[facility]  [address] [place] [address]    January 6, 2020    Zurdo Dash  [address] [place] [address]          Dear Zurdo,    All of these tests are within acceptable limits , things look good !     Enclosed is a copy of your results.     Results for orders placed or performed in visit on 01/03/20   Hemoglobin A1c     Status: Abnormal   Result Value Ref Range    Hemoglobin A1C 6.0 (H) 0 - 5.6 %   Lipid panel reflex to direct LDL Fasting     Status: None   Result Value Ref Range    Cholesterol 139 <200 mg/dL    Triglycerides 107 <150 mg/dL    HDL Cholesterol 43 >39 mg/dL    LDL Cholesterol Calculated 75 <100 mg/dL    Non HDL Cholesterol 96 <130 mg/dL       If you have any questions or concerns, please call myself or my nurse at [phone].      Sincerely,        Kapil Duckworth MD/ayesha

## 2020-01-03 NOTE — PROGRESS NOTES
"SUBJECTIVE:   Zurdo Dash is a 74 year old male who presents for Preventive Visit.  Are you in the first 12 months of your Medicare coverage?  No    Healthy Habits:     In general, how would you rate your overall health?  Fair    Frequency of exercise:  6-7 days/week    Duration of exercise:  15-30 minutes    Do you usually eat at least 4 servings of fruit and vegetables a day, include whole grains    & fiber and avoid regularly eating high fat or \"junk\" foods?  No    Taking medications regularly:  Yes    Medication side effects:  None    Ability to successfully perform activities of daily living:  No assistance needed    Home Safety:  No safety concerns identified    Hearing Impairment:  Need to ask people to speak up or repeat themselves    In the past 6 months, have you been bothered by leaking of urine?  No    In general, how would you rate your overall mental or emotional health?  Good      PHQ-2 Total Score: 0    Additional concerns today:  No    Do you feel safe in your environment? Yes    Have you ever done Advance Care Planning? (For example, a Health Directive, POLST, or a discussion with a medical provider or your loved ones about your wishes): No, advance care planning information given to patient to review.  Patient plans to discuss their wishes with loved ones or provider.      Fall risk  Fallen 2 or more times in the past year?: Yes  Any fall with injury in the past year?: No    Cognitive Screening   1) Repeat 3 items (Leader, Season, Table)    2) Clock draw: NORMAL  3) 3 item recall: Recalls 2 objects   Results: NORMAL clock, 1-2 items recalled: COGNITIVE IMPAIRMENT LESS LIKELY    Mini-CogTM Copyright MABEL Morales. Licensed by the author for use in [facility]; reprinted with permission ([email or web address]). All rights reserved.      Do you have sleep apnea, excessive snoring or daytime drowsiness?: Yes, some possible snort arousals and daytime drowsiness and even waking up tired    Hypertension " "  Had two teeth pulled and has been in significant pain. He attributes his elevated readings to his acute pain. Had some readings over 200 systolic.   BP Readings from Last 6 Encounters:   01/03/20 (!) 142/88   12/30/19 (!) 180/94   12/30/19 (!) 176/80   11/04/19 (!) 142/72   10/24/19 118/76   09/18/19 122/76     Tinnitus, Hearing Loss  He has some fluctuating quality with his awareness of Tinnitus symptoms. 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 Wife states that he has trouble hearing. He's had some hearing tests with work over the years and is aware of high frequency loss. He considered but remains generally resistant to the idea that he would benefit from audiologic consultation     Diabetes Mellitus Type 2   Has been using Ozempic and his blood glucose readings have been on the low side. Some reported readings into the 60's. He doesn't have glimepiride listed on his medications list and thinks that he might be taking 1/2 pill every other day.   Lab Results   Component Value Date    A1C 6.4 08/06/2019    A1C 7.1 01/14/2019    A1C 8.4 09/25/2018    A1C 5.9 03/05/2018    A1C 5.1 10/31/2017     Chronic back and knee pain, rheumatoid arthritis   Patient's wife was certified for medical cannabis and got relief of pain. He was interested in trying treatment as well in order to try this for his chronic pain due to osteoarthritis and rheumatoid arthritis. Medical cannabis discussed as an experimental treatment for chronic pain. Patient has a qualifying condition for Medical Marijuana and will be certified for medical marijuana with the [place] department [place] health office of medical cannabis.     Additional Information   Urine flow is \"not as fast as it used to be.\"    Reviewed and updated as needed this visit by clinical staff  Tobacco  Allergies  Meds  Med Hx  Surg Hx  Fam Hx  Soc Hx      Reviewed and updated as needed this visit by Provider        Social History     Tobacco Use     Smoking status: Former " Smoker     Packs/day: 1.00     Years: 40.00     Pack years: 40.00     Types: Cigarettes     Last attempt to quit: 3/16/2007     Years since quittin.8     Smokeless tobacco: Never Used   Substance Use Topics     Alcohol use: No     Alcohol/week: 0.0 standard drinks         Alcohol Use 1/3/2020   Prescreen: >3 drinks/day or >7 drinks/week? Not Applicable   Prescreen: >3 drinks/day or >7 drinks/week? -     Current providers sharing in care for this patient include:   Patient Care Team:  Kapil Duckworth MD as PCP - General  Kapil Duckworth MD as Assigned PCP  Rainer Bailon MD as MD (Rheumatology)  Bam Ricketts as Health   Stacy Styles RD as Diabetes Educator (Dietitian, Registered)    The following health maintenance items are reviewed in Epic and correct as of today:  Health Maintenance   Topic Date Due     URINE DRUG SCREEN  1945     ZOSTER IMMUNIZATION (2 of 3) 2010     MEDICARE ANNUAL WELLNESS VISIT  2017     LUNG CANCER SCREENING ANNUAL  2018     A1C  2019     BMP  2020     LIPID  2020     DIABETIC FOOT EXAM  03/15/2020     FALL RISK ASSESSMENT  03/15/2020     EYE EXAM  2020     TSH W/FREE T4 REFLEX  2020     MICROALBUMIN  2020     DTAP/TDAP/TD IMMUNIZATION (3 - Td) 2021     COLONOSCOPY  2021     ADVANCE CARE PLANNING  2021     PHQ-2  Completed     INFLUENZA VACCINE  Completed     PNEUMOCOCCAL IMMUNIZATION 65+ LOW/MEDIUM RISK  Completed     AORTIC ANEURYSM SCREENING (SYSTEM ASSIGNED)  Completed     IPV IMMUNIZATION  Aged Out     MENINGITIS IMMUNIZATION  Aged Out     BP Readings from Last 3 Encounters:   20 (!) 142/88   19 (!) 180/94   19 (!) 176/80    Wt Readings from Last 3 Encounters:   20 98.4 kg (217 lb)   19 97.1 kg (214 lb)   19 98.9 kg (218 lb)                  Patient Active Problem List   Diagnosis     Pain in limb     Hyperlipidemia LDL goal <100     Hypertension goal BP  (blood pressure) < 140/90     obesity     Hypogonadism     Advanced directives, counseling/discussion     Health Care Home     Type 2 diabetes mellitus with diabetic polyneuropathy, without long-term current use of insulin (H)     RBBB (right bundle branch block)     Ex-smoker     Family history of esophageal cancer     Gastroesophageal reflux disease, esophagitis presence not specified     Rheumatoid arthritis involving multiple sites with positive rheumatoid factor (H)     Pulmonary nodule     High risk medication use     Spondylosis of cervical region without myelopathy or radiculopathy     Erectile dysfunction, unspecified erectile dysfunction type     Type 2 diabetes mellitus without retinopathy (H)     Tubulovillous adenoma of colon     Diabetic polyneuropathy associated with type 2 diabetes mellitus (H)     Chronic bilateral low back pain without sciatica     Migraine equivalent     Posterior vitreous detachment of left eye     Pseudophakia, ou     Eyelid lesion, LLL     Dermatochalasis of both upper eyelids     Bradycardia     Primary osteoarthritis of both knees     Syncope     Trigger finger, acquired     Past Surgical History:   Procedure Laterality Date     CATARACT IOL, RT/LT       COLONOSCOPY  03/01/2018    [facility]     COMBINED REPAIR PTOSIS WITH BLEPHAROPLASTY BILATERAL Bilateral 8/16/2019    Procedure: BILATERAL UPPER EYELID BLEPHAROPLASTY AND BILATERAL PTOSIS REPAIR;  Surgeon: Janet Garcia MD;  Location: [facility] OR     EXCISE LESION EYELID Left 8/16/2019    Procedure: LEFT LOWER EYELID BIOPSY;  Surgeon: Janet Garcia MD;  Location: [facility] OR     HC INCISION TENDON SHEATH FINGER  4/2009    r hand ring finger     PHACOEMULSIFICATION WITH STANDARD INTRAOCULAR LENS IMPLANT  02/2019; 3/2019    left eye; right eye     TONSILLECTOMY         Social History     Tobacco Use     Smoking status: Former Smoker     Packs/day: 1.00     Years: 40.00     Pack years: 40.00     Types: Cigarettes     Last attempt to  quit: 3/16/2007     Years since quittin.8     Smokeless tobacco: Never Used   Substance Use Topics     Alcohol use: No     Alcohol/week: 0.0 standard drinks     Family History   Problem Relation Age of Onset     Cerebrovascular Disease Mother      Arthritis Mother      Osteoporosis Mother      Alzheimer Disease Father      Arthritis Father      Cancer Father      Diabetes Maternal Grandmother      Cardiovascular Maternal Grandmother      Other Cancer Brother      Cancer Paternal Aunt      Hypertension No family hx of      Thyroid Disease No family hx of      Glaucoma No family hx of      Macular Degeneration No family hx of            Review of Systems   Constitutional: Negative for chills and fever.   HENT: Positive for hearing loss. Negative for congestion, ear pain and sore throat.    Eyes: Negative for pain and visual disturbance.   Respiratory: Positive for cough. Negative for shortness of breath.    Cardiovascular: Negative for chest pain, palpitations and peripheral edema.   Gastrointestinal: Positive for diarrhea. Negative for abdominal pain, constipation, heartburn, hematochezia and nausea.   Genitourinary: Positive for impotence and urgency. Negative for discharge, dysuria, frequency, genital sores and hematuria.   Musculoskeletal: Positive for arthralgias and joint swelling. Negative for myalgias.   Skin: Positive for rash.   Neurological: Negative for dizziness, weakness, headaches and paresthesias.   Psychiatric/Behavioral: Negative for mood changes. The patient is not nervous/anxious.      This document serves as a record of the services and decisions personally performed and made by Kapil Duckworth MD. It was created on his behalf by Marcos Ayon, a trained medical scribe. The creation of this document is based on the provider's statements to the medical scribe.  Marcos Ayon 11:35 AM January 3, 2020    OBJECTIVE:   BP (!) 142/88   Pulse 56   Temp 96.7  F (35.9  C) (Oral)   Resp 18   Ht  "1.727 m (5' 8\")   Wt 98.4 kg (217 lb)   SpO2 98%   BMI 32.99 kg/m   Estimated body mass index is 32.99 kg/m  as calculated from the following:    Height as of this encounter: 1.727 m (5' 8\").    Weight as of this encounter: 98.4 kg (217 lb).  Physical Exam  GENERAL: healthy, alert and no distress, answers all questions appropriately, talkative and appropriate, normal grooming and affect , obese body habitus  EYES: Eyes grossly normal to inspection, PERRL and conjunctivae and sclerae normal  HENT: ear canals and TM's normal, nose and mouth without ulcers or lesions, crowded orpharyx  NECK: no adenopathy, no asymmetry, masses, or scars and thyroid normal to palpation  RESP: lungs clear to auscultation - no rales, rhonchi or wheezes  CV: regular rate and rhythm, normal S1 S2, no S3 or S4, no murmur, click or rub, no peripheral edema and peripheral pulses strong  ABDOMEN: soft, nontender, no hepatosplenomegaly, no masses and bowel sounds normal  MS: no gross musculoskeletal defects noted, no edema  SKIN: no suspicious lesions or rashes to visible skin   NEURO: Normal strength and tone, mentation intact and speech normal  PSYCH: mentation appears normal, affect normal/bright    ASSESSMENT / PLAN:   (Z00.00) Medicare annual wellness visit, subsequent  (primary encounter diagnosis)  Comment: negative screening physical exam today, except for crowded oropharynx.    Plan:  Follow-up in 1 year      (R06.83) Snoring  Comment: history seemed very suspicious for Obstructive Sleep Apnea. Some accompanied daytime drowsiness, snort arousals and unrestful sleep. Patient and spouse agree with referral to a sleep disorder specialist  Plan: SLEEP EVALUATION & MANAGEMENT REFERRAL - [facility] [place]          [phone] (Age 15 and up)        Strongly suggested follow-up with sleep specialist.     (H93.13) Tinnitus, bilateral  Comment: able to deal with the tinnitus with some fluctuating " awareness, also some possible associated symptoms of hearing loss. Knows he has high frequency loss.    Plan: follow-up with audiology if interested in getting an evaluation for possible hearing aids    (Z23) Need for shingles vaccine  Comment: pharmacy router form given   Plan:     (Z87.891) Ex-smoker  Comment: qualifies for annual low dose CT scan.  Plan: CT Chest w Contrast            (E78.5) Hyperlipidemia LDL goal <100  Comment: historically at goal   Plan: Lipid panel reflex to direct LDL Fasting        Continue current medication    (I10) Hypertension goal BP (blood pressure) < 140/90  Comment: some elevated readings likely due to acute pain. Today's reading was borderline elevated and then normal after recheck.  Plan: continue current treatment for blood pressure     (E11.42) Type 2 diabetes mellitus with diabetic polyneuropathy, without long-term current use of insulin (H)  Comment: having some low readings. Asked to stop Amaryl (I believe at a prior appointment as he no longer has this on his medication list). Patient thinks he was still taking this and having sugars into the 60's.  Plan: Hemoglobin A1c        Stop amaryl and warned about risks of hypoglycemia , continue with the Ozempic and metformin.    (M54.5,  G89.29) Chronic bilateral low back pain without sciatica  Comment: I will certify this patient for treatment with medical cannabis through the [place] department of health office of medical cannabis.  Plan: follow-up in 1 year for re-certification if treatment is helpful    COUNSELING:  Reviewed preventive health counseling, as reflected in patient instructions  Special attention given to:       Regular exercise       Healthy diet/nutrition       Hearing screening       Immunizations    Vaccinated for: Zoster  recommended            Consider lung cancer screening for ages 55-80 years and 30 pack-year smoking history     Estimated body mass index is 32.99 kg/m  as calculated from the  "following:    Height as of this encounter: 1.727 m (5' 8\").    Weight as of this encounter: 98.4 kg (217 lb).    Weight management plan: Discussed healthy diet and exercise guidelines patient walks the dog basically everyday     reports that he quit smoking about 12 years ago. His smoking use included cigarettes. He has a 40.00 pack-year smoking history. He has never used smokeless tobacco.      Appropriate preventive services were discussed with this patient, including applicable screening as appropriate for cardiovascular disease, diabetes, osteopenia/osteoporosis, and glaucoma.  As appropriate for age/gender, discussed screening for colorectal cancer, prostate cancer, breast cancer, and cervical cancer. Checklist reviewing preventive services available has been given to the patient.    Reviewed patients plan of care and provided an AVS. The Basic Care Plan (routine screening as documented in Health Maintenance) for Zurdo meets the Care Plan requirement. This Care Plan has been established and reviewed with the Patient and spouse.    Counseling Resources:  ATP IV Guidelines  Pooled Cohorts Equation Calculator  Breast Cancer Risk Calculator  FRAX Risk Assessment  ICSI Preventive Guidelines  Dietary Guidelines for Americans, 2010  USDA's MyPlate  ASA Prophylaxis  Lung CA Screening    The information in this document, created by the medical scribe for me, accurately reflects the services I personally performed and the decisions made by me. I have reviewed and approved this document for accuracy prior to leaving the patient care area.  January 3, 2020 12:04 PM     Kapil Duckworth MD  [facility]    Identified Health Risks:  "

## 2020-01-06 ENCOUNTER — TELEPHONE (OUTPATIENT)
Dept: INTERNAL MEDICINE | Facility: CLINIC | Age: 75
End: 2020-01-06

## 2020-01-06 NOTE — TELEPHONE ENCOUNTER
Patient stated they told you they forgot to mention the 0 in 076 in email, and email was updated in chart this morning, if this was already under this email nothing else is needed.       Conchita MAHONEY CMA ([facility])

## 2020-01-06 NOTE — TELEPHONE ENCOUNTER
Where is the new email ? He was certified at [email or web address] already    Kapil Duckworth MD

## 2020-01-06 NOTE — TELEPHONE ENCOUNTER
Patient updated email, stated that his email was wrong the first time at Office Visit for medical cannabis certification, email updated in chart, please resend medical cannabis certification again with new email.       Conchita MAHONEY CMA ([facility])

## 2020-01-14 ENCOUNTER — ANCILLARY PROCEDURE (OUTPATIENT)
Dept: CT IMAGING | Facility: CLINIC | Age: 75
End: 2020-01-14
Attending: INTERNAL MEDICINE
Payer: COMMERCIAL

## 2020-01-14 DIAGNOSIS — Z87.891 EX-SMOKER: ICD-10-CM

## 2020-01-14 PROCEDURE — G0297 LDCT FOR LUNG CA SCREEN: HCPCS | Mod: TC

## 2020-01-15 ENCOUNTER — OFFICE VISIT (OUTPATIENT)
Dept: RHEUMATOLOGY | Facility: CLINIC | Age: 75
End: 2020-01-15
Payer: COMMERCIAL

## 2020-01-15 VITALS
DIASTOLIC BLOOD PRESSURE: 78 MMHG | HEIGHT: 68 IN | SYSTOLIC BLOOD PRESSURE: 136 MMHG | BODY MASS INDEX: 32.89 KG/M2 | HEART RATE: 63 BPM | OXYGEN SATURATION: 98 % | WEIGHT: 217 LBS

## 2020-01-15 DIAGNOSIS — Z79.899 HIGH RISK MEDICATIONS (NOT ANTICOAGULANTS) LONG-TERM USE: ICD-10-CM

## 2020-01-15 DIAGNOSIS — M05.79 RHEUMATOID ARTHRITIS INVOLVING MULTIPLE SITES WITH POSITIVE RHEUMATOID FACTOR (H): Primary | ICD-10-CM

## 2020-01-15 DIAGNOSIS — M17.0 BILATERAL PRIMARY OSTEOARTHRITIS OF KNEE: ICD-10-CM

## 2020-01-15 LAB
BASOPHILS # BLD AUTO: 0.1 10E9/L (ref 0–0.2)
BASOPHILS NFR BLD AUTO: 0.7 %
DIFFERENTIAL METHOD BLD: NORMAL
EOSINOPHIL # BLD AUTO: 0.1 10E9/L (ref 0–0.7)
EOSINOPHIL NFR BLD AUTO: 1 %
ERYTHROCYTE [DISTWIDTH] IN BLOOD BY AUTOMATED COUNT: 14.2 % (ref 10–15)
HCT VFR BLD AUTO: 42.4 % (ref 40–53)
HGB BLD-MCNC: 14 G/DL (ref 13.3–17.7)
LYMPHOCYTES # BLD AUTO: 1 10E9/L (ref 0.8–5.3)
LYMPHOCYTES NFR BLD AUTO: 14 %
MCH RBC QN AUTO: 30.8 PG (ref 26.5–33)
MCHC RBC AUTO-ENTMCNC: 33 G/DL (ref 31.5–36.5)
MCV RBC AUTO: 93 FL (ref 78–100)
MONOCYTES # BLD AUTO: 0.6 10E9/L (ref 0–1.3)
MONOCYTES NFR BLD AUTO: 8.9 %
NEUTROPHILS # BLD AUTO: 5.2 10E9/L (ref 1.6–8.3)
NEUTROPHILS NFR BLD AUTO: 75.4 %
PLATELET # BLD AUTO: 231 10E9/L (ref 150–450)
RBC # BLD AUTO: 4.55 10E12/L (ref 4.4–5.9)
WBC # BLD AUTO: 6.9 10E9/L (ref 4–11)

## 2020-01-15 PROCEDURE — 80076 HEPATIC FUNCTION PANEL: CPT | Performed by: INTERNAL MEDICINE

## 2020-01-15 PROCEDURE — 99213 OFFICE O/P EST LOW 20 MIN: CPT | Mod: 25 | Performed by: INTERNAL MEDICINE

## 2020-01-15 PROCEDURE — 82565 ASSAY OF CREATININE: CPT | Performed by: INTERNAL MEDICINE

## 2020-01-15 PROCEDURE — 85025 COMPLETE CBC W/AUTO DIFF WBC: CPT | Performed by: INTERNAL MEDICINE

## 2020-01-15 PROCEDURE — 20610 DRAIN/INJ JOINT/BURSA W/O US: CPT | Mod: 50 | Performed by: INTERNAL MEDICINE

## 2020-01-15 PROCEDURE — 36415 COLL VENOUS BLD VENIPUNCTURE: CPT | Performed by: INTERNAL MEDICINE

## 2020-01-15 RX ORDER — HYDROXYCHLOROQUINE SULFATE 200 MG/1
200 TABLET, FILM COATED ORAL 2 TIMES DAILY
Qty: 180 TABLET | Refills: 3 | Status: SHIPPED | OUTPATIENT
Start: 2020-01-15 | End: 2020-04-29

## 2020-01-15 RX ORDER — TRIAMCINOLONE ACETONIDE 40 MG/ML
40 INJECTION, SUSPENSION INTRA-ARTICULAR; INTRAMUSCULAR ONCE
Status: COMPLETED | OUTPATIENT
Start: 2020-01-15 | End: 2020-01-15

## 2020-01-15 RX ORDER — METHOTREXATE 2.5 MG/1
17.5 TABLET ORAL WEEKLY
Qty: 84 TABLET | Refills: 1 | Status: SHIPPED | OUTPATIENT
Start: 2020-01-15 | End: 2020-07-31

## 2020-01-15 RX ORDER — METHOTREXATE 2.5 MG/1
20 TABLET ORAL WEEKLY
Qty: 96 TABLET | Refills: 1 | Status: CANCELLED | OUTPATIENT
Start: 2020-01-15

## 2020-01-15 RX ADMIN — TRIAMCINOLONE ACETONIDE 40 MG: 40 INJECTION, SUSPENSION INTRA-ARTICULAR; INTRAMUSCULAR at 15:35

## 2020-01-15 ASSESSMENT — MIFFLIN-ST. JEOR: SCORE: 1693.81

## 2020-01-15 NOTE — NURSING NOTE
RAPID3 (0-30) Cumulative Score  9.5          RAPID3 Weighted Score (divide #4 by 3 and that is the weighted score)  3.2       Eryn Espinoza CMA Rheumatology  1/15/2020 3:11 PM

## 2020-01-15 NOTE — NURSING NOTE
The following medication was given:     MEDICATION: Triamcinolone 40 mg  SITE: Right knee  DOSE: 1 ml  LOT #: [id number]  :  [employer]  EXPIRATION DATE:  03/2021  NDC#: [address]-7  Consent signed at:  3:30 PM    MEDICATION: Triamcinolone 40 mg  SITE: Left knee  DOSE: 1 ml  LOT #: [id number]  :  [employer]  EXPIRATION DATE:  03/2021  NDC#: [address]-8    1% Lidocaine  :  [employer]  Lot #:  [id number]  Expiration date: 02/2021  NDC: 1017-2960-46

## 2020-01-15 NOTE — PROGRESS NOTES
Rheumatology Clinic Visit      Zurdo Dash MRN# [id number]   YOB: 1945 Age: 74 year old      Date of visit: 1/15/20   PCP: Dr. Kapil Duckworth     Chief Complaint   Patient presents with:  Arthritis: RA. knees both hurt    Assessment and Plan     1. Seropositive nonerosive rheumatoid arthritis (, CCP 64): Currently on methotrexate 22.5 mg once weekly (reducing from 25mg wkly to get to the lowest effective dose), folic acid 1 mg daily, and hydroxychloroquine 200 mg twice a day. Doing well today with regard to rheumatoid arthritis.  Reduce MTX again.   - Reduce methotrexate from 20 mg once weekly; to 17.5 mg once weekly  - Continue folic acid 1 mg daily  - Continue hydroxychloroquine 200 mg twice a day (last eye exam: 6/15/18; previously referred him to ophthalmology again and asked that he schedule toxicity monitoring eye exam again today)  - Labs today and every 3 months: CBC, Creatinine, Hepatic Panel    2. Bilateral knee osteoarthritis / patellofemoral syndrome: Was receiving steroid injections approximately every 3-6 months with good control of his symptoms.  Repeat steroid injections today as documented in the procedure section.  Physical therapy has been beneficial and overall his knee pain has been reduced     3. Obesity: encouraged weight loss and discussed the health benefits    Mr. Dash verbalized agreement with and understanding of the rational for the diagnosis and treatment plan.  All questions were answered to best of my ability and the patient's satisfaction. Mr. Dash was advised to contact the clinic with any questions that may arise after the clinic visit.    Thank you for involving me in the care of the patient    Return to clinic: 3-4 months      HPI   Zurdo Dash is a 74 year old male with a medical history significant for hypertension, hyperlipidemia, diabetes, diabetic neuropathy, GERD, and rheumatoid arthritis who presents for f/u of RA and bilateral knee OA.      Today, Mr. Dash reports that his arthritis is doing well.  Tolerating medications.  No change in symptoms since reducing methotrexate to 20 mg once weekly.  Bilateral knee pain that is worse with activity and improves with rest; he would like to have steroid injections of both knees again as he has had them in the past and they were effective.  Morning stiffness for no more than 5-10 minutes.    Denies fevers, chills, nausea, vomiting, constipation, diarrhea. No abdominal pain. No chest pain/pressure, palpitations, or shortness of breath. No oral or nasal sores. No neck pain. No rash. No LE swelling.      Tobacco: None  EtOH: None  Drugs: None    ROS   GEN: No fevers, chills, or night sweats; intentionally losing weight on the Virtusize diet  SKIN: No rash. Sores from a recent fall.  HEENT: No oral or nasal ulcers.  CV: No chest pain, pressure, palpitations, or dyspnea on exertion.  PULM: No SOB, wheeze, cough.  GI: No nausea, vomiting, constipation, diarrhea. No blood in stool. No abdominal pain.  : No blood in urine.  MSK: See HPI.  NEURO: No numbness, tingling, or weakness.  EXT: No LE swelling  PSYCH: Negative    Active Problem List     Patient Active Problem List   Diagnosis     Pain in limb     Hyperlipidemia LDL goal <100     Hypertension goal BP (blood pressure) < 140/90     obesity     Hypogonadism     Advanced directives, counseling/discussion     Health Care Home     Type 2 diabetes mellitus with diabetic polyneuropathy, without long-term current use of insulin (H)     RBBB (right bundle branch block)     Ex-smoker     Family history of esophageal cancer     Gastroesophageal reflux disease, esophagitis presence not specified     Rheumatoid arthritis involving multiple sites with positive rheumatoid factor (H)     Pulmonary nodule     High risk medication use     Spondylosis of cervical region without myelopathy or radiculopathy     Erectile dysfunction, unspecified erectile dysfunction type      Type 2 diabetes mellitus without retinopathy (H)     Tubulovillous adenoma of colon     Diabetic polyneuropathy associated with type 2 diabetes mellitus (H)     Chronic bilateral low back pain without sciatica     Migraine equivalent     Posterior vitreous detachment of left eye     Pseudophakia, ou     Eyelid lesion, LLL     Dermatochalasis of both upper eyelids     Bradycardia     Primary osteoarthritis of both knees     Syncope     Trigger finger, acquired     Past Medical History     Past Medical History:   Diagnosis Date     Abnormal CT scan 03/2004    calcified lung granuloma     C. difficile diarrhea     H/O     Cataract 11/18/2011     Diabetic neuropathy (H)     mild, mostly soles and distal forefeet, worse on the left side.     Diverticulitis      ED (erectile dysfunction)      Ex-smoker     QUIT SMOKING FEB 2007     History of ETOH abuse     recovering, sober since 1997     Hyperlipidemia LDL goal <100      Hypertension goal BP (blood pressure) < 140/90      Hypogonadism      Obesity      PAD (peripheral artery disease) (H)     leg cramps, with exertion, no formal diagnosis of PAD and minimal if any symptoms at all.     RA (rheumatoid arthritis) (H)     Dr Bailon     Type 2 diabetes, HbA1c goal < 7% (H) 10/2008    A1C of 7.1 %      Past Surgical History     Past Surgical History:   Procedure Laterality Date     CATARACT IOL, RT/LT       COLONOSCOPY  03/01/2018    [facility]     COMBINED REPAIR PTOSIS WITH BLEPHAROPLASTY BILATERAL Bilateral 8/16/2019    Procedure: BILATERAL UPPER EYELID BLEPHAROPLASTY AND BILATERAL PTOSIS REPAIR;  Surgeon: Janet Garcia MD;  Location: [facility] OR     EXCISE LESION EYELID Left 8/16/2019    Procedure: LEFT LOWER EYELID BIOPSY;  Surgeon: Janet Garcia MD;  Location: [facility] OR     HC INCISION TENDON SHEATH FINGER  4/2009    r hand ring finger     PHACOEMULSIFICATION WITH STANDARD INTRAOCULAR LENS IMPLANT  02/2019; 3/2019    left eye; right eye     TONSILLECTOMY       Allergy   No  Known Allergies  Current Medication List     Current Outpatient Medications   Medication Sig     albuterol (PROAIR HFA/PROVENTIL HFA/VENTOLIN HFA) 108 (90 BASE) MCG/ACT Inhaler Inhale 2 puffs into the lungs every 4 hours as needed for other (coughing)     aspirin 81 MG tablet Take 1 tablet by mouth daily.     blood glucose (ACCU-CHEK DAYANARA PLUS) test strip TEST THREE TIMES A DAY OR AS DIRECTED     blood glucose (NO BRAND SPECIFIED) lancing device Use to test blood sugars 3 times daily or as directed.     blood glucose monitoring (ONE TOUCH DELICA) lancets Use to test blood sugars 3 times daily or as directed.     cetirizine (ZYRTEC) 10 MG tablet Take 10 mg by mouth At Bedtime     clobetasol (TEMOVATE) 0.05 % external ointment Apply topically 2 times daily     folic acid (FOLVITE) 1 MG tablet Take 1 tablet (1 mg) by mouth daily     HYDROcodone-acetaminophen (NORCO) 5-325 MG per tablet Take 1-2 tablets by mouth every 4 hours as needed for moderate to severe pain maximum 4 tablet(s) per day     hydroxychloroquine (PLAQUENIL) 200 MG tablet Take 1 tablet (200 mg) by mouth 2 times daily     lisinopril (PRINIVIL/ZESTRIL) 2.5 MG tablet Take 1 tablet (2.5 mg) by mouth daily     metFORMIN (GLUCOPHAGE) 500 MG tablet Take 1 tablet in the morning, and 2 tablets at night (1500 mg total/day) (Patient taking differently: Take 1 tablet in the morning, and 1 tablets at night)     methotrexate sodium 2.5 MG TABS Take 7 tablets (17.5 mg) by mouth once a week . Take all 7 tablets on the same day of each week.     metoprolol succinate ER (TOPROL-XL) 25 MG 24 hr tablet Take 1 tablet (25 mg) by mouth daily     omeprazole (PRILOSEC) 40 MG DR capsule TAKE ONE CAPSULE BY MOUTH ONCE DAILY     order for DME Equipment being ordered: specialized shoes for diabetic neuropathy as well as all supplies provided for annual treatment of diabetes neuropathy     order for DME Equipment being ordered: glucometer     order for DME Equipment being ordered:  "specialized shoes for diabetic neuropathy     His orthotics will be made by [facility] [employer]      Tim Jernigan, Certified   [email or web address]  Phone: [phone]  Fax: [phone]   [place] [address]     pravastatin (PRAVACHOL) 40 MG tablet Take 0.5 tablets (20 mg) by mouth every other day     semaglutide (OZEMPIC, 1 MG/DOSE,) 2 MG/1.5ML pen INJECT 1MG SUBCUTANEOUSLY EVERY 7 DAYS     tiZANidine (ZANAFLEX) 2 MG tablet Take 1 tablet (2 mg) by mouth 3 times daily as needed for muscle spasms     No current facility-administered medications for this visit.          Social History   See HPI    Family History     Family History   Problem Relation Age of Onset     Cerebrovascular Disease Mother      Arthritis Mother      Osteoporosis Mother      Alzheimer Disease Father      Arthritis Father      Cancer Father      Diabetes Maternal Grandmother      Cardiovascular Maternal Grandmother      Other Cancer Brother      Cancer Paternal Aunt      Hypertension No family hx of      Thyroid Disease No family hx of      Glaucoma No family hx of      Macular Degeneration No family hx of        Physical Exam     Temp Readings from Last 3 Encounters:   01/03/20 96.7  F (35.9  C) (Oral)   11/04/19 97.8  F (36.6  C) (Oral)   08/16/19 96.8  F (36  C) (Oral)     BP Readings from Last 5 Encounters:   01/15/20 136/78   01/03/20 126/86   12/30/19 (!) 180/94   12/30/19 (!) 176/80   11/04/19 (!) 142/72     Pulse Readings from Last 1 Encounters:   01/15/20 63     Resp Readings from Last 1 Encounters:   01/03/20 18     Estimated body mass index is 32.99 kg/m  as calculated from the following:    Height as of this encounter: 1.727 m (5' 8\").    Weight as of this encounter: 98.4 kg (217 lb).    GEN: NAD, obese  HEENT: MMM. No oral lesions. Anicteric, noninjected sclera  CV: S1, S2. RRR. No m/r/g.  PULM: CTA bilaterally. No w/c.  MSK: MCPs, PIPs, DIPs, wrists, elbows, shoulders, and ankles without swelling or " tenderness to palpation.  Negative MCP and MTP squeeze.  Knees with crepitation and medial joint line tenderness but no effusion or increased warmth  SKIN: No rash.   EXT: No LE edema  PSYCH: Alert. Appropriate.    Labs / Imaging (select studies)     [facility]    10/17/2013 Left knee synovial fluid at [facility]: , N 3%, No crystals    RF/CCP  Recent Labs   Lab Test 04/07/16  1149   CCPIGG 64*     CBC  Recent Labs   Lab Test 10/16/19  1118 07/16/19  1207 04/18/19  1108   WBC 5.9 7.7 7.1   RBC 4.67 4.41 4.63   HGB 14.5 13.9 14.6   HCT 43.4 40.7 43.2   MCV 93 92 93   RDW 14.1 14.8 14.5    206 229   MCH 31.0 31.5 31.5   MCHC 33.4 34.2 33.8   NEUTROPHIL 76.6 74.7 74.1   LYMPH 13.9 12.2 13.2   MONOCYTE 8.1 11.9 10.4   EOSINOPHIL 0.7 0.6 1.0   BASOPHIL 0.7 0.6 1.3   ANEU 4.5 5.8 5.3   ALYM 0.8 0.9 0.9   SMITH 0.5 0.9 0.7   AEOS 0.0 0.1 0.1   ABAS 0.0 0.1 0.1     CMP  Recent Labs   Lab Test 10/16/19  1118 07/16/19  1207 04/18/19  1108 01/14/19  1716  03/26/18  1130 03/05/18  1315   NA  --   --   --  141  --  139 140   POTASSIUM  --   --   --  4.2  --  4.6 5.5*   CHLORIDE  --   --   --  108  --  107 108   CO2  --   --   --  24  --  23 23   ANIONGAP  --   --   --  9  --  9 9   GLC  --   --   --  142*  --  84 72   BUN  --   --   --  22  --  20 21   CR 1.09 1.17 1.12 1.33*   < > 1.01 0.96   GFRESTIMATED 66 61 64 52*   < > 72 77   GFRESTBLACK 76 70 74 60*   < > 87 >90   NEW  --   --   --  9.2  --  8.8 9.5   BILITOTAL 0.5 0.5 0.6 0.3   < >  --   --    ALBUMIN 3.5 3.7 3.7 3.6   < >  --   --    PROTTOTAL 6.8 6.5* 7.0 7.0   < >  --   --    ALKPHOS 45 42 45 51   < >  --   --    AST 23 29 22 22   < >  --   --    ALT 39 42 45 32   < >  --   --     < > = values in this interval not displayed.     Calcium/VitaminD  Recent Labs   Lab Test 01/14/19  1716 03/26/18  1130 03/05/18  1315  07/23/13  1016  12/05/11  1200   NEW 9.2 8.8 9.5   < >  --    < > 9.3   D3VIT  --   --   --   --   --   --  30  "  VITDT  --   --   --   --  28*  --   --     < > = values in this interval not displayed.     ESR/CRP  Recent Labs   Lab Test 07/16/19  1207 04/18/19  1108 01/14/19  1716   SED 8 9 13   CRP 4.2 7.2 9.8*     Lipid Panel  Recent Labs   Lab Test 01/03/20  1219 01/14/19  1716 10/31/17  1105  06/29/15  1213 08/07/14  1249 07/23/13  1016   CHOL 139 147 127   < > 125 129 153   TRIG 107 253* 159*   < > 286* 270* 248*   HDL 43 33* 48   < > 37* 46 41   LDL 75 63 47   < > 31 29 62   VLDL  --   --   --   --  57* 54* 50*   CHOLHDLRATIO  --   --   --   --  3.4 2.8 3.7   NHDL 96 114 79   < >  --   --   --     < > = values in this interval not displayed.     Hepatitis B  Recent Labs   Lab Test 04/07/16  1149   HBCAB Nonreactive   HEPBANG Nonreactive     Hepatitis C  Recent Labs   Lab Test 04/07/16  1149   HCVAB Nonreactive   Assay performance characteristics have not been established for newborns,   infants, and children       HIV Screening  Recent Labs   Lab Test 04/07/16  1149   HIAGAB Nonreactive   HIV-1 p24 Ag & HIV-1/HIV-2 Ab Not Detected       \"MRI LEFT KNEE  10/08/2013    INDICATION: Left knee pain. Locking, catching and snapping. Weakness.  TECHNIQUE: Routine.  COMPARISON: None.    FINDINGS:  MEDIAL COMPARTMENT: Linear tearing involving the posterior horn and body of   the medial meniscus as seen on series 4: image 6-8. This is also seen on   series 5: image 9-10. Cartilage is thinned peripherally.    LATERAL COMPARTMENT: Lateral meniscus also demonstrates tearing in the   posterior horn on series 4: image 22. There is diffuse tearing in the body   of the meniscus which is horizontal as seen on series 5: image 9 and this   extends into the anterior horn. Cartilage is thinned.    PATELLOFEMORAL COMPARTMENT: Retropatellar cartilage demonstrates   full-thickness loss of cartilage over portions of the median ridge, lateral   facet and medial facet. Cartilage is better preserved over the lower pole   centrally. There is also " "full-thickness loss of cartilage in the lateral   and central trochlear groove. Patella is normally aligned. Retinaculum are   intact.    LIGAMENTS AND TENDONS: The anterior cruciate and posterior cruciate   ligaments are intact. The medial collateral ligament is intact. Lateral   collateral ligamentous complex and popliteus insertion are intact.   Quadriceps tendon and patellar tendon are intact.    BONES AND SOFT TISSUES: Moderate-sized joint effusion. No popliteal cyst.   No muscle edema or fracture.    CONCLUSION:  1. There is tearing of the medial meniscus involving the posterior horn and   body. Cartilage is thinned peripherally.  2. There is also tearing in the posterior horn and body of the lateral   meniscus which also extends into the anterior horn. Cartilage is also   thinned in the lateral compartment.  3. Moderate to severe osteoarthritis in the patellofemoral compartment with   full-thickness loss of cartilage over large portions of the retropatellar   surface and trochlear surface.  4. Joint effusion.    IF YOU ARE A PHYSICIAN AND HAVE QUESTIONS REGARDING THIS REPORT, PLEASE   CALL [phone].\"    \"X-RAY KNEES BILATERAL AP W/LAT/SUN/PA LEFT   Oct 08, 2013 09:51:59 AM     INDICATION: Pain and swelling   COMPARISON: None.      FINDINGS: Moderate narrowing lateral aspect of the patellofemoral   compartment with slight lateral subluxation of the patella. Medial and   lateral compartments are preserved. Moderate knee joint effusion and/or   synovitis. Enthesophyte formation distal quadriceps and proximal patellar   tendons. Extensive vascular calcifications.     AP view right knee demonstrates trace narrowing medial compartment joint   Space.\"    Immunization History     Immunization History   Administered Date(s) Administered     Influenza (High Dose) 3 valent vaccine 09/20/2012, 10/20/2015, 09/20/2016, 08/28/2017, 09/24/2018, 09/18/2019     Influenza (IIV3) PF 10/05/1999, 10/30/2008, 09/28/2011, " 10/14/2013, 10/03/2014     Pneumo Conj 13-V (2010&after) 06/29/2015     Pneumococcal 23 valent 08/19/2010     TD (ADULT, 7+) 08/05/1997, 02/03/2011     Zoster vaccine recombinant adjuvanted (SHINGRIX) 01/03/2020     Zoster vaccine, live 04/19/2010       Procedure     Procedure: Steroid injections of the bilateral knees  Indication: Pain, bilateral primary osteoarthritis of the knee    The procedure was explained in detail. Risks including infection, pain, structural damage such as cartilage damage and tendon rupture, fat atrophy, skin hyper-/hypo-pigmentation, and medication reaction was explained. The need for rest of the affected joint for one week after the procedure was explained.  The option of not doing the procedure was also provided. All questions were answered and the patient consented to the procedure.     A time-out was performed and the correct patient, procedure, and laterality were verified.    The right knee was examined and location for injection was identified - anterior medial. The area was cleaned with chlorhexidine, twice.  Ethyl chloride was then used for topical anaesthetic.  Then a mixture of lidocaine 1% 2 mL and Kenalog 40mg was injected into the intra-articular space.     The left knee was examined and location for injection was identified - anterior medial. The area was cleaned with chlorhexidine, twice.  Ethyl chloride was then used for topical anaesthetic.  Then a mixture of lidocaine 1% 2 mL and Kenalog 40mg was injected into the intra-articular space.     The patient tolerated the procedure well. No complications.    MEDICATION: Triamcinolone 40 mg  LOT #: [id number]  :  [employer]  EXPIRATION DATE:  03/2021  NDC#: [address]-8  Consent signed at:  3:30 PM     MEDICATION: Triamcinolone 40 mg  LOT #: [id number]  :  [employer]  EXPIRATION DATE:  03/2021  NDC#: [address]-7     1% Lidocaine  :  [employer]  Lot #:  [id number]  Expiration  date: 02/2021  NDC: 1916-5302-05         Chart documentation done in part with Dragon Voice recognition Software. Although reviewed after completion, some word and grammatical error may remain.    Albert Tompkins MD

## 2020-01-15 NOTE — PATIENT INSTRUCTIONS
Rheumatology    Dr. Albert Tompkins       [facility] [place]   (Monday)  [address]  [place] [address]       [facility] in [place]   (Tuesday)  [address] [place] [address]    [facility] in [place]   (Wed., Thurs., and Friday)  [address] [place] [address]    Phone number: [phone]  Thank you for choosing [facility].  Eryn Espinoza CMA

## 2020-01-16 ENCOUNTER — TRANSFERRED RECORDS (OUTPATIENT)
Dept: HEALTH INFORMATION MANAGEMENT | Facility: CLINIC | Age: 75
End: 2020-01-16

## 2020-01-16 LAB
ALBUMIN SERPL-MCNC: 3.5 G/DL (ref 3.4–5)
ALP SERPL-CCNC: 44 U/L (ref 40–150)
ALT SERPL W P-5'-P-CCNC: 31 U/L (ref 0–70)
AST SERPL W P-5'-P-CCNC: 21 U/L (ref 0–45)
BILIRUB DIRECT SERPL-MCNC: 0.2 MG/DL (ref 0–0.2)
BILIRUB SERPL-MCNC: 0.6 MG/DL (ref 0.2–1.3)
CREAT SERPL-MCNC: 1.24 MG/DL (ref 0.66–1.25)
GFR SERPL CREATININE-BSD FRML MDRD: 56 ML/MIN/{1.73_M2}
PROT SERPL-MCNC: 6.7 G/DL (ref 6.8–8.8)

## 2020-01-17 ENCOUNTER — TELEPHONE (OUTPATIENT)
Dept: INTERNAL MEDICINE | Facility: CLINIC | Age: 75
End: 2020-01-17

## 2020-01-17 ENCOUNTER — TELEPHONE (OUTPATIENT)
Dept: FAMILY MEDICINE | Facility: CLINIC | Age: 75
End: 2020-01-17

## 2020-01-17 DIAGNOSIS — I73.9 PAD (PERIPHERAL ARTERY DISEASE) (H): Primary | ICD-10-CM

## 2020-01-17 NOTE — LETTER
[facility]  [address]  [place] [address]  [phone]          2020    Zurdo Dash                                                                                                                     [address] [place] [address]  [place] [address]            Dear Zurdo,    We have tried to reach you by phone, but were unable to do so.  Enclosed is a copy of your recent study.    This annual low dose CT for scanning high risk patients for lung cancer does not show any worrisome findings, but this scan should be repeated annually.     CT LOW DOSE LUNG CANCER SCREENING  2020 12:17 PM     HISTORY:  Screening for lung cancer, smoking.     Number of packs-year of smokin  Current or former smoker?: Former  If former, number of years since quit?: 13     COMPARISON: CT dated 2017     TECHNIQUE: Noncontrast acquisition low dose CT chest. Images reviewed  in lung, soft tissue and bone windows.     FINDINGS: (All follow-up of nodules are based on ACR guidelines for  lung cancer screening and measurements of each nodule size must be the  mean of the longest axial plane measurement by its perpendicular  measured to the nearest decimal and rounded up to the nearest whole  Number.)     Nodules: Ill-defined nodular opacity or more likely nodular scarring  right upper lobe posteriorly measuring 6 mm (series 2 image 78)  remains stable. Few bilateral calcified pulmonary granulomata. No new  or enlarging pulmonary nodules.     Additional lung findings: Emphysematous changes both lungs. No acute  infiltrates, bronchiectasis or fibrotic changes.     Coronary artery calcium: Severe.                                                                    IMPRESSION:   1. ACR Assessment Category (v1.1):  Lung-RADS Category 2. Benign  appearance or behavior.       Recommendation:  Lung-RADS Category 2. Benign appearance or behavior.  Recommendation:  continue annual screening with Lung cancer  screening  CT (please order exam code [id number]).      2. Emphysematous changes both lungs.  3. Avoidance of tobacco smoke is strongly advised. Please consider  referral for smoking cessation to [facility]  (MTM) if clinically appropriate.     Please feel free to contact myself or my nurse with any questions regarding this study.    Sincerely,         Kapil Duckworth MD/augie

## 2020-01-17 NOTE — TELEPHONE ENCOUNTER
This has been a recurrent problem. The health insurance  Companies are conducting the QuantaFlo PAD test and coming up with abnormal results in asymptomatic patients. This information is somewhat confusing.    To have a reduced flow down to this low level of especially the 0.54, this is moderately severe and the results do not fit with patients clinical scenario     What makes the most sense to me is is to have patient scheduled for a formal bilateral ankle brachial index test test due to abnormal QuantaFlo PAD test , to exclude real peripheral arterial disease     Kapil Duckworth MD

## 2020-01-17 NOTE — TELEPHONE ENCOUNTER
Called patient and left VM to call clinic for results. Okay to speak to anyone on the pink team.    Notes recorded by Kapil Duckworth MD on 1/15/2020 at 10:39 PM CST  This annual low dose CT for scanning high risk patients for lung cancer does not show any worrisome findings but this scan should be repeated annually. Please let me know if patient has questions for me about this CT scan     Mia RAMSEY CMA ([facility])

## 2020-01-17 NOTE — TELEPHONE ENCOUNTER
Fax received from Nancy Cee (ph: [phone]) from [facility] regarding results from QuantaFlo (which is a portable, non-invasive device that delivers objective and accurate PAD results at the ponint of care.    Results show right foot at 0.65 (moderate) and left foot at 0.54 (significant).    Copy of results given to Dr. Duckworth to review and advise. Caren Telles,

## 2020-01-20 NOTE — TELEPHONE ENCOUNTER
2nd attempt. Called patient and left VM to call clinic for results. Okay to speak to anyone on the pink team.  Carlos Manuel Bell CMA on 1/20/2020 at 9:42 AM

## 2020-01-21 NOTE — TELEPHONE ENCOUNTER
3rd attempt. Called patient and left VM to call clinic for results. Okay to speak to anyone on the pink team. TC please mail letter to pt  Carlos Manuel Bell CMA on 1/21/2020 at 8:08 AM

## 2020-01-28 ENCOUNTER — ANCILLARY PROCEDURE (OUTPATIENT)
Dept: ULTRASOUND IMAGING | Facility: CLINIC | Age: 75
End: 2020-01-28
Attending: INTERNAL MEDICINE
Payer: COMMERCIAL

## 2020-01-28 DIAGNOSIS — I73.9 PAD (PERIPHERAL ARTERY DISEASE) (H): ICD-10-CM

## 2020-01-28 PROCEDURE — 93922 UPR/L XTREMITY ART 2 LEVELS: CPT | Performed by: RADIOLOGY

## 2020-02-03 ENCOUNTER — OFFICE VISIT (OUTPATIENT)
Dept: INTERNAL MEDICINE | Facility: CLINIC | Age: 75
End: 2020-02-03
Payer: COMMERCIAL

## 2020-02-03 ENCOUNTER — NURSE TRIAGE (OUTPATIENT)
Dept: FAMILY MEDICINE | Facility: CLINIC | Age: 75
End: 2020-02-03

## 2020-02-03 VITALS
DIASTOLIC BLOOD PRESSURE: 78 MMHG | HEART RATE: 89 BPM | BODY MASS INDEX: 30.87 KG/M2 | OXYGEN SATURATION: 94 % | RESPIRATION RATE: 18 BRPM | WEIGHT: 203 LBS | TEMPERATURE: 97.7 F | SYSTOLIC BLOOD PRESSURE: 114 MMHG

## 2020-02-03 DIAGNOSIS — D84.9 IMMUNOSUPPRESSION (H): ICD-10-CM

## 2020-02-03 DIAGNOSIS — R05.8 PRODUCTIVE COUGH: ICD-10-CM

## 2020-02-03 DIAGNOSIS — J06.9 UPPER RESPIRATORY TRACT INFECTION, UNSPECIFIED TYPE: Primary | ICD-10-CM

## 2020-02-03 PROCEDURE — 99213 OFFICE O/P EST LOW 20 MIN: CPT | Performed by: INTERNAL MEDICINE

## 2020-02-03 RX ORDER — AZITHROMYCIN 250 MG/1
TABLET, FILM COATED ORAL
Qty: 6 TABLET | Refills: 0 | Status: SHIPPED | OUTPATIENT
Start: 2020-02-03 | End: 2020-02-11

## 2020-02-03 RX ORDER — BENZONATATE 100 MG/1
100 CAPSULE ORAL 3 TIMES DAILY PRN
Qty: 30 CAPSULE | Refills: 0 | Status: SHIPPED | OUTPATIENT
Start: 2020-02-03 | End: 2020-12-15

## 2020-02-03 ASSESSMENT — ENCOUNTER SYMPTOMS
HEADACHES: 1
NAUSEA: 1
CHILLS: 1
FATIGUE: 1
COUGH: 1
WEAKNESS: 1

## 2020-02-03 NOTE — TELEPHONE ENCOUNTER
Spoke with pt. Has had symptoms of a cold for 3 weeks. Has a cough that is productive with green phlegm. Is having difficulty breathing and wheezing. Able to speak full sentences during this call. Has not checked his temp. Has chills.  Hasn't been able to eat for 1 week. Has been drinking fluids. Appt scheduled.    Mia Artis RN  [facility]    Additional Information    Negative: Bluish (or gray) lips or face    Negative: Severe difficulty breathing (e.g., struggling for each breath, speaks in single words)    Negative: Rapid onset of cough and has hives    Negative: Coughing started suddenly after medicine, an allergic food or bee sting    Negative: Difficulty breathing after exposure to flames, smoke, or fumes    Negative: Sounds like a life-threatening emergency to the triager    Negative: Previous asthma attacks and this feels like asthma attack    Negative: Chest pain present when not coughing    Negative: Difficulty breathing    Negative: Passed out (i.e., fainted, collapsed and was not responding)    Negative: Patient sounds very sick or weak to the triager    Wheezing is present    Negative: Increasing ankle swelling    Negative: Fever > 100.0 F (37.8 C) and bedridden (e.g., nursing home patient, stroke, chronic illness, recovering from surgery)    Negative: Fever > 100.0 F (37.8 C) and has diabetes mellitus or a weak immune system (e.g., HIV positive, cancer chemotherapy, organ transplant, splenectomy, chronic steroids)    Negative: Fever > 101 F (38.3 C) and over 60 years of age    Negative: Fever > 103 F (39.4 C)    Negative: Coughed up > 1 tablespoon (15 ml) blood (Exception: blood-tinged sputum)    Protocols used: COUGH-A-OH

## 2020-02-03 NOTE — PROGRESS NOTES
Subjective     Zurdo Dash is a 74 year old male who presents to clinic today for the following health issues:    URI   This is a recurrent problem. The current episode started more than 1 month ago. The problem occurs constantly. The problem has been unchanged. Associated symptoms include chills, congestion, coughing, fatigue, headaches, nausea and weakness. Associated symptoms comments: Diarrhea   . He has tried acetaminophen, NSAIDs, rest and sleep for the symptoms. The treatment provided no relief.      Patient did take temperature at home but had febrile sx. Vitamin C and cough drops tried but nothing has seemed to help. 10% better roughly since onset but overall sx have been present for 3+ weeks. Most bothering sx, include - productive cough, nausea, diarrhea main sx of concern. Patient denies hemoptysis or hematochezia.     No COPD or other breathing issues. Patient has type 2 diabetes with last A1C of 6.4 on 8/2019.     Patient Active Problem List   Diagnosis     Pain in limb     Hyperlipidemia LDL goal <100     Hypertension goal BP (blood pressure) < 140/90     obesity     Hypogonadism     Advanced directives, counseling/discussion     Health Care Home     Type 2 diabetes mellitus with diabetic polyneuropathy, without long-term current use of insulin (H)     RBBB (right bundle branch block)     Ex-smoker     Family history of esophageal cancer     Gastroesophageal reflux disease, esophagitis presence not specified     Rheumatoid arthritis involving multiple sites with positive rheumatoid factor (H)     Pulmonary nodule     High risk medication use     Spondylosis of cervical region without myelopathy or radiculopathy     Erectile dysfunction, unspecified erectile dysfunction type     Type 2 diabetes mellitus without retinopathy (H)     Tubulovillous adenoma of colon     Diabetic polyneuropathy associated with type 2 diabetes mellitus (H)     Chronic bilateral low back pain without sciatica     Migraine  equivalent     Posterior vitreous detachment of left eye     Pseudophakia, ou     Eyelid lesion, LLL     Dermatochalasis of both upper eyelids     Bradycardia     Primary osteoarthritis of both knees     Syncope     Trigger finger, acquired     Past Surgical History:   Procedure Laterality Date     CATARACT IOL, RT/LT       COLONOSCOPY  2018    [facility]     COMBINED REPAIR PTOSIS WITH BLEPHAROPLASTY BILATERAL Bilateral 2019    Procedure: BILATERAL UPPER EYELID BLEPHAROPLASTY AND BILATERAL PTOSIS REPAIR;  Surgeon: Janet Garcia MD;  Location: [facility] OR     EXCISE LESION EYELID Left 2019    Procedure: LEFT LOWER EYELID BIOPSY;  Surgeon: Janet Garcia MD;  Location: [facility] OR     HC INCISION TENDON SHEATH FINGER  2009    r hand ring finger     PHACOEMULSIFICATION WITH STANDARD INTRAOCULAR LENS IMPLANT  2019; 3/2019    left eye; right eye     TONSILLECTOMY         Social History     Tobacco Use     Smoking status: Former Smoker     Packs/day: 1.00     Years: 40.00     Pack years: 40.00     Types: Cigarettes     Last attempt to quit: 3/16/2007     Years since quittin.8     Smokeless tobacco: Never Used   Substance Use Topics     Alcohol use: No     Alcohol/week: 0.0 standard drinks     Family History   Problem Relation Age of Onset     Cerebrovascular Disease Mother      Arthritis Mother      Osteoporosis Mother      Alzheimer Disease Father      Arthritis Father      Cancer Father      Diabetes Maternal Grandmother      Cardiovascular Maternal Grandmother      Other Cancer Brother      Cancer Paternal Aunt      Hypertension No family hx of      Thyroid Disease No family hx of      Glaucoma No family hx of      Macular Degeneration No family hx of          Reviewed and updated as needed this visit by Provider         Review of Systems   Constitutional: Positive for chills and fatigue.   HENT: Positive for congestion.    Respiratory: Positive for cough.    Gastrointestinal: Positive for nausea.    Neurological: Positive for weakness and headaches.      ROS COMP: Constitutional, HEENT, cardiovascular, pulmonary, gi and gu systems are negative, except as otherwise noted.      Objective    /78   Pulse 89   Temp 97.7  F (36.5  C) (Oral)   Resp 18   Wt 92.1 kg (203 lb)   SpO2 94%   BMI 30.87 kg/m    There is no height or weight on file to calculate BMI.  Physical Exam   GENERAL: healthy, alert and no distress  EYES: Eyes grossly normal to inspection, PERRL and conjunctivae and sclerae normal  HENT: ear canals and TM's normal, nose and mouth without ulcers or lesions. Tonsils 1+.   NECK: no adenopathy, no asymmetry, masses, or scars and thyroid normal to palpation  RESP: No accessory muscle use noted. Diffuse wheezing heard throughout upon ausculation, especially in the upper lobes bilaterally.   CV: regular rate and rhythm, normal S1 S2, no S3 or S4, no murmur, click or rub, no peripheral edema and peripheral pulses strong  ABDOMEN: soft, nontender, no hepatosplenomegaly, no masses and bowel sounds normal  MS: no gross musculoskeletal defects noted, no edema  SKIN: no suspicious lesions or rashes  NEURO: Normal strength and tone, mentation intact and speech normal  PSYCH: mentation appears normal, affect normal/bright  Diagnostic Test Results:  Labs reviewed in Epic - Last labs including last A1C.     No labs ordered at this visit.         Assessment & Plan     (J06.9) Upper respiratory tract infection, unspecified type  (primary encounter diagnosis)  (D89.9) Immunosuppression (H)  (R05) Productive cough  Comment: Persistent sx for 3+ weeks plus immunocompromised due to type 2 diabetes.   Plan: Start azithromycin (ZITHROMAX) 250 MG BID x1 day then QDAY x4 days. Start benzonatate (TESSALON) 100 MG capsule PRN for cough when bothersome. Start mucinex QDAY until feeling better with adequate hydration    Follow-up if not improving with therapy.      Return for If not improving.      Marlon Medel,  LJ    This patient office visit today was staffed with me, I did review the entire clinical presentation and history, exam and medical decision making with physicians assistant student LJ Puckett . I agree with and have approved the office visit entirely. Patient seen with me and LJ Puckett   together today. I agree with assessment and plans.      Kapil Duckworth MD  [facility]

## 2020-02-03 NOTE — PATIENT INSTRUCTIONS
Mucinex OTC for congestion. Need to hydrate in order for it to work.     Tessalon for cough as needed.     Azithromycin 2 tablets 1st day followed by 1 tablet for 4 days.

## 2020-02-03 NOTE — TELEPHONE ENCOUNTER
Reason for call:  Symptom   Symptom or request:  Cold. Cough, phlem, nausea, achy, no energy    Duration (how long have symptoms been present):  3 weeks   Have you been treated for this before? No    Additional comments:  Na     Phone number to reach patient:  Home number on file [phone] (home)    Best Time:   Any     Can we leave a detailed message on this number?  YES

## 2020-02-10 ENCOUNTER — OFFICE VISIT (OUTPATIENT)
Dept: URGENT CARE | Facility: URGENT CARE | Age: 75
End: 2020-02-10
Payer: COMMERCIAL

## 2020-02-10 ENCOUNTER — TELEPHONE (OUTPATIENT)
Dept: URGENT CARE | Facility: URGENT CARE | Age: 75
End: 2020-02-10

## 2020-02-10 VITALS
OXYGEN SATURATION: 96 % | SYSTOLIC BLOOD PRESSURE: 158 MMHG | HEART RATE: 71 BPM | WEIGHT: 200.2 LBS | TEMPERATURE: 98 F | BODY MASS INDEX: 30.44 KG/M2 | DIASTOLIC BLOOD PRESSURE: 83 MMHG | RESPIRATION RATE: 16 BRPM

## 2020-02-10 DIAGNOSIS — I77.6 VASCULITIS (H): Primary | ICD-10-CM

## 2020-02-10 PROCEDURE — 99213 OFFICE O/P EST LOW 20 MIN: CPT | Performed by: FAMILY MEDICINE

## 2020-02-10 ASSESSMENT — PAIN SCALES - GENERAL: PAINLEVEL: MODERATE PAIN (5)

## 2020-02-10 NOTE — TELEPHONE ENCOUNTER
Reason for Call:  Other prescription    Detailed comments: Pt has a question regarding nitroglycerin ointment and would like a call back to discuss further.    Phone Number Patient can be reached at: Other phone number:  [phone]    Best Time: anytime    Can we leave a detailed message on this number? YES    Call taken on 2/10/2020 at 1:39 PM by Shamar Leiva

## 2020-02-10 NOTE — PROGRESS NOTES
SUBJECTIVE:   Zurdo Dash is a 74 year old male presenting with a chief complaint of   Chief Complaint   Patient presents with     Finger     right pinky finger pain and turning blue-pain started last week        Noted right distal 5th finger blue in color since last week. He did indicate he was out in the cold a few day prior when using the  and with thin gloves. Denies any prior hx of reynauds symptoms or known vasculitis previously. Does have a hx of rheumatoid arthritis     Social History     Tobacco Use     Smoking status: Former Smoker     Packs/day: 1.00     Years: 40.00     Pack years: 40.00     Types: Cigarettes     Last attempt to quit: 3/16/2007     Years since quittin.9     Smokeless tobacco: Never Used   Substance Use Topics     Alcohol use: No     Alcohol/week: 0.0 standard drinks     Drug use: No        Review of Systems    Past Medical History:   Diagnosis Date     Abnormal CT scan 2004    calcified lung granuloma     C. difficile diarrhea     H/O     Cataract 2011     Diabetic neuropathy (H)     mild, mostly soles and distal forefeet, worse on the left side.     Diverticulitis      ED (erectile dysfunction)      Ex-smoker     QUIT SMOKING 2007     History of ETOH abuse     recovering, sober since      Hyperlipidemia LDL goal <100      Hypertension goal BP (blood pressure) < 140/90      Hypogonadism      Obesity      PAD (peripheral artery disease) (H)     leg cramps, with exertion, no formal diagnosis of PAD and minimal if any symptoms at all.     RA (rheumatoid arthritis) (H)     Dr Bailon     Type 2 diabetes, HbA1c goal < 7% (H) 10/2008    A1C of 7.1 %      Family History   Problem Relation Age of Onset     Cerebrovascular Disease Mother      Arthritis Mother      Osteoporosis Mother      Alzheimer Disease Father      Arthritis Father      Cancer Father      Diabetes Maternal Grandmother      Cardiovascular Maternal Grandmother      Other Cancer Brother       Cancer Paternal Aunt      Hypertension No family hx of      Thyroid Disease No family hx of      Glaucoma No family hx of      Macular Degeneration No family hx of      Current Outpatient Medications   Medication Sig Dispense Refill     albuterol (PROAIR HFA/PROVENTIL HFA/VENTOLIN HFA) 108 (90 BASE) MCG/ACT Inhaler Inhale 2 puffs into the lungs every 4 hours as needed for other (coughing) 1 Inhaler 1     aspirin 81 MG tablet Take 1 tablet by mouth daily.  3     benzonatate (TESSALON) 100 MG capsule Take 1 capsule (100 mg) by mouth 3 times daily as needed for cough 30 capsule 0     blood glucose (ACCU-CHEK DAYANARA PLUS) test strip TEST THREE TIMES A DAY OR AS DIRECTED 300 each 1     blood glucose (NO BRAND SPECIFIED) lancing device Use to test blood sugars 3 times daily or as directed. 1 each 0     blood glucose monitoring (ONE TOUCH DELICA) lancets Use to test blood sugars 3 times daily or as directed. 300 each 3     cetirizine (ZYRTEC) 10 MG tablet Take 10 mg by mouth At Bedtime       clobetasol (TEMOVATE) 0.05 % external ointment Apply topically 2 times daily 30 g 0     folic acid (FOLVITE) 1 MG tablet Take 1 tablet (1 mg) by mouth daily 100 tablet 2     HYDROcodone-acetaminophen (NORCO) 5-325 MG per tablet Take 1-2 tablets by mouth every 4 hours as needed for moderate to severe pain maximum 4 tablet(s) per day 28 tablet 0     hydroxychloroquine (PLAQUENIL) 200 MG tablet Take 1 tablet (200 mg) by mouth 2 times daily 180 tablet 3     lisinopril (PRINIVIL/ZESTRIL) 2.5 MG tablet Take 1 tablet (2.5 mg) by mouth daily 90 tablet 1     metFORMIN (GLUCOPHAGE) 500 MG tablet Take 1 tablet in the morning, and 2 tablets at night (1500 mg total/day) (Patient taking differently: Take 1 tablet in the morning, and 1 tablets at night) 180 tablet 1     methotrexate sodium 2.5 MG TABS Take 7 tablets (17.5 mg) by mouth once a week . Take all 7 tablets on the same day of each week. 84 tablet 1     metoprolol succinate ER (TOPROL-XL) 25  MG 24 hr tablet Take 1 tablet (25 mg) by mouth daily 90 tablet 1     omeprazole (PRILOSEC) 40 MG DR capsule TAKE ONE CAPSULE BY MOUTH ONCE DAILY 90 capsule 1     pravastatin (PRAVACHOL) 40 MG tablet Take 0.5 tablets (20 mg) by mouth every other day 23 tablet 0     semaglutide (OZEMPIC, 1 MG/DOSE,) 2 MG/1.5ML pen INJECT 1MG SUBCUTANEOUSLY EVERY 7 DAYS 3 mL 2     tiZANidine (ZANAFLEX) 2 MG tablet Take 1 tablet (2 mg) by mouth 3 times daily as needed for muscle spasms 90 tablet 1     order for DME Equipment being ordered: specialized shoes for diabetic neuropathy as well as all supplies provided for annual treatment of diabetes neuropathy 1 Device 0     order for DME Equipment being ordered: glucometer 1 Device 0     order for DME Equipment being ordered: specialized shoes for diabetic neuropathy     His orthotics will be made by [facility] [employer] [employer]      Tim Jernigan, Certified   [email or web address]  Phone: [phone]  Fax: [phone]  [address] [place] [address] 1 Device 0     Social History     Tobacco Use     Smoking status: Former Smoker     Packs/day: 1.00     Years: 40.00     Pack years: 40.00     Types: Cigarettes     Last attempt to quit: 3/16/2007     Years since quittin.9     Smokeless tobacco: Never Used   Substance Use Topics     Alcohol use: No     Alcohol/week: 0.0 standard drinks       OBJECTIVE  BP (!) 158/83 (BP Location: Left arm, Patient Position: Sitting, Cuff Size: Adult Large)   Pulse 71   Temp 98  F (36.7  C) (Oral)   Resp 16   Wt 90.8 kg (200 lb 3.2 oz)   SpO2 96%   BMI 30.44 kg/m      Physical Exam  Neck:      Musculoskeletal: Normal range of motion.   Cardiovascular:      Rate and Rhythm: Normal rate.   Pulmonary:      Effort: Pulmonary effort is normal.   Skin:     General: Skin is warm.      Coloration: Skin is pale (or blue in colar along distal right 5th digits on both volar and dorsal aspects distal to the PIP joint ).   Neurological:      Mental  Status: He is alert.           ASSESSMENT:    ICD-10-CM    1. Vasculitis (H) I77.6 VASCULAR MEDICINE REFERRAL     nitroGLYcerin (NITRO-BID) 2 % OINT ointment    -- did seem to improve slightly with warm water soak x 7 minutes.     PLAN:  Trial of the above topical paste with vascular follow-up  Commended him on his quitting smoking years ago.   Immediately to the ER for any worsening pain, distal cyanosis or numbness.   Patient educational/instructional material provided including reasons for follow-up    The patient indicates understanding of these issues and agrees with the plan.   Omega Roldan MD

## 2020-02-10 NOTE — TELEPHONE ENCOUNTER
Team, please return call to pharmacy and gather more information, clarify issue.  Prescription written by Dr. Roldan earlier this afternoon.    Amparo Bocanegra RN  [facility]

## 2020-02-10 NOTE — TELEPHONE ENCOUNTER
Dr. Roldan clarified directions with pharmacist. Closing encounter.    Radha Prince, [facility]

## 2020-02-11 ENCOUNTER — OFFICE VISIT (OUTPATIENT)
Dept: FAMILY MEDICINE | Facility: CLINIC | Age: 75
End: 2020-02-11
Payer: COMMERCIAL

## 2020-02-11 VITALS
RESPIRATION RATE: 16 BRPM | OXYGEN SATURATION: 98 % | BODY MASS INDEX: 30.62 KG/M2 | DIASTOLIC BLOOD PRESSURE: 70 MMHG | SYSTOLIC BLOOD PRESSURE: 130 MMHG | WEIGHT: 202 LBS | TEMPERATURE: 96.2 F | HEART RATE: 76 BPM | HEIGHT: 68 IN

## 2020-02-11 DIAGNOSIS — T14.8XXA HEMATOMA OF SKIN: Primary | ICD-10-CM

## 2020-02-11 PROCEDURE — 99212 OFFICE O/P EST SF 10 MIN: CPT | Performed by: PHYSICIAN ASSISTANT

## 2020-02-11 ASSESSMENT — MIFFLIN-ST. JEOR: SCORE: 1625.77

## 2020-02-11 NOTE — PROGRESS NOTES
"Subjective     Zurdo Dash is a 74 year old male who presents to clinic today for the following health issues:    HPI   Patient presents with:  RECHECK: was seen @ [facility] on 02/10/2020. right pinky finger is swollen and painful x 1 week discolored started x 4 days ago, have numbness too    PMHx sig for rheumatoid arthritis.  Rheumatology follow up pending. Patient notes he has frequent bruising for several years. Did review that Raynaud's is possible but doesn't require tx.     Review of Systems   ROS COMP: Constitutional, HEENT, cardiovascular, pulmonary, gi and gu systems are negative, except as otherwise noted.      Objective    /70   Pulse 76   Temp 96.2  F (35.7  C) (Oral)   Resp 16   Ht 1.727 m (5' 8\")   Wt 91.6 kg (202 lb)   SpO2 98%   BMI 30.71 kg/m    Body mass index is 30.71 kg/m .  Physical Exam   GENERAL: healthy, alert and no distress  MS: 5th digit of R hand shows full ROM with sl ulnar dev noted.  No effusion or inflammation evident.   SKIN: hematoma of 5th digit with early dissolution signs.   C/w other upper extremetie skin.    Diagnostic Test Results:  none         Assessment & Plan     1. Hematoma of skin  Will resolve spontaneously     Follow up if symptoms should persist, change or worsen.     Return per Rheumatology recommendations..    Tavares Ellison PA-C  [facility]      "

## 2020-02-27 ENCOUNTER — OFFICE VISIT (OUTPATIENT)
Dept: INTERNAL MEDICINE | Facility: CLINIC | Age: 75
End: 2020-02-27
Payer: COMMERCIAL

## 2020-02-27 VITALS
TEMPERATURE: 99.1 F | HEIGHT: 68 IN | WEIGHT: 207 LBS | DIASTOLIC BLOOD PRESSURE: 68 MMHG | RESPIRATION RATE: 18 BRPM | SYSTOLIC BLOOD PRESSURE: 114 MMHG | HEART RATE: 67 BPM | BODY MASS INDEX: 31.37 KG/M2 | OXYGEN SATURATION: 99 %

## 2020-02-27 DIAGNOSIS — N18.30 CKD (CHRONIC KIDNEY DISEASE) STAGE 3, GFR 30-59 ML/MIN (H): ICD-10-CM

## 2020-02-27 DIAGNOSIS — E11.42 DIABETIC POLYNEUROPATHY ASSOCIATED WITH TYPE 2 DIABETES MELLITUS (H): ICD-10-CM

## 2020-02-27 DIAGNOSIS — M05.79 RHEUMATOID ARTHRITIS INVOLVING MULTIPLE SITES WITH POSITIVE RHEUMATOID FACTOR (H): ICD-10-CM

## 2020-02-27 DIAGNOSIS — I74.2: Primary | ICD-10-CM

## 2020-02-27 DIAGNOSIS — E11.42 TYPE 2 DIABETES MELLITUS WITH DIABETIC POLYNEUROPATHY, WITHOUT LONG-TERM CURRENT USE OF INSULIN (H): ICD-10-CM

## 2020-02-27 PROCEDURE — 99214 OFFICE O/P EST MOD 30 MIN: CPT | Performed by: INTERNAL MEDICINE

## 2020-02-27 PROCEDURE — 99207 C FOOT EXAM  NO CHARGE: CPT | Performed by: INTERNAL MEDICINE

## 2020-02-27 RX ORDER — HYDROCODONE BITARTRATE AND ACETAMINOPHEN 5; 325 MG/1; MG/1
1-2 TABLET ORAL EVERY 4 HOURS PRN
Qty: 28 TABLET | Refills: 0 | Status: SHIPPED | OUTPATIENT
Start: 2020-02-27 | End: 2020-07-17

## 2020-02-27 ASSESSMENT — MIFFLIN-ST. JEOR: SCORE: 1648.45

## 2020-02-27 NOTE — Clinical Note
I have an absolutely fascinating case here. All the evidence points to an arterial embolus from uncertain source. Getting echocardiogram to exclude endocarditis . He will see you guys. Do you think I should order anything else in the meantime ?

## 2020-02-27 NOTE — Clinical Note
I am thinking about his case and await feedback from vascular surgeon, in the meantime need echocardiogram

## 2020-02-27 NOTE — PROGRESS NOTES
Subjective     Zurdo Dash is a 74 year old male who presents to clinic today for the following health issues:    HPI   ED/UC Followup:    Facility:  [facility] urgent care   Date of visit: 02/10/2020  Reason for visit: Finger infection  Current Status: Pt states still having infection getting worse         Arterial embolism of upper extremity (H)  Rheumatoid arthritis involving multiple sites with positive rheumatoid factor (H)  CKD (chronic kidney disease) stage 3, GFR 30-59 ml/min (H)  Diabetic polyneuropathy associated with type 2 diabetes mellitus (H)  Type 2 diabetes mellitus with diabetic polyneuropathy, without long-term current use of insulin (H)     Today was a very interesting and somewhat concerning appointment. See as detailed below and with assessment and plan section.    Patient does wear specialized shoes for diabetic neuropathy, for about 10 years now, once a year replacement . Has hammertoe deformities on the left foot, some evidence of diabetes neuropathy - bottoms feel thick and abnormal, generally hypoesthesia  . [facility] see him annual.    Second unrelated problem to discuss - he had a right pinky finger had initially felt painful, fairly suddenly and without any trauma or other explanation. Within 3 days this pinky turned entirely purple and was severely painful, came to urgent care clinic and then Raynauds phenomenon was considered / diagnosed ? Initial onset was pain. No other affected digits and no history of Raynauds phenomenon. This purple painful finger slowly progressed to the the purple over a 3 day period. He was seen by Tavares Ellison who said it was probably a bruise and just give it time to heal . Not to be treated with expensive cream [ the initial urgent care evaluation has recommended vascular surgery consultation and treatment with a topical nitroglycerin ]. Just wait a couple of weeks was the advice. Since then this abnormal digit has now developed  an open ulceration which has an ischemic vascular disease look . Total time span from initial pain to purple color and eventually to today's ulceration is about 2.5 weeks.     GFR Estimate   Date Value Ref Range Status   01/15/2020 56 (L) >60 mL/min/[1.73_m2] Final     Comment:     Non  GFR Calc  Starting 12/18/2018, serum creatinine based estimated GFR (eGFR) will be   calculated using the Chronic Kidney Disease Epidemiology Collaboration   (CKD-EPI) equation.     10/16/2019 66 >60 mL/min/[1.73_m2] Final     Comment:     Non  GFR Calc  Starting 12/18/2018, serum creatinine based estimated GFR (eGFR) will be   calculated using the Chronic Kidney Disease Epidemiology Collaboration   (CKD-EPI) equation.     07/16/2019 61 >60 mL/min/[1.73_m2] Final     Comment:     Non  GFR Calc  Starting 12/18/2018, serum creatinine based estimated GFR (eGFR) will be   calculated using the Chronic Kidney Disease Epidemiology Collaboration   (CKD-EPI) equation.     04/18/2019 64 >60 mL/min/[1.73_m2] Final     Comment:     Non  GFR Calc  Starting 12/18/2018, serum creatinine based estimated GFR (eGFR) will be   calculated using the Chronic Kidney Disease Epidemiology Collaboration   (CKD-EPI) equation.     01/14/2019 52 (L) >60 mL/min/[1.73_m2] Final     Comment:     Non  GFR Calc  Starting 12/18/2018, serum creatinine based estimated GFR (eGFR) will be   calculated using the Chronic Kidney Disease Epidemiology Collaboration   (CKD-EPI) equation.         Patient Active Problem List   Diagnosis     Pain in limb     Hyperlipidemia LDL goal <100     Hypertension goal BP (blood pressure) < 140/90     Hypogonadism     Advanced directives, counseling/discussion     Health Care Home     Type 2 diabetes mellitus with diabetic polyneuropathy, without long-term current use of insulin (H)     RBBB (right bundle branch block)     Ex-smoker     Family history of  esophageal cancer     Gastroesophageal reflux disease, esophagitis presence not specified     Rheumatoid arthritis involving multiple sites with positive rheumatoid factor (H)     Pulmonary nodule     High risk medication use     Spondylosis of cervical region without myelopathy or radiculopathy     Erectile dysfunction, unspecified erectile dysfunction type     Type 2 diabetes mellitus without retinopathy (H)     Tubulovillous adenoma of colon     Diabetic polyneuropathy associated with type 2 diabetes mellitus (H)     Chronic bilateral low back pain without sciatica     Migraine equivalent     Posterior vitreous detachment of left eye     Pseudophakia, ou     Eyelid lesion, LLL     Dermatochalasis of both upper eyelids     Bradycardia     Primary osteoarthritis of both knees     Syncope     Trigger finger, acquired     CKD (chronic kidney disease) stage 3, GFR 30-59 ml/min (H)     Past Surgical History:   Procedure Laterality Date     CATARACT IOL, RT/LT       COLONOSCOPY  2018    [facility]     COMBINED REPAIR PTOSIS WITH BLEPHAROPLASTY BILATERAL Bilateral 2019    Procedure: BILATERAL UPPER EYELID BLEPHAROPLASTY AND BILATERAL PTOSIS REPAIR;  Surgeon: Janet Garcia MD;  Location: [facility] OR     EXCISE LESION EYELID Left 2019    Procedure: LEFT LOWER EYELID BIOPSY;  Surgeon: Janet Garcia MD;  Location: [facility] OR     HC INCISION TENDON SHEATH FINGER  2009    r hand ring finger     PHACOEMULSIFICATION WITH STANDARD INTRAOCULAR LENS IMPLANT  2019; 3/2019    left eye; right eye     TONSILLECTOMY         Social History     Tobacco Use     Smoking status: Former Smoker     Packs/day: 1.00     Years: 40.00     Pack years: 40.00     Types: Cigarettes     Last attempt to quit: 3/16/2007     Years since quittin.9     Smokeless tobacco: Never Used   Substance Use Topics     Alcohol use: No     Alcohol/week: 0.0 standard drinks     Family History   Problem Relation Age of Onset     Cerebrovascular  Disease Mother      Arthritis Mother      Osteoporosis Mother      Alzheimer Disease Father      Arthritis Father      Cancer Father      Diabetes Maternal Grandmother      Cardiovascular Maternal Grandmother      Other Cancer Brother      Cancer Paternal Aunt      Hypertension No family hx of      Thyroid Disease No family hx of      Glaucoma No family hx of      Macular Degeneration No family hx of          Current Outpatient Medications   Medication Sig Dispense Refill     HYDROcodone-acetaminophen (NORCO) 5-325 MG tablet Take 1-2 tablets by mouth every 4 hours as needed for moderate to severe pain maximum 4 tablet(s) per day 28 tablet 0     albuterol (PROAIR HFA/PROVENTIL HFA/VENTOLIN HFA) 108 (90 BASE) MCG/ACT Inhaler Inhale 2 puffs into the lungs every 4 hours as needed for other (coughing) 1 Inhaler 1     aspirin 81 MG tablet Take 1 tablet by mouth daily.  3     benzonatate (TESSALON) 100 MG capsule Take 1 capsule (100 mg) by mouth 3 times daily as needed for cough (Patient not taking: Reported on 2/11/2020) 30 capsule 0     blood glucose (ACCU-CHEK DAYANARA PLUS) test strip TEST THREE TIMES A DAY OR AS DIRECTED 300 each 1     blood glucose (NO BRAND SPECIFIED) lancing device Use to test blood sugars 3 times daily or as directed. 1 each 0     blood glucose monitoring (ONE TOUCH DELICA) lancets Use to test blood sugars 3 times daily or as directed. 300 each 3     cetirizine (ZYRTEC) 10 MG tablet Take 10 mg by mouth At Bedtime       clobetasol (TEMOVATE) 0.05 % external ointment Apply topically 2 times daily (Patient not taking: Reported on 2/11/2020) 30 g 0     folic acid (FOLVITE) 1 MG tablet Take 1 tablet (1 mg) by mouth daily 100 tablet 2     hydroxychloroquine (PLAQUENIL) 200 MG tablet Take 1 tablet (200 mg) by mouth 2 times daily 180 tablet 3     lisinopril (PRINIVIL/ZESTRIL) 2.5 MG tablet Take 1 tablet (2.5 mg) by mouth daily 90 tablet 1     metFORMIN (GLUCOPHAGE) 500 MG tablet Take 1 tablet in the morning,  and 2 tablets at night (1500 mg total/day) (Patient taking differently: Take 1 tablet in the morning, and 1 tablets at night) 180 tablet 1     methotrexate sodium 2.5 MG TABS Take 7 tablets (17.5 mg) by mouth once a week . Take all 7 tablets on the same day of each week. 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) With a 12 hour free period prn every 4-6 hours 15 mg 2     omeprazole (PRILOSEC) 40 MG DR capsule TAKE ONE CAPSULE BY MOUTH ONCE DAILY 90 capsule 1     order for DME Equipment being ordered: specialized shoes for diabetic neuropathy as well as all supplies provided for annual treatment of diabetes neuropathy 1 Device 0     order for DME Equipment being ordered: glucometer 1 Device 0     order for DME Equipment being ordered: specialized shoes for diabetic neuropathy     His orthotics will be made by [employer] [facility]      Tim Jernigan, Certified   [email or web address]@"[employer]"  Phone: [phone]  Fax: [phone]  [address] [place] [address] 1 Device 0     pravastatin (PRAVACHOL) 40 MG tablet Take 0.5 tablets (20 mg) by mouth every other day 23 tablet 0     semaglutide (OZEMPIC, 1 MG/DOSE,) 2 MG/1.5ML pen INJECT 1MG SUBCUTANEOUSLY EVERY 7 DAYS 3 mL 2     tiZANidine (ZANAFLEX) 2 MG tablet Take 1 tablet (2 mg) by mouth 3 times daily as needed for muscle spasms 90 tablet 1     No Known Allergies  Recent Labs   Lab Test 01/15/20  1549 01/03/20  1219 10/16/19  1118 08/06/19  1147 07/16/19  1207  01/14/19  1716  09/25/18  1246  03/26/18  1130  10/31/17  1105  06/24/16  1101   A1C  --  6.0*  --  6.4*  --   --  7.1*  --  8.4*  --   --    < > 5.1   < >  --    LDL  --  75  --   --   --   --  63  --   --   --   --   --  47   < >  --    HDL  --  43  --   --   --    "--  33*  --   --   --   --   --  48   < >  --    TRIG  --  107  --   --   --   --  253*  --   --   --   --   --  159*   < >  --    ALT 31  --  39  --  42   < > 32   < >  --    < >  --    < > 27   < >  --    CR 1.24  --  1.09  --  1.17   < > 1.33*   < >  --    < > 1.01   < > 1.08   < >  --    GFRESTIMATED 56*  --  66  --  61   < > 52*   < >  --    < > 72   < > 67   < >  --    GFRESTBLACK 65  --  76  --  70   < > 60*   < >  --    < > 87   < > 81   < >  --    POTASSIUM  --   --   --   --   --   --  4.2  --   --   --  4.6   < >  --    < >  --    TSH  --   --   --   --   --   --   --   --  1.44  --   --   --   --   --  1.07    < > = values in this interval not displayed.      BP Readings from Last 3 Encounters:   02/27/20 114/68   02/11/20 130/70   02/10/20 (!) 158/83    Wt Readings from Last 3 Encounters:   02/27/20 93.9 kg (207 lb)   02/11/20 91.6 kg (202 lb)   02/10/20 90.8 kg (200 lb 3.2 oz)            Reviewed and updated as needed this visit by Provider         Review of Systems   ROS COMP: Constitutional, HEENT, cardiovascular, pulmonary, gi and gu systems are negative, except as otherwise noted.      Objective    /68   Pulse 67   Temp 99.1  F (37.3  C) (Tympanic)   Resp 18   Ht 1.727 m (5' 8\")   Wt 93.9 kg (207 lb)   SpO2 99%   BMI 31.47 kg/m    Body mass index is 31.47 kg/m .  Physical Exam   GENERAL: healthy, alert and no distress  NECK: no adenopathy, no asymmetry, masses, or scars and thyroid normal to palpation  RESP: lungs clear to auscultation - no rales, rhonchi or wheezes  CV: regular rate and rhythm, normal S1 S2, no S3 or S4, no murmur, click or rub, no peripheral edema and peripheral pulses strong  MS: no gross musculoskeletal defects noted, no edema, his right pinky is cool to touch and reddish / purplish along the lateral aspect . There is in the center of this violaceous looking tissue , now an emerging ulceration which looks consistent with ischemic vascular disease . There are no " splinter hemorrhages or other fingernail abnormalities     Diagnostic Test Results:  Echocardiogram pending        (I74.2) Arterial embolism of upper extremity (H)  (primary encounter diagnosis)  Comment: this patient has an absolutely absolutely fascinating case . This clinical presentation is not easily explained by any process other then an arterial embolus although an unusual clinical presentation of arteriosclerosis is possible. The real question is not about his pinky which looks to be barely hanging in there. The real question is what is the possible source of this event ? From the sound of things it's going to be a problem related to either the aortic valve versus part of the thoracic aorta or great vessels of the head and neck. This problem I can start with an echocardiogram to exclude endocarditis however I am going to do a curbside consult with a vascular surgery consultation and determine a] the need for acuity of follow up and if I am to order anything specifically such as an upper extremity angiogram, etc.  Plan: Echocardiogram Complete        Further follow up not yet determined     (M05.79) Rheumatoid arthritis involving multiple sites with positive rheumatoid factor (H)  Comment: small quantity of opioid pain medication to deal with the severity of his finger pain  Plan: HYDROcodone-acetaminophen (NORCO) 5-325 MG         tablet            (N18.3) CKD (chronic kidney disease) stage 3, GFR 30-59 ml/min (H)  Comment: noted as a point of historical importance   Plan: a new but marginal diagnosis . Explained to patient and spouse    (E11.42) Diabetic polyneuropathy associated with type 2 diabetes mellitus (H)  Comment: will complete referral for specialized shoes for diabetic neuropathy with [facility] when form is available  Plan: FOOT EXAM            (E11.42) Type 2 diabetes mellitus with diabetic polyneuropathy, without long-term current use of insulin (H)  Comment: as above    Plan:

## 2020-02-28 ENCOUNTER — TELEPHONE (OUTPATIENT)
Dept: INTERNAL MEDICINE | Facility: CLINIC | Age: 75
End: 2020-02-28

## 2020-02-28 NOTE — TELEPHONE ENCOUNTER
Spoke to wife and she said patient will be back around 20:30 to give him a call then to go over message below  Carlos Manuel Bell CMA on 2/28/2020 at 9:26 AM

## 2020-02-28 NOTE — TELEPHONE ENCOUNTER
Kapil Duckworth MD  P [facility] Rn Triage Pool             I am thinking about his case and await feedback from vascular surgeon, in the meantime need echocardiogram      Please call patient and give number to schedule for echocardiogram. This will take a look at his heart and heart valves  Call the Imaging Center at [phone] to schedule at one of our locations    Jacobo Del Castillo RN

## 2020-02-28 NOTE — TELEPHONE ENCOUNTER
Reason for Call:  Other appointment    Detailed comments: Patient's wife calling back, they would like a call back.    Phone Number Patient can be reached at: Home number on file [phone] (home)    Best Time: any    Can we leave a detailed message on this number? YES    Call taken on 2/28/2020 at 12:10 PM by Tomas Bell

## 2020-03-05 ENCOUNTER — ANCILLARY PROCEDURE (OUTPATIENT)
Dept: CARDIOLOGY | Facility: CLINIC | Age: 75
End: 2020-03-05
Attending: INTERNAL MEDICINE
Payer: COMMERCIAL

## 2020-03-05 PROCEDURE — 93306 TTE W/DOPPLER COMPLETE: CPT | Performed by: INTERNAL MEDICINE

## 2020-03-06 ENCOUNTER — TELEPHONE (OUTPATIENT)
Dept: FAMILY MEDICINE | Facility: CLINIC | Age: 75
End: 2020-03-06

## 2020-03-06 ENCOUNTER — NURSE TRIAGE (OUTPATIENT)
Dept: NURSING | Facility: CLINIC | Age: 75
End: 2020-03-06

## 2020-03-06 NOTE — TELEPHONE ENCOUNTER
Called and left patient VM to return call to go over his results  Carlos Manuel Bell CMA on 3/6/2020 at 1:04 PM

## 2020-03-06 NOTE — TELEPHONE ENCOUNTER
He had an echo yesterday 3/5/20, and the nurse called with results but he wasn't home. He is calling now to see if I can see anything. I found the results and read the note from the Dr to the patient.    Nancy Sanchez RN/ [facility]        Reason for Disposition    Health Information question, no triage required and triager able to answer question    Protocols used: INFORMATION ONLY CALL-A-AH

## 2020-03-06 NOTE — TELEPHONE ENCOUNTER
Notes recorded by Romina Merchant, RN on 3/6/2020 at 9:44 AM CST  Please notify patient of normal results. Thank you!  ------    Notes recorded by Kapil Duckworth MD on 3/5/2020 at 6:26 PM CST  So this is a normal echocardiogram ! It's great news !    Kapil MAHONEY CMA ([facility])

## 2020-03-09 ENCOUNTER — TELEPHONE (OUTPATIENT)
Dept: INTERNAL MEDICINE | Facility: CLINIC | Age: 75
End: 2020-03-09

## 2020-03-09 DIAGNOSIS — I74.2: Primary | ICD-10-CM

## 2020-03-09 NOTE — TELEPHONE ENCOUNTER
Kapil Duckworth MD  P [facility] Rn Triage Pool               Lets have the following discussion with patient.     1. how is his purple finger healing ?   2. it's good that his echocardiogram was unremarkable but we are still not completely off the hook yet   3. I reviewed his case with a  vascular surgeon and a further test, CTA of the chest with upper extremity runoff study to affected arm is recommended . See if patient is on board for this and if so RN to place orders as detailed above , reroute for signing if necessary     Kapil Duckworth MD        Previous Messages     ----- Message -----   From: Myra Lopes MD   Sent: 3/9/2020   9:33 AM CDT   To: Kapil Duckworth MD     I apologize for the delayed response; I've been out of town.   I would agree with an echocardiogram (and possibly a CONSTANTINO if a TTE does not definitively find an intracardiac thrombus). I would also recommend a CTA of the chest with runoff down the arm to look for the level of thrombosis or occlusion.   It does sound very interesting.   --Myra Lopes   ----- Message -----   From: Kapil Duckworth MD   Sent: 2/27/2020   8:29 PM CDT   To: Myra Lopes MD     I have an absolutely fascinating case here. All the evidence points to an arterial embolus from uncertain source. Getting echocardiogram to exclude endocarditis . He will see you guys. Do you think I should order anything else in the meantime ?

## 2020-03-09 NOTE — TELEPHONE ENCOUNTER
Spoke with radiology. CTA is ordered correctly (needs to specify specific side). The view of the chest goes to the top of the aortic arch, so if you want the full chest, would need to order the chest CT in addition, if you don't want the full view of the chest, no need to order the chest CT in addition.     CTA upper extremity is ordered for left side, should this be for the right side since right pinky is involved? Order pended. Please advise.    Mia Artis RN  [facility]

## 2020-03-09 NOTE — TELEPHONE ENCOUNTER
Called and left patient VM to return call to go over his results  Carlos Manuel Bell CMA on 3/9/2020 at 9:24 AM

## 2020-03-09 NOTE — TELEPHONE ENCOUNTER
Spoke with pt. Reviewed provider's message with pt. States the color is coming back, but it still hurts and is still numb. He is willing to do the recommended test.    Routing to provider, is the pended order the correct order? Please advise. Thanks.    Mia Artis RN  [facility]

## 2020-03-09 NOTE — TELEPHONE ENCOUNTER
These orders are based on the discussion I had with a vascular surgeon associated with [facility]     I was requested to get a CT angiogram of the chest with run off to the left upper extremity      So I ordered the best I could - check with radiologist . Possibly both of these tests aren't necessary     See what you can come up with. If necessary connect me with the radiologist      Kapil Duckworth MD

## 2020-03-09 NOTE — TELEPHONE ENCOUNTER
Pt notified. Was provided the number for imaging scheduling.    Mia Artis RN  [facility]

## 2020-03-09 NOTE — TELEPHONE ENCOUNTER
"Left message on answering machine for patient to call back to the RN hotline at [phone].    CTA order pended (will need to check with provider to make sure this is the correct order \"CTA Chest abdomen pelvis runoff w contrast\"    Mia Artis, RN  [facility]    "

## 2020-03-10 ENCOUNTER — TELEPHONE (OUTPATIENT)
Dept: FAMILY MEDICINE | Facility: CLINIC | Age: 75
End: 2020-03-10

## 2020-03-10 NOTE — TELEPHONE ENCOUNTER
Reason for call:  Other   Patient called regarding (reason for call): call back  Additional comments:  matt calling to get office notes to do a authorization for an ct of chest, cta of upper extremity. Please call her.     Phone number to reach patient:  Other phone number:  [phone]    Best Time:   Any     Can we leave a detailed message on this number?  YES    Travel screening: Not Applicable

## 2020-03-10 NOTE — TELEPHONE ENCOUNTER
3rd attempt. Called and left patient VM to return call to go over his results. Please send patient normal result  Carlos Manuel Bell CMA on 3/10/2020 at 8:16 AM

## 2020-03-10 NOTE — TELEPHONE ENCOUNTER
Called Radha no way to talk to her they gave me the fax # [phone] they also gave me the tracking #,s as need if returning call 1st tracking #[id number] 2nd # [id number].  Faxed office notes to number given.  Mary Morales,

## 2020-03-13 ENCOUNTER — ANCILLARY PROCEDURE (OUTPATIENT)
Dept: CT IMAGING | Facility: CLINIC | Age: 75
End: 2020-03-13
Attending: INTERNAL MEDICINE
Payer: COMMERCIAL

## 2020-03-13 ENCOUNTER — TELEPHONE (OUTPATIENT)
Dept: FAMILY MEDICINE | Facility: CLINIC | Age: 75
End: 2020-03-13

## 2020-03-13 DIAGNOSIS — I74.2: ICD-10-CM

## 2020-03-13 DIAGNOSIS — R93.89 ABNORMAL COMPUTED TOMOGRAPHY ANGIOGRAPHY (CTA): Primary | ICD-10-CM

## 2020-03-13 DIAGNOSIS — Z79.899 HIGH RISK MEDICATION USE: ICD-10-CM

## 2020-03-13 LAB
CREAT BLD-MCNC: 1.2 MG/DL (ref 0.5–1.2)
GFR SERPL CREATININE-BSD FRML MDRD: 59 ML/MIN/{1.73_M2}
GFRB: 59

## 2020-03-13 PROCEDURE — 73206 CT ANGIO UPR EXTRM W/O&W/DYE: CPT | Mod: TC

## 2020-03-13 PROCEDURE — 71275 CT ANGIOGRAPHY CHEST: CPT | Mod: TC

## 2020-03-13 RX ORDER — IOPAMIDOL 755 MG/ML
100 INJECTION, SOLUTION INTRAVASCULAR ONCE
Status: COMPLETED | OUTPATIENT
Start: 2020-03-13 | End: 2020-03-13

## 2020-03-13 RX ADMIN — IOPAMIDOL 100 ML: 755 INJECTION, SOLUTION INTRAVASCULAR at 12:11

## 2020-03-13 NOTE — TELEPHONE ENCOUNTER
Reason for call:  Other   Patient called regarding (reason for call): prescription  Additional comments: Patient calling wondering if he should be taking his metformin for the next few days, please call to advise.     Phone number to reach patient:  Home number on file [phone] (home)    Best Time:  Any     Can we leave a detailed message on this number?  YES    Travel screening: Not Applicable

## 2020-03-16 DIAGNOSIS — M05.79 RHEUMATOID ARTHRITIS INVOLVING MULTIPLE SITES WITH POSITIVE RHEUMATOID FACTOR (H): ICD-10-CM

## 2020-03-16 DIAGNOSIS — Z79.899 HIGH RISK MEDICATION USE: ICD-10-CM

## 2020-03-16 LAB
ALBUMIN SERPL-MCNC: 3.6 G/DL (ref 3.4–5)
ALP SERPL-CCNC: 47 U/L (ref 40–150)
ALT SERPL W P-5'-P-CCNC: 32 U/L (ref 0–70)
AST SERPL W P-5'-P-CCNC: 25 U/L (ref 0–45)
BASOPHILS # BLD AUTO: 0.1 10E9/L (ref 0–0.2)
BASOPHILS NFR BLD AUTO: 0.9 %
BILIRUB DIRECT SERPL-MCNC: 0.1 MG/DL (ref 0–0.2)
BILIRUB SERPL-MCNC: 0.5 MG/DL (ref 0.2–1.3)
CREAT SERPL-MCNC: 1.18 MG/DL (ref 0.66–1.25)
DIFFERENTIAL METHOD BLD: ABNORMAL
EOSINOPHIL # BLD AUTO: 0.1 10E9/L (ref 0–0.7)
EOSINOPHIL NFR BLD AUTO: 2 %
ERYTHROCYTE [DISTWIDTH] IN BLOOD BY AUTOMATED COUNT: 15.2 % (ref 10–15)
GFR SERPL CREATININE-BSD FRML MDRD: 60 ML/MIN/{1.73_M2}
HCT VFR BLD AUTO: 41.4 % (ref 40–53)
HGB BLD-MCNC: 13.6 G/DL (ref 13.3–17.7)
LYMPHOCYTES # BLD AUTO: 0.9 10E9/L (ref 0.8–5.3)
LYMPHOCYTES NFR BLD AUTO: 13.5 %
MCH RBC QN AUTO: 30.2 PG (ref 26.5–33)
MCHC RBC AUTO-ENTMCNC: 32.9 G/DL (ref 31.5–36.5)
MCV RBC AUTO: 92 FL (ref 78–100)
MONOCYTES # BLD AUTO: 0.9 10E9/L (ref 0–1.3)
MONOCYTES NFR BLD AUTO: 14.7 %
NEUTROPHILS # BLD AUTO: 4.4 10E9/L (ref 1.6–8.3)
NEUTROPHILS NFR BLD AUTO: 68.9 %
PLATELET # BLD AUTO: 212 10E9/L (ref 150–450)
PROT SERPL-MCNC: 6.9 G/DL (ref 6.8–8.8)
RBC # BLD AUTO: 4.51 10E12/L (ref 4.4–5.9)
WBC # BLD AUTO: 6.4 10E9/L (ref 4–11)

## 2020-03-16 PROCEDURE — 36415 COLL VENOUS BLD VENIPUNCTURE: CPT | Performed by: INTERNAL MEDICINE

## 2020-03-16 PROCEDURE — 82565 ASSAY OF CREATININE: CPT | Performed by: INTERNAL MEDICINE

## 2020-03-16 PROCEDURE — 85025 COMPLETE CBC W/AUTO DIFF WBC: CPT | Performed by: INTERNAL MEDICINE

## 2020-03-16 PROCEDURE — 80076 HEPATIC FUNCTION PANEL: CPT | Performed by: INTERNAL MEDICINE

## 2020-03-16 NOTE — TELEPHONE ENCOUNTER
Patient has arrived to his lab appointment    Per Dr. Julius fatima for creatinine    Lab order placed and lab updated  (Vascular referral also placed per CTA result note)    Jacobo Del Castillo RN

## 2020-03-16 NOTE — TELEPHONE ENCOUNTER
Per patient, he misunderstood the instructions and thought he was supposed to hold his metformin after the CTA w/contrast so he has been holding it since 3/14/2020    He is scheduled to come in today at 11 AM to have his kidney function rechecked due to DM and contrast use during CTA  Please place order    Jacobo Del Castillo RN

## 2020-03-17 ENCOUNTER — TELEPHONE (OUTPATIENT)
Dept: OTHER | Facility: CLINIC | Age: 75
End: 2020-03-17

## 2020-03-17 NOTE — TELEPHONE ENCOUNTER
Urgent referral.   Pt referred to [facility] by Kapil Duckworth MD.     CTA chest with contrast and CTA upper extremity right runoff with contrast.     Discussed with Dr. Lopes, pt needs to be seen for possible finger ischemia soon.     Routing to  to coordinate OV only with vascular surgery soon.    MANJU PelaezN, RN  [facility]

## 2020-03-17 NOTE — TELEPHONE ENCOUNTER
March 17, 2020    Patient is scheduled for New Patient Consult appointment on 3/20/2020 with Dr. Lopes at [facility].     [facility]  [facility]    Office: [phone]  Fax: [phone]

## 2020-03-18 ENCOUNTER — TELEPHONE (OUTPATIENT)
Dept: FAMILY MEDICINE | Facility: CLINIC | Age: 75
End: 2020-03-18

## 2020-03-18 DIAGNOSIS — E11.42 TYPE 2 DIABETES MELLITUS WITH DIABETIC POLYNEUROPATHY, WITHOUT LONG-TERM CURRENT USE OF INSULIN (H): ICD-10-CM

## 2020-03-18 NOTE — TELEPHONE ENCOUNTER
Detailed message left on patient's VM with note as written below.  RN hotline number [phone] given for patient to call back with any questions  Also gave him the number to our pharmacy  [phone]    Jacobo Del Castillo RN

## 2020-03-18 NOTE — TELEPHONE ENCOUNTER
Definitely the Trulicity (dulaglutide) is equal to the Ozempic  [semaglutide]. I don't have any knowledge about the way costs would land on him but hope what he's saying is the right facts.    1. Discontinue Ozempic  [semaglutide] from medication list   2. Following directions for starting Trulicity (dulaglutide) - send in prescription     Directions to start on Trulicity (dulaglutide).    Administer once weekly at any time of day . Inject subcutaneously in the abdomen, thigh, or upper arm. Initiate at 0.75 mg subcutaneously once weekly times 4 doses. Dose will be increased to 1.5 mg once weekly for additional glycemic control after 1 month of taking the 0.75 dose . If a dose is missed administer within 3 days of missed dose .     The 0.75 has no refills. It's a one month supply    The 1.5 milligram dose does have refills. It's to be continued indefinitely. Patient was instructed to return to clinic for an hemoglobin a1c  [ diabetes test ] recheck in 3 months       Kapil Duckworth MD

## 2020-03-18 NOTE — TELEPHONE ENCOUNTER
Called patient.   Reason for wanting to start Trulicity and stop the Ozempic:     Pt states that his wife takes trulicity and he is under the understanding that they are essentially the same or accomplish the same results.     His first 3 copays for Ozempic this year were $47. His next copay is going to jump to $97, then $212 until he has reached the $1000 out of pocket deductible cost for prescriptions.    He has heard that the company for Trulicity ([employer]) would pay for entire cost of medicine in full based on his annual income.

## 2020-03-18 NOTE — TELEPHONE ENCOUNTER
Reason for call:  Other   Patient called regarding (reason for call): call back  Additional comments: Patient is calling because he wants to know if he can get a prescription for Trulicity rather than semaglutide (OZEMPIC, 1 MG/DOSE,) 2 MG/1.5ML pen. Please call back to discuss    Phone number to reach patient:  Home number on file [phone] (home)    Best Time:  any    Can we leave a detailed message on this number?  YES    Travel screening: Not Applicable

## 2020-03-20 ENCOUNTER — OFFICE VISIT (OUTPATIENT)
Dept: OTHER | Facility: CLINIC | Age: 75
End: 2020-03-20
Attending: SURGERY
Payer: COMMERCIAL

## 2020-03-20 ENCOUNTER — TELEPHONE (OUTPATIENT)
Dept: FAMILY MEDICINE | Facility: CLINIC | Age: 75
End: 2020-03-20

## 2020-03-20 VITALS
WEIGHT: 200 LBS | SYSTOLIC BLOOD PRESSURE: 171 MMHG | HEIGHT: 69 IN | DIASTOLIC BLOOD PRESSURE: 99 MMHG | HEART RATE: 54 BPM | BODY MASS INDEX: 29.62 KG/M2

## 2020-03-20 DIAGNOSIS — E11.42 TYPE 2 DIABETES MELLITUS WITH DIABETIC POLYNEUROPATHY, WITHOUT LONG-TERM CURRENT USE OF INSULIN (H): Primary | ICD-10-CM

## 2020-03-20 DIAGNOSIS — M79.601 PAIN OF RIGHT UPPER EXTREMITY: Primary | ICD-10-CM

## 2020-03-20 DIAGNOSIS — I74.2 EMBOLISM AND THROMBOSIS OF ARTERIES OF THE UPPER EXTREMITIES (H): ICD-10-CM

## 2020-03-20 PROCEDURE — 99204 OFFICE O/P NEW MOD 45 MIN: CPT | Mod: ZP | Performed by: SURGERY

## 2020-03-20 PROCEDURE — G0463 HOSPITAL OUTPT CLINIC VISIT: HCPCS

## 2020-03-20 ASSESSMENT — MIFFLIN-ST. JEOR: SCORE: 1632.57

## 2020-03-20 NOTE — TELEPHONE ENCOUNTER
DME smartset alfie'd up for diabetic shoes.   Please sign and then route to pink team to fax.     Molly Burroughs RN

## 2020-03-20 NOTE — NURSING NOTE
"Zurdo Dash is a 74 year old male who presents for:  No chief complaint on file.       Vitals:    Vitals:    03/20/20 1119 03/20/20 1120   BP: (!) 177/97 (!) 171/99   BP Location: Left arm Right arm   Patient Position: Chair Chair   Cuff Size: Adult Regular Adult Regular   Pulse: 53 54   Weight: 200 lb (90.7 kg)    Height: 5' 9\" (1.753 m)        BMI:  Estimated body mass index is 29.53 kg/m  as calculated from the following:    Height as of this encounter: 5' 9\" (1.753 m).    Weight as of this encounter: 200 lb (90.7 kg).    Pain Score:  Data Unavailable        Savannah Baldwin MA  "

## 2020-03-20 NOTE — PROGRESS NOTES
[facility]     Zurdo Dash MRN# [id number]   YOB: 1945 Age: 74 year old        Reason for Clinic Visit: Right finger ischemia              History of Present Illness:   Zurdo Dash is a 74 year old male who presents for evaluation of right 5th finger pain.     He states that about 6 weeks ago he had spontaneous pain in his right 5th finger that became progressively worse, followed by purple discoloration. He was seen in urgent care and subsequently by his PCP; he was given nitroglycerin ointment to use topically and found relief of the pain with this. He had an area of black discoloration that also improved with the nitroglycerin paste. He has some residual pain along the lateral aspect of the finger, but says overall it is much improved.     He had no trauma to the area and no prolonged cold exposure that he can recall, although he thought at first it could be frostbite. He does not have any history of Raynaud's phenomenon. He had no significant recent changes in his health. He is a former smoker and quit in 2007. He walks his dogs about 3/4 mile daily and can do so without significant leg cramping or aching and does not have to stop to rest. He states that in the past he would get calf cramps, but these have resolved. He sees a podiatrist and undergoes regular diabetic foot checks.                Past Medical History:   I have personally reviewed the following:   Past Medical History:   Diagnosis Date     Abnormal CT scan 03/2004    calcified lung granuloma     C. difficile diarrhea     H/O     Cataract 11/18/2011     Diabetic neuropathy (H)     mild, mostly soles and distal forefeet, worse on the left side.     Diverticulitis      ED (erectile dysfunction)      Ex-smoker     QUIT SMOKING FEB 2007     History of ETOH abuse     recovering, sober since 1997     Hyperlipidemia LDL goal <100      Hypertension goal BP (blood pressure) < 140/90      Hypogonadism      Obesity      PAD  (peripheral artery disease) (H)     leg cramps, with exertion, no formal diagnosis of PAD and minimal if any symptoms at all.     RA (rheumatoid arthritis) (H)     Dr Bailon     Type 2 diabetes, HbA1c goal < 7% (H) 10/2008    A1C of 7.1 %              Past Surgical History:   I have personally reviewed the following:   Past Surgical History:   Procedure Laterality Date     CATARACT IOL, RT/LT       COLONOSCOPY  2018    [facility]     COMBINED REPAIR PTOSIS WITH BLEPHAROPLASTY BILATERAL Bilateral 2019    Procedure: BILATERAL UPPER EYELID BLEPHAROPLASTY AND BILATERAL PTOSIS REPAIR;  Surgeon: Janet Garcia MD;  Location: [facility]     EXCISE LESION EYELID Left 2019    Procedure: LEFT LOWER EYELID BIOPSY;  Surgeon: Janet Garcia MD;  Location: [facility]     HC INCISION TENDON SHEATH FINGER  2009    r hand ring finger     PHACOEMULSIFICATION WITH STANDARD INTRAOCULAR LENS IMPLANT  2019; 3/2019    left eye; right eye     TONSILLECTOMY              Social History:   I have personally reviewed the following:   Social History     Tobacco Use     Smoking status: Former Smoker     Packs/day: 1.00     Years: 40.00     Pack years: 40.00     Types: Cigarettes     Last attempt to quit: 3/16/2007     Years since quittin.0     Smokeless tobacco: Never Used   Substance Use Topics     Alcohol use: No     Alcohol/week: 0.0 standard drinks   Smoked for 47 years, quit in  due to the cost increase of cigarettes. On medical marijuana and occasional (prescription) norco for pain in his knees. No EtOH or illicit drug use.           Family History:     Family History   Problem Relation Age of Onset     Cerebrovascular Disease Mother      Arthritis Mother      Osteoporosis Mother      Alzheimer Disease Father      Arthritis Father      Cancer Father      Diabetes Maternal Grandmother      Cardiovascular Maternal Grandmother      Other Cancer Brother      Cancer Paternal Aunt      Hypertension No family hx of       Thyroid Disease No family hx of      Glaucoma No family hx of      Macular Degeneration No family hx of              Allergies:   No Known Allergies          Medications:     Current Outpatient Medications Ordered in Epic   Medication     albuterol (PROAIR HFA/PROVENTIL HFA/VENTOLIN HFA) 108 (90 BASE) MCG/ACT Inhaler     aspirin 81 MG tablet     benzonatate (TESSALON) 100 MG capsule     blood glucose (ACCU-CHEK DAYANARA PLUS) test strip     blood glucose (NO BRAND SPECIFIED) lancing device     blood glucose monitoring (ONE TOUCH DELICA) lancets     cetirizine (ZYRTEC) 10 MG tablet     clobetasol (TEMOVATE) 0.05 % external ointment     dulaglutide (TRULICITY) 0.75 MG/0.5ML pen     dulaglutide (TRULICITY) 1.5 MG/0.5ML pen     folic acid (FOLVITE) 1 MG tablet     HYDROcodone-acetaminophen (NORCO) 5-325 MG tablet     hydroxychloroquine (PLAQUENIL) 200 MG tablet     lisinopril (PRINIVIL/ZESTRIL) 2.5 MG tablet     metFORMIN (GLUCOPHAGE) 500 MG tablet     methotrexate sodium 2.5 MG TABS     metoprolol succinate ER (TOPROL-XL) 25 MG 24 hr tablet     nitroGLYcerin (NITRO-BID) 2 % OINT ointment     omeprazole (PRILOSEC) 40 MG DR capsule     pravastatin (PRAVACHOL) 40 MG tablet     tiZANidine (ZANAFLEX) 2 MG tablet     No current Epic-ordered facility-administered medications on file.              Review of Systems:   The 10 point Review of Systems is negative other than noted in the HPI          Physical Exam:   Vitals were reviewed      BP: (!) 171/99 Pulse: 54              General: sitting comfortably on exam table, NAD.  Neuro/Psych: A&O x 4, pleasantly conversant and verbose  HENT: EOMI and conjugate. Moist mucous membranes. Wears glasses.  Cardiac: Regular rate and rhythm, no m/g appreciated. Quiet heart sounds.   Pulm: Lungs CTAB, no w/r/r.  Abd: Soft, non-distended, no tenderness to palpation.  Extrem: Grossly normal and symmetric ROM in all four extremities. No edema. Warm hands BL.  Skin: Clean, dry, no rashes or  "wounds. Several healing scratches on his dorsal right hand (where his dog bit him 3 weeks ago). Fingertips of both hands with cap refill < 3 sec.  Vasc: Palpable right radial pulse with triphasic doppler signal. Ulnar pulse is a quiet monophasic signal on Doppler exam. Palmar arch patent, but occlusion of the radial artery causes cessation of flow (Zelalem's test), suggesting distal ulnar occlusion. Digital doppler signals present in the right thumb and forefinger, not distinctly heard in other fingers.   Trace right DP pulse palpable; left pedal pulses not palpable. No foot wounds.             Right Hand            Data:   Labs:       Lab Results   Component Value Date     01/14/2019    Lab Results   Component Value Date    CHLORIDE 108 01/14/2019    Lab Results   Component Value Date    BUN 22 01/14/2019      Lab Results   Component Value Date    POTASSIUM 4.2 01/14/2019    Lab Results   Component Value Date    CO2 24 01/14/2019    Lab Results   Component Value Date    CR 1.18 03/16/2020        Lab Results   Component Value Date    WBC 6.4 03/16/2020    HGB 13.6 03/16/2020    HCT 41.4 03/16/2020    MCV 92 03/16/2020     03/16/2020     HgA1c (1/3/20): 6.0%    Echocardiogram (3/2/20): \"Left ventricular function, chamber size, wall motion, and wall thickness are normal.The EF is 55-60%. Global right ventricular function is normal. Mild dilatation of the aorta is present. No pericardial effusion is present.\" No mention made of presence or absence of intracardiac thrombus.    I have reviewed the following images:   CTA Chest (3/13/20): \"1.  Areas of mural thrombus throughout the descending thoracic aorta and abdominal aorta. No thoracic or abdominal aortic aneurysm, stenosis or dissection. 2.  Patent arteries throughout the right upper extremity. The ulnar artery is not well visualized distal to the wrist. May be due to contrast timing. 3.  Diverticulosis without evidence of diverticulitis. 4.  Sequela of " "prior granulomatous disease. 5.  Occlusion of the left superficial femoral artery. 6.  Multifocal moderate stenosis throughout the right superficial femoral artery.\"    ABIs (1/28/20): Right 1.12, L 1.16. Monophasic (severely diseased) waveforms would suggest these ABIs are falsely elevated, likely from calcific stenosis and relative noncompressibility of the vessels.            Assessment and Plan:   Mr. Dash is a 74 year old male who presents with apparent acute right distal ulnar and digital artery occlusion, initially symptomatic with right 5th finger ischemic pain and discoloration, per his description. This happened 6 weeks ago and in the interim he has had significant clinical improvement.       He is not currently taking an aspirin; I encouraged him to resume with a baby aspirin (81 mg) daily.     Encouraged him to taper off and stop using the nitroglycerin ointment.     Discussed with him that he appears to have distal ulnar and digital artery occlusion that precipitated his pain. It is unclear why he spontaneously developed this; it may have been progression of chronic atherosclerotic disease with acute thrombosis, but this is unusual in the upper extremities. It may have been embolic; the intramural thrombus he has in his aorta appears to be distal to the branching of the great vessels, and echocardiogram did not reveal intracardiac thrombus, so there is no frankly obvious source.     Explained that as he is currently doing well, I would not try to intervene for surgical revascularization. He understands and is comfortable without proceeding to surgery.    He does have evidence of PAD in bilateral legs, but is asymptomatic from a claudication standpoint.     Will have him follow-up with upper arterial ultrasounds in 3-6 months. He was encouraged to call the office and present sooner if any questions, concerns, or new symptoms arise.     Myra Lopes MD    "

## 2020-03-20 NOTE — TELEPHONE ENCOUNTER
Reason for Call: Request for an order or referral:    Order or referral being requested: diabetic shoes and inserts    Date needed: as soon as possible    Has the patient been seen by the PCP for this problem? Not Applicable    Additional comments[facility] and would like the order faxed over to them for the patient.    Fax: [phone]  Phone: [phone]    Best Time:  any    Can we leave a detailed message on this number?  Not Applicable    Call taken on 3/20/2020 at 10:04 AM by Tomas Bell

## 2020-03-23 NOTE — TELEPHONE ENCOUNTER
Pt was given provider's message as written. No further questions or concerns.    Mia Artis RN  [facility]

## 2020-03-30 ENCOUNTER — VIRTUAL VISIT (OUTPATIENT)
Dept: FAMILY MEDICINE | Facility: CLINIC | Age: 75
End: 2020-03-30
Payer: COMMERCIAL

## 2020-03-30 DIAGNOSIS — E11.42 TYPE 2 DIABETES MELLITUS WITH DIABETIC POLYNEUROPATHY, WITHOUT LONG-TERM CURRENT USE OF INSULIN (H): ICD-10-CM
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
## 2020-03-30 DIAGNOSIS — I10 HYPERTENSION GOAL BP (BLOOD PRESSURE) < 140/90: ICD-10-CM

## 2020-03-30 DIAGNOSIS — K21.9 GASTROESOPHAGEAL REFLUX DISEASE, ESOPHAGITIS PRESENCE NOT SPECIFIED: ICD-10-CM

## 2020-03-30 DIAGNOSIS — E78.5 HYPERLIPIDEMIA LDL GOAL <100: ICD-10-CM

## 2020-03-30 DIAGNOSIS — S50.01XA CONTUSION OF RIGHT ELBOW, INITIAL ENCOUNTER: ICD-10-CM

## 2020-03-30 DIAGNOSIS — R07.81 RIB PAIN ON RIGHT SIDE: ICD-10-CM

## 2020-03-30 PROCEDURE — 99214 OFFICE O/P EST MOD 30 MIN: CPT | Mod: TEL | Performed by: NURSE PRACTITIONER

## 2020-03-30 RX ORDER — METOPROLOL SUCCINATE 25 MG/1
25 TABLET, EXTENDED RELEASE ORAL DAILY
Qty: 90 TABLET | Refills: 0 | Status: SHIPPED | OUTPATIENT
Start: 2020-03-30 | End: 2020-05-21

## 2020-03-30 RX ORDER — LISINOPRIL 2.5 MG/1
2.5 TABLET ORAL DAILY
Qty: 90 TABLET | Refills: 0 | Status: SHIPPED | OUTPATIENT
Start: 2020-03-30 | End: 2020-05-21

## 2020-03-30 RX ORDER — OMEPRAZOLE 40 MG/1
CAPSULE, DELAYED RELEASE ORAL
Qty: 90 CAPSULE | Refills: 0 | Status: SHIPPED | OUTPATIENT
Start: 2020-03-30 | End: 2020-05-21

## 2020-03-30 RX ORDER — PRAVASTATIN SODIUM 40 MG
20 TABLET ORAL EVERY OTHER DAY
Qty: 23 TABLET | Refills: 0 | Status: SHIPPED | OUTPATIENT
Start: 2020-03-30 | End: 2020-05-21

## 2020-03-30 NOTE — PROGRESS NOTES
"Subjective     Zurdo Dash is a 75 year old male who is being evaluated via a billable telephone visit.      The patient has been notified of following:     \"This telephone visit will be conducted via a call between you and your physician/provider. We have found that certain health care needs can be provided without the need for a physical exam.  This service lets us provide the care you need with a short phone conversation.  If a prescription is necessary we can send it directly to your pharmacy.  If lab work is needed we can place an order for that and you can then stop by our lab to have the test done at a later time.    If during the course of the call the physician/provider feels a telephone visit is not appropriate, you will not be charged for this service.\"     Physician has received verbal consent for a Telephone Visit from the patient? Yes    Zurdo Dash complains of   Chief Complaint   Patient presents with     Telephone       ALLERGIES  Patient has no known allergies.    Patient fell while walking his dogs yesterday.  He got tangled in the leash and fell to his right side.  His right elbow is bruised, but he is able to move it without pain.  He could not get up on his own initially due to his chronic knee pain and had to have a neighbor help him.  He notes pain to his right ribs.  He notes pain with deep breathing.  He denies cough, hemoptysis, shortness of breath, dizziness.  He denies abdominal pain, melena, hematochezia.  He has not taken anything for pain.  He is on medical cannabis chronically and has taken his standard dose.  He has not used any acetaminophen or norco for symptoms.               Patient Active Problem List   Diagnosis     Pain in limb     Hyperlipidemia LDL goal <100     Hypertension goal BP (blood pressure) < 140/90     Hypogonadism     Advanced directives, counseling/discussion     Health Care Home     Type 2 diabetes mellitus with diabetic polyneuropathy, without long-term " current use of insulin (H)     RBBB (right bundle branch block)     Ex-smoker     Family history of esophageal cancer     Gastroesophageal reflux disease, esophagitis presence not specified     Rheumatoid arthritis involving multiple sites with positive rheumatoid factor (H)     Pulmonary nodule     High risk medication use     Spondylosis of cervical region without myelopathy or radiculopathy     Erectile dysfunction, unspecified erectile dysfunction type     Type 2 diabetes mellitus without retinopathy (H)     Tubulovillous adenoma of colon     Diabetic polyneuropathy associated with type 2 diabetes mellitus (H)     Chronic bilateral low back pain without sciatica     Migraine equivalent     Posterior vitreous detachment of left eye     Pseudophakia, ou     Eyelid lesion, LLL     Dermatochalasis of both upper eyelids     Bradycardia     Primary osteoarthritis of both knees     Syncope     Trigger finger, acquired     CKD (chronic kidney disease) stage 3, GFR 30-59 ml/min (H)     Past Surgical History:   Procedure Laterality Date     CATARACT IOL, RT/LT       COLONOSCOPY  2018    [facility]     COMBINED REPAIR PTOSIS WITH BLEPHAROPLASTY BILATERAL Bilateral 2019    Procedure: BILATERAL UPPER EYELID BLEPHAROPLASTY AND BILATERAL PTOSIS REPAIR;  Surgeon: Janet Garcia MD;  Location: [facility]     EXCISE LESION EYELID Left 2019    Procedure: LEFT LOWER EYELID BIOPSY;  Surgeon: Janet Garcia MD;  Location: [facility]     HC INCISION TENDON SHEATH FINGER  2009    r hand ring finger     PHACOEMULSIFICATION WITH STANDARD INTRAOCULAR LENS IMPLANT  2019; 3/2019    left eye; right eye     TONSILLECTOMY         Social History     Tobacco Use     Smoking status: Former Smoker     Packs/day: 1.00     Years: 40.00     Pack years: 40.00     Types: Cigarettes     Last attempt to quit: 3/16/2007     Years since quittin.0     Smokeless tobacco: Never Used   Substance Use Topics     Alcohol use: No      Alcohol/week: 0.0 standard drinks     Family History   Problem Relation Age of Onset     Cerebrovascular Disease Mother      Arthritis Mother      Osteoporosis Mother      Alzheimer Disease Father      Arthritis Father      Cancer Father      Diabetes Maternal Grandmother      Cardiovascular Maternal Grandmother      Other Cancer Brother      Cancer Paternal Aunt      Hypertension No family hx of      Thyroid Disease No family hx of      Glaucoma No family hx of      Macular Degeneration No family hx of          Current Outpatient Medications   Medication Sig Dispense Refill     albuterol (PROAIR HFA/PROVENTIL HFA/VENTOLIN HFA) 108 (90 BASE) MCG/ACT Inhaler Inhale 2 puffs into the lungs every 4 hours as needed for other (coughing) 1 Inhaler 1     aspirin 81 MG tablet Take 1 tablet by mouth daily.  3     benzonatate (TESSALON) 100 MG capsule Take 1 capsule (100 mg) by mouth 3 times daily as needed for cough 30 capsule 0     blood glucose (ACCU-CHEK DAYANARA PLUS) test strip TEST THREE TIMES A DAY OR AS DIRECTED 300 each 1     blood glucose (NO BRAND SPECIFIED) lancing device Use to test blood sugars 3 times daily or as directed. 1 each 0     blood glucose monitoring (ONE TOUCH DELICA) lancets Use to test blood sugars 3 times daily or as directed. 300 each 3     cetirizine (ZYRTEC) 10 MG tablet Take 10 mg by mouth At Bedtime       clobetasol (TEMOVATE) 0.05 % external ointment Apply topically 2 times daily 30 g 0     dulaglutide (TRULICITY) 0.75 MG/0.5ML pen Inject 0.75 mg Subcutaneous every 7 days For 4 weeks. Then increase to 1.5 mg dose 2 mL 0     dulaglutide (TRULICITY) 1.5 MG/0.5ML pen Inject 1.5 mg Subcutaneous every 7 days 2 mL 2     folic acid (FOLVITE) 1 MG tablet Take 1 tablet (1 mg) by mouth daily 100 tablet 2     HYDROcodone-acetaminophen (NORCO) 5-325 MG tablet Take 1-2 tablets by mouth every 4 hours as needed for moderate to severe pain maximum 4 tablet(s) per day 28 tablet 0     hydroxychloroquine  (PLAQUENIL) 200 MG tablet Take 1 tablet (200 mg) by mouth 2 times daily 180 tablet 3     lisinopril (ZESTRIL) 2.5 MG tablet Take 1 tablet (2.5 mg) by mouth daily 90 tablet 0     metFORMIN (GLUCOPHAGE) 500 MG tablet Take 1 tablet in the morning, and 2 tablets at night (1500 mg total/day) 270 tablet 0     methotrexate sodium 2.5 MG TABS Take 7 tablets (17.5 mg) by mouth once a week . Take all 7 tablets on the same day of each week. 84 tablet 1     metoprolol succinate ER (TOPROL-XL) 25 MG 24 hr tablet Take 1 tablet (25 mg) by mouth daily 90 tablet 0     omeprazole (PRILOSEC) 40 MG DR capsule TAKE ONE CAPSULE BY MOUTH ONCE DAILY 90 capsule 0     order for DME Equipment being ordered: specialized shoes for diabetic neuropathy as well as all supplies provided for annual treatment of diabetes neuropathy 1 Device 0     order for DME Equipment being ordered: glucometer 1 Device 0     order for DME Equipment being ordered: specialized shoes for diabetic neuropathy     His orthotics will be made by [employer]      Tim Jernigan, Certified   [email or web address]  Phone: [phone]  Fax: [phone]  [address] [place] [address] 1 Device 0     pravastatin (PRAVACHOL) 40 MG tablet Take 0.5 tablets (20 mg) by mouth every other day 23 tablet 0     tiZANidine (ZANAFLEX) 2 MG tablet Take 1 tablet (2 mg) by mouth 3 times daily as needed for muscle spasms 90 tablet 1     nitroGLYcerin (NITRO-BID) 2 % OINT ointment Place 0.5 inches (7.5 mg) onto the skin every 6 hours as needed (finger vasculitis, 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) With a 12 hour free period prn every 4-6 hours 15 mg 2     No Known Allergies    Reviewed and updated as needed this visit by Provider  Tobacco  Allergies  Meds  Problems  Med Hx  Surg Hx  Fam Hx         Review of Systems   ROS COMP: Constitutional, HEENT, cardiovascular, pulmonary, gi and gu systems are negative, except as otherwise noted.       Objective   Reported  vitals:  There were no vitals taken for this visit.     Psych: Alert and oriented times 3; coherent speech, normal   rate and volume, able to articulate logical thoughts, able   to abstract reason, no tangential thoughts, no hallucinations   or delusions    Diagnostic Test Results:  none         Assessment/Plan:  1. Fall, initial encounter      2. Rib pain on right side  Possible rib fracture vs contusion.  Patient does not appear to be in any distress.  I advised guarding his ribs with coughing, sneezing.  Patient to use acetaminophen and Norco as needed for pain.  He can apply ice for the next several days and then switch to heat.  I advised no lifting/pushing/pulling greater than 20 lbs until pain resolves.  Patient to seek care for shortness of breath, hemoptysis, dizziness.  Patient verbalized understanding.     3. Contusion of right elbow, initial encounter  Patient to ice and rest elbow.  Pain medications as needed as above.    4. Type 2 diabetes mellitus with diabetic polyneuropathy, without long-term current use of insulin (H)  Refill requested.   - lisinopril (ZESTRIL) 2.5 MG tablet; Take 1 tablet (2.5 mg) by mouth daily  Dispense: 90 tablet; Refill: 0  - metFORMIN (GLUCOPHAGE) 500 MG tablet; Take 1 tablet in the morning, and 2 tablets at night (1500 mg total/day)  Dispense: 270 tablet; Refill: 0    5. Hypertension goal BP (blood pressure) < 140/90  Refill requested.   - metoprolol succinate ER (TOPROL-XL) 25 MG 24 hr tablet; Take 1 tablet (25 mg) by mouth daily  Dispense: 90 tablet; Refill: 0    6. Gastroesophageal reflux disease, esophagitis presence not specified  Refill requested.   - omeprazole (PRILOSEC) 40 MG DR capsule; TAKE ONE CAPSULE BY MOUTH ONCE DAILY  Dispense: 90 capsule; Refill: 0    7. Hyperlipidemia LDL goal <100  Refill requested.   - pravastatin (PRAVACHOL) 40 MG tablet; Take 0.5 tablets (20 mg) by mouth every other day  Dispense: 23 tablet; Refill: 0    No follow-ups on  file.      Phone call duration:  14 minutes    MANASA Ventura CNP

## 2020-04-07 ENCOUNTER — TELEPHONE (OUTPATIENT)
Dept: INTERNAL MEDICINE | Facility: CLINIC | Age: 75
End: 2020-04-07

## 2020-04-07 DIAGNOSIS — E11.42 TYPE 2 DIABETES MELLITUS WITH DIABETIC POLYNEUROPATHY, WITHOUT LONG-TERM CURRENT USE OF INSULIN (H): ICD-10-CM

## 2020-04-07 NOTE — TELEPHONE ENCOUNTER
Reason for Call:  Form, our goal is to have forms completed with 72 hours, however, some forms may require a visit or additional information.    Type of letter, form or note:  Patient Assistance Program    Who is the form from?: Patient    Where did the form come from: Patient or family brought in       What clinic location was the form placed at?: [facility]    Where the form was placed: [facility]    What number is listed as a contact on the form?:        Additional comments: Patient dropped off at our pharmacy with note to complete and fax to [facility].    Call taken on 4/7/2020 at 10:44 AM by Caren Telles

## 2020-04-07 NOTE — TELEPHONE ENCOUNTER
LM for patient to return call.  Need to verify if form is for Trulicity and what the dosage is.  Caren Telles,

## 2020-04-08 NOTE — TELEPHONE ENCOUNTER
Spoke to patient.  He said the lower dosage was a starter and he'll then be on the higher dose, so form is for the 1.5mg.  Form started and given to Dr. Duckworth to complete and sign.  Informed patient that Dr. Duckworth will be back in the office on Friday, April 10th. Caren Telles,

## 2020-04-08 NOTE — TELEPHONE ENCOUNTER
LM for patient to return call.  Need to verify if Zurdo is on two dosages of trulicity.  Chart says dulaglutide (TRULICITY) 0.75 MG/0.5ML pen and dulaglutide (TRULICITY) 1.5 MG/0.5ML pen.  Caren Telles,

## 2020-04-16 ENCOUNTER — MEDICAL CORRESPONDENCE (OUTPATIENT)
Dept: HEALTH INFORMATION MANAGEMENT | Facility: CLINIC | Age: 75
End: 2020-04-16

## 2020-04-17 NOTE — TELEPHONE ENCOUNTER
Patient's wife, Yoli called and stated that [facility] needs an actual prescription for the Trulicity sent to them.    She said that it can either be faxed to [phone], or called in to [phone].      Caren Telles,

## 2020-04-17 NOTE — TELEPHONE ENCOUNTER
Trulicity prescription faxed to [facility] at [phone].  Caren Telles,

## 2020-04-21 ENCOUNTER — TELEPHONE (OUTPATIENT)
Dept: FAMILY MEDICINE | Facility: CLINIC | Age: 75
End: 2020-04-21

## 2020-04-21 NOTE — TELEPHONE ENCOUNTER
dulaglutide (TRULICITY) 1.5 MG/0.5ML pen  9 mL  3  4/17/2020   No    Sig - Route: Inject 1.5 mg Subcutaneous every 7 days - Subcutaneous    Class: Local Print    Order: [id number]      Rx was a local print and faxed to [facility]. Please confirm they received this.    Molly Burroughs RN

## 2020-04-21 NOTE — TELEPHONE ENCOUNTER
Spoke to patient and told him that he needs to call his pharmacy to verify address and medication will be ready  Carlos Manuel Bell CMA on 4/21/2020 at 2:51 PM

## 2020-04-21 NOTE — TELEPHONE ENCOUNTER
Reason for call:  Medication   If this is a refill request, has the caller requested the refill from the pharmacy already? No  Will the patient be using a [facility]? No  Name of the pharmacy and phone number for the current request:  The pharmacy at [facility]. [phone]    His next dose is Friday.     Name of the medication requested: trulicity x 1 yr    Other request:  na    Phone number to reach patient:  Home number on file [phone] (home)    Best Time:   Any     Can we leave a detailed message on this number?  YES    Travel screening: Not Applicable

## 2020-04-27 ENCOUNTER — TELEPHONE (OUTPATIENT)
Dept: RHEUMATOLOGY | Facility: CLINIC | Age: 75
End: 2020-04-27

## 2020-04-27 NOTE — TELEPHONE ENCOUNTER
Patient was called by lab to go over travel questions prior to the lab appointment on Tuesday and asked what is he going to do since he can not get his steroid injections at his appointment, is there something orally he could get. Patient has an appointment on Wednesday 4/29/2020.    Eryn Espinoza CMA Rheumatology  4/27/2020 10:04 AM

## 2020-04-28 DIAGNOSIS — M05.79 RHEUMATOID ARTHRITIS INVOLVING MULTIPLE SITES WITH POSITIVE RHEUMATOID FACTOR (H): ICD-10-CM

## 2020-04-28 LAB
ALBUMIN SERPL-MCNC: 3.5 G/DL (ref 3.4–5)
ALP SERPL-CCNC: 59 U/L (ref 40–150)
ALT SERPL W P-5'-P-CCNC: 35 U/L (ref 0–70)
AST SERPL W P-5'-P-CCNC: 29 U/L (ref 0–45)
BASOPHILS # BLD AUTO: 0.1 10E9/L (ref 0–0.2)
BASOPHILS NFR BLD AUTO: 0.8 %
BILIRUB DIRECT SERPL-MCNC: 0.2 MG/DL (ref 0–0.2)
BILIRUB SERPL-MCNC: 0.7 MG/DL (ref 0.2–1.3)
CREAT SERPL-MCNC: 1.16 MG/DL (ref 0.66–1.25)
DIFFERENTIAL METHOD BLD: ABNORMAL
EOSINOPHIL # BLD AUTO: 0.1 10E9/L (ref 0–0.7)
EOSINOPHIL NFR BLD AUTO: 1.1 %
ERYTHROCYTE [DISTWIDTH] IN BLOOD BY AUTOMATED COUNT: 15.1 % (ref 10–15)
GFR SERPL CREATININE-BSD FRML MDRD: 61 ML/MIN/{1.73_M2}
HCT VFR BLD AUTO: 42.5 % (ref 40–53)
HGB BLD-MCNC: 14.3 G/DL (ref 13.3–17.7)
LYMPHOCYTES # BLD AUTO: 0.9 10E9/L (ref 0.8–5.3)
LYMPHOCYTES NFR BLD AUTO: 13.8 %
MCH RBC QN AUTO: 30.7 PG (ref 26.5–33)
MCHC RBC AUTO-ENTMCNC: 33.6 G/DL (ref 31.5–36.5)
MCV RBC AUTO: 91 FL (ref 78–100)
MONOCYTES # BLD AUTO: 0.8 10E9/L (ref 0–1.3)
MONOCYTES NFR BLD AUTO: 12.6 %
NEUTROPHILS # BLD AUTO: 4.6 10E9/L (ref 1.6–8.3)
NEUTROPHILS NFR BLD AUTO: 71.7 %
PLATELET # BLD AUTO: 213 10E9/L (ref 150–450)
PROT SERPL-MCNC: 7 G/DL (ref 6.8–8.8)
RBC # BLD AUTO: 4.66 10E12/L (ref 4.4–5.9)
WBC # BLD AUTO: 6.4 10E9/L (ref 4–11)

## 2020-04-28 PROCEDURE — 82565 ASSAY OF CREATININE: CPT | Performed by: INTERNAL MEDICINE

## 2020-04-28 PROCEDURE — 85025 COMPLETE CBC W/AUTO DIFF WBC: CPT | Performed by: INTERNAL MEDICINE

## 2020-04-28 PROCEDURE — 80076 HEPATIC FUNCTION PANEL: CPT | Performed by: INTERNAL MEDICINE

## 2020-04-28 PROCEDURE — 36415 COLL VENOUS BLD VENIPUNCTURE: CPT | Performed by: INTERNAL MEDICINE

## 2020-04-28 NOTE — PROGRESS NOTES
"Zurdo Dash is a 75 year old male who is being evaluated via a billable telephone visit.      The patient has been notified of following:     \"This telephone visit will be conducted via a call between you and your physician/provider. We have found that certain health care needs can be provided without the need for a physical exam.  This service lets us provide the care you need with a short phone conversation.  If a prescription is necessary we can send it directly to your pharmacy.  If lab work is needed we can place an order for that and you can then stop by our lab to have the test done at a later time.    Telephone visits are billed at different rates depending on your insurance coverage. During this emergency period, for some insurers they may be billed the same as an in-person visit.  Please reach out to your insurance provider with any questions.    If during the course of the call the physician/provider feels a telephone visit is not appropriate, you will not be charged for this service.\"    Patient has given verbal consent for Telephone visit?  Yes    How would you like to obtain your AVS? Mail a copy    Rheumatology Telephone/TeleHealth Visit      Zurdo Dash MRN# [id number]   YOB: 1945 Age: 75 year old      Date of visit: 4/29/20   PCP: Dr. Kapil Duckworth     Chief Complaint   Patient presents with:  Arthritis: talk about injection    Assessment and Plan     1. Seropositive nonerosive rheumatoid arthritis (, CCP 64): Currently on methotrexate 17.5 mg once weekly (reducing from 25mg wkly to get to the lowest effective dose), folic acid 1 mg daily, and hydroxychloroquine 200 mg twice a day. Doing well today with regard to rheumatoid arthritis.  Reduce hydroxychloroquine at this time and at the next visit will stop if still doing well with regard to RA; then reduce MTX more if able.   - Continue methotrexate 17.5mg once weekly  - Continue folic acid 1 mg daily   - Reduce " hydroxychloroquine from 200mg BID; to 200mg daily  - Labs in 3 months: CBC, Creatinine, Hepatic Panel, ESR, CRP    2. Bilateral knee osteoarthritis / patellofemoral syndrome: Was receiving steroid injections approximately every 3-6 months with good control of his symptoms. Unable to given injections now due to non-essential procedures on hold due to COVID-19.  Advised topical capsaicin OTC and if not effective then to try topical voltaren     # NSAIDs:  The risks and benefits of NSAIDs were discussed in detail and the patient verbalized understanding.  The risks discussed include, but are not limited to, the risk for hypersensitivity, anaphylaxis, GI upset, gastric ulcer, nephropathy, and an increased risk for cardiovascular events.     3. Acute right distal ulnar and digital artery occlusion of the right 5th finger: seen by vascular. Possibly RA related but his disease appears well controlled making this less likely.  Agree with aspirin.     # Relevant labs and imaging were reviewed with the patient    # High risk medication toxicity monitoring: discussion and labs reviewed; appropriate labs ordered. See above    # Note that this is a virtual visit to reduce the risk of COVID-19 exposure during this current pandemic.      Mr. Dash verbalized agreement with and understanding of the rational for the diagnosis and treatment plan.  All questions were answered to best of my ability and the patient's satisfaction. Mr. Dash was advised to contact the clinic with any questions that may arise after the clinic visit.    Thank you for involving me in the care of the patient    Return to clinic: 3-4 months      HPI   Zurdo Dash is a 75 year old male with a medical history significant for hypertension, hyperlipidemia, diabetes, diabetic neuropathy, GERD, and rheumatoid arthritis who presents for f/u of RA and bilateral knee OA.     Today, Mr. Dash reports that his arthritis is doing well.  No change with MTX dose  reduction.  Had right 5th finger blood vessel occlusion; on aspirin now; no recurrence.  Knees ache; wishes he could have an injection but understands he shouldn't come in for this. Morning stiffness <5 min.     Denies fevers, chills, nausea, vomiting, constipation, diarrhea. No abdominal pain. No chest pain/pressure, palpitations, or shortness of breath. No oral or nasal sores. No neck pain. No rash. No LE swelling.      Tobacco: None  EtOH: None  Drugs: None    ROS   GEN: No fevers, chills, or night sweats; intentionally losing weight on the [employer] diet  SKIN: No rash. Sores from a recent fall.  HEENT: No oral or nasal ulcers.  CV: No chest pain, pressure, palpitations, or dyspnea on exertion.  PULM: No SOB, wheeze, cough.  GI: No nausea, vomiting, constipation, diarrhea. No blood in stool. No abdominal pain.  : No blood in urine.  MSK: See HPI.  NEURO: No numbness, tingling, or weakness.  EXT: No LE swelling  PSYCH: Negative    Active Problem List     Patient Active Problem List   Diagnosis     Pain in limb     Hyperlipidemia LDL goal <100     Hypertension goal BP (blood pressure) < 140/90     Hypogonadism     Advanced directives, counseling/discussion     Health Care Home     Type 2 diabetes mellitus with diabetic polyneuropathy, without long-term current use of insulin (H)     RBBB (right bundle branch block)     Ex-smoker     Family history of esophageal cancer     Gastroesophageal reflux disease, esophagitis presence not specified     Rheumatoid arthritis involving multiple sites with positive rheumatoid factor (H)     Pulmonary nodule     High risk medication use     Spondylosis of cervical region without myelopathy or radiculopathy     Erectile dysfunction, unspecified erectile dysfunction type     Type 2 diabetes mellitus without retinopathy (H)     Tubulovillous adenoma of colon     Diabetic polyneuropathy associated with type 2 diabetes mellitus (H)     Chronic bilateral low back pain without  sciatica     Migraine equivalent     Posterior vitreous detachment of left eye     Pseudophakia, ou     Eyelid lesion, LLL     Dermatochalasis of both upper eyelids     Bradycardia     Primary osteoarthritis of both knees     Syncope     Trigger finger, acquired     CKD (chronic kidney disease) stage 3, GFR 30-59 ml/min (H)     Past Medical History     Past Medical History:   Diagnosis Date     Abnormal CT scan 03/2004    calcified lung granuloma     C. difficile diarrhea     H/O     Cataract 11/18/2011     Diabetic neuropathy (H)     mild, mostly soles and distal forefeet, worse on the left side.     Diverticulitis      ED (erectile dysfunction)      Ex-smoker     QUIT SMOKING FEB 2007     History of ETOH abuse     recovering, sober since 1997     Hyperlipidemia LDL goal <100      Hypertension goal BP (blood pressure) < 140/90      Hypogonadism      Obesity      PAD (peripheral artery disease) (H)     leg cramps, with exertion, no formal diagnosis of PAD and minimal if any symptoms at all.     RA (rheumatoid arthritis) (H)     Dr Bailon     Type 2 diabetes, HbA1c goal < 7% (H) 10/2008    A1C of 7.1 %      Past Surgical History     Past Surgical History:   Procedure Laterality Date     CATARACT IOL, RT/LT       COLONOSCOPY  03/01/2018    [facility]     COMBINED REPAIR PTOSIS WITH BLEPHAROPLASTY BILATERAL Bilateral 8/16/2019    Procedure: BILATERAL UPPER EYELID BLEPHAROPLASTY AND BILATERAL PTOSIS REPAIR;  Surgeon: Janet Garcia MD;  Location: [facility] OR     EXCISE LESION EYELID Left 8/16/2019    Procedure: LEFT LOWER EYELID BIOPSY;  Surgeon: Janet Garcia MD;  Location: [facility] OR     HC INCISION TENDON SHEATH FINGER  4/2009    r hand ring finger     PHACOEMULSIFICATION WITH STANDARD INTRAOCULAR LENS IMPLANT  02/2019; 3/2019    left eye; right eye     TONSILLECTOMY       Allergy   No Known Allergies  Current Medication List     Current Outpatient Medications   Medication Sig     albuterol (PROAIR HFA/PROVENTIL  HFA/VENTOLIN HFA) 108 (90 BASE) MCG/ACT Inhaler Inhale 2 puffs into the lungs every 4 hours as needed for other (coughing)     aspirin 81 MG tablet Take 1 tablet by mouth daily.     benzonatate (TESSALON) 100 MG capsule Take 1 capsule (100 mg) by mouth 3 times daily as needed for cough     cetirizine (ZYRTEC) 10 MG tablet Take 10 mg by mouth At Bedtime     clobetasol (TEMOVATE) 0.05 % external ointment Apply topically 2 times daily     dulaglutide (TRULICITY) 1.5 MG/0.5ML pen Inject 1.5 mg Subcutaneous every 7 days     folic acid (FOLVITE) 1 MG tablet Take 1 tablet (1 mg) by mouth daily     HYDROcodone-acetaminophen (NORCO) 5-325 MG tablet Take 1-2 tablets by mouth every 4 hours as needed for moderate to severe pain maximum 4 tablet(s) per day     hydroxychloroquine (PLAQUENIL) 200 MG tablet Take 1 tablet (200 mg) by mouth 2 times daily     lisinopril (ZESTRIL) 2.5 MG tablet Take 1 tablet (2.5 mg) by mouth daily     metFORMIN (GLUCOPHAGE) 500 MG tablet Take 1 tablet in the morning, and 2 tablets at night (1500 mg total/day)     methotrexate sodium 2.5 MG TABS Take 7 tablets (17.5 mg) by mouth once a week . Take all 7 tablets on the same day of each week.     metoprolol succinate ER (TOPROL-XL) 25 MG 24 hr tablet Take 1 tablet (25 mg) by mouth daily     omeprazole (PRILOSEC) 40 MG DR capsule TAKE ONE CAPSULE BY MOUTH ONCE DAILY     order for DME Equipment being ordered: specialized shoes for diabetic neuropathy as well as all supplies provided for annual treatment of diabetes neuropathy     order for DME Equipment being ordered: glucometer     order for DME Equipment being ordered: specialized shoes for diabetic neuropathy     His orthotics will be made by [employer]      Tim Jernigan, Certified   [email or web address]  Phone: [phone]  Fax: [phone]  [address] [place] [address]     pravastatin (PRAVACHOL) 40 MG tablet Take 0.5 tablets (20 mg) by mouth every other day      "tiZANidine (ZANAFLEX) 2 MG tablet Take 1 tablet (2 mg) by mouth 3 times daily as needed for muscle spasms     blood glucose (ACCU-CHEK DAYANARA PLUS) test strip TEST THREE TIMES A DAY OR AS DIRECTED     blood glucose (NO BRAND SPECIFIED) lancing device Use to test blood sugars 3 times daily or as directed.     blood glucose monitoring (ONE TOUCH DELICA) lancets Use to test blood sugars 3 times daily or as directed.     nitroGLYcerin (NITRO-BID) 2 % OINT ointment Place 0.5 inches (7.5 mg) onto the skin every 6 hours as needed (finger vasculitis, 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) With a 12 hour free period prn every 4-6 hours     No current facility-administered medications for this visit.          Social History   See HPI    Family History     Family History   Problem Relation Age of Onset     Cerebrovascular Disease Mother      Arthritis Mother      Osteoporosis Mother      Alzheimer Disease Father      Arthritis Father      Cancer Father      Diabetes Maternal Grandmother      Cardiovascular Maternal Grandmother      Other Cancer Brother      Cancer Paternal Aunt      Hypertension No family hx of      Thyroid Disease No family hx of      Glaucoma No family hx of      Macular Degeneration No family hx of        Physical Exam     Temp Readings from Last 3 Encounters:   02/27/20 99.1  F (37.3  C) (Tympanic)   02/11/20 96.2  F (35.7  C) (Oral)   02/10/20 98  F (36.7  C) (Oral)     BP Readings from Last 5 Encounters:   03/20/20 (!) 171/99   02/27/20 114/68   02/11/20 130/70   02/10/20 (!) 158/83   02/03/20 114/78     Pulse Readings from Last 1 Encounters:   03/20/20 54     Resp Readings from Last 1 Encounters:   02/27/20 18     Estimated body mass index is 29.53 kg/m  as calculated from the following:    Height as of 3/20/20: 1.753 m (5' 9\").    Weight as of 3/20/20: 90.7 kg (200 lb).    Telephone visit    Labs / Imaging (select studies)     [facility]    10[facility]17/2013 [facility] , N 3%, No crystals  RF/CCP  Recent Labs   Lab Test 04/07/16  1149   CCPIGG 64*     CBC  Recent Labs   Lab Test 04/28/20  1141 03/16/20  1108 01/15/20  1549   WBC 6.4 6.4 6.9   RBC 4.66 4.51 4.55   HGB 14.3 13.6 14.0   HCT 42.5 41.4 42.4   MCV 91 92 93   RDW 15.1* 15.2* 14.2    212 231   MCH 30.7 30.2 30.8   MCHC 33.6 32.9 33.0   NEUTROPHIL 71.7 68.9 75.4   LYMPH 13.8 13.5 14.0   MONOCYTE 12.6 14.7 8.9   EOSINOPHIL 1.1 2.0 1.0   BASOPHIL 0.8 0.9 0.7   ANEU 4.6 4.4 5.2   ALYM 0.9 0.9 1.0   SMITH 0.8 0.9 0.6   AEOS 0.1 0.1 0.1   ABAS 0.1 0.1 0.1     CMP  Recent Labs   Lab Test 04/28/20  1141 03/16/20  1108 01/15/20  1549  01/14/19  1716  03/26/18  1130 03/05/18  1315   NA  --   --   --   --  141  --  139 140   POTASSIUM  --   --   --   --  4.2  --  4.6 5.5*   CHLORIDE  --   --   --   --  108  --  107 108   CO2  --   --   --   --  24  --  23 23   ANIONGAP  --   --   --   --  9  --  9 9   GLC  --   --   --   --  142*  --  84 72   BUN  --   --   --   --  22  --  20 21   CR 1.16 1.18 1.24   < > 1.33*   < > 1.01 0.96   GFRESTIMATED 61 60* 56*   < > 52*   < > 72 77   GFRESTBLACK 70 69 65   < > 60*   < > 87 >90   NEW  --   --   --   --  9.2  --  8.8 9.5   BILITOTAL 0.7 0.5 0.6   < > 0.3   < >  --   --    ALBUMIN 3.5 3.6 3.5   < > 3.6   < >  --   --    PROTTOTAL 7.0 6.9 6.7*   < > 7.0   < >  --   --    ALKPHOS 59 47 44   < > 51   < >  --   --    AST 29 25 21   < > 22   < >  --   --    ALT 35 32 31   < > 32   < >  --   --     < > = values in this interval not displayed.     Calcium/VitaminD  Recent Labs   Lab Test 01/14/19  1716 03/26/18  1130 03/05/18  1315  07/23/13  1016   NEW 9.2 8.8 9.5   < >  --    VITDT  --   --   --   --  28*    < > = values in this interval not displayed.     ESR/CRP  Recent Labs   Lab Test 07/16/19  1207 04/18/19  1108 01/14/19  1716   SED 8 9 13   CRP 4.2 7.2 9.8*     Lipid Panel  Recent Labs   Lab Test 01/03/20  1219 01/14/19  1716 10/31/17  1105  06/29/15  1213  "08/07/14  1249 07/23/13  1016   CHOL 139 147 127   < > 125 129 153   TRIG 107 253* 159*   < > 286* 270* 248*   HDL 43 33* 48   < > 37* 46 41   LDL 75 63 47   < > 31 29 62   VLDL  --   --   --   --  57* 54* 50*   CHOLHDLRATIO  --   --   --   --  3.4 2.8 3.7   NHDL 96 114 79   < >  --   --   --     < > = values in this interval not displayed.     Hepatitis B  Recent Labs   Lab Test 04/07/16  1149   HBCAB Nonreactive   HEPBANG Nonreactive     Hepatitis C  Recent Labs   Lab Test 04/07/16  1149   HCVAB Nonreactive   Assay performance characteristics have not been established for newborns,   infants, and children       HIV Screening  Recent Labs   Lab Test 04/07/16  1149   HIAGAB Nonreactive   HIV-1 p24 Ag & HIV-1/HIV-2 Ab Not Detected           \"MRI LEFT KNEE  10/08/2013    INDICATION: Left knee pain. Locking, catching and snapping. Weakness.  TECHNIQUE: Routine.  COMPARISON: None.    FINDINGS:  MEDIAL COMPARTMENT: Linear tearing involving the posterior horn and body of   the medial meniscus as seen on series 4: image 6-8. This is also seen on   series 5: image 9-10. Cartilage is thinned peripherally.    LATERAL COMPARTMENT: Lateral meniscus also demonstrates tearing in the   posterior horn on series 4: image 22. There is diffuse tearing in the body   of the meniscus which is horizontal as seen on series 5: image 9 and this   extends into the anterior horn. Cartilage is thinned.    PATELLOFEMORAL COMPARTMENT: Retropatellar cartilage demonstrates   full-thickness loss of cartilage over portions of the median ridge, lateral   facet and medial facet. Cartilage is better preserved over the lower pole   centrally. There is also full-thickness loss of cartilage in the lateral   and central trochlear groove. Patella is normally aligned. Retinaculum are   intact.    LIGAMENTS AND TENDONS: The anterior cruciate and posterior cruciate   ligaments are intact. The medial collateral ligament is intact. Lateral   collateral " "ligamentous complex and popliteus insertion are intact.   Quadriceps tendon and patellar tendon are intact.    BONES AND SOFT TISSUES: Moderate-sized joint effusion. No popliteal cyst.   No muscle edema or fracture.    CONCLUSION:  1. There is tearing of the medial meniscus involving the posterior horn and   body. Cartilage is thinned peripherally.  2. There is also tearing in the posterior horn and body of the lateral   meniscus which also extends into the anterior horn. Cartilage is also   thinned in the lateral compartment.  3. Moderate to severe osteoarthritis in the patellofemoral compartment with   full-thickness loss of cartilage over large portions of the retropatellar   surface and trochlear surface.  4. Joint effusion.    IF YOU ARE A PHYSICIAN AND HAVE QUESTIONS REGARDING THIS REPORT, PLEASE   CALL [phone].\"    \"X-RAY KNEES BILATERAL AP W/LAT/SUN/PA LEFT   Oct 08, 2013 09:51:59 AM     INDICATION: Pain and swelling   COMPARISON: None.      FINDINGS: Moderate narrowing lateral aspect of the patellofemoral   compartment with slight lateral subluxation of the patella. Medial and   lateral compartments are preserved. Moderate knee joint effusion and/or   synovitis. Enthesophyte formation distal quadriceps and proximal patellar   tendons. Extensive vascular calcifications.     AP view right knee demonstrates trace narrowing medial compartment joint   Space.\"    Immunization History     Immunization History   Administered Date(s) Administered     Influenza (High Dose) 3 valent vaccine 09/20/2012, 10/20/2015, 09/20/2016, 08/28/2017, 09/24/2018, 09/18/2019     Influenza (IIV3) PF 10/05/1999, 10/30/2008, 09/28/2011, 10/14/2013, 10/03/2014     Pneumo Conj 13-V (2010&after) 06/29/2015     Pneumococcal 23 valent 08/19/2010     TD (ADULT, 7+) 08/05/1997, 02/03/2011     TDAP Vaccine (Boostrix) 03/11/2020     Zoster vaccine recombinant adjuvanted (SHINGRIX) 01/03/2020, 03/11/2020     Zoster vaccine, live 04/19/2010 "              Chart documentation done in part with Dragon Voice recognition Software. Although reviewed after completion, some word and grammatical error may remain.    Phone call start time: 2:57 PM  Phone call end time: 3:15 PM    This visit is equivalent to a 08423 visit    Location of patient: home  Location of provider: home    Follow up:  follow up appointment scheduled to be in August    Albert Tompkins MD  4/29/2020

## 2020-04-29 ENCOUNTER — VIRTUAL VISIT (OUTPATIENT)
Dept: RHEUMATOLOGY | Facility: CLINIC | Age: 75
End: 2020-04-29
Payer: COMMERCIAL

## 2020-04-29 DIAGNOSIS — Z79.899 HIGH RISK MEDICATIONS (NOT ANTICOAGULANTS) LONG-TERM USE: ICD-10-CM

## 2020-04-29 DIAGNOSIS — M17.0 BILATERAL PRIMARY OSTEOARTHRITIS OF KNEE: Primary | ICD-10-CM

## 2020-04-29 DIAGNOSIS — M05.79 RHEUMATOID ARTHRITIS INVOLVING MULTIPLE SITES WITH POSITIVE RHEUMATOID FACTOR (H): ICD-10-CM

## 2020-04-29 PROCEDURE — 99213 OFFICE O/P EST LOW 20 MIN: CPT | Mod: 95 | Performed by: INTERNAL MEDICINE

## 2020-04-29 RX ORDER — HYDROXYCHLOROQUINE SULFATE 200 MG/1
200 TABLET, FILM COATED ORAL DAILY
Qty: 90 TABLET | Refills: 1 | Status: SHIPPED | OUTPATIENT
Start: 2020-04-29 | End: 2020-08-12

## 2020-04-29 NOTE — TELEPHONE ENCOUNTER
Patient is aware Dr. Tompkins will talk to him about this during his phone visit today.  Eryn Espinoza CMA Rheumatology  4/29/2020 10:00 AM

## 2020-05-14 ENCOUNTER — TELEPHONE (OUTPATIENT)
Dept: FAMILY MEDICINE | Facility: CLINIC | Age: 75
End: 2020-05-14

## 2020-05-14 NOTE — TELEPHONE ENCOUNTER
"New form received and given to Dr. Duckworth to sign.  Form states to only lily \"foot deformity\", as it was the only issue listed in the February 2020 office note.  They have to match for Medicare.    Form given to Dr. Duckworth to sign. Caren Telles,     "

## 2020-05-14 NOTE — TELEPHONE ENCOUNTER
Reason for Call:  Other Forms    Detailed comments: Nyla calling from [employer] calling in regards to forms that were faxed over to them. Please call back.    Phone Number: [phone]    Best Time: any    Can we leave a detailed message on this number? YES    Call taken on 5/14/2020 at 1:16 PM by Tomas Bell

## 2020-05-14 NOTE — TELEPHONE ENCOUNTER
Spoke to Vy.  She said some changes or a new form needs to be signed by Dr. Duckworth regarding the diabetic shoes.  She said a new form was faxed to us over a week ago.  Informed her no form was received.  She will refax form to [phone].  Caren Telles,

## 2020-05-15 NOTE — TELEPHONE ENCOUNTER
"Form completed stating patient has \"foot deformity\" only.  Form signed by Dr. Duckworth and faxed back to Nyla Mak at [facility] to fax number [phone].  Caren Telles,     "

## 2020-05-19 ENCOUNTER — NURSE TRIAGE (OUTPATIENT)
Dept: FAMILY MEDICINE | Facility: CLINIC | Age: 75
End: 2020-05-19

## 2020-05-19 NOTE — TELEPHONE ENCOUNTER
Received call from patient.   He took his BP with home BP machine. States that wife took hers which was normal.  These were his readings (he did not wait 5 minutes in between readings)  193/77  210/95  213/99   He declines any missed doses. States that he probably took his lisinopril this morning. He thinks it is one of his AM meds. He is not sure if he took metoprolol. Advised to back through meds and make sure that he has taken both today. Wait 30-60 minutes before re-checking BP, and always wait 5-10 minutes in between checks.  Pt declines the following symptoms:  -Chest pain  -Shortness of breath   -New vision changes  -New weakness  -New numbness or tingling  -Severe headache.  Pt verbalized understanding to go to the ER if he develops any symptoms or if he consistently has readings with upper number 200 or over. Pt wondering if it is safe for him to go to pharmacy or clinic for BP check. Advised that it is not safe given his risk and chronic conditions, but his BP does need to be addressed.    Recommended video visit tomorrow with provider for HTN. Pt declines stating that he can do telephone and wants to wait for rena Duckworth. Pt scheduled for 05/21/2020.      Additional Information    Negative: Sounds like a life-threatening emergency to the triager    Negative: Pregnant > 20 weeks or postpartum (< 6 weeks after delivery) and new hand or face swelling    Negative: Pregnant > 20 weeks and BP > 140/90    Negative: Systolic BP >= 160 OR Diastolic >= 100, and any cardiac or neurologic symptoms (e.g., chest pain, difficulty breathing, unsteady gait, blurred vision)    Negative: Patient sounds very sick or weak to the triager    Negative: BP Systolic BP >= 140 OR Diastolic >= 90 and postpartum (from 0 to 6 weeks after delivery)    Negative: Systolic BP >= 180 OR Diastolic >= 110, and missed most recent dose of blood pressure medication    Systolic BP >= 180 OR Diastolic >= 110    Answer Assessment - Initial  "Assessment Questions  1. BLOOD PRESSURE: \"What is the blood pressure?\" \"Did you take at least two measurements 5 minutes apart?\"      Sometimes, he took a bunch of different readings and switched arms.   2. ONSET: \"When did you take your blood pressure?\"      Within the last hour  3. HOW: \"How did you obtain the blood pressure?\" (e.g., visiting nurse, automatic home BP monitor)      Home blood pressure wrist cuff  4. HISTORY: \"Do you have a history of high blood pressure?\"      Yes, he takes medication   5. MEDICATIONS: \"Are you taking any medications for blood pressure?\" \"Have you missed any doses recently?\"      Yes, Metoprolol, lisinopril,   6. OTHER SYMPTOMS: \"Do you have any symptoms?\" (e.g., headache, chest pain, blurred vision, difficulty breathing, weakness)      See note  7. PREGNANCY: \"Is there any chance you are pregnant?\" \"When was your last menstrual period?\"      N/A    Protocols used: HIGH BLOOD PRESSURE-A-OH    "

## 2020-05-19 NOTE — TELEPHONE ENCOUNTER
Reason for Call:  Other Hypertension    Detailed comments: Patient calling, states he took his bloop pressure and it's been pretty high lately.  180/80, 200/95, 213/99    Phone Number Patient can be reached at: Home number on file [phone] (home)    Best Time: any    Can we leave a detailed message on this number? YES    Call taken on 5/19/2020 at 3:19 PM by Tomas Bell

## 2020-05-20 ENCOUNTER — ALLIED HEALTH/NURSE VISIT (OUTPATIENT)
Dept: FAMILY MEDICINE | Facility: CLINIC | Age: 75
End: 2020-05-20
Payer: COMMERCIAL

## 2020-05-20 VITALS — SYSTOLIC BLOOD PRESSURE: 138 MMHG | DIASTOLIC BLOOD PRESSURE: 82 MMHG

## 2020-05-20 DIAGNOSIS — I10 HYPERTENSION GOAL BP (BLOOD PRESSURE) < 140/90: Primary | ICD-10-CM

## 2020-05-20 PROCEDURE — 99207 ZZC NO CHARGE NURSE ONLY: CPT | Performed by: INTERNAL MEDICINE

## 2020-05-20 NOTE — PROGRESS NOTES
Zurdo Dash was evaluated at [facility] on May 20, 2020 at which time his blood pressure was:    BP Readings from Last 3 Encounters:   05/19/20 138/82   03/20/20 (!) 171/99   02/27/20 114/68     Pulse Readings from Last 3 Encounters:   03/20/20 54   02/27/20 67   02/11/20 76       Reviewed lifestyle modifications for blood pressure control and reduction: including making healthy food choices, managing weight, getting regular exercise, smoking cessation, reducing alcohol consumption, monitoring blood pressure regularly.     Symptoms: None    BP Goal:< 140/90 mmHg    BP Assessment:  BP at goal    Potential Reasons for BP too high: NA - Not applicable    BP Follow-Up Plan: Recheck BP in 6 months at pharmacy    Recommendation to Provider: Continue with current plan.     Note completed by: Thank you,  Sabrina Hicks, PharmD  [facility]  [phone]

## 2020-05-21 ENCOUNTER — VIRTUAL VISIT (OUTPATIENT)
Dept: INTERNAL MEDICINE | Facility: CLINIC | Age: 75
End: 2020-05-21
Payer: COMMERCIAL

## 2020-05-21 DIAGNOSIS — E78.5 HYPERLIPIDEMIA LDL GOAL <100: ICD-10-CM

## 2020-05-21 DIAGNOSIS — E11.42 TYPE 2 DIABETES MELLITUS WITH DIABETIC POLYNEUROPATHY, WITHOUT LONG-TERM CURRENT USE OF INSULIN (H): ICD-10-CM

## 2020-05-21 DIAGNOSIS — K21.9 GASTROESOPHAGEAL REFLUX DISEASE, ESOPHAGITIS PRESENCE NOT SPECIFIED: ICD-10-CM

## 2020-05-21 DIAGNOSIS — I10 HYPERTENSION GOAL BP (BLOOD PRESSURE) < 140/90: ICD-10-CM

## 2020-05-21 DIAGNOSIS — R23.2 HOT FLASH IN MALE: Primary | ICD-10-CM

## 2020-05-21 DIAGNOSIS — M05.79 RHEUMATOID ARTHRITIS INVOLVING MULTIPLE SITES WITH POSITIVE RHEUMATOID FACTOR (H): ICD-10-CM

## 2020-05-21 PROCEDURE — 99214 OFFICE O/P EST MOD 30 MIN: CPT | Mod: 95 | Performed by: INTERNAL MEDICINE

## 2020-05-21 RX ORDER — PRAVASTATIN SODIUM 40 MG
20 TABLET ORAL EVERY OTHER DAY
Qty: 23 TABLET | Refills: 1 | Status: SHIPPED | OUTPATIENT
Start: 2020-05-21 | End: 2020-12-01

## 2020-05-21 RX ORDER — OMEPRAZOLE 40 MG/1
CAPSULE, DELAYED RELEASE ORAL
Qty: 90 CAPSULE | Refills: 1 | Status: SHIPPED | OUTPATIENT
Start: 2020-05-21 | End: 2020-12-01

## 2020-05-21 RX ORDER — METHOTREXATE 2.5 MG/1
17.5 TABLET ORAL WEEKLY
Qty: 84 TABLET | Refills: 1 | Status: CANCELLED | OUTPATIENT
Start: 2020-05-21

## 2020-05-21 RX ORDER — LISINOPRIL 2.5 MG/1
2.5 TABLET ORAL DAILY
Qty: 90 TABLET | Refills: 1 | Status: SHIPPED | OUTPATIENT
Start: 2020-05-21 | End: 2020-06-16 | Stop reason: DRUGHIGH

## 2020-05-21 RX ORDER — METOPROLOL SUCCINATE 25 MG/1
25 TABLET, EXTENDED RELEASE ORAL DAILY
Qty: 90 TABLET | Refills: 1 | Status: SHIPPED | OUTPATIENT
Start: 2020-05-21 | End: 2020-06-16

## 2020-05-21 NOTE — PROGRESS NOTES
"Zurdo Dash is a 75 year old male who is being evaluated via a billable telephone visit.      The patient has been notified of following:     \"This telephone visit will be conducted via a call between you and your physician/provider. We have found that certain health care needs can be provided without the need for a physical exam.  This service lets us provide the care you need with a short phone conversation.  If a prescription is necessary we can send it directly to your pharmacy.  If lab work is needed we can place an order for that and you can then stop by our lab to have the test done at a later time.    Telephone visits are billed at different rates depending on your insurance coverage. During this emergency period, for some insurers they may be billed the same as an in-person visit.  Please reach out to your insurance provider with any questions.    If during the course of the call the physician/provider feels a telephone visit is not appropriate, you will not be charged for this service.\"    Patient has given verbal consent for Telephone visit?  Yes    What phone number would you like to be contacted at? [phone]    How would you like to obtain your AVS? Mail a copy    Subjective     Zurdo Dash is a 75 year old male who presents via phone visit today for the following health issues:    Patient is taking Trulicity (dulaglutide) and has had a tremendously effective result from (glucagon-like peptide-1) GLP-1 agonists, he's been on Liraglutide [ Victoza ] , later on was Ozempic  [semaglutide] and now Trulicity (dulaglutide) . Good news ! All have worked very well for this patient. So well in fact that I am wondering if we need to potentially decrease his dose back to the 0.75 dose down the road sooner or later?     HPI  Hypertension Follow-up      Do you check your blood pressure regularly outside of the clinic? No 5/18 bp was 211/110 - but subsequently we are finding reason to be suspicious of this " result. He's going to do a side by side comparison with his wrist blood pressure cuff is probably in error      Are you following a low salt diet? Yes    Are your blood pressures ever more than 140 on the top number (systolic) OR more   than 90 on the bottom number (diastolic), for example 140/90? Yes as detailed above       How many servings of fruits and vegetables do you eat daily?  0-1    On average, how many sweetened beverages do you drink each day (Examples: soda, juice, sweet tea, etc.  Do NOT count diet or artificially sweetened beverages)?   0    How many days per week do you exercise enough to make your heart beat faster? 3 or less    How many minutes a day do you exercise enough to make your heart beat faster? 9 or less    How many days per week do you miss taking your medication? 0    BP Readings from Last 6 Encounters:   05/19/20 138/82   03/20/20 (!) 171/99   02/27/20 114/68   02/11/20 130/70   02/10/20 (!) 158/83   02/03/20 114/78     He has a wrist blood pressure cuff and he feels this is less then accurate then an standard office type arm blood pressure cuff. His pressure at the pharmacy was 138/80 - within acceptable limits      Patient Active Problem List   Diagnosis     Pain in limb     Hyperlipidemia LDL goal <100     Hypertension goal BP (blood pressure) < 140/90     Hypogonadism     Advanced directives, counseling/discussion     Health Care Home     Type 2 diabetes mellitus with diabetic polyneuropathy, without long-term current use of insulin (H)     RBBB (right bundle branch block)     Ex-smoker     Family history of esophageal cancer     Gastroesophageal reflux disease, esophagitis presence not specified     Rheumatoid arthritis involving multiple sites with positive rheumatoid factor (H)     Pulmonary nodule     High risk medication use     Spondylosis of cervical region without myelopathy or radiculopathy     Erectile dysfunction, unspecified erectile dysfunction type     Type 2 diabetes  mellitus without retinopathy (H)     Tubulovillous adenoma of colon     Diabetic polyneuropathy associated with type 2 diabetes mellitus (H)     Chronic bilateral low back pain without sciatica     Migraine equivalent     Posterior vitreous detachment of left eye     Pseudophakia, ou     Eyelid lesion, LLL     Dermatochalasis of both upper eyelids     Bradycardia     Primary osteoarthritis of both knees     Syncope     Trigger finger, acquired     CKD (chronic kidney disease) stage 3, GFR 30-59 ml/min (H)     Past Surgical History:   Procedure Laterality Date     CATARACT IOL, RT/LT       COLONOSCOPY  2018    [facility]     COMBINED REPAIR PTOSIS WITH BLEPHAROPLASTY BILATERAL Bilateral 2019    Procedure: BILATERAL UPPER EYELID BLEPHAROPLASTY AND BILATERAL PTOSIS REPAIR;  Surgeon: Janet Garcia MD;  Location: [facility] OR     EXCISE LESION EYELID Left 2019    Procedure: LEFT LOWER EYELID BIOPSY;  Surgeon: Janet Garcia MD;  Location: [facility] OR     HC INCISION TENDON SHEATH FINGER  2009    r hand ring finger     PHACOEMULSIFICATION WITH STANDARD INTRAOCULAR LENS IMPLANT  2019; 3/2019    left eye; right eye     TONSILLECTOMY         Social History     Tobacco Use     Smoking status: Former Smoker     Packs/day: 1.00     Years: 40.00     Pack years: 40.00     Types: Cigarettes     Last attempt to quit: 3/16/2007     Years since quittin.1     Smokeless tobacco: Never Used   Substance Use Topics     Alcohol use: No     Alcohol/week: 0.0 standard drinks     Family History   Problem Relation Age of Onset     Cerebrovascular Disease Mother      Arthritis Mother      Osteoporosis Mother      Alzheimer Disease Father      Arthritis Father      Cancer Father      Diabetes Maternal Grandmother      Cardiovascular Maternal Grandmother      Other Cancer Brother      Cancer Paternal Aunt      Hypertension No family hx of      Thyroid Disease No family hx of      Glaucoma No family hx of      Macular Degeneration  No family hx of          Current Outpatient Medications   Medication Sig Dispense Refill     albuterol (PROAIR HFA/PROVENTIL HFA/VENTOLIN HFA) 108 (90 BASE) MCG/ACT Inhaler Inhale 2 puffs into the lungs every 4 hours as needed for other (coughing) 1 Inhaler 1     aspirin 81 MG tablet Take 1 tablet by mouth daily.  3     blood glucose (ACCU-CHEK DAYANARA PLUS) test strip TEST THREE TIMES A DAY OR AS DIRECTED 300 each 1     blood glucose (NO BRAND SPECIFIED) lancing device Use to test blood sugars 3 times daily or as directed. 1 each 0     blood glucose monitoring (ONE TOUCH DELICA) lancets Use to test blood sugars 3 times daily or as directed. 300 each 3     cetirizine (ZYRTEC) 10 MG tablet Take 10 mg by mouth At Bedtime       clobetasol (TEMOVATE) 0.05 % external ointment Apply topically 2 times daily 30 g 0     diclofenac (VOLTAREN) 1 % topical gel Apply up to 4 grams of 1% gel to knees up to 4 times daily as needed for joint pain (maximum: 8 g per lower extremity per day) 400 g 3     dulaglutide (TRULICITY) 1.5 MG/0.5ML pen Inject 1.5 mg Subcutaneous every 7 days 9 mL 3     folic acid (FOLVITE) 1 MG tablet Take 1 tablet (1 mg) by mouth daily 100 tablet 2     HYDROcodone-acetaminophen (NORCO) 5-325 MG tablet Take 1-2 tablets by mouth every 4 hours as needed for moderate to severe pain maximum 4 tablet(s) per day 28 tablet 0     hydroxychloroquine (PLAQUENIL) 200 MG tablet Take 1 tablet (200 mg) by mouth daily 90 tablet 1     lisinopril (ZESTRIL) 2.5 MG tablet Take 1 tablet (2.5 mg) by mouth daily 90 tablet 1     metFORMIN (GLUCOPHAGE) 500 MG tablet Take 1 tablet (500 mg) by mouth 2 times daily (with meals) 180 tablet 1     methotrexate sodium 2.5 MG TABS Take 7 tablets (17.5 mg) by mouth once a week . Take all 7 tablets on the same day of each week. 84 tablet 1     metoprolol succinate ER (TOPROL-XL) 25 MG 24 hr tablet Take 1 tablet (25 mg) by mouth daily 90 tablet 1     omeprazole (PRILOSEC) 40 MG DR capsule TAKE ONE  CAPSULE BY MOUTH ONCE DAILY 90 capsule 1     order for DME Equipment being ordered: specialized shoes for diabetic neuropathy as well as all supplies provided for annual treatment of diabetes neuropathy 1 Device 0     order for DME Equipment being ordered: glucometer 1 Device 0     order for DME Equipment being ordered: specialized shoes for diabetic neuropathy     His orthotics will be made by [employer]      Tim Jernigan, Certified   [email or web address]  Phone: [phone]  Fax: [phone]  [address] [place] [address] 1 Device 0     pravastatin (PRAVACHOL) 40 MG tablet Take 0.5 tablets (20 mg) by mouth every other day 23 tablet 1     benzonatate (TESSALON) 100 MG capsule Take 1 capsule (100 mg) by mouth 3 times daily as needed for cough (Patient not taking: Reported on 5/21/2020) 30 capsule 0     nitroGLYcerin (NITRO-BID) 2 % OINT ointment Place 0.5 inches (7.5 mg) onto the skin every 6 hours as needed (finger vasculitis, 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) With a 12 hour free period prn every 4-6 hours 15 mg 2     tiZANidine (ZANAFLEX) 2 MG tablet Take 1 tablet (2 mg) by mouth 3 times daily as needed for muscle spasms (Patient not taking: Reported on 5/21/2020) 90 tablet 1     No Known Allergies  Recent Labs   Lab Test 04/28/20  1141 03/16/20  1108 01/15/20  1549 01/03/20  1219  08/06/19  1147  01/14/19  1716  09/25/18  1246  03/26/18  1130  10/31/17  1105  06/24/16  1101   A1C  --   --   --  6.0*  --  6.4*  --  7.1*  --  8.4*  --   --    < > 5.1   < >  --    LDL  --   --   --  75  --   --   --  63  --   --   --   --   --  47   < >  --    HDL  --   --   --  43  --   --   --  33*  --   --   --   --   --  48   < >  --    TRIG  --   --   --  107  --   --   --  253*  --   --   --   --   --  159*   < >  --    ALT 35 32 31  --    < >  --    < > 32   < >  --    < >  --    < > 27   < >  --    CR 1.16 1.18 1.24  --    < >  --    < > 1.33*   < >  --    < > 1.01   < > 1.08   < >  --     GFRESTIMATED 61 60* 56*  --    < >  --    < > 52*   < >  --    < > 72   < > 67   < >  --    GFRESTBLACK 70 69 65  --    < >  --    < > 60*   < >  --    < > 87   < > 81   < >  --    POTASSIUM  --   --   --   --   --   --   --  4.2  --   --   --  4.6   < >  --    < >  --    TSH  --   --   --   --   --   --   --   --   --  1.44  --   --   --   --   --  1.07    < > = values in this interval not displayed.      BP Readings from Last 3 Encounters:   05/19/20 138/82   03/20/20 (!) 171/99   02/27/20 114/68    Wt Readings from Last 3 Encounters:   03/20/20 90.7 kg (200 lb)   02/27/20 93.9 kg (207 lb)   02/11/20 91.6 kg (202 lb)                    Reviewed and updated as needed this visit by Provider         Review of Systems   Constitutional, HEENT, cardiovascular, pulmonary, gi and gu systems are negative, except as otherwise noted.       Objective   Reported vitals:  There were no vitals taken for this visit.   healthy, alert and no distress  PSYCH: Alert and oriented times 3; coherent speech, normal   rate and volume, able to articulate logical thoughts, able   to abstract reason, no tangential thoughts, no hallucinations   or delusions  His affect is normal  RESP: No cough, no audible wheezing, able to talk in full sentences  Remainder of exam unable to be completed due to telephone visits    Diagnostic Test Results:  Labs reviewed in Epic        Assessment/Plan:  1. Type 2 diabetes mellitus with diabetic polyneuropathy, without long-term current use of insulin (H)  I want to see this patients laboratory studies down the road but fundamentally his diabetes mellitus is well controlled as is  - lisinopril (ZESTRIL) 2.5 MG tablet; Take 1 tablet (2.5 mg) by mouth daily  Dispense: 90 tablet; Refill: 1  - metFORMIN (GLUCOPHAGE) 500 MG tablet; Take 1 tablet (500 mg) by mouth 2 times daily (with meals)  Dispense: 180 tablet; Refill: 1  - **A1C FUTURE anytime; Future  - **Basic metabolic panel FUTURE anytime; Future    2.  Rheumatoid arthritis involving multiple sites with positive rheumatoid factor (H)  Continue current plan of care  With rheumatology     3. Hypertension goal BP (blood pressure) < 140/90  Controlled within acceptable limits, continue current plan of care    - metoprolol succinate ER (TOPROL-XL) 25 MG 24 hr tablet; Take 1 tablet (25 mg) by mouth daily  Dispense: 90 tablet; Refill: 1  - **Basic metabolic panel FUTURE anytime; Future    4. Gastroesophageal reflux disease, esophagitis presence not specified  Continue current plan of care  , symptoms are controlled within acceptable limits   - omeprazole (PRILOSEC) 40 MG DR capsule; TAKE ONE CAPSULE BY MOUTH ONCE DAILY  Dispense: 90 capsule; Refill: 1    5. Hyperlipidemia LDL goal <100  Not due for fasting lipid panel right now  Lab Results   Component Value Date    CHOL 139 01/03/2020     Lab Results   Component Value Date    HDL 43 01/03/2020     Lab Results   Component Value Date    LDL 75 01/03/2020     Lab Results   Component Value Date    TRIG 107 01/03/2020     Lab Results   Component Value Date    CHOLHDLRATIO 3.4 06/29/2015       - pravastatin (PRAVACHOL) 40 MG tablet; Take 0.5 tablets (20 mg) by mouth every other day  Dispense: 23 tablet; Refill: 1    6. Hot flash in male  This was an an unrelated matter to review I spoke with patient at some length  And do not hear any features of this problem suggestive of a more sinister dangerous diagnosis , no weight loss or other constitutional symptoms. We discussed what to be on the watch for  , update me if significant changes have taken place . Assume a posture of observation right now, possibly this is due to testosterone deficiency in aging man      No follow-ups on file.    11:58 AM   12:15 PM      Phone call duration:  17 minutes    Kapil Duckworth MD

## 2020-06-16 ENCOUNTER — TELEPHONE (OUTPATIENT)
Dept: INTERNAL MEDICINE | Facility: CLINIC | Age: 75
End: 2020-06-16

## 2020-06-16 ENCOUNTER — ALLIED HEALTH/NURSE VISIT (OUTPATIENT)
Dept: NURSING | Facility: CLINIC | Age: 75
End: 2020-06-16
Payer: COMMERCIAL

## 2020-06-16 VITALS — DIASTOLIC BLOOD PRESSURE: 78 MMHG | HEART RATE: 53 BPM | SYSTOLIC BLOOD PRESSURE: 150 MMHG

## 2020-06-16 DIAGNOSIS — I10 HYPERTENSION GOAL BP (BLOOD PRESSURE) < 140/90: ICD-10-CM

## 2020-06-16 DIAGNOSIS — I10 HYPERTENSION GOAL BP (BLOOD PRESSURE) < 140/90: Primary | ICD-10-CM

## 2020-06-16 PROCEDURE — 99207 ZZC NO CHARGE NURSE ONLY: CPT

## 2020-06-16 RX ORDER — LISINOPRIL 2.5 MG/1
5 TABLET ORAL DAILY
Qty: 60 TABLET | Refills: 0 | Status: SHIPPED | OUTPATIENT
Start: 2020-06-16 | End: 2020-06-19

## 2020-06-16 RX ORDER — METOPROLOL SUCCINATE 25 MG/1
50 TABLET, EXTENDED RELEASE ORAL DAILY
Qty: 60 TABLET | Refills: 0 | Status: SHIPPED | OUTPATIENT
Start: 2020-06-16 | End: 2020-07-15

## 2020-06-16 NOTE — NURSING NOTE
Zurdo Dash is a 75 year old male who presents with Hypertension.    NURSING ASSESSMENT:  Description:  Pt was at the oral surgeon today for evaluation for dental implants. He was advised to follow up with his primary due to elevated BP readings.   Dentist readings:  L arm 211/120  R arm 204/109  R arm 190/100    Writer checked BP while in clinic 1st reading was 162/84 HR 53, 2nd reading was 150/78.   Onset/duration:  Today  Precip. factors:  None  Associated symptoms:  Pt states yesterday he had hot flashes and had 2 episodes where he felt like an electric shock went through his body and this lasted 1 second each time. Denies chest pain, SOB, dizziness, arm or jaw pain, vision issues, or headache.  Improves/worsens symptoms:  NA  Pain scale (0-10)   0/10    Last exam/Treatment:  5/21/20  Allergies: No Known Allergies    MEDICATIONS:   Taking medication(s) as prescribed? Yes  Taking over the counter medication(s?) N/A  Any medication side effects? No significant side effects    Any barriers to taking medication(s) as prescribed?  No  Medication(s) improving/managing symptoms?  N/A  Medication reconciliation completed: Yes      NURSING PLAN: Routed to provider Yes Encounter sent to PCP to advise. Advised to go to ER if he develops any: chest pain, shortness of breath, dizziness, headaches, blurred vision, or weakness. Pt declined to go to ER at this time.    RECOMMENDED DISPOSITION:  To ED, another person to drive -    Will comply with recommendation: No, message sent to PCP to advise  If further questions/concerns or if symptoms do not improve, worsen or new symptoms develop, call your PCP or [facility] as soon as possible.      Guideline used:  Telephone Triage Protocols for Nurses, Fifth Edition, Elisabeth Artis RN

## 2020-06-16 NOTE — PATIENT INSTRUCTIONS
I will send a message to Dr. Duckworth regarding the high BP readings to advise and we will follow up with you by phone. Please go to the ER if you are experiencing any chest pain, shortness of breath, dizziness, headaches, blurred vision, or weakness. Please call our office with any concerns at [phone].    Mia Artis RN  [facility]

## 2020-06-16 NOTE — TELEPHONE ENCOUNTER
It can be difficult sometimes to separate blood pressure problems from a dentists office. There seems to be an especially virulent connection between blood pressure and dentists !    But with that said, of course these blood pressures are much too high.    Lets do the following     Current prescriptions are, for blood pressure,     Lisinopril 2.5 milligrams 1 time per day   Toprol [metoprolol XL] 25 milligrams 1 time per day     Lets double up on both of these prescriptions   Recheck blood pressure with medical assistant blood pressure recheck within 3-5 days   I am willing to ratchet blood pressure medication upwards every 3-7 days. We can't jump upwards too fast.     Does he have any lower leg swelling and if so we would consider adding diuretic therapy     Kapil Duckworth MD

## 2020-06-16 NOTE — TELEPHONE ENCOUNTER
Pt was given provider's message as written. Confirmed that he is taking Lisinopril 2.5mg daily and Metoprolol XL 25mg daily.    Scripts sent for Lisinopril 5mg and Metoprolol 50mg.   Ancillary BP check appt scheduled.    Pt is not having any lower extremity swelling.    Mia Artis RN  [facility]

## 2020-06-16 NOTE — TELEPHONE ENCOUNTER
Pt presented to clinic today with high BP readings after being seen by oral surgeon for dental implants.    Dentist readings:  L arm 211/120  R arm 204/109  R arm 190/100    Writer checked BP while in clinic and reading was 162/84 on L arm. HR 53. Writer rechecked BP and it was 150/78.    Pt states yesterday he had hot flashes and had 2 episodes where he felt like an electric shock went through his body and this lasted 1 second each time. Denies chest pain, SOB, dizziness, arm or jaw pain, vision issues, or headache.    States he cannot have dental implants until his BP is controlled. Pt knows he is taking Lisinopril and Metoprolol, but did not know doses. Gave pt AVS and advised that he compare med list to his medication list to make sure doses listed are correct.     Pt is wondering if he should make any changes to his BP medications? Please advise.    Mia Artis RN  [facility]

## 2020-06-19 ENCOUNTER — ALLIED HEALTH/NURSE VISIT (OUTPATIENT)
Dept: NURSING | Facility: CLINIC | Age: 75
End: 2020-06-19
Payer: COMMERCIAL

## 2020-06-19 ENCOUNTER — TELEPHONE (OUTPATIENT)
Dept: INTERNAL MEDICINE | Facility: CLINIC | Age: 75
End: 2020-06-19

## 2020-06-19 VITALS — SYSTOLIC BLOOD PRESSURE: 160 MMHG | DIASTOLIC BLOOD PRESSURE: 78 MMHG

## 2020-06-19 DIAGNOSIS — I10 HYPERTENSION GOAL BP (BLOOD PRESSURE) < 140/90: ICD-10-CM

## 2020-06-19 DIAGNOSIS — Z01.30 BP CHECK: Primary | ICD-10-CM

## 2020-06-19 RX ORDER — LISINOPRIL 10 MG/1
10 TABLET ORAL DAILY
Qty: 30 TABLET | Refills: 1 | Status: SHIPPED | OUTPATIENT
Start: 2020-06-19 | End: 2020-07-28

## 2020-06-19 NOTE — TELEPHONE ENCOUNTER
BP Readings from Last 6 Encounters:   06/19/20 (!) 160/78   06/16/20 (!) 150/78   05/19/20 138/82   03/20/20 (!) 171/99   02/27/20 114/68   02/11/20 130/70     Based on this most recent previous blood pressure reading as well as data over last few months we can definitely conclude this patient has improperly controlled hypertension .      Recommendations      1. Adjustment medication as follows      Increase lisinopril from 2.5 milligrams to 10 milligrams, send in 30 day prescription with refill      2. Schedule further follow up blood pressure reading in 2-4 weeks of being on new medication      Kapil Duckworth MD

## 2020-06-19 NOTE — PROGRESS NOTES
Zurdo Dash is a 75 year old patient who comes in today for a Blood Pressure check.  Initial BP:  BP (!) 160/78      Data Unavailable  Disposition: results routed to provider

## 2020-06-19 NOTE — TELEPHONE ENCOUNTER
Patient notified of Provider's message as written.  Patient verbalized understanding and agreed with plan    Jacobo Del Castillo RN

## 2020-07-03 ENCOUNTER — ALLIED HEALTH/NURSE VISIT (OUTPATIENT)
Dept: NURSING | Facility: CLINIC | Age: 75
End: 2020-07-03
Payer: COMMERCIAL

## 2020-07-03 ENCOUNTER — TELEPHONE (OUTPATIENT)
Dept: INTERNAL MEDICINE | Facility: CLINIC | Age: 75
End: 2020-07-03

## 2020-07-03 VITALS — HEART RATE: 50 BPM | DIASTOLIC BLOOD PRESSURE: 78 MMHG | OXYGEN SATURATION: 97 % | SYSTOLIC BLOOD PRESSURE: 168 MMHG

## 2020-07-03 DIAGNOSIS — Z01.30 BP CHECK: Primary | ICD-10-CM

## 2020-07-03 NOTE — PROGRESS NOTES
Zurdo Dash is a 75 year old patient who comes in today for a Blood Pressure check.  Initial BP:  BP (!) 168/78   Pulse 50   SpO2 97%      50  Disposition: BP elevated.  Triage RN notified, patient asked to wait. RN stated patient can go home, will huddle with provider and call patient with update.  Mia RAMSEY CMA ([facility])

## 2020-07-03 NOTE — TELEPHONE ENCOUNTER
Patient called and informed.    Telephone visit scheduled for Thursday, July 9th at 4:50 p.m. Caren Telles,

## 2020-07-03 NOTE — TELEPHONE ENCOUNTER
Patient needs to have his BP under control before he can have dental procedure  Lisinopril was increased to 10mg daily on 6/19/2020  Patient had BP rechecked today but is still high    BP Readings from Last 3 Encounters:   07/03/20 (!) 168/78   06/19/20 (!) 160/78   06/16/20 (!) 150/78     Please advise on med changes  Patient would like a return call around 1:30 PM    Jacobo Del Castillo RN

## 2020-07-08 ENCOUNTER — OFFICE VISIT (OUTPATIENT)
Dept: FAMILY MEDICINE | Facility: CLINIC | Age: 75
End: 2020-07-08
Payer: COMMERCIAL

## 2020-07-08 ENCOUNTER — NURSE TRIAGE (OUTPATIENT)
Dept: FAMILY MEDICINE | Facility: CLINIC | Age: 75
End: 2020-07-08

## 2020-07-08 VITALS
HEIGHT: 69 IN | TEMPERATURE: 98 F | OXYGEN SATURATION: 97 % | DIASTOLIC BLOOD PRESSURE: 74 MMHG | BODY MASS INDEX: 30.54 KG/M2 | SYSTOLIC BLOOD PRESSURE: 130 MMHG | HEART RATE: 56 BPM | WEIGHT: 206.2 LBS | RESPIRATION RATE: 14 BRPM

## 2020-07-08 DIAGNOSIS — R35.0 URINARY FREQUENCY: ICD-10-CM

## 2020-07-08 DIAGNOSIS — K59.00 CONSTIPATION, UNSPECIFIED CONSTIPATION TYPE: ICD-10-CM

## 2020-07-08 DIAGNOSIS — R10.13 EPIGASTRIC PAIN: Primary | ICD-10-CM

## 2020-07-08 LAB
ALBUMIN SERPL-MCNC: 3.9 G/DL (ref 3.4–5)
ALBUMIN UR-MCNC: 100 MG/DL
ALP SERPL-CCNC: 56 U/L (ref 40–150)
ALT SERPL W P-5'-P-CCNC: 41 U/L (ref 0–70)
ANION GAP SERPL CALCULATED.3IONS-SCNC: 6 MMOL/L (ref 3–14)
APPEARANCE UR: CLEAR
AST SERPL W P-5'-P-CCNC: 29 U/L (ref 0–45)
BASOPHILS # BLD AUTO: 0 10E9/L (ref 0–0.2)
BASOPHILS NFR BLD AUTO: 0.5 %
BILIRUB SERPL-MCNC: 1 MG/DL (ref 0.2–1.3)
BILIRUB UR QL STRIP: NEGATIVE
BUN SERPL-MCNC: 16 MG/DL (ref 7–30)
CALCIUM SERPL-MCNC: 8.9 MG/DL (ref 8.5–10.1)
CHLORIDE SERPL-SCNC: 103 MMOL/L (ref 94–109)
CO2 SERPL-SCNC: 26 MMOL/L (ref 20–32)
COLOR UR AUTO: YELLOW
CREAT SERPL-MCNC: 1.16 MG/DL (ref 0.66–1.25)
DIFFERENTIAL METHOD BLD: ABNORMAL
EOSINOPHIL # BLD AUTO: 0 10E9/L (ref 0–0.7)
EOSINOPHIL NFR BLD AUTO: 0.1 %
ERYTHROCYTE [DISTWIDTH] IN BLOOD BY AUTOMATED COUNT: 13.7 % (ref 10–15)
GFR SERPL CREATININE-BSD FRML MDRD: 61 ML/MIN/{1.73_M2}
GLUCOSE SERPL-MCNC: 183 MG/DL (ref 70–99)
GLUCOSE UR STRIP-MCNC: 100 MG/DL
HCT VFR BLD AUTO: 43 % (ref 40–53)
HGB BLD-MCNC: 14.6 G/DL (ref 13.3–17.7)
HGB UR QL STRIP: NEGATIVE
KETONES UR STRIP-MCNC: ABNORMAL MG/DL
LEUKOCYTE ESTERASE UR QL STRIP: NEGATIVE
LYMPHOCYTES # BLD AUTO: 0.7 10E9/L (ref 0.8–5.3)
LYMPHOCYTES NFR BLD AUTO: 7.9 %
MCH RBC QN AUTO: 30.7 PG (ref 26.5–33)
MCHC RBC AUTO-ENTMCNC: 34 G/DL (ref 31.5–36.5)
MCV RBC AUTO: 91 FL (ref 78–100)
MONOCYTES # BLD AUTO: 0.5 10E9/L (ref 0–1.3)
MONOCYTES NFR BLD AUTO: 5.9 %
NEUTROPHILS # BLD AUTO: 7.5 10E9/L (ref 1.6–8.3)
NEUTROPHILS NFR BLD AUTO: 85.6 %
NITRATE UR QL: NEGATIVE
NON-SQ EPI CELLS #/AREA URNS LPF: NORMAL /LPF
PH UR STRIP: 7 PH (ref 5–7)
PLATELET # BLD AUTO: 202 10E9/L (ref 150–450)
POTASSIUM SERPL-SCNC: 4.3 MMOL/L (ref 3.4–5.3)
PROT SERPL-MCNC: 7.4 G/DL (ref 6.8–8.8)
RBC # BLD AUTO: 4.75 10E12/L (ref 4.4–5.9)
RBC #/AREA URNS AUTO: NORMAL /HPF
SODIUM SERPL-SCNC: 135 MMOL/L (ref 133–144)
SOURCE: ABNORMAL
SP GR UR STRIP: 1.02 (ref 1–1.03)
UROBILINOGEN UR STRIP-ACNC: 0.2 EU/DL (ref 0.2–1)
WBC # BLD AUTO: 8.8 10E9/L (ref 4–11)
WBC #/AREA URNS AUTO: NORMAL /HPF

## 2020-07-08 PROCEDURE — 99213 OFFICE O/P EST LOW 20 MIN: CPT | Performed by: NURSE PRACTITIONER

## 2020-07-08 PROCEDURE — 80053 COMPREHEN METABOLIC PANEL: CPT | Performed by: NURSE PRACTITIONER

## 2020-07-08 PROCEDURE — 36415 COLL VENOUS BLD VENIPUNCTURE: CPT | Performed by: NURSE PRACTITIONER

## 2020-07-08 PROCEDURE — 85025 COMPLETE CBC W/AUTO DIFF WBC: CPT | Performed by: NURSE PRACTITIONER

## 2020-07-08 PROCEDURE — 81001 URINALYSIS AUTO W/SCOPE: CPT | Performed by: NURSE PRACTITIONER

## 2020-07-08 RX ORDER — OMEPRAZOLE 40 MG/1
40 CAPSULE, DELAYED RELEASE ORAL
Qty: 28 CAPSULE | Refills: 0 | Status: SHIPPED | OUTPATIENT
Start: 2020-07-08 | End: 2020-07-22

## 2020-07-08 RX ORDER — SENNA AND DOCUSATE SODIUM 50; 8.6 MG/1; MG/1
1 TABLET, FILM COATED ORAL AT BEDTIME
Qty: 30 TABLET | Refills: 1 | Status: SHIPPED | OUTPATIENT
Start: 2020-07-08 | End: 2021-01-06

## 2020-07-08 ASSESSMENT — MIFFLIN-ST. JEOR: SCORE: 1655.7

## 2020-07-08 NOTE — PATIENT INSTRUCTIONS
Continue laxative until you have regular bowel movements.  Increase omeprazole to 1 tablet twice daily.

## 2020-07-08 NOTE — PROGRESS NOTES
Subjective     Zurdo Dash is a 75 year old male who presents to clinic today for the following health issues:    HPI   Abdominal Pain      Duration: 3 days     Description (location/character/radiation): right below rib cage.       Associated flank pain: None    Intensity:  Moderate to severe  Accompanying signs and symptoms: Unable to sleep, no appetite, no BM for 3 days usually would go daily.        Fever/Chills: no        Gas/Bloating: YES       Nausea/vomitting: YES, vomited one time last night.       Diarrhea: no        Dysuria or Hematuria: no     History (previous similar pain/trauma/previous testing): none    Precipitating or alleviating factors:       Pain worse with eating/BM/urination: no       Pain relieved by BM: unsure     Therapies tried and outcome: None    LMP:  not applicable    Patient notes an increase in urinary frequency at night.  He denies dysuria.  His appetite is decreased.  He denies similar symptoms in the past.        Patient Active Problem List   Diagnosis     Pain in limb     Hyperlipidemia LDL goal <100     Hypertension goal BP (blood pressure) < 140/90     Hypogonadism     Advanced directives, counseling/discussion     Health Care Home     Type 2 diabetes mellitus with diabetic polyneuropathy, without long-term current use of insulin (H)     RBBB (right bundle branch block)     Ex-smoker     Family history of esophageal cancer     Gastroesophageal reflux disease, esophagitis presence not specified     Rheumatoid arthritis involving multiple sites with positive rheumatoid factor (H)     Pulmonary nodule     High risk medication use     Spondylosis of cervical region without myelopathy or radiculopathy     Erectile dysfunction, unspecified erectile dysfunction type     Type 2 diabetes mellitus without retinopathy (H)     Tubulovillous adenoma of colon     Diabetic polyneuropathy associated with type 2 diabetes mellitus (H)     Chronic bilateral low back pain without sciatica      Migraine equivalent     Posterior vitreous detachment of left eye     Pseudophakia, ou     Eyelid lesion, LLL     Dermatochalasis of both upper eyelids     Bradycardia     Primary osteoarthritis of both knees     Syncope     Trigger finger, acquired     CKD (chronic kidney disease) stage 3, GFR 30-59 ml/min (H)     Past Surgical History:   Procedure Laterality Date     CATARACT IOL, RT/LT       COLONOSCOPY  2018    [facility]     COMBINED REPAIR PTOSIS WITH BLEPHAROPLASTY BILATERAL Bilateral 2019    Procedure: BILATERAL UPPER EYELID BLEPHAROPLASTY AND BILATERAL PTOSIS REPAIR;  Surgeon: Janet Garcia MD;  Location: [facility]     EXCISE LESION EYELID Left 2019    Procedure: LEFT LOWER EYELID BIOPSY;  Surgeon: Janet Garcia MD;  Location: [facility] OR     HC INCISION TENDON SHEATH FINGER  2009    r hand ring finger     PHACOEMULSIFICATION WITH STANDARD INTRAOCULAR LENS IMPLANT  2019; 3/2019    left eye; right eye     TONSILLECTOMY         Social History     Tobacco Use     Smoking status: Former Smoker     Packs/day: 1.00     Years: 40.00     Pack years: 40.00     Types: Cigarettes     Last attempt to quit: 3/16/2007     Years since quittin.3     Smokeless tobacco: Never Used   Substance Use Topics     Alcohol use: No     Alcohol/week: 0.0 standard drinks     Family History   Problem Relation Age of Onset     Cerebrovascular Disease Mother      Arthritis Mother      Osteoporosis Mother      Alzheimer Disease Father      Arthritis Father      Cancer Father      Diabetes Maternal Grandmother      Cardiovascular Maternal Grandmother      Other Cancer Brother      Cancer Paternal Aunt      Hypertension No family hx of      Thyroid Disease No family hx of      Glaucoma No family hx of      Macular Degeneration No family hx of          Current Outpatient Medications   Medication Sig Dispense Refill     albuterol (PROAIR HFA/PROVENTIL HFA/VENTOLIN HFA) 108 (90 BASE) MCG/ACT Inhaler Inhale 2 puffs into the  lungs every 4 hours as needed for other (coughing) 1 Inhaler 1     aspirin 81 MG tablet Take 1 tablet by mouth daily.  3     blood glucose (ACCU-CHEK DAYANARA PLUS) test strip TEST THREE TIMES A DAY OR AS DIRECTED 300 each 1     blood glucose (NO BRAND SPECIFIED) lancing device Use to test blood sugars 3 times daily or as directed. 1 each 0     blood glucose monitoring (ONE TOUCH DELICA) lancets Use to test blood sugars 3 times daily or as directed. 300 each 3     cetirizine (ZYRTEC) 10 MG tablet Take 10 mg by mouth At Bedtime       clobetasol (TEMOVATE) 0.05 % external ointment Apply topically 2 times daily 30 g 0     diclofenac (VOLTAREN) 1 % topical gel Apply up to 4 grams of 1% gel to knees up to 4 times daily as needed for joint pain (maximum: 8 g per lower extremity per day) 400 g 3     dulaglutide (TRULICITY) 1.5 MG/0.5ML pen Inject 1.5 mg Subcutaneous every 7 days 9 mL 3     folic acid (FOLVITE) 1 MG tablet Take 1 tablet (1 mg) by mouth daily 100 tablet 2     HYDROcodone-acetaminophen (NORCO) 5-325 MG tablet Take 1-2 tablets by mouth every 4 hours as needed for moderate to severe pain maximum 4 tablet(s) per day 28 tablet 0     hydroxychloroquine (PLAQUENIL) 200 MG tablet Take 1 tablet (200 mg) by mouth daily 90 tablet 1     lisinopril (ZESTRIL) 10 MG tablet Take 1 tablet (10 mg) by mouth daily 30 tablet 1     metFORMIN (GLUCOPHAGE) 500 MG tablet Take 1 tablet (500 mg) by mouth 2 times daily (with meals) 180 tablet 1     methotrexate sodium 2.5 MG TABS Take 7 tablets (17.5 mg) by mouth once a week . Take all 7 tablets on the same day of each week. 84 tablet 1     metoprolol succinate ER (TOPROL-XL) 25 MG 24 hr tablet Take 2 tablets (50 mg) by mouth daily 60 tablet 0     omeprazole (PRILOSEC) 40 MG DR capsule Take 1 capsule (40 mg) by mouth 2 times daily for 14 days 28 capsule 0     omeprazole (PRILOSEC) 40 MG DR capsule TAKE ONE CAPSULE BY MOUTH ONCE DAILY 90 capsule 1     order for DME Equipment being ordered:  "specialized shoes for diabetic neuropathy as well as all supplies provided for annual treatment of diabetes neuropathy 1 Device 0     order for DME Equipment being ordered: glucometer 1 Device 0     order for DME Equipment being ordered: specialized shoes for diabetic neuropathy     His orthotics will be made by [facility]      Tim Jernigan, Certified   [email or web address]  Phone: [phone]  Fax: [phone]  [address] [place] [address] 1 Device 0     pravastatin (PRAVACHOL) 40 MG tablet Take 0.5 tablets (20 mg) by mouth every other day 23 tablet 1     SENNA-docusate sodium (SENNA S) 8.6-50 MG tablet Take 1 tablet by mouth At Bedtime 30 tablet 1     tiZANidine (ZANAFLEX) 2 MG tablet Take 1 tablet (2 mg) by mouth 3 times daily as needed for muscle spasms 90 tablet 1     benzonatate (TESSALON) 100 MG capsule Take 1 capsule (100 mg) by mouth 3 times daily as needed for cough (Patient not taking: Reported on 5/21/2020) 30 capsule 0     nitroGLYcerin (NITRO-BID) 2 % OINT ointment Place 0.5 inches (7.5 mg) onto the skin every 6 hours as needed (finger vasculitis, 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      Objective    /74   Pulse 56   Temp 98  F (36.7  C) (Oral)   Resp 14   Ht 1.753 m (5' 9\")   Wt 93.5 kg (206 lb 3.2 oz)   SpO2 97%   BMI 30.45 kg/m    Body mass index is 30.45 kg/m .  Physical Exam "   GENERAL: healthy, alert and no distress  RESP: lungs clear to auscultation - no rales, rhonchi or wheezes  CV: regular rate and rhythm, normal S1 S2, no S3 or S4, no murmur, click or rub, no peripheral edema and peripheral pulses strong  ABDOMEN: tenderness epigastric, no organomegaly or masses, bowel sounds normal and no palpable or pulsatile masses  MS: no gross musculoskeletal defects noted, no edema    Diagnostic Test Results:  In Process        Assessment & Plan     1. Epigastric pain  Patient to increase omeprazole to 40 mg BID x 2 weeks.  Will check labs as below.  - omeprazole (PRILOSEC) 40 MG DR capsule; Take 1 capsule (40 mg) by mouth 2 times daily for 14 days  Dispense: 28 capsule; Refill: 0  - CBC with platelets and differential  - Comprehensive metabolic panel (BMP + Alb, Alk Phos, ALT, AST, Total. Bili, TP)  - Urine Microscopic    2. Constipation, unspecified constipation type    - SENNA-docusate sodium (SENNA S) 8.6-50 MG tablet; Take 1 tablet by mouth At Bedtime  Dispense: 30 tablet; Refill: 1    3. Urinary frequency    - *UA reflex to Microscopic and Culture ([place] and [facility] (except [place] and [place])             No follow-ups on file.    MANASA Ventura [facility] ADDIE

## 2020-07-08 NOTE — TELEPHONE ENCOUNTER
"  Additional Information    Negative: Passed out (i.e., fainted, collapsed and was not responding)    Negative: Shock suspected (e.g., cold/pale/clammy skin, too weak to stand, low BP, rapid pulse)    Negative: Visible sweat on face or sweat is dripping down    Negative: Chest pain    Negative: Pregnant > 24 weeks and hand or face swelling    Negative: Bloody, black, or tarry bowel movements    Negative: Vomiting red blood or black (coffee ground) material    Negative: Recent injury to the abdomen    Vomiting and abdomen looks much more swollen than usual    Answer Assessment - Initial Assessment Questions  1. LOCATION: \"Where does it hurt?\"       Upper middle abd   2. RADIATION: \"Does the pain shoot anywhere else?\" (e.g., chest, back)      Does not radiate   3. ONSET: \"When did the pain begin?\" (e.g., minutes, hours or days ago)       3 days ago and can last for hours   4. SUDDEN: \"Gradual or sudden onset?\"      Gradual   5. PATTERN \"Does the pain come and go, or is it constant?\"     - If constant: \"Is it getting better, staying the same, or worsening?\"       (Note: Constant means the pain never goes away completely; most serious pain is constant and it progresses)      - If intermittent: \"How long does it last?\" \"Do you have pain now?\"      (Note: Intermittent means the pain goes away completely between bouts)      Intermittent   6. SEVERITY: \"How bad is the pain?\"  (e.g., Scale 1-10; mild, moderate, or severe)     - MILD (1-3): doesn't interfere with normal activities, abdomen soft and not tender to touch      - MODERATE (4-7): interferes with normal activities or awakens from sleep, tender to touch      - SEVERE (8-10): excruciating pain, doubled over, unable to do any normal activities        6-7/10 (moderate)  7. RECURRENT SYMPTOM: \"Have you ever had this type of abdominal pain before?\" If so, ask: \"When was the last time?\" and \"What happened that time?\"       Nausea & vomiting, bloating   8. AGGRAVATING " "FACTORS: \"Does anything seem to cause this pain?\" (e.g., foods, stress, alcohol)      Not sure   9. CARDIAC SYMPTOMS: \"Do you have any of the following symptoms: chest pain, difficulty breathing, sweating, nausea?\"      Nausea & vomiting   10. OTHER SYMPTOMS: \"Do you have any other symptoms?\" (e.g., fever, vomiting, diarrhea)        No fever   11. PREGNANCY: \"Is there any chance you are pregnant?\" \"When was your last menstrual period?\"        N/a    Protocols used: ABDOMINAL PAIN - UPPER-A-OH    "

## 2020-07-08 NOTE — TELEPHONE ENCOUNTER
Reason for call:  Symptom   Symptom or request:  Having abdominal pain. Bloating. He did vomit. No fever.     Duration (how long have symptoms been present):  Since yesterday.   Have you been treated for this before? No    Additional comments:  Na     Phone number to reach patient:  Cell number on file:    Telephone Information:   Mobile [phone]       Best Time:   Any     Can we leave a detailed message on this number?  YES    Travel screening: Not Applicable

## 2020-07-09 ENCOUNTER — VIRTUAL VISIT (OUTPATIENT)
Dept: INTERNAL MEDICINE | Facility: CLINIC | Age: 75
End: 2020-07-09
Payer: COMMERCIAL

## 2020-07-09 DIAGNOSIS — I10 HYPERTENSION GOAL BP (BLOOD PRESSURE) < 140/90: ICD-10-CM

## 2020-07-09 DIAGNOSIS — R10.84 ABDOMINAL PAIN, GENERALIZED: Primary | ICD-10-CM

## 2020-07-09 PROCEDURE — 99213 OFFICE O/P EST LOW 20 MIN: CPT | Mod: 95 | Performed by: INTERNAL MEDICINE

## 2020-07-09 NOTE — PROGRESS NOTES
"Zurdo Dash is a 75 year old male who is being evaluated via a billable telephone visit.      The patient has been notified of following:     \"This telephone visit will be conducted via a call between you and your physician/provider. We have found that certain health care needs can be provided without the need for a physical exam.  This service lets us provide the care you need with a short phone conversation.  If a prescription is necessary we can send it directly to your pharmacy.  If lab work is needed we can place an order for that and you can then stop by our lab to have the test done at a later time.    Telephone visits are billed at different rates depending on your insurance coverage. During this emergency period, for some insurers they may be billed the same as an in-person visit.  Please reach out to your insurance provider with any questions.    If during the course of the call the physician/provider feels a telephone visit is not appropriate, you will not be charged for this service.\"    Patient has given verbal consent for Telephone visit?  Yes    What phone number would you like to be contacted at? [phone]    How would you like to obtain your AVS? Mail a copy    Subjective     Zurdo Dash is a 75 year old male who presents via phone visit today for the following health issues:    4:41 PM Start time   5:02 PM  PM End time    HPI  Hypertension Follow-up      Do you check your blood pressure regularly outside of the clinic? Yes     Are you following a low salt diet? Yes    Are your blood pressures ever more than 140 on the top number (systolic) OR more   than 90 on the bottom number (diastolic), for example 140/90? Yes blood pressure reveals    BP Readings from Last 6 Encounters:   07/08/20 130/74   07/03/20 (!) 168/78   06/19/20 (!) 160/78   06/16/20 (!) 150/78   05/19/20 138/82   03/20/20 (!) 171/99     Patient has problems with getting in to see the dentist due to his elevated blood pressure " readings       How many servings of fruits and vegetables do you eat daily?  2-3    On average, how many sweetened beverages do you drink each day (Examples: soda, juice, sweet tea, etc.  Do NOT count diet or artificially sweetened beverages)?   0    How many days per week do you exercise enough to make your heart beat faster? 7    How many minutes a day do you exercise enough to make your heart beat faster? 30 - 60    How many days per week do you miss taking your medication? 0    ABDOMINAL   PAIN     Onset: 3 days - hasn't been able to vomit or have a bowel movement . This is very unusual for this patient. Usually first thing in the morning. Not vomiting. Has a normal speech and no dry heaves. No gastroesophageal reflux disease. Maybe a sense of not digesting food and having a lot of burping. He's tried Tums chewable tablets and [place] Plus Cold [tylenol, chlorpheniramine, et al ] and baking soda , but none of these measures have helped. Uses longterm a daily proton pump inhibitor and these gastroesophageal reflux disease symptoms are ok for him. No appetite. He couldn't even a cup of store bought jello.     Description:   Character: Dull ache and Cramping  Location: epigastric region  Radiation: None    Intensity: moderate    Progression of Symptoms:  worsening    Accompanying Signs & Symptoms:  Fever/Chills?: chills  Gas/Bloating: YES- gas and bloating  Nausea:yes  Vomitting: no   Diarrhea?: no   Constipation:YES  Dysuria or Hematuria: no    History:   Trauma: no   Previous similar pain: no    Previous tests done: none    Precipitating factors:   Does the pain change with:     Food: no      BM:hasn't been able to go to the bathroom  Urination: no     Alleviating factors:  none    Therapies Tried and outcome: none    LMP:  not applicable     Was seen yesterday by IRAIDA [place] nurse practitioner and was given the following directions     1. Double up on the Omeprazole [ Prilosec ] 40 milligram tabs  1 time a day up to 2 times a day for 2 weeks  2. Recommended to try SENNA-docusate sodium (SENNA S) 8.6-50 MG tablet   3. A set of laboratory studies were obtained which were normal. Patient had no features of an acute abdomen     Patient Active Problem List   Diagnosis     Pain in limb     Hyperlipidemia LDL goal <100     Hypertension goal BP (blood pressure) < 140/90     Hypogonadism     Advanced directives, counseling/discussion     Health Care Home     Type 2 diabetes mellitus with diabetic polyneuropathy, without long-term current use of insulin (H)     RBBB (right bundle branch block)     Ex-smoker     Family history of esophageal cancer     Gastroesophageal reflux disease, esophagitis presence not specified     Rheumatoid arthritis involving multiple sites with positive rheumatoid factor (H)     Pulmonary nodule     High risk medication use     Spondylosis of cervical region without myelopathy or radiculopathy     Erectile dysfunction, unspecified erectile dysfunction type     Type 2 diabetes mellitus without retinopathy (H)     Tubulovillous adenoma of colon     Diabetic polyneuropathy associated with type 2 diabetes mellitus (H)     Chronic bilateral low back pain without sciatica     Migraine equivalent     Posterior vitreous detachment of left eye     Pseudophakia, ou     Eyelid lesion, LLL     Dermatochalasis of both upper eyelids     Bradycardia     Primary osteoarthritis of both knees     Syncope     Trigger finger, acquired     CKD (chronic kidney disease) stage 3, GFR 30-59 ml/min (H)     Past Surgical History:   Procedure Laterality Date     CATARACT IOL, RT/LT       COLONOSCOPY  03/01/2018    [facility]     COMBINED REPAIR PTOSIS WITH BLEPHAROPLASTY BILATERAL Bilateral 8/16/2019    Procedure: BILATERAL UPPER EYELID BLEPHAROPLASTY AND BILATERAL PTOSIS REPAIR;  Surgeon: Janet Garcia MD;  Location: [facility]     EXCISE LESION EYELID Left 8/16/2019    Procedure: LEFT LOWER EYELID BIOPSY;  Surgeon:  Janet Garcia MD;  Location: [facility] OR     HC INCISION TENDON SHEATH FINGER  2009    r hand ring finger     PHACOEMULSIFICATION WITH STANDARD INTRAOCULAR LENS IMPLANT  2019; 3/2019    left eye; right eye     TONSILLECTOMY         Social History     Tobacco Use     Smoking status: Former Smoker     Packs/day: 1.00     Years: 40.00     Pack years: 40.00     Types: Cigarettes     Last attempt to quit: 3/16/2007     Years since quittin.3     Smokeless tobacco: Never Used   Substance Use Topics     Alcohol use: No     Alcohol/week: 0.0 standard drinks     Family History   Problem Relation Age of Onset     Cerebrovascular Disease Mother      Arthritis Mother      Osteoporosis Mother      Alzheimer Disease Father      Arthritis Father      Cancer Father      Diabetes Maternal Grandmother      Cardiovascular Maternal Grandmother      Other Cancer Brother      Cancer Paternal Aunt      Hypertension No family hx of      Thyroid Disease No family hx of      Glaucoma No family hx of      Macular Degeneration No family hx of          Current Outpatient Medications   Medication Sig Dispense Refill     albuterol (PROAIR HFA/PROVENTIL HFA/VENTOLIN HFA) 108 (90 BASE) MCG/ACT Inhaler Inhale 2 puffs into the lungs every 4 hours as needed for other (coughing) 1 Inhaler 1     aspirin 81 MG tablet Take 1 tablet by mouth daily.  3     blood glucose (ACCU-CHEK DAYANARA PLUS) test strip TEST THREE TIMES A DAY OR AS DIRECTED 300 each 1     blood glucose (NO BRAND SPECIFIED) lancing device Use to test blood sugars 3 times daily or as directed. 1 each 0     blood glucose monitoring (ONE TOUCH DELICA) lancets Use to test blood sugars 3 times daily or as directed. 300 each 3     cetirizine (ZYRTEC) 10 MG tablet Take 10 mg by mouth At Bedtime       diclofenac (VOLTAREN) 1 % topical gel Apply up to 4 grams of 1% gel to knees up to 4 times daily as needed for joint pain (maximum: 8 g per lower extremity per day) 400 g 3     dulaglutide  (TRULICITY) 1.5 MG/0.5ML pen Inject 1.5 mg Subcutaneous every 7 days 9 mL 3     folic acid (FOLVITE) 1 MG tablet Take 1 tablet (1 mg) by mouth daily 100 tablet 2     HYDROcodone-acetaminophen (NORCO) 5-325 MG tablet Take 1-2 tablets by mouth every 4 hours as needed for moderate to severe pain maximum 4 tablet(s) per day 28 tablet 0     hydroxychloroquine (PLAQUENIL) 200 MG tablet Take 1 tablet (200 mg) by mouth daily 90 tablet 1     lisinopril (ZESTRIL) 10 MG tablet Take 1 tablet (10 mg) by mouth daily 30 tablet 1     metFORMIN (GLUCOPHAGE) 500 MG tablet Take 1 tablet (500 mg) by mouth 2 times daily (with meals) 180 tablet 1     methotrexate sodium 2.5 MG TABS Take 7 tablets (17.5 mg) by mouth once a week . Take all 7 tablets on the same day of each week. 84 tablet 1     metoprolol succinate ER (TOPROL-XL) 25 MG 24 hr tablet Take 2 tablets (50 mg) by mouth daily 60 tablet 0     omeprazole (PRILOSEC) 40 MG DR capsule TAKE ONE CAPSULE BY MOUTH ONCE DAILY 90 capsule 1     order for DME Equipment being ordered: specialized shoes for diabetic neuropathy as well as all supplies provided for annual treatment of diabetes neuropathy 1 Device 0     order for DME Equipment being ordered: specialized shoes for diabetic neuropathy     His orthotics will be made by [facility]      Tim Jernigan, Certified   [email or web address]  Phone: [phone]  Fax: [phone]  [address] [place] [address] 1 Device 0     pravastatin (PRAVACHOL) 40 MG tablet Take 0.5 tablets (20 mg) by mouth every other day 23 tablet 1     SENNA-docusate sodium (SENNA S) 8.6-50 MG tablet Take 1 tablet by mouth At Bedtime 30 tablet 1     benzonatate (TESSALON) 100 MG capsule Take 1 capsule (100 mg) by mouth 3 times daily as needed for cough (Patient not taking: Reported on 5/21/2020) 30 capsule 0     clobetasol (TEMOVATE) 0.05 % external ointment Apply topically 2 times daily (Patient not taking: Reported on 7/9/2020) 30 g 0      nitroGLYcerin (NITRO-BID) 2 % OINT ointment Place 0.5 inches (7.5 mg) onto the skin every 6 hours as needed (finger vasculitis, 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) With a 12 hour free period prn every 4-6 hours 15 mg 2     omeprazole (PRILOSEC) 40 MG DR capsule Take 1 capsule (40 mg) by mouth 2 times daily for 14 days (Patient not taking: Reported on 7/9/2020) 28 capsule 0     order for DME Equipment being ordered: glucometer 1 Device 0     tiZANidine (ZANAFLEX) 2 MG tablet Take 1 tablet (2 mg) by mouth 3 times daily as needed for muscle spasms (Patient not taking: Reported on 7/9/2020) 90 tablet 1     No Known Allergies  Recent Labs   Lab Test 07/08/20  1022 04/28/20  1141 03/16/20  1108  01/03/20  1219  08/06/19  1147  01/14/19  1716  09/25/18  1246  10/31/17  1105  06/24/16  1101   A1C  --   --   --   --  6.0*  --  6.4*  --  7.1*  --  8.4*   < > 5.1   < >  --    LDL  --   --   --   --  75  --   --   --  63  --   --   --  47   < >  --    HDL  --   --   --   --  43  --   --   --  33*  --   --   --  48   < >  --    TRIG  --   --   --   --  107  --   --   --  253*  --   --   --  159*   < >  --    ALT 41 35 32   < >  --    < >  --    < > 32   < >  --    < > 27   < >  --    CR 1.16 1.16 1.18   < >  --    < >  --    < > 1.33*   < >  --    < > 1.08   < >  --    GFRESTIMATED 61 61 60*   < >  --    < >  --    < > 52*   < >  --    < > 67   < >  --    GFRESTBLACK 70 70 69   < >  --    < >  --    < > 60*   < >  --    < > 81   < >  --    POTASSIUM 4.3  --   --   --   --   --   --   --  4.2  --   --    < >  --    < >  --    TSH  --   --   --   --   --   --   --   --   --   --  1.44  --   --   --  1.07    < > = values in this interval not displayed.      BP Readings from Last 3 Encounters:   07/08/20 130/74   07/03/20 (!) 168/78   06/19/20 (!) 160/78    Wt Readings from Last 3 Encounters:   07/08/20 93.5 kg (206 lb 3.2 oz)   03/20/20 90.7 kg (200 lb)   02/27/20 93.9 kg (207 lb)                    Reviewed and updated as needed  this visit by Provider         Review of Systems   Constitutional, HEENT, cardiovascular, pulmonary, gi and gu systems are negative, except as otherwise noted.       Objective   Reported vitals:  There were no vitals taken for this visit.   healthy, alert and no distress  PSYCH: Alert and oriented times 3; coherent speech, normal   rate and volume, able to articulate logical thoughts, able   to abstract reason, no tangential thoughts, no hallucinations   or delusions  His affect is normal  RESP: No cough, no audible wheezing, able to talk in full sentences  Remainder of exam unable to be completed due to telephone visits    Diagnostic Test Results:  Labs reviewed in Epic        Assessment/Plan:  1. Abdominal pain, generalized  We reviewed this patients symptoms in greater detail. I also reviewed his office visit notes with Elli Zapata, IRA[place] nurse practitioner from yesterday. There's just no red flag symptoms here. This has all the earmarks of a problem secondary to constipation especially given the fact that he's had only rare episodes of not having bowel movement every single day and this is definitely unusual for him. I recommended he can initially try adding a dose of MiraLax / GlycoLax (polyethylene glycol) in the morning and the evening for 1-3 days and if there's no satisfactory results he would want to try a fleets enema next. If this isn't resolving his symptoms and / or if his symptoms are getting worse we may need an emergency room evaluation , we discussed what to be on the watch for  . An abdominal CT scan may be needed but isn't now    2. Hypertension goal BP (blood pressure) < 140/90  Recommended he reschedule a face to face encounter with bringing in his home based blood pressure cuff and we can run his numbers and decide on further treatment for hypertension.      No follow-ups on file.      Phone call duration: 21  minutes    Kapil Duckworth MD

## 2020-07-15 ENCOUNTER — ALLIED HEALTH/NURSE VISIT (OUTPATIENT)
Dept: EDUCATION SERVICES | Facility: CLINIC | Age: 75
End: 2020-07-15
Payer: COMMERCIAL

## 2020-07-15 DIAGNOSIS — I10 HYPERTENSION GOAL BP (BLOOD PRESSURE) < 140/90: ICD-10-CM

## 2020-07-15 DIAGNOSIS — Z53.9 NO SHOW: Primary | ICD-10-CM

## 2020-07-15 DIAGNOSIS — E11.42 TYPE 2 DIABETES MELLITUS WITH DIABETIC POLYNEUROPATHY, WITH LONG-TERM CURRENT USE OF INSULIN (H): ICD-10-CM

## 2020-07-15 DIAGNOSIS — Z79.4 TYPE 2 DIABETES MELLITUS WITH DIABETIC POLYNEUROPATHY, WITH LONG-TERM CURRENT USE OF INSULIN (H): ICD-10-CM

## 2020-07-15 PROCEDURE — 98968 PH1 ASSMT&MGMT NQHP 21-30: CPT

## 2020-07-15 RX ORDER — METOPROLOL SUCCINATE 25 MG/1
TABLET, EXTENDED RELEASE ORAL
Qty: 180 TABLET | Refills: 2 | Status: SHIPPED | OUTPATIENT
Start: 2020-07-15 | End: 2021-01-06

## 2020-07-15 NOTE — PROGRESS NOTES
"Diabetes Self-Management Education & Support    Telephone Visit for: Follow-up    Patient verbally consented to the telephone visit service today: yes      SUBJECTIVE/OBJECTIVE:  Presents for: Follow-up  Accompanied by: Self  Diabetes education in the past 24mo: Yes  Focus of Visit: Patient Unsure  Diabetes type: Type 2  Diabetes management related comments/concerns: Zurdo states that his BG has been excellent lately since starting the [employer]  Transportation concerns: No  Other concerns:: None  Cultural Influences/Ethnic Background:  American    Diabetes Symptoms & Complications:  Fatigue: Yes  Neuropathy: Yes  Polydipsia: No  Polyphagia: No  Polyuria: No  Visual change: No  Slow healing wounds: Yes(sore on toes)  Symptom course: Worsening  Weight trend: Stable  Complications assessed today?: No  Autonomic neuropathy: No  CVA: No  Heart disease: No  Nephropathy: No  Peripheral neuropathy: No  Foot ulcerations: Yes(He thinks he has an infection on his left toe, inside of his nail. He has been putting some cream on it.  )  Peripheral Vascular Disease: No  Retinopathy: No  Sexual dysfunction: No    Patient Problem List and Family Medical History reviewed for relevant medical history, current medical status, and diabetes risk factors.    Vitals:  There were no vitals taken for this visit.  Estimated body mass index is 30.45 kg/m  as calculated from the following:    Height as of 7/8/20: 1.753 m (5' 9\").    Weight as of 7/8/20: 93.5 kg (206 lb 3.2 oz).   Last 3 BP:   BP Readings from Last 3 Encounters:   07/08/20 130/74   07/03/20 (!) 168/78   06/19/20 (!) 160/78       History   Smoking Status     Former Smoker     Packs/day: 1.00     Years: 40.00     Types: Cigarettes     Quit date: 3/16/2007   Smokeless Tobacco     Never Used       Labs:  Lab Results   Component Value Date    A1C 6.0 01/03/2020     Lab Results   Component Value Date     07/08/2020     Lab Results   Component Value Date    LDL 75 01/03/2020 "     HDL Cholesterol   Date Value Ref Range Status   01/03/2020 43 >39 mg/dL Final   ]  GFR Estimate   Date Value Ref Range Status   07/08/2020 61 >60 mL/min/[1.73_m2] Final     Comment:     Non  GFR Calc  Starting 12/18/2018, serum creatinine based estimated GFR (eGFR) will be   calculated using the Chronic Kidney Disease Epidemiology Collaboration   (CKD-EPI) equation.       GFR Estimate If Black   Date Value Ref Range Status   07/08/2020 70 >60 mL/min/[1.73_m2] Final     Comment:      GFR Calc  Starting 12/18/2018, serum creatinine based estimated GFR (eGFR) will be   calculated using the Chronic Kidney Disease Epidemiology Collaboration   (CKD-EPI) equation.       Lab Results   Component Value Date    CR 1.16 07/08/2020     No results found for: MICROALBUMIN    Healthy Eating:  Healthy Eating Assessed Today: Yes  Cultural/Advent diet restrictions?: No  Meals include: Breakfast, Lunch  Breakfast: 10:30-11: apple fritter OR oatmeal OR breakfast cereal with splenda (1% milk) OR fried egg sandwich OR pancakes   Lunch: [employer] -  or [employer] salad   Dinner: chicken noodle soup with crackers   Beverages: Water, Diet soda, Coffee(1-2 cups of coffee)  Has patient met with a dietitian in the past?: No    Being Active:  Being Active Assessed Today: Yes  Exercise:: Yes  Days per week of moderate to strenuous exercise (like a brisk walk): 7  On average, minutes per day of exercise at this level: 30  Exercise Minutes per Week: 210    Monitoring:  Monitoring Assessed Today: Yes  Did patient bring glucose meter to appointment? : No    BG running around 100-120 fasting, but mostly in the low 100's    Taking Medications:  Diabetes Medication(s)     Biguanides       metFORMIN (GLUCOPHAGE) 500 MG tablet    Take 1 tablet (500 mg) by mouth 2 times daily (with meals)    Incretin Mimetic Agents (GLP-1 Receptor Agonists)       dulaglutide (TRULICITY) 1.5 MG/0.5ML pen    Inject 1.5 mg Subcutaneous every  7 days          Taking Medication Assessed Today: Yes  Problems taking diabetes medications regularly?: No  Diabetes medication side effects?: No    Problem Solving:  Problem Solving Assessed Today: No              Reducing Risks:  Reducing Risks Assessed Today: No    Healthy Coping:  Healthy Coping Assessed Today: No  Patient Activation Measure Survey Score:  HATTIE Score (Last Two) 7/23/2013   HATTIE Raw Score 35   Activation Score 45.2   HATTIE Level 1       Diabetes knowledge and skills assessment:   Patient is knowledgeable in diabetes management concepts related to: Monitoring, Taking Medication and Problem Solving    Patient needs further education on the following diabetes management concepts: Healthy Eating and Being Active    Based on learning assessment above, most appropriate setting for further diabetes education would be: Individual setting.      INTERVENTIONS:    Education provided today on:  AADE Self-Care Behaviors:  Healthy Eating: consistency in amount, composition, and timing of food intake, weight reduction and portion control  Problem Solving: high blood glucose - causes, signs/symptoms, treatment and prevention and low blood glucose - causes, signs/symptoms, treatment and prevention    Opportunities for ongoing education and support in diabetes-self management were discussed.    Pt verbalized understanding of concepts discussed and recommendations provided today.       Education Materials Provided:  No new materials provided today      ASSESSMENT:  Zurdo states that his blood sugars have been really good.  However, he is concerned about his energy level. He states that he has been very fatigued and has even passed out a couple of times.  Encouraged patient to get this checked out and to make transitions from sitting/laying to standing very slowly, make sure he is always in a safe place to have something to grab onto. We discussed his diet, he states that he has been eating smaller portions, and feels  that he has made good progress with his diet and is comfotable at his current weight (around 200 lb). Him and his wife, Yoli have been splitting entrees when they go out to eat to cut back on their portion sizes.     Patient's most recent   Lab Results   Component Value Date    A1C 6.0 01/03/2020    is meeting goal of <7.0    PLAN  Continue with current treatment plan    Stacy Styles RD, LD, CDE  Time Spent: 26 minutes  Encounter Type: Individual    Any diabetes medication dose changes were made via the CDE Protocol and Collaborative Practice Agreement with the patient's referring provider. A copy of this encounter was shared with the provider.

## 2020-07-17 ENCOUNTER — TELEPHONE (OUTPATIENT)
Dept: RHEUMATOLOGY | Facility: CLINIC | Age: 75
End: 2020-07-17

## 2020-07-17 ENCOUNTER — TELEPHONE (OUTPATIENT)
Dept: FAMILY MEDICINE | Facility: CLINIC | Age: 75
End: 2020-07-17

## 2020-07-17 DIAGNOSIS — M05.79 RHEUMATOID ARTHRITIS INVOLVING MULTIPLE SITES WITH POSITIVE RHEUMATOID FACTOR (H): ICD-10-CM

## 2020-07-17 DIAGNOSIS — M05.79 RHEUMATOID ARTHRITIS INVOLVING MULTIPLE SITES WITH POSITIVE RHEUMATOID FACTOR (H): Primary | ICD-10-CM

## 2020-07-17 RX ORDER — HYDROCODONE BITARTRATE AND ACETAMINOPHEN 5; 325 MG/1; MG/1
1-2 TABLET ORAL EVERY 4 HOURS PRN
Qty: 28 TABLET | Refills: 0 | Status: SHIPPED | OUTPATIENT
Start: 2020-07-17 | End: 2020-12-15

## 2020-07-17 RX ORDER — PREDNISONE 20 MG/1
TABLET ORAL
Qty: 15 TABLET | Refills: 0 | Status: SHIPPED | OUTPATIENT
Start: 2020-07-17 | End: 2020-08-12

## 2020-07-17 NOTE — TELEPHONE ENCOUNTER
FYI only-- just wanted to let you know that pt said yes to the prednisone, so I sent in the prescription.    Pt was notified of provider's reply. States that yes, he is willing to try the prednisone, but does not want to be on that long term and states there rheumatology said that they won't prescribe Norco. Stronger encouraged pt to f/u with rheumatology regarding the plan for ongoing management.

## 2020-07-17 NOTE — TELEPHONE ENCOUNTER
Use of opioid pain medication in rheumatoid arthritis flare-up is questionable. I understand that this is an emergency stop gap measure but this isn't a suitable long term plan.    1 time refill , small number of pills  2 further refills need to be approved by rheumatologist   3. Better is course of prednisone in tapering doses , does he want me to prescribe this ? I would suggest 40 milligrams daily times 5 days and 20 milligrams daily times 5 days then discontinue     Kapil Duckworth MD

## 2020-07-17 NOTE — TELEPHONE ENCOUNTER
Called patient. He states that his arthritis is flaring up the worst that it has ever been. Symptoms include pain in wrists and thumbs. He is achy all over. States that usually this would go away. The flare ups come and go to multiple locations. Last night he could not move without hurting and any weight touching his hands and fingers hurt. It hurt to stand. Pt is wondering if he can get a refill on hydrocodone. States that is the only thing that gives him relief from pain and helps to go to sleep. Pt states that he cannot get comfortable in any position.  Pt sees Dr. Tompkins for management of RA, states that he is out of office all week and he was told they would not be able to prescribe pain pills, and that he would have to request them from PCP.

## 2020-07-17 NOTE — TELEPHONE ENCOUNTER
"Called and spoke with patient who developed an RA flare yesterday with pain, redness, stiffness, and swelling in hands, wrists, arms, knees feet and shoulders. Flare is worst in his hands. Patient can barely lift anything. States \"this is the worst it has ever been.\" Taking 17.5 methotrexate once weekly and folic acid daily. Taking Tylenol with minimal relief. Took a hydrocodone last night which helped him sleep. Patient plans on taking another hydrocodone tab this afternoon to help with pain. Wondering if he can get a refill. RN advised that he contact Dr. Duckworth who prescribed this previously. Discussed that Dr. Tompkins is out of office today and advised that I will forward message to him. Encouraged him to go to UC or ED over weekend if pain becomes intolerable. Patient verbalized understanding and will await our call next week.    ([facility] pharmacy)    Adam Shabazz RN....7/17/2020 1:23 PM     "

## 2020-07-17 NOTE — TELEPHONE ENCOUNTER
Reason for call:  Other     Patient called regarding (reason for call): prescription & arthritis     Additional comments: Patient's wife calling stating that patient's arthritis is severe right now and she is having trouble with getting him from the chair due to his knees. States it went all the way up to his arms now. Requesting meds to help with the pain. Please call to advise.     Phone number to reach patient:  Home number on file [phone] (home)    Best Time:  Any     Can we leave a detailed message on this number?  YES    Travel screening: Not Applicable    Patients wife is calling again, she would really like to speak to someone about her husbands condition. Please call as soon as someone is available.

## 2020-07-17 NOTE — TELEPHONE ENCOUNTER
Reason for call:  Symptom   Symptom or request: Arthritis Flare Up    Duration (how long have symptoms been present): Yesterday  Have you been treated for this before? Yes    Additional comments: Please call to advise.    Phone number to reach patient:  Home number on file [phone] (home)    Best Time:  any    Can we leave a detailed message on this number?  YES    Travel screening: Negative

## 2020-07-21 RX ORDER — PREDNISONE 5 MG/1
TABLET ORAL
Qty: 50 TABLET | Refills: 0 | Status: SHIPPED | OUTPATIENT
Start: 2020-07-21 | End: 2020-12-15

## 2020-07-21 NOTE — TELEPHONE ENCOUNTER
Dr. Duckworth Rx'd Prednisone.  Thank you Dr. Duckworth.    RN: Please call to notify Mr. Dash that I have sent a prescription for prednisone in case of another RA flare, so that he has it on hand:    - For RA flare only: Prednisone 20mg daily x5days, then 15mg daily x5days, then 10mg daily x5days, then 5mg daily x5days, then stop.      Albert Tompkins MD  7/21/2020 12:51 PM

## 2020-07-21 NOTE — TELEPHONE ENCOUNTER
Called patient and left a detailed message relaying Dr. Tompkins's note below.    Adam Shabazz RN....7/21/2020 1:26 PM

## 2020-07-27 DIAGNOSIS — I10 HYPERTENSION GOAL BP (BLOOD PRESSURE) < 140/90: ICD-10-CM

## 2020-07-27 NOTE — TELEPHONE ENCOUNTER
duplicate     Disp  Refills  Start  End  ANITA    lisinopril (ZESTRIL) 10 MG tablet  30 tablet  1  6/19/2020   No    Sig - Route: Take 1 tablet (10 mg) by mouth daily - Oral    Sent to pharmacy as: Lisinopril 10 MG Oral Tablet (ZESTRIL)    Class: E-Prescribe    Notes to Pharmacy: Recheck blood pressure in 2-4 weeks    Order: [id number]    E-Prescribing Status: Receipt confirmed by pharmacy (6/19/2020  1:46 PM CDT)

## 2020-07-28 ENCOUNTER — TELEPHONE (OUTPATIENT)
Dept: RHEUMATOLOGY | Facility: CLINIC | Age: 75
End: 2020-07-28

## 2020-07-28 DIAGNOSIS — Z79.899 HIGH RISK MEDICATIONS (NOT ANTICOAGULANTS) LONG-TERM USE: ICD-10-CM

## 2020-07-28 DIAGNOSIS — M05.79 RHEUMATOID ARTHRITIS INVOLVING MULTIPLE SITES WITH POSITIVE RHEUMATOID FACTOR (H): ICD-10-CM

## 2020-07-28 LAB
ALBUMIN SERPL-MCNC: 2.9 G/DL (ref 3.4–5)
ALP SERPL-CCNC: 51 U/L (ref 40–150)
ALT SERPL W P-5'-P-CCNC: 36 U/L (ref 0–70)
AST SERPL W P-5'-P-CCNC: 24 U/L (ref 0–45)
BASOPHILS # BLD AUTO: 0.1 10E9/L (ref 0–0.2)
BASOPHILS NFR BLD AUTO: 0.9 %
BILIRUB DIRECT SERPL-MCNC: 0.2 MG/DL (ref 0–0.2)
BILIRUB SERPL-MCNC: 0.7 MG/DL (ref 0.2–1.3)
CREAT SERPL-MCNC: 1.18 MG/DL (ref 0.66–1.25)
CRP SERPL-MCNC: 31.8 MG/L (ref 0–8)
DIFFERENTIAL METHOD BLD: ABNORMAL
EOSINOPHIL # BLD AUTO: 0.1 10E9/L (ref 0–0.7)
EOSINOPHIL NFR BLD AUTO: 0.8 %
ERYTHROCYTE [DISTWIDTH] IN BLOOD BY AUTOMATED COUNT: 13.7 % (ref 10–15)
ERYTHROCYTE [SEDIMENTATION RATE] IN BLOOD BY WESTERGREN METHOD: 15 MM/H (ref 0–20)
GFR SERPL CREATININE-BSD FRML MDRD: 59 ML/MIN/{1.73_M2}
HCT VFR BLD AUTO: 39 % (ref 40–53)
HGB BLD-MCNC: 13 G/DL (ref 13.3–17.7)
LYMPHOCYTES # BLD AUTO: 0.8 10E9/L (ref 0.8–5.3)
LYMPHOCYTES NFR BLD AUTO: 8.5 %
MCH RBC QN AUTO: 30.2 PG (ref 26.5–33)
MCHC RBC AUTO-ENTMCNC: 33.3 G/DL (ref 31.5–36.5)
MCV RBC AUTO: 91 FL (ref 78–100)
MONOCYTES # BLD AUTO: 0.6 10E9/L (ref 0–1.3)
MONOCYTES NFR BLD AUTO: 6.8 %
NEUTROPHILS # BLD AUTO: 7.6 10E9/L (ref 1.6–8.3)
NEUTROPHILS NFR BLD AUTO: 83 %
PLATELET # BLD AUTO: 173 10E9/L (ref 150–450)
PROT SERPL-MCNC: 6.1 G/DL (ref 6.8–8.8)
RBC # BLD AUTO: 4.3 10E12/L (ref 4.4–5.9)
WBC # BLD AUTO: 9.1 10E9/L (ref 4–11)

## 2020-07-28 PROCEDURE — 85652 RBC SED RATE AUTOMATED: CPT | Performed by: INTERNAL MEDICINE

## 2020-07-28 PROCEDURE — 82565 ASSAY OF CREATININE: CPT | Performed by: INTERNAL MEDICINE

## 2020-07-28 PROCEDURE — 86140 C-REACTIVE PROTEIN: CPT | Performed by: INTERNAL MEDICINE

## 2020-07-28 PROCEDURE — 36415 COLL VENOUS BLD VENIPUNCTURE: CPT | Performed by: INTERNAL MEDICINE

## 2020-07-28 PROCEDURE — 80076 HEPATIC FUNCTION PANEL: CPT | Performed by: INTERNAL MEDICINE

## 2020-07-28 PROCEDURE — 85025 COMPLETE CBC W/AUTO DIFF WBC: CPT | Performed by: INTERNAL MEDICINE

## 2020-07-28 RX ORDER — LISINOPRIL 10 MG/1
10 TABLET ORAL DAILY
Qty: 90 TABLET | Refills: 2 | Status: SHIPPED | OUTPATIENT
Start: 2020-07-28 | End: 2020-08-03

## 2020-07-28 NOTE — TELEPHONE ENCOUNTER
Reason for call:  Other   Patient called regarding (reason for call): Lab   Additional comments: Patients wife is calling because patient has a lab appointment today and she is wondering if he needs this lab appointment because she thinks he already had one.     Phone number to reach patient:  Home number on file [phone] (home)    Best Time:  any    Can we leave a detailed message on this number?  YES    Travel screening: Negative

## 2020-07-28 NOTE — TELEPHONE ENCOUNTER
Contacted patient and his significant other Yoli and advised them that patient is due for labs. They have no further questions.    Adam Shabazz RN....7/28/2020 11:01 AM

## 2020-07-29 DIAGNOSIS — M05.79 RHEUMATOID ARTHRITIS INVOLVING MULTIPLE SITES WITH POSITIVE RHEUMATOID FACTOR (H): ICD-10-CM

## 2020-07-29 NOTE — TELEPHONE ENCOUNTER
Routing refill request to provider for review/approval because:  [place] [facility] [place]     Requested Prescriptions   Pending Prescriptions Disp Refills    methotrexate sodium 2.5 MG TABS 84 tablet 1     Sig: Take 7 tablets (17.5 mg) by mouth once a week . Take all 7 tablets on the same day of each week.       There is no refill protocol information for this order        Molly Burroughs RN

## 2020-07-31 ENCOUNTER — TELEPHONE (OUTPATIENT)
Dept: FAMILY MEDICINE | Facility: CLINIC | Age: 75
End: 2020-07-31

## 2020-07-31 DIAGNOSIS — I10 HYPERTENSION GOAL BP (BLOOD PRESSURE) < 140/90: ICD-10-CM

## 2020-07-31 RX ORDER — METHOTREXATE 2.5 MG/1
17.5 TABLET ORAL WEEKLY
Qty: 84 TABLET | Refills: 1 | Status: SHIPPED | OUTPATIENT
Start: 2020-07-31 | End: 2020-12-10

## 2020-07-31 NOTE — TELEPHONE ENCOUNTER
Per 7/9/2020 virtual visit notes, patient was supposed to reschedule a face to face and bring in his home BP cuff before provider determines any changes in treatment    Called but patient was not available  Wife will have him call the RN hotline back     When did he increase his lisinopril to 20 mg?  Who advised him to increase this?    Jacobo Pak RN

## 2020-07-31 NOTE — TELEPHONE ENCOUNTER
Rheumatology team: Please call to notify Mr. Dash that methotrexate has been refilled.  Albert Tompkins MD  7/31/2020 7:01 AM

## 2020-07-31 NOTE — TELEPHONE ENCOUNTER
Patient states he is now on 20mg of lisinopril.     Please advise, need a new script.     Thank you,  Sabrina Hicks, PharmD  [facility]  [phone]

## 2020-08-03 RX ORDER — LISINOPRIL 20 MG/1
20 TABLET ORAL DAILY
Qty: 90 TABLET | Refills: 1 | Status: SHIPPED | OUTPATIENT
Start: 2020-08-03 | End: 2021-01-06

## 2020-08-03 NOTE — TELEPHONE ENCOUNTER
Per patient, he is certain that at the last visit, Dr. Duckworth advised him to double up on the Lisinopril from 10 mg daily to now 20 mg dailhy  He has been taking lisinopril 20 mg daily (taking 2 of the 10 mg tabs)  7/9/2020 notes does not mention this  He needed his BP down before he could have dental procedure  BP is better now  BP today was 136/76  Has dental appointment on Thursday 8/6/2020    Please advise on f/u plan and whether prescription can be changed to 20 mg daily    Jacobo Pak RN

## 2020-08-12 ENCOUNTER — VIRTUAL VISIT (OUTPATIENT)
Dept: RHEUMATOLOGY | Facility: CLINIC | Age: 75
End: 2020-08-12
Payer: COMMERCIAL

## 2020-08-12 DIAGNOSIS — M05.79 RHEUMATOID ARTHRITIS INVOLVING MULTIPLE SITES WITH POSITIVE RHEUMATOID FACTOR (H): Primary | ICD-10-CM

## 2020-08-12 DIAGNOSIS — M17.0 BILATERAL PRIMARY OSTEOARTHRITIS OF KNEE: ICD-10-CM

## 2020-08-12 DIAGNOSIS — Z79.899 HIGH RISK MEDICATIONS (NOT ANTICOAGULANTS) LONG-TERM USE: ICD-10-CM

## 2020-08-12 PROCEDURE — 99213 OFFICE O/P EST LOW 20 MIN: CPT | Mod: 95 | Performed by: INTERNAL MEDICINE

## 2020-08-12 RX ORDER — HYDROXYCHLOROQUINE SULFATE 200 MG/1
200 TABLET, FILM COATED ORAL DAILY
Qty: 90 TABLET | Refills: 1 | Status: SHIPPED | OUTPATIENT
Start: 2020-08-12 | End: 2020-12-10

## 2020-08-12 NOTE — PROGRESS NOTES
"Zurdo Dash is a 75 year old male who is being evaluated via a billable telephone visit.      The patient has been notified of following:     \"This telephone visit will be conducted via a call between you and your physician/provider. We have found that certain health care needs can be provided without the need for a physical exam.  This service lets us provide the care you need with a short phone conversation.  If a prescription is necessary we can send it directly to your pharmacy.  If lab work is needed we can place an order for that and you can then stop by our lab to have the test done at a later time.    Telephone visits are billed at different rates depending on your insurance coverage. During this emergency period, for some insurers they may be billed the same as an in-person visit.  Please reach out to your insurance provider with any questions.    If during the course of the call the physician/provider feels a telephone visit is not appropriate, you will not be charged for this service.\"    Patient has given verbal consent for Telephone visit?  Yes    What phone number would you like to be contacted at? [phone]    How would you like to obtain your AVS? Mail a copy    Rheumatology Telephone/Telehealth  Visit      Zurdo Dash MRN# [id number]   YOB: 1945 Age: 75 year old      Date of visit: 8/12/20   PCP: Dr. Kapil Duckworth     Chief Complaint   Patient presents with:  Arthritis: Both knees hurt    Assessment and Plan     1. Seropositive nonerosive rheumatoid arthritis (, CCP 64): Currently on methotrexate 17.5 mg once weekly (reducing from 25mg wkly to get to the lowest effective dose), folic acid 1 mg daily, and hydroxychloroquine 200 mg twice a day. Doing well today with regard to rheumatoid arthritis. Consider stopping hydroxychloroquine in the future if still doing well.   - Continue methotrexate 17.5mg once weekly  - Continue folic acid 1 mg daily   - Reduce " hydroxychloroquine from 200mg BID; to 200mg daily  - Labs in 3 months: CBC, Creatinine, Hepatic Panel, ESR, CRP    2. Bilateral knee osteoarthritis / patellofemoral syndrome: Was receiving steroid injections approximately every 3-6 months with good control of his symptoms. Scheduled Aug 26 for injection.     3. Acute right distal ulnar and digital artery occlusion of the right 5th finger: seen by vascular. Possibly RA related but his disease appears well controlled making this less likely.  Agree with aspirin.     # Relevant labs and imaging were reviewed with the patient    # High risk medication toxicity monitoring: discussion and labs reviewed; appropriate labs ordered. See above.  Instructed that if confirmed to have COVID-19 or exposure to someone with confirmed COVID-19 to call this clinic for directions on DMARD management.    # Note that this is a virtual visit to reduce the risk of COVID-19 exposure during this current pandemic.      # Considered to be at high risk of complications from the COVID-19 virus.  It is recommended to limit contact with other people and if possible to work remotely or provide a leave of absence to reduce the risk for COVID-19.      Mr. Dash verbalized agreement with and understanding of the rational for the diagnosis and treatment plan.  All questions were answered to best of my ability and the patient's satisfaction. Mr. Dash was advised to contact the clinic with any questions that may arise after the clinic visit.    Thank you for involving me in the care of the patient    Return to clinic: 3-4 months      HPI   Zurdo Dash is a 75 year old male with a medical history significant for hypertension, hyperlipidemia, diabetes, diabetic neuropathy, GERD, and rheumatoid arthritis who presents for f/u of RA and bilateral knee OA.     Today, Mr. Dash reports that his arthritis is doing well.  No change with MTX dose reduction.  Had right 5th finger blood vessel occlusion; on  aspirin now; no recurrence.  Knees ache; wishes he could have an injection but understands he shouldn't come in for this. Morning stiffness <5 min.     Denies fevers, chills, nausea, vomiting, constipation, diarrhea. No abdominal pain. No chest pain/pressure, palpitations, or shortness of breath. No oral or nasal sores. No neck pain. No rash. No LE swelling.      Tobacco: None  EtOH: None  Drugs: None    ROS   GEN: No fevers, chills, or night sweats. Trying to lose weight  SKIN: No rash.   HEENT: No oral or nasal ulcers.  CV: No chest pain, pressure, palpitations, or dyspnea on exertion.  PULM: No SOB, wheeze, cough.  GI: No nausea, vomiting, constipation, diarrhea. No blood in stool. No abdominal pain.  : No blood in urine.  MSK: See HPI.  NEURO: No numbness, tingling, or weakness.  EXT: No LE swelling  PSYCH: Negative    Active Problem List     Patient Active Problem List   Diagnosis     Pain in limb     Hyperlipidemia LDL goal <100     Hypertension goal BP (blood pressure) < 140/90     Hypogonadism     Advanced directives, counseling/discussion     Health Care Home     Type 2 diabetes mellitus with diabetic polyneuropathy, without long-term current use of insulin (H)     RBBB (right bundle branch block)     Ex-smoker     Family history of esophageal cancer     Gastroesophageal reflux disease, esophagitis presence not specified     Rheumatoid arthritis involving multiple sites with positive rheumatoid factor (H)     Pulmonary nodule     High risk medication use     Spondylosis of cervical region without myelopathy or radiculopathy     Erectile dysfunction, unspecified erectile dysfunction type     Type 2 diabetes mellitus without retinopathy (H)     Tubulovillous adenoma of colon     Diabetic polyneuropathy associated with type 2 diabetes mellitus (H)     Chronic bilateral low back pain without sciatica     Migraine equivalent     Posterior vitreous detachment of left eye     Pseudophakia, ou     Eyelid lesion,  LLL     Dermatochalasis of both upper eyelids     Bradycardia     Primary osteoarthritis of both knees     Syncope     Trigger finger, acquired     CKD (chronic kidney disease) stage 3, GFR 30-59 ml/min (H)     Abdominal pain, generalized     Past Medical History     Past Medical History:   Diagnosis Date     Abnormal CT scan 03/2004    calcified lung granuloma     C. difficile diarrhea     H/O     Cataract 11/18/2011     Diabetic neuropathy (H)     mild, mostly soles and distal forefeet, worse on the left side.     Diverticulitis      ED (erectile dysfunction)      Ex-smoker     QUIT SMOKING FEB 2007     History of ETOH abuse     recovering, sober since 1997     Hyperlipidemia LDL goal <100      Hypertension goal BP (blood pressure) < 140/90      Hypogonadism      Obesity      PAD (peripheral artery disease) (H)     leg cramps, with exertion, no formal diagnosis of PAD and minimal if any symptoms at all.     RA (rheumatoid arthritis) (H)     Dr Bailon     Type 2 diabetes, HbA1c goal < 7% (H) 10/2008    A1C of 7.1 %      Past Surgical History     Past Surgical History:   Procedure Laterality Date     CATARACT IOL, RT/LT       COLONOSCOPY  03/01/2018    [facility]     COMBINED REPAIR PTOSIS WITH BLEPHAROPLASTY BILATERAL Bilateral 8/16/2019    Procedure: BILATERAL UPPER EYELID BLEPHAROPLASTY AND BILATERAL PTOSIS REPAIR;  Surgeon: Janet Garcia MD;  Location: [facility] OR     EXCISE LESION EYELID Left 8/16/2019    Procedure: LEFT LOWER EYELID BIOPSY;  Surgeon: Janet Garcia MD;  Location: [facility] OR     HC INCISION TENDON SHEATH FINGER  4/2009    r hand ring finger     PHACOEMULSIFICATION WITH STANDARD INTRAOCULAR LENS IMPLANT  02/2019; 3/2019    left eye; right eye     TONSILLECTOMY       Allergy   No Known Allergies  Current Medication List     Current Outpatient Medications   Medication Sig     albuterol (PROAIR HFA/PROVENTIL HFA/VENTOLIN HFA) 108 (90 BASE) MCG/ACT Inhaler Inhale 2 puffs into the lungs every 4 hours as  needed for other (coughing)     aspirin 81 MG tablet Take 1 tablet by mouth daily.     cetirizine (ZYRTEC) 10 MG tablet Take 10 mg by mouth At Bedtime     diclofenac (VOLTAREN) 1 % topical gel Apply up to 4 grams of 1% gel to knees up to 4 times daily as needed for joint pain (maximum: 8 g per lower extremity per day)     dulaglutide (TRULICITY) 1.5 MG/0.5ML pen Inject 1.5 mg Subcutaneous every 7 days     folic acid (FOLVITE) 1 MG tablet Take 1 tablet (1 mg) by mouth daily     HYDROcodone-acetaminophen (NORCO) 5-325 MG tablet Take 1-2 tablets by mouth every 4 hours as needed for moderate to severe pain maximum 4 tablet(s) per day     hydroxychloroquine (PLAQUENIL) 200 MG tablet Take 1 tablet (200 mg) by mouth daily     lisinopril (ZESTRIL) 20 MG tablet Take 1 tablet (20 mg) by mouth daily     metFORMIN (GLUCOPHAGE) 500 MG tablet Take 1 tablet (500 mg) by mouth 2 times daily (with meals)     methotrexate sodium 2.5 MG TABS Take 7 tablets (17.5 mg) by mouth once a week . Take all 7 tablets on the same day of each week.     metoprolol succinate ER (TOPROL-XL) 25 MG 24 hr tablet TAKE TWO TABLETS BY MOUTH ONCE DAILY     omeprazole (PRILOSEC) 40 MG DR capsule TAKE ONE CAPSULE BY MOUTH ONCE DAILY     pravastatin (PRAVACHOL) 40 MG tablet Take 0.5 tablets (20 mg) by mouth every other day     SENNA-docusate sodium (SENNA S) 8.6-50 MG tablet Take 1 tablet by mouth At Bedtime     benzonatate (TESSALON) 100 MG capsule Take 1 capsule (100 mg) by mouth 3 times daily as needed for cough (Patient not taking: Reported on 5/21/2020)     blood glucose (ACCU-CHEK DAYANARA PLUS) test strip TEST THREE TIMES A DAY OR AS DIRECTED     blood glucose (NO BRAND SPECIFIED) lancing device Use to test blood sugars 3 times daily or as directed.     blood glucose monitoring (ONE TOUCH DELICA) lancets Use to test blood sugars 3 times daily or as directed.     clobetasol (TEMOVATE) 0.05 % external ointment Apply topically 2 times daily (Patient not  taking: Reported on 7/9/2020)     nitroGLYcerin (NITRO-BID) 2 % OINT ointment Place 0.5 inches (7.5 mg) onto the skin every 6 hours as needed (finger vasculitis, 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) With a 12 hour free period prn every 4-6 hours     order for DME Equipment being ordered: specialized shoes for diabetic neuropathy as well as all supplies provided for annual treatment of diabetes neuropathy     order for DME Equipment being ordered: glucometer     order for DME Equipment being ordered: specialized shoes for diabetic neuropathy     His orthotics will be made by [employer]      Tim Jernigan, Certified   [email or web address]  Phone: [phone]  Fax: [phone]  [address] [place] [address]     predniSONE (DELTASONE) 20 MG tablet Take 2 tablets (40 mg) daily times 5 days, then 1 tablet (20 mg) daily times 5 days then stop     predniSONE (DELTASONE) 5 MG tablet For RA flare only: Prednisone 20mg daily x5days, then 15mg daily x5days, then 10mg daily x5days, then 5mg daily x5days, then stop.     tiZANidine (ZANAFLEX) 2 MG tablet Take 1 tablet (2 mg) by mouth 3 times daily as needed for muscle spasms (Patient not taking: Reported on 7/9/2020)     No current facility-administered medications for this visit.          Social History   See HPI    Family History     Family History   Problem Relation Age of Onset     Cerebrovascular Disease Mother      Arthritis Mother      Osteoporosis Mother      Alzheimer Disease Father      Arthritis Father      Cancer Father      Diabetes Maternal Grandmother      Cardiovascular Maternal Grandmother      Other Cancer Brother      Cancer Paternal Aunt      Hypertension No family hx of      Thyroid Disease No family hx of      Glaucoma No family hx of      Macular Degeneration No family hx of        Physical Exam     Temp Readings from Last 3 Encounters:   07/08/20 98  F (36.7  C) (Oral)   02/27/20 99.1  F (37.3  C) (Tympanic)   02/11/20 96.2  F  "(35.7  C) (Oral)     BP Readings from Last 5 Encounters:   07/08/20 130/74   07/03/20 (!) 168/78   06/19/20 (!) 160/78   06/16/20 (!) 150/78   05/19/20 138/82     Pulse Readings from Last 1 Encounters:   07/08/20 56     Resp Readings from Last 1 Encounters:   07/08/20 14     Estimated body mass index is 30.45 kg/m  as calculated from the following:    Height as of 7/8/20: 1.753 m (5' 9\").    Weight as of 7/8/20: 93.5 kg (206 lb 3.2 oz).    Telephone visit    Labs / Imaging (select studies)     9/28/1999  ([place])    10/17/2013 Left knee [facility] at [facility]: , N 3%, No crystals    RF/CCP  Recent Labs   Lab Test 04/07/16  1149   CCPIGG 64*     CBC  Recent Labs   Lab Test 07/28/20  1148 07/08/20  1022 04/28/20  1141   WBC 9.1 8.8 6.4   RBC 4.30* 4.75 4.66   HGB 13.0* 14.6 14.3   HCT 39.0* 43.0 42.5   MCV 91 91 91   RDW 13.7 13.7 15.1*    202 213   MCH 30.2 30.7 30.7   MCHC 33.3 34.0 33.6   NEUTROPHIL 83.0 85.6 71.7   LYMPH 8.5 7.9 13.8   MONOCYTE 6.8 5.9 12.6   EOSINOPHIL 0.8 0.1 1.1   BASOPHIL 0.9 0.5 0.8   ANEU 7.6 7.5 4.6   ALYM 0.8 0.7* 0.9   SMITH 0.6 0.5 0.8   AEOS 0.1 0.0 0.1   ABAS 0.1 0.0 0.1     CMP  Recent Labs   Lab Test 07/28/20  1148 07/08/20  1022 04/28/20  1141  01/14/19  1716  03/26/18  1130   NA  --  135  --   --  141  --  139   POTASSIUM  --  4.3  --   --  4.2  --  4.6   CHLORIDE  --  103  --   --  108  --  107   CO2  --  26  --   --  24  --  23   ANIONGAP  --  6  --   --  9  --  9   GLC  --  183*  --   --  142*  --  84   BUN  --  16  --   --  22  --  20   CR 1.18 1.16 1.16   < > 1.33*   < > 1.01   GFRESTIMATED 59* 61 61   < > 52*   < > 72   GFRESTBLACK 69 70 70   < > 60*   < > 87   NEW  --  8.9  --   --  9.2  --  8.8   BILITOTAL 0.7 1.0 0.7   < > 0.3   < >  --    ALBUMIN 2.9* 3.9 3.5   < > 3.6   < >  --    PROTTOTAL 6.1* 7.4 7.0   < > 7.0   < >  --    ALKPHOS 51 56 59   < > 51   < >  --    AST 24 29 29   < > 22   < >  --    ALT 36 41 35   < > 32   < >  --     " "< > = values in this interval not displayed.     Calcium/VitaminD  Recent Labs   Lab Test 07/08/20  1022 01/14/19  1716 03/26/18  1130  07/23/13  1016   NEW 8.9 9.2 8.8   < >  --    VITDT  --   --   --   --  28*    < > = values in this interval not displayed.     ESR/CRP  Recent Labs   Lab Test 07/28/20  1148 07/16/19  1207 04/18/19  1108   SED 15 8 9   CRP 31.8* 4.2 7.2     Hepatitis B  Recent Labs   Lab Test 04/07/16  1149   HBCAB Nonreactive   HEPBANG Nonreactive     Hepatitis C  Recent Labs   Lab Test 04/07/16  1149   HCVAB Nonreactive   Assay performance characteristics have not been established for newborns,   infants, and children       HIV Screening  Recent Labs   Lab Test 04/07/16  1149   HIAGAB Nonreactive   HIV-1 p24 Ag & HIV-1/HIV-2 Ab Not Detected           \"MRI LEFT KNEE  10/08/2013    INDICATION: Left knee pain. Locking, catching and snapping. Weakness.  TECHNIQUE: Routine.  COMPARISON: None.    FINDINGS:  MEDIAL COMPARTMENT: Linear tearing involving the posterior horn and body of   the medial meniscus as seen on series 4: image 6-8. This is also seen on   series 5: image 9-10. Cartilage is thinned peripherally.    LATERAL COMPARTMENT: Lateral meniscus also demonstrates tearing in the   posterior horn on series 4: image 22. There is diffuse tearing in the body   of the meniscus which is horizontal as seen on series 5: image 9 and this   extends into the anterior horn. Cartilage is thinned.    PATELLOFEMORAL COMPARTMENT: Retropatellar cartilage demonstrates   full-thickness loss of cartilage over portions of the median ridge, lateral   facet and medial facet. Cartilage is better preserved over the lower pole   centrally. There is also full-thickness loss of cartilage in the lateral   and central trochlear groove. Patella is normally aligned. Retinaculum are   intact.    LIGAMENTS AND TENDONS: The anterior cruciate and posterior cruciate   ligaments are intact. The medial collateral ligament is intact. " "Lateral   collateral ligamentous complex and popliteus insertion are intact.   Quadriceps tendon and patellar tendon are intact.    BONES AND SOFT TISSUES: Moderate-sized joint effusion. No popliteal cyst.   No muscle edema or fracture.    CONCLUSION:  1. There is tearing of the medial meniscus involving the posterior horn and   body. Cartilage is thinned peripherally.  2. There is also tearing in the posterior horn and body of the lateral   meniscus which also extends into the anterior horn. Cartilage is also   thinned in the lateral compartment.  3. Moderate to severe osteoarthritis in the patellofemoral compartment with   full-thickness loss of cartilage over large portions of the retropatellar   surface and trochlear surface.  4. Joint effusion.    IF YOU ARE A PHYSICIAN AND HAVE QUESTIONS REGARDING THIS REPORT, PLEASE   CALL [phone].\"    \"X-RAY KNEES BILATERAL AP W/LAT/SUN/PA LEFT   Oct 08, 2013 09:51:59 AM     INDICATION: Pain and swelling   COMPARISON: None.      FINDINGS: Moderate narrowing lateral aspect of the patellofemoral   compartment with slight lateral subluxation of the patella. Medial and   lateral compartments are preserved. Moderate knee joint effusion and/or   synovitis. Enthesophyte formation distal quadriceps and proximal patellar   tendons. Extensive vascular calcifications.     AP view right knee demonstrates trace narrowing medial compartment joint   Space.\"    Immunization History     Immunization History   Administered Date(s) Administered     Influenza (High Dose) 3 valent vaccine 09/20/2012, 10/20/2015, 09/20/2016, 08/28/2017, 09/24/2018, 09/18/2019     Influenza (IIV3) PF 10/05/1999, 10/30/2008, 09/28/2011, 10/14/2013, 10/03/2014     Pneumo Conj 13-V (2010&after) 06/29/2015     Pneumococcal 23 valent 08/19/2010     TD (ADULT, 7+) 08/05/1997, 02/03/2011     TDAP Vaccine (Boostrix) 03/11/2020     Zoster vaccine recombinant adjuvanted (SHINGRIX) 01/03/2020, 03/11/2020     Zoster " vaccine, live 04/19/2010          Chart documentation done in part with Dragon Voice recognition Software. Although reviewed after completion, some word and grammatical error may remain.      Video-Visit Details    Type of service:  Video Visit    Video Start Time: 2:00 PM  Video End Time: 2:20 PM    Originating Location (pt. Location): Home in [place]    Distant Location (provider location):  Home    Platform used for Video Visit: Ling Tompkins MD

## 2020-08-24 DIAGNOSIS — K21.9 GASTROESOPHAGEAL REFLUX DISEASE, ESOPHAGITIS PRESENCE NOT SPECIFIED: ICD-10-CM

## 2020-08-24 DIAGNOSIS — E11.42 TYPE 2 DIABETES MELLITUS WITH DIABETIC POLYNEUROPATHY, WITHOUT LONG-TERM CURRENT USE OF INSULIN (H): ICD-10-CM

## 2020-08-24 NOTE — TELEPHONE ENCOUNTER
Duplicate, 1st request.    metFORMIN (GLUCOPHAGE) 500 MG tablet  180 tablet  1  5/21/2020   No    Sig - Route: Take 1 tablet (500 mg) by mouth 2 times daily (with meals) - Oral    Class: No Print Out    Order: [id number]     omeprazole (PRILOSEC) 40 MG DR capsule  90 capsule  1  5/21/2020   No    Sig: TAKE ONE CAPSULE BY MOUTH ONCE DAILY    Sent to pharmacy as: Omeprazole 40 MG Oral Capsule Delayed Release (priLOSEC)    Class: E-Prescribe    Order: [id number]    E-Prescribing Status: Receipt confirmed by pharmacy (5/21/2020 12:13 PM CDT)      Mia RAMSEY CMA ([facility])

## 2020-08-25 RX ORDER — OMEPRAZOLE 40 MG/1
CAPSULE, DELAYED RELEASE ORAL
Qty: 28 CAPSULE | Refills: 0 | OUTPATIENT
Start: 2020-08-25

## 2020-08-26 ENCOUNTER — OFFICE VISIT (OUTPATIENT)
Dept: RHEUMATOLOGY | Facility: CLINIC | Age: 75
End: 2020-08-26
Payer: COMMERCIAL

## 2020-08-26 VITALS — HEART RATE: 68 BPM | DIASTOLIC BLOOD PRESSURE: 64 MMHG | SYSTOLIC BLOOD PRESSURE: 121 MMHG | OXYGEN SATURATION: 96 %

## 2020-08-26 DIAGNOSIS — M05.79 RHEUMATOID ARTHRITIS INVOLVING MULTIPLE SITES WITH POSITIVE RHEUMATOID FACTOR (H): ICD-10-CM

## 2020-08-26 DIAGNOSIS — M17.0 BILATERAL PRIMARY OSTEOARTHRITIS OF KNEE: Primary | ICD-10-CM

## 2020-08-26 PROCEDURE — 20610 DRAIN/INJ JOINT/BURSA W/O US: CPT | Mod: 50 | Performed by: INTERNAL MEDICINE

## 2020-08-26 RX ORDER — TRIAMCINOLONE ACETONIDE 40 MG/ML
40 INJECTION, SUSPENSION INTRA-ARTICULAR; INTRAMUSCULAR ONCE
Status: COMPLETED | OUTPATIENT
Start: 2020-08-26 | End: 2020-08-26

## 2020-08-26 RX ORDER — FOLIC ACID 1 MG/1
1 TABLET ORAL DAILY
Qty: 100 TABLET | Refills: 2 | Status: SHIPPED | OUTPATIENT
Start: 2020-08-26 | End: 2021-05-26

## 2020-08-26 RX ADMIN — TRIAMCINOLONE ACETONIDE 40 MG: 40 INJECTION, SUSPENSION INTRA-ARTICULAR; INTRAMUSCULAR at 14:22

## 2020-08-26 NOTE — PROGRESS NOTES
Rheumatology Clinic  Visit      Zurdo Dash MRN# [id number]   YOB: 1945 Age: 75 year old      Date of visit: 8/26/20   PCP: Dr. Kapil Duckworth     Chief Complaint   Patient presents with:  RECHECK: here for injections in both knees    Assessment and Plan     1. Bilateral knee osteoarthritis / patellofemoral syndrome: Was receiving steroid injections approximately every 3-6 months with good control of his symptoms. Injections today as documented in the procedure section.     Mr. Dash verbalized agreement with and understanding of the rational for the diagnosis and treatment plan.  All questions were answered to best of my ability and the patient's satisfaction. Mr. Dash was advised to contact the clinic with any questions that may arise after the clinic visit.    Thank you for involving me in the care of the patient    Return to clinic: 3-4 months      HPI   Zurdo Dash is a 75 year old male with a medical history significant for hypertension, hyperlipidemia, diabetes, diabetic neuropathy, GERD, and rheumatoid arthritis who presents for injections of both knees for OA.     Today, Mr. Dash reports knee pain, worse with activity and improved with rest and steroid injections. Would like a steroid injection.     Tobacco: None  EtOH: None  Drugs: None    ROS   GEN: No fevers or chills  SKIN: No rash  CV: No chest pain, pressure, palpitations, or dyspnea on exertion.  PULM: No wheeze, cough, or shortness of breath.       Active Problem List     Patient Active Problem List   Diagnosis     Pain in limb     Hyperlipidemia LDL goal <100     Hypertension goal BP (blood pressure) < 140/90     Hypogonadism     Advanced directives, counseling/discussion     Health Care Home     Type 2 diabetes mellitus with diabetic polyneuropathy, without long-term current use of insulin (H)     RBBB (right bundle branch block)     Ex-smoker     Family history of esophageal cancer     Gastroesophageal reflux disease,  esophagitis presence not specified     Rheumatoid arthritis involving multiple sites with positive rheumatoid factor (H)     Pulmonary nodule     High risk medication use     Spondylosis of cervical region without myelopathy or radiculopathy     Erectile dysfunction, unspecified erectile dysfunction type     Type 2 diabetes mellitus without retinopathy (H)     Tubulovillous adenoma of colon     Diabetic polyneuropathy associated with type 2 diabetes mellitus (H)     Chronic bilateral low back pain without sciatica     Migraine equivalent     Posterior vitreous detachment of left eye     Pseudophakia, ou     Eyelid lesion, LLL     Dermatochalasis of both upper eyelids     Bradycardia     Primary osteoarthritis of both knees     Syncope     Trigger finger, acquired     CKD (chronic kidney disease) stage 3, GFR 30-59 ml/min (H)     Abdominal pain, generalized     Past Medical History     Past Medical History:   Diagnosis Date     Abnormal CT scan 03/2004    calcified lung granuloma     C. difficile diarrhea     H/O     Cataract 11/18/2011     Diabetic neuropathy (H)     mild, mostly soles and distal forefeet, worse on the left side.     Diverticulitis      ED (erectile dysfunction)      Ex-smoker     QUIT SMOKING FEB 2007     History of ETOH abuse     recovering, sober since 1997     Hyperlipidemia LDL goal <100      Hypertension goal BP (blood pressure) < 140/90      Hypogonadism      Obesity      PAD (peripheral artery disease) (H)     leg cramps, with exertion, no formal diagnosis of PAD and minimal if any symptoms at all.     RA (rheumatoid arthritis) (H)     Dr Bailon     Type 2 diabetes, HbA1c goal < 7% (H) 10/2008    A1C of 7.1 %      Past Surgical History     Past Surgical History:   Procedure Laterality Date     CATARACT IOL, RT/LT       COLONOSCOPY  03/01/2018    [facility]     COMBINED REPAIR PTOSIS WITH BLEPHAROPLASTY BILATERAL Bilateral 8/16/2019    Procedure: BILATERAL UPPER EYELID BLEPHAROPLASTY AND  BILATERAL PTOSIS REPAIR;  Surgeon: Janet Garcia MD;  Location: [facility] OR     EXCISE LESION EYELID Left 8/16/2019    Procedure: LEFT LOWER EYELID BIOPSY;  Surgeon: Janet Garcia MD;  Location: [facility] OR     HC INCISION TENDON SHEATH FINGER  4/2009    r hand ring finger     PHACOEMULSIFICATION WITH STANDARD INTRAOCULAR LENS IMPLANT  02/2019; 3/2019    left eye; right eye     TONSILLECTOMY       Allergy   No Known Allergies  Current Medication List     Current Outpatient Medications   Medication Sig     albuterol (PROAIR HFA/PROVENTIL HFA/VENTOLIN HFA) 108 (90 BASE) MCG/ACT Inhaler Inhale 2 puffs into the lungs every 4 hours as needed for other (coughing)     aspirin 81 MG tablet Take 1 tablet by mouth daily.     benzonatate (TESSALON) 100 MG capsule Take 1 capsule (100 mg) by mouth 3 times daily as needed for cough (Patient not taking: Reported on 5/21/2020)     blood glucose (ACCU-CHEK DAYAANRA PLUS) test strip TEST THREE TIMES A DAY OR AS DIRECTED     blood glucose (NO BRAND SPECIFIED) lancing device Use to test blood sugars 3 times daily or as directed.     blood glucose monitoring (ONE TOUCH DELICA) lancets Use to test blood sugars 3 times daily or as directed.     cetirizine (ZYRTEC) 10 MG tablet Take 10 mg by mouth At Bedtime     clobetasol (TEMOVATE) 0.05 % external ointment Apply topically 2 times daily (Patient not taking: Reported on 7/9/2020)     diclofenac (VOLTAREN) 1 % topical gel Apply up to 4 grams of 1% gel to knees up to 4 times daily as needed for joint pain (maximum: 8 g per lower extremity per day)     dulaglutide (TRULICITY) 1.5 MG/0.5ML pen Inject 1.5 mg Subcutaneous every 7 days     folic acid (FOLVITE) 1 MG tablet Take 1 tablet (1 mg) by mouth daily     HYDROcodone-acetaminophen (NORCO) 5-325 MG tablet Take 1-2 tablets by mouth every 4 hours as needed for moderate to severe pain maximum 4 tablet(s) per day     hydroxychloroquine (PLAQUENIL) 200 MG tablet Take 1 tablet (200 mg) by mouth daily      lisinopril (ZESTRIL) 20 MG tablet Take 1 tablet (20 mg) by mouth daily     metFORMIN (GLUCOPHAGE) 500 MG tablet Take 1 tablet (500 mg) by mouth 2 times daily (with meals)     methotrexate sodium 2.5 MG TABS Take 7 tablets (17.5 mg) by mouth once a week . Take all 7 tablets on the same day of each week.     metoprolol succinate ER (TOPROL-XL) 25 MG 24 hr tablet TAKE TWO TABLETS BY MOUTH ONCE DAILY     nitroGLYcerin (NITRO-BID) 2 % OINT ointment Place 0.5 inches (7.5 mg) onto the skin every 6 hours as needed (finger vasculitis, 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) With a 12 hour free period prn every 4-6 hours     omeprazole (PRILOSEC) 40 MG DR capsule TAKE ONE CAPSULE BY MOUTH ONCE DAILY     order for DME Equipment being ordered: specialized shoes for diabetic neuropathy as well as all supplies provided for annual treatment of diabetes neuropathy     order for DME Equipment being ordered: glucometer     order for DME Equipment being ordered: specialized shoes for diabetic neuropathy     His orthotics will be made by [facility]      Tim Jernigan, Certified   [email or web address]  Phone: [phone]  Fax: [phone]  [address] [place] [address]     pravastatin (PRAVACHOL) 40 MG tablet Take 0.5 tablets (20 mg) by mouth every other day     predniSONE (DELTASONE) 5 MG tablet For RA flare only: Prednisone 20mg daily x5days, then 15mg daily x5days, then 10mg daily x5days, then 5mg daily x5days, then stop.     SENNA-docusate sodium (SENNA S) 8.6-50 MG tablet Take 1 tablet by mouth At Bedtime     tiZANidine (ZANAFLEX) 2 MG tablet Take 1 tablet (2 mg) by mouth 3 times daily as needed for muscle spasms (Patient not taking: Reported on 7/9/2020)     No current facility-administered medications for this visit.          Social History   See HPI    Family History     Family History   Problem Relation Age of Onset     Cerebrovascular Disease Mother      Arthritis Mother      Osteoporosis Mother       "Alzheimer Disease Father      Arthritis Father      Cancer Father      Diabetes Maternal Grandmother      Cardiovascular Maternal Grandmother      Other Cancer Brother      Cancer Paternal Aunt      Hypertension No family hx of      Thyroid Disease No family hx of      Glaucoma No family hx of      Macular Degeneration No family hx of        Physical Exam     Temp Readings from Last 3 Encounters:   07/08/20 98  F (36.7  C) (Oral)   02/27/20 99.1  F (37.3  C) (Tympanic)   02/11/20 96.2  F (35.7  C) (Oral)     BP Readings from Last 5 Encounters:   08/26/20 121/64   07/08/20 130/74   07/03/20 (!) 168/78   06/19/20 (!) 160/78   06/16/20 (!) 150/78     Pulse Readings from Last 1 Encounters:   08/26/20 68     Resp Readings from Last 1 Encounters:   07/08/20 14     Estimated body mass index is 30.45 kg/m  as calculated from the following:    Height as of 7/8/20: 1.753 m (5' 9\").    Weight as of 7/8/20: 93.5 kg (206 lb 3.2 oz).      GEN: NAD. Overweight   HEENT: MMM.  Anicteric, noninjected sclera. No obvious external lesions of the ear and nose. Hearing intact.  PULM: No increased work of breathing; no use of accessory muscles.  MSK: knees with medial joint line tenderness and crepitation; no effusion or increased warmth; no overlying erythema  PSYCH: Alert. Appropriate.       Labs / Imaging (select studies)     9/28/1999  ([facility])    10/17/2013 Left knee synovial fluid at [facility]: , N 3%, No crystals    RF/CCP  Recent Labs   Lab Test 04/07/16  1149   CCPIGG 64*     CBC  Recent Labs   Lab Test 07/28/20  1148 07/08/20  1022 04/28/20  1141   WBC 9.1 8.8 6.4   RBC 4.30* 4.75 4.66   HGB 13.0* 14.6 14.3   HCT 39.0* 43.0 42.5   MCV 91 91 91   RDW 13.7 13.7 15.1*    202 213   MCH 30.2 30.7 30.7   MCHC 33.3 34.0 33.6   NEUTROPHIL 83.0 85.6 71.7   LYMPH 8.5 7.9 13.8   MONOCYTE 6.8 5.9 12.6   EOSINOPHIL 0.8 0.1 1.1   BASOPHIL 0.9 0.5 0.8   ANEU 7.6 7.5 4.6   ALYM 0.8 0.7* 0.9   SMITH 0.6 0.5 0.8 " "  AEOS 0.1 0.0 0.1   ABAS 0.1 0.0 0.1     CMP  Recent Labs   Lab Test 07/28/20  1148 07/08/20  1022 04/28/20  1141  01/14/19  1716  03/26/18  1130   NA  --  135  --   --  141  --  139   POTASSIUM  --  4.3  --   --  4.2  --  4.6   CHLORIDE  --  103  --   --  108  --  107   CO2  --  26  --   --  24  --  23   ANIONGAP  --  6  --   --  9  --  9   GLC  --  183*  --   --  142*  --  84   BUN  --  16  --   --  22  --  20   CR 1.18 1.16 1.16   < > 1.33*   < > 1.01   GFRESTIMATED 59* 61 61   < > 52*   < > 72   GFRESTBLACK 69 70 70   < > 60*   < > 87   NEW  --  8.9  --   --  9.2  --  8.8   BILITOTAL 0.7 1.0 0.7   < > 0.3   < >  --    ALBUMIN 2.9* 3.9 3.5   < > 3.6   < >  --    PROTTOTAL 6.1* 7.4 7.0   < > 7.0   < >  --    ALKPHOS 51 56 59   < > 51   < >  --    AST 24 29 29   < > 22   < >  --    ALT 36 41 35   < > 32   < >  --     < > = values in this interval not displayed.     Calcium/VitaminD  Recent Labs   Lab Test 07/08/20  1022 01/14/19  1716 03/26/18  1130  07/23/13  1016   NEW 8.9 9.2 8.8   < >  --    VITDT  --   --   --   --  28*    < > = values in this interval not displayed.     ESR/CRP  Recent Labs   Lab Test 07/28/20  1148 07/16/19  1207 04/18/19  1108   SED 15 8 9   CRP 31.8* 4.2 7.2     Hepatitis B  Recent Labs   Lab Test 04/07/16  1149   HBCAB Nonreactive   HEPBANG Nonreactive     Hepatitis C  Recent Labs   Lab Test 04/07/16  1149   HCVAB Nonreactive   Assay performance characteristics have not been established for newborns,   infants, and children       HIV Screening  Recent Labs   Lab Test 04/07/16  1149   HIAGAB Nonreactive   HIV-1 p24 Ag & HIV-1/HIV-2 Ab Not Detected           \"MRI LEFT KNEE  10/08/2013    INDICATION: Left knee pain. Locking, catching and snapping. Weakness.  TECHNIQUE: Routine.  COMPARISON: None.    FINDINGS:  MEDIAL COMPARTMENT: Linear tearing involving the posterior horn and body of   the medial meniscus as seen on series 4: image 6-8. This is also seen on   series 5: image 9-10. " "Cartilage is thinned peripherally.    LATERAL COMPARTMENT: Lateral meniscus also demonstrates tearing in the   posterior horn on series 4: image 22. There is diffuse tearing in the body   of the meniscus which is horizontal as seen on series 5: image 9 and this   extends into the anterior horn. Cartilage is thinned.    PATELLOFEMORAL COMPARTMENT: Retropatellar cartilage demonstrates   full-thickness loss of cartilage over portions of the median ridge, lateral   facet and medial facet. Cartilage is better preserved over the lower pole   centrally. There is also full-thickness loss of cartilage in the lateral   and central trochlear groove. Patella is normally aligned. Retinaculum are   intact.    LIGAMENTS AND TENDONS: The anterior cruciate and posterior cruciate   ligaments are intact. The medial collateral ligament is intact. Lateral   collateral ligamentous complex and popliteus insertion are intact.   Quadriceps tendon and patellar tendon are intact.    BONES AND SOFT TISSUES: Moderate-sized joint effusion. No popliteal cyst.   No muscle edema or fracture.    CONCLUSION:  1. There is tearing of the medial meniscus involving the posterior horn and   body. Cartilage is thinned peripherally.  2. There is also tearing in the posterior horn and body of the lateral   meniscus which also extends into the anterior horn. Cartilage is also   thinned in the lateral compartment.  3. Moderate to severe osteoarthritis in the patellofemoral compartment with   full-thickness loss of cartilage over large portions of the retropatellar   surface and trochlear surface.  4. Joint effusion.    IF YOU ARE A PHYSICIAN AND HAVE QUESTIONS REGARDING THIS REPORT, PLEASE   CALL [phone].\"    \"X-RAY KNEES BILATERAL AP W/LAT/SUN/PA LEFT   Oct 08, 2013 09:51:59 AM     INDICATION: Pain and swelling   COMPARISON: None.      FINDINGS: Moderate narrowing lateral aspect of the patellofemoral   compartment with slight lateral subluxation of the " "patella. Medial and   lateral compartments are preserved. Moderate knee joint effusion and/or   synovitis. Enthesophyte formation distal quadriceps and proximal patellar   tendons. Extensive vascular calcifications.     AP view right knee demonstrates trace narrowing medial compartment joint   Space.\"      Procedure     Procedure: Steroid injection of the bilateral knees  Indication: Pain, osteoarthritis    The procedure was explained in detail. Risks including infection, pain, structural damage such as cartilage damage and tendon rupture, fat atrophy, skin hyper-/hypo-pigmentation, and medication reaction was explained. The need for rest of the affected joint for one week after the procedure was explained.  The option of not doing the procedure was also provided. All questions were answered and the patient consented to the procedure.     A time-out was performed and the correct patient, procedure, and laterality were verified.    The right knee was examined and location for injection was identified - anterior medial. The area was cleaned with chlorhexidine, twice.  Ethyl chloride was then used for topical anaesthetic.  Then a mixture of lidocaine 1% 2 mL and Kenalog 40mg was injected into the intra-articular space.     The left knee was examined and location for injection was identified - anterior medial. The area was cleaned with chlorhexidine, twice.  Ethyl chloride was then used for topical anaesthetic.  Then a mixture of lidocaine 1% 2 mL and Kenalog 40mg was injected into the intra-articular space.     The patient tolerated the procedure well. No complications.    1% Lidocaine  : [facility]   Lot #: [id number]  EXPIRATION DATE: 03/2021   NDC: 9184-9245-13    MEDICATION: Triamcinolone 40 mg  LOT #: [id number]  :  [employer]  EXPIRATION DATE:  05/2021  NDC#: [address]-0  MEDICATION: Triamcinolone 40 mg  LOT #: [id number]  :  [employer]  EXPIRATION DATE:  " 05/2021  NDC#: [address]-2      Immunization History     Immunization History   Administered Date(s) Administered     Influenza (High Dose) 3 valent vaccine 09/20/2012, 10/20/2015, 09/20/2016, 08/28/2017, 09/24/2018, 09/18/2019     Influenza (IIV3) PF 10/05/1999, 10/30/2008, 09/28/2011, 10/14/2013, 10/03/2014     Pneumo Conj 13-V (2010&after) 06/29/2015     Pneumococcal 23 valent 08/19/2010     TD (ADULT, 7+) 08/05/1997, 02/03/2011     TDAP Vaccine (Boostrix) 03/11/2020     Zoster vaccine recombinant adjuvanted (SHINGRIX) 01/03/2020, 03/11/2020     Zoster vaccine, live 04/19/2010          Chart documentation done in part with Dragon Voice recognition Software. Although reviewed after completion, some word and grammatical error may remain.    Albert Tompkins MD

## 2020-08-26 NOTE — TELEPHONE ENCOUNTER
Refilled per [facility] protocol.    Adam Shabazz RN....8/26/2020 12:10 PM

## 2020-09-09 ENCOUNTER — HOSPITAL ENCOUNTER (OUTPATIENT)
Dept: ULTRASOUND IMAGING | Facility: CLINIC | Age: 75
Discharge: HOME | End: 2020-09-09
Attending: SURGERY | Admitting: SURGERY
Payer: COMMERCIAL

## 2020-09-09 DIAGNOSIS — I74.2 EMBOLISM AND THROMBOSIS OF ARTERIES OF THE UPPER EXTREMITIES (H): ICD-10-CM

## 2020-09-09 DIAGNOSIS — M79.601 PAIN OF RIGHT UPPER EXTREMITY: ICD-10-CM

## 2020-09-09 PROCEDURE — 93931 UPPER EXTREMITY STUDY: CPT | Mod: RT

## 2020-09-15 ENCOUNTER — OFFICE VISIT (OUTPATIENT)
Dept: VASCULAR SURGERY | Facility: CLINIC | Age: 75
End: 2020-09-15
Attending: SURGERY
Payer: COMMERCIAL

## 2020-09-15 VITALS
DIASTOLIC BLOOD PRESSURE: 92 MMHG | HEIGHT: 69 IN | BODY MASS INDEX: 29.92 KG/M2 | RESPIRATION RATE: 16 BRPM | SYSTOLIC BLOOD PRESSURE: 189 MMHG | WEIGHT: 202 LBS | OXYGEN SATURATION: 96 % | HEART RATE: 52 BPM

## 2020-09-15 DIAGNOSIS — M79.601 PAIN OF RIGHT UPPER EXTREMITY: Primary | ICD-10-CM

## 2020-09-15 PROCEDURE — 99213 OFFICE O/P EST LOW 20 MIN: CPT | Performed by: SURGERY

## 2020-09-15 ASSESSMENT — PAIN SCALES - GENERAL: PAINLEVEL: NO PAIN (0)

## 2020-09-15 ASSESSMENT — MIFFLIN-ST. JEOR: SCORE: 1636.65

## 2020-09-15 NOTE — PROGRESS NOTES
Vascular Surgery Progress Note     Date: September 15, 2020     Reason for Visit:  Follow up of idiopathic right ulnar artery occlusion      Subjective:  Mr. Dash reports that his hand has recovered well overall. He has some persistent numbness in his right pinky finger, but no longer feels pain in the finger. He has occasional scratches on the back of his hands that have not had delayed healing. He is embarrassed by the bruising he has on his arms, which he says seems to spontaneously arise following trivial bumps.       Current Outpatient Medications:      albuterol (PROAIR HFA/PROVENTIL HFA/VENTOLIN HFA) 108 (90 BASE) MCG/ACT Inhaler, Inhale 2 puffs into the lungs every 4 hours as needed for other (coughing), Disp: 1 Inhaler, Rfl: 1     aspirin 81 MG tablet, Take 1 tablet by mouth daily., Disp: , Rfl: 3     benzonatate (TESSALON) 100 MG capsule, Take 1 capsule (100 mg) by mouth 3 times daily as needed for cough, Disp: 30 capsule, Rfl: 0     blood glucose (ACCU-CHEK DAYANARA PLUS) test strip, TEST THREE TIMES A DAY OR AS DIRECTED, Disp: 300 each, Rfl: 1     blood glucose (NO BRAND SPECIFIED) lancing device, Use to test blood sugars 3 times daily or as directed., Disp: 1 each, Rfl: 0     blood glucose monitoring (ONE TOUCH DELICA) lancets, Use to test blood sugars 3 times daily or as directed., Disp: 300 each, Rfl: 3     cetirizine (ZYRTEC) 10 MG tablet, Take 10 mg by mouth At Bedtime, Disp: , Rfl:      clobetasol (TEMOVATE) 0.05 % external ointment, Apply topically 2 times daily, Disp: 30 g, Rfl: 0     diclofenac (VOLTAREN) 1 % topical gel, Apply up to 4 grams of 1% gel to knees up to 4 times daily as needed for joint pain (maximum: 8 g per lower extremity per day), Disp: 400 g, Rfl: 3     dulaglutide (TRULICITY) 1.5 MG/0.5ML pen, Inject 1.5 mg Subcutaneous every 7 days, Disp: 9 mL, Rfl: 3     folic acid (FOLVITE) 1 MG tablet, Take 1 tablet (1 mg) by mouth daily, Disp: 100 tablet, Rfl: 2      HYDROcodone-acetaminophen (NORCO) 5-325 MG tablet, Take 1-2 tablets by mouth every 4 hours as needed for moderate to severe pain maximum 4 tablet(s) per day, Disp: 28 tablet, Rfl: 0     hydroxychloroquine (PLAQUENIL) 200 MG tablet, Take 1 tablet (200 mg) by mouth daily, Disp: 90 tablet, Rfl: 1     lisinopril (ZESTRIL) 20 MG tablet, Take 1 tablet (20 mg) by mouth daily, Disp: 90 tablet, Rfl: 1     metFORMIN (GLUCOPHAGE) 500 MG tablet, Take 1 tablet (500 mg) by mouth 2 times daily (with meals), Disp: 180 tablet, Rfl: 0     methotrexate sodium 2.5 MG TABS, Take 7 tablets (17.5 mg) by mouth once a week . Take all 7 tablets on the same day of each week., Disp: 84 tablet, Rfl: 1     metoprolol succinate ER (TOPROL-XL) 25 MG 24 hr tablet, TAKE TWO TABLETS BY MOUTH ONCE DAILY, Disp: 180 tablet, Rfl: 2     omeprazole (PRILOSEC) 40 MG DR capsule, TAKE ONE CAPSULE BY MOUTH ONCE DAILY, Disp: 90 capsule, Rfl: 1     order for DME, Equipment being ordered: specialized shoes for diabetic neuropathy as well as all supplies provided for annual treatment of diabetes neuropathy, Disp: 1 Device, Rfl: 0     order for DME, Equipment being ordered: glucometer, Disp: 1 Device, Rfl: 0     order for DME, Equipment being ordered: specialized shoes for diabetic neuropathy   His orthotics will be made by [employer]    Tim Jernigan, Certified  [email or web address] Phone: [phone] Fax: [phone] [address] [place] [address], Disp: 1 Device, Rfl: 0     pravastatin (PRAVACHOL) 40 MG tablet, Take 0.5 tablets (20 mg) by mouth every other day, Disp: 23 tablet, Rfl: 1     predniSONE (DELTASONE) 5 MG tablet, For RA flare only: Prednisone 20mg daily x5days, then 15mg daily x5days, then 10mg daily x5days, then 5mg daily x5days, then stop., Disp: 50 tablet, Rfl: 0     SENNA-docusate sodium (SENNA S) 8.6-50 MG tablet, Take 1 tablet by mouth At Bedtime, Disp: 30 tablet, Rfl: 1     tiZANidine (ZANAFLEX) 2 MG tablet, Take  "1 tablet (2 mg) by mouth 3 times daily as needed for muscle spasms, Disp: 90 tablet, Rfl: 1     nitroGLYcerin (NITRO-BID) 2 % OINT ointment, Place 0.5 inches (7.5 mg) onto the skin every 6 hours as needed (finger vasculitis, 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) With a 12 hour free period prn every 4-6 hours, Disp: 15 mg, Rfl: 2     Physical Exam       BP: (!) 189/92 Pulse: 52   Resp: 16 SpO2: 96 %      Vital Signs with Ranges  Pulse:  [52] 52  Resp:  [16] 16  BP: (189)/(92) 189/92  SpO2:  [96 %] 96 %  202 lbs 0 oz    Constitutional: cooperative, no apparent distress, sitting comfortably in chair.   Vascular: bilateral radial pulses palpable  Musculoskeletal: grossly normal and symmetric ROM and strength in BL extremities. Warm, pink fingers bilaterally with cap refill < 3 sec.   Neurologic: Awake, alert, oriented to name, place, time, and situation    Imaging:  I have reviewed the following imaging studies:  US Upper Extremity Arterial (9/9/20): \"The ulnar artery is patent proximally. As the ulnar artery becomes distal the vessel more irregular in its course, but remains patent. No flow is noted at the ulnar artery at the wrist, unchanged.\"      Assessment & Plan   Mr. Dash is a 74 year old male who presented in 03/2020 with apparent acute right distal ulnar and digital artery occlusion, initially symptomatic with right 5th finger ischemic pain and discoloration, per his description.       Discussed that the etiology of this thrombosis is not clear    Would have him continue to take aspirin and statin daily    He does not need to follow up with Vascular Surgery, but is welcome back if any other questions or concerns arise, or if he develops similar symptoms in the future    Myra Lopes    "

## 2020-09-15 NOTE — PATIENT INSTRUCTIONS
Zurdo to follow up with Primary Care provider regarding elevated blood pressure.  Bety Tamez MA

## 2020-10-05 ENCOUNTER — OFFICE VISIT (OUTPATIENT)
Dept: DERMATOLOGY | Facility: CLINIC | Age: 75
End: 2020-10-05
Payer: COMMERCIAL

## 2020-10-05 DIAGNOSIS — D48.5 NEOPLASM OF UNCERTAIN BEHAVIOR OF SKIN: Primary | ICD-10-CM

## 2020-10-05 DIAGNOSIS — L82.1 SEBORRHEIC KERATOSIS: ICD-10-CM

## 2020-10-05 PROCEDURE — 11102 TANGNTL BX SKIN SINGLE LES: CPT | Performed by: DERMATOLOGY

## 2020-10-05 PROCEDURE — 99213 OFFICE O/P EST LOW 20 MIN: CPT | Mod: 25 | Performed by: DERMATOLOGY

## 2020-10-05 PROCEDURE — 88305 TISSUE EXAM BY PATHOLOGIST: CPT | Performed by: PATHOLOGY

## 2020-10-05 NOTE — PATIENT INSTRUCTIONS

## 2020-10-05 NOTE — NURSING NOTE
Zurdo Dash's goals for this visit include:   Chief Complaint   Patient presents with     RECHECK     left lower eyelid       He requests these members of his care team be copied on today's visit information:     PCP: Kapil Duckworth    Referring Provider:  Jr Lewis MD  [address] [place] [address]    There were no vitals taken for this visit.    Do you need any medication refills at today's visit? Shawnee Valentino LPN

## 2020-10-05 NOTE — LETTER
10/5/2020         RE: Zurdo Dash  [address] [place] [id number]        Dear Colleague,    Thank you for referring your patient, Zurdo Dash, to the [facility]. Please see a copy of my visit note below.    [facility] Dermatology Note      Dermatology Problem List:  1. Actinic keratosis, s/p cryo   - left lower eyelid s/p biopsy 8/16/19    Encounter Date: Oct 5, 2020    CC:  Chief Complaint   Patient presents with     RECHECK     left lower eyelid     History of Present Illness:  Mr. Zurdo Dash is a 75 year old male who presents today for follow up of left lower eyelid AK and skin cancer screening. He denies any recurrence of symptoms on his left lower eyelid. No bleeding, scale. Denies problems with gritty eyes.     He's notices a scaly papule on his left cheek that will bleed after shaving. It has been present for a couple months.     Denies any other painful, bleeding, growing, or non-resolving lesions. Otherwise in normal state of health. No other skin concerns today.       Past Medical History:   Patient Active Problem List   Diagnosis     Pain in limb     Hyperlipidemia LDL goal <100     Hypertension goal BP (blood pressure) < 140/90     Hypogonadism     Advanced directives, counseling/discussion     Health Care Home     Type 2 diabetes mellitus with diabetic polyneuropathy, without long-term current use of insulin (H)     RBBB (right bundle branch block)     Ex-smoker     Family history of esophageal cancer     Gastroesophageal reflux disease, esophagitis presence not specified     Rheumatoid arthritis involving multiple sites with positive rheumatoid factor (H)     Pulmonary nodule     High risk medication use     Spondylosis of cervical region without myelopathy or radiculopathy     Erectile dysfunction, unspecified erectile dysfunction type     Type 2 diabetes mellitus without retinopathy (H)     Tubulovillous adenoma of colon     Diabetic  polyneuropathy associated with type 2 diabetes mellitus (H)     Chronic bilateral low back pain without sciatica     Migraine equivalent     Posterior vitreous detachment of left eye     Pseudophakia, ou     Eyelid lesion, LLL     Dermatochalasis of both upper eyelids     Bradycardia     Primary osteoarthritis of both knees     Syncope     Trigger finger, acquired     CKD (chronic kidney disease) stage 3, GFR 30-59 ml/min     Abdominal pain, generalized     Past Medical History:   Diagnosis Date     Abnormal CT scan 03/2004    calcified lung granuloma     C. difficile diarrhea     H/O     Cataract 11/18/2011     Diabetic neuropathy (H)     mild, mostly soles and distal forefeet, worse on the left side.     Diverticulitis      ED (erectile dysfunction)      Ex-smoker     QUIT SMOKING FEB 2007     History of ETOH abuse     recovering, sober since 1997     Hyperlipidemia LDL goal <100      Hypertension goal BP (blood pressure) < 140/90      Hypogonadism      Obesity      PAD (peripheral artery disease) (H)     leg cramps, with exertion, no formal diagnosis of PAD and minimal if any symptoms at all.     RA (rheumatoid arthritis) (H)     Dr Bailon     Type 2 diabetes, HbA1c goal < 7% (H) 10/2008    A1C of 7.1 %      Past Surgical History:   Procedure Laterality Date     CATARACT IOL, RT/LT       COLONOSCOPY  03/01/2018    [facility]     COMBINED REPAIR PTOSIS WITH BLEPHAROPLASTY BILATERAL Bilateral 8/16/2019    Procedure: BILATERAL UPPER EYELID BLEPHAROPLASTY AND BILATERAL PTOSIS REPAIR;  Surgeon: Janet Garcia MD;  Location: [facility] OR     EXCISE LESION EYELID Left 8/16/2019    Procedure: LEFT LOWER EYELID BIOPSY;  Surgeon: Janet Garcia MD;  Location: [facility] OR     HC INCISION TENDON SHEATH FINGER  4/2009    r hand ring finger     PHACOEMULSIFICATION WITH STANDARD INTRAOCULAR LENS IMPLANT  02/2019; 3/2019    left eye; right eye     TONSILLECTOMY         Social History:  Patient reports that he quit smoking about 13 years  ago. His smoking use included cigarettes. He has a 40.00 pack-year smoking history. He has never used smokeless tobacco. He reports that he does not drink alcohol or use drugs.    Family History:  Family History   Problem Relation Age of Onset     Cerebrovascular Disease Mother      Arthritis Mother      Osteoporosis Mother      Alzheimer Disease Father      Arthritis Father      Cancer Father      Diabetes Maternal Grandmother      Cardiovascular Maternal Grandmother      Other Cancer Brother      Cancer Paternal Aunt      Hypertension No family hx of      Thyroid Disease No family hx of      Glaucoma No family hx of      Macular Degeneration No family hx of    No family history of skin cancer.     Medications:  Current Outpatient Medications   Medication Sig Dispense Refill     albuterol (PROAIR HFA/PROVENTIL HFA/VENTOLIN HFA) 108 (90 BASE) MCG/ACT Inhaler Inhale 2 puffs into the lungs every 4 hours as needed for other (coughing) 1 Inhaler 1     aspirin 81 MG tablet Take 1 tablet by mouth daily.  3     benzonatate (TESSALON) 100 MG capsule Take 1 capsule (100 mg) by mouth 3 times daily as needed for cough 30 capsule 0     blood glucose (ACCU-CHEK DAYANARA PLUS) test strip TEST THREE TIMES A DAY OR AS DIRECTED 300 each 1     blood glucose (NO BRAND SPECIFIED) lancing device Use to test blood sugars 3 times daily or as directed. 1 each 0     blood glucose monitoring (ONE TOUCH DELICA) lancets Use to test blood sugars 3 times daily or as directed. 300 each 3     cetirizine (ZYRTEC) 10 MG tablet Take 10 mg by mouth At Bedtime       clobetasol (TEMOVATE) 0.05 % external ointment Apply topically 2 times daily 30 g 0     diclofenac (VOLTAREN) 1 % topical gel Apply up to 4 grams of 1% gel to knees up to 4 times daily as needed for joint pain (maximum: 8 g per lower extremity per day) 400 g 3     dulaglutide (TRULICITY) 1.5 MG/0.5ML pen Inject 1.5 mg Subcutaneous every 7 days 9 mL 3     folic acid (FOLVITE) 1 MG tablet Take 1  tablet (1 mg) by mouth daily 100 tablet 2     HYDROcodone-acetaminophen (NORCO) 5-325 MG tablet Take 1-2 tablets by mouth every 4 hours as needed for moderate to severe pain maximum 4 tablet(s) per day 28 tablet 0     hydroxychloroquine (PLAQUENIL) 200 MG tablet Take 1 tablet (200 mg) by mouth daily 90 tablet 1     lisinopril (ZESTRIL) 20 MG tablet Take 1 tablet (20 mg) by mouth daily 90 tablet 1     metFORMIN (GLUCOPHAGE) 500 MG tablet Take 1 tablet (500 mg) by mouth 2 times daily (with meals) 180 tablet 0     methotrexate sodium 2.5 MG TABS Take 7 tablets (17.5 mg) by mouth once a week . Take all 7 tablets on the same day of each week. 84 tablet 1     metoprolol succinate ER (TOPROL-XL) 25 MG 24 hr tablet TAKE TWO TABLETS BY MOUTH ONCE DAILY 180 tablet 2     omeprazole (PRILOSEC) 40 MG DR capsule TAKE ONE CAPSULE BY MOUTH ONCE DAILY 90 capsule 1     order for DME Equipment being ordered: specialized shoes for diabetic neuropathy as well as all supplies provided for annual treatment of diabetes neuropathy 1 Device 0     order for DME Equipment being ordered: glucometer 1 Device 0     order for DME Equipment being ordered: specialized shoes for diabetic neuropathy     His orthotics will be made by [email or web address]      Tim Jernigan, Certified   [email or web address]  Phone: [phone]  Fax: [phone]  [address] [place] [address] 1 Device 0     pravastatin (PRAVACHOL) 40 MG tablet Take 0.5 tablets (20 mg) by mouth every other day 23 tablet 1     predniSONE (DELTASONE) 5 MG tablet For RA flare only: Prednisone 20mg daily x5days, then 15mg daily x5days, then 10mg daily x5days, then 5mg daily x5days, then stop. 50 tablet 0     SENNA-docusate sodium (SENNA S) 8.6-50 MG tablet Take 1 tablet by mouth At Bedtime 30 tablet 1     tiZANidine (ZANAFLEX) 2 MG tablet Take 1 tablet (2 mg) by mouth 3 times daily as needed for muscle spasms 90 tablet 1     hydrochlorothiazide (MICROZIDE) 12.5 MG  capsule Take 1 capsule (12.5 mg) by mouth daily 30 capsule 2     nitroGLYcerin (NITRO-BID) 2 % OINT ointment Place 0.5 inches (7.5 mg) onto the skin every 6 hours as needed (finger vasculitis, 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) With a 12 hour free period prn every 4-6 hours 15 mg 2       No Known Allergies    Review of Systems:  -Skin Establ Pt: The patient denies any new rash, pruritus, or lesions that are symptomatic, changing or bleeding, except as per HPI.  -Constitutional: The patient is feeling generally well.    Physical exam:  Vitals: There were no vitals taken for this visit.  GEN: This is a well developed, well-nourished male in no acute distress, in a pleasant mood.    SKIN: Waist Up skin exam was performed. The exam included the head/face, neck, both arms, chest, back, abdomen, digits and/or nails.   - eroded pink papule on the left cheek.   - left lower eyelid with pink scar, no erosion or scale.   - Waxy stuck on tan to brown papule on the right cheek, trunk and extremities.   - No other lesions of concern on areas examined.     Impression/Plan:  1.  Neoplasm of undetermined behavior, left  Cheek.   DDx: eroded HAK, SCCIS , BCC  Shave biopsy:  After discussion of benefits and risks including but not limited to bleeding/bruising, pain/swelling, infection, scar, incomplete removal, nerve damage/numbness, recurrence, and non-diagnostic biopsy, written consent, verbal consent and photographs were obtained. Time-out was performed. The area was cleaned with isopropyl alcohol. 0.5mL of 1% lidocaine with epinephrine was injected to obtain adequate anesthesia of the lesion. A shave biopsy was performed. Hemostasis was achieved with aluminium chloride. Vaseline and a sterile dressing were applied. The patient tolerated the procedure and no complications were noted. The patient was provided with verbal and written post care instructions.     2. Actinic Keratosis, left lower eyelid.   Appears resolved.      3.   Seborrheic keratosis   - Patient reassured of the benign nature of these lesions.  - No further intervention required. Patient to report changes.    Provider Disclosure:   The documentation recorded by the scribe accurately reflects the services I personally performed and the decisions made by me.  I personally performed the procedures today.    Jr [place] DO    Department of Dermatology  [facility]: Phone: [phone], Fax:[phone]  [facility] [place]: Phone: [phone], Fax: [phone]      Again, thank you for allowing me to participate in the care of your patient.        Sincerely,        Jr [place] MD

## 2020-10-05 NOTE — NURSING NOTE
The following medication was given:     MEDICATION:  Lidocaine with epinephrine 1% 1:[id number]  ROUTE: SQ  SITE: see procedure note  DOSE: 1cc  LOT #: -EV  : Desmond  EXPIRATION DATE: 1-2-2021  NDC#: 2449-1178-60   Was there drug waste? 1cc  Multi-dose vial: Yes    Keysha Valentino LPN  October 5, 2020

## 2020-10-07 LAB — COPATH REPORT: NORMAL

## 2020-10-09 ENCOUNTER — TELEPHONE (OUTPATIENT)
Dept: DERMATOLOGY | Facility: CLINIC | Age: 75
End: 2020-10-09

## 2020-10-09 NOTE — TELEPHONE ENCOUNTER
Doris Barajas RN   10/9/2020 10:41 AM      Pt returned the clinic's call and notified of results. Pt verbalized understanding and had no further questions at this time...Narcisa Lee RN, LPN   10/9/2020  9:21 AM      Attempted to call patient, patient was out walking his dog and will call clinic back when he gets back. Number was provided.     HILARY Moore Adam R, MD   10/8/2020  5:34 PM      Please call the patient with pathology results.      The biopsy on his left cheek showed a thick precancer. Sometimes a biopsy will treat these spots, but if anything seems to be growing back or recurring (rough scaly pink skin or bleeding) he should call to have it treated (likely liquid nitrogen).   As long as the wound is healing well, no additional surgery is necessary.  Thank you.

## 2020-10-12 ENCOUNTER — ALLIED HEALTH/NURSE VISIT (OUTPATIENT)
Dept: FAMILY MEDICINE | Facility: CLINIC | Age: 75
End: 2020-10-12

## 2020-10-12 ENCOUNTER — ALLIED HEALTH/NURSE VISIT (OUTPATIENT)
Dept: NURSING | Facility: CLINIC | Age: 75
End: 2020-10-12
Payer: COMMERCIAL

## 2020-10-12 VITALS — SYSTOLIC BLOOD PRESSURE: 158 MMHG | DIASTOLIC BLOOD PRESSURE: 86 MMHG

## 2020-10-12 VITALS — SYSTOLIC BLOOD PRESSURE: 144 MMHG | DIASTOLIC BLOOD PRESSURE: 72 MMHG

## 2020-10-12 DIAGNOSIS — Z01.30 BP CHECK: Primary | ICD-10-CM

## 2020-10-12 DIAGNOSIS — I10 HYPERTENSION GOAL BP (BLOOD PRESSURE) < 140/90: Primary | ICD-10-CM

## 2020-10-12 PROCEDURE — 99207 PR NO CHARGE NURSE ONLY: CPT | Performed by: INTERNAL MEDICINE

## 2020-10-12 NOTE — PROGRESS NOTES
Zurdo Dash was evaluated at [facility] on October 12, 2020 at which time his blood pressure was:    BP Readings from Last 3 Encounters:   10/12/20 (!) 158/86   10/12/20 (!) 144/72   09/15/20 (!) 189/92     Pulse Readings from Last 3 Encounters:   09/15/20 52   08/26/20 68   07/08/20 56       Reviewed lifestyle modifications for blood pressure control and reduction: including making healthy food choices, managing weight, getting regular exercise, smoking cessation, reducing alcohol consumption, monitoring blood pressure regularly.     Symptoms: None    BP Goal:< 140/90 mmHg    BP Assessment:  BP too high    Potential Reasons for BP too high: Dose of BP medication too low    BP Follow-Up Plan: Referral to PCP    Recommendation to Provider: I believe patient needs additional increase on lisinopril.     Note completed by: Thank you,  Sabrina Hicks, PharmD  [facility]  [phone]

## 2020-10-12 NOTE — PROGRESS NOTES
Zurdo Dash is a 75 year old patient who comes in today for a Blood Pressure check.  Initial BP:  There were no vitals taken for this visit.     Data Unavailable  Disposition: results routed to provider

## 2020-10-12 NOTE — PROGRESS NOTES
These blood pressure reading are too high    His current blood pressure medication is inadequate    1. Start MICROZIDE  (hydrochlorothiazide, USP 12.5 mg) send in 30 pills with 2 refills  2. Schedule patient for a blood pressure reading in 2-3 weeks    Kapil Duckworth MD

## 2020-10-13 ENCOUNTER — TELEPHONE (OUTPATIENT)
Dept: INTERNAL MEDICINE | Facility: CLINIC | Age: 75
End: 2020-10-13

## 2020-10-13 DIAGNOSIS — I10 HYPERTENSION GOAL BP (BLOOD PRESSURE) < 140/90: Primary | ICD-10-CM

## 2020-10-13 RX ORDER — HYDROCHLOROTHIAZIDE 12.5 MG/1
12.5 CAPSULE ORAL DAILY
Qty: 30 CAPSULE | Refills: 2 | Status: SHIPPED | OUTPATIENT
Start: 2020-10-13 | End: 2021-02-17

## 2020-10-13 NOTE — TELEPHONE ENCOUNTER
These blood pressure reading are too high     His current blood pressure medication is inadequate     1. Start MICROZIDE  (hydrochlorothiazide, USP 12.5 mg) send in 30 pills with 2 refills  2. Schedule patient for a blood pressure reading in 2-3 weeks     Kapil Duckworth MD      BP Readings from Last 3 Encounters:   10/12/20 (!) 158/86   10/12/20 (!) 144/72   09/15/20 (!) 189/92

## 2020-10-13 NOTE — TELEPHONE ENCOUNTER
Patient notified of providers message as written.  Patient verbalized understanding, no further questions or concerns.  Prescription sent to pharmacy.   Carline More RN

## 2020-10-15 ENCOUNTER — TELEPHONE (OUTPATIENT)
Dept: FAMILY MEDICINE | Facility: CLINIC | Age: 75
End: 2020-10-15

## 2020-10-15 ENCOUNTER — OFFICE VISIT (OUTPATIENT)
Dept: PODIATRY | Facility: CLINIC | Age: 75
End: 2020-10-15
Payer: COMMERCIAL

## 2020-10-15 ENCOUNTER — TELEPHONE (OUTPATIENT)
Dept: OTHER | Facility: CLINIC | Age: 75
End: 2020-10-15

## 2020-10-15 VITALS
DIASTOLIC BLOOD PRESSURE: 84 MMHG | SYSTOLIC BLOOD PRESSURE: 138 MMHG | OXYGEN SATURATION: 97 % | WEIGHT: 204 LBS | BODY MASS INDEX: 30.13 KG/M2 | HEART RATE: 56 BPM | RESPIRATION RATE: 16 BRPM

## 2020-10-15 DIAGNOSIS — I73.9 PAD (PERIPHERAL ARTERY DISEASE) (H): Primary | ICD-10-CM

## 2020-10-15 DIAGNOSIS — B35.1 DERMATOPHYTOSIS OF NAIL: ICD-10-CM

## 2020-10-15 DIAGNOSIS — L97.529 ULCER OF LEFT FOOT, UNSPECIFIED ULCER STAGE (H): ICD-10-CM

## 2020-10-15 DIAGNOSIS — E11.51 TYPE II DIABETES MELLITUS WITH PERIPHERAL ARTERY DISEASE (H): ICD-10-CM

## 2020-10-15 DIAGNOSIS — R09.89 DECREASED PULSES IN FEET: ICD-10-CM

## 2020-10-15 DIAGNOSIS — L84 CORNS AND CALLOSITIES: ICD-10-CM

## 2020-10-15 DIAGNOSIS — L97.509 TOE ULCER (H): ICD-10-CM

## 2020-10-15 PROCEDURE — 11055 PARING/CUTG B9 HYPRKER LES 1: CPT | Mod: Q8 | Performed by: PODIATRIST

## 2020-10-15 PROCEDURE — 11720 DEBRIDE NAIL 1-5: CPT | Mod: 51 | Performed by: PODIATRIST

## 2020-10-15 PROCEDURE — 99214 OFFICE O/P EST MOD 30 MIN: CPT | Mod: 25 | Performed by: PODIATRIST

## 2020-10-15 ASSESSMENT — PAIN SCALES - GENERAL: PAINLEVEL: MILD PAIN (3)

## 2020-10-15 NOTE — TELEPHONE ENCOUNTER
"Pt referred to [facility] by Aaron Dent DPM for PAD, Toe ulcer due to DM.   \"Difficult to palpate tibial pulses.  Dorsalis pedis weakly palpable.\"    Pt is already established with Dr. Lopes and just had OV on 9/15/20.   \"He does not need to follow up with Vascular Surgery, but is welcome back if any other questions or concerns arise, or if he develops similar symptoms in the future\"    1/28/20 ALBERT showed:  \"Right leg: Resting ALBERT is noncompressible, however the DP/brachial  index measures 1.12.     Left leg: Resting ALBERT is noncompressible, however the DP brachial  index measures 1.16.\"    Will discuss with Dr. Lopes if wants LE arterial duplex' prior.     MANJU PelaezN, RN  [facility]    "

## 2020-10-15 NOTE — LETTER
10/15/2020         RE: Zurdo Dash  [address] [place] [address]  [place] [address]        Dear Colleague,    Thank you for referring your patient, Zurdo Dash, to the [facility]. Please see a copy of my visit note below.    Subjective:    Patient seen today for diabetic foot exam.    He has a history of a wound on his left third toe 2018.   This healed with offloading and wound care.  The patient states it has bothered him off and on ever since.  It is gotten worse over the last several weeks.  Pain aggravated by activity and relieved by rest.    He denies any drainage purulence odor or increased edema on his feet.  The patient has peripheral neuropathy.  He has chronic kidney disease stage III.  Patient has rheumatoid arthritis and takes Plaquenil.  He is retired.  40-pack-year history of smoking and quit 13 years ago.  Primary care is Elli Arciniega last seen 7/8/2020.      ROS: See above       No Known Allergies    Current Outpatient Medications   Medication Sig Dispense Refill     albuterol (PROAIR HFA/PROVENTIL HFA/VENTOLIN HFA) 108 (90 BASE) MCG/ACT Inhaler Inhale 2 puffs into the lungs every 4 hours as needed for other (coughing) 1 Inhaler 1     aspirin 81 MG tablet Take 1 tablet by mouth daily.  3     benzonatate (TESSALON) 100 MG capsule Take 1 capsule (100 mg) by mouth 3 times daily as needed for cough 30 capsule 0     blood glucose (ACCU-CHEK DAYANARA PLUS) test strip TEST THREE TIMES A DAY OR AS DIRECTED 300 each 1     blood glucose (NO BRAND SPECIFIED) lancing device Use to test blood sugars 3 times daily or as directed. 1 each 0     blood glucose monitoring (ONE TOUCH DELICA) lancets Use to test blood sugars 3 times daily or as directed. 300 each 3     cetirizine (ZYRTEC) 10 MG tablet Take 10 mg by mouth At Bedtime       clobetasol (TEMOVATE) 0.05 % external ointment Apply topically 2 times daily 30 g 0     diclofenac (VOLTAREN) 1 % topical gel Apply up to 4 grams of 1% gel to  knees up to 4 times daily as needed for joint pain (maximum: 8 g per lower extremity per day) 400 g 3     dulaglutide (TRULICITY) 1.5 MG/0.5ML pen Inject 1.5 mg Subcutaneous every 7 days 9 mL 3     folic acid (FOLVITE) 1 MG tablet Take 1 tablet (1 mg) by mouth daily 100 tablet 2     hydrochlorothiazide (MICROZIDE) 12.5 MG capsule Take 1 capsule (12.5 mg) by mouth daily 30 capsule 2     HYDROcodone-acetaminophen (NORCO) 5-325 MG tablet Take 1-2 tablets by mouth every 4 hours as needed for moderate to severe pain maximum 4 tablet(s) per day 28 tablet 0     hydroxychloroquine (PLAQUENIL) 200 MG tablet Take 1 tablet (200 mg) by mouth daily 90 tablet 1     lisinopril (ZESTRIL) 20 MG tablet Take 1 tablet (20 mg) by mouth daily 90 tablet 1     metFORMIN (GLUCOPHAGE) 500 MG tablet Take 1 tablet (500 mg) by mouth 2 times daily (with meals) 180 tablet 0     methotrexate sodium 2.5 MG TABS Take 7 tablets (17.5 mg) by mouth once a week . Take all 7 tablets on the same day of each week. 84 tablet 1     metoprolol succinate ER (TOPROL-XL) 25 MG 24 hr tablet TAKE TWO TABLETS BY MOUTH ONCE DAILY 180 tablet 2     omeprazole (PRILOSEC) 40 MG DR capsule TAKE ONE CAPSULE BY MOUTH ONCE DAILY 90 capsule 1     order for DME Equipment being ordered: specialized shoes for diabetic neuropathy as well as all supplies provided for annual treatment of diabetes neuropathy 1 Device 0     order for DME Equipment being ordered: glucometer 1 Device 0     order for DME Equipment being ordered: specialized shoes for diabetic neuropathy     His orthotics will be made by [facility]      Tim Jernigan, Certified   [email or web address]  Phone: [phone]  Fax: [phone]  [address] [place] [address] 1 Device 0     pravastatin (PRAVACHOL) 40 MG tablet Take 0.5 tablets (20 mg) by mouth every other day 23 tablet 1     predniSONE (DELTASONE) 5 MG tablet For RA flare only: Prednisone 20mg daily x5days, then 15mg daily  x5days, then 10mg daily x5days, then 5mg daily x5days, then stop. 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) With a 12 hour free period prn every 4-6 hours 15 mg 2       Patient Active Problem List   Diagnosis     Pain in limb     Hyperlipidemia LDL goal <100     Hypertension goal BP (blood pressure) < 140/90     Hypogonadism     Advanced directives, counseling/discussion     Health Care Home     Type 2 diabetes mellitus with diabetic polyneuropathy, without long-term current use of insulin (H)     RBBB (right bundle branch block)     Ex-smoker     Family history of esophageal cancer     Gastroesophageal reflux disease, esophagitis presence not specified     Rheumatoid arthritis involving multiple sites with positive rheumatoid factor (H)     Pulmonary nodule     High risk medication use     Spondylosis of cervical region without myelopathy or radiculopathy     Erectile dysfunction, unspecified erectile dysfunction type     Type 2 diabetes mellitus without retinopathy (H)     Tubulovillous adenoma of colon     Diabetic polyneuropathy associated with type 2 diabetes mellitus (H)     Chronic bilateral low back pain without sciatica     Migraine equivalent     Posterior vitreous detachment of left eye     Pseudophakia, ou     Eyelid lesion, LLL     Dermatochalasis of both upper eyelids     Bradycardia     Primary osteoarthritis of both knees     Syncope     Trigger finger, acquired     CKD (chronic kidney disease) stage 3, GFR 30-59 ml/min     Abdominal pain, generalized       Past Medical History:   Diagnosis Date     Abnormal CT scan 03/2004    calcified lung granuloma      C. difficile diarrhea     H/O     Cataract 11/18/2011     Diabetic neuropathy (H)     mild, mostly soles and distal forefeet, worse on the left side.     Diverticulitis      ED (erectile dysfunction)      Ex-smoker     QUIT SMOKING FEB 2007     History of ETOH abuse     recovering, sober since 1997     Hyperlipidemia LDL goal <100      Hypertension goal BP (blood pressure) < 140/90      Hypogonadism      Obesity      PAD (peripheral artery disease) (H)     leg cramps, with exertion, no formal diagnosis of PAD and minimal if any symptoms at all.     RA (rheumatoid arthritis) (H)     Dr Bailon     Type 2 diabetes, HbA1c goal < 7% (H) 10/2008    A1C of 7.1 %        Past Surgical History:   Procedure Laterality Date     CATARACT IOL, RT/LT       COLONOSCOPY  03/01/2018    [facility]     COMBINED REPAIR PTOSIS WITH BLEPHAROPLASTY BILATERAL Bilateral 8/16/2019    Procedure: BILATERAL UPPER EYELID BLEPHAROPLASTY AND BILATERAL PTOSIS REPAIR;  Surgeon: Janet Garcia MD;  Location: [facility] OR     EXCISE LESION EYELID Left 8/16/2019    Procedure: LEFT LOWER EYELID BIOPSY;  Surgeon: Janet Garcia MD;  Location: [facility] OR     HC INCISION TENDON SHEATH FINGER  4/2009    r hand ring finger     PHACOEMULSIFICATION WITH STANDARD INTRAOCULAR LENS IMPLANT  02/2019; 3/2019    left eye; right eye     TONSILLECTOMY         Family History   Problem Relation Age of Onset     Cerebrovascular Disease Mother      Arthritis Mother      Osteoporosis Mother      Alzheimer Disease Father      Arthritis Father      Cancer Father      Diabetes Maternal Grandmother      Cardiovascular Maternal Grandmother      Other Cancer Brother      Cancer Paternal Aunt      Hypertension No family hx of      Thyroid Disease No family hx of      Glaucoma No family hx of      Macular Degeneration No family hx of        Social History     Tobacco Use     Smoking status: Former Smoker     Packs/day: 1.00     Years: 40.00     Pack years: 40.00     Types: Cigarettes      Quit date: 3/16/2007     Years since quittin.5     Smokeless tobacco: Never Used   Substance Use Topics     Alcohol use: No     Alcohol/week: 0.0 standard drinks         Exam:    Vitals: /84   Pulse 56   Resp 16   Wt 92.5 kg (204 lb)   SpO2 97%   BMI 30.13 kg/m    BMI: Body mass index is 30.13 kg/m .  Height: Data Unavailable    Constitutional/ general:  Pt is in no apparent distress, appears well-nourished.  Cooperative with history and physical exam.     Psych:  The patient answered questions appropriately.  Normal affect.  Seems to have reasonable expectations, in terms of treatment.     Eyes:  Visual scanning/ tracking without deficit.     Ears:  Response to auditory stimuli is normal.  negative hearing aid devices.  Auricles in proper alignment.     Lymphatic:  Popliteal lymph nodes not enlarged.     Lungs:  Non labored breathing, non labored speech. No cough.  No audible wheezing. Even, quiet breathing.       Vascular: Lateral ankle edema and varicosities noted.  Difficult to palpate tibial pulses.  Dorsalis pedis weakly palpable.  Capillary refill time less than 3 seconds in all digits.  No hair growth noted on skin.  Exam: Bilateral lower extremity resting ankle brachial indices dated  2020 11:40 AM     Comparison study: None available     Clinical history: PAD (peripheral artery disease) (H)     Ordering provider: SHEY KAUR     Technique: Bilateral lower extremity resting ankle brachial indices  obtained.     Findings:     Right:       Arm: 142 mmHg   PT at ankle: Noncompressible   DP at foot: 160 mmHg      ALBERT: Noncompressible     Right lower extremity limited arterial duplex:     Anterior tibial artery: 23 cm/sec  Dorsalis pedis artery: 22 cm/second  Posterior tibial artery: 15 cm/sec     Monophasic waveforms with slow upstroke in the visualized arteries. No  significant diastolic flow identified.     Left:      Arm: 142 mmHg   PT at ankle: Noncompressible   DP at foot: 165  mmHg      ALBERT: Noncompressible     Left lower extremity limited arterial duplex:     Anterior tibialis artery: 34 cm/second  Dorsalis pedis artery: 15 cc/sec  Posterior tibialis artery: 15 cc/sec     Monophasic waveform the distal upstroke in the visualized left lower  symmetric arteries. No significant continuous diastolic flow.                                                                      Impression:      Right leg: Resting ALBERT is noncompressible, however the DP/brachial  index measures 1.12.     Left leg: Resting ALBERT is noncompressible, however the DP brachial  index measures 1.16.    Neuro:  Alert and oriented x 3. Coordinated gait.  Light touch sensation is intact to the L4, L5, S1 distributions. No obvious deficits.  No evidence of neurological-based weakness, spasticity, or contracture in the lower extremities.  Monofilament absent to midfoot.      Derm: Skin thin shiny atrophic with no hair growth.  Left third nail nails mycotic.  Callus end of left third toe.  Underlying is a full-thickness ulcer that measures 2 x 2 mm.  There is no sinus tracts purulence or odor.  There is just scant edema and erythema surrounding this.    Musculoskeletal:    Lower extremity muscle strength is normal.  Patient is ambulatory without an assistive device or brace.  Pronated arch with weightbearing.  All lesser toes are hammered and stiff.  Generally his feet are stiff with a decreased range of motion bilaterally.      A:  Diabetes mellitus with peripheral neuropathy and LOPS  Peripheral arterial disease  Left third toe ulcer  Callus  Onychomycosis      P: Mycotic nail manually debrided with a .  Calluses debrided with a fifteen blade.   discussed there is an ulcer under the left third toe callus.  Explained that this is not infected.  Gave him augmented crest pad to offload this.  We had him walk and it felt much better.  He will does this daily with antibiotic ointment and a Band-Aid.  Patient is wondering  about surgery for this but since it has been such a chronic problem.  We discussed an arthroplasty.  Risk complications and efficacy discussed.  He understands to still be somewhat flail afterwards but should offload this.  This would be same-day surgery under MAC anesthesia.  Patient had arterial ultrasound January 2020.  Reviewed results.  Discussed with patient would be best if he had evaluation by vascular surgery first to ensure he has good healing potential.  He would like to do this.  We wrote a referral for this.  Patient will return to see me after seeing vascular if they deem he has adequate circulation I will proceed with surgery.    Aaron Dent DPM, FACFAS              Again, thank you for allowing me to participate in the care of your patient.        Sincerely,        Aaron Dent DPM

## 2020-10-15 NOTE — TELEPHONE ENCOUNTER
Called patient to come in for blood pressure check in few weeks to determine if medication changes need to be changed.     Conchita MAHONEY CMA ([facility])

## 2020-10-15 NOTE — PROGRESS NOTES
Subjective:    Patient seen today for diabetic foot exam.    He has a history of a wound on his left third toe 2018.   This healed with offloading and wound care.  The patient states it has bothered him off and on ever since.  It is gotten worse over the last several weeks.  Pain aggravated by activity and relieved by rest.    He denies any drainage purulence odor or increased edema on his feet.  The patient has peripheral neuropathy.  He has chronic kidney disease stage III.  Patient has rheumatoid arthritis and takes Plaquenil.  He is retired.  40-pack-year history of smoking and quit 13 years ago.  Primary care is Elli Arciniega last seen 7/8/2020.      ROS: See above       No Known Allergies    Current Outpatient Medications   Medication Sig Dispense Refill     albuterol (PROAIR HFA/PROVENTIL HFA/VENTOLIN HFA) 108 (90 BASE) MCG/ACT Inhaler Inhale 2 puffs into the lungs every 4 hours as needed for other (coughing) 1 Inhaler 1     aspirin 81 MG tablet Take 1 tablet by mouth daily.  3     benzonatate (TESSALON) 100 MG capsule Take 1 capsule (100 mg) by mouth 3 times daily as needed for cough 30 capsule 0     blood glucose (ACCU-CHEK DAYANARA PLUS) test strip TEST THREE TIMES A DAY OR AS DIRECTED 300 each 1     blood glucose (NO BRAND SPECIFIED) lancing device Use to test blood sugars 3 times daily or as directed. 1 each 0     blood glucose monitoring (ONE TOUCH DELICA) lancets Use to test blood sugars 3 times daily or as directed. 300 each 3     cetirizine (ZYRTEC) 10 MG tablet Take 10 mg by mouth At Bedtime       clobetasol (TEMOVATE) 0.05 % external ointment Apply topically 2 times daily 30 g 0     diclofenac (VOLTAREN) 1 % topical gel Apply up to 4 grams of 1% gel to knees up to 4 times daily as needed for joint pain (maximum: 8 g per lower extremity per day) 400 g 3     dulaglutide (TRULICITY) 1.5 MG/0.5ML pen Inject 1.5 mg Subcutaneous every 7 days 9 mL 3     folic acid (FOLVITE) 1 MG tablet Take 1 tablet (1  mg) by mouth daily 100 tablet 2     hydrochlorothiazide (MICROZIDE) 12.5 MG capsule Take 1 capsule (12.5 mg) by mouth daily 30 capsule 2     HYDROcodone-acetaminophen (NORCO) 5-325 MG tablet Take 1-2 tablets by mouth every 4 hours as needed for moderate to severe pain maximum 4 tablet(s) per day 28 tablet 0     hydroxychloroquine (PLAQUENIL) 200 MG tablet Take 1 tablet (200 mg) by mouth daily 90 tablet 1     lisinopril (ZESTRIL) 20 MG tablet Take 1 tablet (20 mg) by mouth daily 90 tablet 1     metFORMIN (GLUCOPHAGE) 500 MG tablet Take 1 tablet (500 mg) by mouth 2 times daily (with meals) 180 tablet 0     methotrexate sodium 2.5 MG TABS Take 7 tablets (17.5 mg) by mouth once a week . Take all 7 tablets on the same day of each week. 84 tablet 1     metoprolol succinate ER (TOPROL-XL) 25 MG 24 hr tablet TAKE TWO TABLETS BY MOUTH ONCE DAILY 180 tablet 2     omeprazole (PRILOSEC) 40 MG DR capsule TAKE ONE CAPSULE BY MOUTH ONCE DAILY 90 capsule 1     order for DME Equipment being ordered: specialized shoes for diabetic neuropathy as well as all supplies provided for annual treatment of diabetes neuropathy 1 Device 0     order for DME Equipment being ordered: glucometer 1 Device 0     order for DME Equipment being ordered: specialized shoes for diabetic neuropathy     His orthotics will be made by [facility]      Tim Jernigan, Certified   [email or web address]  Phone: [phone]  Fax: [phone]  [address] [place] [address] 1 Device 0     pravastatin (PRAVACHOL) 40 MG tablet Take 0.5 tablets (20 mg) by mouth every other day 23 tablet 1     predniSONE (DELTASONE) 5 MG tablet For RA flare only: Prednisone 20mg daily x5days, then 15mg daily x5days, then 10mg daily x5days, then 5mg daily x5days, then stop. 50 tablet 0     SENNA-docusate sodium (SENNA S) 8.6-50 MG tablet Take 1 tablet by mouth At Bedtime 30 tablet 1     tiZANidine (ZANAFLEX) 2 MG tablet Take 1 tablet (2 mg) by mouth 3 times  daily as needed for muscle spasms 90 tablet 1     nitroGLYcerin (NITRO-BID) 2 % OINT ointment Place 0.5 inches (7.5 mg) onto the skin every 6 hours as needed (finger vasculitis, 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) With a 12 hour free period prn every 4-6 hours 15 mg 2       Patient Active Problem List   Diagnosis     Pain in limb     Hyperlipidemia LDL goal <100     Hypertension goal BP (blood pressure) < 140/90     Hypogonadism     Advanced directives, counseling/discussion     Health Care Home     Type 2 diabetes mellitus with diabetic polyneuropathy, without long-term current use of insulin (H)     RBBB (right bundle branch block)     Ex-smoker     Family history of esophageal cancer     Gastroesophageal reflux disease, esophagitis presence not specified     Rheumatoid arthritis involving multiple sites with positive rheumatoid factor (H)     Pulmonary nodule     High risk medication use     Spondylosis of cervical region without myelopathy or radiculopathy     Erectile dysfunction, unspecified erectile dysfunction type     Type 2 diabetes mellitus without retinopathy (H)     Tubulovillous adenoma of colon     Diabetic polyneuropathy associated with type 2 diabetes mellitus (H)     Chronic bilateral low back pain without sciatica     Migraine equivalent     Posterior vitreous detachment of left eye     Pseudophakia, ou     Eyelid lesion, LLL     Dermatochalasis of both upper eyelids     Bradycardia     Primary osteoarthritis of both knees     Syncope     Trigger finger, acquired     CKD (chronic kidney disease) stage 3, GFR 30-59 ml/min     Abdominal pain, generalized       Past Medical History:   Diagnosis Date     Abnormal CT scan 03/2004    calcified lung granuloma     C. difficile diarrhea     H/O     Cataract 11/18/2011     Diabetic neuropathy (H)     mild, mostly soles and distal forefeet, worse on the left side.     Diverticulitis      ED (erectile dysfunction)      Ex-smoker     QUIT SMOKING FEB 2007      History of ETOH abuse     recovering, sober since      Hyperlipidemia LDL goal <100      Hypertension goal BP (blood pressure) < 140/90      Hypogonadism      Obesity      PAD (peripheral artery disease) (H)     leg cramps, with exertion, no formal diagnosis of PAD and minimal if any symptoms at all.     RA (rheumatoid arthritis) (H)     Dr Bailon     Type 2 diabetes, HbA1c goal < 7% (H) 10/2008    A1C of 7.1 %        Past Surgical History:   Procedure Laterality Date     CATARACT IOL, RT/LT       COLONOSCOPY  2018    [facility]     COMBINED REPAIR PTOSIS WITH BLEPHAROPLASTY BILATERAL Bilateral 2019    Procedure: BILATERAL UPPER EYELID BLEPHAROPLASTY AND BILATERAL PTOSIS REPAIR;  Surgeon: Janet Garcia MD;  Location: [facility]     EXCISE LESION EYELID Left 2019    Procedure: LEFT LOWER EYELID BIOPSY;  Surgeon: Janet Garcia MD;  Location: [facility] OR     HC INCISION TENDON SHEATH FINGER  2009    r hand ring finger     PHACOEMULSIFICATION WITH STANDARD INTRAOCULAR LENS IMPLANT  2019; 3/2019    left eye; right eye     TONSILLECTOMY         Family History   Problem Relation Age of Onset     Cerebrovascular Disease Mother      Arthritis Mother      Osteoporosis Mother      Alzheimer Disease Father      Arthritis Father      Cancer Father      Diabetes Maternal Grandmother      Cardiovascular Maternal Grandmother      Other Cancer Brother      Cancer Paternal Aunt      Hypertension No family hx of      Thyroid Disease No family hx of      Glaucoma No family hx of      Macular Degeneration No family hx of        Social History     Tobacco Use     Smoking status: Former Smoker     Packs/day: 1.00     Years: 40.00     Pack years: 40.00     Types: Cigarettes     Quit date: 3/16/2007     Years since quittin.5     Smokeless tobacco: Never Used   Substance Use Topics     Alcohol use: No     Alcohol/week: 0.0 standard drinks         Exam:    Vitals: /84   Pulse 56   Resp 16   Wt 92.5 kg (204  lb)   SpO2 97%   BMI 30.13 kg/m    BMI: Body mass index is 30.13 kg/m .  Height: Data Unavailable    Constitutional/ general:  Pt is in no apparent distress, appears well-nourished.  Cooperative with history and physical exam.     Psych:  The patient answered questions appropriately.  Normal affect.  Seems to have reasonable expectations, in terms of treatment.     Eyes:  Visual scanning/ tracking without deficit.     Ears:  Response to auditory stimuli is normal.  negative hearing aid devices.  Auricles in proper alignment.     Lymphatic:  Popliteal lymph nodes not enlarged.     Lungs:  Non labored breathing, non labored speech. No cough.  No audible wheezing. Even, quiet breathing.       Vascular: Lateral ankle edema and varicosities noted.  Difficult to palpate tibial pulses.  Dorsalis pedis weakly palpable.  Capillary refill time less than 3 seconds in all digits.  No hair growth noted on skin.  Exam: Bilateral lower extremity resting ankle brachial indices dated  1/28/2020 11:40 AM     Comparison study: None available     Clinical history: PAD (peripheral artery disease) (H)     Ordering provider: SHEY KAUR     Technique: Bilateral lower extremity resting ankle brachial indices  obtained.     Findings:     Right:       Arm: 142 mmHg   PT at ankle: Noncompressible   DP at foot: 160 mmHg      ALBERT: Noncompressible     Right lower extremity limited arterial duplex:     Anterior tibial artery: 23 cm/sec  Dorsalis pedis artery: 22 cm/second  Posterior tibial artery: 15 cm/sec     Monophasic waveforms with slow upstroke in the visualized arteries. No  significant diastolic flow identified.     Left:      Arm: 142 mmHg   PT at ankle: Noncompressible   DP at foot: 165 mmHg      ALBERT: Noncompressible     Left lower extremity limited arterial duplex:     Anterior tibialis artery: 34 cm/second  Dorsalis pedis artery: 15 cc/sec  Posterior tibialis artery: 15 cc/sec     Monophasic waveform the distal upstroke in the  visualized left lower  symmetric arteries. No significant continuous diastolic flow.                                                                      Impression:      Right leg: Resting ALBERT is noncompressible, however the DP/brachial  index measures 1.12.     Left leg: Resting ALBERT is noncompressible, however the DP brachial  index measures 1.16.    Neuro:  Alert and oriented x 3. Coordinated gait.  Light touch sensation is intact to the L4, L5, S1 distributions. No obvious deficits.  No evidence of neurological-based weakness, spasticity, or contracture in the lower extremities.  Monofilament absent to midfoot.      Derm: Skin thin shiny atrophic with no hair growth.  Left third nail nails mycotic.  Callus end of left third toe.  Underlying is a full-thickness ulcer that measures 2 x 2 mm.  There is no sinus tracts purulence or odor.  There is just scant edema and erythema surrounding this.    Musculoskeletal:    Lower extremity muscle strength is normal.  Patient is ambulatory without an assistive device or brace.  Pronated arch with weightbearing.  All lesser toes are hammered and stiff.  Generally his feet are stiff with a decreased range of motion bilaterally.      A:  Diabetes mellitus with peripheral neuropathy and LOPS  Peripheral arterial disease  Left third toe ulcer  Callus  Onychomycosis      P: Mycotic nail manually debrided with a .  Calluses debrided with a fifteen blade.   discussed there is an ulcer under the left third toe callus.  Explained that this is not infected.  Gave him augmented crest pad to offload this.  We had him walk and it felt much better.  He will does this daily with antibiotic ointment and a Band-Aid.  Patient is wondering about surgery for this but since it has been such a chronic problem.  We discussed an arthroplasty.  Risk complications and efficacy discussed.  He understands to still be somewhat flail afterwards but should offload this.  This would be same-day  surgery under MAC anesthesia.  Patient had arterial ultrasound January 2020.  Reviewed results.  Discussed with patient would be best if he had evaluation by vascular surgery first to ensure he has good healing potential.  He would like to do this.  We wrote a referral for this.  Patient will return to see me after seeing vascular if they deem he has adequate circulation I will proceed with surgery.    Aaron Dent DPM, FACFAS

## 2020-10-19 NOTE — TELEPHONE ENCOUNTER
Patient is requesting call from vascular team regarding an appointment.  Mia RAMSEY CMA ([facility])

## 2020-10-19 NOTE — TELEPHONE ENCOUNTER
Spoke to patient and and made appointment for 11/4/2020.  Mia RAMSEY CMA ([facility])

## 2020-10-20 NOTE — TELEPHONE ENCOUNTER
Zurdo is scheduled as follows:     10/22/2020  Sharon  11/17/2020 Dr. Marianne Renner is having dental surgery and these are the best days/times that work with those appointments.     Lashay FRANCIS

## 2020-10-20 NOTE — TELEPHONE ENCOUNTER
Per Dr. Lopes,   Recommending repeat ABIs and a bilateral leg arterial duplex along with in person OV.    Routing to  to coordinate ALBERT's and bilateral lower extremity arterial duplex and in person OV as soon as possible.      CARLOS Pelaez, RN    [facility]  Office:  [phone] Fax: [phone]

## 2020-10-22 ENCOUNTER — HOSPITAL ENCOUNTER (OUTPATIENT)
Dept: ULTRASOUND IMAGING | Facility: CLINIC | Age: 75
End: 2020-10-22
Attending: SURGERY
Payer: COMMERCIAL

## 2020-10-22 DIAGNOSIS — R09.89 DECREASED PULSES IN FEET: ICD-10-CM

## 2020-10-22 DIAGNOSIS — I73.9 PAD (PERIPHERAL ARTERY DISEASE) (H): ICD-10-CM

## 2020-10-22 DIAGNOSIS — L97.509 TOE ULCER (H): ICD-10-CM

## 2020-10-22 PROCEDURE — 93922 UPR/L XTREMITY ART 2 LEVELS: CPT | Mod: 26 | Performed by: INTERNAL MEDICINE

## 2020-10-22 PROCEDURE — 93922 UPR/L XTREMITY ART 2 LEVELS: CPT

## 2020-10-22 PROCEDURE — 93925 LOWER EXTREMITY STUDY: CPT | Mod: 26 | Performed by: INTERNAL MEDICINE

## 2020-10-22 PROCEDURE — 93925 LOWER EXTREMITY STUDY: CPT

## 2020-10-28 NOTE — PROGRESS NOTES
[facility] Dermatology Note      Dermatology Problem List:  1. Actinic keratosis, s/p cryo   - left lower eyelid s/p biopsy 8/16/19    Encounter Date: Oct 5, 2020    CC:  Chief Complaint   Patient presents with     RECHECK     left lower eyelid     History of Present Illness:  Mr. Zurdo Dash is a 75 year old male who presents today for follow up of left lower eyelid AK and skin cancer screening. He denies any recurrence of symptoms on his left lower eyelid. No bleeding, scale. Denies problems with gritty eyes.     He's notices a scaly papule on his left cheek that will bleed after shaving. It has been present for a couple months.     Denies any other painful, bleeding, growing, or non-resolving lesions. Otherwise in normal state of health. No other skin concerns today.       Past Medical History:   Patient Active Problem List   Diagnosis     Pain in limb     Hyperlipidemia LDL goal <100     Hypertension goal BP (blood pressure) < 140/90     Hypogonadism     Advanced directives, counseling/discussion     Health Care Home     Type 2 diabetes mellitus with diabetic polyneuropathy, without long-term current use of insulin (H)     RBBB (right bundle branch block)     Ex-smoker     Family history of esophageal cancer     Gastroesophageal reflux disease, esophagitis presence not specified     Rheumatoid arthritis involving multiple sites with positive rheumatoid factor (H)     Pulmonary nodule     High risk medication use     Spondylosis of cervical region without myelopathy or radiculopathy     Erectile dysfunction, unspecified erectile dysfunction type     Type 2 diabetes mellitus without retinopathy (H)     Tubulovillous adenoma of colon     Diabetic polyneuropathy associated with type 2 diabetes mellitus (H)     Chronic bilateral low back pain without sciatica     Migraine equivalent     Posterior vitreous detachment of left eye     Pseudophakia, ou     Eyelid lesion, LLL     Dermatochalasis of  both upper eyelids     Bradycardia     Primary osteoarthritis of both knees     Syncope     Trigger finger, acquired     CKD (chronic kidney disease) stage 3, GFR 30-59 ml/min     Abdominal pain, generalized     Past Medical History:   Diagnosis Date     Abnormal CT scan 03/2004    calcified lung granuloma     C. difficile diarrhea     H/O     Cataract 11/18/2011     Diabetic neuropathy (H)     mild, mostly soles and distal forefeet, worse on the left side.     Diverticulitis      ED (erectile dysfunction)      Ex-smoker     QUIT SMOKING FEB 2007     History of ETOH abuse     recovering, sober since 1997     Hyperlipidemia LDL goal <100      Hypertension goal BP (blood pressure) < 140/90      Hypogonadism      Obesity      PAD (peripheral artery disease) (H)     leg cramps, with exertion, no formal diagnosis of PAD and minimal if any symptoms at all.     RA (rheumatoid arthritis) (H)     Dr Bailon     Type 2 diabetes, HbA1c goal < 7% (H) 10/2008    A1C of 7.1 %      Past Surgical History:   Procedure Laterality Date     CATARACT IOL, RT/LT       COLONOSCOPY  03/01/2018    [facility]     COMBINED REPAIR PTOSIS WITH BLEPHAROPLASTY BILATERAL Bilateral 8/16/2019    Procedure: BILATERAL UPPER EYELID BLEPHAROPLASTY AND BILATERAL PTOSIS REPAIR;  Surgeon: Janet Garcia MD;  Location: [facility]     EXCISE LESION EYELID Left 8/16/2019    Procedure: LEFT LOWER EYELID BIOPSY;  Surgeon: Janet Garcia MD;  Location: [facility] OR     HC INCISION TENDON SHEATH FINGER  4/2009    r hand ring finger     PHACOEMULSIFICATION WITH STANDARD INTRAOCULAR LENS IMPLANT  02/2019; 3/2019    left eye; right eye     TONSILLECTOMY         Social History:  Patient reports that he quit smoking about 13 years ago. His smoking use included cigarettes. He has a 40.00 pack-year smoking history. He has never used smokeless tobacco. He reports that he does not drink alcohol or use drugs.    Family History:  Family History   Problem Relation Age of Onset      Cerebrovascular Disease Mother      Arthritis Mother      Osteoporosis Mother      Alzheimer Disease Father      Arthritis Father      Cancer Father      Diabetes Maternal Grandmother      Cardiovascular Maternal Grandmother      Other Cancer Brother      Cancer Paternal Aunt      Hypertension No family hx of      Thyroid Disease No family hx of      Glaucoma No family hx of      Macular Degeneration No family hx of    No family history of skin cancer.     Medications:  Current Outpatient Medications   Medication Sig Dispense Refill     albuterol (PROAIR HFA/PROVENTIL HFA/VENTOLIN HFA) 108 (90 BASE) MCG/ACT Inhaler Inhale 2 puffs into the lungs every 4 hours as needed for other (coughing) 1 Inhaler 1     aspirin 81 MG tablet Take 1 tablet by mouth daily.  3     benzonatate (TESSALON) 100 MG capsule Take 1 capsule (100 mg) by mouth 3 times daily as needed for cough 30 capsule 0     blood glucose (ACCU-CHEK DAYANARA PLUS) test strip TEST THREE TIMES A DAY OR AS DIRECTED 300 each 1     blood glucose (NO BRAND SPECIFIED) lancing device Use to test blood sugars 3 times daily or as directed. 1 each 0     blood glucose monitoring (ONE TOUCH DELICA) lancets Use to test blood sugars 3 times daily or as directed. 300 each 3     cetirizine (ZYRTEC) 10 MG tablet Take 10 mg by mouth At Bedtime       clobetasol (TEMOVATE) 0.05 % external ointment Apply topically 2 times daily 30 g 0     diclofenac (VOLTAREN) 1 % topical gel Apply up to 4 grams of 1% gel to knees up to 4 times daily as needed for joint pain (maximum: 8 g per lower extremity per day) 400 g 3     dulaglutide (TRULICITY) 1.5 MG/0.5ML pen Inject 1.5 mg Subcutaneous every 7 days 9 mL 3     folic acid (FOLVITE) 1 MG tablet Take 1 tablet (1 mg) by mouth daily 100 tablet 2     HYDROcodone-acetaminophen (NORCO) 5-325 MG tablet Take 1-2 tablets by mouth every 4 hours as needed for moderate to severe pain maximum 4 tablet(s) per day 28 tablet 0     hydroxychloroquine  (PLAQUENIL) 200 MG tablet Take 1 tablet (200 mg) by mouth daily 90 tablet 1     lisinopril (ZESTRIL) 20 MG tablet Take 1 tablet (20 mg) by mouth daily 90 tablet 1     metFORMIN (GLUCOPHAGE) 500 MG tablet Take 1 tablet (500 mg) by mouth 2 times daily (with meals) 180 tablet 0     methotrexate sodium 2.5 MG TABS Take 7 tablets (17.5 mg) by mouth once a week . Take all 7 tablets on the same day of each week. 84 tablet 1     metoprolol succinate ER (TOPROL-XL) 25 MG 24 hr tablet TAKE TWO TABLETS BY MOUTH ONCE DAILY 180 tablet 2     omeprazole (PRILOSEC) 40 MG DR capsule TAKE ONE CAPSULE BY MOUTH ONCE DAILY 90 capsule 1     order for DME Equipment being ordered: specialized shoes for diabetic neuropathy as well as all supplies provided for annual treatment of diabetes neuropathy 1 Device 0     order for DME Equipment being ordered: glucometer 1 Device 0     order for DME Equipment being ordered: specialized shoes for diabetic neuropathy     His orthotics will be made by [employer]      Tim Jernigan, Certified   [email or web address]  Phone: [phone]  Fax: [phone]  [address] [place] [address] 1 Device 0     pravastatin (PRAVACHOL) 40 MG tablet Take 0.5 tablets (20 mg) by mouth every other day 23 tablet 1     predniSONE (DELTASONE) 5 MG tablet For RA flare only: Prednisone 20mg daily x5days, then 15mg daily x5days, then 10mg daily x5days, then 5mg daily x5days, then stop. 50 tablet 0     SENNA-docusate sodium (SENNA S) 8.6-50 MG tablet Take 1 tablet by mouth At Bedtime 30 tablet 1     tiZANidine (ZANAFLEX) 2 MG tablet Take 1 tablet (2 mg) by mouth 3 times daily as needed for muscle spasms 90 tablet 1     hydrochlorothiazide (MICROZIDE) 12.5 MG capsule Take 1 capsule (12.5 mg) by mouth daily 30 capsule 2     nitroGLYcerin (NITRO-BID) 2 % OINT ointment Place 0.5 inches (7.5 mg) onto the skin every 6 hours as needed (finger vasculitis, 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) With a 12 hour free  period prn every 4-6 hours 15 mg 2       No Known Allergies    Review of Systems:  -Skin Establ Pt: The patient denies any new rash, pruritus, or lesions that are symptomatic, changing or bleeding, except as per HPI.  -Constitutional: The patient is feeling generally well.    Physical exam:  Vitals: There were no vitals taken for this visit.  GEN: This is a well developed, well-nourished male in no acute distress, in a pleasant mood.    SKIN: Waist Up skin exam was performed. The exam included the head/face, neck, both arms, chest, back, abdomen, digits and/or nails.   - eroded pink papule on the left cheek.   - left lower eyelid with pink scar, no erosion or scale.   - Waxy stuck on tan to brown papule on the right cheek, trunk and extremities.   - No other lesions of concern on areas examined.     Impression/Plan:  1.  Neoplasm of undetermined behavior, left  Cheek.   DDx: eroded HAK, SCCIS , BCC  Shave biopsy:  After discussion of benefits and risks including but not limited to bleeding/bruising, pain/swelling, infection, scar, incomplete removal, nerve damage/numbness, recurrence, and non-diagnostic biopsy, written consent, verbal consent and photographs were obtained. Time-out was performed. The area was cleaned with isopropyl alcohol. 0.5mL of 1% lidocaine with epinephrine was injected to obtain adequate anesthesia of the lesion. A shave biopsy was performed. Hemostasis was achieved with aluminium chloride. Vaseline and a sterile dressing were applied. The patient tolerated the procedure and no complications were noted. The patient was provided with verbal and written post care instructions.     2. Actinic Keratosis, left lower eyelid.   Appears resolved.      3.  Seborrheic keratosis   - Patient reassured of the benign nature of these lesions.  - No further intervention required. Patient to report changes.    Provider Disclosure:   The documentation recorded by the scribe accurately reflects the services I  personally performed and the decisions made by me.  I personally performed the procedures today.    Jr Lewis DO    Department of Dermatology  [facility]: Phone: [phone], Fax:[phone]  [facility]: Phone: [phone], Fax: [phone]

## 2020-10-29 ENCOUNTER — TELEPHONE (OUTPATIENT)
Dept: FAMILY MEDICINE | Facility: CLINIC | Age: 75
End: 2020-10-29

## 2020-11-04 ENCOUNTER — ALLIED HEALTH/NURSE VISIT (OUTPATIENT)
Dept: NURSING | Facility: CLINIC | Age: 75
End: 2020-11-04
Payer: COMMERCIAL

## 2020-11-04 VITALS — DIASTOLIC BLOOD PRESSURE: 68 MMHG | SYSTOLIC BLOOD PRESSURE: 120 MMHG

## 2020-11-04 DIAGNOSIS — I10 HYPERTENSION GOAL BP (BLOOD PRESSURE) < 140/90: Primary | ICD-10-CM

## 2020-11-04 NOTE — PROGRESS NOTES
Zurdo Dash is a 75 year old patient who comes in today for a Blood Pressure check.  Initial BP:  /68        Disposition: results routed to provider  Brooke LÓPEZ CMA

## 2020-11-05 ENCOUNTER — OFFICE VISIT (OUTPATIENT)
Dept: PODIATRY | Facility: CLINIC | Age: 75
End: 2020-11-05
Payer: COMMERCIAL

## 2020-11-05 VITALS
BODY MASS INDEX: 29.83 KG/M2 | SYSTOLIC BLOOD PRESSURE: 136 MMHG | DIASTOLIC BLOOD PRESSURE: 74 MMHG | WEIGHT: 202 LBS | HEART RATE: 56 BPM

## 2020-11-05 DIAGNOSIS — E11.51 TYPE II DIABETES MELLITUS WITH PERIPHERAL ARTERY DISEASE (H): ICD-10-CM

## 2020-11-05 DIAGNOSIS — L97.529 ULCER OF LEFT FOOT, UNSPECIFIED ULCER STAGE (H): Primary | ICD-10-CM

## 2020-11-05 DIAGNOSIS — I73.9 PAD (PERIPHERAL ARTERY DISEASE) (H): ICD-10-CM

## 2020-11-05 DIAGNOSIS — M20.42 HAMMER TOE OF LEFT FOOT: ICD-10-CM

## 2020-11-05 PROCEDURE — 99214 OFFICE O/P EST MOD 30 MIN: CPT | Performed by: PODIATRIST

## 2020-11-05 NOTE — PROGRESS NOTES
Subjective:    10/15/20   Patient seen today for diabetic foot exam.    He has a history of a wound on his left third toe 2018.   This healed with offloading and wound care.  The patient states it has bothered him off and on ever since.  It is gotten worse over the last several weeks.  Pain aggravated by activity and relieved by rest.    He denies any drainage purulence odor or increased edema on his feet.  The patient has peripheral neuropathy.  He has chronic kidney disease stage III.  Patient has rheumatoid arthritis and takes Plaquenil.  He is retired.  40-pack-year history of smoking and quit 13 years ago.  Primary care is Elli Arciniega last seen 7/8/2020.    11/5/20 patient returns for evaluation of his left third toe ulcer.  He states is feeling significantly better.  There is minimal drainage now.  He denies any purulence odor fever or chills.  He has gotten arterial ultrasound but has not yet discussed results with vascular surgery.         ROS: See above       No Known Allergies    Current Outpatient Medications   Medication Sig Dispense Refill     albuterol (PROAIR HFA/PROVENTIL HFA/VENTOLIN HFA) 108 (90 BASE) MCG/ACT Inhaler Inhale 2 puffs into the lungs every 4 hours as needed for other (coughing) 1 Inhaler 1     aspirin 81 MG tablet Take 1 tablet by mouth daily.  3     benzonatate (TESSALON) 100 MG capsule Take 1 capsule (100 mg) by mouth 3 times daily as needed for cough 30 capsule 0     blood glucose (ACCU-CHEK DAYANARA PLUS) test strip TEST THREE TIMES A DAY OR AS DIRECTED 300 each 1     blood glucose (NO BRAND SPECIFIED) lancing device Use to test blood sugars 3 times daily or as directed. 1 each 0     blood glucose monitoring (ONE TOUCH DELICA) lancets Use to test blood sugars 3 times daily or as directed. 300 each 3     cetirizine (ZYRTEC) 10 MG tablet Take 10 mg by mouth At Bedtime       clobetasol (TEMOVATE) 0.05 % external ointment Apply topically 2 times daily 30 g 0     diclofenac  (VOLTAREN) 1 % topical gel Apply up to 4 grams of 1% gel to knees up to 4 times daily as needed for joint pain (maximum: 8 g per lower extremity per day) 400 g 3     dulaglutide (TRULICITY) 1.5 MG/0.5ML pen Inject 1.5 mg Subcutaneous every 7 days 9 mL 3     folic acid (FOLVITE) 1 MG tablet Take 1 tablet (1 mg) by mouth daily 100 tablet 2     hydrochlorothiazide (MICROZIDE) 12.5 MG capsule Take 1 capsule (12.5 mg) by mouth daily 30 capsule 2     HYDROcodone-acetaminophen (NORCO) 5-325 MG tablet Take 1-2 tablets by mouth every 4 hours as needed for moderate to severe pain maximum 4 tablet(s) per day 28 tablet 0     hydroxychloroquine (PLAQUENIL) 200 MG tablet Take 1 tablet (200 mg) by mouth daily 90 tablet 1     lisinopril (ZESTRIL) 20 MG tablet Take 1 tablet (20 mg) by mouth daily 90 tablet 1     metFORMIN (GLUCOPHAGE) 500 MG tablet Take 1 tablet (500 mg) by mouth 2 times daily (with meals) 180 tablet 0     methotrexate sodium 2.5 MG TABS Take 7 tablets (17.5 mg) by mouth once a week . Take all 7 tablets on the same day of each week. 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) With a 12 hour free period prn every 4-6 hours 15 mg 2     omeprazole (PRILOSEC) 40 MG DR capsule TAKE ONE CAPSULE BY MOUTH ONCE DAILY 90 capsule 1     order for DME Equipment being ordered: specialized shoes for diabetic neuropathy as well as all supplies provided for annual treatment of diabetes neuropathy 1 Device 0     order for DME Equipment being ordered: glucometer 1 Device 0     order for DME Equipment being ordered: specialized shoes for diabetic neuropathy     His orthotics will be made by [employer]      Tim Jernigan, Certified   [email or web address]  Phone: [phone]  Fax:  [phone] [address] [place] [address] 1 Device 0     pravastatin (PRAVACHOL) 40 MG tablet Take 0.5 tablets (20 mg) by mouth every other day 23 tablet 1     predniSONE (DELTASONE) 5 MG tablet For RA flare only: Prednisone 20mg daily x5days, then 15mg daily x5days, then 10mg daily x5days, then 5mg daily x5days, then stop. 50 tablet 0     SENNA-docusate sodium (SENNA S) 8.6-50 MG tablet Take 1 tablet by mouth At Bedtime 30 tablet 1     tiZANidine (ZANAFLEX) 2 MG tablet Take 1 tablet (2 mg) by mouth 3 times daily as needed for muscle spasms 90 tablet 1       Patient Active Problem List   Diagnosis     Pain in limb     Hyperlipidemia LDL goal <100     Hypertension goal BP (blood pressure) < 140/90     Hypogonadism     Advanced directives, counseling/discussion     Health Care Home     Type 2 diabetes mellitus with diabetic polyneuropathy, without long-term current use of insulin (H)     RBBB (right bundle branch block)     Ex-smoker     Family history of esophageal cancer     Gastroesophageal reflux disease, esophagitis presence not specified     Rheumatoid arthritis involving multiple sites with positive rheumatoid factor (H)     Pulmonary nodule     High risk medication use     Spondylosis of cervical region without myelopathy or radiculopathy     Erectile dysfunction, unspecified erectile dysfunction type     Type 2 diabetes mellitus without retinopathy (H)     Tubulovillous adenoma of colon     Diabetic polyneuropathy associated with type 2 diabetes mellitus (H)     Chronic bilateral low back pain without sciatica     Migraine equivalent     Posterior vitreous detachment of left eye     Pseudophakia, ou     Eyelid lesion, LLL     Dermatochalasis of both upper eyelids     Bradycardia     Primary osteoarthritis of both knees     Syncope     Trigger finger, acquired     CKD (chronic kidney disease) stage 3, GFR 30-59 ml/min     Abdominal pain, generalized       Past Medical History:   Diagnosis Date      Abnormal CT scan 03/2004    calcified lung granuloma     C. difficile diarrhea     H/O     Cataract 11/18/2011     Diabetic neuropathy (H)     mild, mostly soles and distal forefeet, worse on the left side.     Diverticulitis      ED (erectile dysfunction)      Ex-smoker     QUIT SMOKING FEB 2007     History of ETOH abuse     recovering, sober since 1997     Hyperlipidemia LDL goal <100      Hypertension goal BP (blood pressure) < 140/90      Hypogonadism      Obesity      PAD (peripheral artery disease) (H)     leg cramps, with exertion, no formal diagnosis of PAD and minimal if any symptoms at all.     RA (rheumatoid arthritis) (H)     Dr Bailon     Type 2 diabetes, HbA1c goal < 7% (H) 10/2008    A1C of 7.1 %        Past Surgical History:   Procedure Laterality Date     CATARACT IOL, RT/LT       COLONOSCOPY  03/01/2018    [facility]     COMBINED REPAIR PTOSIS WITH BLEPHAROPLASTY BILATERAL Bilateral 8/16/2019    Procedure: BILATERAL UPPER EYELID BLEPHAROPLASTY AND BILATERAL PTOSIS REPAIR;  Surgeon: Janet Garcia MD;  Location: [facility] OR     EXCISE LESION EYELID Left 8/16/2019    Procedure: LEFT LOWER EYELID BIOPSY;  Surgeon: Janet Garcia MD;  Location: [facility] OR     HC INCISION TENDON SHEATH FINGER  4/2009    r hand ring finger     PHACOEMULSIFICATION WITH STANDARD INTRAOCULAR LENS IMPLANT  02/2019; 3/2019    left eye; right eye     TONSILLECTOMY         Family History   Problem Relation Age of Onset     Cerebrovascular Disease Mother      Arthritis Mother      Osteoporosis Mother      Alzheimer Disease Father      Arthritis Father      Cancer Father      Diabetes Maternal Grandmother      Cardiovascular Maternal Grandmother      Other Cancer Brother      Cancer Paternal Aunt      Hypertension No family hx of      Thyroid Disease No family hx of      Glaucoma No family hx of      Macular Degeneration No family hx of        Social History     Tobacco Use     Smoking status: Former Smoker     Packs/day: 1.00      Years: 40.00     Pack years: 40.00     Types: Cigarettes     Quit date: 3/16/2007     Years since quittin.6     Smokeless tobacco: Never Used   Substance Use Topics     Alcohol use: No     Alcohol/week: 0.0 standard drinks         Exam:    Vitals: There were no vitals taken for this visit.  BMI: There is no height or weight on file to calculate BMI.  Height: Data Unavailable    Constitutional/ general:  Pt is in no apparent distress, appears well-nourished.  Cooperative with history and physical exam.     Psych:  The patient answered questions appropriately.  Normal affect.  Seems to have reasonable expectations, in terms of treatment.     Eyes:  Visual scanning/ tracking without deficit.     Ears:  Response to auditory stimuli is normal.  negative hearing aid devices.  Auricles in proper alignment.     Lymphatic:  Popliteal lymph nodes not enlarged.     Lungs:  Non labored breathing, non labored speech. No cough.  No audible wheezing. Even, quiet breathing.       Vascular: Lateral ankle edema and varicosities noted.  Difficult to palpate tibial pulses.  Dorsalis pedis weakly palpable.  Capillary refill time less than 3 seconds in all digits.  No hair growth noted on skin.  Duplex ultrasound of bilateral lower extremity arteries dated  10/22/2020 2:53 PM      Clinical information : Hx of PAD, toe ulcer, decreased pulses; PAD  (peripheral artery disease) (H); Toe ulcer (H); Decreased pulses in  feet      Comparison: None                                                                      Impression:   1) There is calcified plaque seen throughout both lower extremities  2) The left proximal SFA is occluded     Findings:      Right lower extremity:      Common femoral artery: 102/10 cm/sec.  Deep femoral artery: 119/8 cm/sec.  Proximal SFA: 60/2 cm/sec.  Mid SFA: 46/0 cm/sec.  Distal SFA: 23/4 cm/sec.  Popliteal artery: 44/5 cm/sec.  Anterior tibial takeoff: 38/7 cm/sec.  Peroneal artery proximal: 34/6  cm/sec.  PTA ankle: 34/6 cm/sec.        Waveforms are triphasic at the CFA and biphasic to monophasic beyond        Left lower extremity:     Common femoral artery: 90/11 cm/sec.  Deep femoral artery: 74/13 cm/sec.  Proximal SFA: Occluded     Popliteal artery: 32/7 cm/sec.  Anterior tibial takeoff: 53/7 cm/sec.  PTA origin: 30/5 cm/sec.  Peroneal artery: 27/6 cm/sec.  PTA ankle: 30/5 cm/sec.     Waveforms are triphasic at the CFA and biphasic to monophasic beyond      Exam: Bilateral lower extremity resting ankle brachial indices dated  10/22/2020     Comparison study: 1/28/2020     Clinical history: PAD (peripheral artery disease) (H)     Technique: Bilateral lower extremity resting ankle brachial indices  obtained.     Findings:     Right:       Arm: 121 mmHg   PT at ankle: Noncompressible   DP at foot: Noncompressible      ALBERT: Noncompressible     Right lower extremity limited arterial duplex:  Non-compressible  Biphasic waveforms      Left:      Arm: 119 mmHg   PT at ankle: Noncompressible   DP at foot: Noncompressible   ALBERT: Noncompressible     Left lower extremity limited arterial duplex:  Non-compressible  Biphasic to monophasic waveforms                                                                         Impression:      Right leg: Resting ALBERT is noncompressible     Left leg: Resting ALBERT is noncompressible     Compared to the prior study, the non-compressibility of distal vessels  has progressed      Neuro:  Alert and oriented x 3. Coordinated gait.  Light touch sensation is intact to the L4, L5, S1 distributions. No obvious deficits.  No evidence of neurological-based weakness, spasticity, or contracture in the lower extremities.  Monofilament absent to midfoot.      Derm: Skin thin shiny atrophic with no hair growth.  Left third toe is hammered.  The wound at the end of the left third toe in the past measured 2 x 2 mm and today measures 1 x 1 mm.  There is no sinus tracts purulence or odor.  There is  just scant edema and erythema surrounding this.    Musculoskeletal:    Lower extremity muscle strength is normal.  Patient is ambulatory without an assistive device or brace.  Pronated arch with weightbearing.  All lesser toes are hammered and stiff.  Generally his feet are stiff with a decreased range of motion bilaterally.      A:  Diabetes mellitus with peripheral neuropathy and LOPS  Peripheral arterial disease  Left third toe ulcer        P: Discussed with patient wound is smaller.  He will continue the same dressings and offloading with a crest pad.  Patient has not discussed arterial ultrasound results yet with vascular surgery.  Explained to patient that once he discusses these with vascular surgery and they give their blessing for having surgery on his left foot we will proceed.  We would do an arthroplasty of his left third PIPJ.  Same-day surgery under MAC anesthesia.  Recovery would involve elevation but he would be able to weight-bear on this.  Patient understands because of his diabetes and questionable circulation he is at high risk for poor healing infection and possible amputation.  He will call me after he has discussed with vascular he has adequate circulation to heal the surgery and then we will proceed.  25 minutes spent in total time caring for this patient and at least 15 minutes spent  face to face with patient regarding care.        Aaron Dent DPM, FACFAS

## 2020-11-17 ENCOUNTER — OFFICE VISIT (OUTPATIENT)
Dept: VASCULAR SURGERY | Facility: CLINIC | Age: 75
End: 2020-11-17
Payer: COMMERCIAL

## 2020-11-17 VITALS
HEART RATE: 59 BPM | RESPIRATION RATE: 16 BRPM | BODY MASS INDEX: 29.92 KG/M2 | HEIGHT: 69 IN | DIASTOLIC BLOOD PRESSURE: 71 MMHG | WEIGHT: 202 LBS | SYSTOLIC BLOOD PRESSURE: 131 MMHG

## 2020-11-17 DIAGNOSIS — I73.9 PAD (PERIPHERAL ARTERY DISEASE) (H): ICD-10-CM

## 2020-11-17 PROCEDURE — 99213 OFFICE O/P EST LOW 20 MIN: CPT | Performed by: SURGERY

## 2020-11-17 ASSESSMENT — MIFFLIN-ST. JEOR: SCORE: 1636.65

## 2020-11-17 NOTE — PROGRESS NOTES
Vascular Surgery Progress Note     Date: November 17, 2020     Reason for Visit:  Evaluation for PAD    Subjective:  Mr. Dash was previously seen in Vascular Surgery clinic for spontaneous, idiopathic right ulnar artery occlusion. In the interim from his last visit, he was evaluated by Dr. Dent in Podiatry and was discovered to have a recurrent left 3rd toe ulcer with diminished pulses. He is now being evaluated for PAD affecting his ability to heal a DFU.       Current Outpatient Medications:      albuterol (PROAIR HFA/PROVENTIL HFA/VENTOLIN HFA) 108 (90 BASE) MCG/ACT Inhaler, Inhale 2 puffs into the lungs every 4 hours as needed for other (coughing), Disp: 1 Inhaler, Rfl: 1     aspirin 81 MG tablet, Take 1 tablet by mouth daily., Disp: , Rfl: 3     benzonatate (TESSALON) 100 MG capsule, Take 1 capsule (100 mg) by mouth 3 times daily as needed for cough, Disp: 30 capsule, Rfl: 0     blood glucose (ACCU-CHEK DAYANARA PLUS) test strip, TEST THREE TIMES A DAY OR AS DIRECTED, Disp: 300 each, Rfl: 1     blood glucose (NO BRAND SPECIFIED) lancing device, Use to test blood sugars 3 times daily or as directed., Disp: 1 each, Rfl: 0     blood glucose monitoring (ONE TOUCH DELICA) lancets, Use to test blood sugars 3 times daily or as directed., Disp: 300 each, Rfl: 3     cetirizine (ZYRTEC) 10 MG tablet, Take 10 mg by mouth At Bedtime, Disp: , Rfl:      clobetasol (TEMOVATE) 0.05 % external ointment, Apply topically 2 times daily, Disp: 30 g, Rfl: 0     diclofenac (VOLTAREN) 1 % topical gel, Apply up to 4 grams of 1% gel to knees up to 4 times daily as needed for joint pain (maximum: 8 g per lower extremity per day), Disp: 400 g, Rfl: 3     dulaglutide (TRULICITY) 1.5 MG/0.5ML pen, Inject 1.5 mg Subcutaneous every 7 days, Disp: 9 mL, Rfl: 3     folic acid (FOLVITE) 1 MG tablet, Take 1 tablet (1 mg) by mouth daily, Disp: 100 tablet, Rfl: 2     hydrochlorothiazide (MICROZIDE) 12.5 MG capsule, Take 1 capsule (12.5 mg) by  mouth daily, Disp: 30 capsule, Rfl: 2     HYDROcodone-acetaminophen (NORCO) 5-325 MG tablet, Take 1-2 tablets by mouth every 4 hours as needed for moderate to severe pain maximum 4 tablet(s) per day, Disp: 28 tablet, Rfl: 0     hydroxychloroquine (PLAQUENIL) 200 MG tablet, Take 1 tablet (200 mg) by mouth daily, Disp: 90 tablet, Rfl: 1     lisinopril (ZESTRIL) 20 MG tablet, Take 1 tablet (20 mg) by mouth daily, Disp: 90 tablet, Rfl: 1     metFORMIN (GLUCOPHAGE) 500 MG tablet, Take 1 tablet (500 mg) by mouth 2 times daily (with meals), Disp: 180 tablet, Rfl: 0     methotrexate sodium 2.5 MG TABS, Take 7 tablets (17.5 mg) by mouth once a week . Take all 7 tablets on the same day of each week., Disp: 84 tablet, Rfl: 1     metoprolol succinate ER (TOPROL-XL) 25 MG 24 hr tablet, TAKE TWO TABLETS BY MOUTH ONCE DAILY, Disp: 180 tablet, Rfl: 2     nitroGLYcerin (NITRO-BID) 2 % OINT ointment, Place 0.5 inches (7.5 mg) onto the skin every 6 hours as needed (finger vasculitis, 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) With a 12 hour free period prn every 4-6 hours, Disp: 15 mg, Rfl: 2     omeprazole (PRILOSEC) 40 MG DR capsule, TAKE ONE CAPSULE BY MOUTH ONCE DAILY, Disp: 90 capsule, Rfl: 1     order for DME, Equipment being ordered: specialized shoes for diabetic neuropathy as well as all supplies provided for annual treatment of diabetes neuropathy, Disp: 1 Device, Rfl: 0     order for DME, Equipment being ordered: glucometer, Disp: 1 Device, Rfl: 0     order for DME, Equipment being ordered: specialized shoes for diabetic neuropathy   His orthotics will be made by [employer]    Tim Jernigan, Certified  [email or web address] Phone: [phone] Fax: [phone] [address] [place] [address], Disp: 1 Device, Rfl: 0     pravastatin (PRAVACHOL) 40 MG tablet, Take 0.5 tablets (20 mg) by mouth every other day, Disp: 23 tablet, Rfl: 1     predniSONE (DELTASONE) 5 MG tablet, For RA flare only: Prednisone 20mg daily  "x5days, then 15mg daily x5days, then 10mg daily x5days, then 5mg daily x5days, then stop., Disp: 50 tablet, Rfl: 0     SENNA-docusate sodium (SENNA S) 8.6-50 MG tablet, Take 1 tablet by mouth At Bedtime, Disp: 30 tablet, Rfl: 1     tiZANidine (ZANAFLEX) 2 MG tablet, Take 1 tablet (2 mg) by mouth 3 times daily as needed for muscle spasms, Disp: 90 tablet, Rfl: 1     Physical Exam                      Vital Signs with Ranges     0 lbs 0 oz    Constitutional: cooperative, no apparent distress, sitting comfortably in chair.   Vascular: Very quiet monophasic DP signals bilaterally.   Musculoskeletal: grossly normal and symmetric ROM and strength in BL extremities. Small dry ulcerated wound on tip of left third toe; no exudate or surrounding erythema.   Neurologic: Awake, alert, oriented to name, place, time, and situation    Imaging:  Echocardiogram (3/2/20): \"Left ventricular function, chamber size, wall motion, and wall thickness are normal.The EF is 55-60%. Global right ventricular function is normal. Mild dilatation of the aorta is present. No pericardial effusion is present.\"    I have reviewed the following imaging studies:  CTA Chest (3/13/20): \"1.  Areas of mural thrombus throughout the descending thoracic aorta and abdominal aorta. No thoracic or abdominal aortic aneurysm, stenosis or dissection. 2.  Patent arteries throughout the right upper extremity. The ulnar artery is not well visualized distal to the wrist. May be due to contrast timing. 3.  Diverticulosis without evidence of diverticulitis. 4.  Sequela of prior granulomatous disease. 5.  Occlusion of the left superficial femoral artery. 6.  Multifocal moderate stenosis throughout the right superficial femoral artery.\"     ABIs (1/28/20): Right 1.12, L 1.16. Monophasic (severely diseased) waveforms would suggest these ABIs are falsely elevated, likely from calcific stenosis and relative noncompressibility of the vessels.     ABIs (10/22/20--current): " "\"Right leg: Resting ALBERT is noncompressible. Left leg: Resting ALBERT is noncompressible. Compared to the prior study, the non-compressibility of distal vessels has progressed.\"    Arterial Duplex (10/22/20-current): \"1) There is calcified plaque seen throughout both lower extremities. 2) The left proximal SFA is occluded.\"        Assessment & Plan   Zurdo Dash is a 75 year old male with history of DL, HTN, T2DM complicated by neuropathy, former smoking and former alcohol abuse, RA, and PAD with prior claudication and current left third toe ulcer.       He is currently on aspirin and pravastatin; may at some point benefit from higher-intensity statin therapy.     Plan for left lower extremity angiogram with possible endovascular revascularization, to improve wound healing.    Discussed risks and benefits. Alternatives would include CTA first, but would be an additional step without intervention. Risks include extensive disease not amenable to endovascular revascularization; risk of bleeding or bruising, pseudoaneurysm formation at the access site; dissection or thromboembolic complications of intervention; diminished long-term patency; contrast-induced nephropathy; need for further future procedures.      Will schedule angiogram. Discussed with him that if we are unable to get through the SFA, he may require future bypass surgery.     Myra Lopes    "

## 2020-11-19 ENCOUNTER — TELEPHONE (OUTPATIENT)
Dept: OTHER | Facility: CLINIC | Age: 75
End: 2020-11-19

## 2020-11-19 DIAGNOSIS — L97.509 TOE ULCER (H): ICD-10-CM

## 2020-11-19 DIAGNOSIS — I73.9 PAD (PERIPHERAL ARTERY DISEASE) (H): Primary | ICD-10-CM

## 2020-11-19 DIAGNOSIS — E11.42 TYPE 2 DIABETES MELLITUS WITH DIABETIC POLYNEUROPATHY, WITHOUT LONG-TERM CURRENT USE OF INSULIN (H): ICD-10-CM

## 2020-11-20 NOTE — TELEPHONE ENCOUNTER
Routing refill request to provider for review/approval because:  Labs not current:  A1C    No appointment pending at this time.  Routing to provider to advise.    Lavern NUNESN, RN

## 2020-11-23 ENCOUNTER — TELEPHONE (OUTPATIENT)
Dept: OTHER | Facility: CLINIC | Age: 75
End: 2020-11-23

## 2020-11-24 NOTE — TELEPHONE ENCOUNTER
"11/17/20 Dr. Lopes noted:  \"Will schedule angiogram. Discussed with him that if we are unable to get through the SFA, he may require future bypass surgery.\"    Order is in Epic. Sx order form given to  for coordinating.    I called pt back, explained  will contact him within the next couple days to coordinate angiogram.     Pt notes understanding.     CARLOS Pelaez, RN    [facility]  Office:  [phone] Fax: [phone]    "

## 2020-12-01 DIAGNOSIS — Z11.59 ENCOUNTER FOR SCREENING FOR OTHER VIRAL DISEASES: Primary | ICD-10-CM

## 2020-12-01 DIAGNOSIS — K21.9 GASTROESOPHAGEAL REFLUX DISEASE: ICD-10-CM

## 2020-12-01 DIAGNOSIS — E78.5 HYPERLIPIDEMIA LDL GOAL <100: ICD-10-CM

## 2020-12-01 RX ORDER — OMEPRAZOLE 40 MG/1
CAPSULE, DELAYED RELEASE ORAL
Qty: 90 CAPSULE | Refills: 0 | Status: SHIPPED | OUTPATIENT
Start: 2020-12-01 | End: 2021-02-28

## 2020-12-01 RX ORDER — PRAVASTATIN SODIUM 40 MG
20 TABLET ORAL EVERY OTHER DAY
Qty: 23 TABLET | Refills: 0 | Status: SHIPPED | OUTPATIENT
Start: 2020-12-01 | End: 2020-12-15

## 2020-12-01 NOTE — TELEPHONE ENCOUNTER
Medication is being filled for 1 time refill only due to:  Patient needs to be seen because need recheck .

## 2020-12-09 ENCOUNTER — TELEPHONE (OUTPATIENT)
Dept: OTHER | Facility: CLINIC | Age: 75
End: 2020-12-09

## 2020-12-09 NOTE — TELEPHONE ENCOUNTER
Type of procedure: LEFT LOWER EXTREMITY ANGIOGRAM WITH POSSIBLE ENDOVASCULAR REVASCULARIZATION  Location of procedure: [facility]  Date and time of procedure: 12/17/20 @ 8:00 AM  Requesting Surgeon: DR. LIANG  Performing Surgeon/IR: DR. LIANG  Pre-Op Appt Date: 12/15/20 RICARDO REAL  Post-Op Appt Date: PT TO SCHEDULE   Packet sent out: Yes  Pre-cert/Authorization completed:  Yes  Date: 12/9/20

## 2020-12-09 NOTE — PROGRESS NOTES
"Zurdo Dash is a 75 year old male who is being evaluated via a billable telephone visit.      The patient has been notified of following:     \"This telephone visit will be conducted via a call between you and your physician/provider. We have found that certain health care needs can be provided without the need for a physical exam.  This service lets us provide the care you need with a short phone conversation.  If a prescription is necessary we can send it directly to your pharmacy.  If lab work is needed we can place an order for that and you can then stop by our lab to have the test done at a later time.    Telephone visits are billed at different rates depending on your insurance coverage. During this emergency period, for some insurers they may be billed the same as an in-person visit.  Please reach out to your insurance provider with any questions.    If during the course of the call the physician/provider feels a telephone visit is not appropriate, you will not be charged for this service.\"    Patient has given verbal consent for Telephone visit?  Yes    What phone number would you like to be contacted at? [phone]    How would you like to obtain your AVS? Mail a copy    Eryn Espinoza CMA Rheumatology  12/9/2020 3:15 PM    Rheumatology Telephone/Telehealth  Visit      Zurdo Dash MRN# [id number]   YOB: 1945 Age: 75 year old      Date of visit: 12/10/20   PCP: Dr. Kapil Duckworth     Chief Complaint   Patient presents with:  Arthritis: RA    Assessment and Plan     1. Seropositive nonerosive rheumatoid arthritis (, CCP 64): Currently on methotrexate 17.5 mg once weekly (reducing from 25mg wkly to get to the lowest effective dose), folic acid 1 mg daily, and hydroxychloroquine 200 mg every day. Doing well today with regard to rheumatoid arthritis. No worsening symptoms with hydroxychloroquine dose reduction so will stop hydroxychloroquine   - Continue methotrexate 17.5mg once " weekly  - Continue folic acid 1 mg daily   - Discontinue hydroxychloroquine 200mg daily  - Labs now and in 3 months: CBC, Creatinine, Hepatic Panel, ESR, CRP    2. Bilateral knee osteoarthritis / patellofemoral syndrome: Was receiving steroid injections approximately every 3-6 months with good control of his symptoms, last injected 8/26/2020.  Plan to re-inject in Josh    3. Acute right distal ulnar and digital artery occlusion of the right 5th finger: seen by vascular. Possibly RA related but his disease appears well controlled making this less likely.  Agree with aspirin.     # Relevant labs and imaging were reviewed with the patient    # High risk medication toxicity monitoring: discussion and labs reviewed; appropriate labs ordered. See above.  Instructed that if confirmed to have COVID-19 or exposure to someone with confirmed COVID-19 to call this clinic for directions on DMARD management.    # Note that this is a virtual visit to reduce the risk of COVID-19 exposure during this current pandemic.      # Considered to be at high risk of complications from the COVID-19 virus.  It is recommended to limit contact with other people and if possible to work remotely or provide a leave of absence to reduce the risk for COVID-19.      Mr. Dash verbalized agreement with and understanding of the rational for the diagnosis and treatment plan.  All questions were answered to best of my ability and the patient's satisfaction. Mr. Dash was advised to contact the clinic with any questions that may arise after the clinic visit.    Thank you for involving me in the care of the patient    Return to clinic: Jan and April HPI   Zurdo Dash is a 75 year old male with a medical history significant for hypertension, hyperlipidemia, diabetes, diabetic neuropathy, GERD, and rheumatoid arthritis who presents for f/u of RA and bilateral knee OA.     Today, Mr. Dash left 3rd toe with hammer toe and seeing podiatry with plans  for possible surgery to straighten it out.  However, poor circulation in his legs determined by ultrasound so he is going to have a angiogram and possible angioplasty to open up his arteries and improve the blood flow.  Having dental work done and hopes to get his implants completed tomorrow. Knee injections wanted.  Would like his knees injected again.    Denies fevers, chills, nausea, vomiting, constipation, diarrhea. No abdominal pain. No chest pain/pressure, palpitations, or shortness of breath. No oral or nasal sores. No neck pain. No rash. No LE swelling.      Tobacco: None  EtOH: None  Drugs: None    ROS   GEN: No fevers, chills, or night sweats. Trying to lose weight  SKIN: No rash.   HEENT: No oral or nasal ulcers.  CV: No chest pain, pressure, palpitations, or dyspnea on exertion.  PULM: No SOB, wheeze, cough.  GI: No nausea, vomiting, constipation, diarrhea. No blood in stool. No abdominal pain.  MSK: See HPI.  NEURO: No numbness, tingling, or weakness.  EXT: No LE swelling  PSYCH: Negative    Active Problem List     Patient Active Problem List   Diagnosis     Pain in limb     Hyperlipidemia LDL goal <100     Hypertension goal BP (blood pressure) < 140/90     Hypogonadism     Advanced directives, counseling/discussion     Health Care Home     Type 2 diabetes mellitus with diabetic polyneuropathy, without long-term current use of insulin (H)     RBBB (right bundle branch block)     Ex-smoker     Family history of esophageal cancer     Gastroesophageal reflux disease, esophagitis presence not specified     Rheumatoid arthritis involving multiple sites with positive rheumatoid factor (H)     Pulmonary nodule     High risk medication use     Spondylosis of cervical region without myelopathy or radiculopathy     Erectile dysfunction, unspecified erectile dysfunction type     Type 2 diabetes mellitus without retinopathy (H)     Tubulovillous adenoma of colon     Diabetic polyneuropathy associated with type 2  diabetes mellitus (H)     Chronic bilateral low back pain without sciatica     Migraine equivalent     Posterior vitreous detachment of left eye     Pseudophakia, ou     Eyelid lesion, LLL     Dermatochalasis of both upper eyelids     Bradycardia     Primary osteoarthritis of both knees     Syncope     Trigger finger, acquired     CKD (chronic kidney disease) stage 3, GFR 30-59 ml/min     Abdominal pain, generalized     Past Medical History     Past Medical History:   Diagnosis Date     Abnormal CT scan 03/2004    calcified lung granuloma     C. difficile diarrhea     H/O     Cataract 11/18/2011     Diabetic neuropathy (H)     mild, mostly soles and distal forefeet, worse on the left side.     Diverticulitis      ED (erectile dysfunction)      Ex-smoker     QUIT SMOKING FEB 2007     History of ETOH abuse     recovering, sober since 1997     Hyperlipidemia LDL goal <100      Hypertension goal BP (blood pressure) < 140/90      Hypogonadism      Obesity      PAD (peripheral artery disease) (H)     leg cramps, with exertion, no formal diagnosis of PAD and minimal if any symptoms at all.     RA (rheumatoid arthritis) (H)     Dr Bailon     Type 2 diabetes, HbA1c goal < 7% (H) 10/2008    A1C of 7.1 %      Past Surgical History     Past Surgical History:   Procedure Laterality Date     CATARACT IOL, RT/LT       COLONOSCOPY  03/01/2018    [facility]     COMBINED REPAIR PTOSIS WITH BLEPHAROPLASTY BILATERAL Bilateral 8/16/2019    Procedure: BILATERAL UPPER EYELID BLEPHAROPLASTY AND BILATERAL PTOSIS REPAIR;  Surgeon: Janet Garcia MD;  Location: [facility] OR     EXCISE LESION EYELID Left 8/16/2019    Procedure: LEFT LOWER EYELID BIOPSY;  Surgeon: Janet Garcia MD;  Location: [facility] OR     HC INCISION TENDON SHEATH FINGER  4/2009    r hand ring finger     PHACOEMULSIFICATION WITH STANDARD INTRAOCULAR LENS IMPLANT  02/2019; 3/2019    left eye; right eye     TONSILLECTOMY       Allergy   No Known Allergies  Current Medication List      Current Outpatient Medications   Medication Sig     albuterol (PROAIR HFA/PROVENTIL HFA/VENTOLIN HFA) 108 (90 BASE) MCG/ACT Inhaler Inhale 2 puffs into the lungs every 4 hours as needed for other (coughing)     aspirin 81 MG tablet Take 1 tablet by mouth daily.     benzonatate (TESSALON) 100 MG capsule Take 1 capsule (100 mg) by mouth 3 times daily as needed for cough     cetirizine (ZYRTEC) 10 MG tablet Take 10 mg by mouth At Bedtime     clobetasol (TEMOVATE) 0.05 % external ointment Apply topically 2 times daily     diclofenac (VOLTAREN) 1 % topical gel Apply up to 4 grams of 1% gel to knees up to 4 times daily as needed for joint pain (maximum: 8 g per lower extremity per day)     dulaglutide (TRULICITY) 1.5 MG/0.5ML pen Inject 1.5 mg Subcutaneous every 7 days     folic acid (FOLVITE) 1 MG tablet Take 1 tablet (1 mg) by mouth daily     hydrochlorothiazide (MICROZIDE) 12.5 MG capsule Take 1 capsule (12.5 mg) by mouth daily     hydroxychloroquine (PLAQUENIL) 200 MG tablet Take 1 tablet (200 mg) by mouth daily     lisinopril (ZESTRIL) 20 MG tablet Take 1 tablet (20 mg) by mouth daily     metFORMIN (GLUCOPHAGE) 500 MG tablet TAKE ONE TABLET BY MOUTH TWICE A DAY WITH MEALS     methotrexate sodium 2.5 MG TABS Take 7 tablets (17.5 mg) by mouth once a week . Take all 7 tablets on the same day of each week.     metoprolol succinate ER (TOPROL-XL) 25 MG 24 hr tablet TAKE TWO TABLETS BY MOUTH ONCE DAILY     omeprazole (PRILOSEC) 40 MG DR capsule TAKE ONE CAPSULE BY MOUTH ONCE DAILY     pravastatin (PRAVACHOL) 40 MG tablet Take 0.5 tablets (20 mg) by mouth every other day ++NEED RECHECK++     SENNA-docusate sodium (SENNA S) 8.6-50 MG tablet Take 1 tablet by mouth At Bedtime     tiZANidine (ZANAFLEX) 2 MG tablet Take 1 tablet (2 mg) by mouth 3 times daily as needed for muscle spasms     blood glucose (ACCU-CHEK DAYANARA PLUS) test strip TEST THREE TIMES A DAY OR AS DIRECTED     blood glucose (NO BRAND SPECIFIED) lancing  device Use to test blood sugars 3 times daily or as directed.     blood glucose monitoring (ONE TOUCH DELICA) lancets Use to test blood sugars 3 times daily or as directed.     HYDROcodone-acetaminophen (NORCO) 5-325 MG tablet Take 1-2 tablets by mouth every 4 hours as needed for moderate to severe pain maximum 4 tablet(s) per day     nitroGLYcerin (NITRO-BID) 2 % OINT ointment Place 0.5 inches (7.5 mg) onto the skin every 6 hours as needed (finger vasculitis, 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) With a 12 hour free period prn every 4-6 hours     order for DME Equipment being ordered: specialized shoes for diabetic neuropathy as well as all supplies provided for annual treatment of diabetes neuropathy     order for DME Equipment being ordered: glucometer     order for DME Equipment being ordered: specialized shoes for diabetic neuropathy     His orthotics will be made by [facility] [employer]      Tim Jernigan, Certified   [email or web address]  Phone: [phone]  Fax: [phone]  [address] [place] [address]     predniSONE (DELTASONE) 5 MG tablet For RA flare only: Prednisone 20mg daily x5days, then 15mg daily x5days, then 10mg daily x5days, then 5mg daily x5days, then stop.     No current facility-administered medications for this visit.          Social History   See HPI    Family History     Family History   Problem Relation Age of Onset     Cerebrovascular Disease Mother      Arthritis Mother      Osteoporosis Mother      Alzheimer Disease Father      Arthritis Father      Cancer Father      Diabetes Maternal Grandmother      Cardiovascular Maternal Grandmother      Other Cancer Brother      Cancer Paternal Aunt      Hypertension No family hx of      Thyroid Disease No family hx of      Glaucoma No family hx of      Macular Degeneration No family hx of        Physical Exam     Temp Readings from Last 3 Encounters:   07/08/20 98  F (36.7  C) (Oral)   02/27/20 99.1  F (37.3  C) (Tympanic)  "  02/11/20 96.2  F (35.7  C) (Oral)     BP Readings from Last 5 Encounters:   11/17/20 131/71   11/05/20 136/74   11/04/20 120/68   10/15/20 138/84   10/12/20 (!) 158/86     Pulse Readings from Last 1 Encounters:   11/17/20 59     Resp Readings from Last 1 Encounters:   11/17/20 16     Estimated body mass index is 29.83 kg/m  as calculated from the following:    Height as of 11/17/20: 1.753 m (5' 9\").    Weight as of 11/17/20: 91.6 kg (202 lb).    Telephone visit    Labs / Imaging (select studies)     9/28/1999  [facility]    10/17/2013 Left knee synovial fluid at [facility]: , N 3%, No crystals    RF/CCP  Recent Labs   Lab Test 04/07/16  1149   CCPIGG 64*     CBC  Recent Labs   Lab Test 07/28/20  1148 07/08/20  1022 04/28/20  1141   WBC 9.1 8.8 6.4   RBC 4.30* 4.75 4.66   HGB 13.0* 14.6 14.3   HCT 39.0* 43.0 42.5   MCV 91 91 91   RDW 13.7 13.7 15.1*    202 213   MCH 30.2 30.7 30.7   MCHC 33.3 34.0 33.6   NEUTROPHIL 83.0 85.6 71.7   LYMPH 8.5 7.9 13.8   MONOCYTE 6.8 5.9 12.6   EOSINOPHIL 0.8 0.1 1.1   BASOPHIL 0.9 0.5 0.8   ANEU 7.6 7.5 4.6   ALYM 0.8 0.7* 0.9   SMITH 0.6 0.5 0.8   AEOS 0.1 0.0 0.1   ABAS 0.1 0.0 0.1     CMP  Recent Labs   Lab Test 07/28/20  1148 07/08/20  1022 04/28/20  1141 01/14/19  1716 01/14/19  1716 03/26/18  1130 03/26/18  1130   NA  --  135  --   --  141  --  139   POTASSIUM  --  4.3  --   --  4.2  --  4.6   CHLORIDE  --  103  --   --  108  --  107   CO2  --  26  --   --  24  --  23   ANIONGAP  --  6  --   --  9  --  9   GLC  --  183*  --   --  142*  --  84   BUN  --  16  --   --  22  --  20   CR 1.18 1.16 1.16   < > 1.33*   < > 1.01   GFRESTIMATED 59* 61 61   < > 52*   < > 72   GFRESTBLACK 69 70 70   < > 60*   < > 87   NEW  --  8.9  --   --  9.2  --  8.8   BILITOTAL 0.7 1.0 0.7   < > 0.3   < >  --    ALBUMIN 2.9* 3.9 3.5   < > 3.6   < >  --    PROTTOTAL 6.1* 7.4 7.0   < > 7.0   < >  --    ALKPHOS 51 56 59   < > 51   < >  --    AST 24 29 29   < > 22   < >  --  " "  ALT 36 41 35   < > 32   < >  --     < > = values in this interval not displayed.     Calcium/VitaminD  Recent Labs   Lab Test 07/08/20  1022 01/14/19  1716 03/26/18  1130 07/23/13  1016 07/23/13  1016   NEW 8.9 9.2 8.8   < >  --    VITDT  --   --   --   --  28*    < > = values in this interval not displayed.     ESR/CRP  Recent Labs   Lab Test 07/28/20  1148 07/16/19  1207 04/18/19  1108   SED 15 8 9   CRP 31.8* 4.2 7.2     Hepatitis B  Recent Labs   Lab Test 04/07/16  1149   HBCAB Nonreactive   HEPBANG Nonreactive     Hepatitis C  Recent Labs   Lab Test 04/07/16  1149   HCVAB Nonreactive   Assay performance characteristics have not been established for newborns,   infants, and children         HIV Screening  Recent Labs   Lab Test 04/07/16  1149   HIAGAB Nonreactive   HIV-1 p24 Ag & HIV-1/HIV-2 Ab Not Detected           \"MRI LEFT KNEE  10/08/2013    INDICATION: Left knee pain. Locking, catching and snapping. Weakness.  TECHNIQUE: Routine.  COMPARISON: None.    FINDINGS:  MEDIAL COMPARTMENT: Linear tearing involving the posterior horn and body of   the medial meniscus as seen on series 4: image 6-8. This is also seen on   series 5: image 9-10. Cartilage is thinned peripherally.    LATERAL COMPARTMENT: Lateral meniscus also demonstrates tearing in the   posterior horn on series 4: image 22. There is diffuse tearing in the body   of the meniscus which is horizontal as seen on series 5: image 9 and this   extends into the anterior horn. Cartilage is thinned.    PATELLOFEMORAL COMPARTMENT: Retropatellar cartilage demonstrates   full-thickness loss of cartilage over portions of the median ridge, lateral   facet and medial facet. Cartilage is better preserved over the lower pole   centrally. There is also full-thickness loss of cartilage in the lateral   and central trochlear groove. Patella is normally aligned. Retinaculum are   intact.    LIGAMENTS AND TENDONS: The anterior cruciate and posterior cruciate   ligaments " "are intact. The medial collateral ligament is intact. Lateral   collateral ligamentous complex and popliteus insertion are intact.   Quadriceps tendon and patellar tendon are intact.    BONES AND SOFT TISSUES: Moderate-sized joint effusion. No popliteal cyst.   No muscle edema or fracture.    CONCLUSION:  1. There is tearing of the medial meniscus involving the posterior horn and   body. Cartilage is thinned peripherally.  2. There is also tearing in the posterior horn and body of the lateral   meniscus which also extends into the anterior horn. Cartilage is also   thinned in the lateral compartment.  3. Moderate to severe osteoarthritis in the patellofemoral compartment with   full-thickness loss of cartilage over large portions of the retropatellar   surface and trochlear surface.  4. Joint effusion.    IF YOU ARE A PHYSICIAN AND HAVE QUESTIONS REGARDING THIS REPORT, PLEASE   CALL [phone].\"    \"X-RAY KNEES BILATERAL AP W/LAT/SUN/PA LEFT   Oct 08, 2013 09:51:59 AM     INDICATION: Pain and swelling   COMPARISON: None.      FINDINGS: Moderate narrowing lateral aspect of the patellofemoral   compartment with slight lateral subluxation of the patella. Medial and   lateral compartments are preserved. Moderate knee joint effusion and/or   synovitis. Enthesophyte formation distal quadriceps and proximal patellar   tendons. Extensive vascular calcifications.     AP view right knee demonstrates trace narrowing medial compartment joint   Space.\"    Immunization History     Immunization History   Administered Date(s) Administered     Influenza (High Dose) 3 valent vaccine 09/20/2012, 10/20/2015, 09/20/2016, 08/28/2017, 09/24/2018, 09/18/2019     Influenza (IIV3) PF 10/05/1999, 10/30/2008, 09/28/2011, 10/14/2013, 10/03/2014     Influenza, Quad, High Dose, Pf, 65yr + 09/11/2020     Pneumo Conj 13-V (2010&after) 06/29/2015     Pneumococcal 23 valent 08/19/2010     TD (ADULT, 7+) 08/05/1997, 02/03/2011     TDAP Vaccine " (Boostrix) 03/11/2020     Zoster vaccine recombinant adjuvanted (SHINGRIX) 01/03/2020, 03/11/2020     Zoster vaccine, live 04/19/2010          Chart documentation done in part with Dragon Voice recognition Software. Although reviewed after completion, some word and grammatical error may remain.      Phone call start time: 1:45 PM  Phone call end time: 2:00 PM    This visit is equivalent to a 89316 visit    Location of patient: [place], [place]   Location of provider: home    Follow up:  follow up appointment scheduled to be in Jan and April    Albert Tompkins MD

## 2020-12-10 ENCOUNTER — VIRTUAL VISIT (OUTPATIENT)
Dept: RHEUMATOLOGY | Facility: CLINIC | Age: 75
End: 2020-12-10
Payer: COMMERCIAL

## 2020-12-10 DIAGNOSIS — M05.79 RHEUMATOID ARTHRITIS INVOLVING MULTIPLE SITES WITH POSITIVE RHEUMATOID FACTOR (H): Primary | ICD-10-CM

## 2020-12-10 DIAGNOSIS — M17.0 BILATERAL PRIMARY OSTEOARTHRITIS OF KNEE: ICD-10-CM

## 2020-12-10 DIAGNOSIS — Z79.899 HIGH RISK MEDICATIONS (NOT ANTICOAGULANTS) LONG-TERM USE: ICD-10-CM

## 2020-12-10 PROCEDURE — 99213 OFFICE O/P EST LOW 20 MIN: CPT | Mod: 95 | Performed by: INTERNAL MEDICINE

## 2020-12-10 RX ORDER — METHOTREXATE 2.5 MG/1
17.5 TABLET ORAL WEEKLY
Qty: 84 TABLET | Refills: 1 | Status: SHIPPED | OUTPATIENT
Start: 2020-12-10 | End: 2021-04-07

## 2020-12-11 DIAGNOSIS — Z79.899 HIGH RISK MEDICATIONS (NOT ANTICOAGULANTS) LONG-TERM USE: ICD-10-CM

## 2020-12-11 DIAGNOSIS — I10 HYPERTENSION GOAL BP (BLOOD PRESSURE) < 140/90: ICD-10-CM

## 2020-12-11 DIAGNOSIS — E11.42 TYPE 2 DIABETES MELLITUS WITH DIABETIC POLYNEUROPATHY, WITHOUT LONG-TERM CURRENT USE OF INSULIN (H): ICD-10-CM

## 2020-12-11 DIAGNOSIS — M05.79 RHEUMATOID ARTHRITIS INVOLVING MULTIPLE SITES WITH POSITIVE RHEUMATOID FACTOR (H): ICD-10-CM

## 2020-12-11 LAB
ALBUMIN SERPL-MCNC: 3.7 G/DL (ref 3.4–5)
ALP SERPL-CCNC: 51 U/L (ref 40–150)
ALT SERPL W P-5'-P-CCNC: 24 U/L (ref 0–70)
ANION GAP SERPL CALCULATED.3IONS-SCNC: 4 MMOL/L (ref 3–14)
AST SERPL W P-5'-P-CCNC: 16 U/L (ref 0–45)
BILIRUB DIRECT SERPL-MCNC: 0.1 MG/DL (ref 0–0.2)
BILIRUB SERPL-MCNC: 0.4 MG/DL (ref 0.2–1.3)
BUN SERPL-MCNC: 49 MG/DL (ref 7–30)
CALCIUM SERPL-MCNC: 9 MG/DL (ref 8.5–10.1)
CHLORIDE SERPL-SCNC: 114 MMOL/L (ref 94–109)
CO2 SERPL-SCNC: 23 MMOL/L (ref 20–32)
CREAT SERPL-MCNC: 1.67 MG/DL (ref 0.66–1.25)
CRP SERPL-MCNC: 18.9 MG/L (ref 0–8)
ERYTHROCYTE [SEDIMENTATION RATE] IN BLOOD BY WESTERGREN METHOD: 33 MM/H (ref 0–20)
GFR SERPL CREATININE-BSD FRML MDRD: 39 ML/MIN/{1.73_M2}
GLUCOSE SERPL-MCNC: 84 MG/DL (ref 70–99)
HBA1C MFR BLD: 6.7 % (ref 0–5.6)
POTASSIUM SERPL-SCNC: 5.4 MMOL/L (ref 3.4–5.3)
PROT SERPL-MCNC: 6.7 G/DL (ref 6.8–8.8)
SODIUM SERPL-SCNC: 141 MMOL/L (ref 133–144)

## 2020-12-11 PROCEDURE — 86140 C-REACTIVE PROTEIN: CPT | Performed by: INTERNAL MEDICINE

## 2020-12-11 PROCEDURE — 36415 COLL VENOUS BLD VENIPUNCTURE: CPT | Performed by: INTERNAL MEDICINE

## 2020-12-11 PROCEDURE — 80048 BASIC METABOLIC PNL TOTAL CA: CPT | Performed by: INTERNAL MEDICINE

## 2020-12-11 PROCEDURE — 85652 RBC SED RATE AUTOMATED: CPT | Performed by: INTERNAL MEDICINE

## 2020-12-11 PROCEDURE — 80076 HEPATIC FUNCTION PANEL: CPT | Performed by: INTERNAL MEDICINE

## 2020-12-11 PROCEDURE — 85025 COMPLETE CBC W/AUTO DIFF WBC: CPT | Performed by: INTERNAL MEDICINE

## 2020-12-11 PROCEDURE — 83036 HEMOGLOBIN GLYCOSYLATED A1C: CPT | Performed by: INTERNAL MEDICINE

## 2020-12-14 DIAGNOSIS — E11.42 TYPE 2 DIABETES MELLITUS WITH DIABETIC POLYNEUROPATHY, WITHOUT LONG-TERM CURRENT USE OF INSULIN (H): ICD-10-CM

## 2020-12-14 DIAGNOSIS — Z11.59 ENCOUNTER FOR SCREENING FOR OTHER VIRAL DISEASES: ICD-10-CM

## 2020-12-14 LAB
BASOPHILS # BLD AUTO: 0.1 10E9/L (ref 0–0.2)
BASOPHILS NFR BLD AUTO: 1 %
DIFFERENTIAL METHOD BLD: ABNORMAL
EOSINOPHIL # BLD AUTO: 0.1 10E9/L (ref 0–0.7)
EOSINOPHIL NFR BLD AUTO: 2 %
ERYTHROCYTE [DISTWIDTH] IN BLOOD BY AUTOMATED COUNT: 13.9 % (ref 10–15)
HCT VFR BLD AUTO: 35.7 % (ref 40–53)
HGB BLD-MCNC: 11.9 G/DL (ref 13.3–17.7)
LYMPHOCYTES # BLD AUTO: 0.9 10E9/L (ref 0.8–5.3)
LYMPHOCYTES NFR BLD AUTO: 18 %
MCH RBC QN AUTO: 30.7 PG (ref 26.5–33)
MCHC RBC AUTO-ENTMCNC: 33.3 G/DL (ref 31.5–36.5)
MCV RBC AUTO: 92 FL (ref 78–100)
MONOCYTES # BLD AUTO: 0.4 10E9/L (ref 0–1.3)
MONOCYTES NFR BLD AUTO: 8 %
NEUTROPHILS # BLD AUTO: 3.4 10E9/L (ref 1.6–8.3)
NEUTROPHILS NFR BLD AUTO: 71 %
PLATELET # BLD AUTO: 224 10E9/L (ref 150–450)
RBC # BLD AUTO: 3.87 10E12/L (ref 4.4–5.9)
WBC # BLD AUTO: 4.9 10E9/L (ref 4–11)

## 2020-12-14 PROCEDURE — U0003 INFECTIOUS AGENT DETECTION BY NUCLEIC ACID (DNA OR RNA); SEVERE ACUTE RESPIRATORY SYNDROME CORONAVIRUS 2 (SARS-COV-2) (CORONAVIRUS DISEASE [COVID-19]), AMPLIFIED PROBE TECHNIQUE, MAKING USE OF HIGH THROUGHPUT TECHNOLOGIES AS DESCRIBED BY CMS-2020-01-R: HCPCS | Performed by: SURGERY

## 2020-12-15 ENCOUNTER — OFFICE VISIT (OUTPATIENT)
Dept: INTERNAL MEDICINE | Facility: CLINIC | Age: 75
End: 2020-12-15
Payer: COMMERCIAL

## 2020-12-15 VITALS
HEART RATE: 62 BPM | DIASTOLIC BLOOD PRESSURE: 79 MMHG | WEIGHT: 198.6 LBS | RESPIRATION RATE: 16 BRPM | TEMPERATURE: 97.5 F | BODY MASS INDEX: 29.33 KG/M2 | SYSTOLIC BLOOD PRESSURE: 132 MMHG | OXYGEN SATURATION: 92 %

## 2020-12-15 DIAGNOSIS — E78.5 HYPERLIPIDEMIA LDL GOAL <100: ICD-10-CM

## 2020-12-15 DIAGNOSIS — N18.32 STAGE 3B CHRONIC KIDNEY DISEASE (H): ICD-10-CM

## 2020-12-15 DIAGNOSIS — Z01.818 PREOP GENERAL PHYSICAL EXAM: Primary | ICD-10-CM

## 2020-12-15 LAB
ANION GAP SERPL CALCULATED.3IONS-SCNC: 9 MMOL/L (ref 3–14)
BUN SERPL-MCNC: 47 MG/DL (ref 7–30)
CALCIUM SERPL-MCNC: 9 MG/DL (ref 8.5–10.1)
CHLORIDE SERPL-SCNC: 108 MMOL/L (ref 94–109)
CHOLEST SERPL-MCNC: 180 MG/DL
CO2 SERPL-SCNC: 20 MMOL/L (ref 20–32)
CREAT SERPL-MCNC: 1.6 MG/DL (ref 0.66–1.25)
GFR SERPL CREATININE-BSD FRML MDRD: 41 ML/MIN/{1.73_M2}
GLUCOSE SERPL-MCNC: 107 MG/DL (ref 70–99)
HDLC SERPL-MCNC: 40 MG/DL
LDLC SERPL CALC-MCNC: 105 MG/DL
NONHDLC SERPL-MCNC: 140 MG/DL
POTASSIUM SERPL-SCNC: 5 MMOL/L (ref 3.4–5.3)
SODIUM SERPL-SCNC: 137 MMOL/L (ref 133–144)
TRIGL SERPL-MCNC: 177 MG/DL

## 2020-12-15 PROCEDURE — 80061 LIPID PANEL: CPT | Performed by: INTERNAL MEDICINE

## 2020-12-15 PROCEDURE — 80048 BASIC METABOLIC PNL TOTAL CA: CPT | Performed by: INTERNAL MEDICINE

## 2020-12-15 PROCEDURE — 36415 COLL VENOUS BLD VENIPUNCTURE: CPT | Performed by: INTERNAL MEDICINE

## 2020-12-15 PROCEDURE — 99214 OFFICE O/P EST MOD 30 MIN: CPT | Performed by: INTERNAL MEDICINE

## 2020-12-15 RX ORDER — ATORVASTATIN CALCIUM 40 MG/1
40 TABLET, FILM COATED ORAL DAILY
Qty: 90 TABLET | Refills: 3 | Status: SHIPPED | OUTPATIENT
Start: 2020-12-15 | End: 2021-04-08

## 2020-12-15 NOTE — PATIENT INSTRUCTIONS
Take Trulicity (dulaglutide) as usual on Fridays  Do not take the MICROZIDE  (hydrochlorothiazide, USP 12.5 mg) morning of the surgery   You have been off of the Metformin ( Glucophage)  As per directions from Dr. Lopes and you won't restart this one until after surgery   Take the methotrexate as usual on Sunday nights  Take all other regularly scheduled medications

## 2020-12-15 NOTE — PROGRESS NOTES
[facility]  [address]  [facility] [address]  [phone]  Dept: [phone]    PRE-OP EVALUATION:  Today's date: 12/15/2020    Zurdo Dash (: 1945) presents for pre-operative evaluation assessment as requested by Dr. Lopes.  He requires evaluation and anesthesia risk assessment prior to undergoing surgery/procedure for treatment of Angiogram .    Proposed Surgery/ Procedure: Angloplasty, angiogram  Date of Surgery/ Procedure: 2020  Time of Surgery/ Procedure: 8:00am  Hospital/Surgical Facility: [facility] Fax Number:   Primary Physician: Kapil Duckworth  Type of Anesthesia Anticipated: Local with MAC    Preoperative Questionnaire:   No - Have you ever had a heart attack or stroke?  No - Have you ever had surgery on your heart or blood vessels, such as a stent, coronary (heart) bypass, or surgery on an artery in the head, neck, heart, or legs?  No - Do you have chest pain when you are physically active?  No - Do you have a history of heart failure?  No - Do you currently have a cold, bronchitis, or symptoms of other respiratory (head and chest) infections?  No - Do you have a cough, shortness of breath, or wheezing?  No - Do you or anyone in your family have a history of blood clots?  No - Do you or anyone in your family have a serious bleeding problem, such as long-lasting bleeding after surgeries or cuts?  No - Have you ever had anemia or been told to take iron pills?  No - Have you had any abnormal blood loss such as black, tarry or bloody stools, or abnormal vaginal bleeding?  No - Have you ever had a blood transfusion?  Yes - Are you willing to have a blood transfusion if it is medically needed before, during, or after your surgery?  No - Have you or anyone in your family ever had problems with anesthesia (sedation for surgery)?  No - Do you have sleep apnea, excessive snoring, or daytime drowsiness?   No - Do you have any artifical heart valves or  other implanted medical devices, such as a pacemaker, defibrillator, or continuous glucose monitor?  No - Do you have any artifical joints?  No - Are you allergic to latex?  No - Is there any chance that you may be pregnant?    Patient has a Health Care Directive or Living Will:  NO    HPI:     HPI related to upcoming procedure: patient has an area of diabetic foot ulcer with evidence of decreased blood flow and is scheduled for a lower extremity arterial evaluation with potential for stenting or other vascular interventions       See problem list for active medical problems.  Problems all longstanding and stable, except as noted/documented.  See ROS for pertinent symptoms related to these conditions.      MEDICAL HISTORY:     Patient Active Problem List    Diagnosis Date Noted     Abdominal pain, generalized 07/09/2020     Priority: Medium     CKD (chronic kidney disease) stage 3, GFR 30-59 ml/min 02/27/2020     Priority: Medium     Eyelid lesion, LLL 07/10/2019     Priority: Medium     Dermatochalasis of both upper eyelids 07/10/2019     Priority: Medium     Pseudophakia, ou 03/12/2019     Priority: Medium     Posterior vitreous detachment of left eye 02/09/2019     Priority: Medium     Migraine equivalent 12/22/2018     Priority: Medium     Chronic bilateral low back pain without sciatica 09/25/2018     Priority: Medium     Diabetic polyneuropathy associated with type 2 diabetes mellitus (H) 02/28/2018     Priority: Medium     Tubulovillous adenoma of colon 02/20/2018     Priority: Medium     Type 2 diabetes mellitus without retinopathy (H) 01/12/2018     Priority: Medium     Erectile dysfunction, unspecified erectile dysfunction type 11/07/2017     Priority: Medium     Spondylosis of cervical region without myelopathy or radiculopathy 07/17/2017     Priority: Medium     Pulmonary nodule 03/03/2017     Priority: Medium     Needs follow up CT chest 1 year 2/2018       High risk medication use 03/03/2017      Priority: Medium     Rheumatoid arthritis involving multiple sites with positive rheumatoid factor (H) 03/18/2016     Priority: Medium     Family history of esophageal cancer 12/14/2015     Priority: Medium     Gastroesophageal reflux disease, esophagitis presence not specified 12/14/2015     Priority: Medium     IMO Regulatory Load OCT 2020       Primary osteoarthritis of both knees 11/05/2015     Priority: Medium     Ex-smoker 10/20/2015     Priority: Medium     RBBB (right bundle branch block) 10/24/2013     Priority: Medium     Noted on ECG 10/19/2013 . See medical record scanned into Epic electronic medical records         Type 2 diabetes mellitus with diabetic polyneuropathy, without long-term current use of insulin (H) 02/28/2013     Priority: Medium     A1C      6.9   5/3/2016  A1C      8.6   3/18/2016  A1C      9.7   9/30/2015  A1C     11.8   6/29/2015  A1C     10.7   3/30/2015         Trigger finger, acquired 01/15/2013     Priority: Medium     Health Care Home 12/12/2012     Priority: Medium     Alin Espinoza RN,[phone]--[phone]   A / [facility]          DX V65.8 REPLACED WITH 83422 HEALTH CARE HOME (04/08/2013)       Advanced directives, counseling/discussion 03/29/2011     Priority: Medium     Patient states has Advance Directive and will bring in a copy to clinic.         Hypertension goal BP (blood pressure) < 140/90      Priority: Medium     Hypogonadism      Priority: Medium     Hyperlipidemia LDL goal <100 10/31/2010     Priority: Medium     Pain in limb      Priority: Medium     Had some leg cramps with features consistent with vascular claudication. See ankle brachial index tests and vascular surgery consultation from 2/16/2011 in media section of Epic electronic medical records ; the key point is that subsequently , evaluations found zero evidence of     Ischemic vascular disease         Bradycardia 01/12/2009     Priority: Medium     Syncope 01/12/2009     Priority:  Medium      Past Medical History:   Diagnosis Date     Abnormal CT scan 03/2004    calcified lung granuloma     C. difficile diarrhea     H/O     Cataract 11/18/2011     Diabetic neuropathy (H)     mild, mostly soles and distal forefeet, worse on the left side.     Diverticulitis      ED (erectile dysfunction)      Ex-smoker     QUIT SMOKING FEB 2007     History of ETOH abuse     recovering, sober since 1997     Hyperlipidemia LDL goal <100      Hypertension goal BP (blood pressure) < 140/90      Hypogonadism      Obesity      PAD (peripheral artery disease) (H)     leg cramps, with exertion, no formal diagnosis of PAD and minimal if any symptoms at all.     RA (rheumatoid arthritis) (H)     Dr Bailon     Type 2 diabetes, HbA1c goal < 7% (H) 10/2008    A1C of 7.1 %      Past Surgical History:   Procedure Laterality Date     CATARACT IOL, RT/LT       COLONOSCOPY  03/01/2018    [facility]     COMBINED REPAIR PTOSIS WITH BLEPHAROPLASTY BILATERAL Bilateral 8/16/2019    Procedure: BILATERAL UPPER EYELID BLEPHAROPLASTY AND BILATERAL PTOSIS REPAIR;  Surgeon: Janet Garcia MD;  Location: [facility] OR     EXCISE LESION EYELID Left 8/16/2019    Procedure: LEFT LOWER EYELID BIOPSY;  Surgeon: Janet Garcia MD;  Location: [facility] OR     HC INCISION TENDON SHEATH FINGER  4/2009    r hand ring finger     PHACOEMULSIFICATION WITH STANDARD INTRAOCULAR LENS IMPLANT  02/2019; 3/2019    left eye; right eye     TONSILLECTOMY       Current Outpatient Medications   Medication Sig Dispense Refill     albuterol (PROAIR HFA/PROVENTIL HFA/VENTOLIN HFA) 108 (90 BASE) MCG/ACT Inhaler Inhale 2 puffs into the lungs every 4 hours as needed for other (coughing) 1 Inhaler 1     aspirin 81 MG tablet Take 1 tablet by mouth daily.  3     benzonatate (TESSALON) 100 MG capsule Take 1 capsule (100 mg) by mouth 3 times daily as needed for cough 30 capsule 0     blood glucose (ACCU-CHEK DAYANARA PLUS) test strip TEST THREE TIMES A DAY OR AS DIRECTED 300 each 1      blood glucose (NO BRAND SPECIFIED) lancing device Use to test blood sugars 3 times daily or as directed. 1 each 0     blood glucose monitoring (ONE TOUCH DELICA) lancets Use to test blood sugars 3 times daily or as directed. 300 each 3     cetirizine (ZYRTEC) 10 MG tablet Take 10 mg by mouth At Bedtime       clobetasol (TEMOVATE) 0.05 % external ointment Apply topically 2 times daily 30 g 0     diclofenac (VOLTAREN) 1 % topical gel Apply up to 4 grams of 1% gel to knees up to 4 times daily as needed for joint pain (maximum: 8 g per lower extremity per day) 400 g 3     dulaglutide (TRULICITY) 1.5 MG/0.5ML pen Inject 1.5 mg Subcutaneous every 7 days 9 mL 3     folic acid (FOLVITE) 1 MG tablet Take 1 tablet (1 mg) by mouth daily 100 tablet 2     hydrochlorothiazide (MICROZIDE) 12.5 MG capsule Take 1 capsule (12.5 mg) by mouth daily 30 capsule 2     HYDROcodone-acetaminophen (NORCO) 5-325 MG tablet Take 1-2 tablets by mouth every 4 hours as needed for moderate to severe pain maximum 4 tablet(s) per day 28 tablet 0     lisinopril (ZESTRIL) 20 MG tablet Take 1 tablet (20 mg) by mouth daily 90 tablet 1     metFORMIN (GLUCOPHAGE) 500 MG tablet TAKE ONE TABLET BY MOUTH TWICE A DAY WITH MEALS 180 tablet 0     methotrexate sodium 2.5 MG TABS Take 7 tablets (17.5 mg) by mouth once a week . Take all 7 tablets on the same day of each week. 84 tablet 1     metoprolol succinate ER (TOPROL-XL) 25 MG 24 hr tablet TAKE TWO TABLETS BY MOUTH ONCE DAILY 180 tablet 2     omeprazole (PRILOSEC) 40 MG DR capsule TAKE ONE CAPSULE BY MOUTH ONCE DAILY 90 capsule 0     order for DME Equipment being ordered: specialized shoes for diabetic neuropathy as well as all supplies provided for annual treatment of diabetes neuropathy 1 Device 0     order for DME Equipment being ordered: glucometer 1 Device 0     order for DME Equipment being ordered: specialized shoes for diabetic neuropathy     His orthotics will be made by [facility] [employer]      Tim  Delon, Certified   [email or web address]  Phone: [phone]  Fax: [phone]  [address] [place] [address] 1 Device 0     pravastatin (PRAVACHOL) 40 MG tablet Take 0.5 tablets (20 mg) by mouth every other day ++NEED RECHECK++ 23 tablet 0     predniSONE (DELTASONE) 5 MG tablet For RA flare only: Prednisone 20mg daily x5days, then 15mg daily x5days, then 10mg daily x5days, then 5mg daily x5days, then stop. 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) With a 12 hour free period prn every 4-6 hours 15 mg 2     OTC products: None, except as noted above    No Known Allergies   Latex Allergy: NO    Social History     Tobacco Use     Smoking status: Former Smoker     Packs/day: 1.00     Years: 40.00     Pack years: 40.00     Types: Cigarettes     Quit date: 3/16/2007     Years since quittin.7     Smokeless tobacco: Never Used   Substance Use Topics     Alcohol use: No     Alcohol/week: 0.0 standard drinks     History   Drug Use No       REVIEW OF SYSTEMS:   CONSTITUTIONAL: NEGATIVE for fever, chills, change in weight  ENT/MOUTH: NEGATIVE for ear, mouth and throat problems  RESP: NEGATIVE for significant cough or SOB  CV: NEGATIVE for chest pain, palpitations or peripheral edema  Remainder of the review of systems is negative , recent dental implants with some gum pain / gingivitis   EXAM:   /79   Pulse 62   Temp 97.5  F (36.4  C) (Oral)   Resp 16   Wt 90.1 kg (198 lb 9.6 oz)   SpO2 92%   BMI 29.33 kg/m    GENERAL APPEARANCE: healthy, alert and no distress  HENT: ear canals and TM's normal and nose and mouth without ulcers or  lesions  RESP: lungs clear to auscultation - no rales, rhonchi or wheezes  CV: regular rate and rhythm, normal S1 S2, no S3 or S4 and no murmur, click or rub   ABDOMEN: soft, nontender, no HSM or masses and bowel sounds normal  MS: extremities normal- no gross deformities noted and middle toe of left foot demonstrates some nonhealing superficial evidence of decreased blood flow  NEURO: Normal strength and tone, sensory exam grossly normal, mentation intact and speech normal    DIAGNOSTICS:     Orders Placed This Encounter   Procedures     Basic metabolic panel     Lipid panel reflex to direct LDL Fasting     Echocardiogram done 2/27/20 with normal sinus rhythm     Recent Labs   Lab Test 12/11/20  1436 07/28/20  1148 07/08/20  1022 01/03/20  1219 01/03/20  1219   HGB 11.9* 13.0* 14.6   < >  --     173 202   < >  --      --  135  --   --    POTASSIUM 5.4*  --  4.3  --   --    CR 1.67* 1.18 1.16   < >  --    A1C 6.7*  --   --   --  6.0*    < > = values in this interval not displayed.        IMPRESSION:   Reason for surgery/procedure: evaluate for potential peripheral arterial disease that's amenable to vascular intervention  Diagnosis/reason for consult: assess and minimize preoperative risk per consulting surgeon     The proposed surgical procedure is considered INTERMEDIATE risk.    REVISED CARDIAC RISK INDEX  The patient has the following serious cardiovascular risks for perioperative complications such as (MI, PE, VFib and 3  AV Block):  No serious cardiac risks  INTERPRETATION: 0 risks: Class I (very low risk - 0.4% complication rate)    The patient has the following additional risks for perioperative complications:  No identified additional risks      ICD-10-CM    1. Preop general physical exam  Z01.818    2. Hyperlipidemia LDL goal <100  E78.5 atorvastatin (LIPITOR) 40 MG tablet     Lipid panel reflex to direct LDL Fasting   3. Stage 3b chronic kidney disease  N18.32 Basic metabolic panel        RECOMMENDATIONS:     Over last few weeks patient was in terrible oral pain secondary to having had dental implants surgery followed by removal of some excessive gum tissue. Due to his pain he spoke with the dental doctor and was okayed to take ibuprofen which he used regularly for about a week and has since stopped taking this about a week ago. I think this likely does account for why we saw his gfr [ glomerular filtration rate ] drop from the low 50's to the upper 30's last week. We agree to recheck laboratory studies today with further follow up depending on the test results. His procedure is planned to be a lower extremity arteriogram with the possibility of stents or other procedures to fix arterial blockages if they are found      --Patient is to take all scheduled medications on the day of surgery EXCEPT for modifications listed below.    Take Trulicity (dulaglutide) as usual on Fridays  Do not take the MICROZIDE  (hydrochlorothiazide, USP 12.5 mg) morning of the surgery   You have been off of the Metformin ( Glucophage)  As per directions from Dr. Lopes and you won't restart this one until after surgery   Take the methotrexate as usual on Sunday nights  Take all other regularly scheduled medications     APPROVAL GIVEN to proceed with proposed procedure, without further diagnostic evaluation       Signed Electronically by: Kapil Duckworth MD    Copy of this evaluation report is provided to requesting physician.    [facility] Preop Guidelines    Revised Cardiac Risk Index

## 2020-12-16 ENCOUNTER — TELEPHONE (OUTPATIENT)
Dept: INTERVENTIONAL RADIOLOGY/VASCULAR | Facility: CLINIC | Age: 75
End: 2020-12-16

## 2020-12-16 LAB
SARS-COV-2 RNA SPEC QL NAA+PROBE: NOT DETECTED
SPECIMEN SOURCE: NORMAL

## 2020-12-16 NOTE — TELEPHONE ENCOUNTER
Pt was called for pre-procedure/COVID.  COVID test pending.  Pt had all questions answered and is to remain NPO 4 hrs in advance. He also has a ride lined up.

## 2020-12-17 ENCOUNTER — HOSPITAL ENCOUNTER (OUTPATIENT)
Facility: CLINIC | Age: 75
Discharge: HOME | End: 2020-12-17
Attending: SURGERY | Admitting: SURGERY
Payer: COMMERCIAL

## 2020-12-17 ENCOUNTER — APPOINTMENT (OUTPATIENT)
Dept: SURGERY | Facility: PHYSICIAN GROUP | Age: 75
End: 2020-12-17
Payer: COMMERCIAL

## 2020-12-17 ENCOUNTER — APPOINTMENT (OUTPATIENT)
Dept: INTERVENTIONAL RADIOLOGY/VASCULAR | Facility: CLINIC | Age: 75
End: 2020-12-17
Attending: SURGERY
Payer: COMMERCIAL

## 2020-12-17 ENCOUNTER — APPOINTMENT (OUTPATIENT)
Dept: ULTRASOUND IMAGING | Facility: CLINIC | Age: 75
End: 2020-12-17
Attending: SURGERY
Payer: COMMERCIAL

## 2020-12-17 VITALS
BODY MASS INDEX: 29.78 KG/M2 | TEMPERATURE: 97.7 F | SYSTOLIC BLOOD PRESSURE: 116 MMHG | OXYGEN SATURATION: 95 % | RESPIRATION RATE: 16 BRPM | DIASTOLIC BLOOD PRESSURE: 98 MMHG | HEART RATE: 60 BPM | HEIGHT: 69 IN | WEIGHT: 201.06 LBS

## 2020-12-17 DIAGNOSIS — L97.509 TOE ULCER (H): ICD-10-CM

## 2020-12-17 DIAGNOSIS — I73.9 PAD (PERIPHERAL ARTERY DISEASE) (H): Primary | ICD-10-CM

## 2020-12-17 LAB
APTT PPP: 28 SEC (ref 22–37)
CHOLEST SERPL-MCNC: 166 MG/DL
CREAT SERPL-MCNC: 1.81 MG/DL (ref 0.66–1.25)
ERYTHROCYTE [DISTWIDTH] IN BLOOD BY AUTOMATED COUNT: 13.9 % (ref 10–15)
GFR SERPL CREATININE-BSD FRML MDRD: 35 ML/MIN/{1.73_M2}
HBA1C MFR BLD: 6.8 % (ref 0–5.6)
HCT VFR BLD AUTO: 34.1 % (ref 40–53)
HDLC SERPL-MCNC: 35 MG/DL
HGB BLD-MCNC: 11.5 G/DL (ref 13.3–17.7)
INR PPP: 1.21 (ref 0.86–1.14)
LDLC SERPL CALC-MCNC: 90 MG/DL
MCH RBC QN AUTO: 30.2 PG (ref 26.5–33)
MCHC RBC AUTO-ENTMCNC: 33.7 G/DL (ref 31.5–36.5)
MCV RBC AUTO: 90 FL (ref 78–100)
NONHDLC SERPL-MCNC: 131 MG/DL
PLATELET # BLD AUTO: 304 10E9/L (ref 150–450)
RBC # BLD AUTO: 3.81 10E12/L (ref 4.4–5.9)
TRIGL SERPL-MCNC: 204 MG/DL
WBC # BLD AUTO: 4.6 10E9/L (ref 4–11)

## 2020-12-17 PROCEDURE — 85027 COMPLETE CBC AUTOMATED: CPT | Performed by: SURGERY

## 2020-12-17 PROCEDURE — 36246 INS CATH ABD/L-EXT ART 2ND: CPT | Performed by: SURGERY

## 2020-12-17 PROCEDURE — 36415 COLL VENOUS BLD VENIPUNCTURE: CPT

## 2020-12-17 PROCEDURE — 85730 THROMBOPLASTIN TIME PARTIAL: CPT | Performed by: SURGERY

## 2020-12-17 PROCEDURE — 258N000003 HC RX IP 258 OP 636: Performed by: SURGERY

## 2020-12-17 PROCEDURE — C1769 GUIDE WIRE: HCPCS

## 2020-12-17 PROCEDURE — 75710 ARTERY X-RAYS ARM/LEG: CPT | Mod: 26 | Performed by: SURGERY

## 2020-12-17 PROCEDURE — C1760 CLOSURE DEV, VASC: HCPCS

## 2020-12-17 PROCEDURE — 75710 ARTERY X-RAYS ARM/LEG: CPT | Mod: LT

## 2020-12-17 PROCEDURE — 93970 EXTREMITY STUDY: CPT

## 2020-12-17 PROCEDURE — 82565 ASSAY OF CREATININE: CPT | Performed by: SURGERY

## 2020-12-17 PROCEDURE — 272N000567 HC SHEATH CR4

## 2020-12-17 PROCEDURE — 250N000009 HC RX 250: Performed by: SURGERY

## 2020-12-17 PROCEDURE — 76937 US GUIDE VASCULAR ACCESS: CPT | Mod: 26 | Performed by: SURGERY

## 2020-12-17 PROCEDURE — 272N000196 HC ACCESSORY CR5

## 2020-12-17 PROCEDURE — 85610 PROTHROMBIN TIME: CPT | Performed by: SURGERY

## 2020-12-17 PROCEDURE — 250N000011 HC RX IP 250 OP 636: Performed by: SURGERY

## 2020-12-17 PROCEDURE — 36246 INS CATH ABD/L-EXT ART 2ND: CPT

## 2020-12-17 PROCEDURE — 999N000012 HC STATISTIC ANGIOGRAM, STENT, VERTEBRO PLASTY

## 2020-12-17 PROCEDURE — 255N000002 HC RX 255 OP 636: Performed by: SURGERY

## 2020-12-17 PROCEDURE — 99152 MOD SED SAME PHYS/QHP 5/>YRS: CPT | Performed by: SURGERY

## 2020-12-17 PROCEDURE — 272N000568 HC SHEATH CR5

## 2020-12-17 PROCEDURE — 80061 LIPID PANEL: CPT | Performed by: SURGERY

## 2020-12-17 PROCEDURE — 83036 HEMOGLOBIN GLYCOSYLATED A1C: CPT | Performed by: SURGERY

## 2020-12-17 PROCEDURE — 36247 INS CATH ABD/L-EXT ART 3RD: CPT

## 2020-12-17 PROCEDURE — 272N000116 HC CATH CR1

## 2020-12-17 RX ORDER — SODIUM CHLORIDE 9 MG/ML
INJECTION, SOLUTION INTRAVENOUS CONTINUOUS
Status: DISCONTINUED | OUTPATIENT
Start: 2020-12-17 | End: 2020-12-17 | Stop reason: HOSPADM

## 2020-12-17 RX ORDER — NICOTINE POLACRILEX 4 MG
15-30 LOZENGE BUCCAL
Status: DISCONTINUED | OUTPATIENT
Start: 2020-12-17 | End: 2020-12-17 | Stop reason: HOSPADM

## 2020-12-17 RX ORDER — ACETAMINOPHEN 500 MG
500 TABLET ORAL EVERY 6 HOURS PRN
Status: DISCONTINUED | OUTPATIENT
Start: 2020-12-17 | End: 2020-12-17 | Stop reason: HOSPADM

## 2020-12-17 RX ORDER — HEPARIN SODIUM 200 [USP'U]/100ML
1 INJECTION, SOLUTION INTRAVENOUS CONTINUOUS PRN
Status: DISCONTINUED | OUTPATIENT
Start: 2020-12-17 | End: 2020-12-17 | Stop reason: HOSPADM

## 2020-12-17 RX ORDER — BLOOD SUGAR DIAGNOSTIC
STRIP MISCELLANEOUS
Qty: 300 EACH | Refills: 1 | Status: SHIPPED | OUTPATIENT
Start: 2020-12-17 | End: 2021-08-16

## 2020-12-17 RX ORDER — FENTANYL CITRATE 50 UG/ML
25-50 INJECTION, SOLUTION INTRAMUSCULAR; INTRAVENOUS EVERY 5 MIN PRN
Status: DISCONTINUED | OUTPATIENT
Start: 2020-12-17 | End: 2020-12-17 | Stop reason: HOSPADM

## 2020-12-17 RX ORDER — DEXTROSE MONOHYDRATE 25 G/50ML
25-50 INJECTION, SOLUTION INTRAVENOUS
Status: DISCONTINUED | OUTPATIENT
Start: 2020-12-17 | End: 2020-12-17 | Stop reason: HOSPADM

## 2020-12-17 RX ORDER — LIDOCAINE 40 MG/G
CREAM TOPICAL
Status: DISCONTINUED | OUTPATIENT
Start: 2020-12-17 | End: 2020-12-17 | Stop reason: HOSPADM

## 2020-12-17 RX ORDER — IODIXANOL 320 MG/ML
150 INJECTION, SOLUTION INTRAVASCULAR ONCE
Status: COMPLETED | OUTPATIENT
Start: 2020-12-17 | End: 2020-12-17

## 2020-12-17 RX ORDER — HEPARIN SODIUM 1000 [USP'U]/ML
3000-8000 INJECTION, SOLUTION INTRAVENOUS; SUBCUTANEOUS
Status: DISCONTINUED | OUTPATIENT
Start: 2020-12-17 | End: 2020-12-17 | Stop reason: HOSPADM

## 2020-12-17 RX ORDER — NALOXONE HYDROCHLORIDE 0.4 MG/ML
0.2 INJECTION, SOLUTION INTRAMUSCULAR; INTRAVENOUS; SUBCUTANEOUS
Status: DISCONTINUED | OUTPATIENT
Start: 2020-12-17 | End: 2020-12-17 | Stop reason: HOSPADM

## 2020-12-17 RX ORDER — NALOXONE HYDROCHLORIDE 0.4 MG/ML
0.4 INJECTION, SOLUTION INTRAMUSCULAR; INTRAVENOUS; SUBCUTANEOUS
Status: DISCONTINUED | OUTPATIENT
Start: 2020-12-17 | End: 2020-12-17 | Stop reason: HOSPADM

## 2020-12-17 RX ORDER — FLUMAZENIL 0.1 MG/ML
0.2 INJECTION, SOLUTION INTRAVENOUS
Status: DISCONTINUED | OUTPATIENT
Start: 2020-12-17 | End: 2020-12-17 | Stop reason: HOSPADM

## 2020-12-17 RX ADMIN — HEPARIN SODIUM 1 BAG: 200 INJECTION, SOLUTION INTRAVENOUS at 08:44

## 2020-12-17 RX ADMIN — MIDAZOLAM HYDROCHLORIDE 1 MG: 1 INJECTION, SOLUTION INTRAMUSCULAR; INTRAVENOUS at 08:41

## 2020-12-17 RX ADMIN — FENTANYL CITRATE 50 MCG: 50 INJECTION, SOLUTION INTRAMUSCULAR; INTRAVENOUS at 08:49

## 2020-12-17 RX ADMIN — LIDOCAINE HYDROCHLORIDE 8 ML: 10 INJECTION, SOLUTION INFILTRATION; PERINEURAL at 09:12

## 2020-12-17 RX ADMIN — FENTANYL CITRATE 50 MCG: 50 INJECTION, SOLUTION INTRAMUSCULAR; INTRAVENOUS at 09:10

## 2020-12-17 RX ADMIN — IODIXANOL 30 ML: 320 INJECTION, SOLUTION INTRAVASCULAR at 09:19

## 2020-12-17 RX ADMIN — HEPARIN SODIUM 1 BAG: 200 INJECTION, SOLUTION INTRAVENOUS at 08:42

## 2020-12-17 RX ADMIN — SODIUM CHLORIDE: 9 INJECTION, SOLUTION INTRAVENOUS at 06:59

## 2020-12-17 RX ADMIN — MIDAZOLAM HYDROCHLORIDE 0.5 MG: 1 INJECTION, SOLUTION INTRAMUSCULAR; INTRAVENOUS at 08:55

## 2020-12-17 ASSESSMENT — MIFFLIN-ST. JEOR: SCORE: 1632.38

## 2020-12-17 NOTE — PROGRESS NOTES
Care Suites Post Procedure Note    Patient Information  Name: Zurdo Dash  Age: 75 year old    Post Procedure  Time patient returned to Care Suites: 0930  Concerns/abnormal assessment: none  If abnormal assessment, provider notified: N/A  Plan/Other: surgical consult and ultrasound post procedure    Pascale Zayas RN

## 2020-12-17 NOTE — PROGRESS NOTES
patient Name:  Zurdo Dash    Medical Record Number: [id number]  Today's Date:  December 17, 2020  Procedure: left leg angiogram  Start Time: 0841  End of procedure time: 0920    Report given to: care suite RN  Time pt departs:  0925       Notes:       Pt transferred to IR table. Prepped and draped appropriately. Monitoring equipment applied. NC applied. No complaints of pain at this time. Timeout recorded.       VSS. Pt remains on RA. No c/o pain at this time. Right Groin site closed with mynx device CDI, soft. No further questions from RN or pt.      Versed 1.5mg, Fentanyl 100mcg.

## 2020-12-17 NOTE — PRE-PROCEDURE
GENERAL PRE-PROCEDURE:   Procedure:  Left leg angiogram, possible endovascular revascularization  Date/Time:  12/17/2020 8:20 AM    Verbal consent obtained?: Yes    Written consent obtained?: Yes    Risks and benefits: Risks, benefits and alternatives were discussed    Consent given by:  Patient  Patient states understanding of procedure being performed: Yes    Patient's understanding of procedure matches consent: Yes    Procedure consent matches procedure scheduled: Yes    Appropriately NPO:  Yes  ASA Class:  Class 3- Severe systemic disease, definite functional limitations  Mallampati  :  Grade 2- soft palate, base of uvula, tonsillar pillars, and portion of posterior pharyngeal wall visible  Lungs:  Lungs clear with good breath sounds bilaterally  Heart:  Normal heart sounds and rate  History & Physical reviewed:  History and physical reviewed and no updates needed  Statement of review:  I have reviewed the lab findings, diagnostic data, medications, and the plan for sedation

## 2020-12-17 NOTE — TELEPHONE ENCOUNTER
Prescription approved per [facility] Refill Protocol.  Sandhya Vasques RN

## 2020-12-17 NOTE — PROGRESS NOTES
Care Suites Admission Nursing Note    Patient Information  Name: Zurdo Dash  Age: 75 year old  Reason for admission: leg angiogram   Care Suites arrival time: 0630      Patient Admission/Assessment   Pre-procedure assessment complete: Yes  If abnormal assessment/labs, provider notified: N/A  NPO: Yes  Medications held per instructions/orders: Yes  Consent: deferred  If applicable, pregnancy test status: deferred  Patient oriented to room: Yes  Education/questions answered: Yes  Plan/other: angiogram     Discharge Planning  Discharge name/phone number: Yoli    Overnight post sedation caregiver: Yoli   Discharge location: home    Pascale Zayas RN

## 2020-12-17 NOTE — DISCHARGE INSTRUCTIONS
Peripheral Angiogram Discharge Instructions - Femoral     After you go home:      Have an adult stay with you until tomorrow.    Drink extra fluids for 2 days.    You may resume your normal diet.    No smoking       For 24 hours - due to the sedation you received:    Relax and take it easy.    Do NOT make any important or legal decisions.    Do NOT drive or operate machines at home or at work.    Do NOT drink alcohol.    Care of Groin Puncture Site:      For the first 24 hrs - check the puncture site every 1-2 hours while awake.    For 2 days, when you cough, sneeze, laugh or move your bowels, hold your hand over the puncture site and press firmly.    Remove the bandaid after 24 hours. If there is minor oozing, apply another bandaid and remove it after 12 hours.    It is normal to have a small bruise or pea size lump at the site.    You may shower tomorrow.  Do NOT take a bath, or use a hot tub or pool for at least 3 days. Do NOT scrub the site. Do not use lotion or powder near the puncture site.     Activity:            For 2 days:    No stooping or squatting    Do NOT do any heavy activity such as exercise, lifting, or straining.     No housework, yard work or any activity that make you sweat    Do NOT lift more than 10 pounds    Bleeding:      If you start bleeding from the site in your groin, lie down flat and press firmly on/above the site for 10 minutes.     Once bleeding stops, lay flat for 2 hours.     [facility] as soon as you can.       Call 911 right away if you have heavy bleeding or bleeding that does not stop.      Medicines:      If you are on Metformin (Glucophage) and your GFR (kidney function level) is >30, you may continue taking your Metformin.    If you are on Metformin (Glucophage) and your GFR (kidney function level) is <30, do not restart the Metformin for 48 hours after your procedure. Check with your primary care giver before restarting the Metformin to see if you need  to have blood drawn to recheck your kidney function (GFR).    If you are taking an antiplatelet medication such as Plavix, do not stop taking it until you talk to your provider.       Take your medications, including blood thinners, unless your provider tells you not to.      If you take Coumadin (Warfarin), have your INR checked by your provider in  3-5 days. Call your clinic to schedule this.    If you have stopped any medicines, check with your provider about when to restart them.        Follow Up Appointments:      Follow up with [facility] as directed.    Call the clinic if:      You have increased pain or a large or growing hard lump around the site.    The site is red, swollen, hot or tender.    Blood or fluid is draining from the site.    You have chills or a fever greater than 101 F (38 C).    Your leg feels numb, cool or changes color.    You have hives, a rash or unusual itching.    New pain in the back or belly that you cannot control with Tylenol.    Any questions or concerns.    Other Instructions:      If you received a stent - carry your stent card with you at all times.      If you have questions or your original symptoms do not improve, call:         [facility] @ [phone]

## 2020-12-17 NOTE — PROGRESS NOTES
Care Suites Discharge Nursing Note    Patient Information  Name: Zurdo Dash  Age: 75 year old    Discharge Education:  Discharge instructions reviewed: Yes  Additional education/resources provided: none  Patient/patient representative verbalizes understanding: Yes  Patient discharging on new medications: No  Medication education completed: N/A    Discharge Plans:   Discharge location: home  Discharge ride contacted: Yes  Approximate discharge time: 1240    Discharge Criteria:  Discharge criteria met and vital signs stable: Yes    Patient Belongs:  Patient belongings returned to patient: Yes    Pascale Zayas RN

## 2020-12-17 NOTE — PROGRESS NOTES
PATIENT/VISITOR WELLNESS SCREENING    Step 1 Patient Screening    1. In the last month, have you been in contact with someone who was confirmed or suspected to have Coronavirus/COVID-19? No    2. Do you have the following symptoms?  Fever/Chills? No   Cough? No   Shortness of breath? No   New loss of taste or smell? No  Sore throat? No  Muscle or body aches? No  Headaches? No  Fatigue? No  Vomiting or diarrhea? No      NO SYMPTOM(S)    ACTIONS:  1. Standard rooming process  2. Provider to assess per normal protocol  3. Implement precautions as needed and per guidelines     POSITIVE SYMPTOM(S)  If positive for ANY of the following symptoms: fever, cough, shortness of breath, rash    ACTION:  1. Continue to have the patient wear a mask   2. Room patient as soon as possible  3. Don appropriate PPE when entering room  4. Provider evaluation

## 2020-12-22 ENCOUNTER — TELEPHONE (OUTPATIENT)
Dept: OTHER | Facility: CLINIC | Age: 75
End: 2020-12-22

## 2020-12-22 DIAGNOSIS — I73.9 PAD (PERIPHERAL ARTERY DISEASE) (H): Primary | ICD-10-CM

## 2020-12-23 PROBLEM — I73.9 PAD (PERIPHERAL ARTERY DISEASE) (H): Status: ACTIVE | Noted: 2020-12-23

## 2020-12-28 ENCOUNTER — TELEPHONE (OUTPATIENT)
Dept: INTERNAL MEDICINE | Facility: CLINIC | Age: 75
End: 2020-12-28

## 2020-12-29 DIAGNOSIS — Z11.59 ENCOUNTER FOR SCREENING FOR OTHER VIRAL DISEASES: Primary | ICD-10-CM

## 2020-12-29 NOTE — TELEPHONE ENCOUNTER
The 81 milligram aspirin is not an issue. It's the large milligram dosages of over the counter nonprescription Naproxen [Aleve] and ibuprofen to be avoided    If he was asked to stop his aspirin before the planned procedure , that would be per his surgeon    Kapil Duckworth MD

## 2020-12-29 NOTE — TELEPHONE ENCOUNTER
Patient informed of provider note as below  Patient verbalized understanding    Alyse Cullen BSN, RN, CPN

## 2020-12-29 NOTE — TELEPHONE ENCOUNTER
Left message on answering machine for patient to call back to the nurse at [phone].    Mia Artis RN  [facility]

## 2021-01-04 DIAGNOSIS — Z11.59 ENCOUNTER FOR SCREENING FOR OTHER VIRAL DISEASES: ICD-10-CM

## 2021-01-05 LAB
SARS-COV-2 RNA SPEC QL NAA+PROBE: NORMAL
SPECIMEN SOURCE: NORMAL

## 2021-01-06 ENCOUNTER — ANESTHESIA EVENT (OUTPATIENT)
Dept: SURGERY | Facility: CLINIC | Age: 76
DRG: 253 | End: 2021-01-06
Payer: COMMERCIAL

## 2021-01-06 ENCOUNTER — TELEPHONE (OUTPATIENT)
Dept: OTHER | Facility: CLINIC | Age: 76
End: 2021-01-06

## 2021-01-06 LAB
LABORATORY COMMENT REPORT: NORMAL
SARS-COV-2 RNA RESP QL NAA+PROBE: NEGATIVE
SPECIMEN SOURCE: NORMAL

## 2021-01-06 RX ORDER — METOPROLOL SUCCINATE 25 MG/1
50 TABLET, EXTENDED RELEASE ORAL EVERY MORNING
COMMUNITY
End: 2021-05-27

## 2021-01-06 RX ORDER — CLOBETASOL PROPIONATE 0.5 MG/G
OINTMENT TOPICAL 2 TIMES DAILY PRN
COMMUNITY
End: 2021-08-16

## 2021-01-06 RX ORDER — LISINOPRIL 10 MG/1
10 TABLET ORAL EVERY EVENING
COMMUNITY
End: 2021-03-02

## 2021-01-06 RX ORDER — PREDNISONE 5 MG/1
5-20 TABLET ORAL DAILY PRN
COMMUNITY
End: 2022-03-17

## 2021-01-06 ASSESSMENT — LIFESTYLE VARIABLES: TOBACCO_USE: 1

## 2021-01-06 NOTE — TELEPHONE ENCOUNTER
Type of surgery: LEFT FEMORAL TO ABOVE KNEE POPLITEAL ARTERY BYPASS  Location of surgery: [facility]  Date and time of surgery: 01/07/21 @ 10:25am  Surgeon: Dr. Lopes  Pre-Op Appt Date: pt to schedule at [facility]  Post-Op Appt Date: pt to schedule   Packet sent out: Yes  Pre-cert/Authorization completed:  Yes  Date: 01/06/21

## 2021-01-06 NOTE — PROGRESS NOTES
PTA medications updated by Medication Scribe prior to surgery via phone call with patient      -LAST DOSES ENTERED BY NURSE-    Comments:    Medication history sources: Patient, Surescripts and H&P  Medication history source reliability: Good  Adherence assessment: N/A Not Observed    Significant changes made to the medication list:  None      Additional medication history information:   None        Prior to Admission medications    Medication Sig Last Dose Taking? Auth Provider   aspirin 81 MG tablet Take 1 tablet by mouth every evening   at PM Yes Kapil Duckworth MD   atorvastatin (LIPITOR) 40 MG tablet Take 1 tablet (40 mg) by mouth daily  Patient taking differently: Take 40 mg by mouth every evening   at PM Yes Kapil Duckworth MD   cetirizine (ZYRTEC) 10 MG tablet Take 10 mg by mouth At Bedtime  at PM Yes Reported, Patient   clobetasol (TEMOVATE) 0.05 % external ointment Apply topically 2 times daily as needed MORE THAN 1 WEEK at PRN Yes Reported, Patient   diclofenac (VOLTAREN) 1 % topical gel Apply topically 4 times daily as needed for moderate pain MORE THAN 1 WEEK at PRN Yes Reported, Patient   dulaglutide (TRULICITY) 1.5 MG/0.5ML pen Inject 1.5 mg Subcutaneous every 7 days  Patient taking differently: Inject 1.5 mg Subcutaneous every 7 days ON FRIDAYS 1/1/2021 Yes Kapil Duckworth MD   folic acid (FOLVITE) 1 MG tablet Take 1 tablet (1 mg) by mouth daily  at AM Yes Albert Tompkins MD   hydrochlorothiazide (MICROZIDE) 12.5 MG capsule Take 1 capsule (12.5 mg) by mouth daily 1/6/2021 at AM Yes Kapil Duckworth MD   lisinopril (ZESTRIL) 10 MG tablet Take 10 mg by mouth every evening  at PM Yes Reported, Patient   metFORMIN (GLUCOPHAGE) 500 MG tablet TAKE ONE TABLET BY MOUTH TWICE A DAY WITH MEALS  Patient taking differently: Take 500 mg by mouth 2 times daily   Yes Elli Zapata APRN CNP   methotrexate sodium 2.5 MG TABS Take 7 tablets (17.5 mg) by mouth once a week . Take all 7 tablets on the same day of  each week.  Patient taking differently: Take 17.5 mg by mouth once a week . Take all 7 tablets on the same day of each week.   On Sundays 1/3/2021 Yes Albert Tompkins MD   metoprolol succinate ER (TOPROL-XL) 25 MG 24 hr tablet Take 50 mg by mouth every morning (2 X 25MG)  at AM Yes Reported, Patient   omeprazole (PRILOSEC) 40 MG DR capsule TAKE ONE CAPSULE BY MOUTH ONCE DAILY  Patient taking differently: Take 40 mg by mouth every morning TAKE ONE CAPSULE BY MOUTH ONCE DAILY  at AM Yes Kapil Duckworth MD   predniSONE (DELTASONE) 5 MG tablet Take 5-20 mg by mouth daily as needed (Rheumatoid Arthritis Flare ups) MORE THAN 1 WEEK at PRN Yes Reported, Patient   tiZANidine (ZANAFLEX) 2 MG tablet Take 1 tablet (2 mg) by mouth 3 times daily as needed for muscle spasms MORE THAN 1 WEEK at PRN Yes Kapil Duckworth MD   ACCU-CHEK DAYANARA PLUS test strip TEST THREE TIMES A DAY OR AS DIRECTED   Kapil Duckworth MD   blood glucose (NO BRAND SPECIFIED) lancing device Use to test blood sugars 3 times daily or as directed.   Kapil Duckworth MD   blood glucose monitoring (ONE TOUCH DELICA) lancets Use to test blood sugars 3 times daily or as directed.   Kapil Duckworth MD   order for DME Equipment being ordered: specialized shoes for diabetic neuropathy as well as all supplies provided for annual treatment of diabetes neuropathy   Kapil Duckworth MD   order for DME Equipment being ordered: glucometer   Kapil Duckworth MD   order for DME Equipment being ordered: specialized shoes for diabetic neuropathy     His orthotics will be made by [employer]      Tim Jernigan, Certified   [email or web address]  Phone: [phone]  Fax: [phone]  [address] [place] [address]   Kapil Duckworth MD

## 2021-01-07 ENCOUNTER — APPOINTMENT (OUTPATIENT)
Dept: SURGERY | Facility: PHYSICIAN GROUP | Age: 76
End: 2021-01-07
Payer: COMMERCIAL

## 2021-01-07 ENCOUNTER — HOSPITAL ENCOUNTER (INPATIENT)
Facility: CLINIC | Age: 76
LOS: 2 days | Discharge: HOME | DRG: 253 | End: 2021-01-09
Attending: SURGERY | Admitting: SURGERY
Payer: COMMERCIAL

## 2021-01-07 ENCOUNTER — SURGERY (OUTPATIENT)
Age: 76
End: 2021-01-07
Payer: COMMERCIAL

## 2021-01-07 ENCOUNTER — ANESTHESIA (OUTPATIENT)
Dept: SURGERY | Facility: CLINIC | Age: 76
DRG: 253 | End: 2021-01-07
Payer: COMMERCIAL

## 2021-01-07 DIAGNOSIS — I73.9 PAD (PERIPHERAL ARTERY DISEASE) (H): ICD-10-CM

## 2021-01-07 LAB
ABO + RH BLD: NORMAL
ABO + RH BLD: NORMAL
ALBUMIN SERPL-MCNC: 3.3 G/DL (ref 3.4–5)
ALP SERPL-CCNC: 58 U/L (ref 40–150)
ALT SERPL W P-5'-P-CCNC: 24 U/L (ref 0–70)
ANION GAP SERPL CALCULATED.3IONS-SCNC: 6 MMOL/L (ref 3–14)
AST SERPL W P-5'-P-CCNC: 18 U/L (ref 0–45)
BILIRUB SERPL-MCNC: 0.7 MG/DL (ref 0.2–1.3)
BLD GP AB SCN SERPL QL: NORMAL
BLOOD BANK CMNT PATIENT-IMP: NORMAL
BUN SERPL-MCNC: 37 MG/DL (ref 7–30)
CALCIUM SERPL-MCNC: 8.7 MG/DL (ref 8.5–10.1)
CHLORIDE SERPL-SCNC: 110 MMOL/L (ref 94–109)
CHOLEST SERPL-MCNC: 116 MG/DL
CO2 SERPL-SCNC: 22 MMOL/L (ref 20–32)
CREAT SERPL-MCNC: 1.61 MG/DL (ref 0.66–1.25)
GFR SERPL CREATININE-BSD FRML MDRD: 41 ML/MIN/{1.73_M2}
GLUCOSE BLDC GLUCOMTR-MCNC: 129 MG/DL (ref 70–99)
GLUCOSE BLDC GLUCOMTR-MCNC: 130 MG/DL (ref 70–99)
GLUCOSE BLDC GLUCOMTR-MCNC: 182 MG/DL (ref 70–99)
GLUCOSE BLDC GLUCOMTR-MCNC: 215 MG/DL (ref 70–99)
GLUCOSE SERPL-MCNC: 144 MG/DL (ref 70–99)
HBA1C MFR BLD: 6.8 % (ref 0–5.6)
HDLC SERPL-MCNC: 38 MG/DL
HGB BLD-MCNC: 10.2 G/DL (ref 13.3–17.7)
LDLC SERPL CALC-MCNC: 45 MG/DL
NONHDLC SERPL-MCNC: 78 MG/DL
POTASSIUM SERPL-SCNC: 4.2 MMOL/L (ref 3.4–5.3)
PROT SERPL-MCNC: 6.5 G/DL (ref 6.8–8.8)
SODIUM SERPL-SCNC: 138 MMOL/L (ref 133–144)
SPECIMEN EXP DATE BLD: NORMAL
TRIGL SERPL-MCNC: 166 MG/DL

## 2021-01-07 PROCEDURE — 04UL0KZ SUPPLEMENT LEFT FEMORAL ARTERY WITH NONAUTOLOGOUS TISSUE SUBSTITUTE, OPEN APPROACH: ICD-10-PCS | Performed by: SURGERY

## 2021-01-07 PROCEDURE — 86901 BLOOD TYPING SEROLOGIC RH(D): CPT | Performed by: SURGERY

## 2021-01-07 PROCEDURE — 80053 COMPREHEN METABOLIC PANEL: CPT | Performed by: ANESTHESIOLOGY

## 2021-01-07 PROCEDURE — 80061 LIPID PANEL: CPT | Performed by: ANESTHESIOLOGY

## 2021-01-07 PROCEDURE — 258N000003 HC RX IP 258 OP 636: Performed by: NURSE ANESTHETIST, CERTIFIED REGISTERED

## 2021-01-07 PROCEDURE — 250N000011 HC RX IP 250 OP 636: Performed by: SURGERY

## 2021-01-07 PROCEDURE — 250N000009 HC RX 250: Performed by: NURSE ANESTHETIST, CERTIFIED REGISTERED

## 2021-01-07 PROCEDURE — 35656 BPG FEMORAL-POPLITEAL: CPT | Mod: LT | Performed by: PHYSICIAN ASSISTANT

## 2021-01-07 PROCEDURE — 250N000013 HC RX MED GY IP 250 OP 250 PS 637: Performed by: PHYSICIAN ASSISTANT

## 2021-01-07 PROCEDURE — 120N000001 HC R&B MED SURG/OB

## 2021-01-07 PROCEDURE — C1763 CONN TISS, NON-HUMAN: HCPCS | Performed by: SURGERY

## 2021-01-07 PROCEDURE — 250N000025 HC SEVOFLURANE, PER MIN: Performed by: SURGERY

## 2021-01-07 PROCEDURE — 86850 RBC ANTIBODY SCREEN: CPT | Performed by: SURGERY

## 2021-01-07 PROCEDURE — 99223 1ST HOSP IP/OBS HIGH 75: CPT | Performed by: INTERNAL MEDICINE

## 2021-01-07 PROCEDURE — 93005 ELECTROCARDIOGRAM TRACING: CPT

## 2021-01-07 PROCEDURE — 04CL0ZZ EXTIRPATION OF MATTER FROM LEFT FEMORAL ARTERY, OPEN APPROACH: ICD-10-PCS | Performed by: SURGERY

## 2021-01-07 PROCEDURE — C1768 GRAFT, VASCULAR: HCPCS | Performed by: SURGERY

## 2021-01-07 PROCEDURE — 93010 ELECTROCARDIOGRAM REPORT: CPT | Mod: 76 | Performed by: INTERNAL MEDICINE

## 2021-01-07 PROCEDURE — 041L0JL BYPASS LEFT FEMORAL ARTERY TO POPLITEAL ARTERY WITH SYNTHETIC SUBSTITUTE, OPEN APPROACH: ICD-10-PCS | Performed by: SURGERY

## 2021-01-07 PROCEDURE — 83036 HEMOGLOBIN GLYCOSYLATED A1C: CPT | Performed by: ANESTHESIOLOGY

## 2021-01-07 PROCEDURE — 88300 SURGICAL PATH GROSS: CPT | Mod: TC | Performed by: SURGERY

## 2021-01-07 PROCEDURE — 250N000012 HC RX MED GY IP 250 OP 636 PS 637: Performed by: PHYSICIAN ASSISTANT

## 2021-01-07 PROCEDURE — 258N000003 HC RX IP 258 OP 636: Performed by: PHYSICIAN ASSISTANT

## 2021-01-07 PROCEDURE — 85018 HEMOGLOBIN: CPT | Performed by: ANESTHESIOLOGY

## 2021-01-07 PROCEDURE — 999N000141 HC STATISTIC PRE-PROCEDURE NURSING ASSESSMENT: Performed by: SURGERY

## 2021-01-07 PROCEDURE — 86900 BLOOD TYPING SEROLOGIC ABO: CPT | Performed by: SURGERY

## 2021-01-07 PROCEDURE — 250N000011 HC RX IP 250 OP 636: Performed by: NURSE ANESTHETIST, CERTIFIED REGISTERED

## 2021-01-07 PROCEDURE — 250N000013 HC RX MED GY IP 250 OP 250 PS 637: Performed by: SURGERY

## 2021-01-07 PROCEDURE — 710N000009 HC RECOVERY PHASE 1, LEVEL 1, PER MIN: Performed by: SURGERY

## 2021-01-07 PROCEDURE — 360N000077 HC SURGERY LEVEL 4, PER MIN: Performed by: SURGERY

## 2021-01-07 PROCEDURE — 258N000003 HC RX IP 258 OP 636: Performed by: ANESTHESIOLOGY

## 2021-01-07 PROCEDURE — 250N000011 HC RX IP 250 OP 636: Performed by: PHYSICIAN ASSISTANT

## 2021-01-07 PROCEDURE — 88300 SURGICAL PATH GROSS: CPT | Mod: 26 | Performed by: PATHOLOGY

## 2021-01-07 PROCEDURE — 35656 BPG FEMORAL-POPLITEAL: CPT | Mod: LT | Performed by: SURGERY

## 2021-01-07 PROCEDURE — 999N001017 HC STATISTIC GLUCOSE BY METER IP

## 2021-01-07 PROCEDURE — 272N000001 HC OR GENERAL SUPPLY STERILE: Performed by: SURGERY

## 2021-01-07 PROCEDURE — 258N000003 HC RX IP 258 OP 636: Performed by: SURGERY

## 2021-01-07 PROCEDURE — 99222 1ST HOSP IP/OBS MODERATE 55: CPT | Performed by: INTERNAL MEDICINE

## 2021-01-07 PROCEDURE — 370N000017 HC ANESTHESIA TECHNICAL FEE, PER MIN: Performed by: SURGERY

## 2021-01-07 DEVICE — IMP PATCH PERICARDIUM PHOTOFIX BOVINE 0.8X8CM PFP0.8X8: Type: IMPLANTABLE DEVICE | Site: ILIAC/FEMORALS | Status: FUNCTIONAL

## 2021-01-07 DEVICE — GRAFT PROPATEN RR 6MMX50CM THIN WALL HT064050A: Type: IMPLANTABLE DEVICE | Site: ILIAC/FEMORALS | Status: FUNCTIONAL

## 2021-01-07 RX ORDER — METOPROLOL TARTRATE 1 MG/ML
5 INJECTION, SOLUTION INTRAVENOUS EVERY 6 HOURS
Status: DISCONTINUED | OUTPATIENT
Start: 2021-01-08 | End: 2021-01-07

## 2021-01-07 RX ORDER — SODIUM CHLORIDE, SODIUM LACTATE, POTASSIUM CHLORIDE, CALCIUM CHLORIDE 600; 310; 30; 20 MG/100ML; MG/100ML; MG/100ML; MG/100ML
INJECTION, SOLUTION INTRAVENOUS CONTINUOUS
Status: DISCONTINUED | OUTPATIENT
Start: 2021-01-07 | End: 2021-01-07 | Stop reason: HOSPADM

## 2021-01-07 RX ORDER — SODIUM CHLORIDE 9 MG/ML
INJECTION, SOLUTION INTRAVENOUS CONTINUOUS
Status: DISCONTINUED | OUTPATIENT
Start: 2021-01-07 | End: 2021-01-08

## 2021-01-07 RX ORDER — ONDANSETRON 2 MG/ML
4 INJECTION INTRAMUSCULAR; INTRAVENOUS EVERY 30 MIN PRN
Status: DISCONTINUED | OUTPATIENT
Start: 2021-01-07 | End: 2021-01-07 | Stop reason: HOSPADM

## 2021-01-07 RX ORDER — ACETAMINOPHEN 325 MG/1
975 TABLET ORAL EVERY 8 HOURS
Status: DISCONTINUED | OUTPATIENT
Start: 2021-01-07 | End: 2021-01-09 | Stop reason: HOSPADM

## 2021-01-07 RX ORDER — CEFAZOLIN SODIUM 2 G/100ML
2 INJECTION, SOLUTION INTRAVENOUS
Status: COMPLETED | OUTPATIENT
Start: 2021-01-07 | End: 2021-01-07

## 2021-01-07 RX ORDER — GLYCOPYRROLATE 0.2 MG/ML
INJECTION, SOLUTION INTRAMUSCULAR; INTRAVENOUS PRN
Status: DISCONTINUED | OUTPATIENT
Start: 2021-01-07 | End: 2021-01-07

## 2021-01-07 RX ORDER — NALOXONE HYDROCHLORIDE 0.4 MG/ML
0.4 INJECTION, SOLUTION INTRAMUSCULAR; INTRAVENOUS; SUBCUTANEOUS
Status: DISCONTINUED | OUTPATIENT
Start: 2021-01-07 | End: 2021-01-08

## 2021-01-07 RX ORDER — PROTAMINE SULFATE 10 MG/ML
INJECTION, SOLUTION INTRAVENOUS PRN
Status: DISCONTINUED | OUTPATIENT
Start: 2021-01-07 | End: 2021-01-07

## 2021-01-07 RX ORDER — TIZANIDINE 2 MG/1
2 TABLET ORAL 3 TIMES DAILY PRN
Status: DISCONTINUED | OUTPATIENT
Start: 2021-01-07 | End: 2021-01-09 | Stop reason: HOSPADM

## 2021-01-07 RX ORDER — EPHEDRINE SULFATE 50 MG/ML
INJECTION, SOLUTION INTRAMUSCULAR; INTRAVENOUS; SUBCUTANEOUS PRN
Status: DISCONTINUED | OUTPATIENT
Start: 2021-01-07 | End: 2021-01-07

## 2021-01-07 RX ORDER — AMOXICILLIN 250 MG
1 CAPSULE ORAL 2 TIMES DAILY
Status: DISCONTINUED | OUTPATIENT
Start: 2021-01-07 | End: 2021-01-09 | Stop reason: HOSPADM

## 2021-01-07 RX ORDER — LISINOPRIL 10 MG/1
10 TABLET ORAL EVERY EVENING
Status: DISCONTINUED | OUTPATIENT
Start: 2021-01-07 | End: 2021-01-09 | Stop reason: HOSPADM

## 2021-01-07 RX ORDER — CALCIUM CARBONATE 500 MG/1
500 TABLET, CHEWABLE ORAL 4 TIMES DAILY PRN
Status: DISCONTINUED | OUTPATIENT
Start: 2021-01-07 | End: 2021-01-09 | Stop reason: HOSPADM

## 2021-01-07 RX ORDER — NICOTINE POLACRILEX 4 MG
15-30 LOZENGE BUCCAL
Status: DISCONTINUED | OUTPATIENT
Start: 2021-01-07 | End: 2021-01-09 | Stop reason: HOSPADM

## 2021-01-07 RX ORDER — HYDROMORPHONE HYDROCHLORIDE 1 MG/ML
0.2 INJECTION, SOLUTION INTRAMUSCULAR; INTRAVENOUS; SUBCUTANEOUS
Status: DISCONTINUED | OUTPATIENT
Start: 2021-01-07 | End: 2021-01-08

## 2021-01-07 RX ORDER — OXYCODONE HYDROCHLORIDE 5 MG/1
5 TABLET ORAL
Status: DISCONTINUED | OUTPATIENT
Start: 2021-01-07 | End: 2021-01-09 | Stop reason: HOSPADM

## 2021-01-07 RX ORDER — CETIRIZINE HYDROCHLORIDE 10 MG/1
10 TABLET ORAL AT BEDTIME
Status: DISCONTINUED | OUTPATIENT
Start: 2021-01-07 | End: 2021-01-09 | Stop reason: HOSPADM

## 2021-01-07 RX ORDER — NALOXONE HYDROCHLORIDE 0.4 MG/ML
0.2 INJECTION, SOLUTION INTRAMUSCULAR; INTRAVENOUS; SUBCUTANEOUS
Status: DISCONTINUED | OUTPATIENT
Start: 2021-01-07 | End: 2021-01-08

## 2021-01-07 RX ORDER — ASPIRIN 600 MG/1
600 SUPPOSITORY RECTAL ONCE
Status: COMPLETED | OUTPATIENT
Start: 2021-01-07 | End: 2021-01-07

## 2021-01-07 RX ORDER — ASPIRIN 81 MG/1
81 TABLET ORAL DAILY
Status: DISCONTINUED | OUTPATIENT
Start: 2021-01-08 | End: 2021-01-09 | Stop reason: HOSPADM

## 2021-01-07 RX ORDER — BUPIVACAINE HYDROCHLORIDE 5 MG/ML
INJECTION, SOLUTION EPIDURAL; INTRACAUDAL
Status: DISCONTINUED
Start: 2021-01-07 | End: 2021-01-07 | Stop reason: WASHOUT

## 2021-01-07 RX ORDER — DEXAMETHASONE SODIUM PHOSPHATE 4 MG/ML
INJECTION, SOLUTION INTRA-ARTICULAR; INTRALESIONAL; INTRAMUSCULAR; INTRAVENOUS; SOFT TISSUE PRN
Status: DISCONTINUED | OUTPATIENT
Start: 2021-01-07 | End: 2021-01-07

## 2021-01-07 RX ORDER — METOPROLOL SUCCINATE 50 MG/1
50 TABLET, EXTENDED RELEASE ORAL EVERY MORNING
Status: DISCONTINUED | OUTPATIENT
Start: 2021-01-08 | End: 2021-01-07

## 2021-01-07 RX ORDER — ACETAMINOPHEN 325 MG/1
650 TABLET ORAL EVERY 4 HOURS PRN
Status: DISCONTINUED | OUTPATIENT
Start: 2021-01-10 | End: 2021-01-09 | Stop reason: HOSPADM

## 2021-01-07 RX ORDER — HEPARIN SODIUM 1000 [USP'U]/ML
INJECTION, SOLUTION INTRAVENOUS; SUBCUTANEOUS PRN
Status: DISCONTINUED | OUTPATIENT
Start: 2021-01-07 | End: 2021-01-07 | Stop reason: HOSPADM

## 2021-01-07 RX ORDER — CLOPIDOGREL BISULFATE 75 MG/1
75 TABLET ORAL DAILY
Status: DISCONTINUED | OUTPATIENT
Start: 2021-01-08 | End: 2021-01-09 | Stop reason: HOSPADM

## 2021-01-07 RX ORDER — ATORVASTATIN CALCIUM 40 MG/1
40 TABLET, FILM COATED ORAL DAILY
Status: DISCONTINUED | OUTPATIENT
Start: 2021-01-07 | End: 2021-01-09 | Stop reason: HOSPADM

## 2021-01-07 RX ORDER — ONDANSETRON 4 MG/1
4 TABLET, ORALLY DISINTEGRATING ORAL EVERY 30 MIN PRN
Status: DISCONTINUED | OUTPATIENT
Start: 2021-01-07 | End: 2021-01-07 | Stop reason: HOSPADM

## 2021-01-07 RX ORDER — PROCHLORPERAZINE MALEATE 5 MG
5 TABLET ORAL EVERY 6 HOURS PRN
Status: DISCONTINUED | OUTPATIENT
Start: 2021-01-07 | End: 2021-01-08

## 2021-01-07 RX ORDER — LIDOCAINE HYDROCHLORIDE 20 MG/ML
INJECTION, SOLUTION INFILTRATION; PERINEURAL PRN
Status: DISCONTINUED | OUTPATIENT
Start: 2021-01-07 | End: 2021-01-07

## 2021-01-07 RX ORDER — ONDANSETRON 4 MG/1
4 TABLET, ORALLY DISINTEGRATING ORAL EVERY 6 HOURS PRN
Status: DISCONTINUED | OUTPATIENT
Start: 2021-01-07 | End: 2021-01-09 | Stop reason: HOSPADM

## 2021-01-07 RX ORDER — ONDANSETRON 2 MG/ML
4 INJECTION INTRAMUSCULAR; INTRAVENOUS EVERY 6 HOURS PRN
Status: DISCONTINUED | OUTPATIENT
Start: 2021-01-07 | End: 2021-01-09 | Stop reason: HOSPADM

## 2021-01-07 RX ORDER — PROPOFOL 10 MG/ML
INJECTION, EMULSION INTRAVENOUS PRN
Status: DISCONTINUED | OUTPATIENT
Start: 2021-01-07 | End: 2021-01-07

## 2021-01-07 RX ORDER — LIDOCAINE 40 MG/G
CREAM TOPICAL
Status: DISCONTINUED | OUTPATIENT
Start: 2021-01-07 | End: 2021-01-08

## 2021-01-07 RX ORDER — FENTANYL CITRATE 50 UG/ML
25-50 INJECTION, SOLUTION INTRAMUSCULAR; INTRAVENOUS
Status: DISCONTINUED | OUTPATIENT
Start: 2021-01-07 | End: 2021-01-07 | Stop reason: HOSPADM

## 2021-01-07 RX ORDER — CEFAZOLIN SODIUM 2 G/100ML
2 INJECTION, SOLUTION INTRAVENOUS EVERY 8 HOURS
Status: COMPLETED | OUTPATIENT
Start: 2021-01-07 | End: 2021-01-08

## 2021-01-07 RX ORDER — HEPARIN SODIUM 1000 [USP'U]/ML
INJECTION, SOLUTION INTRAVENOUS; SUBCUTANEOUS PRN
Status: DISCONTINUED | OUTPATIENT
Start: 2021-01-07 | End: 2021-01-07

## 2021-01-07 RX ORDER — ONDANSETRON 2 MG/ML
INJECTION INTRAMUSCULAR; INTRAVENOUS PRN
Status: DISCONTINUED | OUTPATIENT
Start: 2021-01-07 | End: 2021-01-07

## 2021-01-07 RX ORDER — HYDROMORPHONE HYDROCHLORIDE 1 MG/ML
.3-.5 INJECTION, SOLUTION INTRAMUSCULAR; INTRAVENOUS; SUBCUTANEOUS EVERY 5 MIN PRN
Status: DISCONTINUED | OUTPATIENT
Start: 2021-01-07 | End: 2021-01-07 | Stop reason: HOSPADM

## 2021-01-07 RX ORDER — FENTANYL CITRATE 50 UG/ML
INJECTION, SOLUTION INTRAMUSCULAR; INTRAVENOUS PRN
Status: DISCONTINUED | OUTPATIENT
Start: 2021-01-07 | End: 2021-01-07

## 2021-01-07 RX ORDER — AMOXICILLIN 250 MG
2 CAPSULE ORAL 2 TIMES DAILY
Status: DISCONTINUED | OUTPATIENT
Start: 2021-01-07 | End: 2021-01-09 | Stop reason: HOSPADM

## 2021-01-07 RX ORDER — HEPARIN SODIUM 1000 [USP'U]/ML
INJECTION, SOLUTION INTRAVENOUS; SUBCUTANEOUS
Status: DISCONTINUED
Start: 2021-01-07 | End: 2021-01-07 | Stop reason: HOSPADM

## 2021-01-07 RX ORDER — HYDROCHLOROTHIAZIDE 12.5 MG/1
12.5 CAPSULE ORAL DAILY
Status: DISCONTINUED | OUTPATIENT
Start: 2021-01-07 | End: 2021-01-09 | Stop reason: HOSPADM

## 2021-01-07 RX ORDER — DEXTROSE MONOHYDRATE 25 G/50ML
25-50 INJECTION, SOLUTION INTRAVENOUS
Status: DISCONTINUED | OUTPATIENT
Start: 2021-01-07 | End: 2021-01-09 | Stop reason: HOSPADM

## 2021-01-07 RX ADMIN — ACETAMINOPHEN 975 MG: 325 TABLET, FILM COATED ORAL at 16:37

## 2021-01-07 RX ADMIN — ASPIRIN 600 MG: 600 SUPPOSITORY RECTAL at 13:21

## 2021-01-07 RX ADMIN — FENTANYL CITRATE 50 MCG: 50 INJECTION, SOLUTION INTRAMUSCULAR; INTRAVENOUS at 12:16

## 2021-01-07 RX ADMIN — ACETAMINOPHEN 975 MG: 325 TABLET, FILM COATED ORAL at 22:37

## 2021-01-07 RX ADMIN — SODIUM CHLORIDE, POTASSIUM CHLORIDE, SODIUM LACTATE AND CALCIUM CHLORIDE: 600; 310; 30; 20 INJECTION, SOLUTION INTRAVENOUS at 07:05

## 2021-01-07 RX ADMIN — DEXAMETHASONE SODIUM PHOSPHATE 4 MG: 4 INJECTION, SOLUTION INTRA-ARTICULAR; INTRALESIONAL; INTRAMUSCULAR; INTRAVENOUS; SOFT TISSUE at 09:10

## 2021-01-07 RX ADMIN — Medication 2.5 MG: at 09:49

## 2021-01-07 RX ADMIN — Medication 5 MG: at 09:04

## 2021-01-07 RX ADMIN — CEFAZOLIN SODIUM 2 G: 2 INJECTION, SOLUTION INTRAVENOUS at 16:37

## 2021-01-07 RX ADMIN — SODIUM CHLORIDE, POTASSIUM CHLORIDE, SODIUM LACTATE AND CALCIUM CHLORIDE: 600; 310; 30; 20 INJECTION, SOLUTION INTRAVENOUS at 10:43

## 2021-01-07 RX ADMIN — PROTAMINE SULFATE 25 MG: 10 INJECTION, SOLUTION INTRAVENOUS at 12:02

## 2021-01-07 RX ADMIN — CETIRIZINE HYDROCHLORIDE 10 MG: 10 TABLET, FILM COATED ORAL at 22:36

## 2021-01-07 RX ADMIN — PHENYLEPHRINE HYDROCHLORIDE 0.25 MCG/KG/MIN: 10 INJECTION INTRAVENOUS at 10:14

## 2021-01-07 RX ADMIN — GLYCOPYRROLATE 0.2 MG: 0.2 INJECTION, SOLUTION INTRAMUSCULAR; INTRAVENOUS at 08:53

## 2021-01-07 RX ADMIN — CEFAZOLIN SODIUM 2 G: 2 INJECTION, SOLUTION INTRAVENOUS at 08:58

## 2021-01-07 RX ADMIN — HEPARIN SODIUM 250 ML: 1000 INJECTION INTRAVENOUS; SUBCUTANEOUS at 09:27

## 2021-01-07 RX ADMIN — PROPOFOL 140 MG: 10 INJECTION, EMULSION INTRAVENOUS at 08:54

## 2021-01-07 RX ADMIN — SODIUM CHLORIDE: 9 INJECTION, SOLUTION INTRAVENOUS at 16:41

## 2021-01-07 RX ADMIN — Medication 5 MG: at 09:31

## 2021-01-07 RX ADMIN — LIDOCAINE HYDROCHLORIDE 40 MG: 20 INJECTION, SOLUTION INFILTRATION; PERINEURAL at 08:54

## 2021-01-07 RX ADMIN — SODIUM CHLORIDE, POTASSIUM CHLORIDE, SODIUM LACTATE AND CALCIUM CHLORIDE: 600; 310; 30; 20 INJECTION, SOLUTION INTRAVENOUS at 14:22

## 2021-01-07 RX ADMIN — Medication 10 MG: at 09:06

## 2021-01-07 RX ADMIN — HEPARIN SODIUM 2000 UNITS: 1000 INJECTION INTRAVENOUS; SUBCUTANEOUS at 11:17

## 2021-01-07 RX ADMIN — ONDANSETRON 4 MG: 2 INJECTION INTRAMUSCULAR; INTRAVENOUS at 12:07

## 2021-01-07 RX ADMIN — INSULIN ASPART 2 UNITS: 100 INJECTION, SOLUTION INTRAVENOUS; SUBCUTANEOUS at 18:45

## 2021-01-07 RX ADMIN — ATORVASTATIN CALCIUM 40 MG: 40 TABLET, FILM COATED ORAL at 20:11

## 2021-01-07 RX ADMIN — HYDROCHLOROTHIAZIDE 12.5 MG: 12.5 CAPSULE ORAL at 16:38

## 2021-01-07 RX ADMIN — Medication 1 LOZENGE: at 20:11

## 2021-01-07 RX ADMIN — HEPARIN SODIUM 9000 UNITS: 1000 INJECTION INTRAVENOUS; SUBCUTANEOUS at 10:16

## 2021-01-07 RX ADMIN — ROCURONIUM BROMIDE 50 MG: 10 INJECTION INTRAVENOUS at 08:54

## 2021-01-07 RX ADMIN — FENTANYL CITRATE 50 MCG: 50 INJECTION, SOLUTION INTRAMUSCULAR; INTRAVENOUS at 08:42

## 2021-01-07 RX ADMIN — OXYCODONE HYDROCHLORIDE 5 MG: 5 TABLET ORAL at 22:36

## 2021-01-07 RX ADMIN — FENTANYL CITRATE 50 MCG: 50 INJECTION, SOLUTION INTRAMUSCULAR; INTRAVENOUS at 09:56

## 2021-01-07 RX ADMIN — OXYCODONE HYDROCHLORIDE 5 MG: 5 TABLET ORAL at 19:08

## 2021-01-07 RX ADMIN — FENTANYL CITRATE 50 MCG: 50 INJECTION, SOLUTION INTRAMUSCULAR; INTRAVENOUS at 11:03

## 2021-01-07 ASSESSMENT — ACTIVITIES OF DAILY LIVING (ADL)
ADLS_ACUITY_SCORE: 16
ADLS_ACUITY_SCORE: 16

## 2021-01-07 ASSESSMENT — MIFFLIN-ST. JEOR: SCORE: 1623.04

## 2021-01-07 ASSESSMENT — ENCOUNTER SYMPTOMS: ORTHOPNEA: 0

## 2021-01-07 NOTE — PROVIDER NOTIFICATION
SKIN IS PALE, WARM AND INTACT.  PATIENT HAS SCABBED AREA TO TOP ASPECT OF THE 3RD LEFT TOE.  MEASURING APPROXIMATELY 0.5CM X 0.5CM, DR. LIANG AWARE OF SKIN CONDITION PRE-PROCEDURE.

## 2021-01-07 NOTE — BRIEF OP NOTE
[facility]    Brief Operative Note    Pre-operative diagnosis: PAD (peripheral artery disease) (H) [I73.9], critical limb ischemia with non healing left third toe wound  Post-operative diagnosis Same as pre-operative diagnosis    Procedure: Procedure(s):  LEFT FEMORAL TO ABOVE KNEE POPLITEAL ARTERY BYPASS WITH PTFE VASCULAR GRAFT REMOVABLE RING 6MMX 50CM  LEFT FEMORAL ENDARTERECTOMY WITH PATCH ANGIOPLASTY PHOTOFIX  0.8 X 8CM  Surgeon: Surgeon(s) and Role:     * Myra Lopes MD - Primary     * Demetris Dash PA-C - Assisting  Anesthesia: General   Estimated blood loss: 75cc  Drains: None  Specimens:   ID Type Source Tests Collected by Time Destination   A : LEFT FEMORAL ARTERY PLAQUE Tissue Artery, Femoral SURGICAL PATHOLOGY EXAM Myra Lopes MD 1/7/2021 10:27 AM      Findings:  Cardiology consult in preop due to asymptomatic Mobitz type 1 rhythm.   Heavily calcified left common femoral artery required endarterectomy with bovine patch angioplasty. Six mm PTFE bypass graft extended from bovine patch to AK popliteal artery. Biphasic doppler flow over posterior tibialis and dorsalis pedis artery at completion.  Complications: None  Implants:   Implant Name Type Inv. Item Serial No.  Lot No. LRB No. Used Action   GRAFT PROPATEN RR [id number] THIN WALL [id number] Graft GRAFT PROPATEN RR [id number] THIN WALL [id number] [employer]   Left 1 Implanted   IMP PATCH PERICARDIUM PHOTOFIX BOVINE 0.8X8CM [id number] Bone/Tissue/Biologic IMP PATCH PERICARDIUM PHOTOFIX BOVINE 0.8X8CM [id number] [facility] [id number] Left 1 Implanted       Demetris Dash PA-C

## 2021-01-07 NOTE — PROVIDER NOTIFICATION
Paged Dr. Doe regarding pt's HR dropping to 38. EKG was completed and showed mobitz 1. Patient asymptomatic.  Requesting hold parameters for BP meds.     1833- Awaiting response.

## 2021-01-07 NOTE — ANESTHESIA POSTPROCEDURE EVALUATION
Patient: Zurdo Dash    Procedure(s):  LEFT FEMORAL TO ABOVE KNEE POPLITEAL ARTERY BYPASS WITH PTFE VASCULAR GRAFT REMOVABLE RING 6MMX 50CM  LEFT FEMORAL ENDARTERECTOMY WITH PATCH ANGIOPLASTY PHOTOFIX  0.8 X 8CM    Diagnosis:PAD (peripheral artery disease) (H) [I73.9]  Diagnosis Additional Information: No value filed.    Anesthesia Type:  General    Note:  Anesthesia Post Evaluation    Patient location during evaluation: PACU  Patient participation: Able to fully participate in evaluation  Level of consciousness: awake  Pain management: adequate  Airway patency: patent  Cardiovascular status: acceptable  Respiratory status: acceptable  Hydration status: acceptable  PONV: controlled     Anesthetic complications: None          Last vitals:  Vitals:    01/07/21 1420 01/07/21 1449 01/07/21 1515   BP: 120/65 132/77 (!) 149/74   Pulse: 51 52 55   Resp: 13 16 12   Temp:  36.5  C (97.7  F)    SpO2: 99% 100% 95%         Electronically Signed By: Blessing Reyes MD  January 7, 2021  4:08 PM

## 2021-01-07 NOTE — OP NOTE
Vascular Surgery Operative Note     PREOPERATIVE DIAGNOSIS:  Peripheral arterial disease with nonhealing left foot wound; left superficial femoral artery occlusion     POSTOPERATIVE DIAGNOSIS:  Same     PROCEDURE: Left femoral endarterectomy; left femoral to above-knee popliteal artery bypass with PTFE     SURGEON:  Myra Lopes MD     ASSISTANT:  Demetris Dash PA-C     ANESTHESIA:  General Endotracheal     ESTIMATED BLOOD LOSS:  200 mL    SPECIMENS: Left common femoral plaque      INDICATIONS:  Zurdo Dash is a 75 year old male with history of DL, HTN, T2DM complicated by neuropathy, former smoking and former alcohol abuse, RA, and PAD with prior claudication and current left third toe ulcer. He underwent LLE angiogram and was found to have flush occlusion of the left superficial femoral artery, with reconstitution of the above-knee popliteal artery. His primary tibial outflow is the posterior tibial artery. He was taken to the OR today to undergo left femoral-popliteal artery bypass.      DESCRIPTION OF TECHNIQUE:   Mr. Dash was brought into the operating room and transferred to the operating table in the supine position. After uneventful endotracheal intubation, general anesthesia was initiated. Antibiotics were administered. Ultrasonography was utilized to assess the left greater saphenous vein, which was found to have areas of diminutive size (< 3 mm) in the mid-distal thigh. The left leg was prepped and draped in standard sterile fashion. Timeout was performed and the entire surgical team was in agreement with the planned procedure and correctly marked side.      A longitudinal distal medial thigh incision was created and deepened through the subcutaneous fat. The fascia was opened and the muscles were retracted, revealing the avascular intramuscular plane. This was bluntly dissected until the popliteal vein was encountered. Small arterial and venous branches were ligated to assist with exposure.  "The vein was circumferentially dissected and retracted, exposing the popliteal artery. The popliteal artery was circumferentially cleared of periadventitial tissues, with a large suprageniculate branch encircled with a vessel loop and smaller branches ligated. The proximal and distal extent of the artery were encircled with vessel loops. The artery had a faint pulse and minimal calcification.     A longitudinal inguinal incision was created sharply and deepened through the subcutaneous fat. The inguinal ligament was exposed, taking care to ligate subcutaneous veins and overlying lymphatic tissue. The femoral artery and nerve were identified and the femoral sheath opened longitudinally. The artery was carefully dissected sharply circumferentially proximally and distally. The common femoral artery was encircled with a vessel loop, as were the superficial femoral and profunda femoris artery origins. The CFA was found to have calcific plaque; a soft, \"clampable\" site was found at the distal external iliac artery, just below the inguinal ligament.     A tunneler was passed in an anatomic plane from the medial above-knee incision to the inguinal incision. A 6 mm ring-enforced PTFE graft was selected and was brought through the tunnel, taking care to ensure there was no kinking or twisting of the graft. Systemic heparinization was administered.     The common femoral, profunda femoris, and superficial femoral arteries were clamped. A longitudinal arteriotomy was created on the CFA. Due to the significant amount of calcific plaque present, an endarterectomy was performed, taking care to rashmi plaque from the PFA origin and ensure good back-bleeding. Loose plaque fibrils were removed from the endarterectomized surface and heparinized saline was used to flush the vessel lumen. A bovine pericardial patch was selected and anastomosed to complete a patch angioplasty with running prolene suture. Following this, an incision was " made over the center of the patch. The vessels were flushed with inflow, backbleeding, and heparinized saline. The PTFE graft was spatulated and brought into approximation with the patch incision. An end-to-side anastomosis was created with prolene, again taking care to flush the vessels prior to completion. The anastomosis was opened to the graft, followed by serial unclamping of the CFA, SFA, and PFA. The graft was clamped distal to the anastomosis, which was found to be reasonably hemostatic.     The PTFE in the medial thigh incision was cut to size and spatulated, ensuring there was no twisting or tension on the graft. The popliteal artery at this level was clamped. A longitudinal arteriotomy was created. Backbleeding was checked and was found to be good. The graft was anastomosed to the artery in an end-to-side fashion, flushing the graft and the vessel lumen with inflow, backbleeding, and heparinized saline. The graft was unclamped, followed by the distal SFA and proximal popliteal clamps. A hand-held doppler was used to assess the pedal doppler signals, which were found to be robust in the DP and adequate in the PT; both signals were markedly improved with the graft.     The incisions were irrigated and hemostasis confirmed with the addition and subsequent removal of topical hemostatic agents. The subcutaneous tissues were closed with interrupted absorbable sutures, followed by skin closure in a running subcuticular fashion with a 4-0 Monocryl suture. The skin was cleaned and dried and steri-strips and sterile bandages applied.     At the end of the case all needle, sponge and instrument counts were correct.  Mr. Dash was then extubated in stable condition and was taken to the postoperative care unit.      The Physician Assistant was medically necessary for their expertise in retraction, exposure, and suctioning.

## 2021-01-07 NOTE — PROGRESS NOTES
HOSPITALIST CONSULT CHART CHECK:      Hospitalist service was consulted for cross coverage only. We will peripherally follow and chart check throughout the week.        - For vascular medical concerns during business hours M-F, call the [facility] at [phone] to have the rounding/on call Vascular Medicine (NOT VASCULAR SURGERY) MD paged.      - After business hours M-F, for medical concerns on this patient, please page hospitalist staff.      - For vascular surgical questions, please page the appropriate surgeon (primary vascular surgeon or on call vascular surgeon) based upon the time of day.       Coy Doe PA-C  Hospitalist Service  [facility]

## 2021-01-07 NOTE — ANESTHESIA PROCEDURE NOTES
Airway   Date/Time: 1/7/2021 8:56 AM   Patient location during procedure: OR    Staff -   Anesthesiologist:  Blessing Reyes MD  CRNA: Martha Pinto  Performed By: CRNA    Consent for Airway   Urgency: elective    Indications and Patient Condition  Indications for airway management: moni-procedural  Induction type:intravenousMask difficulty assessment: 2 - vent by mask + OA or adjuvant +/- NMBA    Final Airway Details  Final airway type: endotracheal airway  Successful airway:ETT - single and Oral  Endotracheal Airway Details   ETT size (mm): 8.0  Cuffed: yes  Successful intubation technique: direct laryngoscopy  Grade View of Cords: 1  Adjucts: stylet  Measured from: gums/teeth  Secured at (cm): 23  Secured with: pink tape  Bite block used: None    Post intubation assessment   Placement verified by: capnometry, equal breath sounds and chest rise   Number of attempts at approach: 1  Secured with:pink tape  Ease of procedure: easy  Dentition: Intact and Unchanged

## 2021-01-07 NOTE — ANESTHESIA CARE TRANSFER NOTE
Patient: Zurdo Dash    Procedure(s):  LEFT FEMORAL TO ABOVE KNEE POPLITEAL ARTERY BYPASS WITH PTFE VASCULAR GRAFT REMOVABLE RING 6MMX 50CM  LEFT FEMORAL ENDARTERECTOMY WITH PATCH ANGIOPLASTY PHOTOFIX  0.8 X 8CM    Diagnosis: PAD (peripheral artery disease) (H) [I73.9]  Diagnosis Additional Information: No value filed.    Anesthesia Type:   General     Note:  Airway :Face Mask  Patient transferred to:PACU  Comments:  Extubation criteria included spontaneous respirations, adequate tidal volumes, followed commands to voice, oropharynx suctioned with soft flexible catheter, extubated atraumatically, extubated with suction, airway patent after extubation.  Oxygen via facemask at 6 liters per minute to PACU. Oxygen tubing connected to wall O2 in PACU, SpO2, NiBP, and EKG monitors and alarms on and functioning, Navjot Hugger warmer connected to patient gown, report on patient's clinical status given to PACU RN, RN questions answered.   Handoff Report: Identifed the Patient, Identified the Reponsible Provider, Reviewed the pertinent medical history, Discussed the surgical course, Reviewed Intra-OP anesthesia mangement and issues during anesthesia, Set expectations for post-procedure period and Allowed opportunity for questions and acknowledgement of understanding      Vitals: (Last set prior to Anesthesia Care Transfer)    CRNA VITALS  1/7/2021 1205 - 1/7/2021 1243      1/7/2021             Resp Rate (set):  10                Electronically Signed By: Martha Pinto  January 7, 2021  12:43 PM

## 2021-01-07 NOTE — CONSULTS
[facility]    Vascular Medicine Consultation     Date of Admission:  1/7/2021  Date of Consult (When I saw the patient): 01/07/21    Attestation: I have examined the patient independently of Starr To PA-C and agree with the examination and plan as delineated below.    Scottie Carter MD      Assessment & Plan   1. Peripheral arterial disease with critical limb ischemia of non-healing wound s/p left femoral endarterectomy and left femoral to above-knee popliteal artery bypass with PTFE 1/7/21    Post-operative cares per Vascular Surgery / Dr. Lopes. Aspirin should be resumed.     2. Type 2 diabetes mellitus    His diabetes has been well controlled as an outpatient with an A1C of 6.8 this admission. He has been on Metformin 500 mg BID and Trulicity. While he is in the hospital his Trulicity and Metformin will be held and his sugars will be monitored closely. He will instead be covered with sliding scale insulin for now. Upon discharge he can resume his Metformin and Trulicity.     3. Hyperlipidemia    His lipid panel this admission is significant for an LDL of 45, indicating that his lipids are well controlled. He should continue Atorvastatin 40 mg daily.     4. Chronic kidney disease    His creatinine on admission was 1.61. This will be monitored and nephrotoxic medications will be avoided.     5. Rheumatoid arthritis    He takes prednisone as needed and methotrexate weekly for this. Continue the same.     6. Hypertension    Continue lisinopril, hydrochlorothiazide and metoprolol as blood pressure tolerates. Will monitor blood pressure closely.     7. Mobitz type 1, asymptomatic    He was noted in pre-op today to have Mobitz type 1 on ECG. He was evaluated by Cardiology prior to going to the OR. He was noted to have no concerning cardiac symptoms and approved to proceed with surgery. Will monitor on telemetry while he is in the hospital. Cardiology to follow in hospital.        Reason for Consult   Reason for consult: Asked by Dr. Lopes to evaluate vascular risk factors and assist with medical management in this 75 year old former smoking male with a history of diabetes, hyperlipidemia, hypertension, and rheumatoid arthritis who now presented for a left femoral endarterectomy and left femoral to above-knee popliteal artery bypass for critical limb ischemia of a non-healing left foot ulcer.     Primary Care Physician   Kapil Duckworth       History of Present Illness   Zurdo Dash is a 75 year old former smoking male with a history of diabetes, hyperlipidemia, hypertension, rheumatoid arthritis, and acute right distal ulnar and digital artery occlusion in 3/2020 who more recently presented with a non-healing left 3rd toe ulceration. Non-invasive imaging showed an occluded left SFA. He underwent a diagnostic left lower extremity angiogram on 12/17/20 confirming this. He now presented for a left femoral endarterectomy and left femoral to above-knee popliteal artery bypass today.         Past Medical History   Past Medical History:   Diagnosis Date     Abnormal CT scan 03/2004    calcified lung granuloma     C. difficile diarrhea     H/O     Cataract 11/18/2011     Diabetic neuropathy (H)     mild, mostly soles and distal forefeet, worse on the left side.     Diverticulitis      ED (erectile dysfunction)      Ex-smoker     QUIT SMOKING FEB 2007     History of ETOH abuse     recovering, sober since 1997     Hyperlipidemia LDL goal <100      Hypertension goal BP (blood pressure) < 140/90      Hypogonadism      Obesity      PAD (peripheral artery disease) (H)     leg cramps, with exertion, no formal diagnosis of PAD and minimal if any symptoms at all.     RA (rheumatoid arthritis) (H)     Dr Bailon     Type 2 diabetes, HbA1c goal < 7% (H) 10/2008    A1C of 7.1 %        Past Surgical History   Past Surgical History:   Procedure Laterality Date     CATARACT IOL, RT/LT       COLONOSCOPY   03/01/2018    [facility]     COMBINED REPAIR PTOSIS WITH BLEPHAROPLASTY BILATERAL Bilateral 8/16/2019    Procedure: BILATERAL UPPER EYELID BLEPHAROPLASTY AND BILATERAL PTOSIS REPAIR;  Surgeon: Janet Garcia MD;  Location: [facility] OR     EXCISE LESION EYELID Left 8/16/2019    Procedure: LEFT LOWER EYELID BIOPSY;  Surgeon: Janet Garcia MD;  Location: [facility] OR     HC INCISION TENDON SHEATH FINGER  4/2009    r hand ring finger     IR LOWER EXTREMITY ANGIOGRAM LEFT  12/17/2020     PHACOEMULSIFICATION WITH STANDARD INTRAOCULAR LENS IMPLANT  02/2019; 3/2019    left eye; right eye     TONSILLECTOMY         Prior to Admission Medications   Prior to Admission Medications   Prescriptions Last Dose Informant Patient Reported? Taking?   ACCU-CHEK DAYANARA PLUS test strip   No No   Sig: TEST THREE TIMES A DAY OR AS DIRECTED   aspirin 81 MG tablet 1/6/2021 at PM Self Yes Yes   Sig: Take 1 tablet by mouth every evening    atorvastatin (LIPITOR) 40 MG tablet 1/6/2021 at PM Self No Yes   Sig: Take 1 tablet (40 mg) by mouth daily   Patient taking differently: Take 40 mg by mouth every evening    blood glucose (NO BRAND SPECIFIED) lancing device   No No   Sig: Use to test blood sugars 3 times daily or as directed.   blood glucose monitoring (ONE TOUCH DELICA) lancets   No No   Sig: Use to test blood sugars 3 times daily or as directed.   cetirizine (ZYRTEC) 10 MG tablet 1/6/2021 at PM Self Yes Yes   Sig: Take 10 mg by mouth At Bedtime   clobetasol (TEMOVATE) 0.05 % external ointment Past Week at PRN Self Yes Yes   Sig: Apply topically 2 times daily as needed   diclofenac (VOLTAREN) 1 % topical gel MORE THAN 1 WEEK at PRN Self Yes Yes   Sig: Apply topically 4 times daily as needed for moderate pain   dulaglutide (TRULICITY) 1.5 MG/0.5ML pen 1/1/2021 Self No Yes   Sig: Inject 1.5 mg Subcutaneous every 7 days   Patient taking differently: Inject 1.5 mg Subcutaneous every 7 days ON FRIDAYS   folic acid (FOLVITE) 1 MG tablet 1/7/2021 at AM Self  No Yes   Sig: Take 1 tablet (1 mg) by mouth daily   hydrochlorothiazide (MICROZIDE) 12.5 MG capsule 1/6/2021 at AM Self No Yes   Sig: Take 1 capsule (12.5 mg) by mouth daily   lisinopril (ZESTRIL) 10 MG tablet 1/6/2021 at PM Self Yes Yes   Sig: Take 10 mg by mouth every evening   metFORMIN (GLUCOPHAGE) 500 MG tablet 1/6/2021 at 9 Self No Yes   Sig: TAKE ONE TABLET BY MOUTH TWICE A DAY WITH MEALS   Patient taking differently: Take 500 mg by mouth 2 times daily    methotrexate sodium 2.5 MG TABS 1/3/2021 Self No Yes   Sig: Take 7 tablets (17.5 mg) by mouth once a week . Take all 7 tablets on the same day of each week.   Patient taking differently: Take 17.5 mg by mouth once a week . Take all 7 tablets on the same day of each week.   On Sundays   metoprolol succinate ER (TOPROL-XL) 25 MG 24 hr tablet 1/7/2021 at AM Self Yes Yes   Sig: Take 50 mg by mouth every morning (2 X 25MG)   omeprazole (PRILOSEC) 40 MG DR capsule 1/7/2021 at AM Self No Yes   Sig: TAKE ONE CAPSULE BY MOUTH ONCE DAILY   Patient taking differently: Take 40 mg by mouth every morning TAKE ONE CAPSULE BY MOUTH ONCE DAILY   order for DME   No No   Sig: Equipment being ordered: specialized shoes for diabetic neuropathy     His orthotics will be made by [employer]      Tim Jernigan, Certified   [email or web address]  Phone: [phone]  Fax: [phone]  [address] [place] [address]   order for DME   No No   Sig: Equipment being ordered: glucometer   order for DME   No No   Sig: Equipment being ordered: specialized shoes for diabetic neuropathy as well as all supplies provided for annual treatment of diabetes neuropathy   predniSONE (DELTASONE) 5 MG tablet MORE THAN 1 WEEK at PRN Self Yes Yes   Sig: Take 5-20 mg by mouth daily as needed (Rheumatoid Arthritis Flare ups)   tiZANidine (ZANAFLEX) 2 MG tablet MORE THAN 1 WEEK at PRN Self No Yes   Sig: Take 1 tablet (2 mg) by mouth 3 times daily as needed for muscle spasms       Facility-Administered Medications: None     Allergies   No Known Allergies    Social History   Zurdo Dash  reports that he quit smoking about 13 years ago. His smoking use included cigarettes. He has a 40.00 pack-year smoking history. He has never used smokeless tobacco. He reports that he does not drink alcohol or use drugs.    Family History   Family History   Problem Relation Age of Onset     Cerebrovascular Disease Mother      Arthritis Mother      Osteoporosis Mother      Alzheimer Disease Father      Arthritis Father      Cancer Father      Diabetes Maternal Grandmother      Cardiovascular Maternal Grandmother      Other Cancer Brother      Cancer Paternal Aunt      Hypertension No family hx of      Thyroid Disease No family hx of      Glaucoma No family hx of      Macular Degeneration No family hx of        Review of Systems   The 10 point Review of Systems is negative other than noted in the HPI or here.     Physical Exam   Temp: 96.7  F (35.9  C) Temp src: Temporal BP: 108/61 Pulse: 53   Resp: 11 SpO2: 98 % O2 Device: Nasal cannula Oxygen Delivery: 3 LPM  Vital Signs with Ranges  Temp:  [96.7  F (35.9  C)-98  F (36.7  C)] 96.7  F (35.9  C)  Pulse:  [51-57] 53  Resp:  [10-16] 11  BP: ()/(56-80) 108/61  SpO2:  [95 %-99 %] 98 %  199 lbs 0 oz    Constitutional: awake, alert, cooperative, no apparent distress, and appears stated age  Eyes: Lids and lashes normal, pupils equal, round and reactive to light, extra ocular muscles intact, sclera clear, conjunctiva normal  ENT: normocepalic, without obvious abnormality, oropharynx pink and moist  Hematologic / Lymphatic: no lymphadenopathy  Respiratory: No increased work of breathing, good air exchange, clear to auscultation bilaterally, no crackles or wheezing  Cardiovascular: regular rate and rhythm, normal S1 and S2 and no murmur noted  GI: Normal bowel sounds, soft, non-distended, non-tender  Skin: no redness, warmth, or swelling, no rashes. Surgical  sites c/d/i.   Musculoskeletal: There is no redness, warmth, or swelling of the joints.   Tone is normal.  Neurologic: Awake, alert, oriented to name, place and time.  Cranial nerves II-XII are grossly intact.    Neuropsychiatric:  Normal affect, memory, insight.  Pulses: DP/PT audible by doppler bilaterally. No carotid bruits appreciated.     Data   Most Recent 3 CBC's:  Recent Labs   Lab Test 01/07/21  0610 12/17/20  0655 12/11/20  1436 07/28/20  1148   WBC  --  4.6 4.9 9.1   HGB 10.2* 11.5* 11.9* 13.0*   MCV  --  90 92 91   PLT  --  304 224 173     Most Recent 3 BMP's:  Recent Labs   Lab Test 01/07/21  0610 12/17/20  0655 12/15/20  1206 12/11/20  1436     --  137 141   POTASSIUM 4.2  --  5.0 5.4*   CHLORIDE 110*  --  108 114*   CO2 22  --  20 23   BUN 37*  --  47* 49*   CR 1.61* 1.81* 1.60* 1.67*   ANIONGAP 6  --  9 4   NEW 8.7  --  9.0 9.0   *  --  107* 84     Most Recent 3 INR's:  Recent Labs   Lab Test 12/17/20  0655   INR 1.21*     Most Recent Cholesterol Panel:  Recent Labs   Lab Test 01/07/21  0610   CHOL 116   LDL 45   HDL 38*   TRIG 166*     Most Recent Hemoglobin A1c:  Recent Labs   Lab Test 01/07/21  0610   A1C 6.8*

## 2021-01-07 NOTE — CONSULTS
Cardiology Consult Note  [facility], [facility]          Assessment and Plan:   #1 Mobitz type 1, asymptomatic on beta blocker  #2 DM II with A1C 6.8  #3 Pre-operative left femoral to above knee popliteal artery bypass  #4 Hypertension,controlled  #5 Hyperlipidemia well controlled  #6 CRI with Cr 1.6    It was a pleasure to be involved in the care of Mr. Dash.  I have reviewed his ECG and examined him and obtained a history.  I have also reviewed his previous testing. He has Mobitz I, which is asymptomatic. He has no concerning cardiac symptoms with reasonable activity.  I do not see a reason that surgery should be delayed in this setting. It is unlikely that he would progress to CHB during surgery and we would not put a pacemaker in in this situation. Would not give any additional AV jame blocking agents. OK to proceed from CV standpoint.    YUSUF Fitch MD [facility]  Adult Congenital and Interventional Cardiology  [facility]           History:   Mr. Dash is a 75 year old male who has a history of DMII, Hypertension, Hyperlipidemia, PVD, who is here for left femoral to above the knee popliteal bypass.  Cardiology was asked to see him in the pre-operative bay to review his ECG, which showed Mobitz type I, which has been asymptomatic on my interview of him. He denies CP, syncope, pre-syncope or other concerning symptoms. He has no history of CAD. He does take Toprol 50 mg daily, which he believes he took this AM. Echo from March of 2020 showed normal EF with no significant valve disease.               Medications:       ceFAZolin  2 g Intravenous Pre-Op/Pre-procedure x 1 dose     Pertinent PMH:  CKD  DMII  HTN  Hyperlipidemia  Mobitz I  Obesity  Peripheral vascular disease     [facility]:  Ex smoker    [facility]:  No early CAD    OUTPATIENT MEDS:  Toprol 50 mg daily  Methotrexate  Metformin 500mg BID  Lisinopril 10 mg daily  hydrochlorothiazide 12.5 mg  "daily  Lipitor 40mg daily  ASA 81 mg daily    ALLERGIES:  NKDA    ROS: Negative beyond that noted in the HPI         Physical Exam:   Blood pressure 109/80, pulse 51, temperature 97.1  F (36.2  C), temperature source Temporal, resp. rate 16, height 1.753 m (5' 9\"), weight 90.3 kg (199 lb), SpO2 95 %.  Wt Readings from Last 4 Encounters:   21 90.3 kg (199 lb)   20 91.2 kg (201 lb 1 oz)   12/15/20 90.1 kg (198 lb 9.6 oz)   20 91.6 kg (202 lb)         Vital Sign Ranges  Temperature Temp  Av.1  F (36.2  C)  Min: 97.1  F (36.2  C)  Max: 97.1  F (36.2  C)   Blood pressure Systolic (24hrs), Av , Min:109 , Max:109        Diastolic (24hrs), Av, Min:80, Max:80      Pulse Pulse  Av  Min: 51  Max: 51   Respirations Resp  Av  Min: 16  Max: 16   Pulse oximetry SpO2  Av %  Min: 95 %  Max: 95 %       No intake or output data in the 24 hours ending 21 0848    Constitutional: Awake, alert, cooperative, no apparent distress   Lungs: Clear to auscultation bilaterally, no crackles or wheezing   Cardiovascular: Regular rate and rhythm, normal S1 and S2, and no murmur noted   Abdomen: Normal bowel sounds, soft, non-distended, non-tender   Skin:  no edema   Other:           Data:     Recent Labs   Lab Test 21  0610 20  0655 20  1436 20  1148   WBC  --  4.6 4.9 9.1   HGB 10.2* 11.5* 11.9* 13.0*   MCV  --  90 92 91   PLT  --  304 224 173   INR  --  1.21*  --   --       Recent Labs   Lab Test 21  0610 20  0655 12/15/20  1206 20  1436     --  137 141   POTASSIUM 4.2  --  5.0 5.4*   CHLORIDE 110*  --  108 114*   CO2 22  --  20 23   BUN 37*  --  47* 49*   CR 1.61* 1.81* 1.60* 1.67*   ANIONGAP 6  --  9 4   NEW 8.7  --  9.0 9.0   *  --  107* 84         Elisa Fitch MD  "

## 2021-01-07 NOTE — ANESTHESIA PREPROCEDURE EVALUATION
Anesthesia Pre-Procedure Evaluation    Patient: Zurdo Dash   MRN: [id number] : 1945          Preoperative Diagnosis: PAD (peripheral artery disease) (H) [I73.9]    Procedure(s):  LEFT FEMORAL TO ABOVE KNEE POPLITEAL ARTERY BYPASS    Past Medical History:   Diagnosis Date     Abnormal CT scan 2004    calcified lung granuloma     C. difficile diarrhea     H/O     Cataract 2011     Diabetic neuropathy (H)     mild, mostly soles and distal forefeet, worse on the left side.     Diverticulitis      ED (erectile dysfunction)      Ex-smoker     QUIT SMOKING 2007     History of ETOH abuse     recovering, sober since      Hyperlipidemia LDL goal <100      Hypertension goal BP (blood pressure) < 140/90      Hypogonadism      Obesity      PAD (peripheral artery disease) (H)     leg cramps, with exertion, no formal diagnosis of PAD and minimal if any symptoms at all.     RA (rheumatoid arthritis) (H)     Dr Bailon     Type 2 diabetes, HbA1c goal < 7% (H) 10/2008    A1C of 7.1 %      Past Surgical History:   Procedure Laterality Date     CATARACT IOL, RT/LT       COLONOSCOPY  2018    [facility]     COMBINED REPAIR PTOSIS WITH BLEPHAROPLASTY BILATERAL Bilateral 2019    Procedure: BILATERAL UPPER EYELID BLEPHAROPLASTY AND BILATERAL PTOSIS REPAIR;  Surgeon: Janet Garcia MD;  Location: [facility]     EXCISE LESION EYELID Left 2019    Procedure: LEFT LOWER EYELID BIOPSY;  Surgeon: Janet Garcia MD;  Location: [facility] OR     HC INCISION TENDON SHEATH FINGER  2009    r hand ring finger     IR LOWER EXTREMITY ANGIOGRAM LEFT  2020     PHACOEMULSIFICATION WITH STANDARD INTRAOCULAR LENS IMPLANT  2019; 3/2019    left eye; right eye     TONSILLECTOMY         Anesthesia Evaluation     . Pt has had prior anesthetic.     No history of anesthetic complications          ROS/MED HX    ENT/Pulmonary: Comment: Recent dental implants, now oral pain tx NSAIDS    (+)allergic rhinitis, tobacco use,  Past use , . .   (-) asthma and sleep apnea   Neurologic: Comment: CLBP    (+)migraines,     Cardiovascular: Comment:       RBBB, SB-45-55, Mobalethea block     : 1945  Study Date: 2020 03:01 PM  Age: 74 yrs  Gender: Male  Patient [facility]  Reason For Study: Arterial embolism of upper extremity (H)  Ordering Physician: SHEY KAUR  Referring Physician: SHEY KAUR  Performed By: Margaux Adams RDCS     BSA: 2.1 m2  Height: 68 in  Weight: 207 lb  BP: 114/68 mmHg  _____________________________________________________________________________  __        Procedure  Echocardiogram with two-dimensional, color and spectral Doppler performed.  _____________________________________________________________________________  __        Interpretation Summary     Left ventricular function, chamber size, wall motion, and wall thickness are  normal.The EF is 55-60%.  Global right ventricular function is normal.  Mild dilatation of the aorta is present.  No pericardial effusion is present.  _____________________________________________________________________________    (+) Dyslipidemia, hypertension-Peripheral Vascular Disease---. : . . fainting (syncope). :. .      (-) CHF, BUSTAMANTE, orthopnea/PND, pacemaker and pacemaker   METS/Exercise Tolerance:     Hematologic:     (+) Anemia, -      Musculoskeletal: Comment: RA, cervical spondylosis  (+) arthritis,  -       GI/Hepatic: Comment: abd pain, h/o EtOH abuse, diverticulitis     (+) GERD       Renal/Genitourinary:     (+) chronic renal disease,       Endo: Comment: Takes 5 mg prednisone when RA flairs-none times 4 months     (+) type II DM Diabetic complications: neuropathy, Obesity, .   (-) chronic steroid usage   Psychiatric:         Infectious Disease:         Malignancy:         Other: Comment: Left retinal detachment                          Physical Exam      Airway   Mallampati: II  TM distance: >3 FB  Neck ROM: full    Dental   (+) implants, upper dentures  "and lower dentures    Cardiovascular   Rhythm and rate: regular      Pulmonary    breath sounds clear to auscultation            Lab Results   Component Value Date    WBC 4.6 2020    HGB 11.5 (L) 2020    HCT 34.1 (L) 2020     2020    CRP 18.9 (H) 2020    SED 33 (H) 2020     12/15/2020    POTASSIUM 5.0 12/15/2020    CHLORIDE 108 12/15/2020    CO2 20 12/15/2020    BUN 47 (H) 12/15/2020    CR 1.81 (H) 2020     (H) 12/15/2020    NEW 9.0 12/15/2020    PHOS 3.5 2013    MAG 2.1 10/21/2013    ALBUMIN 3.7 2020    PROTTOTAL 6.7 (L) 2020    ALT 24 2020    AST 16 2020    ALKPHOS 51 2020    BILITOTAL 0.4 2020    PTT 28 2020    INR 1.21 (H) 2020    TSH 1.44 2018       Preop Vitals  BP Readings from Last 3 Encounters:   20 (!) 116/98   12/15/20 132/79   20 131/71    Pulse Readings from Last 3 Encounters:   20 60   12/15/20 62   20 59      Resp Readings from Last 3 Encounters:   20 16   12/15/20 16   20 16    SpO2 Readings from Last 3 Encounters:   20 95%   12/15/20 92%   10/15/20 97%      Temp Readings from Last 1 Encounters:   20 36.5  C (97.7  F)    Ht Readings from Last 1 Encounters:   20 1.753 m (5' 9\")      Wt Readings from Last 1 Encounters:   20 91.2 kg (201 lb 1 oz)    Estimated body mass index is 29.69 kg/m  as calculated from the following:    Height as of 20: 1.753 m (5' 9\").    Weight as of 20: 91.2 kg (201 lb 1 oz).     No Known Allergies  Social History     Tobacco Use     Smoking status: Former Smoker     Packs/day: 1.00     Years: 40.00     Pack years: 40.00     Types: Cigarettes     Quit date: 3/16/2007     Years since quittin.8     Smokeless tobacco: Never Used   Substance Use Topics     Alcohol use: No     Alcohol/week: 0.0 standard drinks     Wt Readings from Last 1 Encounters:   21 90.3 kg (199 lb)      Prior " to Admission medications    Medication Sig Start Date End Date Taking? Authorizing Provider   aspirin 81 MG tablet Take 1 tablet by mouth every evening  10/2/10  Yes Kapil Duckworth MD   atorvastatin (LIPITOR) 40 MG tablet Take 1 tablet (40 mg) by mouth daily  Patient taking differently: Take 40 mg by mouth every evening  12/15/20  Yes Kapil Duckworth MD   cetirizine (ZYRTEC) 10 MG tablet Take 10 mg by mouth At Bedtime   Yes Reported, Patient   clobetasol (TEMOVATE) 0.05 % external ointment Apply topically 2 times daily as needed   Yes Reported, Patient   diclofenac (VOLTAREN) 1 % topical gel Apply topically 4 times daily as needed for moderate pain   Yes Reported, Patient   dulaglutide (TRULICITY) 1.5 MG/0.5ML pen Inject 1.5 mg Subcutaneous every 7 days  Patient taking differently: Inject 1.5 mg Subcutaneous every 7 days ON FRIDAYS 4/17/20  Yes Kapil Duckworth MD   folic acid (FOLVITE) 1 MG tablet Take 1 tablet (1 mg) by mouth daily 8/26/20  Yes Albert Tompkins MD   hydrochlorothiazide (MICROZIDE) 12.5 MG capsule Take 1 capsule (12.5 mg) by mouth daily 10/13/20  Yes Kapil Duckworth MD   lisinopril (ZESTRIL) 10 MG tablet Take 10 mg by mouth every evening   Yes Reported, Patient   metFORMIN (GLUCOPHAGE) 500 MG tablet TAKE ONE TABLET BY MOUTH TWICE A DAY WITH MEALS  Patient taking differently: Take 500 mg by mouth 2 times daily  11/20/20  Yes Elli Zapata APRN CNP   methotrexate sodium 2.5 MG TABS Take 7 tablets (17.5 mg) by mouth once a week . Take all 7 tablets on the same day of each week.  Patient taking differently: Take 17.5 mg by mouth once a week . Take all 7 tablets on the same day of each week.   On Sundays 12/10/20  Yes Albert Tompkins MD   metoprolol succinate ER (TOPROL-XL) 25 MG 24 hr tablet Take 50 mg by mouth every morning (2 X 25MG)   Yes Reported, Patient   omeprazole (PRILOSEC) 40 MG DR capsule TAKE ONE CAPSULE BY MOUTH ONCE DAILY  Patient taking differently: Take 40 mg by mouth every  morning TAKE ONE CAPSULE BY MOUTH ONCE DAILY 12/1/20  Yes Kapil Duckworth MD   predniSONE (DELTASONE) 5 MG tablet Take 5-20 mg by mouth daily as needed (Rheumatoid Arthritis Flare ups)   Yes Reported, Patient   tiZANidine (ZANAFLEX) 2 MG tablet Take 1 tablet (2 mg) by mouth 3 times daily as needed for muscle spasms 3/12/18  Yes Kapil Duckworth MD   ACCU-CHEK DAYANARA PLUS test strip TEST THREE TIMES A DAY OR AS DIRECTED 12/17/20   Kapil Duckworth MD   blood glucose (NO BRAND SPECIFIED) lancing device Use to test blood sugars 3 times daily or as directed. 3/12/18   Kapil Duckworth MD   blood glucose monitoring (ONE TOUCH DELICA) lancets Use to test blood sugars 3 times daily or as directed. 6/14/17   Kapil Duckworth MD   order for DME Equipment being ordered: specialized shoes for diabetic neuropathy as well as all supplies provided for annual treatment of diabetes neuropathy 3/15/19   Kapil Duckworth MD   order for DME Equipment being ordered: glucometer 3/12/18   Kapil Duckworth MD   order for DME Equipment being ordered: specialized shoes for diabetic neuropathy     His orthotics will be made by [employer]      Tim Jernigan, Certified   [email or web address]  Phone: [phone]  Fax: [phone]  [address] [place] [address] 2/28/18   Kapil Duckworth MD     Current Facility-Administered Medications Ordered in Epic   Medication Dose Route Frequency Last Rate Last Admin     ceFAZolin (ANCEF) intermittent infusion 2 g in 100 mL dextrose PRE-MIX  2 g Intravenous Pre-Op/Pre-procedure x 1 dose         lactated ringers infusion   Intravenous Continuous         lidocaine 1 % 0.1-1 mL  0.1-1 mL Other Q1H PRN         No current [facility]-ordered outpatient medications on file.       lactated ringers       Recent Labs   Lab Test 01/07/21  0610 12/17/20  0655 12/15/20  1206     --  137   POTASSIUM 4.2  --  5.0   CHLORIDE 110*  --  108   CO2 22  --  20   ANIONGAP 6  --  9   *  --  107*   BUN 37*   --  47*   CR 1.61* 1.81* 1.60*   NEW 8.7  --  9.0     Recent Labs   Lab Test 01/07/21  0610 12/17/20  0655 12/11/20  1436   WBC  --  4.6 4.9   HGB 10.2* 11.5* 11.9*   PLT  --  304 224     Recent Labs   Lab Test 01/07/21  0510   ABO PENDING     No results for input(s): TROPI in the last 72262 hours.  No results for input(s): PH, PCO2, PO2, HCO3 in the last 62560 hours.  No results for input(s): HCG in the last 19428 hours.  Recent Results (from the past 744 hour(s))   IR Lower Extremity Angiogram Left    Narrative    Procedure Date: 12/17/2020 9:26 AM     PREOPERATIVE DIAGNOSIS:  Peripheral arterial disease with nonhealing  wound      POSTOPERATIVE DIAGNOSIS:  Same     PROCEDURES:   *  Ultrasound-guided Right common femoral arterial access.   *  Diagnostic Left lower extremity angiogram (second - order  selective).   *  Mynx closure of right femoral artery access site.      SURGEON:  Myra Lopes MD.       ASSISTANT:  None.      ANESTHESIA:  Moderate sedation   DOSE: 1.5 mg Versed; 100 mcg Fentanyl  SEDATION TIME: 39 minutes     ESTIMATED BLOOD LOSS:  Less than 10 mL.   FLUORO TIME: 2.6 min  FLUORO DOSE: 51.43 mGy  CONTRAST VOLUME: 30 mL      SPECIMENS:  None.       INDICATIONS: Zurdo Dash is a 75 year old male with history of?DL,  HTN, T2DM complicated by neuropathy, former smoking and former alcohol  abuse, RA, and PAD with prior claudication and current left third toe  ulcer.? He was taken to the operating room today for a diagnostic and  therapeutic left lower-extremity angiography.      INTRAOPERATIVE FINDINGS:  Patent and nonaneurysmal aorta.  Patent  bilateral  common iliac, internal iliac, and external iliac arteries  with significant calcification but no stenoses.  Patent left  common  femoral artery.  Patent left profunda femoris artery origin. Flush  occlusion of the left superficial femoral artery, with heavy  calcification distally and reconstitution of the above-knee popliteal  artery. Patent  and mildly diseased popliteal artery. Patent anterior  tibial artery origin and tibial peroneal trunk. Rapid flow through the  posterior tibial artery to the level of the foot; small and heavily  diseased appearance of the peroneal artery. Very slow flow through the  anterior tibial artery with suspected occlusion above the ankle.    There is calcification of the above-knee popliteal artery, which  reconstitutes through a dominant collateral supply by the profunda  femoris artery. The below-knee popliteal artery is relatively disease  free, with a patent tibial peroneal trunk. The tibial vessels appear  to be very small in caliber and likely have disease present, not  well-visualized on exam today.     COMPLICATIONS:  None.      DESCRIPTION OF PROCEDURE:    Mr. Dash  was brought into the interventional radiology suite after  identification in the preprocedure waiting area.  He was transferred  to the operating table in supine position and after initial timeout  procedure, identifying the correct site and side, conscious sedation  was initiated with continuous monitoring by nursing staff.  The  bilateral groins were prepped and draped in standard sterile fashion.       Ultrasound was used to evaluate and document patency of the Right  common femoral artery. Under direct ultrasound guidance, a needle was  used to cannulate the common femoral artery. An image was saved for  documentation. The needle was then exchanged over a wire in standard  Seldinger technique to obtain femoral arterial access with a 5-[place]  sheath. The sheath was flushed with heparinized saline and was then  used to introduce a Bentson wire retrograde through the iliac system  into the aorta.      An Omniflush catheter was advanced over the Bentson wire and used to  complete first an aortogram and then a bilateral iliac angiogram.  The  Omniflush catheter was then used to direct the Bentson wire in an  up-and-over fashion into the left external  iliac artery.  An angiogram  was completed of the external iliac artery, common femoral artery,  superficial femoral, and profunda femoris artery origins.  The  catheter was advanced serially over the wire to complete imaging of  the superficial femoral artery, popliteal artery, and tibial vessels.         Findings were as follows: Patent and nonaneurysmal aorta.  Patent  bilateral  common iliac, internal iliac, and external iliac arteries  with significant calcification but no stenoses.  Patent left  common  femoral artery.  Patent left profunda femoris artery origin. Flush  occlusion of the left superficial femoral artery, with heavy  calcification distally and reconstitution of the above-knee popliteal  artery. Patent and mildly diseased popliteal artery. Patent anterior  tibial artery origin and tibial peroneal trunk. Rapid flow through the  posterior tibial artery to the level of the foot; small and heavily  diseased appearance of the peroneal artery. Very slow flow through the  anterior tibial artery with suspected occlusion above the ankle.     Based on the flush SFA occlusion, I felt that the disease pattern  would be better treated with open revascularization. Therefore the  wire and catheter were withdrawn. A Mynx device was deployed for  closure of the femoral artery access site.  Pressure was held over the  groin for approximately 5 minutes with no swelling or hematoma  formation noted.      Mr. Dash tolerated the procedure well and was transported to the  postprocedure unit in good condition.     MD FESTUS Contreras MD   US Lower Extremity Venous Mapping Bilateral    Narrative    US LOWER EXTREMITY VENOUS MAPPING BILATERAL  PROCEDURE DATE: 12/17/2020 12:21 PM     HISTORY:  Peripheral arterial disease. Preoperative imaging for  surgical planning for bypass graft. Please eval for useable  size/length of GSV conduit for left fem-pop bypass    COMPARISON: None.    TECHNIQUE:  Grayscale, color-flow, and spectral waveform analysis were  performed of the bilateral lower extremity superficial veins.    FINDINGS/    Impression    IMPRESSION:   1. Right great saphenous vein in the thigh ranges from 3.4 mm to 2.0  mm.  2. Right great saphenous vein at and below the knee ranges from 2.4 mm  to 1.3 mm.  3. Left great saphenous vein in the thigh ranges from 5.6 mm to 2.2  mm.  4. Left great saphenous vein at and below the knee ranges from 2.9 mm  to 1.1 mm.    AMA DIAZ MD     No results found for this or any previous visit (from the past 4320 hour(s)).      RECENT LABS:         Anesthesia Plan      History & Physical Review      ASA Status:  3 .        Plan for General   PONV prophylaxis:  Ondansetron (or other 5HT-3) and Dexamethasone or Solumedrol  No NSAIDs, monitor glucose, phenylephrine infusion     Dr Fitch [facility] [place] optimized for surgery.         Postoperative Care      Consents  Anesthetic plan, risks, benefits and alternatives discussed with:  Patient.  Use of blood products discussed: Yes.   Use of blood products discussed with Patient.  .                 Blessing Reyes MD

## 2021-01-07 NOTE — PROVIDER NOTIFICATION
NICHOL To A 16:08 on 1/7/20--D/C Trulicity as this med is non-formulary.  D/C Metformin.  Continue Novolog Sliding Scale as ordered.  Ольга [facility]    NICHOL To @ 16:19 on 1/7/20--D/C IV Metoprolol post-op order and resume PTA Toprol XL 50 mg daily in a.m.  Ольга Oro [facility]

## 2021-01-07 NOTE — PROGRESS NOTES
Arrived from PACU at 1445. VSS on 1 LPM NC. CMS w/baseline neuropathy. Doppler pulses. Left groin and medial thigh incision, scant drainage on thigh. Denies pain. Carter patent.

## 2021-01-08 ENCOUNTER — APPOINTMENT (OUTPATIENT)
Dept: PHYSICAL THERAPY | Facility: CLINIC | Age: 76
DRG: 253 | End: 2021-01-08
Attending: SURGERY
Payer: COMMERCIAL

## 2021-01-08 LAB
BASOPHILS # BLD AUTO: 0 10E9/L (ref 0–0.2)
BASOPHILS NFR BLD AUTO: 0.2 %
COPATH REPORT: NORMAL
DIFFERENTIAL METHOD BLD: ABNORMAL
EOSINOPHIL # BLD AUTO: 0 10E9/L (ref 0–0.7)
EOSINOPHIL NFR BLD AUTO: 0 %
ERYTHROCYTE [DISTWIDTH] IN BLOOD BY AUTOMATED COUNT: 14.3 % (ref 10–15)
GLUCOSE BLDC GLUCOMTR-MCNC: 128 MG/DL (ref 70–99)
GLUCOSE BLDC GLUCOMTR-MCNC: 133 MG/DL (ref 70–99)
GLUCOSE BLDC GLUCOMTR-MCNC: 148 MG/DL (ref 70–99)
GLUCOSE BLDC GLUCOMTR-MCNC: 165 MG/DL (ref 70–99)
GLUCOSE BLDC GLUCOMTR-MCNC: 173 MG/DL (ref 70–99)
HCT VFR BLD AUTO: 27.6 % (ref 40–53)
HGB BLD-MCNC: 9 G/DL (ref 13.3–17.7)
IMM GRANULOCYTES # BLD: 0.1 10E9/L (ref 0–0.4)
IMM GRANULOCYTES NFR BLD: 0.6 %
LYMPHOCYTES # BLD AUTO: 0.6 10E9/L (ref 0.8–5.3)
LYMPHOCYTES NFR BLD AUTO: 6.5 %
MCH RBC QN AUTO: 30 PG (ref 26.5–33)
MCHC RBC AUTO-ENTMCNC: 32.6 G/DL (ref 31.5–36.5)
MCV RBC AUTO: 92 FL (ref 78–100)
MONOCYTES # BLD AUTO: 0.4 10E9/L (ref 0–1.3)
MONOCYTES NFR BLD AUTO: 4.1 %
NEUTROPHILS # BLD AUTO: 8.8 10E9/L (ref 1.6–8.3)
NEUTROPHILS NFR BLD AUTO: 88.6 %
NRBC # BLD AUTO: 0 10*3/UL
NRBC BLD AUTO-RTO: 0 /100
PLATELET # BLD AUTO: 212 10E9/L (ref 150–450)
POTASSIUM SERPL-SCNC: 4.7 MMOL/L (ref 3.4–5.3)
RBC # BLD AUTO: 3 10E12/L (ref 4.4–5.9)
WBC # BLD AUTO: 9.9 10E9/L (ref 4–11)

## 2021-01-08 PROCEDURE — 999N001017 HC STATISTIC GLUCOSE BY METER IP

## 2021-01-08 PROCEDURE — 250N000013 HC RX MED GY IP 250 OP 250 PS 637: Performed by: PHYSICIAN ASSISTANT

## 2021-01-08 PROCEDURE — 85049 AUTOMATED PLATELET COUNT: CPT | Performed by: PHYSICIAN ASSISTANT

## 2021-01-08 PROCEDURE — 97116 GAIT TRAINING THERAPY: CPT | Mod: GP | Performed by: PHYSICAL THERAPIST

## 2021-01-08 PROCEDURE — 99233 SBSQ HOSP IP/OBS HIGH 50: CPT | Performed by: PHYSICIAN ASSISTANT

## 2021-01-08 PROCEDURE — 97530 THERAPEUTIC ACTIVITIES: CPT | Mod: GP | Performed by: PHYSICAL THERAPIST

## 2021-01-08 PROCEDURE — 85025 COMPLETE CBC W/AUTO DIFF WBC: CPT | Performed by: PHYSICIAN ASSISTANT

## 2021-01-08 PROCEDURE — 250N000011 HC RX IP 250 OP 636: Performed by: PHYSICIAN ASSISTANT

## 2021-01-08 PROCEDURE — 120N000001 HC R&B MED SURG/OB

## 2021-01-08 PROCEDURE — 36415 COLL VENOUS BLD VENIPUNCTURE: CPT | Performed by: PHYSICIAN ASSISTANT

## 2021-01-08 PROCEDURE — 84132 ASSAY OF SERUM POTASSIUM: CPT | Performed by: PHYSICIAN ASSISTANT

## 2021-01-08 PROCEDURE — 97161 PT EVAL LOW COMPLEX 20 MIN: CPT | Mod: GP | Performed by: PHYSICAL THERAPIST

## 2021-01-08 PROCEDURE — 250N000013 HC RX MED GY IP 250 OP 250 PS 637: Performed by: SURGERY

## 2021-01-08 RX ORDER — NALOXONE HYDROCHLORIDE 0.4 MG/ML
0.4 INJECTION, SOLUTION INTRAMUSCULAR; INTRAVENOUS; SUBCUTANEOUS
Status: DISCONTINUED | OUTPATIENT
Start: 2021-01-08 | End: 2021-01-09 | Stop reason: HOSPADM

## 2021-01-08 RX ORDER — NALOXONE HYDROCHLORIDE 0.4 MG/ML
0.2 INJECTION, SOLUTION INTRAMUSCULAR; INTRAVENOUS; SUBCUTANEOUS
Status: DISCONTINUED | OUTPATIENT
Start: 2021-01-08 | End: 2021-01-09 | Stop reason: HOSPADM

## 2021-01-08 RX ADMIN — ATORVASTATIN CALCIUM 40 MG: 40 TABLET, FILM COATED ORAL at 21:42

## 2021-01-08 RX ADMIN — CLOPIDOGREL BISULFATE 75 MG: 75 TABLET, FILM COATED ORAL at 08:14

## 2021-01-08 RX ADMIN — ASPIRIN 81 MG: 81 TABLET, DELAYED RELEASE ORAL at 08:14

## 2021-01-08 RX ADMIN — INSULIN ASPART 1 UNITS: 100 INJECTION, SOLUTION INTRAVENOUS; SUBCUTANEOUS at 12:51

## 2021-01-08 RX ADMIN — OMEPRAZOLE 40 MG: 20 CAPSULE, DELAYED RELEASE ORAL at 08:14

## 2021-01-08 RX ADMIN — DOCUSATE SODIUM 50 MG AND SENNOSIDES 8.6 MG 2 TABLET: 8.6; 5 TABLET, FILM COATED ORAL at 11:36

## 2021-01-08 RX ADMIN — OXYCODONE HYDROCHLORIDE 5 MG: 5 TABLET ORAL at 04:12

## 2021-01-08 RX ADMIN — OXYCODONE HYDROCHLORIDE 5 MG: 5 TABLET ORAL at 19:36

## 2021-01-08 RX ADMIN — Medication 1 LOZENGE: at 00:37

## 2021-01-08 RX ADMIN — OXYCODONE HYDROCHLORIDE 5 MG: 5 TABLET ORAL at 14:45

## 2021-01-08 RX ADMIN — CETIRIZINE HYDROCHLORIDE 10 MG: 10 TABLET, FILM COATED ORAL at 21:42

## 2021-01-08 RX ADMIN — INSULIN ASPART 1 UNITS: 100 INJECTION, SOLUTION INTRAVENOUS; SUBCUTANEOUS at 18:17

## 2021-01-08 RX ADMIN — ACETAMINOPHEN 975 MG: 325 TABLET, FILM COATED ORAL at 06:52

## 2021-01-08 RX ADMIN — CEFAZOLIN SODIUM 2 G: 2 INJECTION, SOLUTION INTRAVENOUS at 00:33

## 2021-01-08 RX ADMIN — ACETAMINOPHEN 975 MG: 325 TABLET, FILM COATED ORAL at 14:45

## 2021-01-08 RX ADMIN — OXYCODONE HYDROCHLORIDE 5 MG: 5 TABLET ORAL at 08:19

## 2021-01-08 RX ADMIN — DOCUSATE SODIUM 50 MG AND SENNOSIDES 8.6 MG 2 TABLET: 8.6; 5 TABLET, FILM COATED ORAL at 21:43

## 2021-01-08 RX ADMIN — HYDROCHLOROTHIAZIDE 12.5 MG: 12.5 CAPSULE ORAL at 08:14

## 2021-01-08 RX ADMIN — LISINOPRIL 10 MG: 10 TABLET ORAL at 21:42

## 2021-01-08 ASSESSMENT — MIFFLIN-ST. JEOR: SCORE: 1632.38

## 2021-01-08 ASSESSMENT — ACTIVITIES OF DAILY LIVING (ADL)
ADLS_ACUITY_SCORE: 22
ADLS_ACUITY_SCORE: 16

## 2021-01-08 NOTE — PROGRESS NOTES
01/08/21 1100   Quick Adds   Type of Visit Initial PT Evaluation   Living Environment   People in home spouse   Current Living Arrangements house   Home Accessibility stairs to enter home   Number of Stairs, Main Entrance 3   Stair Railings, Main Entrance   (14)   Transportation Anticipated family or friend will provide   Living Environment Comments bed bath on main level   Self-Care   Usual Activity Tolerance good   Current Activity Tolerance moderate   Regular Exercise Yes   Activity/Exercise Type walking   Exercise Amount/Frequency 3-5 times/wk   Equipment Currently Used at Home none   Disability/Function   Hearing Difficulty or Deaf yes   Patient's preferred means of communication verbal   Describe hearing loss hearing loss on right side;hearing loss on left side;ringing in ears   Use of hearing assistive devices none   Were auxiliary aids offered? yes  (talked louder for pt to hear)   The following aids were provided; patient declined offer of auxiliary aids   Wear Glasses or Blind yes   Concentrating, Remembering or Making Decisions Difficulty no   Difficulty Communicating no   Difficulty Eating/Swallowing no   Walking or Climbing Stairs Difficulty yes   Dressing/Bathing Difficulty no   Toileting issues no   Doing Errands Independently Difficulty (such as shopping) no   Fall history within last six months no   Change in Functional Status Since Onset of Current Illness/Injury no   General Information   Onset of Illness/Injury or Date of Surgery 01/05/21   Referring Physician Hardy   Pertinent History of Current Problem (include personal factors and/or comorbidities that impact the POC)  Peripheral arterial disease with critical limb ischemia of non-healing wound s/p left femoral endarterectomy and left femoral to above-knee popliteal artery bypass with PTFE 1/7/21   Cognition   Orientation Status (Cognition) oriented x 4   Affect/Mental Status (Cognition) WNL   Follows Commands (Cognition) follows two-step  commands;over 90% accuracy   Safety Deficit (Cognition) at risk behavior observed   Pain Assessment   Patient Currently in Pain Yes, see Vital Sign flowsheet   Posture    Posture Forward head position;Protracted shoulders   Range of Motion (ROM)   ROM Comment BLE ROM is WFL   Strength   Strength Comments RLE 4/5 HF, remaining WFL, LLE not tested due to post op pain   Bed Mobility   Comment (Bed Mobility) SBA    Transfers   Transfer Safety Comments SBA   Gait/Stairs (Locomotion)   [place] Level (Gait) supervision   Distance in Feet (Required for LE Total Joints) 50   Pattern (Gait) step-through   Deviations/Abnormal Patterns (Gait) base of support, wide;gait speed decreased   Balance   Balance Comments good in sitting and standing, SLS x 10 sec   Clinical Impression   Criteria for Skilled Therapeutic Intervention yes, treatment indicated   PT Diagnosis (PT) difficulty with transfers   Influenced by the following impairments pain   Functional limitations due to impairments impaired functional mobility   Clinical Presentation Stable/Uncomplicated   Clinical Decision Making (Complexity) low complexity   Therapy Frequency (PT) One time eval and treatment only   Predicted Duration of Therapy Intervention (days/wks) 1 day   Planned Therapy Interventions (PT) bed mobility training;stair training;gait training;transfer training   Risk & Benefits of therapy have been explained evaluation/treatment results reviewed;care plan/treatment goals reviewed;patient   Clinical Impression Comments Pt s/p popliteal artery bypass, previous independent, present near baseline mobilizing with sBA or less, will benefit from single PT session to assess need for AD and stair instruction   PT Discharge Planning    PT Discharge Recommendation (DC Rec) home with assist   PT Rationale for DC Rec pt mobilizing well, near baseline, would benefit from SBA on stairs and to lift legs into bed upon returning home, no further IPPT needs, orders  completed, rec cont to ambulate with nursing 4 x dayj   PT Brief overview of current status  SBA bed mobility and transfers, SBA for gait no AD   Total Evaluation Time   Total Evaluation Time (Minutes) 10

## 2021-01-08 NOTE — PROVIDER NOTIFICATION
Paged Dr. Clements regarding pt's HR dropping to 38. EKG was completed and showed mobitz 1. Patient asymptomatic. Requesting hold parameters for BP meds. Awaiting response.

## 2021-01-08 NOTE — PROGRESS NOTES
[facility]    Vascular Medicine Progress Note    Date of Service (when I saw the patient): 01/08/2021          Physician Supervisory Attestation:   I have reviewed and discussed with the physician assistant their history, physical and plan ane did not examined Zurdo Dash and agree with the plan as stated in the physician assistant note.  Should be billed under JUAN .    Tiffany Aguilar MD, [facility], [facility]   1/8/2021           Assessment & Plan   1. Peripheral arterial disease with critical limb ischemia of non-healing wound s/p left femoral endarterectomy and left femoral to above-knee popliteal artery bypass with PTFE 1/7/21     -Post-operative cares per Vascular Surgery / Dr. Lopes.   -Audible pulses by doppler.   -Pain controlled. Ambulating.   -On Plavix/Aspirin.     2. Type 2 diabetes mellitus     -Well controlled with A1C 6.8.  -On Metformin and Trulicity as outpatient -> holding these while in hospital.   -Monitor sugars and sliding scale insulin as needed.  -Sugars good overnight and today so far.   -Upon discharge he can resume his Metformin and Trulicity.      3. Hyperlipidemia     -LDL 45 -> at goal.   -Continue Atorvastatin 40 mg daily.      4. Chronic kidney disease     -Creatinine on admission was 1.61.   -Monitor.  -Avoid nephrotoxic medications.     5. Rheumatoid arthritis     -He takes prednisone as needed and methotrexate weekly for this.   -Continue the same.      6. Hypertension     -BP good. HR 40's-50's.   -Was on lisinopril, hydrochlorothiazide and metoprolol as outpatient.  -With noted Mobitz type 1 and bradycardia this admission, holding Metoprolol.   -Monitor blood pressure closely.      7. Mobitz type 1, asymptomatic     -He was noted in pre-op to have Mobitz type 1 on ECG.  -He was evaluated by Cardiology prior to going to the OR. He had no concerning cardiac symptoms and approved to proceed with surgery.   -Monitor on telemetry.   -Cardiology to follow  in hospital.        Interval History   Doing well. Pain under control. Sore, but walking to bathroom. Voiding well. Eating. No real complaints. Lives with wife in house with 4 steps to enter home, but otherwise can do everything on 1st level.     Physical Exam   Temp: 98  F (36.7  C) Temp src: Oral BP: 119/75 Pulse: 52   Resp: 16 SpO2: 94 % O2 Device: None (Room air) Oxygen Delivery: 3 LPM  Vitals:    01/07/21 0628 01/08/21 0545   Weight: 90.3 kg (199 lb) 91.2 kg (201 lb 1 oz)     Vital Signs with Ranges  Temp:  [96.7  F (35.9  C)-98.2  F (36.8  C)] 98  F (36.7  C)  Pulse:  [46-57] 52  Resp:  [10-18] 16  BP: ()/(56-83) 119/75  SpO2:  [94 %-100 %] 94 %  I/O last 3 completed shifts:  In: 2472.17 [P.O.:180; I.V.:2292.17]  Out: 2100 [Urine:1900; Blood:200]    Constitutional: Awake, alert, cooperative, no apparent distress, and appears stated age.  Eyes: Lids and lashes normal, sclera clear, conjunctiva normal.  ENT: Normocephalic, without obvious abnormality, atraumatic, oral pharynx with moist mucus membranes  Respiratory: No increased work of breathing, good air exchange, clear to auscultation bilaterally, no crackles or wheezing.  Cardiovascular: Bradycardic, normal S1 and S2, no S3 or S4, and no murmur noted.  GI: Normal bowel sounds, soft, non-distended, non-tender  Genitourinary:  Carter out. Voiding.   Skin: No bruising or bleeding, normal skin color, texture, turgor, no redness, warmth, or swelling, no rashes, surgical site with dressing that is c/d/i.   Musculoskeletal: There is no redness, warmth, or swelling of the joints.    Neurologic: Awake, alert, oriented to name, place and time.  Cranial nerves II-XII are grossly intact.    Neuropsychiatric: Calm, normal eye contact, alert, normal affect, oriented to self, place, time and situation, memory for past and recent events intact and thought process normal.    Medications       acetaminophen  975 mg Oral Q8H     aspirin  81 mg Oral Daily     atorvastatin   40 mg Oral Daily     cetirizine  10 mg Oral At Bedtime     clopidogrel  75 mg Oral Daily     hydrochlorothiazide  12.5 mg Oral Daily     insulin aspart  1-7 Units Subcutaneous TID AC     insulin aspart  1-5 Units Subcutaneous At Bedtime     lisinopril  10 mg Oral QPM     omeprazole  40 mg Oral QAM     senna-docusate  1 tablet Oral BID    Or     senna-docusate  2 tablet Oral BID     sodium chloride (PF)  3 mL Intracatheter Q8H       Data   Recent Labs   Lab 01/08/21  0708 01/07/21  0610   WBC 9.9  --    HGB 9.0* 10.2*   MCV 92  --      --    NA  --  138   POTASSIUM 4.7 4.2   CHLORIDE  --  110*   CO2  --  22   BUN  --  37*   CR  --  1.61*   ANIONGAP  --  6   NEW  --  8.7   GLC  --  144*   ALBUMIN  --  3.3*   PROTTOTAL  --  6.5*   BILITOTAL  --  0.7   ALKPHOS  --  58   ALT  --  24   AST  --  18     No results found for this or any previous visit (from the past 24 hour(s)).

## 2021-01-08 NOTE — PROGRESS NOTES
"VASCULAR SURGERY PROGRESS NOTE    Subjective:  No acute events. Overnight. Tolerated procedure well. Pain is controlled.     Objective:    Naldo Landon MD  Vascular Surgery Fellow  [facility]    Labs:  ROUTINE IP LABS (Last four results)  BMP  Recent Labs   Lab 01/07/21  0610      POTASSIUM 4.2   CHLORIDE 110*   NEW 8.7   CO2 22   BUN 37*   CR 1.61*   *     CBC  Recent Labs   Lab 01/07/21  0610   HGB 10.2*     INRNo lab results found in last 7 days.    PHYSICAL EXAM:  BP (!) 140/79 (BP Location: Right arm)   Pulse 50   Temp 97.4  F (36.3  C) (Oral)   Resp 16   Ht 1.753 m (5' 9\")   Wt 90.3 kg (199 lb)   SpO2 97%   BMI 29.39 kg/m    General: The patient is alert and oriented. Appropriate. No acute distress  Psych: pleasant affect, answers questions appropriately  Skin: Color appropriate for race, warm, dry.  Respiratory: The patient does not require supplemental oxygen. Breathing unlabored  GI:  Abdomen soft, nontender to light palpation.  Neuro: CN 2- 12 grossly intact, no deficits in strength and sensation   Extremities: LLE dressing clean and dry. Brisk monophasic DP, Peroneal and PT        Imaging:   No results found for this or any previous visit (from the past 24 hour(s)).    ASSESSMENT:  75M s/p L FEA with patch angioplasty, left fem to AK pop bypass with PTFE 1/7/21      PLAN:  Doing well post-op  Ambulate  Regular diet  IS  ASA/Plavix/Statin  Home 1-2 days      Naldo Landon MD   Vascular Surgery Fellow  [facility]                "

## 2021-01-08 NOTE — PROGRESS NOTES
Called by nursing regarding bradycardia and questions about meds.  Heart rate dropped into the upper 30s earlier, now up into the 50s.  EKG repeated and continues to show sinus bradycardia with Mobitz type I AV block.  Patient asymptomatic and hemodynamically stable.  Will discontinue Toprol-XL for now, can reassess in a.m.  Okay to continue with lisinopril as previously ordered.  Vaidm Clements MD

## 2021-01-08 NOTE — PLAN OF CARE
9575-9018: A&O x4. VSS on RA. Pain controlled by oxycodone, tylenol and and lozenges for sore throat. Tele is sinus rahul with BBB, MD aware. Stopped fluids and saline locked due to adequate PO intake. Pulses present with doppler. LS diminished. BS+, BM-, flatus+. Groin and leg incision dressing CDI. Tolerating full liquid diet. Denies N/V. BGs 182 & 148. Carter pulled and patient is voiding adequately. Up with SBA.

## 2021-01-08 NOTE — PLAN OF CARE
Patient states 3/10 pain in groin, denies the need for pain mediations other than scheduled tylenol at this time. BLE pulses present on doppler. Dressings L groin dressing has slight sanguinous dressing and is soft. LLE dressing also CDI and soft. IV fluids infusing and IV abx administered. He is tolerating his clear liquid well and was advanced to full liquid.     See note regarding page to Dr. Doe.

## 2021-01-09 ENCOUNTER — APPOINTMENT (OUTPATIENT)
Dept: OCCUPATIONAL THERAPY | Facility: CLINIC | Age: 76
DRG: 253 | End: 2021-01-09
Attending: SURGERY
Payer: COMMERCIAL

## 2021-01-09 VITALS
RESPIRATION RATE: 16 BRPM | TEMPERATURE: 97.9 F | WEIGHT: 198.85 LBS | BODY MASS INDEX: 29.45 KG/M2 | SYSTOLIC BLOOD PRESSURE: 110 MMHG | HEIGHT: 69 IN | DIASTOLIC BLOOD PRESSURE: 72 MMHG | HEART RATE: 59 BPM | OXYGEN SATURATION: 97 %

## 2021-01-09 LAB
ANION GAP SERPL CALCULATED.3IONS-SCNC: 5 MMOL/L (ref 3–14)
BASOPHILS # BLD AUTO: 0.1 10E9/L (ref 0–0.2)
BASOPHILS NFR BLD AUTO: 0.8 %
BUN SERPL-MCNC: 31 MG/DL (ref 7–30)
CALCIUM SERPL-MCNC: 8.4 MG/DL (ref 8.5–10.1)
CHLORIDE SERPL-SCNC: 108 MMOL/L (ref 94–109)
CO2 SERPL-SCNC: 23 MMOL/L (ref 20–32)
CREAT SERPL-MCNC: 1.39 MG/DL (ref 0.66–1.25)
DIFFERENTIAL METHOD BLD: ABNORMAL
EOSINOPHIL # BLD AUTO: 0.1 10E9/L (ref 0–0.7)
EOSINOPHIL NFR BLD AUTO: 1.2 %
ERYTHROCYTE [DISTWIDTH] IN BLOOD BY AUTOMATED COUNT: 14.7 % (ref 10–15)
GFR SERPL CREATININE-BSD FRML MDRD: 49 ML/MIN/{1.73_M2}
GLUCOSE BLDC GLUCOMTR-MCNC: 114 MG/DL (ref 70–99)
GLUCOSE BLDC GLUCOMTR-MCNC: 124 MG/DL (ref 70–99)
GLUCOSE SERPL-MCNC: 117 MG/DL (ref 70–99)
HCT VFR BLD AUTO: 28.1 % (ref 40–53)
HGB BLD-MCNC: 9.1 G/DL (ref 13.3–17.7)
IMM GRANULOCYTES # BLD: 0.1 10E9/L (ref 0–0.4)
IMM GRANULOCYTES NFR BLD: 1.3 %
LYMPHOCYTES # BLD AUTO: 0.7 10E9/L (ref 0.8–5.3)
LYMPHOCYTES NFR BLD AUTO: 9.5 %
MCH RBC QN AUTO: 30.8 PG (ref 26.5–33)
MCHC RBC AUTO-ENTMCNC: 32.4 G/DL (ref 31.5–36.5)
MCV RBC AUTO: 95 FL (ref 78–100)
MONOCYTES # BLD AUTO: 0.7 10E9/L (ref 0–1.3)
MONOCYTES NFR BLD AUTO: 9.5 %
NEUTROPHILS # BLD AUTO: 5.9 10E9/L (ref 1.6–8.3)
NEUTROPHILS NFR BLD AUTO: 77.7 %
NRBC # BLD AUTO: 0 10*3/UL
NRBC BLD AUTO-RTO: 0 /100
PLATELET # BLD AUTO: 177 10E9/L (ref 150–450)
POTASSIUM SERPL-SCNC: 4.3 MMOL/L (ref 3.4–5.3)
RBC # BLD AUTO: 2.95 10E12/L (ref 4.4–5.9)
SODIUM SERPL-SCNC: 136 MMOL/L (ref 133–144)
WBC # BLD AUTO: 7.5 10E9/L (ref 4–11)

## 2021-01-09 PROCEDURE — 85025 COMPLETE CBC W/AUTO DIFF WBC: CPT | Performed by: INTERNAL MEDICINE

## 2021-01-09 PROCEDURE — 97535 SELF CARE MNGMENT TRAINING: CPT | Mod: GO

## 2021-01-09 PROCEDURE — 250N000013 HC RX MED GY IP 250 OP 250 PS 637: Performed by: PHYSICIAN ASSISTANT

## 2021-01-09 PROCEDURE — 80048 BASIC METABOLIC PNL TOTAL CA: CPT | Performed by: INTERNAL MEDICINE

## 2021-01-09 PROCEDURE — 36415 COLL VENOUS BLD VENIPUNCTURE: CPT | Performed by: INTERNAL MEDICINE

## 2021-01-09 PROCEDURE — 97165 OT EVAL LOW COMPLEX 30 MIN: CPT | Mod: GO

## 2021-01-09 PROCEDURE — 999N001017 HC STATISTIC GLUCOSE BY METER IP

## 2021-01-09 RX ORDER — ACETAMINOPHEN 325 MG/1
650 TABLET ORAL EVERY 4 HOURS PRN
COMMUNITY
Start: 2021-01-10 | End: 2022-03-17

## 2021-01-09 RX ORDER — OXYCODONE HYDROCHLORIDE 5 MG/1
5 TABLET ORAL EVERY 6 HOURS PRN
Qty: 20 TABLET | Refills: 0 | Status: SHIPPED | OUTPATIENT
Start: 2021-01-09 | End: 2021-02-17

## 2021-01-09 RX ORDER — CLOPIDOGREL BISULFATE 75 MG/1
75 TABLET ORAL DAILY
Qty: 30 TABLET | Refills: 3 | Status: SHIPPED | OUTPATIENT
Start: 2021-01-09 | End: 2021-05-18

## 2021-01-09 RX ORDER — AMOXICILLIN 250 MG
1 CAPSULE ORAL 2 TIMES DAILY
Qty: 50 TABLET | Refills: 0 | Status: SHIPPED | OUTPATIENT
Start: 2021-01-09 | End: 2022-03-17

## 2021-01-09 RX ADMIN — DOCUSATE SODIUM 50 MG AND SENNOSIDES 8.6 MG 2 TABLET: 8.6; 5 TABLET, FILM COATED ORAL at 08:07

## 2021-01-09 RX ADMIN — OXYCODONE HYDROCHLORIDE 5 MG: 5 TABLET ORAL at 08:12

## 2021-01-09 RX ADMIN — ASPIRIN 81 MG: 81 TABLET, DELAYED RELEASE ORAL at 08:06

## 2021-01-09 RX ADMIN — HYDROCHLOROTHIAZIDE 12.5 MG: 12.5 CAPSULE ORAL at 08:06

## 2021-01-09 RX ADMIN — OMEPRAZOLE 40 MG: 20 CAPSULE, DELAYED RELEASE ORAL at 08:06

## 2021-01-09 RX ADMIN — ACETAMINOPHEN 975 MG: 325 TABLET, FILM COATED ORAL at 00:47

## 2021-01-09 RX ADMIN — CLOPIDOGREL BISULFATE 75 MG: 75 TABLET, FILM COATED ORAL at 08:06

## 2021-01-09 RX ADMIN — ACETAMINOPHEN 975 MG: 325 TABLET, FILM COATED ORAL at 08:06

## 2021-01-09 RX ADMIN — OXYCODONE HYDROCHLORIDE 5 MG: 5 TABLET ORAL at 00:57

## 2021-01-09 ASSESSMENT — ACTIVITIES OF DAILY LIVING (ADL)
ADLS_ACUITY_SCORE: 22
PREVIOUS_RESPONSIBILITIES: MEAL PREP;HOUSEKEEPING;LAUNDRY;SHOPPING;MEDICATION MANAGEMENT;FINANCES;DRIVING
ADLS_ACUITY_SCORE: 22
ADLS_ACUITY_SCORE: 22

## 2021-01-09 ASSESSMENT — MIFFLIN-ST. JEOR: SCORE: 1622.38

## 2021-01-09 NOTE — PROGRESS NOTES
01/09/21 1045   Quick Adds   Type of Visit Initial Occupational Therapy Evaluation   Living Environment   People in home spouse   Current Living Arrangements house   Home Accessibility stairs to enter home   Number of Stairs, Main Entrance 3   Transportation Anticipated family or friend will provide   Self-Care   Usual Activity Tolerance good   Current Activity Tolerance moderate   Equipment Currently Used at Home none   Activity/Exercise/Self-Care Comment Patient lives in house with spouse, patient's spouse available to assist upon discharge. Patient has long handed shoe horn and reacher for AE at home.    General Information   Onset of Illness/Injury or Date of Surgery 01/07/20   Referring Physician Demetris Dash, ELIZABETH   Patient/Family Therapy Goal Statement (OT) go home   Additional Occupational Profile Info/Pertinent History of Current Problem Peripheral arterial disease with critical limb ischemia of non-healing wound s/p left femoral endarterectomy and left femoral to above-knee popliteal artery bypass with PTFE 1/7/21   Existing Precautions/Restrictions fall   General Observations and Info Activity: Ambulate    Cognitive Status Examination   Orientation Status orientation to person, place and time   Visual Perception   Visual Impairment/Limitations corrective lenses full-time   Sensory   Sensory Quick Adds No deficits were identified   Pain Assessment   Patient Currently in Pain No   Integumentary/Edema   Integumentary/Edema no deficits were identifed   Posture   Posture not impaired   Range of Motion Comprehensive   General Range of Motion no range of motion deficits identified   Strength Comprehensive (MMT)   General Manual Muscle Testing (MMT) Assessment no strength deficits identified   Muscle Tone Assessment   Muscle Tone Quick Adds No deficits were identified   Coordination   Upper Extremity Coordination No deficits were identified   Bed Mobility   Bed Mobility supine-sit;sit-supine   Supine-Sit  [place] (Bed Mobility) modified independence   Sit-Supine [place] (Bed Mobility) modified independence   Transfers   Transfers bed-chair transfer;sit-stand transfer   Transfer Skill: Bed to Chair/Chair to Bed   Bed-Chair [place] (Transfers) supervision;set up   Sit-Stand Transfer   Sit-Stand [place] (Transfers) supervision;set up   Activities of Daily Living   BADL Assessment/Intervention lower body dressing;upper body dressing   Upper Body Dressing Assessment/Training   [place] Level (Upper Body Dressing) independent   Lower Body Dressing Assessment/Training   [place] Level (Lower Body Dressing) minimum assist (75% patient effort)   Assistive Devices (Lower Body Dressing) reacher;sock-aid;long-handled shoe horn   Instrumental Activities of Daily Living (IADL)   Previous Responsibilities meal prep;housekeeping;laundry;shopping;medication management;finances;driving   Clinical Impression   Criteria for Skilled Therapeutic Interventions Met (OT) yes;meets criteria;skilled treatment is necessary   OT Diagnosis decreased independence in ADLs/IADLs    OT Problem List-Impairments impacting ADL problems related to;activity tolerance impaired;flexibility;balance;pain   Assessment of Occupational Performance 1-3 Performance Deficits   Identified Performance Deficits dressing, bathing    Planned Therapy Interventions (OT) ADL retraining;IADL retraining;home program guidelines;progressive activity/exercise   Clinical Decision Making Complexity (OT) low complexity   Therapy Frequency (OT) 1x eval and treat   Predicted Duration of Therapy 1   Anticipated Equipment Needs Upon Discharge (OT) dressing equipment   Risks and Benefits of Treatment have been explained. Yes   Patient, Family & other staff in agreement with plan of care Yes   OT Discharge Planning    OT Discharge Recommendation (DC Rec) Home with assist   OT Rationale for DC Rec Patient requiring minimal assist with ADLs- LB dressing due to  pain and decreased flexibility from incision. Patient's wife available to assist prn and patient would be successful if having assistance with LB dressing, supervision initially bathing, and assistance with home management.    Total Evaluation Time (Minutes)   Total Evaluation Time (Minutes) 10     Occupational Therapy Discharge Summary    Reason for therapy discharge:    Discharged to home.    Progress towards therapy goal(s). See goals on Care Plan in [facility] electronic health record for goal details.  Goals partially met.  Barriers to achieving goals:   discharge from facility.    Therapy recommendation(s):    No further therapy is recommended.

## 2021-01-09 NOTE — PROGRESS NOTES
Vascular Surgery Progress Note    S: Overall doing very well.  Still requiring p.o. narcotics but able to get up and walk.  Pain is quite tolerable.  Anxious to go home today.    O: No bowel movement yet.  Tolerating diet.  Vitals:  BP  Min: 110/72  Max: 137/82  Temp  Av.5  F (36.4  C)  Min: 97.1  F (36.2  C)  Max: 97.9  F (36.6  C)  Pulse  Av.1  Min: 56  Max: 61  I/O last 3 completed shifts:  In: 610 [P.O.:610]  Out: 850 [Urine:850]    Physical Exam: Alert and appropriate.  Very comfortable.  Finishing breakfast.                             Left groin and thigh incision=A                             Strong biphasic Doppler to left ankle PT and DP    Laboratory: K= 4.3 serum creatinine= 1.39 Hgb= 9.1      Assessment/Plan: #1.  Doing well following left common femoral above-knee popliteal PTFE bypass graft.  Adequate runoff with good Doppler.  Discharged to home today dual antiplatelet therapy.  Instructed in postoperative cares of incision.                 #2.  Mild acute blood loss anemia.  Initiate iron at discharge.                 #3.  Postoperative constipation.  Home with Metamucil and Colace.      Wm. Porter MD

## 2021-01-09 NOTE — PLAN OF CARE
A&O x4. VSS on RA. Pain controlled by oxycodone and tylenol. Pulses palpable. LS diminished. BS active, flatus present. Groin and leg incision dressing CDI. Tolerating regular diet. Voiding. Up with SBA. Ambulating halls.

## 2021-01-09 NOTE — PLAN OF CARE
Alert and oriented x4. Vital signs stable on RA. SBA with gait belt. Tolerating low CHO diet. Lung sounds clear. Passing flatus, no BM this shift. Adequate urine output. L groin incisoin scant dried sanguinous drainage, L knee CDI. Dorsalis pedis and posterior tibial pulses palpable, +2 bilaterally. Pain managed with oxycodone and tylenol. Denies nausea. Sinus bradycardia with first degree AVB and bundle branch block.

## 2021-01-09 NOTE — PLAN OF CARE
A&O x4. VSS on RA. Tele NSR. CMS intact. Lungs diminished. BS+, BM-, flatus+. Incisions covered, CDI. Tolerating low carb diet. Denies N/V. BGs 114. Voiding adequately. Oxycodone for pain. Up SBA. Discharge instructions reviewed. Pt verbalized understanding.

## 2021-01-10 LAB
INTERPRETATION ECG - MUSE: NORMAL
INTERPRETATION ECG - MUSE: NORMAL

## 2021-01-11 NOTE — DISCHARGE SUMMARY
[facility]    Discharge Summary  Vascular Surgery    Date of Admission:  1/7/2021  Date of Discharge:  1/9/2021 11:01 AM  Discharging Provider: Naldo Landon    Discharge Diagnoses   Patient Active Problem List    Diagnosis Date Noted     PAD (peripheral artery disease) (H) 12/23/2020     Priority: Medium     Added automatically from request for surgery [id number]       Abdominal pain, generalized 07/09/2020     Priority: Medium     CKD (chronic kidney disease) stage 3, GFR 30-59 ml/min 02/27/2020     Priority: Medium     Eyelid lesion, LLL 07/10/2019     Priority: Medium     Dermatochalasis of both upper eyelids 07/10/2019     Priority: Medium     Pseudophakia, ou 03/12/2019     Priority: Medium     Posterior vitreous detachment of left eye 02/09/2019     Priority: Medium     Migraine equivalent 12/22/2018     Priority: Medium     Chronic bilateral low back pain without sciatica 09/25/2018     Priority: Medium     Diabetic polyneuropathy associated with type 2 diabetes mellitus (H) 02/28/2018     Priority: Medium     Tubulovillous adenoma of colon 02/20/2018     Priority: Medium     Type 2 diabetes mellitus without retinopathy (H) 01/12/2018     Priority: Medium     Erectile dysfunction, unspecified erectile dysfunction type 11/07/2017     Priority: Medium     Spondylosis of cervical region without myelopathy or radiculopathy 07/17/2017     Priority: Medium     Pulmonary nodule 03/03/2017     Priority: Medium     Needs follow up CT chest 1 year 2/2018       High risk medication use 03/03/2017     Priority: Medium     Rheumatoid arthritis involving multiple sites with positive rheumatoid factor (H) 03/18/2016     Priority: Medium     Family history of esophageal cancer 12/14/2015     Priority: Medium     Gastroesophageal reflux disease, esophagitis presence not specified 12/14/2015     Priority: Medium     IMO Regulatory Load OCT 2020       Primary osteoarthritis of both knees 11/05/2015      Priority: Medium     Ex-smoker 10/20/2015     Priority: Medium     RBBB (right bundle branch block) 10/24/2013     Priority: Medium     Noted on ECG 10/19/2013 . See medical record scanned into Epic electronic medical records         Type 2 diabetes mellitus with diabetic polyneuropathy, without long-term current use of insulin (H) 02/28/2013     Priority: Medium     A1C      6.9   5/3/2016  A1C      8.6   3/18/2016  A1C      9.7   9/30/2015  A1C     11.8   6/29/2015  A1C     10.7   3/30/2015         Trigger finger, acquired 01/15/2013     Priority: Medium     Health Care Home 12/12/2012     Priority: Medium     Alin Espinoza RN,[phone]   FPA / [facility]          DX V65.8 REPLACED WITH 11326 [facility]ALTH CARE HOME (04/08/2013)       Advanced directives, counseling/discussion 03/29/2011     Priority: Medium     Patient states has Advance Directive and will bring in a copy to clinic.         Hypertension goal BP (blood pressure) < 140/90      Priority: Medium     Hypogonadism      Priority: Medium     Hyperlipidemia LDL goal <100 10/31/2010     Priority: Medium     Pain in limb      Priority: Medium     Had some leg cramps with features consistent with vascular claudication. See ankle brachial index tests and vascular surgery consultation from 2/16/2011 in media section of Epic electronic medical records ; the key point is that subsequently , evaluations found zero evidence of     Ischemic vascular disease         Bradycardia 01/12/2009     Priority: Medium     Syncope 01/12/2009     Priority: Medium       Procedure/Surgery Information   Procedure: Procedure(s):  LEFT FEMORAL TO ABOVE KNEE POPLITEAL ARTERY BYPASS WITH PTFE VASCULAR GRAFT REMOVABLE RING 6MMX 50CM  LEFT FEMORAL ENDARTERECTOMY WITH PATCH ANGIOPLASTY PHOTOFIX  0.8 X 8CM   Surgeon(s): Surgeon(s) and Role:     * Myra Lopes MD - Primary     * Demetris Dash PA-C - Assisting   Specimens: ID Type Source Tests  Collected by Time Destination   A : LEFT FEMORAL ARTERY PLAQUE Tissue Artery, Femoral SURGICAL PATHOLOGY EXAM Myra Lopes MD 1/7/2021 10:27 AM       Non-operative procedures Arterial line placement     History of Present Illness    Zurdo Dash is a 75 year old male with history of DL, HTN, T2DM complicated by neuropathy, former smoking and former alcohol abuse, RA, and PAD with prior claudication and current left third toe ulcer.   The patient underwent left lower extremity angiogram on 12/17/2020 which showed flush occlusion of the left SFA with heavy calcification reconstitution to above-knee popliteal artery.  He was consented for left lower extremity bypass and presented for elective surgery. Risk benefits and alternatives were discussed with patient and patient decided to go forth with the procedure.     Hospital Course   Zurdo Dash was admitted on 1/7/2021.  The following problems were addressed during his hospitalization:  Principal Problem:    PAD (peripheral artery disease) (H)      Patient was admitted following above procedure.  There are no complications evolving the case.  Patient tolerated procedure well.  Postoperative day #1 he was ambulating without difficulty.  He had adequate analgesia.  Physical therapy was consulted for progressive mobility.  He was subsequently discharged on postoperative day #2.    Post-operative pain control: included oxycodone, Tylenol, Dilaudid and will be oxycodone and Tylenol on discharge.     Medications discontinued or adjusted during this hospitalization: New narcotics initiated: Oxycodone    Antibiotics prescribed at discharge: No new antibiotics prescribed     Imaging study follow up needs:   -None performed    Significant Findings: Not applicable    Naldo Landon MD    Discharge Disposition   Discharged to home   Condition at discharge: Stable    Pending Results   Final pathology results: No pathology submitted  These results will be  followed up by not applicable  Unresulted Labs Ordered in the Past 30 Days of this Admission     No orders found from 12/8/2020 to 1/8/2021.          Primary Care Physician   Kapil Duckworth        Consultations This Hospital Stay   ANESTHESIOLOGY IP CONSULT  SMOKING CESSATION PROGRAM IP CONSULT  HOSPITALIST IP CONSULT  PHYSICAL THERAPY ADULT IP CONSULT  OCCUPATIONAL THERAPY ADULT IP CONSULT    Time Spent on this Encounter   I have spent less than 30 minutes on this discharge.    Discharge Orders      Follow-up and recommended labs and tests     Follow up with Dr. Lopes by Video , at (location with clinic name or city) [place] office, within one week  to evaluate after surgery. The following labs/tests are recommended: Graft Duplex in 8 week.     Discharge Medications   Discharge Medication List as of 1/9/2021 10:21 AM      START taking these medications    Details   acetaminophen (TYLENOL) 325 MG tablet Take 2 tablets (650 mg) by mouth every 4 hours as needed for other (multimodal surgical pain management along with NSAIDS and opioid medication as indicated based on pain control and physical function.), OTC      clopidogrel (PLAVIX) 75 MG tablet Take 1 tablet (75 mg) by mouth daily, Disp-30 tablet, R-3, E-Prescribe      oxyCODONE (ROXICODONE) 5 MG tablet Take 1 tablet (5 mg) by mouth every 6 hours as needed for severe pain, Disp-20 tablet, R-0, E-Prescribe      senna-docusate (SENOKOT-S/PERICOLACE) 8.6-50 MG tablet Take 1 tablet by mouth 2 times daily, Disp-50 tablet, R-0, E-Prescribe1 bottle         CONTINUE these medications which have NOT CHANGED    Details   ACCU-CHEK DAYANARA PLUS test strip TEST THREE TIMES A DAY OR AS DIRECTED, Disp-300 each, R-1, ANITA, E-Prescribe      aspirin 81 MG tablet Take 1 tablet by mouth every evening , R-3, Historical      atorvastatin (LIPITOR) 40 MG tablet Take 1 tablet (40 mg) by mouth daily, Disp-90 tablet, R-3, E-Prescribe      blood glucose (NO BRAND SPECIFIED) lancing device Use to  test blood sugars 3 times daily or as directed.Disp-1 each, [id number]      blood glucose monitoring (ONE TOUCH DELICA) lancets Use to test blood sugars 3 times daily or as directed., Disp-300 each, R-3, E-Prescribe      cetirizine (ZYRTEC) 10 MG tablet Take 10 mg by mouth At Bedtime, Historical      clobetasol (TEMOVATE) 0.05 % external ointment Apply topically 2 times daily as neededHistorical      diclofenac (VOLTAREN) 1 % topical gel Apply topically 4 times daily as needed for moderate pain, Historical      dulaglutide (TRULICITY) 1.5 MG/0.5ML pen Inject 1.5 mg Subcutaneous every 7 days, Disp-9 mL, R-3, Local Print      folic acid (FOLVITE) 1 MG tablet Take 1 tablet (1 mg) by mouth daily, Disp-100 tablet, R-2, E-Prescribe      hydrochlorothiazide (MICROZIDE) 12.5 MG capsule Take 1 capsule (12.5 mg) by mouth daily, Disp-30 capsule, R-2, E-Prescribe      lisinopril (ZESTRIL) 10 MG tablet Take 10 mg by mouth every evening, Historical      metFORMIN (GLUCOPHAGE) 500 MG tablet TAKE ONE TABLET BY MOUTH TWICE A DAY WITH MEALS, Disp-180 tablet, R-0, E-PrescribeNEED APPOINTMENT FOR FURTHER REFILLS.      methotrexate sodium 2.5 MG TABS Take 7 tablets (17.5 mg) by mouth once a week . Take all 7 tablets on the same day of each week., Disp-84 tablet, R-1, E-Prescribe      metoprolol succinate ER (TOPROL-XL) 25 MG 24 hr tablet Take 50 mg by mouth every morning (2 X 25MG), Historical      omeprazole (PRILOSEC) 40 MG DR capsule TAKE ONE CAPSULE BY MOUTH ONCE DAILY, Disp-90 capsule, R-0, E-Prescribe      !! order for DME Equipment being ordered: specialized shoes for diabetic neuropathy as well as all supplies provided for annual treatment of diabetes neuropathyDisp-1 Device, R-0, Local Print      !! order for DME Equipment being ordered: glucometerDisp-1 Device, R-0, Local Print      !! order for DME Equipment being ordered: specialized shoes for diabetic neuropathy     His orthotics will be made by [employer]  [employer]      Tim Jernigan, Certified   [email or web address]  Phone: [phone]  Fax: [phone] [address] [facility] [address] [place] [address]Dujp-7 Device, R-0, Local Print      predniSONE (DELTASONE) 5 MG tablet Take 5-20 mg by mouth daily as needed (Rheumatoid Arthritis Flare ups), Historical      tiZANidine (ZANAFLEX) 2 MG tablet Take 1 tablet (2 mg) by mouth 3 times daily as needed for muscle spasms, Disp-90 tablet, R-1, E-Prescribe       !! - Potential duplicate medications found. Please discuss with provider.        Allergies   No Known Allergies  Data   Most Recent 3 CBC's:  Recent Labs   Lab Test 01/09/21  0709 01/08/21  0708 01/07/21  0610 12/17/20  0655   WBC 7.5 9.9  --  4.6   HGB 9.1* 9.0* 10.2* 11.5*   MCV 95 92  --  90    212  --  304      Most Recent 3 BMP's:  Recent Labs   Lab Test 01/09/21  0709 01/08/21  0708 01/07/21  0610 12/17/20  0655 12/15/20  1206     --  138  --  137   POTASSIUM 4.3 4.7 4.2  --  5.0   CHLORIDE 108  --  110*  --  108   CO2 23  --  22  --  20   BUN 31*  --  37*  --  47*   CR 1.39*  --  1.61* 1.81* 1.60*   ANIONGAP 5  --  6  --  9   NEW 8.4*  --  8.7  --  9.0   *  --  144*  --  107*     Most Recent 2 LFT's:  Recent Labs   Lab Test 01/07/21  0610 12/11/20  1436   AST 18 16   ALT 24 24   ALKPHOS 58 51   BILITOTAL 0.7 0.4     Most Recent INR's and Anticoagulation Dosing History:  Anticoagulation Dose History     Recent Dosing and Labs Latest Ref Rng & Units 2/3/2011 12/17/2020    INR 0.86 - 1.14 1.00 1.21(H)        Most Recent 3 Troponin's:No lab results found.  Most Recent Cholesterol Panel:  Recent Labs   Lab Test 01/07/21  0610   CHOL 116   LDL 45   HDL 38*   TRIG 166*     Most Recent 6 Bacteria Isolates From Any Culture (See EPIC Reports for Culture Details):  Recent Labs   Lab Test 08/31/15  0718   CULT No Beta Streptococcus isolated     Most Recent TSH, T4 and A1c Labs:  Recent Labs   Lab Test 01/07/21  0610 09/25/18  1246 09/25/18  1246    TSH  --   --  1.44   A1C 6.8*   < > 8.4*    < > = values in this interval not displayed.     STAFF:  Agree with above.  Follow-up with Dr Lopes.                   Ari Buenrostro MD

## 2021-01-21 ENCOUNTER — TELEPHONE (OUTPATIENT)
Dept: OTHER | Facility: CLINIC | Age: 76
End: 2021-01-21

## 2021-01-21 DIAGNOSIS — I73.9 PAD (PERIPHERAL ARTERY DISEASE) (H): ICD-10-CM

## 2021-01-21 DIAGNOSIS — Z09 POSTOPERATIVE EXAMINATION: Primary | ICD-10-CM

## 2021-01-21 NOTE — TELEPHONE ENCOUNTER
"Routing to Manager Etta due to pt concerns.     Hx of Left femoral endarterectomy, left femoral to above knee popliteal artery bypass with PTFE on 1/7/21 with Dr. Lopes. Discharged on 1/9/21.     Pt is overdue for follow up (likely due to being at rehab facility):  \"Follow up with Dr. Lopes by Video , at [place] office, within one week to evaluate after surgery. The following labs/tests are recommended: Graft Duplex in 8 week.\"    CARLOS Pelaez, RN    [facility]  Office:  [phone] Fax: [phone]    "

## 2021-01-21 NOTE — TELEPHONE ENCOUNTER
[facility]    Who is the name of the provider?:  Marianne      What is the location you see this provider at?: [place]    Reason for call:  Surgery 1/7, discharged 1/9.  Very upset, thinks he was discharged from [facility] too early.  Fell & hit head when he got home, required stitches  Has had multiple issues since discharge and is now in rehab.    Can we leave a detailed message on this number?  YES

## 2021-01-25 ENCOUNTER — TELEPHONE (OUTPATIENT)
Dept: OTHER | Facility: CLINIC | Age: 76
End: 2021-01-25

## 2021-01-25 NOTE — TELEPHONE ENCOUNTER
I will have  coordinate with [facility] the OV right after US.  (US 1/26/21 at 9:45am).     [facility]:  [address] [place] [address]  Phone: [phone]    Appt note: US in Epic. F/U s/p left femoral endarterectomy; left femoral to above knee popliteal bypass with PTFE on 1/7/21.     CARLOS Pelaez, RN    [facility]  Office:  [phone] Fax: [phone]

## 2021-01-25 NOTE — TELEPHONE ENCOUNTER
Patient's wife (Yoli) is calling regarding Zurdo's Ultrasound for tomorrow (01/26/20) at 9:45 am at our office. Patient had LEFT FEMORAL TO ABOVE KNEE POPLITEAL ARTERY BYPASS with Dr Lopes on 01/07/21.  Patient is having swelling and she is wondering if Dr Lopes could look at it after his Ultrasound? Zurdo has had a rough week since his surgery. He ended up in [facility] with a pacemaker and 9 stitches in his head. Yoli can be reached at [phone].

## 2021-01-25 NOTE — TELEPHONE ENCOUNTER
Yes, pt okay to have OV right after US, pt to have in person OV with Dr. Lopes.     I called pts wife back, she reports he is at [facility] facility (been there over 1 week), the nurse there called pts wife noting leg swelling.      Pts wife reports she thinks pt was discharged too soon from hospital. Was released on Saturday and on Sunday he had fall.     Please call pts wife during OV at [phone].     I will have  coordinate with [facility] the OV right after US.     Addendum:  Dr. Lopes called pts wife after OV and I called pts daughter/son in law to follow up to ensure all questions were answered. They noted understanding.     CARLOS Pelaez, RN    [facility]  Office:  [phone] Fax: [phone]

## 2021-01-25 NOTE — TELEPHONE ENCOUNTER
01/25/2021 LMTCB and schedule with Dr. Lopes right after US appt on 1/26/2021. OK to double book per MO.     Prachi GARCIA

## 2021-01-26 ENCOUNTER — OFFICE VISIT (OUTPATIENT)
Dept: OTHER | Facility: CLINIC | Age: 76
End: 2021-01-26
Attending: SURGERY
Payer: COMMERCIAL

## 2021-01-26 ENCOUNTER — HOSPITAL ENCOUNTER (OUTPATIENT)
Dept: ULTRASOUND IMAGING | Facility: CLINIC | Age: 76
End: 2021-01-26
Attending: SURGERY
Payer: COMMERCIAL

## 2021-01-26 VITALS — HEART RATE: 62 BPM | SYSTOLIC BLOOD PRESSURE: 111 MMHG | TEMPERATURE: 97.8 F | DIASTOLIC BLOOD PRESSURE: 74 MMHG

## 2021-01-26 DIAGNOSIS — Z09 POSTOPERATIVE EXAMINATION: ICD-10-CM

## 2021-01-26 DIAGNOSIS — I73.9 PAD (PERIPHERAL ARTERY DISEASE) (H): ICD-10-CM

## 2021-01-26 PROCEDURE — G0463 HOSPITAL OUTPT CLINIC VISIT: HCPCS

## 2021-01-26 PROCEDURE — 93926 LOWER EXTREMITY STUDY: CPT | Mod: LT

## 2021-01-26 PROCEDURE — 99213 OFFICE O/P EST LOW 20 MIN: CPT | Performed by: SURGERY

## 2021-01-26 PROCEDURE — 93926 LOWER EXTREMITY STUDY: CPT | Mod: 26 | Performed by: SURGERY

## 2021-01-26 NOTE — PROGRESS NOTES
"Zurdo Dash is a 75 year old male who presents for:  Chief Complaint   Patient presents with     RECHECK     S/P left femoral endarterectomy, left femoral to above knee popliteal artery bypass with PTFE on 1/7/21; 1 week follow up to 1/7/21 surgery with Dr. Lopes         Vitals:    Vitals:    01/26/21 1022   BP: 111/74   BP Location: Left arm   Patient Position: Chair   Cuff Size: Adult Regular   Pulse: 62   Temp: 97.8  F (36.6  C)   TempSrc: Temporal       BMI:  Estimated body mass index is 29.37 kg/m  as calculated from the following:    Height as of 1/7/21: 5' 9\" (1.753 m).    Weight as of 1/9/21: 198 lb 13.7 oz (90.2 kg).    Pain Score:  Data Unavailable        Savannah Baldwin MA    "

## 2021-01-26 NOTE — PATIENT INSTRUCTIONS

## 2021-01-26 NOTE — TELEPHONE ENCOUNTER
See Dr. Lopes OV note. She discussed with pts wife.     CARLOS Pelaez, RN    [facility]  Office:  [phone] Fax: [phone]

## 2021-01-26 NOTE — PROGRESS NOTES
Vascular Surgery Progress Note     Date: January 26, 2021     Reason for Visit:  Longitudinal PAD care    Interim History:   Zurdo Dash was discharged home on 1/9/21 after his bypass. Apparently, he had a syncopal episode in his kitchen on 1/10/21 and was taken to [facility], where he was found to have a traumatic pneumothorax, scalp laceration, and a 2nd degree AV block with bradycardia. He underwent permanent pacemaker placement on 1/13/21 for cardiogenic syncope. [facility] was [facility] to [facility].    Subjective:  Mr. Dash reports that right now he feels pretty good. He has not had dizziness or lightheadedness at his rehab facility. Because he is on a 14-day quarantine, he has only been able to walk around his 15' private room and go up and down one portable step for practice. He has not actually had skilled PT, per his report to me. He also lacks access to a shower or bathtub, and so has been sponge bathing while at the rehab unit.     He has noticed some swelling of his left foot. He feels good when walking, although he has noticed some numbness over his anterior left knee. He says he is eating well. His pain is controlled from the incisions.      Current Outpatient Medications:      ACCU-CHEK DAYANARA PLUS test strip, TEST THREE TIMES A DAY OR AS DIRECTED, Disp: 300 each, Rfl: 1     acetaminophen (TYLENOL) 325 MG tablet, Take 2 tablets (650 mg) by mouth every 4 hours as needed for other (multimodal surgical pain management along with NSAIDS and opioid medication as indicated based on pain control and physical function.), Disp:  , Rfl:      aspirin 81 MG tablet, Take 1 tablet by mouth every evening , Disp: , Rfl: 3     atorvastatin (LIPITOR) 40 MG tablet, Take 1 tablet (40 mg) by mouth daily (Patient taking differently: Take 40 mg by mouth every evening ), Disp: 90 tablet, Rfl: 3     blood glucose (NO BRAND SPECIFIED) lancing device, Use to test blood sugars 3 times daily or as directed.,  Disp: 1 each, Rfl: 0     blood glucose monitoring (ONE TOUCH DELICA) lancets, Use to test blood sugars 3 times daily or as directed., Disp: 300 each, Rfl: 3     cetirizine (ZYRTEC) 10 MG tablet, Take 10 mg by mouth At Bedtime, Disp: , Rfl:      clobetasol (TEMOVATE) 0.05 % external ointment, Apply topically 2 times daily as needed, Disp: , Rfl:      clopidogrel (PLAVIX) 75 MG tablet, Take 1 tablet (75 mg) by mouth daily, Disp: 30 tablet, Rfl: 3     diclofenac (VOLTAREN) 1 % topical gel, Apply topically 4 times daily as needed for moderate pain, Disp: , Rfl:      dulaglutide (TRULICITY) 1.5 MG/0.5ML pen, Inject 1.5 mg Subcutaneous every 7 days (Patient taking differently: Inject 1.5 mg Subcutaneous every 7 days ON FRIDAYS), Disp: 9 mL, Rfl: 3     folic acid (FOLVITE) 1 MG tablet, Take 1 tablet (1 mg) by mouth daily, Disp: 100 tablet, Rfl: 2     hydrochlorothiazide (MICROZIDE) 12.5 MG capsule, Take 1 capsule (12.5 mg) by mouth daily, Disp: 30 capsule, Rfl: 2     lisinopril (ZESTRIL) 10 MG tablet, Take 10 mg by mouth every evening, Disp: , Rfl:      metFORMIN (GLUCOPHAGE) 500 MG tablet, TAKE ONE TABLET BY MOUTH TWICE A DAY WITH MEALS (Patient taking differently: Take 500 mg by mouth 2 times daily ), Disp: 180 tablet, Rfl: 0     methotrexate sodium 2.5 MG TABS, Take 7 tablets (17.5 mg) by mouth once a week . Take all 7 tablets on the same day of each week. (Patient taking differently: Take 17.5 mg by mouth once a week . Take all 7 tablets on the same day of each week.  On Sundays), Disp: 84 tablet, Rfl: 1     metoprolol succinate ER (TOPROL-XL) 25 MG 24 hr tablet, Take 50 mg by mouth every morning (2 X 25MG), Disp: , Rfl:      omeprazole (PRILOSEC) 40 MG DR capsule, TAKE ONE CAPSULE BY MOUTH ONCE DAILY (Patient taking differently: Take 40 mg by mouth every morning TAKE ONE CAPSULE BY MOUTH ONCE DAILY), Disp: 90 capsule, Rfl: 0     order for DME, Equipment being ordered: specialized shoes for diabetic neuropathy as well  as all supplies provided for annual treatment of diabetes neuropathy, Disp: 1 Device, Rfl: 0     order for DME, Equipment being ordered: glucometer, Disp: 1 Device, Rfl: 0     order for DME, Equipment being ordered: specialized shoes for diabetic neuropathy   His orthotics will be made by [facility]    Tim Jernigan, Certified  [email or web address] Phone: [phone] Fax: [phone] [address] [place] [address], Disp: 1 Device, Rfl: 0     oxyCODONE (ROXICODONE) 5 MG tablet, Take 1 tablet (5 mg) by mouth every 6 hours as needed for severe pain, Disp: 20 tablet, Rfl: 0     predniSONE (DELTASONE) 5 MG tablet, Take 5-20 mg by mouth daily as needed (Rheumatoid Arthritis Flare ups), Disp: , Rfl:      senna-docusate (SENOKOT-S/PERICOLACE) 8.6-50 MG tablet, Take 1 tablet by mouth 2 times daily, Disp: 50 tablet, Rfl: 0     tiZANidine (ZANAFLEX) 2 MG tablet, Take 1 tablet (2 mg) by mouth 3 times daily as needed for muscle spasms, Disp: 90 tablet, Rfl: 1     Physical Exam                      Vital Signs with Ranges  Temp:  [97.8  F (36.6  C)] 97.8  F (36.6  C)  Pulse:  [62] 62  BP: (111)/(74) 111/74  0 lbs 0 oz    Constitutional: cooperative, no apparent distress, sitting comfortably in chair. Accompanied by aide from rehab unit.  Vascular: strong bi-triphasic left PT and DP doppler signals. Well-healing medial thigh and inguinal incisions; swelling palpable under thigh incision, without fluctuance or erythema.   Musculoskeletal: grossly normal and symmetric ROM and strength in BL extremities. Minimal left pedal edema. C/d/i, well-healing incision for pacemaker pocket in anterior left chest.  Neurologic: Awake, alert, oriented to name, place, time, and situation    Imaging:  I have reviewed the following imaging studies:  US Lower Extremity Arterial (1/26/21): Left lower extremity: Widely patent femoral to above-knee popliteal artery bypass graft. There is a small amount of fluid seen in the  mid thigh, around the graft; there is additionally a fluid collection, likely representing a seroma, around the distal anastomosis.        Assessment & Plan   Zurdo Dash is a 75 year old male with history of DL, HTN, T2DM complicated by neuropathy, former smoking and former alcohol abuse, CKD stage III, RA, and PAD with prior claudication and current left third toe ulcer. He underwent LLE angiogram and was found to have flush occlusion of the left superficial femoral artery, with reconstitution of the above-knee popliteal artery. He underwent left femoral endarterectomy and left femoral to above-knee popliteal artery bypass with PTFE on 1/7/21.       Overall his wounds appear to be healing well.     I explained that some foot swelling is normal after a bypass surgery.     I believe he has a seroma around the distal incision. He does not have local or systemic signs or symptoms of infection, but I would like to follow this closely. Will plan to repeat a duplex US in 2 weeks to evaluate for resolution or persistence, especially with the perigraft fluid (may represent normal post-surgical change).     His toe wound has healed into a minimal amount of dry callus at the tip of the third toe.    I attempted to call the [facility] ([phone], Chelsie tony PERLA) to ask that Mr. Dash work with PT and be allowed to shower; three telephone calls were met with ringing and no voicemail.     Spent 17 minutes on the phone with Mrs. Dash today after the clinic visit, discussing the hospital stay and his subsequent hospitalization. She is concerned that he was discharged too soon; I tried to explain that his bradycardia was clinically asymptomatic while he was hospitalized, and we did not have an indication to keep him longer. She is also concerned about his weakness; I explained that he was able to walk with PT and OT and had been eager to return home, and again, did not have deficits we felt needed improvement  before discharge.     Return to clinic in 2 weeks with repeat arterial duplex US.    Myra Lopes

## 2021-01-26 NOTE — TELEPHONE ENCOUNTER
Please see previous tele encounter from yesterday.   I spoke with pts wife.   Per this note, pt wife still wants to discuss with administration.     Routing to [facility] for follow up.    CRALOS Pelaez, RN    [facility]  Office:  [phone] Fax: [phone]

## 2021-01-26 NOTE — TELEPHONE ENCOUNTER
01/25/21:    Patient's wife (Yoli) called and is still waiting to hear back from us on this matter. Yoli can be reached at [phone].

## 2021-01-27 DIAGNOSIS — I73.9 PAD (PERIPHERAL ARTERY DISEASE) (H): Primary | ICD-10-CM

## 2021-01-27 DIAGNOSIS — Z09 POSTOPERATIVE EXAMINATION: ICD-10-CM

## 2021-01-28 ENCOUNTER — TELEPHONE (OUTPATIENT)
Dept: OTHER | Facility: CLINIC | Age: 76
End: 2021-01-28

## 2021-01-28 NOTE — TELEPHONE ENCOUNTER
"Per Dr. Lopes note:  \"I attempted to call the [facility] ([phone], Chelsie is DON) to ask that Mr. Dash work with PT and be allowed to shower; three telephone calls were met with ringing and no voicemail.\"     I called [facility] [[email or web address]], spoke to DECLAN Buchanan, provided above information. Chanel notes understanding.     CARLOS Pelaez, RN    [facility]  Office:  [phone] Fax: [phone]    "

## 2021-01-29 ENCOUNTER — TELEPHONE (OUTPATIENT)
Dept: FAMILY MEDICINE | Facility: CLINIC | Age: 76
End: 2021-01-29

## 2021-01-29 ENCOUNTER — DOCUMENTATION ONLY (OUTPATIENT)
Dept: OTHER | Facility: CLINIC | Age: 76
End: 2021-01-29

## 2021-01-29 NOTE — TELEPHONE ENCOUNTER
Notified Heidi of the orders below.    Lesvia BSN-RN  Triage Nurse  [facility]

## 2021-01-29 NOTE — TELEPHONE ENCOUNTER
Reason for call:  Home Healthcare Reason for Call:  Home Health Care    [facility] called regarding (reason for call):     She needs verbal conformation the Kapil Duckworth MD will follow for home care.  Phone Number Homecare Nurse can be reached at: [phone]    Can we leave a detailed message on this number? YES

## 2021-01-29 NOTE — PROGRESS NOTES
1/26/21 Progress notes ground mailed to pts home address per pt/pts wife request.     CARLOS Pelaez, RN    [facility]  Office:  [phone] Fax: [phone]

## 2021-01-29 NOTE — TELEPHONE ENCOUNTER
Confirming that provider will sign off on orders from tcu    Reason for Call:  Verbal Orders    Detailed comments: Home care would like to confirm that provider will sign off on orders from TCU. Thank you.    Phone Number Patient can be reached at: Other phone number:  [phone]    Best Time: Any    Can we leave a detailed message on this number? YES    Call taken on 1/29/2021 at 11:05 AM by Karly Zamora

## 2021-01-29 NOTE — TELEPHONE ENCOUNTER
Called   tamiko [phone]         Did she answer the phone: No, left a message on voicemail to return call to the [facility] at [phone].    MANJU CarranzaN,RN  [facility]

## 2021-02-09 ENCOUNTER — HOSPITAL ENCOUNTER (OUTPATIENT)
Dept: ULTRASOUND IMAGING | Facility: CLINIC | Age: 76
End: 2021-02-09
Attending: SURGERY
Payer: COMMERCIAL

## 2021-02-09 ENCOUNTER — OFFICE VISIT (OUTPATIENT)
Dept: OTHER | Facility: CLINIC | Age: 76
End: 2021-02-09
Attending: SURGERY
Payer: COMMERCIAL

## 2021-02-09 VITALS — SYSTOLIC BLOOD PRESSURE: 117 MMHG | TEMPERATURE: 97.1 F | DIASTOLIC BLOOD PRESSURE: 74 MMHG | HEART RATE: 75 BPM

## 2021-02-09 DIAGNOSIS — I73.9 PAD (PERIPHERAL ARTERY DISEASE) (H): Primary | ICD-10-CM

## 2021-02-09 DIAGNOSIS — I73.9 PAD (PERIPHERAL ARTERY DISEASE) (H): ICD-10-CM

## 2021-02-09 DIAGNOSIS — Z09 POSTOPERATIVE EXAMINATION: ICD-10-CM

## 2021-02-09 PROCEDURE — 93926 LOWER EXTREMITY STUDY: CPT | Mod: LT

## 2021-02-09 PROCEDURE — 99024 POSTOP FOLLOW-UP VISIT: CPT | Performed by: SURGERY

## 2021-02-09 PROCEDURE — 93926 LOWER EXTREMITY STUDY: CPT | Mod: 26 | Performed by: SURGERY

## 2021-02-09 PROCEDURE — G0463 HOSPITAL OUTPT CLINIC VISIT: HCPCS | Mod: 25

## 2021-02-09 NOTE — PROGRESS NOTES
"Zurdo Dash is a 75 year old male who presents for:  Chief Complaint   Patient presents with     RECHECK     S/P left femoral endarterectomy, left femoral to above knee popliteal artery bypass with PTFE on 1/7/21; 2 week follow up to 1/26/21 OV        Vitals:    Vitals:    02/09/21 0924   BP: 117/74   BP Location: Left arm   Patient Position: Chair   Cuff Size: Adult Regular   Pulse: 75   Temp: 97.1  F (36.2  C)   TempSrc: Temporal       BMI:  Estimated body mass index is 29.37 kg/m  as calculated from the following:    Height as of 1/7/21: 5' 9\" (1.753 m).    Weight as of 1/9/21: 198 lb 13.7 oz (90.2 kg).    Pain Score:  Data Unavailable        Savannah Baldwin MA    "

## 2021-02-09 NOTE — PROGRESS NOTES
Vascular Surgery Progress Note     Date: February 9, 2021     Reason for Visit:  Post-op visit    Subjective:  Mr. Dash is doing well. He was discharged from TCU 1 week ago and has been happy to be back home. He is receiving in-home PT every other day and has been working with them; he is not ambulating outside his home much due to the weather (below-freezing temperatures). He has not had significant pain or soreness in his leg. He has ongoing mild skin numbness around the left knee, essentially unchanged, and mild left foot swelling.      Current Outpatient Medications:      ACCU-CHEK DAYANARA PLUS test strip, TEST THREE TIMES A DAY OR AS DIRECTED, Disp: 300 each, Rfl: 1     acetaminophen (TYLENOL) 325 MG tablet, Take 2 tablets (650 mg) by mouth every 4 hours as needed for other (multimodal surgical pain management along with NSAIDS and opioid medication as indicated based on pain control and physical function.), Disp:  , Rfl:      aspirin 81 MG tablet, Take 1 tablet by mouth every evening , Disp: , Rfl: 3     atorvastatin (LIPITOR) 40 MG tablet, Take 1 tablet (40 mg) by mouth daily (Patient taking differently: Take 40 mg by mouth every evening ), Disp: 90 tablet, Rfl: 3     blood glucose (NO BRAND SPECIFIED) lancing device, Use to test blood sugars 3 times daily or as directed., Disp: 1 each, Rfl: 0     blood glucose monitoring (ONE TOUCH DELICA) lancets, Use to test blood sugars 3 times daily or as directed., Disp: 300 each, Rfl: 3     cetirizine (ZYRTEC) 10 MG tablet, Take 10 mg by mouth At Bedtime, Disp: , Rfl:      clobetasol (TEMOVATE) 0.05 % external ointment, Apply topically 2 times daily as needed, Disp: , Rfl:      clopidogrel (PLAVIX) 75 MG tablet, Take 1 tablet (75 mg) by mouth daily, Disp: 30 tablet, Rfl: 3     diclofenac (VOLTAREN) 1 % topical gel, Apply topically 4 times daily as needed for moderate pain, Disp: , Rfl:      dulaglutide (TRULICITY) 1.5 MG/0.5ML pen, Inject 1.5 mg Subcutaneous every 7  days (Patient taking differently: Inject 1.5 mg Subcutaneous every 7 days ON FRIDAYS), Disp: 9 mL, Rfl: 3     folic acid (FOLVITE) 1 MG tablet, Take 1 tablet (1 mg) by mouth daily, Disp: 100 tablet, Rfl: 2     hydrochlorothiazide (MICROZIDE) 12.5 MG capsule, Take 1 capsule (12.5 mg) by mouth daily, Disp: 30 capsule, Rfl: 2     lisinopril (ZESTRIL) 10 MG tablet, Take 10 mg by mouth every evening, Disp: , Rfl:      medical cannabis (Patient's own supply), See Admin Instructions (The purpose of this order is to document that the patient reports taking medical cannabis.  This is not a prescription, and is not used to certify that the patient has a qualifying medical condition.), Disp: , Rfl:      metFORMIN (GLUCOPHAGE) 500 MG tablet, TAKE ONE TABLET BY MOUTH TWICE A DAY WITH MEALS (Patient taking differently: Take 500 mg by mouth 2 times daily ), Disp: 180 tablet, Rfl: 0     methotrexate sodium 2.5 MG TABS, Take 7 tablets (17.5 mg) by mouth once a week . Take all 7 tablets on the same day of each week. (Patient taking differently: Take 17.5 mg by mouth once a week . Take all 7 tablets on the same day of each week.  On Sundays), Disp: 84 tablet, Rfl: 1     metoprolol succinate ER (TOPROL-XL) 25 MG 24 hr tablet, Take 50 mg by mouth every morning (2 X 25MG), Disp: , Rfl:      omeprazole (PRILOSEC) 40 MG DR capsule, TAKE ONE CAPSULE BY MOUTH ONCE DAILY (Patient taking differently: Take 40 mg by mouth every morning TAKE ONE CAPSULE BY MOUTH ONCE DAILY), Disp: 90 capsule, Rfl: 0     oxyCODONE (ROXICODONE) 5 MG tablet, Take 1 tablet (5 mg) by mouth every 6 hours as needed for severe pain, Disp: 20 tablet, Rfl: 0     predniSONE (DELTASONE) 5 MG tablet, Take 5-20 mg by mouth daily as needed (Rheumatoid Arthritis Flare ups), Disp: , Rfl:      senna-docusate (SENOKOT-S/PERICOLACE) 8.6-50 MG tablet, Take 1 tablet by mouth 2 times daily, Disp: 50 tablet, Rfl: 0     tiZANidine (ZANAFLEX) 2 MG tablet, Take 1 tablet (2 mg) by mouth 3  "times daily as needed for muscle spasms, Disp: 90 tablet, Rfl: 1     Physical Exam   Temp: 97.1  F (36.2  C) Temp src: Temporal BP: 117/74 Pulse: 75            Vital Signs with Ranges  Temp:  [97.1  F (36.2  C)] 97.1  F (36.2  C)  Pulse:  [75] 75  BP: (117)/(74) 117/74  0 lbs 0 oz    Constitutional: cooperative, no apparent distress, sitting comfortably in chair. Accompanied by his wife.  Vascular: biphasic DP and PT doppler signals on the left; monophasic DP and PT signals on the right.  Musculoskeletal: grossly normal and symmetric ROM and strength in BL extremities. Mild edema of left foot compared to right. Well-healed left inguinal and left medial thigh incisions.   Neurologic: Awake, alert, oriented to name, place, time, and situation    Imaging:  I have reviewed the following imaging studies:  US Lower Extremity Arterial (2/9/21): \"Left lower extremity: Widely patent femoral to above-knee popliteal artery bypass graft. Persistent small amount of perigraft fluid and persistent fluid collection, likely seroma, in the distal incision.\"       Assessment & Plan   Zurdo Dash is a 75 year old male with history of DL, HTN, T2DM complicated by neuropathy, former smoking and former alcohol abuse, CKD stage III, RA, and PAD with prior claudication and current left third toe ulcer. He underwent LLE angiogram and was found to have flush occlusion of the left superficial femoral artery, with reconstitution of the above-knee popliteal artery. He underwent left femoral endarterectomy and left femoral to above-knee popliteal artery bypass with PTFE on 1/7/21.       Discussed with him the presence of seroma/hematoma around the distal incision, and my expectation that this will gradually resolve and resorb.     I am concerned about the residual perigraft fluid. He has no signs or symptoms of infection, the flow is good through the graft, and his skin has healed well at both incision sites. For now I will plan to proceed " with 3 month follow-up graft imaging.     He will continue on aspirin 81 mg, atorvastatin 40 mg, and plavix 75 mg daily (plavix for at least 3-6 months ideally; aspirin and statin lifelong).     OK to proceed with any Podiatry interventions needed for his left 3rd toe callus or hammertoe. Discussed with Mr. Dash that it is OK to follow-up with Dr. Dent for these at this point.     Will see him back in clinic in 3 months with follow-up duplex US.     Myra Lopes

## 2021-02-11 ENCOUNTER — OFFICE VISIT (OUTPATIENT)
Dept: INTERNAL MEDICINE | Facility: CLINIC | Age: 76
End: 2021-02-11
Payer: COMMERCIAL

## 2021-02-11 VITALS
HEART RATE: 90 BPM | HEIGHT: 69 IN | OXYGEN SATURATION: 96 % | SYSTOLIC BLOOD PRESSURE: 130 MMHG | DIASTOLIC BLOOD PRESSURE: 80 MMHG | RESPIRATION RATE: 16 BRPM | BODY MASS INDEX: 29.47 KG/M2 | WEIGHT: 199 LBS | TEMPERATURE: 98 F

## 2021-02-11 DIAGNOSIS — L97.521 SKIN ULCER OF TOE OF LEFT FOOT, LIMITED TO BREAKDOWN OF SKIN (H): ICD-10-CM

## 2021-02-11 DIAGNOSIS — M05.79 RHEUMATOID ARTHRITIS INVOLVING MULTIPLE SITES WITH POSITIVE RHEUMATOID FACTOR (H): ICD-10-CM

## 2021-02-11 DIAGNOSIS — I74.2 EMBOLISM AND THROMBOSIS OF ARTERIES OF THE UPPER EXTREMITIES (H): ICD-10-CM

## 2021-02-11 DIAGNOSIS — I73.9 PAD (PERIPHERAL ARTERY DISEASE) (H): Primary | ICD-10-CM

## 2021-02-11 DIAGNOSIS — D84.9 IMMUNOSUPPRESSION (H): ICD-10-CM

## 2021-02-11 DIAGNOSIS — E11.51 TYPE II DIABETES MELLITUS WITH PERIPHERAL ARTERY DISEASE (H): ICD-10-CM

## 2021-02-11 PROCEDURE — 99214 OFFICE O/P EST MOD 30 MIN: CPT | Performed by: INTERNAL MEDICINE

## 2021-02-11 ASSESSMENT — MIFFLIN-ST. JEOR: SCORE: 1623.04

## 2021-02-11 NOTE — PROGRESS NOTES
Assessment & Plan     PAD (peripheral artery disease) (H)  A lot has transpired since our last appointment. Patient underwent vascular surgery with Dr. Myra Lopes, [place] vascular surgeon . The original attempt at angioplasty wasn't successful and he eventually wound up with a bypass surgery for his left lower extremity . Following this he briefly went home where shortly thereafter he experienced a sudden loss of consciousness and collapsed to the floor. Subsequently was diagnosed with a severe bradycardia and had permanent pacemaker in upper left chest wall done. Now is enrolled into a device clinic to monitor this new device. After this he spent time at [facility] but was placed on a quarantine and thus was not having showers and was limited to physical therapy in his room. Other then these limitations, he has had a satisfactory recovery     Immunosuppression (H)  Problem is stable and ongoing monitoring      Skin ulcer of toe of left foot, limited to breakdown of skin (H)  Today we took socks and shoes off and had a close look at feet. 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    Kapil Duckworth MD  [facility] ADDIE Renner is a 75 year old who presents for the following health issuesHPI    Encounter Diagnoses   Name Primary?      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 This was unsuccessful nd so he was then scheduled for a peripheral artery bypass surgery procedure for legs, he was in [facility] for 3 days . He was home for one day. But then he went into the kitchen, but he had just a little bit of dizziness symptoms . He was looking around. He looked at a can of food. Then he was looking at the ceiling of an ambulance. He's had 7 stitches to the back of his head. He'd collapsed on the kitchen floor. For complete details please see hospital discharge summary    Vascular Surgery Progress Note     Date: January 26, 2021      Reason for Visit:  Longitudinal PAD care     Interim History:   Zurdo Dash was discharged home on 1/9/21 after his bypass. Apparently, he had a syncopal episode in his kitchen on 1/10/21 and was taken to [facility], where he was found to have a traumatic pneumothorax, scalp laceration, and a 2nd degree AV block with bradycardia. He underwent permanent pacemaker placement on 1/13/21 for cardiogenic syncope. He was discharged to [facility].    Hospital Follow-up Visit:    Hospital/Nursing Home/IP Rehab Facility: [facility]  Date of Admission: 01/07/2021  Date of Discharge: 01/10/2021  Reason(s) for Admission: PAD      Was your hospitalization related to COVID-19? No   Problems taking medications regularly:  None  Medication changes since discharge: see current med list  Problems adhering to non-medication therapy:   "None    Summary of hospitalization:  CareEverywhere information obtained and reviewed  Diagnostic Tests/Treatments reviewed.  Follow up needed: [facility] [place] device clinic, Dr. Lopes for vascular disease follow up   Other Healthcare Providers Involved in Patient s Care:         None  Update since discharge: improved.       Post Discharge Medication Reconciliation: discharge medications reconciled and changed, per note/orders.  Plan of care communicated with patient                Review of Systems   Constitutional, HEENT, cardiovascular, pulmonary, gi and gu systems are negative, except as otherwise noted.      Objective    /80   Pulse 90   Temp 98  F (36.7  C) (Oral)   Resp 16   Ht 1.753 m (5' 9\")   Wt 90.3 kg (199 lb)   SpO2 96%   BMI 29.39 kg/m    Body mass index is 29.39 kg/m .  Physical Exam   GENERAL: healthy, alert and no distress  NECK: no adenopathy, no asymmetry, masses, or scars and thyroid normal to palpation  RESP: lungs clear to auscultation - no rales, rhonchi or wheezes  CV: regular rate and rhythm, normal S1 S2, no S3 or S4, no murmur, click or rub, no peripheral edema and peripheral pulses strong  ABDOMEN: soft, nontender, no hepatosplenomegaly, no masses and bowel sounds normal  MS: no gross musculoskeletal defects noted, no edema    No orders of the defined types were placed in this encounter.        I spent a total of 26 minutes face-to-face with Zurdo Dash during today's office visit.  Over 50% of this time was spent counseling the patient and/or coordinating care regarding   Encounter Diagnoses   Name Primary?     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  See note for details.

## 2021-02-11 NOTE — PATIENT INSTRUCTIONS
We are working hard to begin vaccinating more people against COVID-19. Currently, we are only vaccinating individuals age 75 and older and Phase 1a workers - healthcare workers who are unable to do their job remotely. Vaccine availability is very limited.    If you are 75 or older, or a healthcare worker who is unable to do your job remotely, please log in to [employer] using this link to see if we have an open appointment and schedule an appointment.  If there are no appointments left, you will be unable to schedule and need to check back later.  If you are a healthcare worker, you will be asked to provide proof of employment at your appointment. If you cannot, you will be turned away.    Vaccine appointments are being added as they become available. Please check your [employer] account frequently for availability. If you have technical difficulty using [employer], call [phone] for assistance.    You can learn more about the [facility]'s phased approach to administering the vaccine, with details on each phase, [email or web address].      Aa vaccine supply increases and we are able to open appointments to more groups, we will share that information on our website [email or web address]. Check this website to stay up to date on COVID-19 vaccination information.

## 2021-02-15 ENCOUNTER — NURSE TRIAGE (OUTPATIENT)
Dept: FAMILY MEDICINE | Facility: CLINIC | Age: 76
End: 2021-02-15

## 2021-02-15 ENCOUNTER — ANCILLARY PROCEDURE (OUTPATIENT)
Dept: GENERAL RADIOLOGY | Facility: CLINIC | Age: 76
End: 2021-02-15
Attending: INTERNAL MEDICINE
Payer: COMMERCIAL

## 2021-02-15 ENCOUNTER — OFFICE VISIT (OUTPATIENT)
Dept: INTERNAL MEDICINE | Facility: CLINIC | Age: 76
End: 2021-02-15
Payer: COMMERCIAL

## 2021-02-15 VITALS
TEMPERATURE: 97.8 F | RESPIRATION RATE: 16 BRPM | HEART RATE: 92 BPM | OXYGEN SATURATION: 96 % | BODY MASS INDEX: 29.95 KG/M2 | WEIGHT: 202.8 LBS

## 2021-02-15 DIAGNOSIS — I10 HYPERTENSION GOAL BP (BLOOD PRESSURE) < 140/90: ICD-10-CM

## 2021-02-15 DIAGNOSIS — R07.81 RIB PAIN ON LEFT SIDE: ICD-10-CM

## 2021-02-15 DIAGNOSIS — R07.81 RIB PAIN ON LEFT SIDE: Primary | ICD-10-CM

## 2021-02-15 PROCEDURE — 71101 X-RAY EXAM UNILAT RIBS/CHEST: CPT | Mod: LT | Performed by: RADIOLOGY

## 2021-02-15 PROCEDURE — 99213 OFFICE O/P EST LOW 20 MIN: CPT | Performed by: INTERNAL MEDICINE

## 2021-02-15 RX ORDER — OXYCODONE HYDROCHLORIDE 5 MG/1
5 TABLET ORAL EVERY 6 HOURS PRN
Qty: 12 TABLET | Refills: 0 | Status: SHIPPED | OUTPATIENT
Start: 2021-02-15 | End: 2021-02-18

## 2021-02-15 NOTE — TELEPHONE ENCOUNTER
Patient calling with concerns of left sided chest/rib pain. Recently had pacemaker placed after fall around 1/11/21. Pt reports pain is assisted down side, 8 or 9 out of 10 when he coughs, sneezes or presses on area. He braces with a pillow.  About 2 or 3 out of 10 otherwise. No dizziness, shortness of breath, sweating, nausea. Pain has been present since discharge from hospital but is not improving.     Appointment scheduled for today.    Additional Information    Negative: Severe difficulty breathing (e.g., struggling for each breath, speaks in single words)    Negative: Passed out (i.e., fainted, collapsed and was not responding)    Negative: Chest pain lasting longer than 5 minutes and ANY of the following:* Over 50 years old* Over 30 years old and at least one cardiac risk factor (i.e., high blood pressure, diabetes, high cholesterol, obesity, smoker or strong family history of heart disease)* Pain is crushing, pressure-like, or heavy * Took nitroglycerin and chest pain was not relieved* History of heart disease (i.e., angina, heart attack, bypass surgery, angioplasty, CHF)    Negative: Visible sweat on face or sweat dripping down face    Negative: Sounds like a life-threatening emergency to the triager    Negative: Patient sounds very sick or weak to the triager    Negative: Dizziness or lightheadedness    Negative: Coughing up blood    Negative: Intermittent chest pain and pain has been increasing in severity or frequency    Negative: Chest pain lasting longer than 5 minutes    Negative: History of prior 'blood clot' in leg or lungs (i.e., deep vein thrombosis, pulmonary embolism)    Negative: Recent illness requiring prolonged bed rest (i.e., immobilization)    Negative: Hip or leg fracture in past 2 months (e.g, or had cast on leg or ankle)    Negative: Major surgery in the past month    Negative: Recent long-distance travel with prolonged time in car, bus, plane, or train (i.e., within past 2 weeks; 6 or  "more hours duration)    Negative: Heart beating irregularly or very rapidly    Negative: SEVERE chest pain    Negative: Pain also present in shoulder(s) or arm(s) or jaw    Negative: Difficulty breathing    Negative: Cocaine use within last 3 days    Intermittent chest pains persist > 3 days    Answer Assessment - Initial Assessment Questions  1. LOCATION: \"Where does it hurt?\"        Left side alf down  2. RADIATION: \"Does the pain go anywhere else?\" (e.g., into neck, jaw, arms, back)      Stays in one place  3. ONSET: \"When did the chest pain begin?\" (Minutes, hours or days)       Since falling around 1/11  4. PATTERN \"Does the pain come and go, or has it been constant since it started?\"  \"Does it get worse with exertion?\"       Worse with sneezing coughing deep breath press on it   5. DURATION: \"How long does it last\" (e.g., seconds, minutes, hours)      Sharp and short- couple seconds  6. SEVERITY: \"How bad is the pain?\"  (e.g., Scale 1-10; mild, moderate, or severe)     - MILD (1-3): doesn't interfere with normal activities      - MODERATE (4-7): interferes with normal activities or awakens from sleep     - SEVERE (8-10): excruciating pain, unable to do any normal activities        When it hurts 8 or 9, 2-3 otherwise  7. CARDIAC RISK FACTORS: \"Do you have any history of heart problems or risk factors for heart disease?\" (e.g., prior heart attack, angina; high blood pressure, diabetes, being overweight, high cholesterol, smoking, or strong family history of heart disease)      Recent pacemaker  8. PULMONARY RISK FACTORS: \"Do you have any history of lung disease?\"  (e.g., blood clots in lung, asthma, emphysema, birth control pills)      no  9. CAUSE: \"What do you think is causing the chest pain?\"      possibly fall, but worried about pacemaker  10. OTHER SYMPTOMS: \"Do you have any other symptoms?\" (e.g., dizziness, nausea, vomiting, sweating, fever, difficulty breathing, cough)        No other symptoms  11. " "PREGNANCY: \"Is there any chance you are pregnant?\" \"When was your last menstrual period?\"        na    Protocols used: CHEST PAIN-A-OH    Geni Cardozo RN      "

## 2021-02-15 NOTE — PROGRESS NOTES
Assessment & Plan     Rib pain on left side  This patient was in the hospital , for complete details please see notes from [place]. I actually say him for this post hospital discharge follow up office visit only last week but he had forgotten to bring up what was on his mind at the last appointment. He has had a pain associated with the lower most rib margins , left sided. This patients history and exam all support pain of a benign musculoskeletal variety. I believe I saw a healing rib fracture on the rib fractures . We await the official overread of the film per radiology . Meanwhile supportive measures reviewed . Brief number of oxyCODONE (ROXICODONE) pills. Supportive measures reviewed   - XR Ribs & Chest Left G/E 3 Views; Future  - oxyCODONE (ROXICODONE) 5 MG tablet; Take 1 tablet (5 mg) by mouth every 6 hours as needed for pain    Review of the result(s) of each unique test - I read out today's rib xrays  19 minutes spent on the date of the encounter doing chart review, history and exam, documentation and further activities as noted above    Return in about 6 months (around 8/15/2021).    Kapil Duckworth MD  [facility]    Had a fall in the first place , with bruising to ribs ? On January 10th , he's had this same pain, worsened with sneezing, or coughing or roll onto this left side. Otherwise just sitting he feels a ache but it's easy to handle , like a 2-3/10 on the pain scale. He gets a sharp quick pain with these triggers with a brief flash of pain. He isn't sure if they ever took an xray at the hospital     He couldn't sleep last night without taking an oxyCODONE (ROXICODONE). He has some residual opioid pain medication tabs at home.    There's just no other worrisome features here.     Gilma Renner is a 75 year old who presents for the following health issues   Encounter Diagnosis   Name Primary?     Rib pain on left side Yes       HPI       Review of Systems    Constitutional, HEENT, cardiovascular, pulmonary, gi and gu systems are negative, except as otherwise noted.      Objective    Pulse 92   Temp 97.8  F (36.6  C) (Oral)   Resp 16   Wt 92 kg (202 lb 12.8 oz)   SpO2 96%   BMI 29.95 kg/m    Body mass index is 29.95 kg/m .  Physical Exam   GENERAL: healthy, alert and no distress  RESP: lungs clear to auscultation - no rales, rhonchi or wheezes  CV: regular rate and rhythm, normal S1 S2, no S3 or S4, no murmur, click or rub, no peripheral edema and peripheral pulses strong  ABDOMEN: soft, nontender, no hepatosplenomegaly, no masses and bowel sounds normal  MS: no gross musculoskeletal defects noted, no edema, easily reproducible pain along the chest wall with digital pressure along the lower most rib margins , left sided especially at anterior axillary line of roughly ribs 8-9.    Orders Placed This Encounter   Procedures     XR Ribs & Chest Left G/E 3 Views         I spent a total of 18 minutes face-to-face with Zurdo Dash during today's office visit.  Over 50% of this time was spent counseling the patient and/or coordinating care regarding   Encounter Diagnosis   Name Primary?     Rib pain on left side Yes    .  See note for details.

## 2021-02-17 RX ORDER — HYDROCHLOROTHIAZIDE 12.5 MG/1
12.5 CAPSULE ORAL DAILY
Qty: 90 CAPSULE | Refills: 0 | Status: SHIPPED | OUTPATIENT
Start: 2021-02-17 | End: 2021-04-08

## 2021-02-19 ENCOUNTER — IMMUNIZATION (OUTPATIENT)
Dept: NURSING | Facility: CLINIC | Age: 76
End: 2021-02-19
Payer: COMMERCIAL

## 2021-02-19 PROCEDURE — 0001A PR COVID VAC PFIZER DIL RECON 30 MCG/0.3 ML IM: CPT

## 2021-02-19 PROCEDURE — 91300 PR COVID VAC PFIZER DIL RECON 30 MCG/0.3 ML IM: CPT

## 2021-02-23 ENCOUNTER — MEDICAL CORRESPONDENCE (OUTPATIENT)
Dept: HEALTH INFORMATION MANAGEMENT | Facility: CLINIC | Age: 76
End: 2021-02-23

## 2021-02-23 ENCOUNTER — TELEPHONE (OUTPATIENT)
Dept: INTERNAL MEDICINE | Facility: CLINIC | Age: 76
End: 2021-02-23

## 2021-02-23 NOTE — TELEPHONE ENCOUNTER
Reason for call:  Home Healthcare Reason for Call:  Home Health Care    Mary with [facility] called regarding (reason for call):     Orders are needed for this patient.     Face to Face form to be signed.       Phone Number Homecare Nurse can be reached at: [phone]    Can we leave a detailed message on this number? YES    Orders to be signed at the providers desk. They will be faxed after being signed.    PT SK and OT

## 2021-02-27 DIAGNOSIS — K21.9 GASTROESOPHAGEAL REFLUX DISEASE: ICD-10-CM

## 2021-02-28 RX ORDER — OMEPRAZOLE 40 MG/1
CAPSULE, DELAYED RELEASE ORAL
Qty: 90 CAPSULE | Refills: 1 | Status: SHIPPED | OUTPATIENT
Start: 2021-02-28 | End: 2022-03-17

## 2021-03-01 ENCOUNTER — TELEPHONE (OUTPATIENT)
Dept: FAMILY MEDICINE | Facility: CLINIC | Age: 76
End: 2021-03-01

## 2021-03-01 DIAGNOSIS — I10 HYPERTENSION GOAL BP (BLOOD PRESSURE) < 140/90: Primary | ICD-10-CM

## 2021-03-01 DIAGNOSIS — E11.42 TYPE 2 DIABETES MELLITUS WITH DIABETIC POLYNEUROPATHY, WITHOUT LONG-TERM CURRENT USE OF INSULIN (H): ICD-10-CM

## 2021-03-01 NOTE — TELEPHONE ENCOUNTER
We also need to confirm dose of lisinopril, he has 10mg and 20mg on file  Bertha Lei [facility]  [address] [place] [address] [phone]

## 2021-03-01 NOTE — TELEPHONE ENCOUNTER
Patient is questioning if he is to continue hydrochlorothiazide. It is still active on his medication list, but he reports he hasn't been taking it because he thought he was advised to discontinue.'  Thank you  Bertha Lei, [facility]  [address] [place] [address]  [place] [address]  [phone]

## 2021-03-01 NOTE — TELEPHONE ENCOUNTER
Called pharmacy and spoke with Bertha.  They have both Lisinopril 10 mg and 20 mg on file.  Per Bertha, patient had lisinopril 20 mg filled in August 2020.   Then had Lisinopril 10 mg filled in Oct 2020  Most recently had Lisinopril 10 mg filled on 2/15/2021      Patient had hydrochlorothiazide filled on 12/30/2020 for 30 day supply and again on 2/17/2021, however, patient is under the impression that hydrochlorothiazide was stopped but he is unsure when or why and has not been taking it.  It is unclear when he stopped taking the hydrochlorothiazide    Please advise    BP Readings from Last 3 Encounters:   02/11/21 130/80   02/09/21 117/74   01/26/21 111/74     CARLOS Torres RN  [facility]

## 2021-03-01 NOTE — TELEPHONE ENCOUNTER
The 2 medications are to be continue as they were written in the hospital discharge      1. MICROZIDE  (hydrochlorothiazide, USP 12.5 mg) 1 tab per day  2. Lisinopril 10 milligrams per day    Please clean up medication list and if refills needed we can approve    Kapil Duckworth MD

## 2021-03-02 RX ORDER — LISINOPRIL 10 MG/1
10 TABLET ORAL DAILY
Qty: 90 TABLET | Refills: 0 | Status: SHIPPED | OUTPATIENT
Start: 2021-03-02 | End: 2021-04-08

## 2021-03-02 NOTE — TELEPHONE ENCOUNTER
Patient called. Notified him of message from provider. He verbalized understanding and asked that we send in a refill for lisinopril. Rx sent.

## 2021-03-02 NOTE — TELEPHONE ENCOUNTER
LILIANA Stone (pharmacist)-- See Dr. Duckworth's reply.    Called patient and left voice message to return call at [phone]. Will notify him of message from provider and ask if he needs refills sent for lisinopril. (Rx for hydrochlorothiazide 12.5 mg was sent on 02/17/2021).

## 2021-03-04 NOTE — TELEPHONE ENCOUNTER
Thank you for the update and clarification.    This is his current therapy then and we will continue current plan of care as is.    Medication list is cleaned up, refills provided     A follow up diabetes mellitus recheck is indicated for around April     Kapil Duckworth MD

## 2021-03-04 NOTE — TELEPHONE ENCOUNTER
Called patient to clarify medication dosing and frequency.  Patient stated he takes Trulicity 1.5mg injection once weekly (on Fridays). He stated he does not take Ozempic anymore. He used to take that prior to starting Trulicity. He does take Metformin 500 mg BID - just one tablet twice daily. He does not believe this dose has been switched at all and cannot remember ever taking more than this.    CARLOS Torres RN  [facility] [place]

## 2021-03-04 NOTE — TELEPHONE ENCOUNTER
Made appointment in April, nothing else needed.       Conchita MAHONEY CMA ([facility])

## 2021-03-06 ENCOUNTER — MEDICAL CORRESPONDENCE (OUTPATIENT)
Dept: HEALTH INFORMATION MANAGEMENT | Facility: CLINIC | Age: 76
End: 2021-03-06

## 2021-03-12 ENCOUNTER — IMMUNIZATION (OUTPATIENT)
Dept: NURSING | Facility: CLINIC | Age: 76
End: 2021-03-12
Attending: INTERNAL MEDICINE
Payer: COMMERCIAL

## 2021-03-12 PROCEDURE — 91300 PR COVID VAC PFIZER DIL RECON 30 MCG/0.3 ML IM: CPT

## 2021-03-12 PROCEDURE — 0002A PR COVID VAC PFIZER DIL RECON 30 MCG/0.3 ML IM: CPT

## 2021-03-13 ENCOUNTER — HEALTH MAINTENANCE LETTER (OUTPATIENT)
Age: 76
End: 2021-03-13

## 2021-03-16 ENCOUNTER — TRANSFERRED RECORDS (OUTPATIENT)
Dept: HEALTH INFORMATION MANAGEMENT | Facility: CLINIC | Age: 76
End: 2021-03-16

## 2021-03-20 ENCOUNTER — NURSE TRIAGE (OUTPATIENT)
Dept: NURSING | Facility: CLINIC | Age: 76
End: 2021-03-20

## 2021-03-20 NOTE — TELEPHONE ENCOUNTER
Zurdo reports that his nose has been bleeding since 8:30 AM.  He has packed his nose with Kleeniex but still dripping, states that if he removes the Kleenex, blood will start to gush.    Per protocol, advised ED/UC. Care advice reviewed. Patient verbalizes understanding and prefers to go to [place] [facility] as it is closer to their place. Advised to call back with further questions/concerns.     Sujatha Allan RN/[place] Nurse Advisor        Reason for Disposition    [1] Bleeding present > 30 minutes AND [2] using correct method of direct pressure    Additional Information    Negative: Fainted or too weak to stand following large blood loss    Negative: Sounds like a life-threatening emergency to the triager    Protocols used: NOSEBLEED-A-

## 2021-03-22 ENCOUNTER — MEDICAL CORRESPONDENCE (OUTPATIENT)
Dept: HEALTH INFORMATION MANAGEMENT | Facility: CLINIC | Age: 76
End: 2021-03-22

## 2021-03-22 ENCOUNTER — TELEPHONE (OUTPATIENT)
Dept: OTHER | Facility: CLINIC | Age: 76
End: 2021-03-22

## 2021-03-22 NOTE — TELEPHONE ENCOUNTER
[facility]    Who is the name of the provider?:  Marianne       What is the location you see this provider at?: [place]    Reason for call:  Bypass 1/3, on blood thinner.  Nose bleed 3/20, unable to stop.  Was treated at [place] [facility]     Can we leave a detailed message on this number?  YES

## 2021-03-22 NOTE — TELEPHONE ENCOUNTER
"Pt hx Left femoral endarterectomy, left femoral to above knee popliteal artery bypass with PTFE on 1/7/21.     Pt taken off ASA and Plavix since 3/20/21 due to severe nose bleed.   Had been getting bloody noses which he could stop fairly quickly and then last Saturday: Severe nose bleed Started at 8:30 to 4pm, arrived at ER, while waiting in ER pt \"passed out twice while waiting in ER\" thus kept pt overnight.   Domingo Lomax still in nose, see's Dr. Salo Delunay on Weds.     Will discuss with Dr. Lopes for recommendation on anticoags.      Pt f/u is due 5/2021 with US and OV.     MANJU PelaezN, RN  [facility]  Office:  [phone] Fax: [phone]    "

## 2021-03-23 NOTE — TELEPHONE ENCOUNTER
Per Dr. Lopes, recommending pt continue to hold Plavix until pt see's ENT.   Preferably pt to get back on ASA 81mg.   We will see ENT providers recommendation upon tomorrow's OV.   Called pt, explained this. Pt notes understanding.     MANJU PelaezN, RN  [facility]  Office:  [phone] Fax: [phone]

## 2021-03-24 ENCOUNTER — OFFICE VISIT (OUTPATIENT)
Dept: OTOLARYNGOLOGY | Facility: CLINIC | Age: 76
End: 2021-03-24
Payer: COMMERCIAL

## 2021-03-24 VITALS
SYSTOLIC BLOOD PRESSURE: 106 MMHG | HEART RATE: 86 BPM | DIASTOLIC BLOOD PRESSURE: 73 MMHG | RESPIRATION RATE: 24 BRPM | OXYGEN SATURATION: 98 %

## 2021-03-24 DIAGNOSIS — R04.0 EPISTAXIS: Primary | ICD-10-CM

## 2021-03-24 PROCEDURE — 99203 OFFICE O/P NEW LOW 30 MIN: CPT | Mod: 25 | Performed by: OTOLARYNGOLOGY

## 2021-03-24 PROCEDURE — 30901 CONTROL OF NOSEBLEED: CPT | Mod: RT | Performed by: OTOLARYNGOLOGY

## 2021-03-24 NOTE — PROGRESS NOTES
Chief Complaint - Epistaxis    History of Present Illness - Zurdo Dash is a 75 year old male presents with approximately a long-term problem with epistaxis. However, 5 days ago he had a severe bleed. It would not stop. It occurs on the right side. It was so severe that he went to the ER after he failed to get it to stop on Saturday. A rhinorocket was placed. He became syncopal per the records I reviewed so he was kept overnight. The patient has no personal or family history of bleeding disorders. The patient takes plavix blood-thinning medication. I reviewed the hospital summary in the medical record.     Past Medical History -   Patient Active Problem List   Diagnosis     Pain in limb     Hyperlipidemia LDL goal <100     Hypertension goal BP (blood pressure) < 140/90     Hypogonadism     Advanced directives, counseling/discussion     Health Care Home     Type 2 diabetes mellitus with diabetic polyneuropathy, without long-term current use of insulin (H)     RBBB (right bundle branch block)     Ex-smoker     Family history of esophageal cancer     Gastroesophageal reflux disease, esophagitis presence not specified     Rheumatoid arthritis involving multiple sites with positive rheumatoid factor (H)     Pulmonary nodule     High risk medication use     Spondylosis of cervical region without myelopathy or radiculopathy     Erectile dysfunction, unspecified erectile dysfunction type     Type 2 diabetes mellitus without retinopathy (H)     Tubulovillous adenoma of colon     Diabetic polyneuropathy associated with type 2 diabetes mellitus (H)     Chronic bilateral low back pain without sciatica     Migraine equivalent     Posterior vitreous detachment of left eye     Pseudophakia, ou     Eyelid lesion, LLL     Dermatochalasis of both upper eyelids     Bradycardia     Primary osteoarthritis of both knees     Syncope     Trigger finger, acquired     CKD (chronic kidney disease) stage 3, GFR 30-59 ml/min     Abdominal  pain, generalized     PAD (peripheral artery disease) (H)     Immunosuppression (H)     Skin ulcer of toe of left foot, limited to breakdown of skin (H)     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)       Current Medications -   Current Outpatient Medications:      ACCU-CHEK DAYANARA PLUS test strip, TEST THREE TIMES A DAY OR AS DIRECTED, Disp: 300 each, Rfl: 1     acetaminophen (TYLENOL) 325 MG tablet, Take 2 tablets (650 mg) by mouth every 4 hours as needed for other (multimodal surgical pain management along with NSAIDS and opioid medication as indicated based on pain control and physical function.), Disp:  , Rfl:      aspirin 81 MG tablet, Take 1 tablet by mouth every evening , Disp: , Rfl: 3     atorvastatin (LIPITOR) 40 MG tablet, Take 1 tablet (40 mg) by mouth daily (Patient taking differently: Take 40 mg by mouth every evening ), Disp: 90 tablet, Rfl: 3     blood glucose monitoring (ONE TOUCH DELICA) lancets, Use to test blood sugars 3 times daily or as directed., Disp: 300 each, Rfl: 3     cetirizine (ZYRTEC) 10 MG tablet, Take 10 mg by mouth At Bedtime, Disp: , Rfl:      clobetasol (TEMOVATE) 0.05 % external ointment, Apply topically 2 times daily as needed, Disp: , Rfl:      clopidogrel (PLAVIX) 75 MG tablet, Take 1 tablet (75 mg) by mouth daily, Disp: 30 tablet, Rfl: 3     diclofenac (VOLTAREN) 1 % topical gel, Apply topically 4 times daily as needed for moderate pain, Disp: , Rfl:      dulaglutide (TRULICITY) 1.5 MG/0.5ML pen, Inject 1.5 mg Subcutaneous every 7 days (Patient taking differently: Inject 1.5 mg Subcutaneous every 7 days ON FRIDAYS), Disp: 9 mL, Rfl: 3     folic acid (FOLVITE) 1 MG tablet, Take 1 tablet (1 mg) by mouth daily, Disp: 100 tablet, Rfl: 2     hydrochlorothiazide (MICROZIDE) 12.5 MG capsule, TAKE 1 CAPSULE (12.5 MG) BY MOUTH DAILY, Disp: 90 capsule, Rfl: 0     lisinopril (ZESTRIL) 10 MG tablet, Take 1 tablet (10 mg) by mouth daily, Disp: 90 tablet, Rfl: 0      medical cannabis (Patient's own supply), See Admin Instructions (The purpose of this order is to document that the patient reports taking medical cannabis.  This is not a prescription, and is not used to certify that the patient has a qualifying medical condition.), Disp: , Rfl:      metFORMIN (GLUCOPHAGE) 500 MG tablet, Take 1 tablet (500 mg) by mouth 2 times daily (with meals), Disp: 180 tablet, Rfl: 0     methotrexate sodium 2.5 MG TABS, Take 7 tablets (17.5 mg) by mouth once a week . Take all 7 tablets on the same day of each week. (Patient taking differently: Take 17.5 mg by mouth once a week . Take all 7 tablets on the same day of each week.  On Sundays), Disp: 84 tablet, Rfl: 1     metoprolol succinate ER (TOPROL-XL) 25 MG 24 hr tablet, Take 50 mg by mouth every morning (2 X 25MG), Disp: , Rfl:      omeprazole (PRILOSEC) 40 MG DR capsule, TAKE ONE CAPSULE BY MOUTH ONCE DAILY, Disp: 90 capsule, Rfl: 1     predniSONE (DELTASONE) 5 MG tablet, Take 5-20 mg by mouth daily as needed (Rheumatoid Arthritis Flare ups), Disp: , Rfl:      senna-docusate (SENOKOT-S/PERICOLACE) 8.6-50 MG tablet, Take 1 tablet by mouth 2 times daily, Disp: 50 tablet, Rfl: 0     tiZANidine (ZANAFLEX) 2 MG tablet, Take 1 tablet (2 mg) by mouth 3 times daily as needed for muscle spasms, Disp: 90 tablet, Rfl: 1    Allergies - No Known Allergies    Social History -   Social History     Socioeconomic History     Marital status:      Spouse name: Not on file     Number of children: Not on file     Years of education: Not on file     Highest education level: Not on file   Occupational History     Employer: RETIRED   Social Needs     Financial resource strain: Not on file     Food insecurity     Worry: Not on file     Inability: Not on file     Transportation needs     Medical: Not on file     Non-medical: Not on file   Tobacco Use     Smoking status: Former Smoker     Packs/day: 1.00     Years: 40.00     Pack years: 40.00     Types:  Cigarettes     Quit date: 3/16/2007     Years since quittin.0     Smokeless tobacco: Never Used   Substance and Sexual Activity     Alcohol use: No     Alcohol/week: 0.0 standard drinks     Drug use: No     Sexual activity: Yes     Partners: Female   Lifestyle     Physical activity     Days per week: Not on file     Minutes per session: Not on file     Stress: Not on file   Relationships     Social connections     Talks on phone: Not on file     Gets together: Not on file     Attends Mu-ism service: Not on file     Active member of club or organization: Not on file     Attends meetings of clubs or organizations: Not on file     Relationship status: Not on file     Intimate partner violence     Fear of current or ex partner: Not on file     Emotionally abused: Not on file     Physically abused: Not on file     Forced sexual activity: Not on file   Other Topics Concern     Parent/sibling w/ CABG, MI or angioplasty before 65F 55M? Not Asked   Social History Narrative     Not on file       Family History -   Family History   Problem Relation Age of Onset     Cerebrovascular Disease Mother      Arthritis Mother      Osteoporosis Mother      Alzheimer Disease Father      Arthritis Father      Cancer Father      Diabetes Maternal Grandmother      Cardiovascular Maternal Grandmother      Other Cancer Brother      Cancer Paternal Aunt      Hypertension No family hx of      Thyroid Disease No family hx of      Glaucoma No family hx of      Macular Degeneration No family hx of      Review of Systems - As per HPI and PMHx, otherwise 7 system review of the head and neck is negative.    Physical Exam  /73   Pulse 86   Resp 24   SpO2 98%   General - The patient is in no distress.  Alert and oriented x3, answers questions and cooperates with examination appropriately.   Voice and Breathing - The patient was breathing comfortably without the use of accessory muscles. There was no wheezing, stridor, or stertor.  The  patients voice was clear and strong, with no dysphonia.  Head and Face - Normocephalic and atraumatic.    Eyes - Extraocular movements intact. Sclera were not icteric or injected, conjunctiva were pink and moist.  Neurologic - Cranial nerves II-XII are grossly intact. Specifically, the facial nerve is intact, House-Brackmann grade 1 of 6.   Nose - No significant external deformity. Right rhinorocket in place. I deflated the balloon and removed it. The nasal mucosa along the mid septum and on the right inferior turbinate body on the right side shows fresh blood. The left side was mostly normal.  The septum was deviated to the left. No polyps, masses, or purulence.  Neck -  Soft, non-tender. Palpation of the occipital, submental, submandibular, internal jugular chain, and supraclavicular nodes did not demonstrate any abnormal lymph nodes or masses. The parotid glands were without masses. Palpation of the thyroid was soft and smooth, with no nodules or goiter appreciated.  The trachea was midline.    Procedure - I sprayed the right nasal cavity and septum with phenylephrine and lidocaine. I applied 1 stick of silver nitrate to the mid right septum, and right anterior inferior turbinate. Hemostasis was achieved. I applied bacitracin ointment to the wound with a q-tip.    A/P - Zurdo Dash is a 75 year old male with epistaxis. This is almost certainly coming from the right mid septum and inferior turbinate. I removed his packing and cauterized these spots today. We discussed restrictions on manipulation and picking of the nose. I explained using nasal saline spray 3-5 times per day. He can restart plavix and aspirin tomorrow.    I also explained applying digital pressure to the nasal tip for a minimum of 15-20 minutes to stop a nose bleed. If that doesn't stop the bleeding the patient needs to proceed to the nearest emergency department or return to ENT clinic.     Varun Lin MD  Otolaryngology  [facility]  [place]

## 2021-03-24 NOTE — LETTER
3/24/2021         RE: Zurdo Dash  [address] [place] [id number]        Dear Colleague,    Thank you for referring your patient, Zurdo Dash, to the [facility]. Please see a copy of my visit note below.    Chief Complaint - Epistaxis    History of Present Illness - Zurdo Dash is a 75 year old male presents with approximately a long-term problem with epistaxis. However, 5 days ago he had a severe bleed. It would not stop. It occurs on the right side. It was so severe that he went to the ER after he failed to get it to stop on Saturday. A rhinorocket was placed. He became syncopal per the records I reviewed so he was kept overnight. The patient has no personal or family history of bleeding disorders. The patient takes plavix blood-thinning medication. I reviewed the hospital summary in the medical record.     Past Medical History -   Patient Active Problem List   Diagnosis     Pain in limb     Hyperlipidemia LDL goal <100     Hypertension goal BP (blood pressure) < 140/90     Hypogonadism     Advanced directives, counseling/discussion     Health Care Home     Type 2 diabetes mellitus with diabetic polyneuropathy, without long-term current use of insulin (H)     RBBB (right bundle branch block)     Ex-smoker     Family history of esophageal cancer     Gastroesophageal reflux disease, esophagitis presence not specified     Rheumatoid arthritis involving multiple sites with positive rheumatoid factor (H)     Pulmonary nodule     High risk medication use     Spondylosis of cervical region without myelopathy or radiculopathy     Erectile dysfunction, unspecified erectile dysfunction type     Type 2 diabetes mellitus without retinopathy (H)     Tubulovillous adenoma of colon     Diabetic polyneuropathy associated with type 2 diabetes mellitus (H)     Chronic bilateral low back pain without sciatica     Migraine equivalent     Posterior vitreous detachment of left eye      Pseudophakia, ou     Eyelid lesion, LLL     Dermatochalasis of both upper eyelids     Bradycardia     Primary osteoarthritis of both knees     Syncope     Trigger finger, acquired     CKD (chronic kidney disease) stage 3, GFR 30-59 ml/min     Abdominal pain, generalized     PAD (peripheral artery disease) (H)     Immunosuppression (H)     Skin ulcer of toe of left foot, limited to breakdown of skin (H)     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)       Current Medications -   Current Outpatient Medications:      ACCU-CHEK DAYANARA PLUS test strip, TEST THREE TIMES A DAY OR AS DIRECTED, Disp: 300 each, Rfl: 1     acetaminophen (TYLENOL) 325 MG tablet, Take 2 tablets (650 mg) by mouth every 4 hours as needed for other (multimodal surgical pain management along with NSAIDS and opioid medication as indicated based on pain control and physical function.), Disp:  , Rfl:      aspirin 81 MG tablet, Take 1 tablet by mouth every evening , Disp: , Rfl: 3     atorvastatin (LIPITOR) 40 MG tablet, Take 1 tablet (40 mg) by mouth daily (Patient taking differently: Take 40 mg by mouth every evening ), Disp: 90 tablet, Rfl: 3     blood glucose monitoring (ONE TOUCH DELICA) lancets, Use to test blood sugars 3 times daily or as directed., Disp: 300 each, Rfl: 3     cetirizine (ZYRTEC) 10 MG tablet, Take 10 mg by mouth At Bedtime, Disp: , Rfl:      clobetasol (TEMOVATE) 0.05 % external ointment, Apply topically 2 times daily as needed, Disp: , Rfl:      clopidogrel (PLAVIX) 75 MG tablet, Take 1 tablet (75 mg) by mouth daily, Disp: 30 tablet, Rfl: 3     diclofenac (VOLTAREN) 1 % topical gel, Apply topically 4 times daily as needed for moderate pain, Disp: , Rfl:      dulaglutide (TRULICITY) 1.5 MG/0.5ML pen, Inject 1.5 mg Subcutaneous every 7 days (Patient taking differently: Inject 1.5 mg Subcutaneous every 7 days ON FRIDAYS), Disp: 9 mL, Rfl: 3     folic acid (FOLVITE) 1 MG tablet, Take 1 tablet (1 mg) by mouth  daily, Disp: 100 tablet, Rfl: 2     hydrochlorothiazide (MICROZIDE) 12.5 MG capsule, TAKE 1 CAPSULE (12.5 MG) BY MOUTH DAILY, Disp: 90 capsule, Rfl: 0     lisinopril (ZESTRIL) 10 MG tablet, Take 1 tablet (10 mg) by mouth daily, Disp: 90 tablet, Rfl: 0     medical cannabis (Patient's own supply), See Admin Instructions (The purpose of this order is to document that the patient reports taking medical cannabis.  This is not a prescription, and is not used to certify that the patient has a qualifying medical condition.), Disp: , Rfl:      metFORMIN (GLUCOPHAGE) 500 MG tablet, Take 1 tablet (500 mg) by mouth 2 times daily (with meals), Disp: 180 tablet, Rfl: 0     methotrexate sodium 2.5 MG TABS, Take 7 tablets (17.5 mg) by mouth once a week . Take all 7 tablets on the same day of each week. (Patient taking differently: Take 17.5 mg by mouth once a week . Take all 7 tablets on the same day of each week.  On Sundays), Disp: 84 tablet, Rfl: 1     metoprolol succinate ER (TOPROL-XL) 25 MG 24 hr tablet, Take 50 mg by mouth every morning (2 X 25MG), Disp: , Rfl:      omeprazole (PRILOSEC) 40 MG DR capsule, TAKE ONE CAPSULE BY MOUTH ONCE DAILY, Disp: 90 capsule, Rfl: 1     predniSONE (DELTASONE) 5 MG tablet, Take 5-20 mg by mouth daily as needed (Rheumatoid Arthritis Flare ups), Disp: , Rfl:      senna-docusate (SENOKOT-S/PERICOLACE) 8.6-50 MG tablet, Take 1 tablet by mouth 2 times daily, Disp: 50 tablet, Rfl: 0     tiZANidine (ZANAFLEX) 2 MG tablet, Take 1 tablet (2 mg) by mouth 3 times daily as needed for muscle spasms, Disp: 90 tablet, Rfl: 1    Allergies - No Known Allergies    Social History -   Social History     Socioeconomic History     Marital status:      Spouse name: Not on file     Number of children: Not on file     Years of education: Not on file     Highest education level: Not on file   Occupational History     Employer: RETIRED   Social Needs     Financial resource strain: Not on file     Food  insecurity     Worry: Not on file     Inability: Not on file     Transportation needs     Medical: Not on file     Non-medical: Not on file   Tobacco Use     Smoking status: Former Smoker     Packs/day: 1.00     Years: 40.00     Pack years: 40.00     Types: Cigarettes     Quit date: 3/16/2007     Years since quittin.0     Smokeless tobacco: Never Used   Substance and Sexual Activity     Alcohol use: No     Alcohol/week: 0.0 standard drinks     Drug use: No     Sexual activity: Yes     Partners: Female   Lifestyle     Physical activity     Days per week: Not on file     Minutes per session: Not on file     Stress: Not on file   Relationships     Social connections     Talks on phone: Not on file     Gets together: Not on file     Attends Yazidism service: Not on file     Active member of club or organization: Not on file     Attends meetings of clubs or organizations: Not on file     Relationship status: Not on file     Intimate partner violence     Fear of current or ex partner: Not on file     Emotionally abused: Not on file     Physically abused: Not on file     Forced sexual activity: Not on file   Other Topics Concern     Parent/sibling w/ CABG, MI or angioplasty before 65F 55M? Not Asked   Social History Narrative     Not on file       Family History -   Family History   Problem Relation Age of Onset     Cerebrovascular Disease Mother      Arthritis Mother      Osteoporosis Mother      Alzheimer Disease Father      Arthritis Father      Cancer Father      Diabetes Maternal Grandmother      Cardiovascular Maternal Grandmother      Other Cancer Brother      Cancer Paternal Aunt      Hypertension No family hx of      Thyroid Disease No family hx of      Glaucoma No family hx of      Macular Degeneration No family hx of      Review of Systems - As per HPI and PMHx, otherwise 7 system review of the head and neck is negative.    Physical Exam  /73   Pulse 86   Resp 24   SpO2 98%   General - The patient  is in no distress.  Alert and oriented x3, answers questions and cooperates with examination appropriately.   Voice and Breathing - The patient was breathing comfortably without the use of accessory muscles. There was no wheezing, stridor, or stertor.  The patients voice was clear and strong, with no dysphonia.  Head and Face - Normocephalic and atraumatic.    Eyes - Extraocular movements intact. Sclera were not icteric or injected, conjunctiva were pink and moist.  Neurologic - Cranial nerves II-XII are grossly intact. Specifically, the facial nerve is intact, House-Brackmann grade 1 of 6.   Nose - No significant external deformity. Right rhinorocket in place. I deflated the balloon and removed it. The nasal mucosa along the mid septum and on the right inferior turbinate body on the right side shows fresh blood. The left side was mostly normal.  The septum was deviated to the left. No polyps, masses, or purulence.  Neck -  Soft, non-tender. Palpation of the occipital, submental, submandibular, internal jugular chain, and supraclavicular nodes did not demonstrate any abnormal lymph nodes or masses. The parotid glands were without masses. Palpation of the thyroid was soft and smooth, with no nodules or goiter appreciated.  The trachea was midline.    Procedure - I sprayed the right nasal cavity and septum with phenylephrine and lidocaine. I applied 1 stick of silver nitrate to the mid right septum, and right anterior inferior turbinate. Hemostasis was achieved. I applied bacitracin ointment to the wound with a q-tip.    A/P - Zurdo Dash is a 75 year old male with epistaxis. This is almost certainly coming from the right mid septum and inferior turbinate. I removed his packing and cauterized these spots today. We discussed restrictions on manipulation and picking of the nose. I explained using nasal saline spray 3-5 times per day. He can restart plavix and aspirin tomorrow.    I also explained applying digital  pressure to the nasal tip for a minimum of 15-20 minutes to stop a nose bleed. If that doesn't stop the bleeding the patient needs to proceed to the nearest emergency department or return to ENT clinic.     Varun Lin MD  Otolaryngology  [facility]        Again, thank you for allowing me to participate in the care of your patient.        Sincerely,        Varun Lin MD

## 2021-03-25 NOTE — TELEPHONE ENCOUNTER
"Chart review,  3/24/21 progress note from Dr. Lin of ENT, pt was informed at this visit:  \"He can restart plavix and aspirin tomorrow.\"    CARLOS Pelaez, RN  [facility]  Office:  [phone] Fax: [phone]    "

## 2021-04-01 ENCOUNTER — TELEPHONE (OUTPATIENT)
Dept: RHEUMATOLOGY | Facility: CLINIC | Age: 76
End: 2021-04-01

## 2021-04-07 ENCOUNTER — OFFICE VISIT (OUTPATIENT)
Dept: RHEUMATOLOGY | Facility: CLINIC | Age: 76
End: 2021-04-07
Payer: COMMERCIAL

## 2021-04-07 VITALS
DIASTOLIC BLOOD PRESSURE: 81 MMHG | BODY MASS INDEX: 29.36 KG/M2 | HEIGHT: 69 IN | WEIGHT: 198.2 LBS | HEART RATE: 89 BPM | OXYGEN SATURATION: 97 % | SYSTOLIC BLOOD PRESSURE: 112 MMHG

## 2021-04-07 DIAGNOSIS — M17.0 OSTEOARTHRITIS OF BOTH KNEES, UNSPECIFIED OSTEOARTHRITIS TYPE: ICD-10-CM

## 2021-04-07 DIAGNOSIS — M05.79 RHEUMATOID ARTHRITIS INVOLVING MULTIPLE SITES WITH POSITIVE RHEUMATOID FACTOR (H): Primary | ICD-10-CM

## 2021-04-07 DIAGNOSIS — Z79.899 HIGH RISK MEDICATIONS (NOT ANTICOAGULANTS) LONG-TERM USE: ICD-10-CM

## 2021-04-07 LAB
ALBUMIN SERPL-MCNC: 3.5 G/DL (ref 3.4–5)
ALP SERPL-CCNC: 69 U/L (ref 40–150)
ALT SERPL W P-5'-P-CCNC: 25 U/L (ref 0–70)
AST SERPL W P-5'-P-CCNC: 19 U/L (ref 0–45)
BASOPHILS # BLD AUTO: 0 10E9/L (ref 0–0.2)
BASOPHILS NFR BLD AUTO: 0.5 %
BILIRUB DIRECT SERPL-MCNC: 0.1 MG/DL (ref 0–0.2)
BILIRUB SERPL-MCNC: 0.4 MG/DL (ref 0.2–1.3)
CREAT SERPL-MCNC: 1.52 MG/DL (ref 0.66–1.25)
CRP SERPL-MCNC: 11.3 MG/L (ref 0–8)
DIFFERENTIAL METHOD BLD: ABNORMAL
EOSINOPHIL # BLD AUTO: 0.1 10E9/L (ref 0–0.7)
EOSINOPHIL NFR BLD AUTO: 1.2 %
ERYTHROCYTE [DISTWIDTH] IN BLOOD BY AUTOMATED COUNT: 14 % (ref 10–15)
ERYTHROCYTE [SEDIMENTATION RATE] IN BLOOD BY WESTERGREN METHOD: 43 MM/H (ref 0–20)
GFR SERPL CREATININE-BSD FRML MDRD: 44 ML/MIN/{1.73_M2}
HCT VFR BLD AUTO: 33.3 % (ref 40–53)
HGB BLD-MCNC: 10.8 G/DL (ref 13.3–17.7)
LYMPHOCYTES # BLD AUTO: 0.9 10E9/L (ref 0.8–5.3)
LYMPHOCYTES NFR BLD AUTO: 11.6 %
MCH RBC QN AUTO: 30.4 PG (ref 26.5–33)
MCHC RBC AUTO-ENTMCNC: 32.4 G/DL (ref 31.5–36.5)
MCV RBC AUTO: 94 FL (ref 78–100)
MONOCYTES # BLD AUTO: 0.5 10E9/L (ref 0–1.3)
MONOCYTES NFR BLD AUTO: 6 %
NEUTROPHILS # BLD AUTO: 6 10E9/L (ref 1.6–8.3)
NEUTROPHILS NFR BLD AUTO: 80.7 %
PLATELET # BLD AUTO: 342 10E9/L (ref 150–450)
PROT SERPL-MCNC: 7 G/DL (ref 6.8–8.8)
RBC # BLD AUTO: 3.55 10E12/L (ref 4.4–5.9)
WBC # BLD AUTO: 7.4 10E9/L (ref 4–11)

## 2021-04-07 PROCEDURE — 85025 COMPLETE CBC W/AUTO DIFF WBC: CPT | Performed by: INTERNAL MEDICINE

## 2021-04-07 PROCEDURE — 85652 RBC SED RATE AUTOMATED: CPT | Performed by: INTERNAL MEDICINE

## 2021-04-07 PROCEDURE — 82565 ASSAY OF CREATININE: CPT | Performed by: INTERNAL MEDICINE

## 2021-04-07 PROCEDURE — 86140 C-REACTIVE PROTEIN: CPT | Performed by: INTERNAL MEDICINE

## 2021-04-07 PROCEDURE — 99214 OFFICE O/P EST MOD 30 MIN: CPT | Mod: 25 | Performed by: INTERNAL MEDICINE

## 2021-04-07 PROCEDURE — 20610 DRAIN/INJ JOINT/BURSA W/O US: CPT | Mod: 50 | Performed by: INTERNAL MEDICINE

## 2021-04-07 PROCEDURE — 36415 COLL VENOUS BLD VENIPUNCTURE: CPT | Performed by: INTERNAL MEDICINE

## 2021-04-07 PROCEDURE — 80076 HEPATIC FUNCTION PANEL: CPT | Performed by: INTERNAL MEDICINE

## 2021-04-07 RX ORDER — TRIAMCINOLONE ACETONIDE 40 MG/ML
40 INJECTION, SUSPENSION INTRA-ARTICULAR; INTRAMUSCULAR ONCE
Status: DISCONTINUED | OUTPATIENT
Start: 2021-04-07 | End: 2021-04-07

## 2021-04-07 RX ORDER — TRIAMCINOLONE ACETONIDE 40 MG/ML
40 INJECTION, SUSPENSION INTRA-ARTICULAR; INTRAMUSCULAR ONCE
Status: COMPLETED | OUTPATIENT
Start: 2021-04-07 | End: 2021-04-07

## 2021-04-07 RX ORDER — METHOTREXATE 2.5 MG/1
15 TABLET ORAL WEEKLY
Qty: 78 TABLET | Refills: 0 | Status: SHIPPED | OUTPATIENT
Start: 2021-04-07 | End: 2021-07-05

## 2021-04-07 RX ADMIN — TRIAMCINOLONE ACETONIDE 40 MG: 40 INJECTION, SUSPENSION INTRA-ARTICULAR; INTRAMUSCULAR at 09:45

## 2021-04-07 ASSESSMENT — MIFFLIN-ST. JEOR: SCORE: 1614.41

## 2021-04-07 NOTE — PROGRESS NOTES
Rheumatology Clinic Visit      Zurdo Dash MRN# [id number]   YOB: 1945 Age: 76 year old      Date of visit: 4/07/21   PCP: Dr. Kapil Duckworth     Chief Complaint   Patient presents with:  Arthritis: RA    Assessment and Plan     1. Seropositive nonerosive rheumatoid arthritis (, CCP 64): Currently on methotrexate 17.5 mg once weekly (reducing from 25mg wkly to get to the lowest effective dose), folic acid 1 mg daily. Previously on HCQ (effective, stopped when doing well). Doing well today with regard to rheumatoid arthritis.  Continue methotrexate dose reductions in order to get to the lowest effective dose.  Chronic illness, stable  - Reduce methotrexate from 17.5mg once weekly, to 15mg once weekly  - Continue folic acid 1 mg daily   - Labs now and in 3 months: CBC, Creatinine, Hepatic Panel, ESR, CRP    High risk medication requiring intensive toxicity monitoring at least quarterly: labs ordered include CBC, Creatinine, Hepatic panel to monitor for cytopenia and hepatotoxicity; checking creatinine as it affects clearance of medication.      2. Bilateral knee osteoarthritis / patellofemoral syndrome: Intra-articular steroid injections have been effective.  Repeat injection today as documented in the procedure section.    # Has received 2 doses of the Moderna COVID-19 vaccine    Total minutes spent in evaluation with patient, documentation, , and review of pertinent studies and chart notes: 21     Mr. Dash verbalized agreement with and understanding of the rational for the diagnosis and treatment plan.  All questions were answered to best of my ability and the patient's satisfaction. Mr. Dash was advised to contact the clinic with any questions that may arise after the clinic visit.    Thank you for involving me in the care of the patient    Return to clinic: Jan and April HPI   Zurdo Dash is a 76 year old male with a medical history significant for hypertension,  hyperlipidemia, diabetes, diabetic neuropathy, GERD, and rheumatoid arthritis who presents for f/u of RA and bilateral knee OA.     Today, 4/7/2021: Since last seen, he says that he had femoral bypass surgery, and then noticed low heart rate and now has a pacemaker.  Arthritis is doing well.  He would like injections of both knees because the injections have been helpful previously.  Tolerating rheumatology medications well.    Denies fevers, chills, nausea, vomiting, constipation, diarrhea. No abdominal pain. No chest pain/pressure, palpitations, or shortness of breath. No oral or nasal sores. No neck pain. No rash. No LE swelling.      Tobacco: None  EtOH: None  Drugs: None    ROS   GEN: No fevers, chills, or night sweats. Trying to lose weight  SKIN: No rash.   HEENT: No oral or nasal ulcers.  CV: No chest pain, pressure, palpitations, or dyspnea on exertion.  PULM: No SOB, wheeze, cough.  GI: No nausea, vomiting, constipation, diarrhea. No blood in stool. No abdominal pain.  MSK: See HPI.  NEURO: No numbness, tingling, or weakness.  EXT: No LE swelling  PSYCH: Negative    Active Problem List     Patient Active Problem List   Diagnosis     Pain in limb     Hyperlipidemia LDL goal <100     Hypertension goal BP (blood pressure) < 140/90     Hypogonadism     Advanced directives, counseling/discussion     Health Care Home     Type 2 diabetes mellitus with diabetic polyneuropathy, without long-term current use of insulin (H)     RBBB (right bundle branch block)     Ex-smoker     Family history of esophageal cancer     Gastroesophageal reflux disease, esophagitis presence not specified     Rheumatoid arthritis involving multiple sites with positive rheumatoid factor (H)     Pulmonary nodule     High risk medication use     Spondylosis of cervical region without myelopathy or radiculopathy     Erectile dysfunction, unspecified erectile dysfunction type     Type 2 diabetes mellitus without retinopathy (H)      Tubulovillous adenoma of colon     Diabetic polyneuropathy associated with type 2 diabetes mellitus (H)     Chronic bilateral low back pain without sciatica     Migraine equivalent     Posterior vitreous detachment of left eye     Pseudophakia, ou     Eyelid lesion, LLL     Dermatochalasis of both upper eyelids     Bradycardia     Primary osteoarthritis of both knees     Syncope     Trigger finger, acquired     CKD (chronic kidney disease) stage 3, GFR 30-59 ml/min     Abdominal pain, generalized     PAD (peripheral artery disease) (H)     Immunosuppression (H)     Skin ulcer of toe of left foot, limited to breakdown of skin (H)     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 difficile diarrhea     H/O     Cataract 11/18/2011     Diabetic neuropathy (H)     mild, mostly soles and distal forefeet, worse on the left side.     Diverticulitis      ED (erectile dysfunction)      Ex-smoker     QUIT SMOKING FEB 2007     History of ETOH abuse     recovering, sober since 1997     Hyperlipidemia LDL goal <100      Hypertension goal BP (blood pressure) < 140/90      Hypogonadism      Obesity      PAD (peripheral artery disease) (H)     leg cramps, with exertion, no formal diagnosis of PAD and minimal if any symptoms at all.     RA (rheumatoid arthritis) (H)     Dr Bailon     Type 2 diabetes, HbA1c goal < 7% (H) 10/2008    A1C of 7.1 %      Past Surgical History     Past Surgical History:   Procedure Laterality Date     BYPASS GRAFT FEMOROPOPLITEAL Left 1/7/2021    Procedure: LEFT FEMORAL TO ABOVE KNEE POPLITEAL ARTERY BYPASS WITH PTFE VASCULAR GRAFT REMOVABLE RING 6MMX 50CM;  Surgeon: Myra Lopes MD;  Location: [facility] OR     CATARACT IOL, RT/LT       COLONOSCOPY  03/01/2018    [facility]     COMBINED REPAIR PTOSIS WITH BLEPHAROPLASTY BILATERAL Bilateral 8/16/2019     Procedure: BILATERAL UPPER EYELID BLEPHAROPLASTY AND BILATERAL PTOSIS REPAIR;  Surgeon: Janet Garcia MD;  Location: [facility] OR     ENDARTERECTOMY FEMORAL Left 1/7/2021    Procedure: LEFT FEMORAL ENDARTERECTOMY WITH PATCH ANGIOPLASTY PHOTOFIX  0.8 X 8CM;  Surgeon: Myra Lopes MD;  Location: [facility] OR     EXCISE LESION EYELID Left 8/16/2019    Procedure: LEFT LOWER EYELID BIOPSY;  Surgeon: Janet Garcia MD;  Location: [facility] OR     HC INCISION TENDON SHEATH FINGER  4/2009    r hand ring finger     IR LOWER EXTREMITY ANGIOGRAM LEFT  12/17/2020     PHACOEMULSIFICATION WITH STANDARD INTRAOCULAR LENS IMPLANT  02/2019; 3/2019    left eye; right eye     TONSILLECTOMY       Allergy   No Known Allergies  Current Medication List     Current Outpatient Medications   Medication Sig     acetaminophen (TYLENOL) 325 MG tablet Take 2 tablets (650 mg) by mouth every 4 hours as needed for other (multimodal surgical pain management along with NSAIDS and opioid medication as indicated based on pain control and physical function.)     aspirin 81 MG tablet Take 1 tablet by mouth every evening      atorvastatin (LIPITOR) 40 MG tablet Take 1 tablet (40 mg) by mouth daily (Patient taking differently: Take 40 mg by mouth every evening )     cetirizine (ZYRTEC) 10 MG tablet Take 10 mg by mouth At Bedtime     clobetasol (TEMOVATE) 0.05 % external ointment Apply topically 2 times daily as needed     diclofenac (VOLTAREN) 1 % topical gel Apply topically 4 times daily as needed for moderate pain     dulaglutide (TRULICITY) 1.5 MG/0.5ML pen Inject 1.5 mg Subcutaneous every 7 days (Patient taking differently: Inject 1.5 mg Subcutaneous every 7 days ON FRIDAYS)     folic acid (FOLVITE) 1 MG tablet Take 1 tablet (1 mg) by mouth daily     hydrochlorothiazide (MICROZIDE) 12.5 MG capsule TAKE 1 CAPSULE (12.5 MG) BY MOUTH DAILY     lisinopril (ZESTRIL) 10 MG tablet Take 1 tablet (10 mg) by mouth daily     medical cannabis (Patient's own  supply) See Admin Instructions (The purpose of this order is to document that the patient reports taking medical cannabis.  This is not a prescription, and is not used to certify that the patient has a qualifying medical condition.)     metFORMIN (GLUCOPHAGE) 500 MG tablet Take 1 tablet (500 mg) by mouth 2 times daily (with meals)     methotrexate sodium 2.5 MG TABS Take 7 tablets (17.5 mg) by mouth once a week . Take all 7 tablets on the same day of each week. (Patient taking differently: Take 17.5 mg by mouth once a week . Take all 7 tablets on the same day of each week.   On Sundays)     metoprolol succinate ER (TOPROL-XL) 25 MG 24 hr tablet Take 50 mg by mouth every morning (2 X 25MG)     omeprazole (PRILOSEC) 40 MG DR capsule TAKE ONE CAPSULE BY MOUTH ONCE DAILY     predniSONE (DELTASONE) 5 MG tablet Take 5-20 mg by mouth daily as needed (Rheumatoid Arthritis Flare ups)     senna-docusate (SENOKOT-S/PERICOLACE) 8.6-50 MG tablet Take 1 tablet by mouth 2 times daily     tiZANidine (ZANAFLEX) 2 MG tablet Take 1 tablet (2 mg) by mouth 3 times daily as needed for muscle spasms     ACCU-CHEK DAYANARA PLUS test strip TEST THREE TIMES A DAY OR AS DIRECTED     blood glucose monitoring (ONE TOUCH DELICA) lancets Use to test blood sugars 3 times daily or as directed.     clopidogrel (PLAVIX) 75 MG tablet Take 1 tablet (75 mg) by mouth daily (Patient not taking: Reported on 3/24/2021)     No current facility-administered medications for this visit.          Social History   See HPI    Family History     Family History   Problem Relation Age of Onset     Cerebrovascular Disease Mother      Arthritis Mother      Osteoporosis Mother      Alzheimer Disease Father      Arthritis Father      Cancer Father      Diabetes Maternal Grandmother      Cardiovascular Maternal Grandmother      Other Cancer Brother      Cancer Paternal Aunt      Hypertension No family hx of      Thyroid Disease No family hx of      Glaucoma No family hx of   "    Macular Degeneration No family hx of        Physical Exam     Temp Readings from Last 3 Encounters:   02/15/21 97.8  F (36.6  C) (Oral)   02/11/21 98  F (36.7  C) (Oral)   02/09/21 97.1  F (36.2  C) (Temporal)     BP Readings from Last 5 Encounters:   04/07/21 112/81   03/24/21 106/73   02/11/21 130/80   02/09/21 117/74   01/26/21 111/74     Pulse Readings from Last 1 Encounters:   04/07/21 89     Resp Readings from Last 1 Encounters:   03/24/21 24     Estimated body mass index is 29.27 kg/m  as calculated from the following:    Height as of this encounter: 1.753 m (5' 9\").    Weight as of this encounter: 89.9 kg (198 lb 3.2 oz).      GEN: NAD.  Overweight  HEENT:  Anicteric, noninjected sclera. No obvious external lesions of the ear and nose. Hearing intact.  PULM: No increased work of breathing  MSK: MCPs, PIPs, DIPs without swelling or tenderness to palpation.  Wrists without swelling or tenderness to palpation.  Elbows and shoulders without swelling or tenderness to palpation.  Shoulders with normal range of motion.  Knees with medial joint line tenderness but no effusion or increased warmth; no overlying erythema.  Ankles without swelling or tenderness to palpation.  Negative MTP squeeze.  SKIN: No rash or jaundice seen  PSYCH: Alert. Appropriate.        Labs / Imaging (select studies)     9/28/1999  ([facility])    10/17/2013 Left knee synovial fluid at [facility]: , N 3%, No crystals  RF/CCP  Recent Labs   Lab Test 04/07/16  1149   CCPIGG 64*     CBC  Recent Labs   Lab Test 01/09/21  0709 01/08/21  0708 01/07/21  0610 12/17/20  0655 12/11/20  1436   WBC 7.5 9.9  --  4.6 4.9   RBC 2.95* 3.00*  --  3.81* 3.87*   HGB 9.1* 9.0* 10.2* 11.5* 11.9*   HCT 28.1* 27.6*  --  34.1* 35.7*   MCV 95 92  --  90 92   RDW 14.7 14.3  --  13.9 13.9    212  --  304 224   MCH 30.8 30.0  --  30.2 30.7   MCHC 32.4 32.6  --  33.7 33.3   NEUTROPHIL 77.7 88.6  --   --  71.0   LYMPH 9.5 6.5  --   --  " "18.0   MONOCYTE 9.5 4.1  --   --  8.0   EOSINOPHIL 1.2 0.0  --   --  2.0   BASOPHIL 0.8 0.2  --   --  1.0   ANEU 5.9 8.8*  --   --  3.4   ALYM 0.7* 0.6*  --   --  0.9   SMITH 0.7 0.4  --   --  0.4   AEOS 0.1 0.0  --   --  0.1   ABAS 0.1 0.0  --   --  0.1     CMP  Recent Labs   Lab Test 01/09/21  0709 01/08/21  0708 01/07/21  0610 12/17/20  0655 12/15/20  1206 12/11/20  1436 07/28/20  1148     --  138  --  137 141  --    POTASSIUM 4.3 4.7 4.2  --  5.0 5.4*  --    CHLORIDE 108  --  110*  --  108 114*  --    CO2 23  --  22  --  20 23  --    ANIONGAP 5  --  6  --  9 4  --    *  --  144*  --  107* 84  --    BUN 31*  --  37*  --  47* 49*  --    CR 1.39*  --  1.61* 1.81* 1.60* 1.67* 1.18   GFRESTIMATED 49*  --  41* 35* 41* 39* 59*   GFRESTBLACK 56*  --  47* 41* 48* 45* 69   NEW 8.4*  --  8.7  --  9.0 9.0  --    BILITOTAL  --   --  0.7  --   --  0.4 0.7   ALBUMIN  --   --  3.3*  --   --  3.7 2.9*   PROTTOTAL  --   --  6.5*  --   --  6.7* 6.1*   ALKPHOS  --   --  58  --   --  51 51   AST  --   --  18  --   --  16 24   ALT  --   --  24  --   --  24 36     Calcium/VitaminD  Recent Labs   Lab Test 01/09/21  0709 01/07/21  0610 12/15/20  1206 07/23/13  1016 07/23/13  1016   NEW 8.4* 8.7 9.0   < >  --    VITDT  --   --   --   --  28*    < > = values in this interval not displayed.     ESR/CRP  Recent Labs   Lab Test 12/11/20  1436 07/28/20  1148 07/16/19  1207   SED 33* 15 8   CRP 18.9* 31.8* 4.2     Hepatitis B  Recent Labs   Lab Test 04/07/16  1149   HBCAB Nonreactive   HEPBANG Nonreactive     Hepatitis C  Recent Labs   Lab Test 04/07/16  1149   HCVAB Nonreactive   Assay performance characteristics have not been established for newborns,   infants, and children       HIV Screening  Recent Labs   Lab Test 04/07/16  1149   HIAGAB Nonreactive   HIV-1 p24 Ag & HIV-1/HIV-2 Ab Not Detected           \"MRI LEFT KNEE  10/08/2013    INDICATION: Left knee pain. Locking, catching and snapping. Weakness.  TECHNIQUE: " "Routine.  COMPARISON: None.    FINDINGS:  MEDIAL COMPARTMENT: Linear tearing involving the posterior horn and body of   the medial meniscus as seen on series 4: image 6-8. This is also seen on   series 5: image 9-10. Cartilage is thinned peripherally.    LATERAL COMPARTMENT: Lateral meniscus also demonstrates tearing in the   posterior horn on series 4: image 22. There is diffuse tearing in the body   of the meniscus which is horizontal as seen on series 5: image 9 and this   extends into the anterior horn. Cartilage is thinned.    PATELLOFEMORAL COMPARTMENT: Retropatellar cartilage demonstrates   full-thickness loss of cartilage over portions of the median ridge, lateral   facet and medial facet. Cartilage is better preserved over the lower pole   centrally. There is also full-thickness loss of cartilage in the lateral   and central trochlear groove. Patella is normally aligned. Retinaculum are   intact.    LIGAMENTS AND TENDONS: The anterior cruciate and posterior cruciate   ligaments are intact. The medial collateral ligament is intact. Lateral   collateral ligamentous complex and popliteus insertion are intact.   Quadriceps tendon and patellar tendon are intact.    BONES AND SOFT TISSUES: Moderate-sized joint effusion. No popliteal cyst.   No muscle edema or fracture.    CONCLUSION:  1. There is tearing of the medial meniscus involving the posterior horn and   body. Cartilage is thinned peripherally.  2. There is also tearing in the posterior horn and body of the lateral   meniscus which also extends into the anterior horn. Cartilage is also   thinned in the lateral compartment.  3. Moderate to severe osteoarthritis in the patellofemoral compartment with   full-thickness loss of cartilage over large portions of the retropatellar   surface and trochlear surface.  4. Joint effusion.    IF YOU ARE A PHYSICIAN AND HAVE QUESTIONS REGARDING THIS REPORT, PLEASE   CALL [phone].\"    \"X-RAY KNEES BILATERAL AP " "W/LAT/SUN/PA LEFT   Oct 08, 2013 09:51:59 AM     INDICATION: Pain and swelling   COMPARISON: None.      FINDINGS: Moderate narrowing lateral aspect of the patellofemoral   compartment with slight lateral subluxation of the patella. Medial and   lateral compartments are preserved. Moderate knee joint effusion and/or   synovitis. Enthesophyte formation distal quadriceps and proximal patellar   tendons. Extensive vascular calcifications.     AP view right knee demonstrates trace narrowing medial compartment joint   Space.\"    Immunization History     Immunization History   Administered Date(s) Administered     COVID-19,PF,Pfizer 02/19/2021, 03/12/2021     Influenza (High Dose) 3 valent vaccine 09/20/2012, 10/20/2015, 09/20/2016, 08/28/2017, 09/24/2018, 09/18/2019     Influenza (IIV3) PF 10/05/1999, 10/30/2008, 09/28/2011, 10/14/2013, 10/03/2014     Influenza, Quad, High Dose, Pf, 65yr + 09/11/2020     Pneumo Conj 13-V (2010&after) 06/29/2015     Pneumococcal 23 valent 08/19/2010     TD (ADULT, 7+) 08/05/1997, 02/03/2011     TDAP Vaccine (Boostrix) 03/11/2020     Zoster vaccine recombinant adjuvanted (SHINGRIX) 01/03/2020, 03/11/2020     Zoster vaccine, live 04/19/2010       Procedure     Procedure: Steroid injection of the bilateral knees  Indication: Pain, osteoarthritis    The procedure was explained in detail. Risks including infection, pain, structural damage such as cartilage damage and tendon rupture, fat atrophy, skin hyper-/hypo-pigmentation, and medication reaction was explained. The need for rest of the affected joint for one week after the procedure was explained.  The option of not doing the procedure was also provided. All questions were answered and the patient consented to the procedure.     A time-out was performed and the correct patient, procedure, and laterality were verified.    The right knee was examined and location for injection was identified - anterior medial. The area was cleaned with " chlorhexidine, twice.  Ethyl chloride was then used for topical anaesthetic.  Then a mixture of lidocaine 1% 2 mL and Kenalog 40mg was injected into the intra-articular space.     The left knee was examined and location for injection was identified - anterior medial. The area was cleaned with chlorhexidine, twice.  Ethyl chloride was then used for topical anaesthetic.  Then a mixture of lidocaine 1% 2 mL and Kenalog 40mg was injected into the intra-articular space.     The patient tolerated the procedure well. No complications.    1% Lidocaine  : Hospira  Lot #: [id number]  EXPIRATION DATE: 01 OCT 2022  NDC: 6422-6443-09    MEDICATION: Triamcinolone 40 mg  LOT #: [id number]  :  [employer]  EXPIRATION DATE:  06/2022  NDC#: [address]-8    MEDICATION: Triamcinolone 40 mg  LOT #: [id number]  :  [employer]  EXPIRATION DATE:  06/2022  NDC#: [address]-3       Chart documentation done in part with Dragon Voice recognition Software. Although reviewed after completion, some word and grammatical error may remain.    Albert Tompkins MD

## 2021-04-07 NOTE — PATIENT INSTRUCTIONS
RHEUMATOLOGY    Dr. Albert Tompkins    [facility]  [address] [place] [address]    Our new phone number for Rheumatology is [phone], this number will be able to help you schedule appointments for Dr. Tompkins or if you have any message you would like sent to us.    Thank you for choosing [facility]!    Eryn Espinoza [facility] Rheumatology

## 2021-04-08 ENCOUNTER — OFFICE VISIT (OUTPATIENT)
Dept: INTERNAL MEDICINE | Facility: CLINIC | Age: 76
End: 2021-04-08
Payer: COMMERCIAL

## 2021-04-08 VITALS
SYSTOLIC BLOOD PRESSURE: 108 MMHG | TEMPERATURE: 97.8 F | WEIGHT: 198 LBS | DIASTOLIC BLOOD PRESSURE: 71 MMHG | OXYGEN SATURATION: 98 % | BODY MASS INDEX: 29.24 KG/M2 | RESPIRATION RATE: 16 BRPM | HEART RATE: 83 BPM

## 2021-04-08 DIAGNOSIS — M05.79 RHEUMATOID ARTHRITIS INVOLVING MULTIPLE SITES WITH POSITIVE RHEUMATOID FACTOR (H): ICD-10-CM

## 2021-04-08 DIAGNOSIS — E78.5 HYPERLIPIDEMIA LDL GOAL <100: ICD-10-CM

## 2021-04-08 DIAGNOSIS — I10 HYPERTENSION GOAL BP (BLOOD PRESSURE) < 140/90: ICD-10-CM

## 2021-04-08 DIAGNOSIS — E11.42 DIABETIC POLYNEUROPATHY ASSOCIATED WITH TYPE 2 DIABETES MELLITUS (H): Primary | ICD-10-CM

## 2021-04-08 DIAGNOSIS — E11.42 TYPE 2 DIABETES MELLITUS WITH DIABETIC POLYNEUROPATHY, WITHOUT LONG-TERM CURRENT USE OF INSULIN (H): ICD-10-CM

## 2021-04-08 LAB
CREAT UR-MCNC: 165 MG/DL
MICROALBUMIN UR-MCNC: 14 MG/L
MICROALBUMIN/CREAT UR: 8.36 MG/G CR (ref 0–17)

## 2021-04-08 PROCEDURE — 82043 UR ALBUMIN QUANTITATIVE: CPT | Performed by: INTERNAL MEDICINE

## 2021-04-08 PROCEDURE — 99214 OFFICE O/P EST MOD 30 MIN: CPT | Performed by: INTERNAL MEDICINE

## 2021-04-08 RX ORDER — ATORVASTATIN CALCIUM 40 MG/1
40 TABLET, FILM COATED ORAL EVERY EVENING
Qty: 90 TABLET | Refills: 1 | Status: SHIPPED | OUTPATIENT
Start: 2021-04-08 | End: 2022-03-17

## 2021-04-08 RX ORDER — LISINOPRIL 10 MG/1
10 TABLET ORAL DAILY
Qty: 90 TABLET | Refills: 1 | Status: SHIPPED | OUTPATIENT
Start: 2021-04-08 | End: 2021-07-14

## 2021-04-08 RX ORDER — FOLIC ACID 1 MG/1
1 TABLET ORAL DAILY
Qty: 100 TABLET | Refills: 2 | Status: CANCELLED | OUTPATIENT
Start: 2021-04-08

## 2021-04-08 RX ORDER — HYDROCHLOROTHIAZIDE 12.5 MG/1
12.5 CAPSULE ORAL DAILY
Qty: 90 CAPSULE | Refills: 1 | Status: SHIPPED | OUTPATIENT
Start: 2021-04-08 | End: 2021-07-14

## 2021-04-08 NOTE — PROGRESS NOTES
"    Assessment & Plan     Type 2 diabetes mellitus with diabetic polyneuropathy, without long-term current use of insulin (H)  Well controlled   - dulaglutide (TRULICITY) 1.5 MG/0.5ML pen; Inject 1.5 mg Subcutaneous every 7 days ON FRIDAYS  - metFORMIN (GLUCOPHAGE) 500 MG tablet; Take 1 tablet (500 mg) by mouth 2 times daily (with meals)  - Albumin Random Urine Quantitative with Creat Ratio  Today we also did a DIABETIC FOOT EXAM which is noteworthy for diabetes neuropathy findings.. today we generated a prescription for durable medical equipment or supplies for specialized shoes for diabetic neuropathy which patient needs for his diabetes neuropathy     Hyperlipidemia LDL goal <100  Problem is stable and ongoing monitoring    - atorvastatin (LIPITOR) 40 MG tablet; Take 1 tablet (40 mg) by mouth every evening    Rheumatoid arthritis involving multiple sites with positive rheumatoid factor (H)  Seeing Dr. Albert Tompkins, rheumatologist with [facility] [place]      Hypertension goal BP (blood pressure) < 140/90  Controlled within acceptable limits , continue current plan of care    - hydrochlorothiazide (MICROZIDE) 12.5 MG capsule; Take 1 capsule (12.5 mg) by mouth daily  - lisinopril (ZESTRIL) 10 MG tablet; Take 1 tablet (10 mg) by mouth daily    Review of the result(s) of each unique test - labs and hospital notes from 2021, see EMR  25 minutes spent on the date of the encounter doing chart review, history and exam, documentation and further activities per the note       BMI:   Estimated body mass index is 29.24 kg/m  as calculated from the following:    Height as of 4/7/21: 1.753 m (5' 9\").    Weight as of this encounter: 89.8 kg (198 lb).   Weight management plan: Discussed healthy diet and exercise guidelines      Return in about 6 months (around 10/8/2021).    Kapil Duckworth MD  [facility] Renner is a 76 year old who presents for the following health issues   Encounter " Diagnoses   Name Primary?     Type 2 diabetes mellitus with diabetic polyneuropathy, without long-term current use of insulin (H)      Hyperlipidemia LDL goal <100      Rheumatoid arthritis involving multiple sites with positive rheumatoid factor (H)      Hypertension goal BP (blood pressure) < 140/90        HPI     Diabetes Follow-up    How often are you checking your blood sugar? Two times daily  Blood sugar testing frequency justification:  Adjustment of medication(s)  What time of day are you checking your blood sugars (select all that apply)?  Before and after meals  Have you had any blood sugars above 200?  No  Have you had any blood sugars below 70?  No    What symptoms do you notice when your blood sugar is low?  None    What concerns do you have today about your diabetes? None     Do you have any of these symptoms? (Select all that apply)  Numbness in feet    Have you had a diabetic eye exam in the last 12 months? Yes- Date of last eye exam: 2020,  Location: cataract     Encounter Diagnoses   Name Primary?     Type 2 diabetes mellitus with diabetic polyneuropathy, without long-term current use of insulin (H)      Hyperlipidemia LDL goal <100      Rheumatoid arthritis involving multiple sites with positive rheumatoid factor (H)      Hypertension goal BP (blood pressure) < 140/90       Lab Results   Component Value Date    A1C 6.8 01/07/2021    A1C 6.8 12/17/2020    A1C 6.7 12/11/2020    A1C 6.0 01/03/2020    A1C 6.4 08/06/2019     Needs hemoglobin a1c  [ diabetes test ] today still    Needs new specialized shoes for diabetic neuropathy once every calendar year. He uses [email or web address]     Here's the questions regarding specialized shoes for diabetic neuropathy - he's got diabetes neuropathy , diabetic foot ulcer history and has a hammer toe needing a lift for that toe [ shoe insert]  No amputation history     BP Readings from Last 2 Encounters:   04/07/21 112/81   03/24/21 106/73      Hemoglobin A1C (%)   Date Value   01/07/2021 6.8 (H)   12/17/2020 6.8 (H)     LDL Cholesterol Calculated (mg/dL)   Date Value   01/07/2021 45   12/17/2020 90     Hemoglobin a1c  [ diabetes test ] was 6.2% at [facility] 3/20/2021, see care everywhere !      How many servings of fruits and vegetables do you eat daily?  0-1    On average, how many sweetened beverages do you drink each day (Examples: soda, juice, sweet tea, etc.  Do NOT count diet or artificially sweetened beverages)?   0    How many days per week do you exercise enough to make your heart beat faster? 3 or less    How many minutes a day do you exercise enough to make your heart beat faster? 9 or less    How many days per week do you miss taking your medication? 0    All in all patient is doing absolutely well. 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 Since he's lost all this weight [ intentional weight loss ] his skin is hanging more and this is often a contributing factors with worsened senile purpura       Review of Systems   Constitutional, HEENT, cardiovascular, pulmonary, gi and gu systems are negative, except as otherwise noted.      Objective    /71   Pulse 83   Temp 97.8  F (36.6  C) (Oral)   Resp 16   Wt 89.8 kg (198 lb)   SpO2 98%   BMI 29.24 kg/m    Body mass index is 29.24 kg/m .  Physical Exam   GENERAL: healthy, alert and no distress  NECK: no adenopathy, no asymmetry, masses, or scars and thyroid normal to palpation  RESP: lungs clear to auscultation - no rales, rhonchi or wheezes  CV: regular rate and rhythm, normal S1 S2, no S3 or S4, no murmur, click or rub, no peripheral edema and peripheral pulses strong  ABDOMEN: soft, nontender, no hepatosplenomegaly, no  masses and bowel sounds normal  MS: no gross musculoskeletal defects noted, no edema

## 2021-04-08 NOTE — LETTER
April 9, 2021    Zurdo Dash  [address] [place] [id number]          Dear ,    We are writing to inform you of your test results.  All of these tests are within acceptable limits , things look good !       Resulted Orders   Albumin Random Urine Quantitative with Creat Ratio   Result Value Ref Range    Creatinine Urine 165 mg/dL    Albumin Urine mg/L 14 mg/L    Albumin Urine mg/g Cr 8.36 0 - 17 mg/g Cr       If you have any questions or concerns, please call the clinic at the number listed above.       Sincerely,      Kapil Duckworth MD

## 2021-04-08 NOTE — PATIENT INSTRUCTIONS
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 Joseph with [facility] [place]. I am asking you to call them and ask when you are supposed to have a further follow up appointment with them.

## 2021-04-16 ENCOUNTER — TELEPHONE (OUTPATIENT)
Dept: FAMILY MEDICINE | Facility: CLINIC | Age: 76
End: 2021-04-16

## 2021-04-16 NOTE — TELEPHONE ENCOUNTER
Patient needs you to send a fax order for orthotic shoes  They want the last office visit notes pertaining to feet    Salo @ [facility] [phone]      Call patient if questions ok to leave message  [phone]

## 2021-04-22 NOTE — TELEPHONE ENCOUNTER
Patient called and stated that he has an appointment tomorrow with [facility] and that they are needing additional information.    Spoke to Kaleigh at [facility] (ph:  [phone]) and this is what they need for the appointment.    DME for diabetic shoes and inserts.    Office notes detailing a foot exam.    Routing to Dr. Duckworth to addend 4-8 office note documenting a foot exam.  Also need a new DME for diabetic shoes and inserts.    Once this is completed both can be faxed to [phone].  Caren Telles,

## 2021-04-22 NOTE — TELEPHONE ENCOUNTER
New DME and office note from 4-8-2021, printed and faxed to Salo at [facility] to fax number [phone].  Caren Telles,

## 2021-05-06 ENCOUNTER — MEDICAL CORRESPONDENCE (OUTPATIENT)
Dept: HEALTH INFORMATION MANAGEMENT | Facility: CLINIC | Age: 76
End: 2021-05-06

## 2021-05-08 ENCOUNTER — HEALTH MAINTENANCE LETTER (OUTPATIENT)
Age: 76
End: 2021-05-08

## 2021-05-10 ENCOUNTER — HOSPITAL ENCOUNTER (OUTPATIENT)
Dept: ULTRASOUND IMAGING | Facility: CLINIC | Age: 76
Discharge: HOME | End: 2021-05-10
Attending: SURGERY | Admitting: SURGERY
Payer: COMMERCIAL

## 2021-05-10 DIAGNOSIS — I73.9 PAD (PERIPHERAL ARTERY DISEASE) (H): ICD-10-CM

## 2021-05-10 PROCEDURE — 93926 LOWER EXTREMITY STUDY: CPT | Mod: LT

## 2021-05-10 PROCEDURE — 93926 LOWER EXTREMITY STUDY: CPT | Mod: 26 | Performed by: SURGERY

## 2021-05-12 ENCOUNTER — TELEPHONE (OUTPATIENT)
Dept: INTERNAL MEDICINE | Facility: CLINIC | Age: 76
End: 2021-05-12

## 2021-05-12 NOTE — TELEPHONE ENCOUNTER
Reason for Call:  questions    Detailed comments: Patient was seen by Dr. Mulligan today.  Dr. Mulligan would like patient to have a myocardial perfusion imaging study (nuclear stress test).      Dr. Duckworth had told patient in the past that his numbers were all good, however, today, Dr. Mulligan told patient that his numbers were out of range.  He had concerns regarding the flow to the patient's heart.      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  Dr. Mulligan stated that if you have had a blockage somewhere before, chances are it will happen again, and it possibly could be in your heart.    Patient would like to know what Dr. Duckworth thinks about this and whether he should have the study done.    If possible, patient would like Dr. Duckworth to call him personally to discuss.      Phone Number Patient can be reached at: Home number on file [phone] (home)    Best Time: anytime    Can we leave a detailed message on this number? YES    Call taken on 5/12/2021 at 12:25 PM by Caren Telles

## 2021-05-12 NOTE — TELEPHONE ENCOUNTER
I received patients message. We discussed patients questions and concerns. I argued that the reasoning behind why a stress test is ordered     I did review the note in care everywhere with Dr. Kylee Mulligan with [facility] . I spoke with [place] at some length . His main symptom apparently that drove the requested test is that patient has had some complaints of dyspnea on exertion. I explained to patient that I think there's clearly enough evidence from patients history to support having this test.    I encouraged patient to set up the test and he says that's what he's going to do.    Kapil Duckworth MD

## 2021-05-13 ENCOUNTER — OFFICE VISIT (OUTPATIENT)
Dept: PODIATRY | Facility: CLINIC | Age: 76
End: 2021-05-13
Payer: COMMERCIAL

## 2021-05-13 VITALS
WEIGHT: 198 LBS | SYSTOLIC BLOOD PRESSURE: 117 MMHG | DIASTOLIC BLOOD PRESSURE: 68 MMHG | BODY MASS INDEX: 29.24 KG/M2 | HEART RATE: 82 BPM

## 2021-05-13 DIAGNOSIS — L84 CORNS AND CALLOSITIES: ICD-10-CM

## 2021-05-13 DIAGNOSIS — E11.51 TYPE II DIABETES MELLITUS WITH PERIPHERAL ARTERY DISEASE (H): ICD-10-CM

## 2021-05-13 DIAGNOSIS — B35.1 DERMATOPHYTOSIS OF NAIL: ICD-10-CM

## 2021-05-13 DIAGNOSIS — L97.529 ULCER OF LEFT FOOT, UNSPECIFIED ULCER STAGE (H): Primary | ICD-10-CM

## 2021-05-13 DIAGNOSIS — M20.42 HAMMER TOE OF LEFT FOOT: ICD-10-CM

## 2021-05-13 PROBLEM — Z95.0 PACEMAKER: Status: ACTIVE | Noted: 2021-01-22

## 2021-05-13 PROBLEM — J93.9 PNEUMOTHORAX: Status: ACTIVE | Noted: 2021-01-10

## 2021-05-13 PROBLEM — R06.02 SOBOE (SHORTNESS OF BREATH ON EXERTION): Status: ACTIVE | Noted: 2021-05-12

## 2021-05-13 PROCEDURE — 11721 DEBRIDE NAIL 6 OR MORE: CPT | Mod: 51 | Performed by: PODIATRIST

## 2021-05-13 PROCEDURE — 99213 OFFICE O/P EST LOW 20 MIN: CPT | Mod: 25 | Performed by: PODIATRIST

## 2021-05-13 PROCEDURE — 11056 PARNG/CUTG B9 HYPRKR LES 2-4: CPT | Mod: Q8 | Performed by: PODIATRIST

## 2021-05-13 NOTE — PATIENT INSTRUCTIONS
We wish you continued good healing. If you have any questions or concerns, please do not hesitate to contact us at [phone]    [facility] (secure e-mail communication and access to your chart) to send a message or to make an appointment.    Please remember to call and schedule a follow up appointment if one was recommended at your earliest convenience.     +++OF MARCH 2020+++ LOCATION AND HOURS HAVE CHANGED    PLEASE CALL CLINICS TO VERIFY DAYS AND TIMES  PODIATRY CLINIC HOURS  TELEPHONE NUMBER    Dr. Aaron DAVISONPPEDRITO [facility]        Clinics:  Yannick Patel [facility]   Tuesday 1PM-6PM  [place]  Wednesday 745AM-330PM  [place]  Thursday/Friday 745AM-230PM  [place] REAL/[facility]ANN[place] APPOINTMENTS  [phone]    [place] APPOINTMENTS  [phone]          If you need a medication refill, please contact us you may need lab work and/or a follow up visit prior to your refill (i.e. Antifungal medications).    If MRI needed please call Imaging at [phone] or [phone]    HOW DO I GET MY KNEE SCOOTER? Knee scooters can be picked up at ANY Medical Supply stores with your knee scooter Prescription.  OR    Bring your signed prescription to an [facility] showroom.

## 2021-05-13 NOTE — LETTER
5/13/2021         RE: Zurdo Dash  [address] [place] [id number]        Dear Colleague,    Thank you for referring your patient, Zurdo Dash, to the [facility]. Please see a copy of my visit note below.    Subjective:     Patient seen today for diabetic foot exam.    He has a history of a wound on his left third toe 2018.   This healed with offloading and wound care.  The patient states it has bothered him off and on ever since.  Patient offloading with a crest pad which helps but bulk of pad causes pain at times on the plantar left third toe.  Pain aggravated by activity and relieved by rest.    He denies any drainage purulence odor or increased edema on his feet.  The patient has diabetes mellitus with peripheral neuropathy.  He has chronic kidney disease stage III.  Patient has rheumatoid arthritis and takes Plaquenil.  He is retired.  40-pack-year history of smoking and quit 13 years ago.  Patient has a history of peripheral arterial disease.  He had a left femoral endarterectomy and a left femoropopliteal bypass on 1/7/2021.  He is following up with his vascular surgeon next week to reevaluate the circulation in his leg.  Primary care is Dr. Rivers last seen 4/8/21        ROS: See above       No Known Allergies    Current Outpatient Medications   Medication Sig Dispense Refill     ACCU-CHEK DAYANARA PLUS test strip TEST THREE TIMES A DAY OR AS DIRECTED 300 each 1     acetaminophen (TYLENOL) 325 MG tablet Take 2 tablets (650 mg) by mouth every 4 hours as needed for other (multimodal surgical pain management along with NSAIDS and opioid medication as indicated based on pain control and physical function.)       aspirin 81 MG tablet Take 1 tablet by mouth every evening   3     atorvastatin (LIPITOR) 40 MG tablet Take 1 tablet (40 mg) by mouth every evening 90 tablet 1     blood glucose monitoring (ONE TOUCH DELICA) lancets Use to test blood sugars 3 times daily or as directed.  300 each 3     cetirizine (ZYRTEC) 10 MG tablet Take 10 mg by mouth At Bedtime       clobetasol (TEMOVATE) 0.05 % external ointment Apply topically 2 times daily as needed       clopidogrel (PLAVIX) 75 MG tablet Take 1 tablet (75 mg) by mouth daily 30 tablet 3     diclofenac (VOLTAREN) 1 % topical gel Apply topically 4 times daily as needed for moderate pain       dulaglutide (TRULICITY) 1.5 MG/0.5ML pen Inject 1.5 mg Subcutaneous every 7 days ON FRIDAYS 2 mL 5     folic acid (FOLVITE) 1 MG tablet Take 1 tablet (1 mg) by mouth daily 100 tablet 2     hydrochlorothiazide (MICROZIDE) 12.5 MG capsule Take 1 capsule (12.5 mg) by mouth daily 90 capsule 1     lisinopril (ZESTRIL) 10 MG tablet Take 1 tablet (10 mg) by mouth daily 90 tablet 1     medical cannabis (Patient's own supply) See Admin Instructions (The purpose of this order is to document that the patient reports taking medical cannabis.  This is not a prescription, and is not used to certify that the patient has a qualifying medical condition.)       metFORMIN (GLUCOPHAGE) 500 MG tablet Take 1 tablet (500 mg) by mouth 2 times daily (with meals) 180 tablet 1     methotrexate sodium 2.5 MG TABS Take 6 tablets (15 mg) by mouth once a week . Take all 6 tablets on the same day of each week. 78 tablet 0     metoprolol succinate ER (TOPROL-XL) 25 MG 24 hr tablet Take 50 mg by mouth every morning (2 X 25MG)       omeprazole (PRILOSEC) 40 MG DR capsule TAKE ONE CAPSULE BY MOUTH ONCE DAILY 90 capsule 1     predniSONE (DELTASONE) 5 MG tablet Take 5-20 mg by mouth daily as needed (Rheumatoid Arthritis Flare ups)       senna-docusate (SENOKOT-S/PERICOLACE) 8.6-50 MG tablet Take 1 tablet by mouth 2 times daily 50 tablet 0     tiZANidine (ZANAFLEX) 2 MG tablet Take 1 tablet (2 mg) by mouth 3 times daily as needed for muscle spasms 90 tablet 1       Patient Active Problem List   Diagnosis     Pain in limb     Hyperlipidemia LDL goal <100     Hypertension goal BP (blood pressure)  < 140/90     Hypogonadism     Advanced directives, counseling/discussion     Health Care Home     Type 2 diabetes mellitus with diabetic polyneuropathy, without long-term current use of insulin (H)     RBBB (right bundle branch block)     Ex-smoker     Family history of esophageal cancer     Gastroesophageal reflux disease, esophagitis presence not specified     Rheumatoid arthritis involving multiple sites with positive rheumatoid factor (H)     Pulmonary nodule     High risk medication use     Spondylosis of cervical region without myelopathy or radiculopathy     Erectile dysfunction, unspecified erectile dysfunction type     Type 2 diabetes mellitus without retinopathy (H)     Tubulovillous adenoma of colon     Diabetic polyneuropathy associated with type 2 diabetes mellitus (H)     Chronic bilateral low back pain without sciatica     Migraine equivalent     Posterior vitreous detachment of left eye     Pseudophakia, ou     Eyelid lesion, LLL     Dermatochalasis of both upper eyelids     Bradycardia     Primary osteoarthritis of both knees     Syncope     Trigger finger, acquired     CKD (chronic kidney disease) stage 3, GFR 30-59 ml/min     Abdominal pain, generalized     PAD (peripheral artery disease) (H)     Immunosuppression (H)     Skin ulcer of toe of left foot, limited to breakdown of skin (H)     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 difficile diarrhea     H/O     Cataract 11/18/2011     Diabetic neuropathy (H)     mild, mostly soles and distal forefeet, worse on the left side.     Diverticulitis      ED (erectile dysfunction)      Ex-smoker     QUIT SMOKING FEB 2007     History of ETOH abuse     recovering, sober since 1997     Hyperlipidemia LDL goal <100      Hypertension goal BP (blood  pressure) < 140/90      Hypogonadism      Obesity      PAD (peripheral artery disease) (H)     leg cramps, with exertion, no formal diagnosis of PAD and minimal if any symptoms at all.     RA (rheumatoid arthritis) (H)     Dr Bailon     Type 2 diabetes, HbA1c goal < 7% (H) 10/2008    A1C of 7.1 %        Past Surgical History:   Procedure Laterality Date     BYPASS GRAFT FEMOROPOPLITEAL Left 1/7/2021    Procedure: LEFT FEMORAL TO ABOVE KNEE POPLITEAL ARTERY BYPASS WITH PTFE VASCULAR GRAFT REMOVABLE RING 6MMX 50CM;  Surgeon: Myra Lopes MD;  Location: [facility] OR     CATARACT IOL, RT/LT       COLONOSCOPY  03/01/2018    [facility]     COMBINED REPAIR PTOSIS WITH BLEPHAROPLASTY BILATERAL Bilateral 8/16/2019    Procedure: BILATERAL UPPER EYELID BLEPHAROPLASTY AND BILATERAL PTOSIS REPAIR;  Surgeon: Janet Garcia MD;  Location: [facility] OR     ENDARTERECTOMY FEMORAL Left 1/7/2021    Procedure: LEFT FEMORAL ENDARTERECTOMY WITH PATCH ANGIOPLASTY PHOTOFIX  0.8 X 8CM;  Surgeon: Myra Lopes MD;  Location: [facility] OR     EXCISE LESION EYELID Left 8/16/2019    Procedure: LEFT LOWER EYELID BIOPSY;  Surgeon: Janet Garcia MD;  Location: [facility] OR     HC INCISION TENDON SHEATH FINGER  4/2009    r hand ring finger     IR LOWER EXTREMITY ANGIOGRAM LEFT  12/17/2020     PHACOEMULSIFICATION WITH STANDARD INTRAOCULAR LENS IMPLANT  02/2019; 3/2019    left eye; right eye     TONSILLECTOMY         Family History   Problem Relation Age of Onset     Cerebrovascular Disease Mother      Arthritis Mother      Osteoporosis Mother      Alzheimer Disease Father      Arthritis Father      Cancer Father      Diabetes Maternal Grandmother      Cardiovascular Maternal Grandmother      Other Cancer Brother      Cancer Paternal Aunt      Hypertension No family hx of      Thyroid Disease No family hx of      Glaucoma No family hx of      Macular Degeneration No family hx of        Social History     Tobacco Use     Smoking status: Former  Smoker     Packs/day: 1.00     Years: 40.00     Pack years: 40.00     Types: Cigarettes     Quit date: 3/16/2007     Years since quittin.1     Smokeless tobacco: Never Used   Substance Use Topics     Alcohol use: No     Alcohol/week: 0.0 standard drinks         Exam:    Vitals: /68   Pulse 82   Wt 89.8 kg (198 lb)   BMI 29.24 kg/m    BMI: Body mass index is 29.24 kg/m .  Height: Data Unavailable    Constitutional/ general:  Pt is in no apparent distress, appears well-nourished.  Cooperative with history and physical exam.     Psych:  The patient answered questions appropriately.  Normal affect.  Seems to have reasonable expectations, in terms of treatment.     Eyes:  Visual scanning/ tracking without deficit.     Ears:  Response to auditory stimuli is normal.  negative hearing aid devices.  Auricles in proper alignment.     Lymphatic:  Popliteal lymph nodes not enlarged.     Lungs:  Non labored breathing, non labored speech. No cough.  No audible wheezing. Even, quiet breathing.       Vascular: Lateral ankle edema and varicosities noted.  Difficult to palpate tibial pulses.  Dorsalis pedis weakly palpable.  Capillary refill time less than 3 seconds in all digits.  No hair growth noted on skin.  Duplex ultrasound of bilateral lower extremity arteries dated  10/22/2020 2:53 PM      Clinical information : Hx of PAD, toe ulcer, decreased pulses; PAD  (peripheral artery disease) (H); Toe ulcer (H); Decreased pulses in  feet      Comparison: None                                                                      Impression:   1) There is calcified plaque seen throughout both lower extremities  2) The left proximal SFA is occluded     Findings:      Right lower extremity:      Common femoral artery: 102/10 cm/sec.  Deep femoral artery: 119/8 cm/sec.  Proximal SFA: 60/2 cm/sec.  Mid SFA: 46/0 cm/sec.  Distal SFA: 23/4 cm/sec.  Popliteal artery: 44/5 cm/sec.  Anterior tibial takeoff: 38/7 cm/sec.  Peroneal  artery proximal: 34/6 cm/sec.  PTA ankle: 34/6 cm/sec.        Waveforms are triphasic at the CFA and biphasic to monophasic beyond        Left lower extremity:     Common femoral artery: 90/11 cm/sec.  Deep femoral artery: 74/13 cm/sec.  Proximal SFA: Occluded     Popliteal artery: 32/7 cm/sec.  Anterior tibial takeoff: 53/7 cm/sec.  PTA origin: 30/5 cm/sec.  Peroneal artery: 27/6 cm/sec.  PTA ankle: 30/5 cm/sec.     Waveforms are triphasic at the CFA and biphasic to monophasic beyond      Exam: Bilateral lower extremity resting ankle brachial indices dated  10/22/2020     Comparison study: 1/28/2020     Clinical history: PAD (peripheral artery disease) (H)     Technique: Bilateral lower extremity resting ankle brachial indices  obtained.     Findings:     Right:       Arm: 121 mmHg   PT at ankle: Noncompressible   DP at foot: Noncompressible      ALBERT: Noncompressible     Right lower extremity limited arterial duplex:  Non-compressible  Biphasic waveforms      Left:      Arm: 119 mmHg   PT at ankle: Noncompressible   DP at foot: Noncompressible   ALBERT: Noncompressible     Left lower extremity limited arterial duplex:  Non-compressible  Biphasic to monophasic waveforms                                                                         Impression:      Right leg: Resting ALBERT is noncompressible     Left leg: Resting ALBERT is noncompressible     Compared to the prior study, the non-compressibility of distal vessels  has progressed      Neuro:  Alert and oriented x 3. Coordinated gait.  Light touch sensation is intact to the L4, L5, S1 distributions. No obvious deficits.  No evidence of neurological-based weakness, spasticity, or contracture in the lower extremities.  Monofilament absent to midfoot.      Derm: Skin thin shiny atrophic with no hair growth.  Left third toe is hammered.  There is a callus on the end and underlying small eschar.  It is dry with no drainage.  Slight irritation on the plantar portion of this  toe from the crest pad.  No jitendra openings here.  There is no erythema edema or ecchymosis.  All nails mycotic.  Callus noted on right foot.    Musculoskeletal:    Lower extremity muscle strength is normal.  Patient is ambulatory without an assistive device or brace.  Pronated arch with weightbearing.  All lesser toes are hammered and stiff.  Generally his feet are stiff with a decreased range of motion bilaterally.      A:  Diabetes mellitus with peripheral neuropathy and LOPS  Peripheral arterial disease s/p bypass  Left third hammertoe with eschar, pain  Onychomycosis  Calluses         P: Discussed with patient just small eschar on and of third toe.  Explained he also has some skin irritation from the augmented crest pad.  He will continue crest pad but will watch toe carefully for plantar breakdown.  Patient seen vascular surgeon next week.  If circulation adequate then we will try to perform surgery sometime in July.  We would do an arthroplasty of his left third PIPJ.  Same-day surgery under MAC anesthesia.  Recovery would involve elevation but he would be able to weight-bear on this.  Patient understands because of his diabetes and questionable circulation he is at high risk for poor healing infection and possible amputation.  We await results from vascular visit and then we will place order for surgery.  Mycotic nails manually debrided with a .  Calluses debrided with a fifteen blade.  Patient will contact me if he has any other questions.      Aaron Dent DPM, FACFAS              Again, thank you for allowing me to participate in the care of your patient.        Sincerely,        Aaron Dent DPM

## 2021-05-13 NOTE — PROGRESS NOTES
Subjective:     Patient seen today for diabetic foot exam.    He has a history of a wound on his left third toe 2018.   This healed with offloading and wound care.  The patient states it has bothered him off and on ever since.  Patient offloading with a crest pad which helps but bulk of pad causes pain at times on the plantar left third toe.  Pain aggravated by activity and relieved by rest.    He denies any drainage purulence odor or increased edema on his feet.  The patient has diabetes mellitus with peripheral neuropathy.  He has chronic kidney disease stage III.  Patient has rheumatoid arthritis and takes Plaquenil.  He is retired.  40-pack-year history of smoking and quit 13 years ago.  Patient has a history of peripheral arterial disease.  He had a left femoral endarterectomy and a left femoropopliteal bypass on 1/7/2021.  He is following up with his vascular surgeon next week to reevaluate the circulation in his leg.  Primary care is Dr. Rivers last seen 4/8/21        ROS: See above       No Known Allergies    Current Outpatient Medications   Medication Sig Dispense Refill     ACCU-CHEK DAYANARA PLUS test strip TEST THREE TIMES A DAY OR AS DIRECTED 300 each 1     acetaminophen (TYLENOL) 325 MG tablet Take 2 tablets (650 mg) by mouth every 4 hours as needed for other (multimodal surgical pain management along with NSAIDS and opioid medication as indicated based on pain control and physical function.)       aspirin 81 MG tablet Take 1 tablet by mouth every evening   3     atorvastatin (LIPITOR) 40 MG tablet Take 1 tablet (40 mg) by mouth every evening 90 tablet 1     blood glucose monitoring (ONE TOUCH DELICA) lancets Use to test blood sugars 3 times daily or as directed. 300 each 3     cetirizine (ZYRTEC) 10 MG tablet Take 10 mg by mouth At Bedtime       clobetasol (TEMOVATE) 0.05 % external ointment Apply topically 2 times daily as needed       clopidogrel (PLAVIX) 75 MG tablet Take 1 tablet (75 mg) by mouth  daily 30 tablet 3     diclofenac (VOLTAREN) 1 % topical gel Apply topically 4 times daily as needed for moderate pain       dulaglutide (TRULICITY) 1.5 MG/0.5ML pen Inject 1.5 mg Subcutaneous every 7 days ON FRIDAYS 2 mL 5     folic acid (FOLVITE) 1 MG tablet Take 1 tablet (1 mg) by mouth daily 100 tablet 2     hydrochlorothiazide (MICROZIDE) 12.5 MG capsule Take 1 capsule (12.5 mg) by mouth daily 90 capsule 1     lisinopril (ZESTRIL) 10 MG tablet Take 1 tablet (10 mg) by mouth daily 90 tablet 1     medical cannabis (Patient's own supply) See Admin Instructions (The purpose of this order is to document that the patient reports taking medical cannabis.  This is not a prescription, and is not used to certify that the patient has a qualifying medical condition.)       metFORMIN (GLUCOPHAGE) 500 MG tablet Take 1 tablet (500 mg) by mouth 2 times daily (with meals) 180 tablet 1     methotrexate sodium 2.5 MG TABS Take 6 tablets (15 mg) by mouth once a week . Take all 6 tablets on the same day of each week. 78 tablet 0     metoprolol succinate ER (TOPROL-XL) 25 MG 24 hr tablet Take 50 mg by mouth every morning (2 X 25MG)       omeprazole (PRILOSEC) 40 MG DR capsule TAKE ONE CAPSULE BY MOUTH ONCE DAILY 90 capsule 1     predniSONE (DELTASONE) 5 MG tablet Take 5-20 mg by mouth daily as needed (Rheumatoid Arthritis Flare ups)       senna-docusate (SENOKOT-S/PERICOLACE) 8.6-50 MG tablet Take 1 tablet by mouth 2 times daily 50 tablet 0     tiZANidine (ZANAFLEX) 2 MG tablet Take 1 tablet (2 mg) by mouth 3 times daily as needed for muscle spasms 90 tablet 1       Patient Active Problem List   Diagnosis     Pain in limb     Hyperlipidemia LDL goal <100     Hypertension goal BP (blood pressure) < 140/90     Hypogonadism     Advanced directives, counseling/discussion     Health Care Home     Type 2 diabetes mellitus with diabetic polyneuropathy, without long-term current use of insulin (H)     RBBB (right bundle branch block)      Ex-smoker     Family history of esophageal cancer     Gastroesophageal reflux disease, esophagitis presence not specified     Rheumatoid arthritis involving multiple sites with positive rheumatoid factor (H)     Pulmonary nodule     High risk medication use     Spondylosis of cervical region without myelopathy or radiculopathy     Erectile dysfunction, unspecified erectile dysfunction type     Type 2 diabetes mellitus without retinopathy (H)     Tubulovillous adenoma of colon     Diabetic polyneuropathy associated with type 2 diabetes mellitus (H)     Chronic bilateral low back pain without sciatica     Migraine equivalent     Posterior vitreous detachment of left eye     Pseudophakia, ou     Eyelid lesion, LLL     Dermatochalasis of both upper eyelids     Bradycardia     Primary osteoarthritis of both knees     Syncope     Trigger finger, acquired     CKD (chronic kidney disease) stage 3, GFR 30-59 ml/min     Abdominal pain, generalized     PAD (peripheral artery disease) (H)     Immunosuppression (H)     Skin ulcer of toe of left foot, limited to breakdown of skin (H)     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 difficile diarrhea     H/O     Cataract 11/18/2011     Diabetic neuropathy (H)     mild, mostly soles and distal forefeet, worse on the left side.     Diverticulitis      ED (erectile dysfunction)      Ex-smoker     QUIT SMOKING FEB 2007     History of ETOH abuse     recovering, sober since 1997     Hyperlipidemia LDL goal <100      Hypertension goal BP (blood pressure) < 140/90      Hypogonadism      Obesity      PAD (peripheral artery disease) (H)     leg cramps, with exertion, no formal diagnosis of PAD and minimal if any symptoms at all.     RA (rheumatoid arthritis) (H)     Dr Bailon     Type 2  diabetes, HbA1c goal < 7% (H) 10/2008    A1C of 7.1 %        Past Surgical History:   Procedure Laterality Date     BYPASS GRAFT FEMOROPOPLITEAL Left 2021    Procedure: LEFT FEMORAL TO ABOVE KNEE POPLITEAL ARTERY BYPASS WITH PTFE VASCULAR GRAFT REMOVABLE RING 6MMX 50CM;  Surgeon: Myra Lopes MD;  Location: [facility] OR     CATARACT IOL, RT/LT       COLONOSCOPY  2018    [facility]     COMBINED REPAIR PTOSIS WITH BLEPHAROPLASTY BILATERAL Bilateral 2019    Procedure: BILATERAL UPPER EYELID BLEPHAROPLASTY AND BILATERAL PTOSIS REPAIR;  Surgeon: Janet Garcia MD;  Location: [facility]     ENDARTERECTOMY FEMORAL Left 2021    Procedure: LEFT FEMORAL ENDARTERECTOMY WITH PATCH ANGIOPLASTY PHOTOFIX  0.8 X 8CM;  Surgeon: Myra Lopes MD;  Location: [facility] OR     EXCISE LESION EYELID Left 2019    Procedure: LEFT LOWER EYELID BIOPSY;  Surgeon: Janet Garcia MD;  Location: [facility] OR     HC INCISION TENDON SHEATH FINGER  2009    r hand ring finger     IR LOWER EXTREMITY ANGIOGRAM LEFT  2020     PHACOEMULSIFICATION WITH STANDARD INTRAOCULAR LENS IMPLANT  2019; 3/2019    left eye; right eye     TONSILLECTOMY         Family History   Problem Relation Age of Onset     Cerebrovascular Disease Mother      Arthritis Mother      Osteoporosis Mother      Alzheimer Disease Father      Arthritis Father      Cancer Father      Diabetes Maternal Grandmother      Cardiovascular Maternal Grandmother      Other Cancer Brother      Cancer Paternal Aunt      Hypertension No family hx of      Thyroid Disease No family hx of      Glaucoma No family hx of      Macular Degeneration No family hx of        Social History     Tobacco Use     Smoking status: Former Smoker     Packs/day: 1.00     Years: 40.00     Pack years: 40.00     Types: Cigarettes     Quit date: 3/16/2007     Years since quittin.1     Smokeless tobacco: Never Used   Substance Use Topics     Alcohol use: No     Alcohol/week: 0.0  standard drinks         Exam:    Vitals: /68   Pulse 82   Wt 89.8 kg (198 lb)   BMI 29.24 kg/m    BMI: Body mass index is 29.24 kg/m .  Height: Data Unavailable    Constitutional/ general:  Pt is in no apparent distress, appears well-nourished.  Cooperative with history and physical exam.     Psych:  The patient answered questions appropriately.  Normal affect.  Seems to have reasonable expectations, in terms of treatment.     Eyes:  Visual scanning/ tracking without deficit.     Ears:  Response to auditory stimuli is normal.  negative hearing aid devices.  Auricles in proper alignment.     Lymphatic:  Popliteal lymph nodes not enlarged.     Lungs:  Non labored breathing, non labored speech. No cough.  No audible wheezing. Even, quiet breathing.       Vascular: Lateral ankle edema and varicosities noted.  Difficult to palpate tibial pulses.  Dorsalis pedis weakly palpable.  Capillary refill time less than 3 seconds in all digits.  No hair growth noted on skin.  Duplex ultrasound of bilateral lower extremity arteries dated  10/22/2020 2:53 PM      Clinical information : Hx of PAD, toe ulcer, decreased pulses; PAD  (peripheral artery disease) (H); Toe ulcer (H); Decreased pulses in  feet      Comparison: None                                                                      Impression:   1) There is calcified plaque seen throughout both lower extremities  2) The left proximal SFA is occluded     Findings:      Right lower extremity:      Common femoral artery: 102/10 cm/sec.  Deep femoral artery: 119/8 cm/sec.  Proximal SFA: 60/2 cm/sec.  Mid SFA: 46/0 cm/sec.  Distal SFA: 23/4 cm/sec.  Popliteal artery: 44/5 cm/sec.  Anterior tibial takeoff: 38/7 cm/sec.  Peroneal artery proximal: 34/6 cm/sec.  PTA ankle: 34/6 cm/sec.        Waveforms are triphasic at the CFA and biphasic to monophasic beyond        Left lower extremity:     Common femoral artery: 90/11 cm/sec.  Deep femoral artery: 74/13  cm/sec.  Proximal SFA: Occluded     Popliteal artery: 32/7 cm/sec.  Anterior tibial takeoff: 53/7 cm/sec.  PTA origin: 30/5 cm/sec.  Peroneal artery: 27/6 cm/sec.  PTA ankle: 30/5 cm/sec.     Waveforms are triphasic at the CFA and biphasic to monophasic beyond      Exam: Bilateral lower extremity resting ankle brachial indices dated  10/22/2020     Comparison study: 1/28/2020     Clinical history: PAD (peripheral artery disease) (H)     Technique: Bilateral lower extremity resting ankle brachial indices  obtained.     Findings:     Right:       Arm: 121 mmHg   PT at ankle: Noncompressible   DP at foot: Noncompressible      ALBERT: Noncompressible     Right lower extremity limited arterial duplex:  Non-compressible  Biphasic waveforms      Left:      Arm: 119 mmHg   PT at ankle: Noncompressible   DP at foot: Noncompressible   ALBERT: Noncompressible     Left lower extremity limited arterial duplex:  Non-compressible  Biphasic to monophasic waveforms                                                                         Impression:      Right leg: Resting ALBERT is noncompressible     Left leg: Resting ALBERT is noncompressible     Compared to the prior study, the non-compressibility of distal vessels  has progressed      Neuro:  Alert and oriented x 3. Coordinated gait.  Light touch sensation is intact to the L4, L5, S1 distributions. No obvious deficits.  No evidence of neurological-based weakness, spasticity, or contracture in the lower extremities.  Monofilament absent to midfoot.      Derm: Skin thin shiny atrophic with no hair growth.  Left third toe is hammered.  There is a callus on the end and underlying small eschar.  It is dry with no drainage.  Slight irritation on the plantar portion of this toe from the crest pad.  No jitendra openings here.  There is no erythema edema or ecchymosis.  All nails mycotic.  Callus noted on right foot.    Musculoskeletal:    Lower extremity muscle strength is normal.  Patient is  ambulatory without an assistive device or brace.  Pronated arch with weightbearing.  All lesser toes are hammered and stiff.  Generally his feet are stiff with a decreased range of motion bilaterally.      A:  Diabetes mellitus with peripheral neuropathy and LOPS  Peripheral arterial disease s/p bypass  Left third hammertoe with eschar, pain  Onychomycosis  Calluses         P: Discussed with patient just small eschar on and of third toe.  Explained he also has some skin irritation from the augmented crest pad.  He will continue crest pad but will watch toe carefully for plantar breakdown.  Patient seen vascular surgeon next week.  If circulation adequate then we will try to perform surgery sometime in July.  We would do an arthroplasty of his left third PIPJ.  Same-day surgery under MAC anesthesia.  Recovery would involve elevation but he would be able to weight-bear on this.  Patient understands because of his diabetes and questionable circulation he is at high risk for poor healing infection and possible amputation.  We await results from vascular visit and then we will place order for surgery.  Mycotic nails manually debrided with a .  Calluses debrided with a fifteen blade.  Patient will contact me if he has any other questions.      Aaron Dent DPM, FACFAS

## 2021-05-18 ENCOUNTER — OFFICE VISIT (OUTPATIENT)
Dept: VASCULAR SURGERY | Facility: CLINIC | Age: 76
End: 2021-05-18
Attending: SURGERY
Payer: COMMERCIAL

## 2021-05-18 VITALS
WEIGHT: 198 LBS | SYSTOLIC BLOOD PRESSURE: 102 MMHG | HEIGHT: 69 IN | HEART RATE: 91 BPM | DIASTOLIC BLOOD PRESSURE: 70 MMHG | BODY MASS INDEX: 29.33 KG/M2 | OXYGEN SATURATION: 98 %

## 2021-05-18 DIAGNOSIS — I73.9 PAD (PERIPHERAL ARTERY DISEASE) (H): Primary | ICD-10-CM

## 2021-05-18 DIAGNOSIS — I73.9 PAD (PERIPHERAL ARTERY DISEASE) (H): ICD-10-CM

## 2021-05-18 PROCEDURE — 99214 OFFICE O/P EST MOD 30 MIN: CPT | Performed by: SURGERY

## 2021-05-18 ASSESSMENT — MIFFLIN-ST. JEOR: SCORE: 1613.5

## 2021-05-18 NOTE — TELEPHONE ENCOUNTER
clopidogrel (PLAVIX) 75 MG tablet  Last Written Prescription Date:  1/9/21  Last Fill Quantity: 30,  # refills: 3     Last office visit: 2/9/2021     Future Office Visit:   Next 5 appointments (look out 90 days)    May 18, 2021  1:00 PM  Return Visit with Myra Lopes MD  [facility] [place] ([facility] ) [address] [place] [id number]  [phone]     PER LOV 2/9/21 with Dr. Lopes:    He will continue on aspirin 81 mg, atorvastatin 40 mg, and plavix 75 mg daily (plavix for at least 3-6 months ideally; aspirin and statin lifelong).     Patient is seeing Dr. Lopes 05/18/21    Rx determined at appointment.    Sandy Lima RN BSN  [facility]  [phone]

## 2021-05-18 NOTE — PROGRESS NOTES
Vascular Surgery Progress Note     Date: May 18, 2021     Reason for Visit:  PAD follow-up    Subjective:  Mr. Dash reports doing well overall; he has recovered from his surgery and has felt like his left leg feels better when walking and ambulating. He is eager to have his left hammertoe repair done by Podiatry.       Current Outpatient Medications:      ACCU-CHEK DAYANARA PLUS test strip, TEST THREE TIMES A DAY OR AS DIRECTED, Disp: 300 each, Rfl: 1     acetaminophen (TYLENOL) 325 MG tablet, Take 2 tablets (650 mg) by mouth every 4 hours as needed for other (multimodal surgical pain management along with NSAIDS and opioid medication as indicated based on pain control and physical function.), Disp:  , Rfl:      aspirin 81 MG tablet, Take 1 tablet by mouth every evening , Disp: , Rfl: 3     atorvastatin (LIPITOR) 40 MG tablet, Take 1 tablet (40 mg) by mouth every evening, Disp: 90 tablet, Rfl: 1     blood glucose monitoring (ONE TOUCH DELICA) lancets, Use to test blood sugars 3 times daily or as directed., Disp: 300 each, Rfl: 3     cetirizine (ZYRTEC) 10 MG tablet, Take 10 mg by mouth At Bedtime, Disp: , Rfl:      clobetasol (TEMOVATE) 0.05 % external ointment, Apply topically 2 times daily as needed, Disp: , Rfl:      clopidogrel (PLAVIX) 75 MG tablet, Take 1 tablet (75 mg) by mouth daily, Disp: 30 tablet, Rfl: 3     diclofenac (VOLTAREN) 1 % topical gel, Apply topically 4 times daily as needed for moderate pain, Disp: , Rfl:      dulaglutide (TRULICITY) 1.5 MG/0.5ML pen, Inject 1.5 mg Subcutaneous every 7 days ON FRIDAYS, Disp: 2 mL, Rfl: 5     folic acid (FOLVITE) 1 MG tablet, Take 1 tablet (1 mg) by mouth daily, Disp: 100 tablet, Rfl: 2     hydrochlorothiazide (MICROZIDE) 12.5 MG capsule, Take 1 capsule (12.5 mg) by mouth daily, Disp: 90 capsule, Rfl: 1     lisinopril (ZESTRIL) 10 MG tablet, Take 1 tablet (10 mg) by mouth daily, Disp: 90 tablet, Rfl: 1     medical cannabis (Patient's own supply), See Admin  "Instructions (The purpose of this order is to document that the patient reports taking medical cannabis.  This is not a prescription, and is not used to certify that the patient has a qualifying medical condition.), Disp: , Rfl:      metFORMIN (GLUCOPHAGE) 500 MG tablet, Take 1 tablet (500 mg) by mouth 2 times daily (with meals), Disp: 180 tablet, Rfl: 1     methotrexate sodium 2.5 MG TABS, Take 6 tablets (15 mg) by mouth once a week . Take all 6 tablets on the same day of each week., Disp: 78 tablet, Rfl: 0     metoprolol succinate ER (TOPROL-XL) 25 MG 24 hr tablet, Take 50 mg by mouth every morning (2 X 25MG), Disp: , Rfl:      omeprazole (PRILOSEC) 40 MG DR capsule, TAKE ONE CAPSULE BY MOUTH ONCE DAILY, Disp: 90 capsule, Rfl: 1     predniSONE (DELTASONE) 5 MG tablet, Take 5-20 mg by mouth daily as needed (Rheumatoid Arthritis Flare ups), Disp: , Rfl:      senna-docusate (SENOKOT-S/PERICOLACE) 8.6-50 MG tablet, Take 1 tablet by mouth 2 times daily, Disp: 50 tablet, Rfl: 0     tiZANidine (ZANAFLEX) 2 MG tablet, Take 1 tablet (2 mg) by mouth 3 times daily as needed for muscle spasms, Disp: 90 tablet, Rfl: 1     Physical Exam       BP: 102/70 Pulse: 91     SpO2: 98 %      Vital Signs with Ranges  Pulse:  [91] 91  BP: (102)/(70) 102/70  SpO2:  [98 %] 98 %  198 lbs 0 oz    Constitutional: cooperative, no apparent distress, sitting comfortably in chair.   Vascular: biphasic doppler signals in the PT and DP on the left. Left 2nd toe with hammertoe deformity, no open wounds.   Musculoskeletal: grossly normal and symmetric ROM and strength in BL extremities   Neurologic: Awake, alert, oriented to name, place, time, and situation    Imaging:  I have reviewed the following imaging studies:  US Lower Extremity Arterial (5/10/21): \"1. Patent left femoral to popliteal artery bypass. 2. Simple fluid collection in the distal thigh is again seen, measuring 6.8 x 2.6 x 2.5 cm. This is slightly larger than seen on previous imaging " "and likely represents a seroma.\"       Assessment & Plan   Zurdo Dash is a 75 year old male with history of DL, HTN, T2DM complicated by neuropathy, former smoking and former alcohol abuse, CKD stage III, RA, and PAD with prior claudication and current left third toe ulcer. He underwent LLE angiogram and was found to have flush occlusion of the left superficial femoral artery, with reconstitution of the above-knee popliteal artery. He underwent left femoral endarterectomy and left femoral to above-knee popliteal artery bypass with PTFE on 1/7/21.       He will continue on aspirin 81 mg + atorvastatin 40 mg     Given recurrent epistaxis, peripheral bruising, and a reasonable timeframe following vascular surgery, will be stopping the plavix. I discussed this with Mr. Dash and will be discontinuing it from his med list.     The fluid collection does not appear to be in direct communication with the graft and the graft is not surrounded by fluid. I believe this is a seroma.    Will obtain ALBERT/TBIs for more objective evaluation of ability to heal toe surgery, but I believe it is fine to proceed with planned Podiatry intervention on the left 2nd toe    Will call him if there is anything concerning with the ABIs (and will communicate the same to Dr. Dent in Podiatry); otherwise, will plan to see him in 3-6 months    Myra Lopes    Total time spent on the date of this encounter doing: chart review, review of test results, patient visit, physical exam, education, counseling, developing plan of care, and documenting = 30 minutes    "

## 2021-05-20 ENCOUNTER — RECORDS - HEALTHEAST (OUTPATIENT)
Dept: ADMINISTRATIVE | Facility: OTHER | Age: 76
End: 2021-05-20

## 2021-05-21 ENCOUNTER — TELEPHONE (OUTPATIENT)
Dept: FAMILY MEDICINE | Facility: CLINIC | Age: 76
End: 2021-05-21

## 2021-05-21 ENCOUNTER — RECORDS - HEALTHEAST (OUTPATIENT)
Dept: ADMINISTRATIVE | Facility: OTHER | Age: 76
End: 2021-05-21

## 2021-05-25 ENCOUNTER — HOSPITAL ENCOUNTER (OUTPATIENT)
Dept: ULTRASOUND IMAGING | Facility: CLINIC | Age: 76
Discharge: HOME | End: 2021-05-25
Attending: SURGERY

## 2021-05-25 ENCOUNTER — TRANSFERRED RECORDS (OUTPATIENT)
Dept: HEALTH INFORMATION MANAGEMENT | Facility: CLINIC | Age: 76
End: 2021-05-25

## 2021-05-25 DIAGNOSIS — I73.9 PAD (PERIPHERAL ARTERY DISEASE) (H): ICD-10-CM

## 2021-05-25 DIAGNOSIS — M05.79 RHEUMATOID ARTHRITIS INVOLVING MULTIPLE SITES WITH POSITIVE RHEUMATOID FACTOR (H): ICD-10-CM

## 2021-05-25 DIAGNOSIS — I10 HYPERTENSION GOAL BP (BLOOD PRESSURE) < 140/90: Primary | ICD-10-CM

## 2021-05-26 ENCOUNTER — TELEPHONE (OUTPATIENT)
Dept: OTHER | Facility: CLINIC | Age: 76
End: 2021-05-26

## 2021-05-26 RX ORDER — FOLIC ACID 1 MG/1
1 TABLET ORAL DAILY
Qty: 100 TABLET | Refills: 2 | Status: SHIPPED | OUTPATIENT
Start: 2021-05-26 | End: 2023-08-03

## 2021-05-26 RX ORDER — CLOPIDOGREL BISULFATE 75 MG/1
75 TABLET ORAL DAILY
Qty: 30 TABLET | Refills: 2 | Status: SHIPPED | OUTPATIENT
Start: 2021-05-26 | End: 2021-05-28

## 2021-05-26 NOTE — TELEPHONE ENCOUNTER
Per Last office visit 5/18/21 with Dr. Lopes:      Given recurrent epistaxis, peripheral bruising, and a reasonable timeframe following vascular surgery, will be stopping the plavix. I discussed this with Mr. Dash and will be discontinuing it from his med list.     Returned [facility] call to relay the above.    Sandy Lima RN BSN  [facility]  [phone]

## 2021-05-26 NOTE — TELEPHONE ENCOUNTER
[facility]    Who is the name of the provider?:  Porter      What is the location you see this provider at?: [place]     Reason for call:  Refill for Clopidogrel    Can we leave a detailed message on this number?  YES  - answered during business hours

## 2021-05-27 ENCOUNTER — TELEPHONE (OUTPATIENT)
Dept: OTHER | Facility: CLINIC | Age: 76
End: 2021-05-27

## 2021-05-27 ENCOUNTER — TELEPHONE (OUTPATIENT)
Dept: INTERNAL MEDICINE | Facility: CLINIC | Age: 76
End: 2021-05-27

## 2021-05-27 DIAGNOSIS — R06.02 SOBOE (SHORTNESS OF BREATH ON EXERTION): ICD-10-CM

## 2021-05-27 DIAGNOSIS — I10 HYPERTENSION GOAL BP (BLOOD PRESSURE) < 140/90: ICD-10-CM

## 2021-05-27 DIAGNOSIS — I73.9 PAD (PERIPHERAL ARTERY DISEASE) (H): ICD-10-CM

## 2021-05-27 DIAGNOSIS — E11.42 TYPE 2 DIABETES MELLITUS WITH DIABETIC POLYNEUROPATHY, WITHOUT LONG-TERM CURRENT USE OF INSULIN (H): Primary | ICD-10-CM

## 2021-05-27 RX ORDER — METOPROLOL SUCCINATE 25 MG/1
TABLET, EXTENDED RELEASE ORAL
Qty: 180 TABLET | Refills: 1 | Status: SHIPPED | OUTPATIENT
Start: 2021-05-27 | End: 2022-03-17

## 2021-05-27 NOTE — TELEPHONE ENCOUNTER
Routing refill request to provider for review/approval because:  Medication is reported/historical    Nini Quiros RN, BSN, PHN  [facility]: [place]

## 2021-05-27 NOTE — TELEPHONE ENCOUNTER
Spoke with pt. States he was supposed to have a colonoscopy done through [facility] today. States he has a stress test ordered by [facility] cardiologist, Dr. Mulligan scheduled for 6/1. Pt did the prep, had gown on and IV was placed and was advised he needs to cancel and should have done the colonoscopy after doing the stress test. Was also informed that he needs to have the colonoscopy done through the hospital under monitored anesthesia care.  Instructions he received from [facility] said low BP and pending stress test. Needs medical clearance from cardiologist to have the colonoscopy done. Wants to let Dr. Duckworth know and see what he suggests about this. He also mentioned that he would rather have a cardiologist through [facility] instead of [facility]. Please advise.    Mia Artis RN  [facility]

## 2021-05-27 NOTE — TELEPHONE ENCOUNTER
[facility]    Who is the name of the provider?:  Marianne      What is the location you see this provider at?: [place]    Reason for call:  Waiting for results of US.    Can we leave a detailed message on this number?  YES

## 2021-05-27 NOTE — TELEPHONE ENCOUNTER
LOV on 5/18/21 with Dr. Lopes.  Patient completed ultrasounds at [facility] (in care everywhere) and would like Dr. Hay to review and let him know what the plan is.    Marisol GONZALEZ, RN    [facility]  Vascular [facility]  Office: [phone]  Fax: [phone]

## 2021-05-28 ENCOUNTER — TELEPHONE (OUTPATIENT)
Dept: CARDIOLOGY | Facility: CLINIC | Age: 76
End: 2021-05-28

## 2021-05-28 ENCOUNTER — TELEPHONE (OUTPATIENT)
Dept: FAMILY MEDICINE | Facility: CLINIC | Age: 76
End: 2021-05-28

## 2021-05-28 ENCOUNTER — TELEPHONE (OUTPATIENT)
Dept: OTHER | Facility: CLINIC | Age: 76
End: 2021-05-28

## 2021-05-28 NOTE — TELEPHONE ENCOUNTER
[facility]     Who is the name of the provider? Dr Lopes     What is the location you see this provider at?  [place]     Reason for call:  Per Dr Lopes 05/18/21 note:    Given recurrent epistaxis, peripheral bruising, and a reasonable timeframe following vascular surgery, will be stopping the plavix. I discussed this with Mr. Dash and will be discontinuing it from his med list.       :  Zurdo     Phone number to call:  [phone]    Additional Notes:  Pt went to Pharmacy to  refills and they gave him one for his Plavix - Pt stopped taking it last week - any questions please call him

## 2021-05-28 NOTE — TELEPHONE ENCOUNTER
I returned call to Zurdo and discussed stopping Plavix as per LOV notes from Dr. Lopes.    Marisol GONZALEZ, RN    [facility]  Vascular [facility]  Office: [phone]  Fax: [phone]

## 2021-05-28 NOTE — TELEPHONE ENCOUNTER
1. We just need to let patient know that I received the paper work and it is to be scanned into Epic electronic medical records      2. Cardiology consultation ordered     3. Lexiscan ordered for assessment of coronary arteries     Lets make sure patient knows to call and schedule these things     Reroute if additional input requested from me , otherwise then close this encounter     Kapil Duckworth MD

## 2021-05-28 NOTE — TELEPHONE ENCOUNTER
Pt was given provider's message as written. Numbers provided for cardiology and imaging.    Mia Artis RN  [facility]

## 2021-05-28 NOTE — TELEPHONE ENCOUNTER
I want patient the know this is atrocious and I am so sorry. It just sounds like an absolute nightmare.    Seeing a [facility] cardiology for a cardiac clearance can be arranged. I have placed an order and lets see to it that the appointment gets set up as soon as possible     The issue with a stress test, did he want to cancel his test with [facility] [place] and have this rescheduled with [facility] also ? If so I have placed that order also but please cancel if he chooses to keep the [facility] test    Kapil Duckworth MD

## 2021-05-28 NOTE — TELEPHONE ENCOUNTER
Reason for Call:  Other Patient information    Detailed comments: Patient dropped off cancel instructions from [facility], he wants Dr. Duckworth to have a copy. He cancelled his stress test there and is going to have appointments scheduled within [facility] which he is working to set up.     Please call patient with any questions or to discuss further. Form patient dropped off was placed in providers box.     Phone Number Patient can be reached at: Cell number on file:    Telephone Information:   Mobile [phone]       Best Time: any    Can we leave a detailed message on this number? YES    Call taken on 5/28/2021 at 12:07 PM by Kathy Arce

## 2021-06-03 NOTE — TELEPHONE ENCOUNTER
Dr. Duckworth spoke directly to patient on 6-2-2021, at 6:18 pm.    He is scheduled with cardiology on 6-.    Nothing further needed done at this time. Caren Telles,

## 2021-06-07 ENCOUNTER — TELEPHONE (OUTPATIENT)
Dept: PODIATRY | Facility: CLINIC | Age: 76
End: 2021-06-07

## 2021-06-07 NOTE — TELEPHONE ENCOUNTER
Patient has some heart appointments coming up he would like to complete before scheduling. He will call when he is ready.

## 2021-06-10 ENCOUNTER — OFFICE VISIT (OUTPATIENT)
Dept: CARDIOLOGY | Facility: CLINIC | Age: 76
End: 2021-06-10
Attending: INTERNAL MEDICINE
Payer: COMMERCIAL

## 2021-06-10 ENCOUNTER — HOSPITAL ENCOUNTER (OUTPATIENT)
Facility: AMBULATORY SURGERY CENTER | Age: 76
End: 2021-06-10
Attending: PODIATRIST | Admitting: PODIATRIST
Payer: COMMERCIAL

## 2021-06-10 VITALS
WEIGHT: 197 LBS | SYSTOLIC BLOOD PRESSURE: 124 MMHG | DIASTOLIC BLOOD PRESSURE: 78 MMHG | HEART RATE: 80 BPM | OXYGEN SATURATION: 99 % | BODY MASS INDEX: 29.09 KG/M2

## 2021-06-10 DIAGNOSIS — Z11.59 ENCOUNTER FOR SCREENING FOR OTHER VIRAL DISEASES: ICD-10-CM

## 2021-06-10 DIAGNOSIS — R06.02 SOBOE (SHORTNESS OF BREATH ON EXERTION): ICD-10-CM

## 2021-06-10 DIAGNOSIS — E11.42 TYPE 2 DIABETES MELLITUS WITH DIABETIC POLYNEUROPATHY, WITHOUT LONG-TERM CURRENT USE OF INSULIN (H): ICD-10-CM

## 2021-06-10 DIAGNOSIS — M20.42 HAMMER TOE OF LEFT FOOT: ICD-10-CM

## 2021-06-10 DIAGNOSIS — I73.9 PAD (PERIPHERAL ARTERY DISEASE) (H): ICD-10-CM

## 2021-06-10 DIAGNOSIS — L97.529 ULCER OF LEFT FOOT, UNSPECIFIED ULCER STAGE (H): ICD-10-CM

## 2021-06-10 DIAGNOSIS — I10 HYPERTENSION GOAL BP (BLOOD PRESSURE) < 140/90: ICD-10-CM

## 2021-06-10 PROCEDURE — 99215 OFFICE O/P EST HI 40 MIN: CPT | Performed by: INTERNAL MEDICINE

## 2021-06-10 NOTE — TELEPHONE ENCOUNTER
Type of surgery: left third hammertoe repair  CPT 66752   Ulcer of left foot, unspecified ulcer stage (H) L97.529     Hammer toe of left foot M20.42     Dermatophytosis of nail B35.1    Location of surgery: [facility]  Date and time of surgery: 6-22-21  tbd  Surgeon: Dr Dent  Pre-Op Appt Date: 6-17-21  Post-Op Appt Date: 6-25-21   Packet sent out: Yes  Pre-cert/Authorization completed: No prior auth required per [facility]. Decision ID #:[id number]  Date: 6-11-21    Rachael Atkins  Prior Authorization Dept  [phone]

## 2021-06-10 NOTE — LETTER
6/10/2021      RE: Zurdo Dash  [address] [place] [id number]       Dear Colleague,    Thank you for the opportunity to participate in the care of your patient, Zurdo Dash, at the [facility]. Please see a copy of my visit note below.       SUBJECTIVE:  Zurdo Dash is a 76 year old male who presents for evaluation of dyspnea on exertion.    On January 7 of this year patient had femoral-popliteal bypass surgery for flush occlusion of superficial femoral artery.  Patient had an angiogram and a segment of occlusion was too long for percutaneous intervention.  On 13th patient had a permanent pacemaker placement for trifascicular block and syncope and head injury.  Patient had a syncopal episode and fall sustaining occipital head injury.  Since pacemaker implantation patient had no syncopal episodes.  Patient's main complaint is dyspnea on exertion.  Denied chest pain.  His functional capacity is limited previously due to claudication and now due to neuropathy and hammertoe.    Cardiac risk factors are remote history of smoking, hypertension, hyperlipidemia and type 2 diabetes for over 15 years.    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  Date of last device in office evaluation: 4/28/2021    ?  and model: Medtronic [place] XT DR YARBROUGH [id number] Pacemaker.   Date of implant: 1/13/2021      ? Indication for device: Syncope   ? Cardiac resynchronization therapy:    no      MRI Conditional:  yes    ? Battery longevity documented as less than 3  Months: no  ? Are any of the leads less than 3 months old:  yes    ? Programming              ? Pacing mode and programmed lower rate: AAIR <=> DDDR  bpm              ? Rate-responsive sensor type, if programmed on: sinus rahul 54 bpm        Underlying rhythm and heart rate:  Sinus rahul 54 bpm      ? What is the response of this device to magnet placement:  Asynchronous pacing DOO   ? PM magnet pacing rate: 85 bpm   ? Any alert status on CIED generator or lead:  no    ? Last pacing threshold    Atrial 0.75 V @0.4ms      Ventricular RV: 0.5V @0.4ms     NEVILLE GOODWIN RN,  Certified Cardiac Device Specialist ....................  4/28/2021   1:36 PM       Pneumothorax 01/10/2021     Priority: Medium     PAD (peripheral artery disease) (H) 12/23/2020     Priority: Medium     Added automatically from request for surgery [id number]       Abdominal pain, generalized 07/09/2020     Priority: Medium     CKD (chronic kidney disease) stage 3, GFR 30-59 ml/min 02/27/2020     Priority: Medium     Eyelid lesion, LLL 07/10/2019     Priority: Medium     Dermatochalasis of both upper eyelids 07/10/2019     Priority: Medium     Pseudophakia, ou 03/12/2019     Priority: Medium     Posterior vitreous detachment of left eye 02/09/2019     Priority: Medium     Migraine equivalent 12/22/2018     Priority: Medium     Chronic bilateral low back pain without sciatica 09/25/2018     Priority: Medium     Diabetic polyneuropathy associated with type 2 diabetes mellitus (H) 02/28/2018     Priority: Medium     Tubulovillous adenoma of colon 02/20/2018     Priority: Medium     Type 2 diabetes mellitus without retinopathy (H) 01/12/2018     Priority: Medium     Erectile dysfunction, unspecified erectile dysfunction type 11/07/2017     Priority: Medium     Spondylosis of cervical region without myelopathy or radiculopathy 07/17/2017     Priority: Medium      Pulmonary nodule 03/03/2017     Priority: Medium     Needs follow up CT chest 1 year 2/2018       High risk medication use 03/03/2017     Priority: Medium     Rheumatoid arthritis involving multiple sites with positive rheumatoid factor (H) 03/18/2016     Priority: Medium     Family history of esophageal cancer 12/14/2015     Priority: Medium     Gastroesophageal reflux disease, esophagitis presence not specified 12/14/2015     Priority: Medium     IMO Regulatory Load OCT 2020       Primary osteoarthritis of both knees 11/05/2015     Priority: Medium     Ex-smoker 10/20/2015     Priority: Medium     RBBB (right bundle branch block) 10/24/2013     Priority: Medium     Noted on ECG 10/19/2013 . See medical record scanned into Epic electronic medical records         Type 2 diabetes mellitus with diabetic polyneuropathy, without long-term current use of insulin (H) 02/28/2013     Priority: Medium     A1C      6.9   5/3/2016  A1C      8.6   3/18/2016  A1C      9.7   9/30/2015  A1C     11.8   6/29/2015  A1C     10.7   3/30/2015         Trigger finger, acquired 01/15/2013     Priority: Medium     Health Care Home 12/12/2012     Priority: Medium     Alin Espinoza RN,[phone]--[phone]   FPA / [facility]          DX V65.8 REPLACED WITH 41119 HEALTH CARE HOME (04/08/2013)       Advanced directives, counseling/discussion 03/29/2011     Priority: Medium     Patient states has Advance Directive and will bring in a copy to clinic.         Hypertension goal BP (blood pressure) < 140/90      Priority: Medium     Hypogonadism      Priority: Medium     Hyperlipidemia LDL goal <100 10/31/2010     Priority: Medium     Pain in limb      Priority: Medium     Had some leg cramps with features consistent with vascular claudication. See ankle brachial index tests and vascular surgery consultation from 2/16/2011 in media section of Epic electronic medical records ; the key point is that subsequently , evaluations  found zero evidence of     Ischemic vascular disease         Bradycardia 01/12/2009     Priority: Medium     Syncope 01/12/2009     Priority: Medium    .  Current Outpatient Medications   Medication Sig     ACCU-CHEK DAYANARA PLUS test strip TEST THREE TIMES A DAY OR AS DIRECTED     acetaminophen (TYLENOL) 325 MG tablet Take 2 tablets (650 mg) by mouth every 4 hours as needed for other (multimodal surgical pain management along with NSAIDS and opioid medication as indicated based on pain control and physical function.)     aspirin 81 MG tablet Take 1 tablet by mouth every evening      atorvastatin (LIPITOR) 40 MG tablet Take 1 tablet (40 mg) by mouth every evening     blood glucose monitoring (ONE TOUCH DELICA) lancets Use to test blood sugars 3 times daily or as directed.     cetirizine (ZYRTEC) 10 MG tablet Take 10 mg by mouth At Bedtime     clobetasol (TEMOVATE) 0.05 % external ointment Apply topically 2 times daily as needed     diclofenac (VOLTAREN) 1 % topical gel Apply topically 4 times daily as needed for moderate pain     dulaglutide (TRULICITY) 1.5 MG/0.5ML pen Inject 1.5 mg Subcutaneous every 7 days ON FRIDAYS     folic acid (FOLVITE) 1 MG tablet Take 1 tablet (1 mg) by mouth daily     hydrochlorothiazide (MICROZIDE) 12.5 MG capsule Take 1 capsule (12.5 mg) by mouth daily     lisinopril (ZESTRIL) 10 MG tablet Take 1 tablet (10 mg) by mouth daily     medical cannabis (Patient's own supply) See Admin Instructions (The purpose of this order is to document that the patient reports taking medical cannabis.  This is not a prescription, and is not used to certify that the patient has a qualifying medical condition.)     metFORMIN (GLUCOPHAGE) 500 MG tablet Take 1 tablet (500 mg) by mouth 2 times daily (with meals)     methotrexate sodium 2.5 MG TABS Take 6 tablets (15 mg) by mouth once a week . Take all 6 tablets on the same day of each week.     metoprolol succinate ER (TOPROL-XL) 25 MG 24 hr tablet TAKE TWO  TABLETS BY MOUTH ONCE DAILY     omeprazole (PRILOSEC) 40 MG DR capsule TAKE ONE CAPSULE BY MOUTH ONCE DAILY     predniSONE (DELTASONE) 5 MG tablet Take 5-20 mg by mouth daily as needed (Rheumatoid Arthritis Flare ups)     senna-docusate (SENOKOT-S/PERICOLACE) 8.6-50 MG tablet Take 1 tablet by mouth 2 times daily     tiZANidine (ZANAFLEX) 2 MG tablet Take 1 tablet (2 mg) by mouth 3 times daily as needed for muscle spasms     No current facility-administered medications for this visit.      Past Medical History:   Diagnosis Date     Abnormal CT scan 03/2004    calcified lung granuloma     C. difficile diarrhea     H/O     Cataract 11/18/2011     Diabetic neuropathy (H)     mild, mostly soles and distal forefeet, worse on the left side.     Diverticulitis      ED (erectile dysfunction)      Ex-smoker     QUIT SMOKING FEB 2007     History of ETOH abuse     recovering, sober since 1997     Hyperlipidemia LDL goal <100      Hypertension goal BP (blood pressure) < 140/90      Hypogonadism      Obesity      PAD (peripheral artery disease) (H)     leg cramps, with exertion, no formal diagnosis of PAD and minimal if any symptoms at all.     RA (rheumatoid arthritis) (H)     Dr Bailon     Type 2 diabetes, HbA1c goal < 7% (H) 10/2008    A1C of 7.1 %      Past Surgical History:   Procedure Laterality Date     BYPASS GRAFT FEMOROPOPLITEAL Left 1/7/2021    Procedure: LEFT FEMORAL TO ABOVE KNEE POPLITEAL ARTERY BYPASS WITH PTFE VASCULAR GRAFT REMOVABLE RING 6MMX 50CM;  Surgeon: Myra Lopes MD;  Location: [facility] OR     CATARACT IOL, RT/LT       COLONOSCOPY  03/01/2018    [facility]     COMBINED REPAIR PTOSIS WITH BLEPHAROPLASTY BILATERAL Bilateral 8/16/2019    Procedure: BILATERAL UPPER EYELID BLEPHAROPLASTY AND BILATERAL PTOSIS REPAIR;  Surgeon: Janet Garcia MD;  Location: [facility] OR     ENDARTERECTOMY FEMORAL Left 1/7/2021    Procedure: LEFT FEMORAL ENDARTERECTOMY WITH PATCH ANGIOPLASTY PHOTOFIX  0.8 X 8CM;  Surgeon:  Myra Lopes MD;  Location: [facility] OR     EXCISE LESION EYELID Left 2019    Procedure: LEFT LOWER EYELID BIOPSY;  Surgeon: Janet Garcia MD;  Location: [facility] OR     HC INCISION TENDON SHEATH FINGER  2009    r hand ring finger     IR LOWER EXTREMITY ANGIOGRAM LEFT  2020     PHACOEMULSIFICATION WITH STANDARD INTRAOCULAR LENS IMPLANT  2019; 3/2019    left eye; right eye     TONSILLECTOMY       No Known Allergies  Social History     Socioeconomic History     Marital status:      Spouse name: Not on file     Number of children: Not on file     Years of education: Not on file     Highest education level: Not on file   Occupational History     Employer: RETIRED   Social Needs     Financial resource strain: Not on file     Food insecurity     Worry: Not on file     Inability: Not on file     Transportation needs     Medical: Not on file     Non-medical: Not on file   Tobacco Use     Smoking status: Former Smoker     Packs/day: 1.00     Years: 40.00     Pack years: 40.00     Types: Cigarettes     Quit date: 3/16/2007     Years since quittin.2     Smokeless tobacco: Never Used   Substance and Sexual Activity     Alcohol use: No     Alcohol/week: 0.0 standard drinks     Drug use: No     Sexual activity: Yes     Partners: Female   Lifestyle     Physical activity     Days per week: Not on file     Minutes per session: Not on file     Stress: Not on file   Relationships     Social connections     Talks on phone: Not on file     Gets together: Not on file     Attends Hoahaoism service: Not on file     Active member of club or organization: Not on file     Attends meetings of clubs or organizations: Not on file     Relationship status: Not on file     Intimate partner violence     Fear of current or ex partner: Not on file     Emotionally abused: Not on file     Physically abused: Not on file     Forced sexual activity: Not on file   Other Topics Concern     Parent/sibling w/ CABG, MI or  angioplasty before 65F 55M? Not Asked   Social History Narrative     Not on file     Family History   Problem Relation Age of Onset     Cerebrovascular Disease Mother      Arthritis Mother      Osteoporosis Mother      Alzheimer Disease Father      Arthritis Father      Cancer Father      Diabetes Maternal Grandmother      Cardiovascular Maternal Grandmother      Other Cancer Brother      Cancer Paternal Aunt      Hypertension No family hx of      Thyroid Disease No family hx of      Glaucoma No family hx of      Macular Degeneration No family hx of           REVIEW OF SYSTEMS:  General: negative, fever, chills, night sweats  Skin: negative, acne, rash and scaling  Eyes: negative, double vision, eye pain and photophobia  Ears/Nose/Throat: negative, nasal congestion and purulent rhinorrhea  Respiratory: No cough, No hemoptysis and negative  Cardiovascular: negative, palpitations, tachycardia, irregular heart beat, chest pain, exertional chest pain or pressure, paroxysmal nocturnal dyspnea and orthopnea       OBJECTIVE:  Blood pressure 124/78, pulse 80, weight 89.4 kg (197 lb), SpO2 99 %.  General Appearance: healthy, alert, active and no distress  Head: Normocephalic. No masses, lesions, tenderness or abnormalities  Eyes: conjuctiva clear, PERRL, EOM intact  Ears: External ears normal. Canals clear. TM's normal.  Nose: Nares normal  Mouth: normal  Neck: Supple, no cervical adenopathy, no thyromegaly  Lungs: clear to auscultation  Cardiac: regular rate and rhythm, normal S1 and S2, no murmur  Abdomen: Soft, nontender.  Normal bowel sounds.  No hepatosplenomegaly or abnormal masses  Extremities: no peripheral edema, peripheral pulses normal  Musculoskeletal: negative  Neurological: Cranial nerves 2-12 intact, motor strength intact       ASSESSMENT/PLAN:  Patient here for evaluation of dyspnea on exertion.  He has a history of peripheral vascular disease.  Had femoral-popliteal bypass surgery on 7 January this year.   This was done for flush occlusion of the superficial femoral artery.  On January 13 of this year patient had a permanent pacemaker placement for syncopal episode and head trauma.  He had trifascicular block and bradycardia.  Since pacemaker placement patient remained asymptomatic.  His main complaint is dyspnea on exertion.  He is not a very active person.  Previously he could not walk too much due to claudication.  Currently limiting factors are his peripheral neuropathy and hammertoe.  [place].  Unsure whether it is deconditioning or coronary artery disease due to his risk factors.  We will plan for a Lexiscan to assess CAD.  Meanwhile patient is advised to increase his physical activity slowly.  His cardiac risk factors remote history of smoking, hypertension, hyperlipidemia and type 2 diabetes over 15 years.  Patient's EKG reviewed.  Normal sinus rhythm.  Sinus bradycardia partial right bundle branch block.  Echocardiogram from March 2020 reviewed.  Normal biventricular function no significant valvular abnormalities.  Ascending aorta 4.1 cm which is mild dilation.  Patient is advised to continue his current medications and he will be contacted with the result of Lexiscan.  Per orders.   Return to Clinic 1 year  and as needed.  Total visit duration 45 minutes.  This includes face-to-face interview, chart review, review of Care Everywhere, review of EKG echocardiogram, pacemaker interrogation result, physical exam and documentation.      Please do not hesitate to contact me if you have any questions/concerns.     Sincerely,     DANIELLE Massey MD

## 2021-06-10 NOTE — PATIENT INSTRUCTIONS
Thank you for coming to the [facility] @ [place]; please note the following instructions:    1. Lexiscan scheduled at [place]        If you have any questions regarding your visit please contact your care team:     Cardiology  Telephone Number   Shweta GARCIA, RN  Neeta MARSH, RN   Avelina MAHONEY, RMKUMAR GARCIA, RMA  Ning SHEEHAN, LPN   [phone] (option 1)   For scheduling appts:     [phone] (select option 1)       For the Device Clinic (Pacemakers and ICD's)  RN's :  Elisabeth Staley   During business hours: [phone]    *After business hours:  [phone] (select option 4)      Normal test result notifications will be released via doUdeal or mailed within 7 business days.  All other test results, will be communicated via telephone once reviewed by your cardiologist.    If you need a medication refill please contact your pharmacy.  Please allow 3 business days for your refill to be completed.    As always, thank you for trusting us with your health care needs!

## 2021-06-10 NOTE — NURSING NOTE
"Chief Complaint   Patient presents with     Hypertension     Dr Scott: New pt. Hx of hypertension, hyperlipidemia, diabetes melliltus type II, peripheral artery disease and trifascicular block + episode of synocpe: Dual chamber  PPM- implanted 1/13/2021. (Medtronic [place] XT [id number] [id number]) - [facility]      Hyperlipidemia       Initial /78 (BP Location: Right arm, Patient Position: Chair, Cuff Size: Adult Regular)   Pulse 80   Wt 89.4 kg (197 lb)   SpO2 99%   BMI 29.09 kg/m   Estimated body mass index is 29.09 kg/m  as calculated from the following:    Height as of 5/18/21: 1.753 m (5' 9\").    Weight as of this encounter: 89.4 kg (197 lb)..  BP completed using cuff size: regular    Francia Sanabria MA  "

## 2021-06-10 NOTE — PROGRESS NOTES
SUBJECTIVE:  Zurdo Dash is a 76 year old male who presents for evaluation of dyspnea on exertion.    On January 7 of this year patient had femoral-popliteal bypass surgery for flush occlusion of superficial femoral artery.  Patient had an angiogram and a segment of occlusion was too long for percutaneous intervention.  On 13th patient had a permanent pacemaker placement for trifascicular block and syncope and head injury.  Patient had a syncopal episode and fall sustaining occipital head injury.  Since pacemaker implantation patient had no syncopal episodes.  Patient's main complaint is dyspnea on exertion.  Denied chest pain.  His functional capacity is limited previously due to claudication and now due to neuropathy and hammertoe.    Cardiac risk factors are remote history of smoking, hypertension, hyperlipidemia and type 2 diabetes for over 15 years.    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  Date of last device in office evaluation: 4/28/2021    ?  and model: [employer] [place] XT  MRI [id number] Pacemaker.   Date of implant: 1/13/2021      ? Indication for device: Syncope   ? Cardiac resynchronization therapy:   no      MRI Conditional:  yes    ? Battery longevity documented as less than 3  Months: no  ? Are any of the leads less than 3 months old:  yes    ? Programming              ? Pacing mode and programmed lower rate: AAIR <=> DDDR  bpm              ? Rate-responsive sensor type, if programmed on: sinus rahul 54 bpm        Underlying  rhythm and heart rate:  Sinus rahul 54 bpm      ? What is the response of this device to magnet placement:  Asynchronous pacing DOO   ? PM magnet pacing rate: 85 bpm   ? Any alert status on CIED generator or lead:  no    ? Last pacing threshold    Atrial 0.75 V @0.4ms      Ventricular RV: 0.5V @0.4ms     NEVILLE GOODWIN RN,  Certified Cardiac Device Specialist ....................  4/28/2021   1:36 PM       Pneumothorax 01/10/2021     Priority: Medium     PAD (peripheral artery disease) (H) 12/23/2020     Priority: Medium     Added automatically from request for surgery [id number]       Abdominal pain, generalized 07/09/2020     Priority: Medium     CKD (chronic kidney disease) stage 3, GFR 30-59 ml/min 02/27/2020     Priority: Medium     Eyelid lesion, LLL 07/10/2019     Priority: Medium     Dermatochalasis of both upper eyelids 07/10/2019     Priority: Medium     Pseudophakia, ou 03/12/2019     Priority: Medium     Posterior vitreous detachment of left eye 02/09/2019     Priority: Medium     Migraine equivalent 12/22/2018     Priority: Medium     Chronic bilateral low back pain without sciatica 09/25/2018     Priority: Medium     Diabetic polyneuropathy associated with type 2 diabetes mellitus (H) 02/28/2018     Priority: Medium     Tubulovillous adenoma of colon 02/20/2018     Priority: Medium     Type 2 diabetes mellitus without retinopathy (H) 01/12/2018     Priority: Medium     Erectile dysfunction, unspecified erectile dysfunction type 11/07/2017     Priority: Medium     Spondylosis of cervical region without myelopathy or radiculopathy 07/17/2017     Priority: Medium     Pulmonary nodule 03/03/2017     Priority: Medium     Needs follow up CT chest 1 year 2/2018       High risk medication use 03/03/2017     Priority: Medium     Rheumatoid arthritis involving multiple sites with positive rheumatoid factor (H) 03/18/2016     Priority: Medium     Family history of esophageal cancer 12/14/2015     Priority:  Medium     Gastroesophageal reflux disease, esophagitis presence not specified 12/14/2015     Priority: Medium     IMO Regulatory Load OCT 2020       Primary osteoarthritis of both knees 11/05/2015     Priority: Medium     Ex-smoker 10/20/2015     Priority: Medium     RBBB (right bundle branch block) 10/24/2013     Priority: Medium     Noted on ECG 10/19/2013 . See medical record scanned into Epic electronic medical records         Type 2 diabetes mellitus with diabetic polyneuropathy, without long-term current use of insulin (H) 02/28/2013     Priority: Medium     A1C      6.9   5/3/2016  A1C      8.6   3/18/2016  A1C      9.7   9/30/2015  A1C     11.8   6/29/2015  A1C     10.7   3/30/2015         Trigger finger, acquired 01/15/2013     Priority: Medium     Health Care Home 12/12/2012     Priority: Medium     Alin Espinoza RN,[phone]--[phone]   FPA / [facility]          DX V65.8 REPLACED WITH [facility] C[facility] HOME (04/08/2013)       Advanced directives, counseling/discussion 03/29/2011     Priority: Medium     Patient states has Advance Directive and will bring in a copy to clinic.         Hypertension goal BP (blood pressure) < 140/90      Priority: Medium     Hypogonadism      Priority: Medium     Hyperlipidemia LDL goal <100 10/31/2010     Priority: Medium     Pain in limb      Priority: Medium     Had some leg cramps with features consistent with vascular claudication. See ankle brachial index tests and vascular surgery consultation from 2/16/2011 in media section of Epic electronic medical records ; the key point is that subsequently , evaluations found zero evidence of     Ischemic vascular disease         Bradycardia 01/12/2009     Priority: Medium     Syncope 01/12/2009     Priority: Medium    .  Current Outpatient Medications   Medication Sig     ACCU-CHEK DAYANARA PLUS test strip TEST THREE TIMES A DAY OR AS DIRECTED     acetaminophen (TYLENOL) 325 MG tablet Take 2 tablets (650 mg)  by mouth every 4 hours as needed for other (multimodal surgical pain management along with NSAIDS and opioid medication as indicated based on pain control and physical function.)     aspirin 81 MG tablet Take 1 tablet by mouth every evening      atorvastatin (LIPITOR) 40 MG tablet Take 1 tablet (40 mg) by mouth every evening     blood glucose monitoring (ONE TOUCH DELICA) lancets Use to test blood sugars 3 times daily or as directed.     cetirizine (ZYRTEC) 10 MG tablet Take 10 mg by mouth At Bedtime     clobetasol (TEMOVATE) 0.05 % external ointment Apply topically 2 times daily as needed     diclofenac (VOLTAREN) 1 % topical gel Apply topically 4 times daily as needed for moderate pain     dulaglutide (TRULICITY) 1.5 MG/0.5ML pen Inject 1.5 mg Subcutaneous every 7 days ON FRIDAYS     folic acid (FOLVITE) 1 MG tablet Take 1 tablet (1 mg) by mouth daily     hydrochlorothiazide (MICROZIDE) 12.5 MG capsule Take 1 capsule (12.5 mg) by mouth daily     lisinopril (ZESTRIL) 10 MG tablet Take 1 tablet (10 mg) by mouth daily     medical cannabis (Patient's own supply) See Admin Instructions (The purpose of this order is to document that the patient reports taking medical cannabis.  This is not a prescription, and is not used to certify that the patient has a qualifying medical condition.)     metFORMIN (GLUCOPHAGE) 500 MG tablet Take 1 tablet (500 mg) by mouth 2 times daily (with meals)     methotrexate sodium 2.5 MG TABS Take 6 tablets (15 mg) by mouth once a week . Take all 6 tablets on the same day of each week.     metoprolol succinate ER (TOPROL-XL) 25 MG 24 hr tablet TAKE TWO TABLETS BY MOUTH ONCE DAILY     omeprazole (PRILOSEC) 40 MG DR capsule TAKE ONE CAPSULE BY MOUTH ONCE DAILY     predniSONE (DELTASONE) 5 MG tablet Take 5-20 mg by mouth daily as needed (Rheumatoid Arthritis Flare ups)     senna-docusate (SENOKOT-S/PERICOLACE) 8.6-50 MG tablet Take 1 tablet by mouth 2 times daily     tiZANidine (ZANAFLEX) 2 MG  tablet Take 1 tablet (2 mg) by mouth 3 times daily as needed for muscle spasms     No current facility-administered medications for this visit.      Past Medical History:   Diagnosis Date     Abnormal CT scan 03/2004    calcified lung granuloma     C. difficile diarrhea     H/O     Cataract 11/18/2011     Diabetic neuropathy (H)     mild, mostly soles and distal forefeet, worse on the left side.     Diverticulitis      ED (erectile dysfunction)      Ex-smoker     QUIT SMOKING FEB 2007     History of ETOH abuse     recovering, sober since 1997     Hyperlipidemia LDL goal <100      Hypertension goal BP (blood pressure) < 140/90      Hypogonadism      Obesity      PAD (peripheral artery disease) (H)     leg cramps, with exertion, no formal diagnosis of PAD and minimal if any symptoms at all.     RA (rheumatoid arthritis) (H)     Dr Bailon     Type 2 diabetes, HbA1c goal < 7% (H) 10/2008    A1C of 7.1 %      Past Surgical History:   Procedure Laterality Date     BYPASS GRAFT FEMOROPOPLITEAL Left 1/7/2021    Procedure: LEFT FEMORAL TO ABOVE KNEE POPLITEAL ARTERY BYPASS WITH PTFE VASCULAR GRAFT REMOVABLE RING 6MMX 50CM;  Surgeon: Myra Lopes MD;  Location: [facility] OR     CATARACT IOL, RT/LT       COLONOSCOPY  03/01/2018    [facility]     COMBINED REPAIR PTOSIS WITH BLEPHAROPLASTY BILATERAL Bilateral 8/16/2019    Procedure: BILATERAL UPPER EYELID BLEPHAROPLASTY AND BILATERAL PTOSIS REPAIR;  Surgeon: Janet Garcia MD;  Location: [facility] OR     ENDARTERECTOMY FEMORAL Left 1/7/2021    Procedure: LEFT FEMORAL ENDARTERECTOMY WITH PATCH ANGIOPLASTY PHOTOFIX  0.8 X 8CM;  Surgeon: Myra Lopes MD;  Location:  OR     EXCISE LESION EYELID Left 8/16/2019    Procedure: LEFT LOWER EYELID BIOPSY;  Surgeon: Janet Garcia MD;  Location: [facility] OR     HC INCISION TENDON SHEATH FINGER  4/2009    r hand ring finger     IR LOWER EXTREMITY ANGIOGRAM LEFT  12/17/2020     PHACOEMULSIFICATION WITH STANDARD INTRAOCULAR LENS  IMPLANT  2019; 3/2019    left eye; right eye     TONSILLECTOMY       No Known Allergies  Social History     Socioeconomic History     Marital status:      Spouse name: Not on file     Number of children: Not on file     Years of education: Not on file     Highest education level: Not on file   Occupational History     Employer: RETIRED   Social Needs     Financial resource strain: Not on file     Food insecurity     Worry: Not on file     Inability: Not on file     Transportation needs     Medical: Not on file     Non-medical: Not on file   Tobacco Use     Smoking status: Former Smoker     Packs/day: 1.00     Years: 40.00     Pack years: 40.00     Types: Cigarettes     Quit date: 3/16/2007     Years since quittin.2     Smokeless tobacco: Never Used   Substance and Sexual Activity     Alcohol use: No     Alcohol/week: 0.0 standard drinks     Drug use: No     Sexual activity: Yes     Partners: Female   Lifestyle     Physical activity     Days per week: Not on file     Minutes per session: Not on file     Stress: Not on file   Relationships     Social connections     Talks on phone: Not on file     Gets together: Not on file     Attends Cheondoism service: Not on file     Active member of club or organization: Not on file     Attends meetings of clubs or organizations: Not on file     Relationship status: Not on file     Intimate partner violence     Fear of current or ex partner: Not on file     Emotionally abused: Not on file     Physically abused: Not on file     Forced sexual activity: Not on file   Other Topics Concern     Parent/sibling w/ CABG, MI or angioplasty before 65F 55M? Not Asked   Social History Narrative     Not on file     Family History   Problem Relation Age of Onset     Cerebrovascular Disease Mother      Arthritis Mother      Osteoporosis Mother      Alzheimer Disease Father      Arthritis Father      Cancer Father      Diabetes Maternal Grandmother      Cardiovascular Maternal  Grandmother      Other Cancer Brother      Cancer Paternal Aunt      Hypertension No family hx of      Thyroid Disease No family hx of      Glaucoma No family hx of      Macular Degeneration No family hx of           REVIEW OF SYSTEMS:  General: negative, fever, chills, night sweats  Skin: negative, acne, rash and scaling  Eyes: negative, double vision, eye pain and photophobia  Ears/Nose/Throat: negative, nasal congestion and purulent rhinorrhea  Respiratory: No cough, No hemoptysis and negative  Cardiovascular: negative, palpitations, tachycardia, irregular heart beat, chest pain, exertional chest pain or pressure, paroxysmal nocturnal dyspnea and orthopnea       OBJECTIVE:  Blood pressure 124/78, pulse 80, weight 89.4 kg (197 lb), SpO2 99 %.  General Appearance: healthy, alert, active and no distress  Head: Normocephalic. No masses, lesions, tenderness or abnormalities  Eyes: conjuctiva clear, PERRL, EOM intact  Ears: External ears normal. Canals clear. TM's normal.  Nose: Nares normal  Mouth: normal  Neck: Supple, no cervical adenopathy, no thyromegaly  Lungs: clear to auscultation  Cardiac: regular rate and rhythm, normal S1 and S2, no murmur  Abdomen: Soft, nontender.  Normal bowel sounds.  No hepatosplenomegaly or abnormal masses  Extremities: no peripheral edema, peripheral pulses normal  Musculoskeletal: negative  Neurological: Cranial nerves 2-12 intact, motor strength intact       ASSESSMENT/PLAN:  Patient here for evaluation of dyspnea on exertion.  He has a history of peripheral vascular disease.  Had femoral-popliteal bypass surgery on 7 January this year.  This was done for flush occlusion of the superficial femoral artery.  On January 13 of this year patient had a permanent pacemaker placement for syncopal episode and head trauma.  He had trifascicular block and bradycardia.  Since pacemaker placement patient remained asymptomatic.  His main complaint is dyspnea on exertion.  He is not a very active  person.  Previously he could not walk too much due to claudication.  Currently limiting factors are his peripheral neuropathy and hammertoe.  [place].  Unsure whether it is deconditioning or coronary artery disease due to his risk factors.  We will plan for a Lexiscan to assess CAD.  Meanwhile patient is advised to increase his physical activity slowly.  His cardiac risk factors remote history of smoking, hypertension, hyperlipidemia and type 2 diabetes over 15 years.  Patient's EKG reviewed.  Normal sinus rhythm.  Sinus bradycardia partial right bundle branch block.  Echocardiogram from March 2020 reviewed.  Normal biventricular function no significant valvular abnormalities.  Ascending aorta 4.1 cm which is mild dilation.  Patient is advised to continue his current medications and he will be contacted with the result of Lexiscan.  Per orders.   Return to Clinic 1 year  and as needed.  Total visit duration 45 minutes.  This includes face-to-face interview, chart review, review of Care Everywhere, review of EKG echocardiogram, pacemaker interrogation result, physical exam and documentation.

## 2021-06-16 NOTE — TELEPHONE ENCOUNTER
Patient has a pacemaker and need to have surgery at a Hospital.   Patient has been rescheduled for 07/13/2021 at [facility]  preop remains the same 06/17/2021  Postop rescheduled to 07/15/2021

## 2021-06-16 NOTE — TELEPHONE ENCOUNTER
Prior auth is required for this service if performed at an OP hospital setting.    Submitted for prior auth over the phone with rep at [facility]. Attached clinicals online with Notification/PA # [id number].    Expect 5 - 14 day wait for approval.

## 2021-06-17 ENCOUNTER — OFFICE VISIT (OUTPATIENT)
Dept: FAMILY MEDICINE | Facility: CLINIC | Age: 76
End: 2021-06-17
Payer: COMMERCIAL

## 2021-06-17 VITALS
WEIGHT: 202.5 LBS | HEIGHT: 69 IN | BODY MASS INDEX: 29.99 KG/M2 | SYSTOLIC BLOOD PRESSURE: 113 MMHG | DIASTOLIC BLOOD PRESSURE: 69 MMHG | TEMPERATURE: 98 F | OXYGEN SATURATION: 96 % | HEART RATE: 90 BPM

## 2021-06-17 DIAGNOSIS — M20.42 HAMMER TOE OF SECOND TOE OF LEFT FOOT: ICD-10-CM

## 2021-06-17 DIAGNOSIS — Z01.818 PREOP GENERAL PHYSICAL EXAM: Primary | ICD-10-CM

## 2021-06-17 PROCEDURE — 99214 OFFICE O/P EST MOD 30 MIN: CPT | Performed by: PHYSICIAN ASSISTANT

## 2021-06-17 ASSESSMENT — MIFFLIN-ST. JEOR: SCORE: 1633.91

## 2021-06-17 NOTE — PROGRESS NOTES
[facility]  [address]  [place] [address]  Phone: [phone]  Primary Provider: Kapil Duckworth  Pre-op Performing Provider: DALJIT CORTES      PREOPERATIVE EVALUATION:  Today's date: 6/17/2021    Zurdo Dash is a 76 year old male who presents for a preoperative evaluation.    Surgical Information:  Surgery/Procedure: Repair Hammer Toe Left 3rd Digit  Surgery Location: [facility]  Surgeon: Dr. Aaron Dent  Surgery Date: 07/13/2021  Time of Surgery: to be determined  Where patient plans to recover: At home with family  Fax number for surgical facility: Note does not need to be faxed, will be available electronically in Epic.    Type of Anesthesia Anticipated: General    Assessment & Plan     The proposed surgical procedure is considered INTERMEDIATE risk.    Preop general physical exam    Hammer toe of second toe of left foot       Implanted Device:   - Type of device: Pace Maker Patient advised to bring device information on day of surgery.        RECOMMENDATION:  APPROVAL GIVEN to proceed with proposed procedure, without further diagnostic evaluation.    Review of prior external note(s) from - [facility] information from [facility] reviewed          5 minutes spent on the date of the encounter doing chart review, review of outside records and review of test results         Subjective     HPI related to upcoming procedure: Patient has had hammertoe for > 1 year surgery delayed due to pacemaker placement and L leg bypass. Patient doing well    Preop Questions 6/17/2021   1. Have you ever had a heart attack or stroke? No   2. Have you ever had surgery on your heart or blood vessels, such as a stent placement, a coronary artery bypass, or surgery on an artery in your head, neck, heart, or legs? YES - Pacemaker   3. Do you have chest pain with activity? No   4. Do you have a history of  heart failure? No   5. Do you currently have a cold, bronchitis or symptoms  of other infection? No   6. Do you have a cough, shortness of breath, or wheezing? No   7. Do you or anyone in your family have previous history of blood clots? No   8. Do you or does anyone in your family have a serious bleeding problem such as prolonged bleeding following surgeries or cuts? No   9. Have you ever had problems with anemia or been told to take iron pills? No   10. Have you had any abnormal blood loss such as black, tarry or bloody stools? No   11. Have you ever had a blood transfusion? No   12. Are you willing to have a blood transfusion if it is medically needed before, during, or after your surgery? Yes   13. Have you or any of your relatives ever had problems with anesthesia? No   14. Do you have sleep apnea, excessive snoring or daytime drowsiness? No   15. Do you have any artifical heart valves or other implanted medical devices like a pacemaker, defibrillator, or continuous glucose monitor? YES - Pacemaker   15a. What type of device do you have? Pacemaker   15b. Name of the clinic that manages your device:  [facility]   16. Do you have artificial joints? No   17. Are you allergic to latex? No       Health Care Directive:  Patient does not have a Health Care Directive or Living Will: Discussed advance care planning with patient; however, patient declined at this time.    Preoperative Review of :   reviewed - no record of controlled substances prescribed.          Review of Systems  CONSTITUTIONAL: NEGATIVE for fever, chills, change in weight  INTEGUMENTARY/SKIN: NEGATIVE for worrisome rashes, moles or lesions  EYES: NEGATIVE for vision changes or irritation  ENT/MOUTH: NEGATIVE for ear, mouth and throat problems  RESP: NEGATIVE for significant cough or SOB  BREAST: NEGATIVE for masses, tenderness or discharge  CV: NEGATIVE for chest pain, palpitations or peripheral edema  GI: NEGATIVE for nausea, abdominal pain, heartburn, or change in bowel habits  : NEGATIVE for frequency,  dysuria, or hematuria  MUSCULOSKELETAL: NEGATIVE for significant arthralgias or myalgia  NEURO: NEGATIVE for weakness, dizziness or paresthesias  ENDOCRINE: NEGATIVE for temperature intolerance, skin/hair changes  HEME: NEGATIVE for bleeding problems  PSYCHIATRIC: NEGATIVE for changes in mood or affect    Patient Active Problem List    Diagnosis Date Noted     Ulcer of left foot, unspecified ulcer stage (H) 06/10/2021     Priority: Medium     Added automatically from request for surgery [id number]       Hammer toe of left foot 06/10/2021     Priority: Medium     Added automatically from request for surgery [id number]       SOBOE (shortness of breath on exertion) 05/12/2021     Priority: Medium     Immunosuppression (H) 02/11/2021     Priority: Medium     Skin ulcer of toe of left foot, limited to breakdown of skin (H) 02/11/2021     Priority: Medium     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  Date of last device in office evaluation: 4/28/2021    ?  and model: Igea Eitzen XT  MRI [id number] Pacemaker.   Date of implant: 1/13/2021      ? Indication for device: Syncope   ? Cardiac resynchronization therapy:   no      MRI Conditional:  yes    ? Battery longevity documented as less than 3  Months: no  ? Are any of the leads less than 3 months old:  yes    ? Programming              ? Pacing mode and programmed lower rate: AAIR <=> DDDR  bpm              ? Rate-responsive sensor type, if programmed on: sinus rahul 54 bpm        Underlying rhythm and heart rate:  Sinus rahul 54 bpm      ? What is the response of this device to magnet placement:  Asynchronous pacing DOO   ? PM magnet pacing rate: 85 bpm   ? Any alert status on CIED generator or lead:  no    ? Last pacing threshold    Atrial 0.75 V @0.4ms      Ventricular RV: 0.5V @0.4ms      NEVILLE GOODWIN RN,  Certified Cardiac Device Specialist ....................  4/28/2021   1:36 PM       Pneumothorax 01/10/2021     Priority: Medium     PAD (peripheral artery disease) (H) 12/23/2020     Priority: Medium     Added automatically from request for surgery [id number]       Abdominal pain, generalized 07/09/2020     Priority: Medium     CKD (chronic kidney disease) stage 3, GFR 30-59 ml/min 02/27/2020     Priority: Medium     Eyelid lesion, LLL 07/10/2019     Priority: Medium     Dermatochalasis of both upper eyelids 07/10/2019     Priority: Medium     Pseudophakia, ou 03/12/2019     Priority: Medium     Posterior vitreous detachment of left eye 02/09/2019     Priority: Medium     Migraine equivalent 12/22/2018     Priority: Medium     Chronic bilateral low back pain without sciatica 09/25/2018     Priority: Medium     Diabetic polyneuropathy associated with type 2 diabetes mellitus (H) 02/28/2018     Priority: Medium     Tubulovillous adenoma of colon 02/20/2018     Priority: Medium     Type 2 diabetes mellitus without retinopathy (H) 01/12/2018     Priority: Medium     Erectile dysfunction, unspecified erectile dysfunction type 11/07/2017     Priority: Medium     Spondylosis of cervical region without myelopathy or radiculopathy 07/17/2017     Priority: Medium     Pulmonary nodule 03/03/2017     Priority: Medium     Needs follow up CT chest 1 year 2/2018       High risk medication use 03/03/2017     Priority: Medium     Rheumatoid arthritis involving multiple sites with positive rheumatoid factor (H) 03/18/2016     Priority: Medium     Family history of esophageal cancer 12/14/2015     Priority: Medium     Gastroesophageal reflux disease, esophagitis presence not specified 12/14/2015     Priority: Medium     IMO Regulatory Load OCT 2020       Primary osteoarthritis of both knees 11/05/2015     Priority: Medium     Ex-smoker 10/20/2015     Priority: Medium     RBBB (right bundle branch block)  10/24/2013     Priority: Medium     Noted on ECG 10/19/2013 . See medical record scanned into Epic electronic medical records         Type 2 diabetes mellitus with diabetic polyneuropathy, without long-term current use of insulin (H) 02/28/2013     Priority: Medium     A1C      6.9   5/3/2016  A1C      8.6   3/18/2016  A1C      9.7   9/30/2015  A1C     11.8   6/29/2015  A1C     10.7   3/30/2015         Trigger finger, acquired 01/15/2013     Priority: Medium     Health Care Home 12/12/2012     Priority: Medium     Alin Espinoza RN,C--[phone]   FPA / [facility]          DX V65.8 REPLACED WITH [id number]ALTH CARE HOME (04/08/2013)       Advanced directives, counseling/discussion 03/29/2011     Priority: Medium     Patient states has Advance Directive and will bring in a copy to clinic.         Hypertension goal BP (blood pressure) < 140/90      Priority: Medium     Hypogonadism      Priority: Medium     Hyperlipidemia LDL goal <100 10/31/2010     Priority: Medium     Pain in limb      Priority: Medium     Had some leg cramps with features consistent with vascular claudication. See ankle brachial index tests and vascular surgery consultation from 2/16/2011 in media section of Epic electronic medical records ; the key point is that subsequently , evaluations found zero evidence of     Ischemic vascular disease         Bradycardia 01/12/2009     Priority: Medium     Syncope 01/12/2009     Priority: Medium      Past Medical History:   Diagnosis Date     Abnormal CT scan 03/2004    calcified lung granuloma     C. difficile diarrhea     H/O     Cataract 11/18/2011     Diabetic neuropathy (H)     mild, mostly soles and distal forefeet, worse on the left side.     Diverticulitis      ED (erectile dysfunction)      Ex-smoker     QUIT SMOKING FEB 2007     History of ETOH abuse     recovering, sober since 1997     Hyperlipidemia LDL goal <100      Hypertension goal BP (blood pressure) < 140/90       Hypogonadism      Obesity      PAD (peripheral artery disease) (H)     leg cramps, with exertion, no formal diagnosis of PAD and minimal if any symptoms at all.     RA (rheumatoid arthritis) (H)     Dr Bailon     Type 2 diabetes, HbA1c goal < 7% (H) 10/2008    A1C of 7.1 %      Past Surgical History:   Procedure Laterality Date     BYPASS GRAFT FEMOROPOPLITEAL Left 1/7/2021    Procedure: LEFT FEMORAL TO ABOVE KNEE POPLITEAL ARTERY BYPASS WITH PTFE VASCULAR GRAFT REMOVABLE RING 6MMX 50CM;  Surgeon: Myra Lopes MD;  Location: [facility] OR     CATARACT IOL, RT/LT       COLONOSCOPY  03/01/2018    [facility]     COMBINED REPAIR PTOSIS WITH BLEPHAROPLASTY BILATERAL Bilateral 8/16/2019    Procedure: BILATERAL UPPER EYELID BLEPHAROPLASTY AND BILATERAL PTOSIS REPAIR;  Surgeon: Janet Garcia MD;  Location: [facility] OR     ENDARTERECTOMY FEMORAL Left 1/7/2021    Procedure: LEFT FEMORAL ENDARTERECTOMY WITH PATCH ANGIOPLASTY PHOTOFIX  0.8 X 8CM;  Surgeon: Myra Lopes MD;  Location: [facility] OR     EXCISE LESION EYELID Left 8/16/2019    Procedure: LEFT LOWER EYELID BIOPSY;  Surgeon: Janet Garcia MD;  Location: [facility] OR     HC INCISION TENDON SHEATH FINGER  4/2009    r hand ring finger     IR LOWER EXTREMITY ANGIOGRAM LEFT  12/17/2020     PHACOEMULSIFICATION WITH STANDARD INTRAOCULAR LENS IMPLANT  02/2019; 3/2019    left eye; right eye     TONSILLECTOMY       Current Outpatient Medications   Medication Sig Dispense Refill     ACCU-CHEK DAYANARA PLUS test strip TEST THREE TIMES A DAY OR AS DIRECTED 300 each 1     acetaminophen (TYLENOL) 325 MG tablet Take 2 tablets (650 mg) by mouth every 4 hours as needed for other (multimodal surgical pain management along with NSAIDS and opioid medication as indicated based on pain control and physical function.)       aspirin 81 MG tablet Take 1 tablet by mouth every evening   3     atorvastatin (LIPITOR) 40 MG tablet Take 1 tablet (40 mg) by mouth every evening 90 tablet 1      blood glucose monitoring (ONE TOUCH DELICA) lancets Use to test blood sugars 3 times daily or as directed. 300 each 3     cetirizine (ZYRTEC) 10 MG tablet Take 10 mg by mouth At Bedtime       clobetasol (TEMOVATE) 0.05 % external ointment Apply topically 2 times daily as needed       diclofenac (VOLTAREN) 1 % topical gel Apply topically 4 times daily as needed for moderate pain       dulaglutide (TRULICITY) 1.5 MG/0.5ML pen Inject 1.5 mg Subcutaneous every 7 days ON FRIDAYS 2 mL 5     folic acid (FOLVITE) 1 MG tablet Take 1 tablet (1 mg) by mouth daily 100 tablet 2     hydrochlorothiazide (MICROZIDE) 12.5 MG capsule Take 1 capsule (12.5 mg) by mouth daily 90 capsule 1     lisinopril (ZESTRIL) 10 MG tablet Take 1 tablet (10 mg) by mouth daily 90 tablet 1     medical cannabis (Patient's own supply) See Admin Instructions (The purpose of this order is to document that the patient reports taking medical cannabis.  This is not a prescription, and is not used to certify that the patient has a qualifying medical condition.)       metFORMIN (GLUCOPHAGE) 500 MG tablet Take 1 tablet (500 mg) by mouth 2 times daily (with meals) 180 tablet 1     methotrexate sodium 2.5 MG TABS Take 6 tablets (15 mg) by mouth once a week . Take all 6 tablets on the same day of each week. 78 tablet 0     metoprolol succinate ER (TOPROL-XL) 25 MG 24 hr tablet TAKE TWO TABLETS BY MOUTH ONCE DAILY 180 tablet 1     omeprazole (PRILOSEC) 40 MG DR capsule TAKE ONE CAPSULE BY MOUTH ONCE DAILY 90 capsule 1     predniSONE (DELTASONE) 5 MG tablet Take 5-20 mg by mouth daily as needed (Rheumatoid Arthritis Flare ups)       senna-docusate (SENOKOT-S/PERICOLACE) 8.6-50 MG tablet Take 1 tablet by mouth 2 times daily 50 tablet 0     tiZANidine (ZANAFLEX) 2 MG tablet Take 1 tablet (2 mg) by mouth 3 times daily as needed for muscle spasms 90 tablet 1       No Known Allergies     Social History     Tobacco Use     Smoking status: Former Smoker     Packs/day: 1.00  "    Years: 40.00     Pack years: 40.00     Types: Cigarettes     Quit date: 3/16/2007     Years since quittin.2     Smokeless tobacco: Never Used   Substance Use Topics     Alcohol use: No     Alcohol/week: 0.0 standard drinks       History   Drug Use No         Objective     /69   Pulse 90   Temp 98  F (36.7  C) (Oral)   Ht 1.753 m (5' 9\")   Wt 91.9 kg (202 lb 8 oz)   SpO2 96%   BMI 29.90 kg/m      Physical Exam    GENERAL APPEARANCE: healthy, alert and no distress     EYES: EOMI,  PERRL     HENT: ear canals and TM's normal and nose and mouth without ulcers or lesions     NECK: no adenopathy, no asymmetry, masses, or scars and thyroid normal to palpation     RESP: lungs clear to auscultation - no rales, rhonchi or wheezes     CV: regular rates and rhythm, normal S1 S2, no S3 or S4 and no murmur, click or rub     ABDOMEN:  soft, nontender, no HSM or masses and bowel sounds normal     MS: extremities normal- no gross deformities noted, no evidence of inflammation in joints, FROM in all extremities.     SKIN: no suspicious lesions or rashes     NEURO: Normal strength and tone, sensory exam grossly normal, mentation intact and speech normal     PSYCH: mentation appears normal. and affect normal/bright     LYMPHATICS: No cervical adenopathy    Recent Labs   Lab Test 21  0958 21  0709 21  0708 21  0610 20  0655   HGB 10.8* 9.1* 9.0* 10.2* 11.5*    177 212  --  304   INR  --   --   --   --  1.21*   NA  --  136  --  138  --    POTASSIUM  --  4.3 4.7 4.2  --    CR 1.52* 1.39*  --  1.61* 1.81*   A1C  --   --   --  6.8* 6.8*        Diagnostics:  No labs were ordered during this visit.   No EKG this visit, completed in the last 90 days.    Revised Cardiac Risk Index (RCRI):  The patient has the following serious cardiovascular risks for perioperative complications:   - Diabetes Mellitus (on Insulin) = 1 point     RCRI Interpretation: 1 point: Class II (low risk - 0.9% " complication rate)           Signed Electronically by: Tavares Ellison PA-C  Copy of this evaluation report is provided to requesting physician.

## 2021-06-21 NOTE — TELEPHONE ENCOUNTER
Checked status online at [facility]. Case is approved with Auth# [id number] at [facility].  Start date: 7/13/21  End date:  10/11/21

## 2021-06-21 NOTE — TELEPHONE ENCOUNTER
Patient has called to reschedule surgery to 07/27/2021at [place]  preop 07/23/2021  Postop 07/30/2021

## 2021-07-05 ENCOUNTER — OFFICE VISIT (OUTPATIENT)
Dept: RHEUMATOLOGY | Facility: CLINIC | Age: 76
End: 2021-07-05
Payer: COMMERCIAL

## 2021-07-05 ENCOUNTER — TELEPHONE (OUTPATIENT)
Dept: INTERNAL MEDICINE | Facility: CLINIC | Age: 76
End: 2021-07-05

## 2021-07-05 VITALS
SYSTOLIC BLOOD PRESSURE: 96 MMHG | WEIGHT: 192.8 LBS | DIASTOLIC BLOOD PRESSURE: 61 MMHG | BODY MASS INDEX: 28.56 KG/M2 | OXYGEN SATURATION: 100 % | HEIGHT: 69 IN | HEART RATE: 90 BPM

## 2021-07-05 DIAGNOSIS — M05.79 RHEUMATOID ARTHRITIS INVOLVING MULTIPLE SITES WITH POSITIVE RHEUMATOID FACTOR (H): ICD-10-CM

## 2021-07-05 DIAGNOSIS — M17.0 OSTEOARTHRITIS OF BOTH KNEES, UNSPECIFIED OSTEOARTHRITIS TYPE: Primary | ICD-10-CM

## 2021-07-05 DIAGNOSIS — Z79.899 HIGH RISK MEDICATIONS (NOT ANTICOAGULANTS) LONG-TERM USE: ICD-10-CM

## 2021-07-05 LAB
ALBUMIN SERPL-MCNC: 3.5 G/DL (ref 3.4–5)
ALP SERPL-CCNC: 85 U/L (ref 40–150)
ALT SERPL W P-5'-P-CCNC: 22 U/L (ref 0–70)
AST SERPL W P-5'-P-CCNC: 12 U/L (ref 0–45)
BILIRUB DIRECT SERPL-MCNC: 0.2 MG/DL (ref 0–0.2)
BILIRUB SERPL-MCNC: 0.8 MG/DL (ref 0.2–1.3)
CREAT SERPL-MCNC: 2.84 MG/DL (ref 0.66–1.25)
CRP SERPL-MCNC: 37.1 MG/L (ref 0–8)
ERYTHROCYTE [SEDIMENTATION RATE] IN BLOOD BY WESTERGREN METHOD: 83 MM/H (ref 0–20)
GFR SERPL CREATININE-BSD FRML MDRD: 20 ML/MIN/{1.73_M2}
PROT SERPL-MCNC: 6.9 G/DL (ref 6.8–8.8)

## 2021-07-05 PROCEDURE — 20610 DRAIN/INJ JOINT/BURSA W/O US: CPT | Mod: 50 | Performed by: INTERNAL MEDICINE

## 2021-07-05 PROCEDURE — 85652 RBC SED RATE AUTOMATED: CPT | Performed by: INTERNAL MEDICINE

## 2021-07-05 PROCEDURE — 85025 COMPLETE CBC W/AUTO DIFF WBC: CPT | Performed by: INTERNAL MEDICINE

## 2021-07-05 PROCEDURE — 99214 OFFICE O/P EST MOD 30 MIN: CPT | Mod: 25 | Performed by: INTERNAL MEDICINE

## 2021-07-05 PROCEDURE — 86140 C-REACTIVE PROTEIN: CPT | Performed by: INTERNAL MEDICINE

## 2021-07-05 PROCEDURE — 82565 ASSAY OF CREATININE: CPT | Performed by: INTERNAL MEDICINE

## 2021-07-05 PROCEDURE — 80076 HEPATIC FUNCTION PANEL: CPT | Performed by: INTERNAL MEDICINE

## 2021-07-05 PROCEDURE — 36415 COLL VENOUS BLD VENIPUNCTURE: CPT | Performed by: INTERNAL MEDICINE

## 2021-07-05 RX ORDER — METHOTREXATE 2.5 MG/1
15 TABLET ORAL WEEKLY
Qty: 78 TABLET | Refills: 1 | Status: SHIPPED | OUTPATIENT
Start: 2021-07-05 | End: 2021-07-07

## 2021-07-05 RX ORDER — TRIAMCINOLONE ACETONIDE 40 MG/ML
40 INJECTION, SUSPENSION INTRA-ARTICULAR; INTRAMUSCULAR ONCE
Status: COMPLETED | OUTPATIENT
Start: 2021-07-05 | End: 2021-07-05

## 2021-07-05 RX ADMIN — TRIAMCINOLONE ACETONIDE 40 MG: 40 INJECTION, SUSPENSION INTRA-ARTICULAR; INTRAMUSCULAR at 15:18

## 2021-07-05 ASSESSMENT — MIFFLIN-ST. JEOR: SCORE: 1589.92

## 2021-07-05 NOTE — PROGRESS NOTES
Rheumatology Clinic Visit      Zurdo Dash MRN# [id number]   YOB: 1945 Age: 76 year old      Date of visit: 7/05/21   PCP: Dr. Kapil Duckworth     Chief Complaint   Patient presents with:  Arthritis: RA    Assessment and Plan     1. Seropositive nonerosive rheumatoid arthritis (, CCP 64): Currently on methotrexate 15 mg once weekly (reducing from 25mg wkly to get to the lowest effective dose), folic acid 1 mg daily. Previously on HCQ (effective, stopped when doing well). Doing well today with regard to rheumatoid arthritis.   Chronic illness, stable  - Reduce methotrexate from 17.5mg once weekly, to 15mg once weekly  - Continue folic acid 1 mg daily   - Labs now and in 3 months: CBC, Creatinine, Hepatic Panel, ESR, CRP    High risk medication requiring intensive toxicity monitoring at least quarterly: labs ordered include CBC, Creatinine, Hepatic panel to monitor for cytopenia and hepatotoxicity; checking creatinine as it affects clearance of medication.      2. Bilateral knee osteoarthritis / patellofemoral syndrome: Intra-articular steroid injections have been effective.  Repeat injection today as documented in the procedure section.    - COVID-19: has received the Pfizer COVID-19 vaccine on 2/19/2021 and 3/12/2021    Total minutes spent in evaluation with patient, documentation, , and review of pertinent studies and chart notes: 20        Mr. Dash verbalized agreement with and understanding of the rational for the diagnosis and treatment plan.  All questions were answered to best of my ability and the patient's satisfaction. Mr. Dash was advised to contact the clinic with any questions that may arise after the clinic visit.      Thank you for involving me in the care of the patient    Return to clinic: 3-4 months      HPI   Zurdo Dash is a 76 year old male with a medical history significant for hypertension, hyperlipidemia, diabetes, diabetic neuropathy, GERD, and  rheumatoid arthritis who presents for f/u of RA and bilateral knee OA.     Today, 7/5/2021: Doing well at this time with regard to arthritis.  No change in rheumatoid arthritis symptoms since methotrexate dose was reduced.  Still with knee pain that is worse with increased activity, especially with going up and down stairs, and improved with rest.  No swelling or increased warmth of either knee.  Steroid injections to the knees have been very effective and he would like to have this repeated today.  Significant fatigue and he has going to have a cardiac stress test to further evaluate this; he says this is already planned.  He is also going to have a hammertoe operated on.    Denies fevers, chills, nausea, vomiting, constipation, diarrhea. No abdominal pain. No chest pain/pressure, palpitations, or shortness of breath. No oral or nasal sores. No neck pain. No rash. No LE swelling.      Tobacco: None  EtOH: None  Drugs: None    ROS   12 point review of system was completed and negative except as noted in the HPI     Active Problem List     Patient Active Problem List   Diagnosis     Pain in limb     Hyperlipidemia LDL goal <100     Hypertension goal BP (blood pressure) < 140/90     Hypogonadism     Advanced directives, counseling/discussion     Health Care Home     Type 2 diabetes mellitus with diabetic polyneuropathy, without long-term current use of insulin (H)     RBBB (right bundle branch block)     Ex-smoker     Family history of esophageal cancer     Gastroesophageal reflux disease, esophagitis presence not specified     Rheumatoid arthritis involving multiple sites with positive rheumatoid factor (H)     Pulmonary nodule     High risk medication use     Spondylosis of cervical region without myelopathy or radiculopathy     Erectile dysfunction, unspecified erectile dysfunction type     Type 2 diabetes mellitus without retinopathy (H)     Tubulovillous adenoma of colon     Diabetic polyneuropathy associated  with type 2 diabetes mellitus (H)     Chronic bilateral low back pain without sciatica     Migraine equivalent     Posterior vitreous detachment of left eye     Pseudophakia, ou     Eyelid lesion, LLL     Dermatochalasis of both upper eyelids     Bradycardia     Primary osteoarthritis of both knees     Syncope     Trigger finger, acquired     CKD (chronic kidney disease) stage 3, GFR 30-59 ml/min     Abdominal pain, generalized     PAD (peripheral artery disease) (H)     Immunosuppression (H)     Skin ulcer of toe of left foot, limited to breakdown of skin (H)     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 difficile diarrhea     H/O     Cataract 11/18/2011     Diabetic neuropathy (H)     mild, mostly soles and distal forefeet, worse on the left side.     Diverticulitis      ED (erectile dysfunction)      Ex-smoker     QUIT SMOKING FEB 2007     History of ETOH abuse     recovering, sober since 1997     Hyperlipidemia LDL goal <100      Hypertension goal BP (blood pressure) < 140/90      Hypogonadism      Obesity      PAD (peripheral artery disease) (H)     leg cramps, with exertion, no formal diagnosis of PAD and minimal if any symptoms at all.     RA (rheumatoid arthritis) (H)     Dr Bailon     Type 2 diabetes, HbA1c goal < 7% (H) 10/2008    A1C of 7.1 %      Past Surgical History     Past Surgical History:   Procedure Laterality Date     BYPASS GRAFT FEMOROPOPLITEAL Left 1/7/2021    Procedure: LEFT FEMORAL TO ABOVE KNEE POPLITEAL ARTERY BYPASS WITH PTFE VASCULAR GRAFT REMOVABLE RING 6MMX 50CM;  Surgeon: Myra Lopes MD;  Location: [facility] OR     CATARACT IOL, RT/LT       COLONOSCOPY  03/01/2018    [facility]     COMBINED REPAIR PTOSIS WITH BLEPHAROPLASTY BILATERAL Bilateral 8/16/2019    Procedure: BILATERAL UPPER EYELID BLEPHAROPLASTY AND BILATERAL PTOSIS REPAIR;  Surgeon: Janet Garcia MD;  Location: [facility] OR     ENDARTERECTOMY FEMORAL Left 1/7/2021    Procedure: LEFT FEMORAL ENDARTERECTOMY WITH PATCH ANGIOPLASTY PHOTOFIX  0.8 X 8CM;  Surgeon: Myra Lopes MD;  Location: [facility] OR     EXCISE LESION EYELID Left 8/16/2019    Procedure: LEFT LOWER EYELID BIOPSY;  Surgeon: Janet Garcia MD;  Location: [facility] OR     HC INCISION TENDON SHEATH FINGER  4/2009    r hand ring finger     IR LOWER EXTREMITY ANGIOGRAM LEFT  12/17/2020     PHACOEMULSIFICATION WITH STANDARD INTRAOCULAR LENS IMPLANT  02/2019; 3/2019    left eye; right eye     TONSILLECTOMY       Allergy   No Known Allergies  Current Medication List     Current Outpatient Medications   Medication Sig     ACCU-CHEK DAYANARA PLUS test strip TEST THREE TIMES A DAY OR AS DIRECTED     acetaminophen (TYLENOL) 325 MG tablet Take 2 tablets (650 mg) by mouth every 4 hours as needed for other (multimodal surgical pain management along with NSAIDS and opioid medication as indicated based on pain control and physical function.)     aspirin 81 MG tablet Take 1 tablet by mouth every evening      atorvastatin (LIPITOR) 40 MG tablet Take 1 tablet (40 mg) by mouth every evening     blood glucose monitoring (ONE TOUCH DELICA) lancets Use to test blood sugars 3 times daily or as directed.     cetirizine (ZYRTEC) 10 MG tablet Take 10 mg by mouth At Bedtime     clobetasol (TEMOVATE) 0.05 % external ointment Apply topically 2 times daily as needed     diclofenac (VOLTAREN) 1 % topical gel Apply topically 4 times daily as needed for moderate pain     dulaglutide (TRULICITY) 1.5 MG/0.5ML pen Inject 1.5 mg Subcutaneous every 7 days ON FRIDAYS     folic acid (FOLVITE) 1 MG tablet Take 1 tablet (1 mg) by mouth daily     hydrochlorothiazide (MICROZIDE) 12.5 MG capsule Take 1 capsule  (12.5 mg) by mouth daily     lisinopril (ZESTRIL) 10 MG tablet Take 1 tablet (10 mg) by mouth daily     medical cannabis (Patient's own supply) See Admin Instructions (The purpose of this order is to document that the patient reports taking medical cannabis.  This is not a prescription, and is not used to certify that the patient has a qualifying medical condition.)     metFORMIN (GLUCOPHAGE) 500 MG tablet Take 1 tablet (500 mg) by mouth 2 times daily (with meals)     methotrexate sodium 2.5 MG TABS Take 6 tablets (15 mg) by mouth once a week . Take all 6 tablets on the same day of each week.     metoprolol succinate ER (TOPROL-XL) 25 MG 24 hr tablet TAKE TWO TABLETS BY MOUTH ONCE DAILY     omeprazole (PRILOSEC) 40 MG DR capsule TAKE ONE CAPSULE BY MOUTH ONCE DAILY     predniSONE (DELTASONE) 5 MG tablet Take 5-20 mg by mouth daily as needed (Rheumatoid Arthritis Flare ups)     senna-docusate (SENOKOT-S/PERICOLACE) 8.6-50 MG tablet Take 1 tablet by mouth 2 times daily     tiZANidine (ZANAFLEX) 2 MG tablet Take 1 tablet (2 mg) by mouth 3 times daily as needed for muscle spasms     No current facility-administered medications for this visit.          Social History   See HPI    Family History     Family History   Problem Relation Age of Onset     Cerebrovascular Disease Mother      Arthritis Mother      Osteoporosis Mother      Alzheimer Disease Father      Arthritis Father      Cancer Father      Diabetes Maternal Grandmother      Cardiovascular Maternal Grandmother      Other Cancer Brother      Cancer Paternal Aunt      Hypertension No family hx of      Thyroid Disease No family hx of      Glaucoma No family hx of      Macular Degeneration No family hx of        Physical Exam     Temp Readings from Last 3 Encounters:   06/17/21 98  F (36.7  C) (Oral)   04/08/21 97.8  F (36.6  C) (Oral)   02/15/21 97.8  F (36.6  C) (Oral)     BP Readings from Last 5 Encounters:   06/17/21 113/69   06/10/21 124/78   05/18/21 102/70  "  05/13/21 117/68   04/08/21 108/71     Pulse Readings from Last 1 Encounters:   06/17/21 90     Resp Readings from Last 1 Encounters:   04/08/21 16     Estimated body mass index is 29.9 kg/m  as calculated from the following:    Height as of 6/17/21: 1.753 m (5' 9\").    Weight as of 6/17/21: 91.9 kg (202 lb 8 oz).    GEN: NAD. Healthy appearing adult.   HEENT:  Anicteric, noninjected sclera. No obvious external lesions of the ear and nose. Hearing intact.  CV: S1, S2. RRR. No m/r/g  PULM: No increased work of breathing. CTA bilaterally   MSK: MCPs, PIPs, DIPs without swelling or tenderness to palpation.  Wrists without swelling or tenderness to palpation.  Elbows and shoulders without swelling or tenderness to palpation.   Knees with crepitation and medial joint line tenderness but no effusion or increased warmth; no overlying erythema.  Ankles and MTPs without swelling or tenderness to palpation.    SKIN: No rash or jaundice seen  PSYCH: Alert. Appropriate.      Labs / Imaging (select studies)     9/28/1999  [facility]    10/17/2013 Left knee synovial fluid at [facility]: , N 3%, No crystals  RF/CCP  Recent Labs   Lab Test 04/07/16  1149   CCPIGG 64*     CBC  Recent Labs   Lab Test 04/07/21  0958 01/09/21  0709 01/08/21  0708   WBC 7.4 7.5 9.9   RBC 3.55* 2.95* 3.00*   HGB 10.8* 9.1* 9.0*   HCT 33.3* 28.1* 27.6*   MCV 94 95 92   RDW 14.0 14.7 14.3    177 212   MCH 30.4 30.8 30.0   MCHC 32.4 32.4 32.6   NEUTROPHIL 80.7 77.7 88.6   LYMPH 11.6 9.5 6.5   MONOCYTE 6.0 9.5 4.1   EOSINOPHIL 1.2 1.2 0.0   BASOPHIL 0.5 0.8 0.2   ANEU 6.0 5.9 8.8*   ALYM 0.9 0.7* 0.6*   SMITH 0.5 0.7 0.4   AEOS 0.1 0.1 0.0   ABAS 0.0 0.1 0.0     CMP  Recent Labs   Lab Test 04/07/21  0958 01/09/21  0709 01/08/21  0708 01/07/21  0610 12/15/20  1206 12/15/20  1206 12/11/20  1436   NA  --  136  --  138  --  137 141   POTASSIUM  --  4.3 4.7 4.2  --  5.0 5.4*   CHLORIDE  --  108  --  110*  --  108 114*   CO2  --  23  " --  22  --  20 23   ANIONGAP  --  5  --  6  --  9 4   GLC  --  117*  --  144*  --  107* 84   BUN  --  31*  --  37*  --  47* 49*   CR 1.52* 1.39*  --  1.61*   < > 1.60* 1.67*   GFRESTIMATED 44* 49*  --  41*   < > 41* 39*   GFRESTBLACK 50* 56*  --  47*   < > 48* 45*   NEW  --  8.4*  --  8.7  --  9.0 9.0   BILITOTAL 0.4  --   --  0.7  --   --  0.4   ALBUMIN 3.5  --   --  3.3*  --   --  3.7   PROTTOTAL 7.0  --   --  6.5*  --   --  6.7*   ALKPHOS 69  --   --  58  --   --  51   AST 19  --   --  18  --   --  16   ALT 25  --   --  24  --   --  24    < > = values in this interval not displayed.     Calcium/VitaminD  Recent Labs   Lab Test 01/09/21  0709 01/07/21  0610 12/15/20  1206 07/23/13  1016 07/23/13  1016   NEW 8.4* 8.7 9.0   < >  --    VITDT  --   --   --   --  28*    < > = values in this interval not displayed.     ESR/CRP  Recent Labs   Lab Test 04/07/21  0958 12/11/20  1436 07/28/20  1148   SED 43* 33* 15   CRP 11.3* 18.9* 31.8*     Lipid Panel  Recent Labs   Lab Test 01/07/21  0610 12/17/20  0655 12/15/20  1206 06/29/15  1213 06/29/15  1213 08/07/14  1249 07/23/13  1016   CHOL 116 166 180   < > 125 129 153   TRIG 166* 204* 177*   < > 286* 270* 248*   HDL 38* 35* 40   < > 37* 46 41   LDL 45 90 105*   < > 31 29 62   VLDL  --   --   --   --  57* 54* 50*   CHOLHDLRATIO  --   --   --   --  3.4 2.8 3.7   NHDL 78 131* 140*   < >  --   --   --     < > = values in this interval not displayed.     Hepatitis B  Recent Labs   Lab Test 04/07/16  1149   HBCAB Nonreactive   HEPBANG Nonreactive     Hepatitis C  Recent Labs   Lab Test 04/07/16  1149   HCVAB Nonreactive   Assay performance characteristics have not been established for newborns,   infants, and children       HIV Screening  Recent Labs   Lab Test 04/07/16  1149   HIAGAB Nonreactive   HIV-1 p24 Ag & HIV-1/HIV-2 Ab Not Detected           Immunization History     Immunization History   Administered Date(s) Administered     COVID-19,PF,Pfizer 02/19/2021, 03/12/2021      Influenza (H1N1) 01/20/2010     Influenza (High Dose) 3 valent vaccine 09/20/2012, 10/20/2015, 09/20/2016, 08/28/2017, 09/24/2018, 09/18/2019     Influenza (IIV3) PF 10/05/1999, 10/30/2008, 09/28/2011, 10/14/2013, 10/03/2014     Influenza, Quad, High Dose, Pf, 65yr + 09/11/2020     Pneumo Conj 13-V (2010&after) 06/29/2015     Pneumococcal 23 valent 08/19/2010     TD (ADULT, 7+) 08/05/1997, 02/03/2011     TDAP Vaccine (Boostrix) 03/11/2020     Zoster vaccine recombinant adjuvanted (SHINGRIX) 01/03/2020, 03/11/2020     Zoster vaccine, live 04/19/2010       Procedure     Procedure: Steroid injection of the bilateral knees  Indication: Pain, osteoarthritis of both knees    The procedure was explained in detail. Risks including infection, pain, structural damage such as cartilage damage and tendon rupture, fat atrophy, skin hyper-/hypo-pigmentation, and medication reaction was explained. The need for rest of the affected joint for one week after the procedure was explained.  The option of not doing the procedure was also provided. All questions were answered and the patient consented to the procedure.     A time-out was performed and the correct patient, procedure, and laterality were verified.    The right knee was examined and location for injection was identified - anterior medial. The area was cleaned with chlorhexidine, twice.  Ethyl chloride was then used for topical anaesthetic.  Then a mixture of lidocaine 1% 2 mL and Kenalog 40mg was injected into the intra-articular space.     The left knee was examined and location for injection was identified - anterior medial. The area was cleaned with chlorhexidine, twice.  Ethyl chloride was then used for topical anaesthetic.  Then a mixture of lidocaine 1% 2 mL and Kenalog 40mg was injected into the intra-articular space.     The patient tolerated the procedure well. No complications.    1% Lidocaine  : [employer]  Lot #: [id number]  EXPIRATION DATE:  06/2022  NDC: 2291-0743-10    MEDICATION: Triamcinolone 40 mg  LOT #: [id number]  :  [employer]  EXPIRATION DATE:  11/2022  NDC#: [address]-3    MEDICATION: Triamcinolone 40 mg  LOT #: [id number]  :  [employer]  EXPIRATION DATE:  11/2022  NDC#: [address]-7       Chart documentation done in part with Dragon Voice recognition Software. Although reviewed after completion, some word and grammatical error may remain.    Albert Tompkins MD

## 2021-07-05 NOTE — PATIENT INSTRUCTIONS
RHEUMATOLOGY    Dr. Albert Tompkins    [facility]  [address] [place] [address]    Our new phone number for Rheumatology is [phone], this number will be able to help you schedule appointments for Dr. Tompkins or if you have any message you would like sent to us.    Thank you for choosing [facility]!    Eryn Espinoza [facility]

## 2021-07-05 NOTE — TELEPHONE ENCOUNTER
Received notification from lab staff of critical lab result: Hgb 6.8    Ordering provider is Dr. Tompkins in Specialty. Specialty staff gone for the day and unable to page on-call provider for Rheumatology, so huddled with patient's PCP, Dr. Duckworth. He stated that patient should go to nearest ER for evaluation and treatment.     Notified patient and patient's wife of this. They verbalized understanding. Patient's wife will drive him. He is in agreement with going to emergency room and will leave right now.    Katina Gutierrez, MANJUN RN  [facility]

## 2021-07-07 ENCOUNTER — TELEPHONE (OUTPATIENT)
Dept: RHEUMATOLOGY | Facility: CLINIC | Age: 76
End: 2021-07-07

## 2021-07-07 ENCOUNTER — TELEPHONE (OUTPATIENT)
Dept: INTERNAL MEDICINE | Facility: CLINIC | Age: 76
End: 2021-07-07

## 2021-07-07 ENCOUNTER — NURSE TRIAGE (OUTPATIENT)
Dept: NURSING | Facility: CLINIC | Age: 76
End: 2021-07-07

## 2021-07-07 DIAGNOSIS — Z79.899 HIGH RISK MEDICATION USE: Primary | ICD-10-CM

## 2021-07-07 LAB
BASOPHILS # BLD AUTO: 0 10E9/L (ref 0–0.2)
BASOPHILS NFR BLD AUTO: 1 %
DIFFERENTIAL METHOD BLD: ABNORMAL
EOSINOPHIL # BLD AUTO: 0.1 10E9/L (ref 0–0.7)
EOSINOPHIL NFR BLD AUTO: 2 %
ERYTHROCYTE [DISTWIDTH] IN BLOOD BY AUTOMATED COUNT: 17.3 % (ref 10–15)
HCT VFR BLD AUTO: 21.6 % (ref 40–53)
HGB BLD-MCNC: 6.9 G/DL (ref 13.3–17.7)
LYMPHOCYTES # BLD AUTO: 0.7 10E9/L (ref 0.8–5.3)
LYMPHOCYTES NFR BLD AUTO: 19 %
MCH RBC QN AUTO: 31.4 PG (ref 26.5–33)
MCHC RBC AUTO-ENTMCNC: 31.9 G/DL (ref 31.5–36.5)
MCV RBC AUTO: 98 FL (ref 78–100)
MONOCYTES # BLD AUTO: 0.4 10E9/L (ref 0–1.3)
MONOCYTES NFR BLD AUTO: 10 %
NEUTROPHILS # BLD AUTO: 2.5 10E9/L (ref 1.6–8.3)
NEUTROPHILS NFR BLD AUTO: 68 %
PLATELET # BLD AUTO: 96 10E9/L (ref 150–450)
PLATELET # BLD EST: ABNORMAL 10*3/UL
RBC # BLD AUTO: 2.2 10E12/L (ref 4.4–5.9)
WBC # BLD AUTO: 3.7 10E9/L (ref 4–11)

## 2021-07-07 NOTE — TELEPHONE ENCOUNTER
Received call from pt's spouse, Yoli. She states that is currently at [facility]. He is being discharged today. He was told by the doctor at the hospital that his B12 level is low andhe will need a shot next week and then weekly. She is wondering how they should go about doing this. Advised visit for hospital f/u. She verbalized understanding and scheduled appt with Dr. Mendoza for patient for 07/09.

## 2021-07-07 NOTE — TELEPHONE ENCOUNTER
"Patient was recently released from the hospital and \"lots of changes were made.\" Wife states that [facility] would sent discharge notes.     Patient's wife wanted to schedule an appt for patient within the next 5 days. Patient was advised that first available appt is in October. Patient did not schedule but would like to talk to Dr. Tompkins    Patient would like a call from clinical staff to discuss changes that were made and how this will impact rheum treatment.       "

## 2021-07-07 NOTE — TELEPHONE ENCOUNTER
Copied labs from [facility] below and forwarded message to Dr. Tompkins to advise on use of methotrexate.          Adam VEGA RN....7/7/2021 1:15 PM      no cyanosis

## 2021-07-07 NOTE — TELEPHONE ENCOUNTER
RN: Please call to notify Zurdo Dash that he should discontinue methotrexate in the current setting of the low platelets and low hemoglobin.  Please call his pharmacy to cancel any additional methotrexate refills.  We can follow-up next week on Wednesday, 7/14/2021 at 3:40 PM in clinic - please notify him of the appointment. Labs should be repeated the morning of 7/14/2021.     Albert Tompkins MD  7/7/2021 4:24 PM

## 2021-07-07 NOTE — TELEPHONE ENCOUNTER
76 year male with diabetes mellitus II, pad s/p fem-pop bypass, bradycardia and syncope s/p pacemaker, recurrent epistaxis, admitted to [facility] with anemia.  His wife calls because the hematologist at [facility] is concerned about continuing Methotrexate.   Yoli his wife is calling  Dr. Tompkins to see if it is ok to stop Methotrexate?  She states his HGB is low and kidney functions are off because of the Methotrexate    Will forward to Dr. English team..  Please call her back ASAP to discuss

## 2021-07-08 ENCOUNTER — TELEPHONE (OUTPATIENT)
Dept: RHEUMATOLOGY | Facility: CLINIC | Age: 76
End: 2021-07-08

## 2021-07-08 NOTE — TELEPHONE ENCOUNTER
Called pt and wife and went over the below. Labs scheduled per MD. Spoke with pharmacy to discontinue methotrexate.      Sabrina HEATH RN Specialty Triage 7/8/2021 9:36 AM

## 2021-07-08 NOTE — TELEPHONE ENCOUNTER
[facility] Call Center    Phone Message    May a detailed message be left on voicemail: yes     Reason for Call: Other: Provider communication      Dr. Naseem Barber @ [facility] would like a call back from Dr. Tompkins and can be reached at [phone].    Action Taken: Other: [facility] Rheum    Travel Screening: Not Applicable

## 2021-07-08 NOTE — TELEPHONE ENCOUNTER
Patient was contacted regarding message below. See nurse triage encounter dated 7/7/21.    Adam VEGA RN....7/8/2021 4:19 PM

## 2021-07-08 NOTE — TELEPHONE ENCOUNTER
"Not sure if he was called with this message:      \"RN: Please call to notify Zurdo Dash that he should discontinue methotrexate in the current setting of the low platelets and low hemoglobin.  Please call his pharmacy to cancel any additional methotrexate refills.  We can follow-up next week on Wednesday, 7/14/2021 at 3:40 PM in clinic - please notify him of the appointment. Labs should be repeated the morning of 7/14/2021.      Albert Tompkins MD  7/7/2021 4:24 PM\"    Regarding medication changes: stop methotrexate.  I suspect that the reduced renal function resulted in reduced clearance of methotrexate and subsequent bone marrow suppression.  I anticipate this will improve with being off methotrexate.  Labs should be done to reassess as noted above.     Albert Tompkins MD  7/8/2021 4:15 PM    "

## 2021-07-09 ENCOUNTER — OFFICE VISIT (OUTPATIENT)
Dept: FAMILY MEDICINE | Facility: CLINIC | Age: 76
End: 2021-07-09
Payer: COMMERCIAL

## 2021-07-09 VITALS
DIASTOLIC BLOOD PRESSURE: 62 MMHG | SYSTOLIC BLOOD PRESSURE: 112 MMHG | TEMPERATURE: 97.7 F | WEIGHT: 189 LBS | HEART RATE: 92 BPM | BODY MASS INDEX: 27.91 KG/M2 | OXYGEN SATURATION: 99 %

## 2021-07-09 DIAGNOSIS — D69.6 THROMBOCYTOPENIA (H): ICD-10-CM

## 2021-07-09 DIAGNOSIS — I10 HTN, GOAL BELOW 140/90: ICD-10-CM

## 2021-07-09 DIAGNOSIS — D51.9 ANEMIA DUE TO VITAMIN B12 DEFICIENCY, UNSPECIFIED B12 DEFICIENCY TYPE: ICD-10-CM

## 2021-07-09 DIAGNOSIS — Z09 HOSPITAL DISCHARGE FOLLOW-UP: Primary | ICD-10-CM

## 2021-07-09 DIAGNOSIS — R19.5 POSITIVE FIT (FECAL IMMUNOCHEMICAL TEST): ICD-10-CM

## 2021-07-09 DIAGNOSIS — N18.31 STAGE 3A CHRONIC KIDNEY DISEASE (H): ICD-10-CM

## 2021-07-09 DIAGNOSIS — E11.9 TYPE 2 DIABETES MELLITUS WITHOUT COMPLICATION, WITHOUT LONG-TERM CURRENT USE OF INSULIN (H): ICD-10-CM

## 2021-07-09 PROCEDURE — 99495 TRANSJ CARE MGMT MOD F2F 14D: CPT | Performed by: FAMILY MEDICINE

## 2021-07-09 RX ORDER — CLOPIDOGREL BISULFATE 75 MG/1
TABLET ORAL
COMMUNITY
Start: 2021-06-22 | End: 2021-07-20

## 2021-07-09 RX ORDER — CYANOCOBALAMIN 1000 UG/ML
1000 INJECTION, SOLUTION INTRAMUSCULAR; SUBCUTANEOUS
COMMUNITY
Start: 2021-07-14 | End: 2021-08-03

## 2021-07-09 NOTE — TELEPHONE ENCOUNTER
I called and spoke with Dr. Barber.  We discussed that the pancytopenia could be methotrexate related, possibly related to what ever is causing his creatinine elevation.  He agrees with remaining off of methotrexate, rechecking labs, and if no improvement after about 6 weeks then he is going to consider bone marrow biopsy.  I appreciate the open communication.    Albret Tompkins MD  7/9/2021 11:53 AM

## 2021-07-09 NOTE — PROGRESS NOTES
Assessment & Plan   Zurdo is a 75 yo M with htn, hpld, DM2, RBBB, pacmaker, gerd, former smoker, RA. hypogonadism, chronic back pain, OA knees, pad here for hospital discharge follow-up.    Hospital discharge follow-up    Improving though still weak and getting SOB easily; discussed fact that anemia can cause the symptoms and should improve gradually, but if worsen will call back    Anemia due to bleeding and vitamin B12 deficiency.    - had bleeding and found to have B12 deficiency    HTN, goal below 140/90   -  BP at goal, while ACEI and diuretic held. Will continue to monitor as await labs. Will consider alternative blood pressure medications given degree of renal dysfunction if persist(amlodipine vs changing metoprolol to carvedilol).    Stage 3a chronic kidney disease    Discussed CKD in detail and recent decline and hope that this will likely return to baseline and the reason for holding Metformin, hydrochlorothiazide and ACEI and may reinstate if renal function reverts to baseline.    -   Cr increased to 3.46, baseline around 1.4-1.6 and improving on follow-up labs.    Positive FIT (fecal immunochemical test)     -  Has upcoming colonoscopy    Diabetes Mellitis     - Well controlled on Trulicity and Metformin. Given renal function decline; will hold Metformin and start Glimepiride as await labs; If creatine does return to baseline will consider discontinuing Metformin.    Thrombocytopenia:   Platelets was low at 76, likely from bone marrow suppression. Will recheck while off methotraxate.    Return in about 5 days (around 7/14/2021) for Follow up for symptoms recheck.    Nadlo Mendoza MD  [facility]    Subjective   Zurdo is a 76 year old who presents for the following health issues  accompanied by his spouse and daughter.     [facility]   Hospital Problem List   Principal Problem:  Anemia: Hgb 7.8  Active Problems:  Bradycardia  Rheumatoid arthritis(714.0)  PAD (peripheral artery  disease) (HC)  Type 2 diabetes mellitus, without long-term current use of insulin (HC)  CKD (chronic kidney disease) stage 3, GFR 30-59 ml/min (HC)  HTN (hypertension)  Pancytopenia (HC)  RAEGAN (acute kidney injury) (HC): Creat  2.38 and GFR 27  Epistaxis  Opioid dependence (HC)       Hospital Follow-up Visit:    Hospital/Nursing Home/IP Rehab Facility: [facility]  Date of Admission: 7/6/21  Date of Discharge: 7/7/21  Reason(s) for Admission: B12 deficiency       Was your hospitalization related to COVID-19? No   Problems taking medications regularly:  None  Medication changes since discharge: None  Problems adhering to non-medication therapy:  None    Summary of hospitalization:  CareEverywhere information obtained and reviewed  Diagnostic Tests/Treatments reviewed.  Follow up needed: none  Other Healthcare Providers Involved in Patient s Care:         None  Update since discharge: stable.   Post Discharge Medication Reconciliation: discharge medications reconciled and changed, per note/orders.  Plan of care communicated with patient and family        Summary:  Anemia and thrombocytopenia:  Patient was found to have severe anemia with a Hgb of 6.5, WBC 4.5 and plt 76.   Etiology was unclear, differential considered included bone marrow suppression by MTX vs myelodysplastic process vs medication induced. Hematology consult and felt pancytopenia was likely multifactorial. Noted to have evidence of B12 deficiency and started on B12 supplement to continue weekly injections for the next 4 weeks.   With hold MTX and follow up with hematology and rheumatology in 2 to 4 weeks     Positive fecal occult.   -  does have a colonoscopy already scheduled an outpatient     ASA:   Will continue aspirin at this time given patient's peripheral arterial disease and recent vascular procedure in January 2021.    RAEGAN    Cr increased to 3.46, baseline around 1.4-1.6 and improving on follow-up labs.    RAEGAN likely secondary to severe anemia with  hypoperfusion and concomitant use of ACE inhibitor and hydrochlorothiazide and Metformin    Metformin is held until labs and will do Glimepiride 2 mg in the meantime.       Stopped lisinopril and hydrochlorothiazide; will consider alternative blood pressure medications given degree of renal dysfunction (amlodipine vs changing metoprolol to carvedilol).    ECHO:    supposed to have Lexiscan stress test on 7/7 as part of evaluation of shortness of breath    Follow-up labs/imaging: BMP and CBC in 5-7 days to make sure improving.       Review of Systems   HEENT, cardiovascular, pulmonary, gi and gu systems are negative, except as otherwise noted.      Objective    /62   Pulse 92   Temp 97.7  F (36.5  C)   Wt 85.7 kg (189 lb)   SpO2 99%   BMI 27.91 kg/m    Body mass index is 27.91 kg/m .  Physical Exam   GENERAL: Sitting in wheelchair, healthy looking, alert and no distress  RESP: lungs clear to auscultation - no rales, rhonchi or wheezes  CV: regular rate and rhythm, no murmur  MS: no gross musculoskeletal defects noted, no edema

## 2021-07-10 RX ORDER — GLIMEPIRIDE 2 MG/1
2 TABLET ORAL
Qty: 10 TABLET | Refills: 0 | Status: SHIPPED | OUTPATIENT
Start: 2021-07-10 | End: 2021-07-29

## 2021-07-14 ENCOUNTER — LAB (OUTPATIENT)
Dept: LAB | Facility: CLINIC | Age: 76
End: 2021-07-14
Payer: COMMERCIAL

## 2021-07-14 ENCOUNTER — OFFICE VISIT (OUTPATIENT)
Dept: RHEUMATOLOGY | Facility: CLINIC | Age: 76
End: 2021-07-14
Payer: COMMERCIAL

## 2021-07-14 ENCOUNTER — TELEPHONE (OUTPATIENT)
Dept: RHEUMATOLOGY | Facility: CLINIC | Age: 76
End: 2021-07-14

## 2021-07-14 VITALS — DIASTOLIC BLOOD PRESSURE: 71 MMHG | OXYGEN SATURATION: 97 % | SYSTOLIC BLOOD PRESSURE: 122 MMHG | HEART RATE: 67 BPM

## 2021-07-14 DIAGNOSIS — M05.79 RHEUMATOID ARTHRITIS INVOLVING MULTIPLE SITES WITH POSITIVE RHEUMATOID FACTOR (H): Primary | ICD-10-CM

## 2021-07-14 DIAGNOSIS — Z79.899 HIGH RISK MEDICATION USE: ICD-10-CM

## 2021-07-14 DIAGNOSIS — E53.8 B12 DEFICIENCY: ICD-10-CM

## 2021-07-14 DIAGNOSIS — D75.9 CYTOPENIA: ICD-10-CM

## 2021-07-14 PROCEDURE — 85025 COMPLETE CBC W/AUTO DIFF WBC: CPT

## 2021-07-14 PROCEDURE — 80053 COMPREHEN METABOLIC PANEL: CPT | Performed by: INTERNAL MEDICINE

## 2021-07-14 PROCEDURE — 96372 THER/PROPH/DIAG INJ SC/IM: CPT | Performed by: INTERNAL MEDICINE

## 2021-07-14 PROCEDURE — 36415 COLL VENOUS BLD VENIPUNCTURE: CPT

## 2021-07-14 PROCEDURE — 99214 OFFICE O/P EST MOD 30 MIN: CPT | Mod: 25 | Performed by: INTERNAL MEDICINE

## 2021-07-14 RX ORDER — CYANOCOBALAMIN 1000 UG/ML
1000 INJECTION, SOLUTION INTRAMUSCULAR; SUBCUTANEOUS
Status: COMPLETED | OUTPATIENT
Start: 2021-07-14 | End: 2021-07-14

## 2021-07-14 RX ADMIN — CYANOCOBALAMIN 1000 MCG: 1000 INJECTION, SOLUTION INTRAMUSCULAR; SUBCUTANEOUS at 16:35

## 2021-07-14 NOTE — NURSING NOTE
RAPID3 (0-30) Cumulative Score  11.5          RAPID3 Weighted Score (divide #4 by 3 and that is the weighted score)  3.8

## 2021-07-14 NOTE — PROGRESS NOTES
Rheumatology Clinic Visit      Zurdo Dash MRN# [id number]   YOB: 1945 Age: 76 year old      Date of visit: 7/14/21   PCP: Dr. Kapil Duckworth     Chief Complaint   Patient presents with:  Rheumatoid Arthritis    Assessment and Plan     1. Seropositive nonerosive rheumatoid arthritis (, CCP 64): Previously on HCQ (effective, stopped when doing well). Doing well today with regard to rheumatoid arthritis.   Most recently on methotrexate 15 mg once weekly, but this is on hold due to cytopenias likely secondary to CKD.  Unclear why the acute worsening of his creatinine; he has had several medications stopped by Dr. Mendoza to address this.  Continue following with his PCP for management of the CKD.  Stay off methotrexate and continue monitoring CBC that has already showed improvement.  Also following now with Dr. Naseem Barber (oncology) who will consider BM biopsy if counts do not continue to improve.  Per patient and Dr. Barber's note, Zurdo needs B12 today and this was given by Sabrina.   Chronic illness, side effect from treatment.   - DO NOT TAKE methotrexate  - Continue folic acid 1 mg daily   - Labs now: CMP     2. Bilateral knee osteoarthritis / patellofemoral syndrome: Intra-articular steroid injections have been effective.  Repeat injection last administered 7/5/2021    3.  Cough: Reportedly present for 1 month.  Advised that he follow-up with his PCP for further evaluation    4.  Cytopenias: Likely secondary to methotrexate toxicity secondary to acute kidney injury.  Now off of hydrochlorothiazide, lisinopril, Metformin, and methotrexate.  CBC has improved.  Needs reassessment of kidney function.  See #1    5.  CKD: Continue following with PCP.  Unclear why the acute worsening of his kidney function.      - COVID-19: has received the Pfizer COVID-19 vaccine on 2/19/2021 and 3/12/2021    Total minutes spent in evaluation with patient, documentation, , and review of pertinent  studies and chart notes: 34      Mr. Dash verbalized agreement with and understanding of the rational for the diagnosis and treatment plan.  All questions were answered to best of my ability and the patient's satisfaction. Mr. Dash was advised to contact the clinic with any questions that may arise after the clinic visit.      Thank you for involving me in the care of the patient    Return to clinic: 3-4 months      HPI   Zurdo Dash is a 76 year old male with a medical history significant for hypertension, hyperlipidemia, diabetes, diabetic neuropathy, GERD, and rheumatoid arthritis who presents for f/u of RA, sooner than previously scheduled because of recent hospitalization    Today, 7/14/2021: Last seen on 7/5/2021 when he was doing well.  After that visit he went to the lab for toxicity monitoring and was found to have a low white blood cell count of 3.7, hemoglobin 6.9, platelets 96, and a creatinine of 2.84.  He was admitted to the hospital where he had a hematology evaluation.  He was told to use vitamin B12 1000 mcg once weekly; he asked that this be given to him today and was done so by DECLAN Bennett.  It was thought to be methotrexate bone marrow suppression secondary to kidney dysfunction.  Unclear why the kidneys have worsened.  Feeling weak at this time.  Using a wheelchair.  Has recently seen Dr. Mendoza who stopped hydrochlorothiazide, lisinopril, and Metformin due to the CKD.  He had to delay his cardiac stress test because of this hospitalization but will reach out to the ordering provider to have this rescheduled.  Notes that he has a cough at this time, and has been present for about 1 month.    Denies fevers, chills, nausea, vomiting, constipation, diarrhea. No abdominal pain. No chest pain/pressure, palpitations, or shortness of breath. No oral or nasal sores. No neck pain. No rash. No LE swelling.      Tobacco: None  EtOH: None  Drugs: None    ROS   12 point review of system was completed  and negative except as noted in the HPI     Active Problem List     Patient Active Problem List   Diagnosis     Pain in limb     Hyperlipidemia LDL goal <100     Hypertension goal BP (blood pressure) < 140/90     Hypogonadism     Advanced directives, counseling/discussion     Health Care Home     Type 2 diabetes mellitus with diabetic polyneuropathy, without long-term current use of insulin (H)     RBBB (right bundle branch block)     Ex-smoker     Family history of esophageal cancer     Gastroesophageal reflux disease, esophagitis presence not specified     Rheumatoid arthritis involving multiple sites with positive rheumatoid factor (H)     Pulmonary nodule     High risk medication use     Spondylosis of cervical region without myelopathy or radiculopathy     Erectile dysfunction, unspecified erectile dysfunction type     Type 2 diabetes mellitus without retinopathy (H)     Tubulovillous adenoma of colon     Diabetic polyneuropathy associated with type 2 diabetes mellitus (H)     Chronic bilateral low back pain without sciatica     Migraine equivalent     Posterior vitreous detachment of left eye     Pseudophakia, ou     Eyelid lesion, LLL     Dermatochalasis of both upper eyelids     Bradycardia     Primary osteoarthritis of both knees     Syncope     Trigger finger, acquired     CKD (chronic kidney disease) stage 3, GFR 30-59 ml/min     Abdominal pain, generalized     PAD (peripheral artery disease) (H)     Immunosuppression (H)     Skin ulcer of toe of left foot, limited to breakdown of skin (H)     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 difficile diarrhea     H/O     Cataract 11/18/2011     Diabetic  neuropathy (H)     mild, mostly soles and distal forefeet, worse on the left side.     Diverticulitis      ED (erectile dysfunction)      Ex-smoker     QUIT SMOKING FEB 2007     History of ETOH abuse     recovering, sober since 1997     Hyperlipidemia LDL goal <100      Hypertension goal BP (blood pressure) < 140/90      Hypogonadism      Obesity      PAD (peripheral artery disease) (H)     leg cramps, with exertion, no formal diagnosis of PAD and minimal if any symptoms at all.     RA (rheumatoid arthritis) (H)     Dr Bailon     Type 2 diabetes, HbA1c goal < 7% (H) 10/2008    A1C of 7.1 %      Past Surgical History     Past Surgical History:   Procedure Laterality Date     BYPASS GRAFT FEMOROPOPLITEAL Left 1/7/2021    Procedure: LEFT FEMORAL TO ABOVE KNEE POPLITEAL ARTERY BYPASS WITH PTFE VASCULAR GRAFT REMOVABLE RING 6MMX 50CM;  Surgeon: Myra Lopes MD;  Location: [facility] OR     CATARACT IOL, RT/LT       COLONOSCOPY  03/01/2018    [facility]     COMBINED REPAIR PTOSIS WITH BLEPHAROPLASTY BILATERAL Bilateral 8/16/2019    Procedure: BILATERAL UPPER EYELID BLEPHAROPLASTY AND BILATERAL PTOSIS REPAIR;  Surgeon: Janet Garcia MD;  Location: [facility] OR     ENDARTERECTOMY FEMORAL Left 1/7/2021    Procedure: LEFT FEMORAL ENDARTERECTOMY WITH PATCH ANGIOPLASTY PHOTOFIX  0.8 X 8CM;  Surgeon: Myra Lopes MD;  Location: [facility] OR     EXCISE LESION EYELID Left 8/16/2019    Procedure: LEFT LOWER EYELID BIOPSY;  Surgeon: Janet Garcia MD;  Location: [facility] OR     HC INCISION TENDON SHEATH FINGER  4/2009    r hand ring finger     IR LOWER EXTREMITY ANGIOGRAM LEFT  12/17/2020     PHACOEMULSIFICATION WITH STANDARD INTRAOCULAR LENS IMPLANT  02/2019; 3/2019    left eye; right eye     TONSILLECTOMY       Allergy   No Known Allergies  Current Medication List     Current Outpatient Medications   Medication Sig     acetaminophen (TYLENOL) 325 MG tablet Take 2 tablets (650 mg) by mouth every 4 hours as needed for other  (multimodal surgical pain management along with NSAIDS and opioid medication as indicated based on pain control and physical function.)     aspirin 81 MG tablet Take 1 tablet by mouth every evening      atorvastatin (LIPITOR) 40 MG tablet Take 1 tablet (40 mg) by mouth every evening     cetirizine (ZYRTEC) 10 MG tablet Take 10 mg by mouth At Bedtime     clobetasol (TEMOVATE) 0.05 % external ointment Apply topically 2 times daily as needed     clopidogrel (PLAVIX) 75 MG tablet      cyanocobalamin (CYANOCOBALAMIN) 1000 MCG/ML injection Inject 1,000 mcg into the muscle     diclofenac (VOLTAREN) 1 % topical gel Apply topically 4 times daily as needed for moderate pain     dulaglutide (TRULICITY) 1.5 MG/0.5ML pen Inject 1.5 mg Subcutaneous every 7 days ON FRIDAYS     folic acid (FOLVITE) 1 MG tablet Take 1 tablet (1 mg) by mouth daily     glimepiride (AMARYL) 2 MG tablet Take 1 tablet (2 mg) by mouth every morning (before breakfast)     medical cannabis (Patient's own supply) See Admin Instructions (The purpose of this order is to document that the patient reports taking medical cannabis.  This is not a prescription, and is not used to certify that the patient has a qualifying medical condition.)     metoprolol succinate ER (TOPROL-XL) 25 MG 24 hr tablet TAKE TWO TABLETS BY MOUTH ONCE DAILY     omeprazole (PRILOSEC) 40 MG DR capsule TAKE ONE CAPSULE BY MOUTH ONCE DAILY     predniSONE (DELTASONE) 5 MG tablet Take 5-20 mg by mouth daily as needed (Rheumatoid Arthritis Flare ups)     senna-docusate (SENOKOT-S/PERICOLACE) 8.6-50 MG tablet Take 1 tablet by mouth 2 times daily     tiZANidine (ZANAFLEX) 2 MG tablet Take 1 tablet (2 mg) by mouth 3 times daily as needed for muscle spasms     ACCU-CHEK DAYANARA PLUS test strip TEST THREE TIMES A DAY OR AS DIRECTED     blood glucose monitoring (ONE TOUCH DELICA) lancets Use to test blood sugars 3 times daily or as directed.     No current facility-administered medications for this  "visit.         Social History   See HPI    Family History     Family History   Problem Relation Age of Onset     Cerebrovascular Disease Mother      Arthritis Mother      Osteoporosis Mother      Alzheimer Disease Father      Arthritis Father      Cancer Father      Diabetes Maternal Grandmother      Cardiovascular Maternal Grandmother      Other Cancer Brother      Cancer Paternal Aunt      Hypertension No family hx of      Thyroid Disease No family hx of      Glaucoma No family hx of      Macular Degeneration No family hx of        Physical Exam     Temp Readings from Last 3 Encounters:   07/09/21 97.7  F (36.5  C)   06/17/21 98  F (36.7  C) (Oral)   04/08/21 97.8  F (36.6  C) (Oral)     BP Readings from Last 5 Encounters:   07/14/21 122/71   07/09/21 112/62   07/05/21 96/61   06/17/21 113/69   06/10/21 124/78     Pulse Readings from Last 1 Encounters:   07/14/21 67     Resp Readings from Last 1 Encounters:   04/08/21 16     Estimated body mass index is 27.91 kg/m  as calculated from the following:    Height as of 7/5/21: 1.753 m (5' 9\").    Weight as of 7/9/21: 85.7 kg (189 lb).    GEN: NAD. Appears tired  HEENT:  Anicteric, noninjected sclera. No obvious external lesions of the ear and nose. Hearing intact.  CV: S1, S2. RRR. No m/r/g  PULM: No increased work of breathing. CTA bilaterally   SKIN: No rash or jaundice seen  PSYCH: Alert. Appropriate.      Labs / Imaging (select studies)     9/28/1999  ([facility])    RF/CCP  Recent Labs   Lab Test 04/07/16  1149   CCPIGG 64*     CBC  Recent Labs   Lab Test 07/14/21  0924 07/05/21  1532 04/07/21  0958   WBC 7.0 3.7* 7.4   RBC 2.72* 2.20* 3.55*   HGB 8.4* 6.9* 10.8*   HCT 25.8* 21.6* 33.3*   MCV 95 98 94   RDW 17.1* 17.3* 14.0   * 96* 342   MCH 30.9 31.4 30.4   MCHC 32.6 31.9 32.4   NEUTROPHIL 75 68.0 80.7   LYMPH 7 19.0 11.6   MONOCYTE 15 10.0 6.0   EOSINOPHIL 2 2.0 1.2   BASOPHIL 0 1.0 0.5   ANEU 5.3 2.5 6.0   ALYM 0.5* 0.7* 0.9   SMITH 1.1 0.4 0.5 "   AEOS 0.1 0.1 0.1   ABAS 0.0 0.0 0.0     CMP  Recent Labs   Lab Test 07/05/21  1532 04/07/21  0958 01/09/21  0709 01/08/21  0708 01/07/21  0610 12/15/20  1206   NA  --   --  136  --  138 137   POTASSIUM  --   --  4.3 4.7 4.2 5.0   CHLORIDE  --   --  108  --  110* 108   CO2  --   --  23  --  22 20   ANIONGAP  --   --  5  --  6 9   GLC  --   --  117*  --  144* 107*   BUN  --   --  31*  --  37* 47*   CR 2.84* 1.52* 1.39*  --  1.61* 1.60*   GFRESTIMATED 20* 44* 49*  --  41* 41*   GFRESTBLACK 24* 50* 56*  --  47* 48*   NEW  --   --  8.4*  --  8.7 9.0   BILITOTAL 0.8 0.4  --   --  0.7  --    ALBUMIN 3.5 3.5  --   --  3.3*  --    PROTTOTAL 6.9 7.0  --   --  6.5*  --    ALKPHOS 85 69  --   --  58  --    AST 12 19  --   --  18  --    ALT 22 25  --   --  24  --      Calcium/VitaminD  Recent Labs   Lab Test 01/09/21  0709 01/07/21  0610 12/15/20  1206 07/23/13  1016   NEW 8.4* 8.7 9.0  --    VITDT  --   --   --  28*     ESR/CRP  Recent Labs   Lab Test 07/05/21  1532 04/07/21  0958 12/11/20  1436   SED 83* 43* 33*   CRP 37.1* 11.3* 18.9*     Hepatitis B  Recent Labs   Lab Test 04/07/16  1149   HBCAB Nonreactive   HEPBANG Nonreactive     Hepatitis C  Recent Labs   Lab Test 04/07/16  1149   HCVAB Nonreactive   Assay performance characteristics have not been established for newborns,   infants, and children       HIV Screening  Recent Labs   Lab Test 04/07/16  1149   HIAGAB Nonreactive   HIV-1 p24 Ag & HIV-1/HIV-2 Ab Not Detected       Immunization History     Immunization History   Administered Date(s) Administered     COVID-19,PF,Pfizer 02/19/2021, 03/12/2021     Influenza (H1N1) 01/20/2010     Influenza (High Dose) 3 valent vaccine 09/20/2012, 10/20/2015, 09/20/2016, 08/28/2017, 09/24/2018, 09/18/2019     Influenza (IIV3) PF 10/05/1999, 10/30/2008, 09/28/2011, 10/14/2013, 10/03/2014     Influenza, Quad, High Dose, Pf, 65yr + 09/11/2020     Pneumo Conj 13-V (2010&after) 06/29/2015     Pneumococcal 23 valent 08/19/2010     TD  (ADULT, 7+) 08/05/1997, 02/03/2011     TDAP Vaccine (Boostrix) 03/11/2020     Tdap (Adacel,Boostrix) 03/11/2020     Zoster vaccine recombinant adjuvanted (SHINGRIX) 01/03/2020, 03/11/2020     Zoster vaccine, live 04/19/2010          Chart documentation done in part with Dragon Voice recognition Software. Although reviewed after completion, some word and grammatical error may remain.    Albert Tompkins MD

## 2021-07-14 NOTE — TELEPHONE ENCOUNTER
RN: Please call to notify Zurdo Hardy that the preliminary results on the CBC show a normalized WBC, improved hemoglobin to 8.4, and improved platelets of 140.      Albert Tompkins MD  7/14/2021 4:46 PM

## 2021-07-14 NOTE — Clinical Note
Cytopenias likely secondary to methotrexate toxicity, secondary to RAEGAN.  Preliminary results on the CBC are improved.  I do not know the cause of the RAEGAN

## 2021-07-15 ENCOUNTER — TELEPHONE (OUTPATIENT)
Dept: CARDIOLOGY | Facility: CLINIC | Age: 76
End: 2021-07-15

## 2021-07-15 DIAGNOSIS — Z79.899 HIGH RISK MEDICATIONS (NOT ANTICOAGULANTS) LONG-TERM USE: ICD-10-CM

## 2021-07-15 DIAGNOSIS — D75.9 CYTOPENIA: ICD-10-CM

## 2021-07-15 LAB
ALBUMIN SERPL-MCNC: 3.5 G/DL (ref 3.4–5)
ALP SERPL-CCNC: 67 U/L (ref 40–150)
ALT SERPL W P-5'-P-CCNC: 32 U/L (ref 0–70)
ANION GAP SERPL CALCULATED.3IONS-SCNC: 10 MMOL/L (ref 3–14)
AST SERPL W P-5'-P-CCNC: 22 U/L (ref 0–45)
BILIRUB SERPL-MCNC: 0.7 MG/DL (ref 0.2–1.3)
BUN SERPL-MCNC: 50 MG/DL (ref 7–30)
CALCIUM SERPL-MCNC: 8.8 MG/DL (ref 8.5–10.1)
CHLORIDE BLD-SCNC: 109 MMOL/L (ref 94–109)
CO2 SERPL-SCNC: 17 MMOL/L (ref 20–32)
CREAT SERPL-MCNC: 2.3 MG/DL (ref 0.66–1.25)
GFR SERPL CREATININE-BSD FRML MDRD: 26 ML/MIN/1.73M2
GLUCOSE BLD-MCNC: 126 MG/DL (ref 70–99)
POTASSIUM BLD-SCNC: 5.4 MMOL/L (ref 3.4–5.3)
PROT SERPL-MCNC: 6.8 G/DL (ref 6.8–8.8)
SODIUM SERPL-SCNC: 136 MMOL/L (ref 133–144)

## 2021-07-15 NOTE — CONFIDENTIAL NOTE
Contacted patient who reports that he had to cancelled his lexiscan due to vacation.  Patient prefers to call himself to schedule.  scheuduling # given to patient.    Shweta Edwards RN

## 2021-07-15 NOTE — TELEPHONE ENCOUNTER
Called and informed patient and his wife of results. Wife Yoli is wondering if it is okay for patient to go to [place] tomorrow morning at 6 AM. Forwarded message to Dr. Tompkins.    ( okay)    Adam VEGA RN....7/15/2021 9:02 AM

## 2021-07-16 NOTE — TELEPHONE ENCOUNTER
Called patient and left a detailed message relaying Dr. Tompkins's note below.    Adam VEGA RN....7/16/2021 8:47 AM

## 2021-07-16 NOTE — TELEPHONE ENCOUNTER
RN: When I saw Zurdo Dash in clinic we discussed his plans for travel.  I do not think it is a good idea to travel when feeling unwell, especially in the setting of the COVID19 pandemic.     Albert Tompkins MD  7/15/2021 10:23 PM

## 2021-07-16 NOTE — RESULT ENCOUNTER NOTE
"RN: Please call to notify Zurdo Dash of the following information to ensure that he understands:     [employer] message sent:  \"Mr. Dash,    The creatinine (measure of kidney function) remains elevated.  Labs are suggestive of dehydration.  Glucose is elevated at 126.  Please continue to follow with your primary care provider regarding the elevated creatinine (reduced kidney function).  Please also schedule the next two vitamin B12 shots with your primary care clinic; these were  recommended by Dr. Barber.      I have ordered repeat labs to be done in 2 weeks.    Sincerely,  Albert Tompkins MD  7/15/2021\""

## 2021-07-19 ENCOUNTER — TELEPHONE (OUTPATIENT)
Dept: FAMILY MEDICINE | Facility: CLINIC | Age: 76
End: 2021-07-19

## 2021-07-19 ENCOUNTER — TELEPHONE (OUTPATIENT)
Dept: INTERNAL MEDICINE | Facility: CLINIC | Age: 76
End: 2021-07-19

## 2021-07-19 NOTE — TELEPHONE ENCOUNTER
I am very concerned . None of the laboratory studies are critically serious right now but there some ongoing issues and zI am no more often then sure surgery is appropriate right now.    Did he have the preoperative history and physical examination yet ?    I am thinking he needs occupational therapy be seen by me for a post hospital discharge follow up office visit as soon as possible    Please huddle with me on this case if there's a problem with scheduling aoon appointment     Kapil Duckworth MD

## 2021-07-19 NOTE — TELEPHONE ENCOUNTER
Spoke to patient's wife, Yoli and to patient.    Patient scheduled to see Dr. Duckworth tomorrow for a hospital f/u at 12:10 pm.    Caren Telles,

## 2021-07-19 NOTE — TELEPHONE ENCOUNTER
Daughter Sandhya ulloa, says she knows she is not listed as someone we can talk to but she does have access to Zurdo's mychart.       She says she does not know if she can make it to the visit with Dr. Duckworth tomorrow so wants to pass on information to PCP in case she is not there.    Says patient could not even walk into the clinic last Wednesday for rheumatology appointment.  Had to use a wheelchair due to fatigue.    Has been short of breath for a few months.   They did take him on a airline trip over the weekend to [facility], patient used a wheelchair the whole time and was even having trouble just getting from wheelchair to toilet.    His fatigue and shortness of breath is NOT acutely worse after the trip.   Sandhya says she was with her dad at the 7/14 visit with Dr. Tompkins and Dr. Tompkins felt Zurdo's lungs were clear, advised might need an EKG and to see his PCP for follow up.       Patient had to cancel cardiac stress test (see 7/15 phone call with cardiology) due to being in hospital with low hgb.  That is rescheduled for 8/13/21.    He should get a colonoscopy soon as well per the recent hosp stay for anemia.    Sandhya says she wonders if it is adviseable for patient to proceed with upcoming podiatry surgery, currently planned for 7/27/21 with Dr. Dent.      Routed to Dr. Duckworth as FYI prior to tomorrow's office visit (patient and wife will be there for sure).    Courtney Omalley RN  [facility]

## 2021-07-19 NOTE — TELEPHONE ENCOUNTER
Reason for Call:  Other labs done by Dr. Tompkins    Detailed comments: Patient's wife, Yoli is asking that Dr. Duckworth review the labs that were done 7-14 (Dr. Tompkins) and advise next steps.    Phone Number Patient can be reached at: Home number on file [phone] (home)    Best Time: Patient is scheduled for surgery with Dr. Dent on 7-27.  Has concerns whether he will be able to have the surgery or not.    She states patient is pretty weak, can walk to bathroom and back to his chair but is out of breath and very tired.  Just had a weekend trip to [place] and patient was in a wheelchair the whole time and he was still very tired.    Can we leave a detailed message on this number? YES    Call taken on 7/19/2021 at 10:28 AM by Caren Telles

## 2021-07-19 NOTE — TELEPHONE ENCOUNTER
"I thank daughter for her input. While this is an \"for your information \" chart note update, I am very concerned and will think the surgery needs to be cancelled. So glad we are seeing patient tomorrow     Kapil Duckworth MD    "

## 2021-07-20 ENCOUNTER — OFFICE VISIT (OUTPATIENT)
Dept: INTERNAL MEDICINE | Facility: CLINIC | Age: 76
End: 2021-07-20
Payer: COMMERCIAL

## 2021-07-20 ENCOUNTER — ANCILLARY PROCEDURE (OUTPATIENT)
Dept: GENERAL RADIOLOGY | Facility: CLINIC | Age: 76
End: 2021-07-20
Attending: INTERNAL MEDICINE
Payer: COMMERCIAL

## 2021-07-20 VITALS
SYSTOLIC BLOOD PRESSURE: 129 MMHG | RESPIRATION RATE: 18 BRPM | WEIGHT: 194.6 LBS | OXYGEN SATURATION: 96 % | TEMPERATURE: 98 F | DIASTOLIC BLOOD PRESSURE: 75 MMHG | BODY MASS INDEX: 28.74 KG/M2 | HEART RATE: 100 BPM

## 2021-07-20 DIAGNOSIS — R05.9 COUGH: ICD-10-CM

## 2021-07-20 DIAGNOSIS — E11.42 TYPE 2 DIABETES MELLITUS WITH DIABETIC POLYNEUROPATHY, WITHOUT LONG-TERM CURRENT USE OF INSULIN (H): Primary | ICD-10-CM

## 2021-07-20 DIAGNOSIS — D64.9 ANEMIA, UNSPECIFIED TYPE: ICD-10-CM

## 2021-07-20 DIAGNOSIS — R91.8 PULMONARY NODULES: ICD-10-CM

## 2021-07-20 DIAGNOSIS — N18.32 STAGE 3B CHRONIC KIDNEY DISEASE (H): ICD-10-CM

## 2021-07-20 DIAGNOSIS — M05.79 RHEUMATOID ARTHRITIS INVOLVING MULTIPLE SITES WITH POSITIVE RHEUMATOID FACTOR (H): ICD-10-CM

## 2021-07-20 DIAGNOSIS — E53.8 VITAMIN B12 DEFICIENCY (NON ANEMIC): ICD-10-CM

## 2021-07-20 PROCEDURE — 71046 X-RAY EXAM CHEST 2 VIEWS: CPT | Performed by: RADIOLOGY

## 2021-07-20 PROCEDURE — 99214 OFFICE O/P EST MOD 30 MIN: CPT | Mod: 25 | Performed by: INTERNAL MEDICINE

## 2021-07-20 PROCEDURE — 96372 THER/PROPH/DIAG INJ SC/IM: CPT | Performed by: INTERNAL MEDICINE

## 2021-07-20 RX ORDER — CYANOCOBALAMIN 1000 UG/ML
1000 INJECTION, SOLUTION INTRAMUSCULAR; SUBCUTANEOUS
Status: DISCONTINUED | OUTPATIENT
Start: 2021-07-20 | End: 2021-07-20

## 2021-07-20 RX ORDER — CYANOCOBALAMIN 1000 UG/ML
1000 INJECTION, SOLUTION INTRAMUSCULAR; SUBCUTANEOUS WEEKLY
Status: DISCONTINUED | OUTPATIENT
Start: 2021-07-20 | End: 2022-09-30

## 2021-07-20 RX ADMIN — CYANOCOBALAMIN 1000 MCG: 1000 INJECTION, SOLUTION INTRAMUSCULAR; SUBCUTANEOUS at 13:23

## 2021-07-20 NOTE — PROGRESS NOTES
Chart note addendum - 5:32 PM , 08/02/21 , I spoke with Zurdo by phone and we reviewed the results of his chest CT scan. Concerns are raised per the official overread of the film per radiology that we are dealing with a definite possible malignant process. A follow up positron emission tomography (PET) scan is suggested but in these settings I am most steadied by input from Dr. Graham Rendon, with the [facility] who has an eye towards interventional radiology. I am going to ask Dr. Rendon to review this chest CT scan prior to choosing next step. I informed Zurdo of this plan and he understands it might take a few days to get the plan inocente out.    Otherwise he needed Trulicity (dulaglutide) refilled    Kapil Duckworth MD      Assessment & Plan     Type 2 diabetes mellitus with diabetic polyneuropathy, without long-term current use of insulin (H)  Problem is stable and ongoing monitoring      Stage 3b chronic kidney disease  Worsened kidney function, being followed , improving     Rheumatoid arthritis involving multiple sites with positive rheumatoid factor (H)  Continue current plan of care   With Dr. Albert Tompkins, rheumatologist with 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 We agree to check chest xray today   - XR Chest 2 Views; Future    Vitamin B12 deficiency (non anemic)  Administered as per directions from Dr. Tompkins  - cyanocobalamin injection 1,000 mcg    Pulmonary nodules  This is a chart note addendum - his chest xray showed a pulmonary nodule and a follow up chest CT scan was recommended   - CT Chest w Contrast; Future    Review of the result(s) of each unique test - see hospital discharge summary  Prescription drug management  37 minutes spent on the date of the encounter doing chart review, history and exam, documentation and further activities per the  note  01100}       Return in about 6 weeks (around 8/31/2021).    Kapil Duckworth MD  [facility]    Gilma Renner is a 76 year old who presents for the following health issues   Encounter Diagnoses   Name Primary?     Type 2 diabetes mellitus with diabetic polyneuropathy, without long-term current use of insulin (H) Yes     Stage 3b chronic kidney disease      Rheumatoid arthritis involving multiple sites with positive rheumatoid factor (H)      Anemia, unspecified type      Cough      Vitamin B12 deficiency (non anemic)      Pulmonary nodules         HPI     Today is a follow up office visit since I had this patient sent to the hospital because of wildly abnormal laboratory studies, including a hemoglobin of 6.8 and a creatinine greater then 3. For complete details please see hospital discharge.  Has since the hospital discharge , already seen Dr. Albert Tompkins, rheumatologist with [facility] and his diagnoses were reviewed . 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 His cytopenia's were also considered to be due to chronic kidney disease ?     During an appointment with Naldo Mendoza MD from [facility], several medications were stopped at the post hospital discharge follow up office visit on 7/9/2021    Patient had seen Dr. Tompkins on 7/5/2021 and had laboratory studies drawn that were abnormal, and was sent to the emergency room and was transferred to [facility].     There is a pending appointment with Naseem Perez with [facility] in a few days     Patient has already had a Telephone Encounter with the oncologist- Naseem Barber MD - 07/09/2021 7:58 AM CDT  Formatting of this note might be different from the original.  I spoke to Dr. Irvin this morning and we reviewed Zurdo's case. 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 Zurdo will follow up with Dr. Irvin in the coming weeks. We would expect to see some degree of count recovery in the next 3 weeks or so. Therefore, I would recommend that we have him (Zurdo) return to clinic in mid August for reassessment and count check. If this marrow is still suppressed at that point, then would likely pursue a bone marrow biopsy (see rationale and consult note). Should Dr. Irvin have any concerns at follow-up, then he can reach out to our clinic to get the patient in sooner. You may call our nurse triage line at [phone] or our clinic scheduling line at [phone] to help to arrange follow-up.    It was mentioned in the documentation that because of positive FIT test , patient was to have a colonoscopy and it even says it is scheduled but this is news to patient . This will be postponed for right now    I reviewed multiple laboratory studies and multiple office visit notes     Most recent previous laboratory results are from 7/14/2021, creatinine now 2.3, and gfr [ glomerular filtration rate ] is 26    With the complete blood count with differential , hemoglobin is now 8.4 and platelet count is nearly normal at 140.    Patient feels fatigue but otherwise is doing ok.    Pacemaker running fine, placement secondary to underlying diagnosis of complete heart block     Wt Readings from Last 5 Encounters:   07/20/21 88.3 kg (194 lb 9.6 oz)   07/09/21 85.7 kg (189 lb)   07/05/21 87.5 kg (192 lb 12.8 oz)   06/17/21 91.9 kg (202 lb 8 oz)   06/10/21 89.4 kg (197 lb)     Still has a terrible cough , this has lingered for months and we agreed to look at a chest xray today       Hospital Follow-up Visit:    Hospital/Nursing Home/IP Rehab Facility: [facility]   Date of Admission: 07/05/2021  Date of Discharge: 07/06/2021  Reason(s) for Admission: Severe anemia (Primary Dx);   Frequent nosebleeds      Was your hospitalization  related to COVID-19? No   Problems taking medications regularly:  None  Medication changes since discharge: None  Problems adhering to non-medication therapy:  None    Summary of hospitalization:  CareEverywhere information obtained and reviewed  Diagnostic Tests/Treatments reviewed.  Follow up needed: rheumatology, oncology  Other Healthcare Providers Involved in Patient s Care:         as above  Update since discharge: improved.   Post Discharge Medication Reconciliation: discharge medications reconciled and changed, per note/orders.  Plan of care communicated with patient            Hospital Follow-up Visit:    Hospital/Nursing Home/ Rehab Facility: [facility]   Date of Admission: 07/06/2021  Date of Discharge: 07/07/2021  Reason(s) for Admission: B12 Deficiency      Was your hospitalization related to COVID-19? No   Problems taking medications regularly:  None  Medication changes since discharge: None  Problems adhering to non-medication therapy:  None    Summary of hospitalization:  CareEverywhere information obtained and reviewed  Diagnostic Tests/Treatments reviewed.  Follow up needed: as above    Other Healthcare Providers Involved in Patient s Care:         as above  Update since discharge: improved.       Post Discharge Medication Reconciliation: discharge medications reconciled and changed, per note/orders.  Plan of care communicated with patient            Send message to Dr. Aaron Dent, podiatrist , we cancelled his upcoming foot surgery as he is simply to frail right now. I had a separate staff message communication already with . Dr. Aaron Dent, podiatrist about this. Assuming everything goes Zurdo's way we can revisit the idea of a foot surgery in 2 months or so    Review of Systems   Constitutional, HEENT, cardiovascular, pulmonary, gi and gu systems are negative, except as otherwise noted.      Objective    /75   Pulse 100   Temp 98  F (36.7  C) (Oral)   Resp 18   Wt 88.3  kg (194 lb 9.6 oz)   SpO2 96%   BMI 28.74 kg/m    Body mass index is 28.74 kg/m .  Physical Exam   GENERAL: healthy, alert and no distress, somewhat frail  EYES: Eyes grossly normal to inspection, PERRL and conjunctivae and sclerae normal  NECK: no adenopathy, no asymmetry, masses, or scars and thyroid normal to palpation  RESP: lungs clear to auscultation - no rales, rhonchi or wheezes  CV: regular rate and rhythm, normal S1 S2, no S3 or S4, no murmur, click or rub, no peripheral edema and peripheral pulses strong  MS: musculoskeletal defects consistent with patients long history with rheumatoid arthritis     Orders Placed This Encounter   Procedures     XR Chest 2 Views     CT Chest w Contrast

## 2021-07-21 LAB
BASOPHILS # BLD AUTO: 0.1 10E3/UL (ref 0–0.2)
BASOPHILS # BLD MANUAL: 0 10E3/UL (ref 0–0.2)
BASOPHILS NFR BLD AUTO: 1 %
BASOPHILS NFR BLD MANUAL: 0 %
ELLIPTOCYTES BLD QL SMEAR: SLIGHT
EOSINOPHIL # BLD AUTO: 0.2 10E3/UL (ref 0–0.7)
EOSINOPHIL # BLD MANUAL: 0.1 10E3/UL (ref 0–0.7)
EOSINOPHIL NFR BLD AUTO: 2 %
EOSINOPHIL NFR BLD MANUAL: 2 %
ERYTHROCYTE [DISTWIDTH] IN BLOOD BY AUTOMATED COUNT: 17.1 % (ref 10–15)
HCT VFR BLD AUTO: 25.8 % (ref 40–53)
HGB BLD-MCNC: 8.4 G/DL (ref 13.3–17.7)
LYMPHOCYTES # BLD AUTO: 0.7 10E3/UL (ref 0.8–5.3)
LYMPHOCYTES # BLD MANUAL: 0.5 10E3/UL (ref 0.8–5.3)
LYMPHOCYTES NFR BLD AUTO: 10 %
LYMPHOCYTES NFR BLD MANUAL: 7 %
MCH RBC QN AUTO: 30.9 PG (ref 26.5–33)
MCHC RBC AUTO-ENTMCNC: 32.6 G/DL (ref 31.5–36.5)
MCV RBC AUTO: 95 FL (ref 78–100)
METAMYELOCYTES # BLD MANUAL: 0.1 10E3/UL
METAMYELOCYTES NFR BLD MANUAL: 1 %
MONOCYTES # BLD AUTO: 1.4 10E3/UL (ref 0–1.3)
MONOCYTES # BLD MANUAL: 1.1 10E3/UL (ref 0–1.3)
MONOCYTES NFR BLD AUTO: 20 %
MONOCYTES NFR BLD MANUAL: 15 %
NEUTROPHILS # BLD AUTO: 4.8 10E3/UL (ref 1.6–8.3)
NEUTROPHILS # BLD MANUAL: 5.3 10E3/UL (ref 1.6–8.3)
NEUTROPHILS NFR BLD AUTO: 68 %
NEUTROPHILS NFR BLD MANUAL: 75 %
PATH REV: ABNORMAL
PLAT MORPH BLD: ABNORMAL
PLATELET # BLD AUTO: 140 10E3/UL (ref 150–450)
RBC # BLD AUTO: 2.72 10E6/UL (ref 4.4–5.9)
RBC MORPH BLD: ABNORMAL
WBC # BLD AUTO: 7 10E3/UL (ref 4–11)

## 2021-07-22 ENCOUNTER — NURSE TRIAGE (OUTPATIENT)
Dept: INTERNAL MEDICINE | Facility: CLINIC | Age: 76
End: 2021-07-22

## 2021-07-22 ENCOUNTER — TELEPHONE (OUTPATIENT)
Dept: FAMILY MEDICINE | Facility: CLINIC | Age: 76
End: 2021-07-22

## 2021-07-22 NOTE — TELEPHONE ENCOUNTER
"There is a lung nodule seen versus a \" summation of shadows\". A chest CT scan is recommended and so we are ordering this. Lets get this done as soon as possible.     Kapil Duckworth MD      Left message on answering machine for patient to call back to the nurse at [phone].    Geni Cardozo RN  [facility]    "

## 2021-07-22 NOTE — TELEPHONE ENCOUNTER
"Received call from patient's wife. She stated that they had a cleaning lady wax floors and Zurdo fell this morning. He was triaged and advised to seek care in emergency room due to reported severe pain. See below for details.    Reason for Disposition    SEVERE pain (e.g. excruciating)    Additional Information    Negative: Major bleeding (actively dripping or spurting) that can't be stopped    Negative: Bullet, stabbed by knife, or other serious penetrating wound    Negative: Looks like a dislocated joint (crooked or deformed)    Negative: Can't stand (bear weight) or walk    Negative: Serious injury with multiple fractures (broken bones)    Negative: Sounds like a life-threatening emergency to the triager    Negative: Wound looks infected    Negative: Leg pain from overuse (e.g., sports, running, physical work)    Negative: Leg pain not from an injury    Negative: Hip injury is main concern    Negative: Knee injury is main concern    Negative: Foot or ankle injury is main concern    Answer Assessment - Initial Assessment Questions  1. MECHANISM: \"How did the injury happen?\" (e.g., twisting injury, direct blow)       Fall, landed on R side  2. ONSET: \"When did the injury happen?\" (Minutes or hours ago)       This morning  3. LOCATION: \"Where is the injury located?\"       Knee and hip   4. APPEARANCE of INJURY: \"What does the injury look like?\"  (e.g., deformity of leg)      No deformity  5. SEVERITY: \"Can you put weight on that leg?\" \"Can you walk?\"       With walker, yes. It hurts him a lot to do so  6. SIZE: For cuts, bruises, or swelling, ask: \"How large is it?\" (e.g., inches or centimeters)       None  7. PAIN: \"Is there pain?\" If so, ask: \"How bad is the pain?\"  (Scale 1-10; or mild, moderate, severe)      Severe  8. TETANUS: For any breaks in the skin, ask: \"When was the last tetanus booster?\"      N/A  9. OTHER SYMPTOMS: \"Do you have any other symptoms?\"       None  10. PREGNANCY: \"Is there any chance you " "are pregnant?\" \"When was your last menstrual period?\"        N/A    Protocols used: LEG INJURY-A-OH    MANJU TorresN DECLAN  [facility] [place]  "

## 2021-07-23 NOTE — TELEPHONE ENCOUNTER
Attempted to call pt at home. This was the first attempt at calling and voice message left to return call to clinic at [phone].    Please relay message from Dr. Duckworth and give number for image [phone].    Vy MARSH RN, BSN  [facility]

## 2021-07-26 ENCOUNTER — TELEPHONE (OUTPATIENT)
Dept: FAMILY MEDICINE | Facility: CLINIC | Age: 76
End: 2021-07-26

## 2021-07-26 ENCOUNTER — TELEPHONE (OUTPATIENT)
Dept: RHEUMATOLOGY | Facility: CLINIC | Age: 76
End: 2021-07-26

## 2021-07-26 DIAGNOSIS — E11.9 TYPE 2 DIABETES MELLITUS WITHOUT COMPLICATION, WITHOUT LONG-TERM CURRENT USE OF INSULIN (H): ICD-10-CM

## 2021-07-26 NOTE — TELEPHONE ENCOUNTER
Yoli returned call and writer informed her that pt does not have consent to communicate on file, therefore unable to give results. Yoli acknowledged and said her daughter has a medical power of /medical directive. She was going to see if they can scan a copy to [employer] for our records.    Yoli stated pt fell last week and has been in the hospital since 7/22 ([facility] in [place]). She will have Zurdo call when he gets home for results or can give verbal consent to speak with Yoli.    Vy MARSH RN, BSN  [facility]

## 2021-07-26 NOTE — TELEPHONE ENCOUNTER
Recently methotrexate was discontinued. Should patient discontinue folic acid 1mg as well?  Thank you.  Bertha Lei, [facility]  [address]  [place] [address]  [phone]

## 2021-07-27 ENCOUNTER — TELEPHONE (OUTPATIENT)
Dept: INTERNAL MEDICINE | Facility: CLINIC | Age: 76
End: 2021-07-27

## 2021-07-27 NOTE — TELEPHONE ENCOUNTER
Spouse just called to cancel surgery noting that patient just got out of the hospital. I called [place] and surgery was already canceled and all appointments.

## 2021-07-27 NOTE — TELEPHONE ENCOUNTER
Received call from Margaux with [facility]. She is calling to request a delay in start of care until tomorrow per patient preference. VO given as requested per RN protocol.    MANJU TorresN DECLAN  [facility]

## 2021-07-28 ENCOUNTER — TELEPHONE (OUTPATIENT)
Dept: FAMILY MEDICINE | Facility: CLINIC | Age: 76
End: 2021-07-28

## 2021-07-28 NOTE — TELEPHONE ENCOUNTER
Shannan, Physical Therapist at #[phone] with [facility] calling for verbal orders for PT.  PT once a week times four weeks to work on strengthening, gait training and pain management.   Verbal order given per protocol.  Susan Lam RN on 7/28/2021 at 4:02 PM

## 2021-07-28 NOTE — TELEPHONE ENCOUNTER
Routing refill request to provider for review/approval because:  Labs out of range:  A1C and creatinine  Please check quantity    Hemoglobin A1C   Date Value Ref Range Status   01/07/2021 6.8 (H) 0 - 5.6 % Final     Comment:     Normal <5.7% Prediabetes 5.7-6.4%  Diabetes 6.5% or higher - adopted from ADA   consensus guidelines.        Creatinine   Date Value Ref Range Status   07/14/2021 2.30 (H) 0.66 - 1.25 mg/dL Final   07/05/2021 2.84 (H) 0.66 - 1.25 mg/dL Final            Pending Prescriptions:                       Disp   Refills    glimepiride (AMARYL) 2 MG tablet [Pharmacy*10 tab*0        Sig: TAKE 1 TABLET BY MOUTH EVERY MORNING (BEFORE           BREAKFAST)        Maximino Mccullough RN

## 2021-07-28 NOTE — TELEPHONE ENCOUNTER
Continue folic acid. Needed to help counteract side effects of methotrexate.  (Creatinine elevation likely resulted in methotrexate toxicity, so methotrexate is on hold but folic acid should be continued)    Albert Tompkins MD  7/27/2021 10:05 PM

## 2021-07-29 ENCOUNTER — MEDICAL CORRESPONDENCE (OUTPATIENT)
Dept: HEALTH INFORMATION MANAGEMENT | Facility: CLINIC | Age: 76
End: 2021-07-29

## 2021-07-29 ENCOUNTER — OFFICE VISIT (OUTPATIENT)
Dept: FAMILY MEDICINE | Facility: CLINIC | Age: 76
End: 2021-07-29
Payer: COMMERCIAL

## 2021-07-29 VITALS
SYSTOLIC BLOOD PRESSURE: 126 MMHG | OXYGEN SATURATION: 97 % | HEART RATE: 91 BPM | DIASTOLIC BLOOD PRESSURE: 76 MMHG | WEIGHT: 195.5 LBS | HEIGHT: 69 IN | BODY MASS INDEX: 28.96 KG/M2 | TEMPERATURE: 98.1 F

## 2021-07-29 DIAGNOSIS — S32.501D CLOSED NONDISPLACED FRACTURE OF RIGHT PUBIS WITH ROUTINE HEALING, SUBSEQUENT ENCOUNTER: ICD-10-CM

## 2021-07-29 DIAGNOSIS — Z09 HOSPITAL DISCHARGE FOLLOW-UP: Primary | ICD-10-CM

## 2021-07-29 DIAGNOSIS — E53.8 VITAMIN B12 DEFICIENCY (NON ANEMIC): Primary | ICD-10-CM

## 2021-07-29 PROBLEM — S32.501A CLOSED NONDISPLACED FRACTURE OF RIGHT PUBIS (H): Status: ACTIVE | Noted: 2021-07-22

## 2021-07-29 PROCEDURE — 99213 OFFICE O/P EST LOW 20 MIN: CPT | Performed by: FAMILY MEDICINE

## 2021-07-29 RX ORDER — OXYCODONE HYDROCHLORIDE 5 MG/1
5 TABLET ORAL EVERY 6 HOURS PRN
COMMUNITY
Start: 2021-07-26 | End: 2021-07-29

## 2021-07-29 RX ORDER — GLIMEPIRIDE 2 MG/1
TABLET ORAL
Qty: 30 TABLET | Refills: 1 | Status: SHIPPED | OUTPATIENT
Start: 2021-07-29 | End: 2022-03-17

## 2021-07-29 RX ORDER — OXYCODONE HYDROCHLORIDE 5 MG/1
5 TABLET ORAL EVERY 6 HOURS PRN
Qty: 6 TABLET | Status: CANCELLED | OUTPATIENT
Start: 2021-07-29

## 2021-07-29 RX ORDER — OXYCODONE HYDROCHLORIDE 5 MG/1
5 TABLET ORAL EVERY 6 HOURS PRN
Qty: 10 TABLET | Refills: 0 | Status: SHIPPED | OUTPATIENT
Start: 2021-07-29 | End: 2022-03-17

## 2021-07-29 ASSESSMENT — MIFFLIN-ST. JEOR: SCORE: 1602.16

## 2021-07-29 NOTE — PROGRESS NOTES
Assessment & Plan       ICD-10-CM    1. Hospital discharge follow-up  Z09    2. Closed nondisplaced fracture of right pubis with routine healing, subsequent encounter  S32.501D oxyCODONE (ROXICODONE) 5 MG tablet     Medication refilled  Discussed habit-forming potential of oxycodone  Follow-up with PT    Review of external notes as documented elsewhere in note         There are no Patient Instructions on file for this visit.    Return in about 1 week (around 8/5/2021) for PT.    Minh Caba MD  [facility] [place] Renner is a 76 year old who presents for the following health issues  accompanied by his spouse: Yoli     [facility] Follow-up Visit:    Hospital/Nursing Home/ Rehab Facility: [facility]  Date of Admission: 07/22/2021  Date of Discharge: 07/26/2021  Reason(s) for Admission: Fall,Closed fracture of right pubis,       Was your hospitalization related to COVID-19? No   Problems taking medications regularly:  None  Medication changes since discharge: None  Problems adhering to non-medication therapy:  None    Summary of hospitalization:  [facility] discharge summary reviewed  Diagnostic Tests/Treatments reviewed.  Follow up needed: none  Other Healthcare Providers Involved in Patient s Care:         None  Update since discharge: improved.       Post Discharge Medication Reconciliation: discharge medications reconciled, continue medications without change.  Plan of care communicated with patient              Patient presents for hospital follow-up visit  Suffered fracture of right pubis  Patient saw ortho while inpatient  Surgery not recommended  Oxycodone every 12 hours  Has home PT set up  Appropriate equipment available  Pain controlled with oxycodone, which he has been taking ever 12 hours, sometimes every 8 hours  He has 8 tablets left as of today with initial prescription of 15 tablets prescribed on 7/26        Review of Systems  "  Constitutional, HEENT, cardiovascular, pulmonary, gi and gu systems are negative, except as otherwise noted.      Objective    /76   Pulse 91   Temp 98.1  F (36.7  C) (Oral)   Ht 1.753 m (5' 9\")   Wt 88.7 kg (195 lb 8 oz)   SpO2 97%   BMI 28.87 kg/m    Body mass index is 28.87 kg/m .  Physical Exam   GENERAL: healthy, alert and no distress  EYES: Eyes grossly normal to inspection, PERRL and conjunctivae and sclerae normal  NECK: no adenopathy, no asymmetry, masses, or scars and thyroid normal to palpation  SKIN: no suspicious lesions or rashes  PSYCH: mentation appears normal, affect normal/bright            "

## 2021-07-29 NOTE — TELEPHONE ENCOUNTER
Reason for call:  Medication   If this is a refill request, has the caller requested the refill from the pharmacy already? Yes  Will the patient be using a [facility]? Yes  Name of the pharmacy and phone number for the current request:    [facility] [place], [place] - [address]      Name of the medication requested: cyanocobalamin (CYANOCOBALAMIN) 1000 MCG/ML injection    Other request: Pt's wife calling stating that he is due for B12 shot. Pt is late on shot and was needed on 7/27/21 and was unable to receive it today at pharmacy. Wanting to get in today or tomorrow to get this shot at clinic pharmacy. Call when sent to pharmacy    Phone number to reach patient:  Home number on file [phone] (home)    Best Time:  anytime    Can we leave a detailed message on this number?  YES    Travel screening: Not Applicable

## 2021-07-29 NOTE — TELEPHONE ENCOUNTER
Reached out to patient to inquire about the below message from Dr. Duckworth.    1) Patient states he is currently taking Trulicity.     2) He states that he was in the hospital, recently discharged on Tuesday and does not have a good record but relayed these blood sugars:  07/05/2021: 116  07/10/2021: 178  07/12/2021: 112  Does not have any values after that date.    Patient states he was receiving Insulin Sliding Scale in the hospital and was taken off of Metformin d/t kidney function and they also did not give him Trulicity while inpatient. They prescribed Glimepiride for him instead going forward.    Katina Gutierrez, MANJUN DECLAN  [facility] [place]

## 2021-07-29 NOTE — TELEPHONE ENCOUNTER
Can you please contact patient and do a quick clarification on some things prior to refill of the glimepiride [ Amaryl ] ?    1. What medications is he taking for the diabetes mellitus   2. What do his blood glucose readings look like ? I am fearful of the possibility of him having hypoglycemia and I am not so sure he needs the glimepiride [ Amaryl ] right now - his hemoglobin a1c  [ diabetes test ] was 6.2% in march 2021, see care everywhere    Kapil Duckworth MD

## 2021-07-29 NOTE — TELEPHONE ENCOUNTER
Thank you for the update      I refilled glimepiride [ Amaryl ]     1. Please update medication administration record   2. Recommend to continue with ongoing blood glucose monitoring   3. Update clinic within 2-4 weeks at the very most with numbers     Kapil Duckworth MD

## 2021-07-30 ENCOUNTER — TELEPHONE (OUTPATIENT)
Dept: FAMILY MEDICINE | Facility: CLINIC | Age: 76
End: 2021-07-30

## 2021-07-30 ENCOUNTER — ALLIED HEALTH/NURSE VISIT (OUTPATIENT)
Dept: NURSING | Facility: CLINIC | Age: 76
End: 2021-07-30
Payer: COMMERCIAL

## 2021-07-30 DIAGNOSIS — I10 HTN (HYPERTENSION): Primary | ICD-10-CM

## 2021-07-30 PROCEDURE — 96372 THER/PROPH/DIAG INJ SC/IM: CPT | Performed by: INTERNAL MEDICINE

## 2021-07-30 RX ADMIN — CYANOCOBALAMIN 1000 MCG: 1000 INJECTION, SOLUTION INTRAMUSCULAR; SUBCUTANEOUS at 15:34

## 2021-07-30 NOTE — TELEPHONE ENCOUNTER
Patient notified. He verbalized understanding. Assisted with scheduling B12 injection.     Geni Cardozo RN

## 2021-07-30 NOTE — PROGRESS NOTES
The following medication was given:     MEDICATION: Vitamin B12  1000mcg  ROUTE: IM  SITE: Arm - Left  DOSE: 1mL  LOT #:   :  [place]   EXPIRATION DATE:  01/2023  NDC#: 21767-275-61      S/p HD 12/2 for APE  - Pt still has elevated K and P, sevalamer dose increased and pt started on lokelma, pt also eating outside food which may be contributing  - HD as per Nephrology Dr La and monitor BMP

## 2021-07-30 NOTE — TELEPHONE ENCOUNTER
Patient was seen today for a B12 shot and is wondering if he needs to continue taking the Folic Acid even though he stopped the methotrexate. He is also wondering if he can have a scheduled day for his B12 injection he is wondering if he can stick to having them done on Tuesdays   Thank you  LS

## 2021-08-02 ENCOUNTER — ANCILLARY PROCEDURE (OUTPATIENT)
Dept: CT IMAGING | Facility: CLINIC | Age: 76
End: 2021-08-02
Attending: INTERNAL MEDICINE
Payer: COMMERCIAL

## 2021-08-02 ENCOUNTER — TELEPHONE (OUTPATIENT)
Dept: FAMILY MEDICINE | Facility: CLINIC | Age: 76
End: 2021-08-02

## 2021-08-02 DIAGNOSIS — R91.8 PULMONARY NODULES: ICD-10-CM

## 2021-08-02 PROCEDURE — 71250 CT THORAX DX C-: CPT | Mod: TC | Performed by: RADIOLOGY

## 2021-08-02 NOTE — TELEPHONE ENCOUNTER
Patient states he received his dulaglutide (TRULICITY) 1.5 MG/0.5ML pen through [employer] in the past. He states that he was told by [employer] that he needed to contact his pcp to print and complete form at [employer] for him to be able to be enrolled again.    Please call patient with any questions at [phone]

## 2021-08-02 NOTE — TELEPHONE ENCOUNTER
These are 2 articles of care that, neither of which did I initiate.    1. Folic acid question should be directed to Dr. Albert Tompkins, rheumatologist. I think patient is probably right but I want Dr. Tompkins to sign off on this [ message forwarded]    2. The b12 injection questions . According to my understanding this was also recommended by a hematologist and my only question is, how long was this intended for ?. I don't see why there is any problem with Tuesday. lets set it up that way and please notify patient of this information but we need to clarify length of treatment with Dr. Barber [ Naseem Barber MD with [facility] ].     Kapil Duckworth MD

## 2021-08-03 ENCOUNTER — TELEPHONE (OUTPATIENT)
Dept: FAMILY MEDICINE | Facility: CLINIC | Age: 76
End: 2021-08-03

## 2021-08-03 ENCOUNTER — MYC MEDICAL ADVICE (OUTPATIENT)
Dept: INTERNAL MEDICINE | Facility: CLINIC | Age: 76
End: 2021-08-03

## 2021-08-03 DIAGNOSIS — E11.42 TYPE 2 DIABETES MELLITUS WITH DIABETIC POLYNEUROPATHY, WITHOUT LONG-TERM CURRENT USE OF INSULIN (H): ICD-10-CM

## 2021-08-03 DIAGNOSIS — E53.8 VITAMIN B12 DEFICIENCY (NON ANEMIC): Primary | ICD-10-CM

## 2021-08-03 RX ORDER — CYANOCOBALAMIN 1000 UG/ML
1000 INJECTION, SOLUTION INTRAMUSCULAR; SUBCUTANEOUS
Qty: 1 ML | Refills: 3 | Status: SHIPPED | OUTPATIENT
Start: 2021-08-03 | End: 2022-03-17

## 2021-08-03 NOTE — TELEPHONE ENCOUNTER
Please have Zurdo contact my office at [phone].    We'll help as much as we can.    Thank you,    Eryn Kaufman  Prescription   Pharmacy Assistance  [id number]

## 2021-08-03 NOTE — TELEPHONE ENCOUNTER
When prescriptions are handled by  Assistance programs we ask for help with Amanda Kaufman associated with [facility] Rx Assistance program so I am forwarding this message to her for help with this one    Kapil Duckworth MD

## 2021-08-03 NOTE — TELEPHONE ENCOUNTER
Routing to PCP for review/advice:     Pt is wondering if he should continue to take his atorvastatin (LIPITOR) 40 MG tablet. Pt states he tolerates med with no side effects.      Thank you  Mickey Yen, PharmD  [facility]

## 2021-08-03 NOTE — TELEPHONE ENCOUNTER
Please send a prescription for syringe/needle for b12 injeciton  Thank you  Bertha Lei, [facility]  [address] [place] [address]  [place] [id number] [phone]

## 2021-08-03 NOTE — TELEPHONE ENCOUNTER
I called Sandhya . She has been following his case closely over last few months especially since January 2021.    She asked questions about chronic kidney disease stage 3 .    Sandhya comments that he only hears the good and tending to discount the bad news. So Sandhya is now more and more involved with his healthcare     Results for TITUS ROMAN (MRN [id number]) as of 8/3/2021 13:03   Ref. Range 1/7/2021 06:10 1/9/2021 07:09 4/7/2021 09:58 7/5/2021 15:32 7/14/2021 16:45   GFR Estimate Latest Ref Range: >60 mL/min/1.73m2 41 (L) 49 (L) 44 (L) 20 (L) 26 (L)     Lab Results   Component Value Date    A1C 6.8 01/07/2021    A1C 6.8 12/17/2020    A1C 6.7 12/11/2020    A1C 6.0 01/03/2020    A1C 6.4 08/06/2019     I reviewed that I am awaiting the feedback from the lung specialist to decide on the next course of action , I expect to hear back in 1-3 days     Kapil Duckworth MD

## 2021-08-03 NOTE — TELEPHONE ENCOUNTER
The b12 injections were recommended by Dr. Tompkins. Asking for clarification of projected length of treatment and please send in prescription for needles    Kapil Duckworth MD

## 2021-08-03 NOTE — TELEPHONE ENCOUNTER
B12 was recommended by Dr. Naseem Barber, heme/onc.  Recommend Zurdo Dash follow-up with Dr. Barber for direction on B12.      Albert Tompkins MD  8/3/2021 4:57 PM

## 2021-08-04 RX ORDER — SYRINGE-NEEDLE,INSULIN,0.5 ML 27GX1/2"
SYRINGE, EMPTY DISPOSABLE MISCELLANEOUS
Qty: 12 EACH | Refills: 0 | Status: SHIPPED | OUTPATIENT
Start: 2021-08-04 | End: 2022-03-17

## 2021-08-04 NOTE — TELEPHONE ENCOUNTER
Yoli, pt's wife, and pt both on call. They called to follow up with Dr. Duckworth and Dr. Tompkins.    Dr. Tompkins:  - Pt asking if he should continue taking Folic Acid due to stopping methyltrexate.    Dr. Duckworth:  - Pt said they see Dr. Naseem Barber (heme/onc) for B12 injections but he is located in [place] and was hoping that Dr. Duckworth would be ok with doing the injections in [place] since they live in [place]. It is for weekly injections but duration is not known. Next appointment with Dr. Barber is 8/12/21 but pt is due this week for an injection.     - Form left at desk for [facility]. Pt would like his Trulicity filled through [facility] Patient Assistance Program because it is free. Writer completed form with pt on the phone other than prescription and provider information. Please complete highlighted areas, sign, and have TC fax.    Vy MARSH RN, BSN  [facility]

## 2021-08-04 NOTE — TELEPHONE ENCOUNTER
Please transmit this question to Sunil, Naseem Alcaraz MD with [facility]. I don't know how long this is for, perhaps this doctor could order these needles. It's a simple order I just don't know for how long    Kapil Duckworth MD

## 2021-08-05 NOTE — TELEPHONE ENCOUNTER
Called pt and gave message below about continuing folic acid.    Sabrina HEATH RN Specialty Triage 8/5/2021 2:23 PM

## 2021-08-05 NOTE — TELEPHONE ENCOUNTER
I have no problem ordering B12 and / or syringes. It's no big deal. My question is simply    How long does Dr. Barber want this treatment to be given for. This is a question we can call Dr. Barber;s office and ask his nurse. We don't need an urgent answer. We need to know how long this therapy is intended for. Longterm ? 3 months ? 6 months ?: etc.    Kapil Duckworth MD

## 2021-08-05 NOTE — PROGRESS NOTES
Hi, I need your help finding the right referral orders. Dr. Graham Rendon, with the [facility] who is a pulmonologist I believe but his specialty is interventional radiology approaches to thoracic pathology, has asked me to place a referral order for Zurdo, obviously this is a high priority item . See his reply to me as a staff message below.    1. Please help me find the proper referral orders for patient to see Dr. Rendon's clinic and lets try  To get him in as soon as possible     2. Reroute for signing if necessary     3. Lets please notify patient of this information. Also, Sandhya his daughter is now to be included in all contacts regarding this patients health.    If any of this is unclear, Please huddle with me on this case tomorrow !!!!    Kapil Duckworth MD

## 2021-08-05 NOTE — TELEPHONE ENCOUNTER
RN: please advise Zurdo Dash to continue folic acid for now.     Albert Tompkins MD  8/5/2021 12:42 PM

## 2021-08-05 NOTE — PROGRESS NOTES
Called oncology clinic and representative helped writer place referral for Dr. Rendon. This was signed with co-sign required from PCP. They will have a nurse navigator review the referral and pt will be contacted within 1-2 business days, but since listed as STAT and pt's case was already discussed with Dr. Rendon, should be sooner. Pt and daughter Sandhya were notified of the referral.    Mia Artis RN  [facility]

## 2021-08-05 NOTE — TELEPHONE ENCOUNTER
Called Dr. Barber's office at [facility] at [phone]. Left a message for the nurse with below question and asked that the nurse for Dr. Barber call back to [phone] and ask to speak with a nurse.    Mia Artis RN  [facility]

## 2021-08-06 ENCOUNTER — TELEPHONE (OUTPATIENT)
Dept: INTERNAL MEDICINE | Facility: CLINIC | Age: 76
End: 2021-08-06

## 2021-08-06 NOTE — TELEPHONE ENCOUNTER
Wife, Yoli called to check status of appt with oncology. Has not received a call from them yet. Called [facility] cancer clinic at [phone]. Rebeca is the person assigned to this and is still under review. They will get a call on Monday to schedule. Pt can call [phone]. Called and pt was notified of this. Number provided for oncology clinic.    Mia Artis RN  [facility]

## 2021-08-09 ENCOUNTER — MYC MEDICAL ADVICE (OUTPATIENT)
Dept: INTERNAL MEDICINE | Facility: CLINIC | Age: 76
End: 2021-08-09

## 2021-08-09 ENCOUNTER — TELEPHONE (OUTPATIENT)
Dept: SURGERY | Facility: CLINIC | Age: 76
End: 2021-08-09

## 2021-08-09 DIAGNOSIS — R91.8 PULMONARY NODULES: Primary | ICD-10-CM

## 2021-08-09 NOTE — TELEPHONE ENCOUNTER
Copy of documentation below of call with daughter, Sandhya printed and faxed to Dr. Naseem Barber to fax number [phone] (ph: [phone]) per Dr. Duckworth's request.  Note - he is at [facility] (not [facility]).  Caren Telles,

## 2021-08-09 NOTE — TELEPHONE ENCOUNTER
Called Zurdo to talk with him about his recent CT Scan and referral to Interventional Pulmonology. Yoli patients wife answered the phone and I told her the recommendation is for Zurdo to have a CT in 3 months and an appointment with Interventional Pulmonology. Dr. Duncan reviewed patients CT Scan from 8/2/21 and his previous CT Scans. Yoli then handed the phone to Zurdo and I told Zurdo what the recommendation was. Given the nodule grew over a 1.5 year period Dr. Duncan recommended to do the 3 month follow up CT. Patient is in agreement with the plan and will wait to hear from scheduling. No further questions or concerns.

## 2021-08-09 NOTE — TELEPHONE ENCOUNTER
I called Sandhya, daughter and caregiver person who is authorized to discuss Protected Health Information [PHI] with patient     I am summarizing the discussed we had today     I reviewed my desire to have Zurdo's most recent previous chest CT scan reviewed by the sub-specialists associated with Dr. Graham Rendon, with the [facility] , this did happen . The recommendation is for Zurdo to have a CT in 3 months and an appointment with Interventional Pulmonology. Dr. Duncan who reviewed patients CT Scan from 8/2/21 and his previous CT Scans.  Given the nodule grew over a 1.5 year period Dr. Duncan recommended to do the 3 month follow up CT.    Sandhya expressed great discomfort with this because it's her sense that Zurdo's health is not good enough to just wait 3 months. I reviewed all relevant issues with Sandhya and it's not so clear we need to be doing anything differently right now    1. Regarding his anemia, his hemoglobin is improving. It was the opinion of Naseem Barber MD with [facility] that the cause of anemia was methotrexate toxicity [ for rheumatoid arthritis ] which is stopped now and hemoglobin is improving . I recommended daughter is welcomed to probe for Dr. Barber's opinion with the plan for a redo chest CT scan in 3 months. I don't see clear reasoning for positron emission tomography (PET) scan although that is an option. I don't see evidence to support CT needle guided biopsy but that is also an option. I suggested getting an opinion from Dr. Barber    2. Regarding kidney insufficiency , this is also improving and blamed on pre-existing condition condition of chronic kidney disease and the acute methotrexate toxicity situation. This is also improving. I don't think a referral to a Nephrologist is clearly necessary today but that is also an option    3. Positive FIT test . I agree with colonoscopy but feel this is a lower priority right now. This can be set up in 1-3 months      4. Current symptoms - I am seeing patient back, with Sandhya, in one week. It does NOT sound like patient is dealing with an acute decompensated status but rather the accumulated set of problems along with sedentary behavior and progressive deconditioning, aging.  and this explains his generalized weakness. Emphasized the importance of physical rehabilitation and physical activity . Will re-emphasize at follow up with patient in one week with appointment with me in 8/16/2021    Kapil Duckworth MD

## 2021-08-10 ENCOUNTER — TELEPHONE (OUTPATIENT)
Dept: INTERNAL MEDICINE | Facility: CLINIC | Age: 76
End: 2021-08-10

## 2021-08-10 NOTE — TELEPHONE ENCOUNTER
ANGELICI~ Aug 10, 2021 I spoke with Zurdo, he is in need of financial assistance for medication.    We reviewed the Prescription Assistance Program for manfacturer assistance programs, gross income, insurance and Rx list.    Zurdo is over income for the Pharmacy Assistance Fund $500.    I will complete the [facility] assistance application for Trulicity 1.5mg.    When approved, Zurdo will receive this medication at no cost for the remainder of 2021.    Eryn Kaufman  Prescription   Pharmacy Assistance  [id number]  
03-Mar-2019

## 2021-08-11 ENCOUNTER — ALLIED HEALTH/NURSE VISIT (OUTPATIENT)
Dept: NURSING | Facility: CLINIC | Age: 76
End: 2021-08-11
Payer: COMMERCIAL

## 2021-08-11 DIAGNOSIS — E53.8 VITAMIN B12 DEFICIENCY (NON ANEMIC): Primary | ICD-10-CM

## 2021-08-11 PROCEDURE — 96372 THER/PROPH/DIAG INJ SC/IM: CPT | Performed by: INTERNAL MEDICINE

## 2021-08-11 RX ADMIN — CYANOCOBALAMIN 1000 MCG: 1000 INJECTION, SOLUTION INTRAMUSCULAR; SUBCUTANEOUS at 11:49

## 2021-08-11 NOTE — PROGRESS NOTES
Clinic Administered Medication Documentation    Administrations This Visit     cyanocobalamin injection 1,000 mcg     Admin Date  08/11/2021 Action  Given Dose  1,000 mcg Route  Intramuscular Site  Right Deltoid Administered By  Mia Spencer    Ordering Provider: Kapil Duckworth MD    NDC: 42519-011-16    Lot#:     : [id number]    Patient Supplied?: No    Comments: ok per provider                  Injectable Medication Documentation    Patient was given Cyanocobalamin (B-12). Prior to medication administration, verified patients identity using patient s name and date of birth. Please see MAR and medication order for additional information. Patient instructed to remain in clinic for 15 minutes and report any adverse reaction to staff immediately .      Was entire vial of medication used? Yes  Vial/Syringe: Single dose vial  Expiration Date:  1/2023  Given in right deltoid.  Was this medication supplied by the patient? No   Mia RAMSEY CMA ([facility])

## 2021-08-12 ENCOUNTER — TELEPHONE (OUTPATIENT)
Dept: GASTROENTEROLOGY | Facility: CLINIC | Age: 76
End: 2021-08-12

## 2021-08-12 DIAGNOSIS — Z11.59 ENCOUNTER FOR SCREENING FOR OTHER VIRAL DISEASES: ICD-10-CM

## 2021-08-12 NOTE — TELEPHONE ENCOUNTER
Screening Questions  1. Are you active on mychart? Yes     2. What insurance is in the chart? [facility]     2.  Ordering/Referring Provider: Kapil Duckworth MD in  INTERNAL MEDICINE    3. BMI 28.87    4. Are you on daily home oxygen? No     5. Do you have a history of difficult airway? No     6. Have you had a heart, lung, or liver transplant? no    7. Are you currently on dialysis? No     8. Have you had a stroke or Transient ischemic atttack (TIA) within 6 months? No     9. In the past 6 months, have you had any heart related issues including cardiomyopathy or heart attack?         If yes, did it require cardiac stenting or other implantable device? No     10. Do you have any implantable devices in your body (pacemaker, defib, LVAD)? Yes     11. Do you take nitroglycerin? If yes, how often? No     12. Are you currently taking any blood thinners? No     13. Are you a diabetic? Yes     14. (Females) Are you currently pregnant? No   If yes, how many weeks?    15. Have you had a procedure in the past that was difficult to tolerate with conscious sedation? Any allergies to Fentanyl or Versed  No     16. Are you taking any scheduled prescription narcotics more than once daily? No     17. Do you have any chemical dependencies such as alcohol, street drugs, or methadone? No     18. Do you have any history of post-traumatic stress syndrome or mental health issues? No     19. Do you transfer independently? Yes     20.  Do you have any issues with constipation? No     21. Preferred Pharmacy for Pre Prescription [facility]     Scheduling Details    Which Colonoscopy Prep was Sent?: Gol[facility] - MyChart   Procedure Scheduled: Colonoscopy   Provider/Surgeon: Chelita  Date of Procedure:  8/25/2021  Location: [facility]   Caller (Please ask for phone number if not scheduled by patient): patient & wife      Sedation Type: MAC  Conscious Sedation- Needs  for 6 hours after the procedure  MAC/General-Needs  for 24 hours  after procedure    Pre-op Required at [facility] [place], [place] and OR for MAC sedation:   (if yes advise patient they will need a pre-op prior to procedure)      Is patient on blood thinners? -no (If yes- inform patient to follow up with PCP or provider for follow up instructions)     Informed patient they will need an adult  yes   Cannot take any type of public or medical transportation alone    Informed Patient of COVID Test Requirement yes- scheduled     Confirmed Nurse will call to complete assessment yes     Additional comments:

## 2021-08-13 ENCOUNTER — ANCILLARY PROCEDURE (OUTPATIENT)
Dept: NUCLEAR MEDICINE | Facility: CLINIC | Age: 76
End: 2021-08-13
Attending: INTERNAL MEDICINE
Payer: COMMERCIAL

## 2021-08-13 DIAGNOSIS — R06.02 SOBOE (SHORTNESS OF BREATH ON EXERTION): ICD-10-CM

## 2021-08-13 DIAGNOSIS — I73.9 PAD (PERIPHERAL ARTERY DISEASE) (H): ICD-10-CM

## 2021-08-13 DIAGNOSIS — E11.42 TYPE 2 DIABETES MELLITUS WITH DIABETIC POLYNEUROPATHY, WITHOUT LONG-TERM CURRENT USE OF INSULIN (H): ICD-10-CM

## 2021-08-13 DIAGNOSIS — I10 HYPERTENSION GOAL BP (BLOOD PRESSURE) < 140/90: Primary | ICD-10-CM

## 2021-08-13 DIAGNOSIS — I10 HYPERTENSION GOAL BP (BLOOD PRESSURE) < 140/90: ICD-10-CM

## 2021-08-13 LAB
CV STRESS MAX HR HE: 67
RATE PRESSURE PRODUCT: NORMAL
STRESS ECHO BASELINE DIASTOLIC HE: 102
STRESS ECHO BASELINE HR: 62
STRESS ECHO BASELINE SYSTOLIC BP: 150
STRESS ECHO CALCULATED PERCENT HR: 47 %
STRESS ECHO LAST STRESS DIASTOLIC BP: 82
STRESS ECHO LAST STRESS SYSTOLIC BP: 160
STRESS ECHO TARGET HR: 143

## 2021-08-13 PROCEDURE — 78452 HT MUSCLE IMAGE SPECT MULT: CPT

## 2021-08-13 PROCEDURE — A9502 TC99M TETROFOSMIN: HCPCS | Performed by: INTERNAL MEDICINE

## 2021-08-13 PROCEDURE — 93018 CV STRESS TEST I&R ONLY: CPT | Performed by: INTERNAL MEDICINE

## 2021-08-13 PROCEDURE — 93017 CV STRESS TEST TRACING ONLY: CPT | Performed by: INTERNAL MEDICINE

## 2021-08-13 PROCEDURE — 93016 CV STRESS TEST SUPVJ ONLY: CPT | Performed by: INTERNAL MEDICINE

## 2021-08-13 RX ORDER — REGADENOSON 0.08 MG/ML
0.4 INJECTION, SOLUTION INTRAVENOUS ONCE
Status: COMPLETED | OUTPATIENT
Start: 2021-08-13 | End: 2021-08-13

## 2021-08-13 RX ADMIN — REGADENOSON 0.4 MG: 0.08 INJECTION, SOLUTION INTRAVENOUS at 10:33

## 2021-08-16 ENCOUNTER — OFFICE VISIT (OUTPATIENT)
Dept: INTERNAL MEDICINE | Facility: CLINIC | Age: 76
End: 2021-08-16
Payer: COMMERCIAL

## 2021-08-16 VITALS
SYSTOLIC BLOOD PRESSURE: 154 MMHG | BODY MASS INDEX: 29.98 KG/M2 | HEART RATE: 89 BPM | DIASTOLIC BLOOD PRESSURE: 88 MMHG | OXYGEN SATURATION: 100 % | WEIGHT: 203 LBS | TEMPERATURE: 97.5 F | RESPIRATION RATE: 16 BRPM

## 2021-08-16 DIAGNOSIS — R06.02 SOBOE (SHORTNESS OF BREATH ON EXERTION): ICD-10-CM

## 2021-08-16 DIAGNOSIS — E11.9 TYPE 2 DIABETES MELLITUS WITHOUT RETINOPATHY (H): ICD-10-CM

## 2021-08-16 DIAGNOSIS — N18.32 STAGE 3B CHRONIC KIDNEY DISEASE (H): ICD-10-CM

## 2021-08-16 DIAGNOSIS — D64.9 ANEMIA, UNSPECIFIED TYPE: ICD-10-CM

## 2021-08-16 DIAGNOSIS — R91.1 PULMONARY NODULE: ICD-10-CM

## 2021-08-16 DIAGNOSIS — R53.81 PHYSICAL DECONDITIONING: Primary | ICD-10-CM

## 2021-08-16 LAB
ANION GAP SERPL CALCULATED.3IONS-SCNC: 5 MMOL/L (ref 3–14)
BUN SERPL-MCNC: 40 MG/DL (ref 7–30)
CALCIUM SERPL-MCNC: 8.4 MG/DL (ref 8.5–10.1)
CHLORIDE BLD-SCNC: 117 MMOL/L (ref 94–109)
CO2 SERPL-SCNC: 21 MMOL/L (ref 20–32)
CREAT SERPL-MCNC: 1.76 MG/DL (ref 0.66–1.25)
GFR SERPL CREATININE-BSD FRML MDRD: 36 ML/MIN/1.73M2
GLUCOSE BLD-MCNC: 189 MG/DL (ref 70–99)
HBA1C MFR BLD: 5.6 % (ref 0–5.6)
POTASSIUM BLD-SCNC: 4.4 MMOL/L (ref 3.4–5.3)
SODIUM SERPL-SCNC: 143 MMOL/L (ref 133–144)

## 2021-08-16 PROCEDURE — 80048 BASIC METABOLIC PNL TOTAL CA: CPT | Performed by: INTERNAL MEDICINE

## 2021-08-16 PROCEDURE — 36415 COLL VENOUS BLD VENIPUNCTURE: CPT | Performed by: INTERNAL MEDICINE

## 2021-08-16 PROCEDURE — 83036 HEMOGLOBIN GLYCOSYLATED A1C: CPT | Performed by: INTERNAL MEDICINE

## 2021-08-16 PROCEDURE — 99215 OFFICE O/P EST HI 40 MIN: CPT | Performed by: INTERNAL MEDICINE

## 2021-08-16 NOTE — LETTER
August 17, 2021      Zurdo Dash  [address] [place] [id number]        Dear ,    We are writing to inform you of your test results.    Things are definitely improving         Resulted Orders   Basic metabolic panel  (Ca, Cl, CO2, Creat, Gluc, K, Na, BUN)   Result Value Ref Range    Sodium 143 133 - 144 mmol/L    Potassium 4.4 3.4 - 5.3 mmol/L    Chloride 117 (H) 94 - 109 mmol/L    Carbon Dioxide (CO2) 21 20 - 32 mmol/L    Anion Gap 5 3 - 14 mmol/L    Urea Nitrogen 40 (H) 7 - 30 mg/dL    Creatinine 1.76 (H) 0.66 - 1.25 mg/dL    Calcium 8.4 (L) 8.5 - 10.1 mg/dL    Glucose 189 (H) 70 - 99 mg/dL    GFR Estimate 36 (L) >60 mL/min/1.73m2      Comment:      As of July 11, 2021, eGFR is calculated by the CKD-EPI creatinine equation, without race adjustment. eGFR can be influenced by muscle mass, exercise, and diet. The reported eGFR is an estimation only and is only applicable if the renal function is stable.   Hemoglobin A1c   Result Value Ref Range    Hemoglobin A1C 5.6 0.0 - 5.6 %      Comment:      Normal <5.7%   Prediabetes 5.7-6.4%    Diabetes 6.5% or higher     Note: Adopted from ADA consensus guidelines.       If you have any questions or concerns, please call the clinic at the number listed above.       Sincerely,      Kapil Duckworth MD/ayesha

## 2021-08-16 NOTE — PROGRESS NOTES
Assessment & Plan     Physical deconditioning  I met with patient, spouse Yoli and daughter Sandhya today. Zurdo has been going through a tremendous number of different issues and it's hard to keep it all straight. Today was a further follow up appointment seeking to make sure of these different issues and try to prioritize things. Actually one of the chief problems is that Zurdo tends to see the positives in things and does not, in my opinion, appreciate how fragile his health has become. Possibly the most devastating is that over the last year or 2 he has allowed himself to ease into a pattern of total inactivity and has had with this sedentary behavior the expected development of worse and worse generalized fatigue and physical deconditioning, reviewed the recommendations from the [place] health preventation task force guidelines which stress the importance of 30-45 minutes of cardiopulmonary fitness exercising 3-4 days per week. This can be low impact and as simple as walking or sitting on an stationary exercise bicycle but the consequences of forgoing this couldn't be more serious and leads eventually to non-ambulatory status .     Type 2 diabetes mellitus without retinopathy (H)  Ever since taking the Trulicity (dulaglutide) his diabetes mellitus was been well controlled. He is due for the recheck a1c today as well as reassessment of his kidney function     - Hemoglobin A1c; Future  - Basic metabolic panel  (Ca, Cl, CO2, Creat, Gluc, K, Na, BUN); Future  - Basic metabolic panel  (Ca, Cl, CO2, Creat, Gluc, K, Na, BUN)  - Hemoglobin A1c    Stage 3b chronic kidney disease  Secondary to his hospitalization received with severe anemia thought to be related to methotrexate toxicity , has been improving now. See office visit notes with Naseem Barber MD with [facility], we saw patient with a significant hit to kidney function but this is also greatly improving and with this, patient has regained  some weight and his blood pressure is higher. His lisinopril 10 milligram had been stopped at the hospital and we may need to restart this if kidney function is appropriate     Pulmonary nodule  We also spent time in review of where things sit with this. He had a pulmonary nodule that might be cancer. This is reviewed in prior notes. We aren't certainly if this should approached from watchful waiting and a recheck chest CT scan in 3 months versus positron emission tomography (PET) scan versus lung biopsy. He is seeing Dr. Coe a pulmonary sub-specialists to review his options at an appointment upcoming 9/6/2021    Anemia, unspecified type  This is being followed up on a the hematologist and patient has an upcoming colonoscopy .    SOBOE (shortness of breath on exertion)  multifactorial , basically normal recent Lexiscan and this is reviewed with patient and family today       Review of the result(s) of each unique test - hgb, BMP, chest CT  Prescription drug management  45 minutes spent on the date of the encounter doing chart review, history and exam, documentation and further activities per the note        No follow-ups on file.    Kapil Duckworth MD  [facility] ADDIE Renner is a 76 year old who presents for the following health issues   Encounter Diagnoses   Name Primary?     Physical deconditioning Yes     Type 2 diabetes mellitus without retinopathy (H)      Stage 3b chronic kidney disease      Pulmonary nodule      Anemia, unspecified type      SOBOE (shortness of breath on exertion)       accompanied by his spouse and daughter :    HPI   Chief Complaint   Patient presents with     Results     stress test, [place]      RECHECK     1 month recheck      Hypertension     From patient perspective , no more pelvic pain where he had the fracture   Not using pain pills for 2 weeks about - this was a fall 3 weeks ago. July 22nd     Energy - can hardly get out of bed, feels good to  sleep . Sleeping a lot more lately . He used to alk his dog but not now since he has had all these medical problems     Not since thigh bypass  Then the pacemaker   Then the fall   Hospitalization with low hemoglobin   Hospitalized with a severe nosebleed , had a   This is also noted by Sandhya , daughter, that his caryn is out of control   Blood pressure is markedly elevated today     Feels some symptoms of shortness of breath ??  Cardiac stress test     76, weight, diabetes mellitus , other medications   Rheumatoid arthritis - treatment Dr. Albert Tompkins, rheumatologist with [facility]    Slow hemoglobin increases with the methotrexate stopped  Slow increases with kidney function since stopping the methotrexate   Colonoscopy is scheduled for 1-2 months , patient is uncertain if this is scheduled    Lab Results   Component Value Date    A1C 6.8 01/07/2021    A1C 6.8 12/17/2020    A1C 6.7 12/11/2020    A1C 6.0 01/03/2020    A1C 6.4 08/06/2019     Wt Readings from Last 5 Encounters:   08/16/21 92.1 kg (203 lb)   07/29/21 88.7 kg (195 lb 8 oz)   07/20/21 88.3 kg (194 lb 9.6 oz)   07/09/21 85.7 kg (189 lb)   07/05/21 87.5 kg (192 lb 12.8 oz)     BP Readings from Last 6 Encounters:   08/16/21 (!) 154/88   07/29/21 126/76   07/20/21 129/75   07/14/21 122/71   07/09/21 112/62   07/05/21 96/61         Review of Systems   Constitutional, HEENT, cardiovascular, pulmonary, gi and gu systems are negative, except as otherwise noted.      Objective    BP (!) 154/88   Pulse 89   Temp 97.5  F (36.4  C) (Oral)   Resp 16   Wt 92.1 kg (203 lb)   SpO2 100%   BMI 29.98 kg/m    Body mass index is 29.98 kg/m .  Physical Exam   GENERAL: healthy, alert and no distress  NECK: no adenopathy, no asymmetry, masses, or scars and thyroid normal to palpation  RESP: lungs clear to auscultation - no rales, rhonchi or wheezes  CV: regular rate and rhythm, normal S1 S2, no S3 or S4, no murmur, click or rub, no peripheral edema and  peripheral pulses strong  MS: no gross musculoskeletal defects noted, no edema  NEURO: Normal strength and tone, mentation intact and speech normal  PSYCH: mentation appears normal, affect normal/bright    Orders Placed This Encounter   Procedures     Hemoglobin A1c     Basic metabolic panel  (Ca, Cl, CO2, Creat, Gluc, K, Na, BUN)

## 2021-08-17 ENCOUNTER — TELEPHONE (OUTPATIENT)
Dept: FAMILY MEDICINE | Facility: CLINIC | Age: 76
End: 2021-08-17

## 2021-08-17 NOTE — TELEPHONE ENCOUNTER
Reason for Call:  Other prescription    Detailed comments: Pt calling for he would like to be re-enrolled in the medical marijuana program.      Phone Number Patient can be reached at: Home number on file [phone] (home)    Best Time: anytime    Can we leave a detailed message on this number? YES    Call taken on 8/17/2021 at 2:40 PM by Shamar Leiva

## 2021-08-17 NOTE — TELEPHONE ENCOUNTER
I was able to recertify him based on prior information and yesterdays appointment     Kapil Duckworth MD

## 2021-08-17 NOTE — TELEPHONE ENCOUNTER
Patient called and informed that Dr. Duckworth is currently out of the office and will return on Thursday, September 9th.  Message will be held for him.  Caren Telles,

## 2021-08-18 ENCOUNTER — ALLIED HEALTH/NURSE VISIT (OUTPATIENT)
Dept: NURSING | Facility: CLINIC | Age: 76
End: 2021-08-18
Payer: COMMERCIAL

## 2021-08-18 DIAGNOSIS — E53.8 VITAMIN B12 DEFICIENCY (NON ANEMIC): Primary | ICD-10-CM

## 2021-08-18 PROCEDURE — 96372 THER/PROPH/DIAG INJ SC/IM: CPT | Performed by: INTERNAL MEDICINE

## 2021-08-18 RX ADMIN — CYANOCOBALAMIN 1000 MCG: 1000 INJECTION, SOLUTION INTRAMUSCULAR; SUBCUTANEOUS at 12:34

## 2021-08-18 NOTE — NURSING NOTE
Clinic Administered Medication Documentation          Injectable Medication Documentation    Patient was given Cyanocobalamin (B-12). Prior to medication administration, verified patients identity using patient s name and date of birth. Please see MAR and medication order for additional information. Patient instructed to remain in clinic for 15 minutes.      Was entire vial of medication used? Yes  Vial/Syringe: Single dose vial  Expiration Date:  1-2023   Was this medication supplied by the patient? No

## 2021-08-20 ENCOUNTER — TELEPHONE (OUTPATIENT)
Dept: GASTROENTEROLOGY | Facility: CLINIC | Age: 76
End: 2021-08-20

## 2021-08-20 ENCOUNTER — HOSPITAL ENCOUNTER (OUTPATIENT)
Facility: CLINIC | Age: 76
End: 2021-08-20
Attending: INTERNAL MEDICINE | Admitting: INTERNAL MEDICINE
Payer: COMMERCIAL

## 2021-08-20 NOTE — TELEPHONE ENCOUNTER
Caller: LVM for Zurdo     Procedure: Colonoscopy     Date of Procedure Cancelled: 08/25/2021    Ordering Provider:Kapil Duckworth MD    Reason for cancel: Clinical review, patient should be seen at [facility]     Rescheduled: No, LVM for patient to call back to reschedule     If rescheduled:    Date:    Location:    Note any change or update to original order/sedation:

## 2021-08-20 NOTE — TELEPHONE ENCOUNTER
Caller: Zurdo    Procedure: Colonoscopy    Date of Procedure Cancelled: 8/25/21    Ordering Provider:Kapil Duckworth MD in  INTERNAL MEDICINE    Reason for cancel: Patient has pacemaker and should be seen at [facility].    Rescheduled: Yes     If rescheduled:    Date: 9/23/21   Location: [facility]   Note any change or update to original order/sedation: MAC sedation

## 2021-08-29 DIAGNOSIS — Z11.59 ENCOUNTER FOR SCREENING FOR OTHER VIRAL DISEASES: ICD-10-CM

## 2021-08-30 ENCOUNTER — TELEPHONE (OUTPATIENT)
Dept: INTERNAL MEDICINE | Facility: CLINIC | Age: 76
End: 2021-08-30

## 2021-08-30 DIAGNOSIS — E11.42 TYPE 2 DIABETES MELLITUS WITH DIABETIC POLYNEUROPATHY, WITHOUT LONG-TERM CURRENT USE OF INSULIN (H): ICD-10-CM

## 2021-08-30 NOTE — TELEPHONE ENCOUNTER
I am in process of applying to the Trulicity assistance program.  [facility] requires a hand signed, brand name, hard copy script be submitted with their application.    As Dr. Duckworth is out of office, any provider may sign this script.    Please hand sign a BRAND name, hard copy script for:      TRULICITY 1.5mg    Please send the HARD COPY script to me     via interoffice mail  FPS Eryn Husain     or via [place] mail  at:   [facility]  Eryn Kaufman  [address] [place]  [address]    Thanks so much for your help!    Eryn Kaufman  Prescription   Pharmacy Assistance  [phone]

## 2021-09-01 ENCOUNTER — TELEPHONE (OUTPATIENT)
Dept: GASTROENTEROLOGY | Facility: OUTPATIENT CENTER | Age: 76
End: 2021-09-01

## 2021-09-01 NOTE — TELEPHONE ENCOUNTER
Caller: pt calling us back to reschedule    Procedure: colon    Date of Procedure Cancelled:     Ordering Provider:    Reason for cancel: has been receiving calls to resched due to pacemaker and to change sedation type to mac    Rescheduled: y     If rescheduled:    Date: 9/16   Location: [facility]   Note any change or update to original order/sedation: mac    Pt is pre-op aware

## 2021-09-08 ENCOUNTER — TELEPHONE (OUTPATIENT)
Dept: GASTROENTEROLOGY | Facility: CLINIC | Age: 76
End: 2021-09-08

## 2021-09-08 ENCOUNTER — OFFICE VISIT (OUTPATIENT)
Dept: FAMILY MEDICINE | Facility: CLINIC | Age: 76
End: 2021-09-08
Payer: COMMERCIAL

## 2021-09-08 VITALS
RESPIRATION RATE: 16 BRPM | HEART RATE: 64 BPM | SYSTOLIC BLOOD PRESSURE: 148 MMHG | OXYGEN SATURATION: 96 % | TEMPERATURE: 98.4 F | DIASTOLIC BLOOD PRESSURE: 72 MMHG

## 2021-09-08 DIAGNOSIS — Z86.0100 HISTORY OF COLONIC POLYPS: ICD-10-CM

## 2021-09-08 DIAGNOSIS — I73.9 PAD (PERIPHERAL ARTERY DISEASE) (H): ICD-10-CM

## 2021-09-08 DIAGNOSIS — Z01.818 PREOP GENERAL PHYSICAL EXAM: Primary | ICD-10-CM

## 2021-09-08 DIAGNOSIS — I10 HYPERTENSION GOAL BP (BLOOD PRESSURE) < 140/90: ICD-10-CM

## 2021-09-08 LAB
BASOPHILS # BLD AUTO: 0 10E3/UL (ref 0–0.2)
BASOPHILS NFR BLD AUTO: 0 %
EOSINOPHIL # BLD AUTO: 0.1 10E3/UL (ref 0–0.7)
EOSINOPHIL NFR BLD AUTO: 1 %
ERYTHROCYTE [DISTWIDTH] IN BLOOD BY AUTOMATED COUNT: 14.3 % (ref 10–15)
HCT VFR BLD AUTO: 31.8 % (ref 40–53)
HGB BLD-MCNC: 10.6 G/DL (ref 13.3–17.7)
LYMPHOCYTES # BLD AUTO: 1.2 10E3/UL (ref 0.8–5.3)
LYMPHOCYTES NFR BLD AUTO: 16 %
MCH RBC QN AUTO: 30.5 PG (ref 26.5–33)
MCHC RBC AUTO-ENTMCNC: 33.3 G/DL (ref 31.5–36.5)
MCV RBC AUTO: 92 FL (ref 78–100)
MONOCYTES # BLD AUTO: 0.8 10E3/UL (ref 0–1.3)
MONOCYTES NFR BLD AUTO: 11 %
NEUTROPHILS # BLD AUTO: 5.5 10E3/UL (ref 1.6–8.3)
NEUTROPHILS NFR BLD AUTO: 72 %
PLATELET # BLD AUTO: 195 10E3/UL (ref 150–450)
RBC # BLD AUTO: 3.47 10E6/UL (ref 4.4–5.9)
WBC # BLD AUTO: 7.7 10E3/UL (ref 4–11)

## 2021-09-08 PROCEDURE — 93000 ELECTROCARDIOGRAM COMPLETE: CPT | Performed by: NURSE PRACTITIONER

## 2021-09-08 PROCEDURE — 80048 BASIC METABOLIC PNL TOTAL CA: CPT | Performed by: NURSE PRACTITIONER

## 2021-09-08 PROCEDURE — 36415 COLL VENOUS BLD VENIPUNCTURE: CPT | Performed by: NURSE PRACTITIONER

## 2021-09-08 PROCEDURE — 85025 COMPLETE CBC W/AUTO DIFF WBC: CPT | Performed by: NURSE PRACTITIONER

## 2021-09-08 PROCEDURE — 99214 OFFICE O/P EST MOD 30 MIN: CPT | Performed by: NURSE PRACTITIONER

## 2021-09-08 RX ORDER — LISINOPRIL 10 MG/1
10 TABLET ORAL DAILY
Qty: 90 TABLET | Refills: 1 | Status: SHIPPED | OUTPATIENT
Start: 2021-09-08 | End: 2022-03-17

## 2021-09-08 RX ORDER — BISACODYL 5 MG
15 TABLET, DELAYED RELEASE (ENTERIC COATED) ORAL ONCE
Qty: 4 TABLET | Refills: 0 | Status: CANCELLED | OUTPATIENT
Start: 2021-09-08 | End: 2021-09-08

## 2021-09-08 NOTE — PROGRESS NOTES
[facility]  [address]  [place] [address]  Phone: [phone]  Primary Provider: Kapil Duckworth  Pre-op Performing Provider: DARSHAN MOORE      PREOPERATIVE EVALUATION:  Today's date: 9/8/2021    Zurdo Dash is a 76 year old male who presents for a preoperative evaluation.    Surgical Information:  Surgery/Procedure: colonscopy  [facility]  Surgeon: Salo Brito  Surgery Date: 9/16/21  Time of Surgery: 8:30am  Where patient plans to recover: At home with family  Fax number for surgical facility: Note does not need to be faxed, will be available electronically in Epic.    Type of Anesthesia Anticipated: General    Assessment & Plan     The proposed surgical procedure is considered LOW risk.    Preop general physical exam    - Basic metabolic panel  (Ca, Cl, CO2, Creat, Gluc, K, Na, BUN); Future  - CBC with platelets and differential; Future  - EKG 12-lead complete w/read - Clinics  - CBC with platelets and differential  - Basic metabolic panel  (Ca, Cl, CO2, Creat, Gluc, K, Na, BUN)    History of colonic polyps      Hypertension goal BP (blood pressure) < 140/90  Uncontrolled, ACE was being held due to RAEGAN, renal function improved on recent labs and nearly back to baseline. Restart Lisinopril.  - lisinopril (ZESTRIL) 10 MG tablet; Take 1 tablet (10 mg) by mouth daily    PAD (peripheral artery disease) (H)  Patient is 8 mos s/p left fem-pop bypass and having left lower extremity pain today with diminished pulses. Recommend follow up with vascular surgeon.       Implanted Device:   - Type of device: Pacemaker Patient advised to bring device information on day of surgery.    Risks and Recommendations:  The patient has the following additional risks and recommendations for perioperative complications:  Diabetes:  - Patient is not on insulin therapy: regular NPO guidelines can be followed.   Anemia/Bleeding/Clotting:    - Anemia and does not require treatment  prior to surgery. Monitor hemoglobin postoperatively    Medication Instructions:   - aspirin: Discontinue aspirin 7-10 days prior to procedure to reduce bleeding risk. It should be resumed postoperatively.    - sulfonylurea (e.g. glyburide, glipizide): HOLD day of surgery   - Take usual dose on day of surgery    RECOMMENDATION:  APPROVAL GIVEN to proceed with proposed procedure, without further diagnostic evaluation.    Review of prior external note(s) from - Outside records from Hematology,vascular Surgery, IM  Review of the result(s) of each unique test - CBC, BMP, A1c          Subjective     HPI related to upcoming procedure: Every 3 year colonoscopies due to history of polyps. Couldn't have last colonoscopy due to bradycardia- had Pacemaker and fem-pop bypass 1/2021.     Having pain in left lower extremity today- worse in calf, worse when walking.     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 Renal function improving on last labs- ACE was held due to this. BP high today.    Preop Questions 6/17/2021   1. Have you ever had a heart attack or stroke? No   2. Have you ever had surgery on your heart or blood vessels, such as a stent placement, a coronary artery bypass, or surgery on an artery in your head, neck, heart, or legs? YES - fem/pop bypass 1/7/21, pacemaker   3. Do you have chest pain with activity? No   4. Do you have a history of  heart failure? No   5. Do you currently have a cold, bronchitis or symptoms of other infection? No   6. Do you have a cough, shortness of breath, or wheezing? No   7. Do you or anyone in your family have previous history of blood clots? No   8. Do you or does anyone in your family have a serious bleeding problem such as prolonged bleeding following surgeries or cuts? No   9. Have you ever had problems with anemia or been told to take iron pills? No   10. Have you had any abnormal blood loss such as black, tarry or bloody stools? No   11. Have you  ever had a blood transfusion? No   12. Are you willing to have a blood transfusion if it is medically needed before, during, or after your surgery? Yes   13. Have you or any of your relatives ever had problems with anesthesia? No   14. Do you have sleep apnea, excessive snoring or daytime drowsiness? No   15. Do you have any artifical heart valves or other implanted medical devices like a pacemaker, defibrillator, or continuous glucose monitor? YES - Pacemaker placed January 2021   15a. What type of device do you have? Pacemaker   15b. Name of the clinic that manages your device:  [facility]   16. Do you have artificial joints? No   17. Are you allergic to latex? No     Health Care Directive:  Patient does not have a Health Care Directive or Living Will: Discussed advance care planning with patient; information given to patient to review.    Preoperative Review of :   reviewed - controlled substances reflected in medication list.      Status of Chronic Conditions:  ANEMIA - Patient has a recent history of moderate-severe anemia, which has been symptomatic. 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 Following with hematology.    DIABETES - Patient has a longstanding history of DiabetesType Type II . Patient is being treated with oral agents and denies significant side effects. Control has been good. Complicating factors include but are not limited to: hypertension, hyperlipidemia and foot ulcer.     HYPERLIPIDEMIA - Patient has a long history of significant Hyperlipidemia requiring medication for treatment with recent fair control. Patient reports no problems or side effects with the medication.     HYPERTENSION - Patient has longstanding history of HTN , currently denies any symptoms referable to elevated blood pressure. Specifically denies chest pain, palpitations, dyspnea, orthopnea, PND or peripheral edema. Blood pressure readings have not been in normal range. Current medication  regimen is as listed below. Patient denies any side effects of medication.     RENAL INSUFFICIENCY - Patient has a longstanding history of moderate-severe chronic renal insufficiency. Last Cr 1.76.       Review of Systems  Constitutional, neuro, ENT, endocrine, pulmonary, cardiac, gastrointestinal, genitourinary, musculoskeletal, integument and psychiatric systems are negative, except as otherwise noted.    Patient Active Problem List    Diagnosis Date Noted     Closed nondisplaced fracture of right pubis (H) 07/22/2021     Priority: Medium     Formatting of this note might be different from the original.  After mechanical fall on 7/22/2021       Anemia, unspecified type 07/20/2021     Priority: Medium     Ulcer of left foot, unspecified ulcer stage (H) 06/10/2021     Priority: Medium     Added automatically from request for surgery [id number]       Hammer toe of left foot 06/10/2021     Priority: Medium     Added automatically from request for surgery [id number]       SOBOE (shortness of breath on exertion) 05/12/2021     Priority: Medium     Immunosuppression (H) 02/11/2021     Priority: Medium     Skin ulcer of toe of left foot, limited to breakdown of skin (H) 02/11/2021     Priority: Medium     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  Date of last device in office evaluation: 4/28/2021    ?  and model: MedWEISSENHAUS [place] XT  MRI [id number] Pacemaker.   Date of implant: 1/13/2021      ? Indication for device: Syncope   ? Cardiac resynchronization therapy:   no      MRI Conditional:  yes    ? Battery longevity documented as less than 3  Months: no  ? Are any of the leads less than 3 months old:  yes    ? Programming              ? Pacing mode and programmed lower rate: AAIR <=> DDDR  bpm              ? Rate-responsive sensor type, if  programmed on: sinus rahul 54 bpm        Underlying rhythm and heart rate:  Sinus rahul 54 bpm      ? What is the response of this device to magnet placement:  Asynchronous pacing DOO   ? PM magnet pacing rate: 85 bpm   ? Any alert status on CIED generator or lead:  no    ? Last pacing threshold    Atrial 0.75 V @0.4ms      Ventricular RV: 0.5V @0.4ms     NEVILLE GOODWIN RN,  Certified Cardiac Device Specialist ....................  4/28/2021   1:36 PM       Pneumothorax 01/10/2021     Priority: Medium     PAD (peripheral artery disease) (H) 12/23/2020     Priority: Medium     Added automatically from request for surgery [id number]       Abdominal pain, generalized 07/09/2020     Priority: Medium     CKD (chronic kidney disease) stage 3, GFR 30-59 ml/min 02/27/2020     Priority: Medium     Eyelid lesion, LLL 07/10/2019     Priority: Medium     Dermatochalasis of both upper eyelids 07/10/2019     Priority: Medium     Pseudophakia, ou 03/12/2019     Priority: Medium     Posterior vitreous detachment of left eye 02/09/2019     Priority: Medium     Migraine equivalent 12/22/2018     Priority: Medium     Chronic bilateral low back pain without sciatica 09/25/2018     Priority: Medium     Diabetic polyneuropathy associated with type 2 diabetes mellitus (H) 02/28/2018     Priority: Medium     Tubulovillous adenoma of colon 02/20/2018     Priority: Medium     Type 2 diabetes mellitus without retinopathy (H) 01/12/2018     Priority: Medium     Erectile dysfunction, unspecified erectile dysfunction type 11/07/2017     Priority: Medium     Spondylosis of cervical region without myelopathy or radiculopathy 07/17/2017     Priority: Medium     Pulmonary nodule 03/03/2017     Priority: Medium     Needs follow up CT chest 1 year 2/2018       High risk medication use 03/03/2017     Priority: Medium     Rheumatoid arthritis involving multiple sites with positive rheumatoid factor (H) 03/18/2016     Priority: Medium     Family  history of esophageal cancer 12/14/2015     Priority: Medium     Gastroesophageal reflux disease, esophagitis presence not specified 12/14/2015     Priority: Medium     IMO Regulatory Load OCT 2020       Primary osteoarthritis of both knees 11/05/2015     Priority: Medium     Ex-smoker 10/20/2015     Priority: Medium     RBBB (right bundle branch block) 10/24/2013     Priority: Medium     Noted on ECG 10/19/2013 . See medical record scanned into Epic electronic medical records         Type 2 diabetes mellitus with diabetic polyneuropathy, without long-term current use of insulin (H) 02/28/2013     Priority: Medium     A1C      6.9   5/3/2016  A1C      8.6   3/18/2016  A1C      9.7   9/30/2015  A1C     11.8   6/29/2015  A1C     10.7   3/30/2015         Trigger finger, acquired 01/15/2013     Priority: Medium     Health Care Home 12/12/2012     Priority: Medium     Alin Espinoza RN,C--[phone]   FPA / [facility]          DX V65.8 REPLACED WITH [id number] HEALTH CARE HOME (04/08/2013)       Advanced directives, counseling/discussion 03/29/2011     Priority: Medium     Patient states has Advance Directive and will bring in a copy to clinic.         Hypertension goal BP (blood pressure) < 140/90      Priority: Medium     Hypogonadism      Priority: Medium     Hyperlipidemia LDL goal <100 10/31/2010     Priority: Medium     Pain in limb      Priority: Medium     Had some leg cramps with features consistent with vascular claudication. See ankle brachial index tests and vascular surgery consultation from 2/16/2011 in media section of Epic electronic medical records ; the key point is that subsequently , evaluations found zero evidence of     Ischemic vascular disease         Bradycardia 01/12/2009     Priority: Medium     Syncope 01/12/2009     Priority: Medium      Past Medical History:   Diagnosis Date     Abnormal CT scan 03/2004    calcified lung granuloma     C. difficile diarrhea     H/O      Cataract 11/18/2011     Diabetic neuropathy (H)     mild, mostly soles and distal forefeet, worse on the left side.     Diverticulitis      ED (erectile dysfunction)      Ex-smoker     QUIT SMOKING FEB 2007     History of ETOH abuse     recovering, sober since 1997     Hyperlipidemia LDL goal <100      Hypertension goal BP (blood pressure) < 140/90      Hypogonadism      Obesity      PAD (peripheral artery disease) (H)     leg cramps, with exertion, no formal diagnosis of PAD and minimal if any symptoms at all.     RA (rheumatoid arthritis) (H)     Dr Bailon     Past Surgical History:   Procedure Laterality Date     BYPASS GRAFT FEMOROPOPLITEAL Left 1/7/2021    Procedure: LEFT FEMORAL TO ABOVE KNEE POPLITEAL ARTERY BYPASS WITH PTFE VASCULAR GRAFT REMOVABLE RING 6MMX 50CM;  Surgeon: Myra Lopes MD;  Location: [facility] OR     CATARACT IOL, RT/LT       COLONOSCOPY  03/01/2018    [facility]     COMBINED REPAIR PTOSIS WITH BLEPHAROPLASTY BILATERAL Bilateral 8/16/2019    Procedure: BILATERAL UPPER EYELID BLEPHAROPLASTY AND BILATERAL PTOSIS REPAIR;  Surgeon: Janet Garcia MD;  Location: [facility] OR     ENDARTERECTOMY FEMORAL Left 1/7/2021    Procedure: LEFT FEMORAL ENDARTERECTOMY WITH PATCH ANGIOPLASTY PHOTOFIX  0.8 X 8CM;  Surgeon: Myra Lopes MD;  Location: [facility] OR     EXCISE LESION EYELID Left 8/16/2019    Procedure: LEFT LOWER EYELID BIOPSY;  Surgeon: Janet Garcia MD;  Location: [facility] OR     HC INCISION TENDON SHEATH FINGER  4/2009    r hand ring finger     IR LOWER EXTREMITY ANGIOGRAM LEFT  12/17/2020     PHACOEMULSIFICATION WITH STANDARD INTRAOCULAR LENS IMPLANT  02/2019; 3/2019    left eye; right eye     TONSILLECTOMY       Current Outpatient Medications   Medication Sig Dispense Refill     acetaminophen (TYLENOL) 325 MG tablet Take 2 tablets (650 mg) by mouth every 4 hours as needed for other (multimodal surgical pain management along with NSAIDS and opioid medication as indicated based on pain  "control and physical function.)       aspirin 81 MG tablet Take 1 tablet by mouth every evening   3     atorvastatin (LIPITOR) 40 MG tablet Take 1 tablet (40 mg) by mouth every evening 90 tablet 1     cetirizine (ZYRTEC) 10 MG tablet Take 10 mg by mouth At Bedtime       cyanocobalamin (CYANOCOBALAMIN) 1000 MCG/ML injection Inject 1 mL (1,000 mcg) into the muscle every 30 days 1 mL 3     dulaglutide (TRULICITY) 1.5 MG/0.5ML pen Inject 1.5 mg Subcutaneous every 7 days ON FRIDAYS 10 mL 1     folic acid (FOLVITE) 1 MG tablet Take 1 tablet (1 mg) by mouth daily 100 tablet 2     glimepiride (AMARYL) 2 MG tablet TAKE 1 TABLET BY MOUTH EVERY MORNING (BEFORE BREAKFAST) 30 tablet 1     insulin syringe-needle U-100 (31G X 5/16\" 0.5 ML) 31G X 5/16\" 0.5 ML miscellaneous Use 1 syringe per week for weekly b12 injections. 12 each 0     medical cannabis (Patient's own supply) See Admin Instructions (The purpose of this order is to document that the patient reports taking medical cannabis.  This is not a prescription, and is not used to certify that the patient has a qualifying medical condition.)       metoprolol succinate ER (TOPROL-XL) 25 MG 24 hr tablet TAKE TWO TABLETS BY MOUTH ONCE DAILY 180 tablet 1     omeprazole (PRILOSEC) 40 MG DR capsule TAKE ONE CAPSULE BY MOUTH ONCE DAILY 90 capsule 1     oxyCODONE (ROXICODONE) 5 MG tablet Take 1 tablet (5 mg) by mouth every 6 hours as needed for moderate to severe pain 10 tablet 0     predniSONE (DELTASONE) 5 MG tablet Take 5-20 mg by mouth daily as needed (Rheumatoid Arthritis Flare ups)       senna-docusate (SENOKOT-S/PERICOLACE) 8.6-50 MG tablet Take 1 tablet by mouth 2 times daily 50 tablet 0     tiZANidine (ZANAFLEX) 2 MG tablet Take 1 tablet (2 mg) by mouth 3 times daily as needed for muscle spasms 90 tablet 1       No Known Allergies     Social History     Tobacco Use     Smoking status: Former Smoker     Packs/day: 1.00     Years: 40.00     Pack years: 40.00     Types: " Cigarettes     Quit date: 3/16/2007     Years since quittin.4     Smokeless tobacco: Never Used   Substance Use Topics     Alcohol use: No     Alcohol/week: 0.0 standard drinks     Family History   Problem Relation Age of Onset     Cerebrovascular Disease Mother      Arthritis Mother      Osteoporosis Mother      Alzheimer Disease Father      Arthritis Father      Cancer Father      Diabetes Maternal Grandmother      Cardiovascular Maternal Grandmother      Other Cancer Brother      Cancer Paternal Aunt      Hypertension No family hx of      Thyroid Disease No family hx of      Glaucoma No family hx of      Macular Degeneration No family hx of      History   Drug Use No         Objective     BP (!) 148/72   Pulse 64   Temp 98.4  F (36.9  C)   Resp 16   SpO2 96%     Physical Exam    GENERAL APPEARANCE: healthy, alert and no distress     EYES: EOMI,  PERRL     HENT: ear canals and TM's normal and nose and mouth without ulcers or lesions     NECK: no adenopathy, no asymmetry, masses, or scars and thyroid normal to palpation     RESP: lungs clear to auscultation - no rales, rhonchi or wheezes     CV: regular rates and rhythm, normal S1 S2, no S3 or S4, decreased pulse LLE and dusky discoloration left foot, particularly great toe with brisk cap refill     ABDOMEN:  soft, nontender, no HSM or masses and bowel sounds normal     MS: extremities normal- no gross deformities noted, no evidence of inflammation in joints, FROM in all extremities.     SKIN: no suspicious lesions or rashes     NEURO: Normal strength and tone, sensory exam grossly normal, mentation intact and speech normal     PSYCH: mentation appears normal. and affect normal/bright     LYMPHATICS: No cervical adenopathy    Recent Labs   Lab Test 21  1316 21  1645 21  0924 21  1532 21  0708 21  0610 20  0655 20  0655   HGB  --   --  8.4* 6.9*  --  10.2*  --  11.5*   PLT  --   --  140* 96*  --   --    < >  304   INR  --   --   --   --   --   --   --  1.21*    136  --   --    < > 138  --   --    POTASSIUM 4.4 5.4*  --   --   --  4.2  --   --    CR 1.76* 2.30*  --  2.84*   < > 1.61*  --  1.81*   A1C 5.6  --   --   --   --  6.8*  --  6.8*    < > = values in this interval not displayed.        Diagnostics:  Labs pending at this time.  Results will be reviewed when available.  Recent Results (from the past 720 hour(s))   Stress NM Lexiscan    Collection Time: 08/13/21 12:22 PM   Result Value Ref Range    Target      Baseline Systolic      Baseline Diastolic      Last Stress Systolic      Last Stress Diastolic BP 82     Baseline HR 62     Max HR 67     Calculated Percent HR 47 %    Rate Pressure Product 10,720.0    Basic metabolic panel  (Ca, Cl, CO2, Creat, Gluc, K, Na, BUN)    Collection Time: 08/16/21  1:16 PM   Result Value Ref Range    Sodium 143 133 - 144 mmol/L    Potassium 4.4 3.4 - 5.3 mmol/L    Chloride 117 (H) 94 - 109 mmol/L    Carbon Dioxide (CO2) 21 20 - 32 mmol/L    Anion Gap 5 3 - 14 mmol/L    Urea Nitrogen 40 (H) 7 - 30 mg/dL    Creatinine 1.76 (H) 0.66 - 1.25 mg/dL    Calcium 8.4 (L) 8.5 - 10.1 mg/dL    Glucose 189 (H) 70 - 99 mg/dL    GFR Estimate 36 (L) >60 mL/min/1.73m2   Hemoglobin A1c    Collection Time: 08/16/21  1:16 PM   Result Value Ref Range    Hemoglobin A1C 5.6 0.0 - 5.6 %   CBC with platelets and differential    Collection Time: 09/08/21  4:07 PM   Result Value Ref Range    WBC Count 7.7 4.0 - 11.0 10e3/uL    RBC Count 3.47 (L) 4.40 - 5.90 10e6/uL    Hemoglobin 10.6 (L) 13.3 - 17.7 g/dL    Hematocrit 31.8 (L) 40.0 - 53.0 %    MCV 92 78 - 100 fL    MCH 30.5 26.5 - 33.0 pg    MCHC 33.3 31.5 - 36.5 g/dL    RDW 14.3 10.0 - 15.0 %    Platelet Count 195 150 - 450 10e3/uL    % Neutrophils 72 %    % Lymphocytes 16 %    % Monocytes 11 %    % Eosinophils 1 %    % Basophils 0 %    Absolute Neutrophils 5.5 1.6 - 8.3 10e3/uL    Absolute Lymphocytes 1.2 0.8 - 5.3 10e3/uL     Absolute Monocytes 0.8 0.0 - 1.3 10e3/uL    Absolute Eosinophils 0.1 0.0 - 0.7 10e3/uL    Absolute Basophils 0.0 0.0 - 0.2 10e3/uL      EKG: appears normal, NSR, normal axis, normal intervals, no acute ST/T changes c/w ischemia, no LVH by voltage criteria, unchanged from previous tracings    Revised Cardiac Risk Index (RCRI):  The patient has the following serious cardiovascular risks for perioperative complications:   - No serious cardiac risks = 0 points     RCRI Interpretation: 0 points: Class I (very low risk - 0.4% complication rate)           Signed Electronically by: MANASA Wolfe CNP  Copy of this evaluation report is provided to requesting physician.

## 2021-09-08 NOTE — TELEPHONE ENCOUNTER
Attempted to contact patient regarding upcoming colonoscopy procedure on 9.16.2021 for pre assessment questions. No answer.     Left message to return call to [phone] #2    Covid test scheduled: 9.13.2021    Pre-op: 9.8.2021 with MANASA Wolfe, CNP    Arrival time: 0830    Facility location: [facility]    Sedation type: MAC    Bowel prep recommendation: Golytely vs. Extended w/o mag citrate d/t CKD.  Is pt taking oxycodone regularly or PRN?  Is pt taking senna BID?    Electronic Implantable devices? pacemaker    Blood thinners/Antiplatelet medication? Yes, plavix.  Pt needs to f/u with PCP re: plavix dosing prior to procedure.      Romina Wright RN

## 2021-09-08 NOTE — PATIENT INSTRUCTIONS

## 2021-09-08 NOTE — PROGRESS NOTES
LUNG NODULE & INTERVENTIONAL PULMONARY CLINIC  [facility], [facility]     Zurdo Dash MRN# [id number]   Age: 76 year old YOB: 1945     Reason for Consultation: Enlarging pulmonary nodule    Requesting Physician: Kapil Duckworth MD  [address] [place] [address]       Assessment and Plan:    1. RUL nodule, now 11mm compared to 6mm from 1/2020 CT, but review of 3/2020 CT show the RUL nodule is roughly the same size as it is now, but there is an increase in the solid component of the nodule. There is also an additional 7mm RUL nodule that is similar in size but increase solid component. New 6mm RLL nodule seen. These findings were discussed and clarified with radiology given the conflicting information in the body of the 8/2021 radiology report.   --Will discuss at nodule conference regarding short term followup vs biopsy. Patient is open to either option.     Followup to be determined based on conference.    Mia Coe MD  Interventional Pulmonology  Department of Pulmonary, Allergy, Critical Care and Sleep Medicine   [facility]           History:     Zurdo Dash is a 76 year old male with sig h/o for rheumatoid arthritis and myelosuppresion presumed secondary to methotrexate who is here for enlarging pulmonary nodule. Presents with his wife today.     -Was admitted for pubic fracture in July, managed conservatively and feels it is well healed. No further pain.  -Denies any shortness of breath. Is walking with help of a walker, is unstable without it.  -Exertion is limited by leg pain, in particular pain in left leg down to his feet.   [place] prior to leg pain.     - Personal hx of cancer: No  - Family hx of cancer: Father with pancreatic cancer, brother with esophageal cancer  - Tobacco hx:  Smoked 48 years, 1ppd, quit 2007.  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     Culprit Nodule(s):   11 mm RUL nodule    Other nodules:   New 6mm RLL nodule.     Other active medical problems include:   - Pancytopenia, follows with hematology, thought secondary to methotrexate in setting of renal insufficiency. Hgb improving since stopping methotrexate. Also on B12 injections.   - Diabetes on dulaglutide         Past Medical History:      Past Medical History:   Diagnosis Date     Abnormal CT scan 03/2004    calcified lung granuloma     C. difficile diarrhea     H/O     Cataract 11/18/2011     Diabetic neuropathy (H)     mild, mostly soles and distal forefeet, worse on the left side.     Diverticulitis      ED (erectile dysfunction)      Ex-smoker     QUIT SMOKING FEB 2007     History of ETOH abuse     recovering, sober since 1997     Hyperlipidemia LDL goal <100      Hypertension goal BP (blood pressure) < 140/90      Hypogonadism      Obesity      PAD (peripheral artery disease) (H)     leg cramps, with exertion, no formal diagnosis of PAD and minimal if any symptoms at all.     RA (rheumatoid arthritis) (H)     Dr Bailon           Past Surgical History:      Past Surgical History:   Procedure Laterality Date     BYPASS GRAFT FEMOROPOPLITEAL Left 1/7/2021    Procedure: LEFT FEMORAL TO ABOVE KNEE POPLITEAL ARTERY BYPASS WITH PTFE VASCULAR GRAFT REMOVABLE RING 6MMX 50CM;  Surgeon: Myra Lopes MD;  Location: [facility]     CATARACT IOL, RT/LT       COLONOSCOPY  03/01/2018    [facility]     COMBINED REPAIR PTOSIS WITH BLEPHAROPLASTY BILATERAL Bilateral 8/16/2019    Procedure: BILATERAL UPPER EYELID BLEPHAROPLASTY AND BILATERAL PTOSIS REPAIR;  Surgeon: Janet Garcia MD;  Location:  OR     ENDARTERECTOMY FEMORAL Left 1/7/2021    Procedure: LEFT FEMORAL ENDARTERECTOMY WITH PATCH ANGIOPLASTY PHOTOFIX  0.8 X 8CM;   Surgeon: Myra Lopes MD;  Location: [facility] OR     EXCISE LESION EYELID Left 2019    Procedure: LEFT LOWER EYELID BIOPSY;  Surgeon: Janet Garcia MD;  Location: [facility] OR     HC INCISION TENDON SHEATH FINGER  2009    r hand ring finger     IR LOWER EXTREMITY ANGIOGRAM LEFT  2020     PHACOEMULSIFICATION WITH STANDARD INTRAOCULAR LENS IMPLANT  2019; 3/2019    left eye; right eye     TONSILLECTOMY            Social History:     Social History     Tobacco Use     Smoking status: Former Smoker     Packs/day: 1.00     Years: 40.00     Pack years: 40.00     Types: Cigarettes     Quit date: 3/16/2007     Years since quittin.4     Smokeless tobacco: Never Used   Substance Use Topics     Alcohol use: No     Alcohol/week: 0.0 standard drinks          Family History:     Family History   Problem Relation Age of Onset     Cerebrovascular Disease Mother      Arthritis Mother      Osteoporosis Mother      Alzheimer Disease Father      Arthritis Father      Cancer Father      Diabetes Maternal Grandmother      Cardiovascular Maternal Grandmother      Other Cancer Brother      Cancer Paternal Aunt      Hypertension No family hx of      Thyroid Disease No family hx of      Glaucoma No family hx of      Macular Degeneration No family hx of            Allergies:    No Known Allergies       Medications:     Current Outpatient Medications   Medication Sig     acetaminophen (TYLENOL) 325 MG tablet Take 2 tablets (650 mg) by mouth every 4 hours as needed for other (multimodal surgical pain management along with NSAIDS and opioid medication as indicated based on pain control and physical function.)     aspirin 81 MG tablet Take 1 tablet by mouth every evening      atorvastatin (LIPITOR) 40 MG tablet Take 1 tablet (40 mg) by mouth every evening     cetirizine (ZYRTEC) 10 MG tablet Take 10 mg by mouth At Bedtime     cyanocobalamin (CYANOCOBALAMIN) 1000 MCG/ML injection Inject 1 mL (1,000 mcg) into the muscle  "every 30 days     dulaglutide (TRULICITY) 1.5 MG/0.5ML pen Inject 1.5 mg Subcutaneous every 7 days ON FRIDAYS     folic acid (FOLVITE) 1 MG tablet Take 1 tablet (1 mg) by mouth daily     glimepiride (AMARYL) 2 MG tablet TAKE 1 TABLET BY MOUTH EVERY MORNING (BEFORE BREAKFAST)     insulin syringe-needle U-100 (31G X 5/16\" 0.5 ML) 31G X 5/16\" 0.5 ML miscellaneous Use 1 syringe per week for weekly b12 injections.     medical cannabis (Patient's own supply) See Admin Instructions (The purpose of this order is to document that the patient reports taking medical cannabis.  This is not a prescription, and is not used to certify that the patient has a qualifying medical condition.)     metoprolol succinate ER (TOPROL-XL) 25 MG 24 hr tablet TAKE TWO TABLETS BY MOUTH ONCE DAILY     omeprazole (PRILOSEC) 40 MG DR capsule TAKE ONE CAPSULE BY MOUTH ONCE DAILY     oxyCODONE (ROXICODONE) 5 MG tablet Take 1 tablet (5 mg) by mouth every 6 hours as needed for moderate to severe pain     predniSONE (DELTASONE) 5 MG tablet Take 5-20 mg by mouth daily as needed (Rheumatoid Arthritis Flare ups)     senna-docusate (SENOKOT-S/PERICOLACE) 8.6-50 MG tablet Take 1 tablet by mouth 2 times daily     tiZANidine (ZANAFLEX) 2 MG tablet Take 1 tablet (2 mg) by mouth 3 times daily as needed for muscle spasms     Current Facility-Administered Medications   Medication     cyanocobalamin injection 1,000 mcg            Physical Exam:       There were no vitals taken for this visit.    Wt Readings from Last 4 Encounters:   08/16/21 92.1 kg (203 lb)   07/29/21 88.7 kg (195 lb 8 oz)   07/20/21 88.3 kg (194 lb 9.6 oz)   07/09/21 85.7 kg (189 lb)     General: Well appearing  Heart: Pacemaker in place  Lungs: Breathing comfortably on room air.   Neuro: Answering questions appropriately  Psych: Normal affect     Imaging/Lab Data   All laboratory and imaging data reviewed.           "

## 2021-09-09 ENCOUNTER — TELEPHONE (OUTPATIENT)
Dept: GASTROENTEROLOGY | Facility: CLINIC | Age: 76
End: 2021-09-09

## 2021-09-09 ENCOUNTER — OFFICE VISIT (OUTPATIENT)
Dept: PULMONOLOGY | Facility: CLINIC | Age: 76
End: 2021-09-09
Attending: INTERNAL MEDICINE
Payer: COMMERCIAL

## 2021-09-09 VITALS
SYSTOLIC BLOOD PRESSURE: 155 MMHG | HEART RATE: 71 BPM | TEMPERATURE: 98 F | RESPIRATION RATE: 20 BRPM | WEIGHT: 203.8 LBS | DIASTOLIC BLOOD PRESSURE: 93 MMHG | OXYGEN SATURATION: 98 % | BODY MASS INDEX: 30.1 KG/M2

## 2021-09-09 DIAGNOSIS — D64.9 ANEMIA: Primary | ICD-10-CM

## 2021-09-09 DIAGNOSIS — R91.8 PULMONARY NODULES: ICD-10-CM

## 2021-09-09 LAB
ANION GAP SERPL CALCULATED.3IONS-SCNC: 7 MMOL/L (ref 3–14)
BUN SERPL-MCNC: 37 MG/DL (ref 7–30)
CALCIUM SERPL-MCNC: 8.3 MG/DL (ref 8.5–10.1)
CHLORIDE BLD-SCNC: 116 MMOL/L (ref 94–109)
CO2 SERPL-SCNC: 18 MMOL/L (ref 20–32)
CREAT SERPL-MCNC: 1.68 MG/DL (ref 0.66–1.25)
GFR SERPL CREATININE-BSD FRML MDRD: 39 ML/MIN/1.73M2
GLUCOSE BLD-MCNC: 192 MG/DL (ref 70–99)
POTASSIUM BLD-SCNC: 4.1 MMOL/L (ref 3.4–5.3)
SODIUM SERPL-SCNC: 141 MMOL/L (ref 133–144)

## 2021-09-09 PROCEDURE — 99205 OFFICE O/P NEW HI 60 MIN: CPT | Mod: 25 | Performed by: STUDENT IN AN ORGANIZED HEALTH CARE EDUCATION/TRAINING PROGRAM

## 2021-09-09 PROCEDURE — G0463 HOSPITAL OUTPT CLINIC VISIT: HCPCS

## 2021-09-09 RX ORDER — BISACODYL 5 MG
5 TABLET, DELAYED RELEASE (ENTERIC COATED) ORAL SEE ADMIN INSTRUCTIONS
Qty: 4 TABLET | Refills: 0 | Status: SHIPPED | OUTPATIENT
Start: 2021-09-09 | End: 2022-03-17

## 2021-09-09 RX ORDER — POLYETHYLENE GLYCOL 400 AND PROPYLENE GLYCOL 4; 3 MG/ML; MG/ML
1 SOLUTION/ DROPS OPHTHALMIC
COMMUNITY
End: 2022-03-17

## 2021-09-09 ASSESSMENT — PAIN SCALES - GENERAL: PAINLEVEL: EXTREME PAIN (9)

## 2021-09-09 NOTE — NURSING NOTE
"Oncology Rooming Note    September 9, 2021 9:09 AM   Zurdo Dash is a 76 year old male who presents for:    Chief Complaint   Patient presents with     Oncology Clinic Visit     Pulmonary nodules      Initial Vitals: BP (!) 183/99   Pulse 71   Temp 98  F (36.7  C) (Oral)   Resp 20   Wt 92.4 kg (203 lb 12.8 oz)   SpO2 98%   BMI 30.10 kg/m   Estimated body mass index is 30.1 kg/m  as calculated from the following:    Height as of 7/29/21: 1.753 m (5' 9\").    Weight as of this encounter: 92.4 kg (203 lb 12.8 oz). Body surface area is 2.12 meters squared.  Extreme Pain (9) Comment: Data Unavailable   No LMP for male patient.  Allergies reviewed: Yes  Medications reviewed: Yes    Medications: Medication refills not needed today.  Pharmacy name entered into [employer]:    [id number] [place] [facility] - [address] [place], [place] [address] - [address] [place] [address]    Clinical concerns: New Patient       Megan Duvall LPN            "

## 2021-09-09 NOTE — LETTER
9/9/2021       RE: Zurdo Dash  [address]  [place] [address]     Dear Colleague,    Thank you for referring your patient, Zurdo Dash, to the [facility] at [facility]. Please see a copy of my visit note below.    LUNG NODULE & INTERVENTIONAL PULMONARY CLINIC  [facility]     Zurdo Dash MRN# [id number]   Age: 76 year old YOB: 1945     Reason for Consultation: Enlarging pulmonary nodule    Requesting Physician: Kapil Duckworth MD  [address] [place] [address]       Assessment and Plan:    1. RUL nodule, now 11mm compared to 6mm from 1/2020 CT, but review of 3/2020 CT show the RUL nodule is roughly the same size as it is now, but there is an increase in the solid component of the nodule. There is also an additional 7mm RUL nodule that is similar in size but increase solid component. New 6mm RLL nodule seen. These findings were discussed and clarified with radiology given the conflicting information in the body of the 8/2021 radiology report.   --Will discuss at nodule conference regarding short term followup vs biopsy. Patient is open to either option.     Followup to be determined based on conference.    Mia Coe MD  Interventional Pulmonology  Department of Pulmonary, Allergy, Critical Care and Sleep Medicine   [facility]           History:     Zurdo Dash is a 76 year old male with sig h/o for rheumatoid arthritis and myelosuppresion presumed secondary to methotrexate who is here for enlarging pulmonary nodule. Presents with his wife today.     -Was admitted for pubic fracture in July, managed conservatively and feels it is well healed. No further pain.  -Denies any shortness of breath. Is walking with help of a walker, is unstable without it.  -Exertion is limited by leg pain, in particular pain in left leg down to his feet.   -[place] prior to leg pain.     - Personal hx of cancer: No  - Family hx of cancer: Father with pancreatic cancer, brother with esophageal cancer  - Tobacco hx:  Smoked 48 years, 1ppd, quit 2007.  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     Culprit Nodule(s):   11 mm RUL nodule    Other nodules:   New 6mm RLL nodule.     Other active medical problems include:   - Pancytopenia, follows with hematology, thought secondary to methotrexate in setting of renal insufficiency. Hgb improving since stopping methotrexate. Also on B12 injections.   - Diabetes on dulaglutide         Past Medical History:      Past Medical History:   Diagnosis Date     Abnormal CT scan 03/2004    calcified lung granuloma     C. difficile diarrhea     H/O     Cataract 11/18/2011     Diabetic neuropathy (H)     mild, mostly soles and distal forefeet, worse on the left side.     Diverticulitis      ED (erectile dysfunction)      Ex-smoker     QUIT SMOKING FEB 2007     History of ETOH abuse     recovering, sober since 1997     Hyperlipidemia LDL goal <100      Hypertension goal BP (blood pressure) < 140/90      Hypogonadism      Obesity      PAD (peripheral artery disease) (H)     leg cramps, with exertion, no formal diagnosis of PAD and minimal if any symptoms at all.     RA (rheumatoid arthritis) (H)     Dr Bailon           Past Surgical History:      Past Surgical History:   Procedure Laterality Date     BYPASS GRAFT FEMOROPOPLITEAL Left 1/7/2021    Procedure: LEFT FEMORAL TO ABOVE KNEE POPLITEAL ARTERY BYPASS WITH PTFE VASCULAR GRAFT REMOVABLE RING 6MMX 50CM;  Surgeon: Myra Lopes MD;  Location: [facility]     CATARACT IOL, RT/LT       COLONOSCOPY  03/01/2018    [facility]     COMBINED REPAIR PTOSIS WITH  BLEPHAROPLASTY BILATERAL Bilateral 2019    Procedure: BILATERAL UPPER EYELID BLEPHAROPLASTY AND BILATERAL PTOSIS REPAIR;  Surgeon: Janet Garcia MD;  Location: [facility]     ENDARTERECTOMY FEMORAL Left 2021    Procedure: LEFT FEMORAL ENDARTERECTOMY WITH PATCH ANGIOPLASTY PHOTOFIX  0.8 X 8CM;  Surgeon: Myra Lopes MD;  Location: [facility]     EXCISE LESION EYELID Left 2019    Procedure: LEFT LOWER EYELID BIOPSY;  Surgeon: Janet Garcia MD;  Location: [facility] OR     HC INCISION TENDON SHEATH FINGER  2009    r hand ring finger     IR LOWER EXTREMITY ANGIOGRAM LEFT  2020     PHACOEMULSIFICATION WITH STANDARD INTRAOCULAR LENS IMPLANT  2019; 3/2019    left eye; right eye     TONSILLECTOMY            Social History:     Social History     Tobacco Use     Smoking status: Former Smoker     Packs/day: 1.00     Years: 40.00     Pack years: 40.00     Types: Cigarettes     Quit date: 3/16/2007     Years since quittin.4     Smokeless tobacco: Never Used   Substance Use Topics     Alcohol use: No     Alcohol/week: 0.0 standard drinks          Family History:     Family History   Problem Relation Age of Onset     Cerebrovascular Disease Mother      Arthritis Mother      Osteoporosis Mother      Alzheimer Disease Father      Arthritis Father      Cancer Father      Diabetes Maternal Grandmother      Cardiovascular Maternal Grandmother      Other Cancer Brother      Cancer Paternal Aunt      Hypertension No family hx of      Thyroid Disease No family hx of      Glaucoma No family hx of      Macular Degeneration No family hx of            Allergies:    No Known Allergies       Medications:     Current Outpatient Medications   Medication Sig     acetaminophen (TYLENOL) 325 MG tablet Take 2 tablets (650 mg) by mouth every 4 hours as needed for other (multimodal surgical pain management along with NSAIDS and opioid medication as indicated based on pain control and physical function.)     aspirin  "81 MG tablet Take 1 tablet by mouth every evening      atorvastatin (LIPITOR) 40 MG tablet Take 1 tablet (40 mg) by mouth every evening     cetirizine (ZYRTEC) 10 MG tablet Take 10 mg by mouth At Bedtime     cyanocobalamin (CYANOCOBALAMIN) 1000 MCG/ML injection Inject 1 mL (1,000 mcg) into the muscle every 30 days     dulaglutide (TRULICITY) 1.5 MG/0.5ML pen Inject 1.5 mg Subcutaneous every 7 days ON FRIDAYS     folic acid (FOLVITE) 1 MG tablet Take 1 tablet (1 mg) by mouth daily     glimepiride (AMARYL) 2 MG tablet TAKE 1 TABLET BY MOUTH EVERY MORNING (BEFORE BREAKFAST)     insulin syringe-needle U-100 (31G X 5/16\" 0.5 ML) 31G X 5/16\" 0.5 ML miscellaneous Use 1 syringe per week for weekly b12 injections.     medical cannabis (Patient's own supply) See Admin Instructions (The purpose of this order is to document that the patient reports taking medical cannabis.  This is not a prescription, and is not used to certify that the patient has a qualifying medical condition.)     metoprolol succinate ER (TOPROL-XL) 25 MG 24 hr tablet TAKE TWO TABLETS BY MOUTH ONCE DAILY     omeprazole (PRILOSEC) 40 MG DR capsule TAKE ONE CAPSULE BY MOUTH ONCE DAILY     oxyCODONE (ROXICODONE) 5 MG tablet Take 1 tablet (5 mg) by mouth every 6 hours as needed for moderate to severe pain     predniSONE (DELTASONE) 5 MG tablet Take 5-20 mg by mouth daily as needed (Rheumatoid Arthritis Flare ups)     senna-docusate (SENOKOT-S/PERICOLACE) 8.6-50 MG tablet Take 1 tablet by mouth 2 times daily     tiZANidine (ZANAFLEX) 2 MG tablet Take 1 tablet (2 mg) by mouth 3 times daily as needed for muscle spasms     Current Facility-Administered Medications   Medication     cyanocobalamin injection 1,000 mcg            Physical Exam:       There were no vitals taken for this visit.    Wt Readings from Last 4 Encounters:   08/16/21 92.1 kg (203 lb)   07/29/21 88.7 kg (195 lb 8 oz)   07/20/21 88.3 kg (194 lb 9.6 oz)   07/09/21 85.7 kg (189 lb)     General: " Well appearing  Heart: Pacemaker in place  Lungs: Breathing comfortably on room air.   Neuro: Answering questions appropriately  Psych: Normal affect     Imaging/Lab Data   All laboratory and imaging data reviewed.               Again, thank you for allowing me to participate in the care of your patient.      Sincerely,    Mia Coe MD

## 2021-09-09 NOTE — TELEPHONE ENCOUNTER
9-13 Covid test scheduled: 9-13 [facility] lab    Arrival time: 0830    Facility location: [facility]     Sedation type: MAC    Bowel prep recommendation: Golytely    Instructions,  policy, MAC sedation plan reviewed. Instructed patient to have someone stay with them for 24 hours post exam. Patient has a Pacemaker. Patient states he does not take Plavix. Pre op 9-8 M [facility]    Alexandra Hanna RN

## 2021-09-09 NOTE — TELEPHONE ENCOUNTER
Pulmonary Nodule Conference      Patient Name: Zurdo Dash    Reason for conference discussion (brief overview): 76 year old male with a 1.1 cm posterior right upper lobe nodule. Size is similar to 18 months ago, but increased solid component.  He has a 48 pack year smoking history.  Quit in 2007.  Exertional capacity is limited by knee pain.  No PFT's.    Specific Question:  Does the group recommend a short term follow up CT or biopsy?     Pertinent Histology:  N/A    Referring Physician: Mia Coe MD    The patient's case was presented at the multidisciplinary conference for the above noted reason.  There was a consensus recommendation for the following actions:     Discussion regarding surgical intervention was the consensus.  Patient needs PFT's and thoracic surgery referral to discuss surgical resection as the best intervention.    Case Lead: Gladys Artis RN    Interventional Radiology Staff Present: N/A      Spoke with the patient's wife (with the patient in the background).  Explained the outcome of the conference and the next steps.  Advised her that 2 separate schedulers will be calling to set up the PFT's and the thoracic surgery consult.

## 2021-09-10 ENCOUNTER — TELEPHONE (OUTPATIENT)
Dept: OTHER | Facility: CLINIC | Age: 76
End: 2021-09-10

## 2021-09-10 ENCOUNTER — TRANSFERRED RECORDS (OUTPATIENT)
Dept: HEALTH INFORMATION MANAGEMENT | Facility: CLINIC | Age: 76
End: 2021-09-10

## 2021-09-10 ENCOUNTER — TEAM CONFERENCE (OUTPATIENT)
Dept: PULMONOLOGY | Facility: CLINIC | Age: 76
End: 2021-09-10

## 2021-09-10 ENCOUNTER — NURSE TRIAGE (OUTPATIENT)
Dept: INTERNAL MEDICINE | Facility: CLINIC | Age: 76
End: 2021-09-10

## 2021-09-10 ENCOUNTER — TELEPHONE (OUTPATIENT)
Dept: FAMILY MEDICINE | Facility: CLINIC | Age: 76
End: 2021-09-10

## 2021-09-10 DIAGNOSIS — D64.9 ANEMIA, UNSPECIFIED TYPE: ICD-10-CM

## 2021-09-10 DIAGNOSIS — R91.8 PULMONARY NODULES: Primary | ICD-10-CM

## 2021-09-10 DIAGNOSIS — M05.79 RHEUMATOID ARTHRITIS INVOLVING MULTIPLE SITES WITH POSITIVE RHEUMATOID FACTOR (H): Primary | ICD-10-CM

## 2021-09-10 LAB — HBA1C MFR BLD: 5.9 %

## 2021-09-10 NOTE — TELEPHONE ENCOUNTER
Medical assistant asked me to review patient record with her, patient is here at [facility] for B12 injection.   Epic med list indicates monthly B12, has MAR order for weekly injection as follows:    cyanocobalamin injection 1,000 mcgDose: 1,000 mcg : Intramuscular : WEEKLY :   Admin Instructions:   Times 2 total injections, then discontinue     Order date is 7/20/21.    He received the weekly injections on 8/11/21 and 8/18/21.    So, appears no more weekly injections.    Reviewed phone calls and visits, unable to determine the ongoing plan.    MA will advise patient we don't have a current order for weekly B12, too early if back to monthly.    RN will route issue to PCP Dr. Duckworth to clarify the plan, place new MAR order for desired B12 injections.    Courtney Omalley RN  [facility]

## 2021-09-10 NOTE — TELEPHONE ENCOUNTER
"Received call from patient. He states that he is experiencing severe L leg pain. He has pain in both legs, L is worse than R. Pain is reportedly 9/10, he is hardly able to ambulate d/t pain. He was triaged - see below. Advised ED per protocol, he declined stating \"They will probably try to admit me and I'm not doing that. I have an injection scheduled so I am coming to the clinic right now.\" He then ended the call.     Reason for Disposition    Unable to walk    Additional Information    Negative: Followed a hip injury    Negative: Followed a knee injury    Negative: Followed an ankle or foot injury    Negative: Back pain radiating (shooting) into leg(s)    Negative: Foot pain is the main symptom    Negative: Ankle pain is the main symptom    Negative: Knee pain is the main symptom    Negative: Leg swelling is the main symptom    Negative: Looks like a broken bone or dislocated joint (e.g., crooked or deformed)    Negative: Sounds like a life-threatening emergency to the triager    Negative: Chest pain    Negative: Difficulty breathing    Negative: Entire foot is cool or blue in comparison to other side    Answer Assessment - Initial Assessment Questions  1. ONSET: \"When did the pain start?\"       Started a couple weeks ago  2. LOCATION: \"Where is the pain located?\"       Both legs hurt - L is worse than R  3. PAIN: \"How bad is the pain?\"    (Scale 1-10; or mild, moderate, severe)    -  MILD (1-3): doesn't interfere with normal activities     -  MODERATE (4-7): interferes with normal activities (e.g., work or school) or awakens from sleep, limping     -  SEVERE (8-10): excruciating pain, unable to do any normal activities, unable to walk      9/10  4. WORK OR EXERCISE: \"Has there been any recent work or exercise that involved this part of the body?\"       Fell about a month ago, fractured bone in pelvis   5. CAUSE: \"What do you think is causing the leg pain?\"      Unsure, getting worse and worse  6. OTHER SYMPTOMS: " "\"Do you have any other symptoms?\" (e.g., chest pain, back pain, breathing difficulty, swelling, rash, fever, numbness, weakness)      Neuropathy  7. PREGNANCY: \"Is there any chance you are pregnant?\" \"When was your last menstrual period?\"      N/A    Protocols used: LEG PAIN-A-OH    MANJU TorresN DECLAN  [facility]  "

## 2021-09-10 NOTE — TELEPHONE ENCOUNTER
LOV 5/8/21 with Dr. Lopes at which time patient completed ALBERT US and bilateral arterial US showing patent bypass graft in left leg and short segment of complete occlusion within the distal SFA.     I returned call to Sandhya (authorized in patient chart) and she states Zurdo is having 9/10 severe pain in the leg with the graft (however she cannot verify exactly which leg he is having pain in) and that he cannot walk the pain is so bad. I advised that Zurdo go to ER to have his leg evaluated urgently and this sounds highly concerning that he is not getting sufficient blood flow and Sandhya verbalized understanding.    Marisol NUNESN, RN    [facility]  Office: [phone]  Fax: [phone]

## 2021-09-13 ENCOUNTER — TELEPHONE (OUTPATIENT)
Dept: GASTROENTEROLOGY | Facility: CLINIC | Age: 76
End: 2021-09-13

## 2021-09-13 ENCOUNTER — TELEPHONE (OUTPATIENT)
Dept: FAMILY MEDICINE | Facility: CLINIC | Age: 76
End: 2021-09-13

## 2021-09-13 NOTE — TELEPHONE ENCOUNTER
Caller: Yoli Dash    Procedure: Colon    Date of Procedure Cancelled: 9/16/2021    Ordering Provider:Kapil Duckworth    Reason for cancel: Pt is currently admitted to the hospital due to blood clots and wife does not know when he will be discharted     Any Staff messages sent regarding case?:NO                  Recipient and Sender:  & *                  Message Details:       Rescheduled: not at the time of the call. Pt or his wife will call back when discharged to set up a new date and time for the colonscopy     If rescheduled:    Date:    Location:    Note any change or update to original order/sedation:

## 2021-09-13 NOTE — TELEPHONE ENCOUNTER
Zurdo has been approved to the [facility] assistance program for Trulicity 1.5mg through December 31, 2021.  He will receive this medication at no cost through the enrollment period.    A 120 day supply of Trulicity 1.5mg will be delivered to Zurdo's Home within 3-5 business days. He has been informed of this approval.    He will contact my office for refills as we must work directly with the .  I will note EPIC as each refill is requested.    Thank you,    Merna Almaraz  Prescription Assistance

## 2021-09-13 NOTE — TELEPHONE ENCOUNTER
"Per Dr. Duckworth in TE on 8/3/21 - ok to order B12 injections but he asked how long Dr. Barber wanted pt to receive the injections.     Per Dr. Barber in TE on 8/5/21 - Tatum MANRIQUEZ reached out to Dr. Dr. Barber asking for clarification on frequency of B-12 injections. Dr. Barber's reply is below came on 8/10:    Telephone Encounter - Naseem Barber MD - 08/10/2021 4:15 PM CDT  Formatting of this note might be different from the original.  This is fine. Our intention was to replace him with B12 injections once weekly x4 weeks and then monthly thereafter for 1 year total. We were supposed to follow-up with him 1 month after his hospital discharge and additional repletion of therapy, and he follows up with me this Thursday. We will discuss his management options with him at that point.Naseem Barber MD .................... 8/10/2021 4:17 PM    Per Dr. Barber in [place] 8/12/21 - \"He will continue with B12 injections for now. Given the improvement in all of his counts, we will not proceed with a bone marrow biopsy at this point, but we will follow-up with him in 3 months for close surveillance.\"    Per this chart review and writer's understanding, pt should have received 2 more doses of B12 injections around the week of 8/25 and 9/1 but are not documented in pt's chart.   Last two documented encounters for injections were 8/11 and 8/18.    Should pt have 2 more weekly injections then start monthly for 1 year per recommendation or should pt start monthly doses for 1 year at this time? Will route back to PCP with updates and to inquire next steps.    Vy MARSH RN, BSN  [facility]    "

## 2021-09-13 NOTE — TELEPHONE ENCOUNTER
Absolutely excellent information and Thank you so very much ! !    Please provide whatever orders patient needs to have supplies to receive monthly injections with b12 for one year     Reroute for signing if necessary     Kapil Duckworth MD

## 2021-09-15 RX ORDER — CYANOCOBALAMIN 1000 UG/ML
1000 INJECTION, SOLUTION INTRAMUSCULAR; SUBCUTANEOUS
Status: DISCONTINUED | OUTPATIENT
Start: 2021-09-15 | End: 2022-09-30

## 2021-09-15 NOTE — TELEPHONE ENCOUNTER
T'eed up CAM order for B-12 every 30 days.  Forwarded to provider for signing.    Team:  Please call patient and assist with scheduling the appointment for next dose.

## 2021-09-22 ENCOUNTER — TRANSFERRED RECORDS (OUTPATIENT)
Dept: MULTI SPECIALTY CLINIC | Facility: CLINIC | Age: 76
End: 2021-09-22

## 2021-09-26 LAB — GLUCOSE (EXTERNAL): 124 MG/DL (ref 65–100)

## 2021-09-28 LAB
CREATININE (EXTERNAL): 1.84 MG/DL (ref 0.72–1.25)
GFR ESTIMATED (EXTERNAL): 36 ML/MIN/1.73M2
GFR ESTIMATED (IF AFRICAN AMERICAN) (EXTERNAL): 43 ML/MIN/1.73M2
INR (EXTERNAL): 2.1
POTASSIUM (EXTERNAL): 3.9 MMOL/L (ref 3.5–5)

## 2021-10-13 ENCOUNTER — PATIENT OUTREACH (OUTPATIENT)
Dept: PULMONOLOGY | Facility: CLINIC | Age: 76
End: 2021-10-13

## 2021-10-13 NOTE — TELEPHONE ENCOUNTER
Called the patient's home number to check on his status.  His wife Yoli answered and informed me that he is staying at a care [facility] in [place].  He has been hospitalized twice since seeing Dr. Coe.  He has had blood clots in his legs.  Patient's wife reports that his leg currently has an open wound they are caring for at the care facility.  She is unsure when he will return home.      Provided her my number to call when the patient's leg is better and he is ready to schedule PFT's and thoracic consult.

## 2021-10-23 ENCOUNTER — HEALTH MAINTENANCE LETTER (OUTPATIENT)
Age: 76
End: 2021-10-23

## 2021-11-03 ENCOUNTER — TELEPHONE (OUTPATIENT)
Dept: FAMILY MEDICINE | Facility: CLINIC | Age: 76
End: 2021-11-03

## 2021-11-03 NOTE — TELEPHONE ENCOUNTER
I was given a call from Naseem Barber MD with [facility]     Telephone call was to appraise me of patients condition and that from the perspective of the lung nodule this wasn't overly worrisome for progression to cancer , and the anemia had stabilized     Seen initially for anemia at [facility]    Suspected from methotrexate toxicity due to coexisting condition of acute renal failure, got better    I am now told of a whole bunch of information I was not privy to and that patient had had an urgent surgery and was inpatient for this . Admitted for acute crtitical limb ischemia , had emergency  thrombectomy and eventual bypass, needs a wound vacuum-assisted closure device now and is just not doing so well.     Wound healing is maybe the biggest problem     6 month follow up for anemia suggested     At a transitional care unit now    From Dr. Barber's perspective the anemia and the lung nodule were not the biggest problems now    I agreed and suggested we will reach out to patient and seek to get him into the clinic for a post hospital discharge follow up office visit and possibly another family conference. See most recent previous office visit with me from August. Patient was never seen by the pulmonologist as he was supposed to. Not sure what happened there. We just had no notice of any of this before right now. Now that I click into care everywhere I can see a whole plethora of medical issues including 2 hospitalizations, office visit notes with Infectious Disease specialists, cardiologists, and Dr. Barber from oncologist, a vast amount of information I am seeing for the first time right now.    Will contact patient and / or family and request they schedule follow up appointment as soon as possible      Kapil Duckworth MD

## 2021-11-03 NOTE — TELEPHONE ENCOUNTER
Reason for Call:  Other call back    Detailed comments: Dr. Naseem Barber would like the Provider to give him a call on his cell phone. He would like to discuss a lung nodule found in the pt that will need follow up     Phone Number Patient can be reached at: Other phone number:  [phone]    Best Time: any    Can we leave a detailed message on this number? NO    Call taken on 11/3/2021 at 11:21 AM by Vy Foy

## 2021-11-03 NOTE — TELEPHONE ENCOUNTER
Dr. Duckworth- please clarify, If patient is still in the TCU AND under their in-house provider's care, should he wait until after he is discharge from TCU to have the f/u in clinic or f/u now? (we may need to verify with the TCU on whether or not patient is under their in-house provider's cares. Not sure which TCU patient is at)    Depending on response from provider- Will ask pink team to call patient/family to schedule a post-hospital or post TCU f/u with Kapil Carbajal asa due to multiple health issues that have occurred since the last time he was seen (ask family to come as well)    Jacobo Pak RN  [facility]

## 2021-11-04 ENCOUNTER — TRANSFERRED RECORDS (OUTPATIENT)
Dept: HEALTH INFORMATION MANAGEMENT | Facility: CLINIC | Age: 76
End: 2021-11-04
Payer: COMMERCIAL

## 2021-11-04 NOTE — TELEPHONE ENCOUNTER
Thank you for the requested clarification . Yes, if he is currently at a transitional care unit and especially if he's being followed for his healthcare by an in house primary care physician , of course he shouldn't see me until he's discharged from such a facility     You can close this encounter if he's still in a transitional care unit but do please send in the release of information [ or route this to [facility] ] to get the release of information rolling so we can get information from transitional care unit IF NECESSARY ]    Kapil Duckworth MD

## 2021-11-04 NOTE — TELEPHONE ENCOUNTER
I called [facility] (ph: [phone]).  Patient is currently there.    I asked that when he is discharged that they fax us his discharge summary to [phone].    Caren Telles,

## 2021-11-05 ENCOUNTER — TELEPHONE (OUTPATIENT)
Dept: FAMILY MEDICINE | Facility: CLINIC | Age: 76
End: 2021-11-05

## 2021-11-05 NOTE — TELEPHONE ENCOUNTER
Left detailed message on [employer] with [employer]. identified voice mail.  Advised patient to call [phone] at her earliest convenience with additional questions or concerns

## 2021-11-05 NOTE — TELEPHONE ENCOUNTER
Li with [facility]. Called the clinic.  She reports that patient will be discharged from the TCU around 117/21.  Li is inquiring if Dr. Duckworth will be following patient while in home care.    Maria R call Li ([phone]) with the provider's recommendations

## 2021-11-30 ENCOUNTER — TELEPHONE (OUTPATIENT)
Dept: FAMILY MEDICINE | Facility: CLINIC | Age: 76
End: 2021-11-30
Payer: COMMERCIAL

## 2021-11-30 NOTE — TELEPHONE ENCOUNTER
Not currently at the office .. back Monday December 6th. In order to be able to write a detailed letter I am thinking it is going to be necessary to see patient/ possibly a virtual office visit [ video office visit ] would work. A plain telephone MD visit isn't going to be adequate     I am so sorry but I can't write things and attest to things I have not directly observed or been involved with      Kapil Duckworth MD

## 2021-11-30 NOTE — TELEPHONE ENCOUNTER
Routing call to PCP-     Wife Yoli calling in stating pt has been in [facility] the last 2 months after surgery. He has an extensive open wound that that needs to be cleaned twice daily.   There appeal for him to stay there has been denied and she is looking to see if you can write a detailed letter as to why he needs 24 hour skilled nursing care for his wound care.   If you have further questions she can be reached at [phone].    Please route back to [place] staff with directions    Kaur Santos RN  [facility]: [place]  Phone: [phone]  Fax:[phone]

## 2021-12-01 NOTE — TELEPHONE ENCOUNTER
Spoke with Wife, Yoli. Gave her provider's message as written. Daughter thought it would be best to speak with   vascular surgeon who has been involved in this. They have a message for Vascular surgeon through [facility]. States medicare wants to discharge pt. Did advise that homecare could be ordered and nurse could do wound care and teach wife how to do this. Yoli states she doesn't think she would be able to do his wound care. States she is concerned she would make a mistake and cause an infection.    Mia Artis RN  [facility]

## 2021-12-18 ENCOUNTER — HEALTH MAINTENANCE LETTER (OUTPATIENT)
Age: 76
End: 2021-12-18

## 2022-01-20 ENCOUNTER — TRANSFERRED RECORDS (OUTPATIENT)
Dept: HEALTH INFORMATION MANAGEMENT | Facility: CLINIC | Age: 77
End: 2022-01-20
Payer: COMMERCIAL

## 2022-01-20 ENCOUNTER — TELEPHONE (OUTPATIENT)
Dept: FAMILY MEDICINE | Facility: CLINIC | Age: 77
End: 2022-01-20
Payer: COMMERCIAL

## 2022-01-20 DIAGNOSIS — E11.42 TYPE 2 DIABETES MELLITUS WITH DIABETIC POLYNEUROPATHY, WITHOUT LONG-TERM CURRENT USE OF INSULIN (H): Primary | ICD-10-CM

## 2022-01-20 RX ORDER — INSULIN GLARGINE 100 [IU]/ML
10 INJECTION, SOLUTION SUBCUTANEOUS AT BEDTIME
Qty: 3 ML | Refills: 1 | Status: SHIPPED | OUTPATIENT
Start: 2022-01-20 | End: 2023-08-03

## 2022-01-20 NOTE — TELEPHONE ENCOUNTER
Pt transferred to a new assisted living today from TCU. Luis Carlos at the assisted living facility called asking for clarification on 2 medications:    1. Pt was discharged with instructions to take Lantus but no dose or instructions given and no prescription was sent. Writer does not see notes from TCU in pt's chart to know how to clarify.   - They asked if PCP can provide dose and instructions for Lantus    2. Pt has a prescription for sliding scale novolog insulin and their facility does not do sliding scale. They are not licensed to do so and can only give a set number of units. If PCP can re-write prescription with a set number of units either with meals or PRN they can administer for pt.     Luis Carlos also stated they can monitor the pt's BG and report back so that PCP can adjust dosing if needed.    Luis Carlos can be reached directly at [phone]    Would like team to fax new orders to either the pharmacy or the facility directly (Fax [phone]). New pharmacy is [facility] in [place], [place] (added as primary in chart).    Vy MARSH RN, BSN  [facility]

## 2022-01-21 ENCOUNTER — MEDICAL CORRESPONDENCE (OUTPATIENT)
Dept: HEALTH INFORMATION MANAGEMENT | Facility: CLINIC | Age: 77
End: 2022-01-21
Payer: COMMERCIAL

## 2022-01-21 ENCOUNTER — TELEPHONE (OUTPATIENT)
Dept: FAMILY MEDICINE | Facility: CLINIC | Age: 77
End: 2022-01-21
Payer: COMMERCIAL

## 2022-01-21 NOTE — TELEPHONE ENCOUNTER
Spencer (PT) with [facility] health home care called the clinic requesting verbal orders for PT: 1 time a week X 1 week, 2 times a week X 2 weeks and 1 time a week X 2 weeks for gait training, lower extremities strengthening, balance, home exercise program.    Gave okay for verbal orders for Home care Physical therapy as requested.    Per [facility] protocol/Policies: Chastity Franks, RN for Dr. Duckworth.  Patient who has bee seen by [facility] primary care provider within the past 2 years (9/8/21)

## 2022-01-21 NOTE — TELEPHONE ENCOUNTER
Called and left detailed message with Dr. Duckworth's message. Call back to [phone] if any further questions or concerns.    Team,   Please fax scripts to [phone].    Mia Artis RN  [facility]

## 2022-01-21 NOTE — TELEPHONE ENCOUNTER
Nurse returned call. He did not receive message. Provider note given as written. He will look for prescription faxes and request daughter schedule appointment for patient.     Geni LYMAN RN  [facility]

## 2022-01-21 NOTE — TELEPHONE ENCOUNTER
My last apartment with patient was 8/16/2021 and so much has has happened since then. I can supply some orders but I feel that office visit with me is desperately needed and Please help see to it that appointment is generated as soon as possible     In the meantime , the following insulin instructions are adequate with need for 3 times a day accuchecks     Lantus (Insulin Glargine) 10 units at bedtime   Novolog (ultra-short acting insulin aspart) 8 units with all meals [ breakfast, lunch dinner ]    Prescription generated and signed , Hard copy prescription generated , we can fax tomorrow as needed to where they need to go    Kapil Duckworth MD

## 2022-01-21 NOTE — TELEPHONE ENCOUNTER
Novolog and Lantus prescriptions faxed to [facility] pharmacy (f: [phone]).  Caren Telles,

## 2022-01-25 ENCOUNTER — LAB REQUISITION (OUTPATIENT)
Dept: LAB | Facility: CLINIC | Age: 77
End: 2022-01-25
Payer: COMMERCIAL

## 2022-01-25 DIAGNOSIS — I73.9 PERIPHERAL VASCULAR DISEASE, UNSPECIFIED (H): ICD-10-CM

## 2022-01-25 LAB — INR PPP: 1.78 (ref 0.85–1.15)

## 2022-01-25 PROCEDURE — 85610 PROTHROMBIN TIME: CPT | Mod: ORL | Performed by: NURSE PRACTITIONER

## 2022-01-25 PROCEDURE — 36415 COLL VENOUS BLD VENIPUNCTURE: CPT | Mod: ORL | Performed by: NURSE PRACTITIONER

## 2022-01-25 PROCEDURE — P9603 ONE-WAY ALLOW PRORATED MILES: HCPCS | Mod: ORL | Performed by: NURSE PRACTITIONER

## 2022-01-31 ENCOUNTER — LAB REQUISITION (OUTPATIENT)
Dept: LAB | Facility: CLINIC | Age: 77
End: 2022-01-31
Payer: COMMERCIAL

## 2022-01-31 ENCOUNTER — TELEPHONE (OUTPATIENT)
Dept: INTERNAL MEDICINE | Facility: CLINIC | Age: 77
End: 2022-01-31
Payer: COMMERCIAL

## 2022-01-31 DIAGNOSIS — I73.9 PERIPHERAL VASCULAR DISEASE, UNSPECIFIED (H): ICD-10-CM

## 2022-01-31 DIAGNOSIS — D64.89 OTHER SPECIFIED ANEMIAS: ICD-10-CM

## 2022-01-31 DIAGNOSIS — E11.9 TYPE 2 DIABETES MELLITUS WITHOUT COMPLICATIONS (H): ICD-10-CM

## 2022-01-31 DIAGNOSIS — Z53.9 DIAGNOSIS NOT YET DEFINED: Primary | ICD-10-CM

## 2022-01-31 PROCEDURE — G0180 MD CERTIFICATION HHA PATIENT: HCPCS | Performed by: INTERNAL MEDICINE

## 2022-01-31 NOTE — TELEPHONE ENCOUNTER
Reason for Call:  Form, our goal is to have forms completed with 72 hours, however, some forms may require a visit or additional information.    Type of letter, form or note:  Home Health Certification    Who is the form from?: Home care    Where did the form come from: form was faxed in    What clinic location was the form placed at?: [facility]    Where the form was placed: Given to physician    What number is listed as a contact on the form?: [phone]       Call taken on 1/31/2022 at 11:58 AM by Caren Telles

## 2022-02-01 LAB
ANION GAP SERPL CALCULATED.3IONS-SCNC: 9 MMOL/L (ref 5–18)
BASOPHILS # BLD AUTO: 0.1 10E3/UL (ref 0–0.2)
BASOPHILS NFR BLD AUTO: 1 %
BUN SERPL-MCNC: 36 MG/DL (ref 8–28)
CALCIUM SERPL-MCNC: 8.8 MG/DL (ref 8.5–10.5)
CHLORIDE BLD-SCNC: 110 MMOL/L (ref 98–107)
CHOLEST SERPL-MCNC: 216 MG/DL
CO2 SERPL-SCNC: 22 MMOL/L (ref 22–31)
CREAT SERPL-MCNC: 1.63 MG/DL (ref 0.7–1.3)
EOSINOPHIL # BLD AUTO: 0.2 10E3/UL (ref 0–0.7)
EOSINOPHIL NFR BLD AUTO: 4 %
ERYTHROCYTE [DISTWIDTH] IN BLOOD BY AUTOMATED COUNT: 14 % (ref 10–15)
FASTING STATUS PATIENT QL REPORTED: ABNORMAL
FOLATE SERPL-MCNC: >20 NG/ML
GFR SERPL CREATININE-BSD FRML MDRD: 43 ML/MIN/1.73M2
GLUCOSE BLD-MCNC: 123 MG/DL (ref 70–125)
HBA1C MFR BLD: 9.7 %
HCT VFR BLD AUTO: 35.9 % (ref 40–53)
HDLC SERPL-MCNC: 35 MG/DL
HGB BLD-MCNC: 11.2 G/DL (ref 13.3–17.7)
IMM GRANULOCYTES # BLD: 0.1 10E3/UL
IMM GRANULOCYTES NFR BLD: 1 %
INR PPP: 1.7 (ref 0.85–1.15)
IRON SATN MFR SERPL: 30 % (ref 20–50)
IRON SERPL-MCNC: 65 UG/DL (ref 42–175)
LDLC SERPL CALC-MCNC: 135 MG/DL
LYMPHOCYTES # BLD AUTO: 1.2 10E3/UL (ref 0.8–5.3)
LYMPHOCYTES NFR BLD AUTO: 17 %
MAGNESIUM SERPL-MCNC: 2.3 MG/DL (ref 1.8–2.6)
MCH RBC QN AUTO: 28.7 PG (ref 26.5–33)
MCHC RBC AUTO-ENTMCNC: 31.2 G/DL (ref 31.5–36.5)
MCV RBC AUTO: 92 FL (ref 78–100)
MONOCYTES # BLD AUTO: 1 10E3/UL (ref 0–1.3)
MONOCYTES NFR BLD AUTO: 14 %
NEUTROPHILS # BLD AUTO: 4.4 10E3/UL (ref 1.6–8.3)
NEUTROPHILS NFR BLD AUTO: 63 %
NRBC # BLD AUTO: 0 10E3/UL
NRBC BLD AUTO-RTO: 0 /100
PLATELET # BLD AUTO: 274 10E3/UL (ref 150–450)
POTASSIUM BLD-SCNC: 4.2 MMOL/L (ref 3.5–5)
RBC # BLD AUTO: 3.9 10E6/UL (ref 4.4–5.9)
SODIUM SERPL-SCNC: 141 MMOL/L (ref 136–145)
TIBC SERPL-MCNC: 214 UG/DL (ref 313–563)
TRANSFERRIN SERPL-MCNC: 171 MG/DL (ref 212–360)
TRIGL SERPL-MCNC: 232 MG/DL
VIT B12 SERPL-MCNC: 1190 PG/ML (ref 213–816)
WBC # BLD AUTO: 6.8 10E3/UL (ref 4–11)

## 2022-02-01 PROCEDURE — 80048 BASIC METABOLIC PNL TOTAL CA: CPT | Mod: ORL | Performed by: FAMILY MEDICINE

## 2022-02-01 PROCEDURE — P9603 ONE-WAY ALLOW PRORATED MILES: HCPCS | Mod: ORL | Performed by: FAMILY MEDICINE

## 2022-02-01 PROCEDURE — 83735 ASSAY OF MAGNESIUM: CPT | Mod: ORL | Performed by: FAMILY MEDICINE

## 2022-02-01 PROCEDURE — 83036 HEMOGLOBIN GLYCOSYLATED A1C: CPT | Mod: ORL | Performed by: FAMILY MEDICINE

## 2022-02-01 PROCEDURE — 82746 ASSAY OF FOLIC ACID SERUM: CPT | Mod: ORL | Performed by: FAMILY MEDICINE

## 2022-02-01 PROCEDURE — 83540 ASSAY OF IRON: CPT | Mod: ORL | Performed by: FAMILY MEDICINE

## 2022-02-01 PROCEDURE — 80061 LIPID PANEL: CPT | Mod: ORL | Performed by: FAMILY MEDICINE

## 2022-02-01 PROCEDURE — 85610 PROTHROMBIN TIME: CPT | Mod: ORL | Performed by: FAMILY MEDICINE

## 2022-02-01 PROCEDURE — 85025 COMPLETE CBC W/AUTO DIFF WBC: CPT | Mod: ORL | Performed by: FAMILY MEDICINE

## 2022-02-01 PROCEDURE — 82607 VITAMIN B-12: CPT | Mod: ORL | Performed by: FAMILY MEDICINE

## 2022-02-01 PROCEDURE — 36415 COLL VENOUS BLD VENIPUNCTURE: CPT | Mod: ORL | Performed by: FAMILY MEDICINE

## 2022-02-07 ENCOUNTER — LAB REQUISITION (OUTPATIENT)
Dept: LAB | Facility: CLINIC | Age: 77
End: 2022-02-07
Payer: COMMERCIAL

## 2022-02-07 DIAGNOSIS — I73.9 PERIPHERAL VASCULAR DISEASE, UNSPECIFIED (H): ICD-10-CM

## 2022-02-08 LAB — INR PPP: 1.89 (ref 0.85–1.15)

## 2022-02-08 PROCEDURE — 85610 PROTHROMBIN TIME: CPT | Mod: ORL | Performed by: FAMILY MEDICINE

## 2022-02-08 PROCEDURE — 36415 COLL VENOUS BLD VENIPUNCTURE: CPT | Mod: ORL | Performed by: FAMILY MEDICINE

## 2022-02-08 PROCEDURE — P9603 ONE-WAY ALLOW PRORATED MILES: HCPCS | Mod: ORL | Performed by: FAMILY MEDICINE

## 2022-02-10 ENCOUNTER — TELEPHONE (OUTPATIENT)
Dept: INTERNAL MEDICINE | Facility: CLINIC | Age: 77
End: 2022-02-10
Payer: COMMERCIAL

## 2022-02-10 NOTE — TELEPHONE ENCOUNTER
The Home Care/Assisted Living/Nursing Facility is calling regarding an established patient.  Has the patient seen Home Care in the past or is currently residing in Assisted Living or Nursing Facility? Yes.     Conchita MANRIQUEZ calling from [facility] requesting the following orders that are within the Home Care, Assisted Living or Nursing Home Eval and Treatment standing order and can be signed as standing order signature required by RN.    Preferred Call Back Number: [phone]    Home Care Visits Continuation     DECLAN Arango from [facility] calling to report that pt had a fall yesterday night. Pt did not hit his head. He was trying to avoid stepping on his dogs. Pt has superficial skin tears to both knees. She is asking for verbal ok to apply bacitracin and cover with border dressing. Gave verbal ok for orders per RN protocol.    States wound reopened on left heel and she would like to treat with naya and cover with border dressing. Gave verbal ok for orders requested per RN protocol.    Any additional Orders:  Any order requested not stated above are outside of the standing order and must be routed to a licensed practitioner for approval.    No    Writer has verified [employer] will send fax to have orders signed.    Routing to provider as FYI due to pt's fall.    Mia Artis RN  [facility]

## 2022-02-13 ENCOUNTER — LAB REQUISITION (OUTPATIENT)
Dept: LAB | Facility: CLINIC | Age: 77
End: 2022-02-13
Payer: COMMERCIAL

## 2022-02-13 DIAGNOSIS — I73.9 PERIPHERAL VASCULAR DISEASE, UNSPECIFIED (H): ICD-10-CM

## 2022-02-15 LAB — INR PPP: 1.5 (ref 0.85–1.15)

## 2022-02-15 PROCEDURE — 85610 PROTHROMBIN TIME: CPT | Mod: ORL | Performed by: FAMILY MEDICINE

## 2022-02-15 PROCEDURE — P9603 ONE-WAY ALLOW PRORATED MILES: HCPCS | Mod: ORL | Performed by: FAMILY MEDICINE

## 2022-02-15 PROCEDURE — 36415 COLL VENOUS BLD VENIPUNCTURE: CPT | Mod: ORL | Performed by: FAMILY MEDICINE

## 2022-02-20 ENCOUNTER — LAB REQUISITION (OUTPATIENT)
Dept: LAB | Facility: CLINIC | Age: 77
End: 2022-02-20
Payer: COMMERCIAL

## 2022-02-20 DIAGNOSIS — I73.9 PERIPHERAL VASCULAR DISEASE, UNSPECIFIED (H): ICD-10-CM

## 2022-02-22 LAB — INR PPP: 1.69 (ref 0.85–1.15)

## 2022-02-22 PROCEDURE — P9603 ONE-WAY ALLOW PRORATED MILES: HCPCS | Mod: ORL | Performed by: FAMILY MEDICINE

## 2022-02-22 PROCEDURE — 85610 PROTHROMBIN TIME: CPT | Mod: ORL | Performed by: FAMILY MEDICINE

## 2022-02-22 PROCEDURE — 36415 COLL VENOUS BLD VENIPUNCTURE: CPT | Mod: ORL | Performed by: FAMILY MEDICINE

## 2022-02-27 ENCOUNTER — LAB REQUISITION (OUTPATIENT)
Dept: LAB | Facility: CLINIC | Age: 77
End: 2022-02-27
Payer: COMMERCIAL

## 2022-02-27 DIAGNOSIS — I73.9 PERIPHERAL VASCULAR DISEASE, UNSPECIFIED (H): ICD-10-CM

## 2022-03-01 ENCOUNTER — PATIENT OUTREACH (OUTPATIENT)
Dept: PULMONOLOGY | Facility: CLINIC | Age: 77
End: 2022-03-01

## 2022-03-01 LAB — INR PPP: 1.67 (ref 0.85–1.15)

## 2022-03-01 PROCEDURE — 85610 PROTHROMBIN TIME: CPT | Mod: ORL | Performed by: FAMILY MEDICINE

## 2022-03-01 PROCEDURE — 36415 COLL VENOUS BLD VENIPUNCTURE: CPT | Mod: ORL | Performed by: FAMILY MEDICINE

## 2022-03-01 PROCEDURE — P9603 ONE-WAY ALLOW PRORATED MILES: HCPCS | Mod: ORL | Performed by: FAMILY MEDICINE

## 2022-03-01 NOTE — PROGRESS NOTES
Over the past few months this patient has had an open leg wound for which he was staying in a nursing home for and having the bandages changed BID.  Per his wife she wasn't able to care for him or transport him, so she said he wouldn't be able to see thoracic for an unknown amount of time.      Called this patient today to check in on how he has been doing.  He is living in assisted living now and having a nurse come daily to change his bandage.  He thinks it would be feasible to get out to come to the clinic and see thoracic.    Will update Dr. Coe.

## 2022-03-02 ENCOUNTER — ANCILLARY PROCEDURE (OUTPATIENT)
Dept: CT IMAGING | Facility: CLINIC | Age: 77
End: 2022-03-02
Attending: CLINICAL NURSE SPECIALIST
Payer: COMMERCIAL

## 2022-03-02 DIAGNOSIS — R91.8 PULMONARY NODULES: ICD-10-CM

## 2022-03-02 PROCEDURE — 71250 CT THORAX DX C-: CPT | Mod: GC | Performed by: RADIOLOGY

## 2022-03-07 ENCOUNTER — TELEPHONE (OUTPATIENT)
Dept: RHEUMATOLOGY | Facility: CLINIC | Age: 77
End: 2022-03-07
Payer: COMMERCIAL

## 2022-03-07 ENCOUNTER — LAB REQUISITION (OUTPATIENT)
Dept: LAB | Facility: CLINIC | Age: 77
End: 2022-03-07
Payer: COMMERCIAL

## 2022-03-07 DIAGNOSIS — I73.9 PERIPHERAL VASCULAR DISEASE, UNSPECIFIED (H): ICD-10-CM

## 2022-03-07 NOTE — TELEPHONE ENCOUNTER
Health Call Center    Phone Message    May a detailed message be left on voicemail: yes     Reason for Call: Order(s): Other:   Reason for requested: standing lab orders    Date needed: ASAP  Provider name: Dr. Tompkins    Pt is scheduled for 3/21/22 appointment with Dr. Tompkins.  Pt's spouse is wondering if there are any labs Dr. Tompkins would like the Pt to complete before he comes in on 3/21/22?      Action Taken: message routed to: [facility]

## 2022-03-07 NOTE — TELEPHONE ENCOUNTER
Called patient and his wife and informed them that labs are not needed prior to rheumatology follow up visit. They verbalized understanding and have no questions.    Adam VEGA RN....3/7/2022 3:28 PM

## 2022-03-08 LAB — INR PPP: 1.74 (ref 0.85–1.15)

## 2022-03-08 PROCEDURE — 85610 PROTHROMBIN TIME: CPT | Mod: ORL | Performed by: FAMILY MEDICINE

## 2022-03-08 PROCEDURE — P9603 ONE-WAY ALLOW PRORATED MILES: HCPCS | Mod: ORL | Performed by: FAMILY MEDICINE

## 2022-03-08 PROCEDURE — 36415 COLL VENOUS BLD VENIPUNCTURE: CPT | Mod: ORL | Performed by: FAMILY MEDICINE

## 2022-03-10 ENCOUNTER — OFFICE VISIT (OUTPATIENT)
Dept: SURGERY | Facility: CLINIC | Age: 77
End: 2022-03-10
Attending: STUDENT IN AN ORGANIZED HEALTH CARE EDUCATION/TRAINING PROGRAM
Payer: COMMERCIAL

## 2022-03-10 ENCOUNTER — TELEPHONE (OUTPATIENT)
Dept: FAMILY MEDICINE | Facility: CLINIC | Age: 77
End: 2022-03-10

## 2022-03-10 VITALS
SYSTOLIC BLOOD PRESSURE: 149 MMHG | OXYGEN SATURATION: 97 % | BODY MASS INDEX: 31.4 KG/M2 | HEIGHT: 69 IN | HEART RATE: 74 BPM | WEIGHT: 212 LBS | TEMPERATURE: 98.4 F | DIASTOLIC BLOOD PRESSURE: 89 MMHG | RESPIRATION RATE: 16 BRPM

## 2022-03-10 DIAGNOSIS — R91.8 PULMONARY NODULES: ICD-10-CM

## 2022-03-10 PROCEDURE — G0463 HOSPITAL OUTPT CLINIC VISIT: HCPCS

## 2022-03-10 PROCEDURE — 99204 OFFICE O/P NEW MOD 45 MIN: CPT | Performed by: STUDENT IN AN ORGANIZED HEALTH CARE EDUCATION/TRAINING PROGRAM

## 2022-03-10 ASSESSMENT — PAIN SCALES - GENERAL: PAINLEVEL: MILD PAIN (3)

## 2022-03-10 NOTE — NURSING NOTE
"Oncology Rooming Note    March 10, 2022 9:50 AM   Zurdo Dash is a 76 year old male who presents for:    Chief Complaint   Patient presents with     Oncology Clinic Visit     New; Pulmonary Nodules     Initial Vitals: BP (!) 149/89   Pulse 74   Temp 98.4  F (36.9  C) (Oral)   Resp 16   Ht 1.74 m (5' 8.5\")   Wt 96.2 kg (212 lb)   SpO2 97%   BMI 31.77 kg/m   Estimated body mass index is 31.77 kg/m  as calculated from the following:    Height as of this encounter: 1.74 m (5' 8.5\").    Weight as of this encounter: 96.2 kg (212 lb). Body surface area is 2.16 meters squared.  Mild Pain (3) Comment: Data Unavailable   No LMP for male patient.  Allergies reviewed: Yes  Medications reviewed: No  Pt unaware of all medications.    Medications: Medication refills not needed today.  Pharmacy name entered into [employer]: [facility] [id number]    Clinical concerns:  Follow up.      Neeta Dorsey CMA              "

## 2022-03-10 NOTE — TELEPHONE ENCOUNTER
Luis Carlos with [facility] calling, states that patient only wants to take Trulicity and would like Novolog and Lantus discontinued. Luis Carlos is wondering if patient needs appointment to discuss discontinuation of medications.     Also requesting updated medication list.   Team, please fax updated medication list to [facility]. Fax: [phone] Thanks!      If there are any other questions, Luis Carlos can be reached at [phone], ok to leave message.     Karyn Redmond RN   [facility]

## 2022-03-10 NOTE — LETTER
3/10/2022         RE: Zurdo Dash  [address]  [place] [place] [address]        Dear Colleague,    Thank you for referring your patient, Zurdo Dash, to the [facility]. Please see a copy of my visit note below.    THORACIC SURGERY - NEW PATIENT OFFICE VISIT      Dear Dr. Duckworth,  I saw Mr. Dash at Dr. Coe s request in consultation for the evaluation and treatment of an enlarging lung nodule    HPI  Mr. Zurdo Dash is a 76 year old man with a lung nodule found on screening chest CT, which has been slowly emlarging.  He denies pulmonary symptoms from this, including cough, hemoptysis, or recent SOB.  His lung nodules have not been biopsied and he has not had recent PFTs completed.    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  The recovery from this was complicated by a fall with head trauma and a pelvic fracture as well as an arrhythmia/heart block requiring pacemaker placement.  Later during summer 2021, it was discovered that he had severe anemia requiring blood transfusions, which is attributed to his methotrexate use for rheumatoid arthritis.  He no longer takes methotrexate.  Lastly, in Sept 2021, his bypass clotted off and he experienced a groin wound infection, requiring repeated surgery and opening of the wound.  He is now off antibiotics and the wound is healing well, but it is still open.  He lives in an assisted living facility for help with his cares and medications.  He is on a blood thinner, but is unsure which.    He walks with a walker and manages his ADLs but does get fatigued easily                               Previsit Tests   3/2/2022 CT Chest:    -17 x 11 mm nodule right upper lobe, previously 11 x 8 mm within measured in a similar fashion (series 6 image 80)  -7 mm nodule in the right upper lobe, previously 7 mm (series 6 image 79)      -10 mm  Groundglass nodule within the right lower lobe, previously 10 mm (series 6 image 121) nonremainder subtle 6 mm pulmonary nodules are unchanged.    1/26/2017 PFT - restrictive pattern but no emphysema    PMH  CKD stage 3  HTN  HLD  T2DM on Trulicity and occasional insulin  Rheumatoid arthritis now off methotrexate    Past Medical History:   Diagnosis Date     Abnormal CT scan 03/2004    calcified lung granuloma     C. difficile diarrhea     H/O     Cataract 11/18/2011     Diabetic neuropathy (H)     mild, mostly soles and distal forefeet, worse on the left side.     Diverticulitis      ED (erectile dysfunction)      Ex-smoker     QUIT SMOKING FEB 2007     History of ETOH abuse     recovering, sober since 1997     Hyperlipidemia LDL goal <100      Hypertension goal BP (blood pressure) < 140/90      Hypogonadism      Obesity      PAD (peripheral artery disease) (H)     leg cramps, with exertion, no formal diagnosis of PAD and minimal if any symptoms at all.     RA (rheumatoid arthritis) (H)     Dr Bailon      Takes ASA 81 and possibly warfarin per his daughter.    PSH    Past Surgical History:   Procedure Laterality Date     BYPASS GRAFT FEMOROPOPLITEAL Left 01/07/2021    Procedure: LEFT FEMORAL TO ABOVE KNEE POPLITEAL ARTERY BYPASS WITH PTFE VASCULAR GRAFT REMOVABLE RING 6MMX 50CM;  Surgeon: Myra Lopes MD;  Location: [facility] OR     CATARACT IOL, RT/LT       COLONOSCOPY  03/01/2018    [facility]     COMBINED REPAIR PTOSIS WITH BLEPHAROPLASTY BILATERAL Bilateral 08/16/2019    Procedure: BILATERAL UPPER EYELID BLEPHAROPLASTY AND BILATERAL PTOSIS REPAIR;  Surgeon: Janet Garcia MD;  Location: [facility] OR     ENDARTERECTOMY FEMORAL Left 01/07/2021    Procedure: LEFT FEMORAL ENDARTERECTOMY WITH PATCH ANGIOPLASTY PHOTOFIX  0.8 X 8CM;  Surgeon: Myra Lopes MD;  Location:  OR     EP PACEMAKER  01/2021     EXCISE LESION EYELID Left 08/16/2019    Procedure: LEFT LOWER EYELID BIOPSY;  Surgeon:  Janet Garcia MD;  Location: [facility] OR     IR LOWER EXTREMITY ANGIOGRAM LEFT  12/17/2020     PHACOEMULSIFICATION WITH STANDARD INTRAOCULAR LENS IMPLANT  02/2019; 3/2019    left eye; right eye     TONSILLECTOMY       ZZHC INCISION TENDON SHEATH FINGER  04/2009    r hand ring finger      No other abdominal or thoracic surgeries.    ETOH: None  TOB: Smoked 48 years, 1 ppd, quit in007    Physical examination  BMI 32  CTAB, RRR  Nonhealing wound of the L heel, small wounds with dressings on the Left leg    From a personal perspective, he presents to clinic with his wife, his daughter joins the visit on the phone.    IMPRESSION  76 year old year-old male with an enlarging RUL lung nodule concerning for malignancy.       PLAN  .    I spent 45 min on the date of the encounter in chart review, patient visit, review of tests, documentation and/or discussion with other providers about the issues documented above. I reviewed the plan as follows:    We discussed that the nodule was concerning for a malignancy. However, given his recent extensive medical issues, he is not keen on another surgical procedure. This is understandable and I also do think he is not yet physically ready for another major operation.  We discussed options of an IR biopsy +/- PET and considering SBRT if it was cancer.  He prefers this option.   I will discuss him at our conference to consolidate this plan    All questions were answered and Zurdo Dash and present family were in agreement with the plan.  I appreciate the opportunity to participate in the care of your patient and will keep you updated.        Again, thank you for allowing me to participate in the care of your patient.      Sincerely,    Yuliana Mills MD

## 2022-03-10 NOTE — PROGRESS NOTES
THORACIC SURGERY - NEW PATIENT OFFICE VISIT      Dear Dr. Duckworth,  I saw Mr. Dash at Dr. Coe s request in consultation for the evaluation and treatment of an enlarging lung nodule    HPI  Mr. Zurdo Dash is a 76 year old man with a lung nodule found on screening chest CT, which has been slowly emlarging.  He denies pulmonary symptoms from this, including cough, hemoptysis, or recent SOB.  His lung nodules have not been biopsied and he has not had recent PFTs completed.    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  The recovery from this was complicated by a fall with head trauma and a pelvic fracture as well as an arrhythmia/heart block requiring pacemaker placement.  Later during summer 2021, it was discovered that he had severe anemia requiring blood transfusions, which is attributed to his methotrexate use for rheumatoid arthritis.  He no longer takes methotrexate.  Lastly, in Sept 2021, his bypass clotted off and he experienced a groin wound infection, requiring repeated surgery and opening of the wound.  He is now off antibiotics and the wound is healing well, but it is still open.  He lives in an assisted living facility for help with his cares and medications.  He is on a blood thinner, but is unsure which.    He walks with a walker and manages his ADLs but does get fatigued easily                               Previsit Tests   3/2/2022 CT Chest:    -17 x 11 mm nodule right upper lobe, previously 11 x 8 mm within measured in a similar fashion (series 6 image 80)  -7 mm nodule in the right upper lobe, previously 7 mm (series 6 image 79)      -10 mm Groundglass nodule within the right lower lobe, previously 10 mm (series 6 image 121) nonremainder subtle 6 mm pulmonary nodules are unchanged.    1/26/2017 PFT - restrictive pattern but no emphysema    PMH  CKD stage 3  HTN  HLD  T2DM on Trulicity and occasional  insulin  Rheumatoid arthritis now off methotrexate    Past Medical History:   Diagnosis Date     Abnormal CT scan 03/2004    calcified lung granuloma     C. difficile diarrhea     H/O     Cataract 11/18/2011     Diabetic neuropathy (H)     mild, mostly soles and distal forefeet, worse on the left side.     Diverticulitis      ED (erectile dysfunction)      Ex-smoker     QUIT SMOKING FEB 2007     History of ETOH abuse     recovering, sober since 1997     Hyperlipidemia LDL goal <100      Hypertension goal BP (blood pressure) < 140/90      Hypogonadism      Obesity      PAD (peripheral artery disease) (H)     leg cramps, with exertion, no formal diagnosis of PAD and minimal if any symptoms at all.     RA (rheumatoid arthritis) (H)     Dr Bailon      Takes ASA 81 and possibly warfarin per his daughter.    PSH    Past Surgical History:   Procedure Laterality Date     BYPASS GRAFT FEMOROPOPLITEAL Left 01/07/2021    Procedure: LEFT FEMORAL TO ABOVE KNEE POPLITEAL ARTERY BYPASS WITH PTFE VASCULAR GRAFT REMOVABLE RING 6MMX 50CM;  Surgeon: Myra Lopes MD;  Location: [facility] OR     CATARACT IOL, RT/LT       COLONOSCOPY  03/01/2018    [facility]     COMBINED REPAIR PTOSIS WITH BLEPHAROPLASTY BILATERAL Bilateral 08/16/2019    Procedure: BILATERAL UPPER EYELID BLEPHAROPLASTY AND BILATERAL PTOSIS REPAIR;  Surgeon: Janet Garcia MD;  Location: [facility] OR     ENDARTERECTOMY FEMORAL Left 01/07/2021    Procedure: LEFT FEMORAL ENDARTERECTOMY WITH PATCH ANGIOPLASTY PHOTOFIX  0.8 X 8CM;  Surgeon: Myra Lopes MD;  Location: [facility] OR     EP PACEMAKER  01/2021     EXCISE LESION EYELID Left 08/16/2019    Procedure: LEFT LOWER EYELID BIOPSY;  Surgeon: Janet Garcia MD;  Location: [facility] OR     IR LOWER EXTREMITY ANGIOGRAM LEFT  12/17/2020     PHACOEMULSIFICATION WITH STANDARD INTRAOCULAR LENS IMPLANT  02/2019; 3/2019    left eye; right eye     TONSILLECTOMY       ZZHC INCISION TENDON SHEATH FINGER  04/2009    r hand ring  finger      No other abdominal or thoracic surgeries.    ETOH: None  TOB: Smoked 48 years, 1 ppd, quit in007    Physical examination  BMI 32  CTAB, RRR  Nonhealing wound of the L heel, small wounds with dressings on the Left leg    From a personal perspective, he presents to clinic with his wife, his daughter joins the visit on the phone.    IMPRESSION  76 year old year-old male with an enlarging RUL lung nodule concerning for malignancy.       PLAN  .    I spent 45 min on the date of the encounter in chart review, patient visit, review of tests, documentation and/or discussion with other providers about the issues documented above. I reviewed the plan as follows:    We discussed that the nodule was concerning for a malignancy. However, given his recent extensive medical issues, he is not keen on another surgical procedure. This is understandable and I also do think he is not yet physically ready for another major operation.  We discussed options of an IR biopsy +/- PET and considering SBRT if it was cancer.  He prefers this option.   I will discuss him at our conference to consolidate this plan        All questions were answered and Zurdo Dash and present family were in agreement with the plan.  I appreciate the opportunity to participate in the care of your patient and will keep you updated.    Sincerely,

## 2022-03-11 NOTE — TELEPHONE ENCOUNTER
Lab Results   Component Value Date    A1C 9.7 02/01/2022    A1C 5.6 08/16/2021    A1C 6.8 01/07/2021    A1C 6.8 12/17/2020    A1C 6.7 12/11/2020    A1C 6.0 01/03/2020    A1C 6.4 08/06/2019     I don't have enough information to handle this    Appointment needed    Kapil Duckworth MD

## 2022-03-11 NOTE — TELEPHONE ENCOUNTER
Appointment scheduled for 3/21/22 as wife had appointment the same day.     Team, please fax updated medication list to [facility]. Fax: [phone] Thanks!      Karyn Redmond RN   [facility]

## 2022-03-14 ENCOUNTER — LAB REQUISITION (OUTPATIENT)
Dept: LAB | Facility: CLINIC | Age: 77
End: 2022-03-14
Payer: COMMERCIAL

## 2022-03-14 DIAGNOSIS — I73.9 PERIPHERAL VASCULAR DISEASE, UNSPECIFIED (H): ICD-10-CM

## 2022-03-15 ENCOUNTER — PATIENT OUTREACH (OUTPATIENT)
Dept: SURGERY | Facility: CLINIC | Age: 77
End: 2022-03-15
Payer: COMMERCIAL

## 2022-03-15 DIAGNOSIS — R91.8 PULMONARY NODULES: Primary | ICD-10-CM

## 2022-03-15 LAB — INR PPP: 1.86 (ref 0.85–1.15)

## 2022-03-15 PROCEDURE — 85610 PROTHROMBIN TIME: CPT | Mod: ORL | Performed by: FAMILY MEDICINE

## 2022-03-15 PROCEDURE — P9603 ONE-WAY ALLOW PRORATED MILES: HCPCS | Mod: ORL | Performed by: FAMILY MEDICINE

## 2022-03-15 PROCEDURE — 36415 COLL VENOUS BLD VENIPUNCTURE: CPT | Mod: ORL | Performed by: FAMILY MEDICINE

## 2022-03-15 NOTE — TELEPHONE ENCOUNTER
[facility]/Voicemail    Clinical Data: Care Coordinator Outreach    Writer attempted to call patient to provide update on recommendations form  multidisciplinary conferences. Outreach attempted x 1.  Left message on wife's  voicemail (only phone number provided) with call back information and requested return call.  MyChart message with team recommendations sent as well informing patient of IR referral for biopsy.    Plan: Care Coordinator will try to reach patient again in 3-5 business days.    Ofelia Jordan RN, BSN  Thoracic Surgery RN Care Coordinator    Addendum:  Patient's wife, Yoli, returned writer's call. I informed her that Dr Mills had presented Zurdo's case at our recent multidisciplinary conferences on Fri and yesterday and both conference teams recommend that patient have an IR biopsy performed. Wife informed writer that IR has already contacted them and patient is scheduled to have the Biopsy done 3/31/2022 at 9:30AM.  Wife appreciated the update.

## 2022-03-16 DIAGNOSIS — R91.8 PULMONARY NODULES: Primary | ICD-10-CM

## 2022-03-16 NOTE — PROGRESS NOTES
Outpatient IR Biopsy Referral    Patient is a 75 y/o male with a PMH of DM2, former tobacco use, low back pain, CKD, anemia, rheumatoid arthritis, SOB on exertion, HDL, HTN, RBBB, PMM, femoral artery occulusion s/p fem pop bypass 1/2021, non healing L heel, small wounds on left leg, enlarging pulmonary nodule. Patient was recently reviewed at Pulmonary nodule conference 3/11/22 with Dr. Otoole present who was agreeable to biopsy the RUL with clarification of biopsy resulted information use. IR has been asked to biopsy a RUL lung nodule for concerns of malignancy and staging purposes, patient was discussed at tumor conference 3/15/22 for SBRT therapy and is not a surgical candidate, agreed with biopsy of the RUL by IR for staging purposes.     CT 3/2/22    Lungs: No pneumothorax, pleural effusion, or focal airspace opacity.  Calcified granulomas. Pulmonary nodules as below:  -17 x 11 mm nodule right upper lobe, previously 11 x 8 mm within  measured in a similar fashion (series 6 image 80)  -7 mm nodule in the right upper lobe, previously 7 mm (series 6 image  79)  -10 mm Groundglass nodule within the right lower lobe, previously 10  mm (series 6 image 121) nonremainder subtle 6 mm pulmonary nodules are  unchanged     Impression:     1. Interval enlargement of right upper lobe pulmonary nodule measuring  17 mm, previously 11 mm. Recommend PET/CT and/or tissue sampling.  2. Unchanged appearance of remaining pulmonary nodules as detailed  above.  3. Moderate coronary calcification.        Case and imaging CT 3/2/22 was reviewed with Dr. Otoole at pulmonary conference 3/11/22 from IR and CT biopsy of the RUL lung nodule is approved. Series 6  Image 80.    Procedure order, surgical pathology and leukemia lymphoma orders placed.    If requesting team would like samples sent for anything else please enter them or notify IR prior to scheduled procedure.    ASA will need to be held for 5 days prior to schedule procedure.      Primary team Dr. Mills and Geni GOEL made aware of IR recommendations via epic messaging.     Telephone Clinic visit with IR PA provider will be coordinated to discuss biopsy procedure, risks and benefits.     MANASA Ayala CNP  Interventional Radiology   IR on-call pager: [phone]

## 2022-03-17 ENCOUNTER — TELEPHONE (OUTPATIENT)
Dept: FAMILY MEDICINE | Facility: CLINIC | Age: 77
End: 2022-03-17
Payer: COMMERCIAL

## 2022-03-17 DIAGNOSIS — I73.9 PAD (PERIPHERAL ARTERY DISEASE) (H): ICD-10-CM

## 2022-03-17 DIAGNOSIS — T14.8XXA WOUND INFECTION: Primary | ICD-10-CM

## 2022-03-17 DIAGNOSIS — E11.42 TYPE 2 DIABETES MELLITUS WITH DIABETIC POLYNEUROPATHY, WITHOUT LONG-TERM CURRENT USE OF INSULIN (H): ICD-10-CM

## 2022-03-17 DIAGNOSIS — L08.9 WOUND INFECTION: Primary | ICD-10-CM

## 2022-03-17 NOTE — TELEPHONE ENCOUNTER
Routing to [place] Team pink    Assised living needs medication list printed and faxed to facility.  List must be signed by Dr. Duckworth.    Facility needs list before 2 pm so patient can get his meds tomorrow    Fax  to [phone]        RN received call from Luis Carlos at patients assisted living and discussed above.    Medication list needs to be sent to patients pharmacy today so he can get his medications delivered tomorrow.  List must be signed by PCP    Yash Reynaga, RN, BSN, PHN  [facility]

## 2022-03-17 NOTE — TELEPHONE ENCOUNTER
Assisted living calling back. States the list received is not the same as our Epic med list. They are going to fax this back to our office and need our office to update Epic med list to reflect what they have him taking and needs this faxed back today as soon as possible because pharmacy closes at 6pm. Routing to pink team to watch for fax, then will need to give to RN to update med list.    Mia Artis RN  [facility]

## 2022-03-18 ENCOUNTER — TELEPHONE (OUTPATIENT)
Dept: FAMILY MEDICINE | Facility: CLINIC | Age: 77
End: 2022-03-18
Payer: COMMERCIAL

## 2022-03-18 NOTE — TELEPHONE ENCOUNTER
I spoke with daughter at some length .    We reviewed the salient issues     Fall Risk   Wound care issues   Diabetes mellitus treatment   Living now at assisted care living   Needs a walker big time, can't walk 100 feet with   Insulin costs   Lab Results   Component Value Date    A1C 9.7 02/01/2022    A1C 5.6 08/16/2021    A1C 6.8 01/07/2021    A1C 6.8 12/17/2020    A1C 6.7 12/11/2020    A1C 6.0 01/03/2020    A1C 6.4 08/06/2019     Taking Trulicity (dulaglutide) and insulin for his wound care.   Can only have one primary health care provider and he had a discussion with daughter and agreed to go with Dr Suze Santa

## 2022-03-18 NOTE — TELEPHONE ENCOUNTER
Please see encounter from today.     Left message for Luis Carlos from [facility] to call back. Need to inform him that in house provider, Dr. Santa is seeing pt. Pt still wants to see Dr. Duckworth. Pt will need to discuss meds at appt on 3/21 with Dr. Duckworth.    Mia Artis RN  [facility]

## 2022-03-18 NOTE — TELEPHONE ENCOUNTER
Spoke with patient's daughter regarding patient's upcoming appointment    1) Daughter reports patient's PCP is now in-house provider at [facility], Dr Santa. She has reminded patient of this but he still wants to see Dr Duckworth.     2) Daughter concerned patient wants to change his insulin plan. His blood sugars need to be tightly controled for wound healing (still has groin wound from September) and because of his severe advanced artery disease. They have worked hard to get to this point and daughter is worried patient will try to get off his insulin. He does not want to pay $300 a month for it.    3)Daughter reports patient's memory is not good. He only remembers major events from the last year but does not remember many conversations he has had with providers about his health.     Geni LYMAN RN  [facility]

## 2022-03-18 NOTE — TELEPHONE ENCOUNTER
Prescription and  / or correlate /' medication reconciliation with 25 different medications in a patient I havent seen since 8/16/2021 ? Since he was last seen he was admitted to the hospital at [place] from 9/10-9/20 for acute arterial occlusion of the left leg with critical limb ischemia and MANY other issues ?    NO    He needs an appointment with me.    If that appointment is scheduled and he intends to keep that appointment with me, we MAY be able to provide a one month prescription to hold him over !    Kapil Duckworth MD

## 2022-03-18 NOTE — TELEPHONE ENCOUNTER
Pts daughter requesting callback from provider, has medication concerns, pt currently in assisted Living and taking insulin

## 2022-03-18 NOTE — TELEPHONE ENCOUNTER
DIAGNOSIS: To discuss procedure    DATE RECEIVED: 3.24.22   NOTES STATUS DETAILS   OFFICE NOTE from referring provider Internal 3.24.22  Dr. Yuliana Mills  [facility] Surgery   OFFICE NOTE from other specialist Internal 3.15.22  Ofelia Jordan RN  [facility] Oncology Team Conf   MEDICATION LIST Internal    XRAYS (IMAGES & REPORTS) Internal 3.2.22, 8.2.21  CT Chest

## 2022-03-19 ENCOUNTER — LAB REQUISITION (OUTPATIENT)
Dept: LAB | Facility: CLINIC | Age: 77
End: 2022-03-19
Payer: COMMERCIAL

## 2022-03-19 DIAGNOSIS — I73.9 PERIPHERAL VASCULAR DISEASE, UNSPECIFIED (H): ICD-10-CM

## 2022-03-21 ENCOUNTER — TELEPHONE (OUTPATIENT)
Dept: FAMILY MEDICINE | Facility: CLINIC | Age: 77
End: 2022-03-21

## 2022-03-21 ENCOUNTER — OFFICE VISIT (OUTPATIENT)
Dept: INTERNAL MEDICINE | Facility: CLINIC | Age: 77
End: 2022-03-21
Payer: COMMERCIAL

## 2022-03-21 ENCOUNTER — OFFICE VISIT (OUTPATIENT)
Dept: RHEUMATOLOGY | Facility: CLINIC | Age: 77
End: 2022-03-21
Payer: COMMERCIAL

## 2022-03-21 VITALS
BODY MASS INDEX: 31.59 KG/M2 | RESPIRATION RATE: 14 BRPM | OXYGEN SATURATION: 96 % | WEIGHT: 210.8 LBS | TEMPERATURE: 98 F | DIASTOLIC BLOOD PRESSURE: 84 MMHG | HEART RATE: 87 BPM | SYSTOLIC BLOOD PRESSURE: 138 MMHG

## 2022-03-21 VITALS
SYSTOLIC BLOOD PRESSURE: 137 MMHG | BODY MASS INDEX: 31.41 KG/M2 | OXYGEN SATURATION: 94 % | DIASTOLIC BLOOD PRESSURE: 82 MMHG | HEART RATE: 93 BPM | WEIGHT: 209.6 LBS

## 2022-03-21 DIAGNOSIS — D12.6 TUBULOVILLOUS ADENOMA OF COLON: ICD-10-CM

## 2022-03-21 DIAGNOSIS — M17.0 BILATERAL PRIMARY OSTEOARTHRITIS OF KNEE: ICD-10-CM

## 2022-03-21 DIAGNOSIS — R91.8 MASS OF RIGHT LUNG: ICD-10-CM

## 2022-03-21 DIAGNOSIS — D64.9 ANEMIA, UNSPECIFIED TYPE: ICD-10-CM

## 2022-03-21 DIAGNOSIS — Z79.899 HIGH RISK MEDICATIONS (NOT ANTICOAGULANTS) LONG-TERM USE: ICD-10-CM

## 2022-03-21 DIAGNOSIS — Z00.00 ENCOUNTER FOR MEDICARE ANNUAL WELLNESS EXAM: Primary | ICD-10-CM

## 2022-03-21 DIAGNOSIS — I73.9 PAD (PERIPHERAL ARTERY DISEASE) (H): ICD-10-CM

## 2022-03-21 DIAGNOSIS — M05.79 RHEUMATOID ARTHRITIS INVOLVING MULTIPLE SITES WITH POSITIVE RHEUMATOID FACTOR (H): Primary | ICD-10-CM

## 2022-03-21 DIAGNOSIS — E11.9 TYPE 2 DIABETES MELLITUS WITHOUT RETINOPATHY (H): ICD-10-CM

## 2022-03-21 PROCEDURE — 99214 OFFICE O/P EST MOD 30 MIN: CPT | Mod: 25 | Performed by: INTERNAL MEDICINE

## 2022-03-21 PROCEDURE — 20610 DRAIN/INJ JOINT/BURSA W/O US: CPT | Mod: 50 | Performed by: INTERNAL MEDICINE

## 2022-03-21 PROCEDURE — 99397 PER PM REEVAL EST PAT 65+ YR: CPT | Performed by: INTERNAL MEDICINE

## 2022-03-21 RX ORDER — TRIAMCINOLONE ACETONIDE 40 MG/ML
40 INJECTION, SUSPENSION INTRA-ARTICULAR; INTRAMUSCULAR ONCE
Status: COMPLETED | OUTPATIENT
Start: 2022-03-21 | End: 2022-03-21

## 2022-03-21 RX ORDER — HYDROXYCHLOROQUINE SULFATE 200 MG/1
200 TABLET, FILM COATED ORAL DAILY
Qty: 30 TABLET | Refills: 3 | Status: SHIPPED | OUTPATIENT
Start: 2022-03-21 | End: 2022-07-21

## 2022-03-21 RX ADMIN — TRIAMCINOLONE ACETONIDE 40 MG: 40 INJECTION, SUSPENSION INTRA-ARTICULAR; INTRAMUSCULAR at 09:08

## 2022-03-21 RX ADMIN — TRIAMCINOLONE ACETONIDE 40 MG: 40 INJECTION, SUSPENSION INTRA-ARTICULAR; INTRAMUSCULAR at 09:07

## 2022-03-21 ASSESSMENT — ACTIVITIES OF DAILY LIVING (ADL): CURRENT_FUNCTION: NO ASSISTANCE NEEDED

## 2022-03-21 NOTE — PROGRESS NOTES
Rheumatology Clinic Visit      Zurdo Dash MRN# [id number]   YOB: 1945 Age: 76 year old      Date of visit: 3/21/22   PCP: Dr. Kapil Duckworth     Chief Complaint   Patient presents with:  RECHECK: RA    Assessment and Plan     1. Seropositive nonerosive rheumatoid arthritis (, CCP 64): Previously on HCQ (effective, stopped when doing well), MTX (was well tolerated, but later with cytopenias likely related to worsening creatinine with subsequent MTX toxicity).  Mild synovitis on exam today and more inflammatory symptoms. For active inflammation, start hydroxychloroquine.  Chronic illness, progressive.    - Start hydroxychloroquine 200mg daily  - Ophthalmology referral for hydroxychloroquine toxicity monitoring     # Hydroxychloroquine (Plaquenil) Risks and Benefits:  The risks and benefits of hydroxychloroquine were discussed in detail and the patient verbalized understanding; the patient also verbalized agreement to get the required ophthalmologic toxicity monitoring.  The risks discussed include, but are not limited to, the risk for hypersensitivity, anaphylaxis, anaphylactoid reactions, irreversible retinal damage, rare hematologic reactions, and rare cardiomyopathy.  Patients with G6PD deficiency or hepatic impairment may be at an increased risk for adverse effects.  I encouraged reviewing the package insert and asking any questions about the medication.        2. Bilateral knee osteoarthritis / patellofemoral syndrome: Intra-articular steroid injections have been effective. More symptomatic today and he has requested repeat injections.  Repeat injections today as documented in the procedure section.  Chronic illness, progressive.      3.  CKD: Continue following with PCP.      - COVID-19: has received the Pfizer COVID-19 vaccine on 2/19/2021 and 3/12/2021; Moderna 11/29/2021.    Total minutes spent in evaluation with patient, documentation, , and review of pertinent studies and  chart notes: 26      Mr. Dash verbalized agreement with and understanding of the rational for the diagnosis and treatment plan.  All questions were answered to best of my ability and the patient's satisfaction. Mr. Dash was advised to contact the clinic with any questions that may arise after the clinic visit.      Thank you for involving me in the care of the patient    Return to clinic: 3-4 months      HPI   Zurdo Dash is a 76 year old male with a medical history significant for hypertension, hyperlipidemia, diabetes, diabetic neuropathy, GERD, and rheumatoid arthritis who presents for f/u of RA, sooner than previously scheduled because of recent hospitalization    Today, 3/21/2022: last seen 7/14/2021. Worsening joint pain at night and in the morning with morning stiffness for about 45 min affecting the bilateral 2nd-3rd MCPs and sometimes the 2nd PIPs.  Knees achy, worse with increased activity, especially going from a sitting to standing position; hasn't been using stairs because his new living situation has elevators.  Would like repeat injections of his knees because the injections have been very effective.     Denies fevers, chills, nausea, vomiting, constipation, diarrhea. No abdominal pain. No chest pain/pressure, palpitations, or shortness of breath. No oral or nasal sores. No neck pain. No rash. No LE swelling.      Tobacco: None  EtOH: None  Drugs: None    ROS   12 point review of system was completed and negative except as noted in the HPI     Active Problem List     Patient Active Problem List   Diagnosis     Pain in limb     Hyperlipidemia LDL goal <100     Hypertension goal BP (blood pressure) < 140/90     Hypogonadism     Advanced directives, counseling/discussion     Health Care Home     Type 2 diabetes mellitus with diabetic polyneuropathy, without long-term current use of insulin (H)     RBBB (right bundle branch block)     Ex-smoker     Family history of esophageal cancer      Gastroesophageal reflux disease, esophagitis presence not specified     Rheumatoid arthritis involving multiple sites with positive rheumatoid factor (H)     Pulmonary nodule     High risk medication use     Spondylosis of cervical region without myelopathy or radiculopathy     Erectile dysfunction, unspecified erectile dysfunction type     Type 2 diabetes mellitus without retinopathy (H)     Tubulovillous adenoma of colon     Diabetic polyneuropathy associated with type 2 diabetes mellitus (H)     Chronic bilateral low back pain without sciatica     Migraine equivalent     Posterior vitreous detachment of left eye     Pseudophakia, ou     Eyelid lesion, LLL     Dermatochalasis of both upper eyelids     Bradycardia     Primary osteoarthritis of both knees     Syncope     Trigger finger, acquired     CKD (chronic kidney disease) stage 3, GFR 30-59 ml/min (H)     Abdominal pain, generalized     PAD (peripheral artery disease) (H)     Immunosuppression (H)     Skin ulcer of toe of left foot, limited to breakdown of skin (H)     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 difficile diarrhea     H/O     Cataract 11/18/2011     Diabetic neuropathy (H)     mild, mostly soles and distal forefeet, worse on the left side.     Diverticulitis      ED (erectile dysfunction)      Ex-smoker     QUIT SMOKING FEB 2007     History of ETOH abuse     recovering, sober since 1997     Hyperlipidemia LDL goal <100      Hypertension goal BP (blood pressure) < 140/90      Hypogonadism      Obesity      PAD (peripheral artery disease) (H)     leg cramps, with  exertion, no formal diagnosis of PAD and minimal if any symptoms at all.     RA (rheumatoid arthritis) (H)      Uvaldo     Syncope      Past Surgical History     Past Surgical History:   Procedure Laterality Date     BYPASS GRAFT FEMOROPOPLITEAL Left 01/07/2021    Procedure: LEFT FEMORAL TO ABOVE KNEE POPLITEAL ARTERY BYPASS WITH PTFE VASCULAR GRAFT REMOVABLE RING 6MMX 50CM;  Surgeon: Myra Lopes MD;  Location: [facility] OR     CATARACT IOL, RT/LT       COLONOSCOPY  03/01/2018    [facility]     COMBINED REPAIR PTOSIS WITH BLEPHAROPLASTY BILATERAL Bilateral 08/16/2019    Procedure: BILATERAL UPPER EYELID BLEPHAROPLASTY AND BILATERAL PTOSIS REPAIR;  Surgeon: Janet Garcia MD;  Location: [facility]     ENDARTERECTOMY FEMORAL Left 01/07/2021    Procedure: LEFT FEMORAL ENDARTERECTOMY WITH PATCH ANGIOPLASTY PHOTOFIX  0.8 X 8CM;  Surgeon: Myra Lopes MD;  Location: [facility] OR     EP PACEMAKER  01/2021     EXCISE LESION EYELID Left 08/16/2019    Procedure: LEFT LOWER EYELID BIOPSY;  Surgeon: Janet Garcia MD;  Location: [facility]     IR LOWER EXTREMITY ANGIOGRAM LEFT  12/17/2020     PHACOEMULSIFICATION WITH STANDARD INTRAOCULAR LENS IMPLANT  02/2019; 3/2019    left eye; right eye     TONSILLECTOMY       ZZHC INCISION TENDON SHEATH FINGER  04/2009    r hand ring finger     Allergy     Allergies   Allergen Reactions     Seasonal Allergies      Current Medication List     Current Outpatient Medications   Medication Sig     cetirizine (ZYRTEC) 10 MG tablet Take 10 mg by mouth At Bedtime     dulaglutide (TRULICITY) 1.5 MG/0.5ML pen Inject 1.5 mg Subcutaneous every 7 days ON Wednesdays     folic acid (FOLVITE) 1 MG tablet Take 1 tablet (1 mg) by mouth daily     hydroxychloroquine (PLAQUENIL) 200 MG tablet Take 1 tablet (200 mg) by mouth daily     insulin aspart (NOVOLOG VIAL) 100 UNITS/ML vial Inject 8 Units Subcutaneous 3 times daily (with meals)     insulin glargine (LANTUS VIAL) 100 UNIT/ML vial Inject 10  "Units Subcutaneous At Bedtime     medical cannabis (Patient's own supply) See Admin Instructions (The purpose of this order is to document that the patient reports taking medical cannabis.  This is not a prescription, and is not used to certify that the patient has a qualifying medical condition.)     polyethylene glycol-propylene glycol (SYSTANE) 0.4-0.3 % SOLN ophthalmic solution Apply 1 drop to eye     senna-docusate (SENOKOT-S/PERICOLACE) 8.6-50 MG tablet Take 1 tablet by mouth 2 times daily     Current Facility-Administered Medications   Medication     cyanocobalamin injection 1,000 mcg     cyanocobalamin injection 1,000 mcg         Social History   See HPI    Family History     Family History   Problem Relation Age of Onset     Cerebrovascular Disease Mother      Arthritis Mother      Osteoporosis Mother      Alzheimer Disease Father      Arthritis Father      Cancer Father      Diabetes Maternal Grandmother      Cardiovascular Maternal Grandmother      Other Cancer Brother      Cancer Paternal Aunt      Hypertension No family hx of      Thyroid Disease No family hx of      Glaucoma No family hx of      Macular Degeneration No family hx of        Physical Exam     Temp Readings from Last 3 Encounters:   03/21/22 98  F (36.7  C) (Oral)   03/10/22 98.4  F (36.9  C) (Oral)   09/09/21 98  F (36.7  C) (Oral)     BP Readings from Last 5 Encounters:   03/21/22 138/84   03/21/22 137/82   03/10/22 (!) 149/89   09/09/21 (!) 155/93   09/08/21 (!) 148/72     Pulse Readings from Last 1 Encounters:   03/21/22 87     Resp Readings from Last 1 Encounters:   03/21/22 14     Estimated body mass index is 31.41 kg/m  as calculated from the following:    Height as of 3/10/22: 1.74 m (5' 8.5\").    Weight as of this encounter: 95.1 kg (209 lb 9.6 oz).      GEN: NAD. Healthy appearing adult.   HEENT:  Anicteric, noninjected sclera. No obvious external lesions of the ear and nose. Hearing intact.  CV: S1, S2. RRR. No m/r/g  PULM: No " increased work of breathing. CTA bilaterally   MSK: synovial swelling and tenderness to palpation of the bilateral 2nd-3rd MCPs; right 2nd PIP tender to palpation but no swelling. Wrists, elbows, shoulders, ankles, and MTPs without swelling or tenderness to palpation. Bilateral knees with medial joint line tenderness but no effusion or increased warmth.     SKIN: No rash or jaundice seen  PSYCH: Alert. Appropriate.         Labs / Imaging (select studies)     9/28/1999  [facility]    RF/CCP  Recent Labs   Lab Test 04/07/16  1149   CCPIGG 64*     CBC  Recent Labs   Lab Test 02/01/22  0901 09/08/21  1607 07/14/21  0924 07/05/21  1532 04/07/21  0958   WBC 6.8 7.7 7.0 3.7* 7.4   RBC 3.90* 3.47* 2.72* 2.20* 3.55*   HGB 11.2* 10.6* 8.4* 6.9* 10.8*   HCT 35.9* 31.8* 25.8* 21.6* 33.3*   MCV 92 92 95 98 94   RDW 14.0 14.3 17.1* 17.3* 14.0    195 140* 96* 342   MCH 28.7 30.5 30.9 31.4 30.4   MCHC 31.2* 33.3 32.6 31.9 32.4   NEUTROPHIL 63 72 68  75 68.0 80.7   LYMPH 17 16 10  7 19.0 11.6   MONOCYTE 14 11 20  15 10.0 6.0   EOSINOPHIL 4 1 2  2 2.0 1.2   BASOPHIL 1 0 1  0 1.0 0.5   ANEU  --   --  5.3 2.5 6.0   ALYM  --   --  0.5* 0.7* 0.9   SMITH  --   --  1.1 0.4 0.5   AEOS  --   --  0.1 0.1 0.1   ABAS  --   --  0.0 0.0 0.0   ANEUTAUTO 4.4 5.5 4.8  --   --    ALYMPAUTO 1.2 1.2 0.7*  --   --    AMONOAUTO 1.0 0.8 1.4*  --   --    AEOSAUTO 0.2 0.1 0.2  --   --    ABSBASO 0.1 0.0 0.1  --   --      CMP  Recent Labs   Lab Test 02/01/22  0901 09/08/21  1607 08/16/21  1316 07/14/21  1645 07/14/21  1645 07/05/21  1532 04/07/21  0958 01/09/21  0709    141 143   < > 136  --   --  136   POTASSIUM 4.2 4.1 4.4   < > 5.4*  --   --  4.3   CHLORIDE 110* 116* 117*   < > 109  --   --  108   CO2 22 18* 21   < > 17*  --   --  23   ANIONGAP 9 7 5   < > 10  --   --  5    192* 189*   < > 126*  --   --  117*   BUN 36* 37* 40*   < > 50*  --   --  31*   CR 1.63* 1.68* 1.76*   < > 2.30* 2.84* 1.52* 1.39*   GFRESTIMATED  43* 39* 36*   < > 26* 20* 44* 49*   GFRESTBLACK  --   --   --   --   --  24* 50* 56*   NEW 8.8 8.3* 8.4*   < > 8.8  --   --  8.4*   BILITOTAL  --   --   --   --  0.7 0.8 0.4  --    ALBUMIN  --   --   --   --  3.5 3.5 3.5  --    PROTTOTAL  --   --   --   --  6.8 6.9 7.0  --    ALKPHOS  --   --   --   --  67 85 69  --    AST  --   --   --   --  22 12 19  --    ALT  --   --   --   --  32 22 25  --     < > = values in this interval not displayed.     Calcium/VitaminD  Recent Labs   Lab Test 02/01/22  0901 09/08/21  1607 08/16/21  1316   NEW 8.8 8.3* 8.4*     ESR/CRP  Recent Labs   Lab Test 07/05/21  1532 04/07/21  0958 12/11/20  1436   SED 83* 43* 33*   CRP 37.1* 11.3* 18.9*     Hepatitis B  Recent Labs   Lab Test 04/07/16  1149   HBCAB Nonreactive   HEPBANG Nonreactive     Hepatitis C  Recent Labs   Lab Test 04/07/16  1149   HCVAB Nonreactive   Assay performance characteristics have not been established for newborns,   infants, and children       HIV Screening  Recent Labs   Lab Test 04/07/16  1149   HIAGAB Nonreactive   HIV-1 p24 Ag & HIV-1/HIV-2 Ab Not Detected         Immunization History     Immunization History   Administered Date(s) Administered     COVID-19,PF,Pfizer (12+ Yrs) 02/19/2021, 03/12/2021     Influenza (H1N1) 01/20/2010     Influenza (High Dose) 3 valent vaccine 09/20/2012, 10/20/2015, 09/20/2016, 08/28/2017, 09/24/2018, 09/18/2019     Influenza (IIV3) PF 10/05/1999, 10/30/2008, 09/28/2011, 10/14/2013, 10/03/2014     Influenza, Quad, High Dose, Pf, 65yr+ (Fluzone HD) 09/11/2020     Pneumo Conj 13-V (2010&after) 06/29/2015     Pneumococcal 23 valent 08/19/2010     TD (ADULT, 7+) 08/05/1997, 02/03/2011     TDAP Vaccine (Boostrix) 03/11/2020     Tdap (Adacel,Boostrix) 03/11/2020     Zoster vaccine recombinant adjuvanted (SHINGRIX) 01/03/2020, 03/11/2020     Zoster vaccine, live 04/19/2010       Procedure     Procedure: Steroid injection of the bilateral knees  Indication: Pain, osteoarthritis of both  knees    The procedure was explained in detail. Risks including infection, pain, structural damage such as cartilage damage and tendon rupture, fat atrophy, skin hyper-/hypo-pigmentation, and medication reaction was explained. The need for rest of the affected joint for one week after the procedure was explained.  The option of not doing the procedure was also provided. All questions were answered and the patient consented to the procedure.     A time-out was performed and the correct patient, procedure, and laterality were verified.    The right knee was examined and location for injection was identified - anterior medial. The area was cleaned with chlorhexidine, twice.  Ethyl chloride was then used for topical anaesthetic.  Then a mixture of lidocaine 1% 2 mL and Kenalog 40mg was injected into the intra-articular space.     The left knee was examined and location for injection was identified - anterior medial. The area was cleaned with chlorhexidine, twice.  Ethyl chloride was then used for topical anaesthetic.  Then a mixture of lidocaine 1% 2 mL and Kenalog 40mg was injected into the intra-articular space.     The patient tolerated the procedure well. No complications.    1% Lidocaine  : Hospira  Lot #: [id number]  EXPIRATION DATE: 01 JUL 2023  NDC: 2592-7036-91    MEDICATION: Triamcinolone 40 mg  LOT #: [id number]  EXPIRATION DATE:  07/2023  NDC#: [address]-4    MEDICATION: Triamcinolone 40 mg  LOT #: [id number]  EXPIRATION DATE:  07/2023  NDC#: [address]-6         Chart documentation done in part with Dragon Voice recognition Software. Although reviewed after completion, some word and grammatical error may remain.    Albert Tompkins MD

## 2022-03-21 NOTE — NURSING NOTE
RAPID3 (0-30) Cumulative Score  15.3          RAPID3 Weighted Score (divide #4 by 3 and that is the weighted score)  5.1

## 2022-03-21 NOTE — PROGRESS NOTES
"SUBJECTIVE:   Zurdo Dash is a 76 year old male who presents for Preventive Visit.       Patient has been advised of split billing requirements and indicates understanding: Yes  Are you in the first 12 months of your Medicare coverage?  No    Healthy Habits:     In general, how would you rate your overall health?  Fair    Frequency of exercise:  1 day/week    Duration of exercise:  30-45 minutes    Do you usually eat at least 4 servings of fruit and vegetables a day, include whole grains    & fiber and avoid regularly eating high fat or \"junk\" foods?  No    Taking medications regularly:  Yes    Barriers to taking medications:  None    Medication side effects:  None    Ability to successfully perform activities of daily living:  No assistance needed    Home Safety:  No safety concerns identified    Hearing Impairment:  No hearing concerns    In the past 6 months, have you been bothered by leaking of urine? Yes    In general, how would you rate your overall mental or emotional health?  Good      PHQ-2 Total Score: 0    Additional concerns today:  No    Do you feel safe in your environment? Yes     Today is a difficult appointment; the patient is oblivious to the length, breadth, depth and complexity of his current health issues . He is actually facing very serious problems and he doesn't really have a clue it seems. Prior to this appointment much work went into this appointment. We actually tried to dissuade health maintainence from making this appointment without success. To recap issues :    Last appointment with me was 8/2021. So much has happened since then it's hard to summarize this. Including changes to place of residence into an assisted care living where he is getting care from Dr Suze Santa working with an in-house medical care organization.  I also had a long discussion with daughter Sandhya Brown prior to the visit today.. Care with me here at [facility] not recommended at this " "point . Not able to stop insulin which is what he wanted . At the last office visit with me we reviewed patients advancing physical deconditioning and things have normally gotten worse. He has marked physical deconditioning and can hardly walk yet lacks honest self-appraising, for example left his walker in his car for coming up to the visit room today      Diabetes mellitus 2   Lab Results   Component Value Date    A1C 9.7 02/01/2022    A1C 5.6 08/16/2021    A1C 6.8 01/07/2021    A1C 6.8 12/17/2020    A1C 6.7 12/11/2020    A1C 6.0 01/03/2020    A1C 6.4 08/06/2019     medications are currently insulin Lantus (Insulin Glargine) 10 units at bedtime , novolog (ultra-short acting insulin aspart) 8 units with all meals and Trulicity (dulaglutide) 1.5 milligrams one time per week     I was faxed a list of 25 medications earlier which is in the last most recent previous telephone encounter with [place] Team Triage RN . I agreed to redo all his medications but only for a month      Blood pressure issues , with angiotensin converting enzyme inhibitor held secondary to renal insufficiency . His blood pressure is ok today and perhaps this medication could be restarted soon for renal-protection    Pacemaker in place    Patient is s/p left fem-pop bypass from 1/2021    Seeing Mia Coe MD pulmonary with [facility] who is following his very suspicious pulmonary nodule which is considered now to be high enough risk for lung cancer that Video-assisted thoracoscopic surgery (VATS) or other open lung surgery is recommended but patient declines . He says \" I would do chemotherapy and radiation therapy only\" . But then balks even with this realizing he would loose his hair.    An ongoing anemia workup with many attempts and colonoscopy still not performed. This has been postponed and rescheduled so many times it's deeply concerning. We reviewed this as well today      Original left sided femoral to popliteal " bypass surgery with Dr. Lopes 1 / 2021. Then readmitted 9- with an occluded iliac artery and popliteal artery and a left lower extremity thrombectomy was performed - he wanted for Dr. Lopes but he was in an emergency situation and had to go with [facility] due to his crtitical limb ischemia . He was apparently said to be within 12 hours give or take towards losing his limb. Yet patient has no memory of this and seems puzzled by these discussions of his fragile health and tending to probably not believe what he's hearing ! Was readmitted 9/22/2021 secondary to infected wound from surgery 10 days earlier , left groin, a deep graft infection and this is STILL not healed even after 6 months after the wound infection . See most recent previous office visit with [facility] from 1- [ on care everywhere ] - September to January he needed 2 times a day dressing changes, then moved to once a day and then now looking at going to every other day , using Dakin s solution  . There is some wound care specialists involved still with [facility]. Patient feels he should still be checked and changed every day     From most recent previous office visit with [facility]   Assessment and Plan:  1. I had a long conversation with Zurdo and talked to his daughter, Sandhya. After much deliberation we decided to continue bid dressing changes until he leaves [facility] and when he changes facilities where his wife is, his wife will do dressing changes daily with Dakins damp to dry dressings and this will be done until he follows up with us in one month. His daughter will figure our any other logistics that need to be figured out and will contact his wife's care facility to update them on the new plan. He did have an ultrasound from his last visit which he needs followed in six months. He will continue Warfarin indefinitely. If he has any other issues or  questions he will call the office and will follow up in one month.    MANASA Garland, CNP      Rheumatoid arthritis, sees Dr. Albert Tompkins, rheumatologist with [facility] , methotrexate stopped due to probably the cause of his anemia    Office visits over the last month have included a cardiologist, gastroenterologist, rheumatologist, Infectious Disease specialist , pulmonologist , Ear, Nose, and Throat specialist for epistaxis , oncologist as well as cardio-thoracic surgeon Yuliana Mills MD cardiothoracic surgeon with [facility] seeing patient for potential open lung surgery for his enlarging nodule - felt to be too sick for chest surgery - chest CT scan from 3-2-2022 with Impression:   1. Interval enlargement of right upper lobe pulmonary nodule measuring  17 mm, previously 11 mm. Recommend PET/CT and/or tissue sampling.  2. Unchanged appearance of remaining pulmonary nodules as detailed  above.  3. Moderate coronary calcification.     I have personally reviewed the examination and initial interpretation  and I agree with the findings.     CARLOS TOLEDO MD     Patient says he would only agree to radiation therapy and chemotherapy , patient denies the diagnosis with his chest    Then 1/29/2022 patient sustained a closed head injury from fall    The patient was provided with suggestions to help him develop a healthy physical lifestyle.  He is at risk for lack of exercise and has been provided with information to increase physical activity for the benefit of his well-being.  The patient was counseled and encouraged to consider modifying their diet and eating habits. He was provided with information on recommended healthy diet options.      Have you ever done Advance Care Planning? (For example, a Health Directive, POLST, or a discussion with a medical provider or your loved ones about your wishes): Yes, patient states has an Advance Care Planning document  and will bring a copy to the clinic.      Fall risk  Fallen 2 or more times in the past year?: No  Any fall with injury in the past year?: No    Cognitive Screening   1) Repeat 3 items (Leader, Season, Table)    2) Clock draw: NORMAL  3) 3 item recall: Recalls 3 objects  Results: 3 items recalled: COGNITIVE IMPAIRMENT LESS LIKELY    Mini-CogTM Copyright MABEL Morales. Licensed by the author for use in [facility]; reprinted with permission (soob@[facility]). All rights reserved.      Do you have sleep apnea, excessive snoring or daytime drowsiness?: no    Reviewed and updated as needed this visit by clinical staff   Tobacco  Allergies  Meds              Reviewed and updated as needed this visit by Provider                 Social History     Tobacco Use     Smoking status: Former Smoker     Packs/day: 1.00     Years: 40.00     Pack years: 40.00     Types: Cigarettes     Quit date: 3/16/2007     Years since quitting: 15.0     Smokeless tobacco: Never Used   Substance Use Topics     Alcohol use: No     Alcohol/week: 0.0 standard drinks     If you drink alcohol do you typically have >3 drinks per day or >7 drinks per week? No    Alcohol Use 1/3/2020   Prescreen: >3 drinks/day or >7 drinks/week? Not Applicable   Prescreen: >3 drinks/day or >7 drinks/week? -   No flowsheet data found.      Current providers sharing in care for this patient include:   Patient Care Team:  Kapil Duckworth MD as PCP - General  Kapil Duckworth MD as Assigned PCP  Rainer Bailon MD as MD (Rheumatology)  Stacy Styles RD as Diabetes Educator (Dietitian, Registered)  Myra Lopes MD as Assigned Heart and Vascular Provider  Aaron Dent DPM as Assigned Musculoskeletal Provider  DANIELLE Massey MD as MD (Cardiovascular Disease)  Varun Lin MD as Assigned Surgical Provider  Mia Coe MD as Assigned Pulmonology Provider  Albert Tompkins MD as Assigned Rheumatology Provider  Yuliana Mills MD as  MD (Cardiovascular & Thoracic Surgery)  Ofelia Jordan, RN as Specialty Care Coordinator (Thoracic Surgery)    The following health maintenance items are reviewed in Epic and correct as of today:  Health Maintenance Due   Topic Date Due     URINE DRUG SCREEN  Never done     EYE EXAM  01/01/2021     MEDICARE ANNUAL WELLNESS VISIT  01/03/2021     DIABETIC FOOT EXAM  02/27/2021     COLORECTAL CANCER SCREENING  03/08/2021     PHQ-2 (once per calendar year)  01/01/2022     FALL RISK ASSESSMENT  02/09/2022     MICROALBUMIN  04/08/2022     Labs reviewed in EPIC  BP Readings from Last 3 Encounters:   03/21/22 138/84   03/21/22 137/82   03/10/22 (!) 149/89    Wt Readings from Last 3 Encounters:   03/21/22 95.6 kg (210 lb 12.8 oz)   03/21/22 95.1 kg (209 lb 9.6 oz)   03/10/22 96.2 kg (212 lb)                  Patient Active Problem List   Diagnosis     Pain in limb     Hyperlipidemia LDL goal <100     Hypertension goal BP (blood pressure) < 140/90     Hypogonadism     Advanced directives, counseling/discussion     Health Care Home     Type 2 diabetes mellitus with diabetic polyneuropathy, without long-term current use of insulin (H)     RBBB (right bundle branch block)     Ex-smoker     Family history of esophageal cancer     Gastroesophageal reflux disease, esophagitis presence not specified     Rheumatoid arthritis involving multiple sites with positive rheumatoid factor (H)     Pulmonary nodule     High risk medication use     Spondylosis of cervical region without myelopathy or radiculopathy     Erectile dysfunction, unspecified erectile dysfunction type     Type 2 diabetes mellitus without retinopathy (H)     Tubulovillous adenoma of colon     Diabetic polyneuropathy associated with type 2 diabetes mellitus (H)     Chronic bilateral low back pain without sciatica     Migraine equivalent     Posterior vitreous detachment of left eye     Pseudophakia, ou     Eyelid lesion, LLL     Dermatochalasis of both upper eyelids      Bradycardia     Primary osteoarthritis of both knees     Syncope     Trigger finger, acquired     CKD (chronic kidney disease) stage 3, GFR 30-59 ml/min (H)     Abdominal pain, generalized     PAD (peripheral artery disease) (H)     Immunosuppression (H)     Skin ulcer of toe of left foot, limited to breakdown of skin (H)     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)     Pneumothorax     SOBOE (shortness of breath on exertion)     Pacemaker     Ulcer of left foot, unspecified ulcer stage (H)     Hammer toe of left foot     Anemia, unspecified type     Closed nondisplaced fracture of right pubis (H)     Past Surgical History:   Procedure Laterality Date     BYPASS GRAFT FEMOROPOPLITEAL Left 01/07/2021    Procedure: LEFT FEMORAL TO ABOVE KNEE POPLITEAL ARTERY BYPASS WITH PTFE VASCULAR GRAFT REMOVABLE RING 6MMX 50CM;  Surgeon: Myra Lopes MD;  Location: [facility] OR     CATARACT IOL, RT/LT       COLONOSCOPY  03/01/2018    [facility]     COMBINED REPAIR PTOSIS WITH BLEPHAROPLASTY BILATERAL Bilateral 08/16/2019    Procedure: BILATERAL UPPER EYELID BLEPHAROPLASTY AND BILATERAL PTOSIS REPAIR;  Surgeon: Janet Garcia MD;  Location: [facility] OR     ENDARTERECTOMY FEMORAL Left 01/07/2021    Procedure: LEFT FEMORAL ENDARTERECTOMY WITH PATCH ANGIOPLASTY PHOTOFIX  0.8 X 8CM;  Surgeon: Myra Lopes MD;  Location: [facility] OR     EP PACEMAKER  01/2021     EXCISE LESION EYELID Left 08/16/2019    Procedure: LEFT LOWER EYELID BIOPSY;  Surgeon: Janet Garcia MD;  Location: [facility] OR     IR LOWER EXTREMITY ANGIOGRAM LEFT  12/17/2020     PHACOEMULSIFICATION WITH STANDARD INTRAOCULAR LENS IMPLANT  02/2019; 3/2019    left eye; right eye     TONSILLECTOMY       ZZ INCISION TENDON SHEATH FINGER  04/2009    r hand ring finger       Social History     Tobacco Use     Smoking status: Former Smoker     Packs/day: 1.00     Years: 40.00     Pack years: 40.00     Types: Cigarettes     Quit date: 3/16/2007      Years since quitting: 15.0     Smokeless tobacco: Never Used   Substance Use Topics     Alcohol use: No     Alcohol/week: 0.0 standard drinks     Family History   Problem Relation Age of Onset     Cerebrovascular Disease Mother      Arthritis Mother      Osteoporosis Mother      Alzheimer Disease Father      Arthritis Father      Cancer Father      Diabetes Maternal Grandmother      Cardiovascular Maternal Grandmother      Other Cancer Brother      Cancer Paternal Aunt      Hypertension No family hx of      Thyroid Disease No family hx of      Glaucoma No family hx of      Macular Degeneration No family hx of          Current Outpatient Medications   Medication Sig Dispense Refill     cetirizine (ZYRTEC) 10 MG tablet Take 10 mg by mouth At Bedtime       dulaglutide (TRULICITY) 1.5 MG/0.5ML pen Inject 1.5 mg Subcutaneous every 7 days ON Wednesdays 10 mL 1     folic acid (FOLVITE) 1 MG tablet Take 1 tablet (1 mg) by mouth daily 100 tablet 2     hydroxychloroquine (PLAQUENIL) 200 MG tablet Take 1 tablet (200 mg) by mouth daily 30 tablet 3     insulin aspart (NOVOLOG VIAL) 100 UNITS/ML vial Inject 8 Units Subcutaneous 3 times daily (with meals) 5 mL 1     insulin glargine (LANTUS VIAL) 100 UNIT/ML vial Inject 10 Units Subcutaneous At Bedtime 3 mL 1     medical cannabis (Patient's own supply) See Admin Instructions (The purpose of this order is to document that the patient reports taking medical cannabis.  This is not a prescription, and is not used to certify that the patient has a qualifying medical condition.)       polyethylene glycol-propylene glycol (SYSTANE) 0.4-0.3 % SOLN ophthalmic solution Apply 1 drop to eye       senna-docusate (SENOKOT-S/PERICOLACE) 8.6-50 MG tablet Take 1 tablet by mouth 2 times daily 50 tablet 0     Allergies   Allergen Reactions     Seasonal Allergies      Recent Labs   Lab Test 02/01/22  0901 09/08/21  1607 08/16/21  1316 07/14/21  1645 07/14/21  1645 07/05/21  1532 04/07/21  0958  "01/08/21  0708 01/07/21  0610 12/17/20  0655 12/19/18  1123 09/25/18  1246 07/13/16  1231 06/24/16  1101   A1C 9.7*  --  5.6  --   --   --   --   --  6.8* 6.8*   < > 8.4*   < >  --    *  --   --   --   --   --   --   --  45 90   < >  --    < >  --    HDL 35*  --   --   --   --   --   --   --  38* 35*   < >  --    < >  --    TRIG 232*  --   --   --   --   --   --   --  166* 204*   < >  --    < >  --    ALT  --   --   --   --  32 22 25  --  24  --    < >  --    < >  --    CR 1.63* 1.68* 1.76*   < > 2.30* 2.84* 1.52*   < > 1.61* 1.81*   < >  --    < >  --    GFRESTIMATED 43* 39* 36*   < > 26* 20* 44*   < > 41* 35*   < >  --    < >  --    GFRESTBLACK  --   --   --   --   --  24* 50*   < > 47* 41*   < >  --    < >  --    POTASSIUM 4.2 4.1 4.4   < > 5.4*  --   --    < > 4.2  --    < >  --    < >  --    TSH  --   --   --   --   --   --   --   --   --   --   --  1.44  --  1.07    < > = values in this interval not displayed.            Review of Systems  Constitutional, HEENT, cardiovascular, pulmonary, gi and gu systems are negative, except as otherwise noted. Patient admits to significant physical deconditioning and I note he can hardly walk 20 feet without becoming dyspneic     OBJECTIVE:   /84   Pulse 87   Temp 98  F (36.7  C) (Oral)   Resp 14   Wt 95.6 kg (210 lb 12.8 oz)   SpO2 96%   BMI 31.59 kg/m   Estimated body mass index is 31.59 kg/m  as calculated from the following:    Height as of 3/10/22: 1.74 m (5' 8.5\").    Weight as of this encounter: 95.6 kg (210 lb 12.8 oz).  Physical Exam  GENERAL: healthy, alert and no distress  EYES: Eyes grossly normal to inspection, PERRL and conjunctivae and sclerae normal  MS: no gross musculoskeletal defects noted, no edema  NEURO: seems to have less strength then prior, and otherwise normal tone without spasticity or decreased tone, mentation intact and speech normal  PSYCH: mentation appears normal, affect normal/bright    Diagnostic Test Results:  Labs " "reviewed in Epic      ASSESSMENT / PLAN:   (Z00.00) Encounter for Medicare annual wellness exam  (primary encounter diagnosis)  Comment: long discussion. I am recommending due to the fact that he needs added input with visits from the in house primary care physician service I think he should continue with the current primary health care provider he has with [facility] and not see me. This is after a discussion of all the issues as detailed above   Plan: I know patient has food for thought today. I hate to pain things in a bleak manner but patient was in need of a reality check. This was delivered !    (I73.9) PAD (peripheral artery disease) (H)  Comment: as detailed above   Plan: see as detailed above     (D12.6) Tubulovillous adenoma of colon  Comment: needs colonoscopy   Plan:     (D64.9) Anemia, unspecified type  Comment: needs colonoscopy   Plan:     (E11.9) Type 2 diabetes mellitus without retinopathy (H)  Comment: as detailed above   Plan:     (R91.8) Mass of right lung  Comment: as detailed above   Plan:     Patient has been advised of split billing requirements and indicates understanding: Yes    COUNSELING:  Reviewed preventive health counseling, as reflected in patient instructions    Estimated body mass index is 31.59 kg/m  as calculated from the following:    Height as of 3/10/22: 1.74 m (5' 8.5\").    Weight as of this encounter: 95.6 kg (210 lb 12.8 oz).    Weight management plan: Discussed healthy diet and exercise guidelines    He reports that he quit smoking about 15 years ago. His smoking use included cigarettes. He has a 40.00 pack-year smoking history. He has never used smokeless tobacco.    The colonoscopy was never done despite many attempts     Appropriate preventive services were discussed with this patient, including applicable screening as appropriate for cardiovascular disease, diabetes, osteopenia/osteoporosis, and glaucoma.  As appropriate for age/gender, discussed " screening for colorectal cancer, prostate cancer, breast cancer, and cervical cancer. Checklist reviewing preventive services available has been given to the patient.    Reviewed patients plan of care and provided an AVS. The Basic Care Plan (routine screening as documented in Health Maintenance) for Zurdo meets the Care Plan requirement. This Care Plan has been established and reviewed with the Patient.    Counseling Resources:  ATP IV Guidelines  Pooled Cohorts Equation Calculator  Breast Cancer Risk Calculator  Breast Cancer: Medication to Reduce Risk  FRAX Risk Assessment  ICSI Preventive Guidelines  Dietary Guidelines for Americans, 2010  USDA's MyPlate  ASA Prophylaxis  Lung CA Screening    Kpail Duckworth MD  [facility]    Identified Health Risks:

## 2022-03-21 NOTE — PROGRESS NOTES
Last appointment with me was 8/2021. So much has happened since then. Including change to place of residence into an assisted care living where he is getting care from Dr Suze Santa working with the [facility]. Long discussion with daughter Sandhya Brown. Care with me here at [facility] not recommended . Not able to stop insulin . At the last office visit with me we reviewed patients advancing physical deconditioning     Diabetes mellitus 2   Lab Results   Component Value Date    A1C 9.7 02/01/2022    A1C 5.6 08/16/2021    A1C 6.8 01/07/2021    A1C 6.8 12/17/2020    A1C 6.7 12/11/2020    A1C 6.0 01/03/2020    A1C 6.4 08/06/2019     medications are currently insulin Lantus (Insulin Glargine) 10 units at bedtime , novolog (ultra-short acting insulin aspart) 8 units with all meals and Trulicity (dulaglutide) 1.5 milligrams one time per week     I was faxed a list of 25 medications which is in the last most recent previous telephone encounter with [place] Team Triage RN . Our medication list is grossly inaccurate      Blood pressure issues , with angiotensin converting enzyme inhibitor held secondary to renal insufficiency     Pacemaker     Patient is s/p left fem-pop bypass from 1/2021    Seeing Mia Coe MD pulmonary with [facility] who is following his suspicious pulmonary nodules  An ongoing anemia workup with many attempts and colonoscopy   Original left sided femoral to popliteal bypass surgery with Dr. Lopes 1 / 2021. Then readmitted 9- with an occluded iliac artery and popliteal artery and a left lower extremity thrombectomy was performed - he wanted for Dr. Lopes but he was in an emergency situation and had to go with [facility] due to his crtitical limb ischemia   Readmitted 9/22/2021 secondary to infected wound from surgery 10 days earlier , left groin, a deep graft infection and this is STILL not healed even after 6  months after the wound infection . See most recent previous office visit with [facility] [place] from 1- [ on care everywhere ] - September to January he needed 2 times a day dressing changes, then moved to once a day and then now looking at going to every other day , using dankins soap . There is some wound care specialists involved still with [facility]. Patient feels he should still be checked and changed every day     Assessment and Plan:  1. I had a long conversation with Zurdo and talked to his daughter, Sandhya. After much deliberation we decided to continue bid dressing changes until he leaves [facility] and when he changes facilities where his wife is, his wife will do dressing changes daily with Dakins damp to dry dressings and this will be done until he follows up with us in one month. His daughter will figure our any other logistics that need to be figured out and will contact his wife's care facility to update them on the new plan. He did have an ultrasound from his last visit which he needs followed in six months. He will continue Warfarin indefinitely. If he has any other issues or questions he will call the office and will follow up in one month.    MANASA Garland, CNP      Rheumatoid arthritis, sees Dr. Albert Tompkins, rheumatologist with [facility] , methotrexate stopped  Office visits a cardiologist, gastroenterologist, rheumatologist, Infectious Disease specialist , pulmonologist , Ear, Nose, and Throat specialist for epistaxis , oncologist as well as cardio-thoracic surgeon Yuliana Mills MD cardiothoracic surgeon with [facility] seeing patient for potential open lung surgery for his enlarging nodule - felt to be too sick for chest surgery - chest CT scan from 3-2-2022 with Impression:   1. Interval enlargement of right upper lobe pulmonary nodule measuring  17 mm, previously 11 mm. Recommend PET/CT and/or tissue  sampling.  2. Unchanged appearance of remaining pulmonary nodules as detailed  above.  3. Moderate coronary calcification.     I have personally reviewed the examination and initial interpretation  and I agree with the findings.     CARLOS TOLEDO MD     Patient says he would only agree to radiation therapy and chemotherapy , patient denies the diagnosis     Then 1/29/2022 closed head injury from fall    The patient was provided with suggestions to help him develop a healthy physical lifestyle.  He is at risk for lack of exercise and has been provided with information to increase physical activity for the benefit of his well-being.  The patient was counseled and encouraged to consider modifying their diet and eating habits. He was provided with information on recommended healthy diet options.

## 2022-03-21 NOTE — PATIENT INSTRUCTIONS
Patient Education   Personalized Prevention Plan  You are due for the preventive services outlined below.  Your care team is available to assist you in scheduling these services.  If you have already completed any of these items, please share that information with your care team to update in your medical record.  Health Maintenance Due   Topic Date Due     URINE DRUG SCREEN  Never done     Eye Exam  01/01/2021     Diabetic Foot Exam  02/27/2021     Colorectal Cancer Screening  03/08/2021     PHQ-2 (once per calendar year)  01/01/2022     FALL RISK ASSESSMENT  02/09/2022     Kidney Microalbumin Urine Test  04/08/2022     Your Health Risk Assessment indicates you feel you are not in good health    A healthy lifestyle helps keep the body fit and the mind alert. It helps protect you from disease, helps you fight disease, and helps prevent chronic disease (disease that doesn't go away) from getting worse. This is important as you get older and begin to notice twinges in muscles and joints and a decline in the strength and stamina you once took for granted. A healthy lifestyle includes good healthcare, good nutrition, weight control, recreation, and regular exercise. Avoid harmful substances and do what you can to keep safe. Another part of a healthy lifestyle is stay mentally active and socially involved.    Good healthcare     Have a wellness visit every year.     If you have new symptoms, let us know right away. Don't wait until the next checkup.     Take medicines exactly as prescribed and keep your medicines in a safe place. Tell us if your medicine causes problems.   Healthy diet and weight control     Eat 3 or 4 small, nutritious, low-fat, high-fiber meals a day. Include a variety of fruits, vegetables, and whole-grain foods.     Make sure you get enough calcium in your diet. Calcium, vitamin D, and exercise help prevent osteoporosis (bone thinning).     If you live alone, try eating with others when you can.  That way you get a good meal and have company while you eat it.     Try to keep a healthy weight. If you eat more calories than your body uses for energy, it will be stored as fat and you will gain weight.     Recreation   Recreation is not limited to sports and team events. It includes any activity that provides relaxation, interest, enjoyment, and exercise. Recreation provides an outlet for physical, mental, and social energy. It can give a sense of worth and achievement. It can help you stay healthy.    Mental Exercise and Social Involvement  Mental and emotional health is as important as physical health. Keep in touch with friends and family. Stay as active as possible. Continue to learn and challenge yourself.   Things you can do to stay mentally active are:    Learn something new, like a foreign language or musical instrument.     Play SCRABBLE or do crossword puzzles. If you cannot find people to play these games with you at home, you can play them with others on your computer through the Internet.     Join a games club--anything from card games to chess or checkers or lawn bowling.     Start a new hobby.     Go back to school.     Volunteer.     Read.   Keep up with world events.    Exercise for a Healthier Heart  You may wonder how you can improve the health of your heart. If you re thinking about exercise, you re on the right track. You don t need to become an athlete. But you do need a certain amount of brisk exercise to help strengthen your heart. If you have been diagnosed with a heart condition, your healthcare provider may advise exercise to help stabilize your condition. To help make exercise a habit, choose safe, fun activities.      Exercise with a friend. When activity is fun, you're more likely to stick with it.   Before you start  Check with your healthcare provider before starting an exercise program. This is especially important if you have not been active for a while. It's also important if  you have a long-term (chronic) health problem such as heart disease, diabetes, or obesity. Or if you are at high risk for having these problems.   Why exercise?  Exercising regularly offers many healthy rewards. It can help you do all of the following:     Improve your blood cholesterol level to help prevent further heart trouble    Lower your blood pressure to help prevent a stroke or heart attack    Control diabetes, or reduce your risk of getting this disease    Improve your heart and lung function    Reach and stay at a healthy weight    Make your muscles stronger so you can stay active    Prevent falls and fractures by slowing the loss of bone mass (osteoporosis)    Manage stress better    Reduce your blood pressure    Improve your sense of self and your body image  Exercise tips      Ease into your routine. Set small goals. Then build on them. If you are not sure what your activity level should be, talk with your healthcare provider first before starting an exercise routine.    Exercise on most days. Aim for a total of 150 minutes (2 hours and 30 minutes) or more of moderate-intensity aerobic activity each week. Or 75 minutes (1 hour and 15 minutes) or more of vigorous-intensity aerobic activity each week. Or try for a combination of both. Moderate activity means that you breathe heavier and your heart rate increases but you can still talk. Think about doing 40 minutes of moderate exercise, 3 to 4 times a week. For best results, activity should last for about 40 minutes to lower blood pressure and cholesterol. It's OK to work up to the 40-minute period over time. Examples of moderate-intensity activity are walking 1 mile in 15 minutes. Or doing 30 to 45 minutes of yard work.    Step up your daily activity level.  Along with your exercise program, try being more active the whole day. Walk instead of drive. Or park further away so that you take more steps each day. Do more household tasks or yard work. You may  not be able to meet the advised mount of physical activity. But doing some moderate- or vigorous-intensity aerobic activity can help reduce your risk for heart disease. Your healthcare provider can help you figure out what is best for you.    Choose 1 or more activities you enjoy.  Walking is one of the easiest things you can do. You can also try swimming, riding a bike, dancing, or taking an exercise class.    When to call your healthcare provider  Call your healthcare provider if you have any of these:     Chest pain or feel dizzy or lightheaded    Burning, tightness, pressure, or heaviness in your chest, neck, shoulders, back, or arms    Abnormal shortness of breath    More joint or muscle pain    A very fast or irregular heartbeat (palpitations)  [employer] last reviewed this educational content on 7/1/2019 2000-2021 [employer]. All rights reserved. This information is not intended as a substitute for professional medical care. Always follow your healthcare professional's instructions.          Understanding USDA MyPlate  The USDA has guidelines to help you make healthy food choices. These are called MyPlate. MyPlate shows the food groups that make up healthy meals using the image of a place setting. Before you eat, think about the healthiest choices for what to put on your plate or in your cup or bowl. To learn more about building a healthy plate, visit www.choosemyplate.gov.    The food groups    Fruits. Any fruit or 100% fruit juice counts as part of the Fruit Group. Fruits may be fresh, canned, frozen, or dried, and may be whole, cut-up, or pureed. Make 1/2 of your plate fruits and vegetables.    Vegetables. Any vegetable or 100% vegetable juice counts as a member of the Vegetable Group. Vegetables may be fresh, frozen, canned, or dried. They can be served raw or cooked and may be whole, cut-up, or mashed. Make 1/2 of your plate fruits and vegetables.    Grains. All foods made from grains are  part of the Grains Group. These include wheat, rice, oats, cornmeal, and barley. Grains are often used to make foods such as bread, pasta, oatmeal, cereal, tortillas, and grits. Grains should be no more than 1/4 of your plate. At least half of your grains should be whole grains.    Protein. This group includes meat, poultry, seafood, beans and peas, eggs, processed soy products (such as tofu), nuts (including nut butters), and seeds. Make protein choices no more than 1/4 of your plate. Meat and poultry choices should be lean or low fat.    Dairy. The Dairy Group includes all fluid milk products and foods made from milk that contain calcium, such as yogurt and cheese. (Foods that have little calcium, such as cream, butter, and cream cheese, are not part of this group.) Most dairy choices should be low-fat or fat-free.    Oils. Oils aren't a food group, but they do contain essential nutrients. However it's important to watch your intake of oils. These are fats that are liquid at room temperature. They include canola, corn, olive, soybean, vegetable, and sunflower oil. Foods that are mainly oil include mayonnaise, certain salad dressings, and soft margarines. You likely already get your daily oil allowance from the foods you eat.  Things to limit  Eating healthy also means limiting these things in your diet:       Salt (sodium). Many processed foods have a lot of sodium. To keep sodium intake down, eat fresh vegetables, meats, poultry, and seafood when possible. Purchase low-sodium, reduced-sodium, or no-salt-added food products at the store. And don't add salt to your meals at home. Instead, season them with herbs and spices such as dill, oregano, cumin, and paprika. Or try adding flavor with lemon or lime zest and juice.    Saturated fat. Saturated fats are most often found in animal products such as beef, pork, and chicken. They are often solid at room temperature, such as butter. To reduce your saturated fat  intake, choose leaner cuts of meat and poultry. And try healthier cooking methods such as grilling, broiling, roasting, or baking. For a simple lower-fat swap, use plain nonfat yogurt instead of mayonnaise when making potato salad or macaroni salad.    Added sugars. These are sugars added to foods. They are in foods such as ice cream, candy, soda, fruit drinks, sports drinks, energy drinks, cookies, pastries, jams, and syrups. Cut down on added sugars by sharing sweet treats with a family member or friend. You can also choose fruit for dessert, and drink water or other unsweetened beverages.     [employer] last reviewed this educational content on 6/1/2020 2000-2021 [employer]. All rights reserved. This information is not intended as a substitute for professional medical care. Always follow your healthcare professional's instructions.

## 2022-03-21 NOTE — TELEPHONE ENCOUNTER
Left detailed voicemail with verbal orders as requested. Advised to call back at [phone] with further needs.    Katina NUNESN, RN  [facility] [place]

## 2022-03-21 NOTE — TELEPHONE ENCOUNTER
Conchita from [facility]  Needs an order for [facility] skilled nursing 3 x week for 9 weeks with 3PRN.Please call her secure line.  Mary Morales,

## 2022-03-22 LAB — INR PPP: 1.74 (ref 0.85–1.15)

## 2022-03-22 PROCEDURE — 85610 PROTHROMBIN TIME: CPT | Mod: ORL | Performed by: FAMILY MEDICINE

## 2022-03-22 PROCEDURE — P9604 ONE-WAY ALLOW PRORATED TRIP: HCPCS | Mod: ORL | Performed by: FAMILY MEDICINE

## 2022-03-22 PROCEDURE — 36415 COLL VENOUS BLD VENIPUNCTURE: CPT | Mod: ORL | Performed by: FAMILY MEDICINE

## 2022-03-23 NOTE — TELEPHONE ENCOUNTER
Left message for Luis Carlos, Nurse at pt's Assisted living. Is patient going to continue care with Dr. Santa? Is he still needing pended prescriptions sent?    Mia Artis RN  [facility]

## 2022-03-23 NOTE — TELEPHONE ENCOUNTER
I want patient to know that I am terribly sorry that the appointment he had with me was stressful. [ we needed to discuss from gravely serious matters regarding his health.    Where we left it was that I strongly recommended he continue with receiving care from Dr Suze Santa. If he absolutely rejects this I would not abandon patient and would be forced to deal with his issues and refill these medications    Sorry for the hassles. Please let me know if patient has questions for me or needs to chat. But I think what needed to be said was said at the appointment     Kapil Duckworth MD

## 2022-03-24 ENCOUNTER — VIRTUAL VISIT (OUTPATIENT)
Dept: INTERVENTIONAL RADIOLOGY/VASCULAR | Facility: CLINIC | Age: 77
End: 2022-03-24
Payer: COMMERCIAL

## 2022-03-24 ENCOUNTER — PRE VISIT (OUTPATIENT)
Dept: INTERVENTIONAL RADIOLOGY/VASCULAR | Facility: CLINIC | Age: 77
End: 2022-03-24

## 2022-03-24 DIAGNOSIS — R91.1 PULMONARY NODULE: Primary | ICD-10-CM

## 2022-03-24 PROCEDURE — 99203 OFFICE O/P NEW LOW 30 MIN: CPT | Mod: TEL | Performed by: PHYSICIAN ASSISTANT

## 2022-03-24 NOTE — NURSING NOTE
Chief Complaint   Patient presents with     Consult     biospy - Lung LT       Patient confirms medications and allergies are accurate via patients echeck in completion, and or denies any changes since last reviewed/verified.     Alona Chawla, Virtual Facilitator

## 2022-03-24 NOTE — PROGRESS NOTES
"[facility]      Type of Visit: Virtual: Other: Telephone [phone]    Patient is here for a new visit to discuss biospy LT Lung.     Vitals - Patient Reported  Weight (Patient Reported): 205 lb  Height (Patient Reported): 5' 9\"  BMI (Based on Pt Reported Ht/Wt): 30.27  Pain Score: No Pain (0)      Questions patient would like addressed today are: Patient verbalized no questions/concerns, this has been communicated to the provider.     Refills are needed: No    How would you like to obtain your AVS? Mail a copy    lAona Chawla, David Facilitator/CMA, 3/24/2022           INTERVENTIONAL RADIOLOGY CLINIC NOTE    Requested Procedure:  RUL lung lesion biopsy  Requesting Provider: Geni Castro CNP    Indication/History:  Zurdo Dash is a 76 year old male  PMH of DM2, former tobacco use, low back pain, CKD, anemia, rheumatoid arthritis, SOB on exertion, HDL, HTN, RBBB, PMM, femoral artery occulusion s/p fem pop bypass 1/2021, non healing L heel, small wounds on left leg, enlarging pulmonary nodule.     Case discussed at pulmonary nodule conference for request of percutaneous biopsy of RUL nodule due to concerns of malignancy. Patient was discussed at tumor conference 3/15/22 for SBRT therapy and is not a surgical candidate, agreed with biopsy of the RUL by IR for staging purposes.     Imaging:  CT chest w/o contrast 3/2/22  Impression:   1. Interval enlargement of right upper lobe pulmonary nodule measuring  17 mm, previously 11 mm. Recommend PET/CT and/or tissue sampling.  2. Unchanged appearance of remaining pulmonary nodules as detailed  above.  3. Moderate coronary calcification.    Series 6 Image 80      Case approved by interventional radiologist, Dr. Otoole. Case was presented at 3/11/22 pulmonary nodule conference.    Impression/Plan:  Zurdo Dash is a 76 year old male with concerning RUL nodule which has grown. Approved for IR biopsy of RUL nodule for diagnosis and staging purposes. "     Discussed procedure with the patient via telephone visit. He agrees to proceed with biopsy. Procedure scheduled for 3/31/22 at [facility].    Yaritza Jones PA-C  Interventional Radiology    =====    Past Medical History:  Past Medical History:   Diagnosis Date     Abnormal CT scan 03/2004    calcified lung granuloma     C. difficile diarrhea     H/O     Cataract 11/18/2011     Diabetic neuropathy (H)     mild, mostly soles and distal forefeet, worse on the left side.     Diverticulitis      ED (erectile dysfunction)      Ex-smoker     QUIT SMOKING FEB 2007     History of ETOH abuse     recovering, sober since 1997     Hyperlipidemia LDL goal <100      Hypertension goal BP (blood pressure) < 140/90      Hypogonadism      Obesity      PAD (peripheral artery disease) (H)     leg cramps, with exertion, no formal diagnosis of PAD and minimal if any symptoms at all.     RA (rheumatoid arthritis) (H)     Dr Epsteinay     Syncope        Past Surgical History:  Past Surgical History:   Procedure Laterality Date     BYPASS GRAFT FEMOROPOPLITEAL Left 01/07/2021    Procedure: LEFT FEMORAL TO ABOVE KNEE POPLITEAL ARTERY BYPASS WITH PTFE VASCULAR GRAFT REMOVABLE RING 6MMX 50CM;  Surgeon: Myra Lopes MD;  Location:  OR     CATARACT IOL, RT/LT       COLONOSCOPY  03/01/2018    [facility]     COMBINED REPAIR PTOSIS WITH BLEPHAROPLASTY BILATERAL Bilateral 08/16/2019    Procedure: BILATERAL UPPER EYELID BLEPHAROPLASTY AND BILATERAL PTOSIS REPAIR;  Surgeon: Janet Garcia MD;  Location:  OR     ENDARTERECTOMY FEMORAL Left 01/07/2021    Procedure: LEFT FEMORAL ENDARTERECTOMY WITH PATCH ANGIOPLASTY PHOTOFIX  0.8 X 8CM;  Surgeon: Myra Lopes MD;  Location:  OR     EP PACEMAKER  01/2021     EXCISE LESION EYELID Left 08/16/2019    Procedure: LEFT LOWER EYELID BIOPSY;  Surgeon: Janet Garcia MD;  Location:  OR     IR LOWER EXTREMITY ANGIOGRAM LEFT  12/17/2020     PHACOEMULSIFICATION WITH STANDARD  INTRAOCULAR LENS IMPLANT  02/2019; 3/2019    left eye; right eye     TONSILLECTOMY       ZZHC INCISION TENDON SHEATH FINGER  04/2009    r hand ring finger       Allergies:  Allergies   Allergen Reactions     Seasonal Allergies        Results Reviewed: Labs will be updated prior to procedure    Lab Results   Component Value Date     02/01/2022    PLT 96 07/05/2021     Lab Results   Component Value Date    INR 1.74 03/22/2022    INR 2.1 09/28/2021    INR 1.21 12/17/2020       Vital Signs:  There were no vitals taken for this visit.    Medicines to hold:  He will need to hold Plavix and Asprin for the procedure.

## 2022-03-25 ENCOUNTER — PATIENT OUTREACH (OUTPATIENT)
Dept: SURGERY | Facility: CLINIC | Age: 77
End: 2022-03-25
Payer: COMMERCIAL

## 2022-03-25 ENCOUNTER — TELEPHONE (OUTPATIENT)
Dept: INTERNAL MEDICINE | Facility: CLINIC | Age: 77
End: 2022-03-25
Payer: COMMERCIAL

## 2022-03-25 ENCOUNTER — LAB REQUISITION (OUTPATIENT)
Dept: LAB | Facility: CLINIC | Age: 77
End: 2022-03-25
Payer: COMMERCIAL

## 2022-03-25 DIAGNOSIS — I73.9 PERIPHERAL VASCULAR DISEASE, UNSPECIFIED (H): ICD-10-CM

## 2022-03-25 NOTE — TELEPHONE ENCOUNTER
Tobi with [facility] called back to confirm that patient's current PCP is Dr. Tai Hernandez PA-C with [facility].    He is requesting the team to disregard the order requests at this time.    No further action is needed at this time.

## 2022-03-25 NOTE — TELEPHONE ENCOUNTER
"Received call from Tobi with [facility]. He states that patient is having a biopsy on Thursday and the surgery center is instructing patient to HOLD Coumadin for 5 days. He is asking for this order to be reviewed and faxed to [phone].     After ending the call it was realized that upon chart review, it looks as if Dr. Duckworth is no longer managing patient's care and has deferred to Dr. Santa with patient's assisted living facility.     Per OV note 03/21/2022: \"I am recommending due to the fact that he needs added input with visits from the in house primary care physician service I think he should continue with the current primary health care provider he has with [facility] and not see me.\"    Attempted to reach out to Tobi ([phone]) to relay this information and direct him back to Dr. Santa for these hold orders. Left message on voicemail advising him to return call at [phone].    MANJU TorresN RN  [facility]  " HPI: A 89F PMH of HTN, who presents to ED for repeated falls over the past week found to have 5th/6th rib fractures, incidental pericardial effusion, and cellulitis- admitted for pain control and IV Antibiotics.

## 2022-03-25 NOTE — TELEPHONE ENCOUNTER
Received call back from Tobi with [facility].  Relayed message below, per OV notes. He states that there is no provider that has been seeing the patient, and needs orders as soon as possible for the HOLD orders. He also states he will reach out to patient's daughter to discuss this transfer of care situation.    MANJU TorresN RN  [facility]

## 2022-03-25 NOTE — PROGRESS NOTES
Received voicemail from Yina at [facility], asking for an order with what medications to hold for patient's upcoming procedure with Dr Mills.  Patient isn't scheduled for any procedure with Dr Mills, he is scheduled for a percutaneous biopsy with IR.  Coumadin isn't listed on patient's current home med list.  Patient did have a telephone visit with Yaritza COELHO with IR today.  Returned Elysia's call and left message informing her that patient isn't having a procedure with Dr Mills, therefore Thoracic Surgery can't provide orders pertaining to this.  Instructed them to review the information that was provided during his visit and to also reach out to the provider that manages patient's coumadin, if he is indeed on it.   Epic message sent to Yaritza to call [facility] staff to address their questions.

## 2022-03-28 ENCOUNTER — TELEPHONE (OUTPATIENT)
Dept: FAMILY MEDICINE | Facility: CLINIC | Age: 77
End: 2022-03-28
Payer: COMMERCIAL

## 2022-03-28 NOTE — TELEPHONE ENCOUNTER
Reason for Call:  Form, our goal is to have forms completed with 72 hours, however, some forms may require a visit or additional information.    Type of letter, form or note:  Home Health Certification    Who is the form from?: Home care    Where did the form come from: form was faxed in    What clinic location was the form placed at?: [facility]    Where the form was placed: Given to physician    What number is listed as a contact on the form?: [phone]  [phone]-[phone]       Mary Almazan  [employer],       Call taken on 3/28/2022 at 1:38 PM by Mary Almazan

## 2022-03-29 ENCOUNTER — LAB REQUISITION (OUTPATIENT)
Dept: LAB | Facility: CLINIC | Age: 77
End: 2022-03-29
Payer: COMMERCIAL

## 2022-03-29 DIAGNOSIS — Z20.828 CONTACT WITH AND (SUSPECTED) EXPOSURE TO OTHER VIRAL COMMUNICABLE DISEASES: ICD-10-CM

## 2022-03-29 LAB — INR PPP: 1.21 (ref 0.85–1.15)

## 2022-03-29 PROCEDURE — 36415 COLL VENOUS BLD VENIPUNCTURE: CPT | Mod: ORL | Performed by: FAMILY MEDICINE

## 2022-03-29 PROCEDURE — U0005 INFEC AGEN DETEC AMPLI PROBE: HCPCS | Mod: ORL | Performed by: RADIOLOGY

## 2022-03-29 PROCEDURE — 85610 PROTHROMBIN TIME: CPT | Mod: ORL | Performed by: FAMILY MEDICINE

## 2022-03-29 PROCEDURE — U0005 INFEC AGEN DETEC AMPLI PROBE: HCPCS | Mod: ORL

## 2022-03-29 PROCEDURE — P9604 ONE-WAY ALLOW PRORATED TRIP: HCPCS | Mod: ORL | Performed by: FAMILY MEDICINE

## 2022-03-29 NOTE — IR NOTE
Interventional Radiology Nursing Note    Patient Name: Zurdo Dash  Medical Record Number: [id number]  Today's Date: March 29, 2022    Procedure: lung biopsy    D: Patient is scheduled for a lung biopsy on 3/31/2022. Patient lives at [facility].  A: Faxed pre procedure instructions including insulin management and need to be tested for COVID within four days of the procedure to [phone]. Left message for Katelin RICHARD at [phone] regarding the faxed information.   P: Left IR nurse line phone number for any questions or clarification of the pre procedure instructions.    Gogo Arreola RN  IR Nurse line: [phone]

## 2022-03-29 NOTE — TELEPHONE ENCOUNTER
Form received and given to Dr. Duckworth to sign when he returns to the office on 4-4-2022.    Caren Telles,

## 2022-03-30 LAB — SARS-COV-2 RNA RESP QL NAA+PROBE: NEGATIVE

## 2022-03-31 ENCOUNTER — APPOINTMENT (OUTPATIENT)
Dept: GENERAL RADIOLOGY | Facility: CLINIC | Age: 77
End: 2022-03-31
Attending: RADIOLOGY
Payer: COMMERCIAL

## 2022-03-31 ENCOUNTER — HOSPITAL ENCOUNTER (OUTPATIENT)
Facility: CLINIC | Age: 77
Discharge: HOME | End: 2022-03-31
Attending: RADIOLOGY | Admitting: RADIOLOGY
Payer: COMMERCIAL

## 2022-03-31 ENCOUNTER — APPOINTMENT (OUTPATIENT)
Dept: INTERVENTIONAL RADIOLOGY/VASCULAR | Facility: CLINIC | Age: 77
End: 2022-03-31
Attending: CLINICAL NURSE SPECIALIST
Payer: COMMERCIAL

## 2022-03-31 ENCOUNTER — APPOINTMENT (OUTPATIENT)
Dept: MEDSURG UNIT | Facility: CLINIC | Age: 77
End: 2022-03-31
Attending: RADIOLOGY
Payer: COMMERCIAL

## 2022-03-31 VITALS
SYSTOLIC BLOOD PRESSURE: 153 MMHG | TEMPERATURE: 97.9 F | DIASTOLIC BLOOD PRESSURE: 82 MMHG | HEART RATE: 62 BPM | BODY MASS INDEX: 31.22 KG/M2 | HEIGHT: 69 IN | OXYGEN SATURATION: 97 % | WEIGHT: 210.8 LBS | RESPIRATION RATE: 18 BRPM

## 2022-03-31 DIAGNOSIS — R91.8 PULMONARY NODULES: ICD-10-CM

## 2022-03-31 LAB
ERYTHROCYTE [DISTWIDTH] IN BLOOD BY AUTOMATED COUNT: 13.9 % (ref 10–15)
GLUCOSE BLDC GLUCOMTR-MCNC: 149 MG/DL (ref 70–99)
HCT VFR BLD AUTO: 40.4 % (ref 40–53)
HGB BLD-MCNC: 13.3 G/DL (ref 13.3–17.7)
INR PPP: 1.11 (ref 0.85–1.15)
MCH RBC QN AUTO: 29.8 PG (ref 26.5–33)
MCHC RBC AUTO-ENTMCNC: 32.9 G/DL (ref 31.5–36.5)
MCV RBC AUTO: 91 FL (ref 78–100)
PLATELET # BLD AUTO: 216 10E3/UL (ref 150–450)
RBC # BLD AUTO: 4.46 10E6/UL (ref 4.4–5.9)
WBC # BLD AUTO: 8.6 10E3/UL (ref 4–11)

## 2022-03-31 PROCEDURE — 88185 FLOWCYTOMETRY/TC ADD-ON: CPT | Performed by: CLINICAL NURSE SPECIALIST

## 2022-03-31 PROCEDURE — 71045 X-RAY EXAM CHEST 1 VIEW: CPT

## 2022-03-31 PROCEDURE — 32408 CORE NDL BX LNG/MED PERQ: CPT

## 2022-03-31 PROCEDURE — 88161 CYTOPATH SMEAR OTHER SOURCE: CPT | Mod: TC | Performed by: CLINICAL NURSE SPECIALIST

## 2022-03-31 PROCEDURE — 88188 FLOWCYTOMETRY/READ 9-15: CPT | Performed by: STUDENT IN AN ORGANIZED HEALTH CARE EDUCATION/TRAINING PROGRAM

## 2022-03-31 PROCEDURE — 99152 MOD SED SAME PHYS/QHP 5/>YRS: CPT

## 2022-03-31 PROCEDURE — 85027 COMPLETE CBC AUTOMATED: CPT | Performed by: NURSE PRACTITIONER

## 2022-03-31 PROCEDURE — 71045 X-RAY EXAM CHEST 1 VIEW: CPT | Mod: 26 | Performed by: RADIOLOGY

## 2022-03-31 PROCEDURE — 999N000065 XR CHEST 1 VIEW

## 2022-03-31 PROCEDURE — 32408 CORE NDL BX LNG/MED PERQ: CPT | Mod: RT | Performed by: RADIOLOGY

## 2022-03-31 PROCEDURE — 82962 GLUCOSE BLOOD TEST: CPT | Mod: GZ

## 2022-03-31 PROCEDURE — 85610 PROTHROMBIN TIME: CPT | Performed by: NURSE PRACTITIONER

## 2022-03-31 PROCEDURE — 99152 MOD SED SAME PHYS/QHP 5/>YRS: CPT | Mod: GC | Performed by: RADIOLOGY

## 2022-03-31 PROCEDURE — 999N000142 HC STATISTIC PROCEDURE PREP ONLY

## 2022-03-31 PROCEDURE — 258N000003 HC RX IP 258 OP 636: Performed by: NURSE PRACTITIONER

## 2022-03-31 PROCEDURE — 36415 COLL VENOUS BLD VENIPUNCTURE: CPT | Performed by: NURSE PRACTITIONER

## 2022-03-31 PROCEDURE — 999N000133 HC STATISTIC PP CARE STAGE 2

## 2022-03-31 PROCEDURE — 250N000009 HC RX 250: Performed by: PHYSICIAN ASSISTANT

## 2022-03-31 PROCEDURE — 250N000011 HC RX IP 250 OP 636: Performed by: PHYSICIAN ASSISTANT

## 2022-03-31 PROCEDURE — 272N000506 HC NEEDLE CR6

## 2022-03-31 RX ORDER — LIDOCAINE 40 MG/G
CREAM TOPICAL
Status: DISCONTINUED | OUTPATIENT
Start: 2022-03-31 | End: 2022-03-31 | Stop reason: HOSPADM

## 2022-03-31 RX ORDER — NALOXONE HYDROCHLORIDE 0.4 MG/ML
0.4 INJECTION, SOLUTION INTRAMUSCULAR; INTRAVENOUS; SUBCUTANEOUS
Status: DISCONTINUED | OUTPATIENT
Start: 2022-03-31 | End: 2022-03-31 | Stop reason: HOSPADM

## 2022-03-31 RX ORDER — GLIPIZIDE 5 MG/1
2.5 TABLET ORAL EVERY MORNING
COMMUNITY
End: 2022-04-01

## 2022-03-31 RX ORDER — DEXTROSE MONOHYDRATE 25 G/50ML
25-50 INJECTION, SOLUTION INTRAVENOUS
Status: DISCONTINUED | OUTPATIENT
Start: 2022-03-31 | End: 2022-03-31 | Stop reason: HOSPADM

## 2022-03-31 RX ORDER — UBIDECARENONE 75 MG
100 CAPSULE ORAL WEEKLY
COMMUNITY
End: 2022-04-01

## 2022-03-31 RX ORDER — ACETAMINOPHEN 325 MG/1
650 TABLET ORAL EVERY 4 HOURS PRN
Status: DISCONTINUED | OUTPATIENT
Start: 2022-03-31 | End: 2022-03-31 | Stop reason: HOSPADM

## 2022-03-31 RX ORDER — BUMETANIDE 2 MG/1
2 TABLET ORAL DAILY
COMMUNITY
End: 2022-04-01

## 2022-03-31 RX ORDER — FLUMAZENIL 0.1 MG/ML
0.2 INJECTION, SOLUTION INTRAVENOUS
Status: DISCONTINUED | OUTPATIENT
Start: 2022-03-31 | End: 2022-03-31 | Stop reason: HOSPADM

## 2022-03-31 RX ORDER — NALOXONE HYDROCHLORIDE 0.4 MG/ML
0.2 INJECTION, SOLUTION INTRAMUSCULAR; INTRAVENOUS; SUBCUTANEOUS
Status: DISCONTINUED | OUTPATIENT
Start: 2022-03-31 | End: 2022-03-31 | Stop reason: HOSPADM

## 2022-03-31 RX ORDER — ACETAMINOPHEN 500 MG
1000 TABLET ORAL 3 TIMES DAILY
COMMUNITY
End: 2022-04-01

## 2022-03-31 RX ORDER — NICOTINE POLACRILEX 4 MG
15-30 LOZENGE BUCCAL
Status: DISCONTINUED | OUTPATIENT
Start: 2022-03-31 | End: 2022-03-31 | Stop reason: HOSPADM

## 2022-03-31 RX ORDER — METOPROLOL SUCCINATE 50 MG/1
50 TABLET, EXTENDED RELEASE ORAL DAILY
COMMUNITY
End: 2022-04-01

## 2022-03-31 RX ORDER — FENTANYL CITRATE 50 UG/ML
25-50 INJECTION, SOLUTION INTRAMUSCULAR; INTRAVENOUS EVERY 5 MIN PRN
Status: DISCONTINUED | OUTPATIENT
Start: 2022-03-31 | End: 2022-03-31 | Stop reason: HOSPADM

## 2022-03-31 RX ORDER — PRAVASTATIN SODIUM 10 MG
10 TABLET ORAL DAILY
COMMUNITY
End: 2022-04-01

## 2022-03-31 RX ORDER — TRIAMCINOLONE ACETONIDE 1 MG/G
CREAM TOPICAL 2 TIMES DAILY
COMMUNITY
End: 2022-04-01

## 2022-03-31 RX ORDER — SODIUM CHLORIDE 9 MG/ML
INJECTION, SOLUTION INTRAVENOUS CONTINUOUS
Status: DISCONTINUED | OUTPATIENT
Start: 2022-03-31 | End: 2022-03-31 | Stop reason: HOSPADM

## 2022-03-31 RX ADMIN — MIDAZOLAM 2 MG: 1 INJECTION INTRAMUSCULAR; INTRAVENOUS at 12:02

## 2022-03-31 RX ADMIN — SODIUM CHLORIDE: 9 INJECTION, SOLUTION INTRAVENOUS at 10:52

## 2022-03-31 RX ADMIN — FENTANYL CITRATE 50 MCG: 50 INJECTION INTRAMUSCULAR; INTRAVENOUS at 12:02

## 2022-03-31 RX ADMIN — LIDOCAINE HYDROCHLORIDE 10 ML: 10 INJECTION, SOLUTION EPIDURAL; INFILTRATION; INTRACAUDAL; PERINEURAL at 12:29

## 2022-03-31 NOTE — PROGRESS NOTES
Patient arrived via cart from IR with RN s/p Lung Biopsy. VSS. Right  upper back dressing CDI, no hematoma. Patient tolerating regular diet. Denies pain. Plan CXRAY at 1330 per MD orders. Continue to monitor.

## 2022-03-31 NOTE — PROGRESS NOTES
Pt arrives to 2a, with spouse, for lung biopsy. H&P is up to date. Consent is signed. Labs completed. Pt's assisted living contacted to fax current med list

## 2022-03-31 NOTE — PRE-PROCEDURE
GENERAL PRE-PROCEDURE:   Procedure:  CT guided lung nodule biopsy  Date/Time:  3/31/2022 10:57 AM    Written consent obtained?: Yes    Risks and benefits: Risks, benefits and alternatives were discussed    Consent given by:  Patient  Patient states understanding of procedure being performed: Yes    Patient's understanding of procedure matches consent: Yes    Procedure consent matches procedure scheduled: Yes    Expected level of sedation:  Moderate  Appropriately NPO:  Yes  ASA Class:  3  Mallampati  :  Grade 2- soft palate, base of uvula, tonsillar pillars, and portion of posterior pharyngeal wall visible  Lungs:  Lungs clear with good breath sounds bilaterally  Heart:  Normal heart sounds and rate  History & Physical reviewed:  History and physical reviewed and no updates needed  Statement of review:  I have reviewed the lab findings, diagnostic data, medications, and the plan for sedation

## 2022-03-31 NOTE — IR NOTE
Patient Name: Zurdo Dash  Medical Record Number: [id number]  Today's Date: 3/31/2022    Procedure: right lung nodule biopsy  Proceduralist: Ofe Coe and Ben  Pathology present: yes    Procedure Start: 1200  Procedure end: 1220  Sedation medications administered: versed 2  Mg., fentanyl  50 Mcg.    Report given to: LYNNE RN    Other Notes: Pt arrived to IR room CT 2  from . Consent reviewed. Pt denies any questions or concerns regarding procedure. Pt positioned prone  and monitored per protocol. Pt tolerated procedure without any noted complications. Pt transferred back to .

## 2022-03-31 NOTE — DISCHARGE INSTRUCTIONS
[facility]    Interventional Radiology  Patient Instructions Following Biopsy    AFTER YOU GO HOME  ? If you were given sedation DO NOT drive or operate machinery at home or at work for at least 24 hours  ? DO relax and take it easy for 48 hours, no strenuous activity for 24 hours  ? DO drink plenty of fluids  ? DO resume your regular diet, unless otherwise instructed by your Primary Physician  ? Keep the dressing dry and in place for 24 hours.  ? DO NOT SMOKE FOR AT LEAST 24 HOURS, if you have been given any medications that were to help you relax or sedate you during your procedure  ? DO NOT drink alcoholic beverages the day of your procedure  ? DO NOT do any strenuous exercise or lifting (> 10 lbs) for at least 7 days following your procedure  ? DO NOT take a shower for at least 12 hours following your procedure, no bath/pool for 5 days  ? Remove dressing after shower the next day. Replace with Band aid for 2 days.  Never leave a wet dressing in place.  ? DO NOT make any important or legal decisions for 24 hours following your procedure  ? There should be minimum drainage from the biopsy site    CALL THE PHYSICIAN IF:  ? You start bleeding from the procedure site.  If you do start to bleed from that site, lie down flat and hold pressure on the site for a minimum of 10 minutes.  Your physician will tell you if you need to return to the hospital  ? You develop nausea or vomiting  ? You have excessive swelling, redness, or tenderness at the site  ? You have drainage that looks like it is infected.  ? You experience severe pain  ? You develop hives or a rash or unexplained itching  ? You develop shortness of breath  ? You develop a temperature of 101 degrees F or greater  ? You develop chest pain or cough up blood, lightheadedness or fainting    ADDITIONAL INSTRUCTIONS  If you are taking Coumadin, restart tonight.  Follow up with your Coumadin Clinic or Primary Care MD to have your INR  rechecked.    [facility] INTERVENTIONAL RADIOLOGY DEPARTMENT  Procedure Physician: Dr. Coe                             Date of procedure: March 31, 2022  Telephone Numbers: [phone] Monday-Friday 8:00 am to 4:30 pm  [phone] After 4:30 pm Monday-Friday, Weekends & Holidays.   Ask for the Interventional Radiologist on call.  Someone is on call 24 hrs/day  [facility] toll free number: [phone] Monday-Friday 8:00 am to 4:30 pm  [facility] Emergency Dept: [phone]

## 2022-03-31 NOTE — PROCEDURES
PROCEDURES 3/31/2022 12:32 PM:   1. Image guided lung nodule biopsy    Clinical History: CT guided RUL lung nodule biopsy; Pulmonary nodules.     Comparisons: Chest CT 3/2/2022    Staff: Martínez Coe MD    Fellow/Resident: Ben    Monitoring: Patient was placed on continuous monitoring with intravenous conscious sedation administered by the trained IR nursing staff and supervised by the IR attending. Patient remained stable throughout the procedure.     Medications:  1. Versed IV: 2 mg  2. Fentanyl IV: 50 mcg  3. Lidocaine 1% for local anesthesia    Sedation time, face-to-face: 20 minutes     DLP: 77 mGy*cm    Findings/procedure:    Prior to the procedure, both verbal and written informed consent obtained from the patient. The patient placed prone on the CT table. Preprocedure scan obtained revealing spiculated right upper lobe nodule and adequate percutaneous window for biopsy.     The skin overlying the upper back was prepped and draped. Timeout performed. 1% Lidocaine used for local analgesia. Under CT guidance, a 19 gauge Temno introducer cannula with needle stylette advanced into the center of the spiculated nodule. Total of five 20 gauge core tissue specimens obtained and submitted to Pathology on site who confirmed sample adequacy. 4 the samples were placed in formalin, one sent for RPMI. CT image documenting each needle pass was saved in the patient's record.    Cannula removed and occlusive dressing applied.     Limited post-procedural scan demonstrated expected level of mild perilesional parenchymal hemorrhage, no pneumothorax.    Impression:  CT fluoroscopic right upper lobe lung nodule biopsy without immediate complication. Total of five 20 gauge core tissue specimens obtained and submitted to Pathology.    Plan: Upright chest x-ray, immediate and one hour  to evaluate for delayed pneumothorax. Routine aftercare.

## 2022-04-01 ENCOUNTER — TELEPHONE (OUTPATIENT)
Dept: FAMILY MEDICINE | Facility: CLINIC | Age: 77
End: 2022-04-01
Payer: COMMERCIAL

## 2022-04-01 LAB
PATH REPORT.COMMENTS IMP SPEC: NORMAL
PATH REPORT.FINAL DX SPEC: NORMAL
PATH REPORT.MICROSCOPIC SPEC OTHER STN: NORMAL
PATH REPORT.RELEVANT HX SPEC: NORMAL

## 2022-04-01 RX ORDER — HYDROMORPHONE HYDROCHLORIDE 2 MG/1
2 TABLET ORAL 2 TIMES DAILY PRN
Qty: 10 TABLET | Refills: 0 | COMMUNITY
Start: 2022-03-17 | End: 2022-06-02

## 2022-04-01 RX ORDER — NEEDLES, SAFETY 18GX1 1/2"
NEEDLE, DISPOSABLE MISCELLANEOUS
COMMUNITY
Start: 2022-03-17 | End: 2024-04-08

## 2022-04-01 RX ORDER — SYRINGE-NEEDLE,INSULIN,0.5 ML 27GX1/2"
SYRINGE, EMPTY DISPOSABLE MISCELLANEOUS
COMMUNITY
Start: 2022-03-17 | End: 2024-04-08

## 2022-04-01 RX ORDER — AMOXICILLIN 250 MG
1 CAPSULE ORAL 2 TIMES DAILY PRN
Qty: 50 TABLET | Refills: 0 | COMMUNITY
Start: 2022-03-17 | End: 2023-11-06

## 2022-04-01 RX ORDER — LANOLIN ALCOHOL/MO/W.PET/CERES
CREAM (GRAM) TOPICAL
Qty: 4 TABLET | OUTPATIENT
Start: 2022-04-01

## 2022-04-01 RX ORDER — TRIAMCINOLONE ACETONIDE 1 MG/G
CREAM TOPICAL 2 TIMES DAILY
COMMUNITY
Start: 2022-03-17 | End: 2023-07-24

## 2022-04-01 RX ORDER — GLIPIZIDE 5 MG/1
2.5 TABLET ORAL EVERY MORNING
COMMUNITY
Start: 2022-03-17 | End: 2022-09-30

## 2022-04-01 RX ORDER — ACETAMINOPHEN 500 MG
1000 TABLET ORAL DAILY
COMMUNITY
Start: 2022-03-17 | End: 2023-08-03

## 2022-04-01 RX ORDER — BUMETANIDE 2 MG/1
TABLET ORAL
Qty: 27 TABLET | OUTPATIENT
Start: 2022-04-01

## 2022-04-01 RX ORDER — BISACODYL 10 MG
SUPPOSITORY, RECTAL RECTAL
COMMUNITY
Start: 2022-03-17 | End: 2023-11-06

## 2022-04-01 RX ORDER — DIPHENHYDRAMINE HCL 25 MG
25 CAPSULE ORAL EVERY 6 HOURS PRN
COMMUNITY
Start: 2022-03-17 | End: 2022-09-30

## 2022-04-01 RX ORDER — PRAVASTATIN SODIUM 10 MG
10 TABLET ORAL DAILY
COMMUNITY
Start: 2022-03-17 | End: 2023-08-03

## 2022-04-01 RX ORDER — LANOLIN ALCOHOL/MO/W.PET/CERES
1000 CREAM (GRAM) TOPICAL WEEKLY
COMMUNITY
Start: 2022-03-17 | End: 2023-08-03

## 2022-04-01 RX ORDER — GLIPIZIDE 5 MG/1
TABLET ORAL
Qty: 13.5 TABLET | OUTPATIENT
Start: 2022-04-01

## 2022-04-01 RX ORDER — WARFARIN SODIUM 6 MG/1
TABLET ORAL
COMMUNITY
Start: 2022-03-17 | End: 2022-04-01

## 2022-04-01 RX ORDER — WARFARIN SODIUM 3 MG/1
TABLET ORAL
COMMUNITY
Start: 2022-03-17 | End: 2022-09-30

## 2022-04-01 RX ORDER — PSEUDOEPHED/ACETAMINOPH/DIPHEN 30MG-500MG
TABLET ORAL
Qty: 162 TABLET | OUTPATIENT
Start: 2022-04-01

## 2022-04-01 RX ORDER — BLOOD PRESSURE TEST KIT
KIT MISCELLANEOUS
COMMUNITY
Start: 2022-03-17 | End: 2023-12-11

## 2022-04-01 RX ORDER — PRAVASTATIN SODIUM 10 MG
TABLET ORAL
Qty: 27 TABLET | OUTPATIENT
Start: 2022-04-01

## 2022-04-01 RX ORDER — BUMETANIDE 2 MG/1
2 TABLET ORAL DAILY
COMMUNITY
Start: 2022-03-17 | End: 2022-10-05

## 2022-04-01 RX ORDER — POLYETHYLENE GLYCOL 400 AND PROPYLENE GLYCOL 4; 3 MG/ML; MG/ML
1 SOLUTION/ DROPS OPHTHALMIC 2 TIMES DAILY PRN
COMMUNITY
Start: 2022-03-17 | End: 2023-08-03

## 2022-04-01 RX ORDER — METOPROLOL SUCCINATE 50 MG/1
50 TABLET, EXTENDED RELEASE ORAL DAILY
COMMUNITY
Start: 2022-03-17 | End: 2023-08-03

## 2022-04-01 RX ORDER — METOPROLOL SUCCINATE 50 MG/1
TABLET, EXTENDED RELEASE ORAL
Qty: 27 TABLET | OUTPATIENT
Start: 2022-04-01

## 2022-04-01 RX ORDER — SODIUM HYPOCHLORITE 2.5 MG/ML
SOLUTION TOPICAL
COMMUNITY
Start: 2022-03-17 | End: 2023-07-24

## 2022-04-01 RX ORDER — BLOOD-GLUCOSE METER
EACH MISCELLANEOUS
COMMUNITY
Start: 2022-03-17 | End: 2023-11-06

## 2022-04-01 NOTE — TELEPHONE ENCOUNTER
"Patient currently at [facility] and Kapil Carbajal had advised that patient can only have 1 PCP.  It was previously discussed and agreed that patient should be followed by a provider at the nursing facility.  Per 3/18/2022 phone encounter, daughter reported that patient's in-house provider at [facility] was Dr. Santa.  However, per latest phone encounter on 3/25/2022, \"Tobi with [facility] called back to confirm that patient's current PCP is Dr. Tai Hernandez PA-C with [facility] physicians.\"    Please let HC know     Jacobo Pak RN  [facility]    "

## 2022-04-01 NOTE — TELEPHONE ENCOUNTER
Note added to [facility] cover sheet that Dr. Duckworth is not longer PCP and to contact Tai Hernandez PA-C with [facility] physicians and faxed back to [phone].  Caren Telles,

## 2022-04-01 NOTE — TELEPHONE ENCOUNTER
"Patient currently at [facility] and Kapil Carbajal had advised that patient can only have 1 PCP.  It was discussed and agreed that he should be followed by the nursing facility's provider.  Per 3/18/2022 phone encounter, daughter reported that patient's in-house provider at [facility] was Dr. Santa.  However, per latest phone encounter on 3/25/2022, \"Tobi with [facility] called back to confirm that patient's current PCP is Dr. Tai Hernandez PA-C with [facility] physicians.\"    Please let pharmacy know that refills should come from new PCP    Jacobo Pak RN  [facility]        "

## 2022-04-03 ENCOUNTER — LAB REQUISITION (OUTPATIENT)
Dept: LAB | Facility: CLINIC | Age: 77
End: 2022-04-03
Payer: COMMERCIAL

## 2022-04-03 DIAGNOSIS — I73.9 PERIPHERAL VASCULAR DISEASE, UNSPECIFIED (H): ICD-10-CM

## 2022-04-04 LAB
PATH REPORT.COMMENTS IMP SPEC: ABNORMAL
PATH REPORT.COMMENTS IMP SPEC: YES
PATH REPORT.FINAL DX SPEC: ABNORMAL
PATH REPORT.GROSS SPEC: ABNORMAL
PATH REPORT.MICROSCOPIC SPEC OTHER STN: ABNORMAL
PATH REPORT.RELEVANT HX SPEC: ABNORMAL
PHOTO IMAGE: ABNORMAL

## 2022-04-04 PROCEDURE — 88341 IMHCHEM/IMCYTCHM EA ADD ANTB: CPT | Mod: 26 | Performed by: PATHOLOGY

## 2022-04-04 PROCEDURE — 88342 IMHCHEM/IMCYTCHM 1ST ANTB: CPT | Mod: 26 | Performed by: PATHOLOGY

## 2022-04-04 PROCEDURE — 88333 PATH CONSLTJ SURG CYTO XM 1: CPT | Mod: 26 | Performed by: PATHOLOGY

## 2022-04-04 PROCEDURE — 88305 TISSUE EXAM BY PATHOLOGIST: CPT | Mod: 26 | Performed by: PATHOLOGY

## 2022-04-04 PROCEDURE — 88360 TUMOR IMMUNOHISTOCHEM/MANUAL: CPT | Mod: 26 | Performed by: PATHOLOGY

## 2022-04-04 NOTE — TELEPHONE ENCOUNTER
Called pharmacy and informed that patient has new pcp through [facility], and to as for requests with them, nothing else needed.         Conchita MAHONEY CMA ([facility])

## 2022-04-05 ENCOUNTER — TELEPHONE (OUTPATIENT)
Dept: SURGERY | Facility: CLINIC | Age: 77
End: 2022-04-05

## 2022-04-05 DIAGNOSIS — R91.8 PULMONARY NODULES: Primary | ICD-10-CM

## 2022-04-05 DIAGNOSIS — C34.11 MALIGNANT NEOPLASM OF UPPER LOBE OF RIGHT LUNG (H): ICD-10-CM

## 2022-04-05 LAB — INR PPP: 1.36 (ref 0.85–1.15)

## 2022-04-05 PROCEDURE — 85610 PROTHROMBIN TIME: CPT | Mod: ORL

## 2022-04-05 PROCEDURE — P9603 ONE-WAY ALLOW PRORATED MILES: HCPCS | Mod: ORL

## 2022-04-05 PROCEDURE — 36415 COLL VENOUS BLD VENIPUNCTURE: CPT | Mod: ORL

## 2022-04-05 NOTE — TELEPHONE ENCOUNTER
Call to Sandhya to discuss pathology from his recent lung biopsy-the pathology is positive for lung cancer, adenocarcinoma. 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     We reviewed his pathology at our multi-disciplinary tumor board and the recommendation is for him to see Dr. Joana Duncan to discuss SBRT to the growing RUL nodule that we know is an adenocarcinoma-favoring a lung primary (PD-L1 positive ~20%).    Sandhya has a lot of concerns about her dad-she struggles with getting him to follow instructions given to him by his health care team and is very concerned that the cancer may not be the only disease process going on.    Sandhya was able to conference call her parents in so that I was able to explain the pathology and plan to them as well.     I have placed a referral to Radiation Oncology and asked them to contact Sandhya to schedule a consult since she will be bringing them. Sandhya, Yoli Renner all verbalized understanding and agreement with this plan and appreciated the call. They did not have any remaining questions at the end of our conversation.

## 2022-04-11 ENCOUNTER — LAB REQUISITION (OUTPATIENT)
Dept: LAB | Facility: CLINIC | Age: 77
End: 2022-04-11
Payer: COMMERCIAL

## 2022-04-11 DIAGNOSIS — I73.9 PERIPHERAL VASCULAR DISEASE, UNSPECIFIED (H): ICD-10-CM

## 2022-05-29 ENCOUNTER — LAB REQUISITION (OUTPATIENT)
Dept: LAB | Facility: CLINIC | Age: 77
End: 2022-05-29
Payer: COMMERCIAL

## 2022-05-29 DIAGNOSIS — Z79.01 LONG TERM (CURRENT) USE OF ANTICOAGULANTS: ICD-10-CM

## 2022-05-31 LAB — INR PPP: 2.91 (ref 0.85–1.15)

## 2022-05-31 PROCEDURE — 36415 COLL VENOUS BLD VENIPUNCTURE: CPT | Mod: ORL | Performed by: NURSE PRACTITIONER

## 2022-05-31 PROCEDURE — 85610 PROTHROMBIN TIME: CPT | Mod: ORL | Performed by: NURSE PRACTITIONER

## 2022-05-31 PROCEDURE — P9604 ONE-WAY ALLOW PRORATED TRIP: HCPCS | Mod: ORL | Performed by: NURSE PRACTITIONER

## 2022-06-01 ENCOUNTER — TELEPHONE (OUTPATIENT)
Dept: FAMILY MEDICINE | Facility: CLINIC | Age: 77
End: 2022-06-01
Payer: COMMERCIAL

## 2022-06-01 DIAGNOSIS — T14.8XXA OPEN WOUND: Primary | ICD-10-CM

## 2022-06-01 NOTE — TELEPHONE ENCOUNTER
Report 2 things:  1. Patient fell Sunday no injuries just bruising on both wrists.    2. Assisted living reporting a low grade fever not sure if he caught something or related to his wound wondering if should start antibiotic.  Mary Morales,

## 2022-06-01 NOTE — TELEPHONE ENCOUNTER
Dr. Duckworth    Spoke with Conchita, has wound currently but does not look infected, bright beefy red, and not red around edges not warm to touch. Today had temp 98.3 forehead, behind ear 99.0F, GURVINDER stated 100F this morning and worried as he has has so many infection in past few years. Per pt no other symptoms other than knee pain which is not new for patient. Denying any other symptoms.     Conchita asking about dilaudid script and refill can be sent in for pain. Cued if appropriate.     Thanks,  Sheila RN  [facility]

## 2022-06-02 RX ORDER — HYDROMORPHONE HYDROCHLORIDE 2 MG/1
2 TABLET ORAL 2 TIMES DAILY PRN
Qty: 10 TABLET | Refills: 0 | Status: SHIPPED | OUTPATIENT
Start: 2022-06-02 | End: 2022-06-02

## 2022-06-02 RX ORDER — HYDROMORPHONE HYDROCHLORIDE 2 MG/1
2 TABLET ORAL 2 TIMES DAILY PRN
Qty: 10 TABLET | Refills: 0 | Status: SHIPPED | OUTPATIENT
Start: 2022-06-02 | End: 2022-06-03

## 2022-06-02 NOTE — TELEPHONE ENCOUNTER
"Conchita Carbajal reporting that pt took oxycodone from his wife yesterday unsure if this was an old scrip the had or his wife had and pt stated it helped his pain. However, edu provided to pt that he cannot take med not prescribed to him as if he were to take this and his dilaudid it could cause resp issues.     Per Conchita: \"Patient under impression that Dr. Duckworth is still his primary. Refused Dr. Santa back in January\". Per Conchita will double check with [facility] as well and will have pt schedule visit with you.     Dilaudid was sent to wrong pharmacy, cued correct pharm for sig (med not on [facility]).     Thanks,  DECLAN Sosa  [facility]     "

## 2022-06-02 NOTE — TELEPHONE ENCOUNTER
Medication refilled.  But Long term care regarding patient and his intentions is not entirely clear to me    A real further follow up with me is an option,. I need to understand , if Dr Suze Santa is giuing them there care they need , there at they're place of residence, perhaps messages simply don't belong with me ? His plans for primary health care provider remains unclear.    Kapil Duckworth MD

## 2022-06-02 NOTE — TELEPHONE ENCOUNTER
There's a lot of issues packed into this message. Ultimately if patient is set to see me I will see him.     Taking opioid pain medication isn't a solution to his chronic pain issues. I can't keep refilling this.    10 pills and he needs to see me then , as soon as possible       Kapil Duckworth MD

## 2022-06-03 RX ORDER — HYDROMORPHONE HYDROCHLORIDE 2 MG/1
2 TABLET ORAL 2 TIMES DAILY PRN
Qty: 10 TABLET | Refills: 0 | Status: SHIPPED | OUTPATIENT
Start: 2022-06-03 | End: 2023-08-03

## 2022-06-03 NOTE — TELEPHONE ENCOUNTER
Received call from [place] pharmacy staff- she states that HYDROmorphone (DILAUDID) 2 MG tablet was sent to wrong pharmacy.     She requested it be re-sent to:  [facility], [facility] [address]   (Pharmacy is cued for provider)    Order that went to [place] has already been canceled by that pharmacy.    MANJU TorresN RN  [facility]

## 2022-06-03 NOTE — TELEPHONE ENCOUNTER
Spoke with Conchita. Provider message given. She will clarify PCP today.     Geni LYMAN RN  [facility]

## 2022-06-06 ENCOUNTER — LAB REQUISITION (OUTPATIENT)
Dept: LAB | Facility: CLINIC | Age: 77
End: 2022-06-06
Payer: COMMERCIAL

## 2022-06-06 DIAGNOSIS — Z79.01 LONG TERM (CURRENT) USE OF ANTICOAGULANTS: ICD-10-CM

## 2022-06-09 ENCOUNTER — TELEPHONE (OUTPATIENT)
Dept: FAMILY MEDICINE | Facility: CLINIC | Age: 77
End: 2022-06-09
Payer: COMMERCIAL

## 2022-06-10 RX ORDER — BLOOD SUGAR DIAGNOSTIC
STRIP MISCELLANEOUS
OUTPATIENT
Start: 2022-06-10

## 2022-06-10 NOTE — TELEPHONE ENCOUNTER
Called [facility] as there has been confusion as to who is following patient's cares.  Per 3/28/2022 phone encounter, patient was being followed by [facility] and they were seeing patient in house at [facility].  Per 6/1/2022, Critical access hospital was requesting orders from Kapil Carbajal but we had asked them to verify who PCP first then call us back.  Critical access hospital never called back with confirmation.  ______________________________________________________    Called [facility] and spoke with Yina.  She verified that the [facility] providers have been seeing patient in house and signing off on orders and prescriptions as recently as 6/1/2022.  Patient was then sent [facility] last week for re-infection of surgical wound and is now in a TCU.  Yina unsure which TCU patient is currently at.  She also verified that [facility] can also approve HC orders. Would not need clinic to be approving orders for HC.  Unsure what the plan is once patient is discharged from TCU but if he does go back to [facility], it is assumed that the [facility] would resume his cares    Jacobo Pak RN  [facility]

## 2022-07-01 ENCOUNTER — TELEPHONE (OUTPATIENT)
Dept: PODIATRY | Facility: CLINIC | Age: 77
End: 2022-07-01

## 2022-07-01 NOTE — TELEPHONE ENCOUNTER
[facility] Call Center    Phone Message    May a detailed message be left on voicemail: yes     Reason for Call: Other: Pt's wife calling regarding patient's Heart and vascular disease doctor needs for patient to get in asap due to patient's infection can't wait until the 13th to see Dr. Dent. Please call Yoli at [phone] regarding this     Action Taken: Other:  podiatry [place]    Travel Screening: Not Applicable

## 2022-07-01 NOTE — TELEPHONE ENCOUNTER
DIAGNOSIS: hammer toe,left    APPOINTMENT DATE: 07/13/2022   NOTES STATUS DETAILS   OFFICE NOTE from referring provider N/A    OFFICE NOTE from other specialist N/A    DISCHARGE SUMMARY from hospital N/A    DISCHARGE REPORT from the ER N/A    OPERATIVE REPORT N/A    EMG report N/A    MEDICATION LIST N/A    MRI N/A    DEXA (osteoporosis/bone health) N/A    CT SCAN N/A    XRAYS (IMAGES & REPORTS) N/A

## 2022-07-06 ENCOUNTER — OFFICE VISIT (OUTPATIENT)
Dept: PODIATRY | Facility: CLINIC | Age: 77
End: 2022-07-06
Payer: COMMERCIAL

## 2022-07-06 DIAGNOSIS — E11.621 DIABETIC ULCER OF TOE OF LEFT FOOT ASSOCIATED WITH TYPE 2 DIABETES MELLITUS, WITH NECROSIS OF MUSCLE (H): Primary | ICD-10-CM

## 2022-07-06 DIAGNOSIS — I73.9 PAD (PERIPHERAL ARTERY DISEASE) (H): ICD-10-CM

## 2022-07-06 DIAGNOSIS — L97.523 DIABETIC ULCER OF TOE OF LEFT FOOT ASSOCIATED WITH TYPE 2 DIABETES MELLITUS, WITH NECROSIS OF MUSCLE (H): Primary | ICD-10-CM

## 2022-07-06 PROCEDURE — 99214 OFFICE O/P EST MOD 30 MIN: CPT | Performed by: PODIATRIST

## 2022-07-06 NOTE — PATIENT INSTRUCTIONS
PATIENT INSTRUCTIONS - Podiatry / Foot & Ankle Surgery        Continue betadine, dressing daily to 2nd digit      Already on IV Vancomycin, Ceftriaxone.     If not improved, may need MRI                ROUTINE FOOT CARE (NAIL TRIMMING / CALLUSES)    Go to [email or web address].[email or web address] ([employer]) & search near you.    FYI: Some providers accept insurance while others are out of pocket.  Please contact them for more updated details.    [employer]   [phone]   [employer] (out of pocket)  [phone]  [email or web address]   [employer]  [phone]  [facility] 15 mile radius [facility] To[facility]  [phone]  [facility].   [facility]  [phone] [address] [facility] [facility]  [phone]  [facility]   [facility]  [phone]  [facility]  [phone]  [place] - Dr. Nelson on Tuesdays   [place] [facility]  [phone]  [facility]  [phone]  [facility]  [phone] [facility] (in home)  Sabrina Ritter RN Foot Specialist  [phone]   [facility] Ank[facility] Clin[facility]   [phone]   [facility]  [phone] [address] [place] [address] Foot Clinic  Dr. Tim Kim  [phone]  [place], [place]

## 2022-07-06 NOTE — LETTER
7/6/2022         RE: Zurdo Dash  [address]  [facility] [address]        Dear Colleague,    Thank you for referring your patient, Zurdo Dash, to the [facility]. Please see a copy of my visit note below.    Assessment:      ICD-10-CM    1. Diabetic ulcer of toe of left foot associated with type 2 diabetes mellitus, with necrosis of muscle (H)  E11.621 XR Toe Left G/E 2 Views    L97.523    2. PAD (peripheral artery disease) (H)  I73.9           Plan:  Orders Placed This Encounter   Procedures     XR Toe Left G/E 2 Views         Discussed the etiology and treatment of the condition with the patient.  Discussed surgical and conservative options.    -Wound L 2nd PIPJ  PICC w/ vanc, ceftriaxone  If not improved, consider MRI    Foot care nurse for nails/calluses - see AVS        Return:  No follow-ups on file.     Glendy Gomez DPM                  Chief Complaint:     Patient presents with:  Left 2nd Toe - RECHECK: Red open sore on top of toe  Left 3rd Toe - RECHECK  Hammer Toe: Dark hard spot under toe       HPI:  Zurdo Dash is a 77 year old year old male who presents for Diabetic Foot complication.    Wound L     Last HbA1C -   Hemoglobin A1C POCT   Date Value Ref Range Status   01/07/2021 6.8 (H) 0 - 5.6 % Final     Comment:     Normal <5.7% Prediabetes 5.7-6.4%  Diabetes 6.5% or higher - adopted from ADA   consensus guidelines.       Hemoglobin A1C   Date Value Ref Range Status   02/01/2022 9.7 (H) <=5.6 % Final     Comment:       Prediabetes: 5.7 to 6.4%        Diabetes:  >=6.5%     Patients with Hgb F >5%, total bilirubin >10.0 mg/dL, abnormal red cell turnover, severe renal or hepatic disease or malignancy should not have this A1C method used to diagnose or monitor diabetes.          Past Medical & Surgical History:  Past Medical History:   Diagnosis Date     Abnormal CT scan 03/2004    calcified lung granuloma     C. difficile diarrhea     H/O      Cataract 11/18/2011     Diabetic neuropathy (H)     mild, mostly soles and distal forefeet, worse on the left side.     Diverticulitis      ED (erectile dysfunction)      Ex-smoker     QUIT SMOKING FEB 2007     History of ETOH abuse     recovering, sober since 1997     Hyperlipidemia LDL goal <100      Hypertension goal BP (blood pressure) < 140/90      Hypogonadism      Obesity      PAD (peripheral artery disease) (H)     leg cramps, with exertion, no formal diagnosis of PAD and minimal if any symptoms at all.     RA (rheumatoid arthritis) (H)     Dr SharpUvaldo     Syncope       Past Surgical History:   Procedure Laterality Date     BYPASS GRAFT FEMOROPOPLITEAL Left 01/07/2021    Procedure: LEFT FEMORAL TO ABOVE KNEE POPLITEAL ARTERY BYPASS WITH PTFE VASCULAR GRAFT REMOVABLE RING 6MMX 50CM;  Surgeon: Myra Lopes MD;  Location: [facility]     CATARACT IOL, RT/LT       COLONOSCOPY  03/01/2018    [facility]     COMBINED REPAIR PTOSIS WITH BLEPHAROPLASTY BILATERAL Bilateral 08/16/2019    Procedure: BILATERAL UPPER EYELID BLEPHAROPLASTY AND BILATERAL PTOSIS REPAIR;  Surgeon: Janet Garcia MD;  Location: [facility]     ENDARTERECTOMY FEMORAL Left 01/07/2021    Procedure: LEFT FEMORAL ENDARTERECTOMY WITH PATCH ANGIOPLASTY PHOTOFIX  0.8 X 8CM;  Surgeon: Myra Lopes MD;  Location: [facility]     EP PACEMAKER  01/2021     EXCISE LESION EYELID Left 08/16/2019    Procedure: LEFT LOWER EYELID BIOPSY;  Surgeon: Janet Garcia MD;  Location: [facility]     IR LOWER EXTREMITY ANGIOGRAM LEFT  12/17/2020     PHACOEMULSIFICATION WITH STANDARD INTRAOCULAR LENS IMPLANT  02/2019; 3/2019    left eye; right eye     TONSILLECTOMY       ZZHC INCISION TENDON SHEATH FINGER  04/2009    r hand ring finger      Family History   Problem Relation Age of Onset     Cerebrovascular Disease Mother      Arthritis Mother      Osteoporosis Mother      Alzheimer Disease Father      Arthritis Father      Cancer Father      Diabetes Maternal  "Grandmother      Cardiovascular Maternal Grandmother      Other Cancer Brother      Cancer Paternal Aunt      Hypertension No family hx of      Thyroid Disease No family hx of      Glaucoma No family hx of      Macular Degeneration No family hx of         Social History:  ?  History   Smoking Status     Former Smoker     Packs/day: 1.00     Years: 40.00     Types: Cigarettes     Quit date: 3/16/2007   Smokeless Tobacco     Never Used     History   Drug Use No     Social History    Substance and Sexual Activity      Alcohol use: No        Alcohol/week: 0.0 standard drinks      Allergies:  ?   Allergies   Allergen Reactions     Blood-Group Specific Substance Other (See Comments)     Patient has a Non-specific antibody. Blood products may be delayed. Draw patient 24 hours prior to transfusion. For [facility] testing, draw one red top and two purple top tubes for all Type and Screen orders.     Seasonal Allergies         Medications:    Current Outpatient Medications   Medication     acetaminophen (TYLENOL) 500 MG tablet     Alcohol Swabs PADS     bisacodyl (DULCOLAX) 10 MG suppository     blood glucose (NO BRAND SPECIFIED) test strip     Blood Glucose Monitoring Suppl (ACCU-CHEK GUIDE) w/Device KIT     bumetanide (BUMEX) 2 MG tablet     cetirizine (ZYRTEC) 10 MG tablet     collagenase (SANTYL) 250 UNIT/GM external ointment     cyanocobalamin (VITAMIN B-12) 1000 MCG tablet     diphenhydrAMINE (BENADRYL) 25 MG capsule     dulaglutide (TRULICITY) 1.5 MG/0.5ML pen     folic acid (FOLVITE) 1 MG tablet     Gauze Pads & Dressings (GAUZE SPONGE) 4\"X4\" PADS     glipiZIDE (GLUCOTROL) 5 MG tablet     HYDROmorphone (DILAUDID) 2 MG tablet     hydroxychloroquine (PLAQUENIL) 200 MG tablet     insulin aspart (NOVOLOG VIAL) 100 UNITS/ML vial     insulin glargine (LANTUS VIAL) 100 UNIT/ML vial     insulin syringe-needle U-100 (30G X 1/2\" 0.5 ML) 30G X 1/2\" 0.5 ML miscellaneous     Insulin Syringe-Needle U-100 28G X 1/2\" 0.5 ML MISC " "    Irrigation Supplies MISC     medical cannabis (Patient's own supply)     metoprolol succinate ER (TOPROL-XL) 50 MG 24 hr tablet     polyethylene glycol-propylene glycol (SYSTANE) 0.4-0.3 % SOLN ophthalmic solution     pravastatin (PRAVACHOL) 10 MG tablet     senna-docusate (SENOKOT-S/PERICOLACE) 8.6-50 MG tablet     sertraline (ZOLOFT) 50 MG tablet     sodium hypochlorite (DAKINS HALF-STRENGTH) 0.25 % external solution     syringe/needle, disp, (BD ECLIPSE SYRINGE) 25G X 1\" 3 ML MISC     triamcinolone (KENALOG) 0.1 % external cream     warfarin ANTICOAGULANT (COUMADIN) 3 MG tablet     WARFARIN SODIUM PO     Current Facility-Administered Medications   Medication     cyanocobalamin injection 1,000 mcg     cyanocobalamin injection 1,000 mcg       Physical Exam:  ?  Vitals:  There were no vitals taken for this visit.   General:  WD/WN, in NAD.  A&O x3.  Dermatologic:    Skin is not intact, open lesions present wound 2nd digit PIPJ, Left.   Skin texture, turgor is abnormal, atrophic.  Vascular:  Pulses not palpable bilateral.  Digital capillary refill time delayed left.  Skin temperature is warm left.  Generalized edema- pitting bilateral.  Focal edema- moderate 2nd digit left.  Neurologic:    Protective sensation diminished  Gross sensation normal.  Gait and balance abnormal, walker.  Musculoskeletal:  No pain to palpation of foot & ankle  aROM intact to foot & ankle  Muscle strength 5/5 foot & ankle        Imaging:  x-ray independently reviewed and interpreted by myself today.  Non-Weight-bearing views left foot dated 07/06/22, reveal no osseous erosion to 2nd digit                  Again, thank you for allowing me to participate in the care of your patient.        Sincerely,        Glendy Gomez DPM    "

## 2022-07-06 NOTE — PROGRESS NOTES
Assessment:      ICD-10-CM    1. Diabetic ulcer of toe of left foot associated with type 2 diabetes mellitus, with necrosis of muscle (H)  E11.621 XR Toe Left G/E 2 Views    L97.523    2. PAD (peripheral artery disease) (H)  I73.9           Plan:  Orders Placed This Encounter   Procedures     XR Toe Left G/E 2 Views         Discussed the etiology and treatment of the condition with the patient.  Discussed surgical and conservative options.    -Wound L 2nd PIPJ  PICC w/ vanc, ceftriaxone  If not improved, consider MRI    Foot care nurse for nails/calluses - see AVS        Return:  No follow-ups on file.     Glendy Gomez DPM                  Chief Complaint:     Patient presents with:  Left 2nd Toe - RECHECK: Red open sore on top of toe  Left 3rd Toe - RECHECK  Hammer Toe: Dark hard spot under toe       HPI:  Zurdo Dash is a 77 year old year old male who presents for Diabetic Foot complication.    Wound L     Last HbA1C -   Hemoglobin A1C POCT   Date Value Ref Range Status   01/07/2021 6.8 (H) 0 - 5.6 % Final     Comment:     Normal <5.7% Prediabetes 5.7-6.4%  Diabetes 6.5% or higher - adopted from ADA   consensus guidelines.       Hemoglobin A1C   Date Value Ref Range Status   02/01/2022 9.7 (H) <=5.6 % Final     Comment:       Prediabetes: 5.7 to 6.4%        Diabetes:  >=6.5%     Patients with Hgb F >5%, total bilirubin >10.0 mg/dL, abnormal red cell turnover, severe renal or hepatic disease or malignancy should not have this A1C method used to diagnose or monitor diabetes.          Past Medical & Surgical History:  Past Medical History:   Diagnosis Date     Abnormal CT scan 03/2004    calcified lung granuloma     C. difficile diarrhea     H/O     Cataract 11/18/2011     Diabetic neuropathy (H)     mild, mostly soles and distal forefeet, worse on the left side.     Diverticulitis      ED (erectile dysfunction)      Ex-smoker     QUIT SMOKING FEB 2007     History of ETOH abuse     recovering, sober since  1997     Hyperlipidemia LDL goal <100      Hypertension goal BP (blood pressure) < 140/90      Hypogonadism      Obesity      PAD (peripheral artery disease) (H)     leg cramps, with exertion, no formal diagnosis of PAD and minimal if any symptoms at all.     RA (rheumatoid arthritis) (H)     Dr Bailon     Syncope       Past Surgical History:   Procedure Laterality Date     BYPASS GRAFT FEMOROPOPLITEAL Left 01/07/2021    Procedure: LEFT FEMORAL TO ABOVE KNEE POPLITEAL ARTERY BYPASS WITH PTFE VASCULAR GRAFT REMOVABLE RING 6MMX 50CM;  Surgeon: Myra Lopes MD;  Location: [facility]     CATARACT IOL, RT/LT       COLONOSCOPY  03/01/2018    [facility]     COMBINED REPAIR PTOSIS WITH BLEPHAROPLASTY BILATERAL Bilateral 08/16/2019    Procedure: BILATERAL UPPER EYELID BLEPHAROPLASTY AND BILATERAL PTOSIS REPAIR;  Surgeon: Janet Garcia MD;  Location: [facility]     ENDARTERECTOMY FEMORAL Left 01/07/2021    Procedure: LEFT FEMORAL ENDARTERECTOMY WITH PATCH ANGIOPLASTY PHOTOFIX  0.8 X 8CM;  Surgeon: Myra Lopes MD;  Location: [facility]     EP PACEMAKER  01/2021     EXCISE LESION EYELID Left 08/16/2019    Procedure: LEFT LOWER EYELID BIOPSY;  Surgeon: Janet Garcia MD;  Location: [facility]     IR LOWER EXTREMITY ANGIOGRAM LEFT  12/17/2020     PHACOEMULSIFICATION WITH STANDARD INTRAOCULAR LENS IMPLANT  02/2019; 3/2019    left eye; right eye     TONSILLECTOMY       ZZHC INCISION TENDON SHEATH FINGER  04/2009    r hand ring finger      Family History   Problem Relation Age of Onset     Cerebrovascular Disease Mother      Arthritis Mother      Osteoporosis Mother      Alzheimer Disease Father      Arthritis Father      Cancer Father      Diabetes Maternal Grandmother      Cardiovascular Maternal Grandmother      Other Cancer Brother      Cancer Paternal Aunt      Hypertension No family hx of      Thyroid Disease No family hx of      Glaucoma No family hx of      Macular Degeneration No family hx of         Social  "History:  ?  History   Smoking Status     Former Smoker     Packs/day: 1.00     Years: 40.00     Types: Cigarettes     Quit date: 3/16/2007   Smokeless Tobacco     Never Used     History   Drug Use No     Social History    Substance and Sexual Activity      Alcohol use: No        Alcohol/week: 0.0 standard drinks      Allergies:  ?   Allergies   Allergen Reactions     Blood-Group Specific Substance Other (See Comments)     Patient has a Non-specific antibody. Blood products may be delayed. Draw patient 24 hours prior to transfusion. For [facility] testing, draw one red top and two purple top tubes for all Type and Screen orders.     Seasonal Allergies         Medications:    Current Outpatient Medications   Medication     acetaminophen (TYLENOL) 500 MG tablet     Alcohol Swabs PADS     bisacodyl (DULCOLAX) 10 MG suppository     blood glucose (NO BRAND SPECIFIED) test strip     Blood Glucose Monitoring Suppl (ACCU-CHEK GUIDE) w/Device KIT     bumetanide (BUMEX) 2 MG tablet     cetirizine (ZYRTEC) 10 MG tablet     collagenase (SANTYL) 250 UNIT/GM external ointment     cyanocobalamin (VITAMIN B-12) 1000 MCG tablet     diphenhydrAMINE (BENADRYL) 25 MG capsule     dulaglutide (TRULICITY) 1.5 MG/0.5ML pen     folic acid (FOLVITE) 1 MG tablet     Gauze Pads & Dressings (GAUZE SPONGE) 4\"X4\" PADS     glipiZIDE (GLUCOTROL) 5 MG tablet     HYDROmorphone (DILAUDID) 2 MG tablet     hydroxychloroquine (PLAQUENIL) 200 MG tablet     insulin aspart (NOVOLOG VIAL) 100 UNITS/ML vial     insulin glargine (LANTUS VIAL) 100 UNIT/ML vial     insulin syringe-needle U-100 (30G X 1/2\" 0.5 ML) 30G X 1/2\" 0.5 ML miscellaneous     Insulin Syringe-Needle U-100 28G X 1/2\" 0.5 ML MISC     Irrigation Supplies MISC     medical cannabis (Patient's own supply)     metoprolol succinate ER (TOPROL-XL) 50 MG 24 hr tablet     polyethylene glycol-propylene glycol (SYSTANE) 0.4-0.3 % SOLN ophthalmic solution     pravastatin (PRAVACHOL) 10 MG tablet     " "senna-docusate (SENOKOT-S/PERICOLACE) 8.6-50 MG tablet     sertraline (ZOLOFT) 50 MG tablet     sodium hypochlorite (DAKINS HALF-STRENGTH) 0.25 % external solution     syringe/needle, disp, (BD ECLIPSE SYRINGE) 25G X 1\" 3 ML MISC     triamcinolone (KENALOG) 0.1 % external cream     warfarin ANTICOAGULANT (COUMADIN) 3 MG tablet     WARFARIN SODIUM PO     Current Facility-Administered Medications   Medication     cyanocobalamin injection 1,000 mcg     cyanocobalamin injection 1,000 mcg       Physical Exam:  ?  Vitals:  There were no vitals taken for this visit.   General:  WD/WN, in NAD.  A&O x3.  Dermatologic:    Skin is not intact, open lesions present wound 2nd digit PIPJ, Left.   Skin texture, turgor is abnormal, atrophic.  Vascular:  Pulses not palpable bilateral.  Digital capillary refill time delayed left.  Skin temperature is warm left.  Generalized edema- pitting bilateral.  Focal edema- moderate 2nd digit left.  Neurologic:    Protective sensation diminished  Gross sensation normal.  Gait and balance abnormal, walker.  Musculoskeletal:  No pain to palpation of foot & ankle  aROM intact to foot & ankle  Muscle strength 5/5 foot & ankle        Imaging:  x-ray independently reviewed and interpreted by myself today.  Non-Weight-bearing views left foot dated 07/06/22, reveal no osseous erosion to 2nd digit              "

## 2022-07-13 ENCOUNTER — OFFICE VISIT (OUTPATIENT)
Dept: PODIATRY | Facility: CLINIC | Age: 77
End: 2022-07-13
Payer: COMMERCIAL

## 2022-07-13 ENCOUNTER — PRE VISIT (OUTPATIENT)
Dept: PODIATRY | Facility: CLINIC | Age: 77
End: 2022-07-13

## 2022-07-13 DIAGNOSIS — E11.621 DIABETIC ULCER OF TOE OF LEFT FOOT ASSOCIATED WITH TYPE 2 DIABETES MELLITUS, WITH NECROSIS OF MUSCLE (H): Primary | ICD-10-CM

## 2022-07-13 DIAGNOSIS — E11.51 TYPE II DIABETES MELLITUS WITH PERIPHERAL ARTERY DISEASE (H): ICD-10-CM

## 2022-07-13 DIAGNOSIS — L84 CORNS AND CALLOSITIES: ICD-10-CM

## 2022-07-13 DIAGNOSIS — B35.1 DERMATOPHYTOSIS OF NAIL: ICD-10-CM

## 2022-07-13 DIAGNOSIS — L97.523 DIABETIC ULCER OF TOE OF LEFT FOOT ASSOCIATED WITH TYPE 2 DIABETES MELLITUS, WITH NECROSIS OF MUSCLE (H): Primary | ICD-10-CM

## 2022-07-13 PROCEDURE — 11721 DEBRIDE NAIL 6 OR MORE: CPT | Mod: XS | Performed by: PODIATRIST

## 2022-07-13 PROCEDURE — 87070 CULTURE OTHR SPECIMN AEROBIC: CPT | Performed by: PODIATRIST

## 2022-07-13 PROCEDURE — 11042 DBRDMT SUBQ TIS 1ST 20SQCM/<: CPT | Performed by: PODIATRIST

## 2022-07-13 PROCEDURE — 99214 OFFICE O/P EST MOD 30 MIN: CPT | Mod: 25 | Performed by: PODIATRIST

## 2022-07-13 PROCEDURE — 11056 PARNG/CUTG B9 HYPRKR LES 2-4: CPT | Mod: Q8 | Performed by: PODIATRIST

## 2022-07-13 NOTE — PATIENT INSTRUCTIONS
We wish you continued good healing. If you have any questions or concerns, please do not hesitate to contact us at  [phone]    [facility] (secure e-mail communication and access to your chart) to send a message or to make an appointment.    Please remember to call and schedule a follow up appointment if one was recommended at your earliest convenience.     PODIATRY CLINIC HOURS  TELEPHONE NUMBER    Dr. Aaron Dent D.P.M [facility]        Clinics:  [place] Patel CMA   Tuesday 1PM-6PM  [place]  Wednesday 745AM-330PM  [place]  Thursday/Friday 745AM-230PM  [place] APPOINTMENTS  [phone]    [place] APPOINTMENTS  [phone]        If you need a medication refill, please contact us you may need lab work and/or a follow up visit prior to your refill (i.e. Antifungal medications).  If MRI needed please call Imaging at [phone] or [phone]  HOW DO I GET MY KNEE SCOOTER? Knee scooters can be picked up at ANY Medical Supply stores with your knee scooter Prescription.  OR  Bring your signed prescription to an [facility] showroom.         Put a small amount of Medihoney on left 2nd and 3rd toe cover with bandage  Wear black DH shoe when up and walking  Return to clinic next week

## 2022-07-13 NOTE — LETTER
7/13/2022         RE: Zurdo Dash  [address]  [facility] [address]        Dear Colleague,    Thank you for referring your patient, Zurdo Dash, to the [facility]. Please see a copy of my visit note below.    Subjective:     Patient seen today for wound left second toe.  Patient points to the dorsum of the PIPJ.  Has had this for a few months.  Patient has history of peripheral arterial disease.  Had left femoropopliteal bypass in January 2021.  Since then has had numerous surgeries because of complications from bypass.  Has had a flap for coverage and also seeing plastic surgery.  Has wound left groin and currently being followed up with infectious disease.  PICC line saw infectious disease yesterday vancomycin and ceftriaxone.  And will be pulling PICC line and starting oral medications.  Despite being on antibiotics some erythema second toe.  Denies purulence or odor.  The patient has diabetes mellitus with peripheral neuropathy.  He has chronic kidney disease stage III.  Patient has rheumatoid arthritis and takes Plaquenil.  He is retired.  40-pack-year history of smoking and quit 14 years ago.   Primary care is Dr. Rivers last seen 3/21/22        ROS: See above         Allergies   Allergen Reactions     Blood-Group Specific Substance Other (See Comments)     Patient has a Non-specific antibody. Blood products may be delayed. Draw patient 24 hours prior to transfusion. For [facility] testing, draw one red top and two purple top tubes for all Type and Screen orders.     Seasonal Allergies        Current Outpatient Medications   Medication Sig Dispense Refill     acetaminophen (TYLENOL) 500 MG tablet Take 2 tablets (1,000 mg) by mouth 3 times daily       Alcohol Swabs PADS Uses four times daily       bisacodyl (DULCOLAX) 10 MG suppository Unwrap and insert 1 suppository rectally once daily as needed       blood glucose (NO BRAND SPECIFIED) test strip Use to  "test blood sugar 4 times daily or as directed.       Blood Glucose Monitoring Suppl (ACCU-CHEK GUIDE) w/Device KIT        bumetanide (BUMEX) 2 MG tablet Take 1 tablet (2 mg) by mouth daily       cetirizine (ZYRTEC) 10 MG tablet Take 10 mg by mouth At Bedtime       collagenase (SANTYL) 250 UNIT/GM external ointment Apply topically daily Apply to left heel as directed once daily       cyanocobalamin (VITAMIN B-12) 1000 MCG tablet Take 1 tablet (1,000 mcg) by mouth once a week       diphenhydrAMINE (BENADRYL) 25 MG capsule Take 1 capsule (25 mg) by mouth every 6 hours as needed for itching or allergies X 30 days then dc       dulaglutide (TRULICITY) 1.5 MG/0.5ML pen Inject 1.5 mg Subcutaneous every 7 days ON Wednesdays 10 mL 1     folic acid (FOLVITE) 1 MG tablet Take 1 tablet (1 mg) by mouth daily 100 tablet 2     Gauze Pads & Dressings (GAUZE SPONGE) 4\"X4\" PADS        glipiZIDE (GLUCOTROL) 5 MG tablet Take 0.5 tablets (2.5 mg) by mouth every morning       HYDROmorphone (DILAUDID) 2 MG tablet Take 1 tablet (2 mg) by mouth 2 times daily as needed for pain For urgent pain relief with rheumatoid arthritis flare-up 10 tablet 0     hydroxychloroquine (PLAQUENIL) 200 MG tablet Take 1 tablet (200 mg) by mouth daily 30 tablet 3     insulin aspart (NOVOLOG VIAL) 100 UNITS/ML vial Inject 8 Units Subcutaneous 3 times daily (with meals) 5 mL 1     insulin glargine (LANTUS VIAL) 100 UNIT/ML vial Inject 10 Units Subcutaneous At Bedtime 3 mL 1     insulin syringe-needle U-100 (30G X 1/2\" 0.5 ML) 30G X 1/2\" 0.5 ML miscellaneous Use 4 syringes daily or as directed.       Insulin Syringe-Needle U-100 28G X 1/2\" 0.5 ML MISC        Irrigation Supplies MISC        medical cannabis (Patient's own supply) See Admin Instructions (The purpose of this order is to document that the patient reports taking medical cannabis.  This is not a prescription, and is not used to certify that the patient has a qualifying medical condition.)       " "metoprolol succinate ER (TOPROL-XL) 50 MG 24 hr tablet Take 1 tablet (50 mg) by mouth daily       polyethylene glycol-propylene glycol (SYSTANE) 0.4-0.3 % SOLN ophthalmic solution Place 1 drop into both eyes 2 times daily as needed for dry eyes       pravastatin (PRAVACHOL) 10 MG tablet Take 1 tablet (10 mg) by mouth daily       senna-docusate (SENOKOT-S/PERICOLACE) 8.6-50 MG tablet Take 1 tablet by mouth 2 times daily as needed for constipation 50 tablet 0     sertraline (ZOLOFT) 50 MG tablet Take 1 tablet (50 mg) by mouth daily       sodium hypochlorite (DAKINS HALF-STRENGTH) 0.25 % external solution Use for damp to dry dressing changes two times a day left groin wound       syringe/needle, disp, (BD ECLIPSE SYRINGE) 25G X 1\" 3 ML MISC        triamcinolone (KENALOG) 0.1 % external cream Apply topically 2 times daily Apply to rash on neck, shoulders and lower extremities two times a day       warfarin ANTICOAGULANT (COUMADIN) 3 MG tablet Take 1/2 tab (1.5mg) by mouth on Sunday       WARFARIN SODIUM PO Take 3 mg by mouth Take 1 tab on Sun, Mon, Tues, Wed, Thurs and Fri.  And take 1/2 tab (1.5mg) on Sat         Patient Active Problem List   Diagnosis     Pain in limb     Hyperlipidemia LDL goal <100     Hypertension goal BP (blood pressure) < 140/90     Hypogonadism     Advanced directives, counseling/discussion     Health Care Home     Type 2 diabetes mellitus with diabetic polyneuropathy, without long-term current use of insulin (H)     RBBB (right bundle branch block)     Ex-smoker     Family history of esophageal cancer     Gastroesophageal reflux disease, esophagitis presence not specified     Rheumatoid arthritis involving multiple sites with positive rheumatoid factor (H)     Pulmonary nodule     High risk medication use     Spondylosis of cervical region without myelopathy or radiculopathy     Erectile dysfunction, unspecified erectile dysfunction type     Type 2 diabetes mellitus without retinopathy (H)     " Tubulovillous adenoma of colon     Diabetic polyneuropathy associated with type 2 diabetes mellitus (H)     Chronic bilateral low back pain without sciatica     Migraine equivalent     Posterior vitreous detachment of left eye     Pseudophakia, ou     Eyelid lesion, LLL     Dermatochalasis of both upper eyelids     Bradycardia     Primary osteoarthritis of both knees     Syncope     Trigger finger, acquired     CKD (chronic kidney disease) stage 3, GFR 30-59 ml/min (H)     Abdominal pain, generalized     PAD (peripheral artery disease) (H)     Immunosuppression (H)     Skin ulcer of toe of left foot, limited to breakdown of skin (H)     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 difficile diarrhea     H/O     Cataract 11/18/2011     Diabetic neuropathy (H)     mild, mostly soles and distal forefeet, worse on the left side.     Diverticulitis      ED (erectile dysfunction)      Ex-smoker     QUIT SMOKING FEB 2007     History of ETOH abuse     recovering, sober since 1997     Hyperlipidemia LDL goal <100      Hypertension goal BP (blood pressure) < 140/90      Hypogonadism      Obesity      PAD (peripheral artery disease) (H)     leg cramps, with exertion, no formal diagnosis of PAD and minimal if any symptoms at all.     RA (rheumatoid arthritis) (H)     Dr Bailon     Syncope        Past Surgical History:   Procedure Laterality Date     BYPASS GRAFT FEMOROPOPLITEAL Left 01/07/2021    Procedure: LEFT FEMORAL TO ABOVE KNEE POPLITEAL ARTERY BYPASS WITH PTFE VASCULAR GRAFT REMOVABLE RING 6MMX 50CM;  Surgeon: Myra Lopes MD;  Location:  OR      CATARACT IOL, RT/LT       COLONOSCOPY  03/01/2018    [facility]     COMBINED REPAIR PTOSIS WITH BLEPHAROPLASTY BILATERAL Bilateral 08/16/2019    Procedure: BILATERAL UPPER EYELID BLEPHAROPLASTY AND BILATERAL PTOSIS REPAIR;  Surgeon: Janet Garcia MD;  Location: [facility] OR     ENDARTERECTOMY FEMORAL Left 01/07/2021    Procedure: LEFT FEMORAL ENDARTERECTOMY WITH PATCH ANGIOPLASTY PHOTOFIX  0.8 X 8CM;  Surgeon: Myra Lopes MD;  Location: [facility] OR     EP PACEMAKER  01/2021     EXCISE LESION EYELID Left 08/16/2019    Procedure: LEFT LOWER EYELID BIOPSY;  Surgeon: Janet Garcia MD;  Location: [facility] OR     IR LOWER EXTREMITY ANGIOGRAM LEFT  12/17/2020     PHACOEMULSIFICATION WITH STANDARD INTRAOCULAR LENS IMPLANT  02/2019; 3/2019    left eye; right eye     TONSILLECTOMY       ZZHC INCISION TENDON SHEATH FINGER  04/2009    r hand ring finger       Family History   Problem Relation Age of Onset     Cerebrovascular Disease Mother      Arthritis Mother      Osteoporosis Mother      Alzheimer Disease Father      Arthritis Father      Cancer Father      Diabetes Maternal Grandmother      Cardiovascular Maternal Grandmother      Other Cancer Brother      Cancer Paternal Aunt      Hypertension No family hx of      Thyroid Disease No family hx of      Glaucoma No family hx of      Macular Degeneration No family hx of        Social History     Tobacco Use     Smoking status: Former Smoker     Packs/day: 1.00     Years: 40.00     Pack years: 40.00     Types: Cigarettes     Quit date: 3/16/2007     Years since quitting: 15.3     Smokeless tobacco: Never Used   Substance Use Topics     Alcohol use: No     Alcohol/week: 0.0 standard drinks         Exam:    Vitals: There were no vitals taken for this visit.  BMI: There is no height or weight on file to calculate BMI.  Height: Data Unavailable    Constitutional/ general:  Pt is in no apparent distress, appears well-nourished.  Cooperative with history and physical exam.      Psych:  The patient answered questions appropriately.  Normal affect.  Seems to have reasonable expectations, in terms of treatment.       Lungs:  Non labored breathing, non labored speech. No cough.  No audible wheezing. Even, quiet breathing.       Vascular:   Difficult to palpate pedal pulses.  Increased edema left lower extremity.                                                                   Neuro:  Alert and oriented x 3.  Monofilament absent to midfoot.      Derm: Skin thin shiny atrophic with no hair growth.  Left second toe hammered and slightly elevated.  Wound on the dorsum of the PIPJ measures 6 x 4 mm.  Base goes down to subcutaneous tissue and is dry fibrotic.  No probing to bone.  No purulence or odor.  Erythema and edema surrounding this.  Left third toe is hammered.  There is a callus on the end and underlying  eschar.  Distally left third toe there is 4 x 3 mm ulcer.  It is full-thickness.  No erythema or edema.  No sinus tracts purulence or odor.  All nails mycotic.  Callus noted on right foot.    Musculoskeletal:    Lower extremity muscle strength is normal.  Patient is ambulatory without an assistive device or brace.  Pronated arch with weightbearing.  All lesser toes are hammered and stiff.  Generally his feet are stiff with a decreased range of motion bilaterally.      A:  Diabetes mellitus with peripheral neuropathy and LOPS  Peripheral arterial disease s/p bypass  Left second hammertoe with ulcer and erythema  Left third hammertoe with distal ulcer  Onychomycosis  Calluses         P:   Discussed wound debridement left second toe with patient and they are in agreement.  After verbal consent and sterile prep the wound was debrided the wound all the way down to and including the subcutaneous tissue with a tissue nipper and #15 blade.  Explored base of wound, and dressed with gauze and coban.  Patient tolerated procedure well.  Mycotic nails manually debrided with a .  Calluses  debrided with a fifteen blade.  Discussed there is wound end of left third toe again.  It is not infected at this time.  We will dress both wounds with Medihoney once a day.  We gave him a sample.  Gave DH shoe to offload both wounds as well as crest pad for left third toe.  Patient will return to clinic in 1 week and will reevaluate and discuss culture results.  40 minutes spent in total time counseling patient, reviewing medical records, and coordinating care        Aaron Dent DPM, FACFAS              Again, thank you for allowing me to participate in the care of your patient.        Sincerely,        Aaron Dent DPM

## 2022-07-14 NOTE — PROGRESS NOTES
Subjective:     Patient seen today for wound left second toe.  Patient points to the dorsum of the PIPJ.  Has had this for a few months.  Patient has history of peripheral arterial disease.  Had left femoropopliteal bypass in January 2021.  Since then has had numerous surgeries because of complications from bypass.  Has had a flap for coverage and also seeing plastic surgery.  Has wound left groin and currently being followed up with infectious disease.  PICC line saw infectious disease yesterday vancomycin and ceftriaxone.  And will be pulling PICC line and starting oral medications.  Despite being on antibiotics some erythema second toe.  Denies purulence or odor.  The patient has diabetes mellitus with peripheral neuropathy.  He has chronic kidney disease stage III.  Patient has rheumatoid arthritis and takes Plaquenil.  He is retired.  40-pack-year history of smoking and quit 14 years ago.   Primary care is Dr. Rivers last seen 3/21/22        ROS: See above         Allergies   Allergen Reactions     Blood-Group Specific Substance Other (See Comments)     Patient has a Non-specific antibody. Blood products may be delayed. Draw patient 24 hours prior to transfusion. For [facility] testing, draw one red top and two purple top tubes for all Type and Screen orders.     Seasonal Allergies        Current Outpatient Medications   Medication Sig Dispense Refill     acetaminophen (TYLENOL) 500 MG tablet Take 2 tablets (1,000 mg) by mouth 3 times daily       Alcohol Swabs PADS Uses four times daily       bisacodyl (DULCOLAX) 10 MG suppository Unwrap and insert 1 suppository rectally once daily as needed       blood glucose (NO BRAND SPECIFIED) test strip Use to test blood sugar 4 times daily or as directed.       Blood Glucose Monitoring Suppl (ACCU-CHEK GUIDE) w/Device KIT        bumetanide (BUMEX) 2 MG tablet Take 1 tablet (2 mg) by mouth daily       cetirizine (ZYRTEC) 10 MG tablet Take 10 mg by mouth At Bedtime    "    collagenase (SANTYL) 250 UNIT/GM external ointment Apply topically daily Apply to left heel as directed once daily       cyanocobalamin (VITAMIN B-12) 1000 MCG tablet Take 1 tablet (1,000 mcg) by mouth once a week       diphenhydrAMINE (BENADRYL) 25 MG capsule Take 1 capsule (25 mg) by mouth every 6 hours as needed for itching or allergies X 30 days then dc       dulaglutide (TRULICITY) 1.5 MG/0.5ML pen Inject 1.5 mg Subcutaneous every 7 days ON Wednesdays 10 mL 1     folic acid (FOLVITE) 1 MG tablet Take 1 tablet (1 mg) by mouth daily 100 tablet 2     Gauze Pads & Dressings (GAUZE SPONGE) 4\"X4\" PADS        glipiZIDE (GLUCOTROL) 5 MG tablet Take 0.5 tablets (2.5 mg) by mouth every morning       HYDROmorphone (DILAUDID) 2 MG tablet Take 1 tablet (2 mg) by mouth 2 times daily as needed for pain For urgent pain relief with rheumatoid arthritis flare-up 10 tablet 0     hydroxychloroquine (PLAQUENIL) 200 MG tablet Take 1 tablet (200 mg) by mouth daily 30 tablet 3     insulin aspart (NOVOLOG VIAL) 100 UNITS/ML vial Inject 8 Units Subcutaneous 3 times daily (with meals) 5 mL 1     insulin glargine (LANTUS VIAL) 100 UNIT/ML vial Inject 10 Units Subcutaneous At Bedtime 3 mL 1     insulin syringe-needle U-100 (30G X 1/2\" 0.5 ML) 30G X 1/2\" 0.5 ML miscellaneous Use 4 syringes daily or as directed.       Insulin Syringe-Needle U-100 28G X 1/2\" 0.5 ML MISC        Irrigation Supplies MISC        medical cannabis (Patient's own supply) See Admin Instructions (The purpose of this order is to document that the patient reports taking medical cannabis.  This is not a prescription, and is not used to certify that the patient has a qualifying medical condition.)       metoprolol succinate ER (TOPROL-XL) 50 MG 24 hr tablet Take 1 tablet (50 mg) by mouth daily       polyethylene glycol-propylene glycol (SYSTANE) 0.4-0.3 % SOLN ophthalmic solution Place 1 drop into both eyes 2 times daily as needed for dry eyes       pravastatin " "(PRAVACHOL) 10 MG tablet Take 1 tablet (10 mg) by mouth daily       senna-docusate (SENOKOT-S/PERICOLACE) 8.6-50 MG tablet Take 1 tablet by mouth 2 times daily as needed for constipation 50 tablet 0     sertraline (ZOLOFT) 50 MG tablet Take 1 tablet (50 mg) by mouth daily       sodium hypochlorite (DAKINS HALF-STRENGTH) 0.25 % external solution Use for damp to dry dressing changes two times a day left groin wound       syringe/needle, disp, (BD ECLIPSE SYRINGE) 25G X 1\" 3 ML MISC        triamcinolone (KENALOG) 0.1 % external cream Apply topically 2 times daily Apply to rash on neck, shoulders and lower extremities two times a day       warfarin ANTICOAGULANT (COUMADIN) 3 MG tablet Take 1/2 tab (1.5mg) by mouth on Sunday       WARFARIN SODIUM PO Take 3 mg by mouth Take 1 tab on Sun, Mon, Tues, Wed, Thurs and Fri.  And take 1/2 tab (1.5mg) on Sat         Patient Active Problem List   Diagnosis     Pain in limb     Hyperlipidemia LDL goal <100     Hypertension goal BP (blood pressure) < 140/90     Hypogonadism     Advanced directives, counseling/discussion     Health Care Home     Type 2 diabetes mellitus with diabetic polyneuropathy, without long-term current use of insulin (H)     RBBB (right bundle branch block)     Ex-smoker     Family history of esophageal cancer     Gastroesophageal reflux disease, esophagitis presence not specified     Rheumatoid arthritis involving multiple sites with positive rheumatoid factor (H)     Pulmonary nodule     High risk medication use     Spondylosis of cervical region without myelopathy or radiculopathy     Erectile dysfunction, unspecified erectile dysfunction type     Type 2 diabetes mellitus without retinopathy (H)     Tubulovillous adenoma of colon     Diabetic polyneuropathy associated with type 2 diabetes mellitus (H)     Chronic bilateral low back pain without sciatica     Migraine equivalent     Posterior vitreous detachment of left eye     Pseudophakia, ou     Eyelid " lesion, LLL     Dermatochalasis of both upper eyelids     Bradycardia     Primary osteoarthritis of both knees     Syncope     Trigger finger, acquired     CKD (chronic kidney disease) stage 3, GFR 30-59 ml/min (H)     Abdominal pain, generalized     PAD (peripheral artery disease) (H)     Immunosuppression (H)     Skin ulcer of toe of left foot, limited to breakdown of skin (H)     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 difficile diarrhea     H/O     Cataract 11/18/2011     Diabetic neuropathy (H)     mild, mostly soles and distal forefeet, worse on the left side.     Diverticulitis      ED (erectile dysfunction)      Ex-smoker     QUIT SMOKING FEB 2007     History of ETOH abuse     recovering, sober since 1997     Hyperlipidemia LDL goal <100      Hypertension goal BP (blood pressure) < 140/90      Hypogonadism      Obesity      PAD (peripheral artery disease) (H)     leg cramps, with exertion, no formal diagnosis of PAD and minimal if any symptoms at all.     RA (rheumatoid arthritis) (H)     Dr Bailon     Syncope        Past Surgical History:   Procedure Laterality Date     BYPASS GRAFT FEMOROPOPLITEAL Left 01/07/2021    Procedure: LEFT FEMORAL TO ABOVE KNEE POPLITEAL ARTERY BYPASS WITH PTFE VASCULAR GRAFT REMOVABLE RING 6MMX 50CM;  Surgeon: Myra Lopes MD;  Location: [facility] OR     CATARACT IOL, RT/LT       COLONOSCOPY  03/01/2018    [facility]     COMBINED REPAIR PTOSIS WITH BLEPHAROPLASTY BILATERAL Bilateral 08/16/2019    Procedure: BILATERAL UPPER EYELID BLEPHAROPLASTY AND BILATERAL PTOSIS REPAIR;  Surgeon: Janet Garcia MD;   Location: [facility] OR     ENDARTERECTOMY FEMORAL Left 01/07/2021    Procedure: LEFT FEMORAL ENDARTERECTOMY WITH PATCH ANGIOPLASTY PHOTOFIX  0.8 X 8CM;  Surgeon: Myra Lopes MD;  Location: [facility] OR     EP PACEMAKER  01/2021     EXCISE LESION EYELID Left 08/16/2019    Procedure: LEFT LOWER EYELID BIOPSY;  Surgeon: Janet Garcia MD;  Location: [facility] OR     IR LOWER EXTREMITY ANGIOGRAM LEFT  12/17/2020     PHACOEMULSIFICATION WITH STANDARD INTRAOCULAR LENS IMPLANT  02/2019; 3/2019    left eye; right eye     TONSILLECTOMY       ZZHC INCISION TENDON SHEATH FINGER  04/2009    r hand ring finger       Family History   Problem Relation Age of Onset     Cerebrovascular Disease Mother      Arthritis Mother      Osteoporosis Mother      Alzheimer Disease Father      Arthritis Father      Cancer Father      Diabetes Maternal Grandmother      Cardiovascular Maternal Grandmother      Other Cancer Brother      Cancer Paternal Aunt      Hypertension No family hx of      Thyroid Disease No family hx of      Glaucoma No family hx of      Macular Degeneration No family hx of        Social History     Tobacco Use     Smoking status: Former Smoker     Packs/day: 1.00     Years: 40.00     Pack years: 40.00     Types: Cigarettes     Quit date: 3/16/2007     Years since quitting: 15.3     Smokeless tobacco: Never Used   Substance Use Topics     Alcohol use: No     Alcohol/week: 0.0 standard drinks         Exam:    Vitals: There were no vitals taken for this visit.  BMI: There is no height or weight on file to calculate BMI.  Height: Data Unavailable    Constitutional/ general:  Pt is in no apparent distress, appears well-nourished.  Cooperative with history and physical exam.     Psych:  The patient answered questions appropriately.  Normal affect.  Seems to have reasonable expectations, in terms of treatment.       Lungs:  Non labored breathing, non labored speech. No cough.  No audible wheezing. Even, quiet breathing.        Vascular:   Difficult to palpate pedal pulses.  Increased edema left lower extremity.                                                                   Neuro:  Alert and oriented x 3.  Monofilament absent to midfoot.      Derm: Skin thin shiny atrophic with no hair growth.  Left second toe hammered and slightly elevated.  Wound on the dorsum of the PIPJ measures 6 x 4 mm.  Base goes down to subcutaneous tissue and is dry fibrotic.  No probing to bone.  No purulence or odor.  Erythema and edema surrounding this.  Left third toe is hammered.  There is a callus on the end and underlying  eschar.  Distally left third toe there is 4 x 3 mm ulcer.  It is full-thickness.  No erythema or edema.  No sinus tracts purulence or odor.  All nails mycotic.  Callus noted on right foot.    Musculoskeletal:    Lower extremity muscle strength is normal.  Patient is ambulatory without an assistive device or brace.  Pronated arch with weightbearing.  All lesser toes are hammered and stiff.  Generally his feet are stiff with a decreased range of motion bilaterally.      A:  Diabetes mellitus with peripheral neuropathy and LOPS  Peripheral arterial disease s/p bypass  Left second hammertoe with ulcer and erythema  Left third hammertoe with distal ulcer  Onychomycosis  Calluses         P:   Discussed wound debridement left second toe with patient and they are in agreement.  After verbal consent and sterile prep the wound was debrided the wound all the way down to and including the subcutaneous tissue with a tissue nipper and #15 blade.  Explored base of wound, and dressed with gauze and coban.  Patient tolerated procedure well.  Mycotic nails manually debrided with a .  Calluses debrided with a fifteen blade.  Discussed there is wound end of left third toe again.  It is not infected at this time.  We will dress both wounds with Medihoney once a day.  We gave him a sample.  Gave DH shoe to offload both wounds as well as crest  pad for left third toe.  Patient will return to clinic in 1 week and will reevaluate and discuss culture results.  40 minutes spent in total time counseling patient, reviewing medical records, and coordinating care        Aaron Dent DPM, FACFAS

## 2022-07-15 LAB — BACTERIA SKIN AEROBE CULT: NORMAL

## 2022-07-20 ENCOUNTER — OFFICE VISIT (OUTPATIENT)
Dept: PODIATRY | Facility: CLINIC | Age: 77
End: 2022-07-20
Payer: COMMERCIAL

## 2022-07-20 VITALS — DIASTOLIC BLOOD PRESSURE: 87 MMHG | SYSTOLIC BLOOD PRESSURE: 150 MMHG | HEART RATE: 74 BPM

## 2022-07-20 DIAGNOSIS — E11.51 TYPE II DIABETES MELLITUS WITH PERIPHERAL ARTERY DISEASE (H): ICD-10-CM

## 2022-07-20 DIAGNOSIS — L97.523 DIABETIC ULCER OF TOE OF LEFT FOOT ASSOCIATED WITH TYPE 2 DIABETES MELLITUS, WITH NECROSIS OF MUSCLE (H): Primary | ICD-10-CM

## 2022-07-20 DIAGNOSIS — E11.621 DIABETIC ULCER OF TOE OF LEFT FOOT ASSOCIATED WITH TYPE 2 DIABETES MELLITUS, WITH NECROSIS OF MUSCLE (H): Primary | ICD-10-CM

## 2022-07-20 PROCEDURE — 99214 OFFICE O/P EST MOD 30 MIN: CPT | Performed by: PODIATRIST

## 2022-07-20 NOTE — PROGRESS NOTES
Subjective:    7/13/22    Patient seen today for wound left second toe.  Patient points to the dorsum of the PIPJ.  Has had this for a few months.  Patient has history of peripheral arterial disease.  Had left femoropopliteal bypass in January 2021.  Since then has had numerous surgeries because of complications from bypass.  Has had a flap for coverage and also seeing plastic surgery.  Has wound left groin and currently being followed up with infectious disease.  PICC line saw infectious disease yesterday vancomycin and ceftriaxone.  And will be pulling PICC line and starting oral medications.  Despite being on antibiotics some erythema second toe.  Denies purulence or odor.  The patient has diabetes mellitus with peripheral neuropathy.  He has chronic kidney disease stage III.  Patient has rheumatoid arthritis and takes Plaquenil.  He is retired.  40-pack-year history of smoking and quit 14 years ago.   Primary care is Dr. Rivers last seen 3/21/22     7/20/22 patient returns for left second and third toe ulcers.  Patient on oral antibiotics.  Dressing changed once a day on thigh and improving.  Patient has been wearing the crest pad on his second toe.  Dressing with Medihoney daily.  No new drainage.  States no change in the erythema.       ROS: See above         Allergies   Allergen Reactions     Blood-Group Specific Substance Other (See Comments)     Patient has a Non-specific antibody. Blood products may be delayed. Draw patient 24 hours prior to transfusion. For [employer] testing, draw one red top and two purple top tubes for all Type and Screen orders.     Seasonal Allergies        Current Outpatient Medications   Medication Sig Dispense Refill     acetaminophen (TYLENOL) 500 MG tablet Take 2 tablets (1,000 mg) by mouth 3 times daily       Alcohol Swabs PADS Uses four times daily       bisacodyl (DULCOLAX) 10 MG suppository Unwrap and insert 1 suppository rectally once daily as needed       blood glucose  "(NO BRAND SPECIFIED) test strip Use to test blood sugar 4 times daily or as directed.       Blood Glucose Monitoring Suppl (ACCU-CHEK GUIDE) w/Device KIT        bumetanide (BUMEX) 2 MG tablet Take 1 tablet (2 mg) by mouth daily       cetirizine (ZYRTEC) 10 MG tablet Take 10 mg by mouth At Bedtime       collagenase (SANTYL) 250 UNIT/GM external ointment Apply topically daily Apply to left heel as directed once daily       cyanocobalamin (VITAMIN B-12) 1000 MCG tablet Take 1 tablet (1,000 mcg) by mouth once a week       diphenhydrAMINE (BENADRYL) 25 MG capsule Take 1 capsule (25 mg) by mouth every 6 hours as needed for itching or allergies X 30 days then dc       dulaglutide (TRULICITY) 1.5 MG/0.5ML pen Inject 1.5 mg Subcutaneous every 7 days ON Wednesdays 10 mL 1     folic acid (FOLVITE) 1 MG tablet Take 1 tablet (1 mg) by mouth daily 100 tablet 2     Gauze Pads & Dressings (GAUZE SPONGE) 4\"X4\" PADS        glipiZIDE (GLUCOTROL) 5 MG tablet Take 0.5 tablets (2.5 mg) by mouth every morning       HYDROmorphone (DILAUDID) 2 MG tablet Take 1 tablet (2 mg) by mouth 2 times daily as needed for pain For urgent pain relief with rheumatoid arthritis flare-up 10 tablet 0     hydroxychloroquine (PLAQUENIL) 200 MG tablet Take 1 tablet (200 mg) by mouth daily 30 tablet 3     insulin aspart (NOVOLOG VIAL) 100 UNITS/ML vial Inject 8 Units Subcutaneous 3 times daily (with meals) 5 mL 1     insulin glargine (LANTUS VIAL) 100 UNIT/ML vial Inject 10 Units Subcutaneous At Bedtime 3 mL 1     insulin syringe-needle U-100 (30G X 1/2\" 0.5 ML) 30G X 1/2\" 0.5 ML miscellaneous Use 4 syringes daily or as directed.       Insulin Syringe-Needle U-100 28G X 1/2\" 0.5 ML MISC        Irrigation Supplies MISC        medical cannabis (Patient's own supply) See Admin Instructions (The purpose of this order is to document that the patient reports taking medical cannabis.  This is not a prescription, and is not used to certify that the patient has a " "qualifying medical condition.)       metoprolol succinate ER (TOPROL-XL) 50 MG 24 hr tablet Take 1 tablet (50 mg) by mouth daily       polyethylene glycol-propylene glycol (SYSTANE) 0.4-0.3 % SOLN ophthalmic solution Place 1 drop into both eyes 2 times daily as needed for dry eyes       pravastatin (PRAVACHOL) 10 MG tablet Take 1 tablet (10 mg) by mouth daily       senna-docusate (SENOKOT-S/PERICOLACE) 8.6-50 MG tablet Take 1 tablet by mouth 2 times daily as needed for constipation 50 tablet 0     sertraline (ZOLOFT) 50 MG tablet Take 1 tablet (50 mg) by mouth daily       sodium hypochlorite (DAKINS HALF-STRENGTH) 0.25 % external solution Use for damp to dry dressing changes two times a day left groin wound       syringe/needle, disp, (BD ECLIPSE SYRINGE) 25G X 1\" 3 ML MISC        triamcinolone (KENALOG) 0.1 % external cream Apply topically 2 times daily Apply to rash on neck, shoulders and lower extremities two times a day       warfarin ANTICOAGULANT (COUMADIN) 3 MG tablet Take 1/2 tab (1.5mg) by mouth on Sunday       WARFARIN SODIUM PO Take 3 mg by mouth Take 1 tab on Sun, Mon, Tues, Wed, Thurs and Fri.  And take 1/2 tab (1.5mg) on Sat         Patient Active Problem List   Diagnosis     Pain in limb     Hyperlipidemia LDL goal <100     Hypertension goal BP (blood pressure) < 140/90     Hypogonadism     Advanced directives, counseling/discussion     Health Care Home     Type 2 diabetes mellitus with diabetic polyneuropathy, without long-term current use of insulin (H)     RBBB (right bundle branch block)     Ex-smoker     Family history of esophageal cancer     Gastroesophageal reflux disease, esophagitis presence not specified     Rheumatoid arthritis involving multiple sites with positive rheumatoid factor (H)     Pulmonary nodule     High risk medication use     Spondylosis of cervical region without myelopathy or radiculopathy     Erectile dysfunction, unspecified erectile dysfunction type     Type 2 diabetes " mellitus without retinopathy (H)     Tubulovillous adenoma of colon     Diabetic polyneuropathy associated with type 2 diabetes mellitus (H)     Chronic bilateral low back pain without sciatica     Migraine equivalent     Posterior vitreous detachment of left eye     Pseudophakia, ou     Eyelid lesion, LLL     Dermatochalasis of both upper eyelids     Bradycardia     Primary osteoarthritis of both knees     Syncope     Trigger finger, acquired     CKD (chronic kidney disease) stage 3, GFR 30-59 ml/min (H)     Abdominal pain, generalized     PAD (peripheral artery disease) (H)     Immunosuppression (H)     Skin ulcer of toe of left foot, limited to breakdown of skin (H)     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 difficile diarrhea     H/O     Cataract 11/18/2011     Diabetic neuropathy (H)     mild, mostly soles and distal forefeet, worse on the left side.     Diverticulitis      ED (erectile dysfunction)      Ex-smoker     QUIT SMOKING FEB 2007     History of ETOH abuse     recovering, sober since 1997     Hyperlipidemia LDL goal <100      Hypertension goal BP (blood pressure) < 140/90      Hypogonadism      Obesity      PAD (peripheral artery disease) (H)     leg cramps, with exertion, no formal diagnosis of PAD and minimal if any symptoms at all.     RA (rheumatoid arthritis) (H)     Dr Bailon     Syncope        Past Surgical History:   Procedure Laterality Date     BYPASS GRAFT FEMOROPOPLITEAL Left 01/07/2021    Procedure: LEFT FEMORAL TO ABOVE KNEE POPLITEAL ARTERY BYPASS WITH PTFE VASCULAR GRAFT REMOVABLE RING 6MMX 50CM;  Surgeon: Marianne  Myra Jolley MD;  Location: [facility] OR     CATARACT IOL, RT/LT       COLONOSCOPY  03/01/2018    [facility]     COMBINED REPAIR PTOSIS WITH BLEPHAROPLASTY BILATERAL Bilateral 08/16/2019    Procedure: BILATERAL UPPER EYELID BLEPHAROPLASTY AND BILATERAL PTOSIS REPAIR;  Surgeon: Janet Garcia MD;  Location: [facility] OR     ENDARTERECTOMY FEMORAL Left 01/07/2021    Procedure: LEFT FEMORAL ENDARTERECTOMY WITH PATCH ANGIOPLASTY PHOTOFIX  0.8 X 8CM;  Surgeon: Myra Lopes MD;  Location: [facility] OR     EP PACEMAKER  01/2021     EXCISE LESION EYELID Left 08/16/2019    Procedure: LEFT LOWER EYELID BIOPSY;  Surgeon: Janet Garcia MD;  Location: [facility] OR     IR LOWER EXTREMITY ANGIOGRAM LEFT  12/17/2020     PHACOEMULSIFICATION WITH STANDARD INTRAOCULAR LENS IMPLANT  02/2019; 3/2019    left eye; right eye     TONSILLECTOMY       ZZHC INCISION TENDON SHEATH FINGER  04/2009    r hand ring finger       Family History   Problem Relation Age of Onset     Cerebrovascular Disease Mother      Arthritis Mother      Osteoporosis Mother      Alzheimer Disease Father      Arthritis Father      Cancer Father      Diabetes Maternal Grandmother      Cardiovascular Maternal Grandmother      Other Cancer Brother      Cancer Paternal Aunt      Hypertension No family hx of      Thyroid Disease No family hx of      Glaucoma No family hx of      Macular Degeneration No family hx of        Social History     Tobacco Use     Smoking status: Former Smoker     Packs/day: 1.00     Years: 40.00     Pack years: 40.00     Types: Cigarettes     Quit date: 3/16/2007     Years since quitting: 15.3     Smokeless tobacco: Never Used   Substance Use Topics     Alcohol use: No     Alcohol/week: 0.0 standard drinks         Exam:    Vitals: BP (!) 150/87   Pulse 74   BMI: There is no height or weight on file to calculate BMI.  Height: Data Unavailable    Constitutional/ general:  Pt is in no apparent distress, appears well-nourished.  Cooperative with history  and physical exam.     Psych:  The patient answered questions appropriately.  Normal affect.  Seems to have reasonable expectations, in terms of treatment.       Lungs:  Non labored breathing, non labored speech. No cough.  No audible wheezing. Even, quiet breathing.       Vascular:   Difficult to palpate pedal pulses.  Increased edema left lower extremity.                                                                   Neuro:  Alert and oriented x 3.  Monofilament absent to midfoot.      Derm: Skin thin shiny atrophic with no hair growth.  Left second toe hammered and slightly elevated.  Wound on the dorsum of the PIPJ in the past measured 6 x 4 mm and today it measures 9 x 5 mm..  Base goes down to subcutaneous tissue and is dry fibrotic.  Medial base is joint capsule but bone directly not exposed.  No purulence or odor.  Erythema and edema surrounding this.  Left third toe is hammered.  There is an ulceration left third toe distal that in the past measured 4 x 3 mm and today is 1 x 1 mm eschar.  There is no erythema edema or drainage.    Musculoskeletal:    Lower extremity muscle strength is normal.  Patient is ambulatory without an assistive device or brace.  Pronated arch with weightbearing.  All lesser toes are hammered and stiff.  Generally his feet are stiff with a decreased range of motion bilaterally.       Collected 7/13/2022  2:42 PM     Status: Final result     Visible to patient: Yes (seen)     Dx: Diabetic ulcer of toe of left foot as...    Specimen Information: Foot, Left; Skin         0 Result Notes    Culture 1+ Normal tra            Resulting Agency: [facility]           Specimen Collected: 07/13/22  2:42 PM Last Resulted: 07/15/22 10:21 AM               A:  Diabetes mellitus with peripheral neuropathy and LOPS  Peripheral arterial disease s/p bypass  Left second hammertoe with ulcer and erythema  Left third hammertoe with distal  eschar      P:   Discussed culture shows no growth.  Discussed left  third toe much improved.  We note that he is wearing the crest pad on his second toe.  Discussed he should not do this as this can cause edema and erythema on the remaining distal toe.  He will wear this on his fourth toe.  If irritating the fourth toe will stop wearing this.  Discussed the wound is larger over the PIPJ.  They are wrapping this toe circumferentially with a Band-Aid and gauze.  They will stop doing this.  Explained how this causes more pressure on the wound and may be why it is larger.  Explained to patient this is quite a deep wound.  We will have to see if he has enough circulation to heal this.  Continue the same dressings and will use a longitudinal Band-Aid loosely over wound.  Return to clinic in 2 weeks.        Aaron Dent DPM, FACFAS

## 2022-07-20 NOTE — LETTER
7/20/2022         RE: Zurdo Dash  [address]  [facility] [address]        Dear Colleague,    Thank you for referring your patient, Zurdo Dash, to the [facility]. Please see a copy of my visit note below.    Subjective:    7/13/22    Patient seen today for wound left second toe.  Patient points to the dorsum of the PIPJ.  Has had this for a few months.  Patient has history of peripheral arterial disease.  Had left femoropopliteal bypass in January 2021.  Since then has had numerous surgeries because of complications from bypass.  Has had a flap for coverage and also seeing plastic surgery.  Has wound left groin and currently being followed up with infectious disease.  PICC line saw infectious disease yesterday vancomycin and ceftriaxone.  And will be pulling PICC line and starting oral medications.  Despite being on antibiotics some erythema second toe.  Denies purulence or odor.  The patient has diabetes mellitus with peripheral neuropathy.  He has chronic kidney disease stage III.  Patient has rheumatoid arthritis and takes Plaquenil.  He is retired.  40-pack-year history of smoking and quit 14 years ago.   Primary care is Dr. Rivers last seen 3/21/22     7/20/22 patient returns for left second and third toe ulcers.  Patient on oral antibiotics.  Dressing changed once a day on thigh and improving.  Patient has been wearing the crest pad on his second toe.  Dressing with Medihoney daily.  No new drainage.  States no change in the erythema.       ROS: See above         Allergies   Allergen Reactions     Blood-Group Specific Substance Other (See Comments)     Patient has a Non-specific antibody. Blood products may be delayed. Draw patient 24 hours prior to transfusion. For Yap testing, draw one red top and two purple top tubes for all Type and Screen orders.     Seasonal Allergies        Current Outpatient Medications   Medication Sig Dispense Refill      "acetaminophen (TYLENOL) 500 MG tablet Take 2 tablets (1,000 mg) by mouth 3 times daily       Alcohol Swabs PADS Uses four times daily       bisacodyl (DULCOLAX) 10 MG suppository Unwrap and insert 1 suppository rectally once daily as needed       blood glucose (NO BRAND SPECIFIED) test strip Use to test blood sugar 4 times daily or as directed.       Blood Glucose Monitoring Suppl (ACCU-CHEK GUIDE) w/Device KIT        bumetanide (BUMEX) 2 MG tablet Take 1 tablet (2 mg) by mouth daily       cetirizine (ZYRTEC) 10 MG tablet Take 10 mg by mouth At Bedtime       collagenase (SANTYL) 250 UNIT/GM external ointment Apply topically daily Apply to left heel as directed once daily       cyanocobalamin (VITAMIN B-12) 1000 MCG tablet Take 1 tablet (1,000 mcg) by mouth once a week       diphenhydrAMINE (BENADRYL) 25 MG capsule Take 1 capsule (25 mg) by mouth every 6 hours as needed for itching or allergies X 30 days then dc       dulaglutide (TRULICITY) 1.5 MG/0.5ML pen Inject 1.5 mg Subcutaneous every 7 days ON Wednesdays 10 mL 1     folic acid (FOLVITE) 1 MG tablet Take 1 tablet (1 mg) by mouth daily 100 tablet 2     Gauze Pads & Dressings (GAUZE SPONGE) 4\"X4\" PADS        glipiZIDE (GLUCOTROL) 5 MG tablet Take 0.5 tablets (2.5 mg) by mouth every morning       HYDROmorphone (DILAUDID) 2 MG tablet Take 1 tablet (2 mg) by mouth 2 times daily as needed for pain For urgent pain relief with rheumatoid arthritis flare-up 10 tablet 0     hydroxychloroquine (PLAQUENIL) 200 MG tablet Take 1 tablet (200 mg) by mouth daily 30 tablet 3     insulin aspart (NOVOLOG VIAL) 100 UNITS/ML vial Inject 8 Units Subcutaneous 3 times daily (with meals) 5 mL 1     insulin glargine (LANTUS VIAL) 100 UNIT/ML vial Inject 10 Units Subcutaneous At Bedtime 3 mL 1     insulin syringe-needle U-100 (30G X 1/2\" 0.5 ML) 30G X 1/2\" 0.5 ML miscellaneous Use 4 syringes daily or as directed.       Insulin Syringe-Needle U-100 28G X 1/2\" 0.5 ML MISC        Irrigation " "Supplies MISC        medical cannabis (Patient's own supply) See Admin Instructions (The purpose of this order is to document that the patient reports taking medical cannabis.  This is not a prescription, and is not used to certify that the patient has a qualifying medical condition.)       metoprolol succinate ER (TOPROL-XL) 50 MG 24 hr tablet Take 1 tablet (50 mg) by mouth daily       polyethylene glycol-propylene glycol (SYSTANE) 0.4-0.3 % SOLN ophthalmic solution Place 1 drop into both eyes 2 times daily as needed for dry eyes       pravastatin (PRAVACHOL) 10 MG tablet Take 1 tablet (10 mg) by mouth daily       senna-docusate (SENOKOT-S/PERICOLACE) 8.6-50 MG tablet Take 1 tablet by mouth 2 times daily as needed for constipation 50 tablet 0     sertraline (ZOLOFT) 50 MG tablet Take 1 tablet (50 mg) by mouth daily       sodium hypochlorite (DAKINS HALF-STRENGTH) 0.25 % external solution Use for damp to dry dressing changes two times a day left groin wound       syringe/needle, disp, (BD ECLIPSE SYRINGE) 25G X 1\" 3 ML MISC        triamcinolone (KENALOG) 0.1 % external cream Apply topically 2 times daily Apply to rash on neck, shoulders and lower extremities two times a day       warfarin ANTICOAGULANT (COUMADIN) 3 MG tablet Take 1/2 tab (1.5mg) by mouth on Sunday       WARFARIN SODIUM PO Take 3 mg by mouth Take 1 tab on Sun, Mon, Tues, Wed, Thurs and Fri.  And take 1/2 tab (1.5mg) on Sat         Patient Active Problem List   Diagnosis     Pain in limb     Hyperlipidemia LDL goal <100     Hypertension goal BP (blood pressure) < 140/90     Hypogonadism     Advanced directives, counseling/discussion     Health Care Home     Type 2 diabetes mellitus with diabetic polyneuropathy, without long-term current use of insulin (H)     RBBB (right bundle branch block)     Ex-smoker     Family history of esophageal cancer     Gastroesophageal reflux disease, esophagitis presence not specified     Rheumatoid arthritis involving " multiple sites with positive rheumatoid factor (H)     Pulmonary nodule     High risk medication use     Spondylosis of cervical region without myelopathy or radiculopathy     Erectile dysfunction, unspecified erectile dysfunction type     Type 2 diabetes mellitus without retinopathy (H)     Tubulovillous adenoma of colon     Diabetic polyneuropathy associated with type 2 diabetes mellitus (H)     Chronic bilateral low back pain without sciatica     Migraine equivalent     Posterior vitreous detachment of left eye     Pseudophakia, ou     Eyelid lesion, LLL     Dermatochalasis of both upper eyelids     Bradycardia     Primary osteoarthritis of both knees     Syncope     Trigger finger, acquired     CKD (chronic kidney disease) stage 3, GFR 30-59 ml/min (H)     Abdominal pain, generalized     PAD (peripheral artery disease) (H)     Immunosuppression (H)     Skin ulcer of toe of left foot, limited to breakdown of skin (H)     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 difficile diarrhea     H/O     Cataract 11/18/2011     Diabetic neuropathy (H)     mild, mostly soles and distal forefeet, worse on the left side.     Diverticulitis      ED (erectile dysfunction)      Ex-smoker     QUIT SMOKING FEB 2007     History of ETOH abuse     recovering, sober since 1997     Hyperlipidemia LDL goal <100      Hypertension goal BP (blood pressure) < 140/90      Hypogonadism      Obesity      PAD (peripheral artery disease) (H)     leg cramps, with exertion, no formal diagnosis of PAD and minimal if any symptoms at all.     RA (rheumatoid arthritis) (H)     Dr Bailon      Syncope        Past Surgical History:   Procedure Laterality Date     BYPASS GRAFT FEMOROPOPLITEAL Left 01/07/2021    Procedure: LEFT FEMORAL TO ABOVE KNEE POPLITEAL ARTERY BYPASS WITH PTFE VASCULAR GRAFT REMOVABLE RING 6MMX 50CM;  Surgeon: Myra Lopes MD;  Location: [facility] OR     CATARACT IOL, RT/LT       COLONOSCOPY  03/01/2018    [facility]     COMBINED REPAIR PTOSIS WITH BLEPHAROPLASTY BILATERAL Bilateral 08/16/2019    Procedure: BILATERAL UPPER EYELID BLEPHAROPLASTY AND BILATERAL PTOSIS REPAIR;  Surgeon: Janet Garcia MD;  Location: [facility] OR     ENDARTERECTOMY FEMORAL Left 01/07/2021    Procedure: LEFT FEMORAL ENDARTERECTOMY WITH PATCH ANGIOPLASTY PHOTOFIX  0.8 X 8CM;  Surgeon: Myra Lopes MD;  Location: [facility] OR     EP PACEMAKER  01/2021     EXCISE LESION EYELID Left 08/16/2019    Procedure: LEFT LOWER EYELID BIOPSY;  Surgeon: Janet Garcia MD;  Location: [facility] OR     IR LOWER EXTREMITY ANGIOGRAM LEFT  12/17/2020     PHACOEMULSIFICATION WITH STANDARD INTRAOCULAR LENS IMPLANT  02/2019; 3/2019    left eye; right eye     TONSILLECTOMY       ZZHC INCISION TENDON SHEATH FINGER  04/2009    r hand ring finger       Family History   Problem Relation Age of Onset     Cerebrovascular Disease Mother      Arthritis Mother      Osteoporosis Mother      Alzheimer Disease Father      Arthritis Father      Cancer Father      Diabetes Maternal Grandmother      Cardiovascular Maternal Grandmother      Other Cancer Brother      Cancer Paternal Aunt      Hypertension No family hx of      Thyroid Disease No family hx of      Glaucoma No family hx of      Macular Degeneration No family hx of        Social History     Tobacco Use     Smoking status: Former Smoker     Packs/day: 1.00     Years: 40.00     Pack years: 40.00     Types: Cigarettes     Quit date: 3/16/2007     Years since quitting: 15.3     Smokeless tobacco: Never Used   Substance Use Topics     Alcohol use: No     Alcohol/week: 0.0 standard drinks          Exam:    Vitals: BP (!) 150/87   Pulse 74   BMI: There is no height or weight on file to calculate BMI.  Height: Data Unavailable    Constitutional/ general:  Pt is in no apparent distress, appears well-nourished.  Cooperative with history and physical exam.     Psych:  The patient answered questions appropriately.  Normal affect.  Seems to have reasonable expectations, in terms of treatment.       Lungs:  Non labored breathing, non labored speech. No cough.  No audible wheezing. Even, quiet breathing.       Vascular:   Difficult to palpate pedal pulses.  Increased edema left lower extremity.                                                                   Neuro:  Alert and oriented x 3.  Monofilament absent to midfoot.      Derm: Skin thin shiny atrophic with no hair growth.  Left second toe hammered and slightly elevated.  Wound on the dorsum of the PIPJ in the past measured 6 x 4 mm and today it measures 9 x 5 mm..  Base goes down to subcutaneous tissue and is dry fibrotic.  Medial base is joint capsule but bone directly not exposed.  No purulence or odor.  Erythema and edema surrounding this.  Left third toe is hammered.  There is an ulceration left third toe distal that in the past measured 4 x 3 mm and today is 1 x 1 mm eschar.  There is no erythema edema or drainage.    Musculoskeletal:    Lower extremity muscle strength is normal.  Patient is ambulatory without an assistive device or brace.  Pronated arch with weightbearing.  All lesser toes are hammered and stiff.  Generally his feet are stiff with a decreased range of motion bilaterally.       Collected 7/13/2022  2:42 PM     Status: Final result     Visible to patient: Yes (seen)     Dx: Diabetic ulcer of toe of left foot as...    Specimen Information: Foot, Left; Skin         0 Result Notes    Culture 1+ Normal tra            Resulting Agency: [facility]           Specimen Collected: 07/13/22  2:42 PM Last Resulted: 07/15/22 10:21 AM                A:  Diabetes mellitus with peripheral neuropathy and LOPS  Peripheral arterial disease s/p bypass  Left second hammertoe with ulcer and erythema  Left third hammertoe with distal  eschar      P:   Discussed culture shows no growth.  Discussed left third toe much improved.  We note that he is wearing the crest pad on his second toe.  Discussed he should not do this as this can cause edema and erythema on the remaining distal toe.  He will wear this on his fourth toe.  If irritating the fourth toe will stop wearing this.  Discussed the wound is larger over the PIPJ.  They are wrapping this toe circumferentially with a Band-Aid and gauze.  They will stop doing this.  Explained how this causes more pressure on the wound and may be why it is larger.  Explained to patient this is quite a deep wound.  We will have to see if he has enough circulation to heal this.  Continue the same dressings and will use a longitudinal Band-Aid loosely over wound.  Return to clinic in 2 weeks.        Aaron Dent DPM, FACFAS              Again, thank you for allowing me to participate in the care of your patient.        Sincerely,        Aaron Dent DPM

## 2022-07-21 ENCOUNTER — TELEPHONE (OUTPATIENT)
Dept: RHEUMATOLOGY | Facility: CLINIC | Age: 77
End: 2022-07-21

## 2022-07-21 ENCOUNTER — OFFICE VISIT (OUTPATIENT)
Dept: RHEUMATOLOGY | Facility: CLINIC | Age: 77
End: 2022-07-21
Payer: COMMERCIAL

## 2022-07-21 VITALS
HEART RATE: 62 BPM | WEIGHT: 212.6 LBS | DIASTOLIC BLOOD PRESSURE: 69 MMHG | OXYGEN SATURATION: 98 % | BODY MASS INDEX: 31.86 KG/M2 | SYSTOLIC BLOOD PRESSURE: 106 MMHG

## 2022-07-21 DIAGNOSIS — M17.0 BILATERAL PRIMARY OSTEOARTHRITIS OF KNEE: ICD-10-CM

## 2022-07-21 DIAGNOSIS — M05.79 RHEUMATOID ARTHRITIS INVOLVING MULTIPLE SITES WITH POSITIVE RHEUMATOID FACTOR (H): Primary | ICD-10-CM

## 2022-07-21 PROCEDURE — 99214 OFFICE O/P EST MOD 30 MIN: CPT | Performed by: INTERNAL MEDICINE

## 2022-07-21 RX ORDER — HYDROXYCHLOROQUINE SULFATE 200 MG/1
200 TABLET, FILM COATED ORAL DAILY
Qty: 90 TABLET | Refills: 1 | Status: SHIPPED | OUTPATIENT
Start: 2022-07-21 | End: 2023-03-27

## 2022-07-21 NOTE — PROGRESS NOTES
Rheumatology Clinic Visit      Zurdo Dash MRN# [id number]   YOB: 1945 Age: 77 year old      Date of visit: 7/21/22   PCP: Dr. Kapil Duckworth     Chief Complaint   Patient presents with:  Rheumatoid Arthritis: Hands and knees are bothering him. Would like to get injections in his knees today.    Assessment and Plan     1. Seropositive nonerosive rheumatoid arthritis (, CCP 64): Previously on MTX (was well tolerated, but later with cytopenias likely related to worsening creatinine with subsequent MTX toxicity).  Mild synovitis on exam previously so hydroxychloroquine was restarted and he is doing well at this time.  Chronic illness, stable.    - Continue hydroxychloroquine 200mg daily  - Ophthalmology referral for hydroxychloroquine toxicity monitoring given previously     2. Bilateral knee osteoarthritis / patellofemoral syndrome: Intra-articular steroid injections have been effective. More symptomatic today and he has requested repeat injections, but recovering from vascular grafting complicated by infection and currently erythema around the wound that he says is reaction to the tape, also on antibiotics.  Therefore, reassess in 1 month for possible injection at that time.  He is to continue following with his surgical team regarding the wound healing.  Advised that for the knee pain he try Topical capsaicin..   Chronic illness, progressive.      3.  CKD: Continue following with PCP.      4.  Vaccinations: Vaccinations reviewed with Mr. Dash.    - Influenza: encouraged yearly vaccination  - [id number]: up to date  - [id number]: up to date  - Shingrix: Up to date  - COVID-19: Pfizer COVID-19 vaccine on 2/19/2021 and 3/12/2021; Moderna 11/29/2021, 5/13/2022    Total minutes spent in evaluation with patient, documentation, , and review of pertinent studies and chart notes: 18      Mr. Dash verbalized agreement with and understanding of the rational for the diagnosis and treatment  plan.  All questions were answered to best of my ability and the patient's satisfaction. Mr. Dash was advised to contact the clinic with any questions that may arise after the clinic visit.      Thank you for involving me in the care of the patient    Return to clinic: 1 month and 4 months      HPI   Zurdo Dash is a 77 year old male with a medical history significant for hypertension, hyperlipidemia, diabetes, diabetic neuropathy, GERD, and rheumatoid arthritis who presents for f/u of RA, sooner than previously scheduled because of recent hospitalization    Today, 7/21/2022: Since last seen he had multiple vascular surgeries, complicated on the left with infection and graft rejection per patient report, and now has a left groin and left medial thigh wound that are healing.  He reports having a wound nurse come out to his house daily.  Currently on antibiotics.  He would like to have repeat injections of his knees today; knee pain is worse with activity and improves with rest.  No stiffness in his hands.  No swelling in his hands.  Tolerating hydroxychloroquine well.  Following with podiatry for wound on his toe; had a hammertoe and he thinks that the skin was rubbing on his shoes.    Denies fevers, chills, nausea, vomiting, constipation, diarrhea. No abdominal pain. No chest pain/pressure, palpitations, or shortness of breath. No oral or nasal sores. No neck pain.     Tobacco: None  EtOH: None  Drugs: None    ROS   12 point review of system was completed and negative except as noted in the HPI     Active Problem List     Patient Active Problem List   Diagnosis     Pain in limb     Hyperlipidemia LDL goal <100     Hypertension goal BP (blood pressure) < 140/90     Hypogonadism     Advanced directives, counseling/discussion     Health Care Home     Type 2 diabetes mellitus with diabetic polyneuropathy, without long-term current use of insulin (H)     RBBB (right bundle branch block)     Ex-smoker     Family  history of esophageal cancer     Gastroesophageal reflux disease, esophagitis presence not specified     Rheumatoid arthritis involving multiple sites with positive rheumatoid factor (H)     Pulmonary nodule     High risk medication use     Spondylosis of cervical region without myelopathy or radiculopathy     Erectile dysfunction, unspecified erectile dysfunction type     Type 2 diabetes mellitus without retinopathy (H)     Tubulovillous adenoma of colon     Diabetic polyneuropathy associated with type 2 diabetes mellitus (H)     Chronic bilateral low back pain without sciatica     Migraine equivalent     Posterior vitreous detachment of left eye     Pseudophakia, ou     Eyelid lesion, LLL     Dermatochalasis of both upper eyelids     Bradycardia     Primary osteoarthritis of both knees     Syncope     Trigger finger, acquired     CKD (chronic kidney disease) stage 3, GFR 30-59 ml/min (H)     Abdominal pain, generalized     PAD (peripheral artery disease) (H)     Immunosuppression (H)     Skin ulcer of toe of left foot, limited to breakdown of skin (H)     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 difficile diarrhea     H/O     Cataract 11/18/2011     Diabetic neuropathy (H)     mild, mostly soles and distal forefeet, worse on the left side.     Diverticulitis      ED (erectile dysfunction)      Ex-smoker     QUIT SMOKING FEB 2007     History of ETOH abuse     recovering, sober since 1997     Hyperlipidemia LDL goal <100      Hypertension goal BP (blood pressure) < 140/90      Hypogonadism      Obesity      PAD (peripheral artery  disease) (H)     leg cramps, with exertion, no formal diagnosis of PAD and minimal if any symptoms at all.     RA (rheumatoid arthritis) (H)      Uvaldo     Syncope      Past Surgical History     Past Surgical History:   Procedure Laterality Date     BYPASS GRAFT FEMOROPOPLITEAL Left 01/07/2021    Procedure: LEFT FEMORAL TO ABOVE KNEE POPLITEAL ARTERY BYPASS WITH PTFE VASCULAR GRAFT REMOVABLE RING 6MMX 50CM;  Surgeon: Myra Lopes MD;  Location: [facility] OR     CATARACT IOL, RT/LT       COLONOSCOPY  03/01/2018    [facility]     COMBINED REPAIR PTOSIS WITH BLEPHAROPLASTY BILATERAL Bilateral 08/16/2019    Procedure: BILATERAL UPPER EYELID BLEPHAROPLASTY AND BILATERAL PTOSIS REPAIR;  Surgeon: Janet Garcia MD;  Location: [facility] OR     ENDARTERECTOMY FEMORAL Left 01/07/2021    Procedure: LEFT FEMORAL ENDARTERECTOMY WITH PATCH ANGIOPLASTY PHOTOFIX  0.8 X 8CM;  Surgeon: Myra Lopes MD;  Location: [facility] OR     EP PACEMAKER  01/2021     EXCISE LESION EYELID Left 08/16/2019    Procedure: LEFT LOWER EYELID BIOPSY;  Surgeon: Janet Garcia MD;  Location: [facility] OR     IR LOWER EXTREMITY ANGIOGRAM LEFT  12/17/2020     PHACOEMULSIFICATION WITH STANDARD INTRAOCULAR LENS IMPLANT  02/2019; 3/2019    left eye; right eye     TONSILLECTOMY       ZZHC INCISION TENDON SHEATH FINGER  04/2009    r hand ring finger     Allergy     Allergies   Allergen Reactions     Blood-Group Specific Substance Other (See Comments)     Patient has a Non-specific antibody. Blood products may be delayed. Draw patient 24 hours prior to transfusion. For [facility] testing, draw one red top and two purple top tubes for all Type and Screen orders.     Seasonal Allergies      Current Medication List     Current Outpatient Medications   Medication Sig     acetaminophen (TYLENOL) 500 MG tablet Take 2 tablets (1,000 mg) by mouth 3 times daily     Alcohol Swabs PADS Uses four times daily     bisacodyl (DULCOLAX) 10 MG suppository Unwrap and  "insert 1 suppository rectally once daily as needed     blood glucose (NO BRAND SPECIFIED) test strip Use to test blood sugar 4 times daily or as directed.     Blood Glucose Monitoring Suppl (ACCU-CHEK GUIDE) w/Device KIT      bumetanide (BUMEX) 2 MG tablet Take 1 tablet (2 mg) by mouth daily     cetirizine (ZYRTEC) 10 MG tablet Take 10 mg by mouth At Bedtime     collagenase (SANTYL) 250 UNIT/GM external ointment Apply topically daily Apply to left heel as directed once daily     cyanocobalamin (VITAMIN B-12) 1000 MCG tablet Take 1 tablet (1,000 mcg) by mouth once a week     diphenhydrAMINE (BENADRYL) 25 MG capsule Take 1 capsule (25 mg) by mouth every 6 hours as needed for itching or allergies X 30 days then dc     dulaglutide (TRULICITY) 1.5 MG/0.5ML pen Inject 1.5 mg Subcutaneous every 7 days ON Wednesdays     folic acid (FOLVITE) 1 MG tablet Take 1 tablet (1 mg) by mouth daily     Gauze Pads & Dressings (GAUZE SPONGE) 4\"X4\" PADS      glipiZIDE (GLUCOTROL) 5 MG tablet Take 0.5 tablets (2.5 mg) by mouth every morning     HYDROmorphone (DILAUDID) 2 MG tablet Take 1 tablet (2 mg) by mouth 2 times daily as needed for pain For urgent pain relief with rheumatoid arthritis flare-up     hydroxychloroquine (PLAQUENIL) 200 MG tablet Take 1 tablet (200 mg) by mouth daily     insulin aspart (NOVOLOG VIAL) 100 UNITS/ML vial Inject 8 Units Subcutaneous 3 times daily (with meals)     insulin glargine (LANTUS VIAL) 100 UNIT/ML vial Inject 10 Units Subcutaneous At Bedtime     insulin syringe-needle U-100 (30G X 1/2\" 0.5 ML) 30G X 1/2\" 0.5 ML miscellaneous Use 4 syringes daily or as directed.     Insulin Syringe-Needle U-100 28G X 1/2\" 0.5 ML MISC      Irrigation Supplies MISC      medical cannabis (Patient's own supply) See Admin Instructions (The purpose of this order is to document that the patient reports taking medical cannabis.  This is not a prescription, and is not used to certify that the patient has a qualifying medical " "condition.)     metoprolol succinate ER (TOPROL-XL) 50 MG 24 hr tablet Take 1 tablet (50 mg) by mouth daily     polyethylene glycol-propylene glycol (SYSTANE) 0.4-0.3 % SOLN ophthalmic solution Place 1 drop into both eyes 2 times daily as needed for dry eyes     pravastatin (PRAVACHOL) 10 MG tablet Take 1 tablet (10 mg) by mouth daily     senna-docusate (SENOKOT-S/PERICOLACE) 8.6-50 MG tablet Take 1 tablet by mouth 2 times daily as needed for constipation     sertraline (ZOLOFT) 50 MG tablet Take 1 tablet (50 mg) by mouth daily     sodium hypochlorite (DAKINS HALF-STRENGTH) 0.25 % external solution Use for damp to dry dressing changes two times a day left groin wound     syringe/needle, disp, (BD ECLIPSE SYRINGE) 25G X 1\" 3 ML MISC      triamcinolone (KENALOG) 0.1 % external cream Apply topically 2 times daily Apply to rash on neck, shoulders and lower extremities two times a day     warfarin ANTICOAGULANT (COUMADIN) 3 MG tablet Take 1/2 tab (1.5mg) by mouth on Sunday     WARFARIN SODIUM PO Take 3 mg by mouth Take 1 tab on Sun, Mon, Tues, Wed, Thurs and Fri.  And take 1/2 tab (1.5mg) on Sat     Current Facility-Administered Medications   Medication     cyanocobalamin injection 1,000 mcg     cyanocobalamin injection 1,000 mcg         Social History   See HPI    Family History     Family History   Problem Relation Age of Onset     Cerebrovascular Disease Mother      Arthritis Mother      Osteoporosis Mother      Alzheimer Disease Father      Arthritis Father      Cancer Father      Diabetes Maternal Grandmother      Cardiovascular Maternal Grandmother      Other Cancer Brother      Cancer Paternal Aunt      Hypertension No family hx of      Thyroid Disease No family hx of      Glaucoma No family hx of      Macular Degeneration No family hx of        Physical Exam     Temp Readings from Last 3 Encounters:   03/31/22 97.9  F (36.6  C) (Oral)   03/21/22 98  F (36.7  C) (Oral)   03/10/22 98.4  F (36.9  C) (Oral)     BP " "Readings from Last 5 Encounters:   07/21/22 106/69   07/20/22 (!) 150/87   03/31/22 (!) 153/82   03/21/22 138/84   03/21/22 137/82     Pulse Readings from Last 1 Encounters:   07/20/22 74     Resp Readings from Last 1 Encounters:   03/31/22 18     Estimated body mass index is 31.59 kg/m  as calculated from the following:    Height as of 3/31/22: 1.74 m (5' 8.5\").    Weight as of 3/31/22: 95.6 kg (210 lb 12.8 oz).    GEN: NAD.   HEENT:  Anicteric, noninjected sclera. No obvious external lesions of the ear and nose. Hearing intact.  CV: S1, S2. RRR. No m/r/g  PULM: No increased work of breathing. CTA bilaterally   MSK: MCPs, PIPs, DIPs, wrists, elbows, shoulders without swelling or tenderness palpation.  Knees with medial joint line tenderness but no effusion or increased warmth.  Ankles without swelling or tenderness to palpation.  Negative MTP squeeze on the right.  Wearing a soft shoe and bandages over the left toe where there is a wound that is managed by podiatry.    SKIN: Deep surgical wound on the medial aspect of the left thigh that is packed with gauze.  Near the tape of this packing there is nontender, nonpruritic, erythematous skin that he says was a reaction to tape.  PSYCH: Alert. Appropriate.      Labs / Imaging (select studies)     9/28/1999  ([facility])    RF/CCP  Recent Labs   Lab Test 04/07/16  1149   CCPIGG 64*     CBC  Recent Labs   Lab Test 02/01/22  0901 09/08/21  1607 07/14/21  0924 07/05/21  1532 04/07/21  0958   WBC 6.8 7.7 7.0 3.7* 7.4   RBC 3.90* 3.47* 2.72* 2.20* 3.55*   HGB 11.2* 10.6* 8.4* 6.9* 10.8*   HCT 35.9* 31.8* 25.8* 21.6* 33.3*   MCV 92 92 95 98 94   RDW 14.0 14.3 17.1* 17.3* 14.0    195 140* 96* 342   MCH 28.7 30.5 30.9 31.4 30.4   MCHC 31.2* 33.3 32.6 31.9 32.4   NEUTROPHIL 63 72 68  75 68.0 80.7   LYMPH 17 16 10  7 19.0 11.6   MONOCYTE 14 11 20  15 10.0 6.0   EOSINOPHIL 4 1 2  2 2.0 1.2   BASOPHIL 1 0 1  0 1.0 0.5   ANEU  --   --  5.3 2.5 6.0   ALYM  --   " --  0.5* 0.7* 0.9   SMITH  --   --  1.1 0.4 0.5   AEOS  --   --  0.1 0.1 0.1   ABAS  --   --  0.0 0.0 0.0   ANEUTAUTO 4.4 5.5 4.8  --   --    ALYMPAUTO 1.2 1.2 0.7*  --   --    AMONOAUTO 1.0 0.8 1.4*  --   --    AEOSAUTO 0.2 0.1 0.2  --   --    ABSBASO 0.1 0.0 0.1  --   --      CMP  Recent Labs   Lab Test 02/01/22  0901 09/08/21  1607 08/16/21  1316 07/14/21  1645 07/14/21  1645 07/05/21  1532 04/07/21  0958 01/09/21  0709    141 143   < > 136  --   --  136   POTASSIUM 4.2 4.1 4.4   < > 5.4*  --   --  4.3   CHLORIDE 110* 116* 117*   < > 109  --   --  108   CO2 22 18* 21   < > 17*  --   --  23   ANIONGAP 9 7 5   < > 10  --   --  5    192* 189*   < > 126*  --   --  117*   BUN 36* 37* 40*   < > 50*  --   --  31*   CR 1.63* 1.68* 1.76*   < > 2.30* 2.84* 1.52* 1.39*   GFRESTIMATED 43* 39* 36*   < > 26* 20* 44* 49*   GFRESTBLACK  --   --   --   --   --  24* 50* 56*   NEW 8.8 8.3* 8.4*   < > 8.8  --   --  8.4*   BILITOTAL  --   --   --   --  0.7 0.8 0.4  --    ALBUMIN  --   --   --   --  3.5 3.5 3.5  --    PROTTOTAL  --   --   --   --  6.8 6.9 7.0  --    ALKPHOS  --   --   --   --  67 85 69  --    AST  --   --   --   --  22 12 19  --    ALT  --   --   --   --  32 22 25  --     < > = values in this interval not displayed.     Calcium/VitaminD  Recent Labs   Lab Test 02/01/22  0901 09/08/21  1607 08/16/21  1316   NEW 8.8 8.3* 8.4*     ESR/CRP  Recent Labs   Lab Test 07/05/21  1532 04/07/21  0958 12/11/20  1436   SED 83* 43* 33*   CRP 37.1* 11.3* 18.9*     Hepatitis B  Recent Labs   Lab Test 04/07/16  1149   HBCAB Nonreactive   HEPBANG Nonreactive     Hepatitis C  Recent Labs   Lab Test 04/07/16  1149   HCVAB Nonreactive   Assay performance characteristics have not been established for newborns,   infants, and children       HIV Screening  Recent Labs   Lab Test 04/07/16  1149   HIAGAB Nonreactive   HIV-1 p24 Ag & HIV-1/HIV-2 Ab Not Detected       Immunization History     Immunization History   Administered  Date(s) Administered     COVID-19,PF,Moderna 11/29/2021, 05/13/2022     COVID-19,PF,Pfizer (12+ Yrs) 02/19/2021, 03/12/2021     FLUAD(HD)65+ QUAD 09/24/2021     Influenza (H1N1) 01/20/2010     Influenza (High Dose) 3 valent vaccine 09/20/2012, 10/20/2015, 09/20/2016, 08/28/2017, 09/24/2018, 09/18/2019     Influenza (IIV3) PF 10/05/1999, 10/30/2008, 09/28/2011, 10/14/2013, 10/03/2014     Influenza, Quad, High Dose, Pf, 65yr+ (Fluzone HD) 09/11/2020     Pneumo Conj 13-V (2010&after) 06/29/2015     Pneumococcal 23 valent 08/19/2010     TD (ADULT, 7+) 08/05/1997, 02/03/2011     TDAP Vaccine (Boostrix) 03/11/2020     Tdap (Adacel,Boostrix) 03/11/2020     Zoster vaccine recombinant adjuvanted (SHINGRIX) 01/03/2020, 03/11/2020     Zoster vaccine, live 04/19/2010          Chart documentation done in part with Dragon Voice recognition Software. Although reviewed after completion, some word and grammatical error may remain.    Albert Tompkins MD

## 2022-07-21 NOTE — NURSING NOTE
RAPID3 (0-30) Cumulative Score  20.0          RAPID3 Weighted Score (divide #4 by 3 and that is the weighted score)  6.6

## 2022-07-21 NOTE — PATIENT INSTRUCTIONS
RHEUMATOLOGY    Dr. Albert Tompkins    [address] [place] [address]  Phone number: [phone]  Fax number: [phone]      Thank you for choosing [facility]!    Eryn Espinoza CMA Rheumatology    ----------------------    For the knees: try topical capsaicin (start with small amounts at first)

## 2022-07-22 ENCOUNTER — TELEPHONE (OUTPATIENT)
Dept: FAMILY MEDICINE | Facility: CLINIC | Age: 77
End: 2022-07-22

## 2022-07-22 DIAGNOSIS — R91.8 PULMONARY NODULES: Primary | ICD-10-CM

## 2022-07-22 NOTE — TELEPHONE ENCOUNTER
Conchita RN with [facility] home care called the clinic requesting verbal orders for home care Skilled nursing: daily X 4 weeks then every other day X 5 weeks and 2 PRN for wound cares.      Gave okay for verbal orders for Home care Skilled nursing as requested.    Per [facility] protocol/Policies: Chastity Franks, RN for Dr. Duckworth.  Patient who has bee seen by [facility] primary care provider within the past 2 years (3/21/22)

## 2022-07-26 ENCOUNTER — PATIENT OUTREACH (OUTPATIENT)
Dept: SURGERY | Facility: CLINIC | Age: 77
End: 2022-07-26

## 2022-07-26 ENCOUNTER — ANCILLARY PROCEDURE (OUTPATIENT)
Dept: CT IMAGING | Facility: CLINIC | Age: 77
End: 2022-07-26
Attending: CLINICAL NURSE SPECIALIST
Payer: COMMERCIAL

## 2022-07-26 DIAGNOSIS — R91.8 PULMONARY NODULES: ICD-10-CM

## 2022-07-26 PROCEDURE — 71250 CT THORAX DX C-: CPT | Performed by: RADIOLOGY

## 2022-07-26 NOTE — PROGRESS NOTES
Called patient to inform him that we will be canceling his appointment with Dr Mills this afternoon, but he needs to keep his CT appointment. Spoke with patient's wife, Yoli, to inform her that Dr Mills would like to review the CT scan first to determine if an appointment with her is needed, or if patient should continue with SBRT as previously planned. Informed wife that after Dr Mills reviews the CT, someone from Thoracic Surgery will reach out to update the patient. Patient's wife verbalized her understanding and appreciated the call.     Ofelia Jordan RN, BSN  Thoracic Surgery RN Care Coordinator    Addendum:  After review of CT scan, plan is for patient to continue with SBRT as previously planned. Heidi Villar, RN  Updated patient and wife of results and plan 7/28/2022.

## 2022-07-28 ENCOUNTER — PATIENT OUTREACH (OUTPATIENT)
Dept: RADIATION ONCOLOGY | Facility: CLINIC | Age: 77
End: 2022-07-28

## 2022-07-28 ENCOUNTER — OFFICE VISIT (OUTPATIENT)
Dept: INTERNAL MEDICINE | Facility: CLINIC | Age: 77
End: 2022-07-28
Payer: COMMERCIAL

## 2022-07-28 VITALS
WEIGHT: 215 LBS | SYSTOLIC BLOOD PRESSURE: 122 MMHG | BODY MASS INDEX: 32.22 KG/M2 | DIASTOLIC BLOOD PRESSURE: 72 MMHG | RESPIRATION RATE: 16 BRPM

## 2022-07-28 DIAGNOSIS — I10 HYPERTENSION GOAL BP (BLOOD PRESSURE) < 140/90: ICD-10-CM

## 2022-07-28 DIAGNOSIS — C34.90 ADENOCARCINOMA OF LUNG, UNSPECIFIED LATERALITY (H): ICD-10-CM

## 2022-07-28 DIAGNOSIS — M79.89 LEFT LEG SWELLING: ICD-10-CM

## 2022-07-28 DIAGNOSIS — D64.9 ANEMIA, UNSPECIFIED TYPE: ICD-10-CM

## 2022-07-28 DIAGNOSIS — E11.42 TYPE 2 DIABETES MELLITUS WITH DIABETIC POLYNEUROPATHY, WITHOUT LONG-TERM CURRENT USE OF INSULIN (H): Primary | ICD-10-CM

## 2022-07-28 LAB
ALBUMIN SERPL-MCNC: 3 G/DL (ref 3.4–5)
ALP SERPL-CCNC: 57 U/L (ref 40–150)
ALT SERPL W P-5'-P-CCNC: 25 U/L (ref 0–70)
ANION GAP SERPL CALCULATED.3IONS-SCNC: 7 MMOL/L (ref 3–14)
AST SERPL W P-5'-P-CCNC: 18 U/L (ref 0–45)
BASOPHILS # BLD AUTO: 0.1 10E3/UL (ref 0–0.2)
BASOPHILS NFR BLD AUTO: 1 %
BILIRUB SERPL-MCNC: 0.3 MG/DL (ref 0.2–1.3)
BUN SERPL-MCNC: 42 MG/DL (ref 7–30)
CALCIUM SERPL-MCNC: 8.8 MG/DL (ref 8.5–10.1)
CHLORIDE BLD-SCNC: 111 MMOL/L (ref 94–109)
CO2 SERPL-SCNC: 22 MMOL/L (ref 20–32)
CREAT SERPL-MCNC: 1.54 MG/DL (ref 0.66–1.25)
EOSINOPHIL # BLD AUTO: 0.4 10E3/UL (ref 0–0.7)
EOSINOPHIL NFR BLD AUTO: 4 %
ERYTHROCYTE [DISTWIDTH] IN BLOOD BY AUTOMATED COUNT: 14.4 % (ref 10–15)
GFR SERPL CREATININE-BSD FRML MDRD: 46 ML/MIN/1.73M2
GLUCOSE BLD-MCNC: 154 MG/DL (ref 70–99)
HBA1C MFR BLD: 7.1 % (ref 0–5.6)
HCT VFR BLD AUTO: 38.3 % (ref 40–53)
HGB BLD-MCNC: 12.1 G/DL (ref 13.3–17.7)
LYMPHOCYTES # BLD AUTO: 1.5 10E3/UL (ref 0.8–5.3)
LYMPHOCYTES NFR BLD AUTO: 17 %
MCH RBC QN AUTO: 29.4 PG (ref 26.5–33)
MCHC RBC AUTO-ENTMCNC: 31.6 G/DL (ref 31.5–36.5)
MCV RBC AUTO: 93 FL (ref 78–100)
MONOCYTES # BLD AUTO: 1.1 10E3/UL (ref 0–1.3)
MONOCYTES NFR BLD AUTO: 11 %
NEUTROPHILS # BLD AUTO: 6.2 10E3/UL (ref 1.6–8.3)
NEUTROPHILS NFR BLD AUTO: 67 %
PLATELET # BLD AUTO: 227 10E3/UL (ref 150–450)
POTASSIUM BLD-SCNC: 4.7 MMOL/L (ref 3.4–5.3)
PROT SERPL-MCNC: 6.8 G/DL (ref 6.8–8.8)
RBC # BLD AUTO: 4.11 10E6/UL (ref 4.4–5.9)
SODIUM SERPL-SCNC: 140 MMOL/L (ref 133–144)
WBC # BLD AUTO: 9.2 10E3/UL (ref 4–11)

## 2022-07-28 PROCEDURE — 80053 COMPREHEN METABOLIC PANEL: CPT | Performed by: INTERNAL MEDICINE

## 2022-07-28 PROCEDURE — 83036 HEMOGLOBIN GLYCOSYLATED A1C: CPT | Performed by: INTERNAL MEDICINE

## 2022-07-28 PROCEDURE — 99207 PR FOOT EXAM NO CHARGE: CPT | Mod: 25 | Performed by: INTERNAL MEDICINE

## 2022-07-28 PROCEDURE — 82043 UR ALBUMIN QUANTITATIVE: CPT | Performed by: INTERNAL MEDICINE

## 2022-07-28 PROCEDURE — 85025 COMPLETE CBC W/AUTO DIFF WBC: CPT | Performed by: INTERNAL MEDICINE

## 2022-07-28 PROCEDURE — 99214 OFFICE O/P EST MOD 30 MIN: CPT | Performed by: INTERNAL MEDICINE

## 2022-07-28 PROCEDURE — 36415 COLL VENOUS BLD VENIPUNCTURE: CPT | Performed by: INTERNAL MEDICINE

## 2022-07-28 NOTE — PROGRESS NOTES
Spoke with patient and wife today to review recent CT results.  Discussed the plan is to proceed with his consultation with Dr. Funez on Monday, 8/1/22 at 10:45 am. Patient was also scheduled for a CT/SIM on Monday 8/1/22 at 1:30 PM. Patient and wife verify understanding, no further questions or concerns at this time.    Heidi Villar RN

## 2022-07-28 NOTE — PROGRESS NOTES
Assessment & Plan     Type 2 diabetes mellitus with diabetic polyneuropathy, without long-term current use of insulin (H)  I am seeing Zurdo today on the heels of a truly awful and overwhelming list of co-morbidities. He is wearing out and has approached these accumulated set of problems with a slowly begrudging level o acceptance. Probably the thing that really pushed him over the edge is his persistent ulcerations that have now led to several hospitalizations and further follow up with vascular surgeon . He's also got an open diabetic foot ulcer for which he has seen the Podiatrist. He has someone coming out to his assisted care living place and doing daily dressing changes. Our last hemoglobin a1c  [ diabetes test ] test here at [facility] was 9.7% in February but the recommended today is much much improved    Lab Results   Component Value Date    A1C 7.1 07/28/2022    A1C 9.7 02/01/2022    A1C 5.6 08/16/2021    A1C 6.8 01/07/2021    A1C 6.8 12/17/2020    A1C 6.7 12/11/2020    A1C 6.0 01/03/2020    A1C 6.4 08/06/2019     We spent a good bit of time reviewing things and patient has agreed at this point that he would do well to receive his healthcare with [facility] and Dr Suze Santa . I agreed to send Dr. Santa an introductory letter just to get this ball rolling. I reminisced with Zurdo today and we discussed a long long run with me as his primary health care provider. Zurdo is not ready for hospice care and would agree tophaceous gout to the hospital anytime this were to be recommended by his caregivers. He is also dealing with a lung cancer ,it's an adenocarcinoma although I wasn't finding easy answers to where was his lung biopsy and what is his prognosis          - Hemoglobin A1c  - FOOT EXAM  - Albumin Random Urine Quantitative with Creat Ratio  - Comprehensive metabolic panel (BMP + Alb, Alk Phos, ALT, AST, Total. Bili, TP)    Hypertension goal BP (blood pressure)  < 140/90  Controlled within acceptable limits   - Albumin Random Urine Quantitative with Creat Ratio  - Comprehensive metabolic panel (BMP + Alb, Alk Phos, ALT, AST, Total. Bili, TP)    Anemia, unspecified type  Ongoing monitoring sees still Dr. Albert Tompkins, rheumatologist with [facility]   - CBC with platelets and differential  - Comprehensive metabolic panel (BMP + Alb, Alk Phos, ALT, AST, Total. Bili, TP)    Adenocarcinoma of lung, unspecified laterality (H)  As above   - Comprehensive metabolic panel (BMP + Alb, Alk Phos, ALT, AST, Total. Bili, TP)    Left leg swelling  I ordered a doppler study to rule out acute deep vein thrombosis because I am told his left lower extremity  Is swollen up now quite a bit. There is certainly a significant leg circumference discrepancy and we however think this is most likely related to his ongoing severe problems with his left lower extremity  Vascular health and ongoing ulcerations and vascular disease   - US Lower Extremity Venous Duplex Left; Future  - Comprehensive metabolic panel (BMP + Alb, Alk Phos, ALT, AST, Total. Bili, TP)    Review of the result(s) of each unique test - todays tests including doppler of LLE  Prescription drug management  38 minutes spent on the date of the encounter doing chart review, history and exam, documentation and further activities per the note  [id number]}       Return in about 3 months (around 10/28/2022). / on an as needed basis     Kapil Duckworth MD  [facility] FR[facility]ommunity HealthWILMAN Renner is a 77 year old accompanied by his spouse, presenting for the following health issues:  Follow Up      HPI   Chief Complaint   Patient presents with     Follow Up     It is assumed now that Dr. Santa will continue forwards as primary health care provider    We discussed things mostly philosophically and he's still bitter about how he feels he was shoe-horned into the [facility] care [facility]. [facility] remains a viable option   Patient had complaints of getting his blood glucose readings checked four times a day , he feels this is too much. I recommended the [place] home monitor for blood glucose monitoring .      Review of Systems   Constitutional, HEENT, cardiovascular, pulmonary, gi and gu systems are negative, except as otherwise noted.      Objective    /72   Resp 16   Wt 97.5 kg (215 lb)   BMI 32.22 kg/m    Body mass index is 32.22 kg/m .  Physical Exam   GENERAL: healthy, alert and no distress  EYES: Eyes grossly normal to inspection, PERRL and conjunctivae and sclerae normal  MS: as detailed above   SKIN: no suspicious lesions or rashes  NEURO: Normal strength and tone, mentation intact and speech normal  PSYCH: mentation appears normal, affect normal/bright    Orders Placed This Encounter   Procedures     FOOT EXAM     US Lower Extremity Venous Duplex Left     Hemoglobin A1c     Albumin Random Urine Quantitative with Creat Ratio     Comprehensive metabolic panel (BMP + Alb, Alk Phos, ALT, AST, Total. Bili, TP)     CBC with platelets and differential     CBC with platelets and differential             Last appointment with me was 3-21-22 for Wellness physical exam   Uncertain on chief goals of this office visit   Especially uncertain on refills  Status with lungs and status with diabetes mellitus type 2    Lab Results   Component Value Date    A1C 9.7 02/01/2022    A1C 5.6 08/16/2021    A1C 6.8 01/07/2021    A1C 6.8 12/17/2020    A1C 6.7 12/11/2020    A1C 6.0 01/03/2020    A1C 6.4 08/06/2019     Health Maintenance Due   Topic Date Due     URINE DRUG SCREEN  Never done     EYE EXAM  01/01/2021     DIABETIC FOOT EXAM  02/27/2021     COLORECTAL CANCER SCREENING  03/08/2021     MICROALBUMIN  04/08/2022     ANNUAL REVIEW OF HM ORDERS  06/17/2022     A1C  07/08/2022      Last appointment with Dr. Tompkins was 7-21-22 for follow up if his rheumatoid arthritis , and bilateral knee erosive  osteoarthritis   Chronic kidney disease . At this appointment it is noted that since last appointment he has had multiple vascular surgeries, complicated on the left with infection and graft rejection per patient report, and now has a left groin and left medial thigh wound that are healing.  He reports having a wound nurse come out to his house daily.  Currently on antibiotics.      Ongoing issues with hypertension, dyslipidemia , diabetes mellitus 2, gastroesophageal reflux disease and peripheral neuropathy     Podiatry consultation 7-20  Cardiology  For wound care  7-14 - see care everywhere - from that appointment , History of Present Illness:  This is a pleasant, 77-year-old male known to the vascular surgery service. He has a long history of arterial disease with an infected bypass. He underwent removal of his bypass with a muscle flap closure. He then presented back to the hospital in June with chills and was found to have 2 abscesses within his groin and 1 on his above-the-knee popliteal fossa on the left leg. On 6/4/2022 he underwent an incision and drainage of the 2 abscesses on his left leg. He is currently on antibiotics which we are recommending lifelong. He follows up with Infectious Disease. Overall he is doing well. He has been seeing Podiatry for the sore on his left toes. He denies any pain or claudication. He is planning to return home tomorrow    Still basic diabetes mellitus issues and possible lung cancer issues   7-12 saw Infectious Disease consultation Dr Valera  7=6 podiatry consultation describes concerns with diabetic foot ulcerations  6-14-22 visit with plastic surgeon

## 2022-07-28 NOTE — LETTER
August 1, 2022    Zurdo Dash  [address]  [facility] [place] [address]          Dear ,    We are writing to inform you of your test results.    These laboratory studies are all within reasonable limits . I sent a nice letter to Dr Suze Santa and I think you'd like meeting her       Resulted Orders   Hemoglobin A1c   Result Value Ref Range    Hemoglobin A1C 7.1 (H) 0.0 - 5.6 %      Comment:      Normal <5.7%   Prediabetes 5.7-6.4%    Diabetes 6.5% or higher     Note: Adopted from ADA consensus guidelines.   Albumin Random Urine Quantitative with Creat Ratio   Result Value Ref Range    Creatinine Urine mg/dL 66 mg/dL    Albumin Urine mg/L 32 mg/L    Albumin Urine mg/g Cr 48.48 (H) 0.00 - 17.00 mg/g Cr   Comprehensive metabolic panel (BMP + Alb, Alk Phos, ALT, AST, Total. Bili, TP)   Result Value Ref Range    Sodium 140 133 - 144 mmol/L    Potassium 4.7 3.4 - 5.3 mmol/L    Chloride 111 (H) 94 - 109 mmol/L    Carbon Dioxide (CO2) 22 20 - 32 mmol/L    Anion Gap 7 3 - 14 mmol/L    Urea Nitrogen 42 (H) 7 - 30 mg/dL    Creatinine 1.54 (H) 0.66 - 1.25 mg/dL    Calcium 8.8 8.5 - 10.1 mg/dL    Glucose 154 (H) 70 - 99 mg/dL    Alkaline Phosphatase 57 40 - 150 U/L    AST 18 0 - 45 U/L    ALT 25 0 - 70 U/L    Protein Total 6.8 6.8 - 8.8 g/dL    Albumin 3.0 (L) 3.4 - 5.0 g/dL    Bilirubin Total 0.3 0.2 - 1.3 mg/dL    GFR Estimate 46 (L) >60 mL/min/1.73m2      Comment:      Effective December 21, 2021 eGFRcr in adults is calculated using the 2021 CKD-EPI creatinine equation which includes age and gender (Marycruz et al., NEJ, DOI: 10.1056/LEVVoh6768651)   CBC with platelets and differential   Result Value Ref Range    WBC Count 9.2 4.0 - 11.0 10e3/uL    RBC Count 4.11 (L) 4.40 - 5.90 10e6/uL    Hemoglobin 12.1 (L) 13.3 - 17.7 g/dL    Hematocrit 38.3 (L) 40.0 - 53.0 %    MCV 93 78 - 100 fL    MCH 29.4 26.5 - 33.0 pg    MCHC 31.6 31.5 - 36.5 g/dL    RDW 14.4 10.0 - 15.0 %    Platelet Count 227 150 - 450 10e3/uL     negative % Neutrophils 67 %    % Lymphocytes 17 %    % Monocytes 11 %    % Eosinophils 4 %    % Basophils 1 %    Absolute Neutrophils 6.2 1.6 - 8.3 10e3/uL    Absolute Lymphocytes 1.5 0.8 - 5.3 10e3/uL    Absolute Monocytes 1.1 0.0 - 1.3 10e3/uL    Absolute Eosinophils 0.4 0.0 - 0.7 10e3/uL    Absolute Basophils 0.1 0.0 - 0.2 10e3/uL       If you have any questions or concerns, please call the clinic at the number listed above.       Sincerely,      Kapil Duckworth MD

## 2022-07-28 NOTE — LETTER
[facility]  [address]  [place] [address]  [phone]          July 28, 2022    Dr. Suze Santa  [facility]  [address]  [address]  [place]  [address]    Re:  Zurdo Dash    YOB: 1945                                                                                                                     Dear Suze !    I am writing to you to inform you of the fact that a patient of mine, . Zurdo Dash, as well as his spouse Yoli Dash, are both planning on establishing primary care with you through the services you offer patients at [facility].     Zurdo is currently dealing with some significant medical issues including diabetes mellitus 2, and a serious vascular disease affecting primarily his left lower extremity. He's had some infection and ulceration affecting this limb, and is being closely followed by the vascular disease service with the [facility] [place] [facility]. He also has a slowly growing lung mass which was purportedly biopsied, and found to be adenocarcinoma, although as of this writing I have not actually seen this biopsy report. He's also got significant issues with rheumatoid arthritis and has had ongoing follow up with Dr. Albert Tompkins, rheumatologist with [facility].    I am asking you to take good care of this sweet gentleman.  It is for several different reasons, primarily geographic that he wants to limit his travels now.    Nice to talk with you ! Please let me know if you have any questions about all of this. As it is I said good bye to Zurdo today and we don't expect his care to be out of [facility] at this point gong forwards     Sincerely,         Kapil Duckworth MD/augie

## 2022-07-29 LAB
CREAT UR-MCNC: 66 MG/DL
MICROALBUMIN UR-MCNC: 32 MG/L
MICROALBUMIN/CREAT UR: 48.48 MG/G CR (ref 0–17)

## 2022-07-29 NOTE — PROGRESS NOTES
Dear Colleagues,  Today Zurdo Dash was seen in consultation.    IDENTIFICATION: This is a 77 year old gentleman with multiple comorbidities and a pacemaker diagnosed with [id number], stage 1 lung cancer of the RUL referred for stereotactic body radiation therapy.    HISTORY OF PRESENT ILLNESS:   Zurdo Dash is a 77 year old gentleman with multiple comorbidities including extensive smoking history (1ppd x 40 years quit in 2007), cardiovascular disease, recent severe infection, rheumatoid arthritis, heart block requiring pacemaker placement.  He was noted to have enlarging right upper lobe lung nodule in August 2021 and March 2022 scans. His March 2, 2022 chest CT showed interval enlargement of the right upper lobe nodule measuring 1.7 cm (previously 1.1 cm) otherwise unchanged appearance of remaining pulmonary nodules.    On March 31, 2022, CT-guided chest biopsy was consistent with lung adenocarcinoma with PD-L1 approximately 20%.    On April 5, 2022, he was discussed at thoracic tumor board and plan is for SBRT.  However on April 8, 2022, he was admitted for cellulitis of left leg, s/p surgery, multiple courses of antibiotics and eventually transitioned to transitional care.  He also had developed abscess. He is now on oral antibiotics and lives in an assisted living facility/[facility]. Therefore initially after the tumor board he did not follow-up with radiation oncology given severe left lower extremity infection. Now that he has improved PS he wishes to get treated.    On July 26, 2022 repeat chest CT without contrast showed unchanged size/morphology focal pulmonary nodules compared to his March scans.    Currently he feels well and denies any significant chest pain. He denies cough or hematmesis but has dyspnea on exertion.  He denies any other new masses, skin changes, shortness of breath, chest or bony pain, or new neurologic symptoms. He is being seen here today for consideration for stereotactic body  radiotherapy.    REVIEW OF SYSTEMS: As per HPI, a 14-point review of system is otherwise negative.    PAST RADIATION THERAPY:  Denies    PAST CTD/PACEMAKER: Denies    PHYSICAL EXAMINATION:  BP (!) 140/74   Pulse 63   Temp 98.1  F (36.7  C) (Oral)   Resp 18   Wt 96 kg (211 lb 9.6 oz)   SpO2 98%   BMI 31.71 kg/m    GENERAL    Well-appearing elderly gentleman sitting comfortably in exam chair.  Walks with a walker. He is in no acute distress.  HEENT         Normocephalic, atraumatic.  Sclerae anicteric.  CVR              Regular rate and rhythm.  No murmurs, rubs, or gallops.  LUNGS         Decreased but clear to auscultation bilaterally.  LYMPH        No supraclavicular, infraclavicular, or axillary lymphadenopathy appreciated bilaterally.  ABDOMEN  Soft.    EXT              No clubbing or cyanosis or edema in bilateral upper extremities.    NEURO        No gross focal deficits.  MSK             Good ROM.   SKIN            Warm and well perfused.    PSYCH         Alert and oriented x 3     ECOG PERFORMANCE STATUS: 1     IMPRESSION/PLAN: Zurdo Dash is a 77 year old gentleman with multiple comorbidities and a pacemaker diagnosed with stage 1 lung cancer of the RUL referred for stereotactic body radiation therapy. Generally lobectomy is standard of care for operable stage I tumors, but given patients age and comorbidities he is at risk for pulmonary complications. He is medically inoperable and I recommend definitive treatment with SBRT as it would offer him the best chance for local control of the tumor. The rationale for treatment is based on data for treating similar patients with inoperable tumors eg. RTOG 0236 (Neva et al KI 2010; Sam LOONEY IJROBP 2009) where 3-year tumor control is 88-98% and grade 3 toxicity is low.  Patients with inoperable non-small cell lung cancer have high rates of local tumor control and moderate treatment related morbidity.     The risks, benefits, treatment rationale,  regimen and alternatives were discussed in detail. Side effects may include, but are not limited to, dyspnea, cough, skin reaction/peeling, fatigue, chest wall pain or rib fracture, damage to the heart or lungs including radiation pneumonitis, damage to the spinal cord or nerves resulting in paralysis or neurologic symptoms, damage to the esophagus resulting in dysphagia, skin or soft tissue scarring/necrosis, and damage to the airways or blood vessels. He is aware that the side effects of radiation therapy may be severe and permanent. He decided he wanted to proceed with treatment and a CT simulation will be completed today.    There was ample time for questions and all were answered to the patient's satisfaction. Thank you for allowing me to participate in the care of this pleasant patient. If you have any questions, please do not hesitate to contact my office.     Sincerely,  Lalita Funez MD     [phone] Pager   [phone] [facility]   [phone] Ida Polanco  [phone] [facility]

## 2022-08-01 ENCOUNTER — TRANSFERRED RECORDS (OUTPATIENT)
Dept: HEALTH INFORMATION MANAGEMENT | Facility: CLINIC | Age: 77
End: 2022-08-01

## 2022-08-01 ENCOUNTER — OFFICE VISIT (OUTPATIENT)
Dept: RADIATION ONCOLOGY | Facility: CLINIC | Age: 77
End: 2022-08-01
Payer: COMMERCIAL

## 2022-08-01 ENCOUNTER — ANCILLARY PROCEDURE (OUTPATIENT)
Dept: ULTRASOUND IMAGING | Facility: CLINIC | Age: 77
End: 2022-08-01
Attending: INTERNAL MEDICINE
Payer: COMMERCIAL

## 2022-08-01 ENCOUNTER — APPOINTMENT (OUTPATIENT)
Dept: RADIATION ONCOLOGY | Facility: CLINIC | Age: 77
End: 2022-08-01
Payer: COMMERCIAL

## 2022-08-01 VITALS
SYSTOLIC BLOOD PRESSURE: 140 MMHG | RESPIRATION RATE: 18 BRPM | WEIGHT: 211.6 LBS | DIASTOLIC BLOOD PRESSURE: 74 MMHG | HEART RATE: 63 BPM | BODY MASS INDEX: 31.71 KG/M2 | TEMPERATURE: 98.1 F | OXYGEN SATURATION: 98 %

## 2022-08-01 DIAGNOSIS — M79.89 LEFT LEG SWELLING: ICD-10-CM

## 2022-08-01 DIAGNOSIS — C34.11 MALIGNANT NEOPLASM OF UPPER LOBE OF RIGHT LUNG (H): Primary | ICD-10-CM

## 2022-08-01 PROCEDURE — 99205 OFFICE O/P NEW HI 60 MIN: CPT | Performed by: RADIOLOGY

## 2022-08-01 PROCEDURE — 77263 THER RADIOLOGY TX PLNG CPLX: CPT | Performed by: RADIOLOGY

## 2022-08-01 PROCEDURE — 77334 RADIATION TREATMENT AID(S): CPT | Performed by: RADIOLOGY

## 2022-08-01 PROCEDURE — 93971 EXTREMITY STUDY: CPT | Mod: LT | Performed by: RADIOLOGY

## 2022-08-01 ASSESSMENT — PAIN SCALES - GENERAL: PAINLEVEL: MODERATE PAIN (5)

## 2022-08-01 NOTE — NURSING NOTE
REASON FOR APPOINTMENT   Type of Cancer: Lung adenocarcinoma  Location: R[facility]  Date of Symptom Onset: 8/2/2021 New R lung nodule on CT chest. 3/31/22 CT lung bx. Lung treatment delayed due to cellulitis, surgery, abscess, transitional care and IV antibiotics for L leg infection.     TREATMENT TO-DATE FOR THIS CANCER  Surgery ? no   Chemotherapy ? no   Other Treatments for this Cancer ? no    PERSONAL HISTORY OF CANCER   Previous Cancer ? no   Prior Radiation ? no   Prior Chemotherapy ? no   Prior Hormonal Therapy ? no     RECENT IMAGING STUDIES  CT scan (date: 7/27/22, location: [facility] [place])    REFERRALS NEEDED  No- patient declined supportive services at this time.    VITALS  BP (!) 140/74   Pulse 63   Temp 98.1  F (36.7  C) (Oral)   Resp 18   Wt 96 kg (211 lb 9.6 oz)   SpO2 98%   BMI 31.71 kg/m      PACEMAKER/IMPLANTED CARDIAC DEVICE Yes- patient did not have card at consult, will obtain to contact cardiology clinic about radiation treatment for pacemaker checks.     PAIN  Denies pain related to visit, patient rates pain in L toe as 5/10.    PSYCHOSOCIAL  Marital Status:   Patient lives in [facility] with spouse.  Number of children: unknown  Working status: Retired  Do you feel safe in your home? Yes    REVIEW OF SYSTEMS  Skin: positive for healing wounds to L groin, L upper thigh, L toe  Eyes: positive for glasses  Ears/Nose/Throat: positive for hearing loss, tinnitus  Respiratory: Dyspnea on exertion- baseline  Cardiovascular: positive for pacemaker  Gastrointestinal: positive for diarrhea  Genitourinary: positive for incontinence  Musculoskeletal: positive for joint pain  Neurologic: positive for numbness or tingling of feet  Psychiatric: positive for excessive stress  Hematologic/Lymphatic/Immunologic: negative  Endocrine: positive for diabetes    Radiation Oncology Patient Teaching    Current Concern: RUL lung adenocarcinoma    Person  involved with teaching: Patient and Daughter  Patient asked Questions: Yes  Patient was cooperative: Yes  Patient was receptive (willing to accept information given): Yes    Education Assessment  Comprehension ability: Medium  Knowledge level: Medium  Factors affecting teaching: None    Education Materials Given  Caring for Your Skin During Radiation, Fatigue, monitoring for esophagitis and breathing changes    Educational Topics Discussed  Side effects- see above    Response To Teaching  Verbalizes understanding    Do you have an advanced directive or living will? no  Are you DNR/DNI? no    Heidi Villar RN

## 2022-08-01 NOTE — LETTER
8/1/2022         RE: Zurdo Dash  [address]  [facility] [address]        Dear Colleague,    Thank you for referring your patient, Zurdo Dash, to the [facility] [place]. Please see a copy of my visit note below.    Dear Colleagues,  Today Zurdo Dash was seen in consultation.    IDENTIFICATION: This is a 77 year old gentleman with multiple comorbidities and a pacemaker diagnosed with stage 1 lung cancer of the RUL referred for stereotactic body radiation therapy.    HISTORY OF PRESENT ILLNESS:   Zurdo Dash is a 77 year old gentleman with multiple comorbidities including extensive smoking history (1ppd x 40 years quit in 2007), cardiovascular disease, recent severe infection, rheumatoid arthritis, heart block requiring pacemaker placement.  He was noted to have enlarging right upper lobe lung nodule in August 2021 and March 2022 scans. His March 2, 2022 chest CT showed interval enlargement of the right upper lobe nodule measuring 1.7 cm (previously 1.1 cm) otherwise unchanged appearance of remaining pulmonary nodules.    On March 31, 2022, CT-guided chest biopsy was consistent with lung adenocarcinoma with PD-L1 approximately 20%.    On April 5, 2022, he was discussed at thoracic tumor board and plan is for SBRT.  However on April 8, 2022, he was admitted for cellulitis of left leg, s/p surgery, multiple courses of antibiotics and eventually transitioned to transitional care.  He also had developed abscess. He is now on oral antibiotics and lives in an assisted living facility/[facility]. Therefore initially after the tumor board he did not follow-up with radiation oncology given severe left lower extremity infection. Now that he has improved PS he wishes to get treated.    On July 26, 2022 repeat chest CT without contrast showed unchanged size/morphology focal pulmonary nodules compared to his March scans.    Currently he feels well and denies any  significant chest pain. He denies cough or hematmesis but has dyspnea on exertion.  He denies any other new masses, skin changes, shortness of breath, chest or bony pain, or new neurologic symptoms. He is being seen here today for consideration for stereotactic body radiotherapy.    REVIEW OF SYSTEMS: As per HPI, a 14-point review of system is otherwise negative.    PAST RADIATION THERAPY:  Denies    PAST CTD/PACEMAKER: Denies    PHYSICAL EXAMINATION:  BP (!) 140/74   Pulse 63   Temp 98.1  F (36.7  C) (Oral)   Resp 18   Wt 96 kg (211 lb 9.6 oz)   SpO2 98%   BMI 31.71 kg/m    GENERAL    Well-appearing elderly gentleman sitting comfortably in exam chair.  Walks with a walker. He is in no acute distress.  HEENT         Normocephalic, atraumatic.  Sclerae anicteric.  CVR              Regular rate and rhythm.  No murmurs, rubs, or gallops.  LUNGS         Decreased but clear to auscultation bilaterally.  LYMPH        No supraclavicular, infraclavicular, or axillary lymphadenopathy appreciated bilaterally.  ABDOMEN  Soft.    EXT              No clubbing or cyanosis or edema in bilateral upper extremities.    NEURO        No gross focal deficits.  MSK             Good ROM.   SKIN            Warm and well perfused.    PSYCH         Alert and oriented x 3     ECOG PERFORMANCE STATUS: 1     IMPRESSION/PLAN: Zurdo Dash is a 77 year old gentleman with multiple comorbidities and a pacemaker diagnosed with stage 1 lung cancer of the RUL referred for stereotactic body radiation therapy. Generally lobectomy is standard of care for operable stage I tumors, but given patients age and comorbidities he is at risk for pulmonary complications. He is medically inoperable and I recommend definitive treatment with SBRT as it would offer him the best chance for local control of the tumor. The rationale for treatment is based on data for treating similar patients with inoperable tumors eg. RTOG 0236 (Neva et al KI 2010;  Sam LOONEY IJROBP 2009) where 3-year tumor control is 88-98% and grade 3 toxicity is low.  Patients with inoperable non-small cell lung cancer have high rates of local tumor control and moderate treatment related morbidity.     The risks, benefits, treatment rationale, regimen and alternatives were discussed in detail. Side effects may include, but are not limited to, dyspnea, cough, skin reaction/peeling, fatigue, chest wall pain or rib fracture, damage to the heart or lungs including radiation pneumonitis, damage to the spinal cord or nerves resulting in paralysis or neurologic symptoms, damage to the esophagus resulting in dysphagia, skin or soft tissue scarring/necrosis, and damage to the airways or blood vessels. He is aware that the side effects of radiation therapy may be severe and permanent. He decided he wanted to proceed with treatment and a CT simulation will be completed today.    There was ample time for questions and all were answered to the patient's satisfaction. Thank you for allowing me to participate in the care of this pleasant patient. If you have any questions, please do not hesitate to contact my office.     Sincerely,  Lalita Funez MD     [phone] Pager   [phone] [facility]   [phone] [place]  [phone] [place]        Again, thank you for allowing me to participate in the care of your patient.        Sincerely,        NAYAN Funez MD

## 2022-08-03 ENCOUNTER — PATIENT OUTREACH (OUTPATIENT)
Dept: RADIATION ONCOLOGY | Facility: CLINIC | Age: 77
End: 2022-08-03

## 2022-08-03 NOTE — PROGRESS NOTES
Pacemaker checks scheduled with Dr. Hurtado's office device nurse, Poly. Left message for patient's wife, Yoli, with appointment dates and times for pacemaker checks for during Zurdo's radiation therapy. Yoli to call Dr. Funez's office to verify appointment dates/times and with any questions or concerns.    Heidi Villar RN

## 2022-08-12 ENCOUNTER — LAB REQUISITION (OUTPATIENT)
Dept: LAB | Facility: CLINIC | Age: 77
End: 2022-08-12
Payer: COMMERCIAL

## 2022-08-12 DIAGNOSIS — I73.9 PERIPHERAL VASCULAR DISEASE, UNSPECIFIED (H): ICD-10-CM

## 2022-08-15 ENCOUNTER — APPOINTMENT (OUTPATIENT)
Dept: RADIATION ONCOLOGY | Facility: CLINIC | Age: 77
End: 2022-08-15
Payer: COMMERCIAL

## 2022-08-15 PROCEDURE — 77370 RADIATION PHYSICS CONSULT: CPT | Performed by: RADIOLOGY

## 2022-08-16 ENCOUNTER — ONCOLOGY VISIT (OUTPATIENT)
Dept: RADIATION ONCOLOGY | Facility: CLINIC | Age: 77
End: 2022-08-16

## 2022-08-16 ENCOUNTER — APPOINTMENT (OUTPATIENT)
Dept: RADIATION ONCOLOGY | Facility: CLINIC | Age: 77
End: 2022-08-16
Payer: COMMERCIAL

## 2022-08-16 LAB — INR PPP: 2.04 (ref 0.85–1.15)

## 2022-08-16 PROCEDURE — 77331 SPECIAL RADIATION DOSIMETRY: CPT | Performed by: RADIOLOGY

## 2022-08-16 PROCEDURE — P9603 ONE-WAY ALLOW PRORATED MILES: HCPCS | Mod: ORL | Performed by: NURSE PRACTITIONER

## 2022-08-16 PROCEDURE — 36415 COLL VENOUS BLD VENIPUNCTURE: CPT | Mod: ORL | Performed by: NURSE PRACTITIONER

## 2022-08-16 PROCEDURE — 77373 STRTCTC BDY RAD THER TX DLVR: CPT | Performed by: RADIOLOGY

## 2022-08-16 PROCEDURE — 85610 PROTHROMBIN TIME: CPT | Mod: ORL | Performed by: NURSE PRACTITIONER

## 2022-08-17 ENCOUNTER — OFFICE VISIT (OUTPATIENT)
Dept: PODIATRY | Facility: CLINIC | Age: 77
End: 2022-08-17
Payer: COMMERCIAL

## 2022-08-17 ENCOUNTER — OFFICE VISIT (OUTPATIENT)
Dept: RHEUMATOLOGY | Facility: CLINIC | Age: 77
End: 2022-08-17
Payer: COMMERCIAL

## 2022-08-17 VITALS
OXYGEN SATURATION: 96 % | WEIGHT: 216 LBS | SYSTOLIC BLOOD PRESSURE: 143 MMHG | BODY MASS INDEX: 32.36 KG/M2 | HEART RATE: 84 BPM | DIASTOLIC BLOOD PRESSURE: 79 MMHG

## 2022-08-17 VITALS
DIASTOLIC BLOOD PRESSURE: 80 MMHG | HEART RATE: 84 BPM | SYSTOLIC BLOOD PRESSURE: 144 MMHG | WEIGHT: 216 LBS | BODY MASS INDEX: 32.36 KG/M2

## 2022-08-17 DIAGNOSIS — I73.9 PAD (PERIPHERAL ARTERY DISEASE) (H): ICD-10-CM

## 2022-08-17 DIAGNOSIS — M17.0 BILATERAL PRIMARY OSTEOARTHRITIS OF KNEE: Primary | ICD-10-CM

## 2022-08-17 DIAGNOSIS — E11.51 TYPE II DIABETES MELLITUS WITH PERIPHERAL ARTERY DISEASE (H): ICD-10-CM

## 2022-08-17 DIAGNOSIS — E11.621 DIABETIC ULCER OF TOE OF LEFT FOOT ASSOCIATED WITH TYPE 2 DIABETES MELLITUS, WITH NECROSIS OF MUSCLE (H): Primary | ICD-10-CM

## 2022-08-17 DIAGNOSIS — L97.523 DIABETIC ULCER OF TOE OF LEFT FOOT ASSOCIATED WITH TYPE 2 DIABETES MELLITUS, WITH NECROSIS OF MUSCLE (H): Primary | ICD-10-CM

## 2022-08-17 PROCEDURE — 99214 OFFICE O/P EST MOD 30 MIN: CPT | Performed by: PODIATRIST

## 2022-08-17 PROCEDURE — 20610 DRAIN/INJ JOINT/BURSA W/O US: CPT | Mod: 50 | Performed by: INTERNAL MEDICINE

## 2022-08-17 RX ORDER — TRIAMCINOLONE ACETONIDE 40 MG/ML
40 INJECTION, SUSPENSION INTRA-ARTICULAR; INTRAMUSCULAR ONCE
Status: COMPLETED | OUTPATIENT
Start: 2022-08-17 | End: 2022-08-17

## 2022-08-17 RX ADMIN — TRIAMCINOLONE ACETONIDE 40 MG: 40 INJECTION, SUSPENSION INTRA-ARTICULAR; INTRAMUSCULAR at 10:42

## 2022-08-17 RX ADMIN — TRIAMCINOLONE ACETONIDE 40 MG: 40 INJECTION, SUSPENSION INTRA-ARTICULAR; INTRAMUSCULAR at 10:43

## 2022-08-17 NOTE — PROGRESS NOTES
Subjective:    7/13/22    Patient seen today for wound left second toe.  Patient points to the dorsum of the PIPJ.  Has had this for a few months.  Patient has history of peripheral arterial disease.  Had left femoropopliteal bypass in January 2021.  Since then has had numerous surgeries because of complications from bypass.  Has had a flap for coverage and also seeing plastic surgery.  Has wound left groin and currently being followed up with infectious disease.  PICC line saw infectious disease yesterday vancomycin and ceftriaxone.  And will be pulling PICC line and starting oral medications.  Despite being on antibiotics some erythema second toe.  Denies purulence or odor.  The patient has diabetes mellitus with peripheral neuropathy.  He has chronic kidney disease stage III.  Patient has rheumatoid arthritis and takes Plaquenil.  He is retired.  40-pack-year history of smoking and quit 14 years ago.   Primary care is Dr. Rivers last seen 3/21/22     7/20/22 patient returns for left second and third toe ulcers.  Patient on oral antibiotics.  Dressing changed once a day on thigh and improving.  Patient has been wearing the crest pad on his second toe.  Dressing with Medihoney daily.  No new drainage.  States no change in the erythema.    8/17/22 patient returns for left second and third toe ulcers.  No drainage from third toe.  He is not dressing this.  Uses crest pad on fourth toe when he remembers.  States second toe has minimal drainage.  No purulence or odor.  There are now only dressing with Medihoney and Band-Aid and longitudinally on the dorsum of this toe.  Patient still taking oral antibiotics from a wound left groin status post vascular bypass.          ROS: See above         Allergies   Allergen Reactions     Blood-Group Specific Substance Other (See Comments)     Patient has a Non-specific antibody. Blood products may be delayed. Draw patient 24 hours prior to transfusion. For [facility] testing,  "draw one red top and two purple top tubes for all Type and Screen orders.     Seasonal Allergies        Current Outpatient Medications   Medication Sig Dispense Refill     acetaminophen (TYLENOL) 500 MG tablet Take 2 tablets (1,000 mg) by mouth 3 times daily       Alcohol Swabs PADS Uses four times daily       bisacodyl (DULCOLAX) 10 MG suppository Unwrap and insert 1 suppository rectally once daily as needed       blood glucose (NO BRAND SPECIFIED) test strip Use to test blood sugar 4 times daily or as directed.       Blood Glucose Monitoring Suppl (ACCU-CHEK GUIDE) w/Device KIT        bumetanide (BUMEX) 2 MG tablet Take 1 tablet (2 mg) by mouth daily       cetirizine (ZYRTEC) 10 MG tablet Take 10 mg by mouth At Bedtime       collagenase (SANTYL) 250 UNIT/GM external ointment Apply topically daily Apply to left heel as directed once daily       cyanocobalamin (VITAMIN B-12) 1000 MCG tablet Take 1 tablet (1,000 mcg) by mouth once a week       diphenhydrAMINE (BENADRYL) 25 MG capsule Take 1 capsule (25 mg) by mouth every 6 hours as needed for itching or allergies X 30 days then dc       dulaglutide (TRULICITY) 1.5 MG/0.5ML pen Inject 1.5 mg Subcutaneous every 7 days ON Wednesdays 10 mL 1     folic acid (FOLVITE) 1 MG tablet Take 1 tablet (1 mg) by mouth daily 100 tablet 2     Gauze Pads & Dressings (GAUZE SPONGE) 4\"X4\" PADS        glipiZIDE (GLUCOTROL) 5 MG tablet Take 0.5 tablets (2.5 mg) by mouth every morning       HYDROmorphone (DILAUDID) 2 MG tablet Take 1 tablet (2 mg) by mouth 2 times daily as needed for pain For urgent pain relief with rheumatoid arthritis flare-up 10 tablet 0     hydroxychloroquine (PLAQUENIL) 200 MG tablet Take 1 tablet (200 mg) by mouth daily 90 tablet 1     insulin aspart (NOVOLOG VIAL) 100 UNITS/ML vial Inject 8 Units Subcutaneous 3 times daily (with meals) 5 mL 1     insulin glargine (LANTUS VIAL) 100 UNIT/ML vial Inject 10 Units Subcutaneous At Bedtime 3 mL 1     insulin syringe-needle " "U-100 (30G X 1/2\" 0.5 ML) 30G X 1/2\" 0.5 ML miscellaneous Use 4 syringes daily or as directed.       Insulin Syringe-Needle U-100 28G X 1/2\" 0.5 ML MISC        Irrigation Supplies MISC        medical cannabis (Patient's own supply) See Admin Instructions (The purpose of this order is to document that the patient reports taking medical cannabis.  This is not a prescription, and is not used to certify that the patient has a qualifying medical condition.)       metoprolol succinate ER (TOPROL-XL) 50 MG 24 hr tablet Take 1 tablet (50 mg) by mouth daily       polyethylene glycol-propylene glycol (SYSTANE) 0.4-0.3 % SOLN ophthalmic solution Place 1 drop into both eyes 2 times daily as needed for dry eyes       pravastatin (PRAVACHOL) 10 MG tablet Take 1 tablet (10 mg) by mouth daily       senna-docusate (SENOKOT-S/PERICOLACE) 8.6-50 MG tablet Take 1 tablet by mouth 2 times daily as needed for constipation 50 tablet 0     sertraline (ZOLOFT) 50 MG tablet Take 1 tablet (50 mg) by mouth daily       sodium hypochlorite (DAKINS HALF-STRENGTH) 0.25 % external solution Use for damp to dry dressing changes two times a day left groin wound       syringe/needle, disp, (BD ECLIPSE SYRINGE) 25G X 1\" 3 ML MISC        triamcinolone (KENALOG) 0.1 % external cream Apply topically 2 times daily Apply to rash on neck, shoulders and lower extremities two times a day       warfarin ANTICOAGULANT (COUMADIN) 3 MG tablet Take 1/2 tab (1.5mg) by mouth on Sunday       WARFARIN SODIUM PO Take 3 mg by mouth Take 1 tab on Sun, Mon, Tues, Wed, Thurs and Fri.  And take 1/2 tab (1.5mg) on Sat         Patient Active Problem List   Diagnosis     Pain in limb     Hyperlipidemia LDL goal <100     Hypertension goal BP (blood pressure) < 140/90     Hypogonadism     Advanced directives, counseling/discussion     Health Care Home     Type 2 diabetes mellitus with diabetic polyneuropathy, without long-term current use of insulin (H)     RBBB (right bundle " branch block)     Ex-smoker     Family history of esophageal cancer     Gastroesophageal reflux disease, esophagitis presence not specified     Rheumatoid arthritis involving multiple sites with positive rheumatoid factor (H)     Pulmonary nodule     High risk medication use     Spondylosis of cervical region without myelopathy or radiculopathy     Erectile dysfunction, unspecified erectile dysfunction type     Type 2 diabetes mellitus without retinopathy (H)     Tubulovillous adenoma of colon     Diabetic polyneuropathy associated with type 2 diabetes mellitus (H)     Chronic bilateral low back pain without sciatica     Migraine equivalent     Posterior vitreous detachment of left eye     Pseudophakia, ou     Eyelid lesion, LLL     Dermatochalasis of both upper eyelids     Bradycardia     Primary osteoarthritis of both knees     Syncope     Trigger finger, acquired     CKD (chronic kidney disease) stage 3, GFR 30-59 ml/min (H)     Abdominal pain, generalized     PAD (peripheral artery disease) (H)     Immunosuppression (H)     Skin ulcer of toe of left foot, limited to breakdown of skin (H)     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 difficile diarrhea     H/O     Cataract 11/18/2011     Diabetic neuropathy (H)     mild, mostly soles and distal forefeet, worse on the left side.     Diverticulitis      ED (erectile dysfunction)      Ex-smoker     QUIT SMOKING FEB 2007     History of ETOH abuse     recovering, sober since 1997     Hyperlipidemia LDL goal <100      Hypertension goal BP (blood pressure) < 140/90      Hypogonadism      Obesity      PAD  (peripheral artery disease) (H)     leg cramps, with exertion, no formal diagnosis of PAD and minimal if any symptoms at all.     RA (rheumatoid arthritis) (H)      U[place]do     Syncope        Past Surgical History:   Procedure Laterality Date     BYPASS GRAFT FEMOROPOPLITEAL Left 01/07/2021    Procedure: LEFT FEMORAL TO ABOVE KNEE POPLITEAL ARTERY BYPASS WITH PTFE VASCULAR GRAFT REMOVABLE RING 6MMX 50CM;  Surgeon: Myra Lopes MD;  Location: [facility] OR     CATARACT IOL, RT/LT       COLONOSCOPY  03/01/2018    [facility]     COMBINED REPAIR PTOSIS WITH BLEPHAROPLASTY BILATERAL Bilateral 08/16/2019    Procedure: BILATERAL UPPER EYELID BLEPHAROPLASTY AND BILATERAL PTOSIS REPAIR;  Surgeon: Janet Garcia MD;  Location: [facility] OR     ENDARTERECTOMY FEMORAL Left 01/07/2021    Procedure: LEFT FEMORAL ENDARTERECTOMY WITH PATCH ANGIOPLASTY PHOTOFIX  0.8 X 8CM;  Surgeon: Myra Lopes MD;  Location: [facility] OR     EP PACEMAKER  01/2021     EXCISE LESION EYELID Left 08/16/2019    Procedure: LEFT LOWER EYELID BIOPSY;  Surgeon: Janet Garcia MD;  Location: [facility] OR     IR LOWER EXTREMITY ANGIOGRAM LEFT  12/17/2020     PHACOEMULSIFICATION WITH STANDARD INTRAOCULAR LENS IMPLANT  02/2019; 3/2019    left eye; right eye     TONSILLECTOMY       ZZHC INCISION TENDON SHEATH FINGER  04/2009    r hand ring finger       Family History   Problem Relation Age of Onset     Cerebrovascular Disease Mother      Arthritis Mother      Osteoporosis Mother      Alzheimer Disease Father      Arthritis Father      Cancer Father      Diabetes Maternal Grandmother      Cardiovascular Maternal Grandmother      Other Cancer Brother      Cancer Paternal Aunt      Hypertension No family hx of      Thyroid Disease No family hx of      Glaucoma No family hx of      Macular Degeneration No family hx of        Social History     Tobacco Use     Smoking status: Former Smoker     Packs/day: 1.00     Years: 40.00     Pack years: 40.00     Types:  Cigarettes     Quit date: 3/16/2007     Years since quitting: 15.4     Smokeless tobacco: Never Used   Substance Use Topics     Alcohol use: No     Alcohol/week: 0.0 standard drinks         Exam:    Vitals: There were no vitals taken for this visit.  BMI: There is no height or weight on file to calculate BMI.  Height: Data Unavailable    Constitutional/ general:  Pt is in no apparent distress, appears well-nourished.  Cooperative with history and physical exam.     Psych:  The patient answered questions appropriately.  Normal affect.  Seems to have reasonable expectations, in terms of treatment.       Lungs:  Non labored breathing, non labored speech. No cough.  No audible wheezing. Even, quiet breathing.       Vascular:   Difficult to palpate pedal pulses.  Increased edema left lower extremity.                                                                   Neuro:  Alert and oriented x 3.  Monofilament absent to midfoot.      Derm: Skin thin shiny atrophic with no hair growth.  Left second toe hammered and slightly elevated.      Wound on the dorsum of the PIPJ in the past measured 6 x 4 mm, 9 x 5 mm, and today it measures 8 x 6 mm..  Base goes down to subcutaneous tissue and is covered with Medihoney.  No purulence or odor.  Erythema and edema surrounding this.  Left third toe is hammered.  There is an ulceration left third toe distal just a tiny eschar    Musculoskeletal:    Lower extremity muscle strength is normal.  Patient is ambulatory without an assistive device or brace.  Pronated arch with weightbearing.  All lesser toes are hammered and stiff.  Generally his feet are stiff with a decreased range of motion bilaterally.       Collected 7/13/2022  2:42 PM     Status: Final result     Visible to patient: Yes (seen)     Dx: Diabetic ulcer of toe of left foot as...    Specimen Information: Foot, Left; Skin         0 Result Notes    Culture 1+ Normal tra            Resulting Agency: [facility]           Specimen  Collected: 07/13/22  2:42 PM Last Resulted: 07/15/22 10:21 AM               A:  Diabetes mellitus with peripheral neuropathy and LOPS  Peripheral arterial disease s/p bypass  Left second hammertoe with ulcer and erythema  Left third hammertoe with distal  eschar      P:   Discussed third toe tiny eschar.  Encouraged him to use crest pad.  Discussed second toe somewhat more superficial and perhaps slightly smaller.  Continue dressing.  Continue using Band-Aid to keep offloaded.  We will continue to use DH shoe to keep offloaded.  Discussed that this will take a long time to heal.  Return to clinic in 3 weeks.        Aaron RENEEM, FACFAS

## 2022-08-17 NOTE — PATIENT INSTRUCTIONS
RHEUMATOLOGY    Dr. Albert Tompkins    [address] [place] [address]  Phone number: [phone]  Fax number: [phone]      Thank you for choosing [facility]!    Eryn Espinoza CMA Rheumatology

## 2022-08-17 NOTE — PATIENT INSTRUCTIONS
We wish you continued good healing. If you have any questions or concerns, please do not hesitate to contact us at  [phone]    [employer] (secure e-mail communication and access to your chart) to send a message or to make an appointment.    Please remember to call and schedule a follow up appointment if one was recommended at your earliest convenience.     PODIATRY CLINIC HOURS  TELEPHONE NUMBER    Dr. Aaron DAVISONPPEDRITO [facility]        Clinics:  Yann[place] Patel CMA   Tuesday 1PM-6PM  [place]  Wednesday 745AM-330PM  [place]  Thursday/Friday 745AM-230PM  [place] [facility] APPOINTMENTS  [phone]    [place] APPOINTMENTS  [phone]        If you need a medication refill, please contact us you may need lab work and/or a follow up visit prior to your refill (i.e. Antifungal medications).  If MRI needed please call Imaging at [phone] or [phone]  HOW DO I GET MY KNEE SCOOTER? Knee scooters can be picked up at ANY Medical Supply stores with your knee scooter Prescription.  OR  Bring your signed prescription to an [facility] showroom.

## 2022-08-17 NOTE — NURSING NOTE
RAPID3 (0-30) Cumulative Score  18.7          RAPID3 Weighted Score (divide #4 by 3 and that is the weighted score)  6.2

## 2022-08-17 NOTE — PROGRESS NOTES
Rheumatology Clinic Visit      Zurdo Dash MRN# [id number]   YOB: 1945 Age: 77 year old      Date of visit: 8/17/22   PCP: Dr. Kapil Duckworth     Chief Complaint   Patient presents with:  Rheumatoid Arthritis: Steroid injection in both knees    Assessment and Plan      1. Bilateral knee osteoarthritis / patellofemoral syndrome: Intra-articular steroid injections have been effective. repeat steroid injection today as planned; documented in the procedure section.     2. Elevated blood pressure:  Zurdo to follow up with Primary Care provider regarding elevated blood pressure.     Total minutes spent in evaluation with patient, documentation, , and review of pertinent studies and chart notes: 4      Mr. Dash verbalized agreement with and understanding of the rational for the diagnosis and treatment plan.  All questions were answered to best of my ability and the patient's satisfaction. Mr. Dash was advised to contact the clinic with any questions that may arise after the clinic visit.      Thank you for involving me in the care of the patient    Return to clinic: 3 months      HPI   Zurdo Dash is a 77 year old male with a medical history significant for hypertension, hyperlipidemia, diabetes, diabetic neuropathy, GERD, and rheumatoid arthritis who presents for f/u of RA, sooner than previously scheduled because of recent hospitalization    Today, 8/17/2022: presents for planned steroid injection of each knee.  No longer with infection of the lower leg.  Knee pain is worse with activity and improves with rest.  No overlying redness or increased warmth of the knees.    Denies fevers, chills, nausea, vomiting, constipation, diarrhea. No abdominal pain. No chest pain/pressure, palpitations, or shortness of breath. No oral or nasal sores. No neck pain.     Tobacco: None  EtOH: None  Drugs: None    ROS   12 point review of system was completed and negative except as noted in the HPI      Active Problem List     Patient Active Problem List   Diagnosis     Pain in limb     Hyperlipidemia LDL goal <100     Hypertension goal BP (blood pressure) < 140/90     Hypogonadism     Advanced directives, counseling/discussion     Health Care Home     Type 2 diabetes mellitus with diabetic polyneuropathy, without long-term current use of insulin (H)     RBBB (right bundle branch block)     Ex-smoker     Family history of esophageal cancer     Gastroesophageal reflux disease, esophagitis presence not specified     Rheumatoid arthritis involving multiple sites with positive rheumatoid factor (H)     Pulmonary nodule     High risk medication use     Spondylosis of cervical region without myelopathy or radiculopathy     Erectile dysfunction, unspecified erectile dysfunction type     Type 2 diabetes mellitus without retinopathy (H)     Tubulovillous adenoma of colon     Diabetic polyneuropathy associated with type 2 diabetes mellitus (H)     Chronic bilateral low back pain without sciatica     Migraine equivalent     Posterior vitreous detachment of left eye     Pseudophakia, ou     Eyelid lesion, LLL     Dermatochalasis of both upper eyelids     Bradycardia     Primary osteoarthritis of both knees     Syncope     Trigger finger, acquired     CKD (chronic kidney disease) stage 3, GFR 30-59 ml/min (H)     Abdominal pain, generalized     PAD (peripheral artery disease) (H)     Immunosuppression (H)     Skin ulcer of toe of left foot, limited to breakdown of skin (H)     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 difficile diarrhea      H/O     Cataract 11/18/2011     Diabetic neuropathy (H)     mild, mostly soles and distal forefeet, worse on the left side.     Diverticulitis      ED (erectile dysfunction)      Ex-smoker     QUIT SMOKING FEB 2007     History of ETOH abuse     recovering, sober since 1997     Hyperlipidemia LDL goal <100      Hypertension goal BP (blood pressure) < 140/90      Hypogonadism      Obesity      PAD (peripheral artery disease) (H)     leg cramps, with exertion, no formal diagnosis of PAD and minimal if any symptoms at all.     RA (rheumatoid arthritis) (H)     Dr Bailon     Syncope      Past Surgical History     Past Surgical History:   Procedure Laterality Date     BYPASS GRAFT FEMOROPOPLITEAL Left 01/07/2021    Procedure: LEFT FEMORAL TO ABOVE KNEE POPLITEAL ARTERY BYPASS WITH PTFE VASCULAR GRAFT REMOVABLE RING 6MMX 50CM;  Surgeon: Myra Lopes MD;  Location: [facility] OR     CATARACT IOL, RT/LT       COLONOSCOPY  03/01/2018    [facility]     COMBINED REPAIR PTOSIS WITH BLEPHAROPLASTY BILATERAL Bilateral 08/16/2019    Procedure: BILATERAL UPPER EYELID BLEPHAROPLASTY AND BILATERAL PTOSIS REPAIR;  Surgeon: Janet Garcia MD;  Location: [facility] OR     ENDARTERECTOMY FEMORAL Left 01/07/2021    Procedure: LEFT FEMORAL ENDARTERECTOMY WITH PATCH ANGIOPLASTY PHOTOFIX  0.8 X 8CM;  Surgeon: Myra Lopes MD;  Location:  OR     EP PACEMAKER  01/2021     EXCISE LESION EYELID Left 08/16/2019    Procedure: LEFT LOWER EYELID BIOPSY;  Surgeon: Janet Garcia MD;  Location: [facility] OR     IR LOWER EXTREMITY ANGIOGRAM LEFT  12/17/2020     PHACOEMULSIFICATION WITH STANDARD INTRAOCULAR LENS IMPLANT  02/2019; 3/2019    left eye; right eye     TONSILLECTOMY       ZZHC INCISION TENDON SHEATH FINGER  04/2009    r hand ring finger     Allergy     Allergies   Allergen Reactions     Blood-Group Specific Substance Other (See Comments)     Patient has a Non-specific antibody. Blood products may be delayed. Draw patient 24 hours  "prior to transfusion. For [facility] testing, draw one red top and two purple top tubes for all Type and Screen orders.     Seasonal Allergies      Current Medication List     Current Outpatient Medications   Medication Sig     acetaminophen (TYLENOL) 500 MG tablet Take 2 tablets (1,000 mg) by mouth 3 times daily     Alcohol Swabs PADS Uses four times daily     bisacodyl (DULCOLAX) 10 MG suppository Unwrap and insert 1 suppository rectally once daily as needed     blood glucose (NO BRAND SPECIFIED) test strip Use to test blood sugar 4 times daily or as directed.     Blood Glucose Monitoring Suppl (ACCU-CHEK GUIDE) w/Device KIT      bumetanide (BUMEX) 2 MG tablet Take 1 tablet (2 mg) by mouth daily     cetirizine (ZYRTEC) 10 MG tablet Take 10 mg by mouth At Bedtime     collagenase (SANTYL) 250 UNIT/GM external ointment Apply topically daily Apply to left heel as directed once daily     cyanocobalamin (VITAMIN B-12) 1000 MCG tablet Take 1 tablet (1,000 mcg) by mouth once a week     diphenhydrAMINE (BENADRYL) 25 MG capsule Take 1 capsule (25 mg) by mouth every 6 hours as needed for itching or allergies X 30 days then dc     dulaglutide (TRULICITY) 1.5 MG/0.5ML pen Inject 1.5 mg Subcutaneous every 7 days ON Wednesdays     folic acid (FOLVITE) 1 MG tablet Take 1 tablet (1 mg) by mouth daily     Gauze Pads & Dressings (GAUZE SPONGE) 4\"X4\" PADS      glipiZIDE (GLUCOTROL) 5 MG tablet Take 0.5 tablets (2.5 mg) by mouth every morning     HYDROmorphone (DILAUDID) 2 MG tablet Take 1 tablet (2 mg) by mouth 2 times daily as needed for pain For urgent pain relief with rheumatoid arthritis flare-up     hydroxychloroquine (PLAQUENIL) 200 MG tablet Take 1 tablet (200 mg) by mouth daily     insulin aspart (NOVOLOG VIAL) 100 UNITS/ML vial Inject 8 Units Subcutaneous 3 times daily (with meals)     insulin glargine (LANTUS VIAL) 100 UNIT/ML vial Inject 10 Units Subcutaneous At Bedtime     insulin syringe-needle U-100 (30G X 1/2\" 0.5 " "ML) 30G X 1/2\" 0.5 ML miscellaneous Use 4 syringes daily or as directed.     Insulin Syringe-Needle U-100 28G X 1/2\" 0.5 ML MISC      Irrigation Supplies MISC      medical cannabis (Patient's own supply) See Admin Instructions (The purpose of this order is to document that the patient reports taking medical cannabis.  This is not a prescription, and is not used to certify that the patient has a qualifying medical condition.)     metoprolol succinate ER (TOPROL-XL) 50 MG 24 hr tablet Take 1 tablet (50 mg) by mouth daily     polyethylene glycol-propylene glycol (SYSTANE) 0.4-0.3 % SOLN ophthalmic solution Place 1 drop into both eyes 2 times daily as needed for dry eyes     pravastatin (PRAVACHOL) 10 MG tablet Take 1 tablet (10 mg) by mouth daily     senna-docusate (SENOKOT-S/PERICOLACE) 8.6-50 MG tablet Take 1 tablet by mouth 2 times daily as needed for constipation     sertraline (ZOLOFT) 50 MG tablet Take 1 tablet (50 mg) by mouth daily     sodium hypochlorite (DAKINS HALF-STRENGTH) 0.25 % external solution Use for damp to dry dressing changes two times a day left groin wound     syringe/needle, disp, (BD ECLIPSE SYRINGE) 25G X 1\" 3 ML MISC      triamcinolone (KENALOG) 0.1 % external cream Apply topically 2 times daily Apply to rash on neck, shoulders and lower extremities two times a day     warfarin ANTICOAGULANT (COUMADIN) 3 MG tablet Take 1/2 tab (1.5mg) by mouth on Sunday     WARFARIN SODIUM PO Take 3 mg by mouth Take 1 tab on Sun, Mon, Tues, Wed, Thurs and Fri.  And take 1/2 tab (1.5mg) on Sat     Current Facility-Administered Medications   Medication     cyanocobalamin injection 1,000 mcg     cyanocobalamin injection 1,000 mcg         Social History   See HPI    Family History     Family History   Problem Relation Age of Onset     Cerebrovascular Disease Mother      Arthritis Mother      Osteoporosis Mother      Alzheimer Disease Father      Arthritis Father      Cancer Father      Diabetes Maternal " "Grandmother      Cardiovascular Maternal Grandmother      Other Cancer Brother      Cancer Paternal Aunt      Hypertension No family hx of      Thyroid Disease No family hx of      Glaucoma No family hx of      Macular Degeneration No family hx of        Physical Exam     Temp Readings from Last 3 Encounters:   08/01/22 98.1  F (36.7  C) (Oral)   03/31/22 97.9  F (36.6  C) (Oral)   03/21/22 98  F (36.7  C) (Oral)     BP Readings from Last 5 Encounters:   08/17/22 (!) 143/79   08/01/22 (!) 140/74   07/28/22 122/72   07/21/22 106/69   07/20/22 (!) 150/87     Pulse Readings from Last 1 Encounters:   08/17/22 84     Resp Readings from Last 1 Encounters:   08/01/22 18     Estimated body mass index is 32.36 kg/m  as calculated from the following:    Height as of 3/31/22: 1.74 m (5' 8.5\").    Weight as of this encounter: 98 kg (216 lb).    GEN: NAD.   PULM: No increased work of breathing.  MSK:  Knees with medial joint line tenderness and crepitation but no effusion or increased warmth.     SKIN: healing surgical wound on the medial aspect of the left thigh without opening   PSYCH: Alert. Appropriate.      Labs / Imaging (select studies)     9/28/1999  (Location [facility])    RF/CCP  Recent Labs   Lab Test 04/07/16  1149   CCPIGG 64*     CBC  Recent Labs   Lab Test 02/01/22  0901 09/08/21  1607 07/14/21  0924 07/05/21  1532 04/07/21  0958   WBC 6.8 7.7 7.0 3.7* 7.4   RBC 3.90* 3.47* 2.72* 2.20* 3.55*   HGB 11.2* 10.6* 8.4* 6.9* 10.8*   HCT 35.9* 31.8* 25.8* 21.6* 33.3*   MCV 92 92 95 98 94   RDW 14.0 14.3 17.1* 17.3* 14.0    195 140* 96* 342   MCH 28.7 30.5 30.9 31.4 30.4   MCHC 31.2* 33.3 32.6 31.9 32.4   NEUTROPHIL 63 72 68  75 68.0 80.7   LYMPH 17 16 10  7 19.0 11.6   MONOCYTE 14 11 20  15 10.0 6.0   EOSINOPHIL 4 1 2  2 2.0 1.2   BASOPHIL 1 0 1  0 1.0 0.5   ANEU  --   --  5.3 2.5 6.0   ALYM  --   --  0.5* 0.7* 0.9   SMITH  --   --  1.1 0.4 0.5   AEOS  --   --  0.1 0.1 0.1   ABAS  --   --  0.0 0.0 0.0 "   ANEUTAUTO 4.4 5.5 4.8  --   --    ALYMPAUTO 1.2 1.2 0.7*  --   --    AMONOAUTO 1.0 0.8 1.4*  --   --    AEOSAUTO 0.2 0.1 0.2  --   --    ABSBASO 0.1 0.0 0.1  --   --      CMP  Recent Labs   Lab Test 02/01/22  0901 09/08/21  1607 08/16/21  1316 07/14/21  1645 07/14/21  1645 07/05/21  1532 04/07/21  0958 01/09/21  0709    141 143   < > 136  --   --  136   POTASSIUM 4.2 4.1 4.4   < > 5.4*  --   --  4.3   CHLORIDE 110* 116* 117*   < > 109  --   --  108   CO2 22 18* 21   < > 17*  --   --  23   ANIONGAP 9 7 5   < > 10  --   --  5    192* 189*   < > 126*  --   --  117*   BUN 36* 37* 40*   < > 50*  --   --  31*   CR 1.63* 1.68* 1.76*   < > 2.30* 2.84* 1.52* 1.39*   GFRESTIMATED 43* 39* 36*   < > 26* 20* 44* 49*   GFRESTBLACK  --   --   --   --   --  24* 50* 56*   NEW 8.8 8.3* 8.4*   < > 8.8  --   --  8.4*   BILITOTAL  --   --   --   --  0.7 0.8 0.4  --    ALBUMIN  --   --   --   --  3.5 3.5 3.5  --    PROTTOTAL  --   --   --   --  6.8 6.9 7.0  --    ALKPHOS  --   --   --   --  67 85 69  --    AST  --   --   --   --  22 12 19  --    ALT  --   --   --   --  32 22 25  --     < > = values in this interval not displayed.     Calcium/VitaminD  Recent Labs   Lab Test 02/01/22  0901 09/08/21  1607 08/16/21  1316   NEW 8.8 8.3* 8.4*     ESR/CRP  Recent Labs   Lab Test 07/05/21  1532 04/07/21  0958 12/11/20  1436   SED 83* 43* 33*   CRP 37.1* 11.3* 18.9*     Hepatitis B  Recent Labs   Lab Test 04/07/16  1149   HBCAB Nonreactive   HEPBANG Nonreactive     Hepatitis C  Recent Labs   Lab Test 04/07/16  1149   HCVAB Nonreactive   Assay performance characteristics have not been established for newborns,   infants, and children       HIV Screening  Recent Labs   Lab Test 04/07/16  1149   HIAGAB Nonreactive   HIV-1 p24 Ag & HIV-1/HIV-2 Ab Not Detected       Immunization History     Immunization History   Administered Date(s) Administered     COVID-19,PF,Moderna 11/29/2021, 05/13/2022     COVID-19,PF,Pfizer (12+ Yrs)  02/19/2021, 03/12/2021     FLUAD(HD)65+ QUAD 09/24/2021     Influenza (H1N1) 01/20/2010     Influenza (High Dose) 3 valent vaccine 09/20/2012, 10/20/2015, 09/20/2016, 08/28/2017, 09/24/2018, 09/18/2019     Influenza (IIV3) PF 10/05/1999, 10/30/2008, 09/28/2011, 10/14/2013, 10/03/2014     Influenza, Quad, High Dose, Pf, 65yr+ (Fluzone HD) 09/11/2020     Pneumo Conj 13-V (2010&after) 06/29/2015     Pneumococcal 23 valent 08/19/2010     TD (ADULT, 7+) 08/05/1997, 02/03/2011     TDAP Vaccine (Boostrix) 03/11/2020     Tdap (Adacel,Boostrix) 03/11/2020     Zoster vaccine recombinant adjuvanted (SHINGRIX) 01/03/2020, 03/11/2020     Zoster vaccine, live 04/19/2010       Procedure     Procedure: Steroid injection of the bilateral knees  Indication: Pain, bilateral knee osteoarthritis    The procedure was explained in detail. Risks including infection, pain, structural damage such as cartilage damage and tendon rupture, fat atrophy, skin hyper-/hypo-pigmentation, and medication reaction was explained. The need for rest of the affected joint for one week after the procedure was explained.  The option of not doing the procedure was also provided. All questions were answered and the patient consented to the procedure.     A time-out was performed and the correct patient, procedure, and laterality were verified.    The right knee was examined and location for injection was identified - anterior medial. The area was cleaned with chlorhexidine, twice.  Ethyl chloride was then used for topical anaesthetic.  Then a mixture of lidocaine 1% 2 mL and Kenalog 40mg was injected into the intra-articular space.     The left knee was examined and location for injection was identified - anterior medial. The area was cleaned with chlorhexidine, twice.  Ethyl chloride was then used for topical anaesthetic.  Then a mixture of lidocaine 1% 2 mL and Kenalog 40mg was injected into the intra-articular space.     The patient tolerated the procedure  well. No complications.    1% Lidocaine  : Hospira  Lot #: [id number]  EXPIRATION DATE: 1 SEPT 2023  NDC: 7947-5597-89    MEDICATION: Triamcinolone 40 mg  LOT #: [id number]  EXPIRATION DATE:  11/2023  NDC#: [address]-0    MEDICATION: Triamcinolone 40 mg  LOT #: [id number]  EXPIRATION DATE:  11/2023  NDC#: [address]-7           Chart documentation done in part with Dragon Voice recognition Software. Although reviewed after completion, some word and grammatical error may remain.    Albert Tompkins MD

## 2022-08-17 NOTE — LETTER
8/17/2022         RE: Zurdo Dash  [address]  [facility] [address]        Dear Colleague,    Thank you for referring your patient, Zurdo Dash, to the [facility]. Please see a copy of my visit note below.    Subjective:    7/13/22    Patient seen today for wound left second toe.  Patient points to the dorsum of the PIPJ.  Has had this for a few months.  Patient has history of peripheral arterial disease.  Had left femoropopliteal bypass in January 2021.  Since then has had numerous surgeries because of complications from bypass.  Has had a flap for coverage and also seeing plastic surgery.  Has wound left groin and currently being followed up with infectious disease.  PICC line saw infectious disease yesterday vancomycin and ceftriaxone.  And will be pulling PICC line and starting oral medications.  Despite being on antibiotics some erythema second toe.  Denies purulence or odor.  The patient has diabetes mellitus with peripheral neuropathy.  He has chronic kidney disease stage III.  Patient has rheumatoid arthritis and takes Plaquenil.  He is retired.  40-pack-year history of smoking and quit 14 years ago.   Primary care is Dr. Rivers last seen 3/21/22     7/20/22 patient returns for left second and third toe ulcers.  Patient on oral antibiotics.  Dressing changed once a day on thigh and improving.  Patient has been wearing the crest pad on his second toe.  Dressing with Medihoney daily.  No new drainage.  States no change in the erythema.    8/17/22 patient returns for left second and third toe ulcers.  No drainage from third toe.  He is not dressing this.  Uses crest pad on fourth toe when he remembers.  States second toe has minimal drainage.  No purulence or odor.  There are now only dressing with Medihoney and Band-Aid and longitudinally on the dorsum of this toe.  Patient still taking oral antibiotics from a wound left groin status post vascular bypass.       "    ROS: See above         Allergies   Allergen Reactions     Blood-Group Specific Substance Other (See Comments)     Patient has a Non-specific antibody. Blood products may be delayed. Draw patient 24 hours prior to transfusion. For [facility] testing, draw one red top and two purple top tubes for all Type and Screen orders.     Seasonal Allergies        Current Outpatient Medications   Medication Sig Dispense Refill     acetaminophen (TYLENOL) 500 MG tablet Take 2 tablets (1,000 mg) by mouth 3 times daily       Alcohol Swabs PADS Uses four times daily       bisacodyl (DULCOLAX) 10 MG suppository Unwrap and insert 1 suppository rectally once daily as needed       blood glucose (NO BRAND SPECIFIED) test strip Use to test blood sugar 4 times daily or as directed.       Blood Glucose Monitoring Suppl (ACCU-CHEK GUIDE) w/Device KIT        bumetanide (BUMEX) 2 MG tablet Take 1 tablet (2 mg) by mouth daily       cetirizine (ZYRTEC) 10 MG tablet Take 10 mg by mouth At Bedtime       collagenase (SANTYL) 250 UNIT/GM external ointment Apply topically daily Apply to left heel as directed once daily       cyanocobalamin (VITAMIN B-12) 1000 MCG tablet Take 1 tablet (1,000 mcg) by mouth once a week       diphenhydrAMINE (BENADRYL) 25 MG capsule Take 1 capsule (25 mg) by mouth every 6 hours as needed for itching or allergies X 30 days then dc       dulaglutide (TRULICITY) 1.5 MG/0.5ML pen Inject 1.5 mg Subcutaneous every 7 days ON Wednesdays 10 mL 1     folic acid (FOLVITE) 1 MG tablet Take 1 tablet (1 mg) by mouth daily 100 tablet 2     Gauze Pads & Dressings (GAUZE SPONGE) 4\"X4\" PADS        glipiZIDE (GLUCOTROL) 5 MG tablet Take 0.5 tablets (2.5 mg) by mouth every morning       HYDROmorphone (DILAUDID) 2 MG tablet Take 1 tablet (2 mg) by mouth 2 times daily as needed for pain For urgent pain relief with rheumatoid arthritis flare-up 10 tablet 0     hydroxychloroquine (PLAQUENIL) 200 MG tablet Take 1 tablet (200 mg) by mouth " "daily 90 tablet 1     insulin aspart (NOVOLOG VIAL) 100 UNITS/ML vial Inject 8 Units Subcutaneous 3 times daily (with meals) 5 mL 1     insulin glargine (LANTUS VIAL) 100 UNIT/ML vial Inject 10 Units Subcutaneous At Bedtime 3 mL 1     insulin syringe-needle U-100 (30G X 1/2\" 0.5 ML) 30G X 1/2\" 0.5 ML miscellaneous Use 4 syringes daily or as directed.       Insulin Syringe-Needle U-100 28G X 1/2\" 0.5 ML MISC        Irrigation Supplies MISC        medical cannabis (Patient's own supply) See Admin Instructions (The purpose of this order is to document that the patient reports taking medical cannabis.  This is not a prescription, and is not used to certify that the patient has a qualifying medical condition.)       metoprolol succinate ER (TOPROL-XL) 50 MG 24 hr tablet Take 1 tablet (50 mg) by mouth daily       polyethylene glycol-propylene glycol (SYSTANE) 0.4-0.3 % SOLN ophthalmic solution Place 1 drop into both eyes 2 times daily as needed for dry eyes       pravastatin (PRAVACHOL) 10 MG tablet Take 1 tablet (10 mg) by mouth daily       senna-docusate (SENOKOT-S/PERICOLACE) 8.6-50 MG tablet Take 1 tablet by mouth 2 times daily as needed for constipation 50 tablet 0     sertraline (ZOLOFT) 50 MG tablet Take 1 tablet (50 mg) by mouth daily       sodium hypochlorite (DAKINS HALF-STRENGTH) 0.25 % external solution Use for damp to dry dressing changes two times a day left groin wound       syringe/needle, disp, (BD ECLIPSE SYRINGE) 25G X 1\" 3 ML MISC        triamcinolone (KENALOG) 0.1 % external cream Apply topically 2 times daily Apply to rash on neck, shoulders and lower extremities two times a day       warfarin ANTICOAGULANT (COUMADIN) 3 MG tablet Take 1/2 tab (1.5mg) by mouth on Sunday       WARFARIN SODIUM PO Take 3 mg by mouth Take 1 tab on Sun, Mon, Tues, Wed, Thurs and Fri.  And take 1/2 tab (1.5mg) on Sat         Patient Active Problem List   Diagnosis     Pain in limb     Hyperlipidemia LDL goal <100     " Hypertension goal BP (blood pressure) < 140/90     Hypogonadism     Advanced directives, counseling/discussion     Health Care Home     Type 2 diabetes mellitus with diabetic polyneuropathy, without long-term current use of insulin (H)     RBBB (right bundle branch block)     Ex-smoker     Family history of esophageal cancer     Gastroesophageal reflux disease, esophagitis presence not specified     Rheumatoid arthritis involving multiple sites with positive rheumatoid factor (H)     Pulmonary nodule     High risk medication use     Spondylosis of cervical region without myelopathy or radiculopathy     Erectile dysfunction, unspecified erectile dysfunction type     Type 2 diabetes mellitus without retinopathy (H)     Tubulovillous adenoma of colon     Diabetic polyneuropathy associated with type 2 diabetes mellitus (H)     Chronic bilateral low back pain without sciatica     Migraine equivalent     Posterior vitreous detachment of left eye     Pseudophakia, ou     Eyelid lesion, LLL     Dermatochalasis of both upper eyelids     Bradycardia     Primary osteoarthritis of both knees     Syncope     Trigger finger, acquired     CKD (chronic kidney disease) stage 3, GFR 30-59 ml/min (H)     Abdominal pain, generalized     PAD (peripheral artery disease) (H)     Immunosuppression (H)     Skin ulcer of toe of left foot, limited to breakdown of skin (H)     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 difficile diarrhea     H/O     Cataract 11/18/2011     Diabetic neuropathy (H)     mild, mostly soles and distal forefeet, worse on the left side.     Diverticulitis       ED (erectile dysfunction)      Ex-smoker     QUIT SMOKING FEB 2007     History of ETOH abuse     recovering, sober since 1997     Hyperlipidemia LDL goal <100      Hypertension goal BP (blood pressure) < 140/90      Hypogonadism      Obesity      PAD (peripheral artery disease) (H)     leg cramps, with exertion, no formal diagnosis of PAD and minimal if any symptoms at all.     RA (rheumatoid arthritis) (H)     Dr Bailon     Syncope        Past Surgical History:   Procedure Laterality Date     BYPASS GRAFT FEMOROPOPLITEAL Left 01/07/2021    Procedure: LEFT FEMORAL TO ABOVE KNEE POPLITEAL ARTERY BYPASS WITH PTFE VASCULAR GRAFT REMOVABLE RING 6MMX 50CM;  Surgeon: Myra Lopes MD;  Location: [facility] OR     CATARACT IOL, RT/LT       COLONOSCOPY  03/01/2018    [facility]     COMBINED REPAIR PTOSIS WITH BLEPHAROPLASTY BILATERAL Bilateral 08/16/2019    Procedure: BILATERAL UPPER EYELID BLEPHAROPLASTY AND BILATERAL PTOSIS REPAIR;  Surgeon: Janet Garcia MD;  Location: [facility] OR     ENDARTERECTOMY FEMORAL Left 01/07/2021    Procedure: LEFT FEMORAL ENDARTERECTOMY WITH PATCH ANGIOPLASTY PHOTOFIX  0.8 X 8CM;  Surgeon: Myra Lopes MD;  Location:  OR     EP PACEMAKER  01/2021     EXCISE LESION EYELID Left 08/16/2019    Procedure: LEFT LOWER EYELID BIOPSY;  Surgeon: Janet Garcia MD;  Location: [facility] OR     IR LOWER EXTREMITY ANGIOGRAM LEFT  12/17/2020     PHACOEMULSIFICATION WITH STANDARD INTRAOCULAR LENS IMPLANT  02/2019; 3/2019    left eye; right eye     TONSILLECTOMY       ZZHC INCISION TENDON SHEATH FINGER  04/2009    r hand ring finger       Family History   Problem Relation Age of Onset     Cerebrovascular Disease Mother      Arthritis Mother      Osteoporosis Mother      Alzheimer Disease Father      Arthritis Father      Cancer Father      Diabetes Maternal Grandmother      Cardiovascular Maternal Grandmother      Other Cancer Brother      Cancer Paternal Aunt      Hypertension No family hx  of      Thyroid Disease No family hx of      Glaucoma No family hx of      Macular Degeneration No family hx of        Social History     Tobacco Use     Smoking status: Former Smoker     Packs/day: 1.00     Years: 40.00     Pack years: 40.00     Types: Cigarettes     Quit date: 3/16/2007     Years since quitting: 15.4     Smokeless tobacco: Never Used   Substance Use Topics     Alcohol use: No     Alcohol/week: 0.0 standard drinks         Exam:    Vitals: There were no vitals taken for this visit.  BMI: There is no height or weight on file to calculate BMI.  Height: Data Unavailable    Constitutional/ general:  Pt is in no apparent distress, appears well-nourished.  Cooperative with history and physical exam.     Psych:  The patient answered questions appropriately.  Normal affect.  Seems to have reasonable expectations, in terms of treatment.       Lungs:  Non labored breathing, non labored speech. No cough.  No audible wheezing. Even, quiet breathing.       Vascular:   Difficult to palpate pedal pulses.  Increased edema left lower extremity.                                                                   Neuro:  Alert and oriented x 3.  Monofilament absent to midfoot.      Derm: Skin thin shiny atrophic with no hair growth.  Left second toe hammered and slightly elevated.      Wound on the dorsum of the PIPJ in the past measured 6 x 4 mm, 9 x 5 mm, and today it measures 8 x 6 mm..  Base goes down to subcutaneous tissue and is covered with Medihoney.  No purulence or odor.  Erythema and edema surrounding this.  Left third toe is hammered.  There is an ulceration left third toe distal just a tiny eschar    Musculoskeletal:    Lower extremity muscle strength is normal.  Patient is ambulatory without an assistive device or brace.  Pronated arch with weightbearing.  All lesser toes are hammered and stiff.  Generally his feet are stiff with a decreased range of motion bilaterally.       Collected 7/13/2022  2:42 PM      Status: Final result     Visible to patient: Yes (seen)     Dx: Diabetic ulcer of toe of left foot as...    Specimen Information: Foot, Left; Skin         0 Result Notes    Culture 1+ Normal tra            Resulting Agency: [facility]           Specimen Collected: 07/13/22  2:42 PM Last Resulted: 07/15/22 10:21 AM               A:  Diabetes mellitus with peripheral neuropathy and LOPS  Peripheral arterial disease s/p bypass  Left second hammertoe with ulcer and erythema  Left third hammertoe with distal  eschar      P:   Discussed third toe tiny eschar.  Encouraged him to use crest pad.  Discussed second toe somewhat more superficial and perhaps slightly smaller.  Continue dressing.  Continue using Band-Aid to keep offloaded.  We will continue to use DH shoe to keep offloaded.  Discussed that this will take a long time to heal.  Return to clinic in 3 weeks.        Aaron Dent DPM, FACFAS              Again, thank you for allowing me to participate in the care of your patient.        Sincerely,        Aaron Dent DPM

## 2022-08-18 ENCOUNTER — ONCOLOGY VISIT (OUTPATIENT)
Dept: RADIATION ONCOLOGY | Facility: CLINIC | Age: 77
End: 2022-08-18

## 2022-08-18 ENCOUNTER — APPOINTMENT (OUTPATIENT)
Dept: RADIATION ONCOLOGY | Facility: CLINIC | Age: 77
End: 2022-08-18
Payer: COMMERCIAL

## 2022-08-18 PROCEDURE — 77373 STRTCTC BDY RAD THER TX DLVR: CPT | Performed by: RADIOLOGY

## 2022-08-18 NOTE — PROCEDURES
TITUS ROMAN        MR#: [id number]        STEREOTACTIC BODY RADIOTHERAPY TREATMENT (SBRT) NOTE        DATE OF SERVICE:  8/16/2022            Diagnosis: C34.11  Malignant neoplasm of upper lobe, right bronchus or lung,cancer.  c   T  N  M  .     Medical Indication for SBRT: The patient has medically inoperable Right   Non-small cell  lung cancer due to poor pulmonary function.     Intent of SBRT: Curative     Narrative:  This is a treatment note for 1st fraction of the 5 fraction SBRT for right non-small cell     lung cancer.      The patient was positioned in the custom made immobilization in the Stereotactic body   frame allowing X, Y, Z coordinates to be verified. The patient had a cone beam CT scan   today prior to treatment to verify the new central axis coordinates.       The new XYZ coordinates has shifted to ? 0. 4  cm X(lateral), ? 1.2 cm Y(longitudinal),   and  ? 0. 3 cm Z(vertical).  After the cone beam CT verifications, the CAX set up was on   the treatment machine (TruebeRSVP Law). The patient had 1000 cGy delivered to the 82.5%   isodose line with heterogeneity corrections with a 6 MV photons. The cumulative dose is    1000 cGy of planned 5000 cGy.     No complications were encountered.  There were no complaint or skin reactions seen.   The patient tolerated the therapy well and left the department in stable condition and will   return for the 2nd fraction of total 5 on August 18, 2022.    The patient will continue with radiotherapy.     Ari Mitchell M.D.   Department of Therapeutic Radiology

## 2022-08-21 DIAGNOSIS — E11.42 TYPE 2 DIABETES MELLITUS WITH DIABETIC POLYNEUROPATHY, WITHOUT LONG-TERM CURRENT USE OF INSULIN (H): Primary | ICD-10-CM

## 2022-08-22 ENCOUNTER — APPOINTMENT (OUTPATIENT)
Dept: RADIATION ONCOLOGY | Facility: CLINIC | Age: 77
End: 2022-08-22
Payer: COMMERCIAL

## 2022-08-22 ENCOUNTER — ONCOLOGY VISIT (OUTPATIENT)
Dept: RADIATION ONCOLOGY | Facility: CLINIC | Age: 77
End: 2022-08-22

## 2022-08-22 PROCEDURE — 77373 STRTCTC BDY RAD THER TX DLVR: CPT | Performed by: RADIOLOGY

## 2022-08-23 NOTE — PROCEDURES
TITUS ROMAN        MR#: [id number]        STEREOTACTIC BODY RADIOTHERAPY TREATMENT (SBRT) NOTE        DATE OF SERVICE:  8/16/2022            Diagnosis: C34.11  Malignant neoplasm of upper lobe, right bronchus or lung     Medical Indication for SBRT: The patient has medically inoperable Right   Non-small cell  lung cancer due to poor pulmonary function.     Intent of SBRT: Curative     Narrative:  This is a treatment note for 3rd fraction of the 5 fraction SBRT for right non-small cell     lung cancer.      The patient was positioned in the custom made immobilization in the Stereotactic body   frame allowing X, Y, Z coordinates to be verified. The patient had a cone beam CT scan   today prior to treatment to verify the new central axis coordinates.       The new XYZ coordinates has shifted to 0. 2  cm X(lateral), 0.6 cm Y(longitudinal),   and 0. 1 cm Z(vertical).  After the cone beam CT verifications, the CAX set up was on   the treatment machine (Truebeam). The patient had 1000 cGy delivered to the 82.5%   isodose line with heterogeneity corrections with a 6 MV photons. The cumulative dose is    3000 cGy of planned 5000 cGy.     No complications were encountered.  There were no complaint or skin reactions seen.   The patient tolerated the therapy well and left the department in stable condition and will   return for the 4th fraction of total 5 on August 22, 2022.    The patient will continue with radiotherapy.     Lalita Funez M.D.   Department of Therapeutic Radiology

## 2022-08-24 ENCOUNTER — OFFICE VISIT (OUTPATIENT)
Dept: RADIATION ONCOLOGY | Facility: CLINIC | Age: 77
End: 2022-08-24
Payer: COMMERCIAL

## 2022-08-24 VITALS
TEMPERATURE: 98.4 F | BODY MASS INDEX: 31.92 KG/M2 | HEART RATE: 63 BPM | DIASTOLIC BLOOD PRESSURE: 84 MMHG | SYSTOLIC BLOOD PRESSURE: 159 MMHG | WEIGHT: 213 LBS | OXYGEN SATURATION: 97 % | RESPIRATION RATE: 18 BRPM

## 2022-08-24 DIAGNOSIS — C34.11 MALIGNANT NEOPLASM OF UPPER LOBE OF RIGHT LUNG (H): Primary | ICD-10-CM

## 2022-08-24 PROCEDURE — 99207 PR DROP WITH A PROCEDURE: CPT | Performed by: RADIOLOGY

## 2022-08-24 PROCEDURE — 77373 STRTCTC BDY RAD THER TX DLVR: CPT | Performed by: RADIOLOGY

## 2022-08-24 RX ORDER — INSULIN ADMIN. SUPPLIES
INSULIN PEN (EA) SUBCUTANEOUS
Qty: 100 EACH | Refills: 1 | Status: SHIPPED | OUTPATIENT
Start: 2022-08-24 | End: 2022-12-13

## 2022-08-24 ASSESSMENT — PAIN SCALES - GENERAL: PAINLEVEL: NO PAIN (0)

## 2022-08-24 NOTE — LETTER
2022         RE: Zurdo Dash  [address]  [place] [address]        Dear Colleague,    Thank you for referring your patient, Zurdo Dash, to the [facility] RADIATION ONCOLOGY [place]. Please see a copy of my visit note below.    [facility] PHYSICIANS  SPECIALIZING IN BREAKTHROUGHS  Radiation Oncology    On Treatment Visit Note      Zurdo Dash      Date: 2022   MRN: [id number]   : 1945  Diagnosis: Lung Cancer      Reason for Visit:  On Radiation Treatment Visit     Treatment Summary to Date  Treatment Site: RUL Current Dose: 4000/5000 cGy Fractions: 4/5      Chemotherapy  Chemo concurrent with radx?: No    Subjective:     Doing well with treatment with no complaints.    Nursing ROS:   Nutrition Alteration  Diet Type: Patient's Preference  Skin  Skin Reaction: 0 - No changes  Skin Note: Reviewed may use lotion to radiation site as needed for dry skin.        Cardiovascular  Respiratory effort: 2 - Mild dyspnea on exertion (baseline)  Gastrointestinal  Nausea: 0 - None     Psychosocial  Psychosocial Note: Patient denies fatigue  Pain Assessment  0-10 Pain Scale: 0      Objective:   BP (!) 159/84   Pulse 63   Temp 98.4  F (36.9  C) (Oral)   Resp 18   Wt 96.6 kg (213 lb)   SpO2 97%   BMI 31.92 kg/m    Gen: Appears well, in no acute distress  Skin: No erythema    Labs:  CBC RESULTS: Recent Labs   Lab Test 22  1611   WBC 9.2   RBC 4.11*   HGB 12.1*   HCT 38.3*   MCV 93   MCH 29.4   MCHC 31.6   RDW 14.4        ELECTROLYTES:  Recent Labs   Lab Test 22  1611      POTASSIUM 4.7   CHLORIDE 111*   NEW 8.8   CO2 22   BUN 42*   CR 1.54*   *       Assessment:    Tolerating radiation therapy well.  All questions and concerns addressed.    Toxicities:  Dyspnea: Grade 0: No toxicity  Esophagitis: Grade 0: No toxicity    Plan:   1. Continue current therapy.    2. After completing therapy follow-up in 1 month      Mosaiq chart  and setup information reviewed  MVCT/IGRT images checked    Medication Review  Med list reviewed with patient?: Yes  Med Note: Plaquenil and Methotrexate on hold during radiation treatments, fax with instructions sent to facility on 8/17/22.    Educational Topic Discussed  Additional Instructions: Last pacemaker check this Friday at [facility]. Follow up with Rad Onc in 1 month  Education Instructions: Fatigue, monitoring breathing, skin        Lalita Funez MD    Please do not send letter to referring physician.            Again, thank you for allowing me to participate in the care of your patient.        Sincerely,        NAYAN Funez MD

## 2022-08-25 ENCOUNTER — LAB REQUISITION (OUTPATIENT)
Dept: LAB | Facility: CLINIC | Age: 77
End: 2022-08-25
Payer: COMMERCIAL

## 2022-08-25 DIAGNOSIS — I73.9 PERIPHERAL VASCULAR DISEASE, UNSPECIFIED (H): ICD-10-CM

## 2022-08-26 ENCOUNTER — APPOINTMENT (OUTPATIENT)
Dept: RADIATION ONCOLOGY | Facility: CLINIC | Age: 77
End: 2022-08-26
Payer: COMMERCIAL

## 2022-08-26 ENCOUNTER — ONCOLOGY VISIT (OUTPATIENT)
Dept: RADIATION ONCOLOGY | Facility: CLINIC | Age: 77
End: 2022-08-26

## 2022-08-26 PROCEDURE — 77435 SBRT MANAGEMENT: CPT | Performed by: RADIOLOGY

## 2022-08-26 PROCEDURE — 77373 STRTCTC BDY RAD THER TX DLVR: CPT | Performed by: RADIOLOGY

## 2022-08-26 NOTE — PROGRESS NOTES
[facility] PHYSICIANS  SPECIALIZING IN BREAKTHROUGHS  Radiation Oncology    On Treatment Visit Note      Zurdo Dash      Date: 2022   MRN: [id number]   : 1945  Diagnosis: Lung Cancer      Reason for Visit:  On Radiation Treatment Visit     Treatment Summary to Date  Treatment Site: RUL Current Dose: 4000/5000 cGy Fractions: 4/5      Chemotherapy  Chemo concurrent with radx?: No    Subjective:     Doing well with treatment with no complaints.    Nursing ROS:   Nutrition Alteration  Diet Type: Patient's Preference  Skin  Skin Reaction: 0 - No changes  Skin Note: Reviewed may use lotion to radiation site as needed for dry skin.        Cardiovascular  Respiratory effort: 2 - Mild dyspnea on exertion (baseline)  Gastrointestinal  Nausea: 0 - None     Psychosocial  Psychosocial Note: Patient denies fatigue  Pain Assessment  0-10 Pain Scale: 0      Objective:   BP (!) 159/84   Pulse 63   Temp 98.4  F (36.9  C) (Oral)   Resp 18   Wt 96.6 kg (213 lb)   SpO2 97%   BMI 31.92 kg/m    Gen: Appears well, in no acute distress  Skin: No erythema    Labs:  CBC RESULTS: Recent Labs   Lab Test 22  1611   WBC 9.2   RBC 4.11*   HGB 12.1*   HCT 38.3*   MCV 93   MCH 29.4   MCHC 31.6   RDW 14.4        ELECTROLYTES:  Recent Labs   Lab Test 22  1611      POTASSIUM 4.7   CHLORIDE 111*   NEW 8.8   CO2 22   BUN 42*   CR 1.54*   *       Assessment:    Tolerating radiation therapy well.  All questions and concerns addressed.    Toxicities:  Dyspnea: Grade 0: No toxicity  Esophagitis: Grade 0: No toxicity    Plan:   1. Continue current therapy.    2. After completing therapy follow-up in 1 month      Mosaiq chart and setup information reviewed  MVCT/IGRT images checked    Medication Review  Med list reviewed with patient?: Yes  Med Note: Plaquenil and Methotrexate on hold during radiation treatments, fax with instructions sent to facility on 22.    Educational Topic  Discussed  Additional Instructions: Last pacemaker check this Friday at [facility]. Follow up with Rad Onc in 1 month  Education Instructions: Fatigue, monitoring breathing, skin        Lalita Funez MD    Please do not send letter to referring physician.

## 2022-08-27 NOTE — PROCEDURES
TITUS ROMAN        MR#: [id number]        STEREOTACTIC BODY RADIOTHERAPY TREATMENT (SBRT) NOTE        DATE OF SERVICE:  8/26/2022            Diagnosis: C34.11  Malignant neoplasm of upper lobe, right bronchus or lung     Medical Indication for SBRT: The patient has medically inoperable Right   Non-small cell  lung cancer due to poor pulmonary function.     Intent of SBRT: Curative     Narrative:  This is a treatment note for 5th fraction of the 5 fraction SBRT for right non-small cell     lung cancer.      The patient was positioned in the custom made immobilization in the Stereotactic body   frame allowing X, Y, Z coordinates to be verified. The patient had a cone beam CT scan   today prior to treatment to verify the new central axis coordinates.       The new XYZ coordinates has shifted to ? 0.5   cm X(lateral), ?0.26  cm Y(longitudinal),   and  ? -0.08  cm Z(vertical).  After the cone beam CT verifications, the CAX set up was   on the treatment machine (Truebeam). The patient had 1000 cGy delivered to the 82.5%   isodose line with heterogeneity corrections with a 6 MV photons. The cumulative dose is    5000 cGy of planned 5000 cGy.     No complications were encountered.  There were no complaint or skin reactions seen.   The patient tolerated the therapy well and left the department in stable condition      Ari Mitchell M.D.   Department of Therapeutic Radiology

## 2022-08-29 ENCOUNTER — PATIENT OUTREACH (OUTPATIENT)
Dept: RADIATION ONCOLOGY | Facility: CLINIC | Age: 77
End: 2022-08-29

## 2022-08-29 NOTE — PROGRESS NOTES
Spoke with patient's wife, Yoli, today about Zurdo's pacemaker check appointment last Friday after his last radiation therapy treatment. Yoli states they were not able to make it to the appointment and they now both have COVID-19. Spoke with patient's cardiology clinic device RN and patient is rescheduled for a check on Wednesday, 8/31/22 at 3:15 pm. Informed Cardiology clinic that patient has COVID-19 and to please follow up with wife, Yoli, regarding the appointment this week. Informed Yoli this RN will contact her and Zurdo next week to follow up and to schedule a 1 month return appointment with Dr. Funez.    Heidi Villar RN

## 2022-08-30 LAB — INR PPP: 1.89 (ref 0.85–1.15)

## 2022-08-30 PROCEDURE — 36415 COLL VENOUS BLD VENIPUNCTURE: CPT | Mod: ORL | Performed by: NURSE PRACTITIONER

## 2022-08-30 PROCEDURE — 85610 PROTHROMBIN TIME: CPT | Mod: ORL | Performed by: NURSE PRACTITIONER

## 2022-08-30 PROCEDURE — P9603 ONE-WAY ALLOW PRORATED MILES: HCPCS | Mod: ORL | Performed by: NURSE PRACTITIONER

## 2022-08-31 NOTE — PROCEDURES
TITUS ROMAN        MR#: [id number]        STEREOTACTIC BODY RADIOTHERAPY TREATMENT (SBRT) NOTE        DATE OF SERVICE:  8/24/2022            Diagnosis: C34.11  Malignant neoplasm of upper lobe, right bronchus or lung     Medical Indication for SBRT: The patient has medically inoperable Right   Non-small cell  lung cancer due to poor pulmonary function.     Intent of SBRT: Curative     Narrative:  This is a treatment note for 4th fraction of the 5 fraction SBRT for right non-small cell     lung cancer.      The patient was positioned in the custom made immobilization in the Stereotactic body   frame allowing X, Y, Z coordinates to be verified. The patient had a cone beam CT scan   today prior to treatment to verify the new central axis coordinates.       The new XYZ coordinates has shifted to 0. 5  cm X(lateral), 1.1 cm Y(longitudinal),   and 0. 4 cm Z(vertical).  After the cone beam CT verifications, the CAX set up was on   the treatment machine (Truebeam). The patient had 1000 cGy delivered to the 82.5%   isodose line with heterogeneity corrections with a 6 MV photons. The cumulative dose is    4000 cGy of planned 5000 cGy.     No complications were encountered.  There were no complaint or skin reactions seen.   The patient tolerated the therapy well and left the department in stable condition and will   return for the 5th fraction of total 5 on August 26, 2022.    The patient will continue with radiotherapy.     Lalita Funez M.D.   Department of Therapeutic Radiology

## 2022-09-01 ENCOUNTER — TELEPHONE (OUTPATIENT)
Dept: MULTI SPECIALTY CLINIC | Facility: CLINIC | Age: 77
End: 2022-09-01

## 2022-09-01 NOTE — TELEPHONE ENCOUNTER
Called- pt can continue his hydroxychlorquine. Writer sees not other meds on file or in last OV.    Sabrina HEATH RN Specialty Triage 9/1/2022 1:27 PM

## 2022-09-01 NOTE — TELEPHONE ENCOUNTER
Patient and his wife were both diagnoses with Covid.  They would like a call back to discuss if any of their meds need to be adjusted.

## 2022-09-05 ENCOUNTER — LAB REQUISITION (OUTPATIENT)
Dept: LAB | Facility: CLINIC | Age: 77
End: 2022-09-05
Payer: COMMERCIAL

## 2022-09-05 DIAGNOSIS — I73.9 PERIPHERAL VASCULAR DISEASE, UNSPECIFIED (H): ICD-10-CM

## 2022-09-06 ENCOUNTER — DOCUMENTATION ONLY (OUTPATIENT)
Dept: RADIATION ONCOLOGY | Facility: CLINIC | Age: 77
End: 2022-09-06
Payer: COMMERCIAL

## 2022-09-06 LAB — INR PPP: 3.47 (ref 0.85–1.15)

## 2022-09-06 PROCEDURE — 36415 COLL VENOUS BLD VENIPUNCTURE: CPT | Mod: ORL | Performed by: INTERNAL MEDICINE

## 2022-09-06 PROCEDURE — 85610 PROTHROMBIN TIME: CPT | Mod: ORL | Performed by: INTERNAL MEDICINE

## 2022-09-06 PROCEDURE — P9603 ONE-WAY ALLOW PRORATED MILES: HCPCS | Mod: ORL | Performed by: INTERNAL MEDICINE

## 2022-09-07 ENCOUNTER — OFFICE VISIT (OUTPATIENT)
Dept: PODIATRY | Facility: CLINIC | Age: 77
End: 2022-09-07
Payer: COMMERCIAL

## 2022-09-07 VITALS — DIASTOLIC BLOOD PRESSURE: 82 MMHG | HEART RATE: 64 BPM | SYSTOLIC BLOOD PRESSURE: 128 MMHG

## 2022-09-07 DIAGNOSIS — L97.523 DIABETIC ULCER OF TOE OF LEFT FOOT ASSOCIATED WITH TYPE 2 DIABETES MELLITUS, WITH NECROSIS OF MUSCLE (H): Primary | ICD-10-CM

## 2022-09-07 DIAGNOSIS — E11.51 TYPE II DIABETES MELLITUS WITH PERIPHERAL ARTERY DISEASE (H): ICD-10-CM

## 2022-09-07 DIAGNOSIS — E11.621 DIABETIC ULCER OF TOE OF LEFT FOOT ASSOCIATED WITH TYPE 2 DIABETES MELLITUS, WITH NECROSIS OF MUSCLE (H): Primary | ICD-10-CM

## 2022-09-07 DIAGNOSIS — I73.9 PAD (PERIPHERAL ARTERY DISEASE) (H): ICD-10-CM

## 2022-09-07 PROCEDURE — 99214 OFFICE O/P EST MOD 30 MIN: CPT | Performed by: PODIATRIST

## 2022-09-07 NOTE — LETTER
9/7/2022         RE: Zurdo Dash  [address]  [facility] [address]        Dear Colleague,    Thank you for referring your patient, Zurdo Dash, to the [facility]. Please see a copy of my visit note below.    Subjective:    7/13/22    Patient seen today for wound left second toe.  Patient points to the dorsum of the PIPJ.  Has had this for a few months.  Patient has history of peripheral arterial disease.  Had left femoropopliteal bypass in January 2021.  Since then has had numerous surgeries because of complications from bypass.  Has had a flap for coverage and also seeing plastic surgery.  Has wound left groin and currently being followed up with infectious disease.  PICC line saw infectious disease yesterday vancomycin and ceftriaxone.  And will be pulling PICC line and starting oral medications.  Despite being on antibiotics some erythema second toe.  Denies purulence or odor.  The patient has diabetes mellitus with peripheral neuropathy.  He has chronic kidney disease stage III.  Patient has rheumatoid arthritis and takes Plaquenil.  He is retired.  40-pack-year history of smoking and quit 14 years ago.   Primary care is Dr. Rivers last seen 3/21/22     7/20/22 patient returns for left second and third toe ulcers.  Patient on oral antibiotics.  Dressing changed once a day on thigh and improving.  Patient has been wearing the crest pad on his second toe.  Dressing with Medihoney daily.  No new drainage.  States no change in the erythema.    8/17/22 patient returns for left second and third toe ulcers.  No drainage from third toe.  He is not dressing this.  Uses crest pad on fourth toe when he remembers.  States second toe has minimal drainage.  No purulence or odor.  There are now only dressing with Medihoney and Band-Aid and longitudinally on the dorsum of this toe.  Patient still taking oral antibiotics from a wound left groin status post vascular bypass.        9/7/22 patient returns for left second and third toe ulcers.  No drainage, purulence, odor from third toe.  He states most the time not using crest pad on fourth toe to offload this as he is forgetting this.  He notes the wound over the second toe is getting worse.  Still dressing with Medihoney and Band-Aid and keeping offloaded.  Went to ED for this on 9/3/2022 and he was instructed to follow-up on outpatient basis.  Patient taking cefdinir and doxycycline.  Patient very concerned about amputation and at what level this may be.  He states groin wound healed now.       ROS: See above         Allergies   Allergen Reactions     Blood-Group Specific Substance Other (See Comments)     Patient has a Non-specific antibody. Blood products may be delayed. Draw patient 24 hours prior to transfusion. For [facility] testing, draw one red top and two purple top tubes for all Type and Screen orders.     Seasonal Allergies        Current Outpatient Medications   Medication Sig Dispense Refill     acetaminophen (TYLENOL) 500 MG tablet Take 2 tablets (1,000 mg) by mouth 3 times daily       Alcohol Swabs PADS Uses four times daily       bisacodyl (DULCOLAX) 10 MG suppository Unwrap and insert 1 suppository rectally once daily as needed       blood glucose (NO BRAND SPECIFIED) test strip Use to test blood sugar 4 times daily or as directed.       Blood Glucose Monitoring Suppl (ACCU-CHEK GUIDE) w/Device KIT        bumetanide (BUMEX) 2 MG tablet Take 1 tablet (2 mg) by mouth daily       cetirizine (ZYRTEC) 10 MG tablet Take 10 mg by mouth At Bedtime       collagenase (SANTYL) 250 UNIT/GM external ointment Apply topically daily Apply to left heel as directed once daily       cyanocobalamin (VITAMIN B-12) 1000 MCG tablet Take 1 tablet (1,000 mcg) by mouth once a week       diphenhydrAMINE (BENADRYL) 25 MG capsule Take 1 capsule (25 mg) by mouth every 6 hours as needed for itching or allergies X 30 days then dc       dulaglutide  "(TRULICITY) 1.5 MG/0.5ML pen Inject 1.5 mg Subcutaneous every 7 days ON Wednesdays 10 mL 1     folic acid (FOLVITE) 1 MG tablet Take 1 tablet (1 mg) by mouth daily 100 tablet 2     Gauze Pads & Dressings (GAUZE SPONGE) 4\"X4\" PADS        glipiZIDE (GLUCOTROL) 5 MG tablet Take 0.5 tablets (2.5 mg) by mouth every morning       HYDROmorphone (DILAUDID) 2 MG tablet Take 1 tablet (2 mg) by mouth 2 times daily as needed for pain For urgent pain relief with rheumatoid arthritis flare-up 10 tablet 0     hydroxychloroquine (PLAQUENIL) 200 MG tablet Take 1 tablet (200 mg) by mouth daily 90 tablet 1     insulin aspart (NOVOLOG VIAL) 100 UNITS/ML vial Inject 8 Units Subcutaneous 3 times daily (with meals) 5 mL 1     insulin glargine (LANTUS VIAL) 100 UNIT/ML vial Inject 10 Units Subcutaneous At Bedtime 3 mL 1     insulin syringe-needle U-100 (30G X 1/2\" 0.5 ML) 30G X 1/2\" 0.5 ML miscellaneous Use 4 syringes daily or as directed.       Insulin Syringe-Needle U-100 28G X 1/2\" 0.5 ML MISC        Irrigation Supplies MISC        medical cannabis (Patient's own supply) See Admin Instructions (The purpose of this order is to document that the patient reports taking medical cannabis.  This is not a prescription, and is not used to certify that the patient has a qualifying medical condition.)       metoprolol succinate ER (TOPROL-XL) 50 MG 24 hr tablet Take 1 tablet (50 mg) by mouth daily       NOVOFINE AUTOCOVER PEN NEEDLE 30G X 8 MM miscellaneous USE FOUR TIMES A  each 1     polyethylene glycol-propylene glycol (SYSTANE) 0.4-0.3 % SOLN ophthalmic solution Place 1 drop into both eyes 2 times daily as needed for dry eyes       pravastatin (PRAVACHOL) 10 MG tablet Take 1 tablet (10 mg) by mouth daily       senna-docusate (SENOKOT-S/PERICOLACE) 8.6-50 MG tablet Take 1 tablet by mouth 2 times daily as needed for constipation 50 tablet 0     sertraline (ZOLOFT) 50 MG tablet Take 1 tablet (50 mg) by mouth daily       sodium " "hypochlorite (DAKINS HALF-STRENGTH) 0.25 % external solution Use for damp to dry dressing changes two times a day left groin wound       syringe/needle, disp, (BD ECLIPSE SYRINGE) 25G X 1\" 3 ML MISC        triamcinolone (KENALOG) 0.1 % external cream Apply topically 2 times daily Apply to rash on neck, shoulders and lower extremities two times a day       warfarin ANTICOAGULANT (COUMADIN) 3 MG tablet Take 1/2 tab (1.5mg) by mouth on Sunday       WARFARIN SODIUM PO Take 3 mg by mouth Take 1 tab on Sun, Mon, Tues, Wed, Thurs and Fri.  And take 1/2 tab (1.5mg) on Sat         Patient Active Problem List   Diagnosis     Pain in limb     Hyperlipidemia LDL goal <100     Hypertension goal BP (blood pressure) < 140/90     Hypogonadism     Advanced directives, counseling/discussion     Health Care Home     Type 2 diabetes mellitus with diabetic polyneuropathy, without long-term current use of insulin (H)     RBBB (right bundle branch block)     Ex-smoker     Family history of esophageal cancer     Gastroesophageal reflux disease, esophagitis presence not specified     Rheumatoid arthritis involving multiple sites with positive rheumatoid factor (H)     Pulmonary nodule     High risk medication use     Spondylosis of cervical region without myelopathy or radiculopathy     Erectile dysfunction, unspecified erectile dysfunction type     Type 2 diabetes mellitus without retinopathy (H)     Tubulovillous adenoma of colon     Diabetic polyneuropathy associated with type 2 diabetes mellitus (H)     Chronic bilateral low back pain without sciatica     Migraine equivalent     Posterior vitreous detachment of left eye     Pseudophakia, ou     Eyelid lesion, LLL     Dermatochalasis of both upper eyelids     Bradycardia     Primary osteoarthritis of both knees     Syncope     Trigger finger, acquired     CKD (chronic kidney disease) stage 3, GFR 30-59 ml/min (H)     Abdominal pain, generalized     PAD (peripheral artery disease) (H)     " Immunosuppression (H)     Skin ulcer of toe of left foot, limited to breakdown of skin (H)     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 difficile diarrhea     H/O     Cataract 11/18/2011     Diabetic neuropathy (H)     mild, mostly soles and distal forefeet, worse on the left side.     Diverticulitis      ED (erectile dysfunction)      Ex-smoker     QUIT SMOKING FEB 2007     History of ETOH abuse     recovering, sober since 1997     Hyperlipidemia LDL goal <100      Hypertension goal BP (blood pressure) < 140/90      Hypogonadism      Obesity      PAD (peripheral artery disease) (H)     leg cramps, with exertion, no formal diagnosis of PAD and minimal if any symptoms at all.     RA (rheumatoid arthritis) (H)     Dr SharpUvaldo     Syncope        Past Surgical History:   Procedure Laterality Date     BYPASS GRAFT FEMOROPOPLITEAL Left 01/07/2021    Procedure: LEFT FEMORAL TO ABOVE KNEE POPLITEAL ARTERY BYPASS WITH PTFE VASCULAR GRAFT REMOVABLE RING 6MMX 50CM;  Surgeon: Myra Lopes MD;  Location: [facility] OR     CATARACT IOL, RT/LT       COLONOSCOPY  03/01/2018    [facility]     COMBINED REPAIR PTOSIS WITH BLEPHAROPLASTY BILATERAL Bilateral 08/16/2019    Procedure: BILATERAL UPPER EYELID BLEPHAROPLASTY AND BILATERAL PTOSIS REPAIR;  Surgeon: Janet Garcia MD;  Location: [facility] OR     ENDARTERECTOMY FEMORAL Left 01/07/2021    Procedure: LEFT FEMORAL ENDARTERECTOMY WITH PATCH ANGIOPLASTY PHOTOFIX  0.8 X 8CM;  Surgeon: Myra Lopes MD;  Location:  OR     EP PACEMAKER  01/2021     EXCISE LESION EYELID Left 08/16/2019    Procedure: LEFT LOWER  EYELID BIOPSY;  Surgeon: Janet Garcia MD;  Location: [facility] OR     IR LOWER EXTREMITY ANGIOGRAM LEFT  12/17/2020     PHACOEMULSIFICATION WITH STANDARD INTRAOCULAR LENS IMPLANT  02/2019; 3/2019    left eye; right eye     TONSILLECTOMY       ZZHC INCISION TENDON SHEATH FINGER  04/2009    r hand ring finger       Family History   Problem Relation Age of Onset     Cerebrovascular Disease Mother      Arthritis Mother      Osteoporosis Mother      Alzheimer Disease Father      Arthritis Father      Cancer Father      Diabetes Maternal Grandmother      Cardiovascular Maternal Grandmother      Other Cancer Brother      Cancer Paternal Aunt      Hypertension No family hx of      Thyroid Disease No family hx of      Glaucoma No family hx of      Macular Degeneration No family hx of        Social History     Tobacco Use     Smoking status: Former Smoker     Packs/day: 1.00     Years: 40.00     Pack years: 40.00     Types: Cigarettes     Quit date: 3/16/2007     Years since quitting: 15.4     Smokeless tobacco: Never Used   Substance Use Topics     Alcohol use: No     Alcohol/week: 0.0 standard drinks         Exam:    Vitals: There were no vitals taken for this visit.  BMI: There is no height or weight on file to calculate BMI.  Height: Data Unavailable    Constitutional/ general:  Pt is in no apparent distress, appears well-nourished.  Cooperative with history and physical exam.  Patient seen with wife today.  His daughter is also on his cell phone.    Psych:  The patient answered questions appropriately.  Normal affect.  Seems to have reasonable expectations, in terms of treatment.     Lungs:  Non labored breathing, non labored speech. No cough.  No audible wheezing. Even, quiet breathing.       Vascular:   Difficult to palpate pedal pulses.  Increased edema left lower extremity.                                                                   Neuro:  Alert and oriented x 3.  Monofilament absent to midfoot.      Derm: Skin  thin shiny atrophic with no hair growth.     Wound on the dorsum of the PIPJ in the past measured 6 x 4 mm, 9 x 5 mm, 8 x 6 mm, and today measures 12 x 8 mm..  Base now goes down to periosteum and joint capsule and can see head of proximal phalanx through this..  No purulence or odor.  Erythema and edema surrounding this, perhaps slightly decreased from last visit.  Left third toe is hammered.  There is small eschar distal.  No erythema or edema.    Musculoskeletal:    Lower extremity muscle strength is normal.  Patient is ambulatory without an assistive device or brace.  Pronated arch with weightbearing.  All lesser toes are hammered and stiff.  Generally his feet are stiff with a decreased range of motion bilaterally.       Collected 7/13/2022  2:42 PM     Status: Final result     Visible to patient: Yes (seen)     Dx: Diabetic ulcer of toe of left foot as...    Specimen Information: Foot, Left; Skin         0 Result Notes    Culture 1+ Normal tra            Resulting Agency: [facility]           Specimen Collected: 07/13/22  2:42 PM Last Resulted: 07/15/22 10:21 AM               A:  Diabetes mellitus with peripheral neuropathy and LOPS  Peripheral arterial disease s/p bypass  Left second hammertoe with ulcer and erythema  Left third hammertoe with distal  eschar      P:   Discussed third toe small eschar.  Encouraged him to use crest pad to keep offloaded.  Discussed second toe wound now much larger and bone prominent.  There is no increased in edema and erythema here and may even slightly be decreased.  No gross signs of infection at this time.  Discussed with patient and family members that with prominent bone very doubtful this will heal now.  Patient will clean daily with Betadine and dressed with Medihoney and Band-Aid and we gave him supplies today.  Continue to keep offloaded with DH shoe.  Will refer patient to vascular surgery as he will probably need some type of amputation in the future.  Discussed toe  amputation versus BKA.  Would have to discuss with vascular surgery if he has an enough circulation to heal toe amputation.  Patient distressed and adamant that he does not want below the knee amputation.  Recent infectious disease and ED notes reviewed.  Called patient's primary care physician Dr. Bharati Santa at [phone] to discuss findings.  Return to clinic in 3 weeks.  40 minutes spent in total time counseling patient, reviewing medical records, and coordinating care          Aaron Dent DPM, FACFAS              Again, thank you for allowing me to participate in the care of your patient.        Sincerely,        Aaron Dent DPM

## 2022-09-07 NOTE — PATIENT INSTRUCTIONS
Daily dressing changes  Use Betadine swab place medi-honey on toe then cover with Band-Aid. RTC in 3 weeks    We wish you continued good healing. If you have any questions or concerns, please do not hesitate to contact us at  [phone]    [facility] (secure e-mail communication and access to your chart) to send a message or to make an appointment.    Please remember to call and schedule a follow up appointment if one was recommended at your earliest convenience.     PODIATRY CLINIC HOURS  TELEPHONE NUMBER    Dr. Aaron DAVISNOPPEDRITO [facility]        Clinics:  [facility] Patel [facility]   Tuesday 1PM-6PM  [place]  Wednesday 745AM-330PM  [place]  Thursday/Friday 745AM-230PM  [place] REAL/[facility] APPOINTMENTS  [phone]    [place] APPOINTMENTS  [phone]        If you need a medication refill, please contact us you may need lab work and/or a follow up visit prior to your refill (i.e. Antifungal medications).  If MRI needed please call Imaging at [phone] or [phone]  HOW DO I GET MY KNEE SCOOTER? Knee scooters can be picked up at ANY Medical Supply stores with your knee scooter Prescription.  OR  Bring your signed prescription to an [facility] Medical [facility] showroom.

## 2022-09-07 NOTE — PROGRESS NOTES
Subjective:    7/13/22    Patient seen today for wound left second toe.  Patient points to the dorsum of the PIPJ.  Has had this for a few months.  Patient has history of peripheral arterial disease.  Had left femoropopliteal bypass in January 2021.  Since then has had numerous surgeries because of complications from bypass.  Has had a flap for coverage and also seeing plastic surgery.  Has wound left groin and currently being followed up with infectious disease.  PICC line saw infectious disease yesterday vancomycin and ceftriaxone.  And will be pulling PICC line and starting oral medications.  Despite being on antibiotics some erythema second toe.  Denies purulence or odor.  The patient has diabetes mellitus with peripheral neuropathy.  He has chronic kidney disease stage III.  Patient has rheumatoid arthritis and takes Plaquenil.  He is retired.  40-pack-year history of smoking and quit 14 years ago.   Primary care is Dr. Rivers last seen 3/21/22     7/20/22 patient returns for left second and third toe ulcers.  Patient on oral antibiotics.  Dressing changed once a day on thigh and improving.  Patient has been wearing the crest pad on his second toe.  Dressing with Medihoney daily.  No new drainage.  States no change in the erythema.    8/17/22 patient returns for left second and third toe ulcers.  No drainage from third toe.  He is not dressing this.  Uses crest pad on fourth toe when he remembers.  States second toe has minimal drainage.  No purulence or odor.  There are now only dressing with Medihoney and Band-Aid and longitudinally on the dorsum of this toe.  Patient still taking oral antibiotics from a wound left groin status post vascular bypass.       9/7/22 patient returns for left second and third toe ulcers.  No drainage, purulence, odor from third toe.  He states most the time not using crest pad on fourth toe to offload this as he is forgetting this.  He notes the wound over the second toe is getting  "worse.  Still dressing with Medihoney and Band-Aid and keeping offloaded.  Went to ED for this on 9/3/2022 and he was instructed to follow-up on outpatient basis.  Patient taking cefdinir and doxycycline.  Patient very concerned about amputation and at what level this may be.  He states groin wound healed now.       ROS: See above         Allergies   Allergen Reactions     Blood-Group Specific Substance Other (See Comments)     Patient has a Non-specific antibody. Blood products may be delayed. Draw patient 24 hours prior to transfusion. For [facility] testing, draw one red top and two purple top tubes for all Type and Screen orders.     Seasonal Allergies        Current Outpatient Medications   Medication Sig Dispense Refill     acetaminophen (TYLENOL) 500 MG tablet Take 2 tablets (1,000 mg) by mouth 3 times daily       Alcohol Swabs PADS Uses four times daily       bisacodyl (DULCOLAX) 10 MG suppository Unwrap and insert 1 suppository rectally once daily as needed       blood glucose (NO BRAND SPECIFIED) test strip Use to test blood sugar 4 times daily or as directed.       Blood Glucose Monitoring Suppl (ACCU-CHEK GUIDE) w/Device KIT        bumetanide (BUMEX) 2 MG tablet Take 1 tablet (2 mg) by mouth daily       cetirizine (ZYRTEC) 10 MG tablet Take 10 mg by mouth At Bedtime       collagenase (SANTYL) 250 UNIT/GM external ointment Apply topically daily Apply to left heel as directed once daily       cyanocobalamin (VITAMIN B-12) 1000 MCG tablet Take 1 tablet (1,000 mcg) by mouth once a week       diphenhydrAMINE (BENADRYL) 25 MG capsule Take 1 capsule (25 mg) by mouth every 6 hours as needed for itching or allergies X 30 days then dc       dulaglutide (TRULICITY) 1.5 MG/0.5ML pen Inject 1.5 mg Subcutaneous every 7 days ON Wednesdays 10 mL 1     folic acid (FOLVITE) 1 MG tablet Take 1 tablet (1 mg) by mouth daily 100 tablet 2     Gauze Pads & Dressings (GAUZE SPONGE) 4\"X4\" PADS        glipiZIDE (GLUCOTROL) 5 MG " "tablet Take 0.5 tablets (2.5 mg) by mouth every morning       HYDROmorphone (DILAUDID) 2 MG tablet Take 1 tablet (2 mg) by mouth 2 times daily as needed for pain For urgent pain relief with rheumatoid arthritis flare-up 10 tablet 0     hydroxychloroquine (PLAQUENIL) 200 MG tablet Take 1 tablet (200 mg) by mouth daily 90 tablet 1     insulin aspart (NOVOLOG VIAL) 100 UNITS/ML vial Inject 8 Units Subcutaneous 3 times daily (with meals) 5 mL 1     insulin glargine (LANTUS VIAL) 100 UNIT/ML vial Inject 10 Units Subcutaneous At Bedtime 3 mL 1     insulin syringe-needle U-100 (30G X 1/2\" 0.5 ML) 30G X 1/2\" 0.5 ML miscellaneous Use 4 syringes daily or as directed.       Insulin Syringe-Needle U-100 28G X 1/2\" 0.5 ML MISC        Irrigation Supplies MISC        medical cannabis (Patient's own supply) See Admin Instructions (The purpose of this order is to document that the patient reports taking medical cannabis.  This is not a prescription, and is not used to certify that the patient has a qualifying medical condition.)       metoprolol succinate ER (TOPROL-XL) 50 MG 24 hr tablet Take 1 tablet (50 mg) by mouth daily       NOVOFINE AUTOCOVER PEN NEEDLE 30G X 8 MM miscellaneous USE FOUR TIMES A  each 1     polyethylene glycol-propylene glycol (SYSTANE) 0.4-0.3 % SOLN ophthalmic solution Place 1 drop into both eyes 2 times daily as needed for dry eyes       pravastatin (PRAVACHOL) 10 MG tablet Take 1 tablet (10 mg) by mouth daily       senna-docusate (SENOKOT-S/PERICOLACE) 8.6-50 MG tablet Take 1 tablet by mouth 2 times daily as needed for constipation 50 tablet 0     sertraline (ZOLOFT) 50 MG tablet Take 1 tablet (50 mg) by mouth daily       sodium hypochlorite (DAKINS HALF-STRENGTH) 0.25 % external solution Use for damp to dry dressing changes two times a day left groin wound       syringe/needle, disp, (BD ECLIPSE SYRINGE) 25G X 1\" 3 ML MISC        triamcinolone (KENALOG) 0.1 % external cream Apply topically 2 times " daily Apply to rash on neck, shoulders and lower extremities two times a day       warfarin ANTICOAGULANT (COUMADIN) 3 MG tablet Take 1/2 tab (1.5mg) by mouth on Sunday       WARFARIN SODIUM PO Take 3 mg by mouth Take 1 tab on Sun, Mon, Tues, Wed, Thurs and Fri.  And take 1/2 tab (1.5mg) on Sat         Patient Active Problem List   Diagnosis     Pain in limb     Hyperlipidemia LDL goal <100     Hypertension goal BP (blood pressure) < 140/90     Hypogonadism     Advanced directives, counseling/discussion     Health Care Home     Type 2 diabetes mellitus with diabetic polyneuropathy, without long-term current use of insulin (H)     RBBB (right bundle branch block)     Ex-smoker     Family history of esophageal cancer     Gastroesophageal reflux disease, esophagitis presence not specified     Rheumatoid arthritis involving multiple sites with positive rheumatoid factor (H)     Pulmonary nodule     High risk medication use     Spondylosis of cervical region without myelopathy or radiculopathy     Erectile dysfunction, unspecified erectile dysfunction type     Type 2 diabetes mellitus without retinopathy (H)     Tubulovillous adenoma of colon     Diabetic polyneuropathy associated with type 2 diabetes mellitus (H)     Chronic bilateral low back pain without sciatica     Migraine equivalent     Posterior vitreous detachment of left eye     Pseudophakia, ou     Eyelid lesion, LLL     Dermatochalasis of both upper eyelids     Bradycardia     Primary osteoarthritis of both knees     Syncope     Trigger finger, acquired     CKD (chronic kidney disease) stage 3, GFR 30-59 ml/min (H)     Abdominal pain, generalized     PAD (peripheral artery disease) (H)     Immunosuppression (H)     Skin ulcer of toe of left foot, limited to breakdown of skin (H)     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 difficile diarrhea     H/O     Cataract 11/18/2011     Diabetic neuropathy (H)     mild, mostly soles and distal forefeet, worse on the left side.     Diverticulitis      ED (erectile dysfunction)      Ex-smoker     QUIT SMOKING FEB 2007     History of ETOH abuse     recovering, sober since 1997     Hyperlipidemia LDL goal <100      Hypertension goal BP (blood pressure) < 140/90      Hypogonadism      Obesity      PAD (peripheral artery disease) (H)     leg cramps, with exertion, no formal diagnosis of PAD and minimal if any symptoms at all.     RA (rheumatoid arthritis) (H)      Uvaldo     Syncope        Past Surgical History:   Procedure Laterality Date     BYPASS GRAFT FEMOROPOPLITEAL Left 01/07/2021    Procedure: LEFT FEMORAL TO ABOVE KNEE POPLITEAL ARTERY BYPASS WITH PTFE VASCULAR GRAFT REMOVABLE RING 6MMX 50CM;  Surgeon: Myra Lopes MD;  Location: [facility] OR     CATARACT IOL, RT/LT       COLONOSCOPY  03/01/2018    [facility]     COMBINED REPAIR PTOSIS WITH BLEPHAROPLASTY BILATERAL Bilateral 08/16/2019    Procedure: BILATERAL UPPER EYELID BLEPHAROPLASTY AND BILATERAL PTOSIS REPAIR;  Surgeon: Janet Garcia MD;  Location:  OR     ENDARTERECTOMY FEMORAL Left 01/07/2021    Procedure: LEFT FEMORAL ENDARTERECTOMY WITH PATCH ANGIOPLASTY PHOTOFIX  0.8 X 8CM;  Surgeon: Myra Lopes MD;  Location:  OR     EP PACEMAKER  01/2021     EXCISE LESION EYELID Left 08/16/2019    Procedure: LEFT LOWER EYELID BIOPSY;  Surgeon: Janet Garcia MD;  Location:  OR     IR LOWER EXTREMITY ANGIOGRAM LEFT  12/17/2020     PHACOEMULSIFICATION WITH STANDARD INTRAOCULAR LENS IMPLANT  02/2019; 3/2019    left eye; right eye     TONSILLECTOMY       ZZHC INCISION TENDON SHEATH  FINGER  04/2009    r hand ring finger       Family History   Problem Relation Age of Onset     Cerebrovascular Disease Mother      Arthritis Mother      Osteoporosis Mother      Alzheimer Disease Father      Arthritis Father      Cancer Father      Diabetes Maternal Grandmother      Cardiovascular Maternal Grandmother      Other Cancer Brother      Cancer Paternal Aunt      Hypertension No family hx of      Thyroid Disease No family hx of      Glaucoma No family hx of      Macular Degeneration No family hx of        Social History     Tobacco Use     Smoking status: Former Smoker     Packs/day: 1.00     Years: 40.00     Pack years: 40.00     Types: Cigarettes     Quit date: 3/16/2007     Years since quitting: 15.4     Smokeless tobacco: Never Used   Substance Use Topics     Alcohol use: No     Alcohol/week: 0.0 standard drinks         Exam:    Vitals: There were no vitals taken for this visit.  BMI: There is no height or weight on file to calculate BMI.  Height: Data Unavailable    Constitutional/ general:  Pt is in no apparent distress, appears well-nourished.  Cooperative with history and physical exam.  Patient seen with wife today.  His daughter is also on his cell phone.    Psych:  The patient answered questions appropriately.  Normal affect.  Seems to have reasonable expectations, in terms of treatment.     Lungs:  Non labored breathing, non labored speech. No cough.  No audible wheezing. Even, quiet breathing.       Vascular:   Difficult to palpate pedal pulses.  Increased edema left lower extremity.                                                                   Neuro:  Alert and oriented x 3.  Monofilament absent to midfoot.      Derm: Skin thin shiny atrophic with no hair growth.     Wound on the dorsum of the PIPJ in the past measured 6 x 4 mm, 9 x 5 mm, 8 x 6 mm, and today measures 12 x 8 mm..  Base now goes down to periosteum and joint capsule and can see head of proximal phalanx through this..  No  purulence or odor.  Erythema and edema surrounding this, perhaps slightly decreased from last visit.  Left third toe is hammered.  There is small eschar distal.  No erythema or edema.    Musculoskeletal:    Lower extremity muscle strength is normal.  Patient is ambulatory without an assistive device or brace.  Pronated arch with weightbearing.  All lesser toes are hammered and stiff.  Generally his feet are stiff with a decreased range of motion bilaterally.       Collected 7/13/2022  2:42 PM     Status: Final result     Visible to patient: Yes (seen)     Dx: Diabetic ulcer of toe of left foot as...    Specimen Information: Foot, Left; Skin         0 Result Notes    Culture 1+ Normal tra            Resulting Agency: [facility]           Specimen Collected: 07/13/22  2:42 PM Last Resulted: 07/15/22 10:21 AM               A:  Diabetes mellitus with peripheral neuropathy and LOPS  Peripheral arterial disease s/p bypass  Left second hammertoe with ulcer and erythema  Left third hammertoe with distal  eschar      P:   Discussed third toe small eschar.  Encouraged him to use crest pad to keep offloaded.  Discussed second toe wound now much larger and bone prominent.  There is no increased in edema and erythema here and may even slightly be decreased.  No gross signs of infection at this time.  Discussed with patient and family members that with prominent bone very doubtful this will heal now.  Patient will clean daily with Betadine and dressed with Medihoney and Band-Aid and we gave him supplies today.  Continue to keep offloaded with DH shoe.  Will refer patient to vascular surgery as he will probably need some type of amputation in the future.  Discussed toe amputation versus BKA.  Would have to discuss with vascular surgery if he has an enough circulation to heal toe amputation.  Patient distressed and adamant that he does not want below the knee amputation.  Recent infectious disease and ED notes reviewed.  Called  patient's primary care physician Dr. Bharati Santa at [phone] to discuss findings.  Return to clinic in 3 weeks.  40 minutes spent in total time counseling patient, reviewing medical records, and coordinating care          Aaron Dent DPM, FACFAS

## 2022-09-09 ENCOUNTER — TELEPHONE (OUTPATIENT)
Dept: OTHER | Facility: CLINIC | Age: 77
End: 2022-09-09

## 2022-09-09 NOTE — TELEPHONE ENCOUNTER
[facility]    Who is the provider?:  Marianne      Preferred provider location: [place]    Person calling: Sandhya Brown  Call back phone: [phone]       Nurse call back needed: YES          Can we leave a message?  YES        Reason for call:    Toe with severe ulceration with bone exposed. Infection minimal currently. [facility] Seeking second opinion with Marianne. Release of information to [facility] approved     Outside imaging: n/a    Name / Loc of pharmacy: n/a

## 2022-09-09 NOTE — TELEPHONE ENCOUNTER
"Patient has a history of left femoral endarterectomy and left femoral to above-knee popliteal artery bypass with PTFE on 1/7/21. LOV with Dr. Lopes on 5/18/21.    Per chart review patient saw Dr. Dent with podiatry on 9//7/22:     \"Discussed third toe small eschar.  Encouraged him to use crest pad to keep offloaded.  Discussed second toe wound now much larger and bone prominent.  There is no increased in edema and erythema here and may even slightly be decreased.  No gross signs of infection at this time.  Discussed with patient and family members that with prominent bone very doubtful this will heal now.  Patient will clean daily with Betadine and dressed with Medihoney and Band-Aid and we gave him supplies today.  Continue to keep offloaded with DH shoe.  Will refer patient to vascular surgery as he will probably need some type of amputation in the future.  Discussed toe amputation versus BKA.  Would have to discuss with vascular surgery if he has an enough circulation to heal toe amputation.  Patient distressed and adamant that he does not want below the knee amputation.  Recent infectious disease and ED notes reviewed.  Called patient's primary care physician Dr. Bharati Santa at [phone] to discuss findings.  Return to clinic in 3 weeks\"    Patient needs to be scheduled for ALBERT US and BLE arterial US and in person follow up with Dr. Lopes for follow up.  Ultrasound orders are already in Epic- they have been extended.      Appointment note: Follow up for history of left femoral endarterectomy and left femoral to above-knee popliteal artery bypass with PTFE on 1/7/21. Needs to discuss non-healing wound. See podiatry notes.     "

## 2022-09-11 ENCOUNTER — LAB REQUISITION (OUTPATIENT)
Dept: LAB | Facility: CLINIC | Age: 77
End: 2022-09-11
Payer: COMMERCIAL

## 2022-09-11 DIAGNOSIS — I73.9 PERIPHERAL VASCULAR DISEASE, UNSPECIFIED (H): ICD-10-CM

## 2022-09-12 ENCOUNTER — TELEPHONE (OUTPATIENT)
Dept: PODIATRY | Facility: CLINIC | Age: 77
End: 2022-09-12

## 2022-09-12 NOTE — TELEPHONE ENCOUNTER
Reason for Call: Request for an order or referral:    Order or referral being requested: Order    Date needed: as soon as possible    Has the patient been seen by the PCP for this problem? YES    Additional comments: Conchita is requesting for orders for daily scaled nursing wound care for the next 10 days, treating with Iodine and methoney, and wrapping with gauze instead of bandaid. Conchita would also like Dr. Dent's thoughts on using santyl if the wound does not improve in 10 days.    Phone number Patient can be reached at:  Other phone number:  [phone]    Best Time:  Anytime    Can we leave a detailed message on this number?  YES    Call taken on 9/12/2022 at 4:52 PM by Leann Ramos

## 2022-09-13 LAB — INR PPP: 5.32 (ref 0.85–1.15)

## 2022-09-13 PROCEDURE — 85610 PROTHROMBIN TIME: CPT | Mod: ORL | Performed by: INTERNAL MEDICINE

## 2022-09-13 PROCEDURE — 36415 COLL VENOUS BLD VENIPUNCTURE: CPT | Mod: ORL | Performed by: INTERNAL MEDICINE

## 2022-09-13 PROCEDURE — P9603 ONE-WAY ALLOW PRORATED MILES: HCPCS | Mod: ORL | Performed by: INTERNAL MEDICINE

## 2022-09-15 ENCOUNTER — HOSPITAL ENCOUNTER (OUTPATIENT)
Dept: ULTRASOUND IMAGING | Facility: CLINIC | Age: 77
Discharge: HOME | End: 2022-09-15
Attending: SURGERY
Payer: COMMERCIAL

## 2022-09-15 DIAGNOSIS — I73.9 PAD (PERIPHERAL ARTERY DISEASE) (H): ICD-10-CM

## 2022-09-15 PROCEDURE — 93924 LWR XTR VASC STDY BILAT: CPT

## 2022-09-15 PROCEDURE — 93924 LWR XTR VASC STDY BILAT: CPT | Mod: 26 | Performed by: SURGERY

## 2022-09-15 PROCEDURE — 93925 LOWER EXTREMITY STUDY: CPT

## 2022-09-15 PROCEDURE — 93925 LOWER EXTREMITY STUDY: CPT | Mod: 26 | Performed by: SURGERY

## 2022-09-16 ENCOUNTER — LAB REQUISITION (OUTPATIENT)
Dept: LAB | Facility: CLINIC | Age: 77
End: 2022-09-16
Payer: COMMERCIAL

## 2022-09-16 DIAGNOSIS — I73.9 PERIPHERAL VASCULAR DISEASE, UNSPECIFIED (H): ICD-10-CM

## 2022-09-20 ENCOUNTER — OFFICE VISIT (OUTPATIENT)
Dept: OTHER | Facility: CLINIC | Age: 77
End: 2022-09-20
Attending: SURGERY
Payer: COMMERCIAL

## 2022-09-20 VITALS
BODY MASS INDEX: 29.62 KG/M2 | HEART RATE: 82 BPM | HEIGHT: 69 IN | SYSTOLIC BLOOD PRESSURE: 151 MMHG | WEIGHT: 200 LBS | DIASTOLIC BLOOD PRESSURE: 94 MMHG

## 2022-09-20 DIAGNOSIS — I73.9 PAD (PERIPHERAL ARTERY DISEASE) (H): ICD-10-CM

## 2022-09-20 DIAGNOSIS — C34.91 MALIGNANT NEOPLASM OF RIGHT LUNG, UNSPECIFIED PART OF LUNG (H): Primary | ICD-10-CM

## 2022-09-20 DIAGNOSIS — E11.621 TYPE 2 DIABETES MELLITUS WITH FOOT ULCER (CODE) (H): ICD-10-CM

## 2022-09-20 LAB — INR PPP: 2.78 (ref 0.85–1.15)

## 2022-09-20 PROCEDURE — 36415 COLL VENOUS BLD VENIPUNCTURE: CPT | Mod: ORL | Performed by: INTERNAL MEDICINE

## 2022-09-20 PROCEDURE — 99215 OFFICE O/P EST HI 40 MIN: CPT | Performed by: SURGERY

## 2022-09-20 PROCEDURE — 99417 PROLNG OP E/M EACH 15 MIN: CPT | Performed by: SURGERY

## 2022-09-20 PROCEDURE — P9604 ONE-WAY ALLOW PRORATED TRIP: HCPCS | Mod: ORL | Performed by: INTERNAL MEDICINE

## 2022-09-20 PROCEDURE — G0463 HOSPITAL OUTPT CLINIC VISIT: HCPCS

## 2022-09-20 PROCEDURE — 85610 PROTHROMBIN TIME: CPT | Mod: ORL | Performed by: INTERNAL MEDICINE

## 2022-09-20 NOTE — PROGRESS NOTES
Vascular Surgery Progress Note     Date: September 20, 2022     Reason for Visit:  Longitudinal PAD care    Subjective:  Mr. Dash presents for follow-up in the setting of extensive left leg revascularization that took place partially at an outside institution after his graft thrombosed; he has recurrent nonhealing left foot wounds and is interested in a second opinion (see discussion below).   He says he feels well overall. He has not been walking around very much but does not have pain in his legs. He has no pain from the wound but does not like having the wound present.   I asked him what he would like to be able to do; he says he looks forward to singing more ulikeke in the future.        Current Outpatient Medications:      acetaminophen (TYLENOL) 500 MG tablet, Take 2 tablets (1,000 mg) by mouth 3 times daily, Disp: , Rfl:      Alcohol Swabs PADS, Uses four times daily, Disp: , Rfl:      bisacodyl (DULCOLAX) 10 MG suppository, Unwrap and insert 1 suppository rectally once daily as needed, Disp: , Rfl:      blood glucose (NO BRAND SPECIFIED) test strip, Use to test blood sugar 4 times daily or as directed., Disp: , Rfl:      Blood Glucose Monitoring Suppl (ACCU-CHEK GUIDE) w/Device KIT, , Disp: , Rfl:      bumetanide (BUMEX) 2 MG tablet, Take 1 tablet (2 mg) by mouth daily, Disp: , Rfl:      cetirizine (ZYRTEC) 10 MG tablet, Take 10 mg by mouth At Bedtime, Disp: , Rfl:      collagenase (SANTYL) 250 UNIT/GM external ointment, Apply topically daily Apply to left heel as directed once daily, Disp: , Rfl:      cyanocobalamin (VITAMIN B-12) 1000 MCG tablet, Take 1 tablet (1,000 mcg) by mouth once a week, Disp: , Rfl:      diphenhydrAMINE (BENADRYL) 25 MG capsule, Take 1 capsule (25 mg) by mouth every 6 hours as needed for itching or allergies X 30 days then dc, Disp: , Rfl:      dulaglutide (TRULICITY) 1.5 MG/0.5ML pen, Inject 1.5 mg Subcutaneous every 7 days ON Wednesdays, Disp: 10 mL, Rfl: 1     folic acid  "(FOLVITE) 1 MG tablet, Take 1 tablet (1 mg) by mouth daily, Disp: 100 tablet, Rfl: 2     Gauze Pads & Dressings (GAUZE SPONGE) 4\"X4\" PADS, , Disp: , Rfl:      glipiZIDE (GLUCOTROL) 5 MG tablet, Take 0.5 tablets (2.5 mg) by mouth every morning, Disp: , Rfl:      HYDROmorphone (DILAUDID) 2 MG tablet, Take 1 tablet (2 mg) by mouth 2 times daily as needed for pain For urgent pain relief with rheumatoid arthritis flare-up, Disp: 10 tablet, Rfl: 0     hydroxychloroquine (PLAQUENIL) 200 MG tablet, Take 1 tablet (200 mg) by mouth daily, Disp: 90 tablet, Rfl: 1     insulin aspart (NOVOLOG VIAL) 100 UNITS/ML vial, Inject 8 Units Subcutaneous 3 times daily (with meals), Disp: 5 mL, Rfl: 1     insulin glargine (LANTUS VIAL) 100 UNIT/ML vial, Inject 10 Units Subcutaneous At Bedtime, Disp: 3 mL, Rfl: 1     insulin syringe-needle U-100 (30G X 1/2\" 0.5 ML) 30G X 1/2\" 0.5 ML miscellaneous, Use 4 syringes daily or as directed., Disp: , Rfl:      Insulin Syringe-Needle U-100 28G X 1/2\" 0.5 ML MISC, , Disp: , Rfl:      Irrigation Supplies MISC, , Disp: , Rfl:      medical cannabis (Patient's own supply), See Admin Instructions (The purpose of this order is to document that the patient reports taking medical cannabis.  This is not a prescription, and is not used to certify that the patient has a qualifying medical condition.), Disp: , Rfl:      metoprolol succinate ER (TOPROL-XL) 50 MG 24 hr tablet, Take 1 tablet (50 mg) by mouth daily, Disp: , Rfl:      NOVOFINE AUTOCOVER PEN NEEDLE 30G X 8 MM miscellaneous, USE FOUR TIMES A DAY, Disp: 100 each, Rfl: 1     polyethylene glycol-propylene glycol (SYSTANE) 0.4-0.3 % SOLN ophthalmic solution, Place 1 drop into both eyes 2 times daily as needed for dry eyes, Disp: , Rfl:      pravastatin (PRAVACHOL) 10 MG tablet, Take 1 tablet (10 mg) by mouth daily, Disp: , Rfl:      senna-docusate (SENOKOT-S/PERICOLACE) 8.6-50 MG tablet, Take 1 tablet by mouth 2 times daily as needed for constipation, " "Disp: 50 tablet, Rfl: 0     sertraline (ZOLOFT) 50 MG tablet, Take 1 tablet (50 mg) by mouth daily, Disp: , Rfl:      sodium hypochlorite (DAKINS HALF-STRENGTH) 0.25 % external solution, Use for damp to dry dressing changes two times a day left groin wound, Disp: , Rfl:      syringe/needle, disp, (BD ECLIPSE SYRINGE) 25G X 1\" 3 ML MISC, , Disp: , Rfl:      triamcinolone (KENALOG) 0.1 % external cream, Apply topically 2 times daily Apply to rash on neck, shoulders and lower extremities two times a day, Disp: , Rfl:      warfarin ANTICOAGULANT (COUMADIN) 3 MG tablet, Take 1/2 tab (1.5mg) by mouth on Sunday, Disp: , Rfl:      WARFARIN SODIUM PO, Take 3 mg by mouth Take 1 tab on Sun, Mon, Tues, Wed, Thurs and Fri. And take 1/2 tab (1.5mg) on Sat, Disp: , Rfl:     Current Facility-Administered Medications:      cyanocobalamin injection 1,000 mcg, 1,000 mcg, Intramuscular, Q30 Days, Kapil Duckworth MD     cyanocobalamin injection 1,000 mcg, 1,000 mcg, Intramuscular, Weekly, Kapil Duckworth MD, 1,000 mcg at 08/18/21 1234     Physical Exam       BP: (!) 151/94 Pulse: 82            Vital Signs with Ranges  Pulse:  [82] 82  BP: (151)/(94) 151/94  200 lbs 0 oz    Constitutional: cooperative, no apparent distress, sitting comfortably in chair. Accompanied by his wife and daughter.  Vascular: monophasic L DP, monophasic L PT. Right mono-biphasic DP and PT doppler signals.  Musculoskeletal: grossly normal and symmetric ROM and strength in BL extremities   Neurologic: Awake, alert, oriented to name, place, time, and situation      Left second toe; tips of toes 2-3 have punctate ischemic eschar. Note, Dr. Dent found the following on his exam on 9/7/22: \"Base now goes down to periosteum and joint capsule and can see head of proximal phalanx through this.\"      Imaging:  I have reviewed the following imaging studies:  US ABIs (9/15/22): \"1. [RIGHT] Non compressible tibial vessels at the level of the ankle due to calcific " "arteriosclerotic disease. Toe pressures are 82 mm Hg, good wound healing potential. 2. [LEFT] Non compressible tibial vessels at the level of the ankle due to calcific arteriosclerotic disease. Toe pressures are 38 mm Hg, moderate wound healing potential.\"    US Lower Extremity Arterial (9/15/22): \"1. Right leg: Patent right common femoral, deep femoral and proximal superficial femoral arteries. Occlusion versus high grade stenosis of the distal superficial femoral artery. Post obstructive waveforms in the popliteal and tibial segments. 2. 2. Left leg: Occluded left femoral to popliteal bypass.\"       Assessment & Plan   Zurdo Dash is a 77 year old male with history of DL, HTN, T2DM complicated by neuropathy, former smoking and former alcohol abuse, CKD stage III, RA, and PAD with prior claudication and current left third toe ulcer. He underwent left femoral endarterectomy and left femoral to above-knee popliteal artery bypass with PTFE on 1/7/21.     Per records review, he apparently developed bypass graft occlusion in 09/2021 and presented to [facility]. He underwent thrombectomy and his post-op course was complicated by infection, resulting in debridement and ultimately sartorius muscle flap on 9/22/21. He was lost to follow-up and presented again 4/8/22 with a frankly infected graft; he underwent explantation of the PTFE and redo left femoral to above-knee popliteal artery bypass with GSV from the right thigh, along with a vertical rectus muscle flap to reconstruct the left groin on 4/14/22. He subsequently had I+D of two abscesses on the left leg 6/4/22. In the interim, he has had development and nonhealing of a left second toe ulceration. On today's imaging, he has occlusion of the new femoral-popliteal bypass graft.     He has somewhat recent diagnosis of [id number], stage 1 lung cancer of the RUL; I do not have recent Oncology notes, but the understanding from Mr. Dash's family is that the cancer is " considered controllable with radiation therapy but is not curable.     At the time of my initial angiogram in 12/2020 he had diseased, small tibial runoff (although all three vessels were patent). The concern is that redo surgery would require iliac-tibial bypass, using synthetic graft as he does not have sufficient length of autologous vein left. Repeat femoral exposure would be irrational given the multiple infections and flap reconstructions he has required. Synthetic graft, even with cadaveric vein, has poor patency at the tibial level and is high risk for infection.     We had a long discussion.     Mr. Dash feels fairly good, and is bewildered as to why a redo vascular surgery is not a good option to get definitive toe healing. I explained that this would essentially be too dangerous.     His daughter reminded him that he has been reluctant historically to come in to the hospital for care and this has been a barrier to timely care, in some ways. She also reminded him that he had a very complicated hospital course during his time at [facility], with prolonged intubation required. He has forgotten most of this hospitalization.     I emphasized that I think he had really excellent care at [facility].     I explained that a toe amputation will not heal, and realistically, a BKA will be unlikely to heal. An above-knee amputation would be the definitive level of healing. This is understandably drastic, as he currently has a painless toe ulcer.     We discussed that the toe ulcer right now appears to be well-treated with local wound care, but will not heal and is a potential source of ascending infection.     He does not want high-level amputation. I agree that this will not improve his quality of life.     I briefly discussed palliative care, and explained that the role of Palliative Care is to navigate quality of life in the setting of incurable medical problems (like his lung cancer or vascular disease). He initially said  "\"I don't want hospice - everyone I know who goes on hospice dies!\". I explained the distinction between palliative care and hospice, and said that one does usually lead to the other but that he is not actively dying. I think palliative care as a longitudinal option is a good one for him to help clarify his own goals.     As he is interested in foot salvage but I do not believe there are safe revascularization options, will send a referral to [facility] for evaluation.    He can continue somewhat indefinitely with local wound care for his toe; risk of ascending infection is present, and may force a re-evaluation of goals of care and/or amputation.    He is welcome to return back as needed to vascular surgery clinic     Myra Lopes MD    Total time spent on the date of this encounter doing: chart review, review of test results, patient visit, physical exam, education, counseling, developing plan of care, and documenting = 90 minutes    "

## 2022-09-20 NOTE — PATIENT INSTRUCTIONS
Referral to Hyperbaric oxygen therapy, call to let us know which location you prefer.   Referral to palliative care.   Follow up office visit in 3-6 months. No imaging.   Call if you have questions.   CARLOS Pelaez, RN  [facility]  Office:  [phone] Fax: [phone]

## 2022-09-20 NOTE — PROGRESS NOTES
"[facility]        Patient is here for a  follow up.     Pt is currently taking Warfarin.    BP (!) 151/94 (BP Location: Left arm, Patient Position: Chair, Cuff Size: Adult Large)   Pulse 82   Ht 5' 9\" (1.753 m)   Wt 200 lb (90.7 kg)   BMI 29.53 kg/m      The provider has been notified that the patient has no concerns.     Questions patient would like addressed today are: N/A.    Refills are needed: N/A    Has homecare services and agency name:  Shawnee Baldwin MA    "

## 2022-09-21 ENCOUNTER — TELEPHONE (OUTPATIENT)
Dept: PODIATRY | Facility: CLINIC | Age: 77
End: 2022-09-21

## 2022-09-21 NOTE — TELEPHONE ENCOUNTER
[facility] Call Center    Phone Message    May a detailed message be left on voicemail: yes     Reason for Call: Other: Conchita stated that the wound looks about the same as last week. She's unsure of the diagnosis but it could be an arterial ulcer.  Is there any other recommendations?  They're requesting betadine, gauze and should they remove medihoney? Should instructions stay the same otherwise they are also opened to other ideas.  Patient said that at this time is do not want amputation or do hyperbaric.  Action Taken: Other: Podiatry    Travel Screening: Not Applicable

## 2022-09-25 ENCOUNTER — LAB REQUISITION (OUTPATIENT)
Dept: LAB | Facility: CLINIC | Age: 77
End: 2022-09-25
Payer: COMMERCIAL

## 2022-09-25 DIAGNOSIS — I73.9 PERIPHERAL VASCULAR DISEASE, UNSPECIFIED (H): ICD-10-CM

## 2022-09-27 ENCOUNTER — OFFICE VISIT (OUTPATIENT)
Dept: RADIATION ONCOLOGY | Facility: CLINIC | Age: 77
End: 2022-09-27
Payer: COMMERCIAL

## 2022-09-27 VITALS
HEART RATE: 61 BPM | SYSTOLIC BLOOD PRESSURE: 105 MMHG | DIASTOLIC BLOOD PRESSURE: 72 MMHG | WEIGHT: 209.8 LBS | TEMPERATURE: 98.2 F | OXYGEN SATURATION: 97 % | RESPIRATION RATE: 18 BRPM | BODY MASS INDEX: 30.98 KG/M2

## 2022-09-27 DIAGNOSIS — C34.11 MALIGNANT NEOPLASM OF UPPER LOBE OF RIGHT LUNG (H): Primary | ICD-10-CM

## 2022-09-27 LAB — INR PPP: 2.1 (ref 0.85–1.15)

## 2022-09-27 PROCEDURE — 85610 PROTHROMBIN TIME: CPT | Mod: ORL | Performed by: INTERNAL MEDICINE

## 2022-09-27 PROCEDURE — 99024 POSTOP FOLLOW-UP VISIT: CPT | Performed by: RADIOLOGY

## 2022-09-27 PROCEDURE — 36415 COLL VENOUS BLD VENIPUNCTURE: CPT | Mod: ORL | Performed by: INTERNAL MEDICINE

## 2022-09-27 PROCEDURE — P9603 ONE-WAY ALLOW PRORATED MILES: HCPCS | Mod: ORL | Performed by: INTERNAL MEDICINE

## 2022-09-27 ASSESSMENT — PAIN SCALES - GENERAL: PAINLEVEL: NO PAIN (0)

## 2022-09-27 NOTE — NURSING NOTE
FOLLOW-UP VISIT    Patient Name: Zurdo Dash      : 1945     Age: 77 year old        ______________________________________________________________________________     Chief Complaint   Patient presents with     Radiation Therapy     Return appointment with Dr. Funez     /72   Pulse 61   Temp 98.2  F (36.8  C) (Oral)   Resp 18   Wt 95.2 kg (209 lb 12.8 oz)   SpO2 97%   BMI 30.98 kg/m       Date Radiation Completed: 22    Pain  Denies pain related to visit, patient reports pain to bilateral knees and toe wound on L foot.    Meds  Current Med List Reviewed: Yes  Medication Note:     Labs  CBC RESULTS:   Recent Labs   Lab Test 22  1611   WBC 9.2   RBC 4.11*   HGB 12.1*   HCT 38.3*   MCV 93   MCH 29.4   MCHC 31.6   RDW 14.4          Imaging  None    Respiratory: No shortness of breath, dyspnea on exertion, cough, or hemoptysis  Skin: Radiation site-  Warm  Dry  Intact  Energy Level: baseline, occasional fatigue  Appetite: normal      Appointments:     DATE  Oncologist:     Primary:      Other Notes: Follow up with Dr. Funez in 3 months with CT chest.    Heidi Villar RN

## 2022-09-27 NOTE — LETTER
9/27/2022         RE: Zurdo Dash  [address]  [place] [address]        Dear Colleague,    Thank you for referring your patient, Zurdo Dash, to the [facility] [place]. Please see a copy of my visit note below.    Dear Colleagues,  Today Zurdo Dash was seen in follow up      IDENTIFICATION: 77 year old gentleman with multiple comorbidities and a pacemaker diagnosed with [id number], stage 1 lung cancer of the RUL status post stereotactic body radiation therapy completed on August 26, 2022.    INTERVAL HISTORY:  Zurdo Dash was last seen in our clinic during his last week of treatment. While on treatment he had no complaints.  He returns today in follow-up for a symptom check and states he is doing well with no concerns. Specifically denies n/v/ha/sob/cp.    REVIEW OF SYSTEMS: As per HPI, a 14-point review of systems is otherwise negative.    Past Medical History:   Diagnosis Date     Abnormal CT scan 03/2004    calcified lung granuloma     C. difficile diarrhea     H/O     Cataract 11/18/2011     Diabetic neuropathy (H)     mild, mostly soles and distal forefeet, worse on the left side.     Diverticulitis      ED (erectile dysfunction)      Ex-smoker     QUIT SMOKING FEB 2007     History of ETOH abuse     recovering, sober since 1997     Hyperlipidemia LDL goal <100      Hypertension goal BP (blood pressure) < 140/90      Hypogonadism      Obesity      PAD (peripheral artery disease) (H)     leg cramps, with exertion, no formal diagnosis of PAD and minimal if any symptoms at all.     RA (rheumatoid arthritis) (H)     Dr Bailon     Syncope        Past Surgical History:   Procedure Laterality Date     BYPASS GRAFT FEMOROPOPLITEAL Left 01/07/2021    Procedure: LEFT FEMORAL TO ABOVE KNEE POPLITEAL ARTERY BYPASS WITH PTFE VASCULAR GRAFT REMOVABLE RING 6MMX 50CM;  Surgeon: Myra Lopes MD;  Location: [facility] OR     CATARACT IOL, RT/LT        COLONOSCOPY  03/01/2018    [facility]     COMBINED REPAIR PTOSIS WITH BLEPHAROPLASTY BILATERAL Bilateral 08/16/2019    Procedure: BILATERAL UPPER EYELID BLEPHAROPLASTY AND BILATERAL PTOSIS REPAIR;  Surgeon: Janet Garcia MD;  Location: [facility] OR     ENDARTERECTOMY FEMORAL Left 01/07/2021    Procedure: LEFT FEMORAL ENDARTERECTOMY WITH PATCH ANGIOPLASTY PHOTOFIX  0.8 X 8CM;  Surgeon: Myra Lopes MD;  Location: [facility] OR     EP PACEMAKER  01/2021     EXCISE LESION EYELID Left 08/16/2019    Procedure: LEFT LOWER EYELID BIOPSY;  Surgeon: Janet Garcia MD;  Location: [facility] OR     IR LOWER EXTREMITY ANGIOGRAM LEFT  12/17/2020     PHACOEMULSIFICATION WITH STANDARD INTRAOCULAR LENS IMPLANT  02/2019; 3/2019    left eye; right eye     TONSILLECTOMY       ZZHC INCISION TENDON SHEATH FINGER  04/2009    r hand ring finger       Family History   Problem Relation Age of Onset     Cerebrovascular Disease Mother      Arthritis Mother      Osteoporosis Mother      Alzheimer Disease Father      Arthritis Father      Cancer Father      Diabetes Maternal Grandmother      Cardiovascular Maternal Grandmother      Other Cancer Brother      Cancer Paternal Aunt      Hypertension No family hx of      Thyroid Disease No family hx of      Glaucoma No family hx of      Macular Degeneration No family hx of        Social History     Tobacco Use     Smoking status: Former Smoker     Packs/day: 1.00     Years: 40.00     Pack years: 40.00     Types: Cigarettes     Quit date: 3/16/2007     Years since quitting: 15.5     Smokeless tobacco: Never Used   Substance Use Topics     Alcohol use: No     Alcohol/week: 0.0 standard drinks       Current Outpatient Medications   Medication     acetaminophen (TYLENOL) 500 MG tablet     Alcohol Swabs PADS     blood glucose (NO BRAND SPECIFIED) test strip     Blood Glucose Monitoring Suppl (ACCU-CHEK GUIDE) w/Device KIT     bumetanide (BUMEX) 2 MG tablet     cetirizine (ZYRTEC) 10 MG tablet     collagenase  "(SANTYL) 250 UNIT/GM external ointment     cyanocobalamin (VITAMIN B-12) 1000 MCG tablet     dulaglutide (TRULICITY) 1.5 MG/0.5ML pen     folic acid (FOLVITE) 1 MG tablet     Gauze Pads & Dressings (GAUZE SPONGE) 4\"X4\" PADS     hydroxychloroquine (PLAQUENIL) 200 MG tablet     insulin aspart (NOVOLOG VIAL) 100 UNITS/ML vial     insulin glargine (LANTUS VIAL) 100 UNIT/ML vial     insulin syringe-needle U-100 (30G X 1/2\" 0.5 ML) 30G X 1/2\" 0.5 ML miscellaneous     Insulin Syringe-Needle U-100 28G X 1/2\" 0.5 ML MISC     Irrigation Supplies MISC     metoprolol succinate ER (TOPROL-XL) 50 MG 24 hr tablet     NOVOFINE AUTOCOVER PEN NEEDLE 30G X 8 MM miscellaneous     polyethylene glycol-propylene glycol (SYSTANE) 0.4-0.3 % SOLN ophthalmic solution     pravastatin (PRAVACHOL) 10 MG tablet     senna-docusate (SENOKOT-S/PERICOLACE) 8.6-50 MG tablet     sertraline (ZOLOFT) 50 MG tablet     sodium hypochlorite (DAKINS HALF-STRENGTH) 0.25 % external solution     syringe/needle, disp, (BD ECLIPSE SYRINGE) 25G X 1\" 3 ML MISC     triamcinolone (KENALOG) 0.1 % external cream     WARFARIN SODIUM PO     bisacodyl (DULCOLAX) 10 MG suppository     cefdinir (OMNICEF) 300 MG capsule     doxycycline hyclate (VIBRAMYCIN) 100 MG capsule     ferrous sulfate (FEROSUL) 325 (65 Fe) MG tablet     hydrALAZINE (APRESOLINE) 10 MG tablet     HYDROmorphone (DILAUDID) 2 MG tablet     No current facility-administered medications for this visit.          Allergies   Allergen Reactions     Blood-Group Specific Substance Other (See Comments)     Patient has a Non-specific antibody. Blood products may be delayed. Draw patient 24 hours prior to transfusion. For [facility] testing, draw one red top and two purple top tubes for all Type and Screen orders.     Seasonal Allergies          PHYSICAL EXAM:  /72   Pulse 61   Temp 98.2  F (36.8  C) (Oral)   Resp 18   Wt 95.2 kg (209 lb 12.8 oz)   SpO2 97%   BMI 30.98 kg/m    GEN: appears well, in no " acute distress  HEENT: normocephalic and atraumatic, EOMI, anicteric sclerae  RESP: normal respiration on room air, no stridor  SKIN: normal color and turgor  PSYCH: appropriate mood, affect, and judgment    All pertinent laboratory, imaging, and pathology findings have been reviewed.     IMPRESSION/RECOMMENDATION:  77 year old gentleman with multiple comorbidities and a pacemaker diagnosed with [id number], stage 1 lung cancer of the RUL status post stereotactic body radiation therapy completed on August 26, 2022. He is doing well with no significant radiation toxicity and no evidence of disease. He will return for follow up in 3 months with repeat noncontrast chest CT and was instructed to call our clinic with any questions or concerns.    Thank you for allowing me to participate in the care of this pleasant patient. If you have any questions, please do not hesitate to contact my office.    Lalita Funez MD  Attending Physician  Radiation Oncology        Again, thank you for allowing me to participate in the care of your patient.        Sincerely,        NAYAN Funez MD

## 2022-09-28 ENCOUNTER — TELEPHONE (OUTPATIENT)
Dept: RHEUMATOLOGY | Facility: CLINIC | Age: 77
End: 2022-09-28

## 2022-09-28 ENCOUNTER — OFFICE VISIT (OUTPATIENT)
Dept: PODIATRY | Facility: CLINIC | Age: 77
End: 2022-09-28
Payer: COMMERCIAL

## 2022-09-28 VITALS — SYSTOLIC BLOOD PRESSURE: 140 MMHG | HEART RATE: 88 BPM | DIASTOLIC BLOOD PRESSURE: 90 MMHG

## 2022-09-28 DIAGNOSIS — M17.0 BILATERAL PRIMARY OSTEOARTHRITIS OF KNEE: Primary | ICD-10-CM

## 2022-09-28 DIAGNOSIS — E11.51 TYPE II DIABETES MELLITUS WITH PERIPHERAL ARTERY DISEASE (H): ICD-10-CM

## 2022-09-28 DIAGNOSIS — E11.621 DIABETIC ULCER OF TOE OF LEFT FOOT ASSOCIATED WITH TYPE 2 DIABETES MELLITUS, WITH NECROSIS OF MUSCLE (H): Primary | ICD-10-CM

## 2022-09-28 DIAGNOSIS — L97.523 DIABETIC ULCER OF TOE OF LEFT FOOT ASSOCIATED WITH TYPE 2 DIABETES MELLITUS, WITH NECROSIS OF MUSCLE (H): Primary | ICD-10-CM

## 2022-09-28 DIAGNOSIS — I73.9 PAD (PERIPHERAL ARTERY DISEASE) (H): ICD-10-CM

## 2022-09-28 PROCEDURE — 99212 OFFICE O/P EST SF 10 MIN: CPT | Performed by: PODIATRIST

## 2022-09-28 NOTE — LETTER
9/28/2022         RE: Zurdo Dash  [address]  [facility] [id number]        Dear Colleague,    Thank you for referring your patient, Zurdo Dash, to the [facility]. Please see a copy of my visit note below.    Subjective:    7/13/22    Patient seen today for wound left second toe.  Patient points to the dorsum of the PIPJ.  Has had this for a few months.  Patient has history of peripheral arterial disease.  Had left femoropopliteal bypass in January 2021.  Since then has had numerous surgeries because of complications from bypass.  Has had a flap for coverage and also seeing plastic surgery.  Has wound left groin and currently being followed up with infectious disease.  PICC line saw infectious disease yesterday vancomycin and ceftriaxone.  And will be pulling PICC line and starting oral medications.  Despite being on antibiotics some erythema second toe.  Denies purulence or odor.  The patient has diabetes mellitus with peripheral neuropathy.  He has chronic kidney disease stage III.  Patient has rheumatoid arthritis and takes Plaquenil.  He is retired.  40-pack-year history of smoking and quit 14 years ago.   Primary care is Dr. Rivers last seen 3/21/22     7/20/22 patient returns for left second and third toe ulcers.  Patient on oral antibiotics.  Dressing changed once a day on thigh and improving.  Patient has been wearing the crest pad on his second toe.  Dressing with Medihoney daily.  No new drainage.  States no change in the erythema.    8/17/22 patient returns for left second and third toe ulcers.  No drainage from third toe.  He is not dressing this.  Uses crest pad on fourth toe when he remembers.  States second toe has minimal drainage.  No purulence or odor.  There are now only dressing with Medihoney and Band-Aid and longitudinally on the dorsum of this toe.  Patient still taking oral antibiotics from a wound left groin status post vascular bypass.        9/7/22 patient returns for left second and third toe ulcers.  No drainage, purulence, odor from third toe.  He states most the time not using crest pad on fourth toe to offload this as he is forgetting this.  He notes the wound over the second toe is getting worse.  Still dressing with Medihoney and Band-Aid and keeping offloaded.  Went to ED for this on 9/3/2022 and he was instructed to follow-up on outpatient basis.  Patient taking cefdinir and doxycycline.  Patient very concerned about amputation and at what level this may be.  He states groin wound healed now.    9/28/22   patient returns for left second and third toe ulcers.  No drainage or odor purulence from third toe.  He has not been wearing the crest pad on his fourth toe recently.  A wound care nurse is helping him with a wound on his second toe.  He is still using iodine on this and making sure it is offloaded.  He is using the DH shoe.  Has recently seen vascular surgery for a second opinion.  They recommended wound care and if this gets infected he will need an AKA.  Patient states that he is going to a wound clinic in 2 weeks.  Denies purulence or odor.       ROS: See above         Allergies   Allergen Reactions     Blood-Group Specific Substance Other (See Comments)     Patient has a Non-specific antibody. Blood products may be delayed. Draw patient 24 hours prior to transfusion. For eTutor testing, draw one red top and two purple top tubes for all Type and Screen orders.     Seasonal Allergies        Current Outpatient Medications   Medication Sig Dispense Refill     acetaminophen (TYLENOL) 500 MG tablet Take 2 tablets (1,000 mg) by mouth 3 times daily       Alcohol Swabs PADS Uses four times daily       bisacodyl (DULCOLAX) 10 MG suppository Unwrap and insert 1 suppository rectally once daily as needed (Patient not taking: Reported on 9/27/2022)       blood glucose (NO BRAND SPECIFIED) test strip Use to test blood sugar 4 times daily or  "as directed.       Blood Glucose Monitoring Suppl (ACCU-CHEK GUIDE) w/Device KIT        bumetanide (BUMEX) 2 MG tablet Take 1 tablet (2 mg) by mouth daily       cetirizine (ZYRTEC) 10 MG tablet Take 10 mg by mouth At Bedtime       collagenase (SANTYL) 250 UNIT/GM external ointment Apply topically daily Apply to left heel as directed once daily       cyanocobalamin (VITAMIN B-12) 1000 MCG tablet Take 1 tablet (1,000 mcg) by mouth once a week       diphenhydrAMINE (BENADRYL) 25 MG capsule Take 1 capsule (25 mg) by mouth every 6 hours as needed for itching or allergies X 30 days then dc (Patient not taking: Reported on 9/27/2022)       dulaglutide (TRULICITY) 1.5 MG/0.5ML pen Inject 1.5 mg Subcutaneous every 7 days ON Wednesdays 10 mL 1     folic acid (FOLVITE) 1 MG tablet Take 1 tablet (1 mg) by mouth daily 100 tablet 2     Gauze Pads & Dressings (GAUZE SPONGE) 4\"X4\" PADS        glipiZIDE (GLUCOTROL) 5 MG tablet Take 0.5 tablets (2.5 mg) by mouth every morning       HYDROmorphone (DILAUDID) 2 MG tablet Take 1 tablet (2 mg) by mouth 2 times daily as needed for pain For urgent pain relief with rheumatoid arthritis flare-up (Patient not taking: Reported on 9/27/2022) 10 tablet 0     hydroxychloroquine (PLAQUENIL) 200 MG tablet Take 1 tablet (200 mg) by mouth daily 90 tablet 1     insulin aspart (NOVOLOG VIAL) 100 UNITS/ML vial Inject 8 Units Subcutaneous 3 times daily (with meals) 5 mL 1     insulin glargine (LANTUS VIAL) 100 UNIT/ML vial Inject 10 Units Subcutaneous At Bedtime 3 mL 1     insulin syringe-needle U-100 (30G X 1/2\" 0.5 ML) 30G X 1/2\" 0.5 ML miscellaneous Use 4 syringes daily or as directed.       Insulin Syringe-Needle U-100 28G X 1/2\" 0.5 ML MISC        Irrigation Supplies MISC        medical cannabis (Patient's own supply) See Admin Instructions (The purpose of this order is to document that the patient reports taking medical cannabis.  This is not a prescription, and is not used to certify that the " "patient has a qualifying medical condition.)       metoprolol succinate ER (TOPROL-XL) 50 MG 24 hr tablet Take 1 tablet (50 mg) by mouth daily       NOVOFINE AUTOCOVER PEN NEEDLE 30G X 8 MM miscellaneous USE FOUR TIMES A  each 1     polyethylene glycol-propylene glycol (SYSTANE) 0.4-0.3 % SOLN ophthalmic solution Place 1 drop into both eyes 2 times daily as needed for dry eyes       pravastatin (PRAVACHOL) 10 MG tablet Take 1 tablet (10 mg) by mouth daily       senna-docusate (SENOKOT-S/PERICOLACE) 8.6-50 MG tablet Take 1 tablet by mouth 2 times daily as needed for constipation 50 tablet 0     sertraline (ZOLOFT) 50 MG tablet Take 1 tablet (50 mg) by mouth daily       sodium hypochlorite (DAKINS HALF-STRENGTH) 0.25 % external solution Use for damp to dry dressing changes two times a day left groin wound       syringe/needle, disp, (BD ECLIPSE SYRINGE) 25G X 1\" 3 ML MISC        triamcinolone (KENALOG) 0.1 % external cream Apply topically 2 times daily Apply to rash on neck, shoulders and lower extremities two times a day       warfarin ANTICOAGULANT (COUMADIN) 3 MG tablet Take 1/2 tab (1.5mg) by mouth on Sunday       WARFARIN SODIUM PO Take 3 mg by mouth Take 1 tab on Sun, Mon, Tues, Wed, Thurs and Fri.  And take 1/2 tab (1.5mg) on Sat         Patient Active Problem List   Diagnosis     Pain in limb     Hyperlipidemia LDL goal <100     Hypertension goal BP (blood pressure) < 140/90     Hypogonadism     Advanced directives, counseling/discussion     Health Care Home     Type 2 diabetes mellitus with diabetic polyneuropathy, without long-term current use of insulin (H)     RBBB (right bundle branch block)     Ex-smoker     Family history of esophageal cancer     Gastroesophageal reflux disease, esophagitis presence not specified     Rheumatoid arthritis involving multiple sites with positive rheumatoid factor (H)     Pulmonary nodule     High risk medication use     Spondylosis of cervical region without " myelopathy or radiculopathy     Erectile dysfunction, unspecified erectile dysfunction type     Type 2 diabetes mellitus without retinopathy (H)     Tubulovillous adenoma of colon     Diabetic polyneuropathy associated with type 2 diabetes mellitus (H)     Chronic bilateral low back pain without sciatica     Migraine equivalent     Posterior vitreous detachment of left eye     Pseudophakia, ou     Eyelid lesion, LLL     Dermatochalasis of both upper eyelids     Bradycardia     Primary osteoarthritis of both knees     Syncope     Trigger finger, acquired     CKD (chronic kidney disease) stage 3, GFR 30-59 ml/min (H)     Abdominal pain, generalized     PAD (peripheral artery disease) (H)     Immunosuppression (H)     Skin ulcer of toe of left foot, limited to breakdown of skin (H)     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 difficile diarrhea     H/O     Cataract 11/18/2011     Diabetic neuropathy (H)     mild, mostly soles and distal forefeet, worse on the left side.     Diverticulitis      ED (erectile dysfunction)      Ex-smoker     QUIT SMOKING FEB 2007     History of ETOH abuse     recovering, sober since 1997     Hyperlipidemia LDL goal <100      Hypertension goal BP (blood pressure) < 140/90      Hypogonadism      Obesity      PAD (peripheral artery disease) (H)     leg cramps, with exertion, no formal diagnosis of PAD and minimal if any symptoms at all.     RA (rheumatoid arthritis) (H)     Dr Bailon     Syncope        Past Surgical History:   Procedure Laterality Date     BYPASS GRAFT FEMOROPOPLITEAL Left 01/07/2021    Procedure: LEFT FEMORAL  TO ABOVE KNEE POPLITEAL ARTERY BYPASS WITH PTFE VASCULAR GRAFT REMOVABLE RING 6MMX 50CM;  Surgeon: Myra Lopes MD;  Location: [facility] OR     CATARACT IOL, RT/LT       COLONOSCOPY  03/01/2018    [facility]     COMBINED REPAIR PTOSIS WITH BLEPHAROPLASTY BILATERAL Bilateral 08/16/2019    Procedure: BILATERAL UPPER EYELID BLEPHAROPLASTY AND BILATERAL PTOSIS REPAIR;  Surgeon: Janet Garcia MD;  Location: [facility] OR     ENDARTERECTOMY FEMORAL Left 01/07/2021    Procedure: LEFT FEMORAL ENDARTERECTOMY WITH PATCH ANGIOPLASTY PHOTOFIX  0.8 X 8CM;  Surgeon: Myra Lopes MD;  Location: [facility] OR     EP PACEMAKER  01/2021     EXCISE LESION EYELID Left 08/16/2019    Procedure: LEFT LOWER EYELID BIOPSY;  Surgeon: Janet Garcia MD;  Location: [facility] OR     IR LOWER EXTREMITY ANGIOGRAM LEFT  12/17/2020     PHACOEMULSIFICATION WITH STANDARD INTRAOCULAR LENS IMPLANT  02/2019; 3/2019    left eye; right eye     TONSILLECTOMY       ZZHC INCISION TENDON SHEATH FINGER  04/2009    r hand ring finger       Family History   Problem Relation Age of Onset     Cerebrovascular Disease Mother      Arthritis Mother      Osteoporosis Mother      Alzheimer Disease Father      Arthritis Father      Cancer Father      Diabetes Maternal Grandmother      Cardiovascular Maternal Grandmother      Other Cancer Brother      Cancer Paternal Aunt      Hypertension No family hx of      Thyroid Disease No family hx of      Glaucoma No family hx of      Macular Degeneration No family hx of        Social History     Tobacco Use     Smoking status: Former Smoker     Packs/day: 1.00     Years: 40.00     Pack years: 40.00     Types: Cigarettes     Quit date: 3/16/2007     Years since quitting: 15.5     Smokeless tobacco: Never Used   Substance Use Topics     Alcohol use: No     Alcohol/week: 0.0 standard drinks         Exam:    Vitals: BP (!) 140/90   Pulse 88   BMI: There is no height or weight on file to calculate BMI.  Height: Data  Unavailable    Constitutional/ general:  Pt is in no apparent distress, appears well-nourished.  Cooperative with history and physical exam.  Patient seen with wife today.  His daughter is also on his cell phone.    Psych:  The patient answered questions appropriately.  Normal affect.  Seems to have reasonable expectations, in terms of treatment.     Lungs:  Non labored breathing, non labored speech. No cough.  No audible wheezing. Even, quiet breathing.       Vascular:   Difficult to palpate pedal pulses.  Increased edema left lower extremity.                                                                   Neuro:  Alert and oriented x 3.  Monofilament absent to midfoot.      Derm: Skin thin shiny atrophic with no hair growth.     Wound on the dorsum of the left second toe PIPJ PIPJ in the past measured 6 x 4 mm, 9 x 5 mm, 8 x 6 mm, 12 x 8 mm, and today measures 11 x 7 mm..  Base now goes down to periosteum which is then and head of proximal phalanx slightly prominent.  Dry eschar is noted on approximately half the wound now.  No purulence or odor.    Left third toe hammered with callus/dried blood distal.  No erythema edema or drainage.    Musculoskeletal:    Lower extremity muscle strength is normal.  Patient is ambulatory without an assistive device or brace.  Pronated arch with weightbearing.  All lesser toes are hammered and stiff.  Generally his feet are stiff with a decreased range of motion bilaterally.       Collected 7/13/2022  2:42 PM     Status: Final result     Visible to patient: Yes (seen)     Dx: Diabetic ulcer of toe of left foot as...    Specimen Information: Foot, Left; Skin         0 Result Notes    Culture 1+ Normal tra            Resulting Agency: [facility]           Specimen Collected: 07/13/22  2:42 PM Last Resulted: 07/15/22 10:21 AM               A:  Diabetes mellitus with peripheral neuropathy and LOPS  Peripheral arterial disease s/p bypass  Left second hammertoe with ulcer and  erythema  Left third hammertoe with distal  eschar      P:   Discussed third toe small eschar.  Encouraged him to use crest pad to keep offloaded.  Discussed second toe wound slightly smaller.  Still deep and bone proud.  Continue dressings.  Continue offloading.  Encourage patient to see wound care clinic in 2 weeks.  Watch closely for signs of infection.  Discussed importance of good blood sugar control and diet with protein zinc and vitamin C in helping to heal this.  Discussed with patient that if this gets infected he will need AKA.  Will return to see me in 2 weeks unless he is following up at wound care clinic.    Aaron Dent DPM, FACFAS              Again, thank you for allowing me to participate in the care of your patient.        Sincerely,        Aaron Dent DPM

## 2022-09-28 NOTE — TELEPHONE ENCOUNTER
[facility] Call Center    Phone Message    May a detailed message be left on voicemail: yes     Reason for Call: Symptoms or Concerns     Current symptom or concern: pain in both knees    Symptoms have been present for:  1 day(s)    Are there any new or worsening symptoms? Yes: It is getting harder for pt to get up as he is in a ton of pain.     Action Taken: Message routed to:  Other: [facility]     Travel Screening: Not Applicable

## 2022-09-28 NOTE — PROGRESS NOTES
Subjective:    7/13/22    Patient seen today for wound left second toe.  Patient points to the dorsum of the PIPJ.  Has had this for a few months.  Patient has history of peripheral arterial disease.  Had left femoropopliteal bypass in January 2021.  Since then has had numerous surgeries because of complications from bypass.  Has had a flap for coverage and also seeing plastic surgery.  Has wound left groin and currently being followed up with infectious disease.  PICC line saw infectious disease yesterday vancomycin and ceftriaxone.  And will be pulling PICC line and starting oral medications.  Despite being on antibiotics some erythema second toe.  Denies purulence or odor.  The patient has diabetes mellitus with peripheral neuropathy.  He has chronic kidney disease stage III.  Patient has rheumatoid arthritis and takes Plaquenil.  He is retired.  40-pack-year history of smoking and quit 14 years ago.   Primary care is Dr. Rivers last seen 3/21/22     7/20/22 patient returns for left second and third toe ulcers.  Patient on oral antibiotics.  Dressing changed once a day on thigh and improving.  Patient has been wearing the crest pad on his second toe.  Dressing with Medihoney daily.  No new drainage.  States no change in the erythema.    8/17/22 patient returns for left second and third toe ulcers.  No drainage from third toe.  He is not dressing this.  Uses crest pad on fourth toe when he remembers.  States second toe has minimal drainage.  No purulence or odor.  There are now only dressing with Medihoney and Band-Aid and longitudinally on the dorsum of this toe.  Patient still taking oral antibiotics from a wound left groin status post vascular bypass.       9/7/22 patient returns for left second and third toe ulcers.  No drainage, purulence, odor from third toe.  He states most the time not using crest pad on fourth toe to offload this as he is forgetting this.  He notes the wound over the second toe is getting  worse.  Still dressing with Medihoney and Band-Aid and keeping offloaded.  Went to ED for this on 9/3/2022 and he was instructed to follow-up on outpatient basis.  Patient taking cefdinir and doxycycline.  Patient very concerned about amputation and at what level this may be.  He states groin wound healed now.    9/28/22   patient returns for left second and third toe ulcers.  No drainage or odor purulence from third toe.  He has not been wearing the crest pad on his fourth toe recently.  A wound care nurse is helping him with a wound on his second toe.  He is still using iodine on this and making sure it is offloaded.  He is using the DH shoe.  Has recently seen vascular surgery for a second opinion.  They recommended wound care and if this gets infected he will need an AKA.  Patient states that he is going to a wound clinic in 2 weeks.  Denies purulence or odor.       ROS: See above         Allergies   Allergen Reactions     Blood-Group Specific Substance Other (See Comments)     Patient has a Non-specific antibody. Blood products may be delayed. Draw patient 24 hours prior to transfusion. For [facility] testing, draw one red top and two purple top tubes for all Type and Screen orders.     Seasonal Allergies        Current Outpatient Medications   Medication Sig Dispense Refill     acetaminophen (TYLENOL) 500 MG tablet Take 2 tablets (1,000 mg) by mouth 3 times daily       Alcohol Swabs PADS Uses four times daily       bisacodyl (DULCOLAX) 10 MG suppository Unwrap and insert 1 suppository rectally once daily as needed (Patient not taking: Reported on 9/27/2022)       blood glucose (NO BRAND SPECIFIED) test strip Use to test blood sugar 4 times daily or as directed.       Blood Glucose Monitoring Suppl (ACCU-CHEK GUIDE) w/Device KIT        bumetanide (BUMEX) 2 MG tablet Take 1 tablet (2 mg) by mouth daily       cetirizine (ZYRTEC) 10 MG tablet Take 10 mg by mouth At Bedtime       collagenase (SANTYL) 250 UNIT/GM  "external ointment Apply topically daily Apply to left heel as directed once daily       cyanocobalamin (VITAMIN B-12) 1000 MCG tablet Take 1 tablet (1,000 mcg) by mouth once a week       diphenhydrAMINE (BENADRYL) 25 MG capsule Take 1 capsule (25 mg) by mouth every 6 hours as needed for itching or allergies X 30 days then dc (Patient not taking: Reported on 9/27/2022)       dulaglutide (TRULICITY) 1.5 MG/0.5ML pen Inject 1.5 mg Subcutaneous every 7 days ON Wednesdays 10 mL 1     folic acid (FOLVITE) 1 MG tablet Take 1 tablet (1 mg) by mouth daily 100 tablet 2     Gauze Pads & Dressings (GAUZE SPONGE) 4\"X4\" PADS        glipiZIDE (GLUCOTROL) 5 MG tablet Take 0.5 tablets (2.5 mg) by mouth every morning       HYDROmorphone (DILAUDID) 2 MG tablet Take 1 tablet (2 mg) by mouth 2 times daily as needed for pain For urgent pain relief with rheumatoid arthritis flare-up (Patient not taking: Reported on 9/27/2022) 10 tablet 0     hydroxychloroquine (PLAQUENIL) 200 MG tablet Take 1 tablet (200 mg) by mouth daily 90 tablet 1     insulin aspart (NOVOLOG VIAL) 100 UNITS/ML vial Inject 8 Units Subcutaneous 3 times daily (with meals) 5 mL 1     insulin glargine (LANTUS VIAL) 100 UNIT/ML vial Inject 10 Units Subcutaneous At Bedtime 3 mL 1     insulin syringe-needle U-100 (30G X 1/2\" 0.5 ML) 30G X 1/2\" 0.5 ML miscellaneous Use 4 syringes daily or as directed.       Insulin Syringe-Needle U-100 28G X 1/2\" 0.5 ML MISC        Irrigation Supplies MISC        medical cannabis (Patient's own supply) See Admin Instructions (The purpose of this order is to document that the patient reports taking medical cannabis.  This is not a prescription, and is not used to certify that the patient has a qualifying medical condition.)       metoprolol succinate ER (TOPROL-XL) 50 MG 24 hr tablet Take 1 tablet (50 mg) by mouth daily       NOVOFINE AUTOCOVER PEN NEEDLE 30G X 8 MM miscellaneous USE FOUR TIMES A  each 1     polyethylene glycol-propylene " "glycol (SYSTANE) 0.4-0.3 % SOLN ophthalmic solution Place 1 drop into both eyes 2 times daily as needed for dry eyes       pravastatin (PRAVACHOL) 10 MG tablet Take 1 tablet (10 mg) by mouth daily       senna-docusate (SENOKOT-S/PERICOLACE) 8.6-50 MG tablet Take 1 tablet by mouth 2 times daily as needed for constipation 50 tablet 0     sertraline (ZOLOFT) 50 MG tablet Take 1 tablet (50 mg) by mouth daily       sodium hypochlorite (DAKINS HALF-STRENGTH) 0.25 % external solution Use for damp to dry dressing changes two times a day left groin wound       syringe/needle, disp, (BD ECLIPSE SYRINGE) 25G X 1\" 3 ML MISC        triamcinolone (KENALOG) 0.1 % external cream Apply topically 2 times daily Apply to rash on neck, shoulders and lower extremities two times a day       warfarin ANTICOAGULANT (COUMADIN) 3 MG tablet Take 1/2 tab (1.5mg) by mouth on Sunday       WARFARIN SODIUM PO Take 3 mg by mouth Take 1 tab on Sun, Mon, Tues, Wed, Thurs and Fri.  And take 1/2 tab (1.5mg) on Sat         Patient Active Problem List   Diagnosis     Pain in limb     Hyperlipidemia LDL goal <100     Hypertension goal BP (blood pressure) < 140/90     Hypogonadism     Advanced directives, counseling/discussion     Health Care Home     Type 2 diabetes mellitus with diabetic polyneuropathy, without long-term current use of insulin (H)     RBBB (right bundle branch block)     Ex-smoker     Family history of esophageal cancer     Gastroesophageal reflux disease, esophagitis presence not specified     Rheumatoid arthritis involving multiple sites with positive rheumatoid factor (H)     Pulmonary nodule     High risk medication use     Spondylosis of cervical region without myelopathy or radiculopathy     Erectile dysfunction, unspecified erectile dysfunction type     Type 2 diabetes mellitus without retinopathy (H)     Tubulovillous adenoma of colon     Diabetic polyneuropathy associated with type 2 diabetes mellitus (H)     Chronic bilateral " low back pain without sciatica     Migraine equivalent     Posterior vitreous detachment of left eye     Pseudophakia, ou     Eyelid lesion, LLL     Dermatochalasis of both upper eyelids     Bradycardia     Primary osteoarthritis of both knees     Syncope     Trigger finger, acquired     CKD (chronic kidney disease) stage 3, GFR 30-59 ml/min (H)     Abdominal pain, generalized     PAD (peripheral artery disease) (H)     Immunosuppression (H)     Skin ulcer of toe of left foot, limited to breakdown of skin (H)     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 difficile diarrhea     H/O     Cataract 11/18/2011     Diabetic neuropathy (H)     mild, mostly soles and distal forefeet, worse on the left side.     Diverticulitis      ED (erectile dysfunction)      Ex-smoker     QUIT SMOKING FEB 2007     History of ETOH abuse     recovering, sober since 1997     Hyperlipidemia LDL goal <100      Hypertension goal BP (blood pressure) < 140/90      Hypogonadism      Obesity      PAD (peripheral artery disease) (H)     leg cramps, with exertion, no formal diagnosis of PAD and minimal if any symptoms at all.     RA (rheumatoid arthritis) (H)     Dr Bailon     Syncope        Past Surgical History:   Procedure Laterality Date     BYPASS GRAFT FEMOROPOPLITEAL Left 01/07/2021    Procedure: LEFT FEMORAL TO ABOVE KNEE POPLITEAL ARTERY BYPASS WITH PTFE VASCULAR GRAFT REMOVABLE RING 6MMX 50CM;  Surgeon: Myra Lopes MD;  Location: [facility] OR     CATARACT IOL, RT/LT       COLONOSCOPY  03/01/2018    [facility]     COMBINED REPAIR PTOSIS WITH BLEPHAROPLASTY BILATERAL Bilateral  08/16/2019    Procedure: BILATERAL UPPER EYELID BLEPHAROPLASTY AND BILATERAL PTOSIS REPAIR;  Surgeon: Janet Garcia MD;  Location: [facility] OR     ENDARTERECTOMY FEMORAL Left 01/07/2021    Procedure: LEFT FEMORAL ENDARTERECTOMY WITH PATCH ANGIOPLASTY PHOTOFIX  0.8 X 8CM;  Surgeon: Myra Lopes MD;  Location: [facility] OR     EP PACEMAKER  01/2021     EXCISE LESION EYELID Left 08/16/2019    Procedure: LEFT LOWER EYELID BIOPSY;  Surgeon: Janet Garcia MD;  Location: [facility] OR     IR LOWER EXTREMITY ANGIOGRAM LEFT  12/17/2020     PHACOEMULSIFICATION WITH STANDARD INTRAOCULAR LENS IMPLANT  02/2019; 3/2019    left eye; right eye     TONSILLECTOMY       ZZHC INCISION TENDON SHEATH FINGER  04/2009    r hand ring finger       Family History   Problem Relation Age of Onset     Cerebrovascular Disease Mother      Arthritis Mother      Osteoporosis Mother      Alzheimer Disease Father      Arthritis Father      Cancer Father      Diabetes Maternal Grandmother      Cardiovascular Maternal Grandmother      Other Cancer Brother      Cancer Paternal Aunt      Hypertension No family hx of      Thyroid Disease No family hx of      Glaucoma No family hx of      Macular Degeneration No family hx of        Social History     Tobacco Use     Smoking status: Former Smoker     Packs/day: 1.00     Years: 40.00     Pack years: 40.00     Types: Cigarettes     Quit date: 3/16/2007     Years since quitting: 15.5     Smokeless tobacco: Never Used   Substance Use Topics     Alcohol use: No     Alcohol/week: 0.0 standard drinks         Exam:    Vitals: BP (!) 140/90   Pulse 88   BMI: There is no height or weight on file to calculate BMI.  Height: Data Unavailable    Constitutional/ general:  Pt is in no apparent distress, appears well-nourished.  Cooperative with history and physical exam.  Patient seen with wife today.  His daughter is also on his cell phone.    Psych:  The patient answered questions appropriately.  Normal affect.  Seems  to have reasonable expectations, in terms of treatment.     Lungs:  Non labored breathing, non labored speech. No cough.  No audible wheezing. Even, quiet breathing.       Vascular:   Difficult to palpate pedal pulses.  Increased edema left lower extremity.                                                                   Neuro:  Alert and oriented x 3.  Monofilament absent to midfoot.      Derm: Skin thin shiny atrophic with no hair growth.     Wound on the dorsum of the left second toe PIPJ PIPJ in the past measured 6 x 4 mm, 9 x 5 mm, 8 x 6 mm, 12 x 8 mm, and today measures 11 x 7 mm..  Base now goes down to periosteum which is then and head of proximal phalanx slightly prominent.  Dry eschar is noted on approximately half the wound now.  No purulence or odor.    Left third toe hammered with callus/dried blood distal.  No erythema edema or drainage.    Musculoskeletal:    Lower extremity muscle strength is normal.  Patient is ambulatory without an assistive device or brace.  Pronated arch with weightbearing.  All lesser toes are hammered and stiff.  Generally his feet are stiff with a decreased range of motion bilaterally.       Collected 7/13/2022  2:42 PM     Status: Final result     Visible to patient: Yes (seen)     Dx: Diabetic ulcer of toe of left foot as...    Specimen Information: Foot, Left; Skin         0 Result Notes    Culture 1+ Normal tra            Resulting Agency: [facility]           Specimen Collected: 07/13/22  2:42 PM Last Resulted: 07/15/22 10:21 AM               A:  Diabetes mellitus with peripheral neuropathy and LOPS  Peripheral arterial disease s/p bypass  Left second hammertoe with ulcer and erythema  Left third hammertoe with distal  eschar      P:   Discussed third toe small eschar.  Encouraged him to use crest pad to keep offloaded.  Discussed second toe wound slightly smaller.  Still deep and bone proud.  Continue dressings.  Continue offloading.  Encourage patient to see wound care  clinic in 2 weeks.  Watch closely for signs of infection.  Discussed importance of good blood sugar control and diet with protein zinc and vitamin C in helping to heal this.  Discussed with patient that if this gets infected he will need AKA.  Will return to see me in 2 weeks unless he is following up at wound care clinic.    Aaron Dent DPM, FACFAS

## 2022-09-28 NOTE — PATIENT INSTRUCTIONS
We wish you continued good healing. If you have any questions or concerns, please do not hesitate to contact us at  [phone]    [facility] (secure e-mail communication and access to your chart) to send a message or to make an appointment.    Please remember to call and schedule a follow up appointment if one was recommended at your earliest convenience.     PODIATRY CLINIC HOURS  TELEPHONE NUMBER    Dr. Aaron DAVISONPPEDRITO [facility]        Clinics:  [facility] Patel CMA   Tuesday 1PM-6PM  [place]  Wednesday 745AM-330PM  [place]  Thursday/Friday 745AM-230PM  [place] [facility] APPOINTMENTS  [phone]    [place] APPOINTMENTS  [phone]        If you need a medication refill, please contact us you may need lab work and/or a follow up visit prior to your refill (i.e. Antifungal medications).  If MRI needed please call Imaging at [phone] or [phone]  HOW DO I GET MY KNEE SCOOTER? Knee scooters can be picked up at ANY Medical Supply stores with your knee scooter Prescription.  OR  Bring your signed prescription to an [facility] showroom.

## 2022-09-29 ENCOUNTER — LAB REQUISITION (OUTPATIENT)
Dept: LAB | Facility: CLINIC | Age: 77
End: 2022-09-29
Payer: COMMERCIAL

## 2022-09-29 DIAGNOSIS — I73.9 PERIPHERAL VASCULAR DISEASE, UNSPECIFIED (H): ICD-10-CM

## 2022-09-29 NOTE — TELEPHONE ENCOUNTER
"Returned call to patient's wife Yoli.  Patient's wife gave the phone to the patient.    Last office visit 8- with Dr. Tompkins.  Procedure: Steroid injection of the bilateral knees  Indication: Pain, bilateral knee osteoarthritis     Patient states the steroid injection helped initially, but is now starting to wear off.    What joints are affected? Both knees    Are they red-no  swollen-no  Painful-9 or 10 when patient is trying to stand up or get out of bed  warm to the touch-yes    Patient states he does not have any pain when sitting or standing.      How long have you been having symptoms? Started having pain  within the past 2 weeks    Are the symptoms constant or intermittent? Only when trying to stand.  Once patient is up there is no pain.    Are symptoms worse in the AM or PM-the pain is only present when the patient is getting up or out of bed.    Are symptoms worse with activity or rest? the pain is only present when the patient is getting up or out of bed.     Any aggravating or reliveing factors? None    What pharmacy would you like to use to use should Dr. Tompkins send a prescription?  [facility]       Patient is requesting another steroid injection.  Advised patient that the usual time frame between injections is at least 3 months. Patient stated \"I have heard that\".  Advised patient that writer would send message to Dr. Tompkins to advise.    Amber CEDEÑO RN                              "

## 2022-09-29 NOTE — TELEPHONE ENCOUNTER
DECLAN Bennett: Please call to notify Zurdo Hardy that the next steroid injection would need to be at least 3 months from the last steroid injection.  If steroid injections are not helping, an alternative approach is to get a surgical opinion from orthopedics.  If he would like to see orthopedic surgery, then please place an orthopedic  referral for an orthopedic surgeon.    Albert Tompkins MD  9/29/2022

## 2022-09-30 ENCOUNTER — VIRTUAL VISIT (OUTPATIENT)
Dept: PHARMACY | Facility: CLINIC | Age: 77
End: 2022-09-30
Payer: COMMERCIAL

## 2022-09-30 DIAGNOSIS — I74.2 EMBOLISM AND THROMBOSIS OF ARTERIES OF THE UPPER EXTREMITIES (H): ICD-10-CM

## 2022-09-30 DIAGNOSIS — L97.529 ULCER OF LEFT FOOT, UNSPECIFIED ULCER STAGE (H): Primary | ICD-10-CM

## 2022-09-30 DIAGNOSIS — M17.0 PRIMARY OSTEOARTHRITIS OF BOTH KNEES: ICD-10-CM

## 2022-09-30 DIAGNOSIS — Z78.9 TAKES DIETARY SUPPLEMENTS: ICD-10-CM

## 2022-09-30 DIAGNOSIS — N18.32 STAGE 3B CHRONIC KIDNEY DISEASE (H): ICD-10-CM

## 2022-09-30 DIAGNOSIS — E11.9 TYPE 2 DIABETES MELLITUS WITHOUT RETINOPATHY (H): ICD-10-CM

## 2022-09-30 DIAGNOSIS — G89.29 CHRONIC BILATERAL LOW BACK PAIN WITHOUT SCIATICA: ICD-10-CM

## 2022-09-30 DIAGNOSIS — I73.9 PAD (PERIPHERAL ARTERY DISEASE) (H): ICD-10-CM

## 2022-09-30 DIAGNOSIS — M54.50 CHRONIC BILATERAL LOW BACK PAIN WITHOUT SCIATICA: ICD-10-CM

## 2022-09-30 DIAGNOSIS — E78.5 HYPERLIPIDEMIA LDL GOAL <100: ICD-10-CM

## 2022-09-30 DIAGNOSIS — M05.79 RHEUMATOID ARTHRITIS INVOLVING MULTIPLE SITES WITH POSITIVE RHEUMATOID FACTOR (H): ICD-10-CM

## 2022-09-30 PROCEDURE — 99207 PR NO CHARGE LOS: CPT | Performed by: PHARMACIST

## 2022-09-30 RX ORDER — HYDRALAZINE HYDROCHLORIDE 10 MG/1
10 TABLET, FILM COATED ORAL
COMMUNITY
End: 2023-11-06

## 2022-09-30 RX ORDER — CEFDINIR 300 MG/1
300 CAPSULE ORAL 2 TIMES DAILY
COMMUNITY
End: 2023-07-24

## 2022-09-30 RX ORDER — FERROUS SULFATE 325(65) MG
325 TABLET ORAL DAILY
COMMUNITY
End: 2023-08-03

## 2022-09-30 RX ORDER — DOXYCYCLINE 100 MG/1
100 CAPSULE ORAL 2 TIMES DAILY
COMMUNITY
End: 2023-07-24

## 2022-09-30 NOTE — Clinical Note
Hi Dr. Duckworth, this patient is not on an ACEI or ARB, which is indicated due to CKD and proteinuria, do you mind if I start him out on that? It looks like he might also be managed by Dr. Santa at [facility]. Let me know if you have any recommendations, thanks!  Kaur Olivo, PharmD Medication Therapy Management Pharmacist [phone]

## 2022-09-30 NOTE — TELEPHONE ENCOUNTER
Called and spoke with pt about Md message below. He verbalized understanding. He first needs to get he diabetic ulcer healed. Then he will look into ortho for options with knees. Referral sent    Sabrina HEATH RN Specialty Triage 9/30/2022 10:20 AM

## 2022-09-30 NOTE — PROGRESS NOTES
Medication Therapy Management (MTM) Encounter    ASSESSMENT:                            Medication Adherence/Access: No issues identified    Left toe ulcer: Managed by wound care specialist in [place].  No current concerns about antibiotic regimen.    Type 2 diabetes: Patient is meeting goal of A1c <8% and 2-hour post prandial goal of <180.  His nursing home will need to be called to confirm the sliding scale regimen for Humalog dosing.    Hypertension/PAD/CKD: Blood pressure currently at goal of <140/90.  Patient is not currently on an ACEi or ARB and KDIGO guidelines for recommend the use of an ACEi or ARB for patients with hyperertension, CKD, and albuminuria.      Hyperlipidemia: Stable.    Rheumatoid arthritis/Pain: Stable. Monitored and treated by nursing home staff.    Depression: Stable.    History of DVT: Managed by INR clinic.    Constipation: Stable.    OTC/Supplements: Stable.      PLAN:                            1. Kaur will call nursing home to confirm Novolog sliding scale.    [facility]  [address] [place] [address]  [phone]     2. Kaur will contact Dr. Duckworth to suggest the initiation of an ACE inhibitor or ARB medication, which is used to manage high blood pressure and kidney disease.    3. Follow-up with the [place] wound clinic regarding the ulcer on your toe.      Addendum 10/5/22:  Received med list from assisted living and updated accordingly. No longer on mealtime insulin.     Addendum 10/7/22:  Dedra Romero MD [place], [facility]  I am covering for Dr. Duckworth while he's on leave - Sounds fine!   Dedra Romero MD             Previous Messages       ----- Message -----   From: Bernadette Olivo [facility]   Sent: 10/5/2022   3:22 PM CDT   To: Kapil Duckworth MD     Hi Dr. Duckworth, this patient is not on an ACEI or ARB, which is indicated due to CKD and proteinuria, do you mind if I start him out on that? It looks like he might also be managed by   [facility] at [facility]. Let me know if you have any recommendations, thanks!     Kaur Olivo, PharmD   Medication Therapy Management Pharmacist   [phone]       Follow-up: Follow-up telephone visit scheduled for 10/17/22 at 11:30 am.    SUBJECTIVE/OBJECTIVE:                          Zurdo Dash is a 77 year old male called for an initial visit. He was referred to me from insurance plan.      Reason for visit: Comprehensive medication review.    Allergies/ADRs: None  Past Medical History: Reviewed in chart  Tobacco: He reports that he quit smoking about 15 years ago. His smoking use included cigarettes. He has a 40.00 pack-year smoking history. He has never used smokeless tobacco.  Alcohol: not currently using  Caffeine: 1 cup coffee with each meal  Patient uses [facility] pharmacy.  PCP: Dr. Suze Santa at [facility].    Medication Adherence/Access: Nursing home staff gives him a cup with his medications in the morning and in the evening/bedtime.  Nurses also give him his insulin injections.    Left toe ulcer:  Cefdinir 300 mg twice daily  Doxycycline 100 mg twice daily    No adverse effects reported.  He is glad that he is on these antibiotics because he is very concerned that the sore on his toe is going to get infected and that he is going to require an amputation.  Patient reports that a nurse performs daily bandage changes with gauze pads with iodine. He is scheduled for a visit with a wound clinic in [place] on 10/14/22 to be examined by a wound specialist.    Type 2 diabetes:  Lantus 25 units daily  Novolog - administered before meals by nursing home staff on a sliding scale - need to call nursing home to confirm sliding scale.  Trulicity 1.5 mg weekly Wednesdays     Sometimes nauseous and feels like he is going to vomit.  Blood sugar monitoring: Continuous Glucose Monitor - Freestyle [place] 2. Patient reports that he is usually in the 200s right after eating, but it is usually corrected into the 100s about  an hour later.  Very seldom has a blood sugar reading under 80.  Symptoms of low blood sugar? none  Symptoms of high blood sugar? Nausea  Aspirin: Not taking due to age  Statin: Yes: pravastatin 10 mg daily   ACEi/ARB: No.   Urine Albumin:   Lab Results   Component Value Date    UMALCR 48.48 (H) 07/28/2022      Lab Results   Component Value Date    A1C 7.1 07/28/2022    A1C 9.7 02/01/2022    A1C 5.6 08/16/2021    A1C 6.8 01/07/2021    A1C 6.8 12/17/2020    A1C 6.7 12/11/2020    A1C 6.0 01/03/2020    A1C 6.4 08/06/2019     Hypertension/PAD/CKD:   metoprolol succinate 50 mg daily  bumetanide 1 mg daily  hydralazine 10 mg daily at bedtime for systolic blood pressure > 170.    Patient has blood pressure monitored by nursing staff at nursing home daily. Home BP monitoring in range of 120-130's systolic over 80-90's diastolic.  He said that the nursing staff has not had any concerns about his recent blood pressure readings.  Patient reports no current medication side effects.   BP Readings from Last 3 Encounters:   09/28/22 (!) 140/90   09/27/22 105/72   09/20/22 (!) 151/94     GFR Estimate   Date Value Ref Range Status   07/28/2022 46 (L) >60 mL/min/1.73m2 Final     Comment:     Effective December 21, 2021 eGFRcr in adults is calculated using the 2021 CKD-EPI creatinine equation which includes age and gender (Marycruz gomez al., NEJ, DOI: 10.1056/NFTSvs6533047)   07/05/2021 20 (L) >60 mL/min/[1.73_m2] Final     Comment:     Non  GFR Calc  Starting 12/18/2018, serum creatinine based estimated GFR (eGFR) will be   calculated using the Chronic Kidney Disease Epidemiology Collaboration   (CKD-EPI) equation.       GFR Estimate If Black   Date Value Ref Range Status   07/05/2021 24 (L) >60 mL/min/[1.73_m2] Final     Comment:      GFR Calc  Starting 12/18/2018, serum creatinine based estimated GFR (eGFR) will be   calculated using the Chronic Kidney Disease Epidemiology Collaboration   (CKD-EPI)  equation.       Hyperlipidemia:   Pravastatin 10 mg daily    Patient reports no significant myalgias or other side effects.  Recent Labs   Lab Test 02/01/22  0901 01/07/21  0610 09/30/15  1302 06/29/15  1213 08/07/14  1249   CHOL 216* 116   < > 125 129   HDL 35* 38*   < > 37* 46   * 45   < > 31 29   TRIG 232* 166*   < > 286* 270*   CHOLHDLRATIO  --   --   --  3.4 2.8    < > = values in this interval not displayed.     Rheumatoid arthritis/Pain/Osteoarthritis:  hydroxychloroquine 200 mg daily - no side effects reported    Dilaudid 2 mg twice daily as needed for RA flare - typically only uses 1 tablet every 2-3 days.    Tylenol 1000 mg three times daily scheduled - He does not think that Tylenol is helping much with the pain.    Cortisone injections - He occasionally gets cortisone shots in his knee for osteoarthritis.  His next injection is scheduled in November with his rheumatologist.    Medical cannabis - He used to get medical marijuana from a dispensary in the form of a spray that went under his tongue.  The onset was fast (about 10 minutes) and it really helped with relaxing and pain.  The medical marijuana was very expensive, so he recently switched to oral gummies purchased OTC with the recent [place] cannabis law change.  The gummies still work well for relaxing and pain, but they have a longer onset of 1 hour.    methocarbamol 250 mg twice daily as needed for pain.    Depression/Anxiety:  sertraline 50 mg daily    He did not know that he was taking anything for depression/anxiety.  He said that he has been very anxious lately about an ulcer on his toe that is at high risk for infection and amputation.  He is glad that he is taking sertraline to help to manage the anxiety about his toe.    History of DVT:  warfarin 3 mg on Sunday, Monday, Tuesday, Wednesday, Thursday, Friday and 1.5 mg on Saturday - indefinite treatment for stroke prophylaxis per Dr. Duckworth.  Has weekly INR readings.  Patient reports no  concerns from his las anticoagulation clinic visit.    Constipation:  Miralax 17 g once daily -as needed for constipation  Not currently constipated, so not using medications for constipation.    OTC/Supplements:  Zyrtec 10 mg daily - seasonal allergies, takes nightly. No concerns about seasonal allergies.  Vitamin B-12 1000 mg weekly (Tuesdays) - said that he has a history of low B-12 levels   Ferrous sulfate 325 mg daily for anemia.      INR   Date Value Ref Range Status   10/04/2022 2.60 (H) 0.85 - 1.15 Final   12/17/2020 1.21 (H) 0.86 - 1.14 Final     INR (External)   Date Value Ref Range Status   09/28/2021 2.1 (H) <1.3 Final        Today's Vitals: There were no vitals taken for this visit.  ----------------      I spent 1 hour and 2 minutes with this patient today. All changes were made via collaborative practice agreement with Kapil Duckworth MD. A copy of the visit note was provided to the patient's provider(s).    The patient was sent via [employer] a summary of these recommendations.     Kaur Olivo, PharmD  Medication Therapy Management Pharmacist  [phone]  [employer] Student    Telemedicine Visit Details  Type of service:  Telephone visit  Start Time: 1:34 PM  End Time: 2:36 PM  Originating Location (patient location): [place]  Distant Location (provider location):  [facility]     Medication Therapy Recommendations  CKD (chronic kidney disease) stage 3, GFR 30-59 ml/min (H)    Current Medication: metoprolol succinate ER (TOPROL-XL) 50 MG 24 hr tablet   Rationale: Synergistic therapy - Needs additional medication therapy - Indication   Recommendation: Start Medication - lisinopril 2.5 MG tablet   Status: Contact Provider - Awaiting Response

## 2022-10-04 LAB — INR PPP: 2.6 (ref 0.85–1.15)

## 2022-10-04 PROCEDURE — 85610 PROTHROMBIN TIME: CPT | Mod: ORL | Performed by: INTERNAL MEDICINE

## 2022-10-04 PROCEDURE — 36415 COLL VENOUS BLD VENIPUNCTURE: CPT | Mod: ORL | Performed by: INTERNAL MEDICINE

## 2022-10-04 PROCEDURE — P9604 ONE-WAY ALLOW PRORATED TRIP: HCPCS | Mod: ORL | Performed by: INTERNAL MEDICINE

## 2022-10-05 RX ORDER — BUMETANIDE 1 MG/1
1 TABLET ORAL DAILY
COMMUNITY
End: 2023-08-03

## 2022-10-05 RX ORDER — METHOCARBAMOL 500 MG/1
250 TABLET, FILM COATED ORAL 2 TIMES DAILY
COMMUNITY
End: 2023-11-06

## 2022-10-05 RX ORDER — POLYETHYLENE GLYCOL 3350 17 G/17G
1 POWDER, FOR SOLUTION ORAL DAILY
COMMUNITY
End: 2023-07-24

## 2022-10-05 NOTE — PATIENT INSTRUCTIONS
"Recommendations from today's MTM visit:                                                    MTM (medication therapy management) is a service provided by a clinical pharmacist designed to help you get the most of out of your medicines.   Today we reviewed what your medicines are for, how to know if they are working, that your medicines are safe and how to make your medicine regimen as easy as possible.      1. Kaur will call nursing [place] to confirm Novolog sliding scale.    [facility]  [address], [place] [address]  [phone]     2. Kaur will contact Dr. Duckworth to suggest the initiation of an ACE inhibitor or ARB medication, which is used to manage high blood pressure and kidney disease.    3. Follow-up with the [place] wound clinic regarding the ulcer on your toe.    Follow-up: Return in about 3 weeks (around 10/21/2022) for Medication Therapy Management.    It was great speaking with you today.  I value your experience and would be very thankful for your time in providing feedback in our clinic survey. In the next few days, you may receive an email or text message from [employer] with a link to a survey related to your  clinical pharmacist.\"     To schedule another MTM appointment, please call the clinic directly or you may call the MTM scheduling line at [phone] or toll-free at [phone].     My Clinical Pharmacist's contact information:                                                      Please feel free to contact me with any questions or concerns you have.      Kaur Olivo, PharmD  Medication Therapy Management Pharmacist  [phone]   "

## 2022-10-05 NOTE — PROGRESS NOTES
Radiotherapy Treatment Summary              PATIENT: Zurdo Dash  MEDICAL RECORD NO: [id number]   : 1945    PATHOLOGY/STAGE:  77 year old gentleman with multiple comorbidities and a pacemaker diagnosed with [id number], stage 1 lung cancer of the RUL s/p stereotactic body radiation therapy.  INTENT OF RADIOTHERAPY: Curative  CONCURRENT SYSTEMIC THERAPY:  None        SITE OF TREATMENT: Right upper lobe lung tumor    DATES  OF TREATMENT: 2022 to 2022    TOTAL DOSE OF TREATMENT: 5000 cGy    DOSE PER FRACTION OF TREATMENT: 1000 cGy       COMMENT/TOXICITY: Patient did well with no treatment related complaints       PAIN MANAGEMENT:  Patient did not require any pain medications.                            FOLLOW UP PLAN:  Patient will follow up with Lalita Funez MD in 1 month.    Lalita Funez MD  Department of Radiation Oncology  [facility]  Patient Care Team:  Outside, Provider as PCP - General  Kapil Duckworth MD as Assigned PCP  Rainer Bailon MD as MD (Rheumatology)  Stacy Styles RD as Diabetes Educator (Dietitian, Registered)  Aaron Dent DPM as Assigned Musculoskeletal Provider  DANIELLE Massey MD as MD (Cardiovascular Disease)  Mia Coe MD as Assigned Pulmonology Provider  Albert Tompkins MD as Assigned Rheumatology Provider  Yuliana Mills MD as MD (Cardiovascular & Thoracic Surgery)  Ofelia Jordan, DECLAN as Specialty Care Coordinator (Thoracic Surgery)  Glendy Gomez DPM as Assigned Surgical Provider  Naomi Funez MD as Assigned Cancer Care Provider  Myra Lopes MD as Assigned Heart and Vascular Provider  Bernadette Olivo [facility] as Pharmacist (Pharmacist Ambulatory Care)

## 2022-10-05 NOTE — PROGRESS NOTES
Dear Colleagues,  Today Zurdo Dash was seen in follow up      IDENTIFICATION: 77 year old gentleman with multiple comorbidities and a pacemaker diagnosed with [id number], stage 1 lung cancer of the RUL status post stereotactic body radiation therapy completed on August 26, 2022.    INTERVAL HISTORY:  Zurdo Dash was last seen in our clinic during his last week of treatment. While on treatment he had no complaints.  He returns today in follow-up for a symptom check and states he is doing well with no concerns. Specifically denies n/v/ha/sob/cp.    REVIEW OF SYSTEMS: As per HPI, a 14-point review of systems is otherwise negative.    Past Medical History:   Diagnosis Date     Abnormal CT scan 03/2004    calcified lung granuloma     C. difficile diarrhea     H/O     Cataract 11/18/2011     Diabetic neuropathy (H)     mild, mostly soles and distal forefeet, worse on the left side.     Diverticulitis      ED (erectile dysfunction)      Ex-smoker     QUIT SMOKING FEB 2007     History of ETOH abuse     recovering, sober since 1997     Hyperlipidemia LDL goal <100      Hypertension goal BP (blood pressure) < 140/90      Hypogonadism      Obesity      PAD (peripheral artery disease) (H)     leg cramps, with exertion, no formal diagnosis of PAD and minimal if any symptoms at all.     RA (rheumatoid arthritis) (H)     Dr SharpUvaldo     Syncope        Past Surgical History:   Procedure Laterality Date     BYPASS GRAFT FEMOROPOPLITEAL Left 01/07/2021    Procedure: LEFT FEMORAL TO ABOVE KNEE POPLITEAL ARTERY BYPASS WITH PTFE VASCULAR GRAFT REMOVABLE RING 6MMX 50CM;  Surgeon: Myra Lopes MD;  Location: [facility] OR     CATARACT IOL, RT/LT       COLONOSCOPY  03/01/2018    [facility]     COMBINED REPAIR PTOSIS WITH BLEPHAROPLASTY BILATERAL Bilateral 08/16/2019    Procedure: BILATERAL UPPER EYELID BLEPHAROPLASTY AND BILATERAL PTOSIS REPAIR;  Surgeon: Janet Garcia MD;  Location: [facility] OR     ENDARTERECTOMY FEMORAL Left  "01/07/2021    Procedure: LEFT FEMORAL ENDARTERECTOMY WITH PATCH ANGIOPLASTY PHOTOFIX  0.8 X 8CM;  Surgeon: Myra Lopes MD;  Location: [facility] OR     EP PACEMAKER  01/2021     EXCISE LESION EYELID Left 08/16/2019    Procedure: LEFT LOWER EYELID BIOPSY;  Surgeon: Janet Garcia MD;  Location: [facility] OR     IR LOWER EXTREMITY ANGIOGRAM LEFT  12/17/2020     PHACOEMULSIFICATION WITH STANDARD INTRAOCULAR LENS IMPLANT  02/2019; 3/2019    left eye; right eye     TONSILLECTOMY       ZZHC INCISION TENDON SHEATH FINGER  04/2009    r hand ring finger       Family History   Problem Relation Age of Onset     Cerebrovascular Disease Mother      Arthritis Mother      Osteoporosis Mother      Alzheimer Disease Father      Arthritis Father      Cancer Father      Diabetes Maternal Grandmother      Cardiovascular Maternal Grandmother      Other Cancer Brother      Cancer Paternal Aunt      Hypertension No family hx of      Thyroid Disease No family hx of      Glaucoma No family hx of      Macular Degeneration No family hx of        Social History     Tobacco Use     Smoking status: Former Smoker     Packs/day: 1.00     Years: 40.00     Pack years: 40.00     Types: Cigarettes     Quit date: 3/16/2007     Years since quitting: 15.5     Smokeless tobacco: Never Used   Substance Use Topics     Alcohol use: No     Alcohol/week: 0.0 standard drinks       Current Outpatient Medications   Medication     acetaminophen (TYLENOL) 500 MG tablet     Alcohol Swabs PADS     blood glucose (NO BRAND SPECIFIED) test strip     Blood Glucose Monitoring Suppl (ACCU-CHEK GUIDE) w/Device KIT     bumetanide (BUMEX) 2 MG tablet     cetirizine (ZYRTEC) 10 MG tablet     collagenase (SANTYL) 250 UNIT/GM external ointment     cyanocobalamin (VITAMIN B-12) 1000 MCG tablet     dulaglutide (TRULICITY) 1.5 MG/0.5ML pen     folic acid (FOLVITE) 1 MG tablet     Gauze Pads & Dressings (GAUZE SPONGE) 4\"X4\" PADS     hydroxychloroquine (PLAQUENIL) 200 MG tablet " "    insulin aspart (NOVOLOG VIAL) 100 UNITS/ML vial     insulin glargine (LANTUS VIAL) 100 UNIT/ML vial     insulin syringe-needle U-100 (30G X 1/2\" 0.5 ML) 30G X 1/2\" 0.5 ML miscellaneous     Insulin Syringe-Needle U-100 28G X 1/2\" 0.5 ML MISC     Irrigation Supplies MISC     metoprolol succinate ER (TOPROL-XL) 50 MG 24 hr tablet     NOVOFINE AUTOCOVER PEN NEEDLE 30G X 8 MM miscellaneous     polyethylene glycol-propylene glycol (SYSTANE) 0.4-0.3 % SOLN ophthalmic solution     pravastatin (PRAVACHOL) 10 MG tablet     senna-docusate (SENOKOT-S/PERICOLACE) 8.6-50 MG tablet     sertraline (ZOLOFT) 50 MG tablet     sodium hypochlorite (DAKINS HALF-STRENGTH) 0.25 % external solution     syringe/needle, disp, (BD ECLIPSE SYRINGE) 25G X 1\" 3 ML MISC     triamcinolone (KENALOG) 0.1 % external cream     WARFARIN SODIUM PO     bisacodyl (DULCOLAX) 10 MG suppository     cefdinir (OMNICEF) 300 MG capsule     doxycycline hyclate (VIBRAMYCIN) 100 MG capsule     ferrous sulfate (FEROSUL) 325 (65 Fe) MG tablet     hydrALAZINE (APRESOLINE) 10 MG tablet     HYDROmorphone (DILAUDID) 2 MG tablet     No current facility-administered medications for this visit.          Allergies   Allergen Reactions     Blood-Group Specific Substance Other (See Comments)     Patient has a Non-specific antibody. Blood products may be delayed. Draw patient 24 hours prior to transfusion. For [facility] testing, draw one red top and two purple top tubes for all Type and Screen orders.     Seasonal Allergies          PHYSICAL EXAM:  /72   Pulse 61   Temp 98.2  F (36.8  C) (Oral)   Resp 18   Wt 95.2 kg (209 lb 12.8 oz)   SpO2 97%   BMI 30.98 kg/m    GEN: appears well, in no acute distress  HEENT: normocephalic and atraumatic, EOMI, anicteric sclerae  RESP: normal respiration on room air, no stridor  SKIN: normal color and turgor  PSYCH: appropriate mood, affect, and judgment    All pertinent laboratory, imaging, and pathology findings have been " reviewed.     IMPRESSION/RECOMMENDATION:  77 year old gentleman with multiple comorbidities and a pacemaker diagnosed with [id number], stage 1 lung cancer of the RUL status post stereotactic body radiation therapy completed on August 26, 2022. He is doing well with no significant radiation toxicity and no evidence of disease. He will return for follow up in 3 months with repeat noncontrast chest CT and was instructed to call our clinic with any questions or concerns.    Thank you for allowing me to participate in the care of this pleasant patient. If you have any questions, please do not hesitate to contact my office.    Lalita Funez MD  Attending Physician  Radiation Oncology

## 2022-10-10 ENCOUNTER — HEALTH MAINTENANCE LETTER (OUTPATIENT)
Age: 77
End: 2022-10-10

## 2022-10-10 NOTE — NURSING NOTE
Zurdo Dash's goals for this visit include:   Chief Complaint   Patient presents with     RECHECK     AK left lower eyelid       He requests these members of his care team be copied on today's visit information:     PCP: Kapil Duckworth    Referring Provider:  No referring provider defined for this encounter.    There were no vitals taken for this visit.    Do you need any medication refills at today's visit? Shawnee Valentino LPN      
KEN on CKD  R/O recurrent C diff

## 2022-10-12 ENCOUNTER — LAB REQUISITION (OUTPATIENT)
Dept: LAB | Facility: CLINIC | Age: 77
End: 2022-10-12
Payer: COMMERCIAL

## 2022-10-12 DIAGNOSIS — I73.9 PERIPHERAL VASCULAR DISEASE, UNSPECIFIED (H): ICD-10-CM

## 2022-10-17 ENCOUNTER — VIRTUAL VISIT (OUTPATIENT)
Dept: PHARMACY | Facility: CLINIC | Age: 77
End: 2022-10-17
Payer: COMMERCIAL

## 2022-10-17 DIAGNOSIS — I73.9 PAD (PERIPHERAL ARTERY DISEASE) (H): ICD-10-CM

## 2022-10-17 DIAGNOSIS — N18.32 STAGE 3B CHRONIC KIDNEY DISEASE (H): Primary | ICD-10-CM

## 2022-10-17 PROCEDURE — 99207 PR NO CHARGE LOS: CPT | Performed by: PHARMACIST

## 2022-10-17 NOTE — PATIENT INSTRUCTIONS
"Recommendations from today's MTM visit:                                                         1. Kaur will contact Dr. Santa to see if it would be beneficial to restart a medication called lisinopril, which may be helpful for your kidneys.     Follow-up: Return in about 3 months (around 1/17/2023) for Medication Therapy Management.    It was great speaking with you today.  I value your experience and would be very thankful for your time in providing feedback in our clinic survey. In the next few days, you may receive an email or text message from [employer] with a link to a survey related to your  clinical pharmacist.\"     To schedule another MTM appointment, please call the clinic directly or you may call the MTM scheduling line at [phone] or toll-free at [phone].     My Clinical Pharmacist's contact information:                                                      Please feel free to contact me with any questions or concerns you have.      Kaur Olivo, PharmD  Medication Therapy Management Pharmacist  [phone]   "

## 2022-10-17 NOTE — PROGRESS NOTES
Medication Therapy Management (MTM) Encounter    ASSESSMENT:                            Medication Adherence/Access: No issues identified    Hypertension/PAD/CKD: Blood pressure currently at goal of <140/90.  Patient is not currently on an ACEi or ARB and KDIGO guidelines for recommend the use of an ACEi or ARB for patients with hyperertension, CKD, and albuminuria.      PLAN:                            1. Kaur will contact Dr. Santa to see if it would be beneficial to restart a medication called lisinopril, which may be helpful for your kidneys.     -voicemail left    Follow-up: Return in about 3 months (around 1/17/2023) for Medication Therapy Management.    SUBJECTIVE/OBJECTIVE:                          Zurdo Dash is a 77 year old male called for a follow-up visit.  Today's visit is a follow-up MTM visit from 9/30/22.     Reason for visit: med review, follow-up. Reviewed that his primary is Dr. Santa ([facility]), [phone], ?[phone] not seeing Dr. Duckworth anymore.     Allergies/ADRs: Reviewed in chart  Past Medical History: Reviewed in chart  Tobacco: He reports that he quit smoking about 15 years ago. His smoking use included cigarettes. He has a 40.00 pack-year smoking history. He has never used smokeless tobacco.  Alcohol: not currently using  Caffeine: 1 cup coffee with each meal  Patient uses [facility].  PCP: Dr. Suze Santa at [facility].    Medication Adherence/Access: Nursing home staff gives him a cup with his medications in the morning and in the evening/bedtime.  Nurses also give him his insulin injections.    Hypertension/PAD/CKD:   metoprolol succinate 50 mg daily  bumetanide 1 mg daily  hydralazine 10 mg daily at bedtime for systolic blood pressure > 170.    Patient has blood pressure monitored by wound nurse daily, reports it's been good.  Patient reports no current medication side effects.     From [facility]:    BP Readings from Last 3 Encounters:   09/28/22 (!) 140/90   09/27/22  105/72   09/20/22 (!) 151/94     GFR Estimate   Date Value Ref Range Status   07/28/2022 46 (L) >60 mL/min/1.73m2 Final     Comment:     Effective December 21, 2021 eGFRcr in adults is calculated using the 2021 CKD-EPI creatinine equation which includes age and gender (Marycruz gomez al., [facility], DOI: 10.1056/LRPQub6846638)   02/01/2022 43 (L) >60 mL/min/1.73m2 Final     Comment:     Effective December 21, 2021 eGFRcr in adults is calculated using the 2021 CKD-EPI creatinine equation which includes age and gender (Marycruz et al., [facility], DOI: 10.1056/RGLQww0124045)   09/08/2021 39 (L) >60 mL/min/1.73m2 Final     Comment:     As of July 11, 2021, eGFR is calculated by the CKD-EPI creatinine equation, without race adjustment. eGFR can be influenced by muscle mass, exercise, and diet. The reported eGFR is an estimation only and is only applicable if the renal function is stable.   07/05/2021 20 (L) >60 mL/min/[1.73_m2] Final     Comment:     Non  GFR Calc  Starting 12/18/2018, serum creatinine based estimated GFR (eGFR) will be   calculated using the Chronic Kidney Disease Epidemiology Collaboration   (CKD-EPI) equation.     04/07/2021 44 (L) >60 mL/min/[1.73_m2] Final     Comment:     Non  GFR Calc  Starting 12/18/2018, serum creatinine based estimated GFR (eGFR) will be   calculated using the Chronic Kidney Disease Epidemiology Collaboration   (CKD-EPI) equation.     01/09/2021 49 (L) >60 mL/min/[1.73_m2] Final     Comment:     Non  GFR Calc  Starting 12/18/2018, serum creatinine based estimated GFR (eGFR) will be   calculated using the Chronic Kidney Disease Epidemiology Collaboration   (CKD-EPI) equation.        Today's Vitals: There were no vitals taken for this visit.  ----------------      I spent 14 minutes with this patient today. I offer these suggestions for consideration by Dr. Santa. A copy of the visit note was provided to the patient's provider(s).    The patient was  mailed a summary of these recommendations.     Kaur Olivo, PharmD  Medication Therapy Management Pharmacist  [phone]    Telemedicine Visit Details  Type of service:  Telephone visit  Start Time: 11:35 AM  End Time: 11:49 AM  Originating Location (pt. Location): Assisted Living      Distant Location (provider location):  On-site  Provider has received verbal consent for a visit from the patient? Yes     Medication Therapy Recommendations  No medication therapy recommendations to display

## 2022-10-18 LAB — INR PPP: 3.22 (ref 0.85–1.15)

## 2022-10-18 PROCEDURE — 85610 PROTHROMBIN TIME: CPT | Mod: ORL | Performed by: INTERNAL MEDICINE

## 2022-10-18 PROCEDURE — P9603 ONE-WAY ALLOW PRORATED MILES: HCPCS | Mod: ORL | Performed by: INTERNAL MEDICINE

## 2022-10-18 PROCEDURE — 36415 COLL VENOUS BLD VENIPUNCTURE: CPT | Mod: ORL | Performed by: INTERNAL MEDICINE

## 2022-10-20 ENCOUNTER — LAB REQUISITION (OUTPATIENT)
Dept: LAB | Facility: CLINIC | Age: 77
End: 2022-10-20
Payer: COMMERCIAL

## 2022-10-20 DIAGNOSIS — I73.9 PERIPHERAL VASCULAR DISEASE, UNSPECIFIED (H): ICD-10-CM

## 2022-10-25 LAB — INR PPP: 1.69 (ref 0.85–1.15)

## 2022-10-25 PROCEDURE — P9603 ONE-WAY ALLOW PRORATED MILES: HCPCS | Mod: ORL | Performed by: INTERNAL MEDICINE

## 2022-10-25 PROCEDURE — 36415 COLL VENOUS BLD VENIPUNCTURE: CPT | Mod: ORL | Performed by: INTERNAL MEDICINE

## 2022-10-25 PROCEDURE — 85610 PROTHROMBIN TIME: CPT | Mod: ORL | Performed by: INTERNAL MEDICINE

## 2022-10-27 ENCOUNTER — LAB REQUISITION (OUTPATIENT)
Dept: LAB | Facility: CLINIC | Age: 77
End: 2022-10-27
Payer: COMMERCIAL

## 2022-10-27 DIAGNOSIS — I73.9 PERIPHERAL VASCULAR DISEASE, UNSPECIFIED (H): ICD-10-CM

## 2022-11-01 LAB — INR PPP: 1.68 (ref 0.85–1.15)

## 2022-11-01 PROCEDURE — 85610 PROTHROMBIN TIME: CPT | Mod: ORL | Performed by: INTERNAL MEDICINE

## 2022-11-01 PROCEDURE — P9604 ONE-WAY ALLOW PRORATED TRIP: HCPCS | Mod: ORL | Performed by: INTERNAL MEDICINE

## 2022-11-01 PROCEDURE — 36415 COLL VENOUS BLD VENIPUNCTURE: CPT | Mod: ORL | Performed by: INTERNAL MEDICINE

## 2022-11-04 ENCOUNTER — LAB REQUISITION (OUTPATIENT)
Dept: LAB | Facility: CLINIC | Age: 77
End: 2022-11-04
Payer: COMMERCIAL

## 2022-11-04 DIAGNOSIS — I73.9 PERIPHERAL VASCULAR DISEASE, UNSPECIFIED (H): ICD-10-CM

## 2022-11-08 LAB — INR PPP: 2.62 (ref 0.85–1.15)

## 2022-11-08 PROCEDURE — 85610 PROTHROMBIN TIME: CPT | Mod: ORL | Performed by: INTERNAL MEDICINE

## 2022-11-08 PROCEDURE — P9603 ONE-WAY ALLOW PRORATED MILES: HCPCS | Mod: ORL | Performed by: INTERNAL MEDICINE

## 2022-11-08 PROCEDURE — 36415 COLL VENOUS BLD VENIPUNCTURE: CPT | Mod: ORL | Performed by: INTERNAL MEDICINE

## 2022-11-13 ENCOUNTER — LAB REQUISITION (OUTPATIENT)
Dept: LAB | Facility: CLINIC | Age: 77
End: 2022-11-13
Payer: COMMERCIAL

## 2022-11-13 DIAGNOSIS — I73.9 PERIPHERAL VASCULAR DISEASE, UNSPECIFIED (H): ICD-10-CM

## 2022-11-15 LAB — INR PPP: 2.58 (ref 0.85–1.15)

## 2022-11-15 PROCEDURE — 36415 COLL VENOUS BLD VENIPUNCTURE: CPT | Mod: ORL | Performed by: PHYSICIAN ASSISTANT

## 2022-11-15 PROCEDURE — P9604 ONE-WAY ALLOW PRORATED TRIP: HCPCS | Mod: ORL | Performed by: PHYSICIAN ASSISTANT

## 2022-11-15 PROCEDURE — 85610 PROTHROMBIN TIME: CPT | Mod: ORL | Performed by: PHYSICIAN ASSISTANT

## 2022-11-27 ENCOUNTER — HEALTH MAINTENANCE LETTER (OUTPATIENT)
Age: 77
End: 2022-11-27

## 2022-11-29 ENCOUNTER — TELEPHONE (OUTPATIENT)
Dept: RHEUMATOLOGY | Facility: CLINIC | Age: 77
End: 2022-11-29
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
## 2022-11-29 NOTE — TELEPHONE ENCOUNTER
Left message to return call to reschedule. Slot held 12/1 at 2:20    Yovana CEDEÑO, Specialty RN 11/29/2022 1:52 PM

## 2022-11-30 NOTE — TELEPHONE ENCOUNTER
Called and spoke to wife, Yoli. Scheduled patient for 12/1    Yovana CEDEÑO Specialty RN 11/30/2022 11:18 AM

## 2022-11-30 NOTE — TELEPHONE ENCOUNTER
Called and spoke to Yoli. They were down eating breakfast. Offered to call back in about an hour, will attempt call at that time    Yovana CEDEÑO, Specialty RN 11/30/2022 9:39 AM

## 2022-12-01 ENCOUNTER — OFFICE VISIT (OUTPATIENT)
Dept: RHEUMATOLOGY | Facility: CLINIC | Age: 77
End: 2022-12-01
Payer: COMMERCIAL

## 2022-12-01 VITALS
SYSTOLIC BLOOD PRESSURE: 145 MMHG | DIASTOLIC BLOOD PRESSURE: 89 MMHG | WEIGHT: 219.2 LBS | BODY MASS INDEX: 32.37 KG/M2 | HEART RATE: 73 BPM | OXYGEN SATURATION: 94 %

## 2022-12-01 DIAGNOSIS — M17.0 BILATERAL PRIMARY OSTEOARTHRITIS OF KNEE: Primary | ICD-10-CM

## 2022-12-01 PROCEDURE — 20610 DRAIN/INJ JOINT/BURSA W/O US: CPT | Mod: 50 | Performed by: INTERNAL MEDICINE

## 2022-12-01 PROCEDURE — 99214 OFFICE O/P EST MOD 30 MIN: CPT | Mod: 25 | Performed by: INTERNAL MEDICINE

## 2022-12-01 RX ORDER — TRIAMCINOLONE ACETONIDE 40 MG/ML
40 INJECTION, SUSPENSION INTRA-ARTICULAR; INTRAMUSCULAR ONCE
Status: DISCONTINUED | OUTPATIENT
Start: 2022-12-01 | End: 2022-12-01

## 2022-12-01 RX ORDER — METHYLPREDNISOLONE ACETATE 40 MG/ML
40 INJECTION, SUSPENSION INTRA-ARTICULAR; INTRALESIONAL; INTRAMUSCULAR; SOFT TISSUE ONCE
Status: COMPLETED | OUTPATIENT
Start: 2022-12-01 | End: 2022-12-01

## 2022-12-01 RX ADMIN — METHYLPREDNISOLONE ACETATE 40 MG: 40 INJECTION, SUSPENSION INTRA-ARTICULAR; INTRALESIONAL; INTRAMUSCULAR; SOFT TISSUE at 15:00

## 2022-12-01 NOTE — PATIENT INSTRUCTIONS
RHEUMATOLOGY    Dr. Albert Tompkins    [facility]  [address] [place] [address]  [place] [address]  Phone number: [phone]  Fax number: [phone]    You may schedule your FLU shot by calling [phone] or if you would like to get your shot at a [facility] you may schedule online at [email or web address].    Thank you for choosing [facility]!    Eryn Espinoza CMA Rheumatology

## 2022-12-01 NOTE — PROGRESS NOTES
Rheumatology Clinic Visit      Zurdo Dash MRN# [id number]   YOB: 1945 Age: 77 year old      Date of visit: 12/01/22   PCP: Dr. Kapil Duckworth     Chief Complaint   Patient presents with:  Rheumatoid Arthritis: Would like injections in both knees    Assessment and Plan      1. Seropositive nonerosive rheumatoid arthritis (, CCP 64): Previously on MTX (was well tolerated, but later with cytopenias likely related to worsening creatinine with subsequent MTX toxicity).  Mild synovitis on exam previously so hydroxychloroquine was restarted and he is doing well at this time.  Chronic illness, stable.    - Continue hydroxychloroquine 200mg daily (toxicity monitoring eye exam is needed and I advised that he call to schedule    2. Bilateral knee osteoarthritis / patellofemoral syndrome: Intra-articular steroid injections have been effective.  Steroid injection today as requested by the patient.  This was documented in the procedure section.    3. Elevated blood pressure:  Zurdo to follow up with Primary Care provider regarding elevated blood pressure.     Total minutes spent in evaluation with patient, documentation, , and review of pertinent studies and chart notes: 10      Mr. Dash verbalized agreement with and understanding of the rational for the diagnosis and treatment plan.  All questions were answered to best of my ability and the patient's satisfaction. Mr. Dash was advised to contact the clinic with any questions that may arise after the clinic visit.      Thank you for involving me in the care of the patient    Return to clinic: 3 months      HPI   Zurdo Dash is a 77 year old male with a medical history significant for hypertension, hyperlipidemia, diabetes, diabetic neuropathy, GERD, and rheumatoid arthritis who presents for f/u of RA, sooner than previously scheduled because of recent hospitalization    Today, 12/1/2022: Was unable to make it earlier this week due to  snowy conditions that were hazardous for driving.  Currently doing well with regard to rheumatoid arthritis.  Bilateral knee osteoarthritis; knee pain with increased activity and improves with rest.  He would like to have repeat steroid steroid injections of each knee today as they have been helpful in the past.    Denies fevers, chills, nausea, vomiting, constipation, diarrhea. No abdominal pain. No chest pain/pressure, palpitations, or shortness of breath. No oral or nasal sores. No neck pain.     Tobacco: None  EtOH: None  Drugs: None    ROS   12 point review of system was completed and negative except as noted in the HPI     Active Problem List     Patient Active Problem List   Diagnosis     Pain in limb     Hyperlipidemia LDL goal <100     Hypertension goal BP (blood pressure) < 140/90     Hypogonadism     Advanced directives, counseling/discussion     Health Care Home     Type 2 diabetes mellitus with diabetic polyneuropathy, without long-term current use of insulin (H)     RBBB (right bundle branch block)     Ex-smoker     Family history of esophageal cancer     Gastroesophageal reflux disease, esophagitis presence not specified     Rheumatoid arthritis involving multiple sites with positive rheumatoid factor (H)     Pulmonary nodule     High risk medication use     Spondylosis of cervical region without myelopathy or radiculopathy     Erectile dysfunction, unspecified erectile dysfunction type     Type 2 diabetes mellitus without retinopathy (H)     Tubulovillous adenoma of colon     Diabetic polyneuropathy associated with type 2 diabetes mellitus (H)     Chronic bilateral low back pain without sciatica     Migraine equivalent     Posterior vitreous detachment of left eye     Pseudophakia, ou     Eyelid lesion, LLL     Dermatochalasis of both upper eyelids     Bradycardia     Primary osteoarthritis of both knees     Syncope     Trigger finger, acquired     CKD (chronic kidney disease) stage 3, GFR 30-59  ml/min (H)     Abdominal pain, generalized     PAD (peripheral artery disease) (H)     Immunosuppression (H)     Skin ulcer of toe of left foot, limited to breakdown of skin (H)     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 difficile diarrhea     H/O     Cataract 11/18/2011     Diabetic neuropathy (H)     mild, mostly soles and distal forefeet, worse on the left side.     Diverticulitis      ED (erectile dysfunction)      Ex-smoker     QUIT SMOKING FEB 2007     History of ETOH abuse     recovering, sober since 1997     Hyperlipidemia LDL goal <100      Hypertension goal BP (blood pressure) < 140/90      Hypogonadism      Obesity      PAD (peripheral artery disease) (H)     leg cramps, with exertion, no formal diagnosis of PAD and minimal if any symptoms at all.     RA (rheumatoid arthritis) (H)     Dr Epsteinay     Syncope      Past Surgical History     Past Surgical History:   Procedure Laterality Date     BYPASS GRAFT FEMOROPOPLITEAL Left 01/07/2021    Procedure: LEFT FEMORAL TO ABOVE KNEE POPLITEAL ARTERY BYPASS WITH PTFE VASCULAR GRAFT REMOVABLE RING 6MMX 50CM;  Surgeon: Myra Lopes MD;  Location: [facility] OR     CATARACT IOL, RT/LT       COLONOSCOPY  03/01/2018    [facility]     COMBINED REPAIR PTOSIS WITH BLEPHAROPLASTY BILATERAL Bilateral 08/16/2019    Procedure: BILATERAL UPPER EYELID BLEPHAROPLASTY AND BILATERAL PTOSIS REPAIR;  Surgeon: Janet Garcia MD;  Location: [facility]     ENDARTERECTOMY FEMORAL Left 01/07/2021    Procedure: LEFT FEMORAL ENDARTERECTOMY WITH PATCH ANGIOPLASTY PHOTOFIX  0.8 X 8CM;  Surgeon: Marianne  Myra Jolley MD;  Location: [facility] OR     EP PACEMAKER  01/2021     EXCISE LESION EYELID Left 08/16/2019    Procedure: LEFT LOWER EYELID BIOPSY;  Surgeon: Janet Garcia MD;  Location: [facility]     IR LOWER EXTREMITY ANGIOGRAM LEFT  12/17/2020     PHACOEMULSIFICATION WITH STANDARD INTRAOCULAR LENS IMPLANT  02/2019; 3/2019    left eye; right eye     TONSILLECTOMY       ZZHC INCISION TENDON SHEATH FINGER  04/2009    r hand ring finger     Allergy     Allergies   Allergen Reactions     Blood-Group Specific Substance Other (See Comments)     Patient has a Non-specific antibody. Blood products may be delayed. Draw patient 24 hours prior to transfusion. For [facility] testing, draw one red top and two purple top tubes for all Type and Screen orders.     Seasonal Allergies      Current Medication List     Current Outpatient Medications   Medication Sig     acetaminophen (TYLENOL) 500 MG tablet Take 2 tablets (1,000 mg) by mouth 3 times daily     Alcohol Swabs PADS Uses four times daily     blood glucose (NO BRAND SPECIFIED) test strip Use to test blood sugar 4 times daily or as directed.     Blood Glucose Monitoring Suppl (ACCU-CHEK GUIDE) w/Device KIT      bumetanide (BUMEX) 1 MG tablet Take 1 mg by mouth daily     cefdinir (OMNICEF) 300 MG capsule Take 300 mg by mouth 2 times daily     cetirizine (ZYRTEC) 10 MG tablet Take 10 mg by mouth At Bedtime     collagenase (SANTYL) 250 UNIT/GM external ointment Apply topically daily Apply to left heel as directed once daily     cyanocobalamin (VITAMIN B-12) 1000 MCG tablet Take 1 tablet (1,000 mcg) by mouth once a week     doxycycline hyclate (VIBRAMYCIN) 100 MG capsule Take 100 mg by mouth 2 times daily     dulaglutide (TRULICITY) 1.5 MG/0.5ML pen Inject 1.5 mg Subcutaneous every 7 days ON Wednesdays     ferrous sulfate (FEROSUL) 325 (65 Fe) MG tablet Take 325 mg by mouth daily     folic acid (FOLVITE) 1 MG tablet Take 1 tablet (1 mg) by mouth daily     Gauze Pads &  "Dressings (GAUZE SPONGE) 4\"X4\" PADS      hydrALAZINE (APRESOLINE) 10 MG tablet Take 10 mg by mouth nightly as needed (systolic bp > 170)     HYDROmorphone (DILAUDID) 2 MG tablet Take 1 tablet (2 mg) by mouth 2 times daily as needed for pain For urgent pain relief with rheumatoid arthritis flare-up     hydroxychloroquine (PLAQUENIL) 200 MG tablet Take 1 tablet (200 mg) by mouth daily     insulin glargine (LANTUS VIAL) 100 UNIT/ML vial Inject 10 Units Subcutaneous At Bedtime (Patient taking differently: Inject 25 Units Subcutaneous At Bedtime)     insulin syringe-needle U-100 (30G X 1/2\" 0.5 ML) 30G X 1/2\" 0.5 ML miscellaneous Use 4 syringes daily or as directed.     Insulin Syringe-Needle U-100 28G X 1/2\" 0.5 ML MISC      Irrigation Supplies MISC      methocarbamol (ROBAXIN) 500 MG tablet Take 250 mg by mouth 2 times daily     metoprolol succinate ER (TOPROL-XL) 50 MG 24 hr tablet Take 1 tablet (50 mg) by mouth daily     NOVOFINE AUTOCOVER PEN NEEDLE 30G X 8 MM miscellaneous USE FOUR TIMES A DAY     polyethylene glycol (MIRALAX) 17 GM/Dose powder Take 1 capful by mouth daily     polyethylene glycol-propylene glycol (SYSTANE) 0.4-0.3 % SOLN ophthalmic solution Place 1 drop into both eyes 2 times daily as needed for dry eyes     pravastatin (PRAVACHOL) 10 MG tablet Take 1 tablet (10 mg) by mouth daily     senna-docusate (SENOKOT-S/PERICOLACE) 8.6-50 MG tablet Take 1 tablet by mouth 2 times daily as needed for constipation     sertraline (ZOLOFT) 50 MG tablet Take 1 tablet (50 mg) by mouth daily     sodium hypochlorite (DAKINS HALF-STRENGTH) 0.25 % external solution Use for damp to dry dressing changes two times a day left groin wound     syringe/needle, disp, (BD ECLIPSE SYRINGE) 25G X 1\" 3 ML MISC      triamcinolone (KENALOG) 0.1 % external cream Apply topically 2 times daily Apply to rash on neck, shoulders and lower extremities two times a day     WARFARIN SODIUM PO Take 3 mg by mouth daily     bisacodyl (DULCOLAX) " "10 MG suppository Unwrap and insert 1 suppository rectally once daily as needed (Patient not taking: Reported on 9/27/2022)     No current facility-administered medications for this visit.         Social History   See HPI    Family History     Family History   Problem Relation Age of Onset     Cerebrovascular Disease Mother      Arthritis Mother      Osteoporosis Mother      Alzheimer Disease Father      Arthritis Father      Cancer Father      Diabetes Maternal Grandmother      Cardiovascular Maternal Grandmother      Other Cancer Brother      Cancer Paternal Aunt      Hypertension No family hx of      Thyroid Disease No family hx of      Glaucoma No family hx of      Macular Degeneration No family hx of        Physical Exam     Temp Readings from Last 3 Encounters:   09/27/22 98.2  F (36.8  C) (Oral)   08/24/22 98.4  F (36.9  C) (Oral)   08/01/22 98.1  F (36.7  C) (Oral)     BP Readings from Last 5 Encounters:   12/01/22 (!) 145/89   09/28/22 (!) 140/90   09/27/22 105/72   09/20/22 (!) 151/94   09/07/22 128/82     Pulse Readings from Last 1 Encounters:   12/01/22 73     Resp Readings from Last 1 Encounters:   09/27/22 18     Estimated body mass index is 32.37 kg/m  as calculated from the following:    Height as of 9/20/22: 1.753 m (5' 9\").    Weight as of this encounter: 99.4 kg (219 lb 3.2 oz).      GEN: NAD.   HEENT:  Anicteric, noninjected sclera. No obvious external lesions of the ear and nose. Hearing intact.  CV: S1, S2. RRR. No m/r/g  PULM: No increased work of breathing. CTA bilaterally   MSK: MCPs, PIPs, DIPs without swelling or tenderness to palpation.  Wrists without swelling or tenderness to palpation.  Elbows and shoulders without swelling or tenderness to palpation.  Knees with crepitation and medial joint line tenderness but no effusion or increased warmth.  SKIN: No rash or jaundice seen  PSYCH: Alert. Appropriate.         Labs / Imaging (select studies)     9/28/1999  " (Critical access hospital)    RF/CCP  Recent Labs   Lab Test 04/07/16  1149   CCPIGG 64*     CBC  Recent Labs   Lab Test 07/28/22  1611 03/31/22  1023 02/01/22  0901 09/08/21  1607 07/14/21  0924 07/14/21  0924 07/05/21  1532 04/07/21  0958   WBC 9.2 8.6 6.8 7.7   < > 7.0 3.7* 7.4   RBC 4.11* 4.46 3.90* 3.47*   < > 2.72* 2.20* 3.55*   HGB 12.1* 13.3 11.2* 10.6*   < > 8.4* 6.9* 10.8*   HCT 38.3* 40.4 35.9* 31.8*   < > 25.8* 21.6* 33.3*   MCV 93 91 92 92   < > 95 98 94   RDW 14.4 13.9 14.0 14.3   < > 17.1* 17.3* 14.0    216 274 195   < > 140* 96* 342   MCH 29.4 29.8 28.7 30.5   < > 30.9 31.4 30.4   MCHC 31.6 32.9 31.2* 33.3   < > 32.6 31.9 32.4   NEUTROPHIL 67  --  63 72   < > 68  75 68.0 80.7   LYMPH 17  --  17 16   < > 10  7 19.0 11.6   MONOCYTE 11  --  14 11   < > 20  15 10.0 6.0   EOSINOPHIL 4  --  4 1   < > 2  2 2.0 1.2   BASOPHIL 1  --  1 0   < > 1  0 1.0 0.5   ANEU  --   --   --   --   --  5.3 2.5 6.0   ALYM  --   --   --   --   --  0.5* 0.7* 0.9   SMITH  --   --   --   --   --  1.1 0.4 0.5   AEOS  --   --   --   --   --  0.1 0.1 0.1   ABAS  --   --   --   --   --  0.0 0.0 0.0   ANEUTAUTO 6.2  --  4.4 5.5   < > 4.8  --   --    ALYMPAUTO 1.5  --  1.2 1.2   < > 0.7*  --   --    AMONOAUTO 1.1  --  1.0 0.8   < > 1.4*  --   --    AEOSAUTO 0.4  --  0.2 0.1   < > 0.2  --   --    ABSBASO 0.1  --  0.1 0.0   < > 0.1  --   --     < > = values in this interval not displayed.     CMP  Recent Labs   Lab Test 07/28/22  1611 03/31/22  1051 02/01/22  0901 09/08/21  1607 08/16/21  1316 07/14/21  1645 07/05/21  1532 04/07/21  0958 01/09/21  0709     --  141 141   < > 136  --   --  136   POTASSIUM 4.7  --  4.2 4.1   < > 5.4*  --   --  4.3   CHLORIDE 111*  --  110* 116*   < > 109  --   --  108   CO2 22  --  22 18*   < > 17*  --   --  23   ANIONGAP 7  --  9 7   < > 10  --   --  5   * 149* 123 192*   < > 126*  --   --  117*   BUN 42*  --  36* 37*   < > 50*  --   --  31*   CR 1.54*  --  1.63* 1.68*   < > 2.30* 2.84*  1.52* 1.39*   GFRESTIMATED 46*  --  43* 39*   < > 26* 20* 44* 49*   GFRESTBLACK  --   --   --   --   --   --  24* 50* 56*   NEW 8.8  --  8.8 8.3*   < > 8.8  --   --  8.4*   BILITOTAL 0.3  --   --   --   --  0.7 0.8 0.4  --    ALBUMIN 3.0*  --   --   --   --  3.5 3.5 3.5  --    PROTTOTAL 6.8  --   --   --   --  6.8 6.9 7.0  --    ALKPHOS 57  --   --   --   --  67 85 69  --    AST 18  --   --   --   --  22 12 19  --    ALT 25  --   --   --   --  32 22 25  --     < > = values in this interval not displayed.     Calcium/VitaminD  Recent Labs   Lab Test 07/28/22  1611 02/01/22  0901 09/08/21  1607   NEW 8.8 8.8 8.3*     ESR/CRP  Recent Labs   Lab Test 07/05/21  1532 04/07/21  0958 12/11/20  1436   SED 83* 43* 33*   CRP 37.1* 11.3* 18.9*     Lipid Panel  Recent Labs   Lab Test 02/01/22  0901 01/07/21  0610 12/17/20  0655 12/15/20  1206 09/30/15  1302 06/29/15  1213   CHOL 216* 116 166 180   < > 125   TRIG 232* 166* 204* 177*   < > 286*   HDL 35* 38* 35* 40   < > 37*   * 45 90 105*   < > 31   VLDL  --   --   --   --   --  57*   CHOLHDLRATIO  --   --   --   --   --  3.4   NHDL  --  78 131* 140*   < >  --     < > = values in this interval not displayed.     Hepatitis B  Recent Labs   Lab Test 04/07/16  1149   HBCAB Nonreactive   HEPBANG Nonreactive     Hepatitis C  Recent Labs   Lab Test 04/07/16  1149   HCVAB Nonreactive   Assay performance characteristics have not been established for newborns,   infants, and children       HIV Screening  Recent Labs   Lab Test 04/07/16  1149   HIAGAB Nonreactive   HIV-1 p24 Ag & HIV-1/HIV-2 Ab Not Detected       Immunization History     Immunization History   Administered Date(s) Administered     COVID-19 Vaccine 12+ (Pfizer) 02/19/2021, 03/12/2021     COVID-19 Vaccine 18+ (Moderna) 11/29/2021, 05/13/2022     FLUAD(HD)65+ QUAD 09/24/2021     Influenza (H1N1) 01/20/2010     Influenza (High Dose) 3 valent vaccine 09/20/2012, 10/20/2015, 09/20/2016, 08/28/2017, 09/24/2018, 09/18/2019      Influenza (IIV3) PF 10/05/1999, 10/30/2008, 09/28/2011, 10/14/2013, 10/03/2014     Influenza Vaccine 65+ (Fluzone HD) 09/11/2020     Pneumo Conj 13-V (2010&after) 06/29/2015     Pneumococcal 23 valent 08/19/2010     TD (ADULT, 7+) 08/05/1997, 02/03/2011     TDAP Vaccine (Boostrix) 03/11/2020     Tdap (Adacel,Boostrix) 03/11/2020     Zoster vaccine recombinant adjuvanted (SHINGRIX) 01/03/2020, 03/11/2020     Zoster vaccine, live 04/19/2010       Procedure     Procedure: Steroid injection of the bilateral knees  Indication: Pain, bilateral knee osteoarthritis    The procedure was explained in detail. Risks including infection, pain, structural damage such as cartilage damage and tendon rupture, fat atrophy, skin hyper-/hypo-pigmentation, and medication reaction was explained. The need for rest of the affected joint for one week after the procedure was explained.  The option of not doing the procedure was also provided. All questions were answered and the patient consented to the procedure.     A time-out was performed and the correct patient, procedure, and laterality were verified.    The right knee was examined and location for injection was identified - anterior medial. The area was cleaned with chlorhexidine, twice.  Ethyl chloride was then used for topical anaesthetic.  Then a mixture of lidocaine 1% 2 mL and methylprednisolone 40mg was injected into the intra-articular space.     The left knee was examined and location for injection was identified - anterior medial. The area was cleaned with chlorhexidine, twice.  Ethyl chloride was then used for topical anaesthetic.  Then a mixture of lidocaine 1% 2 mL and methylprednisolone 40mg was injected into the intra-articular space.     The patient tolerated the procedure well. No complications.    1% Lidocaine  : Hospira  Lot #: [id number]  EXPIRATION DATE: 1 SEPT 2023  NDC: 4366-1153-96    MEDICATION: Methylprednisolone  : Amneal  LOT #:  [id number]  EXPIRATION DATE:  12/2023  NDC#: [address]-5    MEDICATION: Methylprednisolone  : Amneal  LOT #: [id number]  EXPIRATION DATE:  12/2023  NDC#: [address]-7         Chart documentation done in part with Dragon Voice recognition Software. Although reviewed after completion, some word and grammatical error may remain.    Albert Tompkins MD

## 2022-12-05 ENCOUNTER — LAB REQUISITION (OUTPATIENT)
Dept: LAB | Facility: CLINIC | Age: 77
End: 2022-12-05
Payer: COMMERCIAL

## 2022-12-05 DIAGNOSIS — I73.9 PERIPHERAL VASCULAR DISEASE, UNSPECIFIED (H): ICD-10-CM

## 2022-12-06 LAB — INR PPP: 3.31 (ref 0.85–1.15)

## 2022-12-06 PROCEDURE — 85610 PROTHROMBIN TIME: CPT | Mod: ORL | Performed by: PHYSICIAN ASSISTANT

## 2022-12-06 PROCEDURE — P9604 ONE-WAY ALLOW PRORATED TRIP: HCPCS | Mod: ORL | Performed by: PHYSICIAN ASSISTANT

## 2022-12-06 PROCEDURE — 36415 COLL VENOUS BLD VENIPUNCTURE: CPT | Mod: ORL | Performed by: PHYSICIAN ASSISTANT

## 2022-12-11 ENCOUNTER — LAB REQUISITION (OUTPATIENT)
Dept: LAB | Facility: CLINIC | Age: 77
End: 2022-12-11
Payer: COMMERCIAL

## 2022-12-11 DIAGNOSIS — I73.9 PERIPHERAL VASCULAR DISEASE, UNSPECIFIED (H): ICD-10-CM

## 2022-12-13 DIAGNOSIS — E11.42 TYPE 2 DIABETES MELLITUS WITH DIABETIC POLYNEUROPATHY, WITHOUT LONG-TERM CURRENT USE OF INSULIN (H): ICD-10-CM

## 2022-12-13 LAB — INR PPP: 3.67 (ref 0.85–1.15)

## 2022-12-13 PROCEDURE — P9604 ONE-WAY ALLOW PRORATED TRIP: HCPCS | Mod: ORL | Performed by: PHYSICIAN ASSISTANT

## 2022-12-13 PROCEDURE — 36415 COLL VENOUS BLD VENIPUNCTURE: CPT | Mod: ORL | Performed by: PHYSICIAN ASSISTANT

## 2022-12-13 PROCEDURE — 85610 PROTHROMBIN TIME: CPT | Mod: ORL | Performed by: PHYSICIAN ASSISTANT

## 2022-12-13 RX ORDER — INSULIN ADMIN. SUPPLIES
INSULIN PEN (EA) SUBCUTANEOUS
Qty: 100 EACH | Refills: 0 | Status: SHIPPED | OUTPATIENT
Start: 2022-12-13 | End: 2024-04-08

## 2022-12-17 ENCOUNTER — LAB REQUISITION (OUTPATIENT)
Dept: LAB | Facility: CLINIC | Age: 77
End: 2022-12-17
Payer: COMMERCIAL

## 2022-12-17 DIAGNOSIS — I73.9 PERIPHERAL VASCULAR DISEASE, UNSPECIFIED (H): ICD-10-CM

## 2022-12-20 LAB — INR PPP: 2.6 (ref 0.85–1.15)

## 2022-12-20 PROCEDURE — 36415 COLL VENOUS BLD VENIPUNCTURE: CPT | Mod: ORL | Performed by: PHYSICIAN ASSISTANT

## 2022-12-20 PROCEDURE — 85610 PROTHROMBIN TIME: CPT | Mod: ORL | Performed by: PHYSICIAN ASSISTANT

## 2022-12-20 PROCEDURE — P9604 ONE-WAY ALLOW PRORATED TRIP: HCPCS | Mod: ORL | Performed by: PHYSICIAN ASSISTANT

## 2022-12-25 ENCOUNTER — LAB REQUISITION (OUTPATIENT)
Dept: LAB | Facility: CLINIC | Age: 77
End: 2022-12-25
Payer: COMMERCIAL

## 2022-12-25 DIAGNOSIS — I48.91 UNSPECIFIED ATRIAL FIBRILLATION (H): ICD-10-CM

## 2022-12-26 ENCOUNTER — PATIENT OUTREACH (OUTPATIENT)
Dept: RADIATION ONCOLOGY | Facility: CLINIC | Age: 77
End: 2022-12-26

## 2022-12-26 NOTE — PROGRESS NOTES
Attempted to contact patient's spouse, Yoli, and [facility] nursing staff regarding Zurdo's appointment tomorrow with Dr. Funez. Left voicemail and requested they call back to reschedule Zurdo's return appointment with Dr. Funez tomorrow because patient did not show for his scheduled CT Chest last Friday. Plan to reschedule the return with Dr. Funez after scan. Message sent to scheduling with Dr. Funez's office to reach out to schedule. Appointment for tomorrow cancelled.     Heidi Villar RN

## 2022-12-27 ENCOUNTER — ANCILLARY PROCEDURE (OUTPATIENT)
Dept: CT IMAGING | Facility: CLINIC | Age: 77
End: 2022-12-27
Attending: RADIOLOGY
Payer: COMMERCIAL

## 2022-12-27 DIAGNOSIS — C34.11 MALIGNANT NEOPLASM OF UPPER LOBE OF RIGHT LUNG (H): ICD-10-CM

## 2022-12-27 LAB — INR PPP: 2.37 (ref 0.85–1.15)

## 2022-12-27 PROCEDURE — 71250 CT THORAX DX C-: CPT | Performed by: RADIOLOGY

## 2022-12-27 PROCEDURE — 36415 COLL VENOUS BLD VENIPUNCTURE: CPT | Mod: ORL | Performed by: INTERNAL MEDICINE

## 2022-12-27 PROCEDURE — P9604 ONE-WAY ALLOW PRORATED TRIP: HCPCS | Mod: ORL | Performed by: INTERNAL MEDICINE

## 2022-12-27 PROCEDURE — 85610 PROTHROMBIN TIME: CPT | Mod: ORL | Performed by: INTERNAL MEDICINE

## 2022-12-29 ENCOUNTER — LAB REQUISITION (OUTPATIENT)
Dept: LAB | Facility: CLINIC | Age: 77
End: 2022-12-29
Payer: COMMERCIAL

## 2022-12-29 DIAGNOSIS — I48.91 UNSPECIFIED ATRIAL FIBRILLATION (H): ICD-10-CM

## 2023-01-02 ENCOUNTER — OFFICE VISIT (OUTPATIENT)
Dept: RADIATION ONCOLOGY | Facility: CLINIC | Age: 78
End: 2023-01-02
Payer: COMMERCIAL

## 2023-01-02 VITALS
TEMPERATURE: 97.9 F | DIASTOLIC BLOOD PRESSURE: 74 MMHG | WEIGHT: 218.3 LBS | OXYGEN SATURATION: 97 % | RESPIRATION RATE: 16 BRPM | BODY MASS INDEX: 32.24 KG/M2 | SYSTOLIC BLOOD PRESSURE: 119 MMHG | HEART RATE: 63 BPM

## 2023-01-02 DIAGNOSIS — C34.11 MALIGNANT NEOPLASM OF UPPER LOBE OF RIGHT LUNG (H): Primary | ICD-10-CM

## 2023-01-02 PROCEDURE — 99213 OFFICE O/P EST LOW 20 MIN: CPT | Performed by: RADIOLOGY

## 2023-01-02 RX ORDER — NICOTINE POLACRILEX 4 MG/1
20 GUM, CHEWING ORAL DAILY
COMMUNITY
End: 2023-08-03

## 2023-01-02 ASSESSMENT — PAIN SCALES - GENERAL: PAINLEVEL: MILD PAIN (3)

## 2023-01-02 NOTE — NURSING NOTE
FOLLOW-UP VISIT    Patient Name: Zurdo Dash      : 1945     Age: 77 year old        ______________________________________________________________________________     Chief Complaint   Patient presents with     Radiation Therapy     Return appointment with Dr. Funez     /74   Pulse 63   Temp 97.9  F (36.6  C) (Oral)   Resp 16   Wt 99 kg (218 lb 4.8 oz)   SpO2 97%   BMI 32.24 kg/m       Date Radiation Completed: 2022    Pain  Denies pain related to visit, patient reports L foot toe pain rating as 2-3/10 today, he is following with wound clinic.    Meds  Current Med List Reviewed: Yes  Medication Note:     Labs  CBC RESULTS:   Recent Labs   Lab Test 22  1611   WBC 9.2   RBC 4.11*   HGB 12.1*   HCT 38.3*   MCV 93   MCH 29.4   MCHC 31.6   RDW 14.4          Imaging  CT: 2022    Respiratory: Dyspnea on exertion- baseline  Skin:  Warm  Dry  Intact to radiation site, continues to have wound to L foot toe and is following with wound clinic.   Energy Level: baseline  Appetite: normal      Appointments:     DATE  Oncologist:     Primary:      Other Notes: Follow up CT scan in 2 months with telephone return with Dr. Funez.    Heidi Villar RN

## 2023-01-02 NOTE — Clinical Note
1/2/2023         RE: Zurdo Dash  [address]  [facility] [address]        Dear Colleague,    Thank you for referring your patient, Zurdo Dash, to the [facility] [place]. Please see a copy of my visit note below.    Dear Colleagues,  Today Zurdo Dash was seen in follow up      IDENTIFICATION: 77 year old gentleman with multiple comorbidities and a pacemaker diagnosed with [id number], stage 1 lung cancer of the RUL status post stereotactic body radiation therapy completed on August 26, 2022.    INTERVAL HISTORY:  Zurdo Dash was last seen in our clinic 3 months ago. While on treatment he had no complaints.  He returns today in follow-up and states he is doing well with no concerns. Specifically denies n/v/ha/sob/cp.    REVIEW OF SYSTEMS: As per HPI, a 14-point review of systems is otherwise negative.    Past Medical History:   Diagnosis Date     Abnormal CT scan 03/2004    calcified lung granuloma     C. difficile diarrhea     H/O     Cataract 11/18/2011     Diabetic neuropathy (H)     mild, mostly soles and distal forefeet, worse on the left side.     Diverticulitis      ED (erectile dysfunction)      Ex-smoker     QUIT SMOKING FEB 2007     History of ETOH abuse     recovering, sober since 1997     Hyperlipidemia LDL goal <100      Hypertension goal BP (blood pressure) < 140/90      Hypogonadism      Obesity      PAD (peripheral artery disease) (H)     leg cramps, with exertion, no formal diagnosis of PAD and minimal if any symptoms at all.     RA (rheumatoid arthritis) (H)     Dr Bailon     Syncope        Past Surgical History:   Procedure Laterality Date     BYPASS GRAFT FEMOROPOPLITEAL Left 01/07/2021    Procedure: LEFT FEMORAL TO ABOVE KNEE POPLITEAL ARTERY BYPASS WITH PTFE VASCULAR GRAFT REMOVABLE RING 6MMX 50CM;  Surgeon: Myra Lopes MD;  Location: [facility]     CATARACT IOL, RT/LT       COLONOSCOPY  03/01/2018    [facility]     COMBINED REPAIR  PTOSIS WITH BLEPHAROPLASTY BILATERAL Bilateral 08/16/2019    Procedure: BILATERAL UPPER EYELID BLEPHAROPLASTY AND BILATERAL PTOSIS REPAIR;  Surgeon: Janet Garcia MD;  Location: [facility]     ENDARTERECTOMY FEMORAL Left 01/07/2021    Procedure: LEFT FEMORAL ENDARTERECTOMY WITH PATCH ANGIOPLASTY PHOTOFIX  0.8 X 8CM;  Surgeon: Myra Lopes MD;  Location: [facility] OR     EP PACEMAKER  01/2021     EXCISE LESION EYELID Left 08/16/2019    Procedure: LEFT LOWER EYELID BIOPSY;  Surgeon: Janet Garcia MD;  Location: [facility] OR     IR LOWER EXTREMITY ANGIOGRAM LEFT  12/17/2020     PHACOEMULSIFICATION WITH STANDARD INTRAOCULAR LENS IMPLANT  02/2019; 3/2019    left eye; right eye     TONSILLECTOMY       ZZHC INCISION TENDON SHEATH FINGER  04/2009    r hand ring finger       Family History   Problem Relation Age of Onset     Cerebrovascular Disease Mother      Arthritis Mother      Osteoporosis Mother      Alzheimer Disease Father      Arthritis Father      Cancer Father      Diabetes Maternal Grandmother      Cardiovascular Maternal Grandmother      Other Cancer Brother      Cancer Paternal Aunt      Hypertension No family hx of      Thyroid Disease No family hx of      Glaucoma No family hx of      Macular Degeneration No family hx of        Social History     Tobacco Use     Smoking status: Former     Packs/day: 1.00     Years: 40.00     Pack years: 40.00     Types: Cigarettes     Quit date: 3/16/2007     Years since quitting: 15.8     Smokeless tobacco: Never   Substance Use Topics     Alcohol use: No     Alcohol/week: 0.0 standard drinks       Current Outpatient Medications   Medication     acetaminophen (TYLENOL) 500 MG tablet     Alcohol Swabs PADS     blood glucose (NO BRAND SPECIFIED) test strip     Blood Glucose Monitoring Suppl (ACCU-CHEK GUIDE) w/Device KIT     bumetanide (BUMEX) 1 MG tablet     cefdinir (OMNICEF) 300 MG capsule     cetirizine (ZYRTEC) 10 MG tablet     collagenase (SANTYL) 250 UNIT/GM  "external ointment     cyanocobalamin (VITAMIN B-12) 1000 MCG tablet     doxycycline hyclate (VIBRAMYCIN) 100 MG capsule     ferrous sulfate (FEROSUL) 325 (65 Fe) MG tablet     folic acid (FOLVITE) 1 MG tablet     Gauze Pads & Dressings (GAUZE SPONGE) 4\"X4\" PADS     hydrALAZINE (APRESOLINE) 10 MG tablet     HYDROmorphone (DILAUDID) 2 MG tablet     hydroxychloroquine (PLAQUENIL) 200 MG tablet     insulin glargine (LANTUS VIAL) 100 UNIT/ML vial     insulin syringe-needle U-100 (30G X 1/2\" 0.5 ML) 30G X 1/2\" 0.5 ML miscellaneous     Insulin Syringe-Needle U-100 28G X 1/2\" 0.5 ML MISC     Irrigation Supplies MISC     methocarbamol (ROBAXIN) 500 MG tablet     metoprolol succinate ER (TOPROL-XL) 50 MG 24 hr tablet     NOVOFINE AUTOCOVER PEN NEEDLE 30G X 8 MM miscellaneous     omeprazole 20 MG tablet     polyethylene glycol (MIRALAX) 17 GM/Dose powder     polyethylene glycol-propylene glycol (SYSTANE) 0.4-0.3 % SOLN ophthalmic solution     pravastatin (PRAVACHOL) 10 MG tablet     sertraline (ZOLOFT) 50 MG tablet     sodium hypochlorite (DAKINS HALF-STRENGTH) 0.25 % external solution     syringe/needle, disp, (BD ECLIPSE SYRINGE) 25G X 1\" 3 ML MISC     triamcinolone (KENALOG) 0.1 % external cream     WARFARIN SODIUM PO     bisacodyl (DULCOLAX) 10 MG suppository     dulaglutide (TRULICITY) 1.5 MG/0.5ML pen     senna-docusate (SENOKOT-S/PERICOLACE) 8.6-50 MG tablet     No current facility-administered medications for this visit.          Allergies   Allergen Reactions     Seasonal Allergies      Blood-Group Specific Substance Other (See Comments)     Patient has a Non-specific antibody. Blood products may be delayed. Draw patient 24 hours prior to transfusion. For [facility] testing, draw one red top and two purple top tubes for all Type and Screen orders.  Patient has a Non-specific antibody. Blood products may be delayed. Draw patient 24 hours prior to transfusion. For [facility] testing, draw one red top and two " purple top tubes for all Type and Screen orders.         PHYSICAL EXAM:  /74   Pulse 63   Temp 97.9  F (36.6  C) (Oral)   Resp 16   Wt 99 kg (218 lb 4.8 oz)   SpO2 97%   BMI 32.24 kg/m    GEN: appears well, in no acute distress  HEENT: normocephalic and atraumatic, EOMI, anicteric sclerae  CV: RRR  RESP: normal respiration on room air, no stridor, CTAB  SKIN: normal color and turgor  PSYCH: appropriate mood, affect, and judgment    All pertinent laboratory, imaging, and pathology findings have been reviewed.     12/27/22 Chest CT IMPRESSION: Increasing soft tissue irregular opacification at site of  radiated right upper lobe neoplasm. Continued follow-up recommended. Very slight borderline interval growth of other remote anterior right upper lobe nodule from 7 mm to current measurement of 8 mm. Granulomatous disease. Atherosclerosis. Pacemaker. Unchanged right adrenal nodule consistent with adenoma.    IMPRESSION/RECOMMENDATION:  77 year old gentleman with multiple comorbidities and a pacemaker diagnosed with [id number], stage 1 lung cancer of the RUL status post stereotactic body radiation therapy completed on August 26, 2022. He is doing well with no significant radiation toxicity.  His most recent chest CT is a difficult read as there is irregular opacification at the site of the neoplasm. A short interval follow up scan should be helpful in determining radiation changes vs. neoplasm growth.  He will return for follow up in 2 months with repeat noncontrast chest CT and was instructed to call our clinic with any questions or concerns.    Thank you for allowing me to participate in the care of this pleasant patient. If you have any questions, please do not hesitate to contact my office.    Lalita Funez MD  Attending Physician  Radiation Oncology        Again, thank you for allowing me to participate in the care of your patient.        Sincerely,        NAYAN Funez MD

## 2023-01-02 NOTE — TELEPHONE ENCOUNTER
DIAGNOSIS: bilateral knee pain    APPOINTMENT DATE: 01/16/2023   NOTES STATUS DETAILS   OFFICE NOTE from referring provider Internal 12/01/2022 Dr Tompkins [facility]    OFFICE NOTE from other specialist N/A    DISCHARGE SUMMARY from hospital N/A    DISCHARGE REPORT from the ER N/A    OPERATIVE REPORT N/A    EMG report N/A    MEDICATION LIST N/A    MRI N/A    DEXA (osteoporosis/bone health) N/A    CT SCAN N/A    XRAYS (IMAGES & REPORTS) Internal 11/04/2019 bilateral knee

## 2023-01-03 LAB — INR PPP: 2.18 (ref 0.85–1.15)

## 2023-01-03 PROCEDURE — 36415 COLL VENOUS BLD VENIPUNCTURE: CPT | Mod: ORL | Performed by: PHYSICIAN ASSISTANT

## 2023-01-03 PROCEDURE — P9603 ONE-WAY ALLOW PRORATED MILES: HCPCS | Mod: ORL | Performed by: PHYSICIAN ASSISTANT

## 2023-01-03 PROCEDURE — 85610 PROTHROMBIN TIME: CPT | Mod: ORL | Performed by: PHYSICIAN ASSISTANT

## 2023-01-04 NOTE — PROGRESS NOTES
Dear Colleagues,  Today Zurdo Dash was seen in follow up      IDENTIFICATION: 77 year old gentleman with multiple comorbidities and a pacemaker diagnosed with [id number], stage 1 lung cancer of the RUL status post stereotactic body radiation therapy completed on August 26, 2022.    INTERVAL HISTORY:  Zurdo Dash was last seen in our clinic 3 months ago. While on treatment he had no complaints.  He returns today in follow-up and states he is doing well with no concerns. Specifically denies n/v/ha/sob/cp.    REVIEW OF SYSTEMS: As per HPI, a 14-point review of systems is otherwise negative.    Past Medical History:   Diagnosis Date     Abnormal CT scan 03/2004    calcified lung granuloma     C. difficile diarrhea     H/O     Cataract 11/18/2011     Diabetic neuropathy (H)     mild, mostly soles and distal forefeet, worse on the left side.     Diverticulitis      ED (erectile dysfunction)      Ex-smoker     QUIT SMOKING FEB 2007     History of ETOH abuse     recovering, sober since 1997     Hyperlipidemia LDL goal <100      Hypertension goal BP (blood pressure) < 140/90      Hypogonadism      Obesity      PAD (peripheral artery disease) (H)     leg cramps, with exertion, no formal diagnosis of PAD and minimal if any symptoms at all.     RA (rheumatoid arthritis) (H)     Dr Bailon     Syncope        Past Surgical History:   Procedure Laterality Date     BYPASS GRAFT FEMOROPOPLITEAL Left 01/07/2021    Procedure: LEFT FEMORAL TO ABOVE KNEE POPLITEAL ARTERY BYPASS WITH PTFE VASCULAR GRAFT REMOVABLE RING 6MMX 50CM;  Surgeon: Myra Lopes MD;  Location: [facility]     CATARACT IOL, RT/LT       COLONOSCOPY  03/01/2018    [facility]     COMBINED REPAIR PTOSIS WITH BLEPHAROPLASTY BILATERAL Bilateral 08/16/2019    Procedure: BILATERAL UPPER EYELID BLEPHAROPLASTY AND BILATERAL PTOSIS REPAIR;  Surgeon: Janet Garcia MD;  Location: [facility]     ENDARTERECTOMY FEMORAL Left 01/07/2021    Procedure: LEFT FEMORAL  "ENDARTERECTOMY WITH PATCH ANGIOPLASTY PHOTOFIX  0.8 X 8CM;  Surgeon: Myra Lopes MD;  Location: [facility]     EP PACEMAKER  01/2021     EXCISE LESION EYELID Left 08/16/2019    Procedure: LEFT LOWER EYELID BIOPSY;  Surgeon: Janet Garcia MD;  Location: [facility]     IR LOWER EXTREMITY ANGIOGRAM LEFT  12/17/2020     PHACOEMULSIFICATION WITH STANDARD INTRAOCULAR LENS IMPLANT  02/2019; 3/2019    left eye; right eye     TONSILLECTOMY       ZZHC INCISION TENDON SHEATH FINGER  04/2009    r hand ring finger       Family History   Problem Relation Age of Onset     Cerebrovascular Disease Mother      Arthritis Mother      Osteoporosis Mother      Alzheimer Disease Father      Arthritis Father      Cancer Father      Diabetes Maternal Grandmother      Cardiovascular Maternal Grandmother      Other Cancer Brother      Cancer Paternal Aunt      Hypertension No family hx of      Thyroid Disease No family hx of      Glaucoma No family hx of      Macular Degeneration No family hx of        Social History     Tobacco Use     Smoking status: Former     Packs/day: 1.00     Years: 40.00     Pack years: 40.00     Types: Cigarettes     Quit date: 3/16/2007     Years since quitting: 15.8     Smokeless tobacco: Never   Substance Use Topics     Alcohol use: No     Alcohol/week: 0.0 standard drinks       Current Outpatient Medications   Medication     acetaminophen (TYLENOL) 500 MG tablet     Alcohol Swabs PADS     blood glucose (NO BRAND SPECIFIED) test strip     Blood Glucose Monitoring Suppl (ACCU-CHEK GUIDE) w/Device KIT     bumetanide (BUMEX) 1 MG tablet     cefdinir (OMNICEF) 300 MG capsule     cetirizine (ZYRTEC) 10 MG tablet     collagenase (SANTYL) 250 UNIT/GM external ointment     cyanocobalamin (VITAMIN B-12) 1000 MCG tablet     doxycycline hyclate (VIBRAMYCIN) 100 MG capsule     ferrous sulfate (FEROSUL) 325 (65 Fe) MG tablet     folic acid (FOLVITE) 1 MG tablet     Gauze Pads & Dressings (GAUZE SPONGE) 4\"X4\" PADS     " "hydrALAZINE (APRESOLINE) 10 MG tablet     HYDROmorphone (DILAUDID) 2 MG tablet     hydroxychloroquine (PLAQUENIL) 200 MG tablet     insulin glargine (LANTUS VIAL) 100 UNIT/ML vial     insulin syringe-needle U-100 (30G X 1/2\" 0.5 ML) 30G X 1/2\" 0.5 ML miscellaneous     Insulin Syringe-Needle U-100 28G X 1/2\" 0.5 ML MISC     Irrigation Supplies MISC     methocarbamol (ROBAXIN) 500 MG tablet     metoprolol succinate ER (TOPROL-XL) 50 MG 24 hr tablet     NOVOFINE AUTOCOVER PEN NEEDLE 30G X 8 MM miscellaneous     omeprazole 20 MG tablet     polyethylene glycol (MIRALAX) 17 GM/Dose powder     polyethylene glycol-propylene glycol (SYSTANE) 0.4-0.3 % SOLN ophthalmic solution     pravastatin (PRAVACHOL) 10 MG tablet     sertraline (ZOLOFT) 50 MG tablet     sodium hypochlorite (DAKINS HALF-STRENGTH) 0.25 % external solution     syringe/needle, disp, (BD ECLIPSE SYRINGE) 25G X 1\" 3 ML MISC     triamcinolone (KENALOG) 0.1 % external cream     WARFARIN SODIUM PO     bisacodyl (DULCOLAX) 10 MG suppository     dulaglutide (TRULICITY) 1.5 MG/0.5ML pen     senna-docusate (SENOKOT-S/PERICOLACE) 8.6-50 MG tablet     No current facility-administered medications for this visit.          Allergies   Allergen Reactions     Seasonal Allergies      Blood-Group Specific Substance Other (See Comments)     Patient has a Non-specific antibody. Blood products may be delayed. Draw patient 24 hours prior to transfusion. For [facility] testing, draw one red top and two purple top tubes for all Type and Screen orders.  Patient has a Non-specific antibody. Blood products may be delayed. Draw patient 24 hours prior to transfusion. For [facility] testing, draw one red top and two purple top tubes for all Type and Screen orders.         PHYSICAL EXAM:  /74   Pulse 63   Temp 97.9  F (36.6  C) (Oral)   Resp 16   Wt 99 kg (218 lb 4.8 oz)   SpO2 97%   BMI 32.24 kg/m    GEN: appears well, in no acute distress  HEENT: normocephalic and " atraumatic, EOMI, anicteric sclerae  CV: RRR  RESP: normal respiration on room air, no stridor, CTAB  SKIN: normal color and turgor  PSYCH: appropriate mood, affect, and judgment    All pertinent laboratory, imaging, and pathology findings have been reviewed.     12/27/22 Chest CT IMPRESSION: Increasing soft tissue irregular opacification at site of  radiated right upper lobe neoplasm. Continued follow-up recommended. Very slight borderline interval growth of other remote anterior right upper lobe nodule from 7 mm to current measurement of 8 mm. Granulomatous disease. Atherosclerosis. Pacemaker. Unchanged right adrenal nodule consistent with adenoma.    IMPRESSION/RECOMMENDATION:  77 year old gentleman with multiple comorbidities and a pacemaker diagnosed with [id number], stage 1 lung cancer of the RUL status post stereotactic body radiation therapy completed on August 26, 2022. He is doing well with no significant radiation toxicity.  His most recent chest CT is a difficult read as there is irregular opacification at the site of the neoplasm. A short interval follow up scan should be helpful in determining radiation changes vs. neoplasm growth.  He will return for follow up in 2 months with repeat noncontrast chest CT and was instructed to call our clinic with any questions or concerns.    Thank you for allowing me to participate in the care of this pleasant patient. If you have any questions, please do not hesitate to contact my office.    Lalita Funez MD  Attending Physician  Radiation Oncology

## 2023-01-08 ENCOUNTER — LAB REQUISITION (OUTPATIENT)
Dept: LAB | Facility: CLINIC | Age: 78
End: 2023-01-08
Payer: COMMERCIAL

## 2023-01-08 DIAGNOSIS — E11.22 TYPE 2 DIABETES MELLITUS WITH DIABETIC CHRONIC KIDNEY DISEASE (H): ICD-10-CM

## 2023-01-08 DIAGNOSIS — D64.9 ANEMIA, UNSPECIFIED: ICD-10-CM

## 2023-01-10 LAB
ANION GAP SERPL CALCULATED.3IONS-SCNC: 15 MMOL/L (ref 7–15)
BASOPHILS # BLD AUTO: 0.1 10E3/UL (ref 0–0.2)
BASOPHILS NFR BLD AUTO: 1 %
BUN SERPL-MCNC: 35.3 MG/DL (ref 8–23)
CALCIUM SERPL-MCNC: 9.1 MG/DL (ref 8.8–10.2)
CHLORIDE SERPL-SCNC: 105 MMOL/L (ref 98–107)
CREAT SERPL-MCNC: 1.55 MG/DL (ref 0.67–1.17)
DEPRECATED HCO3 PLAS-SCNC: 20 MMOL/L (ref 22–29)
EOSINOPHIL # BLD AUTO: 0.3 10E3/UL (ref 0–0.7)
EOSINOPHIL NFR BLD AUTO: 4 %
ERYTHROCYTE [DISTWIDTH] IN BLOOD BY AUTOMATED COUNT: 13.3 % (ref 10–15)
FERRITIN SERPL-MCNC: 227 NG/ML (ref 31–409)
GFR SERPL CREATININE-BSD FRML MDRD: 46 ML/MIN/1.73M2
GLUCOSE SERPL-MCNC: 110 MG/DL (ref 70–99)
HBA1C MFR BLD: 8.2 %
HCT VFR BLD AUTO: 37.7 % (ref 40–53)
HGB BLD-MCNC: 11.9 G/DL (ref 13.3–17.7)
IMM GRANULOCYTES # BLD: 0.1 10E3/UL
IMM GRANULOCYTES NFR BLD: 1 %
LYMPHOCYTES # BLD AUTO: 0.9 10E3/UL (ref 0.8–5.3)
LYMPHOCYTES NFR BLD AUTO: 14 %
MCH RBC QN AUTO: 30.1 PG (ref 26.5–33)
MCHC RBC AUTO-ENTMCNC: 31.6 G/DL (ref 31.5–36.5)
MCV RBC AUTO: 95 FL (ref 78–100)
MONOCYTES # BLD AUTO: 1 10E3/UL (ref 0–1.3)
MONOCYTES NFR BLD AUTO: 15 %
NEUTROPHILS # BLD AUTO: 4.3 10E3/UL (ref 1.6–8.3)
NEUTROPHILS NFR BLD AUTO: 65 %
NRBC # BLD AUTO: 0 10E3/UL
NRBC BLD AUTO-RTO: 0 /100
PLATELET # BLD AUTO: 219 10E3/UL (ref 150–450)
POTASSIUM SERPL-SCNC: 4.5 MMOL/L (ref 3.4–5.3)
RBC # BLD AUTO: 3.95 10E6/UL (ref 4.4–5.9)
SODIUM SERPL-SCNC: 140 MMOL/L (ref 136–145)
VIT B12 SERPL-MCNC: 734 PG/ML (ref 232–1245)
WBC # BLD AUTO: 6.5 10E3/UL (ref 4–11)

## 2023-01-10 PROCEDURE — 80048 BASIC METABOLIC PNL TOTAL CA: CPT | Mod: ORL | Performed by: PHYSICIAN ASSISTANT

## 2023-01-10 PROCEDURE — 82607 VITAMIN B-12: CPT | Mod: ORL | Performed by: PHYSICIAN ASSISTANT

## 2023-01-10 PROCEDURE — 36415 COLL VENOUS BLD VENIPUNCTURE: CPT | Mod: ORL | Performed by: PHYSICIAN ASSISTANT

## 2023-01-10 PROCEDURE — 83036 HEMOGLOBIN GLYCOSYLATED A1C: CPT | Mod: ORL | Performed by: PHYSICIAN ASSISTANT

## 2023-01-10 PROCEDURE — 85025 COMPLETE CBC W/AUTO DIFF WBC: CPT | Mod: ORL | Performed by: PHYSICIAN ASSISTANT

## 2023-01-10 PROCEDURE — 82728 ASSAY OF FERRITIN: CPT | Mod: ORL | Performed by: PHYSICIAN ASSISTANT

## 2023-01-10 PROCEDURE — P9603 ONE-WAY ALLOW PRORATED MILES: HCPCS | Mod: ORL | Performed by: PHYSICIAN ASSISTANT

## 2023-01-13 ENCOUNTER — LAB REQUISITION (OUTPATIENT)
Dept: LAB | Facility: CLINIC | Age: 78
End: 2023-01-13
Payer: COMMERCIAL

## 2023-01-13 DIAGNOSIS — I48.91 UNSPECIFIED ATRIAL FIBRILLATION (H): ICD-10-CM

## 2023-01-16 ENCOUNTER — PRE VISIT (OUTPATIENT)
Dept: ORTHOPEDICS | Facility: CLINIC | Age: 78
End: 2023-01-16

## 2023-01-25 ENCOUNTER — OFFICE VISIT (OUTPATIENT)
Dept: PODIATRY | Facility: CLINIC | Age: 78
End: 2023-01-25
Payer: COMMERCIAL

## 2023-01-25 DIAGNOSIS — E11.621 DIABETIC ULCER OF TOE OF LEFT FOOT ASSOCIATED WITH TYPE 2 DIABETES MELLITUS, WITH NECROSIS OF MUSCLE (H): Primary | ICD-10-CM

## 2023-01-25 DIAGNOSIS — L97.523 DIABETIC ULCER OF TOE OF LEFT FOOT ASSOCIATED WITH TYPE 2 DIABETES MELLITUS, WITH NECROSIS OF MUSCLE (H): Primary | ICD-10-CM

## 2023-01-25 DIAGNOSIS — E11.51 TYPE II DIABETES MELLITUS WITH PERIPHERAL ARTERY DISEASE (H): ICD-10-CM

## 2023-01-25 PROBLEM — R65.20 SEVERE SEPSIS (H): Status: ACTIVE | Noted: 2022-06-06

## 2023-01-25 PROBLEM — D69.6 THROMBOCYTOPENIA (H): Status: ACTIVE | Noted: 2022-05-10

## 2023-01-25 PROBLEM — M86.9 OSTEOMYELITIS (H): Status: ACTIVE | Noted: 2023-01-12

## 2023-01-25 PROBLEM — G83.9 PARESIS (H): Status: ACTIVE | Noted: 2023-01-18

## 2023-01-25 PROBLEM — N39.41 URGE INCONTINENCE OF URINE: Status: ACTIVE | Noted: 2022-08-31

## 2023-01-25 PROBLEM — A41.9 SEVERE SEPSIS (H): Status: ACTIVE | Noted: 2022-06-06

## 2023-01-25 PROBLEM — C34.90 ADENOCARCINOMA, LUNG (H): Status: ACTIVE | Noted: 2022-04-08

## 2023-01-25 PROBLEM — S98.139A: Status: ACTIVE | Noted: 2023-01-25

## 2023-01-25 PROCEDURE — 99213 OFFICE O/P EST LOW 20 MIN: CPT | Performed by: PODIATRIST

## 2023-01-25 NOTE — LETTER
1/25/2023         RE: Zurdo Dash  [address]  [facility] [address]        Dear Colleague,    Thank you for referring your patient, Zurdo Dash, to the [facility]. Please see a copy of my visit note below.    Subjective: Patient is status post day 9 left second toe amputation at [facility] on 1/16/2022.  Patient has had no pain.  He is here for a postop check.  His surgeon was Dr. Molly Ward    ROS: See above         Allergies   Allergen Reactions     Seasonal Allergies      Blood-Group Specific Substance Other (See Comments)     Patient has a Non-specific antibody. Blood products may be delayed. Draw patient 24 hours prior to transfusion. For [facility] testing, draw one red top and two purple top tubes for all Type and Screen orders.  Patient has a Non-specific antibody. Blood products may be delayed. Draw patient 24 hours prior to transfusion. For [facility] testing, draw one red top and two purple top tubes for all Type and Screen orders.       Current Outpatient Medications   Medication Sig Dispense Refill     acetaminophen (TYLENOL) 500 MG tablet Take 1,000 mg by mouth daily       Alcohol Swabs PADS Uses four times daily       bisacodyl (DULCOLAX) 10 MG suppository Unwrap and insert 1 suppository rectally once daily as needed (Patient not taking: Reported on 9/27/2022)       blood glucose (NO BRAND SPECIFIED) test strip Use to test blood sugar 4 times daily or as directed.       Blood Glucose Monitoring Suppl (ACCU-CHEK GUIDE) w/Device KIT        bumetanide (BUMEX) 1 MG tablet Take 1 mg by mouth daily       cefdinir (OMNICEF) 300 MG capsule Take 300 mg by mouth 2 times daily       cetirizine (ZYRTEC) 10 MG tablet Take 10 mg by mouth At Bedtime       collagenase (SANTYL) 250 UNIT/GM external ointment Apply topically daily Apply to left heel as directed once daily       cyanocobalamin (VITAMIN B-12) 1000 MCG tablet Take 1 tablet (1,000 mcg) by  "mouth once a week       doxycycline hyclate (VIBRAMYCIN) 100 MG capsule Take 100 mg by mouth 2 times daily       dulaglutide (TRULICITY) 1.5 MG/0.5ML pen Inject 1.5 mg Subcutaneous every 7 days ON Wednesdays (Patient not taking: Reported on 1/2/2023) 10 mL 1     ferrous sulfate (FEROSUL) 325 (65 Fe) MG tablet Take 325 mg by mouth daily       folic acid (FOLVITE) 1 MG tablet Take 1 tablet (1 mg) by mouth daily 100 tablet 2     Gauze Pads & Dressings (GAUZE SPONGE) 4\"X4\" PADS        hydrALAZINE (APRESOLINE) 10 MG tablet Take 10 mg by mouth nightly as needed (systolic bp > 170)       HYDROmorphone (DILAUDID) 2 MG tablet Take 1 tablet (2 mg) by mouth 2 times daily as needed for pain For urgent pain relief with rheumatoid arthritis flare-up 10 tablet 0     hydroxychloroquine (PLAQUENIL) 200 MG tablet Take 1 tablet (200 mg) by mouth daily 90 tablet 1     insulin glargine (LANTUS VIAL) 100 UNIT/ML vial Inject 10 Units Subcutaneous At Bedtime (Patient taking differently: Inject 25 Units Subcutaneous At Bedtime) 3 mL 1     insulin syringe-needle U-100 (30G X 1/2\" 0.5 ML) 30G X 1/2\" 0.5 ML miscellaneous Use 4 syringes daily or as directed.       Insulin Syringe-Needle U-100 28G X 1/2\" 0.5 ML MISC        Irrigation Supplies MISC        methocarbamol (ROBAXIN) 500 MG tablet Take 250 mg by mouth 2 times daily       metoprolol succinate ER (TOPROL-XL) 50 MG 24 hr tablet Take 1 tablet (50 mg) by mouth daily       NOVOFINE AUTOCOVER PEN NEEDLE 30G X 8 MM miscellaneous USE FOUR TIMES A  each 0     omeprazole 20 MG tablet Take 20 mg by mouth daily       polyethylene glycol (MIRALAX) 17 GM/Dose powder Take 1 capful by mouth daily       polyethylene glycol-propylene glycol (SYSTANE) 0.4-0.3 % SOLN ophthalmic solution Place 1 drop into both eyes 2 times daily as needed for dry eyes       pravastatin (PRAVACHOL) 10 MG tablet Take 1 tablet (10 mg) by mouth daily       senna-docusate (SENOKOT-S/PERICOLACE) 8.6-50 MG tablet Take 1 " "tablet by mouth 2 times daily as needed for constipation (Patient not taking: Reported on 1/2/2023) 50 tablet 0     sertraline (ZOLOFT) 50 MG tablet Take 1 tablet (50 mg) by mouth daily       sodium hypochlorite (DAKINS HALF-STRENGTH) 0.25 % external solution Use for damp to dry dressing changes two times a day left groin wound       syringe/needle, disp, (BD ECLIPSE SYRINGE) 25G X 1\" 3 ML MISC        triamcinolone (KENALOG) 0.1 % external cream Apply topically 2 times daily Apply to rash on neck, shoulders and lower extremities two times a day       WARFARIN SODIUM PO Take 3 mg by mouth daily         Patient Active Problem List   Diagnosis     Pain in limb     Hyperlipidemia LDL goal <100     Hypertension goal BP (blood pressure) < 140/90     Hypogonadism     Advanced directives, counseling/discussion     Health Care Home     Type 2 diabetes mellitus with diabetic polyneuropathy, without long-term current use of insulin (H)     RBBB (right bundle branch block)     Ex-smoker     Family history of esophageal cancer     Gastroesophageal reflux disease, esophagitis presence not specified     Rheumatoid arthritis involving multiple sites with positive rheumatoid factor (H)     Pulmonary nodule     High risk medication use     Spondylosis of cervical region without myelopathy or radiculopathy     Erectile dysfunction, unspecified erectile dysfunction type     Type 2 diabetes mellitus without retinopathy (H)     Tubulovillous adenoma of colon     Diabetic polyneuropathy associated with type 2 diabetes mellitus (H)     Chronic bilateral low back pain without sciatica     Migraine equivalent     Posterior vitreous detachment of left eye     Pseudophakia, ou     Eyelid lesion, LLL     Dermatochalasis of both upper eyelids     Bradycardia     Primary osteoarthritis of both knees     Syncope     Trigger finger, acquired     CKD (chronic kidney disease) stage 3, GFR 30-59 ml/min (H)     Abdominal pain, generalized     PAD " (peripheral artery disease) (H)     Immunosuppression (H)     Skin ulcer of toe of left foot, limited to breakdown of skin (H)     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 difficile diarrhea     H/O     Cataract 11/18/2011     Diabetic neuropathy (H)     mild, mostly soles and distal forefeet, worse on the left side.     Diverticulitis      ED (erectile dysfunction)      Ex-smoker     QUIT SMOKING FEB 2007     History of ETOH abuse     recovering, sober since 1997     Hyperlipidemia LDL goal <100      Hypertension goal BP (blood pressure) < 140/90      Hypogonadism      Obesity      PAD (peripheral artery disease) (H)     leg cramps, with exertion, no formal diagnosis of PAD and minimal if any symptoms at all.     RA (rheumatoid arthritis) (H)     Dr Bailon     Syncope        Past Surgical History:   Procedure Laterality Date     BYPASS GRAFT FEMOROPOPLITEAL Left 01/07/2021    Procedure: LEFT FEMORAL TO ABOVE KNEE POPLITEAL ARTERY BYPASS WITH PTFE VASCULAR GRAFT REMOVABLE RING 6MMX 50CM;  Surgeon: Myra Lopes MD;  Location:  OR     CATARACT IOL, RT/LT       COLONOSCOPY  03/01/2018    [facility]     COMBINED REPAIR PTOSIS WITH BLEPHAROPLASTY BILATERAL Bilateral 08/16/2019    Procedure: BILATERAL UPPER EYELID BLEPHAROPLASTY AND BILATERAL PTOSIS REPAIR;  Surgeon: Janet Garcia MD;  Location:  OR     ENDARTERECTOMY FEMORAL Left 01/07/2021    Procedure: LEFT FEMORAL  ENDARTERECTOMY WITH PATCH ANGIOPLASTY PHOTOFIX  0.8 X 8CM;  Surgeon: Myra Lopes MD;  Location: [facility] OR     EP PACEMAKER  01/2021     EXCISE LESION EYELID Left 08/16/2019    Procedure: LEFT LOWER EYELID BIOPSY;  Surgeon: Janet Garcia MD;  Location: [facility] OR     IR LOWER EXTREMITY ANGIOGRAM LEFT  12/17/2020     PHACOEMULSIFICATION WITH STANDARD INTRAOCULAR LENS IMPLANT  02/2019; 3/2019    left eye; right eye     TONSILLECTOMY       ZZHC INCISION TENDON SHEATH FINGER  04/2009    r hand ring finger       Family History   Problem Relation Age of Onset     Cerebrovascular Disease Mother      Arthritis Mother      Osteoporosis Mother      Alzheimer Disease Father      Arthritis Father      Cancer Father      Diabetes Maternal Grandmother      Cardiovascular Maternal Grandmother      Other Cancer Brother      Cancer Paternal Aunt      Hypertension No family hx of      Thyroid Disease No family hx of      Glaucoma No family hx of      Macular Degeneration No family hx of        Social History     Tobacco Use     Smoking status: Former     Packs/day: 1.00     Years: 40.00     Pack years: 40.00     Types: Cigarettes     Quit date: 3/16/2007     Years since quitting: 15.8     Smokeless tobacco: Never   Substance Use Topics     Alcohol use: No     Alcohol/week: 0.0 standard drinks         Exam:    Vitals: There were no vitals taken for this visit.  BMI: There is no height or weight on file to calculate BMI.  Height: Data Unavailable    Constitutional/ general:  Pt is in no apparent distress, appears well-nourished.  Cooperative with history and physical exam.  Patient seen with wife today.  His daughter is also on his cell phone.    Psych:  The patient answered questions appropriately.  Normal affect.  Seems to have reasonable expectations, in terms of treatment.     Lungs:  Non labored breathing, non labored speech. No cough.  No audible wheezing. Even, quiet breathing.       Vascular:   Difficult to palpate  pedal pulses.  Increased edema left lower extremity.                                                                   Neuro:  Alert and oriented x 3.  Monofilament absent to midfoot.      Derm: Skin thin shiny atrophic with no hair growth.     Musculoskeletal:    Left second toe amputated.  Incision dry.  No drainage.  No erythema or ecchymosis.    A:  Diabetes mellitus with peripheral neuropathy and LOPS  Peripheral arterial disease s/p bypass  Postop day 9 left second toe amputation      P:   Discussed incision is healing.  There is no signs of infection.  We redressed this.  Continue to keep toe dry at all times.  We will minimize walking.  Continue to use DH shoe.  Discussed with patient he should have sutures out next week.  He will follow-up with his surgeon Dr. Molly Ward.  Return to clinic prn.      Aaron Dent DPM, FACFAS              Again, thank you for allowing me to participate in the care of your patient.        Sincerely,        Aaron Dent DPM

## 2023-01-25 NOTE — PATIENT INSTRUCTIONS
We wish you continued good healing. If you have any questions or concerns, please do not hesitate to contact us at  [phone]    [facility] (secure e-mail communication and access to your chart) to send a message or to make an appointment.    Please remember to call and schedule a follow up appointment if one was recommended at your earliest convenience.     PODIATRY CLINIC HOURS  TELEPHONE NUMBER    Dr. Aaron DAVISONPPEDRITO [facility]        Clinics:  [facility]   [place]  Tuesday 1PM-6PM  [place]  Wednesday 745AM-330PM  [place]  Thursday/Friday 745AM-230PM       [place] APPOINTMENTS  [phone]    [place] APPOINTMENTS  [phone]        If you need a medication refill, please contact us you may need lab work and/or a follow up visit prior to your refill (i.e. Antifungal medications).  If MRI needed please call Imaging at [phone]   HOW DO I GET MY KNEE SCOOTER? Knee scooters can be picked up at ANY Medical Supply stores with your knee scooter Prescription.  OR  Bring your signed prescription to an [facility] Medical Equipment showroom.

## 2023-01-25 NOTE — PROGRESS NOTES
Subjective: Patient is status post day 9 left second toe amputation at [facility] on 1/16/2022.  Patient has had no pain.  He is here for a postop check.  His surgeon was Dr. Molly Ward    ROS: See above         Allergies   Allergen Reactions     Seasonal Allergies      Blood-Group Specific Substance Other (See Comments)     Patient has a Non-specific antibody. Blood products may be delayed. Draw patient 24 hours prior to transfusion. For [facility] testing, draw one red top and two purple top tubes for all Type and Screen orders.  Patient has a Non-specific antibody. Blood products may be delayed. Draw patient 24 hours prior to transfusion. For [facility] testing, draw one red top and two purple top tubes for all Type and Screen orders.       Current Outpatient Medications   Medication Sig Dispense Refill     acetaminophen (TYLENOL) 500 MG tablet Take 1,000 mg by mouth daily       Alcohol Swabs PADS Uses four times daily       bisacodyl (DULCOLAX) 10 MG suppository Unwrap and insert 1 suppository rectally once daily as needed (Patient not taking: Reported on 9/27/2022)       blood glucose (NO BRAND SPECIFIED) test strip Use to test blood sugar 4 times daily or as directed.       Blood Glucose Monitoring Suppl (ACCU-CHEK GUIDE) w/Device KIT        bumetanide (BUMEX) 1 MG tablet Take 1 mg by mouth daily       cefdinir (OMNICEF) 300 MG capsule Take 300 mg by mouth 2 times daily       cetirizine (ZYRTEC) 10 MG tablet Take 10 mg by mouth At Bedtime       collagenase (SANTYL) 250 UNIT/GM external ointment Apply topically daily Apply to left heel as directed once daily       cyanocobalamin (VITAMIN B-12) 1000 MCG tablet Take 1 tablet (1,000 mcg) by mouth once a week       doxycycline hyclate (VIBRAMYCIN) 100 MG capsule Take 100 mg by mouth 2 times daily       dulaglutide (TRULICITY) 1.5 MG/0.5ML pen Inject 1.5 mg Subcutaneous every 7 days ON Wednesdays (Patient not taking: Reported on 1/2/2023) 10 mL 1      "ferrous sulfate (FEROSUL) 325 (65 Fe) MG tablet Take 325 mg by mouth daily       folic acid (FOLVITE) 1 MG tablet Take 1 tablet (1 mg) by mouth daily 100 tablet 2     Gauze Pads & Dressings (GAUZE SPONGE) 4\"X4\" PADS        hydrALAZINE (APRESOLINE) 10 MG tablet Take 10 mg by mouth nightly as needed (systolic bp > 170)       HYDROmorphone (DILAUDID) 2 MG tablet Take 1 tablet (2 mg) by mouth 2 times daily as needed for pain For urgent pain relief with rheumatoid arthritis flare-up 10 tablet 0     hydroxychloroquine (PLAQUENIL) 200 MG tablet Take 1 tablet (200 mg) by mouth daily 90 tablet 1     insulin glargine (LANTUS VIAL) 100 UNIT/ML vial Inject 10 Units Subcutaneous At Bedtime (Patient taking differently: Inject 25 Units Subcutaneous At Bedtime) 3 mL 1     insulin syringe-needle U-100 (30G X 1/2\" 0.5 ML) 30G X 1/2\" 0.5 ML miscellaneous Use 4 syringes daily or as directed.       Insulin Syringe-Needle U-100 28G X 1/2\" 0.5 ML MISC        Irrigation Supplies MISC        methocarbamol (ROBAXIN) 500 MG tablet Take 250 mg by mouth 2 times daily       metoprolol succinate ER (TOPROL-XL) 50 MG 24 hr tablet Take 1 tablet (50 mg) by mouth daily       NOVOFINE AUTOCOVER PEN NEEDLE 30G X 8 MM miscellaneous USE FOUR TIMES A  each 0     omeprazole 20 MG tablet Take 20 mg by mouth daily       polyethylene glycol (MIRALAX) 17 GM/Dose powder Take 1 capful by mouth daily       polyethylene glycol-propylene glycol (SYSTANE) 0.4-0.3 % SOLN ophthalmic solution Place 1 drop into both eyes 2 times daily as needed for dry eyes       pravastatin (PRAVACHOL) 10 MG tablet Take 1 tablet (10 mg) by mouth daily       senna-docusate (SENOKOT-S/PERICOLACE) 8.6-50 MG tablet Take 1 tablet by mouth 2 times daily as needed for constipation (Patient not taking: Reported on 1/2/2023) 50 tablet 0     sertraline (ZOLOFT) 50 MG tablet Take 1 tablet (50 mg) by mouth daily       sodium hypochlorite (DAKINS HALF-STRENGTH) 0.25 % external solution Use " "for damp to dry dressing changes two times a day left groin wound       syringe/needle, disp, (BD ECLIPSE SYRINGE) 25G X 1\" 3 ML MISC        triamcinolone (KENALOG) 0.1 % external cream Apply topically 2 times daily Apply to rash on neck, shoulders and lower extremities two times a day       WARFARIN SODIUM PO Take 3 mg by mouth daily         Patient Active Problem List   Diagnosis     Pain in limb     Hyperlipidemia LDL goal <100     Hypertension goal BP (blood pressure) < 140/90     Hypogonadism     Advanced directives, counseling/discussion     Health Care Home     Type 2 diabetes mellitus with diabetic polyneuropathy, without long-term current use of insulin (H)     RBBB (right bundle branch block)     Ex-smoker     Family history of esophageal cancer     Gastroesophageal reflux disease, esophagitis presence not specified     Rheumatoid arthritis involving multiple sites with positive rheumatoid factor (H)     Pulmonary nodule     High risk medication use     Spondylosis of cervical region without myelopathy or radiculopathy     Erectile dysfunction, unspecified erectile dysfunction type     Type 2 diabetes mellitus without retinopathy (H)     Tubulovillous adenoma of colon     Diabetic polyneuropathy associated with type 2 diabetes mellitus (H)     Chronic bilateral low back pain without sciatica     Migraine equivalent     Posterior vitreous detachment of left eye     Pseudophakia, ou     Eyelid lesion, LLL     Dermatochalasis of both upper eyelids     Bradycardia     Primary osteoarthritis of both knees     Syncope     Trigger finger, acquired     CKD (chronic kidney disease) stage 3, GFR 30-59 ml/min (H)     Abdominal pain, generalized     PAD (peripheral artery disease) (H)     Immunosuppression (H)     Skin ulcer of toe of left foot, limited to breakdown of skin (H)     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 difficile diarrhea     H/O     Cataract 11/18/2011     Diabetic neuropathy (H)     mild, mostly soles and distal forefeet, worse on the left side.     Diverticulitis      ED (erectile dysfunction)      Ex-smoker     QUIT SMOKING FEB 2007     History of ETOH abuse     recovering, sober since 1997     Hyperlipidemia LDL goal <100      Hypertension goal BP (blood pressure) < 140/90      Hypogonadism      Obesity      PAD (peripheral artery disease) (H)     leg cramps, with exertion, no formal diagnosis of PAD and minimal if any symptoms at all.     RA (rheumatoid arthritis) (H)      [place]     Syncope        Past Surgical History:   Procedure Laterality Date     BYPASS GRAFT FEMOROPOPLITEAL Left 01/07/2021    Procedure: LEFT FEMORAL TO ABOVE KNEE POPLITEAL ARTERY BYPASS WITH PTFE VASCULAR GRAFT REMOVABLE RING 6MMX 50CM;  Surgeon: Myra Lopes MD;  Location: [facility] OR     CATARACT IOL, RT/LT       COLONOSCOPY  03/01/2018    [facility]     COMBINED REPAIR PTOSIS WITH BLEPHAROPLASTY BILATERAL Bilateral 08/16/2019    Procedure: BILATERAL UPPER EYELID BLEPHAROPLASTY AND BILATERAL PTOSIS REPAIR;  Surgeon: Janet Garcia MD;  Location:  OR     ENDARTERECTOMY FEMORAL Left 01/07/2021    Procedure: LEFT FEMORAL ENDARTERECTOMY WITH PATCH ANGIOPLASTY PHOTOFIX  0.8 X 8CM;  Surgeon: Myra Lopes MD;  Location:  OR     EP PACEMAKER  01/2021     EXCISE LESION EYELID Left 08/16/2019    Procedure: LEFT LOWER EYELID BIOPSY;  Surgeon: Janet Garcia MD;   Location: [facility] OR     IR LOWER EXTREMITY ANGIOGRAM LEFT  12/17/2020     PHACOEMULSIFICATION WITH STANDARD INTRAOCULAR LENS IMPLANT  02/2019; 3/2019    left eye; right eye     TONSILLECTOMY       ZZHC INCISION TENDON SHEATH FINGER  04/2009    r hand ring finger       Family History   Problem Relation Age of Onset     Cerebrovascular Disease Mother      Arthritis Mother      Osteoporosis Mother      Alzheimer Disease Father      Arthritis Father      Cancer Father      Diabetes Maternal Grandmother      Cardiovascular Maternal Grandmother      Other Cancer Brother      Cancer Paternal Aunt      Hypertension No family hx of      Thyroid Disease No family hx of      Glaucoma No family hx of      Macular Degeneration No family hx of        Social History     Tobacco Use     Smoking status: Former     Packs/day: 1.00     Years: 40.00     Pack years: 40.00     Types: Cigarettes     Quit date: 3/16/2007     Years since quitting: 15.8     Smokeless tobacco: Never   Substance Use Topics     Alcohol use: No     Alcohol/week: 0.0 standard drinks         Exam:    Vitals: There were no vitals taken for this visit.  BMI: There is no height or weight on file to calculate BMI.  Height: Data Unavailable    Constitutional/ general:  Pt is in no apparent distress, appears well-nourished.  Cooperative with history and physical exam.  Patient seen with wife today.  His daughter is also on his cell phone.    Psych:  The patient answered questions appropriately.  Normal affect.  Seems to have reasonable expectations, in terms of treatment.     Lungs:  Non labored breathing, non labored speech. No cough.  No audible wheezing. Even, quiet breathing.       Vascular:   Difficult to palpate pedal pulses.  Increased edema left lower extremity.                                                                   Neuro:  Alert and oriented x 3.  Monofilament absent to midfoot.      Derm: Skin thin shiny atrophic with no hair growth.      Musculoskeletal:    Left second toe amputated.  Incision dry.  No drainage.  No erythema or ecchymosis.    A:  Diabetes mellitus with peripheral neuropathy and LOPS  Peripheral arterial disease s/p bypass  Postop day 9 left second toe amputation      P:   Discussed incision is healing.  There is no signs of infection.  We redressed this.  Continue to keep toe dry at all times.  We will minimize walking.  Continue to use DH shoe.  Discussed with patient he should have sutures out next week.  He will follow-up with his surgeon Dr. Molly Ward.  Return to clinic prn.      Aaron Dent DPM, FACFAS

## 2023-01-30 ENCOUNTER — LAB REQUISITION (OUTPATIENT)
Dept: LAB | Facility: CLINIC | Age: 78
End: 2023-01-30
Payer: COMMERCIAL

## 2023-01-30 DIAGNOSIS — I48.91 UNSPECIFIED ATRIAL FIBRILLATION (H): ICD-10-CM

## 2023-01-31 LAB — INR PPP: 2.71 (ref 0.85–1.15)

## 2023-01-31 PROCEDURE — 36415 COLL VENOUS BLD VENIPUNCTURE: CPT | Mod: ORL | Performed by: NURSE PRACTITIONER

## 2023-01-31 PROCEDURE — 85610 PROTHROMBIN TIME: CPT | Mod: ORL | Performed by: NURSE PRACTITIONER

## 2023-02-13 ENCOUNTER — LAB REQUISITION (OUTPATIENT)
Dept: LAB | Facility: CLINIC | Age: 78
End: 2023-02-13
Payer: COMMERCIAL

## 2023-02-13 DIAGNOSIS — I48.91 UNSPECIFIED ATRIAL FIBRILLATION (H): ICD-10-CM

## 2023-02-14 LAB — INR PPP: 3.82 (ref 0.85–1.15)

## 2023-02-14 PROCEDURE — 85610 PROTHROMBIN TIME: CPT | Mod: ORL | Performed by: PHYSICIAN ASSISTANT

## 2023-02-14 PROCEDURE — P9603 ONE-WAY ALLOW PRORATED MILES: HCPCS | Mod: ORL | Performed by: PHYSICIAN ASSISTANT

## 2023-02-14 PROCEDURE — 36415 COLL VENOUS BLD VENIPUNCTURE: CPT | Mod: ORL | Performed by: PHYSICIAN ASSISTANT

## 2023-02-20 ENCOUNTER — LAB REQUISITION (OUTPATIENT)
Dept: LAB | Facility: CLINIC | Age: 78
End: 2023-02-20
Payer: COMMERCIAL

## 2023-02-20 DIAGNOSIS — I48.91 UNSPECIFIED ATRIAL FIBRILLATION (H): ICD-10-CM

## 2023-02-21 LAB — INR PPP: 3.17 (ref 0.85–1.15)

## 2023-02-21 PROCEDURE — 36415 COLL VENOUS BLD VENIPUNCTURE: CPT | Mod: ORL | Performed by: INTERNAL MEDICINE

## 2023-02-21 PROCEDURE — 85610 PROTHROMBIN TIME: CPT | Mod: ORL | Performed by: INTERNAL MEDICINE

## 2023-02-21 PROCEDURE — P9604 ONE-WAY ALLOW PRORATED TRIP: HCPCS | Mod: ORL | Performed by: INTERNAL MEDICINE

## 2023-02-27 ENCOUNTER — ANCILLARY PROCEDURE (OUTPATIENT)
Dept: CT IMAGING | Facility: CLINIC | Age: 78
End: 2023-02-27
Attending: RADIOLOGY
Payer: COMMERCIAL

## 2023-02-27 ENCOUNTER — ANCILLARY PROCEDURE (OUTPATIENT)
Dept: CT IMAGING | Facility: CLINIC | Age: 78
End: 2023-02-27
Attending: INTERNAL MEDICINE
Payer: COMMERCIAL

## 2023-02-27 ENCOUNTER — LAB REQUISITION (OUTPATIENT)
Dept: LAB | Facility: CLINIC | Age: 78
End: 2023-02-27
Payer: COMMERCIAL

## 2023-02-27 DIAGNOSIS — R51.9 HEAD ACHE: ICD-10-CM

## 2023-02-27 DIAGNOSIS — I48.91 UNSPECIFIED ATRIAL FIBRILLATION (H): ICD-10-CM

## 2023-02-27 DIAGNOSIS — C34.11 MALIGNANT NEOPLASM OF UPPER LOBE OF RIGHT LUNG (H): ICD-10-CM

## 2023-02-27 PROCEDURE — 70450 CT HEAD/BRAIN W/O DYE: CPT | Mod: TC | Performed by: STUDENT IN AN ORGANIZED HEALTH CARE EDUCATION/TRAINING PROGRAM

## 2023-02-27 PROCEDURE — 71250 CT THORAX DX C-: CPT | Mod: TC | Performed by: RADIOLOGY

## 2023-02-28 ENCOUNTER — VIRTUAL VISIT (OUTPATIENT)
Dept: RADIATION ONCOLOGY | Facility: CLINIC | Age: 78
End: 2023-02-28
Payer: COMMERCIAL

## 2023-02-28 DIAGNOSIS — C34.11 MALIGNANT NEOPLASM OF UPPER LOBE OF RIGHT LUNG (H): Primary | ICD-10-CM

## 2023-02-28 LAB — INR PPP: 2.88 (ref 0.85–1.15)

## 2023-02-28 PROCEDURE — 99214 OFFICE O/P EST MOD 30 MIN: CPT | Mod: VID | Performed by: RADIOLOGY

## 2023-02-28 PROCEDURE — P9603 ONE-WAY ALLOW PRORATED MILES: HCPCS | Mod: ORL | Performed by: INTERNAL MEDICINE

## 2023-02-28 PROCEDURE — 85610 PROTHROMBIN TIME: CPT | Mod: ORL | Performed by: INTERNAL MEDICINE

## 2023-02-28 PROCEDURE — 36415 COLL VENOUS BLD VENIPUNCTURE: CPT | Mod: ORL | Performed by: INTERNAL MEDICINE

## 2023-02-28 NOTE — NURSING NOTE
"Zurdo is a 77 year old who is being evaluated via a billable video visit.      How would you like to obtain your AVS? MyChart  If the video visit is dropped, the invitation should be resent by: Text to cell phone: [phone]  Will anyone else be joining your video visit? No        Video-Visit Details    Type of service:  Video Visit   Video Start Time: Per MD  Video End Time:Per MD    Originating Location (pt. Location): Home    Distant Location (provider location):  On-site  Platform used for Video Visit: [facility]     Oncology Rooming Note    February 28, 2023 4:30 PM   Zurdo Dash is a 77 year old male who presents for:    Chief Complaint   Patient presents with     Radiation Therapy     Video Return appointment with Dr. Funez     Initial Vitals: There were no vitals taken for this visit. Estimated body mass index is 32.24 kg/m  as calculated from the following:    Height as of 9/20/22: 1.753 m (5' 9\").    Weight as of 1/2/23: 99 kg (218 lb 4.8 oz). There is no height or weight on file to calculate BSA.  Data Unavailable Comment: Data Unavailable   No LMP for male patient.  Allergies reviewed: Yes  Medications reviewed: Yes    Medications: Medication refills not needed today.  Pharmacy name entered into [facility]:    [facility] [address] [address] [facility] [address]  [facility] [facility] [address] [facility]  [facility] [address] - [place], [place] - [address]    Clinical concerns: Video Return appointment Dr. Funez was notified.      Heidi Villar RN              "

## 2023-02-28 NOTE — PROGRESS NOTES
Dear Colleagues,  Today Zurdo Dash was seen in follow up      IDENTIFICATION: 77 year old gentleman with multiple comorbidities and a pacemaker diagnosed with [id number], stage 1 lung cancer of the RUL status post stereotactic body radiation therapy completed on August 26, 2022.    INTERVAL HISTORY:  Zurdo Dash was last seen 2 months ago. While on treatment he had no complaints.  He is called today in follow-up and states he is doing well with no concerns. Specifically denies n/v/ha/sob/cp.  Previous scan showed increased consolidation and this scan was ordered as a short-term follow-up.    REVIEW OF SYSTEMS: As per HPI, a 14-point review of systems is otherwise negative.    Past Medical History:   Diagnosis Date     Abnormal CT scan 03/2004    calcified lung granuloma     C. difficile diarrhea     H/O     Cataract 11/18/2011     Diabetic neuropathy (H)     mild, mostly soles and distal forefeet, worse on the left side.     Diverticulitis      ED (erectile dysfunction)      Ex-smoker     QUIT SMOKING FEB 2007     History of ETOH abuse     recovering, sober since 1997     Hyperlipidemia LDL goal <100      Hypertension goal BP (blood pressure) < 140/90      Hypogonadism      Obesity      PAD (peripheral artery disease) (H)     leg cramps, with exertion, no formal diagnosis of PAD and minimal if any symptoms at all.     RA (rheumatoid arthritis) (H)     Dr SharpUvaldo     Syncope        Past Surgical History:   Procedure Laterality Date     BYPASS GRAFT FEMOROPOPLITEAL Left 01/07/2021    Procedure: LEFT FEMORAL TO ABOVE KNEE POPLITEAL ARTERY BYPASS WITH PTFE VASCULAR GRAFT REMOVABLE RING 6MMX 50CM;  Surgeon: Myra Lopes MD;  Location: [facility] OR     CATARACT IOL, RT/LT       COLONOSCOPY  03/01/2018    [facility]     COMBINED REPAIR PTOSIS WITH BLEPHAROPLASTY BILATERAL Bilateral 08/16/2019    Procedure: BILATERAL UPPER EYELID BLEPHAROPLASTY AND BILATERAL PTOSIS REPAIR;  Surgeon: Janet Garcia MD;  Location:  [facility]     ENDARTERECTOMY FEMORAL Left 01/07/2021    Procedure: LEFT FEMORAL ENDARTERECTOMY WITH PATCH ANGIOPLASTY PHOTOFIX  0.8 X 8CM;  Surgeon: Myra Lopes MD;  Location: [facility] OR     EP PACEMAKER  01/2021     EXCISE LESION EYELID Left 08/16/2019    Procedure: LEFT LOWER EYELID BIOPSY;  Surgeon: Janet Garcia MD;  Location: [facility] OR     IR LOWER EXTREMITY ANGIOGRAM LEFT  12/17/2020     PHACOEMULSIFICATION WITH STANDARD INTRAOCULAR LENS IMPLANT  02/2019; 3/2019    left eye; right eye     TONSILLECTOMY       ZZHC INCISION TENDON SHEATH FINGER  04/2009    r hand ring finger       Family History   Problem Relation Age of Onset     Cerebrovascular Disease Mother      Arthritis Mother      Osteoporosis Mother      Alzheimer Disease Father      Arthritis Father      Cancer Father      Diabetes Maternal Grandmother      Cardiovascular Maternal Grandmother      Other Cancer Brother      Cancer Paternal Aunt      Hypertension No family hx of      Thyroid Disease No family hx of      Glaucoma No family hx of      Macular Degeneration No family hx of        Social History     Tobacco Use     Smoking status: Former     Packs/day: 1.00     Years: 40.00     Pack years: 40.00     Types: Cigarettes     Quit date: 3/16/2007     Years since quitting: 15.9     Smokeless tobacco: Never   Substance Use Topics     Alcohol use: No     Alcohol/week: 0.0 standard drinks       Current Outpatient Medications   Medication     acetaminophen (TYLENOL) 500 MG tablet     Alcohol Swabs PADS     bisacodyl (DULCOLAX) 10 MG suppository     blood glucose (NO BRAND SPECIFIED) test strip     Blood Glucose Monitoring Suppl (ACCU-CHEK GUIDE) w/Device KIT     bumetanide (BUMEX) 1 MG tablet     cefdinir (OMNICEF) 300 MG capsule     cetirizine (ZYRTEC) 10 MG tablet     collagenase (SANTYL) 250 UNIT/GM external ointment     cyanocobalamin (VITAMIN B-12) 1000 MCG tablet     doxycycline hyclate (VIBRAMYCIN) 100 MG capsule     dulaglutide  "(TRULICITY) 1.5 MG/0.5ML pen     ferrous sulfate (FEROSUL) 325 (65 Fe) MG tablet     folic acid (FOLVITE) 1 MG tablet     Gauze Pads & Dressings (GAUZE SPONGE) 4\"X4\" PADS     hydrALAZINE (APRESOLINE) 10 MG tablet     HYDROmorphone (DILAUDID) 2 MG tablet     hydroxychloroquine (PLAQUENIL) 200 MG tablet     insulin glargine (LANTUS VIAL) 100 UNIT/ML vial     insulin syringe-needle U-100 (30G X 1/2\" 0.5 ML) 30G X 1/2\" 0.5 ML miscellaneous     Insulin Syringe-Needle U-100 28G X 1/2\" 0.5 ML MISC     Irrigation Supplies MISC     methocarbamol (ROBAXIN) 500 MG tablet     metoprolol succinate ER (TOPROL-XL) 50 MG 24 hr tablet     NOVOFINE AUTOCOVER PEN NEEDLE 30G X 8 MM miscellaneous     omeprazole 20 MG tablet     polyethylene glycol (MIRALAX) 17 GM/Dose powder     polyethylene glycol-propylene glycol (SYSTANE) 0.4-0.3 % SOLN ophthalmic solution     pravastatin (PRAVACHOL) 10 MG tablet     senna-docusate (SENOKOT-S/PERICOLACE) 8.6-50 MG tablet     sertraline (ZOLOFT) 50 MG tablet     sodium hypochlorite (DAKINS HALF-STRENGTH) 0.25 % external solution     syringe/needle, disp, (BD ECLIPSE SYRINGE) 25G X 1\" 3 ML MISC     triamcinolone (KENALOG) 0.1 % external cream     WARFARIN SODIUM PO     No current facility-administered medications for this visit.          Allergies   Allergen Reactions     Seasonal Allergies      Blood-Group Specific Substance Other (See Comments)     Patient has a Non-specific antibody. Blood products may be delayed. Draw patient 24 hours prior to transfusion. For [facility] testing, draw one red top and two purple top tubes for all Type and Screen orders.  Patient has a Non-specific antibody. Blood products may be delayed. Draw patient 24 hours prior to transfusion. For [facility] testing, draw one red top and two purple top tubes for all Type and Screen orders.         PHYSICAL EXAM:  There were no vitals taken for this visit.  GEN: appears well, in no acute distress  HEENT: normocephalic and " atraumatic, EOMI, anicteric sclerae  CV: RRR  RESP: normal respiration on room air, no stridor, CTAB  SKIN: normal color and turgor  PSYCH: appropriate mood, affect, and judgment    All pertinent laboratory, imaging, and pathology findings have been reviewed.     February 27, 2023, chest CT without contrast shows increased area of consolidation volume loss and bronchiectasis in the posterior right upper lobe likely related to evolving postradiation changes however tumor cannot be excluded and short interval follow-up is recommended.  New 5 mm groundglass nodule in right upper lobe    IMPRESSION/RECOMMENDATION:  77 year old gentleman with multiple comorbidities and a pacemaker diagnosed with [id number], stage 1 lung cancer of the RUL status post stereotactic body radiation therapy completed on August 26, 2022. He is doing well with no significant radiation toxicity.  His most recent chest CT is a difficult read as there is irregular opacification at the site of the neoplasm and now 5 mm GGO.  This is the short interval follow up scan that was ordered due to changes seen on a previous scan.  I will order PET CT scan to be completed within the next week or 2 as this will be helpful in determining radiation changes vs. neoplasm growth.      Thank you for allowing me to participate in the care of this pleasant patient. If you have any questions, please do not hesitate to contact my office.    Lalita Funez MD  Attending Physician  Radiation Oncology    Phone call 12:01pm to 12:06pm

## 2023-03-04 ENCOUNTER — LAB REQUISITION (OUTPATIENT)
Dept: LAB | Facility: CLINIC | Age: 78
End: 2023-03-04
Payer: COMMERCIAL

## 2023-03-04 DIAGNOSIS — I48.91 UNSPECIFIED ATRIAL FIBRILLATION (H): ICD-10-CM

## 2023-03-07 LAB — INR PPP: 2.71 (ref 0.85–1.15)

## 2023-03-07 PROCEDURE — 36415 COLL VENOUS BLD VENIPUNCTURE: CPT | Mod: ORL | Performed by: PHYSICIAN ASSISTANT

## 2023-03-07 PROCEDURE — 85610 PROTHROMBIN TIME: CPT | Mod: ORL | Performed by: PHYSICIAN ASSISTANT

## 2023-03-07 PROCEDURE — P9603 ONE-WAY ALLOW PRORATED MILES: HCPCS | Mod: ORL | Performed by: PHYSICIAN ASSISTANT

## 2023-03-11 ENCOUNTER — LAB REQUISITION (OUTPATIENT)
Dept: LAB | Facility: CLINIC | Age: 78
End: 2023-03-11
Payer: COMMERCIAL

## 2023-03-11 DIAGNOSIS — I48.91 UNSPECIFIED ATRIAL FIBRILLATION (H): ICD-10-CM

## 2023-03-14 LAB — INR PPP: 2.41 (ref 0.85–1.15)

## 2023-03-14 PROCEDURE — P9604 ONE-WAY ALLOW PRORATED TRIP: HCPCS | Mod: ORL | Performed by: PHYSICIAN ASSISTANT

## 2023-03-14 PROCEDURE — 85610 PROTHROMBIN TIME: CPT | Mod: ORL | Performed by: PHYSICIAN ASSISTANT

## 2023-03-14 PROCEDURE — 36415 COLL VENOUS BLD VENIPUNCTURE: CPT | Mod: ORL | Performed by: PHYSICIAN ASSISTANT

## 2023-03-23 ENCOUNTER — PATIENT OUTREACH (OUTPATIENT)
Dept: RADIATION ONCOLOGY | Facility: CLINIC | Age: 78
End: 2023-03-23
Payer: COMMERCIAL

## 2023-03-23 NOTE — PROGRESS NOTES
Spoke with Zurdo and his wife, Yoli, today regarding the management of his blood sugar. Patient was scheduled for a PET/CT on 3/15/23 ordered by Dr. Funez, but his blood sugar was 261 and he was unable to complete the test. Patient states his PCP Dr. Santa came to [facility] where he lives and has increased his insulin last week and again yesterday 3/22/23. He checked his BS today and it was 152 at noon. He has adjusted his diet also to try to manage his BS as well. We discussed that he will need his BS to be 180 or under to complete his PET/CT. Plan for scheduling to contact patient early next week to reschedule PET/CT. Provided this RN's direct contact number and patient is to call if his BS is above 180. He is to monitor daily. Patient and wife verbalize understanding, no further questions or concerns at this time.    Heidi Villar RN

## 2023-03-27 ENCOUNTER — TELEPHONE (OUTPATIENT)
Dept: PHARMACY | Facility: CLINIC | Age: 78
End: 2023-03-27

## 2023-03-27 ENCOUNTER — LAB REQUISITION (OUTPATIENT)
Dept: LAB | Facility: CLINIC | Age: 78
End: 2023-03-27
Payer: COMMERCIAL

## 2023-03-27 ENCOUNTER — OFFICE VISIT (OUTPATIENT)
Dept: RHEUMATOLOGY | Facility: CLINIC | Age: 78
End: 2023-03-27
Payer: COMMERCIAL

## 2023-03-27 VITALS
OXYGEN SATURATION: 96 % | BODY MASS INDEX: 32.16 KG/M2 | DIASTOLIC BLOOD PRESSURE: 76 MMHG | WEIGHT: 217.8 LBS | SYSTOLIC BLOOD PRESSURE: 116 MMHG | HEART RATE: 65 BPM

## 2023-03-27 DIAGNOSIS — Z79.899 HIGH RISK MEDICATION USE: ICD-10-CM

## 2023-03-27 DIAGNOSIS — I48.91 UNSPECIFIED ATRIAL FIBRILLATION (H): ICD-10-CM

## 2023-03-27 DIAGNOSIS — M05.79 RHEUMATOID ARTHRITIS INVOLVING MULTIPLE SITES WITH POSITIVE RHEUMATOID FACTOR (H): Primary | ICD-10-CM

## 2023-03-27 DIAGNOSIS — M17.0 BILATERAL PRIMARY OSTEOARTHRITIS OF KNEE: ICD-10-CM

## 2023-03-27 PROCEDURE — 20610 DRAIN/INJ JOINT/BURSA W/O US: CPT | Mod: 50 | Performed by: INTERNAL MEDICINE

## 2023-03-27 PROCEDURE — 99214 OFFICE O/P EST MOD 30 MIN: CPT | Mod: 25 | Performed by: INTERNAL MEDICINE

## 2023-03-27 RX ORDER — TRIAMCINOLONE ACETONIDE 40 MG/ML
40 INJECTION, SUSPENSION INTRA-ARTICULAR; INTRAMUSCULAR ONCE
Status: COMPLETED | OUTPATIENT
Start: 2023-03-27 | End: 2023-03-27

## 2023-03-27 RX ORDER — HYDROXYCHLOROQUINE SULFATE 200 MG/1
200 TABLET, FILM COATED ORAL DAILY
Qty: 90 TABLET | Refills: 1 | Status: SHIPPED | OUTPATIENT
Start: 2023-03-27 | End: 2023-08-01

## 2023-03-27 RX ADMIN — TRIAMCINOLONE ACETONIDE 40 MG: 40 INJECTION, SUSPENSION INTRA-ARTICULAR; INTRAMUSCULAR at 13:53

## 2023-03-27 NOTE — TELEPHONE ENCOUNTER
We have been unable to reach this patient for MTM follow-up after several attempts. We will stop reaching out to the patient at this time. Please let us know if we can assist in this patient's care in the future.      Kaur Olivo, PharmD  Medication Therapy Management Pharmacist  [phone]

## 2023-03-27 NOTE — PATIENT INSTRUCTIONS
RHEUMATOLOGY    Dr. Albert Tompkins    [facility]  [address] [place] [id number]  Phone number: [phone]  Fax number: [phone]      Thank you for choosing [facility]!

## 2023-03-27 NOTE — PROGRESS NOTES
Rheumatology Clinic Visit      Zurdo Dash MRN# [id number]   YOB: 1945 Age: 77 year old      Date of visit: 3/27/23   PCP: Dr. Kapil Duckworth     Chief Complaint   Patient presents with:  Rheumatoid Arthritis: Would like injections today    Assessment and Plan      1. Seropositive nonerosive rheumatoid arthritis (, CCP 64): Previously on MTX (was well tolerated, but later with cytopenias likely related to worsening creatinine with subsequent MTX toxicity).  Mild synovitis on exam previously so hydroxychloroquine was restarted and he is doing well at this time.  Chronic illness, stable.    - Continue hydroxychloroquine 200mg daily (toxicity monitoring eye exam is needed and I advised that he call to schedule)  - Ophthalmology referral for hydroxychloroquine toxicity monitoring     2. Bilateral knee osteoarthritis / patellofemoral syndrome: Intra-articular steroid injections have been effective.  Steroid injection today as requested by the patient, as documented in the procedure section.  We discussed today about knee replacement surgery and he is first going to find out where he stands with regard to oncology, and if okay then he will consider seeing orthopedic surgeon but understands that he will will need evaluation/clearance from his primary care provider, and possibly his cardiologist.    Total minutes spent in evaluation with patient, documentation, , and review of pertinent studies and chart notes: 12      Mr. Dash verbalized agreement with and understanding of the rational for the diagnosis and treatment plan.  All questions were answered to best of my ability and the patient's satisfaction. Mr. Dash was advised to contact the clinic with any questions that may arise after the clinic visit.      Thank you for involving me in the care of the patient    Return to clinic: 3 months      HPI   Zurdo Dash is a 77 year old male with a medical history significant for  hypertension, hyperlipidemia, diabetes, diabetic neuropathy, GERD, pacemaker, lung cancer, rheumatoid arthritis who presents for f/u of RA, sooner than previously scheduled because of recent hospitalization    Today, 3/27/2023: Bilateral knee pain that is worse with activity and improves with rest.  Would like steroid injection of each knee today because the knee injections have been very effective for his knee osteoarthritis symptoms.  He is considering seeing an orthopedic surgeon for knee replacement surgery, but first has to follow-up with his oncologist.  Rheumatoid arthritis controlled at this time.    Denies fevers, chills, nausea, vomiting, constipation, diarrhea. No abdominal pain. No chest pain/pressure, palpitations, or shortness of breath. No oral or nasal sores. No neck pain.     Tobacco: None  EtOH: None  Drugs: None    ROS   12 point review of system was completed and negative except as noted in the HPI     Active Problem List     Patient Active Problem List   Diagnosis     Pain in limb     Hyperlipidemia LDL goal <100     Hypertension goal BP (blood pressure) < 140/90     Hypogonadism     Advanced directives, counseling/discussion     Health Care Home     Type 2 diabetes mellitus with diabetic polyneuropathy, without long-term current use of insulin (H)     RBBB (right bundle branch block)     Ex-smoker     Family history of esophageal cancer     Gastroesophageal reflux disease, esophagitis presence not specified     Rheumatoid arthritis involving multiple sites with positive rheumatoid factor (H)     Pulmonary nodule     High risk medication use     Spondylosis of cervical region without myelopathy or radiculopathy     Erectile dysfunction, unspecified erectile dysfunction type     Type 2 diabetes mellitus without retinopathy (H)     Tubulovillous adenoma of colon     Diabetic polyneuropathy associated with type 2 diabetes mellitus (H)     Chronic bilateral low back pain without sciatica      Migraine equivalent     Posterior vitreous detachment of left eye     Pseudophakia, ou     Eyelid lesion, LLL     Dermatochalasis of both upper eyelids     Bradycardia     Primary osteoarthritis of both knees     Syncope     Trigger finger, acquired     CKD (chronic kidney disease) stage 3, GFR 30-59 ml/min (H)     Abdominal pain, generalized     PAD (peripheral artery disease) (H)     Immunosuppression (H)     Skin ulcer of toe of left foot, limited to breakdown of skin (H)     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)     Pneumothorax     SOBOE (shortness of breath on exertion)     Pacemaker     Ulcer of left foot, unspecified ulcer stage (H)     Hammer toe of left foot     Anemia, unspecified type     Closed nondisplaced fracture of right pubis (H)     Amputation of toe (H)     Adenocarcinoma, lung (H)     Urge incontinence of urine     Thrombocytopenia (H)     Severe sepsis (H)     Paresis (H)     Osteomyelitis (H)     Past Medical History     Past Medical History:   Diagnosis Date     Abnormal CT scan 03/2004    calcified lung granuloma     C. difficile diarrhea     H/O     Cataract 11/18/2011     Diabetic neuropathy (H)     mild, mostly soles and distal forefeet, worse on the left side.     Diverticulitis      ED (erectile dysfunction)      Ex-smoker     QUIT SMOKING FEB 2007     History of ETOH abuse     recovering, sober since 1997     Hyperlipidemia LDL goal <100      Hypertension goal BP (blood pressure) < 140/90      Hypogonadism      Obesity      PAD (peripheral artery disease) (H)     leg cramps, with exertion, no formal diagnosis of PAD and minimal if any symptoms at all.     RA (rheumatoid arthritis) (H)     Dr Bailon     Syncope      Past Surgical History     Past Surgical History:   Procedure Laterality Date     BYPASS GRAFT FEMOROPOPLITEAL Left 01/07/2021    Procedure: LEFT FEMORAL TO ABOVE KNEE POPLITEAL ARTERY BYPASS WITH PTFE VASCULAR GRAFT REMOVABLE RING 6MMX 50CM;   Surgeon: Myra Lopes MD;  Location: [facility] OR     CATARACT IOL, RT/LT       COLONOSCOPY  03/01/2018    [facility]     COMBINED REPAIR PTOSIS WITH BLEPHAROPLASTY BILATERAL Bilateral 08/16/2019    Procedure: BILATERAL UPPER EYELID BLEPHAROPLASTY AND BILATERAL PTOSIS REPAIR;  Surgeon: Janet Garcia MD;  Location: [facility] OR     ENDARTERECTOMY FEMORAL Left 01/07/2021    Procedure: LEFT FEMORAL ENDARTERECTOMY WITH PATCH ANGIOPLASTY PHOTOFIX  0.8 X 8CM;  Surgeon: Myra Lopes MD;  Location: [facility] OR     EP PACEMAKER  01/2021     EXCISE LESION EYELID Left 08/16/2019    Procedure: LEFT LOWER EYELID BIOPSY;  Surgeon: Janet Garcia MD;  Location:  OR     IR LOWER EXTREMITY ANGIOGRAM LEFT  12/17/2020     PHACOEMULSIFICATION WITH STANDARD INTRAOCULAR LENS IMPLANT  02/2019; 3/2019    left eye; right eye     TONSILLECTOMY       ZZHC INCISION TENDON SHEATH FINGER  04/2009    r hand ring finger     Allergy     Allergies   Allergen Reactions     Seasonal Allergies      Blood-Group Specific Substance Other (See Comments)     Patient has a Non-specific antibody. Blood products may be delayed. Draw patient 24 hours prior to transfusion. For [facility] testing, draw one red top and two purple top tubes for all Type and Screen orders.  Patient has a Non-specific antibody. Blood products may be delayed. Draw patient 24 hours prior to transfusion. For [facility] testing, draw one red top and two purple top tubes for all Type and Screen orders.     Current Medication List     Current Outpatient Medications   Medication Sig     acetaminophen (TYLENOL) 500 MG tablet Take 1,000 mg by mouth daily     Alcohol Swabs PADS Uses four times daily     bumetanide (BUMEX) 1 MG tablet Take 1 mg by mouth daily     cefdinir (OMNICEF) 300 MG capsule Take 300 mg by mouth 2 times daily     cetirizine (ZYRTEC) 10 MG tablet Take 10 mg by mouth At Bedtime     collagenase (SANTYL) 250 UNIT/GM external ointment Apply topically daily  "Apply to left heel as directed once daily     cyanocobalamin (VITAMIN B-12) 1000 MCG tablet Take 1 tablet (1,000 mcg) by mouth once a week     doxycycline hyclate (VIBRAMYCIN) 100 MG capsule Take 100 mg by mouth 2 times daily     ferrous sulfate (FEROSUL) 325 (65 Fe) MG tablet Take 325 mg by mouth daily     folic acid (FOLVITE) 1 MG tablet Take 1 tablet (1 mg) by mouth daily     Gauze Pads & Dressings (GAUZE SPONGE) 4\"X4\" PADS      hydrALAZINE (APRESOLINE) 10 MG tablet Take 10 mg by mouth nightly as needed (systolic bp > 170)     HYDROmorphone (DILAUDID) 2 MG tablet Take 1 tablet (2 mg) by mouth 2 times daily as needed for pain For urgent pain relief with rheumatoid arthritis flare-up     hydroxychloroquine (PLAQUENIL) 200 MG tablet Take 1 tablet (200 mg) by mouth daily     insulin syringe-needle U-100 (30G X 1/2\" 0.5 ML) 30G X 1/2\" 0.5 ML miscellaneous Use 4 syringes daily or as directed.     Insulin Syringe-Needle U-100 28G X 1/2\" 0.5 ML MISC      Irrigation Supplies MISC      methocarbamol (ROBAXIN) 500 MG tablet Take 250 mg by mouth 2 times daily     metoprolol succinate ER (TOPROL-XL) 50 MG 24 hr tablet Take 1 tablet (50 mg) by mouth daily     NOVOFINE AUTOCOVER PEN NEEDLE 30G X 8 MM miscellaneous USE FOUR TIMES A DAY     omeprazole 20 MG tablet Take 20 mg by mouth daily     polyethylene glycol (MIRALAX) 17 GM/Dose powder Take 1 capful by mouth daily     polyethylene glycol-propylene glycol (SYSTANE) 0.4-0.3 % SOLN ophthalmic solution Place 1 drop into both eyes 2 times daily as needed for dry eyes     pravastatin (PRAVACHOL) 10 MG tablet Take 1 tablet (10 mg) by mouth daily     sertraline (ZOLOFT) 50 MG tablet Take 1 tablet (50 mg) by mouth daily     sodium hypochlorite (DAKINS HALF-STRENGTH) 0.25 % external solution Use for damp to dry dressing changes two times a day left groin wound     syringe/needle, disp, (BD ECLIPSE SYRINGE) 25G X 1\" 3 ML MISC      triamcinolone (KENALOG) 0.1 % external cream Apply " topically 2 times daily Apply to rash on neck, shoulders and lower extremities two times a day     WARFARIN SODIUM PO Take 3 mg by mouth daily     bisacodyl (DULCOLAX) 10 MG suppository Unwrap and insert 1 suppository rectally once daily as needed (Patient not taking: Reported on 9/27/2022)     blood glucose (NO BRAND SPECIFIED) test strip Use to test blood sugar 4 times daily or as directed.     Blood Glucose Monitoring Suppl (ACCU-CHEK GUIDE) w/Device KIT      dulaglutide (TRULICITY) 1.5 MG/0.5ML pen Inject 1.5 mg Subcutaneous every 7 days ON Wednesdays (Patient not taking: Reported on 1/2/2023)     insulin glargine (LANTUS VIAL) 100 UNIT/ML vial Inject 10 Units Subcutaneous At Bedtime (Patient taking differently: Inject 25 Units Subcutaneous At Bedtime)     senna-docusate (SENOKOT-S/PERICOLACE) 8.6-50 MG tablet Take 1 tablet by mouth 2 times daily as needed for constipation (Patient not taking: Reported on 1/2/2023)     No current facility-administered medications for this visit.         Social History   See HPI    Family History     Family History   Problem Relation Age of Onset     Cerebrovascular Disease Mother      Arthritis Mother      Osteoporosis Mother      Alzheimer Disease Father      Arthritis Father      Cancer Father      Diabetes Maternal Grandmother      Cardiovascular Maternal Grandmother      Other Cancer Brother      Cancer Paternal Aunt      Hypertension No family hx of      Thyroid Disease No family hx of      Glaucoma No family hx of      Macular Degeneration No family hx of        Physical Exam     Temp Readings from Last 3 Encounters:   01/02/23 97.9  F (36.6  C) (Oral)   09/27/22 98.2  F (36.8  C) (Oral)   08/24/22 98.4  F (36.9  C) (Oral)     BP Readings from Last 5 Encounters:   01/02/23 119/74   12/01/22 (!) 145/89   09/28/22 (!) 140/90   09/27/22 105/72   09/20/22 (!) 151/94     Pulse Readings from Last 1 Encounters:   01/02/23 63     Resp Readings from Last 1 Encounters:   01/02/23 16  "    Estimated body mass index is 32.24 kg/m  as calculated from the following:    Height as of 9/20/22: 1.753 m (5' 9\").    Weight as of 1/2/23: 99 kg (218 lb 4.8 oz).      GEN: NAD.   HEENT:  Anicteric, noninjected sclera. No obvious external lesions of the ear and nose. Hearing intact.  CV: S1, S2. RRR. No m/r/g  PULM: No increased work of breathing. CTA bilaterally   MSK: MCPs, PIPs, DIPs without swelling or tenderness to palpation.  Wrists without swelling or tenderness to palpation.  Elbows and shoulders without swelling or tenderness to palpation.  Knees with crepitation and medial joint line tenderness but no effusion or increased warmth.  Ankles and MTPs without swelling or tenderness to palpation.  Absent left second toe.  SKIN: No rash or jaundice seen  PSYCH: Alert. Appropriate.         Labs / Imaging (select studies)     9/28/1999  ([facility])    RF/CCP  Recent Labs   Lab Test 04/07/16  1149   CCPIGG 64*     CBC  Recent Labs   Lab Test 01/10/23  0745 07/28/22  1611 03/31/22  1023 02/01/22  0901 09/08/21  1607 07/14/21  0924 07/05/21  1532 04/07/21  0958   WBC 6.5 9.2 8.6 6.8   < > 7.0 3.7* 7.4   RBC 3.95* 4.11* 4.46 3.90*   < > 2.72* 2.20* 3.55*   HGB 11.9* 12.1* 13.3 11.2*   < > 8.4* 6.9* 10.8*   HCT 37.7* 38.3* 40.4 35.9*   < > 25.8* 21.6* 33.3*   MCV 95 93 91 92   < > 95 98 94   RDW 13.3 14.4 13.9 14.0   < > 17.1* 17.3* 14.0    227 216 274   < > 140* 96* 342   MCH 30.1 29.4 29.8 28.7   < > 30.9 31.4 30.4   MCHC 31.6 31.6 32.9 31.2*   < > 32.6 31.9 32.4   NEUTROPHIL 65 67  --  63   < > 68  75 68.0 80.7   LYMPH 14 17  --  17   < > 10  7 19.0 11.6   MONOCYTE 15 11  --  14   < > 20  15 10.0 6.0   EOSINOPHIL 4 4  --  4   < > 2  2 2.0 1.2   BASOPHIL 1 1  --  1   < > 1  0 1.0 0.5   ANEU  --   --   --   --   --  5.3 2.5 6.0   ALYM  --   --   --   --   --  0.5* 0.7* 0.9   SMITH  --   --   --   --   --  1.1 0.4 0.5   AEOS  --   --   --   --   --  0.1 0.1 0.1   ABAS  --   --   --   --   --  " 0.0 0.0 0.0   ANEUTAUTO 4.3 6.2  --  4.4   < > 4.8  --   --    ALYMPAUTO 0.9 1.5  --  1.2   < > 0.7*  --   --    AMONOAUTO 1.0 1.1  --  1.0   < > 1.4*  --   --    AEOSAUTO 0.3 0.4  --  0.2   < > 0.2  --   --    ABSBASO 0.1 0.1  --  0.1   < > 0.1  --   --     < > = values in this interval not displayed.     CMP  Recent Labs   Lab Test 01/10/23  0745 07/28/22  1611 03/31/22  1051 02/01/22  0901 08/16/21  1316 07/14/21  1645 07/05/21  1532 04/07/21  0958 01/09/21  0709    140  --  141   < > 136  --   --  136   POTASSIUM 4.5 4.7  --  4.2   < > 5.4*  --   --  4.3   CHLORIDE 105 111*  --  110*   < > 109  --   --  108   CO2 20* 22  --  22   < > 17*  --   --  23   ANIONGAP 15 7  --  9   < > 10  --   --  5   * 154* 149* 123   < > 126*  --   --  117*   BUN 35.3* 42*  --  36*   < > 50*  --   --  31*   CR 1.55* 1.54*  --  1.63*   < > 2.30* 2.84* 1.52* 1.39*   GFRESTIMATED 46* 46*  --  43*   < > 26* 20* 44* 49*   GFRESTBLACK  --   --   --   --   --   --  24* 50* 56*   NEW 9.1 8.8  --  8.8   < > 8.8  --   --  8.4*   BILITOTAL  --  0.3  --   --   --  0.7 0.8 0.4  --    ALBUMIN  --  3.0*  --   --   --  3.5 3.5 3.5  --    PROTTOTAL  --  6.8  --   --   --  6.8 6.9 7.0  --    ALKPHOS  --  57  --   --   --  67 85 69  --    AST  --  18  --   --   --  22 12 19  --    ALT  --  25  --   --   --  32 22 25  --     < > = values in this interval not displayed.     Calcium/VitaminD  Recent Labs   Lab Test 01/10/23  0745 07/28/22  1611 02/01/22  0901   NEW 9.1 8.8 8.8     Hepatitis B  Recent Labs   Lab Test 04/07/16  1149   HBCAB Nonreactive   HEPBANG Nonreactive     Hepatitis C  Recent Labs   Lab Test 04/07/16  1149   HCVAB Nonreactive   Assay performance characteristics have not been established for newborns,   infants, and children       HIV Screening  Recent Labs   Lab Test 04/07/16  1149   HIAGAB Nonreactive   HIV-1 p24 Ag & HIV-1/HIV-2 Ab Not Detected       Immunization History     Immunization History   Administered Date(s)  Administered     COVID-19 Vaccine 12+ (Pfizer) 02/19/2021, 03/12/2021     COVID-19 Vaccine 18+ (Moderna) 11/29/2021, 05/13/2022     COVID-19 Vaccine Bivalent Booster 12+ (Pfizer) 10/04/2022     FLUAD(HD)65+ QUAD 09/24/2021     Influenza (H1N1) 01/20/2010     Influenza (High Dose) 3 valent vaccine 09/20/2012, 10/20/2015, 09/20/2016, 08/28/2017, 09/24/2018, 09/18/2019     Influenza (IIV3) PF 10/05/1999, 10/30/2008, 09/28/2011, 10/14/2013, 10/03/2014     Influenza Vaccine 65+ (Fluzone HD) 09/11/2020     Pneumo Conj 13-V (2010&after) 06/29/2015     Pneumococcal 23 valent 08/19/2010     TD,PF 7+ (Tenivac) 08/05/1997, 02/03/2011     TDAP (Adacel,Boostrix) 03/11/2020     TDAP Vaccine (Boostrix) 03/11/2020     Zoster recombinant adjuvanted (SHINGRIX) 01/03/2020, 03/11/2020     Zoster vaccine, live 04/19/2010       Procedure     Procedure: Steroid injection of the bilateral knees  Indication: Pain, bilateral knee osteoarthritis    The procedure was explained in detail. Risks including infection, pain, structural damage such as cartilage damage and tendon rupture, fat atrophy, skin hyper-/hypo-pigmentation, and medication reaction was explained. The need for rest of the affected joint for one week after the procedure was explained.  The option of not doing the procedure was also provided. All questions were answered and the patient consented to the procedure.     A time-out was performed and the correct patient, procedure, and laterality were verified.    The right knee was examined and location for injection was identified - anterior medial. The area was cleaned with chlorhexidine, twice.  Ethyl chloride was then used for topical anaesthetic.  Then a mixture of lidocaine 1% 2 mL and Kenalog 40mg was injected into the intra-articular space.     The left knee was examined and location for injection was identified - anterior medial. The area was cleaned with chlorhexidine, twice.  Ethyl chloride was then used for topical  anaesthetic.  Then a mixture of lidocaine 1% 2 mL and Kenalog 40mg was injected into the intra-articular space.     The patient tolerated the procedure well. No complications.    1% Lidocaine  : Hospira  Lot #: [id number]  EXPIRATION DATE: 1 SEPT 2023  NDC: 0349-6944-59    MEDICATION: Triamcinolone 40 mg  LOT #: [id number]  EXPIRATION DATE:  06/2024  NDC#: [address]-8    MEDICATION: Triamcinolone 40 mg  LOT #: [id number]  EXPIRATION DATE:  06/2024  NDC#: [address]-2               Chart documentation done in part with Dragon Voice recognition Software. Although reviewed after completion, some word and grammatical error may remain.    Albert Tompkins MD

## 2023-03-28 LAB — INR PPP: 3.93 (ref 0.85–1.15)

## 2023-03-28 PROCEDURE — 36415 COLL VENOUS BLD VENIPUNCTURE: CPT | Mod: ORL | Performed by: PHYSICIAN ASSISTANT

## 2023-03-28 PROCEDURE — P9603 ONE-WAY ALLOW PRORATED MILES: HCPCS | Mod: ORL | Performed by: PHYSICIAN ASSISTANT

## 2023-03-28 PROCEDURE — 85610 PROTHROMBIN TIME: CPT | Mod: ORL | Performed by: PHYSICIAN ASSISTANT

## 2023-03-31 ENCOUNTER — LAB REQUISITION (OUTPATIENT)
Dept: LAB | Facility: CLINIC | Age: 78
End: 2023-03-31
Payer: COMMERCIAL

## 2023-03-31 DIAGNOSIS — I48.91 UNSPECIFIED ATRIAL FIBRILLATION (H): ICD-10-CM

## 2023-04-04 LAB — INR PPP: 1.59 (ref 0.85–1.15)

## 2023-04-04 PROCEDURE — 36415 COLL VENOUS BLD VENIPUNCTURE: CPT | Mod: ORL | Performed by: PHYSICIAN ASSISTANT

## 2023-04-04 PROCEDURE — 85610 PROTHROMBIN TIME: CPT | Mod: ORL | Performed by: PHYSICIAN ASSISTANT

## 2023-04-05 ENCOUNTER — TRANSFERRED RECORDS (OUTPATIENT)
Dept: HEALTH INFORMATION MANAGEMENT | Facility: CLINIC | Age: 78
End: 2023-04-05
Payer: COMMERCIAL

## 2023-04-06 ENCOUNTER — MEDICAL CORRESPONDENCE (OUTPATIENT)
Dept: HEALTH INFORMATION MANAGEMENT | Facility: CLINIC | Age: 78
End: 2023-04-06
Payer: COMMERCIAL

## 2023-04-09 ENCOUNTER — LAB REQUISITION (OUTPATIENT)
Dept: LAB | Facility: CLINIC | Age: 78
End: 2023-04-09
Payer: COMMERCIAL

## 2023-04-09 DIAGNOSIS — I48.91 UNSPECIFIED ATRIAL FIBRILLATION (H): ICD-10-CM

## 2023-04-10 ENCOUNTER — PATIENT OUTREACH (OUTPATIENT)
Dept: RADIATION ONCOLOGY | Facility: CLINIC | Age: 78
End: 2023-04-10
Payer: COMMERCIAL

## 2023-04-10 DIAGNOSIS — E11.42 TYPE 2 DIABETES MELLITUS WITH DIABETIC POLYNEUROPATHY, WITHOUT LONG-TERM CURRENT USE OF INSULIN (H): ICD-10-CM

## 2023-04-10 NOTE — PROGRESS NOTES
Spoke with patient's wife, Yoli, regarding upcoming PET scan for Zurdo. We reviewed all instructions for test, managing insulin/blood sugar and reviewed date/time of scan. Patient's wife was provided with Dr. Funez's office contact number. They are to call with any questions or concerns.    Heidi Villar RN

## 2023-04-11 RX ORDER — BLOOD PRESSURE TEST KIT
KIT MISCELLANEOUS
OUTPATIENT
Start: 2023-04-11

## 2023-04-11 NOTE — TELEPHONE ENCOUNTER
[facility] (ph: [phone]) called and informed.    PCP changed to [facility].    Caren Telles,

## 2023-04-11 NOTE — TELEPHONE ENCOUNTER
Prescription being filled by primary health care provider Dr Suze Santa with [facility]. Please 1] reroute to correct place 2] change primary care physician designation     Kapil Duckworth MD

## 2023-04-15 ENCOUNTER — ANCILLARY PROCEDURE (OUTPATIENT)
Dept: PET IMAGING | Facility: CLINIC | Age: 78
End: 2023-04-15
Attending: RADIOLOGY
Payer: COMMERCIAL

## 2023-04-15 DIAGNOSIS — C34.11 MALIGNANT NEOPLASM OF UPPER LOBE OF RIGHT LUNG (H): ICD-10-CM

## 2023-04-15 PROCEDURE — A9552 F18 FDG: HCPCS | Performed by: RADIOLOGY

## 2023-04-15 PROCEDURE — 78816 PET IMAGE W/CT FULL BODY: CPT | Mod: GC | Performed by: RADIOLOGY

## 2023-04-17 ENCOUNTER — LAB REQUISITION (OUTPATIENT)
Dept: LAB | Facility: CLINIC | Age: 78
End: 2023-04-17
Payer: COMMERCIAL

## 2023-04-17 DIAGNOSIS — I48.91 UNSPECIFIED ATRIAL FIBRILLATION (H): ICD-10-CM

## 2023-04-18 LAB — INR PPP: 1.54 (ref 0.85–1.15)

## 2023-04-18 PROCEDURE — 85610 PROTHROMBIN TIME: CPT | Mod: ORL | Performed by: PHYSICIAN ASSISTANT

## 2023-04-18 PROCEDURE — 36415 COLL VENOUS BLD VENIPUNCTURE: CPT | Mod: ORL | Performed by: PHYSICIAN ASSISTANT

## 2023-04-18 PROCEDURE — P9604 ONE-WAY ALLOW PRORATED TRIP: HCPCS | Mod: ORL | Performed by: PHYSICIAN ASSISTANT

## 2023-04-20 ENCOUNTER — PATIENT OUTREACH (OUTPATIENT)
Dept: RADIATION ONCOLOGY | Facility: CLINIC | Age: 78
End: 2023-04-20
Payer: COMMERCIAL

## 2023-04-20 ENCOUNTER — MEDICAL CORRESPONDENCE (OUTPATIENT)
Dept: HEALTH INFORMATION MANAGEMENT | Facility: CLINIC | Age: 78
End: 2023-04-20
Payer: COMMERCIAL

## 2023-04-20 NOTE — PROGRESS NOTES
Attempted to contact patient and his wife, Yoli, to review recent PET/CT results. Left voicemail for patient informing him that Dr. Funez has reviewed the results and it showed post radiation changes at this time, she would like to repeat the PET/CT in 3 months to continue monitoring for now. Patient to call Dr. Funez's office to discuss and to schedule an the next PET/CT, provided contact number. This RN will follow up early next week if patient and spouse do not return call.    Heidi Villar RN

## 2023-04-24 ENCOUNTER — LAB REQUISITION (OUTPATIENT)
Dept: LAB | Facility: CLINIC | Age: 78
End: 2023-04-24
Payer: COMMERCIAL

## 2023-04-24 DIAGNOSIS — C34.11 MALIGNANT NEOPLASM OF UPPER LOBE OF RIGHT LUNG (H): Primary | ICD-10-CM

## 2023-04-24 DIAGNOSIS — I48.91 UNSPECIFIED ATRIAL FIBRILLATION (H): ICD-10-CM

## 2023-04-24 PROCEDURE — 99207 PR NO BILLABLE SERVICE THIS VISIT: CPT | Performed by: RADIOLOGY

## 2023-04-25 LAB — INR PPP: 1.91 (ref 0.85–1.15)

## 2023-04-25 PROCEDURE — 85610 PROTHROMBIN TIME: CPT | Mod: ORL | Performed by: INTERNAL MEDICINE

## 2023-04-25 PROCEDURE — P9603 ONE-WAY ALLOW PRORATED MILES: HCPCS | Mod: ORL | Performed by: INTERNAL MEDICINE

## 2023-04-25 PROCEDURE — 36415 COLL VENOUS BLD VENIPUNCTURE: CPT | Mod: ORL | Performed by: INTERNAL MEDICINE

## 2023-04-30 ENCOUNTER — LAB REQUISITION (OUTPATIENT)
Dept: LAB | Facility: CLINIC | Age: 78
End: 2023-04-30
Payer: COMMERCIAL

## 2023-04-30 DIAGNOSIS — I48.91 UNSPECIFIED ATRIAL FIBRILLATION (H): ICD-10-CM

## 2023-05-02 LAB — INR PPP: 2.34 (ref 0.85–1.15)

## 2023-05-02 PROCEDURE — 85610 PROTHROMBIN TIME: CPT | Mod: ORL | Performed by: INTERNAL MEDICINE

## 2023-05-02 PROCEDURE — 36415 COLL VENOUS BLD VENIPUNCTURE: CPT | Mod: ORL | Performed by: INTERNAL MEDICINE

## 2023-05-02 PROCEDURE — P9604 ONE-WAY ALLOW PRORATED TRIP: HCPCS | Mod: ORL | Performed by: INTERNAL MEDICINE

## 2023-05-03 ENCOUNTER — ANCILLARY PROCEDURE (OUTPATIENT)
Dept: GENERAL RADIOLOGY | Facility: CLINIC | Age: 78
End: 2023-05-03
Attending: FAMILY MEDICINE
Payer: COMMERCIAL

## 2023-05-03 ENCOUNTER — OFFICE VISIT (OUTPATIENT)
Dept: ORTHOPEDICS | Facility: CLINIC | Age: 78
End: 2023-05-03
Payer: COMMERCIAL

## 2023-05-03 DIAGNOSIS — M25.561 BILATERAL KNEE PAIN: ICD-10-CM

## 2023-05-03 DIAGNOSIS — M17.0 BILATERAL PRIMARY OSTEOARTHRITIS OF KNEE: Primary | ICD-10-CM

## 2023-05-03 DIAGNOSIS — M25.562 BILATERAL KNEE PAIN: ICD-10-CM

## 2023-05-03 PROCEDURE — 73564 X-RAY EXAM KNEE 4 OR MORE: CPT | Mod: LT | Performed by: RADIOLOGY

## 2023-05-03 PROCEDURE — 99204 OFFICE O/P NEW MOD 45 MIN: CPT | Performed by: FAMILY MEDICINE

## 2023-05-03 NOTE — LETTER
5/3/2023         RE: Zurdo Dash  [address]  [place] [address]        Dear Colleague,    Thank you for referring your patient, Zurdo Dash, to the [facility] [place]. Please see a copy of my visit note below.      [facility]  SPORTS MEDICINE CLINIC VISIT     May 3, 2023        ASSESSMENT & PLAN    79 yo with bilateral knee pain due to osteoarthritis. Has not had benefit from steroid in the past and also has uncontrolled diabetes. Is interested in viscosupplementation injection    Reviewed imaging and assessment with patient in detail  Prior auth sent today. Will contact patient when approved and schedule appt for injection.   Otherwise discussed options for treatment including:  -use of compression knee sleeve with patellar cut out  -use of acetaminophen or topical diclofenac PRN  -discussed the benefit of physical therapy and regular exercise      Hermes Warner MD  [facility] MEDICINE [facility]    -----  Chief Complaint   Patient presents with     Consult     Bilateral knee pain - would like to do a gel shot       SUBJECTIVE  Zurdo Dash is a/an 78 year old male who is seen as a self referral for evaluation of  Bilateral knee pain.     The patient is seen with their wife.  The patient is Right handed    Onset: years years(s) ago. Reports insidious onset without acute precipitating event.  Location of Pain: bilateral knees  Worsened by: movement, walking, stairs.   Better with:   Treatments tried: no treatment tried to date  Associated symptoms: pain    Orthopedic/Surgical history: NO  Social History/Occupation: retired      REVIEW OF SYSTEMS:  Do you have fever, chills, weight loss? No  Do you have any vision problems? No  Do you have any chest pain or edema? No  Do you have any shortness of breath or wheezing?  No  Do you have stomach problems? No  Do you have any numbness or focal weakness? No  Do you have diabetes?  Yes,   Do you have problems with bleeding or clotting? No  Do you have an rashes or other skin lesions? No    OBJECTIVE:  There were no vitals taken for this visit.     Patient is alert, No acute distress, pleasant and conversati  bilateral knee:   Skin intact. No erythema or ecchymosis.  No effusion or soft tissue swelling.    AROM: Zero to approximately 135  without restriction or reported pain.    Palpation: No medial or lateral facet joint tenderness.  ttp over the medial right knee and lateral joint line of left    Special Tests:    No ligamentous laxity or pain with valgus or varus stress.  Negative Lachman's, Anterior Drawer and Posterior Drawer     Full Isometric quad strength, extensor mechanism in place     Neurovascularly intact in the lower extremity    Hip and Ankle with full AROM and nontender      RADIOLOGY:    4 view x-rays of the bilateral performed and reviewed independently demonstrating tricompartmental DJD most prominent on the medial aspect of the right knee, lateral aspect of the left knee and severe in the bilateral patellar compartments.  See EMR for formal radiology report.         Again, thank you for allowing me to participate in the care of your patient.        Sincerely,        Hermes Warner MD

## 2023-05-03 NOTE — PROGRESS NOTES
[facility]  SPORTS MEDICINE CLINIC VISIT     May 3, 2023        ASSESSMENT & PLAN    79 yo with bilateral knee pain due to osteoarthritis. Has not had benefit from steroid in the past and also has uncontrolled diabetes. Is interested in viscosupplementation injection    Reviewed imaging and assessment with patient in detail  Prior auth sent today. Will contact patient when approved and schedule appt for injection.   Otherwise discussed options for treatment including:  -use of compression knee sleeve with patellar cut out  -use of acetaminophen or topical diclofenac PRN  -discussed the benefit of physical therapy and regular exercise      Hermes Warner MD  [facility] SPORTS MEDICINE [facility]    -----  Chief Complaint   Patient presents with     Consult     Bilateral knee pain - would like to do a gel shot       SUBJECTIVE  Zurdo Dash is a/an 78 year old male who is seen as a self referral for evaluation of  Bilateral knee pain.     The patient is seen with their wife.  The patient is Right handed    Onset: years years(s) ago. Reports insidious onset without acute precipitating event.  Location of Pain: bilateral knees  Worsened by: movement, walking, stairs.   Better with:   Treatments tried: no treatment tried to date  Associated symptoms: pain    Orthopedic/Surgical history: NO  Social History/Occupation: retired      REVIEW OF SYSTEMS:    Do you have fever, chills, weight loss? No    Do you have any vision problems? No    Do you have any chest pain or edema? No    Do you have any shortness of breath or wheezing?  No    Do you have stomach problems? No    Do you have any numbness or focal weakness? No    Do you have diabetes? Yes,     Do you have problems with bleeding or clotting? No    Do you have an rashes or other skin lesions? No    OBJECTIVE:  There were no vitals taken for this visit.     Patient is alert, No acute distress, pleasant and conversati  bilateral knee:   Skin  intact. No erythema or ecchymosis.  No effusion or soft tissue swelling.    AROM: Zero to approximately 135  without restriction or reported pain.    Palpation: No medial or lateral facet joint tenderness.  ttp over the medial right knee and lateral joint line of left    Special Tests:    No ligamentous laxity or pain with valgus or varus stress.  Negative Lachman's, Anterior Drawer and Posterior Drawer     Full Isometric quad strength, extensor mechanism in place     Neurovascularly intact in the lower extremity    Hip and Ankle with full AROM and nontender      RADIOLOGY:    4 view x-rays of the bilateral performed and reviewed independently demonstrating tricompartmental DJD most prominent on the medial aspect of the right knee, lateral aspect of the left knee and severe in the bilateral patellar compartments.  See EMR for formal radiology report.

## 2023-05-08 ENCOUNTER — LAB REQUISITION (OUTPATIENT)
Dept: LAB | Facility: CLINIC | Age: 78
End: 2023-05-08
Payer: COMMERCIAL

## 2023-05-08 DIAGNOSIS — I48.91 UNSPECIFIED ATRIAL FIBRILLATION (H): ICD-10-CM

## 2023-05-09 LAB — INR PPP: 2.04 (ref 0.85–1.15)

## 2023-05-09 PROCEDURE — 36415 COLL VENOUS BLD VENIPUNCTURE: CPT | Mod: ORL | Performed by: INTERNAL MEDICINE

## 2023-05-09 PROCEDURE — P9604 ONE-WAY ALLOW PRORATED TRIP: HCPCS | Mod: ORL | Performed by: INTERNAL MEDICINE

## 2023-05-09 PROCEDURE — 85610 PROTHROMBIN TIME: CPT | Mod: ORL | Performed by: INTERNAL MEDICINE

## 2023-05-10 ENCOUNTER — TRANSFERRED RECORDS (OUTPATIENT)
Dept: HEALTH INFORMATION MANAGEMENT | Facility: CLINIC | Age: 78
End: 2023-05-10

## 2023-05-11 ENCOUNTER — TELEPHONE (OUTPATIENT)
Dept: ORTHOPEDICS | Facility: CLINIC | Age: 78
End: 2023-05-11
Payer: COMMERCIAL

## 2023-05-11 NOTE — TELEPHONE ENCOUNTER
[facility] Call Center    Phone Message    May a detailed message be left on voicemail: yes     Reason for Call: Other: Synvisc one injection/ Patient said should have Prior Autho in chart if not please obtain patient appt is 6/7/23     Action Taken: Message routed to:  Adult Clinics: Sports Medicine p [phone]    Travel Screening: Not Applicable

## 2023-05-12 ENCOUNTER — OFFICE VISIT (OUTPATIENT)
Dept: AUDIOLOGY | Facility: CLINIC | Age: 78
End: 2023-05-12
Payer: COMMERCIAL

## 2023-05-12 ENCOUNTER — OFFICE VISIT (OUTPATIENT)
Dept: OTOLARYNGOLOGY | Facility: CLINIC | Age: 78
End: 2023-05-12
Payer: COMMERCIAL

## 2023-05-12 VITALS — DIASTOLIC BLOOD PRESSURE: 80 MMHG | HEART RATE: 84 BPM | SYSTOLIC BLOOD PRESSURE: 125 MMHG

## 2023-05-12 DIAGNOSIS — H61.22 IMPACTED CERUMEN OF LEFT EAR: Primary | ICD-10-CM

## 2023-05-12 DIAGNOSIS — H90.3 SENSORINEURAL HEARING LOSS (SNHL) OF BOTH EARS: Primary | ICD-10-CM

## 2023-05-12 PROCEDURE — 92557 COMPREHENSIVE HEARING TEST: CPT | Performed by: AUDIOLOGIST

## 2023-05-12 PROCEDURE — 92567 TYMPANOMETRY: CPT | Performed by: AUDIOLOGIST

## 2023-05-12 PROCEDURE — 69210 REMOVE IMPACTED EAR WAX UNI: CPT | Mod: LT | Performed by: OTOLARYNGOLOGY

## 2023-05-12 ASSESSMENT — ENCOUNTER SYMPTOMS: CONSTITUTIONAL NEGATIVE: 1

## 2023-05-12 NOTE — PROGRESS NOTES
AUDIOLOGY REPORT    SUMMARY: Audiology visit completed. See audiogram for results.    RECOMMENDATIONS: Follow-up with ENT.    Dejuan Woodward  Doctor of Audiology  [place] License # [id number]

## 2023-05-12 NOTE — PROGRESS NOTES
Zurdo Dash's chief complaint for this visit includes:  Chief Complaint   Patient presents with     Consult     Left ear wax. No other concerns. Audio after     PCP: Services, [facility] Physician    Referring Provider:  Referred Self, MD  No address on file    /80   Pulse 84

## 2023-05-12 NOTE — PROGRESS NOTES
HPI   This patient is here for ear wax removal.  No otalgia, otorrhea, dizziness or vertigo.    ENT Problem List  Gastroesophageal reflux disease, esophagitis presence not specified  Spondylosis of cervical region without myelopathy or radiculopathy  Migraine equivalent  Posterior vitreous detachment of left eye  Pseudophakia, ou  Eyelid lesion, LLL  Dermatochalasis of both upper eyelids  Syncope     Other  Pain in limb  Hyperlipidemia LDL goal <100  Hypertension goal BP (blood pressure) < 140/90  Hypogonadism  Advanced directives, counseling/discussion  Health Care Home  Type 2 diabetes mellitus with diabetic polyneuropathy, without long-term current use of insulin (H)  RBBB (right bundle branch block)  Ex-smoker  Family history of esophageal cancer  Rheumatoid arthritis involving multiple sites with positive rheumatoid factor (H)  Pulmonary nodule  High risk medication use  Erectile dysfunction, unspecified erectile dysfunction type  Type 2 diabetes mellitus without retinopathy (H)  Tubulovillous adenoma of colon  Diabetic polyneuropathy associated with type 2 diabetes mellitus (H)  Chronic bilateral low back pain without sciatica  Bradycardia  Primary osteoarthritis of both knees  Trigger finger, acquired  CKD (chronic kidney disease) stage 3, GFR 30-59 ml/min (H)  Abdominal pain, generalized  PAD (peripheral artery disease) (H)  Immunosuppression (H)  Skin ulcer of toe of left foot, limited to breakdown of skin (H)  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)  Pneumothorax  SOBOE (shortness of breath on exertion)  Pacemaker  Ulcer of left foot, unspecified ulcer stage (H)  Hammer toe of left foot  Anemia, unspecified type  Closed nondisplaced fracture of right pubis (H)  Amputation of toe (H)  Adenocarcinoma, lung (H)  Urge incontinence of urine  Thrombocytopenia (H)  Severe sepsis (H)  Paresis (H)      Current Medications:  acetaminophen (TYLENOL) 500 MG tablet  Alcohol Swabs  "PADS  bisacodyl (DULCOLAX) 10 MG suppository  blood glucose (NO BRAND SPECIFIED) test strip  Blood Glucose Monitoring Suppl (ACCU-CHEK GUIDE) w/Device KIT  bumetanide (BUMEX) 1 MG tablet  cefdinir (OMNICEF) 300 MG capsule  cetirizine (ZYRTEC) 10 MG tablet  collagenase (SANTYL) 250 UNIT/GM external ointment  cyanocobalamin (VITAMIN B-12) 1000 MCG tablet  doxycycline hyclate (VIBRAMYCIN) 100 MG capsule  dulaglutide (TRULICITY) 1.5 MG/0.5ML pen  ferrous sulfate (FEROSUL) 325 (65 Fe) MG tablet  folic acid (FOLVITE) 1 MG tablet  Gauze Pads & Dressings (GAUZE SPONGE) 4\"X4\" PADS  hydrALAZINE (APRESOLINE) 10 MG tablet  HYDROmorphone (DILAUDID) 2 MG tablet  hydroxychloroquine (PLAQUENIL) 200 MG tablet    insulin glargine (LANTUS VIAL) 100 UNIT/ML vial  insulin syringe-needle U-100 (30G X 1/2\" 0.5 ML) 30G X 1/2\" 0.5 ML miscellaneous  Insulin Syringe-Needle U-100 28G X 1/2\" 0.5 ML MISC  Irrigation Supplies MISC  methocarbamol (ROBAXIN) 500 MG tablet    metoprolol succinate ER (TOPROL-XL) 50 MG 24 hr tablet  NOVOFINE AUTOCOVER PEN NEEDLE 30G X 8 MM miscellaneous  omeprazole 20 MG tablet  polyethylene glycol (MIRALAX) 17 GM/Dose powder  polyethylene glycol-propylene glycol (SYSTANE) 0.4-0.3 % SOLN ophthalmic solution    pravastatin (PRAVACHOL) 10 MG tablet  senna-docusate (SENOKOT-S/PERICOLACE) 8.6-50 MG tablet    sertraline (ZOLOFT) 50 MG tablet  sodium hypochlorite (DAKINS HALF-STRENGTH) 0.25 % external solution  syringe/needle, disp, (BD ECLIPSE SYRINGE) 25G X 1\" 3 ML MISC  triamcinolone (KENALOG) 0.1 % external cream  WARFARIN SODIUM PO      Review of Systems   Constitutional: Negative.    HENT: Positive for hearing loss and tinnitus. Negative for ear discharge and ear pain.    Skin: Negative.          Physical Exam  Vitals and nursing note reviewed.   Constitutional:       Appearance: Normal appearance.   HENT:      Head: Normocephalic and atraumatic.      Right Ear: Tympanic membrane, ear canal and external ear normal. " There is no impacted cerumen.      Left Ear: Tympanic membrane, ear canal and external ear normal. There is impacted cerumen.   Neurological:      Mental Status: He is alert.       Ear wax removal: The patient was seen in the room. An informed consent was obtained from the patient. His ears were evaluated under the microscope. Right ear canal was blocked 30% with ear wax that was suctioned and removed with an alligator. The left ear canal was blocked 100% with ear wax that was suctioned with a 7 tip. He tolerated the procedure well and left the room no complications.

## 2023-05-12 NOTE — LETTER
5/12/2023         RE: Zurdo Dash  [address]  [facility] [address]        Dear Colleague,    Thank you for referring your patient, Zrudo Dsah, to the [facility]. Please see a copy of my visit note below.    HPI   This patient is here for ear wax removal.  No otalgia, otorrhea, dizziness or vertigo.    ENT Problem List  Gastroesophageal reflux disease, esophagitis presence not specified  Spondylosis of cervical region without myelopathy or radiculopathy  Migraine equivalent  Posterior vitreous detachment of left eye  Pseudophakia, ou  Eyelid lesion, LLL  Dermatochalasis of both upper eyelids  Syncope     Other  Pain in limb  Hyperlipidemia LDL goal <100  Hypertension goal BP (blood pressure) < 140/90  Hypogonadism  Advanced directives, counseling/discussion  Health Care Home  Type 2 diabetes mellitus with diabetic polyneuropathy, without long-term current use of insulin (H)  RBBB (right bundle branch block)  Ex-smoker  Family history of esophageal cancer  Rheumatoid arthritis involving multiple sites with positive rheumatoid factor (H)  Pulmonary nodule  High risk medication use  Erectile dysfunction, unspecified erectile dysfunction type  Type 2 diabetes mellitus without retinopathy (H)  Tubulovillous adenoma of colon  Diabetic polyneuropathy associated with type 2 diabetes mellitus (H)  Chronic bilateral low back pain without sciatica  Bradycardia  Primary osteoarthritis of both knees  Trigger finger, acquired  CKD (chronic kidney disease) stage 3, GFR 30-59 ml/min (H)  Abdominal pain, generalized  PAD (peripheral artery disease) (H)  Immunosuppression (H)  Skin ulcer of toe of left foot, limited to breakdown of skin (H)  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)  Pneumothorax  SOBOE (shortness of breath on exertion)  Pacemaker  Ulcer of left foot, unspecified ulcer stage (H)  Hammer toe of left foot  Anemia, unspecified type  Closed  "nondisplaced fracture of right pubis (H)  Amputation of toe (H)  Adenocarcinoma, lung (H)  Urge incontinence of urine  Thrombocytopenia (H)  Severe sepsis (H)  Paresis (H)      Current Medications:  acetaminophen (TYLENOL) 500 MG tablet  Alcohol Swabs PADS  bisacodyl (DULCOLAX) 10 MG suppository  blood glucose (NO BRAND SPECIFIED) test strip  Blood Glucose Monitoring Suppl (ACCU-CHEK GUIDE) w/Device KIT  bumetanide (BUMEX) 1 MG tablet  cefdinir (OMNICEF) 300 MG capsule  cetirizine (ZYRTEC) 10 MG tablet  collagenase (SANTYL) 250 UNIT/GM external ointment  cyanocobalamin (VITAMIN B-12) 1000 MCG tablet  doxycycline hyclate (VIBRAMYCIN) 100 MG capsule  dulaglutide (TRULICITY) 1.5 MG/0.5ML pen  ferrous sulfate (FEROSUL) 325 (65 Fe) MG tablet  folic acid (FOLVITE) 1 MG tablet  Gauze Pads & Dressings (GAUZE SPONGE) 4\"X4\" PADS  hydrALAZINE (APRESOLINE) 10 MG tablet  HYDROmorphone (DILAUDID) 2 MG tablet  hydroxychloroquine (PLAQUENIL) 200 MG tablet    insulin glargine (LANTUS VIAL) 100 UNIT/ML vial  insulin syringe-needle U-100 (30G X 1/2\" 0.5 ML) 30G X 1/2\" 0.5 ML miscellaneous  Insulin Syringe-Needle U-100 28G X 1/2\" 0.5 ML MISC  Irrigation Supplies MISC  methocarbamol (ROBAXIN) 500 MG tablet    metoprolol succinate ER (TOPROL-XL) 50 MG 24 hr tablet  NOVOFINE AUTOCOVER PEN NEEDLE 30G X 8 MM miscellaneous  omeprazole 20 MG tablet  polyethylene glycol (MIRALAX) 17 GM/Dose powder  polyethylene glycol-propylene glycol (SYSTANE) 0.4-0.3 % SOLN ophthalmic solution    pravastatin (PRAVACHOL) 10 MG tablet  senna-docusate (SENOKOT-S/PERICOLACE) 8.6-50 MG tablet    sertraline (ZOLOFT) 50 MG tablet  sodium hypochlorite (DAKINS HALF-STRENGTH) 0.25 % external solution  syringe/needle, disp, (BD ECLIPSE SYRINGE) 25G X 1\" 3 ML MISC  triamcinolone (KENALOG) 0.1 % external cream  WARFARIN SODIUM PO      Review of Systems   Constitutional: Negative.    HENT: Positive for hearing loss and tinnitus. Negative for ear discharge and ear pain.  "   Skin: Negative.          Physical Exam  Vitals and nursing note reviewed.   Constitutional:       Appearance: Normal appearance.   HENT:      Head: Normocephalic and atraumatic.      Right Ear: Tympanic membrane, ear canal and external ear normal. There is no impacted cerumen.      Left Ear: Tympanic membrane, ear canal and external ear normal. There is impacted cerumen.   Neurological:      Mental Status: He is alert.       Ear wax removal: The patient was seen in the room. An informed consent was obtained from the patient. His ears were evaluated under the microscope. Right ear canal was blocked 30% with ear wax that was suctioned and removed with an alligator. The left ear canal was blocked 100% with ear wax that was suctioned with a 7 tip. He tolerated the procedure well and left the room no complications.        Zurdo Dash's chief complaint for this visit includes:  Chief Complaint   Patient presents with     Consult     Left ear wax. No other concerns. Audio after     PCP: Services, [facility] Physician    Referring Provider:  Referred Self, MD  No address on file    /80   Pulse 84           Again, thank you for allowing me to participate in the care of your patient.        Sincerely,        Brit Arshad MD

## 2023-05-15 ENCOUNTER — LAB REQUISITION (OUTPATIENT)
Dept: LAB | Facility: CLINIC | Age: 78
End: 2023-05-15
Payer: COMMERCIAL

## 2023-05-15 DIAGNOSIS — I48.91 UNSPECIFIED ATRIAL FIBRILLATION (H): ICD-10-CM

## 2023-05-16 LAB — INR PPP: 2.33 (ref 0.85–1.15)

## 2023-05-16 PROCEDURE — 36415 COLL VENOUS BLD VENIPUNCTURE: CPT | Mod: ORL | Performed by: PHYSICIAN ASSISTANT

## 2023-05-16 PROCEDURE — P9603 ONE-WAY ALLOW PRORATED MILES: HCPCS | Mod: ORL | Performed by: PHYSICIAN ASSISTANT

## 2023-05-16 PROCEDURE — 85610 PROTHROMBIN TIME: CPT | Mod: ORL | Performed by: PHYSICIAN ASSISTANT

## 2023-05-17 ENCOUNTER — TELEPHONE (OUTPATIENT)
Dept: ORTHOPEDICS | Facility: CLINIC | Age: 78
End: 2023-05-17
Payer: COMMERCIAL

## 2023-05-17 NOTE — TELEPHONE ENCOUNTER
Called and left VM for Pt that our Financial team was still working on documents and we would update them once we were aware of their response.    Anoop LÓPEZ ATC

## 2023-05-17 NOTE — TELEPHONE ENCOUNTER
[facility] Call Center    Phone Message    May a detailed message be left on voicemail: no     Reason for Call: Other: Requesting c/b- wondering if any updates if insurance will cover injections    Action Taken: Message routed to:  [facility]  SPORTS    Travel Screening: Not Applicable

## 2023-05-19 ENCOUNTER — OFFICE VISIT (OUTPATIENT)
Dept: OPHTHALMOLOGY | Facility: CLINIC | Age: 78
End: 2023-05-19
Attending: INTERNAL MEDICINE
Payer: COMMERCIAL

## 2023-05-19 DIAGNOSIS — Z79.899 HIGH RISK MEDICATION USE: ICD-10-CM

## 2023-05-19 PROCEDURE — 92083 EXTENDED VISUAL FIELD XM: CPT | Performed by: OPHTHALMOLOGY

## 2023-05-19 PROCEDURE — 92134 CPTRZ OPH DX IMG PST SGM RTA: CPT | Performed by: OPHTHALMOLOGY

## 2023-05-19 PROCEDURE — 92012 INTRM OPH EXAM EST PATIENT: CPT | Performed by: OPHTHALMOLOGY

## 2023-05-19 ASSESSMENT — REFRACTION_WEARINGRX
OS_CYLINDER: +0.25
SPECS_TYPE: PAL
OD_SPHERE: -0.75
OS_ADD: +2.50
OD_CYLINDER: +0.25
OS_AXIS: 166
OD_ADD: +2.50
OS_SPHERE: -0.25
OD_AXIS: 047

## 2023-05-19 ASSESSMENT — VISUAL ACUITY
METHOD: SNELLEN - LINEAR
OS_CC: 20/20
OD_CC: 20/25
CORRECTION_TYPE: GLASSES
OD_CC+: -1

## 2023-05-19 ASSESSMENT — TONOMETRY
OS_IOP_MMHG: 15
IOP_METHOD: APPLANATION
OD_IOP_MMHG: 15

## 2023-05-19 NOTE — PROGRESS NOTES
Current Eye Medications:  Hydroxychloroquine 200 mg daily, Artificial tears as needed, once in a while.      Subjective:  Plaquenil check. Vision is doing pretty well both eyes in the distance and near. Fell out of bed and hit right side of face 6 months ago. Healed well, but just under right eye skin itches there. No eye pain or discomfort in either eye.      Objective:  See Ophthalmology Exam.       Assessment:  No evidence of macular toxicity on retinal OCT or central Kidd Visual Field in patient on chronic Plaquenil therapy for RA.      Plan:  Okay to continue with Plaquenil as currently doing.  Return visit next available at convenience for a complete exam.  Justin Avendano M.D.  [phone]

## 2023-05-19 NOTE — Clinical Note
5/19/2023         RE: Zurdo Dash  [address]  [facility] [address]        Dear Colleague,    Thank you for referring your patient, Zurdo Dash, to the [facility]. Please see a copy of my visit note below.    No notes on file    Again, thank you for allowing me to participate in the care of your patient.        Sincerely,        Justin Avendano MD

## 2023-05-19 NOTE — PATIENT INSTRUCTIONS
Okay to continue with Plaquenil as currently doing.  Return visit next available at convenience for a complete exam.  Justin Avendano M.D.  [phone]

## 2023-05-26 ENCOUNTER — LAB REQUISITION (OUTPATIENT)
Dept: LAB | Facility: CLINIC | Age: 78
End: 2023-05-26
Payer: COMMERCIAL

## 2023-05-26 DIAGNOSIS — I48.91 UNSPECIFIED ATRIAL FIBRILLATION (H): ICD-10-CM

## 2023-05-27 ENCOUNTER — HEALTH MAINTENANCE LETTER (OUTPATIENT)
Age: 78
End: 2023-05-27

## 2023-05-30 LAB — INR PPP: 2.18 (ref 0.85–1.15)

## 2023-05-30 PROCEDURE — 36415 COLL VENOUS BLD VENIPUNCTURE: CPT | Mod: ORL | Performed by: PHYSICIAN ASSISTANT

## 2023-05-30 PROCEDURE — 85610 PROTHROMBIN TIME: CPT | Mod: ORL | Performed by: PHYSICIAN ASSISTANT

## 2023-05-30 PROCEDURE — P9604 ONE-WAY ALLOW PRORATED TRIP: HCPCS | Mod: ORL | Performed by: PHYSICIAN ASSISTANT

## 2023-06-07 ENCOUNTER — OFFICE VISIT (OUTPATIENT)
Dept: ORTHOPEDICS | Facility: CLINIC | Age: 78
End: 2023-06-07
Payer: COMMERCIAL

## 2023-06-07 DIAGNOSIS — M17.0 BILATERAL PRIMARY OSTEOARTHRITIS OF KNEE: Primary | ICD-10-CM

## 2023-06-07 PROCEDURE — 20610 DRAIN/INJ JOINT/BURSA W/O US: CPT | Mod: 50 | Performed by: FAMILY MEDICINE

## 2023-06-07 ASSESSMENT — PAIN SCALES - GENERAL: PAINLEVEL: MODERATE PAIN (4)

## 2023-06-07 NOTE — LETTER
6/7/2023         RE: Zurdo Dash  [address]  [place] [place] [facility] [address]        Dear Colleague,    Thank you for referring your patient, Zurdo Dash, to the [facility] SPORTS MEDICINE CLINIC [place]. Please see a copy of my visit note below.      [facility]  SPORTS MEDICINE CLINIC VISIT     Jun 7, 2023        Large Joint Injection/Arthocentesis: bilateral knee    Date/Time: 6/7/2023 10:57 AM    Performed by: Hermes Warner MD  Authorized by: Hermes Warner MD    Indications:  Pain  Needle Size:  22 G  Guidance: landmark guided    Approach:  Anterolateral  Location:  Knee  Laterality:  Bilateral      Medications (Right):  48 mg hylan 48 MG/6ML  Medications (Left):  48 mg hylan 48 MG/6ML  Outcome:  Tolerated well, no immediate complications  Procedure discussed: discussed risks, benefits, and alternatives    Consent Given by:  Patient  Timeout: timeout called immediately prior to procedure    Prep: patient was prepped and draped in usual sterile fashion       There were no complications. The patient tolerated the procedure well. There was minimal bleeding.   The patient was instructed to ice the knees upon leaving clinic and refrain from overuse over the next 2 days.   The patient was instructed to go to the emergency room with any unusual pain, swelling, or redness occurred in the injected area.     Hermes Warner MD            Again, thank you for allowing me to participate in the care of your patient.        Sincerely,        Hermes Warner MD

## 2023-06-07 NOTE — PROGRESS NOTES
[facility]  SPORTS MEDICINE CLINIC VISIT     Jun 7, 2023        Large Joint Injection/Arthocentesis: bilateral knee    Date/Time: 6/7/2023 10:57 AM    Performed by: Hermes Warner MD  Authorized by: Hermes Warner MD    Indications:  Pain  Needle Size:  22 G  Guidance: landmark guided    Approach:  Anterolateral  Location:  Knee  Laterality:  Bilateral      Medications (Right):  48 mg hylan 48 MG/6ML  Medications (Left):  48 mg hylan 48 MG/6ML  Outcome:  Tolerated well, no immediate complications  Procedure discussed: discussed risks, benefits, and alternatives    Consent Given by:  Patient  Timeout: timeout called immediately prior to procedure    Prep: patient was prepped and draped in usual sterile fashion       There were no complications. The patient tolerated the procedure well. There was minimal bleeding.   The patient was instructed to ice the knees upon leaving clinic and refrain from overuse over the next 2 days.   The patient was instructed to go to the emergency room with any unusual pain, swelling, or redness occurred in the injected area.     Hermes Warner MD

## 2023-06-07 NOTE — NURSING NOTE
[facility]   ORTHOPEDICS & SPORTS MEDICINE  [address] [place] [address]  Dept: [phone]  ______________________________________________________________________________    Patient: Zurdo Dash   : 1945   MRN: [id number]   2023    INVASIVE PROCEDURE SAFETY CHECKLIST    Date: 23   Procedure:BL Knee Injection (Synvisc One)   Patient Name: Zurdo Dash  MRN: [id number]  YOB: 1945    Action: Complete sections as appropriate. Any discrepancy results in a HARD COPY until resolved.     PRE PROCEDURE:  Patient ID verified with 2 identifiers (name and  or MRN): Yes  Procedure and site verified with patient/designee (when able): Yes  Accurate consent documentation in medical record: Yes  H&P (or appropriate assessment) documented in medical record: Yes  H&P must be up to 20 days prior to procedure and updates within 24 hours of procedure as applicable: NA  Relevant diagnostic and radiology test results appropriately labeled and displayed as applicable: NA  Procedure site(s) marked with provider initials: NA    TIMEOUT:  Time-Out performed immediately prior to starting procedure, including verbal and active participation of all team members addressing the following:Yes  * Correct patient identify  * Confirmed that the correct side and site are marked  * An accurate procedure consent form  * Agreement on the procedure to be done  * Correct patient position  * Relevant images and results are properly labeled and appropriately displayed  * The need to administer antibiotics or fluids for irrigation purposes during the procedure as applicable   * Safety precautions based on patient history or medication use    DURING PROCEDURE: Verification of correct person, site, and procedures any time the responsibility for care of the patient is transferred to another member of the care team.       Prior to injection, verified patient identity using patient's name and date of  birth.  Due to injection administration, patient instructed to remain in clinic for 15 minutes  afterwards, and to report any adverse reaction to me immediately.    Joint injection was performed.      Drug Amount Wasted:  None.  Vial/Syringe: Syringe x2  Expiration Date:  01/31/2026 x2      Anoop Cherry, GINA  June 7, 2023

## 2023-06-09 ENCOUNTER — TELEPHONE (OUTPATIENT)
Dept: FAMILY MEDICINE | Facility: CLINIC | Age: 78
End: 2023-06-09
Payer: COMMERCIAL

## 2023-06-09 NOTE — TELEPHONE ENCOUNTER
Patient Quality Outreach    Patient is due for the following:   Physical  - patient seeing outside provider.     Next Steps:   non patient seeing outside provider.     Type of outreach:    none    Next Steps:  Reach out within 90 days via none.    Max number of attempts reached: none. Will try again in 90 days if patient still on fail list.    Questions for provider review:    None           Conchita Jang [facility]  Chart routed to none.

## 2023-06-12 ENCOUNTER — LAB REQUISITION (OUTPATIENT)
Dept: LAB | Facility: CLINIC | Age: 78
End: 2023-06-12
Payer: COMMERCIAL

## 2023-06-12 ENCOUNTER — OFFICE VISIT (OUTPATIENT)
Dept: OPHTHALMOLOGY | Facility: CLINIC | Age: 78
End: 2023-06-12
Payer: COMMERCIAL

## 2023-06-12 DIAGNOSIS — E11.42 TYPE 2 DIABETES MELLITUS WITH DIABETIC POLYNEUROPATHY, WITHOUT LONG-TERM CURRENT USE OF INSULIN (H): Primary | ICD-10-CM

## 2023-06-12 DIAGNOSIS — H43.812 POSTERIOR VITREOUS DETACHMENT OF LEFT EYE: ICD-10-CM

## 2023-06-12 DIAGNOSIS — Z01.01 ENCOUNTER FOR EXAMINATION OF EYES AND VISION WITH ABNORMAL FINDINGS: ICD-10-CM

## 2023-06-12 DIAGNOSIS — Z96.1 PSEUDOPHAKIA: ICD-10-CM

## 2023-06-12 DIAGNOSIS — Z98.890 HISTORY OF BLEPHAROPLASTY: ICD-10-CM

## 2023-06-12 DIAGNOSIS — I48.91 UNSPECIFIED ATRIAL FIBRILLATION (H): ICD-10-CM

## 2023-06-12 DIAGNOSIS — H52.4 PRESBYOPIA: ICD-10-CM

## 2023-06-12 PROCEDURE — 92014 COMPRE OPH EXAM EST PT 1/>: CPT | Performed by: OPHTHALMOLOGY

## 2023-06-12 PROCEDURE — 92015 DETERMINE REFRACTIVE STATE: CPT | Performed by: OPHTHALMOLOGY

## 2023-06-12 ASSESSMENT — REFRACTION_WEARINGRX
OS_ADD: +2.50
OD_SPHERE: -0.50
OS_SPHERE: PLANO
OD_CYLINDER: SPHERE
OS_CYLINDER: SPHERE
SPECS_TYPE: PAL
OD_ADD: +2.50

## 2023-06-12 ASSESSMENT — TONOMETRY
OS_IOP_MMHG: 13
IOP_METHOD: APPLANATION
OD_IOP_MMHG: 14

## 2023-06-12 ASSESSMENT — CUP TO DISC RATIO
OD_RATIO: 0.3
OS_RATIO: 0.2

## 2023-06-12 ASSESSMENT — REFRACTION_MANIFEST
OD_ADD: +2.50
OS_SPHERE: PLANO
OS_ADD: +2.50
OS_CYLINDER: SPHERE
OD_SPHERE: -0.75
OD_CYLINDER: SPHERE

## 2023-06-12 ASSESSMENT — CONF VISUAL FIELD
METHOD: COUNTING FINGERS
OS_SUPERIOR_TEMPORAL_RESTRICTION: 0
OD_SUPERIOR_NASAL_RESTRICTION: 0
OS_INFERIOR_TEMPORAL_RESTRICTION: 0
OS_SUPERIOR_NASAL_RESTRICTION: 0
OD_SUPERIOR_TEMPORAL_RESTRICTION: 0
OS_INFERIOR_NASAL_RESTRICTION: 0
OS_NORMAL: 1
OD_INFERIOR_NASAL_RESTRICTION: 0
OD_INFERIOR_TEMPORAL_RESTRICTION: 0
OD_NORMAL: 1

## 2023-06-12 ASSESSMENT — VISUAL ACUITY
CORRECTION_TYPE: GLASSES
OD_CC+: -2
OS_CC: 20/20
METHOD: SNELLEN - LINEAR
OD_CC: 20/20

## 2023-06-12 ASSESSMENT — EXTERNAL EXAM - RIGHT EYE: OD_EXAM: PROLAPSED FAT PADS: UPPER, 2+ BROW PTOSIS

## 2023-06-12 NOTE — PROGRESS NOTES
" Current Eye Medications:  OTC allergy drop - both eyes PRN      Subjective:  Diabetic eye exam - glasses frame is broken, would like to update. Uses drops PRN itching. Blood sugar fluctuates throughout the day.     Type II DM - insulin dependent  Last blood sugar - 180 - 8:14am / 69 - 10:23am, / 242 - 11:24am  Lab Results   Component Value Date    A1C 8.2 01/10/2023    A1C 7.1 07/28/2022    A1C 9.7 02/01/2022    A1C 5.6 08/16/2021    A1C 6.8 01/07/2021    A1C 6.8 12/17/2020    A1C 6.7 12/11/2020    A1C 6.0 01/03/2020    A1C 6.4 08/06/2019          Objective:  See Ophthalmology Exam.       Assessment:  Stable eye exam in patient with diabetes.  No diabetic retinopathy.  No macular toxicity from Plaquenil on dilated exam.      ICD-10-CM    1. Type 2 diabetes mellitus with diabetic polyneuropathy, without long-term current use of insulin (H)  E11.42       2. Pseudophakia, ou  Z96.1       3. History of blepharoplasty  Z98.890       4. Posterior vitreous detachment of left eye  H43.812       5. Encounter for examination of eyes and vision with abnormal findings  Z01.01       6. Presbyopia  H52.4            Plan:  Glasses prescription given - optional  Possible clouding of posterior capsule both eyes discussed.   Possible posterior vitreous detachment (sudden onset large floater and/or flashing lights) both eyes discussed.   May use artificial tears up to four times a day (like Refresh Optive, Systane Balance, TheraTears, or generic artificial tears are ok. Avoid \"get the red out\" drops).   Can try an over the counter allergy drop (Zaditor) both eyes twice daily as needed or (Pataday) both eyes once daily as needed.       Call in February 2024 for an appointment in June 2024 for Complete Exam    Dr. Avendano [phone]           "

## 2023-06-12 NOTE — PATIENT INSTRUCTIONS
"Glasses prescription given - optional  Possible clouding of posterior capsule both eyes discussed.   Possible posterior vitreous detachment (sudden onset large floater and/or flashing lights) both eyes discussed.   May use artificial tears up to four times a day (like Refresh Optive, Systane Balance, TheraTears, or generic artificial tears are ok. Avoid \"get the red out\" drops).   Can try an over the counter allergy drop (Zaditor) both eyes twice daily as needed or (Pataday) both eyes once daily as needed.       Call in February 2024 for an appointment in June 2024 for Complete Exam    Dr. Avendano [phone]      Patient Education   Diabetes weakens the blood vessels all over the body, including the eyes. Damage to the blood vessels in the eyes can cause swelling or bleeding into part of the eye (called the retina). This is called diabetic retinopathy ([facility]the[facility]). If not treated, this disease can cause vision loss or blindness.   Symptoms may include blurred or distorted vision, but many people have no symptoms. It's important to see your eye doctor regularly to check for problems.   Early treatment and good control can help protect your vision. Here are the things you can do to help prevent vision loss:      1. Keep your blood sugar levels under tight control.      2. Bring high blood pressure under control.      3. No smoking.      4. Have yearly dilated eye exams.       "

## 2023-06-12 NOTE — LETTER
"    6/12/2023         RE: Zurdo Dash  [address]  [facility] [address]        Dear Colleague,    Thank you for referring your patient, Zurdo Dash, to the [facility]. Please see a copy of my visit note below.     Current Eye Medications:  OTC allergy drop - both eyes PRN      Subjective:  Diabetic eye exam - glasses frame is broken, would like to update. Uses drops PRN itching. Blood sugar fluctuates throughout the day.     Type II DM - insulin dependent  Last blood sugar - 180 - 8:14am / 69 - 10:23am, / 242 - 11:24am  Lab Results   Component Value Date    A1C 8.2 01/10/2023    A1C 7.1 07/28/2022    A1C 9.7 02/01/2022    A1C 5.6 08/16/2021    A1C 6.8 01/07/2021    A1C 6.8 12/17/2020    A1C 6.7 12/11/2020    A1C 6.0 01/03/2020    A1C 6.4 08/06/2019          Objective:  See Ophthalmology Exam.       Assessment:  Stable eye exam in patient with diabetes.  No diabetic retinopathy.  No macular toxicity from Plaquenil on dilated exam.      ICD-10-CM    1. Type 2 diabetes mellitus with diabetic polyneuropathy, without long-term current use of insulin (H)  E11.42       2. Pseudophakia, ou  Z96.1       3. History of blepharoplasty  Z98.890       4. Posterior vitreous detachment of left eye  H43.812       5. Encounter for examination of eyes and vision with abnormal findings  Z01.01       6. Presbyopia  H52.4            Plan:  Glasses prescription given - optional  Possible clouding of posterior capsule both eyes discussed.   Possible posterior vitreous detachment (sudden onset large floater and/or flashing lights) both eyes discussed.   May use artificial tears up to four times a day (like Refresh Optive, Systane Balance, TheraTears, or generic artificial tears are ok. Avoid \"get the red out\" drops).   Can try an over the counter allergy drop (Zaditor) both eyes twice daily as needed or (Pataday) both eyes once daily as needed.       Call in February 2024 for an appointment in June " 2024 for Complete Exam    Dr. Avendano [phone]               Again, thank you for allowing me to participate in the care of your patient.        Sincerely,        Justin Avendano MD

## 2023-06-13 LAB — INR PPP: 1.91 (ref 0.85–1.15)

## 2023-06-13 PROCEDURE — P9604 ONE-WAY ALLOW PRORATED TRIP: HCPCS | Mod: ORL | Performed by: INTERNAL MEDICINE

## 2023-06-13 PROCEDURE — 85610 PROTHROMBIN TIME: CPT | Mod: ORL | Performed by: INTERNAL MEDICINE

## 2023-06-13 PROCEDURE — 36415 COLL VENOUS BLD VENIPUNCTURE: CPT | Mod: ORL | Performed by: INTERNAL MEDICINE

## 2023-06-17 ENCOUNTER — LAB REQUISITION (OUTPATIENT)
Dept: LAB | Facility: CLINIC | Age: 78
End: 2023-06-17
Payer: COMMERCIAL

## 2023-06-17 DIAGNOSIS — I48.91 UNSPECIFIED ATRIAL FIBRILLATION (H): ICD-10-CM

## 2023-06-20 LAB — INR PPP: 2.39 (ref 0.85–1.15)

## 2023-06-20 PROCEDURE — 36415 COLL VENOUS BLD VENIPUNCTURE: CPT | Mod: ORL | Performed by: INTERNAL MEDICINE

## 2023-06-20 PROCEDURE — 85610 PROTHROMBIN TIME: CPT | Mod: ORL | Performed by: INTERNAL MEDICINE

## 2023-06-20 PROCEDURE — P9603 ONE-WAY ALLOW PRORATED MILES: HCPCS | Mod: ORL | Performed by: INTERNAL MEDICINE

## 2023-06-25 ENCOUNTER — LAB REQUISITION (OUTPATIENT)
Dept: LAB | Facility: CLINIC | Age: 78
End: 2023-06-25
Payer: COMMERCIAL

## 2023-06-25 DIAGNOSIS — E11.22 TYPE 2 DIABETES MELLITUS WITH DIABETIC CHRONIC KIDNEY DISEASE (H): ICD-10-CM

## 2023-06-25 ASSESSMENT — SLIT LAMP EXAM - LIDS: COMMENTS: HEAVY DERMATOCHALASIS RESTING ON LASHES, TRUE PTOSIS

## 2023-06-27 LAB
ANION GAP SERPL CALCULATED.3IONS-SCNC: 12 MMOL/L (ref 7–15)
BUN SERPL-MCNC: 38.1 MG/DL (ref 8–23)
CALCIUM SERPL-MCNC: 8.5 MG/DL (ref 8.8–10.2)
CHLORIDE SERPL-SCNC: 109 MMOL/L (ref 98–107)
CREAT SERPL-MCNC: 1.52 MG/DL (ref 0.67–1.17)
DEPRECATED HCO3 PLAS-SCNC: 20 MMOL/L (ref 22–29)
GFR SERPL CREATININE-BSD FRML MDRD: 46 ML/MIN/1.73M2
GLUCOSE SERPL-MCNC: 181 MG/DL (ref 70–99)
HBA1C MFR BLD: 7.8 %
INR PPP: 2.31 (ref 0.85–1.15)
POTASSIUM SERPL-SCNC: 4.4 MMOL/L (ref 3.4–5.3)
SODIUM SERPL-SCNC: 141 MMOL/L (ref 136–145)

## 2023-06-27 PROCEDURE — 80048 BASIC METABOLIC PNL TOTAL CA: CPT | Mod: ORL | Performed by: PHYSICIAN ASSISTANT

## 2023-06-27 PROCEDURE — P9603 ONE-WAY ALLOW PRORATED MILES: HCPCS | Mod: ORL | Performed by: PHYSICIAN ASSISTANT

## 2023-06-27 PROCEDURE — 83036 HEMOGLOBIN GLYCOSYLATED A1C: CPT | Mod: ORL | Performed by: PHYSICIAN ASSISTANT

## 2023-06-27 PROCEDURE — 85610 PROTHROMBIN TIME: CPT | Mod: ORL | Performed by: PHYSICIAN ASSISTANT

## 2023-06-27 PROCEDURE — 36415 COLL VENOUS BLD VENIPUNCTURE: CPT | Mod: ORL | Performed by: PHYSICIAN ASSISTANT

## 2023-07-10 ENCOUNTER — LAB REQUISITION (OUTPATIENT)
Dept: LAB | Facility: CLINIC | Age: 78
End: 2023-07-10
Payer: COMMERCIAL

## 2023-07-10 DIAGNOSIS — E11.22 TYPE 2 DIABETES MELLITUS WITH DIABETIC CHRONIC KIDNEY DISEASE (H): ICD-10-CM

## 2023-07-11 LAB — INR PPP: 1.48 (ref 0.85–1.15)

## 2023-07-11 PROCEDURE — 36415 COLL VENOUS BLD VENIPUNCTURE: CPT | Mod: ORL | Performed by: PHYSICIAN ASSISTANT

## 2023-07-11 PROCEDURE — 85610 PROTHROMBIN TIME: CPT | Mod: ORL | Performed by: PHYSICIAN ASSISTANT

## 2023-07-11 PROCEDURE — P9604 ONE-WAY ALLOW PRORATED TRIP: HCPCS | Mod: ORL | Performed by: PHYSICIAN ASSISTANT

## 2023-07-12 ENCOUNTER — OFFICE VISIT (OUTPATIENT)
Dept: RHEUMATOLOGY | Facility: CLINIC | Age: 78
End: 2023-07-12
Payer: COMMERCIAL

## 2023-07-12 VITALS
BODY MASS INDEX: 32.64 KG/M2 | WEIGHT: 221 LBS | OXYGEN SATURATION: 93 % | HEART RATE: 84 BPM | DIASTOLIC BLOOD PRESSURE: 68 MMHG | SYSTOLIC BLOOD PRESSURE: 103 MMHG

## 2023-07-12 DIAGNOSIS — M05.79 RHEUMATOID ARTHRITIS INVOLVING MULTIPLE SITES WITH POSITIVE RHEUMATOID FACTOR (H): Primary | ICD-10-CM

## 2023-07-12 DIAGNOSIS — M17.0 BILATERAL PRIMARY OSTEOARTHRITIS OF KNEE: ICD-10-CM

## 2023-07-12 PROCEDURE — 99214 OFFICE O/P EST MOD 30 MIN: CPT | Performed by: INTERNAL MEDICINE

## 2023-07-12 NOTE — PATIENT INSTRUCTIONS
RHEUMATOLOGY    [facility]  [address] [place] [address]  [place] [address]    Phone number: [phone]  Fax number: [phone]    If you need a medication refill, please contact us as you may need lab work and/or a follow up visit prior to your refill.      Thank you for choosing [facility]!    Eryn Espinoza CMA Rheumatology

## 2023-07-12 NOTE — PROGRESS NOTES
Rheumatology Clinic Visit      Zurdo Dash MRN# [id number]   YOB: 1945 Age: 78 year old      Date of visit: 7/12/23   PCP: Dr. Kapil Duckworth     Chief Complaint   Patient presents with:  Rheumatoid Arthritis    Assessment and Plan      1. Seropositive nonerosive rheumatoid arthritis (, CCP 64): Previously on MTX (was well tolerated, but later with cytopenias likely related to worsening creatinine with subsequent MTX toxicity).  Mild synovitis on exam previously so hydroxychloroquine was restarted and he is doing well at this time.  Chronic illness, stable.    - Continue hydroxychloroquine 200mg daily (last eye exam performed by Dr. Avendano on 5/19/2023)    2. Bilateral knee osteoarthritis / patellofemoral syndrome: Intra-articular steroid injections have been effective but caused significant hyperglycemia.  Was then seen by orthopedics in [place] where a Synvisc injection was given with possibly some improvement; he would like to plan to have a repeat Synvisc injection in December 2023, 6 months after the previous Synvisc injection.  He says he would like an orthopedic surgery referral so that he can make an appointment here at the [place] location.   - Orthopedic surgery referral    Total minutes spent in evaluation with patient, documentation, , and review of pertinent studies and chart notes: 14      Mr. Dash verbalized agreement with and understanding of the rational for the diagnosis and treatment plan.  All questions were answered to best of my ability and the patient's satisfaction. Mr. Dash was advised to contact the clinic with any questions that may arise after the clinic visit.      Thank you for involving me in the care of the patient    Return to clinic: 6 months      HPI   Zurdo Dash is a 78 year old male with a medical history significant for hypertension, hyperlipidemia, diabetes, diabetic neuropathy, GERD, pacemaker, lung cancer, rheumatoid arthritis  who presents for f/u of RA, sooner than previously scheduled because of recent hospitalization    Today, 7/12/2023: Intra-articular steroid injection for bilateral knee osteoarthritis was effective but caused a blood glucose in the 600s and therefore he went to see orthopedics at the [place] location for a Synvisc injection in June 2023 with improvement of his knee pain.  He would like to plan a repeat Synvisc injection 6 months afterwards and would like a referral to orthopedics here at the [place] location.  Other joints are doing well.  Morning stiffness for no more than 20 minutes.    Denies fevers, chills, nausea, vomiting, constipation, diarrhea. No abdominal pain. No chest pain/pressure, palpitations, or shortness of breath. No oral or nasal sores. No neck pain.     Tobacco: None  EtOH: None  Drugs: None    ROS   12 point review of system was completed and negative except as noted in the HPI     Active Problem List     Patient Active Problem List   Diagnosis     Pain in limb     Hyperlipidemia LDL goal <100     Hypertension goal BP (blood pressure) < 140/90     Hypogonadism     Advanced directives, counseling/discussion     Health Care Home     Type 2 diabetes mellitus with diabetic polyneuropathy, without long-term current use of insulin (H)     RBBB (right bundle branch block)     Ex-smoker     Family history of esophageal cancer     Gastroesophageal reflux disease, esophagitis presence not specified     Rheumatoid arthritis involving multiple sites with positive rheumatoid factor (H)     Pulmonary nodule     High risk medication use     Spondylosis of cervical region without myelopathy or radiculopathy     Erectile dysfunction, unspecified erectile dysfunction type     Type 2 diabetes mellitus without retinopathy (H)     Tubulovillous adenoma of colon     Diabetic polyneuropathy associated with type 2 diabetes mellitus (H)     Chronic bilateral low back pain without sciatica     Migraine equivalent      Posterior vitreous detachment of left eye     Pseudophakia, ou     Eyelid lesion, LLL     Dermatochalasis of both upper eyelids     Bradycardia     Primary osteoarthritis of both knees     Syncope     Trigger finger, acquired     CKD (chronic kidney disease) stage 3, GFR 30-59 ml/min (H)     Abdominal pain, generalized     PAD (peripheral artery disease) (H)     Immunosuppression (H)     Skin ulcer of toe of left foot, limited to breakdown of skin (H)     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)     Pneumothorax     SOBOE (shortness of breath on exertion)     Pacemaker     Ulcer of left foot, unspecified ulcer stage (H)     Hammer toe of left foot     Anemia, unspecified type     Closed nondisplaced fracture of right pubis (H)     Amputation of toe (H)     Adenocarcinoma, lung (H)     Urge incontinence of urine     Thrombocytopenia (H)     Severe sepsis (H)     Paresis (H)     Osteomyelitis (H)     Past Medical History     Past Medical History:   Diagnosis Date     Abnormal CT scan 03/2004    calcified lung granuloma     C. difficile diarrhea     H/O     Cataract 11/18/2011     Diabetic neuropathy (H)     mild, mostly soles and distal forefeet, worse on the left side.     Diverticulitis      ED (erectile dysfunction)      Ex-smoker     QUIT SMOKING FEB 2007     History of ETOH abuse     recovering, sober since 1997     Hyperlipidemia LDL goal <100      Hypertension goal BP (blood pressure) < 140/90      Hypogonadism      Obesity      PAD (peripheral artery disease) (H)     leg cramps, with exertion, no formal diagnosis of PAD and minimal if any symptoms at all.     RA (rheumatoid arthritis) (H)     Dr Bailon     Syncope      Past Surgical History     Past Surgical History:   Procedure Laterality Date     BYPASS GRAFT FEMOROPOPLITEAL Left 01/07/2021    Procedure: LEFT FEMORAL TO ABOVE KNEE POPLITEAL ARTERY BYPASS WITH PTFE VASCULAR GRAFT REMOVABLE RING 6MMX 50CM;  Surgeon: Myra Lopes  MD Samreen;  Location: [facility] OR     CATARACT IOL, RT/LT       COLONOSCOPY  03/01/2018    [facility]     COMBINED REPAIR PTOSIS WITH BLEPHAROPLASTY BILATERAL Bilateral 08/16/2019    Procedure: BILATERAL UPPER EYELID BLEPHAROPLASTY AND BILATERAL PTOSIS REPAIR;  Surgeon: Janet Garcia MD;  Location: [facility] OR     ENDARTERECTOMY FEMORAL Left 01/07/2021    Procedure: LEFT FEMORAL ENDARTERECTOMY WITH PATCH ANGIOPLASTY PHOTOFIX  0.8 X 8CM;  Surgeon: Myra Lopes MD;  Location: [facility] OR     EP PACEMAKER  01/2021     EXCISE LESION EYELID Left 08/16/2019    Procedure: LEFT LOWER EYELID BIOPSY;  Surgeon: Janet Garcia MD;  Location: [facility] OR     IR LOWER EXTREMITY ANGIOGRAM LEFT  12/17/2020     PHACOEMULSIFICATION WITH STANDARD INTRAOCULAR LENS IMPLANT  02/2019; 3/2019    left eye; right eye     TONSILLECTOMY       ZZHC INCISION TENDON SHEATH FINGER  04/2009    r hand ring finger     Allergy     Allergies   Allergen Reactions     Seasonal Allergies      Blood-Group Specific Substance Other (See Comments)     Patient has a Non-specific antibody. Blood products may be delayed. Draw patient 24 hours prior to transfusion. For [facility] testing, draw one red top and two purple top tubes for all Type and Screen orders.  Patient has a Non-specific antibody. Blood products may be delayed. Draw patient 24 hours prior to transfusion. For [facility] testing, draw one red top and two purple top tubes for all Type and Screen orders.     Current Medication List     Current Outpatient Medications   Medication Sig     acetaminophen (TYLENOL) 500 MG tablet Take 1,000 mg by mouth daily     Alcohol Swabs PADS Uses four times daily     bumetanide (BUMEX) 1 MG tablet Take 1 mg by mouth daily     cefdinir (OMNICEF) 300 MG capsule Take 300 mg by mouth 2 times daily     cetirizine (ZYRTEC) 10 MG tablet Take 10 mg by mouth At Bedtime     collagenase (SANTYL) 250 UNIT/GM external ointment Apply topically daily Apply to left heel as  directed once daily     cyanocobalamin (VITAMIN B-12) 1000 MCG tablet Take 1 tablet (1,000 mcg) by mouth once a week     doxycycline hyclate (VIBRAMYCIN) 100 MG capsule Take 100 mg by mouth 2 times daily     ferrous sulfate (FEROSUL) 325 (65 Fe) MG tablet Take 325 mg by mouth daily     folic acid (FOLVITE) 1 MG tablet Take 1 tablet (1 mg) by mouth daily     hydrALAZINE (APRESOLINE) 10 MG tablet Take 10 mg by mouth nightly as needed (systolic bp > 170)     HYDROmorphone (DILAUDID) 2 MG tablet Take 1 tablet (2 mg) by mouth 2 times daily as needed for pain For urgent pain relief with rheumatoid arthritis flare-up     hydroxychloroquine (PLAQUENIL) 200 MG tablet Take 1 tablet (200 mg) by mouth daily     insulin glargine (LANTUS VIAL) 100 UNIT/ML vial Inject 10 Units Subcutaneous At Bedtime (Patient taking differently: Inject 25 Units Subcutaneous At Bedtime)     methocarbamol (ROBAXIN) 500 MG tablet Take 250 mg by mouth 2 times daily     metoprolol succinate ER (TOPROL-XL) 50 MG 24 hr tablet Take 1 tablet (50 mg) by mouth daily     omeprazole 20 MG tablet Take 20 mg by mouth daily     polyethylene glycol (MIRALAX) 17 GM/Dose powder Take 1 capful by mouth daily     polyethylene glycol-propylene glycol (SYSTANE) 0.4-0.3 % SOLN ophthalmic solution Place 1 drop into both eyes 2 times daily as needed for dry eyes     pravastatin (PRAVACHOL) 10 MG tablet Take 1 tablet (10 mg) by mouth daily     sertraline (ZOLOFT) 50 MG tablet Take 1 tablet (50 mg) by mouth daily     sodium hypochlorite (DAKINS HALF-STRENGTH) 0.25 % external solution Use for damp to dry dressing changes two times a day left groin wound     triamcinolone (KENALOG) 0.1 % external cream Apply topically 2 times daily Apply to rash on neck, shoulders and lower extremities two times a day     WARFARIN SODIUM PO Take 3 mg by mouth daily     bisacodyl (DULCOLAX) 10 MG suppository Unwrap and insert 1 suppository rectally once daily as needed (Patient not taking:  "Reported on 9/27/2022)     blood glucose (NO BRAND SPECIFIED) test strip Use to test blood sugar 4 times daily or as directed.     Blood Glucose Monitoring Suppl (ACCU-CHEK GUIDE) w/Device KIT      dulaglutide (TRULICITY) 1.5 MG/0.5ML pen Inject 1.5 mg Subcutaneous every 7 days ON Wednesdays (Patient not taking: Reported on 1/2/2023)     Gauze Pads & Dressings (GAUZE SPONGE) 4\"X4\" PADS      insulin syringe-needle U-100 (30G X 1/2\" 0.5 ML) 30G X 1/2\" 0.5 ML miscellaneous Use 4 syringes daily or as directed.     Insulin Syringe-Needle U-100 28G X 1/2\" 0.5 ML MISC      Irrigation Supplies MISC      NOVOFINE AUTOCOVER PEN NEEDLE 30G X 8 MM miscellaneous USE FOUR TIMES A DAY     senna-docusate (SENOKOT-S/PERICOLACE) 8.6-50 MG tablet Take 1 tablet by mouth 2 times daily as needed for constipation (Patient not taking: Reported on 1/2/2023)     syringe/needle, disp, (BD ECLIPSE SYRINGE) 25G X 1\" 3 ML MISC      Current Facility-Administered Medications   Medication     hylan (SYNVISC ONE) injection 48 mg     hylan (SYNVISC ONE) injection 48 mg         Social History   See HPI    Family History     Family History   Problem Relation Age of Onset     Cerebrovascular Disease Mother      Arthritis Mother      Osteoporosis Mother      Alzheimer Disease Father      Arthritis Father      Cancer Father      Diabetes Maternal Grandmother      Cardiovascular Maternal Grandmother      Other Cancer Brother      Cancer Paternal Aunt      Hypertension No family hx of      Thyroid Disease No family hx of      Glaucoma No family hx of      Macular Degeneration No family hx of        Physical Exam     Temp Readings from Last 3 Encounters:   01/02/23 97.9  F (36.6  C) (Oral)   09/27/22 98.2  F (36.8  C) (Oral)   08/24/22 98.4  F (36.9  C) (Oral)     BP Readings from Last 5 Encounters:   05/12/23 125/80   03/27/23 116/76   01/02/23 119/74   12/01/22 (!) 145/89   09/28/22 (!) 140/90     Pulse Readings from Last 1 Encounters:   05/12/23 84 " "    Resp Readings from Last 1 Encounters:   01/02/23 16     Estimated body mass index is 32.16 kg/m  as calculated from the following:    Height as of 9/20/22: 1.753 m (5' 9\").    Weight as of 3/27/23: 98.8 kg (217 lb 12.8 oz).      GEN: NAD.   HEENT:  Anicteric, noninjected sclera. No obvious external lesions of the ear and nose. Hearing intact.  CV: S1, S2. RRR. No m/r/g  PULM: No increased work of breathing. CTA bilaterally   MSK: MCPs, PIPs, DIPs without swelling or tenderness to palpation.  Wrists without swelling or tenderness to palpation.  Elbows and shoulders without swelling or tenderness to palpation.  Knees with crepitation and medial joint line tenderness but no effusion or increased warmth.  Ankles and MTPs without swelling or tenderness to palpation.  Absent left second toe.  SKIN: No rash or jaundice seen  PSYCH: Alert. Appropriate.         Labs / Imaging (select studies)     [facility]    RF/CCP  Recent Labs   Lab Test 04/07/16  1149   CCPIGG 64*     CBC  Recent Labs   Lab Test 01/10/23  0745 07/28/22  1611 03/31/22  1023 02/01/22  0901 09/08/21  1607 07/14/21  0924 07/05/21  1532 04/07/21  0958   WBC 6.5 9.2 8.6 6.8   < > 7.0 3.7* 7.4   RBC 3.95* 4.11* 4.46 3.90*   < > 2.72* 2.20* 3.55*   HGB 11.9* 12.1* 13.3 11.2*   < > 8.4* 6.9* 10.8*   HCT 37.7* 38.3* 40.4 35.9*   < > 25.8* 21.6* 33.3*   MCV 95 93 91 92   < > 95 98 94   RDW 13.3 14.4 13.9 14.0   < > 17.1* 17.3* 14.0    227 216 274   < > 140* 96* 342   MCH 30.1 29.4 29.8 28.7   < > 30.9 31.4 30.4   MCHC 31.6 31.6 32.9 31.2*   < > 32.6 31.9 32.4   NEUTROPHIL 65 67  --  63   < > 68  75 68.0 80.7   LYMPH 14 17  --  17   < > 10  7 19.0 11.6   MONOCYTE 15 11  --  14   < > 20  15 10.0 6.0   EOSINOPHIL 4 4  --  4   < > 2  2 2.0 1.2   BASOPHIL 1 1  --  1   < > 1  0 1.0 0.5   ANEU  --   --   --   --   --  5.3 2.5 6.0   ALYM  --   --   --   --   --  0.5* 0.7* 0.9   SMITH  --   --   --   --   --  1.1 0.4 0.5   AEOS  --   -- "   --   --   --  0.1 0.1 0.1   ABAS  --   --   --   --   --  0.0 0.0 0.0   ANEUTAUTO 4.3 6.2  --  4.4   < > 4.8  --   --    ALYMPAUTO 0.9 1.5  --  1.2   < > 0.7*  --   --    AMONOAUTO 1.0 1.1  --  1.0   < > 1.4*  --   --    AEOSAUTO 0.3 0.4  --  0.2   < > 0.2  --   --    ABSBASO 0.1 0.1  --  0.1   < > 0.1  --   --     < > = values in this interval not displayed.     CMP  Recent Labs   Lab Test 06/27/23  0540 01/10/23  0745 07/28/22  1611 08/16/21  1316 07/14/21  1645 07/05/21  1532 04/07/21  0958 01/09/21  0709    140 140   < > 136  --   --  136   POTASSIUM 4.4 4.5 4.7   < > 5.4*  --   --  4.3   CHLORIDE 109* 105 111*   < > 109  --   --  108   CO2 20* 20* 22   < > 17*  --   --  23   ANIONGAP 12 15 7   < > 10  --   --  5   * 110* 154*   < > 126*  --   --  117*   BUN 38.1* 35.3* 42*   < > 50*  --   --  31*   CR 1.52* 1.55* 1.54*   < > 2.30* 2.84* 1.52* 1.39*   GFRESTIMATED 46* 46* 46*   < > 26* 20* 44* 49*   GFRESTBLACK  --   --   --   --   --  24* 50* 56*   NEW 8.5* 9.1 8.8   < > 8.8  --   --  8.4*   BILITOTAL  --   --  0.3  --  0.7 0.8 0.4  --    ALBUMIN  --   --  3.0*  --  3.5 3.5 3.5  --    PROTTOTAL  --   --  6.8  --  6.8 6.9 7.0  --    ALKPHOS  --   --  57  --  67 85 69  --    AST  --   --  18  --  22 12 19  --    ALT  --   --  25  --  32 22 25  --     < > = values in this interval not displayed.     Calcium/VitaminD  Recent Labs   Lab Test 06/27/23  0540 01/10/23  0745 07/28/22  1611   NEW 8.5* 9.1 8.8     Hepatitis B  Recent Labs   Lab Test 04/07/16  1149   HBCAB Nonreactive   HEPBANG Nonreactive     Hepatitis C  Recent Labs   Lab Test 04/07/16  1149   HCVAB Nonreactive   Assay performance characteristics have not been established for newborns,   infants, and children       HIV Screening  Recent Labs   Lab Test 04/07/16  1149   HIAGAB Nonreactive   HIV-1 p24 Ag & HIV-1/HIV-2 Ab Not Detected       Immunization History     Immunization History   Administered Date(s) Administered     COVID-19 Bivalent  12+ (Pfizer) 10/04/2022     COVID-19 MONOVALENT 12+ (Pfizer) 02/19/2021, 03/12/2021     COVID-19 Monovalent 18+ (Moderna) 11/29/2021, 05/13/2022     FLUAD(HD)65+ QUAD 09/24/2021     Influenza (H1N1) 01/20/2010     Influenza (High Dose) 3 valent vaccine 09/20/2012, 10/20/2015, 09/20/2016, 08/28/2017, 09/24/2018, 09/18/2019     Influenza (IIV3) PF 10/05/1999, 10/30/2008, 09/28/2011, 10/14/2013, 10/03/2014     Influenza Vaccine 65+ (Fluzone HD) 09/11/2020     Pneumo Conj 13-V (2010&after) 06/29/2015     Pneumococcal 23 valent 08/19/2010     TD,PF 7+ (Tenivac) 08/05/1997, 02/03/2011     TDAP (Adacel,Boostrix) 03/11/2020     TDAP Vaccine (Boostrix) 03/11/2020     Zoster recombinant adjuvanted (SHINGRIX) 01/03/2020, 03/11/2020     Zoster vaccine, live 04/19/2010            Chart documentation done in part with Dragon Voice recognition Software. Although reviewed after completion, some word and grammatical error may remain.    Albert Tompikns MD

## 2023-07-12 NOTE — NURSING NOTE
RAPID3 (0-30) Cumulative Score  18.2          RAPID3 Weighted Score (divide #4 by 3 and that is the weighted score)  6.0

## 2023-07-21 ENCOUNTER — ANCILLARY PROCEDURE (OUTPATIENT)
Dept: PET IMAGING | Facility: CLINIC | Age: 78
End: 2023-07-21
Attending: RADIOLOGY
Payer: COMMERCIAL

## 2023-07-21 DIAGNOSIS — C34.11 MALIGNANT NEOPLASM OF UPPER LOBE OF RIGHT LUNG (H): ICD-10-CM

## 2023-07-21 PROCEDURE — 78816 PET IMAGE W/CT FULL BODY: CPT | Mod: PS | Performed by: RADIOLOGY

## 2023-07-21 PROCEDURE — A9552 F18 FDG: HCPCS | Performed by: RADIOLOGY

## 2023-07-23 PROBLEM — H02.831 DERMATOCHALASIS OF BOTH UPPER EYELIDS: Status: RESOLVED | Noted: 2019-07-10 | Resolved: 2023-07-23

## 2023-07-23 PROBLEM — H02.834 DERMATOCHALASIS OF BOTH UPPER EYELIDS: Status: RESOLVED | Noted: 2019-07-10 | Resolved: 2023-07-23

## 2023-07-23 PROBLEM — Z98.890 HISTORY OF BLEPHAROPLASTY: Status: ACTIVE | Noted: 2023-07-23

## 2023-07-23 ASSESSMENT — SLIT LAMP EXAM - LIDS
COMMENTS: GOOD COSMESIS
COMMENTS: GOOD COSMESIS

## 2023-07-24 ENCOUNTER — OFFICE VISIT (OUTPATIENT)
Dept: RADIATION ONCOLOGY | Facility: CLINIC | Age: 78
End: 2023-07-24
Payer: COMMERCIAL

## 2023-07-24 VITALS
SYSTOLIC BLOOD PRESSURE: 121 MMHG | HEART RATE: 68 BPM | BODY MASS INDEX: 32.27 KG/M2 | OXYGEN SATURATION: 96 % | TEMPERATURE: 97.8 F | RESPIRATION RATE: 18 BRPM | DIASTOLIC BLOOD PRESSURE: 77 MMHG | WEIGHT: 218.5 LBS

## 2023-07-24 DIAGNOSIS — C34.11 MALIGNANT NEOPLASM OF UPPER LOBE OF RIGHT LUNG (H): Primary | ICD-10-CM

## 2023-07-24 PROCEDURE — 99214 OFFICE O/P EST MOD 30 MIN: CPT | Performed by: RADIOLOGY

## 2023-07-24 RX ORDER — GLIMEPIRIDE 1 MG/1
1 TABLET ORAL DAILY
COMMUNITY
Start: 2023-06-21 | End: 2023-08-03

## 2023-07-24 ASSESSMENT — PAIN SCALES - GENERAL: PAINLEVEL: SEVERE PAIN (7)

## 2023-07-24 NOTE — LETTER
7/24/2023         RE: Zurdo Dash  [address]  [facility] [address]        Dear Colleague,    Thank you for referring your patient, Zurdo Dash, to the [facility] [place]. Please see a copy of my visit note below.    Dear Colleagues,  Today Zurdo Dash was seen in follow up      IDENTIFICATION: 78 year old gentleman with multiple comorbidities and a pacemaker diagnosed with [id number], stage 1 lung cancer of the RUL status post stereotactic body radiation therapy completed on August 26, 2022.    INTERVAL HISTORY:  Zurdo Dash was last contacted 3 months ago. While on treatment he had no complaints.  He is seen today in follow-up and states he is doing well with no concerns. Specifically denies n/v/ha/sob/cp.      REVIEW OF SYSTEMS: As per HPI, a 14-point review of systems is otherwise negative.    Past Medical History:   Diagnosis Date     Abnormal CT scan 03/2004    calcified lung granuloma     C. difficile diarrhea     H/O     Cataract 11/18/2011     Diabetic neuropathy (H)     mild, mostly soles and distal forefeet, worse on the left side.     Diverticulitis      ED (erectile dysfunction)      Ex-smoker     QUIT SMOKING FEB 2007     History of ETOH abuse     recovering, sober since 1997     Hyperlipidemia LDL goal <100      Hypertension goal BP (blood pressure) < 140/90      Hypogonadism      Obesity      PAD (peripheral artery disease) (H)     leg cramps, with exertion, no formal diagnosis of PAD and minimal if any symptoms at all.     RA (rheumatoid arthritis) (H)     Dr Bailon     Syncope        Past Surgical History:   Procedure Laterality Date     BYPASS GRAFT FEMOROPOPLITEAL Left 01/07/2021    Procedure: LEFT FEMORAL TO ABOVE KNEE POPLITEAL ARTERY BYPASS WITH PTFE VASCULAR GRAFT REMOVABLE RING 6MMX 50CM;  Surgeon: Myra Lopes MD;  Location: [facility]     CATARACT IOL, RT/LT       COLONOSCOPY  03/01/2018    [facility]     COMBINED REPAIR  PTOSIS WITH BLEPHAROPLASTY BILATERAL Bilateral 2019    Procedure: BILATERAL UPPER EYELID BLEPHAROPLASTY AND BILATERAL PTOSIS REPAIR;  Surgeon: Janet Garcia MD;  [facility]     ENDARTERECTOMY FEMORAL Left 2021    Procedure: LEFT FEMORAL ENDARTERECTOMY WITH PATCH ANGIOPLASTY PHOTOFIX  0.8 X 8CM;  Surgeon: Myra Lopes MD;  Location: [facility]     EP PACEMAKER  2021     EXCISE LESION EYELID Left 2019    Procedure: LEFT LOWER EYELID BIOPSY;  Surgeon: Janet Garcia MD;  [facility]: [facility]     IR LOWER EXTREMITY ANGIOGRAM LEFT  2020     PHACOEMULSIFICATION WITH STANDARD INTRAOCULAR LENS IMPLANT  2019; 3/2019    left eye; right eye     TONSILLECTOMY       ZZHC INCISION TENDON SHEATH FINGER  2009    r hand ring finger       Family History   Problem Relation Age of Onset     Cerebrovascular Disease Mother      Arthritis Mother      Osteoporosis Mother      Alzheimer Disease Father      Arthritis Father      Cancer Father      Diabetes Maternal Grandmother      Cardiovascular Maternal Grandmother      Other Cancer Brother      Cancer Paternal Aunt      Hypertension No family hx of      Thyroid Disease No family hx of      Glaucoma No family hx of      Macular Degeneration No family hx of        Social History     Tobacco Use     Smoking status: Former     Packs/day: 1.00     Years: 40.00     Pack years: 40.00     Types: Cigarettes     Quit date: 3/16/2007     Years since quittin.3     Smokeless tobacco: Never   Substance Use Topics     Alcohol use: No     Alcohol/week: 0.0 standard drinks of alcohol       Current Outpatient Medications   Medication     acetaminophen (TYLENOL) 500 MG tablet     Alcohol Swabs PADS     blood glucose (NO BRAND SPECIFIED) test strip     Blood Glucose Monitoring Suppl (ACCU-CHEK GUIDE) w/Device KIT     bumetanide (BUMEX) 1 MG tablet     cetirizine (ZYRTEC) 10 MG tablet     cyanocobalamin (VITAMIN B-12) 1000 MCG tablet     diclofenac  "(VOLTAREN) 1 % topical gel     dulaglutide (TRULICITY) 1.5 MG/0.5ML pen     ferrous sulfate (FEROSUL) 325 (65 Fe) MG tablet     folic acid (FOLVITE) 1 MG tablet     glimepiride (AMARYL) 1 MG tablet     HYDROmorphone (DILAUDID) 2 MG tablet     hydroxychloroquine (PLAQUENIL) 200 MG tablet     insulin glargine (LANTUS VIAL) 100 UNIT/ML vial     insulin syringe-needle U-100 (30G X 1/2\" 0.5 ML) 30G X 1/2\" 0.5 ML miscellaneous     Insulin Syringe-Needle U-100 28G X 1/2\" 0.5 ML MISC     Irrigation Supplies MISC     methocarbamol (ROBAXIN) 500 MG tablet     metoprolol succinate ER (TOPROL-XL) 50 MG 24 hr tablet     NOVOFINE AUTOCOVER PEN NEEDLE 30G X 8 MM miscellaneous     omeprazole 20 MG tablet     polyethylene glycol-propylene glycol (SYSTANE) 0.4-0.3 % SOLN ophthalmic solution     pravastatin (PRAVACHOL) 10 MG tablet     sertraline (ZOLOFT) 50 MG tablet     syringe/needle, disp, (BD ECLIPSE SYRINGE) 25G X 1\" 3 ML MISC     WARFARIN SODIUM PO     bisacodyl (DULCOLAX) 10 MG suppository     hydrALAZINE (APRESOLINE) 10 MG tablet     senna-docusate (SENOKOT-S/PERICOLACE) 8.6-50 MG tablet     No current facility-administered medications for this visit.          Allergies   Allergen Reactions     Seasonal Allergies      Blood-Group Specific Substance Other (See Comments)     Patient has a Non-specific antibody. Blood products may be delayed. Draw patient 24 hours prior to transfusion. For [facility] testing, draw one red top and two purple top tubes for all Type and Screen orders.  Patient has a Non-specific antibody. Blood products may be delayed. Draw patient 24 hours prior to transfusion. For [facility] testing, draw one red top and two purple top tubes for all Type and Screen orders.         PHYSICAL EXAM:  /77   Pulse 68   Temp 97.8  F (36.6  C) (Oral)   Resp 18   Wt 99.1 kg (218 lb 8 oz)   SpO2 96%   BMI 32.27 kg/m    GEN: appears well, in no acute distress  HEENT: normocephalic and atraumatic, EOMI, " anicteric sclerae  CV: RRR  RESP: normal respiration on room air, no stridor, CTAB  LYMPH: No supraclavicular or infraclavicular lymphadenopathy appreciated.  SKIN: normal color and turgor  PSYCH: appropriate mood, affect, and judgment    All pertinent laboratory, imaging, and pathology findings have been reviewed.     7/21/23 PET/CT IMPRESSION:   1. Mildly decreased metabolic activity and size of right upper lobe pulmonary lesion compared to 4/15/2023 PET, which is most compatible with evolving postradiation changes. Recommend continued attention on follow-up.   2. New 0.5 cm solid pulmonary nodule in left lower lobe is indeterminate, probably infective/inflammatory; attention on follow-up. Unchanged irregular 0.6 cm right upper lobe pulmonary nodule remains below the size threshold for FDG uptake.    3. No evidence of jame disease or distant metastasis.   4. Redemonstrated mildly avid, focal cutaneous thickening in the left mid lateral and distal thigh; correlate with direct visualization.  5. Additional chronic/ancillary findings as detailed in the body of the report.     IMPRESSION/RECOMMENDATION:  78 year old gentleman with multiple comorbidities and a pacemaker diagnosed with [id number], stage 1 lung cancer of the RUL status post stereotactic body radiation therapy completed on August 26, 2022. He is doing well with no significant radiation toxicity.  His most scan shows no definitive evidence of recurrence or new disease.  New left lower lobe nodule is indeterminate.  We will resume repeat every 3 month chest CTs with in person or video follow up.       Thank you for allowing me to participate in the care of this pleasant patient. If you have any questions, please do not hesitate to contact my office.    Lalita Funez MD  Attending Physician  Radiation Oncology      Again, thank you for allowing me to participate in the care of your patient.        Sincerely,        NAYAN Funez MD

## 2023-07-24 NOTE — NURSING NOTE
FOLLOW-UP VISIT    Patient Name: Zurdo Dash      : 1945     Age: 78 year old        ______________________________________________________________________________     Chief Complaint   Patient presents with    Radiation Therapy     Return appointment with Dr. Funez     /77   Pulse 68   Temp 97.8  F (36.6  C) (Oral)   Resp 18   Wt 99.1 kg (218 lb 8 oz)   SpO2 96%   BMI 32.27 kg/m       Date Radiation Completed: 2022    Pain  Denies    Meds  Current Med List Reviewed: Yes  Medication Note:     Labs  CBC RESULTS:   Recent Labs   Lab Test 01/10/23  0745   WBC 6.5   RBC 3.95*   HGB 11.9*   HCT 37.7*   MCV 95   MCH 30.1   MCHC 31.6   RDW 13.3          Imaging  PET-2023    Respiratory: No shortness of breath, dyspnea on exertion, cough, or hemoptysis  Skin:  Warm  Dry  Intact  Energy Level: baseline fatigue  Appetite: normal      Appointments:     DATE  Oncologist:     Primary:      Other Notes: Patient reports he and his wife are moving to [place] to be closer to family. Plan for follow up CT chest in 3 months with Return appointment with Dr. Funez.    Heidi Villar RN

## 2023-07-24 NOTE — PROGRESS NOTES
Dear Colleagues,  Today Zurdo Dash was seen in follow up      IDENTIFICATION: 78 year old gentleman with multiple comorbidities and a pacemaker diagnosed with [id number], stage 1 lung cancer of the RUL status post stereotactic body radiation therapy completed on August 26, 2022.    INTERVAL HISTORY:  Zurdo Dash was last contacted 3 months ago. While on treatment he had no complaints.  He is seen today in follow-up and states he is doing well with no concerns. Specifically denies n/v/ha/sob/cp.      REVIEW OF SYSTEMS: As per HPI, a 14-point review of systems is otherwise negative.    Past Medical History:   Diagnosis Date    Abnormal CT scan 03/2004    calcified lung granuloma    C. difficile diarrhea     H/O    Cataract 11/18/2011    Diabetic neuropathy (H)     mild, mostly soles and distal forefeet, worse on the left side.    Diverticulitis     ED (erectile dysfunction)     Ex-smoker     QUIT SMOKING FEB 2007    History of ETOH abuse     recovering, sober since 1997    Hyperlipidemia LDL goal <100     Hypertension goal BP (blood pressure) < 140/90     Hypogonadism     Obesity     PAD (peripheral artery disease) (H)     leg cramps, with exertion, no formal diagnosis of PAD and minimal if any symptoms at all.    RA (rheumatoid arthritis) (H)     Dr Epsteinay    Syncope        Past Surgical History:   Procedure Laterality Date    BYPASS GRAFT FEMOROPOPLITEAL Left 01/07/2021    Procedure: LEFT FEMORAL TO ABOVE KNEE POPLITEAL ARTERY BYPASS WITH PTFE VASCULAR GRAFT REMOVABLE RING 6MMX 50CM;  Surgeon: Myra Lopes MD;  [facility]    CATARACT IOL, RT/LT      COLONOSCOPY  03/01/2018    [facility]    COMBINED REPAIR PTOSIS WITH BLEPHAROPLASTY BILATERAL Bilateral 08/16/2019    Procedure: BILATERAL UPPER EYELID BLEPHAROPLASTY AND BILATERAL PTOSIS REPAIR;  Surgeon: Janet Garcia MD;  [facility]    ENDARTERECTOMY FEMORAL Left 01/07/2021    Procedure: LEFT FEMORAL ENDARTERECTOMY WITH PATCH ANGIOPLASTY  PHOTOFIX  0.8 X 8CM;  Surgeon: Myra Lopes MD;  [facility]    EP PACEMAKER  2021    EXCISE LESION EYELID Left 2019    Procedure: LEFT LOWER EYELID BIOPSY;  Surgeon: Janet Garcia MD;  [facility]: [facility]    IR LOWER EXTREMITY ANGIOGRAM LEFT  2020    PHACOEMULSIFICATION WITH STANDARD INTRAOCULAR LENS IMPLANT  2019; 3/2019    left eye; right eye    TONSILLECTOMY      ZZHC INCISION TENDON SHEATH FINGER  2009    r hand ring finger       Family History   Problem Relation Age of Onset    Cerebrovascular Disease Mother     Arthritis Mother     Osteoporosis Mother     Alzheimer Disease Father     Arthritis Father     Cancer Father     Diabetes Maternal Grandmother     Cardiovascular Maternal Grandmother     Other Cancer Brother     Cancer Paternal Aunt     Hypertension No family hx of     Thyroid Disease No family hx of     Glaucoma No family hx of     Macular Degeneration No family hx of        Social History     Tobacco Use    Smoking status: Former     Packs/day: 1.00     Years: 40.00     Pack years: 40.00     Types: Cigarettes     Quit date: 3/16/2007     Years since quittin.3    Smokeless tobacco: Never   Substance Use Topics    Alcohol use: No     Alcohol/week: 0.0 standard drinks of alcohol       Current Outpatient Medications   Medication    acetaminophen (TYLENOL) 500 MG tablet    Alcohol Swabs PADS    blood glucose (NO BRAND SPECIFIED) test strip    Blood Glucose Monitoring Suppl (ACCU-CHEK GUIDE) w/Device KIT    bumetanide (BUMEX) 1 MG tablet    cetirizine (ZYRTEC) 10 MG tablet    cyanocobalamin (VITAMIN B-12) 1000 MCG tablet    diclofenac (VOLTAREN) 1 % topical gel    dulaglutide (TRULICITY) 1.5 MG/0.5ML pen    ferrous sulfate (FEROSUL) 325 (65 Fe) MG tablet    folic acid (FOLVITE) 1 MG tablet    glimepiride (AMARYL) 1 MG tablet    HYDROmorphone (DILAUDID) 2 MG tablet    hydroxychloroquine (PLAQUENIL) 200 MG tablet    insulin glargine (LANTUS VIAL) 100 UNIT/ML vial  "   insulin syringe-needle U-100 (30G X 1/2\" 0.5 ML) 30G X 1/2\" 0.5 ML miscellaneous    Insulin Syringe-Needle U-100 28G X 1/2\" 0.5 ML MISC    Irrigation Supplies MISC    methocarbamol (ROBAXIN) 500 MG tablet    metoprolol succinate ER (TOPROL-XL) 50 MG 24 hr tablet    NOVOFINE AUTOCOVER PEN NEEDLE 30G X 8 MM miscellaneous    omeprazole 20 MG tablet    polyethylene glycol-propylene glycol (SYSTANE) 0.4-0.3 % SOLN ophthalmic solution    pravastatin (PRAVACHOL) 10 MG tablet    sertraline (ZOLOFT) 50 MG tablet    syringe/needle, disp, (BD ECLIPSE SYRINGE) 25G X 1\" 3 ML MISC    WARFARIN SODIUM PO    bisacodyl (DULCOLAX) 10 MG suppository    hydrALAZINE (APRESOLINE) 10 MG tablet    senna-docusate (SENOKOT-S/PERICOLACE) 8.6-50 MG tablet     No current facility-administered medications for this visit.          Allergies   Allergen Reactions    Seasonal Allergies     Blood-Group Specific Substance Other (See Comments)     Patient has a Non-specific antibody. Blood products may be delayed. Draw patient 24 hours prior to transfusion. For [facility] testing, draw one red top and two purple top tubes for all Type and Screen orders.  Patient has a Non-specific antibody. Blood products may be delayed. Draw patient 24 hours prior to transfusion. For [facility] testing, draw one red top and two purple top tubes for all Type and Screen orders.         PHYSICAL EXAM:  /77   Pulse 68   Temp 97.8  F (36.6  C) (Oral)   Resp 18   Wt 99.1 kg (218 lb 8 oz)   SpO2 96%   BMI 32.27 kg/m    GEN: appears well, in no acute distress  HEENT: normocephalic and atraumatic, EOMI, anicteric sclerae  CV: RRR  RESP: normal respiration on room air, no stridor, CTAB  LYMPH: No supraclavicular or infraclavicular lymphadenopathy appreciated.  SKIN: normal color and turgor  PSYCH: appropriate mood, affect, and judgment    All pertinent laboratory, imaging, and pathology findings have been reviewed.     7/21/23 PET/CT IMPRESSION:   1. Mildly " decreased metabolic activity and size of right upper lobe pulmonary lesion compared to 4/15/2023 PET, which is most compatible with evolving postradiation changes. Recommend continued attention on follow-up.   2. New 0.5 cm solid pulmonary nodule in left lower lobe is indeterminate, probably infective/inflammatory; attention on follow-up. Unchanged irregular 0.6 cm right upper lobe pulmonary nodule remains below the size threshold for FDG uptake.    3. No evidence of jame disease or distant metastasis.   4. Redemonstrated mildly avid, focal cutaneous thickening in the left mid lateral and distal thigh; correlate with direct visualization.  5. Additional chronic/ancillary findings as detailed in the body of the report.     IMPRESSION/RECOMMENDATION:  78 year old gentleman with multiple comorbidities and a pacemaker diagnosed with [id number], stage 1 lung cancer of the RUL status post stereotactic body radiation therapy completed on August 26, 2022. He is doing well with no significant radiation toxicity.  His most scan shows no definitive evidence of recurrence or new disease.  New left lower lobe nodule is indeterminate.  We will resume repeat every 3 month chest CTs with in person or video follow up.       Thank you for allowing me to participate in the care of this pleasant patient. If you have any questions, please do not hesitate to contact my office.    Lalita Funez MD  Attending Physician  Radiation Oncology

## 2023-07-28 ENCOUNTER — MEDICAL CORRESPONDENCE (OUTPATIENT)
Dept: HEALTH INFORMATION MANAGEMENT | Facility: CLINIC | Age: 78
End: 2023-07-28

## 2023-07-31 ENCOUNTER — LAB (OUTPATIENT)
Dept: LAB | Facility: CLINIC | Age: 78
End: 2023-07-31
Payer: COMMERCIAL

## 2023-07-31 DIAGNOSIS — T14.8XXA WOUND INFECTION: ICD-10-CM

## 2023-07-31 DIAGNOSIS — L08.9 WOUND INFECTION: ICD-10-CM

## 2023-07-31 DIAGNOSIS — I74.2 EMBOLISM AND THROMBOSIS OF ARTERIES OF THE UPPER EXTREMITIES (H): ICD-10-CM

## 2023-07-31 DIAGNOSIS — M05.79 RHEUMATOID ARTHRITIS INVOLVING MULTIPLE SITES WITH POSITIVE RHEUMATOID FACTOR (H): ICD-10-CM

## 2023-07-31 PROCEDURE — 36415 COLL VENOUS BLD VENIPUNCTURE: CPT

## 2023-07-31 PROCEDURE — 85610 PROTHROMBIN TIME: CPT

## 2023-07-31 RX ORDER — WARFARIN SODIUM 1 MG/1
TABLET ORAL DAILY
OUTPATIENT
Start: 2023-07-31

## 2023-07-31 RX ORDER — FERROUS SULFATE 325(65) MG
325 TABLET ORAL DAILY
OUTPATIENT
Start: 2023-07-31

## 2023-07-31 RX ORDER — CETIRIZINE HYDROCHLORIDE 10 MG/1
10 TABLET ORAL AT BEDTIME
OUTPATIENT
Start: 2023-07-31

## 2023-07-31 RX ORDER — FOLIC ACID 1 MG/1
1 TABLET ORAL DAILY
Qty: 100 TABLET | Refills: 2 | OUTPATIENT
Start: 2023-07-31

## 2023-07-31 RX ORDER — BUMETANIDE 1 MG/1
1 TABLET ORAL DAILY
OUTPATIENT
Start: 2023-07-31

## 2023-07-31 RX ORDER — GLIMEPIRIDE 1 MG/1
1 TABLET ORAL DAILY
OUTPATIENT
Start: 2023-07-31

## 2023-07-31 RX ORDER — METOPROLOL SUCCINATE 50 MG/1
50 TABLET, EXTENDED RELEASE ORAL DAILY
OUTPATIENT
Start: 2023-07-31

## 2023-07-31 RX ORDER — LANOLIN ALCOHOL/MO/W.PET/CERES
1000 CREAM (GRAM) TOPICAL WEEKLY
OUTPATIENT
Start: 2023-07-31

## 2023-07-31 RX ORDER — ACETAMINOPHEN 500 MG
1000 TABLET ORAL DAILY
OUTPATIENT
Start: 2023-07-31

## 2023-07-31 NOTE — TELEPHONE ENCOUNTER
Patient moved back to [facility] and need her scripts transferred back to us. He also needs reifll on lipitor.  Thank you   Tatyana Godinez. Pharmacy Technician   [facility]

## 2023-08-01 ENCOUNTER — DOCUMENTATION ONLY (OUTPATIENT)
Dept: FAMILY MEDICINE | Facility: CLINIC | Age: 78
End: 2023-08-01

## 2023-08-01 DIAGNOSIS — I74.2 EMBOLISM AND THROMBOSIS OF ARTERIES OF THE UPPER EXTREMITIES (H): Primary | ICD-10-CM

## 2023-08-01 RX ORDER — HYDROXYCHLOROQUINE SULFATE 200 MG/1
200 TABLET, FILM COATED ORAL DAILY
Qty: 90 TABLET | Refills: 2 | Status: SHIPPED | OUTPATIENT
Start: 2023-08-01 | End: 2023-09-26

## 2023-08-01 RX ORDER — SYRINGE-NEEDLE,INSULIN,0.5 ML 27GX1/2"
SYRINGE, EMPTY DISPOSABLE MISCELLANEOUS
OUTPATIENT
Start: 2023-08-01

## 2023-08-01 NOTE — PROGRESS NOTES
Pt came to lab for an INR lab draw.  Lab did not have any orders. He does not see INR clinic at .  [facility] has been doing his INR draws per CELESTE Ibrahim.  I have called twice for them to fax us an order. Both the Assistant and Manager have not replied to my voicemails.  We have the specimen frozen waiting for an order.  This patient has an appointment with you next week. Would you be willing to order an INR for us?  Thanks.

## 2023-08-02 ENCOUNTER — TELEPHONE (OUTPATIENT)
Dept: FAMILY MEDICINE | Facility: CLINIC | Age: 78
End: 2023-08-02
Payer: COMMERCIAL

## 2023-08-02 DIAGNOSIS — R10.84 ABDOMINAL PAIN, GENERALIZED: ICD-10-CM

## 2023-08-02 DIAGNOSIS — T14.8XXA OPEN WOUND: ICD-10-CM

## 2023-08-02 DIAGNOSIS — M05.79 RHEUMATOID ARTHRITIS INVOLVING MULTIPLE SITES WITH POSITIVE RHEUMATOID FACTOR (H): ICD-10-CM

## 2023-08-02 DIAGNOSIS — K59.00 CONSTIPATION, UNSPECIFIED CONSTIPATION TYPE: ICD-10-CM

## 2023-08-02 DIAGNOSIS — K21.9 GASTROESOPHAGEAL REFLUX DISEASE, UNSPECIFIED WHETHER ESOPHAGITIS PRESENT: Primary | ICD-10-CM

## 2023-08-02 DIAGNOSIS — D64.9 ANEMIA, UNSPECIFIED TYPE: ICD-10-CM

## 2023-08-02 DIAGNOSIS — E78.5 HYPERLIPIDEMIA LDL GOAL <100: ICD-10-CM

## 2023-08-02 DIAGNOSIS — I10 HYPERTENSION GOAL BP (BLOOD PRESSURE) < 140/90: ICD-10-CM

## 2023-08-02 DIAGNOSIS — T14.8XXA WOUND INFECTION: ICD-10-CM

## 2023-08-02 DIAGNOSIS — E11.42 TYPE 2 DIABETES MELLITUS WITH DIABETIC POLYNEUROPATHY, WITHOUT LONG-TERM CURRENT USE OF INSULIN (H): ICD-10-CM

## 2023-08-02 DIAGNOSIS — F32.A DEPRESSION, UNSPECIFIED DEPRESSION TYPE: ICD-10-CM

## 2023-08-02 DIAGNOSIS — N18.32 STAGE 3B CHRONIC KIDNEY DISEASE (H): ICD-10-CM

## 2023-08-02 DIAGNOSIS — R79.89 LOW VITAMIN B12 LEVEL: ICD-10-CM

## 2023-08-02 DIAGNOSIS — L08.9 WOUND INFECTION: ICD-10-CM

## 2023-08-02 NOTE — TELEPHONE ENCOUNTER
Patient's wife is requesting that an order for Warfarin be sent to the [facility].    Thank you,  Kelvin Elizondo PharmD - Float Pharmacist, on behalf of [facility]

## 2023-08-03 DIAGNOSIS — R79.89 LOW VITAMIN B12 LEVEL: Primary | ICD-10-CM

## 2023-08-03 LAB — INR PPP: 1.46 (ref 0.85–1.15)

## 2023-08-03 RX ORDER — NICOTINE POLACRILEX 4 MG/1
20 GUM, CHEWING ORAL DAILY
Qty: 30 TABLET | Refills: 0 | Status: SHIPPED | OUTPATIENT
Start: 2023-08-03 | End: 2023-08-25

## 2023-08-03 RX ORDER — INSULIN GLARGINE 100 [IU]/ML
25 INJECTION, SOLUTION SUBCUTANEOUS AT BEDTIME
Qty: 10 ML | Refills: 0 | Status: SHIPPED | OUTPATIENT
Start: 2023-08-03 | End: 2023-08-11

## 2023-08-03 RX ORDER — LANOLIN ALCOHOL/MO/W.PET/CERES
1000 CREAM (GRAM) TOPICAL WEEKLY
Qty: 30 TABLET | Refills: 0 | Status: SHIPPED | OUTPATIENT
Start: 2023-08-03 | End: 2024-03-15

## 2023-08-03 RX ORDER — POLYETHYLENE GLYCOL 400 AND PROPYLENE GLYCOL 4; 3 MG/ML; MG/ML
1 SOLUTION/ DROPS OPHTHALMIC 2 TIMES DAILY PRN
Qty: 3 ML | Refills: 0 | Status: SHIPPED | OUTPATIENT
Start: 2023-08-03

## 2023-08-03 RX ORDER — PRAVASTATIN SODIUM 10 MG
10 TABLET ORAL DAILY
Qty: 30 TABLET | Refills: 0 | Status: SHIPPED | OUTPATIENT
Start: 2023-08-03 | End: 2023-08-25

## 2023-08-03 RX ORDER — CETIRIZINE HYDROCHLORIDE 10 MG/1
10 TABLET ORAL AT BEDTIME
Qty: 30 TABLET | Refills: 0 | Status: SHIPPED | OUTPATIENT
Start: 2023-08-03

## 2023-08-03 RX ORDER — FOLIC ACID 1 MG/1
1 TABLET ORAL DAILY
Qty: 100 TABLET | Refills: 2 | Status: SHIPPED | OUTPATIENT
Start: 2023-08-03 | End: 2023-09-26

## 2023-08-03 RX ORDER — HYDROMORPHONE HYDROCHLORIDE 2 MG/1
2 TABLET ORAL 2 TIMES DAILY PRN
Qty: 10 TABLET | Refills: 0 | Status: SHIPPED | OUTPATIENT
Start: 2023-08-03 | End: 2024-08-23

## 2023-08-03 RX ORDER — BUMETANIDE 1 MG/1
1 TABLET ORAL DAILY
Qty: 30 TABLET | Refills: 0 | Status: SHIPPED | OUTPATIENT
Start: 2023-08-03 | End: 2023-08-25

## 2023-08-03 RX ORDER — LANOLIN ALCOHOL/MO/W.PET/CERES
1000 CREAM (GRAM) TOPICAL WEEKLY
Qty: 90 TABLET | Refills: 3 | Status: SHIPPED | OUTPATIENT
Start: 2023-08-03 | End: 2023-10-23

## 2023-08-03 RX ORDER — METOPROLOL SUCCINATE 50 MG/1
50 TABLET, EXTENDED RELEASE ORAL DAILY
Qty: 30 TABLET | Refills: 0 | Status: SHIPPED | OUTPATIENT
Start: 2023-08-03 | End: 2023-08-25

## 2023-08-03 RX ORDER — FERROUS SULFATE 325(65) MG
325 TABLET ORAL DAILY
Qty: 30 TABLET | Refills: 0 | Status: SHIPPED | OUTPATIENT
Start: 2023-08-03 | End: 2023-08-25

## 2023-08-03 RX ORDER — HYDROXYCHLOROQUINE SULFATE 200 MG/1
200 TABLET, FILM COATED ORAL DAILY
Qty: 90 TABLET | Refills: 2 | OUTPATIENT
Start: 2023-08-03

## 2023-08-03 RX ORDER — GLIMEPIRIDE 1 MG/1
1 TABLET ORAL DAILY
Qty: 30 TABLET | Refills: 0 | Status: SHIPPED | OUTPATIENT
Start: 2023-08-03 | End: 2023-08-25

## 2023-08-03 RX ORDER — ACETAMINOPHEN 500 MG
1000 TABLET ORAL DAILY
Qty: 60 TABLET | Refills: 0 | Status: SHIPPED | OUTPATIENT
Start: 2023-08-03 | End: 2023-08-25

## 2023-08-03 NOTE — TELEPHONE ENCOUNTER
See prior encounter, states pt was discharged from [facility] group    Please advise     Appointments in Next Year      Aug 03, 2023  2:00 PM  (Arrive by 1:50 PM)  Return - Medical Marijuana with Kapil Duckworth MD  [facility] ([facility] ) [phone]     Aug 11, 2023  2:20 PM  (Arrive by 2:00 PM)  Provider Visit with Kapil Duckworth MD  [facility] ) [phone]     Jan 17, 2024  1:20 PM  (Arrive by 1:05 PM)  Return Visit with Albert Tompkins MD  [facility] ([facility] ) [phone]

## 2023-08-03 NOTE — TELEPHONE ENCOUNTER
Need to check-in with patient     I was given a tremendous wall of prescriptions to fill 15 in all , many with no appropriate diagnoses attached  I used my best guesses but some of the diagnosis might need to change [ such as why are we prescribing  Zyrtec [ cetirizine ] ]  I filled all prescriptions except for hydroxychloroquine (PLAQUENIL) as this is a disease modifying anti-rheumatologic drug [DMARD] and I am not managing his rheumatoid arthritis. He's going to need a rheumatological consultation if this is not already occurring. If it is an emergency I could agree to a month of medication only,  Please review with patient and reroute as necessary     Also ask why did he no show today, he and his spouse for appointments ? This was a courtesy refill and I could not continue with this by any means    Kapil Duckworth MD

## 2023-08-03 NOTE — TELEPHONE ENCOUNTER
Dr. Duckworth,    Patient returned call. Per pt he used to see physician through [facility], however, has been discharged from their care and has transitioned to [facility] assisted living (right across from us). Per pt now that he is living close to our clinic he would like to resume seeing you as his primary. His last visit with you was July of 2022 so I helped schedule him for a med follow up next Friday the 11th.     In the meantime patient needs refills on medications cued. He also needs refills on his warfarin and the INR results are in chart. Do you want to enter referral for INR clinic? Can you also prescribe his warfarin until he is able to be seen by you next week? Can he have veronica fills?    Please advise. All cued expect warfarin as I don't know what dose he needs.     Thanks,  DECLAN Sosa  [facility]

## 2023-08-03 NOTE — TELEPHONE ENCOUNTER
Kapil Carbajal has not been managing the warfarin.  Kapil Carbajal placed the order for the INR on 7/31/2023 because the nursing facility never sent the order to the lab.  No results in chart yet.  Spoke with lab and they will be sending the sample out to be tested today.    LM on spouse's VM (signed consent to communicate PHI on file)   Is patient still in the nursing facility? If so, [facility] (Dr. Suze Santa and Cecilia Amor, PAC) listed and refill request should go there.   If no longer under their cares, need to know current warfarin dose    Jacobo Pak RN  [facility]

## 2023-08-03 NOTE — TELEPHONE ENCOUNTER
Unaware of the dose and dosing information , please reroute with answers to these questions     Kapil Duckworth MD

## 2023-08-03 NOTE — TELEPHONE ENCOUNTER
This is a patient I had followed in the past for primary care for many many years. Then he'd been with [facility] for around 2 years     I do not understand the circumstances of how and why he'd not be with [facility] anymore. It's concerning     Assuming this is the case , that would explain why he was on the schedule to be seen today , he as well as his wife.    Lets get in touch with him and help to get him rescheduled    The medication is refilled    See what you can find out !    Reroute with answers to these questions     Kapil Duckworth MD

## 2023-08-04 ENCOUNTER — TELEPHONE (OUTPATIENT)
Dept: FAMILY MEDICINE | Facility: CLINIC | Age: 78
End: 2023-08-04
Payer: COMMERCIAL

## 2023-08-04 DIAGNOSIS — E11.42 TYPE 2 DIABETES MELLITUS WITH DIABETIC POLYNEUROPATHY, WITHOUT LONG-TERM CURRENT USE OF INSULIN (H): Primary | ICD-10-CM

## 2023-08-04 NOTE — TELEPHONE ENCOUNTER
Called patient and left a detailed voice message notifying patient that refills were sent. Requested call back if patient has any questions.    Pt has appointment scheduled for 08/11/2023 with Dr. Duckworth.    Romina Merchant RN   [facility]

## 2023-08-04 NOTE — TELEPHONE ENCOUNTER
Called patient. Left detailed voice message on identified voicemail box regarding provider's message, advised to return call at [phone] for any further questions and follow-up regarding provider's message.     Noted patient has an appointment scheduled on 8/11.    CARLOS Gordon RN  [facility]

## 2023-08-04 NOTE — TELEPHONE ENCOUNTER
Zurdo stopped in to  his prescriptions and he say's he uses the Lantus Solostar pen not the Lantus vial. We verified the directions and he said that was correct so we would just need a new prescription sent down with a change to the Pen if possible.  Thank You,  Bisi Teixeira, [facility] [place]

## 2023-08-07 ENCOUNTER — TRANSFERRED RECORDS (OUTPATIENT)
Dept: INTERNAL MEDICINE | Facility: CLINIC | Age: 78
End: 2023-08-07
Payer: COMMERCIAL

## 2023-08-11 ENCOUNTER — OFFICE VISIT (OUTPATIENT)
Dept: INTERNAL MEDICINE | Facility: CLINIC | Age: 78
End: 2023-08-11
Payer: COMMERCIAL

## 2023-08-11 VITALS
SYSTOLIC BLOOD PRESSURE: 149 MMHG | DIASTOLIC BLOOD PRESSURE: 79 MMHG | RESPIRATION RATE: 18 BRPM | WEIGHT: 220.4 LBS | HEART RATE: 68 BPM | OXYGEN SATURATION: 96 % | BODY MASS INDEX: 32.55 KG/M2

## 2023-08-11 DIAGNOSIS — Z79.899 MEDICAL MARIJUANA USE: ICD-10-CM

## 2023-08-11 DIAGNOSIS — E44.1 MILD PROTEIN-CALORIE MALNUTRITION (H): ICD-10-CM

## 2023-08-11 DIAGNOSIS — N18.31 STAGE 3A CHRONIC KIDNEY DISEASE (H): ICD-10-CM

## 2023-08-11 DIAGNOSIS — E11.42 DIABETIC POLYNEUROPATHY ASSOCIATED WITH TYPE 2 DIABETES MELLITUS (H): ICD-10-CM

## 2023-08-11 DIAGNOSIS — E11.9 TYPE 2 DIABETES MELLITUS WITHOUT RETINOPATHY (H): Primary | ICD-10-CM

## 2023-08-11 DIAGNOSIS — G83.9 PARESIS (H): ICD-10-CM

## 2023-08-11 DIAGNOSIS — D84.9 IMMUNOSUPPRESSION (H): ICD-10-CM

## 2023-08-11 DIAGNOSIS — I74.2 EMBOLISM AND THROMBOSIS OF ARTERIES OF THE UPPER EXTREMITIES (H): ICD-10-CM

## 2023-08-11 DIAGNOSIS — C34.11 MALIGNANT NEOPLASM OF UPPER LOBE OF RIGHT LUNG (H): ICD-10-CM

## 2023-08-11 PROBLEM — M86.9 OSTEOMYELITIS (H): Status: RESOLVED | Noted: 2023-01-12 | Resolved: 2023-08-11

## 2023-08-11 PROCEDURE — 99207 PR FOOT EXAM NO CHARGE: CPT | Performed by: INTERNAL MEDICINE

## 2023-08-11 PROCEDURE — 99215 OFFICE O/P EST HI 40 MIN: CPT | Performed by: INTERNAL MEDICINE

## 2023-08-11 RX ORDER — WARFARIN SODIUM 3 MG/1
1.5 TABLET ORAL DAILY
Qty: 45 TABLET | Refills: 1 | Status: SHIPPED | OUTPATIENT
Start: 2023-08-11 | End: 2023-10-06

## 2023-08-11 NOTE — PROGRESS NOTES
Assessment & Plan     Type 2 diabetes mellitus without retinopathy (H)  Because of the length, breadth, depth and complexity of this patient I was unable to feel fully cognizant with his past medical history and current functional problem list and I recommended patient come back for a further follow up appointment in around 1 months. I promised with the follow up appointment at my request in order to review a further follow up with patient. This patient is an elderly male who has multiple medical problems . I could not adequately absorb all the relevant issues at our first recheck appointment. I was able to do a much more significant review since then and will be much better prepared for follow up office visit .     - REVIEW OF HEALTH MAINTENANCE PROTOCOL ORDERS  - insulin glargine (LANTUS PEN) 100 UNIT/ML pen; Inject 24 Units Subcutaneous 2 times daily    Diabetic polyneuropathy associated with type 2 diabetes mellitus (H)  Problem is stable and ongoing monitoring    - REVIEW OF HEALTH MAINTENANCE PROTOCOL ORDERS  - FOOT EXAM    Stage 3a chronic kidney disease (H)  Problem is stable and ongoing monitoring    - REVIEW OF HEALTH MAINTENANCE PROTOCOL ORDERS    Mild protein-calorie malnutrition (H)  Problem is stable and ongoing monitoring    - REVIEW OF HEALTH MAINTENANCE PROTOCOL ORDERS    Immunosuppression (H)  Problem is stable and ongoing monitoring  , relates to rheumatoid arthritis   - REVIEW OF HEALTH MAINTENANCE PROTOCOL ORDERS    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    Weight as of this encounter: 100 kg (220 lb 6.4 oz).   Weight management plan: Discussed healthy diet and exercise guidelines        Kapil Duckworth MD  [facility] ADDIE Renner is a 78 year old, presenting for the following health issues:  Recheck Medication         No data to display                HPI       Patient has relocated to the [facility], assisted care living right next to our clinic, moved from [facility]. He had been receiving in-home medical cares from [facility] with his prior assisted care living facility but now he seeks to re-establish with me. I have known this this patient greater then 10 years long although I have not managed his primary care for roughly 2 years and there's a lot of ground to cover.     Toe amputation happened February 2023, due to diabetic foot ulcer and vascular disease. He had come close to a below the knee amputation due to his diabetic toe ulcer , but the infection was brought under control just with a toe amputation. This has healed without difficulty. This was an amazingly good turn of events as he had apparently been signed up for the below the knee amputation and he is in good cheer about it still.    Dense diabetes neuropathy and hurts to try and walk. Not currently due for hemoglobin a1c  [ diabetes test ] . I refilled around 15 medications a few days ago and expected to see patient for this " appointment soon. Here with spouse Yoli also my patient re-establishing also.     Lab Results   Component Value Date    A1C 7.8 06/27/2023    A1C 8.2 01/10/2023    A1C 7.1 07/28/2022    A1C 9.7 02/01/2022    A1C 5.6 08/16/2021    A1C 6.8 01/07/2021    A1C 6.8 12/17/2020    A1C 6.7 12/11/2020    A1C 6.0 01/03/2020    A1C 6.4 08/06/2019     Still sees Dr. Albert Tompkins, rheumatologist with [facility] for his rheumatoid arthritis  last appointment was 7/12/2023    His primary health care provider with [facility] was Dr Suze Santa and last appointment there was also in July 2023, issues followed included discussion of his     Acquired hypercoagulable state , lung cancer, diabetes neuropathy , medical cannabis use, diabetes mellitus type 2 , toe amputation status, gastroesophageal reflux disease, multiple osteoarthritis joints and rheumatoid arthritis , hammer toe of left foot, hypertension. 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 uJstin Avendano with [facility] was 6-     Echocardiogram March 2023 demonstrated an LVEF of 55 to 60% with normal LV size and systolic function.   Last cardiology consultation appears to have been 5- with Dr. Hurtado.with [facility] [place]     Cardiac MR     PET Myocardial Perfusion Scan 3/30/2023  PERFUSION:  Abnormal relative myocardial  perfusion.  Moderate-sized, fixed defect of mild intensity involving mid anterior and apical segments, suggestive of infarction.   No significant regional ischemia.   Summed difference score of 0; low risk finding.   Transient ischemic dilatation (calculated at 1.34/ visually left ventricular cavity appears larger with stress as well); high risk finding.  FUNCTION:  Normal left ventricular size; mildly reduced calculated left ventricular ejection fraction if 46%. Global left ventricular hypokinesis, mid to apical anterior wall appears more hypokinetic.   Left Ventricular Ejection Fraction remained unchanged with stress.  CORONARY CALCIUM:  Coronary artery disease (severe calcified atheroma).     In January 26, 2023 he saw Infectious Disease specialist Dr. Elise Modi at the [facility] for follow up of his toe amputation and review of antibiotics choices. He had been days away from a below the knee amputation. His vascular situation is detailed below from note from cardiology  1/19/023 -     This is a 77 year-old male with a history of Diabetes, hypertension, chronic kidney disease and peripheral arterial disease. He originally saw Dr. Lopes at [place], who is his primary vascular surgeon, and had undergone extensive revascularization with her. He ultimately ended up having an extensive infection requiring an explant of his left fem-pop bypass with Dr. Miller in April of 2022. Closure was assisted by plastic surgery and he required a vertical rectus muscle flap. Since that time, flow in his foot has been from collaterals coming off of his profunda. He has a chronic left toe wound that he had for many months, and for which he presented to the hospital on 01/13/2022 due to likely osteomyelitis. He did have increased toe pain at that time as well. We did discuss with him at that time that unfortunately there are no further vascular interventions that we can offer him. Thus, his  options were to continue local wound care and suppressive antibiotics, proceed with a toe amputation or proceed with a left above the knee amputation. Ultimately, he underwent a left second digit amputation by Dr. Ward on 01/16/2023. He follows up today to discuss possible above the knee amputation.     Wt Readings from Last 5 Encounters:   08/11/23 100 kg (220 lb 6.4 oz)   07/24/23 99.1 kg (218 lb 8 oz)   07/12/23 100.2 kg (221 lb)   03/27/23 98.8 kg (217 lb 12.8 oz)   01/02/23 99 kg (218 lb 4.8 oz)     BP Readings from Last 6 Encounters:   08/11/23 (!) 149/79   07/24/23 121/77   07/12/23 103/68   05/12/23 125/80   03/27/23 116/76   01/02/23 119/74     Last appointment with me was 7-28-22.     Main issues he has a diagnosis of a malignant neoplasm of the right upper lobe. This is called stage 1 in the office visit notes and he's received radiation therapy from Dr. Funez., this was complicated 8-26-22 and seen in follow up 7-24-23 , a follow up chest CT scan is set up for October 2023. -     7/21/23 PET/CT IMPRESSION:   1. Mildly decreased metabolic activity and size of right upper lobe pulmonary lesion compared to 4/15/2023 PET, which is most compatible with evolving postradiation changes. Recommend continued attention on follow-up.   2. New 0.5 cm solid pulmonary nodule in left lower lobe is indeterminate, probably infective/inflammatory; attention on follow-up. Unchanged irregular 0.6 cm right upper lobe pulmonary nodule remains below the size threshold for FDG uptake.    3. No evidence of jame disease or distant metastasis.   4. Redemonstrated mildly avid, focal cutaneous thickening in the left mid lateral and distal thigh; correlate with direct visualization.  5. Additional chronic/ancillary findings as detailed in the body of the report.     IMPRESSION/RECOMMENDATION:  78 year old gentleman with multiple comorbidities and a pacemaker diagnosed with [id number], stage 1 lung cancer of the RUL status post  stereotactic body radiation therapy completed on August 26, 2022. He is doing well with no significant radiation toxicity.  His most scan shows no definitive evidence of recurrence or new disease.  New left lower lobe nodule is indeterminate.  We will resume repeat every 3 month chest CTs with in person or video follow up.        Thank you for allowing me to participate in the care of this pleasant patient. If you have any questions, please do not hesitate to contact my office.     Lalita Funez MD  Attending Physician  Radiation Oncology      Review of Systems   Constitutional, HEENT, cardiovascular, pulmonary, gi and gu systems are negative, except as otherwise noted.      Objective    BP (!) 149/79   Pulse 68   Resp 18   Wt 100 kg (220 lb 6.4 oz)   SpO2 96%   BMI 32.55 kg/m    Body mass index is 32.55 kg/m .  Physical Exam   GENERAL: healthy, alert and no distress  EYES: Eyes grossly normal to inspection, PERRL and conjunctivae and sclerae normal  MS: missing left second toe secondary to amputation   NEURO: Normal strength and tone, mentation intact and speech normal  PSYCH: mentation appears normal, affect normal/bright    No orders of the defined types were placed in this encounter.

## 2023-08-14 ENCOUNTER — TELEPHONE (OUTPATIENT)
Dept: FAMILY MEDICINE | Facility: CLINIC | Age: 78
End: 2023-08-14
Payer: COMMERCIAL

## 2023-08-14 DIAGNOSIS — E11.42 TYPE 2 DIABETES MELLITUS WITH DIABETIC POLYNEUROPATHY, WITHOUT LONG-TERM CURRENT USE OF INSULIN (H): Primary | ICD-10-CM

## 2023-08-14 NOTE — TELEPHONE ENCOUNTER
Patient is requesting a refill on his freestyle sensor 2.  Thank you   Tatyana Godinez. Pharmacy Technician   [facility] [place]

## 2023-08-23 ENCOUNTER — TELEPHONE (OUTPATIENT)
Dept: INTERNAL MEDICINE | Facility: CLINIC | Age: 78
End: 2023-08-23
Payer: COMMERCIAL

## 2023-08-23 NOTE — TELEPHONE ENCOUNTER
Forms/Letter Request    Type of form/letter:  Diabetic shoes/inserts    Have you been seen for this request: N/A    Do we have the form/letter: Yes:     Who is the form from? [facility]    Where did/will the form come from? form was faxed in    When is form/letter needed by: ?    How would you like the form/letter returned: Fax : [phone]

## 2023-08-23 NOTE — TELEPHONE ENCOUNTER
Form from [facility] (podiatry notes from [facility] for Dr. Duckworth to co-sign and Statement of Certifying Physician) received and given to Dr. Duckworth to complete and sign.  Caren Telles,

## 2023-08-24 ENCOUNTER — MEDICAL CORRESPONDENCE (OUTPATIENT)
Dept: INTERNAL MEDICINE | Facility: CLINIC | Age: 78
End: 2023-08-24
Payer: COMMERCIAL

## 2023-08-24 NOTE — TELEPHONE ENCOUNTER
Form completed, signed by Dr. Duckworth and [facility] (f: [phone]).  Caren Telles,

## 2023-08-25 DIAGNOSIS — F32.A DEPRESSION, UNSPECIFIED DEPRESSION TYPE: ICD-10-CM

## 2023-08-25 DIAGNOSIS — D64.9 ANEMIA, UNSPECIFIED TYPE: ICD-10-CM

## 2023-08-25 DIAGNOSIS — N18.32 STAGE 3B CHRONIC KIDNEY DISEASE (H): ICD-10-CM

## 2023-08-25 DIAGNOSIS — T14.8XXA WOUND INFECTION: ICD-10-CM

## 2023-08-25 DIAGNOSIS — E11.42 TYPE 2 DIABETES MELLITUS WITH DIABETIC POLYNEUROPATHY, WITHOUT LONG-TERM CURRENT USE OF INSULIN (H): ICD-10-CM

## 2023-08-25 DIAGNOSIS — L08.9 WOUND INFECTION: ICD-10-CM

## 2023-08-25 DIAGNOSIS — I10 HYPERTENSION GOAL BP (BLOOD PRESSURE) < 140/90: ICD-10-CM

## 2023-08-25 DIAGNOSIS — E78.5 HYPERLIPIDEMIA LDL GOAL <100: ICD-10-CM

## 2023-08-25 DIAGNOSIS — K21.9 GASTROESOPHAGEAL REFLUX DISEASE, UNSPECIFIED WHETHER ESOPHAGITIS PRESENT: ICD-10-CM

## 2023-08-25 RX ORDER — METOPROLOL SUCCINATE 50 MG/1
50 TABLET, EXTENDED RELEASE ORAL DAILY
Qty: 90 TABLET | Refills: 1 | Status: SHIPPED | OUTPATIENT
Start: 2023-08-25 | End: 2023-08-28

## 2023-08-25 RX ORDER — PRAVASTATIN SODIUM 10 MG
10 TABLET ORAL DAILY
Qty: 90 TABLET | Refills: 1 | Status: SHIPPED | OUTPATIENT
Start: 2023-08-25 | End: 2023-08-28

## 2023-08-25 RX ORDER — FERROUS SULFATE 325(65) MG
325 TABLET ORAL DAILY
Qty: 90 TABLET | Refills: 1 | Status: SHIPPED | OUTPATIENT
Start: 2023-08-25 | End: 2024-04-08

## 2023-08-25 RX ORDER — ACETAMINOPHEN 500 MG/1
1000 TABLET, FILM COATED ORAL DAILY
Qty: 180 TABLET | Refills: 1 | Status: SHIPPED | OUTPATIENT
Start: 2023-08-25 | End: 2024-03-15

## 2023-08-25 RX ORDER — BUMETANIDE 1 MG/1
1 TABLET ORAL DAILY
Qty: 90 TABLET | Refills: 1 | Status: SHIPPED | OUTPATIENT
Start: 2023-08-25 | End: 2023-08-28

## 2023-08-25 RX ORDER — GLIMEPIRIDE 1 MG/1
1 TABLET ORAL DAILY
Qty: 30 TABLET | Refills: 1 | Status: SHIPPED | OUTPATIENT
Start: 2023-08-25 | End: 2023-10-22

## 2023-08-28 ENCOUNTER — TELEPHONE (OUTPATIENT)
Dept: FAMILY MEDICINE | Facility: CLINIC | Age: 78
End: 2023-08-28
Payer: COMMERCIAL

## 2023-08-28 DIAGNOSIS — N18.32 STAGE 3B CHRONIC KIDNEY DISEASE (H): ICD-10-CM

## 2023-08-28 DIAGNOSIS — F32.A DEPRESSION, UNSPECIFIED DEPRESSION TYPE: ICD-10-CM

## 2023-08-28 DIAGNOSIS — E78.5 HYPERLIPIDEMIA LDL GOAL <100: ICD-10-CM

## 2023-08-28 DIAGNOSIS — K21.9 GASTROESOPHAGEAL REFLUX DISEASE, UNSPECIFIED WHETHER ESOPHAGITIS PRESENT: ICD-10-CM

## 2023-08-28 DIAGNOSIS — I10 HYPERTENSION GOAL BP (BLOOD PRESSURE) < 140/90: ICD-10-CM

## 2023-08-28 RX ORDER — BUMETANIDE 1 MG/1
1 TABLET ORAL DAILY
Qty: 100 TABLET | Refills: 1 | Status: SHIPPED | OUTPATIENT
Start: 2023-08-28 | End: 2024-03-26

## 2023-08-28 RX ORDER — PRAVASTATIN SODIUM 10 MG
10 TABLET ORAL DAILY
Qty: 100 TABLET | Refills: 1 | Status: SHIPPED | OUTPATIENT
Start: 2023-08-28 | End: 2024-03-26

## 2023-08-28 RX ORDER — METOPROLOL SUCCINATE 50 MG/1
50 TABLET, EXTENDED RELEASE ORAL DAILY
Qty: 100 TABLET | Refills: 1 | Status: SHIPPED | OUTPATIENT
Start: 2023-08-28 | End: 2024-03-26

## 2023-08-28 NOTE — TELEPHONE ENCOUNTER
Zurdo's insurance wants us to bill 100 days supply instead of 90 days supply. Are you able to resend his scripts for 100 days supply please.  Thank you   Tatyana Godinez. Pharmacy Technician   [facility]

## 2023-08-29 ENCOUNTER — TELEPHONE (OUTPATIENT)
Dept: ORTHOPEDICS | Facility: CLINIC | Age: 78
End: 2023-08-29
Payer: COMMERCIAL

## 2023-08-29 DIAGNOSIS — E11.42 TYPE 2 DIABETES MELLITUS WITH DIABETIC POLYNEUROPATHY, WITHOUT LONG-TERM CURRENT USE OF INSULIN (H): Primary | ICD-10-CM

## 2023-08-29 NOTE — TELEPHONE ENCOUNTER
[facility] Call Center    Phone Message    May a detailed message be left on voicemail: yes     Reason for Call: Other: Patient would like to speak with you regarding an appointment for Synvisc One injections in B knees.  Please call.  Thank you.     Action Taken: Other: [id number]    Travel Screening: Not Applicable

## 2023-08-30 NOTE — TELEPHONE ENCOUNTER
Tried calling patient and the message box was full so I could not leave message.     Alyse CASAS RN, [facility] 08/30/23 10:40 AM

## 2023-09-06 ENCOUNTER — TELEPHONE (OUTPATIENT)
Dept: RHEUMATOLOGY | Facility: CLINIC | Age: 78
End: 2023-09-06
Payer: COMMERCIAL

## 2023-09-06 NOTE — TELEPHONE ENCOUNTER
Spoke to patient:    Having pain in hands and a hard time moving his hands  This has gotten worse since he got off the methotrexate  He would like to come in and see Dr. Tompkins to discuss.   Patient scheduled 9/11 to see Dr. Tompkins.     Alyse CASAS RN, [facility] 09/06/23 3:04 PM

## 2023-09-06 NOTE — TELEPHONE ENCOUNTER
Called patient to follow up. He wants to see Dr. Jeronimo because it is closer to where he lives. Made an appointment for assessment and plan with Dr. Jeronimo.     Alyse CASAS RN, [facility] 09/06/23 2:07 PM

## 2023-09-06 NOTE — TELEPHONE ENCOUNTER
Patient is having an arthritis flare and can barely use his hand. He would like to be squeezed in ASAP to see Dr. Tompkins because there is no way he can wait until the next available in December.

## 2023-09-07 NOTE — TELEPHONE ENCOUNTER
We can provide some additional medications but would require follow up appointment to examine him and understand issues better , if he needs additional medications after this refill     Kapil Duckworth MD     (E4) spontaneous

## 2023-09-11 ENCOUNTER — OFFICE VISIT (OUTPATIENT)
Dept: RHEUMATOLOGY | Facility: CLINIC | Age: 78
End: 2023-09-11
Payer: COMMERCIAL

## 2023-09-11 VITALS
HEART RATE: 83 BPM | WEIGHT: 212.8 LBS | SYSTOLIC BLOOD PRESSURE: 130 MMHG | BODY MASS INDEX: 31.43 KG/M2 | OXYGEN SATURATION: 94 % | DIASTOLIC BLOOD PRESSURE: 81 MMHG | RESPIRATION RATE: 18 BRPM

## 2023-09-11 DIAGNOSIS — Z79.899 HIGH RISK MEDICATION USE: ICD-10-CM

## 2023-09-11 DIAGNOSIS — M05.79 RHEUMATOID ARTHRITIS INVOLVING MULTIPLE SITES WITH POSITIVE RHEUMATOID FACTOR (H): Primary | ICD-10-CM

## 2023-09-11 LAB
ALBUMIN SERPL BCG-MCNC: 4 G/DL (ref 3.5–5.2)
ALP SERPL-CCNC: 55 U/L (ref 40–129)
ALT SERPL W P-5'-P-CCNC: 11 U/L (ref 0–70)
AST SERPL W P-5'-P-CCNC: 23 U/L (ref 0–45)
BASOPHILS # BLD AUTO: 0.1 10E3/UL (ref 0–0.2)
BASOPHILS NFR BLD AUTO: 1 %
BILIRUB DIRECT SERPL-MCNC: <0.2 MG/DL (ref 0–0.3)
BILIRUB SERPL-MCNC: 0.4 MG/DL
CREAT SERPL-MCNC: 1.97 MG/DL (ref 0.67–1.17)
CRP SERPL-MCNC: 26 MG/L
EGFRCR SERPLBLD CKD-EPI 2021: 34 ML/MIN/1.73M2
EOSINOPHIL # BLD AUTO: 0.4 10E3/UL (ref 0–0.7)
EOSINOPHIL NFR BLD AUTO: 6 %
ERYTHROCYTE [DISTWIDTH] IN BLOOD BY AUTOMATED COUNT: 13.1 % (ref 10–15)
ERYTHROCYTE [SEDIMENTATION RATE] IN BLOOD BY WESTERGREN METHOD: 63 MM/HR (ref 0–20)
HCT VFR BLD AUTO: 41.5 % (ref 40–53)
HGB BLD-MCNC: 13.2 G/DL (ref 13.3–17.7)
IMM GRANULOCYTES # BLD: 0 10E3/UL
IMM GRANULOCYTES NFR BLD: 0 %
LYMPHOCYTES # BLD AUTO: 0.9 10E3/UL (ref 0.8–5.3)
LYMPHOCYTES NFR BLD AUTO: 14 %
MCH RBC QN AUTO: 29.3 PG (ref 26.5–33)
MCHC RBC AUTO-ENTMCNC: 31.8 G/DL (ref 31.5–36.5)
MCV RBC AUTO: 92 FL (ref 78–100)
MONOCYTES # BLD AUTO: 0.8 10E3/UL (ref 0–1.3)
MONOCYTES NFR BLD AUTO: 12 %
NEUTROPHILS # BLD AUTO: 4.5 10E3/UL (ref 1.6–8.3)
NEUTROPHILS NFR BLD AUTO: 68 %
PLATELET # BLD AUTO: 254 10E3/UL (ref 150–450)
PROT SERPL-MCNC: 7.6 G/DL (ref 6.4–8.3)
RBC # BLD AUTO: 4.51 10E6/UL (ref 4.4–5.9)
WBC # BLD AUTO: 6.6 10E3/UL (ref 4–11)

## 2023-09-11 PROCEDURE — 80076 HEPATIC FUNCTION PANEL: CPT | Performed by: INTERNAL MEDICINE

## 2023-09-11 PROCEDURE — 86704 HEP B CORE ANTIBODY TOTAL: CPT | Performed by: INTERNAL MEDICINE

## 2023-09-11 PROCEDURE — 86803 HEPATITIS C AB TEST: CPT | Performed by: INTERNAL MEDICINE

## 2023-09-11 PROCEDURE — 85025 COMPLETE CBC W/AUTO DIFF WBC: CPT | Performed by: INTERNAL MEDICINE

## 2023-09-11 PROCEDURE — 82565 ASSAY OF CREATININE: CPT | Performed by: INTERNAL MEDICINE

## 2023-09-11 PROCEDURE — 99214 OFFICE O/P EST MOD 30 MIN: CPT | Performed by: INTERNAL MEDICINE

## 2023-09-11 PROCEDURE — 85652 RBC SED RATE AUTOMATED: CPT | Performed by: INTERNAL MEDICINE

## 2023-09-11 PROCEDURE — 87340 HEPATITIS B SURFACE AG IA: CPT | Performed by: INTERNAL MEDICINE

## 2023-09-11 PROCEDURE — 86140 C-REACTIVE PROTEIN: CPT | Performed by: INTERNAL MEDICINE

## 2023-09-11 PROCEDURE — 36415 COLL VENOUS BLD VENIPUNCTURE: CPT | Performed by: INTERNAL MEDICINE

## 2023-09-11 NOTE — PROGRESS NOTES
Rheumatology Clinic Visit      Zurdo Dash MRN# [id number]   YOB: 1945 Age: 78 year old      Date of visit: 9/11/23   PCP: Dr. Kapil Duckworth     Chief Complaint   Patient presents with:  Rheumatoid Arthritis: Flare in hands and knees are bad    Assessment and Plan      1. Seropositive nonerosive rheumatoid arthritis (, CCP 64): Previously on MTX (was well tolerated, but later with cytopenias likely related to worsening creatinine with subsequent MTX toxicity).  Then presented with mild synovitis so hydroxychloroquine was restarted and he was doing well for a while but now with synovitis and again.  Avoiding leflunomide because of pre-existing bilateral lower extremity neuropathy.  Discussed with allergy, azathioprine, biologic DMARD, and restarting methotrexate; he would like to restart methotrexate.  He notes that he was on methotrexate for about 15 years before it was discontinued.  More active inflammatory Tritus symptoms now so methotrexate will be started as long as labs are okay.   Chronic illness, progressive.     - Continue hydroxychloroquine 200mg daily (last eye exam performed by Dr. Avendano on 5/19/2023)  - If labs are okay: Start methotrexate 12.5 mg once weekly and folic acid 1 mg daily  - Labs today: CBC, Creatinine, Hepatic Panel, ESR, CRP  - Labs monthly [id number]: CBC, Cr, Hepatic Panel  - Labs in 3 months: CBC, Creatinine, Hepatic Panel, ESR, CRP    High risk medication requiring intensive toxicity monitoring at least quarterly.      # Methotrexate Risks and Benefits: The risks and benefits of methotrexate were discussed in detail and the patient verbalized understanding.  The risks discussed include, but are not limited to, the risk for hypersensitivity, anaphylaxis, anaphylactoid reactions, infections, bone marrow suppression, renal toxicity, hepatotoxicity, pulmonary toxicity, malignancy, impaired fertility, GI upset, alopecia, and oral and nasal sores.  Folic acid  supplementation is recommended during methotrexate therapy to help prevent some of the side effects. Pregnancy prevention and planning was discussed; it is recommended that women of childbearing potential use reliable contraception during therapy.  The risks of taking both methotrexate and alcohol were reviewed; complete alcohol avoidance was discussed.  Routine laboratory monitoring is required during methotrexate therapy. Taking MTX once weekly, all within a 24 hour period was stressed and the patient verbalized this instruction back to me.  I encouraged reviewing the package insert and asking any questions about the medication.    2. Bilateral knee osteoarthritis / patellofemoral syndrome: Following with orthopedics where Synvisc injections are providing some benefit.    Total minutes spent in evaluation with patient, documentation, , and review of pertinent studies and chart notes: 20      Mr. Dash verbalized agreement with and understanding of the rational for the diagnosis and treatment plan.  All questions were answered to best of my ability and the patient's satisfaction. Mr. Dash was advised to contact the clinic with any questions that may arise after the clinic visit.      Thank you for involving me in the care of the patient    Return to clinic: 3 months      HPI   Zurdo Dash is a 78 year old male with a medical history significant for hypertension, hyperlipidemia, diabetes, diabetic neuropathy, GERD, pacemaker, lung cancer, rheumatoid arthritis who presents for f/u of RA, sooner than previously scheduled because of recent hospitalization     7/12/2023: Intra-articular steroid injection for bilateral knee osteoarthritis was effective but caused a blood glucose in the 600s and therefore he went to see orthopedics at the [facility] for a Synvisc injection in June 2023 with improvement of his knee pain.  He would like to plan a repeat Synvisc injection 6 months afterwards and  would like a referral to orthopedics here at the [place] location.  Other joints are doing well.  Morning stiffness for no more than 20 minutes.    Today, 9/11/2023: Worsening inflammatory arthritis symptoms at the MCPs that are worse in the morning improved with time and activity.  Mild swelling at the MCPs bilaterally.  Synvisc injections for the knees from orthopedics provide some benefit and he is hoping to get another Synvisc injection from orthopedics soon.  Other joints are doing well.  Morning stiffness for about 2 hours at the MCPs.    Denies fevers, chills, nausea, vomiting, constipation, diarrhea. No abdominal pain. No chest pain/pressure, palpitations, or shortness of breath. No oral or nasal sores. No neck pain.     Tobacco: None  EtOH: None  Drugs: None    ROS   12 point review of system was completed and negative except as noted in the HPI     Active Problem List     Patient Active Problem List   Diagnosis    Pain in limb    Hyperlipidemia LDL goal <100    Hypertension goal BP (blood pressure) < 140/90    Hypogonadism    Advanced directives, counseling/discussion    Health Care Home    Type 2 diabetes mellitus with diabetic polyneuropathy, without long-term current use of insulin (H)    RBBB (right bundle branch block)    Ex-smoker    Family history of esophageal cancer    Gastroesophageal reflux disease, esophagitis presence not specified    Rheumatoid arthritis involving multiple sites with positive rheumatoid factor (H)    Pulmonary nodule    High risk medication use    Spondylosis of cervical region without myelopathy or radiculopathy    Erectile dysfunction, unspecified erectile dysfunction type    Type 2 diabetes mellitus without retinopathy (H)    Tubulovillous adenoma of colon    Diabetic polyneuropathy associated with type 2 diabetes mellitus (H)    Chronic bilateral low back pain without sciatica    Migraine equivalent    Posterior vitreous detachment of left eye    Pseudophakia, ou     Eyelid lesion, LLL    Bradycardia    Primary osteoarthritis of both knees    Syncope    Trigger finger, acquired    CKD (chronic kidney disease) stage 3, GFR 30-59 ml/min (H)    Abdominal pain, generalized    PAD (peripheral artery disease) (H)    Immunosuppression (H)    Skin ulcer of toe of left foot, limited to breakdown of skin (H)    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)    Pneumothorax    SOBOE (shortness of breath on exertion)    Pacemaker    Ulcer of left foot, unspecified ulcer stage (H)    Hammer toe of left foot    Anemia, unspecified type    Closed nondisplaced fracture of right pubis (H)    Amputation of toe (H)    Adenocarcinoma, lung (H)    Urge incontinence of urine    Thrombocytopenia (H)    Severe sepsis (H)    Paresis (H)    History of blepharoplasty    Mild protein-calorie malnutrition (H)    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)     Past Medical History     Past Medical History:   Diagnosis Date    Abnormal CT scan 03/2004    calcified lung granuloma    C. difficile diarrhea     H/O    Cataract 11/18/2011    Diabetic neuropathy (H)     mild, mostly soles and distal forefeet, worse on the left side.    Diverticulitis     ED (erectile dysfunction)     Ex-smoker     QUIT SMOKING FEB 2007    History of ETOH abuse     recovering, sober since 1997    Hyperlipidemia LDL goal <100     Hypertension goal BP (blood pressure) < 140/90     Hypogonadism     Obesity     PAD (peripheral artery disease) (H)     leg cramps, with exertion, no formal diagnosis of PAD and minimal if any symptoms at all.    RA (rheumatoid arthritis) (H)     Dr Bailon    Syncope      Past Surgical History     Past Surgical History:   Procedure Laterality Date    AMPUTATION Left 01/2023    Toe    BYPASS GRAFT FEMOROPOPLITEAL Left 01/07/2021    Procedure: LEFT FEMORAL TO ABOVE KNEE POPLITEAL ARTERY BYPASS WITH PTFE VASCULAR GRAFT REMOVABLE RING 6MMX 50CM;  Surgeon: Myra Lopes MD;   Location: [facility] OR    CATARACT IOL, RT/LT      COLONOSCOPY  03/01/2018    [facility]    COMBINED REPAIR PTOSIS WITH BLEPHAROPLASTY BILATERAL Bilateral 08/16/2019    Procedure: BILATERAL UPPER EYELID BLEPHAROPLASTY AND BILATERAL PTOSIS REPAIR;  Surgeon: Janet Garcia MD;  Location: [facility] OR    ENDARTERECTOMY FEMORAL Left 01/07/2021    Procedure: LEFT FEMORAL ENDARTERECTOMY WITH PATCH ANGIOPLASTY PHOTOFIX  0.8 X 8CM;  Surgeon: Myra Lopes MD;  Location: [facility] OR    EP PACEMAKER  01/2021    EXCISE LESION EYELID Left 08/16/2019    Procedure: LEFT LOWER EYELID BIOPSY;  Surgeon: Janet Garcia MD;  Location: [facility] OR    IR LOWER EXTREMITY ANGIOGRAM LEFT  12/17/2020    PHACOEMULSIFICATION WITH STANDARD INTRAOCULAR LENS IMPLANT  02/2019; 3/2019    left eye; right eye    TONSILLECTOMY      ZZHC INCISION TENDON SHEATH FINGER  04/2009    r hand ring finger     Allergy     Allergies   Allergen Reactions    Seasonal Allergies     Blood-Group Specific Substance Other (See Comments)     Patient has a Non-specific antibody. Blood products may be delayed. Draw patient 24 hours prior to transfusion. For [facility] testing, draw one red top and two purple top tubes for all Type and Screen orders.  Patient has a Non-specific antibody. Blood products may be delayed. Draw patient 24 hours prior to transfusion. For [facility] testing, draw one red top and two purple top tubes for all Type and Screen orders.     Current Medication List     Current Outpatient Medications   Medication Sig    acetaminophen (PAIN RELIEF EXTRA STRENGTH) 500 MG tablet TAKE TWO TABLETS BY MOUTH ONCE DAILY    Alcohol Swabs PADS Uses four times daily    bisacodyl (DULCOLAX) 10 MG suppository Unwrap and insert 1 suppository rectally once daily as needed    blood glucose (NO BRAND SPECIFIED) test strip Use to test blood sugar 4 times daily or as directed.    Blood Glucose Monitoring Suppl (ACCU-CHEK GUIDE) w/Device KIT     bumetanide (BUMEX) 1 MG tablet  "Take 1 tablet (1 mg) by mouth daily    cetirizine (ZYRTEC) 10 MG tablet Take 1 tablet (10 mg) by mouth At Bedtime    Continuous Blood Gluc Sensor (FREESTYLE YENNY 2 SENSOR) [facility] [place] 1 each every 14 days Use 1 sensor every 14 days. Use to read blood sugars per 's instructions.    cyanocobalamin (VITAMIN B-12) 1000 MCG tablet Take 1 tablet (1,000 mcg) by mouth once a week    cyanocobalamin (VITAMIN B-12) 1000 MCG tablet Take 1 tablet (1,000 mcg) by mouth once a week    diclofenac (VOLTAREN) 1 % topical gel as needed    dulaglutide (TRULICITY) 1.5 MG/0.5ML pen Inject 1.5 mg Subcutaneous every 7 days ON Wednesdays    ferrous sulfate (FEROSUL) 325 (65 Fe) MG tablet TAKE ONE TABLET BY MOUTH ONCE DAILY    folic acid (FOLVITE) 1 MG tablet Take 1 tablet (1 mg) by mouth daily    glimepiride (AMARYL) 1 MG tablet TAKE ONE TABLET BY MOUTH ONCE DAILY    hydrALAZINE (APRESOLINE) 10 MG tablet Take 10 mg by mouth nightly as needed (systolic bp > 170)    HYDROmorphone (DILAUDID) 2 MG tablet Take 1 tablet (2 mg) by mouth 2 times daily as needed for pain For urgent pain relief with rheumatoid arthritis flare-up    hydroxychloroquine (PLAQUENIL) 200 MG tablet Take 1 tablet (200 mg) by mouth daily    insulin glargine (LANTUS PEN) 100 UNIT/ML pen Inject 24 Units Subcutaneous 2 times daily    insulin glargine (LANTUS PEN) 100 UNIT/ML pen Inject 25 Units Subcutaneous At Bedtime    insulin pen needle (31G X 5 MM) 31G X 5 MM miscellaneous Use 1 pen needles daily or as directed.    insulin syringe-needle U-100 (30G X 1/2\" 0.5 ML) 30G X 1/2\" 0.5 ML miscellaneous Use 4 syringes daily or as directed.    Insulin Syringe-Needle U-100 28G X 1/2\" 0.5 ML MISC     Irrigation Supplies MISC     methocarbamol (ROBAXIN) 500 MG tablet Take 250 mg by mouth 2 times daily    metoprolol succinate ER (TOPROL XL) 50 MG 24 hr tablet Take 1 tablet (50 mg) by mouth daily    NOVOFINE AUTOCOVER PEN NEEDLE 30G X 8 MM miscellaneous USE FOUR TIMES A DAY    " "omeprazole (PRILOSEC) 20 MG DR capsule Take 1 capsule (20 mg) by mouth daily    polyethylene glycol-propylene glycol (SYSTANE) 0.4-0.3 % SOLN ophthalmic solution Place 1 drop into both eyes 2 times daily as needed for dry eyes    pravastatin (PRAVACHOL) 10 MG tablet Take 1 tablet (10 mg) by mouth daily    senna-docusate (SENOKOT-S/PERICOLACE) 8.6-50 MG tablet Take 1 tablet by mouth 2 times daily as needed for constipation    sertraline (ZOLOFT) 50 MG tablet Take 1 tablet (50 mg) by mouth daily    syringe/needle, disp, (BD ECLIPSE SYRINGE) 25G X 1\" 3 ML MISC     warfarin ANTICOAGULANT (COUMADIN) 3 MG tablet Take 0.5 tablets (1.5 mg) by mouth daily Dose per anticoagulation team.     No current facility-administered medications for this visit.         Social History   See HPI    Family History     Family History   Problem Relation Age of Onset    Cerebrovascular Disease Mother     Arthritis Mother     Osteoporosis Mother     Alzheimer Disease Father     Arthritis Father     Cancer Father     Diabetes Maternal Grandmother     Cardiovascular Maternal Grandmother     Other Cancer Brother     Cancer Paternal Aunt     Hypertension No family hx of     Thyroid Disease No family hx of     Glaucoma No family hx of     Macular Degeneration No family hx of        Physical Exam     Temp Readings from Last 3 Encounters:   07/24/23 97.8  F (36.6  C) (Oral)   01/02/23 97.9  F (36.6  C) (Oral)   09/27/22 98.2  F (36.8  C) (Oral)     BP Readings from Last 5 Encounters:   09/11/23 130/81   08/11/23 (!) 149/79   07/24/23 121/77   07/12/23 103/68   05/12/23 125/80     Pulse Readings from Last 1 Encounters:   09/11/23 83     Resp Readings from Last 1 Encounters:   09/11/23 18     Estimated body mass index is 31.43 kg/m  as calculated from the following:    Height as of 9/20/22: 1.753 m (5' 9\").    Weight as of this encounter: 96.5 kg (212 lb 12.8 oz).    GEN: NAD.   HEENT:  Anicteric, noninjected sclera. No obvious external lesions of the " ear and nose. Hearing intact.  CV: S1, S2. RRR. No m/r/g  PULM: No increased work of breathing. CTA bilaterally   MSK: Synovial swelling and tenderness to palpation of the bilateral second-third MCPs.  Other MCPs, PIPs, DIPs without swelling or tenderness to palpation.  Wrists without swelling or tenderness to palpation.  Elbows and shoulders without swelling or tenderness to palpation.  Knees with crepitation and medial joint line tenderness but no effusion or increased warmth.  Ankles and MTPs without swelling or tenderness to palpation.  Absent left second toe.  SKIN: No rash or jaundice seen  PSYCH: Alert. Appropriate.       Labs / Imaging (select studies)     9/28/1999  ([email or web address])    RF/CCP  Recent Labs   Lab Test 04/07/16  1149   CCPIGG 64*     CBC  Recent Labs   Lab Test 01/10/23  0745 07/28/22  1611 03/31/22  1023 02/01/22  0901 09/08/21  1607 07/14/21  0924 07/05/21  1532 04/07/21  0958   WBC 6.5 9.2 8.6 6.8   < > 7.0 3.7* 7.4   RBC 3.95* 4.11* 4.46 3.90*   < > 2.72* 2.20* 3.55*   HGB 11.9* 12.1* 13.3 11.2*   < > 8.4* 6.9* 10.8*   HCT 37.7* 38.3* 40.4 35.9*   < > 25.8* 21.6* 33.3*   MCV 95 93 91 92   < > 95 98 94   RDW 13.3 14.4 13.9 14.0   < > 17.1* 17.3* 14.0    227 216 274   < > 140* 96* 342   MCH 30.1 29.4 29.8 28.7   < > 30.9 31.4 30.4   MCHC 31.6 31.6 32.9 31.2*   < > 32.6 31.9 32.4   NEUTROPHIL 65 67  --  63   < > 68  75 68.0 80.7   LYMPH 14 17  --  17   < > 10  7 19.0 11.6   MONOCYTE 15 11  --  14   < > 20  15 10.0 6.0   EOSINOPHIL 4 4  --  4   < > 2  2 2.0 1.2   BASOPHIL 1 1  --  1   < > 1  0 1.0 0.5   ANEU  --   --   --   --   --  5.3 2.5 6.0   ALYM  --   --   --   --   --  0.5* 0.7* 0.9   SMITH  --   --   --   --   --  1.1 0.4 0.5   AEOS  --   --   --   --   --  0.1 0.1 0.1   ABAS  --   --   --   --   --  0.0 0.0 0.0   ANEUTAUTO 4.3 6.2  --  4.4   < > 4.8  --   --    ALYMPAUTO 0.9 1.5  --  1.2   < > 0.7*  --   --    AMONOAUTO 1.0 1.1  --  1.0   < > 1.4*  --   --    AEOSAUTO  0.3 0.4  --  0.2   < > 0.2  --   --    ABSBASO 0.1 0.1  --  0.1   < > 0.1  --   --     < > = values in this interval not displayed.     CMP  Recent Labs   Lab Test 06/27/23  0540 01/10/23  0745 07/28/22  1611 08/16/21  1316 07/14/21  1645 07/05/21  1532 04/07/21  0958 01/09/21  0709    140 140   < > 136  --   --  136   POTASSIUM 4.4 4.5 4.7   < > 5.4*  --   --  4.3   CHLORIDE 109* 105 111*   < > 109  --   --  108   CO2 20* 20* 22   < > 17*  --   --  23   ANIONGAP 12 15 7   < > 10  --   --  5   * 110* 154*   < > 126*  --   --  117*   BUN 38.1* 35.3* 42*   < > 50*  --   --  31*   CR 1.52* 1.55* 1.54*   < > 2.30* 2.84* 1.52* 1.39*   GFRESTIMATED 46* 46* 46*   < > 26* 20* 44* 49*   GFRESTBLACK  --   --   --   --   --  24* 50* 56*   NEW 8.5* 9.1 8.8   < > 8.8  --   --  8.4*   BILITOTAL  --   --  0.3  --  0.7 0.8 0.4  --    ALBUMIN  --   --  3.0*  --  3.5 3.5 3.5  --    PROTTOTAL  --   --  6.8  --  6.8 6.9 7.0  --    ALKPHOS  --   --  57  --  67 85 69  --    AST  --   --  18  --  22 12 19  --    ALT  --   --  25  --  32 22 25  --     < > = values in this interval not displayed.     Calcium/VitaminD  Recent Labs   Lab Test 06/27/23  0540 01/10/23  0745 07/28/22  1611   NEW 8.5* 9.1 8.8     ESR/CRP  Recent Labs   Lab Test 07/05/21  1532 04/07/21  0958 12/11/20  1436   SED 83* 43* 33*   CRP 37.1* 11.3* 18.9*     Hepatitis B  Recent Labs   Lab Test 04/07/16  1149   HBCAB Nonreactive   HEPBANG Nonreactive     Hepatitis C  Recent Labs   Lab Test 04/07/16  1149   HCVAB Nonreactive   Assay performance characteristics have not been established for newborns,   infants, and children       HIV Screening  Recent Labs   Lab Test 04/07/16  1149   HIAGAB Nonreactive   HIV-1 p24 Ag & HIV-1/HIV-2 Ab Not Detected         Immunization History     Immunization History   Administered Date(s) Administered    COVID-19 Bivalent 12+ (Pfizer) 10/04/2022    COVID-19 MONOVALENT 12+ (Pfizer) 02/19/2021, 03/12/2021    COVID-19 Monovalent  18+ (Moderna) 11/29/2021, 05/13/2022    Influenza (H1N1) 01/20/2010    Influenza (High Dose) 3 valent vaccine 09/20/2012, 10/20/2015, 09/20/2016, 08/28/2017, 09/24/2018, 09/18/2019    Influenza (IIV3) PF 10/05/1999, 10/30/2008, 09/28/2011, 10/14/2013, 10/03/2014    Influenza Vaccine 65+ (FLUAD) 09/24/2021    Influenza Vaccine 65+ (Fluzone HD) 09/11/2020    Pneumo Conj 13-V (2010&after) 06/29/2015    Pneumococcal 23 valent 08/19/2010    TD,PF 7+ (Tenivac) 08/05/1997, 02/03/2011    TDAP (Adacel,Boostrix) 03/11/2020    TDAP Vaccine (Boostrix) 03/11/2020    Zoster recombinant adjuvanted (SHINGRIX) 01/03/2020, 03/11/2020    Zoster vaccine, live 04/19/2010          Chart documentation done in part with Dragon Voice recognition Software. Although reviewed after completion, some word and grammatical error may remain.    Albert Tompkins MD

## 2023-09-11 NOTE — PATIENT INSTRUCTIONS
RHEUMATOLOGY    [facility] [place]  [address] [place] [address]  [place] [address]    Phone number: [phone]  Fax number: [phone]    If you need a medication refill, please contact us as you may need lab work and/or a follow up visit prior to your refill.      Thank you for choosing [facility]!    Eryn Espinoza CMA Rheumatology

## 2023-09-11 NOTE — PROGRESS NOTES
RAPID3 (0-30) Cumulative Score  13.0          RAPID3 Weighted Score (divide #4 by 3 and that is the weighted score)  4.3

## 2023-09-12 LAB
HBV CORE AB SERPL QL IA: NONREACTIVE
HBV SURFACE AG SERPL QL IA: NONREACTIVE
HCV AB SERPL QL IA: NONREACTIVE

## 2023-09-13 ENCOUNTER — TELEPHONE (OUTPATIENT)
Dept: RHEUMATOLOGY | Facility: CLINIC | Age: 78
End: 2023-09-13
Payer: COMMERCIAL

## 2023-09-13 DIAGNOSIS — M05.79 RHEUMATOID ARTHRITIS INVOLVING MULTIPLE SITES WITH POSITIVE RHEUMATOID FACTOR (H): Primary | ICD-10-CM

## 2023-09-13 DIAGNOSIS — E11.42 TYPE 2 DIABETES MELLITUS WITH DIABETIC POLYNEUROPATHY, WITHOUT LONG-TERM CURRENT USE OF INSULIN (H): ICD-10-CM

## 2023-09-13 RX ORDER — FOLIC ACID 1 MG/1
1 TABLET ORAL DAILY
Qty: 90 TABLET | Refills: 2 | Status: SHIPPED | OUTPATIENT
Start: 2023-09-13 | End: 2023-09-26

## 2023-09-13 RX ORDER — METHOTREXATE 2.5 MG/1
10 TABLET ORAL WEEKLY
Qty: 16 TABLET | Refills: 1 | Status: SHIPPED | OUTPATIENT
Start: 2023-09-13 | End: 2023-09-26

## 2023-09-13 RX ORDER — INSULIN GLARGINE 100 [IU]/ML
INJECTION, SOLUTION SUBCUTANEOUS
Qty: 15 ML | Refills: 1 | OUTPATIENT
Start: 2023-09-13

## 2023-09-13 NOTE — TELEPHONE ENCOUNTER
RN: Please call to notify Zurdo Hardy that rheumatology labs show persistent inflammatory marker elevation, and a reduced kidney function.  Most recent creatinine higher than previous, currently at 1.97.  Creatinine clearance calculated to be 41 mL/min.  Considering lower renal function on most recent lab, methotrexate will be started at the lower dose of 10 mg (4 tablets) once weekly.  Folic acid 1 mg daily.    Albert Tompkins MD  9/13/2023

## 2023-09-13 NOTE — TELEPHONE ENCOUNTER
Called patient to relay the information from Dr. Tompkins. Patient did not answer. Left voicemail for patient to call back.     Alyse CASAS RN, Specialty Clinic 09/13/23 1:54 PM

## 2023-09-13 NOTE — TELEPHONE ENCOUNTER
Patient called about his methotrexate. He stated he tried to fill it and he could not. Looking at his visit on 9/11, Dr. Tompkins needed to review his labs and then if they looked good, he would fill methotrexate. Lab results are back. Forwarding to Dr. Tompkins to review and advise.     Alyse CASAS RN, Specialty Clinic 09/13/23 1:40 PM

## 2023-09-14 NOTE — TELEPHONE ENCOUNTER
Called patient to offer him appointment 9/26/23 at 9:40. There was no offer. Left message for patient to call back.     Alyse CASAS RN, Specialty Clinic 09/14/23 4:26 PM

## 2023-09-14 NOTE — TELEPHONE ENCOUNTER
Tried calling patient back after patient called me, as I was on the phone with another patient. There was no answer. Left message to call back to discuss.     Alyse CASAS RN, Specialty Clinic 09/14/23 3:05 PM

## 2023-09-14 NOTE — TELEPHONE ENCOUNTER
Tried calling patient for a second time. No answer. Left message to call back.     Alyse CASAS RN, Specialty Clinic 09/14/23 9:04 AM

## 2023-09-14 NOTE — TELEPHONE ENCOUNTER
Patient called back. Patient does not want to take methotrexate at all as he has had kidney issues in the past from the methotrexate. He is wanting a different option. Patient made aware of the lower dose of methotrexate and the monitoring that would be included with taking the methotrexate. Will reach out to provider.     Alyse CASAS RN, Specialty Clinic 09/14/23 3:10 PM

## 2023-09-15 NOTE — TELEPHONE ENCOUNTER
Patient called and yulia scheduled him appointment.     Alyse CASAS RN, Specialty Clinic 09/15/23 12:28 PM

## 2023-09-18 ENCOUNTER — TELEPHONE (OUTPATIENT)
Dept: FAMILY MEDICINE | Facility: CLINIC | Age: 78
End: 2023-09-18
Payer: COMMERCIAL

## 2023-09-18 DIAGNOSIS — C34.11 MALIGNANT NEOPLASM OF UPPER LOBE OF RIGHT LUNG (H): Primary | ICD-10-CM

## 2023-09-18 DIAGNOSIS — I74.2 EMBOLISM AND THROMBOSIS OF ARTERIES OF THE UPPER EXTREMITIES (H): ICD-10-CM

## 2023-09-18 NOTE — TELEPHONE ENCOUNTER
Called patient and left message that referral was put in, and to call if noone has called him within 24hrs.       Conchita MAHONEY CMA ([facility])

## 2023-09-18 NOTE — TELEPHONE ENCOUNTER
General Call      Reason for Call:  patient has moved, and has not had an INR done since before July 29th    What are your questions or concerns:  needs to know if he should have someone come to do INR again    Date of last appointment with provider:     Could we send this information to you in [employer] or would you prefer to receive a phone call?:   Patient would prefer a phone call   Okay to leave a detailed message?: Yes at Home number on file [phone] (home)

## 2023-09-18 NOTE — TELEPHONE ENCOUNTER
Chart reviewed with [facility] RN at time of encounter.    Need to confirm dosing patient has been taking, from chart review looks like 1.5mg Mon/Thurs/Sat and 3mg ROW, but will need to confirm dosing when call patient to set up INR appointment.    Margaux Espinoza, PharmD BCACP  Anticoagulation Clinical Pharmacist

## 2023-09-19 NOTE — TELEPHONE ENCOUNTER
Writer left another  requesting return call to [facility]. Noted that patient's daughter appears to have read the Mychart, but no response yet.    Need to determine what dosing patient has taken, schedule an INR as soon as possible. Previously patient had INR managed at [facility] by Emily. INR referral and standing INR order are in place if patient plans to come in to lab for INR.    [facility] to continue to attempt to reach patient.     Velia Parada RN, BSN  [facility]  [phone]

## 2023-09-22 ENCOUNTER — OFFICE VISIT (OUTPATIENT)
Dept: ORTHOPEDICS | Facility: CLINIC | Age: 78
End: 2023-09-22
Payer: COMMERCIAL

## 2023-09-22 VITALS
SYSTOLIC BLOOD PRESSURE: 122 MMHG | BODY MASS INDEX: 31.4 KG/M2 | HEIGHT: 69 IN | WEIGHT: 212 LBS | DIASTOLIC BLOOD PRESSURE: 78 MMHG

## 2023-09-22 DIAGNOSIS — M25.562 CHRONIC PAIN OF BOTH KNEES: ICD-10-CM

## 2023-09-22 DIAGNOSIS — G89.29 CHRONIC PAIN OF BOTH KNEES: ICD-10-CM

## 2023-09-22 DIAGNOSIS — M25.561 CHRONIC PAIN OF BOTH KNEES: ICD-10-CM

## 2023-09-22 DIAGNOSIS — M17.0 PRIMARY OSTEOARTHRITIS OF BOTH KNEES: Primary | ICD-10-CM

## 2023-09-22 PROCEDURE — 99213 OFFICE O/P EST LOW 20 MIN: CPT | Performed by: STUDENT IN AN ORGANIZED HEALTH CARE EDUCATION/TRAINING PROGRAM

## 2023-09-22 NOTE — PATIENT INSTRUCTIONS
"-Trial Neenca knee brace from [employer]- this is less bulky then most braces  -Continue to use walker and try to stay active  -Start taking Taking Tylenol three times daily (every 6 hours)  -You can return in 3 months for gel injections if you would like after Dec 7th  -I recommend you follow up with orthopedic surgery to see if you are a candidate for knee replacement   -You can also try topical Voltaren gel to the knees up to 4x daily     Non-Operative treatment of Knee Osteoarthritis    To Decrease Stress  Stay fit and Exercise   Muscle Strength: consider physical therapy  Activity Modification  Weight Control  Consider a knee brace or walking poles to offload knee    To Decrease Pain  Acetaminophen (Tylenol) as needed   Can trial topical Voltaren gel up to 4x daily  Glucosamine chondroiten (take for 3 months and if helpful, continue)  Steroid injections (no sooner than every 3 months)  Viscosupplementation/\"Lubricant\" injections (Synvisc, Hyalgan, etc. are brand names)  Platelet Rich Plasma (Not covered by insurance)    "

## 2023-09-22 NOTE — LETTER
9/22/2023         RE: Zurdo Dash  [address]  [address]  [place] [address]        Dear Colleague,    Thank you for referring your patient, Zurdo Dash, to the [facility] [place]. Please see a copy of my visit note below.    ASSESSMENT & PLAN    Zurdo was seen today for pain and pain.    Diagnoses and all orders for this visit:    Primary osteoarthritis of both knees    Chronic pain of both knees      78-year-old male presents with bilateral knee pain secondary to osteoarthritis.  He was due to see an orthopedic surgeon today, but missed his appointment.  He has tried corticosteroid injections in the past, but unfortunately these raise his blood sugar to over 600, and has had gel injections in the past, however these only provided approximately 1 month of relief.  He is requesting repeat gel injections today, however it has only been 3 months since his last injection and we discussed that insurance would only pay for these every 6 months.  He is also taking Dilaudid for his knee pain, and we discussed the risks of taking this daily.    Plan:  - Discussed continuing to use a walker for offloading the knees, and that he could trial knee compression sleeves if desired  - Recommend that he reschedule his appointment with orthopedic surgery to discuss if he is a candidate for total knee replacement given his severe, persistent pain that has been unresponsive to injections  - Can increase Tylenol to 3 times daily every 6 hours to help with baseline pain, and can also trial topical Voltaren gel to bilateral knees up to 4 times daily  - Discussed importance of staying active  - Patient would like to follow-up in [place], so he can follow-up with me as needed if he wishes to repeat gel injections in 3 months      Return if symptoms worsen or fail to improve.      Dr. Sara Ivan, DO  [facility]  Sports Medicine     -----  Chief Complaint  "  Patient presents with     Left Knee - Pain     Right Knee - Pain       SUBJECTIVE  Zurdo Dash is a/an 78 year old male who is seen as a self referral for evaluation of bilateral knee pain. Pain is severe and constant, walks with walker.  Reports that his last gel injection only seem to provide approximately 1 month of pain relief.    The patient is seen with wife, who helps provide the history    Onset: many years(s) ago. Reports insidious onset without acute precipitating event.  Location/characterization of Pain: bilateral anterior knees  Worsened by: transitioning from sit to stand, getting out of bed  Treatments tried: Viscosupplementation injection (most recent date: 6/30/23) - provided some / minimal relief, multiple cortisone injections - drastically affected blood sugar levels, Tylenol, Dilaudid - once daily most days  Associated symptoms: Swelling    Prior injury/Surgical history of affected joint: NO  Social History/Occupation: retired    REVIEW OF SYSTEMS:  Pertinent positives/negative: As stated above in HPI    OBJECTIVE:  /78   Ht 1.753 m (5' 9\")   Wt 96.2 kg (212 lb)   BMI 31.31 kg/m     General: Alert and in no distress  Skin: no visable rashes  CV: Extremities appear well perfused   Resp: normal respiratory effort, no conversational dyspnea   Psych: normal mood, affect  Gait: walker  MSK:  Antalgic gait, walks with walker  Extension of bilateral knees with bilateral flexion contractures of approximately 5 degrees, decreased range of motion to knee flexion bilaterally as well.  Significant tenderness to palpation with medial joint line palpation bilaterally, patellar crepitus bilaterally.  Intact extensor mechanism bilaterally, but generalized quadriceps weakness    RADIOLOGY:  Final results and radiologist's interpretation available in the [facility] health record.  Images below were personally reviewed and discussed with the patient in the office today.    Bilateral knee x-rays performed " 5/3/2023  IMPRESSION:   Severe bilateral patellofemoral compartment knee osteoarthritis with bone-on-bone abutment laterally and lateral patellar subluxation. Patellofemoral spurring. Mild to moderate medial right and lateral left knee osteoarthritis. No acute knee fracture or dislocation identified on either side. No sizable knee joint effusion. Extensive arterial calcification. Surgical clips posterior distal left thigh. No anterior knee soft tissue swelling.                       Again, thank you for allowing me to participate in the care of your patient.        Sincerely,        Sara Ivan, DO

## 2023-09-22 NOTE — PROGRESS NOTES
ASSESSMENT & PLAN    Zurdo was seen today for pain and pain.    Diagnoses and all orders for this visit:    Primary osteoarthritis of both knees    Chronic pain of both knees      78-year-old male presents with bilateral knee pain secondary to osteoarthritis.  He was due to see an orthopedic surgeon today, but missed his appointment.  He has tried corticosteroid injections in the past, but unfortunately these raise his blood sugar to over 600, and has had gel injections in the past, however these only provided approximately 1 month of relief.  He is requesting repeat gel injections today, however it has only been 3 months since his last injection and we discussed that insurance would only pay for these every 6 months.  He is also taking Dilaudid for his knee pain, and we discussed the risks of taking this daily.    Plan:  - Discussed continuing to use a walker for offloading the knees, and that he could trial knee compression sleeves if desired  - Recommend that he reschedule his appointment with orthopedic surgery to discuss if he is a candidate for total knee replacement given his severe, persistent pain that has been unresponsive to injections  - Can increase Tylenol to 3 times daily every 6 hours to help with baseline pain, and can also trial topical Voltaren gel to bilateral knees up to 4 times daily  - Discussed importance of staying active  - Patient would like to follow-up in [place], so he can follow-up with me as needed if he wishes to repeat gel injections in 3 months      Return if symptoms worsen or fail to improve.      Dr. Sara Ivan, DO  [facility]  Sports Medicine     -----  Chief Complaint   Patient presents with    Left Knee - Pain    Right Knee - Pain       SUBJECTIVE  Zurdo Dash is a/an 78 year old male who is seen as a self referral for evaluation of bilateral knee pain. Pain is severe and constant, walks with walker.  Reports that his last gel injection  "only seem to provide approximately 1 month of pain relief.    The patient is seen with wife, who helps provide the history    Onset: many years(s) ago. Reports insidious onset without acute precipitating event.  Location/characterization of Pain: bilateral anterior knees  Worsened by: transitioning from sit to stand, getting out of bed  Treatments tried: Viscosupplementation injection (most recent date: 6/30/23) - provided some / minimal relief, multiple cortisone injections - drastically affected blood sugar levels, Tylenol, Dilaudid - once daily most days  Associated symptoms: Swelling    Prior injury/Surgical history of affected joint: NO  Social History/Occupation: retired    REVIEW OF SYSTEMS:  Pertinent positives/negative: As stated above in HPI    OBJECTIVE:  /78   Ht 1.753 m (5' 9\")   Wt 96.2 kg (212 lb)   BMI 31.31 kg/m     General: Alert and in no distress  Skin: no visable rashes  CV: Extremities appear well perfused   Resp: normal respiratory effort, no conversational dyspnea   Psych: normal mood, affect  Gait: walker  MSK:  Antalgic gait, walks with walker  Extension of bilateral knees with bilateral flexion contractures of approximately 5 degrees, decreased range of motion to knee flexion bilaterally as well.  Significant tenderness to palpation with medial joint line palpation bilaterally, patellar crepitus bilaterally.  Intact extensor mechanism bilaterally, but generalized quadriceps weakness    RADIOLOGY:  Final results and radiologist's interpretation available in the [facility] health record.  Images below were personally reviewed and discussed with the patient in the office today.    Bilateral knee x-rays performed 5/3/2023  IMPRESSION:   Severe bilateral patellofemoral compartment knee osteoarthritis with bone-on-bone abutment laterally and lateral patellar subluxation. Patellofemoral spurring. Mild to moderate medial right and lateral left knee osteoarthritis. No acute knee fracture or " dislocation identified on either side. No sizable knee joint effusion. Extensive arterial calcification. Surgical clips posterior distal left thigh. No anterior knee soft tissue swelling.

## 2023-09-26 ENCOUNTER — TELEPHONE (OUTPATIENT)
Dept: ORTHOPEDICS | Facility: CLINIC | Age: 78
End: 2023-09-26

## 2023-09-26 ENCOUNTER — OFFICE VISIT (OUTPATIENT)
Dept: RHEUMATOLOGY | Facility: CLINIC | Age: 78
End: 2023-09-26
Payer: COMMERCIAL

## 2023-09-26 VITALS
DIASTOLIC BLOOD PRESSURE: 81 MMHG | BODY MASS INDEX: 32.64 KG/M2 | SYSTOLIC BLOOD PRESSURE: 139 MMHG | OXYGEN SATURATION: 97 % | HEART RATE: 82 BPM | WEIGHT: 221 LBS

## 2023-09-26 DIAGNOSIS — M17.0 PRIMARY OSTEOARTHRITIS OF BOTH KNEES: ICD-10-CM

## 2023-09-26 DIAGNOSIS — M05.79 RHEUMATOID ARTHRITIS INVOLVING MULTIPLE SITES WITH POSITIVE RHEUMATOID FACTOR (H): Primary | ICD-10-CM

## 2023-09-26 PROCEDURE — 99214 OFFICE O/P EST MOD 30 MIN: CPT | Performed by: INTERNAL MEDICINE

## 2023-09-26 RX ORDER — HYDROXYCHLOROQUINE SULFATE 200 MG/1
200 TABLET, FILM COATED ORAL 2 TIMES DAILY
Qty: 180 TABLET | Refills: 1 | Status: SHIPPED | OUTPATIENT
Start: 2023-09-26 | End: 2024-02-01

## 2023-09-26 NOTE — PROGRESS NOTES
Rheumatology Clinic Visit      Zurdo Dash MRN# [id number]   YOB: 1945 Age: 78 year old      Date of visit: 9/26/23   PCP: Dr. Kapil Duckworth  Sports Medicine: Dr. Hermes Warner    Chief Complaint   Patient presents with:  Consult: Knee pain    Assessment and Plan      1. Seropositive nonerosive rheumatoid arthritis (, CCP 64): Previously on MTX (was well tolerated, but later with cytopenias likely related to worsening creatinine with subsequent MTX toxicity).  Then presented with mild synovitis so hydroxychloroquine was restarted and he was doing well for a while but now with synovitis and again.  Avoiding leflunomide because of pre-existing bilateral lower extremity neuropathy.  Discussed options previously, including biologic DMARD, but he wished to restart methotrexate.  Methotrexate was dose adjusted based on creatinine clearance but after prescribing the patient noted that his daughter prefer that he not be on methotrexate so we met again today and after thorough discussion the plan now is to increase hydroxychloroquine.  We discussed the risks associated with hydroxychloroquine toxicity continue renal impairment.  Other conventional DMARD would need to be used with caution because of reduced creatinine clearance, and biologic DMARDs are an option but there is concern for infection considering his history of infection and poor vascular supply to the lower extremities in the setting of diabetes.  Therefore increase hydroxychloroquine today and if further treatment escalation is needed then would need to reconsider other DMARDs.   Chronic illness, progressive.     - Increase hydroxychloroquine from 200 mg daily, to 200 mg twice daily (last eye exam performed by Dr. Avendano on 5/19/2023)  - Remove from medication list because it was never started: Methotrexate and folic acid.    High risk medication requiring intensive toxicity monitoring at least quarterly.      2. Bilateral knee  osteoarthritis / patellofemoral syndrome: Following with orthopedics where Synvisc injections have provided limited duration of benefit.  Steroid injections are effective but now caused significant hyperglycemia so are avoided.  Vascular disease, diabetes, and history of infection are wrist to consider risks when considering possibility of joint replacement and if this were to be pursued then he should discuss with orthopedics and his vascular surgeon.  Another option to consider for significant knee pain is pain management evaluation and he would like to explore this option.  Continue following with sports medicine / orthopedics as well.   - Pain management referral     Total minutes spent in evaluation with patient, documentation, , and review of pertinent studies and chart notes: 20      Mr. Dash verbalized agreement with and understanding of the rational for the diagnosis and treatment plan.  All questions were answered to best of my ability and the patient's satisfaction. Mr. Dash was advised to contact the clinic with any questions that may arise after the clinic visit.      Thank you for involving me in the care of the patient    Return to clinic: 3 months      HPI   Zurdo Dash is a 78 year old male with a medical history significant for hypertension, hyperlipidemia, diabetes, diabetic neuropathy, GERD, pacemaker, lung cancer, rheumatoid arthritis who presents for f/u of RA, sooner than previously scheduled because of recent hospitalization     7/12/2023: Intra-articular steroid injection for bilateral knee osteoarthritis was effective but caused a blood glucose in the 600s and therefore he went to see orthopedics at the [facility] for a Synvisc injection in June 2023 with improvement of his knee pain.  He would like to plan a repeat Synvisc injection 6 months afterwards and would like a referral to orthopedics here at the [place] location.  Other joints are doing well.  Morning  stiffness for no more than 20 minutes.    9/11/2023: Worsening inflammatory arthritis symptoms at the MCPs that are worse in the morning improved with time and activity.  Mild swelling at the MCPs bilaterally.  Synvisc injections for the knees from orthopedics provide some benefit and he is hoping to get another Synvisc injection from orthopedics soon.  Other joints are doing well.  Morning stiffness for about 2 hours at the MCPs.    Today, 9/26/2023: Here sooner than previously scheduled, with his daughter, to discuss alternatives to methotrexate.  Okay with using hydroxychloroquine.  Prefers to avoid biologic DMARDs because of risk for infection considering his history of infection, poor vascular flow in the lower extremities, and diabetes.  Also with chronic knee pain, limited duration of benefit from Synvisc; steroid injections of the knees caused hyperglycemia previously; pain in the knees is worse with activity and improves with rest; no swelling; would like something to help with the degenerative knee pain.    Denies fevers, chills, nausea, vomiting, constipation, diarrhea. No abdominal pain. No chest pain/pressure, palpitations, or shortness of breath. No oral or nasal sores. No neck pain.     Tobacco: None  EtOH: None  Drugs: None    ROS   12 point review of system was completed and negative except as noted in the HPI     Active Problem List     Patient Active Problem List   Diagnosis    Pain in limb    Hyperlipidemia LDL goal <100    Hypertension goal BP (blood pressure) < 140/90    Hypogonadism    Advanced directives, counseling/discussion    Health Care Home    Type 2 diabetes mellitus with diabetic polyneuropathy, without long-term current use of insulin (H)    RBBB (right bundle branch block)    Ex-smoker    Family history of esophageal cancer    Gastroesophageal reflux disease, esophagitis presence not specified    Rheumatoid arthritis involving multiple sites with positive rheumatoid factor (H)     Pulmonary nodule    High risk medication use    Spondylosis of cervical region without myelopathy or radiculopathy    Erectile dysfunction, unspecified erectile dysfunction type    Type 2 diabetes mellitus without retinopathy (H)    Tubulovillous adenoma of colon    Diabetic polyneuropathy associated with type 2 diabetes mellitus (H)    Chronic bilateral low back pain without sciatica    Migraine equivalent    Posterior vitreous detachment of left eye    Pseudophakia, ou    Eyelid lesion, LLL    Bradycardia    Primary osteoarthritis of both knees    Syncope    Trigger finger, acquired    CKD (chronic kidney disease) stage 3, GFR 30-59 ml/min (H)    Abdominal pain, generalized    PAD (peripheral artery disease) (H)    Immunosuppression (H)    Skin ulcer of toe of left foot, limited to breakdown of skin (H)    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)    Pneumothorax    SOBOE (shortness of breath on exertion)    Pacemaker    Ulcer of left foot, unspecified ulcer stage (H)    Hammer toe of left foot    Anemia, unspecified type    Closed nondisplaced fracture of right pubis (H)    Amputation of toe (H)    Adenocarcinoma, lung (H)    Urge incontinence of urine    Thrombocytopenia (H)    Severe sepsis (H)    Paresis (H)    History of blepharoplasty    Mild protein-calorie malnutrition (H)    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)     Past Medical History     Past Medical History:   Diagnosis Date    Abnormal CT scan 03/2004    calcified lung granuloma    C. difficile diarrhea     H/O    Cataract 11/18/2011    Diabetic neuropathy (H)     mild, mostly soles and distal forefeet, worse on the left side.    Diverticulitis     ED (erectile dysfunction)     Ex-smoker     QUIT SMOKING FEB 2007    History of ETOH abuse     recovering, sober since 1997    Hyperlipidemia LDL goal <100     Hypertension goal BP (blood pressure) < 140/90     Hypogonadism     Obesity     PAD (peripheral artery disease)  (H)     leg cramps, with exertion, no formal diagnosis of PAD and minimal if any symptoms at all.    RA (rheumatoid arthritis) (H)     Dr SharpUvaldo    Syncope      Past Surgical History     Past Surgical History:   Procedure Laterality Date    AMPUTATION Left 01/2023    Toe    BYPASS GRAFT FEMOROPOPLITEAL Left 01/07/2021    Procedure: LEFT FEMORAL TO ABOVE KNEE POPLITEAL ARTERY BYPASS WITH PTFE VASCULAR GRAFT REMOVABLE RING 6MMX 50CM;  Surgeon: Myra Lopes MD;  Location: [facility] OR    CATARACT IOL, RT/LT      COLONOSCOPY  03/01/2018    [facility]    COMBINED REPAIR PTOSIS WITH BLEPHAROPLASTY BILATERAL Bilateral 08/16/2019    Procedure: BILATERAL UPPER EYELID BLEPHAROPLASTY AND BILATERAL PTOSIS REPAIR;  Surgeon: Janet Garcia MD;  Location: [facility] OR    ENDARTERECTOMY FEMORAL Left 01/07/2021    Procedure: LEFT FEMORAL ENDARTERECTOMY WITH PATCH ANGIOPLASTY PHOTOFIX  0.8 X 8CM;  Surgeon: Myra Lopes MD;  Location:  OR    EP PACEMAKER  01/2021    EXCISE LESION EYELID Left 08/16/2019    Procedure: LEFT LOWER EYELID BIOPSY;  Surgeon: Janet Garcia MD;  Location: [facility] OR    IR LOWER EXTREMITY ANGIOGRAM LEFT  12/17/2020    PHACOEMULSIFICATION WITH STANDARD INTRAOCULAR LENS IMPLANT  02/2019; 3/2019    left eye; right eye    TONSILLECTOMY      ZZHC INCISION TENDON SHEATH FINGER  04/2009    r hand ring finger     Allergy     Allergies   Allergen Reactions    Seasonal Allergies     Blood-Group Specific Substance Other (See Comments)     Patient has a Non-specific antibody. Blood products may be delayed. Draw patient 24 hours prior to transfusion. For [facility] testing, draw one red top and two purple top tubes for all Type and Screen orders.  Patient has a Non-specific antibody. Blood products may be delayed. Draw patient 24 hours prior to transfusion. For [facility] testing, draw one red top and two purple top tubes for all Type and Screen orders.     Current Medication List     Current  Outpatient Medications   Medication Sig    acetaminophen (PAIN RELIEF EXTRA STRENGTH) 500 MG tablet TAKE TWO TABLETS BY MOUTH ONCE DAILY    Alcohol Swabs PADS Uses four times daily    bisacodyl (DULCOLAX) 10 MG suppository Unwrap and insert 1 suppository rectally once daily as needed    blood glucose (NO BRAND SPECIFIED) test strip Use to test blood sugar 4 times daily or as directed.    Blood Glucose Monitoring Suppl (ACCU-CHEK GUIDE) w/Device KIT     bumetanide (BUMEX) 1 MG tablet Take 1 tablet (1 mg) by mouth daily    cetirizine (ZYRTEC) 10 MG tablet Take 1 tablet (10 mg) by mouth At Bedtime    Continuous Blood Gluc Sensor (FREESTYLE YENNY 2 SENSOR) MISC 1 each every 14 days Use 1 sensor every 14 days. Use to read blood sugars per 's instructions.    cyanocobalamin (VITAMIN B-12) 1000 MCG tablet Take 1 tablet (1,000 mcg) by mouth once a week    cyanocobalamin (VITAMIN B-12) 1000 MCG tablet Take 1 tablet (1,000 mcg) by mouth once a week    diclofenac (VOLTAREN) 1 % topical gel as needed    dulaglutide (TRULICITY) 1.5 MG/0.5ML pen Inject 1.5 mg Subcutaneous every 7 days ON Wednesdays    ferrous sulfate (FEROSUL) 325 (65 Fe) MG tablet TAKE ONE TABLET BY MOUTH ONCE DAILY    folic acid (FOLVITE) 1 MG tablet Take 1 tablet (1 mg) by mouth daily    folic acid (FOLVITE) 1 MG tablet Take 1 tablet (1 mg) by mouth daily    glimepiride (AMARYL) 1 MG tablet TAKE ONE TABLET BY MOUTH ONCE DAILY    hydrALAZINE (APRESOLINE) 10 MG tablet Take 10 mg by mouth nightly as needed (systolic bp > 170)    HYDROmorphone (DILAUDID) 2 MG tablet Take 1 tablet (2 mg) by mouth 2 times daily as needed for pain For urgent pain relief with rheumatoid arthritis flare-up    hydroxychloroquine (PLAQUENIL) 200 MG tablet Take 1 tablet (200 mg) by mouth daily    insulin glargine (LANTUS PEN) 100 UNIT/ML pen Inject 24 Units Subcutaneous 2 times daily    insulin glargine (LANTUS PEN) 100 UNIT/ML pen Inject 25 Units Subcutaneous At Bedtime     "insulin pen needle (31G X 5 MM) 31G X 5 MM miscellaneous Use 1 pen needles daily or as directed.    insulin syringe-needle U-100 (30G X 1/2\" 0.5 ML) 30G X 1/2\" 0.5 ML miscellaneous Use 4 syringes daily or as directed.    Insulin Syringe-Needle U-100 28G X 1/2\" 0.5 ML MISC     Irrigation Supplies MISC     methocarbamol (ROBAXIN) 500 MG tablet Take 250 mg by mouth 2 times daily    methotrexate sodium 2.5 MG TABS Take 4 tablets (10 mg) by mouth once a week .  This is a once weekly medication, taken on the same day of each week.    metoprolol succinate ER (TOPROL XL) 50 MG 24 hr tablet Take 1 tablet (50 mg) by mouth daily    NOVOFINE AUTOCOVER PEN NEEDLE 30G X 8 MM miscellaneous USE FOUR TIMES A DAY    omeprazole (PRILOSEC) 20 MG DR capsule Take 1 capsule (20 mg) by mouth daily    polyethylene glycol-propylene glycol (SYSTANE) 0.4-0.3 % SOLN ophthalmic solution Place 1 drop into both eyes 2 times daily as needed for dry eyes    pravastatin (PRAVACHOL) 10 MG tablet Take 1 tablet (10 mg) by mouth daily    senna-docusate (SENOKOT-S/PERICOLACE) 8.6-50 MG tablet Take 1 tablet by mouth 2 times daily as needed for constipation    sertraline (ZOLOFT) 50 MG tablet Take 1 tablet (50 mg) by mouth daily    syringe/needle, disp, (BD ECLIPSE SYRINGE) 25G X 1\" 3 ML MISC     warfarin ANTICOAGULANT (COUMADIN) 3 MG tablet Take 0.5 tablets (1.5 mg) by mouth daily Dose per anticoagulation team.     No current facility-administered medications for this visit.         Social History   See HPI    Family History     Family History   Problem Relation Age of Onset    Cerebrovascular Disease Mother     Arthritis Mother     Osteoporosis Mother     Alzheimer Disease Father     Arthritis Father     Cancer Father     Diabetes Maternal Grandmother     Cardiovascular Maternal Grandmother     Other Cancer Brother     Cancer Paternal Aunt     Hypertension No family hx of     Thyroid Disease No family hx of     Glaucoma No family hx of     Macular " "Degeneration No family hx of        Physical Exam     Temp Readings from Last 3 Encounters:   07/24/23 97.8  F (36.6  C) (Oral)   01/02/23 97.9  F (36.6  C) (Oral)   09/27/22 98.2  F (36.8  C) (Oral)     BP Readings from Last 5 Encounters:   09/26/23 139/81   09/22/23 122/78   09/11/23 130/81   08/11/23 (!) 149/79   07/24/23 121/77     Pulse Readings from Last 1 Encounters:   09/26/23 82     Resp Readings from Last 1 Encounters:   09/11/23 18     Estimated body mass index is 32.64 kg/m  as calculated from the following:    Height as of 9/22/23: 1.753 m (5' 9\").    Weight as of this encounter: 100.2 kg (221 lb).    GEN: NAD.   HEENT:  Anicteric, noninjected sclera. No obvious external lesions of the ear and nose. Hearing intact.  CV: S1, S2. RRR. No m/r/g  PULM: No increased work of breathing. CTA bilaterally   MSK: Synovial swelling and tenderness to palpation of the bilateral second-third MCPs.  Mild synovial swelling and tenderness palpation of the right fourth PIP.  Other MCPs, PIPs, and DIPs without swelling or tenderness to palpation.  Wrists without swelling or tenderness to palpation.  Elbows and shoulders without swelling or tenderness to palpation.  Knees with crepitation and medial joint line tenderness but no effusion or increased warmth.  Ankles and MTPs without swelling or tenderness to palpation.    SKIN: No rash or jaundice seen  PSYCH: Alert. Appropriate.       Labs / Imaging (select studies)     9/28/1999  ([facility])    RF/CCP  Recent Labs   Lab Test 04/07/16  1149   CCPIGG 64*     CBC  Recent Labs   Lab Test 09/11/23  1111 01/10/23  0745 07/28/22  1611 09/08/21  1607 07/14/21  0924 07/05/21  1532 04/07/21  0958   WBC 6.6 6.5 9.2   < > 7.0 3.7* 7.4   RBC 4.51 3.95* 4.11*   < > 2.72* 2.20* 3.55*   HGB 13.2* 11.9* 12.1*   < > 8.4* 6.9* 10.8*   HCT 41.5 37.7* 38.3*   < > 25.8* 21.6* 33.3*   MCV 92 95 93   < > 95 98 94   RDW 13.1 13.3 14.4   < > 17.1* 17.3* 14.0    219 227   < > 140* " 96* 342   MCH 29.3 30.1 29.4   < > 30.9 31.4 30.4   MCHC 31.8 31.6 31.6   < > 32.6 31.9 32.4   NEUTROPHIL 68 65 67   < > 68  75 68.0 80.7   LYMPH 14 14 17   < > 10  7 19.0 11.6   MONOCYTE 12 15 11   < > 20  15 10.0 6.0   EOSINOPHIL 6 4 4   < > 2  2 2.0 1.2   BASOPHIL 1 1 1   < > 1  0 1.0 0.5   ANEU  --   --   --   --  5.3 2.5 6.0   ALYM  --   --   --   --  0.5* 0.7* 0.9   SMITH  --   --   --   --  1.1 0.4 0.5   AEOS  --   --   --   --  0.1 0.1 0.1   ABAS  --   --   --   --  0.0 0.0 0.0   ANEUTAUTO 4.5 4.3 6.2   < > 4.8  --   --    ALYMPAUTO 0.9 0.9 1.5   < > 0.7*  --   --    AMONOAUTO 0.8 1.0 1.1   < > 1.4*  --   --    AEOSAUTO 0.4 0.3 0.4   < > 0.2  --   --    ABSBASO 0.1 0.1 0.1   < > 0.1  --   --     < > = values in this interval not displayed.     CMP  Recent Labs   Lab Test 09/11/23  1111 06/27/23  0540 01/10/23  0745 07/28/22  1611 08/16/21  1316 07/14/21  1645 07/05/21  1532 04/07/21  0958 01/09/21  0709   NA  --  141 140 140   < > 136  --   --  136   POTASSIUM  --  4.4 4.5 4.7   < > 5.4*  --   --  4.3   CHLORIDE  --  109* 105 111*   < > 109  --   --  108   CO2  --  20* 20* 22   < > 17*  --   --  23   ANIONGAP  --  12 15 7   < > 10  --   --  5   GLC  --  181* 110* 154*   < > 126*  --   --  117*   BUN  --  38.1* 35.3* 42*   < > 50*  --   --  31*   CR 1.97* 1.52* 1.55* 1.54*   < > 2.30* 2.84* 1.52* 1.39*   GFRESTIMATED 34* 46* 46* 46*   < > 26* 20* 44* 49*   GFRESTBLACK  --   --   --   --   --   --  24* 50* 56*   NEW  --  8.5* 9.1 8.8   < > 8.8  --   --  8.4*   BILITOTAL 0.4  --   --  0.3  --  0.7 0.8 0.4  --    ALBUMIN 4.0  --   --  3.0*  --  3.5 3.5 3.5  --    PROTTOTAL 7.6  --   --  6.8  --  6.8 6.9 7.0  --    ALKPHOS 55  --   --  57  --  67 85 69  --    AST 23  --   --  18  --  22 12 19  --    ALT 11  --   --  25  --  32 22 25  --     < > = values in this interval not displayed.     Calcium/VitaminD  Recent Labs   Lab Test 06/27/23  0540 01/10/23  0745 07/28/22  1611   NEW 8.5* 9.1 8.8      ESR/CRP  Recent Labs   Lab Test 09/11/23  1111 07/05/21  1532 04/07/21  0958 12/11/20  1436   SED 63* 83* 43* 33*   CRP  --  37.1* 11.3* 18.9*   CRPI 26.00*  --   --   --        Hepatitis B  Recent Labs   Lab Test 09/11/23  1111 04/07/16  1149   HBCAB Nonreactive Nonreactive   HEPBANG Nonreactive Nonreactive     Hepatitis C  Recent Labs   Lab Test 09/11/23  1111 04/07/16  1149   HCVAB Nonreactive Nonreactive   Assay performance characteristics have not been established for newborns,   infants, and children       HIV Screening  Recent Labs   Lab Test 04/07/16  1149   HIAGAB Nonreactive   HIV-1 p24 Ag & HIV-1/HIV-2 Ab Not Detected         Immunization History     Immunization History   Administered Date(s) Administered    COVID-19 Bivalent 12+ (Pfizer) 10/04/2022    COVID-19 MONOVALENT 12+ (Pfizer) 02/19/2021, 03/12/2021    COVID-19 Monovalent 18+ (Moderna) 11/29/2021, 05/13/2022    Influenza (H1N1) 01/20/2010    Influenza (High Dose) 3 valent vaccine 09/20/2012, 10/20/2015, 09/20/2016, 08/28/2017, 09/24/2018, 09/18/2019    Influenza (IIV3) PF 10/05/1999, 10/30/2008, 09/28/2011, 10/14/2013, 10/03/2014    Influenza Vaccine 65+ (FLUAD) 09/24/2021    Influenza Vaccine 65+ (Fluzone HD) 09/11/2020    Pneumo Conj 13-V (2010&after) 06/29/2015    Pneumococcal 23 valent 08/19/2010    TD,PF 7+ (Tenivac) 08/05/1997, 02/03/2011    TDAP (Adacel,Boostrix) 03/11/2020    TDAP Vaccine (Boostrix) 03/11/2020    Zoster recombinant adjuvanted (SHINGRIX) 01/03/2020, 03/11/2020    Zoster vaccine, live 04/19/2010          Chart documentation done in part with Dragon Voice recognition Software. Although reviewed after completion, some word and grammatical error may remain.    Albert Tompkins MD

## 2023-09-26 NOTE — TELEPHONE ENCOUNTER
Patient Returning Call    Reason for call:  Yoli would like to get a call back from Dr. Warner in regards to getting a prior authorization in order for Zurdo to get a Synvisc injection.     Information relayed to patient:  Dr. Warner or his care team will give you a call back when they receive this message    Patient has additional questions:  No      Could we send this information to you in [place] or would you prefer to receive a phone call?:   Patient would prefer a phone call   Okay to leave a detailed message?: Yes at Cell number on file:    Telephone Information:   Mobile [phone]

## 2023-09-26 NOTE — Clinical Note
LILIANA that I have referred Zurdo to pain management to assist with knee pain.  Steroid injections for knee osteoarthritis caused hyperglycemia; Synvisc is provided limited duration of benefit, and surgery may be more risky because of vascular issues and diabetes.  I wonder if pain management would consider nerve ablation or similar.   He understands that this is in addition to continuing to follow with Dr. Warner.

## 2023-09-26 NOTE — TELEPHONE ENCOUNTER
Patient is asking for a call from Dr. Warner's team regarding the possibility of another synvisc injection prior to the 6 month window. His insurance states they would need some info from Dr. Warner to approve the injection, and would like to discuss his options, and the possibility of a knee replacement.    Please call patient at [phone], any day after 1pm, and okay to leave a detailed msg.

## 2023-09-28 ENCOUNTER — TELEPHONE (OUTPATIENT)
Dept: FAMILY MEDICINE | Facility: CLINIC | Age: 78
End: 2023-09-28

## 2023-09-28 ENCOUNTER — ANTICOAGULATION THERAPY VISIT (OUTPATIENT)
Dept: ANTICOAGULATION | Facility: CLINIC | Age: 78
End: 2023-09-28

## 2023-09-28 ENCOUNTER — LAB (OUTPATIENT)
Dept: LAB | Facility: CLINIC | Age: 78
End: 2023-09-28
Payer: COMMERCIAL

## 2023-09-28 DIAGNOSIS — Z79.899 HIGH RISK MEDICATION USE: ICD-10-CM

## 2023-09-28 DIAGNOSIS — I74.2 EMBOLISM AND THROMBOSIS OF ARTERIES OF THE UPPER EXTREMITIES (H): ICD-10-CM

## 2023-09-28 DIAGNOSIS — M05.79 RHEUMATOID ARTHRITIS INVOLVING MULTIPLE SITES WITH POSITIVE RHEUMATOID FACTOR (H): ICD-10-CM

## 2023-09-28 DIAGNOSIS — N18.30 CKD (CHRONIC KIDNEY DISEASE) STAGE 3, GFR 30-59 ML/MIN (H): ICD-10-CM

## 2023-09-28 DIAGNOSIS — E11.42 DIABETIC POLYNEUROPATHY ASSOCIATED WITH TYPE 2 DIABETES MELLITUS (H): ICD-10-CM

## 2023-09-28 DIAGNOSIS — C34.11 MALIGNANT NEOPLASM OF UPPER LOBE OF RIGHT LUNG (H): Primary | ICD-10-CM

## 2023-09-28 LAB
ALBUMIN SERPL BCG-MCNC: 3.7 G/DL (ref 3.5–5.2)
ALP SERPL-CCNC: 55 U/L (ref 40–129)
ALT SERPL W P-5'-P-CCNC: 11 U/L (ref 0–70)
AST SERPL W P-5'-P-CCNC: 20 U/L (ref 0–45)
BILIRUB DIRECT SERPL-MCNC: <0.2 MG/DL (ref 0–0.3)
BILIRUB SERPL-MCNC: 0.3 MG/DL
CHOLEST SERPL-MCNC: 155 MG/DL
CREAT SERPL-MCNC: 1.83 MG/DL (ref 0.67–1.17)
CREAT UR-MCNC: 113 MG/DL
EGFRCR SERPLBLD CKD-EPI 2021: 37 ML/MIN/1.73M2
HBA1C MFR BLD: 6.4 % (ref 0–5.6)
HDLC SERPL-MCNC: 33 MG/DL
INR BLD: 1.1 (ref 0.9–1.1)
LDLC SERPL CALC-MCNC: 78 MG/DL
MICROALBUMIN UR-MCNC: 22.8 MG/L
MICROALBUMIN/CREAT UR: 20.18 MG/G CR (ref 0–17)
NONHDLC SERPL-MCNC: 122 MG/DL
PROT SERPL-MCNC: 7.1 G/DL (ref 6.4–8.3)
TRIGL SERPL-MCNC: 218 MG/DL

## 2023-09-28 PROCEDURE — 85610 PROTHROMBIN TIME: CPT

## 2023-09-28 PROCEDURE — 36415 COLL VENOUS BLD VENIPUNCTURE: CPT

## 2023-09-28 PROCEDURE — 82043 UR ALBUMIN QUANTITATIVE: CPT

## 2023-09-28 PROCEDURE — 83036 HEMOGLOBIN GLYCOSYLATED A1C: CPT

## 2023-09-28 PROCEDURE — 82565 ASSAY OF CREATININE: CPT

## 2023-09-28 PROCEDURE — 80061 LIPID PANEL: CPT

## 2023-09-28 PROCEDURE — 82570 ASSAY OF URINE CREATININE: CPT

## 2023-09-28 PROCEDURE — 80076 HEPATIC FUNCTION PANEL: CPT

## 2023-09-28 NOTE — TELEPHONE ENCOUNTER
INR checked 9-28-23 and unable to reach patient. Writer left voice mail and patient's wife did call back but unable to reach x two since then.     Alyse Macias, RN, BSN, PHN

## 2023-09-28 NOTE — PROGRESS NOTES
ANTICOAGULATION MANAGEMENT     Zurdo Dash 78 year old male is on warfarin with subtherapeutic INR result. (Goal INR 2.0-3.0)    Recent labs: (last 7 days)     09/28/23  1055   INR 1.1       ASSESSMENT     Source(s): Chart Review  Previous INR was  transfer from  [facility] physician services. Last INR was 7-31-23 and 1.46  Medication, diet, health changes since last INR: [facility] has not been able to reach this patient since referral was sent 9-18-23. Dosing needs to be verified         PLAN     Unable to reach Zurdo Kent (wife) today.    No instructions provided. Unable to assess or verify current dosing so cannot advise on dosing at this time.    Follow up required to confirm warfarin dose taken and assess for changes and discuss out of range result     Alyse Macias RN  Anticoagulation Clinic  9/28/2023

## 2023-09-28 NOTE — PROGRESS NOTES
Yoli called back but they were not home so she could not verify dosing. She says patient has been taking coumadin but is unsure how much. Yoli will call [facility] back on 9-29-23 with an update.    Alyse Macias RN, BSN, PHN  9-

## 2023-09-28 NOTE — TELEPHONE ENCOUNTER
Writer called back and again no answer. Another message left for patient or Yoli to return call.    Alyse Macias RN, BSN, PHN

## 2023-09-28 NOTE — PROGRESS NOTES
Patient's wife Yoli just called the pharmacy.     She reported that he is taking 0.5 tablet of the 3 mg tablet for a daily dose of 1.5mg of warfarin.    I encouraged her to reach out to anticoagulation management tomorrow to determine plan going forward.      Amalia Amaya, PharmD [facility] [place]  Pharmacy Manager  September 28, 2023

## 2023-09-28 NOTE — TELEPHONE ENCOUNTER
Reason for Call:  Other call back    Detailed comments: discuss inr meds with.     Phone Number Patient can be reached at: Home number on file [phone]       Best Time: any    Can we leave a detailed message on this number? Not Applicable    Call taken on 9/28/2023 at 1:56 PM by Jailene Spencer

## 2023-09-29 NOTE — PROGRESS NOTES
Spoke w/ pt and Yoli. Discussed new dosing in detail. Answered questions.  Yoli read instructions back.  Yoli verbalizes understanding and agrees to plan. No further questions or concerns.  Christy Harper RN on 9/29/2023 at 3:34 PM

## 2023-09-29 NOTE — TELEPHONE ENCOUNTER
VM left for Yoli or pt to return call. See anticoag encounter as well.  Christy Harper RN on 9/29/2023 at 12:03 PM

## 2023-09-29 NOTE — TELEPHONE ENCOUNTER
Called and attempted to leave message for patient regarding synvisc injection for [place], their mailbox was full. He had his most recent injections 3 months ago and will be unable to have the synvisc  injections again until December 7th.        Odell Vigil, EMT

## 2023-10-03 ENCOUNTER — TRANSFERRED RECORDS (OUTPATIENT)
Dept: HEALTH INFORMATION MANAGEMENT | Facility: CLINIC | Age: 78
End: 2023-10-03

## 2023-10-03 ENCOUNTER — ANTICOAGULATION THERAPY VISIT (OUTPATIENT)
Dept: ANTICOAGULATION | Facility: CLINIC | Age: 78
End: 2023-10-03

## 2023-10-03 ENCOUNTER — LAB (OUTPATIENT)
Dept: LAB | Facility: CLINIC | Age: 78
End: 2023-10-03
Payer: COMMERCIAL

## 2023-10-03 DIAGNOSIS — C34.11 MALIGNANT NEOPLASM OF UPPER LOBE OF RIGHT LUNG (H): Primary | ICD-10-CM

## 2023-10-03 DIAGNOSIS — I74.2 EMBOLISM AND THROMBOSIS OF ARTERIES OF THE UPPER EXTREMITIES (H): ICD-10-CM

## 2023-10-03 LAB — INR BLD: 1.3 (ref 0.9–1.1)

## 2023-10-03 PROCEDURE — 85610 PROTHROMBIN TIME: CPT

## 2023-10-03 PROCEDURE — 36415 COLL VENOUS BLD VENIPUNCTURE: CPT

## 2023-10-03 NOTE — PROGRESS NOTES
Yoli is returning the call, please call her back [phone].  Elisabeth Navarrete   [facility]  Central Scheduler

## 2023-10-03 NOTE — PROGRESS NOTES
ANTICOAGULATION MANAGEMENT     Zurdo Dash 78 year old male is on warfarin with subtherapeutic INR result. (Goal INR 2.0-3.0)    Recent labs: (last 7 days)     10/03/23  1349   INR 1.3*       ASSESSMENT     Source(s): Chart Review and Patient/Caregiver Call     Warfarin doses taken: Warfarin taken as instructed  Diet: No new diet changes identified  Medication/supplement changes: None noted  New illness, injury, or hospitalization: No  Signs or symptoms of bleeding or clotting: No  Previous result: Subtherapeutic  Additional findings: None       PLAN     Recommended plan for no diet, medication or health factor changes affecting INR     Dosing Instructions: Increase your warfarin dose (18.2% change) with next INR in 3 days       Summary  As of 10/3/2023      Full warfarin instructions:  1.5 mg every Sun; 3 mg all other days   Next INR check:  10/5/2023               Telephone call with Zurdo who verbalizes understanding and agrees to plan    Lab visit scheduled    Education provided:   Goal range and lab monitoring: goal range and significance of current result    Plan made per [facility] anticoagulation protocol    Becka Dacosta RN  Anticoagulation Clinic  10/3/2023    _______________________________________________________________________     Anticoagulation Episode Summary       Current INR goal:  2.0-3.0   TTR:  0.0 % (5 d)   Target end date:  Indefinite   Send INR reminders to:  ROSALES REAL    Indications    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) [C34.11]  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

## 2023-10-03 NOTE — PROGRESS NOTES
ANTICOAGULATION MANAGEMENT     Zurdo Dash 78 year old male is on warfarin with subtherapeutic INR result. (Goal INR 2.0-3.0)    Recent labs: (last 7 days)     10/03/23  1349   INR 1.3*       ASSESSMENT     Source(s): Chart Review  Previous INR was Subtherapeutic  Medication, diet, health changes since last INR chart reviewed; none identified         PLAN     Unable to reach Zurdo/Yoli today.    LMTCB    Follow up required to confirm warfarin dose taken and assess for changes, discuss out of range result , and discuss dosing instructions and confirm understanding of instructions    Becka Dacosta RN  Anticoagulation Clinic  10/3/2023

## 2023-10-05 ENCOUNTER — LAB (OUTPATIENT)
Dept: LAB | Facility: CLINIC | Age: 78
End: 2023-10-05
Payer: COMMERCIAL

## 2023-10-05 ENCOUNTER — TELEPHONE (OUTPATIENT)
Dept: ANTICOAGULATION | Facility: CLINIC | Age: 78
End: 2023-10-05

## 2023-10-05 ENCOUNTER — ANTICOAGULATION THERAPY VISIT (OUTPATIENT)
Dept: ANTICOAGULATION | Facility: CLINIC | Age: 78
End: 2023-10-05

## 2023-10-05 DIAGNOSIS — I74.2 EMBOLISM AND THROMBOSIS OF ARTERIES OF THE UPPER EXTREMITIES (H): ICD-10-CM

## 2023-10-05 DIAGNOSIS — C34.11 MALIGNANT NEOPLASM OF UPPER LOBE OF RIGHT LUNG (H): Primary | ICD-10-CM

## 2023-10-05 DIAGNOSIS — E11.42 DIABETIC POLYNEUROPATHY ASSOCIATED WITH TYPE 2 DIABETES MELLITUS (H): Primary | ICD-10-CM

## 2023-10-05 DIAGNOSIS — N18.30 CKD (CHRONIC KIDNEY DISEASE) STAGE 3, GFR 30-59 ML/MIN (H): ICD-10-CM

## 2023-10-05 LAB — INR BLD: 1.3 (ref 0.9–1.1)

## 2023-10-05 PROCEDURE — 85610 PROTHROMBIN TIME: CPT

## 2023-10-05 PROCEDURE — 36415 COLL VENOUS BLD VENIPUNCTURE: CPT

## 2023-10-05 NOTE — TELEPHONE ENCOUNTER
Writer has already spoken with this patient. Closing encounter at this time.    Alyse Macias, RN, BSN, PHN

## 2023-10-05 NOTE — TELEPHONE ENCOUNTER
General Call    Contacts         Type Contact Phone/Fax    10/05/2023 04:29 PM CDT Phone (Incoming) Zurdo Dash (Self) [phone] (H)     Calling back INR RN          Reason for Call:  INR    What are your questions or concerns:  Calling back due to Voicemail, but if Alyse has talked with Zurdo, you can disregard this message.    Date of last appointment with provider: N/A    Could we send this information to you in [employer] [place] or would you prefer to receive a phone call?:   Patient would prefer a phone call   Okay to leave a detailed message?: Yes at Cell number on file:    Telephone Information:   Mobile [phone]

## 2023-10-05 NOTE — PROGRESS NOTES
ANTICOAGULATION MANAGEMENT     Zurdo Dash 78 year old male is on warfarin with subtherapeutic INR result. (Goal INR 2.0-3.0)    Recent labs: (last 7 days)     10/05/23  1059   INR 1.3*       ASSESSMENT     Source(s): Chart Review and Patient/Caregiver Call     Warfarin doses taken: Warfarin taken as instructed  Diet: No new diet changes identified  Medication/supplement changes: None noted  New illness, injury, or hospitalization: No  Signs or symptoms of bleeding or clotting: No  Previous result: Subtherapeutic  Additional findings: None       PLAN     Recommended plan for no diet, medication or health factor changes affecting INR     Dosing Instructions: booster dose then Increase your warfarin dose (15.4% change) with next INR in 4 days       Summary  As of 10/5/2023      Full warfarin instructions:  10/5: 6 mg; Otherwise 4.5 mg every Thu; 3 mg all other days   Next INR check:  10/9/2023               Telephone call with Robb who verbalizes understanding and agrees to plan    Lab visit scheduled    Education provided:   Please call back if any changes to your diet, medications or how you've been taking warfarin  Contact [phone]  with any changes, questions or concerns.     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

## 2023-10-06 DIAGNOSIS — I74.2 EMBOLISM AND THROMBOSIS OF ARTERIES OF THE UPPER EXTREMITIES (H): ICD-10-CM

## 2023-10-06 RX ORDER — WARFARIN SODIUM 3 MG/1
TABLET ORAL
Qty: 96 TABLET | Refills: 1 | Status: SHIPPED | OUTPATIENT
Start: 2023-10-06 | End: 2023-12-07

## 2023-10-06 NOTE — TELEPHONE ENCOUNTER
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 Rx instructions and quantity supplied updated to match patient's current dosing plan. Warfarin 90 day supply with 1 refill granted per [facility] protocol     Alyse Macias RN  Anticoagulation Clinic

## 2023-10-09 ENCOUNTER — ANTICOAGULATION THERAPY VISIT (OUTPATIENT)
Dept: ANTICOAGULATION | Facility: CLINIC | Age: 78
End: 2023-10-09

## 2023-10-09 ENCOUNTER — LAB (OUTPATIENT)
Dept: LAB | Facility: CLINIC | Age: 78
End: 2023-10-09
Payer: COMMERCIAL

## 2023-10-09 DIAGNOSIS — I74.2 EMBOLISM AND THROMBOSIS OF ARTERIES OF THE UPPER EXTREMITIES (H): ICD-10-CM

## 2023-10-09 DIAGNOSIS — C34.11 MALIGNANT NEOPLASM OF UPPER LOBE OF RIGHT LUNG (H): Primary | ICD-10-CM

## 2023-10-09 LAB — INR BLD: 2.2 (ref 0.9–1.1)

## 2023-10-09 PROCEDURE — 85610 PROTHROMBIN TIME: CPT

## 2023-10-09 PROCEDURE — 36415 COLL VENOUS BLD VENIPUNCTURE: CPT

## 2023-10-09 NOTE — PROGRESS NOTES
ANTICOAGULATION MANAGEMENT     Zurdo Dash 78 year old male is on warfarin with therapeutic INR result. (Goal INR 2.0-3.0)    Recent labs: (last 7 days)     10/09/23  1111   INR 2.2*       ASSESSMENT     Source(s): Chart Review and Patient/Caregiver Call     Warfarin doses taken: Warfarin taken as instructed  Diet: No new diet changes identified  Medication/supplement changes: None noted  New illness, injury, or hospitalization: No  Signs or symptoms of bleeding or clotting: No  Previous result: Subtherapeutic  Additional findings: None     PLAN     Recommended plan for no diet, medication or health factor changes affecting INR     Dosing Instructions: Continue your current warfarin dose with next INR in 3 days       Pt or wife to call back if pt took different dosing, had changes or concerns.    Summary  As of 10/9/2023      Full warfarin instructions:  4.5 mg every Thu; 3 mg all other days   Next INR check:  10/12/2023               Telephone call with Sandhya who verbalizes understanding and agrees to plan    Lab visit scheduled    Education provided:   Please call back if any changes to your diet, medications or how you've been taking warfarin    Plan made per [facility] anticoagulation protocol    Christy Harper RN  Anticoagulation Clinic  10/9/2023    _______________________________________________________________________     Anticoagulation Episode Summary       Current INR goal:  2.0-3.0   TTR:  8.1 % (1.6 wk)   Target end date:  Indefinite   Send INR reminders to:  ROSALES REAL    Indications    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) [C34.11]  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

## 2023-10-09 NOTE — PROGRESS NOTES
Discussed with Sandhya - she did talk with her Mother (pt's wife) and pt did fall over the weekend. He does have a bruise on his chest. No other concerns or changes.    Pt is living at an assisted living facility and was thoroughly checked after the fall. Denies hitting his head. Reviewed S&S to watch for, when pt should be seen and to call back with questions/concerns.    Sandhya verbalizes understanding and agrees to plan. No further questions or concerns.  Christy Harper RN on 10/9/2023 at 4:13 PM

## 2023-10-09 NOTE — TELEPHONE ENCOUNTER
Message left for patient to return call to [facility] when message received. Mychart also sent.    Velia Parada RN, BSN  [facility]  [phone]     Detail Level: Detailed Left Pupillary Line Units: 0 Show Masseter Units: Yes Consent: Written consent obtained. Risks include but not limited to lid/brow ptosis, bruising, swelling, diplopia, temporary effect, incomplete chemical denervation. Show Right And Left Periorbital Units: No Show Inventory Tab: Show Incrementing Botox Units: By 0.5 Units Dilution (U/0.1 Cc): 2 Post-Care Instructions: Patient instructed to not lie down for 4 hours and limit physical activity for 24 hours.

## 2023-10-11 NOTE — TELEPHONE ENCOUNTER
Called and left VM for Pt to explain that for their discussion of a knee replacement they will need to speak with a total joint surgeon such as Dr. Tamayo. For the information requested by his Insurance to perform a Gel Injection sooner than 6 months please let us know what that information is or send over the form they require.      Anoop LÓPEZ ATC

## 2023-10-12 ENCOUNTER — LAB (OUTPATIENT)
Dept: LAB | Facility: CLINIC | Age: 78
End: 2023-10-12
Payer: COMMERCIAL

## 2023-10-12 ENCOUNTER — ANTICOAGULATION THERAPY VISIT (OUTPATIENT)
Dept: LAB | Facility: CLINIC | Age: 78
End: 2023-10-12

## 2023-10-12 DIAGNOSIS — I74.2 EMBOLISM AND THROMBOSIS OF ARTERIES OF THE UPPER EXTREMITIES (H): ICD-10-CM

## 2023-10-12 DIAGNOSIS — C34.11 MALIGNANT NEOPLASM OF UPPER LOBE OF RIGHT LUNG (H): Primary | ICD-10-CM

## 2023-10-12 LAB — INR BLD: 2.8 (ref 0.9–1.1)

## 2023-10-12 PROCEDURE — 36416 COLLJ CAPILLARY BLOOD SPEC: CPT

## 2023-10-12 PROCEDURE — 85610 PROTHROMBIN TIME: CPT

## 2023-10-12 NOTE — PROGRESS NOTES
ANTICOAGULATION MANAGEMENT     Zurdo Dash 78 year old male is on warfarin with therapeutic INR result. (Goal INR 2.0-3.0)    Recent labs: (last 7 days)     10/12/23  1103   INR 2.8*       ASSESSMENT     Source(s): Chart Review and Patient/Caregiver Call     Warfarin doses taken: More warfarin taken than planned which may be affecting INR and Zurdo states he has been taking 6mg daily since 10/5/23  Diet: No new diet changes identified  Medication/supplement changes: None noted  New illness, injury, or hospitalization: No  Signs or symptoms of bleeding or clotting: No  Previous result: Subtherapeutic  Additional findings: None       PLAN     Recommended plan for temporary change(s) affecting INR     Dosing Instructions:  adjusted dose to median with prev dose and what dose Zurdo was actually taking. Increase of 46.7%  with next INR in 5 days       Summary  As of 10/12/2023      Full warfarin instructions:  3 mg every Mon, Thu, Sat; 6 mg all other days   Next INR check:  10/16/2023               Telephone call with Zurdo who verbalizes understanding and agrees to plan    Lab visit scheduled    Education provided:   Please call back if any changes to your diet, medications or how you've been taking warfarin  Goal range and lab monitoring: goal range and significance of current result    Plan made with [facility] Pharmacist Deanne May, RN  Anticoagulation Clinic  10/12/2023    _______________________________________________________________________     Anticoagulation Episode Summary       Current INR goal:  2.0-3.0   TTR:  27.7% (2 wk)   Target end date:  Indefinite   Send INR reminders to:  ROSALES REAL    Indications    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) [C34.11]  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

## 2023-10-13 ENCOUNTER — OFFICE VISIT (OUTPATIENT)
Dept: URGENT CARE | Facility: URGENT CARE | Age: 78
End: 2023-10-13
Payer: COMMERCIAL

## 2023-10-13 VITALS
DIASTOLIC BLOOD PRESSURE: 83 MMHG | BODY MASS INDEX: 32.92 KG/M2 | RESPIRATION RATE: 16 BRPM | SYSTOLIC BLOOD PRESSURE: 157 MMHG | OXYGEN SATURATION: 99 % | HEART RATE: 84 BPM | TEMPERATURE: 99.2 F | WEIGHT: 222.9 LBS

## 2023-10-13 DIAGNOSIS — E11.42 DIABETIC POLYNEUROPATHY ASSOCIATED WITH TYPE 2 DIABETES MELLITUS (H): ICD-10-CM

## 2023-10-13 DIAGNOSIS — S89.91XA INJURY OF RIGHT SHIN, INITIAL ENCOUNTER: Primary | ICD-10-CM

## 2023-10-13 DIAGNOSIS — N18.32 STAGE 3B CHRONIC KIDNEY DISEASE (H): ICD-10-CM

## 2023-10-13 DIAGNOSIS — Z92.29 HX: LONG TERM ANTICOAGULANT USE: ICD-10-CM

## 2023-10-13 PROCEDURE — 99213 OFFICE O/P EST LOW 20 MIN: CPT | Performed by: PHYSICIAN ASSISTANT

## 2023-10-13 ASSESSMENT — ENCOUNTER SYMPTOMS
ABDOMINAL PAIN: 0
GASTROINTESTINAL NEGATIVE: 1
NEUROLOGICAL NEGATIVE: 1
CHEST TIGHTNESS: 0
DIARRHEA: 0
PALPITATIONS: 0
FEVER: 0
HEADACHES: 0
SHORTNESS OF BREATH: 0
RESPIRATORY NEGATIVE: 1
WOUND: 1
SORE THROAT: 0
VOMITING: 0
DYSURIA: 0
MUSCULOSKELETAL NEGATIVE: 1
CARDIOVASCULAR NEGATIVE: 1
HEMATURIA: 0
MYALGIAS: 0
FREQUENCY: 0
WHEEZING: 0
COUGH: 0
CHILLS: 0
ALLERGIC/IMMUNOLOGIC NEGATIVE: 1
CONSTITUTIONAL NEGATIVE: 1
NAUSEA: 0

## 2023-10-13 NOTE — PROGRESS NOTES
Chief Complaint:    Chief Complaint   Patient presents with    Leg Problem     Scrapped on right leg 1 week ago, pt thought it was a rug burn because he fell. Not getting better.      Medical Decision Making:    Vital signs reviewed by Fabiano Escobedo PA-C  BP (!) 157/83 (BP Location: Right arm, Patient Position: Standing, Cuff Size: Adult Regular)   Pulse 84   Temp 99.2  F (37.3  C) (Tympanic)   Resp 16   Wt 101.1 kg (222 lb 14.4 oz)   SpO2 99%   BMI 32.92 kg/m      Differential Diagnosis:  R shin abrasion     ASSESSMENT:     1. Injury of right shin, initial encounter    2. Diabetic polyneuropathy associated with type 2 diabetes mellitus (H)    3. Stage 3b chronic kidney disease (H)    4. HX: long term anticoagulant use           PLAN:     Patient is in no acute distress.  No indication of infection of the R lower leg at this time.  Antibiotic not indicated at this time.    Keep area clean and dry.  Tylenol for discomfort.  NSAIDS contraindicated with anticoagulation therapy, and CKD.  Patient at higher risk for severe infection with DM.  Patient instructed to monitor blood sugars closely with illness.  Continue taking your Glimepiride, and Insulin and follow up with your primary provider for any DM medication changes if needed.  Patient instructed to follow up with PCP in 1 week if symptoms are not improving.  Sooner if symptoms worsen.  Worrisome symptoms discussed with instructions to go to the ED.  Patient verbalized understanding and agreed with this plan.    Labs:     No results found for any visits on 10/13/23.    Current Meds:    Current Outpatient Medications:     acetaminophen (PAIN RELIEF EXTRA STRENGTH) 500 MG tablet, TAKE TWO TABLETS BY MOUTH ONCE DAILY, Disp: 180 tablet, Rfl: 1    Alcohol Swabs PADS, Uses four times daily, Disp: , Rfl:     bisacodyl (DULCOLAX) 10 MG suppository, Unwrap and insert 1 suppository rectally once daily as needed, Disp: , Rfl:     blood glucose (NO BRAND SPECIFIED)  test strip, Use to test blood sugar 4 times daily or as directed., Disp: , Rfl:     Blood Glucose Monitoring Suppl (ACCU-CHEK GUIDE) w/Device KIT, , Disp: , Rfl:     bumetanide (BUMEX) 1 MG tablet, Take 1 tablet (1 mg) by mouth daily, Disp: 100 tablet, Rfl: 1    cetirizine (ZYRTEC) 10 MG tablet, Take 1 tablet (10 mg) by mouth At Bedtime, Disp: 30 tablet, Rfl: 0    Continuous Blood Gluc Sensor (FREESTYLE YENNY 2 SENSOR) MISC, 1 each every 14 days Use 1 sensor every 14 days. Use to read blood sugars per 's instructions., Disp: 2 each, Rfl: 5    cyanocobalamin (VITAMIN B-12) 1000 MCG tablet, Take 1 tablet (1,000 mcg) by mouth once a week, Disp: 30 tablet, Rfl: 0    cyanocobalamin (VITAMIN B-12) 1000 MCG tablet, Take 1 tablet (1,000 mcg) by mouth once a week, Disp: 90 tablet, Rfl: 3    diclofenac (VOLTAREN) 1 % topical gel, as needed, Disp: , Rfl:     dulaglutide (TRULICITY) 1.5 MG/0.5ML pen, Inject 1.5 mg Subcutaneous every 7 days ON Wednesdays, Disp: 10 mL, Rfl: 1    ferrous sulfate (FEROSUL) 325 (65 Fe) MG tablet, TAKE ONE TABLET BY MOUTH ONCE DAILY, Disp: 90 tablet, Rfl: 1    glimepiride (AMARYL) 1 MG tablet, TAKE ONE TABLET BY MOUTH ONCE DAILY, Disp: 30 tablet, Rfl: 1    hydrALAZINE (APRESOLINE) 10 MG tablet, Take 10 mg by mouth nightly as needed (systolic bp > 170), Disp: , Rfl:     HYDROmorphone (DILAUDID) 2 MG tablet, Take 1 tablet (2 mg) by mouth 2 times daily as needed for pain For urgent pain relief with rheumatoid arthritis flare-up, Disp: 10 tablet, Rfl: 0    hydroxychloroquine (PLAQUENIL) 200 MG tablet, Take 1 tablet (200 mg) by mouth 2 times daily, Disp: 180 tablet, Rfl: 1    insulin glargine (LANTUS PEN) 100 UNIT/ML pen, Inject 24 Units Subcutaneous 2 times daily, Disp: 15 mL, Rfl: 2    insulin glargine (LANTUS PEN) 100 UNIT/ML pen, Inject 25 Units Subcutaneous At Bedtime, Disp: 15 mL, Rfl: 1    insulin pen needle (31G X 5 MM) 31G X 5 MM miscellaneous, Use 1 pen needles daily or as directed.,  "Disp: 100 each, Rfl: 3    insulin syringe-needle U-100 (30G X 1/2\" 0.5 ML) 30G X 1/2\" 0.5 ML miscellaneous, Use 4 syringes daily or as directed., Disp: , Rfl:     Insulin Syringe-Needle U-100 28G X 1/2\" 0.5 ML MISC, , Disp: , Rfl:     Irrigation Supplies MISC, , Disp: , Rfl:     methocarbamol (ROBAXIN) 500 MG tablet, Take 250 mg by mouth 2 times daily, Disp: , Rfl:     metoprolol succinate ER (TOPROL XL) 50 MG 24 hr tablet, Take 1 tablet (50 mg) by mouth daily, Disp: 100 tablet, Rfl: 1    NOVOFINE AUTOCOVER PEN NEEDLE 30G X 8 MM miscellaneous, USE FOUR TIMES A DAY, Disp: 100 each, Rfl: 0    omeprazole (PRILOSEC) 20 MG DR capsule, Take 1 capsule (20 mg) by mouth daily, Disp: 100 capsule, Rfl: 1    polyethylene glycol-propylene glycol (SYSTANE) 0.4-0.3 % SOLN ophthalmic solution, Place 1 drop into both eyes 2 times daily as needed for dry eyes, Disp: 3 mL, Rfl: 0    pravastatin (PRAVACHOL) 10 MG tablet, Take 1 tablet (10 mg) by mouth daily, Disp: 100 tablet, Rfl: 1    senna-docusate (SENOKOT-S/PERICOLACE) 8.6-50 MG tablet, Take 1 tablet by mouth 2 times daily as needed for constipation, Disp: 50 tablet, Rfl: 0    sertraline (ZOLOFT) 50 MG tablet, Take 1 tablet (50 mg) by mouth daily, Disp: 100 tablet, Rfl: 1    syringe/needle, disp, (BD ECLIPSE SYRINGE) 25G X 1\" 3 ML MISC, , Disp: , Rfl:     warfarin ANTICOAGULANT (COUMADIN) 3 MG tablet, Take 4.5 mg Thursdays; 3 mg all other days or per INR anticoagulation team., Disp: 96 tablet, Rfl: 1    Allergies:  Allergies   Allergen Reactions    Seasonal Allergies     Blood-Group Specific Substance Other (See Comments)     Patient has a Non-specific antibody. Blood products may be delayed. Draw patient 24 hours prior to transfusion. For [facility] testing, draw one red top and two purple top tubes for all Type and Screen orders.  Patient has a Non-specific antibody. Blood products may be delayed. Draw patient 24 hours prior to transfusion. For [facility] testing, draw one " red top and two purple top tubes for all Type and Screen orders.       SUBJECTIVE    HPI: Zurdo Dash is an 78 year old male who presents for evaluation and treatment of R leg wound.  Patient has a complicated medical Hx including DM, anticoagulation therapy, Adenocarcinoma of the R lung, and CHF.  Patient injured the leg on a piece of furniture roughly 1 week ago.  He has a abrasion with some redness surrounding it and is concerned about infection.  Redness has not been worsening.  He has not noticed any streaking.      ROS:      Review of Systems   Constitutional: Negative.  Negative for chills and fever.   HENT: Negative.  Negative for sore throat.    Respiratory: Negative.  Negative for cough, chest tightness, shortness of breath and wheezing.    Cardiovascular: Negative.  Negative for chest pain and palpitations.   Gastrointestinal: Negative.  Negative for abdominal pain, diarrhea, nausea and vomiting.   Genitourinary:  Negative for dysuria, frequency, hematuria and urgency.   Musculoskeletal: Negative.  Negative for myalgias.   Skin:  Positive for wound. Negative for rash.   Allergic/Immunologic: Negative.  Negative for immunocompromised state.   Neurological: Negative.  Negative for headaches.        Family History   Family History   Problem Relation Age of Onset    Cerebrovascular Disease Mother     Arthritis Mother     Osteoporosis Mother     Alzheimer Disease Father     Arthritis Father     Cancer Father     Diabetes Maternal Grandmother     Cardiovascular Maternal Grandmother     Other Cancer Brother     Cancer Paternal Aunt     Hypertension No family hx of     Thyroid Disease No family hx of     Glaucoma No family hx of     Macular Degeneration No family hx of        Social History  Social History     Socioeconomic History    Marital status:      Spouse name: Not on file    Number of children: Not on file    Years of education: Not on file    Highest education level: Not on file   Occupational  History     Employer: RETIRED   Tobacco Use    Smoking status: Former     Packs/day: 1.00     Years: 40.00     Additional pack years: 0.00     Total pack years: 40.00     Types: Cigarettes     Quit date: 3/16/2007     Years since quittin.5    Smokeless tobacco: Never   Vaping Use    Vaping Use: Never used   Substance and Sexual Activity    Alcohol use: No     Alcohol/week: 0.0 standard drinks of alcohol    Drug use: No    Sexual activity: Yes     Partners: Female   Other Topics Concern    Parent/sibling w/ CABG, MI or angioplasty before 65F 55M? Not Asked   Social History Narrative    Not on file     Social Determinants of Health     Financial Resource Strain: Not on file   Food Insecurity: Not on file   Transportation Needs: Not on file   Physical Activity: Not on file   Stress: Not on file   Social Connections: Not on file   Interpersonal Safety: Not on file   Housing Stability: Not on file        Surgical History:  Past Surgical History:   Procedure Laterality Date    AMPUTATION Left 2023    Toe    BYPASS GRAFT FEMOROPOPLITEAL Left 2021    Procedure: LEFT FEMORAL TO ABOVE KNEE POPLITEAL ARTERY BYPASS WITH PTFE VASCULAR GRAFT REMOVABLE RING 6MMX 50CM;  Surgeon: Myra Lopes MD;  Location:  OR    CATARACT IOL, RT/LT      COLONOSCOPY  2018    [facility]    COMBINED REPAIR PTOSIS WITH BLEPHAROPLASTY BILATERAL Bilateral 2019    Procedure: BILATERAL UPPER EYELID BLEPHAROPLASTY AND BILATERAL PTOSIS REPAIR;  Surgeon: Janet Garcia MD;  Location:  OR    ENDARTERECTOMY FEMORAL Left 2021    Procedure: LEFT FEMORAL ENDARTERECTOMY WITH PATCH ANGIOPLASTY PHOTOFIX  0.8 X 8CM;  Surgeon: Myra Lopes MD;  Location:  OR    EP PACEMAKER  2021    EXCISE LESION EYELID Left 2019    Procedure: LEFT LOWER EYELID BIOPSY;  Surgeon: Janet Garcia MD;  Location:  OR    IR LOWER EXTREMITY ANGIOGRAM LEFT  2020    PHACOEMULSIFICATION WITH STANDARD  INTRAOCULAR LENS IMPLANT  02/2019; 3/2019    left eye; right eye    TONSILLECTOMY      ZZHC INCISION TENDON SHEATH FINGER  04/2009    r hand ring finger        Problem List:  Patient Active Problem List   Diagnosis    Pain in limb    Hyperlipidemia LDL goal <100    Hypertension goal BP (blood pressure) < 140/90    Hypogonadism    Advanced directives, counseling/discussion    Health Care Home    Type 2 diabetes mellitus with diabetic polyneuropathy, without long-term current use of insulin (H)    RBBB (right bundle branch block)    Ex-smoker    Family history of esophageal cancer    Gastroesophageal reflux disease, esophagitis presence not specified    Rheumatoid arthritis involving multiple sites with positive rheumatoid factor (H)    Pulmonary nodule    High risk medication use    Spondylosis of cervical region without myelopathy or radiculopathy    Erectile dysfunction, unspecified erectile dysfunction type    Type 2 diabetes mellitus without retinopathy (H)    Tubulovillous adenoma of colon    Diabetic polyneuropathy associated with type 2 diabetes mellitus (H)    Chronic bilateral low back pain without sciatica    Migraine equivalent    Posterior vitreous detachment of left eye    Pseudophakia, ou    Eyelid lesion, LLL    Bradycardia    Primary osteoarthritis of both knees    Syncope    Trigger finger, acquired    CKD (chronic kidney disease) stage 3, GFR 30-59 ml/min (H)    Abdominal pain, generalized    PAD (peripheral artery disease) (H24)    Immunosuppression (H24)    Skin ulcer of toe of left foot, limited to breakdown of skin (H)    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)    Pneumothorax    SOBOE (shortness of breath on exertion)    Pacemaker    Ulcer of left foot, unspecified ulcer stage (H)    Hammer toe of left foot    Anemia, unspecified type    Closed nondisplaced fracture of right pubis (H)    Amputation of toe (H24)    Adenocarcinoma, lung (H)    Urge incontinence of urine     Thrombocytopenia (H24)    Severe sepsis (H)    Paresis (H)    History of blepharoplasty    Mild protein-calorie malnutrition (H24)    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)           OBJECTIVE:     Vital signs noted and reviewed by Fabiano Escobedo PA-C  BP (!) 157/83 (BP Location: Right arm, Patient Position: Standing, Cuff Size: Adult Regular)   Pulse 84   Temp 99.2  F (37.3  C) (Tympanic)   Resp 16   Wt 101.1 kg (222 lb 14.4 oz)   SpO2 99%   BMI 32.92 kg/m       PEFR:    Physical Exam  Vitals and nursing note reviewed.   Constitutional:       General: He is not in acute distress.     Appearance: He is well-developed. He is not ill-appearing, toxic-appearing or diaphoretic.   HENT:      Head: Normocephalic and atraumatic.      Right Ear: Hearing, tympanic membrane, ear canal and external ear normal. Tympanic membrane is not perforated, erythematous, retracted or bulging.      Left Ear: Hearing, tympanic membrane, ear canal and external ear normal. Tympanic membrane is not perforated, erythematous, retracted or bulging.      Nose: Nose normal. No mucosal edema, congestion or rhinorrhea.      Mouth/Throat:      Pharynx: No oropharyngeal exudate or posterior oropharyngeal erythema.      Tonsils: No tonsillar exudate or tonsillar abscesses. 0 on the right. 0 on the left.   Eyes:      Pupils: Pupils are equal, round, and reactive to light.   Cardiovascular:      Rate and Rhythm: Normal rate and regular rhythm.      Heart sounds: Normal heart sounds, S1 normal and S2 normal. Heart sounds not distant. No murmur heard.     No friction rub. No gallop.   Pulmonary:      Effort: Pulmonary effort is normal. No respiratory distress.      Breath sounds: Normal breath sounds. No decreased breath sounds, wheezing, rhonchi or rales.   Abdominal:      General: Bowel sounds are normal. There is no distension.      Palpations: Abdomen is soft.      Tenderness: There is no abdominal tenderness.   Musculoskeletal:       Cervical back: Normal range of motion and neck supple.      Right lower leg: Swelling and tenderness present. No edema.        Legs:       Comments: Roughly 10 cm excoriation with some erythema directly surrounding the area and scab formation.  There is also some underlying bruising.     Lymphadenopathy:      Cervical: No cervical adenopathy.   Skin:     General: Skin is warm and dry.      Findings: No rash.   Neurological:      Mental Status: He is alert.      Cranial Nerves: No cranial nerve deficit.   Psychiatric:         Attention and Perception: He is attentive.         Speech: Speech normal.         Behavior: Behavior normal. Behavior is cooperative.         Thought Content: Thought content normal.         Judgment: Judgment normal.             Fabiano Escobedo PA-C  10/13/2023, 1:50 PM

## 2023-10-16 ENCOUNTER — TELEPHONE (OUTPATIENT)
Dept: FAMILY MEDICINE | Facility: CLINIC | Age: 78
End: 2023-10-16

## 2023-10-16 ENCOUNTER — LAB (OUTPATIENT)
Dept: LAB | Facility: CLINIC | Age: 78
End: 2023-10-16
Payer: COMMERCIAL

## 2023-10-16 ENCOUNTER — ANTICOAGULATION THERAPY VISIT (OUTPATIENT)
Dept: ANTICOAGULATION | Facility: CLINIC | Age: 78
End: 2023-10-16

## 2023-10-16 DIAGNOSIS — E11.42 TYPE 2 DIABETES MELLITUS WITH DIABETIC POLYNEUROPATHY, WITHOUT LONG-TERM CURRENT USE OF INSULIN (H): ICD-10-CM

## 2023-10-16 DIAGNOSIS — C34.11 MALIGNANT NEOPLASM OF UPPER LOBE OF RIGHT LUNG (H): Primary | ICD-10-CM

## 2023-10-16 DIAGNOSIS — I74.2 EMBOLISM AND THROMBOSIS OF ARTERIES OF THE UPPER EXTREMITIES (H): ICD-10-CM

## 2023-10-16 LAB — INR BLD: 1.4 (ref 0.9–1.1)

## 2023-10-16 PROCEDURE — 36416 COLLJ CAPILLARY BLOOD SPEC: CPT

## 2023-10-16 PROCEDURE — 85610 PROTHROMBIN TIME: CPT

## 2023-10-16 NOTE — TELEPHONE ENCOUNTER
TITUS'S INSulin got updated to use twice daily, we need an updated script of pentips to use twice daily as well.  Thank you   Tatyana Godinez. Pharmacy Technician   [facility]

## 2023-10-16 NOTE — PROGRESS NOTES
ANTICOAGULATION MANAGEMENT     Zurdo Dash 78 year old male is on warfarin with subtherapeutic INR result. (Goal INR 2.0-3.0)    Recent labs: (last 7 days)     10/16/23  1416   INR 1.4*       ASSESSMENT     Source(s): Chart Review and Patient/Caregiver Call     Warfarin doses taken: Less warfarin taken than planned which may be affecting INR  Diet: No new diet changes identified  Medication/supplement changes: None noted  New illness, injury, or hospitalization: No  Signs or symptoms of bleeding or clotting: No  Previous result: Therapeutic last 2(+) visits  Additional findings: None     PLAN     Recommended plan for temporary change(s) affecting INR     Dosing Instructions: Increase your warfarin dose (18.2% change) with next INR in 3 days       Summary  As of 10/16/2023      Full warfarin instructions:  3 mg every Sat; 6 mg all other days   Next INR check:  10/19/2023               Telephone call with Zurdo who verbalizes understanding and agrees to plan    Lab visit scheduled    Education provided:   Please call back if any changes to your diet, medications or how you've been taking warfarin  Symptom monitoring: monitoring for clotting signs and symptoms and monitoring for stroke signs and symptoms    Plan made with [facility] Pharmacist Margaux Harper, DECLAN  Anticoagulation Clinic  10/16/2023    _______________________________________________________________________     Anticoagulation Episode Summary       Current INR goal:  2.0-3.0   TTR:  34.4% (2.6 wk)   Target end date:  Indefinite   Send INR reminders to:  ROSALES REAL    Indications    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) [C34.11]  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

## 2023-10-19 ENCOUNTER — LAB (OUTPATIENT)
Dept: LAB | Facility: CLINIC | Age: 78
End: 2023-10-19
Payer: COMMERCIAL

## 2023-10-19 ENCOUNTER — ANTICOAGULATION THERAPY VISIT (OUTPATIENT)
Dept: ANTICOAGULATION | Facility: CLINIC | Age: 78
End: 2023-10-19

## 2023-10-19 DIAGNOSIS — I74.2 EMBOLISM AND THROMBOSIS OF ARTERIES OF THE UPPER EXTREMITIES (H): ICD-10-CM

## 2023-10-19 DIAGNOSIS — C34.11 MALIGNANT NEOPLASM OF UPPER LOBE OF RIGHT LUNG (H): Primary | ICD-10-CM

## 2023-10-19 LAB — INR BLD: 2.6 (ref 0.9–1.1)

## 2023-10-19 PROCEDURE — 36416 COLLJ CAPILLARY BLOOD SPEC: CPT

## 2023-10-19 PROCEDURE — 85610 PROTHROMBIN TIME: CPT

## 2023-10-19 NOTE — PROGRESS NOTES
ANTICOAGULATION MANAGEMENT     Zurdo Dash 78 year old male is on warfarin with therapeutic INR result. (Goal INR 2.0-3.0)    Recent labs: (last 7 days)     10/19/23  1156   INR 2.6*       ASSESSMENT     Source(s): Chart Review and Patient/Caregiver Call     Warfarin doses taken: Warfarin taken as instructed  Diet: No new diet changes identified  Medication/supplement changes: None noted  New illness, injury, or hospitalization: No  Signs or symptoms of bleeding or clotting: No  Previous result: Subtherapeutic  Additional findings:  rapid rise     PLAN     Recommended plan for no diet, medication or health factor changes affecting INR     Dosing Instructions: decrease your warfarin dose (7.7% change) with next INR in 5 days       Summary  As of 10/19/2023      Full warfarin instructions:  3 mg every Mon, Fri; 6 mg all other days   Next INR check:  10/24/2023               Telephone call with Yoli who verbalizes understanding and agrees to plan (OK verbally given to [facility] in the past to discuss with Yoli)    Lab visit scheduled    Education provided:   Please call back if any changes to your diet, medications or how you've been taking warfarin    Plan made with [facility] Pharmacist Margaux Harper RN  Anticoagulation Clinic  10/19/2023    _______________________________________________________________________     Anticoagulation Episode Summary       Current INR goal:  2.0-3.0   TTR:  36.6% (3 wk)   Target end date:  Indefinite   Send INR reminders to:  ROSALES REAL    Indications    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) [C34.11]  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 Dosing  instructions not given to patient.  Christy Harper RN on 10/19/2023 at 12:54 PM

## 2023-10-20 ENCOUNTER — ANCILLARY PROCEDURE (OUTPATIENT)
Dept: CT IMAGING | Facility: CLINIC | Age: 78
End: 2023-10-20
Attending: RADIOLOGY
Payer: COMMERCIAL

## 2023-10-20 DIAGNOSIS — E11.42 TYPE 2 DIABETES MELLITUS WITH DIABETIC POLYNEUROPATHY, WITHOUT LONG-TERM CURRENT USE OF INSULIN (H): ICD-10-CM

## 2023-10-20 DIAGNOSIS — C34.11 MALIGNANT NEOPLASM OF UPPER LOBE OF RIGHT LUNG (H): ICD-10-CM

## 2023-10-20 PROCEDURE — 71250 CT THORAX DX C-: CPT | Mod: GC | Performed by: RADIOLOGY

## 2023-10-22 RX ORDER — GLIMEPIRIDE 1 MG/1
1 TABLET ORAL DAILY
Qty: 30 TABLET | Refills: 0 | Status: SHIPPED | OUTPATIENT
Start: 2023-10-22 | End: 2023-11-06

## 2023-10-23 ENCOUNTER — OFFICE VISIT (OUTPATIENT)
Dept: RADIATION ONCOLOGY | Facility: CLINIC | Age: 78
End: 2023-10-23
Payer: COMMERCIAL

## 2023-10-23 VITALS
WEIGHT: 218.1 LBS | SYSTOLIC BLOOD PRESSURE: 143 MMHG | BODY MASS INDEX: 32.21 KG/M2 | TEMPERATURE: 98 F | DIASTOLIC BLOOD PRESSURE: 91 MMHG | RESPIRATION RATE: 16 BRPM | OXYGEN SATURATION: 96 % | HEART RATE: 55 BPM

## 2023-10-23 DIAGNOSIS — C34.11 MALIGNANT NEOPLASM OF UPPER LOBE OF RIGHT LUNG (H): Primary | ICD-10-CM

## 2023-10-23 PROCEDURE — 99213 OFFICE O/P EST LOW 20 MIN: CPT | Performed by: RADIOLOGY

## 2023-10-23 ASSESSMENT — PAIN SCALES - GENERAL: PAINLEVEL: MODERATE PAIN (4)

## 2023-10-23 NOTE — PROGRESS NOTES
Dear Colleagues,  Today Zurdo Dash was seen in follow up      IDENTIFICATION: 78 year old gentleman with multiple comorbidities and a pacemaker diagnosed with [id number], stage 1 lung cancer of the RUL status post stereotactic body radiation therapy completed on August 26, 2022.    INTERVAL HISTORY: While on treatment he had no complaints.  He is seen today in follow-up and states he is doing well with no concerns. Specifically denies n/v/ha/sob/cp.      REVIEW OF SYSTEMS: As per HPI, a 14-point review of systems is otherwise negative.    Past Medical History:   Diagnosis Date    Abnormal CT scan 03/2004    calcified lung granuloma    C. difficile diarrhea     H/O    Cataract 11/18/2011    Diabetic neuropathy (H)     mild, mostly soles and distal forefeet, worse on the left side.    Diverticulitis     ED (erectile dysfunction)     Ex-smoker     QUIT SMOKING FEB 2007    History of ETOH abuse     recovering, sober since 1997    Hyperlipidemia LDL goal <100     Hypertension goal BP (blood pressure) < 140/90     Hypogonadism     Obesity     PAD (peripheral artery disease) (H24)     leg cramps, with exertion, no formal diagnosis of PAD and minimal if any symptoms at all.    RA (rheumatoid arthritis) (H)     Dr Epsteinay    Syncope        Past Surgical History:   Procedure Laterality Date    AMPUTATION Left 01/2023    Toe    BYPASS GRAFT FEMOROPOPLITEAL Left 01/07/2021    Procedure: LEFT FEMORAL TO ABOVE KNEE POPLITEAL ARTERY BYPASS WITH PTFE VASCULAR GRAFT REMOVABLE RING 6MMX 50CM;  Surgeon: Myra Lopes MD;  Location: [facility]    CATARACT IOL, RT/LT      COLONOSCOPY  03/01/2018    [facility]    COMBINED REPAIR PTOSIS WITH BLEPHAROPLASTY BILATERAL Bilateral 08/16/2019    Procedure: BILATERAL UPPER EYELID BLEPHAROPLASTY AND BILATERAL PTOSIS REPAIR;  Surgeon: Janet Garcia MD;  Location: [facility]    ENDARTERECTOMY FEMORAL Left 01/07/2021    Procedure: LEFT FEMORAL ENDARTERECTOMY WITH PATCH ANGIOPLASTY PHOTOFIX  0.8  X 8CM;  Surgeon: Myra Lopes MD;  Location: [facility]    EP PACEMAKER  2021    EXCISE LESION EYELID Left 2019    Procedure: LEFT LOWER EYELID BIOPSY;  Surgeon: Janet Garcia MD;  Location: [facility]    IR LOWER EXTREMITY ANGIOGRAM LEFT  2020    PHACOEMULSIFICATION WITH STANDARD INTRAOCULAR LENS IMPLANT  2019; 3/2019    left eye; right eye    TONSILLECTOMY      ZZHC INCISION TENDON SHEATH FINGER  2009    r hand ring finger       Family History   Problem Relation Age of Onset    Cerebrovascular Disease Mother     Arthritis Mother     Osteoporosis Mother     Alzheimer Disease Father     Arthritis Father     Cancer Father     Diabetes Maternal Grandmother     Cardiovascular Maternal Grandmother     Other Cancer Brother     Cancer Paternal Aunt     Hypertension No family hx of     Thyroid Disease No family hx of     Glaucoma No family hx of     Macular Degeneration No family hx of        Social History     Tobacco Use    Smoking status: Former     Packs/day: 1.00     Years: 40.00     Additional pack years: 0.00     Total pack years: 40.00     Types: Cigarettes     Quit date: 3/16/2007     Years since quittin.6    Smokeless tobacco: Never   Substance Use Topics    Alcohol use: No     Alcohol/week: 0.0 standard drinks of alcohol       Current Outpatient Medications   Medication    acetaminophen (PAIN RELIEF EXTRA STRENGTH) 500 MG tablet    blood glucose (NO BRAND SPECIFIED) test strip    Blood Glucose Monitoring Suppl (ACCU-CHEK GUIDE) w/Device KIT    bumetanide (BUMEX) 1 MG tablet    cetirizine (ZYRTEC) 10 MG tablet    cyanocobalamin (VITAMIN B-12) 1000 MCG tablet    dulaglutide (TRULICITY) 1.5 MG/0.5ML pen    ferrous sulfate (FEROSUL) 325 (65 Fe) MG tablet    glimepiride (AMARYL) 1 MG tablet    hydrALAZINE (APRESOLINE) 10 MG tablet    hydroxychloroquine (PLAQUENIL) 200 MG tablet    insulin glargine (LANTUS PEN) 100 UNIT/ML pen    insulin pen needle (31G X 5 MM) 31G X 5 MM  "miscellaneous    insulin syringe-needle U-100 (30G X 1/2\" 0.5 ML) 30G X 1/2\" 0.5 ML miscellaneous    Insulin Syringe-Needle U-100 28G X 1/2\" 0.5 ML MISC    Irrigation Supplies MISC    methocarbamol (ROBAXIN) 500 MG tablet    metoprolol succinate ER (TOPROL XL) 50 MG 24 hr tablet    NOVOFINE AUTOCOVER PEN NEEDLE 30G X 8 MM miscellaneous    omeprazole (PRILOSEC) 20 MG DR capsule    polyethylene glycol-propylene glycol (SYSTANE) 0.4-0.3 % SOLN ophthalmic solution    pravastatin (PRAVACHOL) 10 MG tablet    senna-docusate (SENOKOT-S/PERICOLACE) 8.6-50 MG tablet    sertraline (ZOLOFT) 50 MG tablet    syringe/needle, disp, (BD ECLIPSE SYRINGE) 25G X 1\" 3 ML MISC    warfarin ANTICOAGULANT (COUMADIN) 3 MG tablet    Alcohol Swabs PADS    bisacodyl (DULCOLAX) 10 MG suppository    Continuous Blood Gluc Sensor (FREESTYLE YENNY 2 SENSOR) MISC    diclofenac (VOLTAREN) 1 % topical gel    HYDROmorphone (DILAUDID) 2 MG tablet     No current facility-administered medications for this visit.          Allergies   Allergen Reactions    Seasonal Allergies     Blood-Group Specific Substance Other (See Comments)     Patient has a Non-specific antibody. Blood products may be delayed. Draw patient 24 hours prior to transfusion. For [facility] testing, draw one red top and two purple top tubes for all Type and Screen orders.  Patient has a Non-specific antibody. Blood products may be delayed. Draw patient 24 hours prior to transfusion. For [facility] testing, draw one red top and two purple top tubes for all Type and Screen orders.         PHYSICAL EXAM:  BP (!) 143/91   Pulse 55   Temp 98  F (36.7  C) (Oral)   Resp 16   Wt 98.9 kg (218 lb 1.6 oz)   SpO2 96%   BMI 32.21 kg/m    GEN: appears well, in no acute distress  HEENT: normocephalic and atraumatic, EOMI, anicteric sclerae  CV: RRR  RESP: normal respiration on room air, no stridor, CTAB  LYMPH: No supraclavicular or infraclavicular lymphadenopathy appreciated.  SKIN: normal " color and turgor  PSYCH: appropriate mood, affect, and judgment    All pertinent laboratory, imaging, and pathology findings have been reviewed.     IMPRESSION: 10/20/23 Chest CT shows scattered pure groundglass nodules in the bilateral lungs, not significantly changed in size or appearance dating back to CT chest. Resolution of the left lower lobe nodule noted on prior recent PET/CT. No evidence for new or enlarging suspicious pulmonary nodules. Stable right upper lobe post radiation changes and nearby soft tissue nodule, unchanged compared to PET/CT 7/21/2023.    IMPRESSION/RECOMMENDATION:  78 year old gentleman with multiple comorbidities and a pacemaker diagnosed with [id number], stage 1 lung cancer of the RUL status post stereotactic body radiation therapy completed on August 26, 2022. He is doing well with no significant radiation toxicity.  His most scan shows no definitive evidence of recurrence or new disease.  We will continue with repeat every 3 month chest CTs with in person or video follow up.       Thank you for allowing me to participate in the care of this pleasant patient. If you have any questions, please do not hesitate to contact my office.    Lalita Funez MD  Attending Physician  Radiation Oncology

## 2023-10-23 NOTE — LETTER
10/23/2023         RE: Zurdo Dash  [address]  [address]  [place] [address]        Dear Colleague,    Thank you for referring your patient, Zurdo Dash, to the [facility] [place]. Please see a copy of my visit note below.    Dear Colleagues,  Today Zurdo Dash was seen in follow up      IDENTIFICATION: 78 year old gentleman with multiple comorbidities and a pacemaker diagnosed with [id number], stage 1 lung cancer of the RUL status post stereotactic body radiation therapy completed on August 26, 2022.    INTERVAL HISTORY: While on treatment he had no complaints.  He is seen today in follow-up and states he is doing well with no concerns. Specifically denies n/v/ha/sob/cp.      REVIEW OF SYSTEMS: As per HPI, a 14-point review of systems is otherwise negative.    Past Medical History:   Diagnosis Date     Abnormal CT scan 03/2004    calcified lung granuloma     C. difficile diarrhea     H/O     Cataract 11/18/2011     Diabetic neuropathy (H)     mild, mostly soles and distal forefeet, worse on the left side.     Diverticulitis      ED (erectile dysfunction)      Ex-smoker     QUIT SMOKING FEB 2007     History of ETOH abuse     recovering, sober since 1997     Hyperlipidemia LDL goal <100      Hypertension goal BP (blood pressure) < 140/90      Hypogonadism      Obesity      PAD (peripheral artery disease) (H24)     leg cramps, with exertion, no formal diagnosis of PAD and minimal if any symptoms at all.     RA (rheumatoid arthritis) (H)     Dr Bailon     Syncope        Past Surgical History:   Procedure Laterality Date     AMPUTATION Left 01/2023    Toe     BYPASS GRAFT FEMOROPOPLITEAL Left 01/07/2021    Procedure: LEFT FEMORAL TO ABOVE KNEE POPLITEAL ARTERY BYPASS WITH PTFE VASCULAR GRAFT REMOVABLE RING 6MMX 50CM;  Surgeon: Myra Lopes MD;  Location: [facility]     CATARACT IOL, RT/LT       COLONOSCOPY  03/01/2018    [facility]     COMBINED REPAIR PTOSIS WITH  BLEPHAROPLASTY BILATERAL Bilateral 2019    Procedure: BILATERAL UPPER EYELID BLEPHAROPLASTY AND BILATERAL PTOSIS REPAIR;  Surgeon: Janet Garcia MD;  Location: [facility]     ENDARTERECTOMY FEMORAL Left 2021    Procedure: LEFT FEMORAL ENDARTERECTOMY WITH PATCH ANGIOPLASTY PHOTOFIX  0.8 X 8CM;  Surgeon: Myra Lopes MD;  [facility]: [facility]     EP PACEMAKER  2021     EXCISE LESION EYELID Left 2019    Procedure: LEFT LOWER EYELID BIOPSY;  Surgeon: Janet Garcia MD;  Location: [facility]     IR LOWER EXTREMITY ANGIOGRAM LEFT  2020     PHACOEMULSIFICATION WITH STANDARD INTRAOCULAR LENS IMPLANT  2019; 3/2019    left eye; right eye     TONSILLECTOMY       ZZHC INCISION TENDON SHEATH FINGER  2009    r hand ring finger       Family History   Problem Relation Age of Onset     Cerebrovascular Disease Mother      Arthritis Mother      Osteoporosis Mother      Alzheimer Disease Father      Arthritis Father      Cancer Father      Diabetes Maternal Grandmother      Cardiovascular Maternal Grandmother      Other Cancer Brother      Cancer Paternal Aunt      Hypertension No family hx of      Thyroid Disease No family hx of      Glaucoma No family hx of      Macular Degeneration No family hx of        Social History     Tobacco Use     Smoking status: Former     Packs/day: 1.00     Years: 40.00     Additional pack years: 0.00     Total pack years: 40.00     Types: Cigarettes     Quit date: 3/16/2007     Years since quittin.6     Smokeless tobacco: Never   Substance Use Topics     Alcohol use: No     Alcohol/week: 0.0 standard drinks of alcohol       Current Outpatient Medications   Medication     acetaminophen (PAIN RELIEF EXTRA STRENGTH) 500 MG tablet     blood glucose (NO BRAND SPECIFIED) test strip     Blood Glucose Monitoring Suppl (ACCU-CHEK GUIDE) w/Device KIT     bumetanide (BUMEX) 1 MG tablet     cetirizine (ZYRTEC) 10 MG tablet     cyanocobalamin (VITAMIN B-12) 1000 MCG  "tablet     dulaglutide (TRULICITY) 1.5 MG/0.5ML pen     ferrous sulfate (FEROSUL) 325 (65 Fe) MG tablet     glimepiride (AMARYL) 1 MG tablet     hydrALAZINE (APRESOLINE) 10 MG tablet     hydroxychloroquine (PLAQUENIL) 200 MG tablet     insulin glargine (LANTUS PEN) 100 UNIT/ML pen     insulin pen needle (31G X 5 MM) 31G X 5 MM miscellaneous     insulin syringe-needle U-100 (30G X 1/2\" 0.5 ML) 30G X 1/2\" 0.5 ML miscellaneous     Insulin Syringe-Needle U-100 28G X 1/2\" 0.5 ML MISC     Irrigation Supplies MISC     methocarbamol (ROBAXIN) 500 MG tablet     metoprolol succinate ER (TOPROL XL) 50 MG 24 hr tablet     NOVOFINE AUTOCOVER PEN NEEDLE 30G X 8 MM miscellaneous     omeprazole (PRILOSEC) 20 MG DR capsule     polyethylene glycol-propylene glycol (SYSTANE) 0.4-0.3 % SOLN ophthalmic solution     pravastatin (PRAVACHOL) 10 MG tablet     senna-docusate (SENOKOT-S/PERICOLACE) 8.6-50 MG tablet     sertraline (ZOLOFT) 50 MG tablet     syringe/needle, disp, (BD ECLIPSE SYRINGE) 25G X 1\" 3 ML MISC     warfarin ANTICOAGULANT (COUMADIN) 3 MG tablet     Alcohol Swabs PADS     bisacodyl (DULCOLAX) 10 MG suppository     Continuous Blood Gluc Sensor (FREESTYLE YENNY 2 SENSOR) MISC     diclofenac (VOLTAREN) 1 % topical gel     HYDROmorphone (DILAUDID) 2 MG tablet     No current facility-administered medications for this visit.          Allergies   Allergen Reactions     Seasonal Allergies      Blood-Group Specific Substance Other (See Comments)     Patient has a Non-specific antibody. Blood products may be delayed. Draw patient 24 hours prior to transfusion. For [facility] testing, draw one red top and two purple top tubes for all Type and Screen orders.  Patient has a Non-specific antibody. Blood products may be delayed. Draw patient 24 hours prior to transfusion. For [facility] testing, draw one red top and two purple top tubes for all Type and Screen orders.         PHYSICAL EXAM:  BP (!) 143/91   Pulse 55   Temp 98  F " (36.7  C) (Oral)   Resp 16   Wt 98.9 kg (218 lb 1.6 oz)   SpO2 96%   BMI 32.21 kg/m    GEN: appears well, in no acute distress  HEENT: normocephalic and atraumatic, EOMI, anicteric sclerae  CV: RRR  RESP: normal respiration on room air, no stridor, CTAB  LYMPH: No supraclavicular or infraclavicular lymphadenopathy appreciated.  SKIN: normal color and turgor  PSYCH: appropriate mood, affect, and judgment    All pertinent laboratory, imaging, and pathology findings have been reviewed.     IMPRESSION: 10/20/23 Chest CT shows scattered pure groundglass nodules in the bilateral lungs, not significantly changed in size or appearance dating back to CT chest. Resolution of the left lower lobe nodule noted on prior recent PET/CT. No evidence for new or enlarging suspicious pulmonary nodules. Stable right upper lobe post radiation changes and nearby soft tissue nodule, unchanged compared to PET/CT 7/21/2023.    IMPRESSION/RECOMMENDATION:  78 year old gentleman with multiple comorbidities and a pacemaker diagnosed with [id number], stage 1 lung cancer of the RUL status post stereotactic body radiation therapy completed on August 26, 2022. He is doing well with no significant radiation toxicity.  His most scan shows no definitive evidence of recurrence or new disease.  We will continue with repeat every 3 month chest CTs with in person or video follow up.       Thank you for allowing me to participate in the care of this pleasant patient. If you have any questions, please do not hesitate to contact my office.    Lalita Funez MD  Attending Physician  Radiation Oncology      Again, thank you for allowing me to participate in the care of your patient.        Sincerely,        NAYAN Funez MD

## 2023-10-23 NOTE — NURSING NOTE
FOLLOW-UP VISIT    Patient Name: Zurdo Dash      : 1945     Age: 78 year old        ______________________________________________________________________________     Chief Complaint   Patient presents with    Radiation Therapy     Return appointment with Dr. Funez     BP (!) 143/91   Pulse 55   Temp 98  F (36.7  C) (Oral)   Resp 16   Wt 98.9 kg (218 lb 1.6 oz)   SpO2 96%   BMI 32.21 kg/m       Date Radiation Completed: 22    Pain  Denies pain related to visit, patient reports chronic bilateral knee pain.     Meds  Current Med List Reviewed: Yes  Medication Note:     Labs  CBC RESULTS:   Recent Labs   Lab Test 23  1111   WBC 6.6   RBC 4.51   HGB 13.2*   HCT 41.5   MCV 92   MCH 29.3   MCHC 31.8   RDW 13.1          Imaging  CT: Chest 10/20/23    Respiratory: No shortness of breath, dyspnea on exertion, cough, or hemoptysis  Skin:  Warm  Dry  Intact  Energy Level: normal- baseline  Appetite: normal      Appointments:     DATE  Oncologist:     Primary:      Other Notes: Patient using walker, reports following with [facility] for bilateral knee pain. He also recently stopped his methotrexate for his RA due to kidney function. Follow up per Dr. Funez.    Heidi Villar RN

## 2023-10-24 ENCOUNTER — ANTICOAGULATION THERAPY VISIT (OUTPATIENT)
Dept: ANTICOAGULATION | Facility: CLINIC | Age: 78
End: 2023-10-24

## 2023-10-24 ENCOUNTER — LAB (OUTPATIENT)
Dept: LAB | Facility: CLINIC | Age: 78
End: 2023-10-24
Payer: COMMERCIAL

## 2023-10-24 DIAGNOSIS — I74.2 EMBOLISM AND THROMBOSIS OF ARTERIES OF THE UPPER EXTREMITIES (H): ICD-10-CM

## 2023-10-24 DIAGNOSIS — C34.11 MALIGNANT NEOPLASM OF UPPER LOBE OF RIGHT LUNG (H): Primary | ICD-10-CM

## 2023-10-24 LAB — INR BLD: 1.3 (ref 0.9–1.1)

## 2023-10-24 PROCEDURE — 36416 COLLJ CAPILLARY BLOOD SPEC: CPT

## 2023-10-24 PROCEDURE — 85610 PROTHROMBIN TIME: CPT

## 2023-10-24 NOTE — PROGRESS NOTES
ANTICOAGULATION MANAGEMENT     Zurdo Dash 78 year old male is on warfarin with subtherapeutic INR result. (Goal INR 2.0-3.0)    Recent labs: (last 7 days)     10/24/23  1257   INR 1.3*       ASSESSMENT     Source(s): Chart Review and Patient/Caregiver Call     Warfarin doses taken: Less warfarin taken than planned which may be affecting INR - pt believes he missed his coumadin since last Thursday but he is not 100% sure. Disucssed with [facility] and we will just put him back to 6 mg daily and keep a closer eye on his INR  Diet: No new diet changes identified  Medication/supplement changes: None noted  New illness, injury, or hospitalization: No  Signs or symptoms of bleeding or clotting: No  Previous result: Therapeutic last visit; previously outside of goal range  Additional findings: None       PLAN     Recommended plan for temporary change(s) and ongoing change(s) affecting INR     Dosing Instructions: Increase your warfarin dose (16.7% change) with next INR in 3 days       Summary  As of 10/24/2023      Full warfarin instructions:  6 mg every day   Next INR check:  10/27/2023               Telephone call with Zurdo who verbalizes understanding and agrees to plan and who agrees to plan and repeated back plan correctly    Lab visit scheduled    Education provided:   Taking warfarin: take warfarin at same time each day; preferably in the evening, prescribed tablet strength and color, importance of following [facility] instructions vs instructions on the prescription bottle, and Importance of taking warfarin as instructed  Goal range and lab monitoring: goal range and significance of current result, Importance of therapeutic range, and Importance of following up at instructed interval    Plan made with [facility] Pharmacist Margaux Dash, RN  Anticoagulation Clinic  10/24/2023    _______________________________________________________________________     Anticoagulation Episode Summary       Current INR goal:   2.0-3.0   TTR:  38.4% (3.7 wk)   Target end date:  Indefinite   Send INR reminders to:  ROSALES REAL    Indications    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) [C34.11]  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

## 2023-10-27 ENCOUNTER — LAB (OUTPATIENT)
Dept: LAB | Facility: CLINIC | Age: 78
End: 2023-10-27
Payer: COMMERCIAL

## 2023-10-27 ENCOUNTER — ANTICOAGULATION THERAPY VISIT (OUTPATIENT)
Dept: ANTICOAGULATION | Facility: CLINIC | Age: 78
End: 2023-10-27

## 2023-10-27 DIAGNOSIS — I74.2 EMBOLISM AND THROMBOSIS OF ARTERIES OF THE UPPER EXTREMITIES (H): ICD-10-CM

## 2023-10-27 DIAGNOSIS — C34.11 MALIGNANT NEOPLASM OF UPPER LOBE OF RIGHT LUNG (H): Primary | ICD-10-CM

## 2023-10-27 LAB — INR BLD: 2.3 (ref 0.9–1.1)

## 2023-10-27 PROCEDURE — 36416 COLLJ CAPILLARY BLOOD SPEC: CPT

## 2023-10-27 PROCEDURE — 85610 PROTHROMBIN TIME: CPT

## 2023-10-27 NOTE — PROGRESS NOTES
ANTICOAGULATION MANAGEMENT     Zurdo Dash 78 year old male is on warfarin with therapeutic INR result. (Goal INR 2.0-3.0)    Recent labs: (last 7 days)     10/27/23  1431   INR 2.3*       ASSESSMENT     Source(s): Chart Review  Previous INR was Subtherapeutic  Medication, diet, health changes since last INR chart reviewed; none identified         PLAN     Recommended plan for no diet, medication or health factor changes affecting INR     Dosing Instructions: Continue your current warfarin dose with next INR in 4 days       Summary  As of 10/27/2023      Full warfarin instructions:  6 mg every day   Next INR check:  10/31/2023               Detailed voice message left for Yoli with dosing instructions and follow up date.   Sent [employer] message with dosing and follow up instructions    Lab visit scheduled    Education provided:   Please call back if any changes to your diet, medications or how you've been taking warfarin  Contact [phone]  with any changes, questions or concerns.     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

## 2023-10-28 ENCOUNTER — HEALTH MAINTENANCE LETTER (OUTPATIENT)
Age: 78
End: 2023-10-28

## 2023-10-30 ENCOUNTER — TELEPHONE (OUTPATIENT)
Dept: FAMILY MEDICINE | Facility: CLINIC | Age: 78
End: 2023-10-30
Payer: COMMERCIAL

## 2023-10-30 ENCOUNTER — OFFICE VISIT (OUTPATIENT)
Dept: URGENT CARE | Facility: URGENT CARE | Age: 78
End: 2023-10-30
Payer: COMMERCIAL

## 2023-10-30 VITALS
BODY MASS INDEX: 32.77 KG/M2 | DIASTOLIC BLOOD PRESSURE: 76 MMHG | OXYGEN SATURATION: 96 % | HEART RATE: 66 BPM | WEIGHT: 221.9 LBS | SYSTOLIC BLOOD PRESSURE: 154 MMHG | TEMPERATURE: 98.3 F

## 2023-10-30 DIAGNOSIS — L60.0 INGROWN TOENAIL: Primary | ICD-10-CM

## 2023-10-30 PROCEDURE — 99213 OFFICE O/P EST LOW 20 MIN: CPT | Performed by: EMERGENCY MEDICINE

## 2023-10-30 RX ORDER — CEPHALEXIN 500 MG/1
500 CAPSULE ORAL 3 TIMES DAILY
Qty: 15 CAPSULE | Refills: 0 | Status: SHIPPED | OUTPATIENT
Start: 2023-10-30 | End: 2023-11-04

## 2023-10-30 NOTE — TELEPHONE ENCOUNTER
"Patient's wife calling back regarding previous message.    She is insisting and requesting that a message be sent to PCP to see if he is able to fit patient in today. She explains that they live across the street from clinic, and states \"Dr. Duckworth told us that if we ever needed to be seen that he would fit us in since we live across the street\".    Advised to patient and patient's wife that there is no availability today with PCP or any [facility] provider, and still advising UC at this time to assess infected foot wound due to diabetes and having surgery tomorrow. Patient's wife still requesting message to be sent.    Routing to PCP as high priority as requested.    MANJU GordonN RN  [facility]  "

## 2023-10-30 NOTE — TELEPHONE ENCOUNTER
My apologies, I did recommend that I would do my best to see patient if I had no appointments, that we could strive to find soon appointments, I did not and could not promise to see a complicated such as Zurdo on an immediate day request    My apologies  Lets see what we can do  It possible we can seek a soon appointment but it sounds like urgent care clinic was the right call      Kapil Duckworth MD

## 2023-10-30 NOTE — TELEPHONE ENCOUNTER
Patient calling as he has an infected wound on his foot.    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 His wound was assessed by a nurse today in his AL facility.    Patient also has surgery tomorrow.    Advised to patient he should be seen today in clinic or urgent care for further evaluation and assessment. Patient verbalized understanding and plans to go to urgent care tonight.    CARLOS Gordon RN  [facility]

## 2023-10-31 NOTE — PATIENT INSTRUCTIONS
Epsom salt soaks, antibiotics, follow up closely with podiatry. Return or go to the ER if redness/swelling worsens.

## 2023-10-31 NOTE — TELEPHONE ENCOUNTER
Noted per chart review, patient was seen in UC and prescribed antibiotics.    MANJU GordonN RN  [facility]

## 2023-11-02 ENCOUNTER — LAB (OUTPATIENT)
Dept: LAB | Facility: CLINIC | Age: 78
End: 2023-11-02
Payer: COMMERCIAL

## 2023-11-02 ENCOUNTER — ANTICOAGULATION THERAPY VISIT (OUTPATIENT)
Dept: ANTICOAGULATION | Facility: CLINIC | Age: 78
End: 2023-11-02

## 2023-11-02 DIAGNOSIS — I74.2 EMBOLISM AND THROMBOSIS OF ARTERIES OF THE UPPER EXTREMITIES (H): ICD-10-CM

## 2023-11-02 DIAGNOSIS — C34.11 MALIGNANT NEOPLASM OF UPPER LOBE OF RIGHT LUNG (H): Primary | ICD-10-CM

## 2023-11-02 LAB — INR BLD: 4 (ref 0.9–1.1)

## 2023-11-02 PROCEDURE — 36416 COLLJ CAPILLARY BLOOD SPEC: CPT

## 2023-11-02 PROCEDURE — 85610 PROTHROMBIN TIME: CPT

## 2023-11-02 NOTE — PROGRESS NOTES
ANTICOAGULATION MANAGEMENT     Zurdo Dash 78 year old male is on warfarin with supratherapeutic INR result. (Goal INR 2.0-3.0)    Recent labs: (last 7 days)     11/02/23  1100   INR 4.0*       ASSESSMENT     Source(s): Chart Review and Patient/Caregiver Call     Warfarin doses taken: Warfarin taken as instructed  Diet: No new diet changes identified  Medication/supplement changes:  Keflex   - 10/30-11/4  New illness, injury, or hospitalization: Yes:  visit 10/30/23 - ingrown toenail  Signs or symptoms of bleeding or clotting: No  Previous result: Therapeutic last visit; previously outside of goal range  Additional findings: None     PLAN     Recommended plan for temporary change(s) affecting INR     Dosing Instructions: pt already took dose today - tomorrow hold dose then continue your current warfarin dose with next INR in 4 days    May consider dose adjustment if INR remains supra therapeutic - pt is on Keflex for his toenail     Summary  As of 11/2/2023      Full warfarin instructions:  11/3: Hold; Otherwise 6 mg every day   Next INR check:  11/6/2023               Telephone call with Yoli who verbalizes understanding and agrees to plan    Lab visit scheduled    Education provided:   Please call back if any changes to your diet, medications or how you've been taking warfarin  Symptom monitoring: monitoring for bleeding signs and symptoms    Plan made per [facility] anticoagulation protocol    Christy Harper RN  Anticoagulation Clinic  11/2/2023    _______________________________________________________________________     Anticoagulation Episode Summary       Current INR goal:  2.0-3.0   TTR:  38.2% (1.2 mo)   Target end date:  Indefinite   Send INR reminders to:  ROSALES REAL    Indications    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) [C34.11]  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 They were also instructed to call if they developed any nausea, vomiting or diarrhea. All of the patient's questions and concerns were addressed.

## 2023-11-03 ENCOUNTER — TRANSFERRED RECORDS (OUTPATIENT)
Dept: HEALTH INFORMATION MANAGEMENT | Facility: CLINIC | Age: 78
End: 2023-11-03

## 2023-11-06 ENCOUNTER — OFFICE VISIT (OUTPATIENT)
Dept: INTERNAL MEDICINE | Facility: CLINIC | Age: 78
End: 2023-11-06
Payer: COMMERCIAL

## 2023-11-06 ENCOUNTER — ANTICOAGULATION THERAPY VISIT (OUTPATIENT)
Dept: ANTICOAGULATION | Facility: CLINIC | Age: 78
End: 2023-11-06

## 2023-11-06 VITALS
DIASTOLIC BLOOD PRESSURE: 70 MMHG | BODY MASS INDEX: 32.52 KG/M2 | OXYGEN SATURATION: 94 % | HEART RATE: 62 BPM | SYSTOLIC BLOOD PRESSURE: 112 MMHG | WEIGHT: 220.2 LBS | TEMPERATURE: 97.7 F

## 2023-11-06 DIAGNOSIS — L89.893 PRESSURE ULCER OF OTHER SITE, STAGE 3 (H): ICD-10-CM

## 2023-11-06 DIAGNOSIS — Z29.11 NEED FOR VACCINATION AGAINST RESPIRATORY SYNCYTIAL VIRUS: ICD-10-CM

## 2023-11-06 DIAGNOSIS — C34.11 MALIGNANT NEOPLASM OF UPPER LOBE OF RIGHT LUNG (H): Primary | ICD-10-CM

## 2023-11-06 DIAGNOSIS — I47.29 NSVT (NONSUSTAINED VENTRICULAR TACHYCARDIA) (H): ICD-10-CM

## 2023-11-06 DIAGNOSIS — E11.9 TYPE 2 DIABETES MELLITUS WITHOUT RETINOPATHY (H): Primary | ICD-10-CM

## 2023-11-06 DIAGNOSIS — F11.20 UNCOMPLICATED OPIOID DEPENDENCE (H): ICD-10-CM

## 2023-11-06 DIAGNOSIS — I74.2 EMBOLISM AND THROMBOSIS OF ARTERIES OF THE UPPER EXTREMITIES (H): ICD-10-CM

## 2023-11-06 DIAGNOSIS — M05.79 RHEUMATOID ARTHRITIS INVOLVING MULTIPLE SITES WITH POSITIVE RHEUMATOID FACTOR (H): ICD-10-CM

## 2023-11-06 DIAGNOSIS — D59.10 AUTOIMMUNE HEMOLYTIC ANEMIA (H): ICD-10-CM

## 2023-11-06 DIAGNOSIS — I73.9 PAD (PERIPHERAL ARTERY DISEASE) (H): ICD-10-CM

## 2023-11-06 DIAGNOSIS — F33.0 MAJOR DEPRESSIVE DISORDER, RECURRENT, MILD (H): ICD-10-CM

## 2023-11-06 DIAGNOSIS — I50.22 CHRONIC SYSTOLIC (CONGESTIVE) HEART FAILURE (H): ICD-10-CM

## 2023-11-06 DIAGNOSIS — Z79.899 HIGH RISK MEDICATION USE: ICD-10-CM

## 2023-11-06 DIAGNOSIS — E11.42 DIABETIC POLYNEUROPATHY ASSOCIATED WITH TYPE 2 DIABETES MELLITUS (H): ICD-10-CM

## 2023-11-06 LAB
BASOPHILS # BLD AUTO: 0 10E3/UL (ref 0–0.2)
BASOPHILS NFR BLD AUTO: 1 %
EOSINOPHIL # BLD AUTO: 0.4 10E3/UL (ref 0–0.7)
EOSINOPHIL NFR BLD AUTO: 5 %
ERYTHROCYTE [DISTWIDTH] IN BLOOD BY AUTOMATED COUNT: 14.4 % (ref 10–15)
HCT VFR BLD AUTO: 35.3 % (ref 40–53)
HGB BLD-MCNC: 11.3 G/DL (ref 13.3–17.7)
IMM GRANULOCYTES # BLD: 0 10E3/UL
IMM GRANULOCYTES NFR BLD: 0 %
INR BLD: 2.5 (ref 0.9–1.1)
LYMPHOCYTES # BLD AUTO: 1 10E3/UL (ref 0.8–5.3)
LYMPHOCYTES NFR BLD AUTO: 14 %
MCH RBC QN AUTO: 29.3 PG (ref 26.5–33)
MCHC RBC AUTO-ENTMCNC: 32 G/DL (ref 31.5–36.5)
MCV RBC AUTO: 92 FL (ref 78–100)
MONOCYTES # BLD AUTO: 0.7 10E3/UL (ref 0–1.3)
MONOCYTES NFR BLD AUTO: 10 %
NEUTROPHILS # BLD AUTO: 4.9 10E3/UL (ref 1.6–8.3)
NEUTROPHILS NFR BLD AUTO: 70 %
PLATELET # BLD AUTO: 251 10E3/UL (ref 150–450)
RBC # BLD AUTO: 3.86 10E6/UL (ref 4.4–5.9)
WBC # BLD AUTO: 6.9 10E3/UL (ref 4–11)

## 2023-11-06 PROCEDURE — 99214 OFFICE O/P EST MOD 30 MIN: CPT | Performed by: INTERNAL MEDICINE

## 2023-11-06 PROCEDURE — 82565 ASSAY OF CREATININE: CPT | Performed by: INTERNAL MEDICINE

## 2023-11-06 PROCEDURE — 80076 HEPATIC FUNCTION PANEL: CPT | Performed by: INTERNAL MEDICINE

## 2023-11-06 PROCEDURE — 85610 PROTHROMBIN TIME: CPT | Performed by: INTERNAL MEDICINE

## 2023-11-06 PROCEDURE — 36415 COLL VENOUS BLD VENIPUNCTURE: CPT | Performed by: INTERNAL MEDICINE

## 2023-11-06 PROCEDURE — 85025 COMPLETE CBC W/AUTO DIFF WBC: CPT | Performed by: INTERNAL MEDICINE

## 2023-11-06 RX ORDER — GABAPENTIN 600 MG/1
600 TABLET ORAL 3 TIMES DAILY
Qty: 270 TABLET | Refills: 1 | Status: SHIPPED | OUTPATIENT
Start: 2023-11-06

## 2023-11-06 RX ORDER — RESPIRATORY SYNCYTIAL VIRUS VACCINE 120MCG/0.5
0.5 KIT INTRAMUSCULAR ONCE
Qty: 1 EACH | Refills: 0 | Status: CANCELLED | OUTPATIENT
Start: 2023-11-06 | End: 2023-11-06

## 2023-11-06 ASSESSMENT — PAIN SCALES - GENERAL: PAINLEVEL: MILD PAIN (2)

## 2023-11-06 NOTE — PROGRESS NOTES
Assessment & Plan     Type 2 diabetes mellitus without retinopathy (H)  Zurdo Dash is a charming and interesting individual who is getting older these days and is the first to admit he is not able to keep track of his multiple medications and indeed for this appointment I had my nurse draw up directly from the [facility] just exactly what is he taking ? And once again for the bulk of this appointment we spent a large % of the time with medication reconciliation and deleted multiple medications from the medication list as he's  not taking the, we have things now pretty much down to the core of what he is taking with a few prn medications still listed he has blood glucose readings that are low to very low in the mornings , even some in the 50's for which he treats immediately with increased glucose. But we made some changes, first of all we stopped his 1 milligram of glimepiride [ Amaryl ] . Then we changed how he uses the Lantus solostar pen  , he had been taking 24 units 2 times a day but we changed this to 40 units to be taken first thing in the morning. I encouraged patient to continue with this [place] home monitor for blood glucose monitoring and bring to me a list of all recorded blood glucose readings over the next few weeks and update me. We may go to the certified diabetes educator if we run into continued troubles.. his last hemoglobin a1c  [ diabetes test ] was 6,4%.   - insulin glargine (LANTUS PEN) 100 UNIT/ML pen; Inject 40 Units Subcutaneous every morning    Need for vaccination against respiratory syncytial virus  Postponed     Autoimmune hemolytic anemia (H)  Problem is stable and ongoing monitoring      Pressure ulcer of other site, stage 3 (H)  Problem is stable and ongoing monitoring      Chronic systolic (congestive) heart failure (H)  Problem is stable and ongoing monitoring      Uncomplicated opioid dependence (H)  Problem is stable and ongoing monitoring      Major  depressive disorder, recurrent, mild (H24)  Problem is stable and ongoing monitoring      NSVT (nonsustained ventricular tachycardia) (H)  Problem is stable and ongoing monitoring     Diabetic polyneuropathy associated with type 2 diabetes mellitus (H)  Problem is stable and ongoing monitoring   - gabapentin (NEURONTIN) 600 MG tablet; Take 1 tablet (600 mg) by mouth 3 times daily    PAD (peripheral artery disease) (H24)  Problem is stable and ongoing monitoring    - INR; Future    Rheumatoid arthritis involving multiple sites with positive rheumatoid factor (H)  See office visit notes with Dr. Tompkins  - CBC with Platelets & Differential  - Creatinine  - Hepatic function panel    High risk medication use  See office visit notes with Dr. 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)  Problem is stable and ongoing monitoring    - INR point of care    Review of the result(s) of each unique test - INR   Prescription drug management  30 minutes spent by me on the date of the encounter doing chart review, history and exam, documentation and further activities per the note      Kapil Duckworth MD  [facility] ADDIE Renner is a 78 year old, presenting for the following health issues:  Recheck Medication         No data to display                HPI   Declines a RSV vaccine because of an upcoming nerve ablation procedure scheduled for the near future   Patient feels his diabetes mellitus is on a roller coaster with up and down     Diabetes Follow-up    How often are you checking your blood sugar? Continuous glucose monitor  What time of day are you checking your blood sugars (select all that apply)?  Before and after meals  Have you had any blood sugars above 200?  Yes we reviewed his home blood glucose monitoring via the [place]  "home monitor for blood glucose monitoring which he did bring with him today   Have you had any blood sugars below 70?  Yes see assessment and plan section for further discussion   What symptoms do you notice when your blood sugar is low?  None  What concerns do you have today about your diabetes? None   Do you have any of these symptoms? (Select all that apply)  Numbness in feet and Burning in feet    Lab Results   Component Value Date    A1C 6.4 09/28/2023    A1C 7.8 06/27/2023    A1C 8.2 01/10/2023    A1C 7.1 07/28/2022    A1C 9.7 02/01/2022    A1C 6.8 01/07/2021    A1C 6.8 12/17/2020    A1C 6.7 12/11/2020    A1C 6.0 01/03/2020    A1C 6.4 08/06/2019     Wt Readings from Last 5 Encounters:   11/06/23 99.9 kg (220 lb 3.2 oz)   10/30/23 100.7 kg (221 lb 14.4 oz)   10/23/23 98.9 kg (218 lb 1.6 oz)   10/13/23 101.1 kg (222 lb 14.4 oz)   09/26/23 100.2 kg (221 lb)       50's \" happens all the time in the mornings or in the night \" but he acknowledges these lows are major benefit only over the last year or maybe just 6 months     Has tried to avoid steroids due to his diabetes mellitus   He had a blood glucose of 600 once   He's only had the [place] home monitor for blood glucose monitoring recently so he is wondering if the major blood glucose elevations he say after the steroid injection because he could not measure this before    He has hypoglycemic unawareness and also hyperglycemia unawareness        Using capsaicin capsules ? Prescribed from [facility]  , which is where he is receiving his nerve ablation procedures    Hyperlipidemia Follow-Up    Are you regularly taking any medication or supplement to lower your cholesterol?   Yes- pravastatin  Are you having muscle aches or other side effects that you think could be caused by your cholesterol lowering medication?  No  Lab Results   Component Value Date    CHOL 155 09/28/2023    CHOL 116 01/07/2021     Lab " Results   Component Value Date    HDL 33 09/28/2023    HDL 38 01/07/2021     Lab Results   Component Value Date    LDL 78 09/28/2023    LDL 45 01/07/2021     Lab Results   Component Value Date    TRIG 218 09/28/2023    TRIG 166 01/07/2021     Lab Results   Component Value Date    CHOLHDLRATIO 3.4 06/29/2015         Hypertension Follow-up    Do you check your blood pressure regularly outside of the clinic? Yes   Are you following a low salt diet? No  Are your blood pressures ever more than 140 on the top number (systolic) OR more   than 90 on the bottom number (diastolic), for example 140/90? No    BP Readings from Last 2 Encounters:   11/06/23 112/70   10/30/23 (!) 154/76     Hemoglobin A1C (%)   Date Value   09/28/2023 6.4 (H)   06/27/2023 7.8 (H)   01/07/2021 6.8 (H)   12/17/2020 6.8 (H)     LDL Cholesterol Calculated (mg/dL)   Date Value   09/28/2023 78   02/01/2022 135 (H)   01/07/2021 45   12/17/2020 90     How many servings of fruits and vegetables do you eat daily?  0-1  On average, how many sweetened beverages do you drink each day (Examples: soda, juice, sweet tea, etc.  Do NOT count diet or artificially sweetened beverages)?   0  How many days per week do you exercise enough to make your heart beat faster? 7  How many minutes a day do you exercise enough to make your heart beat faster? 10 minutes  How many days per week do you miss taking your medication? 0        Review of Systems   Constitutional, HEENT, cardiovascular, pulmonary, gi and gu systems are negative, except as otherwise noted.      Objective    Wt 99.9 kg (220 lb 3.2 oz)   BMI 32.52 kg/m    Body mass index is 32.52 kg/m .  Physical Exam   GENERAL: healthy, alert and no distress  EYES: Eyes grossly normal to inspection, PERRL and conjunctivae and sclerae normal  RESP: lungs clear to auscultation - no rales, rhonchi or wheezes  CV: regular rate and rhythm, normal S1 S2, no S3 or S4, no murmur, click or rub, no peripheral edema and peripheral  pulses strong  PSYCH: mentation appears normal, affect normal/bright    Orders Placed This Encounter   Procedures    INR    CBC with platelets and differential

## 2023-11-06 NOTE — PROGRESS NOTES
ANTICOAGULATION MANAGEMENT     Zurdo Dash 78 year old male is on warfarin with therapeutic INR result. (Goal INR 2.0-3.0)    Recent labs: (last 7 days)     11/06/23  1522   INR 2.5*       ASSESSMENT     Source(s): Chart Review  Previous INR was Supratherapeutic  Medication, diet, health changes since last INR chart reviewed; none identified         PLAN     Unable to reach Zurdo today.    Left message to call back needs call back has been out of range.    Follow up required to confirm warfarin dose taken and assess for changes, confirm warfarin dose taken, and assess for changes     Rebeca Maddox, RN  Anticoagulation Clinic  11/6/2023

## 2023-11-07 LAB
ALBUMIN SERPL BCG-MCNC: 3.6 G/DL (ref 3.5–5.2)
ALP SERPL-CCNC: 53 U/L (ref 40–129)
ALT SERPL W P-5'-P-CCNC: 19 U/L (ref 0–70)
AST SERPL W P-5'-P-CCNC: 19 U/L (ref 0–45)
BILIRUB DIRECT SERPL-MCNC: <0.2 MG/DL (ref 0–0.3)
BILIRUB SERPL-MCNC: 0.3 MG/DL
CREAT SERPL-MCNC: 1.75 MG/DL (ref 0.67–1.17)
EGFRCR SERPLBLD CKD-EPI 2021: 39 ML/MIN/1.73M2
PROT SERPL-MCNC: 6.7 G/DL (ref 6.4–8.3)

## 2023-11-07 NOTE — PROGRESS NOTES
Writer left two additional voicemail messages today for patient and/or wife. A mychart was sent and read by their daughter but no reply. Closing out this encounter at this time as we have attempted to reach many times and left VM 3 times in the last two days.    Alyse Macias RN, BSN, PHN  11-7-23

## 2023-11-09 ENCOUNTER — TELEPHONE (OUTPATIENT)
Dept: FAMILY MEDICINE | Facility: CLINIC | Age: 78
End: 2023-11-09

## 2023-11-09 ENCOUNTER — ANTICOAGULATION THERAPY VISIT (OUTPATIENT)
Dept: ANTICOAGULATION | Facility: CLINIC | Age: 78
End: 2023-11-09

## 2023-11-09 ENCOUNTER — LAB (OUTPATIENT)
Dept: LAB | Facility: CLINIC | Age: 78
End: 2023-11-09
Payer: COMMERCIAL

## 2023-11-09 DIAGNOSIS — I74.2 EMBOLISM AND THROMBOSIS OF ARTERIES OF THE UPPER EXTREMITIES (H): ICD-10-CM

## 2023-11-09 DIAGNOSIS — C34.11 MALIGNANT NEOPLASM OF UPPER LOBE OF RIGHT LUNG (H): Primary | ICD-10-CM

## 2023-11-09 LAB — INR BLD: 3.2 (ref 0.9–1.1)

## 2023-11-09 PROCEDURE — 36416 COLLJ CAPILLARY BLOOD SPEC: CPT

## 2023-11-09 PROCEDURE — 85610 PROTHROMBIN TIME: CPT

## 2023-11-09 NOTE — PROGRESS NOTES
ANTICOAGULATION MANAGEMENT     Zurdo Dash 78 year old male is on warfarin with supratherapeutic INR result. (Goal INR 2.0-3.0)    Recent labs: (last 7 days)     11/09/23  1445   INR 3.2*       ASSESSMENT     Source(s): Chart Review  Previous INR was Therapeutic last visit; previously outside of goal range  Medication, diet, health changes since last INR chart reviewed; none identified         PLAN     Unable to reach Zurdo today.    Left message to take reduced dose of warfarin, 3 mg tonight. Request call back for assessment.    Follow up required to confirm warfarin dose taken and assess for changes and discuss out of range result     Becka Dacosta RN  Anticoagulation Clinic  11/9/2023

## 2023-11-09 NOTE — TELEPHONE ENCOUNTER
LMTCB    Becka Dacosta RN   [facility] Anticoagulation [facility]

## 2023-11-09 NOTE — TELEPHONE ENCOUNTER
Patient Returning Call    Reason for call:  patient Wife is calling and would like to speak to INR nurse for dosing in in structions     Information relayed to patient:  left message for call back     Patient has additional questions:  Yes    What are your questions/concerns:  na     Who does the patient want to speak with:  inr nurse     Is an  needed?:  No      Could we send this information to you in [facility] or would you prefer to receive a phone call?:   Patient would prefer a phone call   Okay to leave a detailed message?: yes at Cell number on file:    Telephone Information:   Mobile [phone]

## 2023-11-10 NOTE — PROGRESS NOTES
Unable to reach Zurdo today.    LMTCB    Follow up required to confirm warfarin dose taken and assess for changes    Becka Dacosta RN  Anticoagulation Clinic  11/10/2023

## 2023-11-10 NOTE — PROGRESS NOTES
ANTICOAGULATION MANAGEMENT     Zurdo Dash 78 year old male is on warfarin with supratherapeutic INR result. (Goal INR 2.0-3.0)    Recent labs: (last 7 days)     11/09/23  1445   INR 3.2*       ASSESSMENT     Source(s): Chart Review and Patient/Caregiver Call     Warfarin doses taken: Warfarin taken as instructed  Diet: No new diet changes identified  Medication/supplement changes: None noted  New illness, injury, or hospitalization: No  Signs or symptoms of bleeding or clotting: No  Previous result: Therapeutic last visit; previously outside of goal range  Additional findings: None       PLAN     Recommended plan for no diet, medication or health factor changes affecting INR     Dosing Instructions: decrease your warfarin dose (7.1% change) with next INR in 1 week       Summary  As of 11/9/2023      Full warfarin instructions:  11/9: 6 mg; Otherwise 3 mg every Thu; 6 mg all other days   Next INR check:  11/16/2023               Telephone call with Zurdo who verbalizes understanding and agrees to plan    Lab visit scheduled    Education provided:   Goal range and lab monitoring: goal range and significance of current result    Plan made per [facility] anticoagulation protocol    Becka Dacosta RN  Anticoagulation Clinic  11/10/2023    _______________________________________________________________________     Anticoagulation Episode Summary       Current INR goal:  2.0-3.0   TTR:  40.0% (1.4 mo)   Target end date:  Indefinite   Send INR reminders to:  ROSALES REAL    Indications    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) [C34.11]  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

## 2023-11-10 NOTE — TELEPHONE ENCOUNTER
See anticoag encounter for 11/9/23    Becka Dacosta RN   [facility]ticoagulation [facility]

## 2023-11-17 ENCOUNTER — NURSE TRIAGE (OUTPATIENT)
Dept: FAMILY MEDICINE | Facility: CLINIC | Age: 78
End: 2023-11-17

## 2023-11-17 ENCOUNTER — VIRTUAL VISIT (OUTPATIENT)
Dept: URGENT CARE | Facility: CLINIC | Age: 78
End: 2023-11-17
Payer: COMMERCIAL

## 2023-11-17 ENCOUNTER — LAB (OUTPATIENT)
Dept: LAB | Facility: CLINIC | Age: 78
End: 2023-11-17
Payer: COMMERCIAL

## 2023-11-17 ENCOUNTER — TELEPHONE (OUTPATIENT)
Dept: ANTICOAGULATION | Facility: CLINIC | Age: 78
End: 2023-11-17
Payer: COMMERCIAL

## 2023-11-17 ENCOUNTER — ANTICOAGULATION THERAPY VISIT (OUTPATIENT)
Dept: ANTICOAGULATION | Facility: CLINIC | Age: 78
End: 2023-11-17

## 2023-11-17 DIAGNOSIS — C34.11 MALIGNANT NEOPLASM OF UPPER LOBE OF RIGHT LUNG (H): Primary | ICD-10-CM

## 2023-11-17 DIAGNOSIS — R05.1 ACUTE COUGH: Primary | ICD-10-CM

## 2023-11-17 DIAGNOSIS — I74.2 EMBOLISM AND THROMBOSIS OF ARTERIES OF THE UPPER EXTREMITIES (H): ICD-10-CM

## 2023-11-17 LAB
INR BLD: >8 (ref 0.9–1.1)
INR PPP: 7.03 (ref 0.85–1.15)

## 2023-11-17 PROCEDURE — 36416 COLLJ CAPILLARY BLOOD SPEC: CPT

## 2023-11-17 PROCEDURE — 85610 PROTHROMBIN TIME: CPT

## 2023-11-17 PROCEDURE — 99441 PR PHYSICIAN TELEPHONE EVALUATION 5-10 MIN: CPT | Mod: 93 | Performed by: EMERGENCY MEDICINE

## 2023-11-17 RX ORDER — CEFDINIR 300 MG/1
300 CAPSULE ORAL 2 TIMES DAILY
Qty: 14 CAPSULE | Refills: 0 | Status: SHIPPED | OUTPATIENT
Start: 2023-11-17 | End: 2023-11-24

## 2023-11-17 NOTE — PROGRESS NOTES
Phone appointment:  Duration: 10 minutes    CHIEF COMPLAINT: Persistent cough and congestion.  Nosebleed.      HPI: Patient is a 77-year-old male whose had a cough productive in nature for approximately 1-1/2 weeks.  It is not getting better.  He is also has some mild laryngitis type symptoms with hoarse voice.  Sore throat seems to be better.  No COVID testing today.  Incidentally, patient mentions that he is having a nosebleed.  He has frequent nosebleeds and is noted to be on warfarin.  He currently is in the process of packing tissue in his nose to get it stopped.      ROS: See HPI otherwise normal.    Allergies   Allergen Reactions     Seasonal Allergies      Blood-Group Specific Substance Other (See Comments)     Patient has a Non-specific antibody. Blood products may be delayed. Draw patient 24 hours prior to transfusion. For [facility] testing, draw one red top and two purple top tubes for all Type and Screen orders.  Patient has a Non-specific antibody. Blood products may be delayed. Draw patient 24 hours prior to transfusion. For [facility] testing, draw one red top and two purple top tubes for all Type and Screen orders.      Current Outpatient Medications   Medication Sig Dispense Refill     acetaminophen (PAIN RELIEF EXTRA STRENGTH) 500 MG tablet TAKE TWO TABLETS BY MOUTH ONCE DAILY 180 tablet 1     Alcohol Swabs PADS Uses four times daily       bumetanide (BUMEX) 1 MG tablet Take 1 tablet (1 mg) by mouth daily 100 tablet 1     cefdinir (OMNICEF) 300 MG capsule Take 1 capsule (300 mg) by mouth 2 times daily for 7 days 14 capsule 0     cetirizine (ZYRTEC) 10 MG tablet Take 1 tablet (10 mg) by mouth At Bedtime 30 tablet 0     Continuous Blood Gluc Sensor (FREESTYLE YENNY 2 SENSOR) [facility] 1 each every 14 days Use 1 sensor every 14 days. Use to read blood sugars per 's instructions. 2 each 5     cyanocobalamin (VITAMIN B-12) 1000 MCG tablet Take 1 tablet (1,000 mcg) by mouth once a week 30  "tablet 0     diclofenac (VOLTAREN) 1 % topical gel as needed       dulaglutide (TRULICITY) 1.5 MG/0.5ML pen Inject 1.5 mg Subcutaneous every 7 days ON Wednesdays 10 mL 1     ferrous sulfate (FEROSUL) 325 (65 Fe) MG tablet TAKE ONE TABLET BY MOUTH ONCE DAILY 90 tablet 1     gabapentin (NEURONTIN) 600 MG tablet Take 1 tablet (600 mg) by mouth 3 times daily 270 tablet 1     HYDROmorphone (DILAUDID) 2 MG tablet Take 1 tablet (2 mg) by mouth 2 times daily as needed for pain For urgent pain relief with rheumatoid arthritis flare-up 10 tablet 0     hydroxychloroquine (PLAQUENIL) 200 MG tablet Take 1 tablet (200 mg) by mouth 2 times daily 180 tablet 1     insulin glargine (LANTUS PEN) 100 UNIT/ML pen Inject 40 Units Subcutaneous every morning 15 mL 2     insulin pen needle (31G X 5 MM) 31G X 5 MM miscellaneous Use 2 pen needles daily or as directed. 200 each 3     insulin syringe-needle U-100 (30G X 1/2\" 0.5 ML) 30G X 1/2\" 0.5 ML miscellaneous Use 4 syringes daily or as directed.       Insulin Syringe-Needle U-100 28G X 1/2\" 0.5 ML MISC        Irrigation Supplies MISC        metoprolol succinate ER (TOPROL XL) 50 MG 24 hr tablet Take 1 tablet (50 mg) by mouth daily 100 tablet 1     NOVOFINE AUTOCOVER PEN NEEDLE 30G X 8 MM miscellaneous USE FOUR TIMES A  each 0     omeprazole (PRILOSEC) 20 MG DR capsule Take 1 capsule (20 mg) by mouth daily 100 capsule 1     polyethylene glycol-propylene glycol (SYSTANE) 0.4-0.3 % SOLN ophthalmic solution Place 1 drop into both eyes 2 times daily as needed for dry eyes 3 mL 0     pravastatin (PRAVACHOL) 10 MG tablet Take 1 tablet (10 mg) by mouth daily 100 tablet 1     sertraline (ZOLOFT) 50 MG tablet Take 1 tablet (50 mg) by mouth daily 100 tablet 1     syringe/needle, disp, (BD ECLIPSE SYRINGE) 25G X 1\" 3 ML MISC        warfarin ANTICOAGULANT (COUMADIN) 3 MG tablet Take 4.5 mg Thursdays; 3 mg all other days or per INR anticoagulation team. 96 tablet 1         PE: No acute distress " on phone appointment.  He is alert and oriented.  He is nondyspneic sounding speaking in full sentences.        TREATMENT: None.      ASSESSMENT: Persistent productive cough, will treat as bacterial etiology.  Old records reveal recent INR 8 days ago to be 3.2.  Patient states he is due to have INR and I have asked that he get an INR today which she will do.  If unable to stop his nosebleed he will need to go to the emergency department especially because of his recent elevated INR.      DIAGNOSIS: Cough.  Nosebleed.      PLAN: Omnicef as instructed.  Contact INR clinic to recheck INR today because of the nosebleed.  Use typical home remedies to stop nosebleed as he has done in the past but if unable to stop he will need to go to the emergency department specially in lieu of the fact that he has a recent elevated INR.

## 2023-11-17 NOTE — TELEPHONE ENCOUNTER
ANTICOAGULATION     Zurdo KUMAR Dash is overdue for an INR check.     Left message for patient to call and schedule lab appointment as soon as possible. If returning call, please schedule.     Becka Dacosta RN

## 2023-11-17 NOTE — PROGRESS NOTES
ANTICOAGULATION MANAGEMENT     Zurdo Dash 78 year old male is on warfarin with supratherapeutic INR result. (Goal INR 2.0-3.0)    Recent labs: (last 7 days)     11/17/23  1508   INR 8.0*       ASSESSMENT     Source(s): Chart Review  Previous INR was Supratherapeutic  Medication, diet, health changes since last INR: Patient had a virtual visit today for a cough he has had for about 10 days. Patient was having a nosebleed at time of virtual visit. It is not known how long that lasted. Omnicef prescribed for his cough. After that visit, patient's daughter called triage at 4:05 pm reporting pt feeling lightheaded and fainting and falling today at 12:30 pm. Patient sent to [place] ED for evaluation. Will set priority reminder to follow up 11-20-23 and look in care everywhere for update.       PLAN     Unable to reach Zurdo today. He was sent to ER.    [facility] to follow up 11-17-23 for care everywhere update    Follow up required to discuss out of range result     Alyse Macias RN  Anticoagulation Clinic  11/17/2023

## 2023-11-17 NOTE — TELEPHONE ENCOUNTER
Patient's daughter Sandhya calling with patient on the phone. States that patient had bloody nose this afternoon and went back to his apartment from common area, when he went to sit on cough he became lightheaded and fainted, patient fell with LOC, unsure how long he was unconscious for, notes this happened at about 1230 today 11/17/23. Patient states that he does not believe he hit his head, notes minor skin tears on arms only, mild pain on arms where lesions are. Patient states that he feels slightly weak at this time but denies dizziness.    Per communication with INR nurse, patient had high INR of 8 today, concerns for bleeding following fall.    Writer discussed with daughter/patient the importance to proceed to ED due to concerns of internal bleeding, states understanding and will proceed to [place] ED, no further questions.    CARLOS Mitchell RN  [facility] [place]      Reason for Disposition   Age > 50 years    Additional Information   Negative: Still unconscious   Negative: Still feels dizzy or lightheaded   Negative: Difficult to awaken or acting confused (e.g., disoriented, slurred speech)   Negative: Difficulty breathing   Negative: Bluish (or gray) lips or face   Negative: Shock suspected (e.g., cold/pale/clammy skin, too weak to stand, low BP, rapid pulse)   Negative: Bleeding (e.g., vomiting blood, rectal bleeding or tarry stools, severe vaginal bleeding)   Negative: Chest pain   Negative: Extra heartbeats, irregular heart beating, or heart is beating very fast (i.e., 'palpitations')   Negative: Heart beating < 50 beats per minute OR > 140 beats per minute   Negative: Fainted suddenly after medicine, allergic food or bee sting   Negative: Sounds like a life-threatening emergency to the triager   Negative: Has diabetes (diabetes mellitus) and fainting from low blood glucose (70 mg/dl [3.9 mmol/l] or below)   Negative: Seizure suspected (e.g., muscle jerking or shaking followed by  "confusion)   Negative: Heat exhaustion suspected (i.e., dehydration from heat exposure)   Negative: Fainted > 15 minutes ago and still looks pale (pale skin, pallor)   Negative: Fainted > 15 minutes ago and still feels weak or dizzy   Negative: History of heart problems or congestive heart failure   Negative: Occurred during exercise   Negative: Any head or face injury    Answer Assessment - Initial Assessment Questions  1. MECHANISM: \"How did the fall happen?\"      -Felt lightheaded and fainted and then fell  2. DOMESTIC VIOLENCE AND ELDER ABUSE SCREENING: \"Did you fall because someone pushed you or tried to hurt you?\" If Yes, ask: \"Are you safe now?\"      -No  3. ONSET: \"When did the fall happen?\" (e.g., minutes, hours, or days ago)      -Today 11/17/23, 12:30p  4. LOCATION: \"What part of the body hit the ground?\" (e.g., back, buttocks, head, hips, knees, hands, head, stomach)      -Unsure  5. INJURY: \"Did you hurt (injure) yourself when you fell?\" If Yes, ask: \"What did you injure? Tell me more about this?\" (e.g., body area; type of injury; pain severity)\"      -Some skin tears on arms, are bandaged up  6. PAIN: \"Is there any pain?\" If Yes, ask: \"How bad is the pain?\" (e.g., Scale 1-10; or mild,   moderate, severe)    - NONE (0): No pain    - MILD (1-3): Doesn't interfere with normal activities     - MODERATE (4-7): Interferes with normal activities or awakens from sleep     - SEVERE (8-10): Excruciating pain, unable to do any normal activities       -Mild  7. SIZE: For cuts, bruises, or swelling, ask: \"How large is it?\" (e.g., inches or centimeters)       -Small  8. PREGNANCY: \"Is there any chance you are pregnant?\" \"When was your last menstrual period?\"      NA  9. OTHER SYMPTOMS: \"Do you have any other symptoms?\" (e.g., dizziness, fever, weakness; new onset or worsening).       -Slightly weak  10. CAUSE: \"What do you think caused the fall (or falling)?\" (e.g., tripped, dizzy spell)        " -Lightheadedness    Protocols used: Falls and V[place]uhe-H-FC, Yajigjsa-I-OS

## 2023-11-17 NOTE — TELEPHONE ENCOUNTER
Patient calling, says he has had cold symptoms for about a week and a half, has been at least 10 days.   Denies headache or sinus pain.    See triage below, patient wants to be seen so disposition is:    SEE IN OFFICE TODAY OR TOMORROW:   * You need to be examined. Let me give you an appointment.  Nothing available in person or virtual at [facility], [facility], [facility]  * IF NO AVAILABLE OFFICE APPOINTMENTS: You need to be seen in an Urgent Care Center. Patient declines that, too expensive.    Scheduled phone visit with virtual , 12:30 today.    Patient verbalized understanding of and agreement with plan.    DECLAN Lang [facility] Triage      Reason for Disposition   Patient wants to be seen    Additional Information   Negative: Bluish (or gray) lips or face   Negative: SEVERE difficulty breathing (e.g., struggling for each breath, speaks in single words)   Negative: Rapid onset of cough and has hives   Negative: Coughing started suddenly after medicine, an allergic food or bee sting   Negative: Difficulty breathing after exposure to flames, smoke, or fumes   Negative: Sounds like a life-threatening emergency to the triager   Negative: Previous asthma attacks and this feels like asthma attack   Negative: Dry cough (non-productive; no sputum or minimal clear sputum) and within 14 days of COVID-19 Exposure   Negative: MODERATE difficulty breathing (e.g., speaks in phrases, SOB even at rest, pulse 100-120) and still present when not coughing   Negative: Chest pain present when not coughing   Negative: Passed out (i.e., fainted, collapsed and was not responding)   Negative: Patient sounds very sick or weak to the triager   Negative: MILD difficulty breathing (e.g., minimal/no SOB at rest, SOB with walking, pulse <100) and still present when not coughing   Negative: Coughed up > 1 tablespoon (15 ml) blood (Exception: Blood-tinged sputum.)   Negative: Fever > 103 F (39.4 C)   Negative: Fever > 101 F (38.3 C) and over 60  "years of age   Negative: Fever > 100.0 F (37.8 C) and has diabetes mellitus or a weak immune system (e.g., HIV positive, cancer chemotherapy, organ transplant, splenectomy, chronic steroids)   Negative: Fever > 100.0 F (37.8 C) and bedridden (e.g., CVA, chronic illness, recovering from surgery)   Negative: Increasing ankle swelling   Negative: Wheezing is present   Negative: SEVERE coughing spells (e.g., whooping sound after coughing, vomiting after coughing)   Negative: Coughing up rodrigo-colored (reddish-brown) or blood-tinged sputum   Negative: Fever present > 3 days (72 hours)   Negative: Fever returns after gone for over 24 hours and symptoms worse or not improved   Negative: Using nasal washes and pain medicine > 24 hours and sinus pain persists   Negative: Known COPD or other severe lung disease (i.e., bronchiectasis, cystic fibrosis, lung surgery) and worsening symptoms (i.e., increased sputum purulence or amount, increased breathing difficulty)    Answer Assessment - Initial Assessment Questions  1. ONSET: \"When did the cough begin?\"       About a week and a half ago  2. SEVERITY: \"How bad is the cough today?\"       Has coughing spells last 30 seconds, happens every 15 minutes, is able to sleep but will have strong coughing spell in the AM  3. SPUTUM: \"Describe the color of your sputum\" (none, dry cough; clear, white, yellow, green)      Greenish  4. HEMOPTYSIS: \"Are you coughing up any blood?\" If so ask: \"How much?\" (flecks, streaks, tablespoons, etc.)      no  5. DIFFICULTY BREATHING: \"Are you having difficulty breathing?\" If Yes, ask: \"How bad is it?\" (e.g., mild, moderate, severe)     - MILD: No SOB at rest, mild SOB with walking, speaks normally in sentences, can lie down, no retractions, pulse < 100.     - MODERATE: SOB at rest, SOB with minimal exertion and prefers to sit, cannot lie down flat, speaks in phrases, mild retractions, audible wheezing, pulse 100-120.     - SEVERE: Very SOB at rest, speaks " "in single words, struggling to breathe, sitting hunched forward, retractions, pulse > 120       Not short of breath at all, is speaking  easily  6. FEVER: \"Do you have a fever?\" If Yes, ask: \"What is your temperature, how was it measured, and when did it start?\"      no  7. CARDIAC HISTORY: \"Do you have any history of heart disease?\" (e.g., heart attack, congestive heart failure)       No, has high blood pressure, has a pacemaker  8. LUNG HISTORY: \"Do you have any history of lung disease?\"  (e.g., pulmonary embolus, asthma, emphysema)      no  9. 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 OTHER SYMPTOMS: \"Do you have any other symptoms?\" (e.g., runny nose, wheezing, chest pain)        Runny nose, blowing green nasal drainage  11. PREGNANCY: \"Is there any chance you are pregnant?\" \"When was your last menstrual period?\"        N/a  12. TRAVEL: \"Have you traveled out of the country in the last month?\" (e.g., travel history, exposures)        no    Protocols used: Cough-A-OH    "

## 2023-11-21 ENCOUNTER — TELEPHONE (OUTPATIENT)
Dept: FAMILY MEDICINE | Facility: CLINIC | Age: 78
End: 2023-11-21
Payer: COMMERCIAL

## 2023-11-21 NOTE — TELEPHONE ENCOUNTER
Reason for Call:  Appointment Request    Patient requesting this type of appt:  Hospital/ED Follow-Up     Requested provider: Kapil Duckworth    Reason patient unable to be scheduled: Not within requested timeframe    When does patient want to be seen/preferred time: 3-5 days    Comments: Zurdo was at [facility] for acute bleeding, acute blood loss due to a fall. He is recommended to do a follow up within 3-5 days from discharge date 11/21. Clinic will have to call pt to schedule an appt.    Could we send this information to you in [facility] [employer] or would you prefer to receive a phone call?:   Patient would prefer a phone call   Okay to leave a detailed message?: Yes at Cell number on file:    Telephone Information:   Mobile [phone]       Call taken on 11/21/2023 at 9:42 AM by Leann Ramos

## 2023-11-27 ENCOUNTER — TELEPHONE (OUTPATIENT)
Dept: ANTICOAGULATION | Facility: CLINIC | Age: 78
End: 2023-11-27
Payer: COMMERCIAL

## 2023-11-27 DIAGNOSIS — C34.11 MALIGNANT NEOPLASM OF UPPER LOBE OF RIGHT LUNG (H): Primary | ICD-10-CM

## 2023-11-27 DIAGNOSIS — I74.2 EMBOLISM AND THROMBOSIS OF ARTERIES OF THE UPPER EXTREMITIES (H): ICD-10-CM

## 2023-11-27 NOTE — TELEPHONE ENCOUNTER
ANTICOAGULATION  MANAGEMENT    Zurdo Dash is being discharged from the [facility] ([facility]).    Reason for discharge: warfarin replaced by alternate therapy, apixaban, following his hospitalization at [facility].    Anticoagulation episode resolved, ACC referral closed, and Standing order discontinued    If patient needs warfarin management in the future, please send a new referral    Velia Parada RN

## 2023-11-28 NOTE — TELEPHONE ENCOUNTER
Note:  Fax received from [facility] that patient will be returning to [facility] on 11-.      Caren Telles,

## 2023-11-28 NOTE — TELEPHONE ENCOUNTER
E11.42 (ICD-10-CM) - Type 2 diabetes mellitus with diabetic polyneuropathy, without long-term current use of insulin (H)   I73.9 (ICD-10-CM) - PAD (peripheral artery disease) (H)   I10 (ICD-10-CM) - Hypertension goal BP (blood pressure) < 140/90   R06.02 (ICD-10-CM) - SOBOE (shortness of breath on exertion     Wq. Referral. Left message to return clinic call to .  Ning Erazo L.P.N.    May see REYNA Saez Md.,Facc.  Or Alayna Cox MD.,Cardiology     room air

## 2023-12-04 ENCOUNTER — MEDICAL CORRESPONDENCE (OUTPATIENT)
Dept: HEALTH INFORMATION MANAGEMENT | Facility: CLINIC | Age: 78
End: 2023-12-04

## 2023-12-07 ENCOUNTER — OFFICE VISIT (OUTPATIENT)
Dept: INTERNAL MEDICINE | Facility: CLINIC | Age: 78
End: 2023-12-07
Payer: COMMERCIAL

## 2023-12-07 VITALS
HEIGHT: 69 IN | HEART RATE: 86 BPM | SYSTOLIC BLOOD PRESSURE: 104 MMHG | OXYGEN SATURATION: 96 % | RESPIRATION RATE: 16 BRPM | WEIGHT: 224 LBS | TEMPERATURE: 98.2 F | BODY MASS INDEX: 33.18 KG/M2 | DIASTOLIC BLOOD PRESSURE: 68 MMHG

## 2023-12-07 DIAGNOSIS — I74.2 EMBOLISM AND THROMBOSIS OF ARTERIES OF THE UPPER EXTREMITIES (H): ICD-10-CM

## 2023-12-07 DIAGNOSIS — N17.9 AKI (ACUTE KIDNEY INJURY) (H): ICD-10-CM

## 2023-12-07 DIAGNOSIS — R04.0 EPISTAXIS: ICD-10-CM

## 2023-12-07 DIAGNOSIS — D64.9 ANEMIA, UNSPECIFIED TYPE: ICD-10-CM

## 2023-12-07 DIAGNOSIS — N18.32 STAGE 3B CHRONIC KIDNEY DISEASE (H): ICD-10-CM
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
## 2023-12-07 DIAGNOSIS — R55 SYNCOPE, UNSPECIFIED SYNCOPE TYPE: Primary | ICD-10-CM

## 2023-12-07 DIAGNOSIS — R79.1 SUPRATHERAPEUTIC INR: ICD-10-CM

## 2023-12-07 PROCEDURE — 99214 OFFICE O/P EST MOD 30 MIN: CPT | Performed by: INTERNAL MEDICINE

## 2023-12-07 RX ORDER — FERROUS GLUCONATE 324(38)MG
324 TABLET ORAL
Qty: 90 TABLET | Refills: 1 | Status: SHIPPED | OUTPATIENT
Start: 2023-12-07 | End: 2024-06-14

## 2023-12-07 NOTE — PROGRESS NOTES
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 We reviewed many different issues today including post hospital discharge follow up office visit , see care everywhere, he had a fall with a lot of bruising and a supra-therapeutic INR , his warfarin has been for severe peripheral arterial disease with interventions. He is now on Apixaban (Eliquis) which we reviewed in significant detail  , all questions answered to patient and his wife Yoli    Anemia, unspecified type  This is needing some monthly monitoring , we started iron supplements today and placed standing orders   - ferrous gluconate (FERGON) 324 (38 Fe) MG tablet; Take 1 tablet (324 mg) by mouth daily (with breakfast)  - Hemoglobin; Standing  - Ferritin; Standing  - Iron and iron binding capacity; Standing    RAEGAN (acute kidney injury) (H24)  Due to be rechecked this was not severe, see discharge summary   - Basic metabolic panel  (Ca, Cl, CO2, Creat, Gluc, K, Na, BUN); Standing    Epistaxis  This is an ongoing serious problem, he's had some Ear, Nose, and Throat specialist consultation and nasal cautery. He knows what to be on the watch for  , will update me if significant changes have taken place     Supratherapeutic INR  As detailed above , warfarin is discontinued    Syncope, unspecified syncope type  Fundamentally he had a brief fainting sell and this has not been shown to be due the any sinister dangerous underlying diagnoses      Stage 3b chronic kidney disease (H)  Ongoing monitoring , as detailed above     Fall, subsequent encounter  Discussed , results from fainting    Lung mass  See discharge summary and also see office visit notes with Dr. Funez, radiation oncologist     Review of the result(s) of each unique test - today's tests  Prescription drug  management  29 minutes spent by me on the date of the encounter doing chart review, history and exam, documentation and further activities per the note     MED REC REQUIRED  Post Medication Reconciliation Status: discharge medications reconciled and changed, per note/orders      Kapil Duckworth MD  [facility] ADDIE Renner is a 78 year old, presenting for the following health issues:  Hospital F/U        12/7/2023     1:29 PM   Additional Questions   Roomed by Dinorah   Accompanied by wife       HPI       Hospital Follow-up Visit:    Hospital/Nursing Home/ Rehab Facility: [facility]   Date of Admission: 11/17/23  Date of Discharge: 11/21/23  Reason(s) for Admission: Syncope and collapse (Primary Dx);   Epistaxis;   Anemia, unspecified type;   RAEGAN (acute kidney injury) (HC);   Supratherapeutic INR;   Deep vein thrombosis (DVT) of proximal lower extremity, unspecified chronicity, unspecified laterality (HC)     Hemoglobin   Date Value Ref Range Status   11/06/2023 11.3 (L) 13.3 - 17.7 g/dL Final   07/05/2021 6.9 (LL) 13.3 - 17.7 g/dL Final     Comment:     Results confirmed by repeat test  Critical Value called to and read back by  CHINA MUNOZ RN ON 07.05.2021 AT 1755 MX     ]     Epistaxis continues, we reviewed this, he's had Ear, Nose, and Throat specialist consultations and nasal cautery without success    Lowest hemoglobin was 8.2 and it was up to 9.3 at discharge , you need to be taking iron supplements and recheck hemoglobins monthly for the next many months     Seeing Dr. Funez, she is giving patient radiation therapy , 5 treatments over 2 weeks, this was this summer regarding his right upper lobe mass    Was your hospitalization related to COVID-19? No   Problems taking medications regularly:  None  Medication changes since discharge: warfarin discontinued, Apixaban (Eliquis) started  Problems adhering to non-medication therapy:  None    Summary of  "hospitalization:  CareEverywhere information obtained and reviewed  Diagnostic Tests/Treatments reviewed.  Follow up needed: none  Other Healthcare Providers Involved in Patient s Care:         several different sub-specialists including cardiology  , vascular , rheumatologist and xrot   Update since discharge: improved.         Plan of care communicated with patient and family             Review of Systems   Constitutional, HEENT, cardiovascular, pulmonary, gi and gu systems are negative, except as otherwise noted.      Objective    /68   Pulse 86   Temp 98.2  F (36.8  C) (Temporal)   Resp 16   Ht 1.753 m (5' 9\")   Wt 101.6 kg (224 lb)   SpO2 96%   BMI 33.08 kg/m    Body mass index is 33.08 kg/m .  Physical Exam   GENERAL: healthy, alert and no distress  EYES: Eyes grossly normal to inspection, PERRL and conjunctivae and sclerae normal  RESP: lungs clear to auscultation - no rales, rhonchi or wheezes  CV: regular rate and rhythm, normal S1 S2, no S3 or S4, no murmur, click or rub, no peripheral edema and peripheral pulses strong  MS: no gross musculoskeletal defects noted, no edema  NEURO: Normal strength and tone, mentation intact and speech normal  PSYCH: mentation appears normal, affect normal/bright    Orders Placed This Encounter   Procedures    Hemoglobin    Ferritin    Iron and iron binding capacity    Basic metabolic panel  (Ca, Cl, CO2, Creat, Gluc, K, Na, BUN)         "

## 2023-12-11 DIAGNOSIS — E11.42 TYPE 2 DIABETES MELLITUS WITH DIABETIC POLYNEUROPATHY, WITHOUT LONG-TERM CURRENT USE OF INSULIN (H): ICD-10-CM

## 2023-12-12 RX ORDER — BLOOD PRESSURE TEST KIT
KIT MISCELLANEOUS
Qty: 120 EACH | Refills: 11 | Status: SHIPPED | OUTPATIENT
Start: 2023-12-12

## 2023-12-22 ENCOUNTER — TELEPHONE (OUTPATIENT)
Dept: FAMILY MEDICINE | Facility: CLINIC | Age: 78
End: 2023-12-22
Payer: COMMERCIAL

## 2023-12-22 DIAGNOSIS — I74.2 EMBOLISM AND THROMBOSIS OF ARTERIES OF THE UPPER EXTREMITIES (H): Primary | ICD-10-CM

## 2023-12-22 NOTE — TELEPHONE ENCOUNTER
Patient was discharged from Hospital last month with eliquis 5mg twice daily, it was filled at [place], but there are no refills. Could you send a prescription? Patient is checking to see if he has enough until 12/26/23.  Thank you  Bertha Lei, [facility]  [address] [place] [address]  [place] [address]  [phone]

## 2024-01-02 ENCOUNTER — TELEPHONE (OUTPATIENT)
Dept: INTERNAL MEDICINE | Facility: CLINIC | Age: 79
End: 2024-01-02
Payer: COMMERCIAL

## 2024-01-02 NOTE — TELEPHONE ENCOUNTER
Reason for Call:  Form, our goal is to have forms completed with 72 hours, however, some forms may require a visit or additional information.    Type of letter, form or note:  Home Health Certification    Who is the form from?: Home care    Where did the form come from: form was faxed in    What clinic location was the form placed at?: [facility]    Where the form was placed: Given to physician    What number is listed as a contact on the form?: [phone]       Call taken on 1/2/2024 at 11:24 AM by Caren Telles

## 2024-01-02 NOTE — TELEPHONE ENCOUNTER
[facility] received and given to Dr. Duckworth to sign.  Caren Telles,

## 2024-01-03 ENCOUNTER — TELEPHONE (OUTPATIENT)
Dept: RADIATION ONCOLOGY | Facility: CLINIC | Age: 79
End: 2024-01-03
Payer: COMMERCIAL

## 2024-01-03 NOTE — TELEPHONE ENCOUNTER
Returning their call from 12/28/2023. Tried calling Yoli back on 01/03/2024 and reached her voicemail. Left message.

## 2024-01-04 ENCOUNTER — MEDICAL CORRESPONDENCE (OUTPATIENT)
Dept: HEALTH INFORMATION MANAGEMENT | Facility: CLINIC | Age: 79
End: 2024-01-04
Payer: COMMERCIAL

## 2024-01-04 NOTE — TELEPHONE ENCOUNTER
[facility] signed by Dr. Duckworth and faxed back to [phone].  Caren Telles,

## 2024-01-06 ENCOUNTER — HEALTH MAINTENANCE LETTER (OUTPATIENT)
Age: 79
End: 2024-01-06

## 2024-01-08 ENCOUNTER — LAB (OUTPATIENT)
Dept: LAB | Facility: CLINIC | Age: 79
End: 2024-01-08
Payer: COMMERCIAL

## 2024-01-08 DIAGNOSIS — D64.9 ANEMIA, UNSPECIFIED TYPE: ICD-10-CM

## 2024-01-08 DIAGNOSIS — N17.9 AKI (ACUTE KIDNEY INJURY) (H): ICD-10-CM

## 2024-01-08 LAB
ANION GAP SERPL CALCULATED.3IONS-SCNC: 12 MMOL/L (ref 7–15)
BUN SERPL-MCNC: 38.1 MG/DL (ref 8–23)
CALCIUM SERPL-MCNC: 9 MG/DL (ref 8.8–10.2)
CHLORIDE SERPL-SCNC: 105 MMOL/L (ref 98–107)
CREAT SERPL-MCNC: 2.08 MG/DL (ref 0.67–1.17)
DEPRECATED HCO3 PLAS-SCNC: 22 MMOL/L (ref 22–29)
EGFRCR SERPLBLD CKD-EPI 2021: 32 ML/MIN/1.73M2
FERRITIN SERPL-MCNC: 122 NG/ML (ref 31–409)
GLUCOSE SERPL-MCNC: 178 MG/DL (ref 70–99)
HGB BLD-MCNC: 10.7 G/DL (ref 13.3–17.7)
IRON BINDING CAPACITY (ROCHE): 237 UG/DL (ref 240–430)
IRON SATN MFR SERPL: 32 % (ref 15–46)
IRON SERPL-MCNC: 75 UG/DL (ref 61–157)
POTASSIUM SERPL-SCNC: 4.7 MMOL/L (ref 3.4–5.3)
SODIUM SERPL-SCNC: 139 MMOL/L (ref 135–145)

## 2024-01-08 PROCEDURE — 83540 ASSAY OF IRON: CPT

## 2024-01-08 PROCEDURE — 36415 COLL VENOUS BLD VENIPUNCTURE: CPT

## 2024-01-08 PROCEDURE — 83550 IRON BINDING TEST: CPT

## 2024-01-08 PROCEDURE — 82728 ASSAY OF FERRITIN: CPT

## 2024-01-08 PROCEDURE — 80048 BASIC METABOLIC PNL TOTAL CA: CPT

## 2024-01-08 PROCEDURE — 85018 HEMOGLOBIN: CPT

## 2024-01-12 NOTE — TELEPHONE ENCOUNTER
Called and spoke to patient. Patient reports that he has a cold and that his wife has been at the hospital with her mother so he forgot all about his appointment today. RN offered to reschedule patient but he would like to wait until he is feeling better. He will also complete labs when his cold is gone.  Nel Swain RN    
Rheumatology Team:    Mr. Dash did not come to his scheduled appointment today.  Please call Mr. Dash to see if he would like to reschedule.      Toxicity monitoring labs are not up to date and should be done as soon as possible; orders are in the system and he may have them done at any [place] lab.  Please encourage him to do this soon; please offer to schedule the lab appointment.    Albert Tompkins MD  1/11/2017 12:31 PM      
23

## 2024-01-18 ENCOUNTER — TELEPHONE (OUTPATIENT)
Dept: FAMILY MEDICINE | Facility: CLINIC | Age: 79
End: 2024-01-18
Payer: COMMERCIAL

## 2024-01-18 NOTE — TELEPHONE ENCOUNTER
Zurdo had a phone consult appointment to go over his medication list to review for  assistance.    We attempted 3 times to get call and left 3 messages. No response. Left word to call back to set up another message.    [place]  Prescription   Please send responses to [id number]

## 2024-01-19 ENCOUNTER — ANCILLARY PROCEDURE (OUTPATIENT)
Dept: CT IMAGING | Facility: CLINIC | Age: 79
End: 2024-01-19
Attending: RADIOLOGY
Payer: COMMERCIAL

## 2024-01-19 DIAGNOSIS — C34.11 MALIGNANT NEOPLASM OF UPPER LOBE OF RIGHT LUNG (H): ICD-10-CM

## 2024-01-19 PROCEDURE — 71250 CT THORAX DX C-: CPT | Mod: GC | Performed by: RADIOLOGY

## 2024-01-22 ENCOUNTER — OFFICE VISIT (OUTPATIENT)
Dept: RADIATION ONCOLOGY | Facility: CLINIC | Age: 79
End: 2024-01-22
Payer: COMMERCIAL

## 2024-01-22 VITALS
RESPIRATION RATE: 16 BRPM | OXYGEN SATURATION: 98 % | TEMPERATURE: 97.7 F | SYSTOLIC BLOOD PRESSURE: 145 MMHG | BODY MASS INDEX: 32.78 KG/M2 | DIASTOLIC BLOOD PRESSURE: 82 MMHG | WEIGHT: 222 LBS | HEART RATE: 78 BPM

## 2024-01-22 DIAGNOSIS — C34.11 MALIGNANT NEOPLASM OF UPPER LOBE OF RIGHT LUNG (H): Primary | ICD-10-CM

## 2024-01-22 PROCEDURE — 99213 OFFICE O/P EST LOW 20 MIN: CPT | Performed by: RADIOLOGY

## 2024-01-22 ASSESSMENT — PAIN SCALES - GENERAL: PAINLEVEL: MILD PAIN (3)

## 2024-01-22 NOTE — LETTER
1/22/2024         RE: Zurdo Dash  [address]  [address]  [place] [address]        Dear Colleague,    Thank you for referring your patient, Zurdo Dash, to the [facility] RADIATION ONCOLOGY [place]. Please see a copy of my visit note below.    Dear Colleagues,  Today Zurdo Dash was seen in follow up      IDENTIFICATION: 78 year old gentleman with multiple comorbidities and a pacemaker diagnosed with [id number], stage 1 lung cancer of the RUL status post stereotactic body radiation therapy completed on August 26, 2022.    INTERVAL HISTORY: While on treatment he had no complaints.  He is seen today in follow-up and states he has had a runny nose and throat clearing for the past month.  He doesn't have a diagnosis of allergies and has not tried zyrtec/claritin.  Denies sore throat..  He is otherwise doing well with no concerns. Specifically denies n/v/ha/sob/cp.      REVIEW OF SYSTEMS: As per HPI, a 14-point review of systems is otherwise negative.    Past Medical History:   Diagnosis Date     Abnormal CT scan 03/2004    calcified lung granuloma     C. difficile diarrhea     H/O     Cataract 11/18/2011     Diabetic neuropathy (H)     mild, mostly soles and distal forefeet, worse on the left side.     Diverticulitis      ED (erectile dysfunction)      Ex-smoker     QUIT SMOKING FEB 2007     History of ETOH abuse     recovering, sober since 1997     Hyperlipidemia LDL goal <100      Hypertension goal BP (blood pressure) < 140/90      Hypogonadism      Obesity      PAD (peripheral artery disease) (H24)     leg cramps, with exertion, no formal diagnosis of PAD and minimal if any symptoms at all.     RA (rheumatoid arthritis) (H)     Dr Bailon     Syncope        Past Surgical History:   Procedure Laterality Date     AMPUTATION Left 01/2023    Toe     BYPASS GRAFT FEMOROPOPLITEAL Left 01/07/2021    Procedure: LEFT FEMORAL TO ABOVE KNEE POPLITEAL ARTERY BYPASS WITH PTFE VASCULAR GRAFT  REMOVABLE RING 6MMX 50CM;  Surgeon: Myra Lopes MD;  Location: [facility]     CATARACT IOL, RT/LT       COLONOSCOPY  2018    [facility]     COMBINED REPAIR PTOSIS WITH BLEPHAROPLASTY BILATERAL Bilateral 2019    Procedure: BILATERAL UPPER EYELID BLEPHAROPLASTY AND BILATERAL PTOSIS REPAIR;  Surgeon: Janet Garcia MD;  Location: [facility]     ENDARTERECTOMY FEMORAL Left 2021    Procedure: LEFT FEMORAL ENDARTERECTOMY WITH PATCH ANGIOPLASTY PHOTOFIX  0.8 X 8CM;  Surgeon: Myra Lopes MD;  Location: [facility]     EP PACEMAKER  2021     EXCISE LESION EYELID Left 2019    Procedure: LEFT LOWER EYELID BIOPSY;  Surgeon: Janet Garcia MD;  Location: [facility]     IR LOWER EXTREMITY ANGIOGRAM LEFT  2020     PHACOEMULSIFICATION WITH STANDARD INTRAOCULAR LENS IMPLANT  2019; 3/2019    left eye; right eye     TONSILLECTOMY       ZZHC INCISION TENDON SHEATH FINGER  2009    r hand ring finger       Family History   Problem Relation Age of Onset     Cerebrovascular Disease Mother      Arthritis Mother      Osteoporosis Mother      Alzheimer Disease Father      Arthritis Father      Cancer Father      Diabetes Maternal Grandmother      Cardiovascular Maternal Grandmother      Other Cancer Brother      Cancer Paternal Aunt      Hypertension No family hx of      Thyroid Disease No family hx of      Glaucoma No family hx of      Macular Degeneration No family hx of        Social History     Tobacco Use     Smoking status: Former     Packs/day: 1.00     Years: 40.00     Additional pack years: 0.00     Total pack years: 40.00     Types: Cigarettes     Quit date: 3/16/2007     Years since quittin.8     Smokeless tobacco: Never   Substance Use Topics     Alcohol use: No     Alcohol/week: 0.0 standard drinks of alcohol       Current Outpatient Medications   Medication     acetaminophen (PAIN RELIEF EXTRA STRENGTH) 500 MG tablet     Alcohol Swabs PADS     apixaban ANTICOAGULANT  "(ELIQUIS) 5 MG tablet     bumetanide (BUMEX) 1 MG tablet     cetirizine (ZYRTEC) 10 MG tablet     Continuous Blood Gluc Sensor (FREESTYLE YENNY 2 SENSOR) MISC     cyanocobalamin (VITAMIN B-12) 1000 MCG tablet     diclofenac (VOLTAREN) 1 % topical gel     dulaglutide (TRULICITY) 1.5 MG/0.5ML pen     ferrous gluconate (FERGON) 324 (38 Fe) MG tablet     ferrous sulfate (FEROSUL) 325 (65 Fe) MG tablet     gabapentin (NEURONTIN) 600 MG tablet     HYDROmorphone (DILAUDID) 2 MG tablet     hydroxychloroquine (PLAQUENIL) 200 MG tablet     insulin glargine (LANTUS PEN) 100 UNIT/ML pen     insulin pen needle (31G X 5 MM) 31G X 5 MM miscellaneous     insulin syringe-needle U-100 (30G X 1/2\" 0.5 ML) 30G X 1/2\" 0.5 ML miscellaneous     Insulin Syringe-Needle U-100 28G X 1/2\" 0.5 ML MISC     Irrigation Supplies MISC     metoprolol succinate ER (TOPROL XL) 50 MG 24 hr tablet     NOVOFINE AUTOCOVER PEN NEEDLE 30G X 8 MM miscellaneous     omeprazole (PRILOSEC) 20 MG DR capsule     polyethylene glycol-propylene glycol (SYSTANE) 0.4-0.3 % SOLN ophthalmic solution     pravastatin (PRAVACHOL) 10 MG tablet     sertraline (ZOLOFT) 50 MG tablet     syringe/needle, disp, (BD ECLIPSE SYRINGE) 25G X 1\" 3 ML MISC     No current facility-administered medications for this visit.          Allergies   Allergen Reactions     Seasonal Allergies      Blood-Group Specific Substance Other (See Comments)     Patient has a Non-specific antibody. Blood products may be delayed. Draw patient 24 hours prior to transfusion. For [facility] testing, draw one red top and two purple top tubes for all Type and Screen orders.  Patient has a Non-specific antibody. Blood products may be delayed. Draw patient 24 hours prior to transfusion. For [facility] testing, draw one red top and two purple top tubes for all Type and Screen orders.         PHYSICAL EXAM:  BP (!) 145/82   Pulse 78   Temp 97.7  F (36.5  C) (Oral)   Resp 16   Wt 100.7 kg (222 lb)   SpO2 98% "   BMI 32.78 kg/m    GEN: appears well, in no acute distress  HEENT: normocephalic and atraumatic, EOMI, anicteric sclerae  CV: RRR  RESP: normal respiration on room air, no stridor, CTAB  SKIN: normal color and turgor  PSYCH: appropriate mood, affect, and judgment    All pertinent laboratory, imaging, and pathology findings have been reviewed.     IMPRESSION: 1/19/2024 chest CT 1. Unchanged pure groundglass nodules in the bilateral lungs.  No evidence for new or enlarging suspicious pulmonary nodules. 2. Less prominent right upper lobe post radiation changes and nearby soft tissue nodule.    IMPRESSION/RECOMMENDATION:  78 year old gentleman with multiple comorbidities and a pacemaker diagnosed with [id number], stage 1 lung cancer of the RUL status post stereotactic body radiation therapy completed on August 26, 2022. He is doing well with no significant radiation toxicity.  His most scan shows no definitive evidence of recurrence or new disease.  We will continue with repeat every 3 month chest CTs with in person or video follow up.   Regarding the throat clearing and runny nose we discussed that more common things being common this could be related to allergies.  However he should follow up with PCP for work up/management.  In the interim I've asked him to start a trial of daily Claritin for the next week to see if that improves his symptoms.    Thank you for allowing me to participate in the care of this pleasant patient. If you have any questions, please do not hesitate to contact my office.    Lalita Funez MD  Attending Physician  Radiation Oncology      Again, thank you for allowing me to participate in the care of your patient.        Sincerely,        NAYAN Funez MD

## 2024-01-22 NOTE — PROGRESS NOTES
Dear Colleagues,  Today Zurdo Dash was seen in follow up      IDENTIFICATION: 78 year old gentleman with multiple comorbidities and a pacemaker diagnosed with [id number], stage 1 lung cancer of the RUL status post stereotactic body radiation therapy completed on August 26, 2022.    INTERVAL HISTORY: While on treatment he had no complaints.  He is seen today in follow-up and states he has had a runny nose and throat clearing for the past month.  He doesn't have a diagnosis of allergies and has not tried zyrtec/claritin.  Denies sore throat..  He is otherwise doing well with no concerns. Specifically denies n/v/ha/sob/cp.      REVIEW OF SYSTEMS: As per HPI, a 14-point review of systems is otherwise negative.    Past Medical History:   Diagnosis Date    Abnormal CT scan 03/2004    calcified lung granuloma    C. difficile diarrhea     H/O    Cataract 11/18/2011    Diabetic neuropathy (H)     mild, mostly soles and distal forefeet, worse on the left side.    Diverticulitis     ED (erectile dysfunction)     Ex-smoker     QUIT SMOKING FEB 2007    History of ETOH abuse     recovering, sober since 1997    Hyperlipidemia LDL goal <100     Hypertension goal BP (blood pressure) < 140/90     Hypogonadism     Obesity     PAD (peripheral artery disease) (H24)     leg cramps, with exertion, no formal diagnosis of PAD and minimal if any symptoms at all.    RA (rheumatoid arthritis) (H)     Dr Bailon    Syncope        Past Surgical History:   Procedure Laterality Date    AMPUTATION Left 01/2023    Toe    BYPASS GRAFT FEMOROPOPLITEAL Left 01/07/2021    Procedure: LEFT FEMORAL TO ABOVE KNEE POPLITEAL ARTERY BYPASS WITH PTFE VASCULAR GRAFT REMOVABLE RING 6MMX 50CM;  Surgeon: Myra Lopes MD;  Location: [facility]    CATARACT IOL, RT/LT      COLONOSCOPY  03/01/2018    [facility]    COMBINED REPAIR PTOSIS WITH BLEPHAROPLASTY BILATERAL Bilateral 08/16/2019    Procedure: BILATERAL UPPER EYELID BLEPHAROPLASTY AND BILATERAL PTOSIS  REPAIR;  Surgeon: Janet Garcia MD;  Location: [facility]    ENDARTERECTOMY FEMORAL Left 2021    Procedure: LEFT FEMORAL ENDARTERECTOMY WITH PATCH ANGIOPLASTY PHOTOFIX  0.8 X 8CM;  Surgeon: Myra Lopes MD;  Location: [facility]    EP PACEMAKER  2021    EXCISE LESION EYELID Left 2019    Procedure: LEFT LOWER EYELID BIOPSY;  Surgeon: Janet Garcia MD;  Location: [facility]    IR LOWER EXTREMITY ANGIOGRAM LEFT  2020    PHACOEMULSIFICATION WITH STANDARD INTRAOCULAR LENS IMPLANT  2019; 3/2019    left eye; right eye    TONSILLECTOMY      ZZHC INCISION TENDON SHEATH FINGER  2009    r hand ring finger       Family History   Problem Relation Age of Onset    Cerebrovascular Disease Mother     Arthritis Mother     Osteoporosis Mother     Alzheimer Disease Father     Arthritis Father     Cancer Father     Diabetes Maternal Grandmother     Cardiovascular Maternal Grandmother     Other Cancer Brother     Cancer Paternal Aunt     Hypertension No family hx of     Thyroid Disease No family hx of     Glaucoma No family hx of     Macular Degeneration No family hx of        Social History     Tobacco Use    Smoking status: Former     Packs/day: 1.00     Years: 40.00     Additional pack years: 0.00     Total pack years: 40.00     Types: Cigarettes     Quit date: 3/16/2007     Years since quittin.8    Smokeless tobacco: Never   Substance Use Topics    Alcohol use: No     Alcohol/week: 0.0 standard drinks of alcohol       Current Outpatient Medications   Medication    acetaminophen (PAIN RELIEF EXTRA STRENGTH) 500 MG tablet    Alcohol Swabs PADS    apixaban ANTICOAGULANT (ELIQUIS) 5 MG tablet    bumetanide (BUMEX) 1 MG tablet    cetirizine (ZYRTEC) 10 MG tablet    Continuous Blood Gluc Sensor (FREESTYLE YENNY 2 SENSOR) MISC    cyanocobalamin (VITAMIN B-12) 1000 MCG tablet    diclofenac (VOLTAREN) 1 % topical gel    dulaglutide (TRULICITY) 1.5 MG/0.5ML pen    ferrous gluconate (FERGON) 324 (38 Fe)  "MG tablet    ferrous sulfate (FEROSUL) 325 (65 Fe) MG tablet    gabapentin (NEURONTIN) 600 MG tablet    HYDROmorphone (DILAUDID) 2 MG tablet    hydroxychloroquine (PLAQUENIL) 200 MG tablet    insulin glargine (LANTUS PEN) 100 UNIT/ML pen    insulin pen needle (31G X 5 MM) 31G X 5 MM miscellaneous    insulin syringe-needle U-100 (30G X 1/2\" 0.5 ML) 30G X 1/2\" 0.5 ML miscellaneous    Insulin Syringe-Needle U-100 28G X 1/2\" 0.5 ML MISC    Irrigation Supplies MISC    metoprolol succinate ER (TOPROL XL) 50 MG 24 hr tablet    NOVOFINE AUTOCOVER PEN NEEDLE 30G X 8 MM miscellaneous    omeprazole (PRILOSEC) 20 MG DR capsule    polyethylene glycol-propylene glycol (SYSTANE) 0.4-0.3 % SOLN ophthalmic solution    pravastatin (PRAVACHOL) 10 MG tablet    sertraline (ZOLOFT) 50 MG tablet    syringe/needle, disp, (BD ECLIPSE SYRINGE) 25G X 1\" 3 ML MISC     No current facility-administered medications for this visit.          Allergies   Allergen Reactions    Seasonal Allergies     Blood-Group Specific Substance Other (See Comments)     Patient has a Non-specific antibody. Blood products may be delayed. Draw patient 24 hours prior to transfusion. For [facility] testing, draw one red top and two purple top tubes for all Type and Screen orders.  Patient has a Non-specific antibody. Blood products may be delayed. Draw patient 24 hours prior to transfusion. For [facility] testing, draw one red top and two purple top tubes for all Type and Screen orders.         PHYSICAL EXAM:  BP (!) 145/82   Pulse 78   Temp 97.7  F (36.5  C) (Oral)   Resp 16   Wt 100.7 kg (222 lb)   SpO2 98%   BMI 32.78 kg/m    GEN: appears well, in no acute distress  HEENT: normocephalic and atraumatic, EOMI, anicteric sclerae  CV: RRR  RESP: normal respiration on room air, no stridor, CTAB  SKIN: normal color and turgor  PSYCH: appropriate mood, affect, and judgment    All pertinent laboratory, imaging, and pathology findings have been reviewed. "     IMPRESSION: 1/19/2024 chest CT 1. Unchanged pure groundglass nodules in the bilateral lungs.  No evidence for new or enlarging suspicious pulmonary nodules. 2. Less prominent right upper lobe post radiation changes and nearby soft tissue nodule.    IMPRESSION/RECOMMENDATION:  78 year old gentleman with multiple comorbidities and a pacemaker diagnosed with [id number], stage 1 lung cancer of the RUL status post stereotactic body radiation therapy completed on August 26, 2022. He is doing well with no significant radiation toxicity.  His most scan shows no definitive evidence of recurrence or new disease.  We will continue with repeat every 3 month chest CTs with in person or video follow up.   Regarding the throat clearing and runny nose we discussed that more common things being common this could be related to allergies.  However he should follow up with PCP for work up/management.  In the interim I've asked him to start a trial of daily Claritin for the next week to see if that improves his symptoms.    Thank you for allowing me to participate in the care of this pleasant patient. If you have any questions, please do not hesitate to contact my office.    Lalita Funez MD  Attending Physician  Radiation Oncology

## 2024-01-22 NOTE — NURSING NOTE
FOLLOW-UP VISIT    Patient Name: Zurdo Dash      : 1945     Age: 78 year old        ______________________________________________________________________________     Chief Complaint   Patient presents with    Radiation Therapy     Return appointment with Dr. Funez     BP (!) 145/82   Pulse 78   Temp 97.7  F (36.5  C) (Oral)   Resp 16   Wt 100.7 kg (222 lb)   SpO2 98%   BMI 32.78 kg/m       Date Radiation Completed: 2022    Pain  Denies pain related to visit, patient reports chronic knee pain 3/10.    Meds  Current Med List Reviewed: Yes  Medication Note:     Labs  CBC RESULTS:   Recent Labs   Lab Test 24  1128 23  1520   WBC  --  6.9   RBC  --  3.86*   HGB 10.7* 11.3*   HCT  --  35.3*   MCV  --  92   MCH  --  29.3   MCHC  --  32.0   RDW  --  14.4   PLT  --  251       Imaging  CT Chest: 24    Respiratory: No shortness of breath, dyspnea on exertion, cough, or hemoptysis, patient reports increased phlegm- clear.  Skin:  Warm  Dry  Intact  Energy Level: normal- baseline  Appetite: normal      Appointments:     DATE  Oncologist:     Primary:      Other Notes: Follow up CT Chest and Return with Dr. Funez in 3 months.    Heidi Villar RN

## 2024-01-30 ENCOUNTER — TRANSFERRED RECORDS (OUTPATIENT)
Dept: HEALTH INFORMATION MANAGEMENT | Facility: CLINIC | Age: 79
End: 2024-01-30
Payer: COMMERCIAL

## 2024-02-01 ENCOUNTER — OFFICE VISIT (OUTPATIENT)
Dept: RHEUMATOLOGY | Facility: CLINIC | Age: 79
End: 2024-02-01
Payer: COMMERCIAL

## 2024-02-01 ENCOUNTER — ANCILLARY PROCEDURE (OUTPATIENT)
Dept: GENERAL RADIOLOGY | Facility: CLINIC | Age: 79
End: 2024-02-01
Attending: INTERNAL MEDICINE
Payer: COMMERCIAL

## 2024-02-01 VITALS
BODY MASS INDEX: 32.78 KG/M2 | HEART RATE: 93 BPM | RESPIRATION RATE: 16 BRPM | OXYGEN SATURATION: 94 % | DIASTOLIC BLOOD PRESSURE: 68 MMHG | WEIGHT: 222 LBS | SYSTOLIC BLOOD PRESSURE: 108 MMHG

## 2024-02-01 DIAGNOSIS — M05.79 RHEUMATOID ARTHRITIS INVOLVING MULTIPLE SITES WITH POSITIVE RHEUMATOID FACTOR (H): ICD-10-CM

## 2024-02-01 DIAGNOSIS — M05.79 RHEUMATOID ARTHRITIS INVOLVING MULTIPLE SITES WITH POSITIVE RHEUMATOID FACTOR (H): Primary | ICD-10-CM

## 2024-02-01 DIAGNOSIS — M17.0 PRIMARY OSTEOARTHRITIS OF BOTH KNEES: ICD-10-CM

## 2024-02-01 PROCEDURE — 99214 OFFICE O/P EST MOD 30 MIN: CPT | Performed by: INTERNAL MEDICINE

## 2024-02-01 PROCEDURE — 73130 X-RAY EXAM OF HAND: CPT | Mod: TC | Performed by: RADIOLOGY

## 2024-02-01 RX ORDER — HYDROXYCHLOROQUINE SULFATE 200 MG/1
200 TABLET, FILM COATED ORAL 2 TIMES DAILY
Qty: 180 TABLET | Refills: 2 | Status: SHIPPED | OUTPATIENT
Start: 2024-02-01

## 2024-02-01 ASSESSMENT — PAIN SCALES - GENERAL: PAINLEVEL: NO PAIN (0)

## 2024-02-01 NOTE — PROGRESS NOTES
Rheumatology Clinic Visit      Zurdo Dash MRN# [id number]   YOB: 1945 Age: 78 year old      Date of visit: 2/01/24   PCP: Dr. Kapil Duckworth  Sports Medicine: Dr. Hermes Warner    Chief Complaint   Patient presents with:  RECHECK: rheumatoid arthritis   bilateral painful knees from sitting to standing position    Assessment and Plan      1. Seropositive nonerosive rheumatoid arthritis (, CCP 64): Previously on MTX (was well tolerated, but later with cytopenias likely related to worsening creatinine with subsequent MTX toxicity).  Then presented with mild synovitis so hydroxychloroquine was restarted that was effective but then mild synovitis again so hydroxychloroquine was increased and he is doing well now with regard to inflammatory arthritis.  Check x-rays to assess for progressive changes from RA.  Chronic illness, stable  - Continue  hydroxychloroquine 200 mg twice daily (last eye exam performed by Dr. Avendano on 5/19/2023; yearly exam required)  - X-rays: Bilateral hands    High risk medication requiring intensive toxicity monitoring at least quarterly.      2. Bilateral knee osteoarthritis / patellofemoral syndrome: Following with orthopedics where Synvisc injections have provided limited duration of benefit.  Steroid injections are effective but now caused significant hyperglycemia so are avoided.  Vascular disease, diabetes, and history of infection are wrist to consider risks when considering possibility of joint replacement and if this were to be pursued then he should discuss with orthopedics and his vascular surgeon.  Another option was to consider pain management evaluation and he wished to explore this option; now followed at [facility] and anticipates having a spinal device placed to help with the knee pain.  Chronic illness, progressive.      Total minutes spent in evaluation with patient, documentation, , and review of pertinent studies and chart notes: 20        Hardy verbalized agreement with and understanding of the rational for the diagnosis and treatment plan.  All questions were answered to best of my ability and the patient's satisfaction. Mr. Dash was advised to contact the clinic with any questions that may arise after the clinic visit.      Thank you for involving me in the care of the patient    Return to clinic: 4 months    HPI   Zurdo Dash is a 78 year old male with a medical history significant for hypertension, hyperlipidemia, diabetes, diabetic neuropathy, GERD, pacemaker, lung cancer, rheumatoid arthritis who presents for f/u of RA, sooner than previously scheduled because of recent hospitalization     7/12/2023: Intra-articular steroid injection for bilateral knee osteoarthritis was effective but caused a blood glucose in the 600s and therefore he went to see orthopedics at the [facility] for a Synvisc injection in June 2023 with improvement of his knee pain.  He would like to plan a repeat Synvisc injection 6 months afterwards and would like a referral to orthopedics here at the [place] location.  Other joints are doing well.  Morning stiffness for no more than 20 minutes.    9/11/2023: Worsening inflammatory arthritis symptoms at the MCPs that are worse in the morning improved with time and activity.  Mild swelling at the MCPs bilaterally.  Synvisc injections for the knees from orthopedics provide some benefit and he is hoping to get another Synvisc injection from orthopedics soon.  Other joints are doing well.  Morning stiffness for about 2 hours at the MCPs.    9/26/2023: Here sooner than previously scheduled, with his daughter, to discuss alternatives to methotrexate.  Okay with using hydroxychloroquine.  Prefers to avoid biologic DMARDs because of risk for infection considering his history of infection, poor vascular flow in the lower extremities, and diabetes.  Also with chronic knee pain, limited duration of benefit from Synvisc;  steroid injections of the knees caused hyperglycemia previously; pain in the knees is worse with activity and improves with rest; no swelling; would like something to help with the degenerative knee pain.    Today, 2/1/2024: Degenerative changes at the DIPs and PIPs.  Heberden's and Bassam's nodes present.  Planning to have a spinal stimulator placed for chronic knee pain management at [facility].  Reports that he was told that his malignancy is mostly resolved, based on CT scans.    Denies fevers, chills, nausea, vomiting, constipation, diarrhea. No abdominal pain. No chest pain/pressure, palpitations, or shortness of breath. No oral or nasal sores. No neck pain.     Tobacco: None  EtOH: None  Drugs: None    ROS   12 point review of system was completed and negative except as noted in the HPI     Active Problem List     Patient Active Problem List   Diagnosis    Pain in limb    Hyperlipidemia LDL goal <100    Hypertension goal BP (blood pressure) < 140/90    Hypogonadism    Advanced directives, counseling/discussion    Health Care Home    Type 2 diabetes mellitus with diabetic polyneuropathy, without long-term current use of insulin (H)    RBBB (right bundle branch block)    Ex-smoker    Family history of esophageal cancer    Gastroesophageal reflux disease, esophagitis presence not specified    Rheumatoid arthritis involving multiple sites with positive rheumatoid factor (H)    Pulmonary nodule    High risk medication use    Spondylosis of cervical region without myelopathy or radiculopathy    Erectile dysfunction, unspecified erectile dysfunction type    Type 2 diabetes mellitus without retinopathy (H)    Tubulovillous adenoma of colon    Diabetic polyneuropathy associated with type 2 diabetes mellitus (H)    Chronic bilateral low back pain without sciatica    Migraine equivalent    Posterior vitreous detachment of left eye    Pseudophakia, ou    Eyelid lesion, LLL    Bradycardia    Primary osteoarthritis of both  knees    Syncope    Trigger finger, acquired    CKD (chronic kidney disease) stage 3, GFR 30-59 ml/min (H)    Abdominal pain, generalized    PAD (peripheral artery disease) (H24)    Immunosuppression (H24)    Skin ulcer of toe of left foot, limited to breakdown of skin (H)    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)    Pneumothorax    SOBOE (shortness of breath on exertion)    Pacemaker    Ulcer of left foot, unspecified ulcer stage (H)    Hammer toe of left foot    Anemia, unspecified type    Closed nondisplaced fracture of right pubis (H)    Amputation of toe (H24)    Adenocarcinoma, lung (H)    Urge incontinence of urine    Thrombocytopenia (H24)    Severe sepsis (H)    Paresis (H)    History of blepharoplasty    Mild protein-calorie malnutrition (H24)    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)    Autoimmune hemolytic anemia (H)    Pressure ulcer of other site, stage 3 (H)    Chronic systolic (congestive) heart failure (H)    Uncomplicated opioid dependence (H)    Major depressive disorder, recurrent, mild (H24)    NSVT (nonsustained ventricular tachycardia) (H)     Past Medical History     Past Medical History:   Diagnosis Date    Abnormal CT scan 03/2004    calcified lung granuloma    C. difficile diarrhea     H/O    Cataract 11/18/2011    Diabetic neuropathy (H)     mild, mostly soles and distal forefeet, worse on the left side.    Diverticulitis     ED (erectile dysfunction)     Ex-smoker     QUIT SMOKING FEB 2007    History of ETOH abuse     recovering, sober since 1997    Hyperlipidemia LDL goal <100     Hypertension goal BP (blood pressure) < 140/90     Hypogonadism     Obesity     PAD (peripheral artery disease) (H24)     leg cramps, with exertion, no formal diagnosis of PAD and minimal if any symptoms at all.    RA (rheumatoid arthritis) (H)     Dr Bailon    Syncope      Past Surgical History     Past Surgical History:   Procedure Laterality Date    AMPUTATION Left  01/2023    Toe    BYPASS GRAFT FEMOROPOPLITEAL Left 01/07/2021    Procedure: LEFT FEMORAL TO ABOVE KNEE POPLITEAL ARTERY BYPASS WITH PTFE VASCULAR GRAFT REMOVABLE RING 6MMX 50CM;  Surgeon: Myra Lopes MD;  Location: [facility] OR    CATARACT IOL, RT/LT      COLONOSCOPY  03/01/2018    [facility]    COMBINED REPAIR PTOSIS WITH BLEPHAROPLASTY BILATERAL Bilateral 08/16/2019    Procedure: BILATERAL UPPER EYELID BLEPHAROPLASTY AND BILATERAL PTOSIS REPAIR;  Surgeon: Janet Garcia MD;  Location: [facility] OR    ENDARTERECTOMY FEMORAL Left 01/07/2021    Procedure: LEFT FEMORAL ENDARTERECTOMY WITH PATCH ANGIOPLASTY PHOTOFIX  0.8 X 8CM;  Surgeon: Myra Lopes MD;  Location: [facility] OR    EP PACEMAKER  01/2021    EXCISE LESION EYELID Left 08/16/2019    Procedure: LEFT LOWER EYELID BIOPSY;  Surgeon: Janet Garcia MD;  Location: [facility] OR    IR LOWER EXTREMITY ANGIOGRAM LEFT  12/17/2020    PHACOEMULSIFICATION WITH STANDARD INTRAOCULAR LENS IMPLANT  02/2019; 3/2019    left eye; right eye    TONSILLECTOMY      ZZHC INCISION TENDON SHEATH FINGER  04/2009    r hand ring finger     Allergy     Allergies   Allergen Reactions    Seasonal Allergies     Blood-Group Specific Substance Other (See Comments)     Patient has a Non-specific antibody. Blood products may be delayed. Draw patient 24 hours prior to transfusion. For [facility] testing, draw one red top and two purple top tubes for all Type and Screen orders.  Patient has a Non-specific antibody. Blood products may be delayed. Draw patient 24 hours prior to transfusion. For [facility] testing, draw one red top and two purple top tubes for all Type and Screen orders.     Current Medication List     Current Outpatient Medications   Medication Sig    acetaminophen (PAIN RELIEF EXTRA STRENGTH) 500 MG tablet TAKE TWO TABLETS BY MOUTH ONCE DAILY    Alcohol Swabs PADS Uses four times daily    apixaban ANTICOAGULANT (ELIQUIS) 5 MG tablet Take 1 tablet (5 mg) by mouth 2 times  "daily    bumetanide (BUMEX) 1 MG tablet Take 1 tablet (1 mg) by mouth daily    cetirizine (ZYRTEC) 10 MG tablet Take 1 tablet (10 mg) by mouth At Bedtime    Continuous Blood Gluc Sensor (FREESTYLE YENNY 2 SENSOR) MISC 1 each every 14 days Use 1 sensor every 14 days. Use to read blood sugars per 's instructions.    cyanocobalamin (VITAMIN B-12) 1000 MCG tablet Take 1 tablet (1,000 mcg) by mouth once a week    diclofenac (VOLTAREN) 1 % topical gel as needed    dulaglutide (TRULICITY) 1.5 MG/0.5ML pen Inject 1.5 mg Subcutaneous every 7 days ON Wednesdays    ferrous gluconate (FERGON) 324 (38 Fe) MG tablet Take 1 tablet (324 mg) by mouth daily (with breakfast)    ferrous sulfate (FEROSUL) 325 (65 Fe) MG tablet TAKE ONE TABLET BY MOUTH ONCE DAILY    gabapentin (NEURONTIN) 600 MG tablet Take 1 tablet (600 mg) by mouth 3 times daily    HYDROmorphone (DILAUDID) 2 MG tablet Take 1 tablet (2 mg) by mouth 2 times daily as needed for pain For urgent pain relief with rheumatoid arthritis flare-up    hydroxychloroquine (PLAQUENIL) 200 MG tablet Take 1 tablet (200 mg) by mouth 2 times daily    insulin glargine (LANTUS PEN) 100 UNIT/ML pen Inject 40 Units Subcutaneous every morning    insulin pen needle (31G X 5 MM) 31G X 5 MM miscellaneous Use 2 pen needles daily or as directed.    insulin syringe-needle U-100 (30G X 1/2\" 0.5 ML) 30G X 1/2\" 0.5 ML miscellaneous Use 4 syringes daily or as directed.    Insulin Syringe-Needle U-100 28G X 1/2\" 0.5 ML MISC     Irrigation Supplies MISC     metoprolol succinate ER (TOPROL XL) 50 MG 24 hr tablet Take 1 tablet (50 mg) by mouth daily    NOVOFINE AUTOCOVER PEN NEEDLE 30G X 8 MM miscellaneous USE FOUR TIMES A DAY    omeprazole (PRILOSEC) 20 MG DR capsule Take 1 capsule (20 mg) by mouth daily    polyethylene glycol-propylene glycol (SYSTANE) 0.4-0.3 % SOLN ophthalmic solution Place 1 drop into both eyes 2 times daily as needed for dry eyes    pravastatin (PRAVACHOL) 10 MG tablet " "Take 1 tablet (10 mg) by mouth daily    sertraline (ZOLOFT) 50 MG tablet Take 1 tablet (50 mg) by mouth daily    syringe/needle, disp, (BD ECLIPSE SYRINGE) 25G X 1\" 3 ML MISC      No current facility-administered medications for this visit.         Social History   See HPI    Family History     Family History   Problem Relation Age of Onset    Cerebrovascular Disease Mother     Arthritis Mother     Osteoporosis Mother     Alzheimer Disease Father     Arthritis Father     Cancer Father     Diabetes Maternal Grandmother     Cardiovascular Maternal Grandmother     Other Cancer Brother     Cancer Paternal Aunt     Hypertension No family hx of     Thyroid Disease No family hx of     Glaucoma No family hx of     Macular Degeneration No family hx of        Physical Exam     Temp Readings from Last 3 Encounters:   01/22/24 97.7  F (36.5  C) (Oral)   12/07/23 98.2  F (36.8  C) (Temporal)   11/06/23 97.7  F (36.5  C) (Temporal)     BP Readings from Last 5 Encounters:   02/01/24 108/68   01/22/24 (!) 145/82   12/07/23 104/68   11/06/23 112/70   10/30/23 (!) 154/76     Pulse Readings from Last 1 Encounters:   02/01/24 93     Resp Readings from Last 1 Encounters:   02/01/24 16     Estimated body mass index is 32.78 kg/m  as calculated from the following:    Height as of 12/7/23: 1.753 m (5' 9\").    Weight as of this encounter: 100.7 kg (222 lb).    GEN: NAD.   HEENT:  Anicteric, noninjected sclera. No obvious external lesions of the ear and nose. Hearing intact.  CV: S1, S2. RRR. No m/r/g  PULM: No increased work of breathing. CTA bilaterally   MSK: MCPs, PIPs, and DIPs without swelling or tenderness to palpation.  Heberden's and Bassam's nodes present.  Wrists without swelling or tenderness to palpation.  Elbows and shoulders without swelling or tenderness to palpation.  Knees with crepitation and medial joint line tenderness but no effusion or increased warmth.  Ankles and MTPs without swelling or tenderness to palpation.  "   SKIN: No rash or jaundice seen  PSYCH: Alert. Appropriate.       Labs / Imaging (select studies)     9/28/1999  ([facility])    RF/CCP  Recent Labs   Lab Test 04/07/16  1149   CCPIGG 64*     CBC  Recent Labs   Lab Test 01/08/24  1128 11/06/23  1520 09/11/23  1111 01/10/23  0745 09/08/21  1607 07/14/21  0924 07/05/21  1532 04/07/21  0958   WBC  --  6.9 6.6 6.5   < > 7.0 3.7* 7.4   RBC  --  3.86* 4.51 3.95*   < > 2.72* 2.20* 3.55*   HGB 10.7* 11.3* 13.2* 11.9*   < > 8.4* 6.9* 10.8*   HCT  --  35.3* 41.5 37.7*   < > 25.8* 21.6* 33.3*   MCV  --  92 92 95   < > 95 98 94   RDW  --  14.4 13.1 13.3   < > 17.1* 17.3* 14.0   PLT  --  251 254 219   < > 140* 96* 342   MCH  --  29.3 29.3 30.1   < > 30.9 31.4 30.4   MCHC  --  32.0 31.8 31.6   < > 32.6 31.9 32.4   NEUTROPHIL  --  70 68 65   < > 68  75 68.0 80.7   LYMPH  --  14 14 14   < > 10  7 19.0 11.6   MONOCYTE  --  10 12 15   < > 20  15 10.0 6.0   EOSINOPHIL  --  5 6 4   < > 2  2 2.0 1.2   BASOPHIL  --  1 1 1   < > 1  0 1.0 0.5   ANEU  --   --   --   --   --  5.3 2.5 6.0   ALYM  --   --   --   --   --  0.5* 0.7* 0.9   SMITH  --   --   --   --   --  1.1 0.4 0.5   AEOS  --   --   --   --   --  0.1 0.1 0.1   ABAS  --   --   --   --   --  0.0 0.0 0.0   ANEUTAUTO  --  4.9 4.5 4.3   < > 4.8  --   --    ALYMPAUTO  --  1.0 0.9 0.9   < > 0.7*  --   --    AMONOAUTO  --  0.7 0.8 1.0   < > 1.4*  --   --    AEOSAUTO  --  0.4 0.4 0.3   < > 0.2  --   --    ABSBASO  --  0.0 0.1 0.1   < > 0.1  --   --     < > = values in this interval not displayed.     CMP  Recent Labs   Lab Test 01/08/24  1128 11/06/23  1520 09/28/23  1055 09/11/23  1111 06/27/23  0540 01/10/23  0745 07/14/21  1645 07/05/21  1532 04/07/21  0958 01/09/21  0709     --   --   --  141 140   < >  --   --  136   POTASSIUM 4.7  --   --   --  4.4 4.5   < >  --   --  4.3   CHLORIDE 105  --   --   --  109* 105   < >  --   --  108   CO2 22  --   --   --  20* 20*   < >  --   --  23   ANIONGAP 12  --   --   --  12  15   < >  --   --  5   *  --   --   --  181* 110*   < >  --   --  117*   BUN 38.1*  --   --   --  38.1* 35.3*   < >  --   --  31*   CR 2.08* 1.75* 1.83* 1.97* 1.52* 1.55*   < > 2.84* 1.52* 1.39*   GFRESTIMATED 32* 39* 37* 34* 46* 46*   < > 20* 44* 49*   GFRESTBLACK  --   --   --   --   --   --   --  24* 50* 56*   NEW 9.0  --   --   --  8.5* 9.1   < >  --   --  8.4*   BILITOTAL  --  0.3 0.3 0.4  --   --    < > 0.8 0.4  --    ALBUMIN  --  3.6 3.7 4.0  --   --    < > 3.5 3.5  --    PROTTOTAL  --  6.7 7.1 7.6  --   --    < > 6.9 7.0  --    ALKPHOS  --  53 55 55  --   --    < > 85 69  --    AST  --  19 20 23  --   --    < > 12 19  --    ALT  --  19 11 11  --   --    < > 22 25  --     < > = values in this interval not displayed.     Calcium/VitaminD  Recent Labs   Lab Test 01/08/24  1128 06/27/23  0540 01/10/23  0745   NEW 9.0 8.5* 9.1     Hepatitis B  Recent Labs   Lab Test 09/11/23  1111 04/07/16  1149   HBCAB Nonreactive Nonreactive   HEPBANG Nonreactive Nonreactive     Hepatitis C  Recent Labs   Lab Test 09/11/23  1111 04/07/16  1149   HCVAB Nonreactive Nonreactive   Assay performance characteristics have not been established for newborns,   infants, and children       HIV Screening  Recent Labs   Lab Test 04/07/16  1149   HIAGAB Nonreactive   HIV-1 p24 Ag & HIV-1/HIV-2 Ab Not Detected           Immunization History     Immunization History   Administered Date(s) Administered    COVID-19 Bivalent 12+ (Pfizer) 10/04/2022    COVID-19 MONOVALENT 12+ (Pfizer) 02/19/2021, 03/12/2021    COVID-19 Monovalent 18+ (Moderna) 11/29/2021, 05/13/2022    Influenza (H1N1) 01/20/2010    Influenza (High Dose) 3 valent vaccine 09/20/2012, 10/20/2015, 09/20/2016, 08/28/2017, 09/24/2018, 09/18/2019    Influenza (IIV3) PF 10/05/1999, 10/30/2008, 09/28/2011, 10/14/2013, 10/03/2014    Influenza Vaccine 65+ (FLUAD) 09/24/2021, 10/04/2022    Influenza Vaccine 65+ (Fluzone HD) 09/11/2020    Pneumo Conj 13-V (2010&after) 06/29/2015     Pneumococcal 23 valent 08/19/2010    TD,PF 7+ (Tenivac) 08/05/1997, 02/03/2011    TDAP (Adacel,Boostrix) 03/11/2020    TDAP Vaccine (Boostrix) 03/11/2020    Zoster recombinant adjuvanted (SHINGRIX) 01/03/2020, 03/11/2020    Zoster vaccine, live 04/19/2010          Chart documentation done in part with Dragon Voice recognition Software. Although reviewed after completion, some word and grammatical error may remain.    Albert Tompkins MD

## 2024-02-12 ENCOUNTER — MEDICAL CORRESPONDENCE (OUTPATIENT)
Dept: HEALTH INFORMATION MANAGEMENT | Facility: CLINIC | Age: 79
End: 2024-02-12

## 2024-02-13 ENCOUNTER — TELEPHONE (OUTPATIENT)
Dept: FAMILY MEDICINE | Facility: CLINIC | Age: 79
End: 2024-02-13
Payer: COMMERCIAL

## 2024-02-13 NOTE — TELEPHONE ENCOUNTER
Patient dropped off form for Dr. Duckworth to complete. He indicated the RX dosage is incorrect on the form.

## 2024-02-15 ENCOUNTER — OFFICE VISIT (OUTPATIENT)
Dept: INTERNAL MEDICINE | Facility: CLINIC | Age: 79
End: 2024-02-15
Payer: COMMERCIAL

## 2024-02-15 VITALS
WEIGHT: 224 LBS | HEART RATE: 67 BPM | SYSTOLIC BLOOD PRESSURE: 106 MMHG | BODY MASS INDEX: 33.08 KG/M2 | TEMPERATURE: 97.4 F | OXYGEN SATURATION: 95 % | DIASTOLIC BLOOD PRESSURE: 70 MMHG

## 2024-02-15 DIAGNOSIS — N17.9 AKI (ACUTE KIDNEY INJURY) (H): ICD-10-CM

## 2024-02-15 DIAGNOSIS — E11.621 DIABETIC ULCER OF TOE OF LEFT FOOT ASSOCIATED WITH TYPE 2 DIABETES MELLITUS, WITH NECROSIS OF MUSCLE (H): ICD-10-CM

## 2024-02-15 DIAGNOSIS — I47.29 NSVT (NONSUSTAINED VENTRICULAR TACHYCARDIA) (H): ICD-10-CM

## 2024-02-15 DIAGNOSIS — F33.0 MAJOR DEPRESSIVE DISORDER, RECURRENT, MILD (H): ICD-10-CM

## 2024-02-15 DIAGNOSIS — F11.20 UNCOMPLICATED OPIOID DEPENDENCE (H): ICD-10-CM

## 2024-02-15 DIAGNOSIS — D59.10 AUTOIMMUNE HEMOLYTIC ANEMIA (H): ICD-10-CM

## 2024-02-15 DIAGNOSIS — D84.9 IMMUNOSUPPRESSION (H): ICD-10-CM

## 2024-02-15 DIAGNOSIS — E44.1 MILD PROTEIN-CALORIE MALNUTRITION (H): ICD-10-CM

## 2024-02-15 DIAGNOSIS — Z79.899 HIGH RISK MEDICATION USE: ICD-10-CM

## 2024-02-15 DIAGNOSIS — R13.10 ABNORMAL SWALLOWING: ICD-10-CM

## 2024-02-15 DIAGNOSIS — L97.529 ULCER OF LEFT FOOT, UNSPECIFIED ULCER STAGE (H): ICD-10-CM

## 2024-02-15 DIAGNOSIS — Z23 NEED FOR PROPHYLACTIC VACCINATION AGAINST HEPATITIS A: ICD-10-CM

## 2024-02-15 DIAGNOSIS — L97.523 DIABETIC ULCER OF TOE OF LEFT FOOT ASSOCIATED WITH TYPE 2 DIABETES MELLITUS, WITH NECROSIS OF MUSCLE (H): ICD-10-CM

## 2024-02-15 DIAGNOSIS — C34.11 MALIGNANT NEOPLASM OF UPPER LOBE OF RIGHT LUNG (H): ICD-10-CM

## 2024-02-15 DIAGNOSIS — Z29.11 NEED FOR VACCINATION AGAINST RESPIRATORY SYNCYTIAL VIRUS: ICD-10-CM

## 2024-02-15 DIAGNOSIS — D64.9 ANEMIA, UNSPECIFIED TYPE: ICD-10-CM

## 2024-02-15 DIAGNOSIS — L97.521 SKIN ULCER OF TOE OF LEFT FOOT, LIMITED TO BREAKDOWN OF SKIN (H): ICD-10-CM

## 2024-02-15 DIAGNOSIS — E11.42 DIABETIC POLYNEUROPATHY ASSOCIATED WITH TYPE 2 DIABETES MELLITUS (H): Primary | ICD-10-CM

## 2024-02-15 DIAGNOSIS — N18.32 STAGE 3B CHRONIC KIDNEY DISEASE (H): ICD-10-CM

## 2024-02-15 DIAGNOSIS — E11.42 TYPE 2 DIABETES MELLITUS WITH DIABETIC POLYNEUROPATHY, WITHOUT LONG-TERM CURRENT USE OF INSULIN (H): ICD-10-CM

## 2024-02-15 DIAGNOSIS — I50.22 CHRONIC SYSTOLIC (CONGESTIVE) HEART FAILURE (H): ICD-10-CM

## 2024-02-15 DIAGNOSIS — I74.2 EMBOLISM AND THROMBOSIS OF ARTERIES OF THE UPPER EXTREMITIES (H): ICD-10-CM

## 2024-02-15 DIAGNOSIS — L89.893 PRESSURE ULCER OF OTHER SITE, STAGE 3 (H): ICD-10-CM

## 2024-02-15 DIAGNOSIS — Z89.422 ACQUIRED ABSENCE OF OTHER LEFT TOE(S) (H): ICD-10-CM

## 2024-02-15 PROBLEM — G83.9 PARESIS (H): Status: RESOLVED | Noted: 2023-01-18 | Resolved: 2024-02-15

## 2024-02-15 LAB
HBA1C MFR BLD: 8.9 % (ref 0–5.6)
HGB BLD-MCNC: 10.8 G/DL (ref 13.3–17.7)

## 2024-02-15 PROCEDURE — 83540 ASSAY OF IRON: CPT | Performed by: INTERNAL MEDICINE

## 2024-02-15 PROCEDURE — 99214 OFFICE O/P EST MOD 30 MIN: CPT | Performed by: INTERNAL MEDICINE

## 2024-02-15 PROCEDURE — 80048 BASIC METABOLIC PNL TOTAL CA: CPT | Performed by: INTERNAL MEDICINE

## 2024-02-15 PROCEDURE — 83550 IRON BINDING TEST: CPT | Performed by: INTERNAL MEDICINE

## 2024-02-15 PROCEDURE — 83036 HEMOGLOBIN GLYCOSYLATED A1C: CPT | Performed by: INTERNAL MEDICINE

## 2024-02-15 PROCEDURE — 82728 ASSAY OF FERRITIN: CPT | Performed by: INTERNAL MEDICINE

## 2024-02-15 PROCEDURE — 85018 HEMOGLOBIN: CPT | Performed by: INTERNAL MEDICINE

## 2024-02-15 PROCEDURE — 36415 COLL VENOUS BLD VENIPUNCTURE: CPT | Performed by: INTERNAL MEDICINE

## 2024-02-15 RX ORDER — RESPIRATORY SYNCYTIAL VIRUS VACCINE 120MCG/0.5
0.5 KIT INTRAMUSCULAR ONCE
Qty: 1 EACH | Refills: 0 | Status: CANCELLED | OUTPATIENT
Start: 2024-02-15 | End: 2024-02-15

## 2024-02-15 RX ORDER — INSULIN LISPRO 100 [IU]/ML
10 INJECTION, SOLUTION INTRAVENOUS; SUBCUTANEOUS
Qty: 15 ML | Refills: 1 | Status: SHIPPED | OUTPATIENT
Start: 2024-02-15 | End: 2024-03-07

## 2024-02-15 NOTE — PROGRESS NOTES
Gilma Renner is a 78 year old, presenting for the following health issues:  Cough      2/15/2024     1:26 PM   Additional Questions   Roomed by Savannah CEDEÑO     Pt presenting w/ multiple concerns on this visit including chronic cough, sore toe, and elevated BG. Provider clarified that most important concerns for patient today is elevated BG.     Pt reporting BG consistently in 300s over the past week. Current DM management includes Trulicity (pt reports taking 1.5mg instead of scheduled 3mg) and 40 units Lantus in AM.    Additionally, pt reports concern regarding diabetic ulcer to L great toe. Reports he is being followed by Suze Ward is a Podiatrist working with the [facility] in [place].     Review of Systems  Constitutional, HEENT, cardiovascular, pulmonary, gi and gu systems are negative, except as otherwise noted.      Objective    /70 (BP Location: Left arm, Patient Position: Sitting, Cuff Size: Adult Large)   Pulse 67   Temp 97.4  F (36.3  C) (Temporal)   Wt 101.6 kg (224 lb)   SpO2 95%   BMI 33.08 kg/m    Body mass index is 33.08 kg/m .  Physical Exam   GENERAL: alert and no distress  NECK: no adenopathy, no asymmetry, masses, or scars  RESP: lungs clear to auscultation - no rales, rhonchi or wheezes  CV: regular rate and rhythm, normal S1 S2, no S3 or S4, no murmur, click or rub, no peripheral edema  ABDOMEN: soft, nontender, and bowel sounds normal  MS: no gross musculoskeletal defects noted, no edema  SKIN: Diabetic ulcer to R great toe  PSYCH: mentation appears normal, affect normal/bright    (E11.42) Diabetic polyneuropathy associated with type 2 diabetes mellitus (H)  (primary encounter diagnosis)  Comment: Uncontrolled. Awaiting hemoglobin A1c  Plan: HEMOGLOBIN A1C      (E11.42) Type 2 diabetes mellitus with diabetic polyneuropathy, without long-term current use of insulin (H)  Comment: Uncontrolled. Plan to add short acting insulin w/ meals.,   Plan: Adult  Diabetes Education  Referral, insulin lispro (HUMALOG KWIKPEN) 100 UNIT/ML (1 unit dial) KWIKPEN - starting dose of 10 with meals, wait to administer insulin until meal is prepared and ready to eat,    (Z23) Need for prophylactic vaccination against hepatitis A  Comment: Pt uninterested in immunization at this time.  Plan: Plan to reassess at future appointment.     (Z29.11) Need for vaccination against respiratory syncytial virus  Comment: Pt uninterested in immunization at this time.  Plan: Plan to reassess at future appointment.    (L97.521) Skin ulcer of toe of left foot, limited to breakdown of skin (H)  Comment: Followed by podiatry.   Plan: Pt  agreeable to release of information to allow us access to podiatry notes. According to patient his left great toe ulceration is actually better. This patient has been at the absolute last step before below the knee amputation is necessary  for over 2 years and the fact that he still has his toe is in my opinion, nothing short of a miracle      (L97.529) Ulcer of left foot, unspecified ulcer stage (H)  Comment: Followed by podiatry.   Plan: Pt  agreeable to release of information to allow us access to podiatry notes.     (E11.621,  L97.523) Diabetic ulcer of toe of left foot associated with type 2 diabetes mellitus, with necrosis of muscle (H)  Comment: Followed by podiatry.  Plan: Pt  agreeable to release of information to allow us access to podiatry notes.     (Z89.422) Acquired absence of other left toe(s) (H24)  Comment: Followed by podiatry.   Plan: Pt  agreeable to release of information to allow us access to podiatry notes.     (D59.10) Autoimmune hemolytic anemia (H)  Comment: Stable.   Plan: Continue w/ ongoing monitoring.     (C34.11) 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)  Comment: Seeing Dr. Funez for his radiation therapy and patient tells me he has only with scar tissue and no cancer   Plan: Stable. Continue w/ POC per XRT.     (D84.9)  Immunosuppression (H24)  Comment: Most recent WBC 6.4 as of 23  Plan: Stable. Continue w/ ongoing monitoring.     (E44.1) Mild protein-calorie malnutrition (H24)  Comment: Discussed current meal plans at assisted living facility.   Plan: Stable. Continue w/ ongoing monitoring.     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     (F11.20) Uncomplicated opioid dependence (H)  Comment: Stable.  Plan: Continue w/ ongoing monitoring.     (I50.22) Chronic systolic (congestive) heart failure (H)  Comment: Followed by cardiology.  Plan: Continue w/ POC per cards.     (F33.0) Major depressive disorder, recurrent, mild (H24)  Comment: Stable.   Plan: Continue w/ ongoing POC.     (I47.29) NSVT (nonsustained ventricular tachycardia) (H)  Comment: Followed by cardiology.  Plan: Continue w/ POC per cards.    (N18.32) Stage 3b chronic kidney disease (H)  Comment: Stable.   Plan: Continuing w/ oncoming monitoring.     (R13.10) Abnormal swallowing  Comment: Pt expressing acute concern as brother  of esophageal ca.   Plan: XR Esophagram w Upper GI    (D64.9) Anemia, unspecified type  Comment: Hgb 10.8 in clinic.   Plan: Stable. Continue w/ ongoing monitoring.     (N17.9) RAEGAN (acute kidney injury) (H24)  Comment: Chronic Kidney Disease  Plan: Plan to reassess for appropriateness.       Bisi Pena NP Student  This patient office visit today was staffed with me, I did review the entire clinical presentation and history, exam and medical decision making with DNP student Bisi Pena  I agree with and have approved the office visit entirely. Patient seen with me and YESENIA student Bisi Pena  together today. I agree with assessment and plans.   Signed Electronically by: Kapil Duckworth MD

## 2024-02-15 NOTE — PROGRESS NOTES
Diabetes mellitus is wildly out of control    Generally in the 300's  For about 2 weeks  Before that it was in the 200's  Lab Results   Component Value Date    A1C 6.4 2023    A1C 7.8 2023    A1C 8.2 01/10/2023    A1C 7.1 2022    A1C 9.7 2022    A1C 6.8 2021    A1C 6.8 2020    A1C 6.7 2020    A1C 6.0 2020    A1C 6.4 2019     Hemoglobin   Date Value Ref Range Status   2024 10.7 (L) 13.3 - 17.7 g/dL Final   2021 6.9 (LL) 13.3 - 17.7 g/dL Final     Comment:     Results confirmed by repeat test  Critical Value called to and read back by  CHINA MUNOZ RN ON 2021 AT 1755 MX     ]  Patient has stated he has been taking the Trulicity (dulaglutide) at 3 milligrams not the 1.5     Suze Ward is a Podiatrist working with the [facility] in [place] , we need these notes, patient has further follow up with her coming up in     She blocked a below the knee amputation and instead chose to remove just a toe, this was approximately 2 years ago    2 months of thickish sputum      Seeing Dr. Funez for his radiation therapy and patient tells me he has only with scar tissue and no cancer     Brother  of esophageal cancer   Patient worked with brother in the same place and he'd had al,to of hoarse voice which patient also sees in himself

## 2024-02-15 NOTE — LETTER
February 16, 2024    Zurdo Dash  [address]    [place] [address]          Dear ,    We are writing to inform you of your test results.  All of these tests are within acceptable limits , except for the out of control diabetes mellitus which we already reviewed at your appointment , and for this we started rapid acting insulin to take with meals at a  further follow up with the certified diabetes educator is in the works. Please write back if you'd like to discuss this more or call or Flipboardhart message me       Resulted Orders   Hemoglobin   Result Value Ref Range    Hemoglobin 10.8 (L) 13.3 - 17.7 g/dL   Ferritin   Result Value Ref Range    Ferritin 87 31 - 409 ng/mL   Iron and iron binding capacity   Result Value Ref Range    Iron 202 (H) 61 - 157 ug/dL    Iron Binding Capacity 241 240 - 430 ug/dL    Iron Sat Index 84 (H) 15 - 46 %   Basic metabolic panel  (Ca, Cl, CO2, Creat, Gluc, K, Na, BUN)   Result Value Ref Range    Sodium 134 (L) 135 - 145 mmol/L      Comment:      Reference intervals for this test were updated on 09/26/2023 to more accurately reflect our healthy population. There may be differences in the flagging of prior results with similar values performed with this method. Interpretation of those prior results can be made in the context of the updated reference intervals.     Potassium 5.3 3.4 - 5.3 mmol/L    Chloride 102 98 - 107 mmol/L    Carbon Dioxide (CO2) 21 (L) 22 - 29 mmol/L    Anion Gap 11 7 - 15 mmol/L    Urea Nitrogen 33.7 (H) 8.0 - 23.0 mg/dL    Creatinine 2.02 (H) 0.67 - 1.17 mg/dL    GFR Estimate 33 (L) >60 mL/min/1.73m2    Calcium 9.0 8.8 - 10.2 mg/dL    Glucose 418 (H) 70 - 99 mg/dL   HEMOGLOBIN A1C   Result Value Ref Range    Hemoglobin A1C 8.9 (H) 0.0 - 5.6 %       If you have any questions or concerns, please call the clinic at the number listed above.       Sincerely,      Kapil Duckworth MD

## 2024-02-16 LAB
ANION GAP SERPL CALCULATED.3IONS-SCNC: 11 MMOL/L (ref 7–15)
BUN SERPL-MCNC: 33.7 MG/DL (ref 8–23)
CALCIUM SERPL-MCNC: 9 MG/DL (ref 8.8–10.2)
CHLORIDE SERPL-SCNC: 102 MMOL/L (ref 98–107)
CREAT SERPL-MCNC: 2.02 MG/DL (ref 0.67–1.17)
DEPRECATED HCO3 PLAS-SCNC: 21 MMOL/L (ref 22–29)
EGFRCR SERPLBLD CKD-EPI 2021: 33 ML/MIN/1.73M2
FERRITIN SERPL-MCNC: 87 NG/ML (ref 31–409)
GLUCOSE SERPL-MCNC: 418 MG/DL (ref 70–99)
IRON BINDING CAPACITY (ROCHE): 241 UG/DL (ref 240–430)
IRON SATN MFR SERPL: 84 % (ref 15–46)
IRON SERPL-MCNC: 202 UG/DL (ref 61–157)
POTASSIUM SERPL-SCNC: 5.3 MMOL/L (ref 3.4–5.3)
SODIUM SERPL-SCNC: 134 MMOL/L (ref 135–145)

## 2024-02-20 ENCOUNTER — TELEPHONE (OUTPATIENT)
Dept: FAMILY MEDICINE | Facility: CLINIC | Age: 79
End: 2024-02-20
Payer: COMMERCIAL

## 2024-02-20 NOTE — TELEPHONE ENCOUNTER
Spoke with Pt. States he is not scheduled for the procedure yet. Will be having 2 procedures. Will be getting a device placed on outside of body (TENs unit) for a 2 week trial and if this works, will have a surgical procedure implant for Neuropathy in feet.    Per pt, [facility] is needing to know if he should stop taking the Eliquis prior. Phone is [phone] option 1. Fax is [phone]. States they have reached out to our office with this question, but no recent notes found in regards to this.    Routing to PCP to advise.    Mia Artis RN  [facility]

## 2024-02-20 NOTE — TELEPHONE ENCOUNTER
Reason for call:  Other   Patient called regarding (reason for call): call back  Additional comments: patient is having a procedure with  [facility]   They are needing to know if patient should stop his eloquis before this    Phone number to reach patient:  [phone]     Best Time:  any    Can we leave a detailed message on this number?  YES    Travel screening: Not Applicable

## 2024-02-21 NOTE — TELEPHONE ENCOUNTER
Spoke with patient. Relayed provider's message as written. Patient verbalized understanding and has no further questions at this time.    Called [facility] and left detailed voice message on identified voicemail box regarding hold orders for Eliquis, orders will be faxed to provided faxed number, advised to return call at [phone] for any further questions.     Routing to team to please fax provider's orders per previous message to Fax#: [phone].    MANJU GordonN RN  [facility]

## 2024-02-21 NOTE — TELEPHONE ENCOUNTER
I feel the risk versus benefit analysis favors him to be allowed to hold Apixaban (Eliquis) just as requested, I give my permission    The time period to hold the Apixaban (Eliquis) needs to only be 3 days without the medication , 3 days prior to the planned procedure , then resume as soon as possible afterwards

## 2024-02-22 ENCOUNTER — TRANSFERRED RECORDS (OUTPATIENT)
Dept: HEALTH INFORMATION MANAGEMENT | Facility: CLINIC | Age: 79
End: 2024-02-22
Payer: COMMERCIAL

## 2024-02-22 ENCOUNTER — MEDICAL CORRESPONDENCE (OUTPATIENT)
Dept: INTERNAL MEDICINE | Facility: CLINIC | Age: 79
End: 2024-02-22
Payer: COMMERCIAL

## 2024-02-23 NOTE — TELEPHONE ENCOUNTER
"Order for \"request to hold Eliquis\" signed by Dr. Duckworth and faxed back to [facility] (f: [phone]). Caren Telles,     "

## 2024-02-26 ENCOUNTER — TELEPHONE (OUTPATIENT)
Dept: FAMILY MEDICINE | Facility: CLINIC | Age: 79
End: 2024-02-26
Payer: COMMERCIAL

## 2024-02-26 NOTE — TELEPHONE ENCOUNTER
[facility] ([facility]) mo ulloa that patient was inpatient from 2/22/24 and will be discharged today 02/26/24. States that patient time frame to be seen for a hospital follow-up is 1-5 days from discharge. PCP has no openings within time frame, offered open appointment within timeframe with available provider, patient has hospital follow-up appointment scheduled for 2/28/24 with Dr. Eduardo Garcia. No further questions.    MANJU MitchellN RN  [facility]

## 2024-02-29 ENCOUNTER — MEDICAL CORRESPONDENCE (OUTPATIENT)
Dept: HEALTH INFORMATION MANAGEMENT | Facility: CLINIC | Age: 79
End: 2024-02-29
Payer: COMMERCIAL

## 2024-03-02 ENCOUNTER — MEDICAL CORRESPONDENCE (OUTPATIENT)
Dept: HEALTH INFORMATION MANAGEMENT | Facility: CLINIC | Age: 79
End: 2024-03-02
Payer: COMMERCIAL

## 2024-03-07 ENCOUNTER — OFFICE VISIT (OUTPATIENT)
Dept: INTERNAL MEDICINE | Facility: CLINIC | Age: 79
End: 2024-03-07
Payer: COMMERCIAL

## 2024-03-07 ENCOUNTER — MEDICAL CORRESPONDENCE (OUTPATIENT)
Dept: HEALTH INFORMATION MANAGEMENT | Facility: CLINIC | Age: 79
End: 2024-03-07

## 2024-03-07 VITALS
DIASTOLIC BLOOD PRESSURE: 64 MMHG | OXYGEN SATURATION: 96 % | RESPIRATION RATE: 14 BRPM | SYSTOLIC BLOOD PRESSURE: 109 MMHG | TEMPERATURE: 97.7 F | HEART RATE: 71 BPM

## 2024-03-07 DIAGNOSIS — M86.9 OSTEOMYELITIS OF TOE OF LEFT FOOT (H): ICD-10-CM

## 2024-03-07 DIAGNOSIS — E11.621 DIABETIC ULCER OF TOE OF LEFT FOOT ASSOCIATED WITH TYPE 2 DIABETES MELLITUS, WITH NECROSIS OF MUSCLE (H): ICD-10-CM

## 2024-03-07 DIAGNOSIS — I50.22 CHRONIC SYSTOLIC (CONGESTIVE) HEART FAILURE (H): ICD-10-CM

## 2024-03-07 DIAGNOSIS — L97.523 DIABETIC ULCER OF TOE OF LEFT FOOT ASSOCIATED WITH TYPE 2 DIABETES MELLITUS, WITH NECROSIS OF MUSCLE (H): ICD-10-CM

## 2024-03-07 DIAGNOSIS — Z29.11 NEED FOR VACCINATION AGAINST RESPIRATORY SYNCYTIAL VIRUS: ICD-10-CM

## 2024-03-07 DIAGNOSIS — E44.1 MILD PROTEIN-CALORIE MALNUTRITION (H): ICD-10-CM

## 2024-03-07 DIAGNOSIS — E11.42 TYPE 2 DIABETES MELLITUS WITH DIABETIC POLYNEUROPATHY, WITHOUT LONG-TERM CURRENT USE OF INSULIN (H): Primary | ICD-10-CM

## 2024-03-07 DIAGNOSIS — Z23 NEED FOR PROPHYLACTIC VACCINATION AGAINST HEPATITIS A: ICD-10-CM

## 2024-03-07 DIAGNOSIS — F33.0 MAJOR DEPRESSIVE DISORDER, RECURRENT, MILD (H): ICD-10-CM

## 2024-03-07 DIAGNOSIS — I47.29 NSVT (NONSUSTAINED VENTRICULAR TACHYCARDIA) (H): ICD-10-CM

## 2024-03-07 DIAGNOSIS — I74.2 EMBOLISM AND THROMBOSIS OF ARTERIES OF THE UPPER EXTREMITIES (H): ICD-10-CM

## 2024-03-07 DIAGNOSIS — N18.32 STAGE 3B CHRONIC KIDNEY DISEASE (H): ICD-10-CM

## 2024-03-07 PROBLEM — F11.20 UNCOMPLICATED OPIOID DEPENDENCE (H): Status: RESOLVED | Noted: 2023-11-06 | Resolved: 2024-03-07

## 2024-03-07 PROCEDURE — 99417 PROLNG OP E/M EACH 15 MIN: CPT | Performed by: INTERNAL MEDICINE

## 2024-03-07 PROCEDURE — 99215 OFFICE O/P EST HI 40 MIN: CPT | Performed by: INTERNAL MEDICINE

## 2024-03-07 RX ORDER — RESPIRATORY SYNCYTIAL VIRUS VACCINE 120MCG/0.5
0.5 KIT INTRAMUSCULAR ONCE
Qty: 1 EACH | Refills: 0 | Status: CANCELLED | OUTPATIENT
Start: 2024-03-07 | End: 2024-03-07

## 2024-03-07 RX ORDER — INSULIN LISPRO 100 [IU]/ML
14 INJECTION, SOLUTION INTRAVENOUS; SUBCUTANEOUS
Status: SHIPPED
Start: 2024-03-07 | End: 2024-04-10

## 2024-03-07 NOTE — PROGRESS NOTES
Assessment & Plan     Type 2 diabetes mellitus with diabetic polyneuropathy, without long-term current use of insulin (H)  Today is a further follow up regarding this patient and his of course diabetes mellitus. He had an excellent hemoglobin a1c  [ diabetes test ] in 9/2023 , under 7% but the recheck hemoglobin a1c  [ diabetes test ] around 2 weeks ago was 8.9% and we tried to determine just exactly what had happened . He continues with Trulicity (dulaglutide) at the 3 milligram dose not the 1.5 milligram dose and so this was an error with his current medication list , today we reviewed his insulin and we needed the increase both his Humalog  (insulin lispro) and his Lantus (Insulin Glargine) . 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 His complications from peripheral arterial disease are extreme and he stands a continued risk of eventually heading towards a below the knee amputation , he has been perilously close to this already more then once and has instead managed to hold onto his leg with ongoing evaluations with a DPM at the [facility] in [place] , these notes are in the media section . He's just gotten out the hospital and technically today is a post hospital discharge follow up office visit . We reviewed the discharge summary and all questions answered. Patient remains in a difficult situation and needs close ongoing follow up. He's now status post great toe amputation from his left foot with the prior 2nd toe amputation already done.  -  "Med Therapy Management Referral    Stage 3b chronic kidney disease (H)  Problem is stable and ongoing monitoring    - Med Therapy Management Referral    Need for vaccination against respiratory syncytial virus  Recommended, declines , but agrees with seeing Yuli Olivo RPH, the [facility] pharmacist   - Med Therapy Management Referral    Need for prophylactic vaccination against hepatitis A  As above   - Med Therapy Management Referral    Diabetic ulcer of toe of left foot associated with type 2 diabetes mellitus, with necrosis of muscle (H)  As detailed above, ongoing evaluation and management     Mild protein-calorie malnutrition (H24)  Problem is stable and ongoing monitoring      Chronic systolic (congestive) heart failure (H)  Problem is stable and ongoing monitoring  , see cardiology consultation office visit notes     Major depressive disorder, recurrent, mild (H24)  Problem is stable and ongoing monitoring      NSVT (nonsustained ventricular tachycardia) (H)  Problem is stable and ongoing monitoring      Osteomyelitis of toe of left foot (H)  As detailed above, this is essentially a duplicate diagnosis     Review of the result(s) of each unique test - hospital tests  Prescription drug management  65 minutes spent by me on the date of the encounter doing chart review, history and exam, documentation and further activities per the note    MED REC REQUIRED  Post Medication Reconciliation Status: discharge medications reconciled and changed, per note/orders  BMI  Estimated body mass index is 33.08 kg/m  as calculated from the following:    Height as of 12/7/23: 1.753 m (5' 9\").    Weight as of 2/15/24: 101.6 kg (224 lb).   Weight management plan: Discussed healthy diet and exercise guidelines      Orders Placed This Encounter   Procedures    Med Therapy Management Referral         Gilma Renner is a 78 year old, presenting for the following health issues:  Hospital F/U        2/15/2024     1:26 PM   Additional " Questions   Roomed by Savannah SILVA         Seeing [facility]  for an implant to help his nerve pain  He has end stage osteoarthritic joints with  knees and has a combination of osteoarthritis and inflammatory arthritis ][ rheumatoid arthritis ]  Can't take steroid injections anymore   Tried the gel shot [ synovisc injection ] also without benefit   Takes Gabapentin [Neurontin] for his dense neuropathic pain from his legs   He's had a lot of falls and often has had emergency room evaluations secondary to this    Lab Results   Component Value Date    A1C 8.9 02/15/2024    A1C 6.4 09/28/2023    A1C 7.8 06/27/2023    A1C 8.2 01/10/2023    A1C 7.1 07/28/2022    A1C 6.8 01/07/2021    A1C 6.8 12/17/2020    A1C 6.7 12/11/2020    A1C 6.0 01/03/2020    A1C 6.4 08/06/2019     Patient says he gets a free Trulicity (dulaglutide) prescription from [facility] and he also says this is actually the 3 milligram - I will ask Sascha Kaufman about this one.   Takes 40 milligrams at night and uses Humalog  (insulin lispro) 11 units 2 times a day [ sometimes 3 times per day ] certified diabetes educator is so necessary but not attended yet     Methotrexate questions , from an old rheumatologist [ Dr. Bailon ]  Since off methotrexate patient seems to be doing worse with his rheumatoid arthritis with fingers becoming crooked, this is not something I am able to answer and this question is deflected to his rheumatologist     Hospital Follow-up Visit:    Hospital/Nursing Home/IP Rehab Facility:  [facility]   Date of Admission: 02/22/2024   Date of Discharge: 02/29/2024  Reason(s) for Admission: Diabetic foot infection (HC) (Primary Dx);  Osteomyelitis of great toe of left foot (HC);  Skin ulcer of left great toe with necrosis of muscle (HC);  Osteomyelitis of second toe of left foot (HC);  Constipation, unspecified constipation type;  Antibiotic-associated diarrhea  Discharge  Disposition: Home Health     Was your hospitalization related to COVID-19? No   Problems taking medications regularly:  None  Medication changes since discharge: None  Problems adhering to non-medication therapy:  None    Summary of hospitalization:  CareEverywhere information obtained and reviewed  Diagnostic Tests/Treatments reviewed.  Follow up needed: multiple sub-specialists , see Epic electronic medical records   Other Healthcare Providers Involved in Patient s Care:          as above   Update since discharge: improved.       Saw Infectious Disease specialist in follow up after his hospitalization , on 3-6-2024, see Epic electronic medical records    Plan of care communicated with patient and family     Had a follow up hospital virtual office visit appointment also on 3-3-2024 with Hanna Cardozo, nurse practitioner     Patient has taken the prescribed antibiotics clindamycin (CLEOCIN) but had a,to of side effects and so was changed to a new antibiotic doxycycline [ Vibramycin ] not 2 times a day , he is tolerating this one better.                 Review of Systems  Constitutional, HEENT, cardiovascular, pulmonary, gi and gu systems are negative, except as otherwise noted.      Objective    /64   Pulse 71   Temp 97.7  F (36.5  C) (Temporal)   Resp 14   SpO2 96%   There is no height or weight on file to calculate BMI.  Physical Exam   GENERAL: alert and no distress  EYES: Eyes grossly normal to inspection, PERRL and conjunctivae and sclerae normal  RESP: lungs clear to auscultation - no rales, rhonchi or wheezes  CV: regular rate and rhythm, normal S1 S2, no S3 or S4, no murmur, click or rub, no peripheral edema  MS: wheelchair bound patient with left leg wrapped in a protective dressing . This was changed today at home by spouse and this is said to look good. Has upcoming follow up with Podiatrist   NEURO: Normal strength and tone, mentation intact and speech normal  PSYCH: mentation appears normal,  affect normal/bright    Orders Placed This Encounter   Procedures    Med Therapy Management Referral             Signed Electronically by: Kapil Duckworth MD

## 2024-03-07 NOTE — Clinical Note
Just wondering if you have any paper work with this patient who receives his Trulicity (dulaglutide) from [facility] ???? Thank you so very much ! !  SHEY KAUR MD

## 2024-03-07 NOTE — PATIENT INSTRUCTIONS
Blood glucose goals      Check blood glucose 2 times a day a] first thing in the morning - blood glucose should be at 130 or less b] check 2 hours after the biggest meal of the day , blood glucose should be below 180  If you submit good self-monitoring of blood glucose data we will recommend specific changes with your diabetes mellitus medication dosages to get you to your blood glucose goals       With the Lantus . For every 5 days that the glucose [ sugar test ] is above 130 in the morning, increase the dose by 5 units     With the Humalog (insulin lispro) For every 5 days that the glucose [ sugar test ] is above 180 after the biggest meal of the day , increase the dose by 3 units

## 2024-03-11 ENCOUNTER — MEDICAL CORRESPONDENCE (OUTPATIENT)
Dept: HEALTH INFORMATION MANAGEMENT | Facility: CLINIC | Age: 79
End: 2024-03-11
Payer: COMMERCIAL

## 2024-03-15 DIAGNOSIS — K21.9 GASTROESOPHAGEAL REFLUX DISEASE, UNSPECIFIED WHETHER ESOPHAGITIS PRESENT: ICD-10-CM

## 2024-03-15 DIAGNOSIS — L08.9 WOUND INFECTION: ICD-10-CM

## 2024-03-15 DIAGNOSIS — T14.8XXA WOUND INFECTION: ICD-10-CM

## 2024-03-15 DIAGNOSIS — R79.89 LOW VITAMIN B12 LEVEL: ICD-10-CM

## 2024-03-15 RX ORDER — ACETAMINOPHEN 500 MG/1
1000 TABLET ORAL DAILY
Qty: 180 TABLET | Refills: 1 | Status: SHIPPED | OUTPATIENT
Start: 2024-03-15

## 2024-03-15 RX ORDER — LANOLIN ALCOHOL/MO/W.PET/CERES
1000 CREAM (GRAM) TOPICAL WEEKLY
Qty: 30 TABLET | Refills: 0 | Status: SHIPPED | OUTPATIENT
Start: 2024-03-15

## 2024-03-19 ENCOUNTER — VIRTUAL VISIT (OUTPATIENT)
Dept: EDUCATION SERVICES | Facility: CLINIC | Age: 79
End: 2024-03-19
Attending: INTERNAL MEDICINE
Payer: COMMERCIAL

## 2024-03-19 DIAGNOSIS — E11.42 TYPE 2 DIABETES MELLITUS WITH DIABETIC POLYNEUROPATHY, WITHOUT LONG-TERM CURRENT USE OF INSULIN (H): ICD-10-CM

## 2024-03-19 PROCEDURE — G0108 DIAB MANAGE TRN  PER INDIV: HCPCS | Mod: 93 | Performed by: DIETITIAN, REGISTERED

## 2024-03-19 RX ORDER — DULAGLUTIDE 3 MG/.5ML
3 INJECTION, SOLUTION SUBCUTANEOUS
COMMUNITY

## 2024-03-19 NOTE — PROGRESS NOTES
Diabetes Self-Management Education & Support    Presents for: Individual review    Type of Service: Telephone Visit    Originating Location (Patient Location): [facility]  (parked car in 's parking lot)  Distant Location (Provider Location): [facility]  Mode of Communication:  Telephone    Telephone Visit Start Time:  2:03 PM  Telephone Visit End Time (telephone visit stop time): 2:49 PM    How would patient like to obtain AVS? None needed; will provide tomorrow, 3/20/24 at in person appointment    ASSESSMENT:  Patient hospitalized at [facility] from 2/22-2/29/24 for amputation of left great toe.  A1C at time of admission was 8.8% (2/22/24).  Completed hospital follow up appointment with Dr. Duckworth on 3/7/24 at which time his insulin doses were increased and patient was given additional instructions for insulin adjustment.  Reports he increased Lantus to 48 units/day (HS) last night (3/18/24).  He monitors his glucose using the Chico 2  and he is not able to upload from home.  He reports the glucose data (see below) to writer which shows patient IS meeting adjusted glucose goals for:    -time in  mg/dL target of above 50%   -time above 180 mg/dL of less than 50%   -time below 70 mg/dL of less than 1%  Used adjusted glucose goals with provider documented A1C goal of below 8%.  Reports he would like to further improve glucoses.  High glucose alarm while on phone with writer; glucose of 242 mg/dL about 20 minutes after consumption of a Felice.  Reports he missed taking Humalog U100 dose at lunch meal today.  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  Reports he treats a low glucose with a can of regular soda or juice AND glucose tablets.  Discussed he is likely over treating low glucoses.  Reviewed preferred treatment of hypoglycemia.  Would like to upload Chico 2  before additional changes are made to patient's insulin doses as concerned he  may need Humalog U100 with the Ensure he drinks each night.  Scheduled brief in person appointment with writer for tomorrow to upload Chico 2 .     Lastly, reports was without Chico 2 sensor for a few days due to shipment delay from [employer].  Recently received a 3 month supply of sensors (6 total) from [employer] and is on his second sensor.  Discussed consideration of transferring prescription to [facility] in the future.   Patient abruptly needed to end call to attend to dog groomer.    Patient's most recent   Lab Results   Component Value Date    A1C 8.9 02/15/2024    A1C 6.8 01/07/2021     is not meeting goal of <8.0    Diabetes knowledge and skills assessment:   Patient is knowledgeable in diabetes management concepts related to: Monitoring and Taking Medication    Continue education with the following diabetes management concepts: Monitoring, Taking Medication, Problem Solving, and Reducing Risks    Based on learning assessment above, most appropriate setting for further diabetes education would be: Individual setting.      PLAN  Keep something with you for treating a low blood sugar (70 mg/dL or below).  If your blood sugar is low, eat/drink ONE of the following:   -1/2 cup juice  -1/2 cup regular soda  -1 package fruit snacks  -4 glucose tablets  Then, wait 15 minutes and re-check blood sugar.  If it is not above 70 mg/dL, repeat the above.    Follow up with Elysia in person on 3/20/24 to upload Chico 2 reader.     Topics to cover at upcoming visits: Monitoring, Taking Medication, Problem Solving, and Reducing Risks    Follow-up: 3/20/24 in person with Elysia    See Care Plan for co-developed, patient-state behavior change goals.  AVS provided for patient today.    Education Materials Provided:  No new materials provided today      SUBJECTIVE/OBJECTIVE:  Presents for: Individual review  Accompanied by: Self, Spouse  Diabetes education in the past 24mo: No  Diabetes type: Type 2  How confident are you  "filling out medical forms by yourself:: Not Assessed  Transportation concerns: No  Difficulty affording diabetes medication?: Yes (receives Trulicity through  assistance program)  Cultural Influences/Ethnic Background:  Not  or       Diabetes Symptoms & Complications:  Complications assessed today?: No    Patient Problem List and Family Medical History reviewed for relevant medical history, current medical status, and diabetes risk factors.    Vitals:  There were no vitals taken for this visit.  Estimated body mass index is 33.08 kg/m  as calculated from the following:    Height as of 12/7/23: 1.753 m (5' 9\").    Weight as of 2/15/24: 101.6 kg (224 lb).   Last 3 BP:   BP Readings from Last 3 Encounters:   03/07/24 109/64   02/15/24 106/70   02/01/24 108/68       History   Smoking Status    Former    Packs/day: 1.00    Years: 40.00    Types: Cigarettes    Quit date: 3/16/2007   Smokeless Tobacco    Never       Labs:  Lab Results   Component Value Date    A1C 8.9 02/15/2024    A1C 6.8 01/07/2021     Lab Results   Component Value Date     02/15/2024     07/28/2022     01/09/2021     Lab Results   Component Value Date    LDL 78 09/28/2023    LDL 45 01/07/2021     HDL Cholesterol   Date Value Ref Range Status   01/07/2021 38 (L) >39 mg/dL Final     Direct Measure HDL   Date Value Ref Range Status   09/28/2023 33 (L) >=40 mg/dL Final   ]  GFR Estimate   Date Value Ref Range Status   02/15/2024 33 (L) >60 mL/min/1.73m2 Final   07/05/2021 20 (L) >60 mL/min/[1.73_m2] Final     Comment:     Non  GFR Calc  Starting 12/18/2018, serum creatinine based estimated GFR (eGFR) will be   calculated using the Chronic Kidney Disease Epidemiology Collaboration   (CKD-EPI) equation.       GFR Estimate If Black   Date Value Ref Range Status   07/05/2021 24 (L) >60 mL/min/[1.73_m2] Final     Comment:      GFR Calc  Starting 12/18/2018, serum creatinine based " "estimated GFR (eGFR) will be   calculated using the Chronic Kidney Disease Epidemiology Collaboration   (CKD-EPI) equation.       Lab Results   Component Value Date    CR 2.02 02/15/2024    CR 2.84 07/05/2021     No results found for: \"MICROALBUMIN\"    Healthy Eating:  Healthy Eating Assessed Today: Yes  Meal planning/habits: Avoiding sweets  Who cooks/prepares meals for you?: Other (assisted living facility)  Breakfast: usually skips  Lunch: today = pork chop, onion, gravy, dressing, zucchini, [place] stew, coffee with Splenda, diet Coke  Dinner: last night = grilled ham & swiss on rye, soup, coffee with Splenda, diet Coke  Snacks: PM today = Felice from bakery; HS: Ensure Original (Vanilla)  Other: 2 meals/day at assisted living facility; up for day 9:30-10 AM; to bed around 11:30 PM - 12 AM  Beverages: Coffee, Diet soda  Has patient met with a dietitian in the past?: Yes    Being Active:  Being Active Assessed Today: Yes  Exercise:: Unable to exercise  Barrier to exercise: Physical limitation (non-weight bearing s/p left toe amputation)    Monitoring:  Monitoring Assessed Today: Yes  Did patient bring glucose meter to appointment? : Yes  Blood Glucose Meter: CGM  Times checking blood sugar at home (number): 4  Times checking blood sugar at home (per): Day  Blood glucose trend: Decreasing    Past 14 days (reported by patient) per his Chico 2 :    Average glucose: 169 mg/dL    12 - 6 AM: 169 mg/dL    6 AM - 12 PM: 162 mg/dL    12-6 PM: 174 mg/dL    6 PM - 12 AM: 169 mg/dL   Time In Target ( mg/dL):     Above: 32%    In: 68%    Below: 0%   Low Glucose: 0   Sensor Usage:     Scans per day: 4    Sensor Data captured: 58%    Taking Medications:  Diabetes Medication(s)       Insulin       insulin glargine (LANTUS PEN) 100 UNIT/ML pen Inject 45 Units Subcutaneous every morning Reports taking 48 units/day     insulin lispro (HUMALOG KWIKPEN) 100 UNIT/ML (1 unit dial) KWIKPEN Inject 14 Units Subcutaneous 2 " times daily (before meals)       Incretin Mimetic Agents       Dulaglutide (TRULICITY) 3 MG/0.5ML SOPN Inject 3 mg Subcutaneous every 7 days (Wednesdays)     Taking Medication Assessed Today: Yes  Current Treatments: Insulin Injections, Non-insulin Injectables  Dose schedule: Pre-lunch, Pre-dinner, At bedtime  Given by: Patient  Injection/Infusion sites: Abdomen  Problems taking diabetes medications regularly?: Yes (missed Humalog U100 at lunch today (3/19))  Diabetes medication side effects?: No    Problem Solving:  Problem Solving Assessed Today: Yes  Is the patient at risk for hypoglycemia?: Yes  Hypoglycemia Frequency: Rarely  Hypoglycemia Treatment: Glucose (tablets or gel), Other food, Juice (regular soda)    Hypoglycemia Symptoms  Hypoglycemia: None    Hypoglycemia Complications  Hypoglycemia Complications: None    Reducing Risks:  Reducing Risks Assessed Today: No  Diabetes Risks: Age over 45 years, Sedentary Lifestyle, Hyperlipidemia  CAD Risks: Male sex, Sedentary lifestyle, Hypertension, Diabetes Mellitus, Dyslipidemia, Obesity    Healthy Coping:  Healthy Coping Assessed Today: Yes  Emotional response to diabetes: Confidence diabetes can be controlled  Informal Support system:: Spouse  Stage of change: ACTION (Actively working towards change)  Patient Activation Measure Survey Score:      7/23/2013     9:00 AM   HATTIE Score (Last Two)   HATTIE Raw Score 35   Activation Score 45.2   HATTIE Level 1         Care Plan and Education Provided:  Care Plan: Diabetes   Updates made by Rosemary Middleton RD since 3/19/2024 12:00 AM        Problem: HbA1C Not In Goal         Goal: Establish Regular Follow-Ups with PCP         Task: Discuss with PCP the recommended timing for patient's next follow up visit(s)    Responsible User: Rosemary Middleton RD        Task: Discuss schedule for PCP visits with patient    Responsible User: Rosemary Middleton RD        Goal: Get HbA1C Level in Goal         Task: Educate patient on  diabetes education self-management topics    Responsible User: Rosemary Middleton RD        Task: Educate patient on benefits of regular glucose monitoring    Responsible User: Rosemary Middleton RD        Task: Refer patient to appropriate extended care team member, as needed (Medication Therapy Management, Behavioral Health, Physical Therapy, etc.)    Responsible User: Rosemary Middleton RD        Task: Discuss diabetes treatment plan with patient    Responsible User: Rosemary Middleton RD        Problem: Diabetes Self-Management Education Needed to Optimize Self-Care Behaviors         Goal: Understand diabetes pathophysiology and disease progression         Task: Provide education on diabetes pathophysiology and disease progression specfic to patient's diabetes type    Responsible User: Rosemary Middleton RD        Goal: Healthy Eating - follow a healthy eating pattern for diabetes         Task: Provide education on portion control and consistency in amount, composition and timing of food intake    Responsible User: Rosemary Middleton RD        Task: Provide education on managing carbohydrate intake (carbohydrate counting, plate planning method, etc.)    Responsible User: Rosemary Middleton RD        Task: Provide education on weight management    Responsible User: Rosemary Middleton RD        Task: Provide education on heart healthy eating    Responsible User: Rosemary Middleton RD        Task: Provide education on eating out    Responsible User: Rosemary Middleton RD        Task: Develop individualized healthy eating plan with patient    Responsible User: Rosemary Middleton RD        Goal: Being Active - get regular physical activity, working up to at least 150 minutes per week         Task: Provide education on relationship of activity to glucose and precautions to take if at risk for low glucose    Responsible User: Rosemary Mdidleton RD        Task: Discuss barriers to physical  activity with patient    Responsible User: Rosemary Middleton RD        Task: Develop physical activity plan with patient    Responsible User: Rosemary Middleton RD        Task: Explore community resources including walking groups, assistance programs, and home videos    Responsible User: Rosemary Middleton RD        Goal: Monitoring - monitor glucose and ketones as directed    This Visit's Progress: 60%   Note:    Use Freestyle [place] 2 to capture 100% glucose data.        Task: Provide education on blood glucose monitoring (purpose, proper technique, frequency, glucose targets, interpreting results, when to use glucose control solution, sharps disposal)    Responsible User: Rosemary Middleton RD        Task: Provide education on continuous glucose monitoring (sensor placement, use of hardik or /reader, understanding glucose trends, alerts and alarms, differences between sensor glucose and blood glucose)    Responsible User: Rosemary Middleton RD        Task: Provide education on ketone monitoring (when to monitor, frequency, etc.)    Responsible User: Rosemary Middleton RD        Goal: Taking Medication - patient is consistently taking medications as directed         Task: Provide education on action of prescribed medication, including when to take and possible side effects    Responsible User: Rosemary Middleton RD        Task: Provide education on insulin and injectable diabetes medications, including administration, storage, site selection and rotation for injection sites    Responsible User: Rosemary Middleton RD        Task: Discuss barriers to medication adherence with patient and provide management technique ideas as appropriate    Responsible User: Rosemary Middleton RD        Task: Provide education on frequency and refill details of medications    Responsible User: Rosemary Middleton RD        Goal: Problem Solving - know how to prevent and manage short-term diabetes complications          Task: Provide education on high blood glucose - causes, signs/symptoms, prevention and treatment    Responsible User: Rosemary Middleton RD        Task: Provide education on low blood glucose - causes, signs/symptoms, prevention, treatment, carrying a carbohydrate source at all times, and medical identification Completed 3/19/2024   Responsible User: Rosemary Middleton RD        Task: Provide education on safe travel with diabetes    Responsible User: Rosemary Middleton RD        Task: Provide education on how to care for diabetes on sick days    Responsible User: Rosemary Middleton RD        Task: Provide education on when to call a health care provider    Responsible User: Rosemary Middleton RD        Goal: Reducing Risks - know how to prevent and treat long-term diabetes complications         Task: Provide education on major complications of diabetes, prevention, early diagnostic measures and treatment of complications    Responsible User: Rosemary Middleton RD        Task: Provide education on recommended care for dental, eye and foot health    Responsible User: Rosemary Middleton RD        Task: Provide education on Hemoglobin A1c - goals and relationship to blood glucose levels    Responsible User: Rosemary Middleton RD        Task: Provide education on recommendations for heart health - lipid levels and goals, blood pressure and goals, and aspirin therapy, if indicated    Responsible User: Rosemary Middleton RD        Task: Provide education on tobacco cessation    Responsible User: Rosemary Middleton RD        Goal: Healthy Coping - use available resources to cope with the challenges of managing diabetes         Task: Discuss recognizing feelings about having diabetes    Responsible User: Rosemary Middleton RD        Task: Provide education on the benefits of making appropriate lifestyle changes    Responsible User: Rosemary Middleton RD        Task: Provide education on  benefits of utilizing support systems    Responsible User: Rosemary Middleton RD        Task: Discuss methods for coping with stress    Responsible User: Rosemary Middleton RD        Task: Provide education on when to seek professional counseling    Responsible User: Rosemary Middleton RD Beth Reisdorf, MPH, RD, CDCES, LD 3/19/2024      Time Spent: 46 minutes  Encounter Type: Individual    Any diabetes medication dose changes were made via the CDE Protocol per the patient's referring provider. A copy of this encounter was shared with the provider.

## 2024-03-19 NOTE — LETTER
3/19/2024         RE: Zurdo Dash  [address]  [facility] [address]        Dear Colleague,    Thank you for referring your patient, Zurdo Dash, to the [facility]. Please see a copy of my visit note below.    Diabetes Self-Management Education & Support    Presents for: Individual review    Type of Service: Telephone Visit    Originating Location (Patient Location): [facility]  (parked car in 's parking lot)  Distant Location (Provider Location): [facility]  Mode of Communication:  Telephone    Telephone Visit Start Time:  2:03 PM  Telephone Visit End Time (telephone visit stop time): 2:49 PM    How would patient like to obtain AVS? None needed; will provide tomorrow, 3/20/24 at in person appointment    ASSESSMENT:  Patient hospitalized at [facility] from 2/22-2/29/24 for amputation of left great toe.  A1C at time of admission was 8.8% (2/22/24).  Completed hospital follow up appointment with Dr. Duckworth on 3/7/24 at which time his insulin doses were increased and patient was given additional instructions for insulin adjustment.  Reports he increased Lantus to 48 units/day (HS) last night (3/18/24).  He monitors his glucose using the Chico 2  and he is not able to upload from home.  He reports the glucose data (see below) to writer which shows patient IS meeting adjusted glucose goals for:    -time in  mg/dL target of above 50%   -time above 180 mg/dL of less than 50%   -time below 70 mg/dL of less than 1%  Used adjusted glucose goals with provider documented A1C goal of below 8%.  Reports he would like to further improve glucoses.  High glucose alarm while on phone with writer; glucose of 242 mg/dL about 20 minutes after consumption of a Felice.  Reports he missed taking Humalog U100 dose at lunch meal today.  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  Reports he treats a low glucose with a can of  regular soda or juice AND glucose tablets.  Discussed he is likely over treating low glucoses.  Reviewed preferred treatment of hypoglycemia.  Would like to upload Chico 2  before additional changes are made to patient's insulin doses as concerned he may need Humalog U100 with the Ensure he drinks each night.  Scheduled brief in person appointment with writer for tomorrow to upload Chico 2 .     Lastly, reports was without Chico 2 sensor for a few days due to shipment delay from [employer].  Recently received a 3 month supply of sensors (6 total) from [employer] and is on his second sensor.  Discussed consideration of transferring prescription to [facility] in the future.   Patient abruptly needed to end call to attend to dog groomer.    Patient's most recent   Lab Results   Component Value Date    A1C 8.9 02/15/2024    A1C 6.8 01/07/2021     is not meeting goal of <8.0    Diabetes knowledge and skills assessment:   Patient is knowledgeable in diabetes management concepts related to: Monitoring and Taking Medication    Continue education with the following diabetes management concepts: Monitoring, Taking Medication, Problem Solving, and Reducing Risks    Based on learning assessment above, most appropriate setting for further diabetes education would be: Individual setting.      PLAN  Keep something with you for treating a low blood sugar (70 mg/dL or below).  If your blood sugar is low, eat/drink ONE of the following:   -1/2 cup juice  -1/2 cup regular soda  -1 package fruit snacks  -4 glucose tablets  Then, wait 15 minutes and re-check blood sugar.  If it is not above 70 mg/dL, repeat the above.    Follow up with Elysia in person on 3/20/24 to upload Chico 2 reader.     Topics to cover at upcoming visits: Monitoring, Taking Medication, Problem Solving, and Reducing Risks    Follow-up: 3/20/24 in person with Elysia    See Care Plan for co-developed, patient-state behavior change goals.  AVS provided for  "patient today.    Education Materials Provided:  No new materials provided today      SUBJECTIVE/OBJECTIVE:  Presents for: Individual review  Accompanied by: Self, Spouse  Diabetes education in the past 24mo: No  Diabetes type: Type 2  How confident are you filling out medical forms by yourself:: Not Assessed  Transportation concerns: No  Difficulty affording diabetes medication?: Yes (receives Trulicity through  assistance program)  Cultural Influences/Ethnic Background:  Not  or       Diabetes Symptoms & Complications:  Complications assessed today?: No    Patient Problem List and Family Medical History reviewed for relevant medical history, current medical status, and diabetes risk factors.    Vitals:  There were no vitals taken for this visit.  Estimated body mass index is 33.08 kg/m  as calculated from the following:    Height as of 12/7/23: 1.753 m (5' 9\").    Weight as of 2/15/24: 101.6 kg (224 lb).   Last 3 BP:   BP Readings from Last 3 Encounters:   03/07/24 109/64   02/15/24 106/70   02/01/24 108/68       History   Smoking Status     Former     Packs/day: 1.00     Years: 40.00     Types: Cigarettes     Quit date: 3/16/2007   Smokeless Tobacco     Never       Labs:  Lab Results   Component Value Date    A1C 8.9 02/15/2024    A1C 6.8 01/07/2021     Lab Results   Component Value Date     02/15/2024     07/28/2022     01/09/2021     Lab Results   Component Value Date    LDL 78 09/28/2023    LDL 45 01/07/2021     HDL Cholesterol   Date Value Ref Range Status   01/07/2021 38 (L) >39 mg/dL Final     Direct Measure HDL   Date Value Ref Range Status   09/28/2023 33 (L) >=40 mg/dL Final   ]  GFR Estimate   Date Value Ref Range Status   02/15/2024 33 (L) >60 mL/min/1.73m2 Final   07/05/2021 20 (L) >60 mL/min/[1.73_m2] Final     Comment:     Non  GFR Calc  Starting 12/18/2018, serum creatinine based estimated GFR (eGFR) will be   calculated using the " "Chronic Kidney Disease Epidemiology Collaboration   (CKD-EPI) equation.       GFR Estimate If Black   Date Value Ref Range Status   07/05/2021 24 (L) >60 mL/min/[1.73_m2] Final     Comment:      GFR Calc  Starting 12/18/2018, serum creatinine based estimated GFR (eGFR) will be   calculated using the Chronic Kidney Disease Epidemiology Collaboration   (CKD-EPI) equation.       Lab Results   Component Value Date    CR 2.02 02/15/2024    CR 2.84 07/05/2021     No results found for: \"MICROALBUMIN\"    Healthy Eating:  Healthy Eating Assessed Today: Yes  Meal planning/habits: Avoiding sweets  Who cooks/prepares meals for you?: Other (assisted living facility)  Breakfast: usually skips  Lunch: today = pork chop, onion, gravy, dressing, zucchini, Guyanese stew, coffee with Splenda, diet Coke  Dinner: last night = grilled ham & swiss on rye, soup, coffee with Splenda, diet Coke  Snacks: PM today = Felice from bakery; HS: Ensure Original (Vanilla)  Other: 2 meals/day at assisted living facility; up for day 9:30-10 AM; to bed around 11:30 PM - 12 AM  Beverages: Coffee, Diet soda  Has patient met with a dietitian in the past?: Yes    Being Active:  Being Active Assessed Today: Yes  Exercise:: Unable to exercise  Barrier to exercise: Physical limitation (non-weight bearing s/p left toe amputation)    Monitoring:  Monitoring Assessed Today: Yes  Did patient bring glucose meter to appointment? : Yes  Blood Glucose Meter: CGM  Times checking blood sugar at home (number): 4  Times checking blood sugar at home (per): Day  Blood glucose trend: Decreasing    Past 14 days (reported by patient) per his Chico 2 :    Average glucose: 169 mg/dL    12 - 6 AM: 169 mg/dL    6 AM - 12 PM: 162 mg/dL    12-6 PM: 174 mg/dL    6 PM - 12 AM: 169 mg/dL   Time In Target ( mg/dL):     Above: 32%    In: 68%    Below: 0%   Low Glucose: 0   Sensor Usage:     Scans per day: 4    Sensor Data captured: 58%    Taking " Medications:  Diabetes Medication(s)       Insulin       insulin glargine (LANTUS PEN) 100 UNIT/ML pen Inject 45 Units Subcutaneous every morning Reports taking 48 units/day     insulin lispro (HUMALOG KWIKPEN) 100 UNIT/ML (1 unit dial) KWIKPEN Inject 14 Units Subcutaneous 2 times daily (before meals)       Incretin Mimetic Agents       Dulaglutide (TRULICITY) 3 MG/0.5ML SOPN Inject 3 mg Subcutaneous every 7 days (Wednesdays)     Taking Medication Assessed Today: Yes  Current Treatments: Insulin Injections, Non-insulin Injectables  Dose schedule: Pre-lunch, Pre-dinner, At bedtime  Given by: Patient  Injection/Infusion sites: Abdomen  Problems taking diabetes medications regularly?: Yes (missed Humalog U100 at lunch today (3/19))  Diabetes medication side effects?: No    Problem Solving:  Problem Solving Assessed Today: Yes  Is the patient at risk for hypoglycemia?: Yes  Hypoglycemia Frequency: Rarely  Hypoglycemia Treatment: Glucose (tablets or gel), Other food, Juice (regular soda)    Hypoglycemia Symptoms  Hypoglycemia: None    Hypoglycemia Complications  Hypoglycemia Complications: None    Reducing Risks:  Reducing Risks Assessed Today: No  Diabetes Risks: Age over 45 years, Sedentary Lifestyle, Hyperlipidemia  CAD Risks: Male sex, Sedentary lifestyle, Hypertension, Diabetes Mellitus, Dyslipidemia, Obesity    Healthy Coping:  Healthy Coping Assessed Today: Yes  Emotional response to diabetes: Confidence diabetes can be controlled  Informal Support system:: Spouse  Stage of change: ACTION (Actively working towards change)  Patient Activation Measure Survey Score:      7/23/2013     9:00 AM   HATTIE Score (Last Two)   HATTIE Raw Score 35   Activation Score 45.2   HATTIE Level 1         Care Plan and Education Provided:  Care Plan: Diabetes   Updates made by Rosemary Middleton RD since 3/19/2024 12:00 AM        Problem: HbA1C Not In Goal         Goal: Establish Regular Follow-Ups with PCP         Task: Discuss with PCP the  recommended timing for patient's next follow up visit(s)    Responsible User: Rosemary Middleton RD        Task: Discuss schedule for PCP visits with patient    Responsible User: Rosemary Middleton RD        Goal: Get HbA1C Level in Goal         Task: Educate patient on diabetes education self-management topics    Responsible User: Rosemary Middleton RD        Task: Educate patient on benefits of regular glucose monitoring    Responsible User: Rosemary Middleton RD        Task: Refer patient to appropriate extended care team member, as needed (Medication Therapy Management, Behavioral Health, Physical Therapy, etc.)    Responsible User: Rosemary Middleton RD        Task: Discuss diabetes treatment plan with patient    Responsible User: Rosemary Middleton RD        Problem: Diabetes Self-Management Education Needed to Optimize Self-Care Behaviors         Goal: Understand diabetes pathophysiology and disease progression         Task: Provide education on diabetes pathophysiology and disease progression specfic to patient's diabetes type    Responsible User: Rosemary Middleton RD        Goal: Healthy Eating - follow a healthy eating pattern for diabetes         Task: Provide education on portion control and consistency in amount, composition and timing of food intake    Responsible User: Rosemary Middleton RD        Task: Provide education on managing carbohydrate intake (carbohydrate counting, plate planning method, etc.)    Responsible User: Rosemary Middleton RD        Task: Provide education on weight management    Responsible User: Rosemary Middleton RD        Task: Provide education on heart healthy eating    Responsible User: Rosemary Middleton RD        Task: Provide education on eating out    Responsible User: Rosemary Middleton RD        Task: Develop individualized healthy eating plan with patient    Responsible User: Rosemary Middleton RD        Goal: Being Active - get regular  physical activity, working up to at least 150 minutes per week         Task: Provide education on relationship of activity to glucose and precautions to take if at risk for low glucose    Responsible User: Rosemary Middleton RD        Task: Discuss barriers to physical activity with patient    Responsible User: Rosemary Middleton RD        Task: Develop physical activity plan with patient    Responsible User: Rosemary Middleton RD        Task: Explore community resources including walking groups, assistance programs, and home videos    Responsible User: Rosemary Middleton RD        Goal: Monitoring - monitor glucose and ketones as directed    This Visit's Progress: 60%   Note:    Use Freestyle Chico 2 to capture 100% glucose data.        Task: Provide education on blood glucose monitoring (purpose, proper technique, frequency, glucose targets, interpreting results, when to use glucose control solution, sharps disposal)    Responsible User: Rosemary Middleton RD        Task: Provide education on continuous glucose monitoring (sensor placement, use of hardik or /reader, understanding glucose trends, alerts and alarms, differences between sensor glucose and blood glucose)    Responsible User: Rosemary Middleton RD        Task: Provide education on ketone monitoring (when to monitor, frequency, etc.)    Responsible User: Rosemary Middleton RD        Goal: Taking Medication - patient is consistently taking medications as directed         Task: Provide education on action of prescribed medication, including when to take and possible side effects    Responsible User: Rosemary Middleton RD        Task: Provide education on insulin and injectable diabetes medications, including administration, storage, site selection and rotation for injection sites    Responsible User: Rosemary Middleton RD        Task: Discuss barriers to medication adherence with patient and provide management technique ideas as  appropriate    Responsible User: Rosemary Middleton RD        Task: Provide education on frequency and refill details of medications    Responsible User: Rosemary Middleton RD        Goal: Problem Solving - know how to prevent and manage short-term diabetes complications         Task: Provide education on high blood glucose - causes, signs/symptoms, prevention and treatment    Responsible User: Rosemary Middleton RD        Task: Provide education on low blood glucose - causes, signs/symptoms, prevention, treatment, carrying a carbohydrate source at all times, and medical identification Completed 3/19/2024   Responsible User: Rosemary Middleton RD        Task: Provide education on safe travel with diabetes    Responsible User: Rosemary Middleton RD        Task: Provide education on how to care for diabetes on sick days    Responsible User: Rosemary Middleton RD        Task: Provide education on when to call a health care provider    Responsible User: Rosemary Middleton RD        Goal: Reducing Risks - know how to prevent and treat long-term diabetes complications         Task: Provide education on major complications of diabetes, prevention, early diagnostic measures and treatment of complications    Responsible User: Rosemary Middleton RD        Task: Provide education on recommended care for dental, eye and foot health    Responsible User: Rosemary Middleton RD        Task: Provide education on Hemoglobin A1c - goals and relationship to blood glucose levels    Responsible User: Rosemary Middleton RD        Task: Provide education on recommendations for heart health - lipid levels and goals, blood pressure and goals, and aspirin therapy, if indicated    Responsible User: Rosemary Middleton RD        Task: Provide education on tobacco cessation    Responsible User: Rosemary Middleton RD        Goal: Healthy Coping - use available resources to cope with the challenges of managing diabetes          Task: Discuss recognizing feelings about having diabetes    Responsible User: Rosemary Middleton RD        Task: Provide education on the benefits of making appropriate lifestyle changes    Responsible User: Rosemary Middleton RD        Task: Provide education on benefits of utilizing support systems    Responsible User: Rosemary Middleton RD        Task: Discuss methods for coping with stress    Responsible User: Rosemary Middleton RD        Task: Provide education on when to seek professional counseling    Responsible User: Rosemary Middleton RD Beth Reisdorf, MPH, RD, CDCES, LD 3/19/2024      Time Spent: 46 minutes  Encounter Type: Individual    Any diabetes medication dose changes were made via the CDE Protocol per the patient's referring provider. A copy of this encounter was shared with the provider.

## 2024-03-22 NOTE — ADDENDUM NOTE
Addended by: ROCHELLE GASPAR on: 8/5/2021 08:42 AM     Modules accepted: Orders    
Addended by: SHEY KAUR on: 8/2/2021 05:37 PM     Modules accepted: Orders    
Current every day smoker

## 2024-03-26 DIAGNOSIS — N18.32 STAGE 3B CHRONIC KIDNEY DISEASE (H): ICD-10-CM

## 2024-03-26 DIAGNOSIS — I10 HYPERTENSION GOAL BP (BLOOD PRESSURE) < 140/90: ICD-10-CM

## 2024-03-26 DIAGNOSIS — F32.A DEPRESSION, UNSPECIFIED DEPRESSION TYPE: ICD-10-CM

## 2024-03-26 DIAGNOSIS — E78.5 HYPERLIPIDEMIA LDL GOAL <100: ICD-10-CM

## 2024-03-26 RX ORDER — METOPROLOL SUCCINATE 50 MG/1
50 TABLET, EXTENDED RELEASE ORAL DAILY
Qty: 90 TABLET | Refills: 0 | Status: SHIPPED | OUTPATIENT
Start: 2024-03-26 | End: 2024-08-05

## 2024-03-26 RX ORDER — PRAVASTATIN SODIUM 10 MG
10 TABLET ORAL DAILY
Qty: 90 TABLET | Refills: 0 | Status: SHIPPED | OUTPATIENT
Start: 2024-03-26 | End: 2024-06-17

## 2024-03-26 RX ORDER — BUMETANIDE 1 MG/1
1 TABLET ORAL DAILY
Qty: 90 TABLET | Refills: 0 | Status: SHIPPED | OUTPATIENT
Start: 2024-03-26 | End: 2024-07-10

## 2024-04-08 ENCOUNTER — VIRTUAL VISIT (OUTPATIENT)
Dept: PHARMACY | Facility: CLINIC | Age: 79
End: 2024-04-08
Attending: INTERNAL MEDICINE
Payer: COMMERCIAL

## 2024-04-08 DIAGNOSIS — K21.9 GASTROESOPHAGEAL REFLUX DISEASE: ICD-10-CM

## 2024-04-08 DIAGNOSIS — E11.9 TYPE 2 DIABETES MELLITUS WITHOUT RETINOPATHY (H): Primary | ICD-10-CM

## 2024-04-08 DIAGNOSIS — E78.5 HYPERLIPIDEMIA LDL GOAL <100: ICD-10-CM

## 2024-04-08 DIAGNOSIS — F33.0 MAJOR DEPRESSIVE DISORDER, RECURRENT, MILD (H): ICD-10-CM

## 2024-04-08 DIAGNOSIS — I73.9 PAD (PERIPHERAL ARTERY DISEASE) (H): ICD-10-CM

## 2024-04-08 DIAGNOSIS — N18.32 STAGE 3B CHRONIC KIDNEY DISEASE (H): ICD-10-CM

## 2024-04-08 DIAGNOSIS — I74.2 EMBOLISM AND THROMBOSIS OF ARTERIES OF THE UPPER EXTREMITIES (H): ICD-10-CM

## 2024-04-08 DIAGNOSIS — Z78.9 TAKES DIETARY SUPPLEMENTS: ICD-10-CM

## 2024-04-08 DIAGNOSIS — I10 HYPERTENSION GOAL BP (BLOOD PRESSURE) < 140/90: ICD-10-CM

## 2024-04-08 DIAGNOSIS — E11.42 DIABETIC POLYNEUROPATHY ASSOCIATED WITH TYPE 2 DIABETES MELLITUS (H): ICD-10-CM

## 2024-04-08 DIAGNOSIS — M05.79 RHEUMATOID ARTHRITIS INVOLVING MULTIPLE SITES WITH POSITIVE RHEUMATOID FACTOR (H): ICD-10-CM

## 2024-04-08 PROCEDURE — 99207 PR NO CHARGE LOS: CPT | Mod: 93 | Performed by: PHARMACIST

## 2024-04-08 RX ORDER — FLUTICASONE PROPIONATE 50 MCG
1-2 SPRAY, SUSPENSION (ML) NASAL DAILY PRN
COMMUNITY

## 2024-04-08 NOTE — PROGRESS NOTES
Medication Therapy Management (MTM) Encounter    ASSESSMENT:                            Medication Adherence/Access: No issues identified    Diabetes /Type 2: May benefit from updated A1C in another month, since starting bolus insulin. Blood sugar is at goal of time in target > 70% in range  mg/dL.     GERD  Stable    Hypertension PAD/CKD Stage 3/HFpEF: Stable. Blood pressure at goal < 140/90.    History of DVT: Dose of Eliquis is too high due to age 79 and creatinine > 1.5 mg/dL (1.79 on 2/26/24 through care everywhere) - in addition, lower dose of 2.5 mg twice daily is appropriate per indefinite anticoagulation to reduce DVT risk. Full dose 5 mg twice daily is not indicated - discussed with akua Carbajal to reduce dose. Can contact prescription assistance program to see if qualifies for assistance.    Hyperlipidemia Stable.    Rheumatoid arthritis/Pain/Osteoarthritis: Stable.    Depression/Anxiety: Stable.    Neuropathy:  Stable.    OTC/Supplements: Stable.    PLAN:                            Decrease Eliquis to 2.5mg twice daily  Contact the [place] Prescription Assistance Program/Fund (Eryn Kaufman and team) at [phone] to see if you would qualify for Eliquis assistance.  Recheck A1C - scheduled lab appointment for 5/20/24.    Follow-up: Return in about 6 months (around 10/8/2024) for Medication Therapy Management.    SUBJECTIVE/OBJECTIVE:                          Zurdo Dash is a 79 year old male called for a follow-up visit. Patient was accompanied by wife, Yoli. PCP is Dr. Duckworth.    Reason for visit: med review.    Allergies/ADRs: Reviewed in chart  Past Medical History: Reviewed in chart  Tobacco: He reports that he quit smoking about 17 years ago. His smoking use included cigarettes. He has a 40 pack-year smoking history. He has never used smokeless tobacco.  Alcohol: not currently using    Medication Adherence/Access: Lives in assisted living, [facility], he manages all his medications on  his own.   Uses pill box, no concerns.    Diabetes /Type 2:  Trulicity 3 mg weekly (Wednesdays) - gets through [facility] program-they work directly though [employer]  Lantus 47 units daily   Humalog 14 units twice daily before meals-takes it with him to dining room and he takes it right before he starts eating - started after last A1C was higher.    Patient is not experiencing side effects.  Blood sugar monitoring: Continuous Glucose Monitor   Average glucose Last 7 Days - 151mg/dl  Average glucose Last 14 Days - 148mg/dl  Time in Range Last 7 Days - 15%, Above (>180mg/dl) 85%, Below (<70mg/dl) 0%  Time in Range Last 7 Days - 14%, Above (>180mg/dl) 86%, Below (<70mg/dl) 0%  Current diabetes symptoms: none  Diet/Exercise: 2 meals/day     Eye exam is up to date  Foot exam is up to date  Urine Albumin:   Lab Results   Component Value Date    UMALCR 20.18 (H) 09/28/2023      Lab Results   Component Value Date    A1C 8.9 (H) 02/15/2024     GERD    Omeprazole 20 mg daily     Patient reports no current symptoms.   Patient feels that current regimen is effective.  The patient does not have a history of GI bleed.       Hypertension PAD/CKD Stage 3/HFpEF:  bumetanide 1 mg daily   Metoprolol ER 50 mg daily     ECHO: 3/2/20: EF 55-60%  Patient reports no current medication side effects  Patient does not self-monitor blood pressure.       BP Readings from Last 3 Encounters:   03/07/24 109/64   02/15/24 106/70   02/01/24 108/68     Pulse Readings from Last 3 Encounters:   03/07/24 71   02/15/24 67   02/01/24 93     History of DVT:  Eliquis 5 mg twice daily - expensive     States this/these are effective. Denies side effects.     Hyperlipidemia   pravastatin 10 mg daily    Patient reports no significant myalgias or other side effects.     Recent Labs   Lab Test 09/28/23  1055 02/01/22  0901   CHOL 155 216*   HDL 33* 35*   LDL 78 135*   TRIG 218* 232*     Rheumatoid arthritis/Pain/Osteoarthritis:  hydroxychloroquine 200 mg daily    Acetaminophen ER 1000 mg daily  hydromorphone 2 mg twice daily as needed for RA flare - rare to use  Medical marijuana - THC gummies as needed for RA flare  THC beau as needed (gets this OTC)    States this/these are effective. Denies side effects.     Depression/Anxiety:  sertraline 50 mg daily    States this/these are effective. Denies side effects.     Neuropathy:    Gabapentin 900 mg three times daily      He does usually take a nap in the afternoon. Very helpful for his neuropathy. He is going to have a procedure for his neuropathy, with Ispine. He's not sure exactly what it is.    OTC/Supplements:  Cetirizine 10 mg daily   Flonase as needed  Vitamin B-12 1000 mg weekly (Tuesdays)   Ferrous gluconate 324 mg daily     States this/these are effective. Denies side effects.     Today's Vitals: There were no vitals taken for this visit.  ----------------      I spent 34 minutes with this patient today. All changes were made via collaborative practice agreement with Kapil Duckworth MD. A copy of the visit note was provided to the patient's provider(s).    A summary of these recommendations was sent via [employer].    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

## 2024-04-08 NOTE — PATIENT INSTRUCTIONS
"Recommendations from today's MTM visit:                                                         Decrease Eliquis to 2.5mg twice daily  Contact the [place] Prescription Assistance Program/Fund (Eryn Kaufman and team) at [phone] to see if you would qualify for Eliquis assistance.  Recheck A1C - scheduled lab appointment for 5/20/24.    Follow-up: Return in about 6 months (around 10/8/2024) for Medication Therapy Management.    It was great speaking with you today.  I value your experience and would be very thankful for your time in providing feedback in our clinic survey. In the next few days, you may receive an email or text message from [employer] with a link to a survey related to your  clinical pharmacist.\"     To schedule another MTM appointment, please call the clinic directly or you may call the MTM scheduling line at [phone] or toll-free at [phone].     My Clinical Pharmacist's contact information:                                                      Please feel free to contact me with any questions or concerns you have.      Kaur Olivo, PharmD  Medication Therapy Management Pharmacist     "

## 2024-04-08 NOTE — Clinical Note
CLARY FORD note, thanks!  Kaur Olivo, PharmD Medication Therapy Management Pharmacist [phone]

## 2024-04-10 DIAGNOSIS — E11.42 TYPE 2 DIABETES MELLITUS WITH DIABETIC POLYNEUROPATHY, WITHOUT LONG-TERM CURRENT USE OF INSULIN (H): ICD-10-CM

## 2024-04-10 RX ORDER — INSULIN LISPRO 100 [IU]/ML
14 INJECTION, SOLUTION INTRAVENOUS; SUBCUTANEOUS
Qty: 30 ML | Refills: 0 | Status: SHIPPED | OUTPATIENT
Start: 2024-04-10 | End: 2024-09-27

## 2024-04-10 NOTE — TELEPHONE ENCOUNTER
Patient states he is out of his Lantus Solostar, and needs this filled as soon as possible. Also, the prescription for Insulin Lispro was cancelled at the pharmacy via PPI. Is the patient still supposed to be taking that? If so, please send a new script to the pharmacy so that we can fill that too.    Thank you,  Poly Whitlock, Pharmacy Technician  [facility] [place]

## 2024-04-10 NOTE — TELEPHONE ENCOUNTER
Pt calling. States he is out of Lantus insulin. Is taking 47 units per night. Only had 4 units left last night so that is all he was able to take. Pt is requesting refill today if possible.    Pt contacted pharmacy for refill of Humalog insulin. Pt has 1 pen left. Pt was informed by pharmacy that the doctor discontinued Humalog and pt is unsure why. Pt has been taking 14 units before each meal. Only has 2 meals per day. Pt states his Diabetes has been controlled with this, so does not understand why insulin was discontinued.     RN noted that both Lantus and Humalog are listed as historical on Epic med list and that pt had recent visit with MTM and was advised to continue Lantus 47 units daily and Humalog 14 units bid. Medications discontinued in error?    Routing to covering Provider as pt is out of Lantus insulin.    Mia Artis RN  [facility]

## 2024-04-12 ENCOUNTER — ANCILLARY PROCEDURE (OUTPATIENT)
Dept: CT IMAGING | Facility: CLINIC | Age: 79
End: 2024-04-12
Attending: RADIOLOGY
Payer: COMMERCIAL

## 2024-04-12 DIAGNOSIS — C34.11 MALIGNANT NEOPLASM OF UPPER LOBE OF RIGHT LUNG (H): ICD-10-CM

## 2024-04-12 PROCEDURE — 71250 CT THORAX DX C-: CPT | Performed by: RADIOLOGY

## 2024-04-16 ENCOUNTER — OFFICE VISIT (OUTPATIENT)
Dept: RADIATION ONCOLOGY | Facility: CLINIC | Age: 79
End: 2024-04-16
Payer: COMMERCIAL

## 2024-04-16 VITALS
DIASTOLIC BLOOD PRESSURE: 77 MMHG | SYSTOLIC BLOOD PRESSURE: 139 MMHG | HEART RATE: 78 BPM | RESPIRATION RATE: 16 BRPM | BODY MASS INDEX: 32.49 KG/M2 | TEMPERATURE: 97.7 F | WEIGHT: 220 LBS | OXYGEN SATURATION: 97 %

## 2024-04-16 DIAGNOSIS — C34.11 MALIGNANT NEOPLASM OF UPPER LOBE OF RIGHT LUNG (H): Primary | ICD-10-CM

## 2024-04-16 PROCEDURE — 99213 OFFICE O/P EST LOW 20 MIN: CPT | Performed by: RADIOLOGY

## 2024-04-16 RX ORDER — OXYCODONE HYDROCHLORIDE 5 MG/1
5 TABLET ORAL EVERY 6 HOURS PRN
COMMUNITY
Start: 2024-02-26

## 2024-04-16 ASSESSMENT — PAIN SCALES - GENERAL: PAINLEVEL: MILD PAIN (3)

## 2024-04-16 NOTE — LETTER
4/16/2024         RE: Zurdo Dash  [address]  [place] [address]        Dear Colleague,    Thank you for referring your patient, Zurdo Dash, to the [facility] [place]. Please see a copy of my visit note below.    Dear Colleagues,  Today Zurdo Dsah was seen in follow up      IDENTIFICATION: 79 year old gentleman with multiple comorbidities and a pacemaker diagnosed with [id number], stage 1 lung cancer of the RUL status post stereotactic body radiation therapy completed on August 26, 2022.    INTERVAL HISTORY: While on treatment he had no complaints.  He is seen today in follow-up with no reportable late radiation-induced side effects.  He recently had a toe amputation.  He is otherwise doing well with no concerns. Specifically denies n/v/ha/sob/cp.      REVIEW OF SYSTEMS: As per HPI, a 14-point review of systems is otherwise negative.    Past Medical History:   Diagnosis Date     Abnormal CT scan 03/2004    calcified lung granuloma     C. difficile diarrhea     H/O     Cataract 11/18/2011     Diabetic neuropathy (H)     mild, mostly soles and distal forefeet, worse on the left side.     Diverticulitis      ED (erectile dysfunction)      Ex-smoker     QUIT SMOKING FEB 2007     History of ETOH abuse     recovering, sober since 1997     Hyperlipidemia LDL goal <100      Hypertension goal BP (blood pressure) < 140/90      Hypogonadism      Obesity      PAD (peripheral artery disease) (H24)     leg cramps, with exertion, no formal diagnosis of PAD and minimal if any symptoms at all.     RA (rheumatoid arthritis) (H)     Dr Bailon     Syncope        Past Surgical History:   Procedure Laterality Date     AMPUTATION Left 01/2023    Toe     BYPASS GRAFT FEMOROPOPLITEAL Left 01/07/2021    Procedure: LEFT FEMORAL TO ABOVE KNEE POPLITEAL ARTERY BYPASS WITH PTFE VASCULAR GRAFT REMOVABLE RING 6MMX 50CM;  Surgeon: Myra Lopes MD;  Location:  OR      CATARACT IOL, RT/LT       COLONOSCOPY  2018    [facility]     COMBINED REPAIR PTOSIS WITH BLEPHAROPLASTY BILATERAL Bilateral 2019    Procedure: BILATERAL UPPER EYELID BLEPHAROPLASTY AND BILATERAL PTOSIS REPAIR;  Surgeon: Janet Garcia MD;  Location: [facility]     ENDARTERECTOMY FEMORAL Left 2021    Procedure: LEFT FEMORAL ENDARTERECTOMY WITH PATCH ANGIOPLASTY PHOTOFIX  0.8 X 8CM;  Surgeon: Myra Lopes MD;  Location: [facility] OR     EP PACEMAKER  2021     EXCISE LESION EYELID Left 2019    Procedure: LEFT LOWER EYELID BIOPSY;  Surgeon: Janet Garcia MD;  Location: [facility] OR     IR LOWER EXTREMITY ANGIOGRAM LEFT  2020     PHACOEMULSIFICATION WITH STANDARD INTRAOCULAR LENS IMPLANT  2019; 3/2019    left eye; right eye     TONSILLECTOMY       ZZHC INCISION TENDON SHEATH FINGER  2009    r hand ring finger       Family History   Problem Relation Age of Onset     Cerebrovascular Disease Mother      Arthritis Mother      Osteoporosis Mother      Alzheimer Disease Father      Arthritis Father      Cancer Father      Diabetes Maternal Grandmother      Cardiovascular Maternal Grandmother      Other Cancer Brother      Cancer Paternal Aunt      Hypertension No family hx of      Thyroid Disease No family hx of      Glaucoma No family hx of      Macular Degeneration No family hx of        Social History     Tobacco Use     Smoking status: Former     Current packs/day: 0.00     Average packs/day: 1 pack/day for 40.0 years (40.0 ttl pk-yrs)     Types: Cigarettes     Start date: 3/16/1967     Quit date: 3/16/2007     Years since quittin.0     Smokeless tobacco: Never   Substance Use Topics     Alcohol use: No     Alcohol/week: 0.0 standard drinks of alcohol       Current Outpatient Medications   Medication Sig Dispense Refill     Alcohol Swabs PADS Uses four times daily 120 each 11     apixaban ANTICOAGULANT (ELIQUIS ANTICOAGULANT) 2.5 MG tablet Take 1 tablet (2.5 mg) by mouth 2 times  daily 180 tablet 1     bumetanide (BUMEX) 1 MG tablet TAKE 1 TABLET (1 MG) BY MOUTH DAILY 90 tablet 0     cetirizine (ZYRTEC) 10 MG tablet Take 1 tablet (10 mg) by mouth At Bedtime 30 tablet 0     Continuous Blood Gluc Sensor (FREESTYLE YENNY 2 SENSOR) MISC 1 each every 14 days Use 1 sensor every 14 days. Use to read blood sugars per 's instructions. 2 each 5     cyanocobalamin (VITAMIN B-12) 1000 MCG tablet TAKE 1 TABLET (1,000 MCG) BY MOUTH ONCE A WEEK 30 tablet 0     Dulaglutide (TRULICITY) 3 MG/0.5ML SOPN Inject 3 mg Subcutaneous every 7 days       ferrous gluconate (FERGON) 324 (38 Fe) MG tablet Take 1 tablet (324 mg) by mouth daily (with breakfast) 90 tablet 1     fluticasone (FLONASE) 50 MCG/ACT nasal spray Spray 1-2 sprays into both nostrils daily as needed for rhinitis or allergies       gabapentin (NEURONTIN) 600 MG tablet Take 1 tablet (600 mg) by mouth 3 times daily 270 tablet 1     HYDROmorphone (DILAUDID) 2 MG tablet Take 1 tablet (2 mg) by mouth 2 times daily as needed for pain For urgent pain relief with rheumatoid arthritis flare-up 10 tablet 0     hydroxychloroquine (PLAQUENIL) 200 MG tablet Take 1 tablet (200 mg) by mouth 2 times daily 180 tablet 2     insulin glargine (LANTUS PEN) 100 UNIT/ML pen Inject 47 Units Subcutaneous daily 45 mL 0     insulin lispro (HUMALOG KWIKPEN) 100 UNIT/ML (1 unit dial) KWIKPEN Inject 14 Units Subcutaneous 2 times daily (before meals) 30 mL 0     insulin pen needle (31G X 5 MM) 31G X 5 MM miscellaneous Use 3 pen needles daily or as directed. 300 each 1     medical cannabis (Patient's own supply) See Admin Instructions (The purpose of this order is to document that the patient reports taking medical cannabis.  This is not a prescription, and is not used to certify that the patient has a qualifying medical condition.)       metoprolol succinate ER (TOPROL XL) 50 MG 24 hr tablet TAKE 1 TABLET (50 MG) BY MOUTH DAILY 90 tablet 0     omeprazole (PRILOSEC) 20  MG DR capsule TAKE 1 CAPSULE (20 MG) BY MOUTH DAILY 100 capsule 1     oxyCODONE (ROXICODONE) 5 MG tablet Take 5 mg by mouth every 6 hours as needed       PERMETHRIN EX Wheelchair: Standard  with leg rests: (Swing away Length of need: 99 months       polyethylene glycol-propylene glycol (SYSTANE) 0.4-0.3 % SOLN ophthalmic solution Place 1 drop into both eyes 2 times daily as needed for dry eyes 3 mL 0     pravastatin (PRAVACHOL) 10 MG tablet TAKE 1 TABLET (10 MG) BY MOUTH DAILY 90 tablet 0     sertraline (ZOLOFT) 50 MG tablet TAKE 1 TABLET (50 MG) BY MOUTH DAILY 90 tablet 0     SM PAIN RELIEVER EX  MG tablet TAKE TWO TABLETS BY MOUTH ONCE DAILY 180 tablet 1     No current facility-administered medications for this visit.          Allergies   Allergen Reactions     Seasonal Allergies      Blood-Group Specific Substance Other (See Comments)     Patient has a Non-specific antibody. Blood products may be delayed. Draw patient 24 hours prior to transfusion. For [facility] testing, draw one red top and two purple top tubes for all Type and Screen orders.  Patient has a Non-specific antibody. Blood products may be delayed. Draw patient 24 hours prior to transfusion. For [facility] testing, draw one red top and two purple top tubes for all Type and Screen orders.         PHYSICAL EXAM:  /77   Pulse 78   Temp 97.7  F (36.5  C) (Oral)   Resp 16   Wt 99.8 kg (220 lb)   SpO2 97%   BMI 32.49 kg/m    GEN: appears well, in no acute distress  HEENT: normocephalic and atraumatic, EOMI, anicteric sclerae  CV: RRR  RESP: normal respiration on room air, no stridor, CTAB  SKIN: normal color and turgor  PSYCH: appropriate mood, affect, and judgment    All pertinent laboratory, imaging, and pathology findings have been reviewed.     IMAGING: Chest CT without contrast completed on April 12, 2024 shows slight interval growth of the posterior right upper lobe mass at the site of the prior radiation treatment site which  likely is consistent with evolving radiation changes.    IMPRESSION/RECOMMENDATION:  79 year old gentleman with multiple comorbidities and a pacemaker diagnosed with [id number], stage 1 lung cancer of the RUL status post stereotactic body radiation therapy completed on August 26, 2022. He is doing well with no significant radiation toxicity.  His most recent scan shows no definitive evidence of recurrence or new disease.  We will continue with repeat every 3 month chest CTs with in person or video follow up.       Thank you for allowing me to participate in the care of this pleasant patient. If you have any questions, please do not hesitate to contact my office.    Lalita Funez MD  Attending Physician  Radiation Oncology      Again, thank you for allowing me to participate in the care of your patient.        Sincerely,        NAYAN Funez MD

## 2024-04-16 NOTE — PROGRESS NOTES
Dear Colleagues,  Today Zurdo Dash was seen in follow up      IDENTIFICATION: 79 year old gentleman with multiple comorbidities and a pacemaker diagnosed with [id number], stage 1 lung cancer of the RUL status post stereotactic body radiation therapy completed on August 26, 2022.    INTERVAL HISTORY: While on treatment he had no complaints.  He is seen today in follow-up with no reportable late radiation-induced side effects.  He recently had a toe amputation.  He is otherwise doing well with no concerns. Specifically denies n/v/ha/sob/cp.      REVIEW OF SYSTEMS: As per HPI, a 14-point review of systems is otherwise negative.    Past Medical History:   Diagnosis Date    Abnormal CT scan 03/2004    calcified lung granuloma    C. difficile diarrhea     H/O    Cataract 11/18/2011    Diabetic neuropathy (H)     mild, mostly soles and distal forefeet, worse on the left side.    Diverticulitis     ED (erectile dysfunction)     Ex-smoker     QUIT SMOKING FEB 2007    History of ETOH abuse     recovering, sober since 1997    Hyperlipidemia LDL goal <100     Hypertension goal BP (blood pressure) < 140/90     Hypogonadism     Obesity     PAD (peripheral artery disease) (H24)     leg cramps, with exertion, no formal diagnosis of PAD and minimal if any symptoms at all.    RA (rheumatoid arthritis) (H)     Dr SharpUvaldo    Syncope        Past Surgical History:   Procedure Laterality Date    AMPUTATION Left 01/2023    Toe    BYPASS GRAFT FEMOROPOPLITEAL Left 01/07/2021    Procedure: LEFT FEMORAL TO ABOVE KNEE POPLITEAL ARTERY BYPASS WITH PTFE VASCULAR GRAFT REMOVABLE RING 6MMX 50CM;  Surgeon: Myra Lopes MD;  Location: [facility]    CATARACT IOL, RT/LT      COLONOSCOPY  03/01/2018    [facility]    COMBINED REPAIR PTOSIS WITH BLEPHAROPLASTY BILATERAL Bilateral 08/16/2019    Procedure: BILATERAL UPPER EYELID BLEPHAROPLASTY AND BILATERAL PTOSIS REPAIR;  Surgeon: Janet Garcia MD;  Location: [facility]    ENDARTERECTOMY FEMORAL  Left 2021    Procedure: LEFT FEMORAL ENDARTERECTOMY WITH PATCH ANGIOPLASTY PHOTOFIX  0.8 X 8CM;  Surgeon: Myra Lopes MD;  Location: [facility]    EP PACEMAKER  2021    EXCISE LESION EYELID Left 2019    Procedure: LEFT LOWER EYELID BIOPSY;  Surgeon: Janet Garcia MD;  Location: [facility]    IR LOWER EXTREMITY ANGIOGRAM LEFT  2020    PHACOEMULSIFICATION WITH STANDARD INTRAOCULAR LENS IMPLANT  2019; 3/2019    left eye; right eye    TONSILLECTOMY      ZZHC INCISION TENDON SHEATH FINGER  2009    r hand ring finger       Family History   Problem Relation Age of Onset    Cerebrovascular Disease Mother     Arthritis Mother     Osteoporosis Mother     Alzheimer Disease Father     Arthritis Father     Cancer Father     Diabetes Maternal Grandmother     Cardiovascular Maternal Grandmother     Other Cancer Brother     Cancer Paternal Aunt     Hypertension No family hx of     Thyroid Disease No family hx of     Glaucoma No family hx of     Macular Degeneration No family hx of        Social History     Tobacco Use    Smoking status: Former     Current packs/day: 0.00     Average packs/day: 1 pack/day for 40.0 years (40.0 ttl pk-yrs)     Types: Cigarettes     Start date: 3/16/1967     Quit date: 3/16/2007     Years since quittin.0    Smokeless tobacco: Never   Substance Use Topics    Alcohol use: No     Alcohol/week: 0.0 standard drinks of alcohol       Current Outpatient Medications   Medication Sig Dispense Refill    Alcohol Swabs PADS Uses four times daily 120 each 11    apixaban ANTICOAGULANT (ELIQUIS ANTICOAGULANT) 2.5 MG tablet Take 1 tablet (2.5 mg) by mouth 2 times daily 180 tablet 1    bumetanide (BUMEX) 1 MG tablet TAKE 1 TABLET (1 MG) BY MOUTH DAILY 90 tablet 0    cetirizine (ZYRTEC) 10 MG tablet Take 1 tablet (10 mg) by mouth At Bedtime 30 tablet 0    Continuous Blood Gluc Sensor (FREESTYLE YENNY 2 SENSOR) MISC 1 each every 14 days Use 1 sensor every 14 days. Use to read  blood sugars per 's instructions. 2 each 5    cyanocobalamin (VITAMIN B-12) 1000 MCG tablet TAKE 1 TABLET (1,000 MCG) BY MOUTH ONCE A WEEK 30 tablet 0    Dulaglutide (TRULICITY) 3 MG/0.5ML SOPN Inject 3 mg Subcutaneous every 7 days      ferrous gluconate (FERGON) 324 (38 Fe) MG tablet Take 1 tablet (324 mg) by mouth daily (with breakfast) 90 tablet 1    fluticasone (FLONASE) 50 MCG/ACT nasal spray Spray 1-2 sprays into both nostrils daily as needed for rhinitis or allergies      gabapentin (NEURONTIN) 600 MG tablet Take 1 tablet (600 mg) by mouth 3 times daily 270 tablet 1    HYDROmorphone (DILAUDID) 2 MG tablet Take 1 tablet (2 mg) by mouth 2 times daily as needed for pain For urgent pain relief with rheumatoid arthritis flare-up 10 tablet 0    hydroxychloroquine (PLAQUENIL) 200 MG tablet Take 1 tablet (200 mg) by mouth 2 times daily 180 tablet 2    insulin glargine (LANTUS PEN) 100 UNIT/ML pen Inject 47 Units Subcutaneous daily 45 mL 0    insulin lispro (HUMALOG KWIKPEN) 100 UNIT/ML (1 unit dial) KWIKPEN Inject 14 Units Subcutaneous 2 times daily (before meals) 30 mL 0    insulin pen needle (31G X 5 MM) 31G X 5 MM miscellaneous Use 3 pen needles daily or as directed. 300 each 1    medical cannabis (Patient's own supply) See Admin Instructions (The purpose of this order is to document that the patient reports taking medical cannabis.  This is not a prescription, and is not used to certify that the patient has a qualifying medical condition.)      metoprolol succinate ER (TOPROL XL) 50 MG 24 hr tablet TAKE 1 TABLET (50 MG) BY MOUTH DAILY 90 tablet 0    omeprazole (PRILOSEC) 20 MG DR capsule TAKE 1 CAPSULE (20 MG) BY MOUTH DAILY 100 capsule 1    oxyCODONE (ROXICODONE) 5 MG tablet Take 5 mg by mouth every 6 hours as needed      PERMETHRIN EX Wheelchair: Standard  with leg rests: (Swing away Length of need: 99 months      polyethylene glycol-propylene glycol (SYSTANE) 0.4-0.3 % SOLN ophthalmic solution  Place 1 drop into both eyes 2 times daily as needed for dry eyes 3 mL 0    pravastatin (PRAVACHOL) 10 MG tablet TAKE 1 TABLET (10 MG) BY MOUTH DAILY 90 tablet 0    sertraline (ZOLOFT) 50 MG tablet TAKE 1 TABLET (50 MG) BY MOUTH DAILY 90 tablet 0    SM PAIN RELIEVER EX  MG tablet TAKE TWO TABLETS BY MOUTH ONCE DAILY 180 tablet 1     No current facility-administered medications for this visit.          Allergies   Allergen Reactions    Seasonal Allergies     Blood-Group Specific Substance Other (See Comments)     Patient has a Non-specific antibody. Blood products may be delayed. Draw patient 24 hours prior to transfusion. For [facility] testing, draw one red top and two purple top tubes for all Type and Screen orders.  Patient has a Non-specific antibody. Blood products may be delayed. Draw patient 24 hours prior to transfusion. For [facility] testing, draw one red top and two purple top tubes for all Type and Screen orders.         PHYSICAL EXAM:  /77   Pulse 78   Temp 97.7  F (36.5  C) (Oral)   Resp 16   Wt 99.8 kg (220 lb)   SpO2 97%   BMI 32.49 kg/m    GEN: appears well, in no acute distress  HEENT: normocephalic and atraumatic, EOMI, anicteric sclerae  CV: RRR  RESP: normal respiration on room air, no stridor, CTAB  SKIN: normal color and turgor  PSYCH: appropriate mood, affect, and judgment    All pertinent laboratory, imaging, and pathology findings have been reviewed.     IMAGING: Chest CT without contrast completed on April 12, 2024 shows slight interval growth of the posterior right upper lobe mass at the site of the prior radiation treatment site which likely is consistent with evolving radiation changes.    IMPRESSION/RECOMMENDATION:  79 year old gentleman with multiple comorbidities and a pacemaker diagnosed with [id number], stage 1 lung cancer of the RUL status post stereotactic body radiation therapy completed on August 26, 2022. He is doing well with no significant radiation  toxicity.  His most recent scan shows no definitive evidence of recurrence or new disease.  We will continue with repeat every 3 month chest CTs with in person or video follow up.       Thank you for allowing me to participate in the care of this pleasant patient. If you have any questions, please do not hesitate to contact my office.    Lalita Funez MD  Attending Physician  Radiation Oncology

## 2024-04-16 NOTE — NURSING NOTE
FOLLOW-UP VISIT    Patient Name: Zurdo Dash      : 1945     Age: 79 year old        ______________________________________________________________________________     Chief Complaint   Patient presents with    Radiation Therapy     Return appointment with Dr. Funez      /77   Pulse 78   Temp 97.7  F (36.5  C) (Oral)   Resp 16   Wt 99.8 kg (220 lb)   SpO2 97%   BMI 32.49 kg/m       Date Radiation Completed: 22    Pain  Denies pain related to visit. Patient had recent L big toe amputation rating pain as 3/10 to site.    Meds  Current Med List Reviewed: Yes  Medication Note:     Labs  CBC RESULTS:   Recent Labs   Lab Test 02/15/24  1457 24  1128 23  1520   WBC  --   --  6.9   RBC  --   --  3.86*   HGB 10.8*   < > 11.3*   HCT  --   --  35.3*   MCV  --   --  92   MCH  --   --  29.3   MCHC  --   --  32.0   RDW  --   --  14.4   PLT  --   --  251    < > = values in this interval not displayed.       Imaging  CT Chest: 24    Respiratory: Dyspnea on exertion and Cough- wet, non-productive  Skin:  Warm  Dry  Intact  Energy Level: normal  Appetite: normal      Appointments:     DATE  Oncologist:     Primary:      Other Notes: Follow up CT Chest in 3 months and Return with Dr. Funez.    Heidi Villar RN

## 2024-05-13 ENCOUNTER — TELEPHONE (OUTPATIENT)
Dept: FAMILY MEDICINE | Facility: CLINIC | Age: 79
End: 2024-05-13
Payer: COMMERCIAL

## 2024-05-13 NOTE — TELEPHONE ENCOUNTER
Home Care is calling regarding an established patient with [facility].       Requesting orders from: Kapil Duckworth  Provider is following patient: No       Orders Requested    Skilled Nursing  Request for initial certification (first set of orders)   Frequency:  2x/wk for 3 wks  4 PRNs for wound monitoring      Physical Therapy was offered but pt declined and is an FYI to pcp that PT was offered. Pt able to ambulate with no concerns from Home Care Nurse.     Medication Question:  Pt is on Oxy as needed and 1000mg tylenol in the morning for Arthritis chronic pain   -pt was experiencing Constipation and started on miralax in ED    Home care Nurse questions: Should pt be on scheduled tylenol instead?    Zoloft for depression started 2021: Pt states that he is feeling better and doesn't think he needs this. Home care nurse reports pt gets anxious and not depressed. Pt will be discussing this at next visit.       Information was gathered and will be sent to provider to confirm provider will be following patient.  RN will contact Home Care with information after provider review.  Confirmed ok to leave a detailed message with call back.  Contact information confirmed and updated as needed.    Marian Liu RN

## 2024-05-14 NOTE — TELEPHONE ENCOUNTER
Please approve orders as requested      No to scheduled acetaminophen     Will discuss sertraline [ Zoloft ] at upcoming appointment     Kapil Duckworth MD

## 2024-05-14 NOTE — TELEPHONE ENCOUNTER
Left detailed message for Amalia with Provider's message as written. Asked that she call back to [phone] if any further questions or concerns.    Mia Artis RN  [facility]

## 2024-05-21 NOTE — TELEPHONE ENCOUNTER
Patient first established with Dr Ng 3/2024 for mitral regurgitation. At this time, a SANTIAGO was ordered to determine severity of regurgitation. Due to the requirements of needing a  home and the patient not having any family in the area, the procedure was going to be completed at [facility] as patient has family in that area who are able to help.    Patient established with Dr Coburn on 3/29/24 and a SANTIAGO was performed on 4/19/24.     Patient states he would like to continue to follow with Dr Ng as this is closest to his home.    Patient returned for an office visit with Dr Ng on 5/8/24. Due to the severity of the mitral valve regurgitation seen on the SANTIAGO, recommendation made at this office visit was to complete a left and right heart catheterization preoperatively in anticipation of mitral valve surgery.    Patient was scheduled for procedure at [place] with plans to be admitted overnight due to patient not having a ride home. Patient cancelled during pre procedure due to concerns of cost with admission.    Patient has family that is able to assist him with transportation if he has procedure at [facility].   Sending message to Dr Coburn' office to determine if they are able to set patient up for left and right catheterization.   There is some information in the chart just a little bit difficult to track down    The emergency room makes a wrong comment , they say he's taking Trulicity (dulaglutide) AND Ozempic  [semaglutide] and Metformin ( Glucophage)      You can't take Ozempic  [semaglutide] and Trulicity (dulaglutide)  Together. Which is he taking ?    Then, Metformin ( Glucophage)  Is taken 500 milligrams     TWO tabs TWO times a day. I find his last hemoglobin a1c  [ diabetes test ] was     Lab Results   Component Value Date    A1C 6.8 01/07/2021    A1C 6.8 12/17/2020    A1C 6.7 12/11/2020    A1C 6.0 01/03/2020    A1C 6.4 08/06/2019     Quite good.     Before I make any changes [ if at all ] please clarify exactly what he's taking and Reroute please with this requested information      Thank you so very much !     Kapil Duckworth MD

## 2024-05-25 ENCOUNTER — HEALTH MAINTENANCE LETTER (OUTPATIENT)
Age: 79
End: 2024-05-25

## 2024-05-28 ENCOUNTER — TELEPHONE (OUTPATIENT)
Dept: INTERNAL MEDICINE | Facility: CLINIC | Age: 79
End: 2024-05-28
Payer: COMMERCIAL

## 2024-05-28 NOTE — TELEPHONE ENCOUNTER
Reason for Call:  Form, our goal is to have forms completed with 72 hours, however, some forms may require a visit or additional information.    Type of letter, form or note:  Home Health Certification    Who is the form from?: Home care    Where did the form come from: form was faxed in    What clinic location was the form placed at?: [facility]    Where the form was placed: Given to physician    What number is listed as a contact on the form?: [phone]       Call taken on 5/28/2024 at 11:50 AM by Caren Telles

## 2024-05-28 NOTE — TELEPHONE ENCOUNTER
[facility] received and given to Dr. Duckworth to sign when he returns to the office on Thursday, May 30th, 2024.  Caren Telles,

## 2024-06-04 NOTE — TELEPHONE ENCOUNTER
[facility] signed by Dr. Duckworth and faxed to [phone].  Caren Telles,

## 2024-06-11 ENCOUNTER — TELEPHONE (OUTPATIENT)
Dept: FAMILY MEDICINE | Facility: CLINIC | Age: 79
End: 2024-06-11
Payer: COMMERCIAL

## 2024-06-11 NOTE — TELEPHONE ENCOUNTER
Reason for Call:  Appointment Request    Patient requesting this type of appt:  Hospital/ED Follow-Up     Requested provider: Kapil Duckworth    Reason patient unable to be scheduled: Not within requested timeframe    When does patient want to be seen/preferred time: 3-7 days    Comments: needs to be seen in 5 days for [facility] follow up, discharged on 6/11 for toe infection    Could we [facility] this [facility] to you [facility] or would you prefer to receive a phone call?:   Patient would prefer a phone call   Okay to leave a detailed message?: not talking to pt.       Call taken on 6/11/2024 at 2:26 PM by Vy Copeland

## 2024-06-12 NOTE — TELEPHONE ENCOUNTER
Patient called and spoke to spouse, Yoli.    Appointment scheduled with Dr. Duckworth on Friday, June 14th at 10 a.m.    Caren Telles,

## 2024-06-13 ENCOUNTER — TELEPHONE (OUTPATIENT)
Dept: FAMILY MEDICINE | Facility: CLINIC | Age: 79
End: 2024-06-13

## 2024-06-13 ENCOUNTER — OFFICE VISIT (OUTPATIENT)
Dept: RHEUMATOLOGY | Facility: CLINIC | Age: 79
End: 2024-06-13
Payer: COMMERCIAL

## 2024-06-13 VITALS — OXYGEN SATURATION: 94 % | HEART RATE: 61 BPM | DIASTOLIC BLOOD PRESSURE: 58 MMHG | SYSTOLIC BLOOD PRESSURE: 88 MMHG

## 2024-06-13 DIAGNOSIS — M65.342 TRIGGER RING FINGER OF LEFT HAND: ICD-10-CM

## 2024-06-13 DIAGNOSIS — Z79.899 LONG-TERM USE OF HYDROXYCHLOROQUINE: ICD-10-CM

## 2024-06-13 DIAGNOSIS — D64.9 ANEMIA, UNSPECIFIED TYPE: ICD-10-CM

## 2024-06-13 DIAGNOSIS — M05.79 RHEUMATOID ARTHRITIS INVOLVING MULTIPLE SITES WITH POSITIVE RHEUMATOID FACTOR (H): Primary | ICD-10-CM

## 2024-06-13 PROCEDURE — 99214 OFFICE O/P EST MOD 30 MIN: CPT | Performed by: INTERNAL MEDICINE

## 2024-06-13 PROCEDURE — G2211 COMPLEX E/M VISIT ADD ON: HCPCS | Performed by: INTERNAL MEDICINE

## 2024-06-13 NOTE — TELEPHONE ENCOUNTER
Home Care is calling regarding an established patient with [facility].       Requesting orders from: Kapil Duckworth  Provider is following patient: Yes  Is this a 60-day recertification request?  No    Orders Requested    Skilled Nursing  Request for initial certification (first set of orders)   Frequency:  3x/wk for 1 wks 2x/wk for 4 weeks and 1x/week for 4 weeks     Post hospital admission for 3rd digit amputation on L foot      Information was gathered and will be sent to provider for review.  RN will contact Home Care with information after provider review.  Confirmed ok to leave a detailed message with call back.  Contact information confirmed and updated as needed.    Paula Velasquez RN

## 2024-06-13 NOTE — PROGRESS NOTES
Rheumatology Clinic Visit      Zurdo Dash MRN# [id number]   YOB: 1945 Age: 79 year old      Date of visit: 6/13/24   PCP: Dr. Kapil Duckworth  Sports Medicine: Dr. Hermes Warner    Chief Complaint   Patient presents with:  Rheumatoid Arthritis    Assessment and Plan      1. Seropositive nonerosive rheumatoid arthritis (, CCP 64): Previously on MTX (was well tolerated, but later with cytopenias likely related to worsening creatinine with subsequent MTX toxicity).  Then presented with mild synovitis so hydroxychloroquine was restarted that was effective but then mild synovitis again so hydroxychloroquine was increased and he is doing well now with regard to inflammatory arthritis.  Check x-rays to assess for progressive changes from RA.  Chronic illness, stable  - Continue hydroxychloroquine 200 mg twice daily (last eye exam performed by Dr. Avendano on 5/19/2023; yearly exam required)  - Ophthalmology referral for hydroxychloroquine toxicity monitoring     2. Bilateral knee osteoarthritis / patellofemoral syndrome: Following with orthopedics where Synvisc injections have provided limited duration of benefit.  Steroid injections are effective but now caused significant hyperglycemia so are avoided.  Vascular disease, diabetes, and history of infection are wrist to consider risks when considering possibility of joint replacement and if this were to be pursued then he should discuss with orthopedics and his vascular surgeon.  Another option was to consider pain management evaluation and he wished to explore this option; now followed at [facility] and anticipates having a spinal device placed to help with the knee pain.  Chronic illness, progressive.      3.  Trigger finger of the left fourth digit: Advise splinting and self-massage.  Previous intra-articular steroid injections caused significant hyperglycemia so avoid steroid injection today.  Note that he had to have surgery for trigger finger in the  past at different fingers and could be considered in the future if persistent symptoms.    Total minutes spent in evaluation with patient, documentation, , and review of pertinent studies and chart notes: 16  The longitudinal plan of care for the rheumatology problem(s) were addressed during this visit.  Due to added complexity of care, we will continue to support the patient and the subsequent management of this condition with ongoing continuity of care.       Mr. Dash verbalized agreement with and understanding of the rational for the diagnosis and treatment plan.  All questions were answered to best of my ability and the patient's satisfaction. Mr. Dash was advised to contact the clinic with any questions that may arise after the clinic visit.      Thank you for involving me in the care of the patient    Return to clinic: 6 months    HPI   Zurdo Dash is a 79 year old male with a medical history significant for hypertension, hyperlipidemia, diabetes, diabetic neuropathy, GERD, pacemaker, lung cancer, rheumatoid arthritis who presents for f/u of RA, sooner than previously scheduled because of recent hospitalization     7/12/2023: Intra-articular steroid injection for bilateral knee osteoarthritis was effective but caused a blood glucose in the 600s and therefore he went to see orthopedics at the [facility] for a Synvisc injection in June 2023 with improvement of his knee pain.  He would like to plan a repeat Synvisc injection 6 months afterwards and would like a referral to orthopedics here at the [facility].  Other joints are doing well.  Morning stiffness for no more than 20 minutes.    9/11/2023: Worsening inflammatory arthritis symptoms at the MCPs that are worse in the morning improved with time and activity.  Mild swelling at the MCPs bilaterally.  Synvisc injections for the knees from orthopedics provide some benefit and he is hoping to get another Synvisc injection from  orthopedics soon.  Other joints are doing well.  Morning stiffness for about 2 hours at the MCPs.    9/26/2023: Here sooner than previously scheduled, with his daughter, to discuss alternatives to methotrexate.  Okay with using hydroxychloroquine.  Prefers to avoid biologic DMARDs because of risk for infection considering his history of infection, poor vascular flow in the lower extremities, and diabetes.  Also with chronic knee pain, limited duration of benefit from Synvisc; steroid injections of the knees caused hyperglycemia previously; pain in the knees is worse with activity and improves with rest; no swelling; would like something to help with the degenerative knee pain.    2/1/2024: Degenerative changes at the DIPs and PIPs.  Heberden's and Bassam's nodes present.  Planning to have a spinal stimulator placed for chronic knee pain management at [facility].  Reports that he was told that his malignancy is mostly resolved, based on CT scans.    Today, 6/13/2024: Had additional toes removed due to infection/diabetes.  Trigger finger of the left fourth digit and possibly starting at the left second digit.  History of trigger finger surgery on the right hand in the past, estimated to be in about 2009.  Otherwise doing well.  Taking hydroxychloroquine regularly.  Due for an eye exam    Denies fevers, chills, nausea, vomiting, constipation, diarrhea. No abdominal pain. No chest pain/pressure, palpitations, or shortness of breath. No oral or nasal sores. No neck pain.  No lightheadedness or dizziness.    Tobacco: None  EtOH: None  Drugs: None    ROS   12 point review of system was completed and negative except as noted in the HPI     Active Problem List     Patient Active Problem List   Diagnosis    Pain in limb    Hyperlipidemia LDL goal <100    Hypertension goal BP (blood pressure) < 140/90    Hypogonadism    Advanced directives, counseling/discussion    Health Care Home    Type 2 diabetes mellitus with diabetic  polyneuropathy, without long-term current use of insulin (H)    RBBB (right bundle branch block)    Ex-smoker    Family history of esophageal cancer    Gastroesophageal reflux disease, esophagitis presence not specified    Rheumatoid arthritis involving multiple sites with positive rheumatoid factor (H)    Pulmonary nodule    High risk medication use    Spondylosis of cervical region without myelopathy or radiculopathy    Erectile dysfunction, unspecified erectile dysfunction type    Type 2 diabetes mellitus without retinopathy (H)    Tubulovillous adenoma of colon    Diabetic polyneuropathy associated with type 2 diabetes mellitus (H)    Chronic bilateral low back pain without sciatica    Migraine equivalent    Posterior vitreous detachment of left eye    Pseudophakia, ou    Eyelid lesion, LLL    Bradycardia    Primary osteoarthritis of both knees    Syncope    Trigger finger, acquired    Stage 3b chronic kidney disease (H)    Abdominal pain, generalized    PAD (peripheral artery disease) (H24)    Immunosuppression (H24)    Skin ulcer of toe of left foot, limited to breakdown of skin (H)    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)    Pneumothorax    SOBOE (shortness of breath on exertion)    Pacemaker    Ulcer of left foot, unspecified ulcer stage (H)    Hammer toe of left foot    Anemia, unspecified type    Closed nondisplaced fracture of right pubis (H)    Amputation of toe (H24)    Adenocarcinoma, lung (H)    Urge incontinence of urine    Thrombocytopenia (H24)    Severe sepsis (H)    History of blepharoplasty    Mild protein-calorie malnutrition (H24)    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)    Autoimmune hemolytic anemia (H)    Pressure ulcer of other site, stage 3 (H)    Chronic systolic (congestive) heart failure (H)    Major depressive disorder, recurrent, mild (H24)    NSVT (nonsustained ventricular tachycardia) (H)    Diabetic ulcer of toe of left foot associated with  type 2 diabetes mellitus, with necrosis of muscle (H)    Acquired absence of other left toe(s) (H24)     Past Medical History     Past Medical History:   Diagnosis Date    Abnormal CT scan 03/2004    calcified lung granuloma    C. difficile diarrhea     H/O    Cataract 11/18/2011    Diabetic neuropathy (H)     mild, mostly soles and distal forefeet, worse on the left side.    Diverticulitis     ED (erectile dysfunction)     Ex-smoker     QUIT SMOKING FEB 2007    History of ETOH abuse     recovering, sober since 1997    Hyperlipidemia LDL goal <100     Hypertension goal BP (blood pressure) < 140/90     Hypogonadism     Obesity     PAD (peripheral artery disease) (H24)     leg cramps, with exertion, no formal diagnosis of PAD and minimal if any symptoms at all.    RA (rheumatoid arthritis) (H)     Dr Epsteinay    Syncope      Past Surgical History     Past Surgical History:   Procedure Laterality Date    AMPUTATION Left 01/2023    Toe    BYPASS GRAFT FEMOROPOPLITEAL Left 01/07/2021    Procedure: LEFT FEMORAL TO ABOVE KNEE POPLITEAL ARTERY BYPASS WITH PTFE VASCULAR GRAFT REMOVABLE RING 6MMX 50CM;  Surgeon: Myra Lopes MD;  Location: [facility] OR    CATARACT IOL, RT/LT      COLONOSCOPY  03/01/2018    [facility]    COMBINED REPAIR PTOSIS WITH BLEPHAROPLASTY BILATERAL Bilateral 08/16/2019    Procedure: BILATERAL UPPER EYELID BLEPHAROPLASTY AND BILATERAL PTOSIS REPAIR;  Surgeon: Janet Garcia MD;  Location: [facility] OR    ENDARTERECTOMY FEMORAL Left 01/07/2021    Procedure: LEFT FEMORAL ENDARTERECTOMY WITH PATCH ANGIOPLASTY PHOTOFIX  0.8 X 8CM;  Surgeon: Myra Lopes MD;  Location:  OR    EP PACEMAKER  01/2021    EXCISE LESION EYELID Left 08/16/2019    Procedure: LEFT LOWER EYELID BIOPSY;  Surgeon: Janet Garcia MD;  Location:  OR    IR LOWER EXTREMITY ANGIOGRAM LEFT  12/17/2020    PHACOEMULSIFICATION WITH STANDARD INTRAOCULAR LENS IMPLANT  02/2019; 3/2019    left eye; right eye    TONSILLECTOMY       ZZHC INCISION TENDON SHEATH FINGER  04/2009    r hand ring finger     Allergy     Allergies   Allergen Reactions    Seasonal Allergies     Blood-Group Specific Substance Other (See Comments)     Patient has a Non-specific antibody. Blood products may be delayed. Draw patient 24 hours prior to transfusion. For [facility] testing, draw one red top and two purple top tubes for all Type and Screen orders.  Patient has a Non-specific antibody. Blood products may be delayed. Draw patient 24 hours prior to transfusion. For [facility] testing, draw one red top and two purple top tubes for all Type and Screen orders.     Current Medication List     Current Outpatient Medications   Medication Sig Dispense Refill    apixaban ANTICOAGULANT (ELIQUIS ANTICOAGULANT) 2.5 MG tablet Take 1 tablet (2.5 mg) by mouth 2 times daily 180 tablet 1    bumetanide (BUMEX) 1 MG tablet TAKE 1 TABLET (1 MG) BY MOUTH DAILY 90 tablet 0    cetirizine (ZYRTEC) 10 MG tablet Take 1 tablet (10 mg) by mouth At Bedtime 30 tablet 0    cyanocobalamin (VITAMIN B-12) 1000 MCG tablet TAKE 1 TABLET (1,000 MCG) BY MOUTH ONCE A WEEK 30 tablet 0    Dulaglutide (TRULICITY) 3 MG/0.5ML SOPN Inject 3 mg Subcutaneous every 7 days      ferrous gluconate (FERGON) 324 (38 Fe) MG tablet Take 1 tablet (324 mg) by mouth daily (with breakfast) 90 tablet 1    fluticasone (FLONASE) 50 MCG/ACT nasal spray Spray 1-2 sprays into both nostrils daily as needed for rhinitis or allergies      gabapentin (NEURONTIN) 600 MG tablet Take 1 tablet (600 mg) by mouth 3 times daily 270 tablet 1    HYDROmorphone (DILAUDID) 2 MG tablet Take 1 tablet (2 mg) by mouth 2 times daily as needed for pain For urgent pain relief with rheumatoid arthritis flare-up 10 tablet 0    hydroxychloroquine (PLAQUENIL) 200 MG tablet Take 1 tablet (200 mg) by mouth 2 times daily 180 tablet 2    insulin glargine (LANTUS PEN) 100 UNIT/ML pen Inject 47 Units Subcutaneous daily 45 mL 0    insulin lispro  (HUMALOG KWIKPEN) 100 UNIT/ML (1 unit dial) KWIKPEN Inject 14 Units Subcutaneous 2 times daily (before meals) 30 mL 0    medical cannabis (Patient's own supply) See Admin Instructions (The purpose of this order is to document that the patient reports taking medical cannabis.  This is not a prescription, and is not used to certify that the patient has a qualifying medical condition.)      metoprolol succinate ER (TOPROL XL) 50 MG 24 hr tablet TAKE 1 TABLET (50 MG) BY MOUTH DAILY 90 tablet 0    omeprazole (PRILOSEC) 20 MG DR capsule TAKE 1 CAPSULE (20 MG) BY MOUTH DAILY 100 capsule 1    oxyCODONE (ROXICODONE) 5 MG tablet Take 5 mg by mouth every 6 hours as needed      pravastatin (PRAVACHOL) 10 MG tablet TAKE 1 TABLET (10 MG) BY MOUTH DAILY 90 tablet 0    sertraline (ZOLOFT) 50 MG tablet TAKE 1 TABLET (50 MG) BY MOUTH DAILY 90 tablet 0    SM PAIN RELIEVER EX  MG tablet TAKE TWO TABLETS BY MOUTH ONCE DAILY 180 tablet 1    Alcohol Swabs PADS Uses four times daily 120 each 11    Continuous Blood Gluc Sensor (FREESTYLE YENNY 2 SENSOR) [facility] 1 each every 14 days Use 1 sensor every 14 days. Use to read blood sugars per 's instructions. 2 each 5    insulin pen needle (31G X 5 MM) 31G X 5 MM miscellaneous Use 3 pen needles daily or as directed. 300 each 1    PERMETHRIN EX Wheelchair: Standard  with leg rests: (Swing away Length of need: 99 months      polyethylene glycol-propylene glycol (SYSTANE) 0.4-0.3 % SOLN ophthalmic solution Place 1 drop into both eyes 2 times daily as needed for dry eyes (Patient not taking: Reported on 6/13/2024) 3 mL 0     No current facility-administered medications for this visit.         Social History   See HPI    Family History     Family History   Problem Relation Age of Onset    Cerebrovascular Disease Mother     Arthritis Mother     Osteoporosis Mother     Alzheimer Disease Father     Arthritis Father     Cancer Father     Diabetes Maternal Grandmother     Cardiovascular  "Maternal Grandmother     Other Cancer Brother     Cancer Paternal Aunt     Hypertension No family hx of     Thyroid Disease No family hx of     Glaucoma No family hx of     Macular Degeneration No family hx of        Physical Exam     Temp Readings from Last 3 Encounters:   04/16/24 97.7  F (36.5  C) (Oral)   03/07/24 97.7  F (36.5  C) (Temporal)   02/15/24 97.4  F (36.3  C) (Temporal)     BP Readings from Last 5 Encounters:   06/13/24 (!) 88/58   04/16/24 139/77   03/07/24 109/64   02/15/24 106/70   02/01/24 108/68     Pulse Readings from Last 1 Encounters:   06/13/24 61     Resp Readings from Last 1 Encounters:   04/16/24 16     Estimated body mass index is 32.49 kg/m  as calculated from the following:    Height as of 12/7/23: 1.753 m (5' 9\").    Weight as of 4/16/24: 99.8 kg (220 lb).    GEN: NAD.   HEENT:  Anicteric, noninjected sclera. No obvious external lesions of the ear and nose. Hearing intact.  PULM: No increased work of breathing.   MSK: MCPs, PIPs, and DIPs without swelling or tenderness to palpation.  Active triggering of the left fourth finger.  Heberden's and Bassam's nodes present.  Wrists without swelling or tenderness to palpation.  Elbows and shoulders without swelling or tenderness to palpation.  Knees with crepitation and medial joint line tenderness but no effusion or increased warmth.  Left foot bandaged.  SKIN: No rash or jaundice seen  PSYCH: Alert. Appropriate.       Labs / Imaging (select studies)     9/28/1999  ([facility])    RF/CCP  Recent Labs   Lab Test 04/07/16  1149   CCPIGG 64*     CBC  Recent Labs   Lab Test 02/15/24  1457 01/08/24  1128 11/06/23  1520 09/11/23  1111 01/10/23  0745 09/08/21  1607 07/14/21  0924 07/05/21  1532 04/07/21  0958   WBC  --   --  6.9 6.6 6.5   < > 7.0 3.7* 7.4   RBC  --   --  3.86* 4.51 3.95*   < > 2.72* 2.20* 3.55*   HGB 10.8* 10.7* 11.3* 13.2* 11.9*   < > 8.4* 6.9* 10.8*   HCT  --   --  35.3* 41.5 37.7*   < > 25.8* 21.6* 33.3*   MCV  --   " --  92 92 95   < > 95 98 94   RDW  --   --  14.4 13.1 13.3   < > 17.1* 17.3* 14.0   PLT  --   --  251 254 219   < > 140* 96* 342   MCH  --   --  29.3 29.3 30.1   < > 30.9 31.4 30.4   MCHC  --   --  32.0 31.8 31.6   < > 32.6 31.9 32.4   NEUTROPHIL  --   --  70 68 65   < > 68  75 68.0 80.7   LYMPH  --   --  14 14 14   < > 10  7 19.0 11.6   MONOCYTE  --   --  10 12 15   < > 20  15 10.0 6.0   EOSINOPHIL  --   --  5 6 4   < > 2  2 2.0 1.2   BASOPHIL  --   --  1 1 1   < > 1  0 1.0 0.5   ANEU  --   --   --   --   --   --  5.3 2.5 6.0   ALYM  --   --   --   --   --   --  0.5* 0.7* 0.9   MSITH  --   --   --   --   --   --  1.1 0.4 0.5   AEOS  --   --   --   --   --   --  0.1 0.1 0.1   ABAS  --   --   --   --   --   --  0.0 0.0 0.0   ANEUTAUTO  --   --  4.9 4.5 4.3   < > 4.8  --   --    ALYMPAUTO  --   --  1.0 0.9 0.9   < > 0.7*  --   --    AMONOAUTO  --   --  0.7 0.8 1.0   < > 1.4*  --   --    AEOSAUTO  --   --  0.4 0.4 0.3   < > 0.2  --   --    ABSBASO  --   --  0.0 0.1 0.1   < > 0.1  --   --     < > = values in this interval not displayed.     CMP  Recent Labs   Lab Test 02/15/24  1457 01/08/24  1128 11/06/23  1520 09/28/23  1055 09/11/23  1111 06/27/23  0540 07/14/21  1645 07/05/21  1532 04/07/21  0958 01/09/21  0709   * 139  --   --   --  141   < >  --   --  136   POTASSIUM 5.3 4.7  --   --   --  4.4   < >  --   --  4.3   CHLORIDE 102 105  --   --   --  109*   < >  --   --  108   CO2 21* 22  --   --   --  20*   < >  --   --  23   ANIONGAP 11 12  --   --   --  12   < >  --   --  5   * 178*  --   --   --  181*   < >  --   --  117*   BUN 33.7* 38.1*  --   --   --  38.1*   < >  --   --  31*   CR 2.02* 2.08* 1.75* 1.83* 1.97* 1.52*   < > 2.84* 1.52* 1.39*   GFRESTIMATED 33* 32* 39* 37* 34* 46*   < > 20* 44* 49*   GFRESTBLACK  --   --   --   --   --   --   --  24* 50* 56*   NEW 9.0 9.0  --   --   --  8.5*   < >  --   --  8.4*   BILITOTAL  --   --  0.3 0.3 0.4  --    < > 0.8 0.4  --    ALBUMIN  --   --  3.6 3.7  4.0  --    < > 3.5 3.5  --    PROTTOTAL  --   --  6.7 7.1 7.6  --    < > 6.9 7.0  --    ALKPHOS  --   --  53 55 55  --    < > 85 69  --    AST  --   --  19 20 23  --    < > 12 19  --    ALT  --   --  19 11 11  --    < > 22 25  --     < > = values in this interval not displayed.     Calcium/VitaminD  Recent Labs   Lab Test 02/15/24  1457 01/08/24  1128 06/27/23  0540   NEW 9.0 9.0 8.5*     ESR/CRP  Recent Labs   Lab Test 09/11/23  1111 07/05/21  1532 04/07/21  0958 12/11/20  1436   SED 63* 83* 43* 33*   CRP  --  37.1* 11.3* 18.9*   CRPI 26.00*  --   --   --      Lipid Panel  Recent Labs   Lab Test 09/28/23  1055 02/01/22  0901 01/07/21  0610 12/17/20  0655   CHOL 155 216* 116 166   TRIG 218* 232* 166* 204*   HDL 33* 35* 38* 35*   LDL 78 135* 45 90   NHDL 122  --  78 131*     Hepatitis B  Recent Labs   Lab Test 09/11/23  1111 04/07/16  1149   HBCAB Nonreactive Nonreactive   HEPBANG Nonreactive Nonreactive     Hepatitis C  Recent Labs   Lab Test 09/11/23  1111 04/07/16  1149   HCVAB Nonreactive Nonreactive   Assay performance characteristics have not been established for newborns,   infants, and children       HIV Screening  Recent Labs   Lab Test 04/07/16  1149   HIAGAB Nonreactive   HIV-1 p24 Ag & HIV-1/HIV-2 Ab Not Detected       Immunization History     Immunization History   Administered Date(s) Administered    COVID-19 Bivalent 12+ (Pfizer) 10/04/2022    COVID-19 MONOVALENT 12+ (Pfizer) 02/19/2021, 03/12/2021    COVID-19 Monovalent 18+ (Moderna) 11/29/2021, 05/13/2022    Influenza (H1N1) 01/20/2010    Influenza (High Dose) 3 valent vaccine 09/20/2012, 10/20/2015, 09/20/2016, 08/28/2017, 09/24/2018, 09/18/2019    Influenza (IIV3) PF 10/05/1999, 10/30/2008, 09/28/2011, 10/14/2013, 10/03/2014    Influenza Vaccine 65+ (FLUAD) 09/24/2021, 10/04/2022    Influenza Vaccine 65+ (Fluzone HD) 09/11/2020    Pneumo Conj 13-V (2010&after) 06/29/2015    Pneumococcal 23 valent 08/19/2010    TD,PF 7+ (Tenivac) 08/05/1997,  02/03/2011    TDAP (Adacel,Boostrix) 03/11/2020    TDAP Vaccine (Boostrix) 03/11/2020    Zoster recombinant adjuvanted (SHINGRIX) 01/03/2020, 03/11/2020    Zoster vaccine, live 04/19/2010          Chart documentation done in part with Dragon Voice recognition Software. Although reviewed after completion, some word and grammatical error may remain.    Albert Tompkins MD

## 2024-06-13 NOTE — PATIENT INSTRUCTIONS
RHEUMATOLOGY    [facility] [place]  [address] [place] [address]  [place] [address]    Phone number: [phone]  Fax number: [phone]    If you need a medication refill, please contact us as you may need lab work and/or a follow up visit prior to your refill.      Thank you for choosing [facility]!    Eryn Espinoza CMA Rheumatology

## 2024-06-13 NOTE — TELEPHONE ENCOUNTER
Writer called Darleen (home care) and relayed verbal ok for requested home care orders per Dr. Kapil Duckworth, no further questions.    CARLOS Mitchell RN  [facility]

## 2024-06-14 ENCOUNTER — ANCILLARY PROCEDURE (OUTPATIENT)
Dept: GENERAL RADIOLOGY | Facility: CLINIC | Age: 79
End: 2024-06-14
Attending: INTERNAL MEDICINE
Payer: COMMERCIAL

## 2024-06-14 ENCOUNTER — OFFICE VISIT (OUTPATIENT)
Dept: INTERNAL MEDICINE | Facility: CLINIC | Age: 79
End: 2024-06-14
Payer: COMMERCIAL

## 2024-06-14 VITALS
HEART RATE: 62 BPM | OXYGEN SATURATION: 95 % | TEMPERATURE: 97.7 F | RESPIRATION RATE: 16 BRPM | SYSTOLIC BLOOD PRESSURE: 127 MMHG | DIASTOLIC BLOOD PRESSURE: 76 MMHG

## 2024-06-14 DIAGNOSIS — S59.902D INJURY OF LEFT ELBOW, SUBSEQUENT ENCOUNTER: ICD-10-CM

## 2024-06-14 DIAGNOSIS — M86.9 OSTEOMYELITIS OF LEFT FOOT, UNSPECIFIED TYPE (H): ICD-10-CM

## 2024-06-14 DIAGNOSIS — Z29.11 NEED FOR VACCINATION AGAINST RESPIRATORY SYNCYTIAL VIRUS: ICD-10-CM

## 2024-06-14 DIAGNOSIS — E11.9 TYPE 2 DIABETES MELLITUS WITHOUT RETINOPATHY (H): ICD-10-CM

## 2024-06-14 DIAGNOSIS — Z23 NEED FOR PROPHYLACTIC VACCINATION AGAINST HEPATITIS A: ICD-10-CM

## 2024-06-14 DIAGNOSIS — S98.132A AMPUTATION OF TOE OF LEFT FOOT (H): Primary | ICD-10-CM

## 2024-06-14 LAB — HBA1C MFR BLD: 6.6 % (ref 0–5.6)

## 2024-06-14 PROCEDURE — 73070 X-RAY EXAM OF ELBOW: CPT | Mod: TC | Performed by: RADIOLOGY

## 2024-06-14 PROCEDURE — 36415 COLL VENOUS BLD VENIPUNCTURE: CPT | Performed by: INTERNAL MEDICINE

## 2024-06-14 PROCEDURE — 83036 HEMOGLOBIN GLYCOSYLATED A1C: CPT | Performed by: INTERNAL MEDICINE

## 2024-06-14 PROCEDURE — 99214 OFFICE O/P EST MOD 30 MIN: CPT | Performed by: INTERNAL MEDICINE

## 2024-06-14 RX ORDER — RESPIRATORY SYNCYTIAL VIRUS VACCINE 120MCG/0.5
0.5 KIT INTRAMUSCULAR ONCE
Qty: 1 EACH | Refills: 0 | Status: CANCELLED | OUTPATIENT
Start: 2024-06-14 | End: 2024-06-14

## 2024-06-14 RX ORDER — CIPROFLOXACIN 500 MG/1
500 TABLET, FILM COATED ORAL
COMMUNITY
Start: 2024-06-11 | End: 2024-06-18

## 2024-06-14 RX ORDER — FERROUS GLUCONATE 324(38)MG
324 TABLET ORAL
Qty: 90 TABLET | Refills: 3 | Status: SHIPPED | OUTPATIENT
Start: 2024-06-14

## 2024-06-14 NOTE — LETTER
June 17, 2024    Zurdo HEATH Hardy  [address]   [place] [address]          Dear ,    We are writing to inform you of your test results.    This is an excellent hemoglobin a1c  [ diabetes test ] !         Resulted Orders   Hemoglobin A1c   Result Value Ref Range    Hemoglobin A1C 6.6 (H) 0.0 - 5.6 %      Comment:      Normal <5.7%   Prediabetes 5.7-6.4%    Diabetes 6.5% or higher     Note: Adopted from ADA consensus guidelines.       If you have any questions or concerns, please call the clinic at the number listed above.       Sincerely,      Kapil Duckworth MD

## 2024-06-14 NOTE — PROGRESS NOTES
Assessment & Plan     Amputation of toe of left foot (H24)  I am seeing this patient for a post hospital discharge follow up office visit , his hospitalization was secondary to continued ischemic vascular disease and severe peripheral arterial disease. He's had multiple interventions and unfortunately has severe ongoing problems . He's down to nothing more then his last 2 toes [ 4 and 5] are still remaining and his foot was bandaged and undressed and reviewed today. He has a further follow up appointment with his main Podiatrist Molly Ward DPM , he has a further follow up with her next week. See exam as detailed below. Patient has a wound that is clean, dry, and intact and it looks fine. It is concerning that patient is describing some pain with his left foot and at first it sounds like rest ischemic pain but further discussion is less clear on this point and he has pain more with movement and any weight bearing and it is difficult to see this pain as worrisome in and of itself . His diabetes neuropathy is also making some understanding of his foot symptoms more difficult . Ultimately after and his wife Yoli are aware of what to be on the watch for and meanwhile will see Podiatrist next week.    Patient is looking for opioid pain medication for his toe / post-surgery and I am not willing to do this. First of all his Podiatrist needs to be aware of and be involved in evaluation and management of his foot pain and I recommended he call [facility] in [place] right away today to review his concerns. Secondly he is not a chronic pain syndrome patient and I don't want to go down this road if at all possible     Type 2 diabetes mellitus without retinopathy (H)  Part of his ischemic vascular disease  is his diabetes mellitus which has at times been not managed well. Currently, his last hemoglobin a1c  [ diabetes test ] in February was 8.9 and we added rapid acting insulin to take with meals. He has  a [place] home monitor for blood glucose monitoring and says he is not having wild blood glucose variability like he had prior to adding the rapid acting pre-meal insulin. We agreed to recheck hemoglobin a1c  [ diabetes test ] today with further follow up depending on the test results   - Hemoglobin A1c; Future  - Hemoglobin A1c    Osteomyelitis of left foot, unspecified type (H)  Unfortunately his case is challenging and this is why he has a primary Podiatrist. Will copy Dr. Ward with this office visit notes for today     Injury of left elbow, subsequent encounter  He's had some chronic smoldering elbow pain and an xray will help to exclude the possible diagnosis of elbow fracture / loose bone chip   - XR Elbow Left 2 Views; Future    Need for prophylactic vaccination against hepatitis A  Declines     Need for vaccination against respiratory syncytial virus  Declines       MED REC REQUIRED  Post Medication Reconciliation Status: discharge medications reconciled and changed, per note/orders      Subjective   Zurdo is a 79 year old, presenting for the following health issues:  Hospital F/U        6/14/2024     9:39 AM   Additional Questions   Roomed by philippe SILVA       Hospital Follow-up Visit:    Hospital/Nursing Home/IP Rehab Facility:  [facility]   Date of Admission: 06/09/2024  Date of Discharge: 06/11/2024  Reason(s) for Admission: Infection of superficial incisional surgical site after procedure, initial encounter (Primary Dx);  Osteomyelitis of third toe of left foot (HC);  Pseudomonas aeruginosa infection  Discharge Disposition: Home Health   Was the patient in the ICU or did the patient experience delirium during hospitalization?  No  Do you have any other stressors you would like to discuss with your provider? No    Problems taking medications regularly:  None  Medication changes since discharge: see current med list   Problems adhering to non-medication therapy:  None    Summary of  "hospitalization:  CareEverywhere information obtained and reviewed  Diagnostic Tests/Treatments reviewed.  Follow up needed: with Podiatrist   Other Healthcare Providers Involved in Patient s Care:          multiple sub-specialists   Update since discharge: improved.       The hospitalization was due to further complications of peripheral vascular disease and toe infections with additional toe amputation surgery, patient describes no real warning signs, had been using iodine and wrapping it every single day, he's was getting nurses come in and change his dressings and he was told all along things are good and then one day it's not fine., has a lot of pain and reddened quality and clear cut evidence of infection and now his toe remains in pain even after the amputation . He has a pain / tender to touch - in the past dressing changes were not painful at all even with changing dressings but now his toe is tender to touch and very tender such as increased pain to even turning the wrong way in sleep and wakes him from a dead sleep. His hospital discharge  day was Wednesday 2 days ago. He says he discussed this pain while at the hospital. Purportedly this was not thought to be a concern.  Patient does have chronic painful diabetes neuropathy and has been taking 3 times a day Gabapentin [Neurontin] for quite some time and patient feels the Gabapentin [Neurontin] has been good for his peripheral neuropathy pain. Has also used a transcutaneous electrical nerve stimulator  but this was not as helpful. Patient is :\" bringing . Patient also has further follow up with [facility] in [place] , Dr's include Dr. Gabriel, Dr. Patton, and Dr. Wood. They have a further follow up appointment with them on 6/19    Osteomyelitis of third toe of left foot (HC) s/p toe amputation was his diagnosis. For complete details please see [facility] discharge summary on care everywhere . He still walks around his apartment and " takes a few steps but is almost always using his wheeled walker with a seat     One of his last falls he's harmed     Hospital Course     Zurdo Dash is a(n) 79 y.o. with a history of severe PAD, s/p femoral bypass and multiple toe amputations on left foot, CKD, DVT, hypertension, type 2 diabetes and rheumatoid arthritis, last admitted from 5/7/2024 to 5/10/2024 with dusky left foot and painful third toe infected ulcer. No vascular interventions were performed. Dr. Wood from podiatry did partial toe amputation on 5/9/2024. As the margins were clear, no further antibiotics were prescribed at discharge. There was apparently some issues with delayed healing. He started noticing increased pain and pus drainage about 3 days ago. No associated fevers. No chest pain or shortness of breath. There was no radiographic evidence of osteomyelitis but this was suspected with exposed bone. He underwent total amputation of the third left toe on 6/10/2024 by Dr. Gabriel. Cultures were growing pseudomonas, and he was prescribed ciprofloxacin at discharge. Pathology report is pending. If this amputation fails to heal he may need a TMA vs more proximal amputation. He was discharged home with home health RN.     Recommendations for Outpatient Provider PCP: Kapil Duckworth MD     Admission: 6/9/2024 @ [facility]    Specific recommendations to be addressed at the follow up visit:   - left third toe amputation, failure to heal following partial amputation. S/p total amputation.   - at risk for TMA or more proximal amputation if this does not heal   - culture growing pseudomonas, see below  Medication changes: ciprofloxacin x 7 days   Follow up labs/imaging: final pathology report, BMP   Other specialty follow-up not included in DC orders: podiatry     I will send a communication to   1st Dr. Wood partial amputation surgery was with this  And the   2nd surgery [ complete amputation ] was Dr. Gabriel    Plan of care communicated with  patient and family           Lab Results   Component Value Date    A1C 8.9 02/15/2024    A1C 6.4 09/28/2023    A1C 7.8 06/27/2023    A1C 8.2 01/10/2023    A1C 7.1 07/28/2022    A1C 6.8 01/07/2021    A1C 6.8 12/17/2020    A1C 6.7 12/11/2020    A1C 6.0 01/03/2020    A1C 6.4 08/06/2019           Review of Systems  Constitutional, HEENT, cardiovascular, pulmonary, gi and gu systems are negative, except as otherwise noted.      Objective    /76   Pulse 62   Temp 97.7  F (36.5  C) (Temporal)   Resp 16   SpO2 95%   There is no height or weight on file to calculate BMI.  Physical Exam   GENERAL: alert and no distress  EYES: Eyes grossly normal to inspection, PERRL and conjunctivae and sclerae normal  RESP: lungs clear to auscultation - no rales, rhonchi or wheezes  CV: regular rate and rhythm, normal S1 S2, no S3 or S4, no murmur, click or rub, no peripheral edema  MS: he's wearing a surgical shoe and underneath this is wrapped with a Telfa Non Stick Pad directly on the interrupted sutures which are still present. Wound edges are nicely approximated . No drainage. No evidence of infection , not cold to touch    Orders Placed This Encounter   Procedures    XR Elbow Left 2 Views    Hemoglobin A1c             Signed Electronically by: Kapil Duckworth MD

## 2024-06-14 NOTE — Clinical Note
Just keeping you in the information loop . This patient just continues to do poorly and rheumatoid arthritis is frankly not his biggest problem. Losing more bits of himself to amputation from ischemic vascular disease  is an ever-present and clear threat. He's asking / sending complaints of his joint pains and I think he's talking about osteoarthritis actually with his hands. He asks about methotrexate and seems confused. Opioid pain medication is not a solution in anyway. Probably no follow up here with you is specifically appropriate but just letting you know of this patient with the sonny Duckworth MD

## 2024-06-17 DIAGNOSIS — E78.5 HYPERLIPIDEMIA LDL GOAL <100: ICD-10-CM

## 2024-06-17 RX ORDER — PRAVASTATIN SODIUM 10 MG
10 TABLET ORAL DAILY
Qty: 90 TABLET | Refills: 0 | Status: SHIPPED | OUTPATIENT
Start: 2024-06-17

## 2024-06-19 ENCOUNTER — TELEPHONE (OUTPATIENT)
Dept: FAMILY MEDICINE | Facility: CLINIC | Age: 79
End: 2024-06-19
Payer: COMMERCIAL

## 2024-06-19 NOTE — TELEPHONE ENCOUNTER
Home Care is calling regarding an established patient with [facility].       Requesting orders from: Kapil Duckworth  Provider is following patient: Yes  Is this a 60-day recertification request?  No    Orders Requested    Physical Therapy  Request for initial certification    Frequency:  1 more visit this week, 2x week for 1 week, 1x week for 3 weeks       Information was gathered and will be sent to provider for review.  RN will contact Home Care with information after provider review.  Confirmed ok to leave a detailed message with call back.  Contact information confirmed and updated as needed.    Michela (Neida PT) CB#: [phone], dvm ok    Thanks,  MANJU MitchellN RN  [facility]

## 2024-06-21 NOTE — TELEPHONE ENCOUNTER
Left detailed message on confidential line approving requested home care orders     Alyse Vaz RN  [facility]

## 2024-06-27 ENCOUNTER — MEDICAL CORRESPONDENCE (OUTPATIENT)
Dept: HEALTH INFORMATION MANAGEMENT | Facility: CLINIC | Age: 79
End: 2024-06-27
Payer: COMMERCIAL

## 2024-07-02 ENCOUNTER — TELEPHONE (OUTPATIENT)
Dept: FAMILY MEDICINE | Facility: CLINIC | Age: 79
End: 2024-07-02
Payer: COMMERCIAL

## 2024-07-02 ENCOUNTER — TELEPHONE (OUTPATIENT)
Dept: INTERNAL MEDICINE | Facility: CLINIC | Age: 79
End: 2024-07-02
Payer: COMMERCIAL

## 2024-07-02 NOTE — TELEPHONE ENCOUNTER
Zuleika,  wanted to update pcp that patient is admitted to the hospital at [facility].     Saida Lobato RN on 7/2/2024 at 8:07 AM

## 2024-07-02 NOTE — TELEPHONE ENCOUNTER
Reason for Call:  Form, our goal is to have forms completed with 72 hours, however, some forms may require a visit or additional information.    Type of letter, form or note:  Home Health Certification    Who is the form from?: Home care    Where did the form come from: form was faxed in    What clinic location was the form placed at?: [facility]    Where the form was placed: Given to physician    What number is listed as a contact on the form?: [phone]       Call taken on 7/2/2024 at 12:33 PM by Caren Telles

## 2024-07-02 NOTE — TELEPHONE ENCOUNTER
[facility] received and given to Dr. Hernandez to sign for Dr. Duckworth who is out of the office until 7-.  Caren Telles,

## 2024-07-08 ENCOUNTER — OFFICE VISIT (OUTPATIENT)
Dept: OPHTHALMOLOGY | Facility: CLINIC | Age: 79
End: 2024-07-08
Payer: COMMERCIAL

## 2024-07-08 ENCOUNTER — TELEPHONE (OUTPATIENT)
Dept: OPHTHALMOLOGY | Facility: CLINIC | Age: 79
End: 2024-07-08

## 2024-07-08 DIAGNOSIS — Z79.899 LONG-TERM USE OF HYDROXYCHLOROQUINE: ICD-10-CM

## 2024-07-08 PROCEDURE — 92012 INTRM OPH EXAM EST PATIENT: CPT | Performed by: OPHTHALMOLOGY

## 2024-07-08 PROCEDURE — 92083 EXTENDED VISUAL FIELD XM: CPT | Performed by: OPHTHALMOLOGY

## 2024-07-08 PROCEDURE — 92134 CPTRZ OPH DX IMG PST SGM RTA: CPT | Performed by: OPHTHALMOLOGY

## 2024-07-08 ASSESSMENT — VISUAL ACUITY
OD_CC: 20/20
METHOD: SNELLEN - LINEAR
OS_CC: 20/20
CORRECTION_TYPE: GLASSES

## 2024-07-08 ASSESSMENT — SLIT LAMP EXAM - LIDS
COMMENTS: GOOD COSMESIS
COMMENTS: GOOD COSMESIS

## 2024-07-08 ASSESSMENT — EXTERNAL EXAM - RIGHT EYE: OD_EXAM: PROLAPSED FAT PADS: UPPER, 2+ BROW PTOSIS

## 2024-07-08 NOTE — TELEPHONE ENCOUNTER
Left message for patient to call me back.  Patient can come back for the HVF/OCT for plaquenil testing this am until around 11:15 am With Dr. Avendano. He thought his appt was today at 945 but it was scheduled for 3 pm.

## 2024-07-08 NOTE — PATIENT INSTRUCTIONS
Will update Dr. Tompkins with today's visit.     Return visit in 6 months for a complete exam.     Return visit in one year for Plaquenil OCT and 10-2 Kidd Visual Field.    Justin Avendano M.D.  [phone]

## 2024-07-08 NOTE — LETTER
7/8/2024      Zurdo Dash  [address]  [place] [address]      Dear Colleague,    Thank you for referring your patient, Zurdo Dash, to the [facility]. Please see a copy of my visit note below.     Current Eye Medications: Artificial Tears as needed (rarely)      Subjective:  Here for high risk medication follow up with testing. Patient currently takes 400 MG of plaquenil daily for RA.Patient follows with Dr. Tompkins.  Vision is stable. No eye pain or discomfort.      Objective:  See Ophthalmology Exam.       Assessment:  Stable retinal OCT and central Kidd Visual Field both eyes in patient on chronic Plaquenil therapy for RA.  Cleared for continued usage.      Plan:  Will update Dr. Tompkins with today's visit.     Return visit in 6 months for a complete exam.     Return visit in one year for Plaquenil OCT and 10-2 Kidd Visual Field.    Justin Avendano M.D.  [phone]         Again, thank you for allowing me to participate in the care of your patient.        Sincerely,        Justin Avendano MD

## 2024-07-09 DIAGNOSIS — I10 HYPERTENSION GOAL BP (BLOOD PRESSURE) < 140/90: ICD-10-CM

## 2024-07-09 DIAGNOSIS — N18.32 STAGE 3B CHRONIC KIDNEY DISEASE (H): ICD-10-CM

## 2024-07-09 DIAGNOSIS — F32.A DEPRESSION, UNSPECIFIED DEPRESSION TYPE: ICD-10-CM

## 2024-07-09 DIAGNOSIS — T14.8XXA WOUND INFECTION: ICD-10-CM

## 2024-07-09 DIAGNOSIS — L08.9 WOUND INFECTION: ICD-10-CM

## 2024-07-10 ENCOUNTER — TELEPHONE (OUTPATIENT)
Dept: FAMILY MEDICINE | Facility: CLINIC | Age: 79
End: 2024-07-10
Payer: COMMERCIAL

## 2024-07-10 DIAGNOSIS — M05.79 RHEUMATOID ARTHRITIS INVOLVING MULTIPLE SITES WITH POSITIVE RHEUMATOID FACTOR (H): Primary | ICD-10-CM

## 2024-07-10 RX ORDER — BUMETANIDE 1 MG/1
1 TABLET ORAL DAILY
Qty: 90 TABLET | Refills: 0 | Status: SHIPPED | OUTPATIENT
Start: 2024-07-10

## 2024-07-10 RX ORDER — ACETAMINOPHEN 500 MG
500-1000 TABLET ORAL 3 TIMES DAILY PRN
Qty: 180 TABLET | Refills: 11 | Status: SHIPPED | OUTPATIENT
Start: 2024-07-10

## 2024-07-10 RX ORDER — PSEUDOEPHED/ACETAMINOPH/DIPHEN 30MG-500MG
1000 TABLET ORAL DAILY
Qty: 180 TABLET | Refills: 1 | OUTPATIENT
Start: 2024-07-10

## 2024-07-10 NOTE — TELEPHONE ENCOUNTER
Yes he can increase just as you've said    I have sent a new prescription with these instructions     Kapil Duckworth MD

## 2024-07-10 NOTE — TELEPHONE ENCOUNTER
Refill request for acetaminophen 500mg was denied, he picked up 180 tablets on 5/31, he reports that he was told he could increase his dose to 2 tablets 3 times daily.   Can you please confirm if this is correct  Thank you  Bertha Lei, [facility]  [address] [place] [address]  [place] [address]  [phone]

## 2024-07-16 ENCOUNTER — ANCILLARY PROCEDURE (OUTPATIENT)
Dept: CT IMAGING | Facility: CLINIC | Age: 79
End: 2024-07-16
Attending: RADIOLOGY
Payer: COMMERCIAL

## 2024-07-16 DIAGNOSIS — C34.11 MALIGNANT NEOPLASM OF UPPER LOBE OF RIGHT LUNG (H): ICD-10-CM

## 2024-07-16 PROCEDURE — 71250 CT THORAX DX C-: CPT | Mod: GC | Performed by: RADIOLOGY

## 2024-07-22 ENCOUNTER — OFFICE VISIT (OUTPATIENT)
Dept: RADIATION ONCOLOGY | Facility: CLINIC | Age: 79
End: 2024-07-22
Payer: COMMERCIAL

## 2024-07-22 VITALS
SYSTOLIC BLOOD PRESSURE: 104 MMHG | DIASTOLIC BLOOD PRESSURE: 67 MMHG | BODY MASS INDEX: 33.54 KG/M2 | OXYGEN SATURATION: 95 % | RESPIRATION RATE: 16 BRPM | WEIGHT: 227.1 LBS | HEART RATE: 64 BPM | TEMPERATURE: 97.7 F

## 2024-07-22 DIAGNOSIS — C34.11 MALIGNANT NEOPLASM OF UPPER LOBE OF RIGHT LUNG (H): Primary | ICD-10-CM

## 2024-07-22 DIAGNOSIS — M05.79 RHEUMATOID ARTHRITIS INVOLVING MULTIPLE SITES WITH POSITIVE RHEUMATOID FACTOR (H): ICD-10-CM

## 2024-07-22 DIAGNOSIS — M79.601 PAIN OF RIGHT UPPER EXTREMITY: Primary | ICD-10-CM

## 2024-07-22 DIAGNOSIS — E11.42 DIABETIC POLYNEUROPATHY ASSOCIATED WITH TYPE 2 DIABETES MELLITUS (H): ICD-10-CM

## 2024-07-22 PROCEDURE — 99212 OFFICE O/P EST SF 10 MIN: CPT | Performed by: RADIOLOGY

## 2024-07-22 PROCEDURE — G2211 COMPLEX E/M VISIT ADD ON: HCPCS | Performed by: RADIOLOGY

## 2024-07-22 RX ORDER — POLYETHYLENE GLYCOL 3350 17 G/17G
1 POWDER, FOR SOLUTION ORAL PRN
COMMUNITY

## 2024-07-22 ASSESSMENT — PAIN SCALES - GENERAL: PAINLEVEL: MODERATE PAIN (4)

## 2024-07-22 NOTE — NURSING NOTE
"Oncology Rooming Note    July 22, 2024 3:44 PM   Zurdo Dash is a 79 year old male who presents for:    Chief Complaint   Patient presents with    Radiation Therapy     Return appointment with Dr. Funez      Initial Vitals: /67   Pulse 64   Temp 97.7  F (36.5  C) (Oral)   Resp 16   Wt 103 kg (227 lb 1.6 oz)   SpO2 95%   BMI 33.54 kg/m   Estimated body mass index is 33.54 kg/m  as calculated from the following:    Height as of 12/7/23: 1.753 m (5' 9\").    Weight as of this encounter: 103 kg (227 lb 1.6 oz). Body surface area is 2.24 meters squared.  Moderate Pain (4) Comment: Data Unavailable   No LMP for male patient.  Allergies reviewed: Yes  Medications reviewed: Yes    Medications: Medication refills not needed today.  Pharmacy name entered into [employer]:    [facility] [address] [facility] [address]  [facility] [address]  [facility] [address] - [place], [place] - [address]  [address]    Frailty Screening:   Is the patient here for a new oncology consult visit in cancer care? 2. No    Clinical concerns: Return appointment  Dr. Funez was notified.  Follow up in 6 months with repeat CT Chest.    Heidi Villar RN              "

## 2024-07-22 NOTE — LETTER
7/22/2024      Zurdo Dash  [address]  [place] [address]      Dear Colleague,    Thank you for referring your patient, Zurdo Dash, to the [facility] [place]. Please see a copy of my visit note below.    Dear Colleagues,  Today Zurdo Dash was seen in follow up      IDENTIFICATION: 79 year old gentleman with multiple comorbidities and a pacemaker diagnosed with [id number], stage 1 lung cancer of the RUL status post stereotactic body radiation therapy completed on August 26, 2022.    INTERVAL HISTORY: While on treatment he had no complaints.  He is seen today in follow-up with no reportable late radiation-induced side effects.   He is doing well with no concerns. Specifically denies n/v/ha/sob/cp.      REVIEW OF SYSTEMS: As per HPI, a 14-point review of systems is otherwise negative.    Past Medical History:   Diagnosis Date     Abnormal CT scan 03/2004    calcified lung granuloma     C. difficile diarrhea     H/O     Cataract 11/18/2011     Diabetic neuropathy (H)     mild, mostly soles and distal forefeet, worse on the left side.     Diverticulitis      ED (erectile dysfunction)      Ex-smoker     QUIT SMOKING FEB 2007     History of ETOH abuse     recovering, sober since 1997     Hyperlipidemia LDL goal <100      Hypertension goal BP (blood pressure) < 140/90      Hypogonadism      Obesity      PAD (peripheral artery disease) (H24)     leg cramps, with exertion, no formal diagnosis of PAD and minimal if any symptoms at all.     RA (rheumatoid arthritis) (H)     Dr Bailon     Syncope        Past Surgical History:   Procedure Laterality Date     AMPUTATION Left 01/2023    Toe     BYPASS GRAFT FEMOROPOPLITEAL Left 01/07/2021    Procedure: LEFT FEMORAL TO ABOVE KNEE POPLITEAL ARTERY BYPASS WITH PTFE VASCULAR GRAFT REMOVABLE RING 6MMX 50CM;  Surgeon: Myra Lopes MD;  Location: [facility]     CATARACT IOL, RT/LT       COLONOSCOPY  03/01/2018    [facility]      COMBINED REPAIR PTOSIS WITH BLEPHAROPLASTY BILATERAL Bilateral 2019    Procedure: BILATERAL UPPER EYELID BLEPHAROPLASTY AND BILATERAL PTOSIS REPAIR;  Surgeon: Janet Garcia MD;  Location: [facility]     ENDARTERECTOMY FEMORAL Left 2021    Procedure: LEFT FEMORAL ENDARTERECTOMY WITH PATCH ANGIOPLASTY PHOTOFIX  0.8 X 8CM;  Surgeon: Myra Lopes MD;  Location: [facility] OR     EP PACEMAKER  2021     EXCISE LESION EYELID Left 2019    Procedure: LEFT LOWER EYELID BIOPSY;  Surgeon: Janet Garcia MD;  Location: [facility]     IR LOWER EXTREMITY ANGIOGRAM LEFT  2020     PHACOEMULSIFICATION WITH STANDARD INTRAOCULAR LENS IMPLANT  2019; 3/2019    left eye; right eye     TONSILLECTOMY       ZZHC INCISION TENDON SHEATH FINGER  2009    r hand ring finger       Family History   Problem Relation Age of Onset     Cerebrovascular Disease Mother      Arthritis Mother      Osteoporosis Mother      Alzheimer Disease Father      Arthritis Father      Cancer Father      Diabetes Maternal Grandmother      Cardiovascular Maternal Grandmother      Other Cancer Brother      Cancer Paternal Aunt      Hypertension No family hx of      Thyroid Disease No family hx of      Glaucoma No family hx of      Macular Degeneration No family hx of        Social History     Tobacco Use     Smoking status: Former     Current packs/day: 0.00     Average packs/day: 1 pack/day for 40.0 years (40.0 ttl pk-yrs)     Types: Cigarettes     Start date: 3/16/1967     Quit date: 3/16/2007     Years since quittin.3     Smokeless tobacco: Never   Substance Use Topics     Alcohol use: No     Alcohol/week: 0.0 standard drinks of alcohol       Current Outpatient Medications   Medication Sig Dispense Refill     acetaminophen (TYLENOL) 500 MG tablet Take 1-2 tablets (500-1,000 mg) by mouth 3 times daily as needed for mild pain 180 tablet 11     Alcohol Swabs PADS Uses four times daily 120 each 11     apixaban ANTICOAGULANT  (ELIQUIS ANTICOAGULANT) 2.5 MG tablet Take 1 tablet (2.5 mg) by mouth 2 times daily 180 tablet 1     bumetanide (BUMEX) 1 MG tablet TAKE ONE TABLET BY MOUTH ONCE DAILY 90 tablet 0     cetirizine (ZYRTEC) 10 MG tablet Take 1 tablet (10 mg) by mouth At Bedtime 30 tablet 0     Continuous Blood Gluc Sensor (FREESTYLE YENNY 2 SENSOR) MISC 1 each every 14 days Use 1 sensor every 14 days. Use to read blood sugars per 's instructions. 2 each 5     cyanocobalamin (VITAMIN B-12) 1000 MCG tablet TAKE 1 TABLET (1,000 MCG) BY MOUTH ONCE A WEEK 30 tablet 0     Dulaglutide (TRULICITY) 3 MG/0.5ML SOPN Inject 3 mg Subcutaneous every 7 days       ferrous gluconate (FERGON) 324 (38 Fe) MG tablet TAKE 1 TABLET (324 MG) BY MOUTH DAILY (WITH BREAKFAST) 90 tablet 3     fluticasone (FLONASE) 50 MCG/ACT nasal spray Spray 1-2 sprays into both nostrils daily as needed for rhinitis or allergies       gabapentin (NEURONTIN) 600 MG tablet Take 1 tablet (600 mg) by mouth 3 times daily 270 tablet 1     HYDROmorphone (DILAUDID) 2 MG tablet Take 1 tablet (2 mg) by mouth 2 times daily as needed for pain For urgent pain relief with rheumatoid arthritis flare-up 10 tablet 0     hydroxychloroquine (PLAQUENIL) 200 MG tablet Take 1 tablet (200 mg) by mouth 2 times daily 180 tablet 2     insulin glargine (LANTUS PEN) 100 UNIT/ML pen Inject 47 Units Subcutaneous daily 45 mL 0     insulin lispro (HUMALOG KWIKPEN) 100 UNIT/ML (1 unit dial) KWIKPEN Inject 14 Units Subcutaneous 2 times daily (before meals) 30 mL 0     insulin pen needle (31G X 5 MM) 31G X 5 MM miscellaneous Use 3 pen needles daily or as directed. 300 each 1     medical cannabis (Patient's own supply) See Admin Instructions (The purpose of this order is to document that the patient reports taking medical cannabis.  This is not a prescription, and is not used to certify that the patient has a qualifying medical condition.)       metoprolol succinate ER (TOPROL XL) 50 MG 24 hr tablet  TAKE 1 TABLET (50 MG) BY MOUTH DAILY 90 tablet 0     omeprazole (PRILOSEC) 20 MG DR capsule TAKE 1 CAPSULE (20 MG) BY MOUTH DAILY 100 capsule 1     oxyCODONE (ROXICODONE) 5 MG tablet Take 5 mg by mouth every 6 hours as needed       PERMETHRIN EX Wheelchair: Standard  with leg rests: (Swing away Length of need: 99 months       polyethylene glycol (MIRALAX) 17 GM/Dose powder Take 1 Scoop by mouth as needed for constipation       polyethylene glycol-propylene glycol (SYSTANE) 0.4-0.3 % SOLN ophthalmic solution Place 1 drop into both eyes 2 times daily as needed for dry eyes 3 mL 0     pravastatin (PRAVACHOL) 10 MG tablet TAKE 1 TABLET (10 MG) BY MOUTH DAILY 90 tablet 0     sertraline (ZOLOFT) 50 MG tablet TAKE ONE TABLET BY MOUTH ONCE DAILY 90 tablet 0     SM PAIN RELIEVER EX  MG tablet TAKE TWO TABLETS BY MOUTH ONCE DAILY 180 tablet 1     No current facility-administered medications for this visit.          Allergies   Allergen Reactions     Seasonal Allergies      Blood-Group Specific Substance Other (See Comments)     Patient has a Non-specific antibody. Blood products may be delayed. Draw patient 24 hours prior to transfusion. For [facility] testing, draw one red top and two purple top tubes for all Type and Screen orders.  Patient has a Non-specific antibody. Blood products may be delayed. Draw patient 24 hours prior to transfusion. For [facility] testing, draw one red top and two purple top tubes for all Type and Screen orders.         PHYSICAL EXAM:  /67   Pulse 64   Temp 97.7  F (36.5  C) (Oral)   Resp 16   Wt 103 kg (227 lb 1.6 oz)   SpO2 95%   BMI 33.54 kg/m    GEN: appears well, in no acute distress  HEENT: normocephalic and atraumatic, EOMI, anicteric sclerae  CV: RRR  RESP: normal respiration on room air, no stridor, CTAB  SKIN: normal color and turgor  PSYCH: appropriate mood, affect, and judgment    All pertinent laboratory, imaging, and pathology findings have been reviewed.      IMAGING: Chest CT without contrast completed on July 16, 2024   1. Stable size of posterior right upper lobe mass at site of stereotactic radiation.  2. Stable additional pulmonary nodules as detailed above.  3. Stable right adrenal apparent myelolipoma.  4. Atherosclerosis including coronary artery disease.    IMPRESSION/RECOMMENDATION:  79 year old gentleman with multiple comorbidities and a pacemaker diagnosed with [id number], stage 1 lung cancer of the RUL status post stereotactic body radiation therapy completed on August 26, 2022. He is doing well with no significant radiation toxicity.  His most recent scan shows no definitive evidence of recurrence or new disease.  We will continue with every 6 month chest CTs with in person or video follow up.       Thank you for allowing me to participate in the care of this pleasant patient. If you have any questions, please do not hesitate to contact my office.    Lalita Funez MD  Attending Physician  Radiation Oncology      Again, thank you for allowing me to participate in the care of your patient.        Sincerely,        NAYAN Funez MD

## 2024-07-22 NOTE — TELEPHONE ENCOUNTER
Patient has been getting Gabapentin from a provider outside of [facility], taking 900mg three times a day. The previous provider has denied our recent refill request, and so the patient asked us to request this from Dr. Duckworth.    Thank you,  Poly Whitlock, Pharmacy Technician  [facility]

## 2024-07-22 NOTE — PROGRESS NOTES
Dear Colleagues,  Today Zurdo Dash was seen in follow up      IDENTIFICATION: 79 year old gentleman with multiple comorbidities and a pacemaker diagnosed with [id number], stage 1 lung cancer of the RUL status post stereotactic body radiation therapy completed on August 26, 2022.    INTERVAL HISTORY: While on treatment he had no complaints.  He is seen today in follow-up with no reportable late radiation-induced side effects.   He is doing well with no concerns. Specifically denies n/v/ha/sob/cp.      REVIEW OF SYSTEMS: As per HPI, a 14-point review of systems is otherwise negative.    Past Medical History:   Diagnosis Date    Abnormal CT scan 03/2004    calcified lung granuloma    C. difficile diarrhea     H/O    Cataract 11/18/2011    Diabetic neuropathy (H)     mild, mostly soles and distal forefeet, worse on the left side.    Diverticulitis     ED (erectile dysfunction)     Ex-smoker     QUIT SMOKING FEB 2007    History of ETOH abuse     recovering, sober since 1997    Hyperlipidemia LDL goal <100     Hypertension goal BP (blood pressure) < 140/90     Hypogonadism     Obesity     PAD (peripheral artery disease) (H24)     leg cramps, with exertion, no formal diagnosis of PAD and minimal if any symptoms at all.    RA (rheumatoid arthritis) (H)     Dr Epsteinay    Syncope        Past Surgical History:   Procedure Laterality Date    AMPUTATION Left 01/2023    Toe    BYPASS GRAFT FEMOROPOPLITEAL Left 01/07/2021    Procedure: LEFT FEMORAL TO ABOVE KNEE POPLITEAL ARTERY BYPASS WITH PTFE VASCULAR GRAFT REMOVABLE RING 6MMX 50CM;  Surgeon: Myra Lopes MD;  Location: [facility]    CATARACT IOL, RT/LT      COLONOSCOPY  03/01/2018    [facility]    COMBINED REPAIR PTOSIS WITH BLEPHAROPLASTY BILATERAL Bilateral 08/16/2019    Procedure: BILATERAL UPPER EYELID BLEPHAROPLASTY AND BILATERAL PTOSIS REPAIR;  Surgeon: Janet Garcia MD;  Location: [facility]    ENDARTERECTOMY FEMORAL Left 01/07/2021    Procedure: LEFT FEMORAL  ENDARTERECTOMY WITH PATCH ANGIOPLASTY PHOTOFIX  0.8 X 8CM;  Surgeon: Myra Lopes MD;  Location: [facility]    EP PACEMAKER  2021    EXCISE LESION EYELID Left 2019    Procedure: LEFT LOWER EYELID BIOPSY;  Surgeon: Janet Garcia MD;  Location: [facility]    IR LOWER EXTREMITY ANGIOGRAM LEFT  2020    PHACOEMULSIFICATION WITH STANDARD INTRAOCULAR LENS IMPLANT  2019; 3/2019    left eye; right eye    TONSILLECTOMY      ZZHC INCISION TENDON SHEATH FINGER  2009    r hand ring finger       Family History   Problem Relation Age of Onset    Cerebrovascular Disease Mother     Arthritis Mother     Osteoporosis Mother     Alzheimer Disease Father     Arthritis Father     Cancer Father     Diabetes Maternal Grandmother     Cardiovascular Maternal Grandmother     Other Cancer Brother     Cancer Paternal Aunt     Hypertension No family hx of     Thyroid Disease No family hx of     Glaucoma No family hx of     Macular Degeneration No family hx of        Social History     Tobacco Use    Smoking status: Former     Current packs/day: 0.00     Average packs/day: 1 pack/day for 40.0 years (40.0 ttl pk-yrs)     Types: Cigarettes     Start date: 3/16/1967     Quit date: 3/16/2007     Years since quittin.3    Smokeless tobacco: Never   Substance Use Topics    Alcohol use: No     Alcohol/week: 0.0 standard drinks of alcohol       Current Outpatient Medications   Medication Sig Dispense Refill    acetaminophen (TYLENOL) 500 MG tablet Take 1-2 tablets (500-1,000 mg) by mouth 3 times daily as needed for mild pain 180 tablet 11    Alcohol Swabs PADS Uses four times daily 120 each 11    apixaban ANTICOAGULANT (ELIQUIS ANTICOAGULANT) 2.5 MG tablet Take 1 tablet (2.5 mg) by mouth 2 times daily 180 tablet 1    bumetanide (BUMEX) 1 MG tablet TAKE ONE TABLET BY MOUTH ONCE DAILY 90 tablet 0    cetirizine (ZYRTEC) 10 MG tablet Take 1 tablet (10 mg) by mouth At Bedtime 30 tablet 0    Continuous Blood Gluc Sensor  (FREESTYLE YENNY 2 SENSOR) MISC 1 each every 14 days Use 1 sensor every 14 days. Use to read blood sugars per 's instructions. 2 each 5    cyanocobalamin (VITAMIN B-12) 1000 MCG tablet TAKE 1 TABLET (1,000 MCG) BY MOUTH ONCE A WEEK 30 tablet 0    Dulaglutide (TRULICITY) 3 MG/0.5ML SOPN Inject 3 mg Subcutaneous every 7 days      ferrous gluconate (FERGON) 324 (38 Fe) MG tablet TAKE 1 TABLET (324 MG) BY MOUTH DAILY (WITH BREAKFAST) 90 tablet 3    fluticasone (FLONASE) 50 MCG/ACT nasal spray Spray 1-2 sprays into both nostrils daily as needed for rhinitis or allergies      gabapentin (NEURONTIN) 600 MG tablet Take 1 tablet (600 mg) by mouth 3 times daily 270 tablet 1    HYDROmorphone (DILAUDID) 2 MG tablet Take 1 tablet (2 mg) by mouth 2 times daily as needed for pain For urgent pain relief with rheumatoid arthritis flare-up 10 tablet 0    hydroxychloroquine (PLAQUENIL) 200 MG tablet Take 1 tablet (200 mg) by mouth 2 times daily 180 tablet 2    insulin glargine (LANTUS PEN) 100 UNIT/ML pen Inject 47 Units Subcutaneous daily 45 mL 0    insulin lispro (HUMALOG KWIKPEN) 100 UNIT/ML (1 unit dial) KWIKPEN Inject 14 Units Subcutaneous 2 times daily (before meals) 30 mL 0    insulin pen needle (31G X 5 MM) 31G X 5 MM miscellaneous Use 3 pen needles daily or as directed. 300 each 1    medical cannabis (Patient's own supply) See Admin Instructions (The purpose of this order is to document that the patient reports taking medical cannabis.  This is not a prescription, and is not used to certify that the patient has a qualifying medical condition.)      metoprolol succinate ER (TOPROL XL) 50 MG 24 hr tablet TAKE 1 TABLET (50 MG) BY MOUTH DAILY 90 tablet 0    omeprazole (PRILOSEC) 20 MG DR capsule TAKE 1 CAPSULE (20 MG) BY MOUTH DAILY 100 capsule 1    oxyCODONE (ROXICODONE) 5 MG tablet Take 5 mg by mouth every 6 hours as needed      PERMETHRIN EX Wheelchair: Standard  with leg rests: (Swing away Length of need: 99  months      polyethylene glycol (MIRALAX) 17 GM/Dose powder Take 1 Scoop by mouth as needed for constipation      polyethylene glycol-propylene glycol (SYSTANE) 0.4-0.3 % SOLN ophthalmic solution Place 1 drop into both eyes 2 times daily as needed for dry eyes 3 mL 0    pravastatin (PRAVACHOL) 10 MG tablet TAKE 1 TABLET (10 MG) BY MOUTH DAILY 90 tablet 0    sertraline (ZOLOFT) 50 MG tablet TAKE ONE TABLET BY MOUTH ONCE DAILY 90 tablet 0    SM PAIN RELIEVER EX  MG tablet TAKE TWO TABLETS BY MOUTH ONCE DAILY 180 tablet 1     No current facility-administered medications for this visit.          Allergies   Allergen Reactions    Seasonal Allergies     Blood-Group Specific Substance Other (See Comments)     Patient has a Non-specific antibody. Blood products may be delayed. Draw patient 24 hours prior to transfusion. For [facility] testing, draw one red top and two purple top tubes for all Type and Screen orders.  Patient has a Non-specific antibody. Blood products may be delayed. Draw patient 24 hours prior to transfusion. For [facility] testing, draw one red top and two purple top tubes for all Type and Screen orders.         PHYSICAL EXAM:  /67   Pulse 64   Temp 97.7  F (36.5  C) (Oral)   Resp 16   Wt 103 kg (227 lb 1.6 oz)   SpO2 95%   BMI 33.54 kg/m    GEN: appears well, in no acute distress  HEENT: normocephalic and atraumatic, EOMI, anicteric sclerae  CV: RRR  RESP: normal respiration on room air, no stridor, CTAB  SKIN: normal color and turgor  PSYCH: appropriate mood, affect, and judgment    All pertinent laboratory, imaging, and pathology findings have been reviewed.     IMAGING: Chest CT without contrast completed on July 16, 2024   1. Stable size of posterior right upper lobe mass at site of stereotactic radiation.  2. Stable additional pulmonary nodules as detailed above.  3. Stable right adrenal apparent myelolipoma.  4. Atherosclerosis including coronary artery  disease.    IMPRESSION/RECOMMENDATION:  79 year old gentleman with multiple comorbidities and a pacemaker diagnosed with [id number], stage 1 lung cancer of the RUL status post stereotactic body radiation therapy completed on August 26, 2022. He is doing well with no significant radiation toxicity.  His most recent scan shows no definitive evidence of recurrence or new disease.  We will continue with every 6 month chest CTs with in person or video follow up.       Thank you for allowing me to participate in the care of this pleasant patient. If you have any questions, please do not hesitate to contact my office.    Lalita Funze MD  Attending Physician  Radiation Oncology

## 2024-07-23 RX ORDER — GABAPENTIN 300 MG/1
900 CAPSULE ORAL 3 TIMES DAILY
Qty: 270 CAPSULE | Refills: 1 | Status: SHIPPED | OUTPATIENT
Start: 2024-07-23 | End: 2024-09-27

## 2024-08-05 DIAGNOSIS — I10 HYPERTENSION GOAL BP (BLOOD PRESSURE) < 140/90: ICD-10-CM

## 2024-08-05 DIAGNOSIS — E11.42 TYPE 2 DIABETES MELLITUS WITH DIABETIC POLYNEUROPATHY, WITHOUT LONG-TERM CURRENT USE OF INSULIN (H): ICD-10-CM

## 2024-08-05 RX ORDER — INSULIN GLARGINE 100 [IU]/ML
INJECTION, SOLUTION SUBCUTANEOUS
Qty: 45 ML | Refills: 0 | Status: SHIPPED | OUTPATIENT
Start: 2024-08-05

## 2024-08-05 RX ORDER — METOPROLOL SUCCINATE 50 MG/1
50 TABLET, EXTENDED RELEASE ORAL DAILY
Qty: 90 TABLET | Refills: 1 | Status: SHIPPED | OUTPATIENT
Start: 2024-08-05

## 2024-08-14 ENCOUNTER — TELEPHONE (OUTPATIENT)
Dept: OPHTHALMOLOGY | Facility: CLINIC | Age: 79
End: 2024-08-14
Payer: COMMERCIAL

## 2024-08-14 NOTE — TELEPHONE ENCOUNTER
Called patient, left message. He was calling me back, but I had left him a message on 7-8-24 in [employer] that was read by his daughter.  Don't have anything pending to speak to patient about.

## 2024-08-22 ENCOUNTER — OFFICE VISIT (OUTPATIENT)
Dept: INTERNAL MEDICINE | Facility: CLINIC | Age: 79
End: 2024-08-22
Payer: COMMERCIAL

## 2024-08-22 VITALS
WEIGHT: 232 LBS | HEART RATE: 65 BPM | BODY MASS INDEX: 35.16 KG/M2 | TEMPERATURE: 98.1 F | DIASTOLIC BLOOD PRESSURE: 77 MMHG | OXYGEN SATURATION: 95 % | SYSTOLIC BLOOD PRESSURE: 133 MMHG | HEIGHT: 68 IN | RESPIRATION RATE: 16 BRPM

## 2024-08-22 DIAGNOSIS — M17.0 PRIMARY OSTEOARTHRITIS OF BOTH KNEES: Primary | ICD-10-CM

## 2024-08-22 DIAGNOSIS — M79.601 PAIN OF RIGHT UPPER EXTREMITY: ICD-10-CM

## 2024-08-22 DIAGNOSIS — N18.32 STAGE 3B CHRONIC KIDNEY DISEASE (H): ICD-10-CM

## 2024-08-22 DIAGNOSIS — M65.30 TRIGGER FINGER, ACQUIRED: ICD-10-CM

## 2024-08-22 DIAGNOSIS — S98.132A AMPUTATION OF TOE OF LEFT FOOT (H): ICD-10-CM

## 2024-08-22 DIAGNOSIS — E66.01 CLASS 2 SEVERE OBESITY DUE TO EXCESS CALORIES WITH SERIOUS COMORBIDITY AND BODY MASS INDEX (BMI) OF 35.0 TO 35.9 IN ADULT (H): ICD-10-CM

## 2024-08-22 DIAGNOSIS — Z23 NEED FOR PROPHYLACTIC VACCINATION AGAINST HEPATITIS A: ICD-10-CM

## 2024-08-22 DIAGNOSIS — Z48.02 ENCOUNTER FOR REMOVAL OF SUTURES: ICD-10-CM

## 2024-08-22 DIAGNOSIS — E66.812 CLASS 2 SEVERE OBESITY DUE TO EXCESS CALORIES WITH SERIOUS COMORBIDITY AND BODY MASS INDEX (BMI) OF 35.0 TO 35.9 IN ADULT (H): ICD-10-CM

## 2024-08-22 DIAGNOSIS — M77.02 MEDIAL EPICONDYLITIS OF LEFT ELBOW: ICD-10-CM

## 2024-08-22 DIAGNOSIS — E11.42 TYPE 2 DIABETES MELLITUS WITH DIABETIC POLYNEUROPATHY, WITHOUT LONG-TERM CURRENT USE OF INSULIN (H): ICD-10-CM

## 2024-08-22 LAB
ANION GAP SERPL CALCULATED.3IONS-SCNC: 11 MMOL/L (ref 7–15)
BUN SERPL-MCNC: 35.7 MG/DL (ref 8–23)
CALCIUM SERPL-MCNC: 8.8 MG/DL (ref 8.8–10.4)
CHLORIDE SERPL-SCNC: 107 MMOL/L (ref 98–107)
CREAT SERPL-MCNC: 1.93 MG/DL (ref 0.67–1.17)
EGFRCR SERPLBLD CKD-EPI 2021: 35 ML/MIN/1.73M2
GLUCOSE SERPL-MCNC: 174 MG/DL (ref 70–99)
HBA1C MFR BLD: 6.9 % (ref 0–5.6)
HCO3 SERPL-SCNC: 20 MMOL/L (ref 22–29)
POTASSIUM SERPL-SCNC: 4.8 MMOL/L (ref 3.4–5.3)
SODIUM SERPL-SCNC: 138 MMOL/L (ref 135–145)

## 2024-08-22 PROCEDURE — 99214 OFFICE O/P EST MOD 30 MIN: CPT | Performed by: INTERNAL MEDICINE

## 2024-08-22 PROCEDURE — 36415 COLL VENOUS BLD VENIPUNCTURE: CPT | Performed by: INTERNAL MEDICINE

## 2024-08-22 PROCEDURE — 83036 HEMOGLOBIN GLYCOSYLATED A1C: CPT | Performed by: INTERNAL MEDICINE

## 2024-08-22 PROCEDURE — 80048 BASIC METABOLIC PNL TOTAL CA: CPT | Performed by: INTERNAL MEDICINE

## 2024-08-22 ASSESSMENT — PATIENT HEALTH QUESTIONNAIRE - PHQ9
SUM OF ALL RESPONSES TO PHQ QUESTIONS 1-9: 4
10. IF YOU CHECKED OFF ANY PROBLEMS, HOW DIFFICULT HAVE THESE PROBLEMS MADE IT FOR YOU TO DO YOUR WORK, TAKE CARE OF THINGS AT HOME, OR GET ALONG WITH OTHER PEOPLE: NOT DIFFICULT AT ALL
SUM OF ALL RESPONSES TO PHQ QUESTIONS 1-9: 4

## 2024-08-22 NOTE — LETTER
August 23, 2024      Zurdo Dash  [address]   [place] [address]        Dear Zurdo:    We are writing to inform you of your test results.      All of these tests are within acceptable limits, things look good !       Resulted Orders   BASIC METABOLIC PANEL   Result Value Ref Range    Sodium 138 135 - 145 mmol/L    Potassium 4.8 3.4 - 5.3 mmol/L    Chloride 107 98 - 107 mmol/L    Carbon Dioxide (CO2) 20 (L) 22 - 29 mmol/L    Anion Gap 11 7 - 15 mmol/L    Urea Nitrogen 35.7 (H) 8.0 - 23.0 mg/dL    Creatinine 1.93 (H) 0.67 - 1.17 mg/dL    GFR Estimate 35 (L) >60 mL/min/1.73m2      Comment:      eGFR calculated using 2021 CKD-EPI equation.    Calcium 8.8 8.8 - 10.4 mg/dL      Comment:      Reference intervals for this test were updated on 7/16/2024 to reflect our healthy population more accurately. There may be differences in the flagging of prior results with similar values performed with this method. Those prior results can be interpreted in the context of the updated reference intervals.    Glucose 174 (H) 70 - 99 mg/dL   Hemoglobin A1c   Result Value Ref Range    Hemoglobin A1C 6.9 (H) 0.0 - 5.6 %      Comment:      Normal <5.7%   Prediabetes 5.7-6.4%    Diabetes 6.5% or higher     Note: Adopted from ADA consensus guidelines.       If you have any questions or concerns, please call the clinic at the number listed above.       Sincerely,      Kapil Duckworth MD/augie

## 2024-08-22 NOTE — PROGRESS NOTES
Assessment & Plan     Primary osteoarthritis of both knees  Zurdo Dash is a patient well known to me. I have followed his case from primary care perspective for well over 10 years. This patient is an amazing interesting individual and is only now coming to  with some progressive infirmity. He has rheumatoid arthritis but he has a co-morbidity of significant generalized osteoarthritis and his quality of life has really torpedoed over last years but especially the last few months. He is exasperated with his progressive declining physical capabilities. For the first time he is now considering a total knee replacement but I cautioned him that I don't think he is a good surgical candidate. It's not completely off the table but I am uneasy with this. I think he would survive general assistance and the surgery just fine but it is the needed post operative physical therapy and the challenges in my opinion would possibly be too much for him, I am reminded of some patients I saw years back that following surgery , never walked again. I explained to patient my reservations but he is interested to hear the perspective of one of the orthopedist surgeons so referral orders placed   - Orthopedic  Referral; Future  - Pain Management  Referral; Future    Pain of right upper extremity  During this appointment patient bitterly complaints of multiple issues and concerns all centered on a plethora of different serious musculoskeletal issues and I am beginning to see this patient has a candidate for the chronic pain management program . He  has essentially no history of with chronic oral opioid therapy and I am not saying this is his best option but it is certainly one of his options.   - Pain Management  Referral; Future    Class 2 severe obesity due to excess calories with serious comorbidity and body mass index (BMI) of 35.0 to 35.9 in adult (H)  Noted as a point of historical  importance=    Amputation of toe of left foot (H24)  Part of is continued worse and worse mobility issues   - Pain Management  Referral; Future    Trigger finger, acquired  As above   - Pain Management  Referral; Future    Medial epicondylitis of left elbow  He has a chronic non-resolving pain with his left elbow which is consistent with medial epicondylitis    - Pain Management  Referral; Future    Stage 3b chronic kidney disease (H)  Problem is stable and ongoing monitoring    - BASIC METABOLIC PANEL; Future  - BASIC METABOLIC PANEL    Need for prophylactic vaccination against hepatitis A  Postponed     Encounter for removal of sutures  He had 2 interrupted sutures removed today from his scalp, see emergency room evaluation     Type 2 diabetes mellitus with diabetic polyneuropathy, without long-term current use of insulin (H)  Due for this test / procedure / healthcare maintenance , further follow up depending on the test results   - REVIEW OF HEALTH MAINTENANCE PROTOCOL ORDERS  - Hemoglobin A1c; Future  - Hemoglobin A1c    Subjective   Zurdo is a 79 year old, presenting for the following health issues:  Suture Removal        8/22/2024     2:38 PM   Additional Questions   Roomed by Tia     History of Present Illness       Reason for visit:  Stiches cut off    He eats 2-3 servings of fruits and vegetables daily.He consumes 0 sweetened beverage(s) daily.He exercises with enough effort to increase his heart rate 9 or less minutes per day.  He exercises with enough effort to increase his heart rate 3 or less days per week. He is missing 1 dose(s) of medications per week.  He is not taking prescribed medications regularly due to remembering to take.     Right elbow - xrays negative   Hurts so bad , almost kept cecil awake - this has been for 3 months or so  Then pain in the right upper arm and shoulder  Knees  Peripheral neuropathy   Missing some toes  Recent treatment for trigger finger - was  "recommended to try Diclofenac (VOLTAREN) 1 % GEL before considers surgery   Everything going also wrong he feels   Patient aches and hurts all over and feels he has a poor quality of life   Hurts to get up from a seated position so he tries to do everything once he gets up because it is \"so damned painful\" and he wants to sit back down as soon as possible and not move at all = he also is now wearing a finger splint type of dressing   Mainly uses a walker  He's beginning to consider a total knee replacement although it is not likely he is a surgical candidate   Patient accepts this is all depressing . It is getting to the point that he cannot stand these widespread pain issues     Chronic kidney disease stage 3 B and almost into stage 4.     I need to discuss this with Dr. Albert Tompkins, rheumatologist with [facility] and I don't think steroids are of any benefit. This patient is close to hospice care and I am encouraging him to connect with [facility] or possibly palliative care consultation    We did discuss if he could use a lift chair and he feels while yes this could help him he has no room for it.      ED/UC Followup:    Facility:  [facility]  Date of visit: 08/11/2024  Reason for visit: Head laceration  Current Status: well healed incision , 2 interrupted sutures removed.        Review of Systems  Constitutional, HEENT, cardiovascular, pulmonary, gi and gu systems are negative, except as otherwise noted.      Objective    /77   Pulse 65   Temp 98.1  F (36.7  C) (Temporal)   Resp 16   Ht 1.73 m (5' 8.11\")   Wt 105.2 kg (232 lb)   SpO2 95%   BMI 35.16 kg/m    Body mass index is 35.16 kg/m .  Physical Exam   GENERAL: alert and no distress  EYES: Eyes grossly normal to inspection, PERRL and conjunctivae and sclerae normal  MS: walks very slowly with obvious pain with weight bearing. He has musculoskeletal issues including left elbow pain with the medial " epicondyle   NEURO: Normal strength and tone, mentation intact and speech normal  PSYCH: mentation appears normal, affect normal/bright    Orders Placed This Encounter   Procedures    REVIEW OF HEALTH MAINTENANCE PROTOCOL ORDERS    BASIC METABOLIC PANEL    Hemoglobin A1c    Orthopedic  Referral    Pain Management  Referral             Signed Electronically by: Kapil Duckworth MD

## 2024-08-23 DIAGNOSIS — T14.8XXA OPEN WOUND: ICD-10-CM

## 2024-08-23 NOTE — TELEPHONE ENCOUNTER
Patient called  He stated that we discussed pain meds yesterday during his appointment.   He is requesting a refill of Dilaudid   Per patient, pharmacy told him that they need a new script    Jacobo Pak RN  [facility]

## 2024-08-23 NOTE — TELEPHONE ENCOUNTER
As per our discussion , I will only refill this if he is agreeing to an appointment with the [facility]     Kapil Duckworth MD

## 2024-08-25 RX ORDER — HYDROMORPHONE HYDROCHLORIDE 2 MG/1
2 TABLET ORAL 2 TIMES DAILY PRN
Qty: 10 TABLET | Refills: 0 | Status: SHIPPED | OUTPATIENT
Start: 2024-08-25

## 2024-09-05 ENCOUNTER — TELEPHONE (OUTPATIENT)
Dept: FAMILY MEDICINE | Facility: CLINIC | Age: 79
End: 2024-09-05

## 2024-09-05 DIAGNOSIS — L03.116 CELLULITIS OF LEFT FOOT: Primary | ICD-10-CM

## 2024-09-05 RX ORDER — DOXYCYCLINE 100 MG/1
100 CAPSULE ORAL 2 TIMES DAILY
Qty: 14 CAPSULE | Refills: 0 | Status: SHIPPED | OUTPATIENT
Start: 2024-09-05

## 2024-09-05 NOTE — TELEPHONE ENCOUNTER
Patient was in the emergency department 9/4/25 and prescribed doxycylcine hyclate 100m twice daily for 5 days. He took one dose last night and now he can't find the bottle. The Emergency dept declined to refill for a lost medication. Is this something you would be willing to authorize?  Thank you  [facility]  [address] [place] [address]  [place] [address]  [phone]

## 2024-09-21 ENCOUNTER — OFFICE VISIT (OUTPATIENT)
Dept: URGENT CARE | Facility: URGENT CARE | Age: 79
End: 2024-09-21
Payer: COMMERCIAL

## 2024-09-21 VITALS
TEMPERATURE: 97.7 F | OXYGEN SATURATION: 96 % | DIASTOLIC BLOOD PRESSURE: 82 MMHG | BODY MASS INDEX: 35.16 KG/M2 | WEIGHT: 232 LBS | HEART RATE: 91 BPM | SYSTOLIC BLOOD PRESSURE: 165 MMHG | RESPIRATION RATE: 24 BRPM

## 2024-09-21 DIAGNOSIS — L03.032 CELLULITIS OF FOURTH TOE, LEFT: ICD-10-CM

## 2024-09-21 DIAGNOSIS — L97.521 SKIN ULCER OF TOE OF LEFT FOOT, LIMITED TO BREAKDOWN OF SKIN (H): Primary | ICD-10-CM

## 2024-09-21 PROCEDURE — 99214 OFFICE O/P EST MOD 30 MIN: CPT | Performed by: INTERNAL MEDICINE

## 2024-09-21 RX ORDER — DOXYCYCLINE 100 MG/1
100 CAPSULE ORAL 2 TIMES DAILY
Qty: 20 CAPSULE | Refills: 0 | Status: SHIPPED | OUTPATIENT
Start: 2024-09-21 | End: 2024-10-01

## 2024-09-21 ASSESSMENT — PAIN SCALES - GENERAL: PAINLEVEL: MILD PAIN (3)

## 2024-09-21 NOTE — PATIENT INSTRUCTIONS
appointment Monday with Podiatry     Have Podiatry direct whether or not should be on antibiotics     Doxycycline refilled for 10 days with concern for infection restarted

## 2024-09-21 NOTE — PROGRESS NOTES
ASSESSMENT AND PLAN:      ICD-10-CM    1. Skin ulcer of toe of left foot, limited to breakdown of skin (H)  L97.521 doxycycline hyclate (VIBRAMYCIN) 100 MG capsule      2. Cellulitis of fourth toe, left  L03.032 doxycycline hyclate (VIBRAMYCIN) 100 MG capsule          PLAN:      Patient Instructions       appointment Monday with Podiatry     Have Podiatry direct whether or not should be on antibiotics     Doxycycline refilled for 10 days with concern for infection restarted    Shira Walker MD  [facility]    Subjective     Zurdo Dash is a 79 year old who presents for Patient presents with:  Infection: Toe left foot    an established patient of [facility].      Concern for left toe infection  Daughter visiting & wanted him to be seen  Ongoing symptoms  Location: left foot  Context: 4th toe slightly sensitive  Possible more red    Treatment measures tried include: iodine  Relief from treatment: minor  Significant past medical history: hx toe amputations with hx DM    Seen in ER 9/3/2024  Given doxycyclin  XR Foot Left G/E 3 Views  Order: [id number]  Impression    Amputation of the 1st, 2nd and 3rd toes at the level of the MTP joints. No fracture or osteomyelitis. Mild degenerative changes throughout the midfoot. Arterial calcifications.    Review of Systems      Past Medical History:   Diagnosis Date    Abnormal CT scan 03/2004    calcified lung granuloma    C. difficile diarrhea     H/O    Cataract 11/18/2011    Diabetic neuropathy (H)     mild, mostly soles and distal forefeet, worse on the left side.    Diverticulitis     ED (erectile dysfunction)     Ex-smoker     QUIT SMOKING FEB 2007    History of ETOH abuse     recovering, sober since 1997    Hyperlipidemia LDL goal <100     Hypertension goal BP (blood pressure) < 140/90     Hypogonadism     Obesity     PAD (peripheral artery disease) (H24)     leg cramps, with exertion, no formal diagnosis of PAD and minimal if any symptoms at  all.    RA (rheumatoid arthritis) (H)     Dr Bailon    Syncope            Objective    BP (!) 165/82 (BP Location: Left arm, Patient Position: Sitting, Cuff Size: Adult Large)   Pulse 91   Temp 97.7  F (36.5  C) (Oral)   Resp 24   Wt 105.2 kg (232 lb)   SpO2 96%   BMI 35.16 kg/m    Physical Exam  Vitals reviewed.   Constitutional:       Appearance: Normal appearance.   Musculoskeletal:      Comments: left foot  4th & 5 th toes remaining  4th is yellow with iodine   Toe slightly swollen  Ulcer noted.   Neurological:      Mental Status: He is alert.

## 2024-09-26 DIAGNOSIS — M79.601 PAIN OF RIGHT UPPER EXTREMITY: ICD-10-CM

## 2024-09-26 DIAGNOSIS — M05.79 RHEUMATOID ARTHRITIS INVOLVING MULTIPLE SITES WITH POSITIVE RHEUMATOID FACTOR (H): ICD-10-CM

## 2024-09-26 DIAGNOSIS — E11.42 TYPE 2 DIABETES MELLITUS WITH DIABETIC POLYNEUROPATHY, WITHOUT LONG-TERM CURRENT USE OF INSULIN (H): ICD-10-CM

## 2024-09-26 DIAGNOSIS — E11.42 DIABETIC POLYNEUROPATHY ASSOCIATED WITH TYPE 2 DIABETES MELLITUS (H): ICD-10-CM

## 2024-09-27 RX ORDER — GABAPENTIN 300 MG/1
900 CAPSULE ORAL 3 TIMES DAILY
Qty: 270 CAPSULE | Refills: 1 | Status: SHIPPED | OUTPATIENT
Start: 2024-09-27

## 2024-09-27 RX ORDER — INSULIN LISPRO 100 [IU]/ML
INJECTION, SOLUTION INTRAVENOUS; SUBCUTANEOUS
Qty: 30 ML | Refills: 0 | Status: SHIPPED | OUTPATIENT
Start: 2024-09-27

## 2024-10-08 ENCOUNTER — NURSE TRIAGE (OUTPATIENT)
Dept: FAMILY MEDICINE | Facility: CLINIC | Age: 79
End: 2024-10-08
Payer: COMMERCIAL

## 2024-10-08 NOTE — TELEPHONE ENCOUNTER
Left message on answering machine for Yoli to call back to the nurse at [phone]. RN stated in message that we have an urgent message from Provider.    Mia Artis RN  [facility]

## 2024-10-08 NOTE — TELEPHONE ENCOUNTER
Pt wife calling stating pt leg is 3 times the normal size. States they saw the ortho provider today and the provider was not worried.     Pt is in pain states his leg and calf are sore. Pt reports it hurts to walk on his leg. Pt states his wife is being dramatic about 3 times the size but he does state it is swollen    RN advised pt be seen in ED to rule out blood clot- both pt and wife refused. Pt states he is to painful to sit in ER tonight. RN reiterated the importance of ER visit. Pt declined.     Pt wants visit with PCP- RN relayed PCP is out of office but a message could be sent to covering providers    Pt would like to be seen in office instead of ER     Janki Yusuf RN        Reason for Disposition   Thigh or calf pain and only 1 side and present > 1 hour    Additional Information   Negative: Sounds like a life-threatening emergency to the triager   Negative: Chest pain   Negative: Followed an insect bite and has localized swelling (e.g., small area of puffy or swollen skin)   Negative: Followed a knee injury   Negative: Ankle or foot injury   Negative: Pregnant with leg swelling or edema   Negative: Difficulty breathing at rest   Negative: Entire foot is cool or blue in comparison to other side   Negative: SEVERE swelling (e.g., swelling extends above knee, entire leg is swollen, weeping fluid)    Protocols used: Leg Swelling and Edema-A-OH

## 2024-10-08 NOTE — TELEPHONE ENCOUNTER
Recommend ED  If patient refuses, allow him to schedule a clinic appointment or go to urgent care

## 2024-10-09 NOTE — TELEPHONE ENCOUNTER
Called and updated patient's wife on Dedra Hicks's recommendations.  She does not think that patient will agree to go to ED due to the wait but will try to encourage him to go.  She will call clinic if patient continues to decline ED  If he agrees, she plans to take him to [facility]    Jacobo Pak RN  [facility]

## 2024-10-17 ENCOUNTER — OFFICE VISIT (OUTPATIENT)
Dept: INTERNAL MEDICINE | Facility: CLINIC | Age: 79
End: 2024-10-17
Payer: COMMERCIAL

## 2024-10-17 VITALS
SYSTOLIC BLOOD PRESSURE: 123 MMHG | TEMPERATURE: 97.7 F | DIASTOLIC BLOOD PRESSURE: 73 MMHG | RESPIRATION RATE: 16 BRPM | OXYGEN SATURATION: 97 % | HEART RATE: 59 BPM

## 2024-10-17 DIAGNOSIS — Z23 NEEDS FLU SHOT: ICD-10-CM

## 2024-10-17 DIAGNOSIS — G89.4 CHRONIC PAIN SYNDROME: ICD-10-CM

## 2024-10-17 DIAGNOSIS — I50.22 CHRONIC SYSTOLIC (CONGESTIVE) HEART FAILURE (H): ICD-10-CM

## 2024-10-17 DIAGNOSIS — Z23 NEED FOR COVID-19 VACCINE: ICD-10-CM

## 2024-10-17 DIAGNOSIS — L97.523 DIABETIC ULCER OF TOE OF LEFT FOOT ASSOCIATED WITH TYPE 2 DIABETES MELLITUS, WITH NECROSIS OF MUSCLE (H): ICD-10-CM

## 2024-10-17 DIAGNOSIS — E11.9 TYPE 2 DIABETES MELLITUS WITHOUT RETINOPATHY (H): ICD-10-CM

## 2024-10-17 DIAGNOSIS — M17.0 PRIMARY OSTEOARTHRITIS OF BOTH KNEES: ICD-10-CM

## 2024-10-17 DIAGNOSIS — Z89.429 TOE AMPUTEE (H): ICD-10-CM

## 2024-10-17 DIAGNOSIS — L03.116 LEFT LEG CELLULITIS: Primary | ICD-10-CM

## 2024-10-17 DIAGNOSIS — E11.621 DIABETIC ULCER OF TOE OF LEFT FOOT ASSOCIATED WITH TYPE 2 DIABETES MELLITUS, WITH NECROSIS OF MUSCLE (H): ICD-10-CM

## 2024-10-17 DIAGNOSIS — F11.90 CHRONIC, CONTINUOUS USE OF OPIOIDS: ICD-10-CM

## 2024-10-17 DIAGNOSIS — E78.5 HYPERLIPIDEMIA LDL GOAL <100: ICD-10-CM

## 2024-10-17 DIAGNOSIS — Z23 NEED FOR PROPHYLACTIC VACCINATION AGAINST HEPATITIS A: ICD-10-CM

## 2024-10-17 DIAGNOSIS — F11.20 UNCOMPLICATED OPIOID DEPENDENCE (H): ICD-10-CM

## 2024-10-17 PROCEDURE — 99214 OFFICE O/P EST MOD 30 MIN: CPT | Mod: 25 | Performed by: INTERNAL MEDICINE

## 2024-10-17 PROCEDURE — 90662 IIV NO PRSV INCREASED AG IM: CPT | Performed by: INTERNAL MEDICINE

## 2024-10-17 PROCEDURE — G0008 ADMIN INFLUENZA VIRUS VAC: HCPCS | Performed by: INTERNAL MEDICINE

## 2024-10-17 PROCEDURE — 90480 ADMN SARSCOV2 VAC 1/ONLY CMP: CPT | Performed by: INTERNAL MEDICINE

## 2024-10-17 PROCEDURE — 91320 SARSCV2 VAC 30MCG TRS-SUC IM: CPT | Performed by: INTERNAL MEDICINE

## 2024-10-17 RX ORDER — HYDROMORPHONE HYDROCHLORIDE 2 MG/1
2 TABLET ORAL EVERY 6 HOURS PRN
Qty: 90 TABLET | Refills: 0 | Status: SHIPPED | OUTPATIENT
Start: 2024-10-17 | End: 2024-10-17

## 2024-10-17 RX ORDER — HYDROMORPHONE HYDROCHLORIDE 2 MG/1
2 TABLET ORAL EVERY 6 HOURS PRN
Qty: 90 TABLET | Refills: 0 | Status: SHIPPED | OUTPATIENT
Start: 2024-10-17

## 2024-10-17 NOTE — LETTER
Opioid / Opioid Plus Controlled Substance Agreement    This is an agreement between you and your provider about the safe and appropriate use of controlled substance/opioids prescribed by your care team. Controlled substances are medicines that can cause physical and mental dependence (abuse).    There are strict laws about having and using these medicines. We here at [facility] are committing to working with you in your efforts to get better. To support you in this work, we ll help you schedule regular office appointments for medicine refills. If we must cancel or change your appointment for any reason, we ll make sure you have enough medicine to last until your next appointment.     As a Provider, I will:  Listen carefully to your concerns and treat you with respect.   Recommend a treatment plan that I believe is in your best interest. This plan may involve therapies other than opioid pain medication.   Talk with you often about the possible benefits, and the risk of harm of any medicine that we prescribe for you.   Provide a plan on how to taper (discontinue or go off) using this medicine if the decision is made to stop its use.    As a Patient, I understand that opioid(s):   Are a controlled substance prescribed by my care team to help me function or work and manage my condition(s).   Are strong medicines and can cause serious side effects such as:  Drowsiness, which can seriously affect my driving ability  A lower breathing rate, enough to cause death  Harm to my thinking ability   Depression   Abuse of and addiction to this medicine  Need to be taken exactly as prescribed. Combining opioids with certain medicines or chemicals (such as illegal drugs, sedatives, sleeping pills, and benzodiazepines) can be dangerous or even fatal. If I stop opioids suddenly, I may have severe withdrawal symptoms.  Do not work for all types of pain nor for all patients. If they re not helpful, I may be asked to stop  them.      The risks, benefits and side effects of these medicine(s) were explained to me. I agree that:  I will take part in other treatments as advised by my care team. This may be psychiatry or counseling, physical therapy, behavioral therapy, group treatment or a referral to a specialist.     I will keep all my appointments. I understand that this is part of the monitoring of opioids. My care team may require an office visit for EVERY opioid/controlled substance refill. If I miss appointments or don t follow instructions, my care team may stop my medicine.    I will take my medicines as prescribed. I will not change the dose or schedule unless my care team tells me to. There will be no refills if I run out early.     I may be asked to come to the clinic and complete a urine drug test or complete a pill count at any time. If I don t give a urine sample or participate in a pill count, the care team may stop my medicine.    I will only receive prescriptions from this clinic for chronic pain. If I am treated by another provider for acute pain issues, I will tell them that I am taking opioid pain medication for chronic pain and that I have a treatment agreement with this provider. I will inform my [facility] care team within one business day if I am given a prescription for any pain medication by another healthcare provider. My [facility] care team can contact other providers and pharmacists about my use of any medicines.    It is up to me to make sure that I don t run out of my medicines on weekends or holidays. If my care team is willing to refill my opioid prescription without a visit, I must request refills only during office hours. Refills may take up to 3 business days to process. I will use one pharmacy to fill all my opioid and other controlled substance prescriptions. I will notify the clinic about any changes to my insurance or medication availability.    I am responsible for my prescriptions.  If the medicine/prescription is lost, stolen or destroyed, it will not be replaced. I also agree not to share controlled substance medicines with anyone.    I am aware I should not use any illegal or recreational drugs. I agree not to drink alcohol unless my care team says I can.       If I enroll in the [place] [facility] program, I will tell my care team prior to my next refill.     I will tell my care team right away if I become pregnant, have a new medical problem treated outside of my regular clinic, or have a change in my medications.    I understand that this medicine can affect my thinking, judgment and reaction time. Alcohol and drugs affect the brain and body, which can affect the safety of my driving. Being under the influence of alcohol or drugs can affect my decision-making, behaviors, personal safety, and the safety of others. Driving while impaired (DWI) can occur if a person is driving, operating, or in physical control of a car, motorcycle, boat, snowmobile, ATV, motorbike, off-road vehicle, or any other motor vehicle ([place] Statute 169A.20). I understand the risk if I choose to drive or operate any vehicle or machinery.    I understand that if I do not follow any of the conditions above, my prescriptions or treatment may be stopped or changed.          Opioids  What You Need to Know    What are opioids?   Opioids are pain medicines that must be prescribed by a doctor. They are also known as narcotics.     Examples are:   morphine ([place] Contin, Debora)  oxycodone (Oxycontin)  oxycodone and acetaminophen (Percocet)  hydrocodone and acetaminophen (Vicodin, Norco)   fentanyl patch (Duragesic)   hydromorphone (Dilaudid)   methadone  codeine (Tylenol #3)     What do opioids do well?   Opioids are best for severe short-term pain such as after a surgery or injury. They may work well for cancer pain. They may help some people with long-lasting (chronic) pain.     What do opioids NOT do well?   Opioids  never get rid of pain entirely, and they don t work well for most patients with chronic pain. Opioids don t reduce swelling, one of the causes of pain.                                    Other ways to manage chronic pain and improve function include:     Treat the health problem that may be causing pain  Anti-inflammation medicines, which reduce swelling and tenderness, such as ibuprofen (Advil, Motrin) or naproxen (Aleve)  Acetaminophen (Tylenol)  Antidepressants and anti-seizure medicines, especially for nerve pain  Topical treatments such as patches or creams  Injections or nerve blocks  Chiropractic or osteopathic treatment  Acupuncture, massage, deep breathing, meditation, visual imagery, aromatherapy  Use heat or ice at the pain site  Physical therapy   Exercise  Stop smoking  Take part in therapy       Risks and side effects     Talk to your doctor before you start or decide to keep taking opioids. Possible side effects include:    Lowering your breathing rate enough to cause death  Overdose, including death, especially if taking higher than prescribed doses  Worse depression symptoms; less pleasure in things you usually enjoy  Feeling tired or sluggish  Slower thoughts or cloudy thinking  Being more sensitive to pain over time; pain is harder to control  Trouble sleeping or restless sleep  Changes in hormone levels (for example, less testosterone)  Changes in sex drive or ability to have sex  Constipation  Unsafe driving  Itching and sweating  Dizziness  Nausea, throwing up and dry mouth    What else should I know about opioids?    Opioids may lead to dependence, tolerance, or addiction.    Dependence means that if you stop or reduce the medicine too quickly, you will have withdrawal symptoms. These include loose poop (diarrhea), jitters, flu-like symptoms, nervousness and tremors. Dependence is not the same as addiction.                     Tolerance means needing higher doses over time to get the same  effect. This may increase the chance of serious side effects.    Addiction is when people improperly use a substance that harms their body, their mind or their relations with others. Use of opiates can cause a relapse of addiction if you have a history of drug or alcohol abuse.    People who have used opioids for a long time may have a lower quality of life, worse depression, higher levels of pain and more visits to doctors.    You can overdose on opioids. Take these steps to lower your risk of overdose:    Recognize the signs:  Signs of overdose include decrease or loss of consciousness (blackout), slowed breathing, trouble waking up and blue lips. If someone is worried about overdose, they should call 911.    Talk to your doctor about Narcan (naloxone).   If you are at risk for overdose, you may be given a prescription for Narcan. This medicine very quickly reverses the effects of opioids.   If you overdose, a friend or family member can give you Narcan while waiting for the ambulance. They need to know the signs of overdose and how to give Narcan.     Don't use alcohol or street drugs.   Taking them with opioids can cause death.    Do not take any of these medicines unless your doctor says it s OK. Taking these with opioids can cause death:  Benzodiazepines, such as lorazepam (Ativan), alprazolam (Xanax) or diazepam (Valium)  Muscle relaxers, such as cyclobenzaprine (Flexeril)  Sleeping pills like zolpidem (Ambien)   Other opioids      How to keep you and other people safe while taking opioids:    Never share your opioids with others.  Opioid medicines are regulated by the Drug Enforcement Agency (CLAIRE). Selling or sharing medications is a criminal act.    2. Be sure to store opioids in a secure place, locked up if possible. Young children can easily swallow them and overdose.    3. When you are traveling with your medicines, keep them in the original bottles. If you use a pill box, be sure you also carry a copy  of your medicine list from your clinic or pharmacy.    4. Safe disposal of opioids    Most pharmacies have places to get rid of medicine, called disposal kiosks. Medicine disposal options are also available in every [facility]. Search your county and  medication disposal  to find more options. You can find more details at:  https://www.pca.[facility].[place]./living-green/managing-unwanted-medications     I agree that my provider, clinic care team, and pharmacy may work with any city, state or federal law enforcement agency that investigates the misuse, sale, or other diversion of my controlled medicine. I will allow my provider to discuss my care with, or share a copy of, this agreement with any other treating provider, pharmacy or emergency room where I receive care.    I have read this agreement and have asked questions about anything I did not understand.    _______________________________________________________  Patient Signature - Zurdo Dash _____________________                   Date     _______________________________________________________  Provider Signature - Kapil Duckworth MD   _____________________                   Date     _______________________________________________________  Witness Signature (required if provider not present while patient signing)   _____________________                   Date

## 2024-10-17 NOTE — PROGRESS NOTES
Assessment & Plan     Left leg cellulitis  Today is a difficult appointment given the fact that this patient is so sick now with his advanced peripheral arterial disease , just sprung back out of the hospital following his additional toe amputations with his left lower extremity  now to the point that he has no toes left. He's following closely with his Podiatrist Dr. San with [facility] in [place] and she saw him already this am. We considered removing his dressing to look for myself but in consideration of the fact that this exam was already completely by a more appropriate MD I won't do this today, he's continuing with his antibiotic medication as detailed on the discharge summary , it's dual oral antibiotics .     Diabetic ulcer of toe of left foot associated with type 2 diabetes mellitus, with necrosis of muscle (H)  He's got continued problems and is far from out of the woods yet and looming in the background is the possibility of an eventual below the knee amputation.     Toe amputee (H)  We reviewed his longterm outlook and I am increasingly concerned with his despondency. He can hardly walk, he has falls and he has dysphoria. We talked today at some length .     Primary osteoarthritis of both knees  Adding to his difficult scenario is his advanced bilateral knee osteoarthritis and he's just not a viable candidate for total knee replacement at this point.     Uncomplicated opioid dependence (H)  We got into a long discussion about end of life issues and longterm perspective on his situation. He is one step short of completely breaking down today in tears and we talked at some length  regarding his chronic pain situation. He has crossed a threshold and now is qualifying condition for chronic oral opioid therapy. We discussion this at great length . He signed the Chronic Pain Contract today and I completed all the documentation to enter this patient into the [facility] program. A  "follow up appointment in 3 months is necessary     Chronic pain syndrome  As detailed above   - HYDROmorphone (DILAUDID) 2 MG tablet; Take 1 tablet (2 mg) by mouth every 6 hours as needed for pain.    Chronic, continuous use of opioids  As above   - HYDROmorphone (DILAUDID) 2 MG tablet; Take 1 tablet (2 mg) by mouth every 6 hours as needed for pain.    Chronic systolic (congestive) heart failure (H)  Problem is stable and ongoing monitoring      Need for prophylactic vaccination against hepatitis A  Declines     Hyperlipidemia LDL goal <100  Lab Results   Component Value Date    CHOL 155 09/28/2023    CHOL 116 01/07/2021     Lab Results   Component Value Date    HDL 33 09/28/2023    HDL 38 01/07/2021     Lab Results   Component Value Date    LDL 78 09/28/2023    LDL 45 01/07/2021     Lab Results   Component Value Date    TRIG 218 09/28/2023    TRIG 166 01/07/2021     Lab Results   Component Value Date    CHOLHDLRATIO 3.4 06/29/2015     He declines laboratory studies today. We can postpone the fasting lipid panel until 3 months from now    Needs flu shot  Administered   - INFLUENZA HIGH DOSE, TRIVALENT, PF (FLUZONE)    Need for COVID-19 vaccine  Administered   - COVID-19 12+ (PFIZER)    Diabetes mellitus type 2    Patient is adhering to recommended actions using the continuous glucose monitor and is adherent to diabetes mellitus treatment plan         MED REC REQUIRED  Post Medication Reconciliation Status: discharge medications reconciled and changed, per note/orders  BMI  Estimated body mass index is 35.16 kg/m  as calculated from the following:    Height as of 8/22/24: 1.73 m (5' 8.11\").    Weight as of 9/21/24: 105.2 kg (232 lb).   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  No  Do you have any other stressors you would like to discuss with your provider? No    Problems taking medications regularly:  None  Medication changes since discharge: none  Problems adhering to non-medication therapy:  None    During this appointment  he had his remaining 2 toes on his left lower extremity  were infected and given an amputation , these toes had developed gangrene. Saw his Podiatrist Dr. Ward with the [facility] in [place],     Fell on the floor last night, his right knee buckled he thinks. , he has known severe bilateral osteoarthritis of his knees    He'd developed left lower extremity  swelling along with some evidence of discoloration and this led to an emergency room visit brought by private vehicle     Left foot cellulitis was diagnosed and also a on-healing left foot diabetes mellitus wound  Left fourth toe osteomyelitis  - Patient has history of MRSA cellulitis.   - Podiatry consulted. To the OR 10/11 for:  - Left Fourth Digit Amputation (Disarticulation at [facility])  - Left Fifth Digit Amputation (Disarticulation at [facility])  - Infectious Disease followed  - plan to discharge on Keflex and minocycline through 10/24/2024    Lab Results   Component Value Date    A1C 6.9 08/22/2024    A1C 6.6 06/14/2024    A1C 8.9 02/15/2024    A1C 6.4 09/28/2023    A1C 7.8 06/27/2023    A1C 6.8 01/07/2021    A1C 6.8 12/17/2020    A1C 6.7 12/11/2020    A1C 6.0 01/03/2020    A1C 6.4 08/06/2019     Mr. Dash is a 79-year-old male with a past medical history of essential  hypertension, hyperlipidemia, insulin-dependent diabetes mellitus type 2, chronic kidney disease stage III, peripheral artery disease, diabetic foot infections/osteomyelitis/status post prior amputations, rheumatoid arthritis, previous MRSA infection.    Patient is depressed and recognizes that he can hardly even do his mobility related activities of daily living [MRADL] although he is not yet entirely incapacitated with chronic pain - he takes pain pills now dilaudid [ hydromorphone ] , he's taking this 3-4 times per day since hospital discharge .    Summary of hospitalization:  CareEverywhere information obtained and reviewed  Diagnostic Tests/Treatments reviewed.  Follow up needed: multiple sub-specialists primarily vascular surgeon and Podiatrist as well as rheumatologist for rheumatoid arthritis and others  Other Healthcare Providers Involved in Patient s Care:          as above   Update since discharge: improved.         Plan of care communicated with patient and family               Review of Systems  Constitutional, HEENT, cardiovascular, pulmonary, gi and gu systems are negative, except as otherwise noted.      Objective    /73   Pulse 59   Temp 97.7  F (36.5  C) (Temporal)   Resp 16   SpO2 97%   There is no height or weight on file to calculate BMI.  Physical Exam   GENERAL: alert and no distress  EYES: Eyes grossly normal to inspection, PERRL and conjunctivae and sclerae normal  RESP: lungs clear to auscultation - no rales, rhonchi or wheezes  CV: regular rate and rhythm, normal S1 S2, no S3 or S4, no murmur, click or rub, no peripheral edema  MS: limited to almost nonexistent weight bearing , in a wheelchair. Wearing a surgical shoe and left lower extremity  wrapped with layered dressing     Orders Placed This Encounter   Procedures    INFLUENZA HIGH DOSE, TRIVALENT, PF (FLUZONE)    COVID-19 12+ (PFIZER)             Signed Electronically by: Kapil Duckworth MD

## 2024-10-22 DIAGNOSIS — I10 HYPERTENSION GOAL BP (BLOOD PRESSURE) < 140/90: ICD-10-CM

## 2024-10-22 DIAGNOSIS — N18.32 STAGE 3B CHRONIC KIDNEY DISEASE (H): ICD-10-CM

## 2024-10-22 DIAGNOSIS — K21.9 GASTROESOPHAGEAL REFLUX DISEASE, UNSPECIFIED WHETHER ESOPHAGITIS PRESENT: ICD-10-CM

## 2024-10-23 RX ORDER — BUMETANIDE 1 MG/1
1 TABLET ORAL DAILY
Qty: 90 TABLET | Refills: 1 | Status: SHIPPED | OUTPATIENT
Start: 2024-10-23

## 2024-10-28 ENCOUNTER — TELEPHONE (OUTPATIENT)
Dept: FAMILY MEDICINE | Facility: CLINIC | Age: 79
End: 2024-10-28
Payer: COMMERCIAL

## 2024-10-28 DIAGNOSIS — F32.A DEPRESSION, UNSPECIFIED DEPRESSION TYPE: ICD-10-CM

## 2024-10-28 DIAGNOSIS — E78.5 HYPERLIPIDEMIA LDL GOAL <100: ICD-10-CM

## 2024-10-28 NOTE — TELEPHONE ENCOUNTER
Forms/Letter Request    Type of form/letter: Home Health Certification      Do we have the form/letter: Yes:     Who is the form from? Home care    Where did/will the form come from? form was faxed in    When is form/letter needed by: 3-5 days     How would you like the form/letter returned: Fax : [phone]    Patient Notified form requests are processed in 5-7 business days:Yes    Could we send this information to you in [place] or would you prefer to receive a phone call?:   Patient would like to be contacted via [place] [place]

## 2024-10-29 RX ORDER — PRAVASTATIN SODIUM 10 MG
10 TABLET ORAL DAILY
Qty: 90 TABLET | Refills: 3 | Status: SHIPPED | OUTPATIENT
Start: 2024-10-29

## 2024-11-01 ENCOUNTER — MEDICAL CORRESPONDENCE (OUTPATIENT)
Dept: HEALTH INFORMATION MANAGEMENT | Facility: CLINIC | Age: 79
End: 2024-11-01
Payer: COMMERCIAL

## 2024-11-01 DIAGNOSIS — E11.42 TYPE 2 DIABETES MELLITUS WITH DIABETIC POLYNEUROPATHY, WITHOUT LONG-TERM CURRENT USE OF INSULIN (H): ICD-10-CM

## 2024-11-01 NOTE — TELEPHONE ENCOUNTER
Form for non-adju CGM supply allowance and  non adjunctive CGM from [employer] received and signed by Dr. Duckworth.    Both forms and copy of 10- faxed to [phone] and [phone] (both fax numbers were on the forms).      Caren Telles,

## 2024-11-01 NOTE — TELEPHONE ENCOUNTER
See information below.    Patients wife called back, and will call with information on where to send sensors to.    Lesvia GONZALEZ RN  Triage Nurse  [facility]

## 2024-11-01 NOTE — TELEPHONE ENCOUNTER
[facility]    There are going to fax request for sensors, and chart notes.    See faxed information    Lesvia GONZALEZ RN  Triage Nurse  [facility]

## 2024-11-01 NOTE — TELEPHONE ENCOUNTER
Medication Question or Refill    Contacts       Contact Date/Time Type Contact Phone/Fax    11/01/2024 09:59 AM CDT Phone (Incoming) MUNA ROMAN (Emergency Contact) [phone] (M)            What medication are you calling about (include dose and sig)?: Continuous Blood Gluc Sensor (FREESTYLE YENNY 2 SENSOR) [facility]     Preferred Pharmacy:She said they are getting this through [employer] and was unsure of the location information.    [facility]  [address] [place] [address] [place] [address]  Phone: [phone]  Fax: [phone]    [facility]  [address], [address]  [place], [place], [address]  Phone: [phone]  Fax: [phone]    [facility]  [address]  [place] [address]  Phone: [phone] ([phone])  Fax: [phone]    Controlled Substance Agreement on file:   CSA -- Patient Level:     [Media Unavailable] Controlled Substance Agreement - Opioid - Scan on 10/18/2024  1:14 PM       Who prescribed the medication?: Jaronraymond Kapil   Do you need a refill? Yes, he is out of them and [place] told them they need chart notes as well in support of need for the CGM  She found this out when she called to get a refill. He is out.   Patient offered an appointment? No, call was ended prior to being able to offering appointment.   Do you have any questions or concerns?  Yes: patient is out of the sensor.   Could we send this information to you in [place] or would you prefer to receive a phone call?:   Patient would prefer a phone call   Okay to leave a detailed message?: Yes at Home number on file [phone] (home)

## 2024-11-12 DIAGNOSIS — I74.2 EMBOLISM AND THROMBOSIS OF ARTERIES OF THE UPPER EXTREMITIES (H): ICD-10-CM

## 2024-11-13 RX ORDER — APIXABAN 2.5 MG/1
2.5 TABLET, FILM COATED ORAL 2 TIMES DAILY
Qty: 180 TABLET | Refills: 1 | Status: SHIPPED | OUTPATIENT
Start: 2024-11-13

## 2024-11-29 ENCOUNTER — TELEPHONE (OUTPATIENT)
Dept: INTERNAL MEDICINE | Facility: CLINIC | Age: 79
End: 2024-11-29
Payer: COMMERCIAL

## 2024-11-29 NOTE — TELEPHONE ENCOUNTER
"A more effective regimen to improve longterm bowel function is to    Drink lots of fluid, 4-6 glasses of liquid per day   Start using MiraLax / GlycoLax (polyethylene glycol) , she should be talking this product every single night !!! The goal is not just to relieve a current constipation problem but to aim at better overall function longterm   Angel to be following a high fiber diet Foods which are high in fibre include wholemeal bread, wholegrain breakfast cereals, fruit and vegetables.   To relieve an acute constipation problem if he's tried things \" from above\" without success then it is time for enema therapy , this is the best option. Please review with him how this works if he is uncertain . This is a self-explanatory home therapy he or Yoli can purchase , fleets enema over the counter nonprescription . Can be used as often as hourly for up to three times.    Reroute if additional input requested from me     Kapil Duckworth MD     "

## 2024-11-29 NOTE — TELEPHONE ENCOUNTER
"S-(situation): Constipation    B-(background): Patient was inpatient at [facility] 11/22/24-11/27/24 for osteomyelitis of left foot, s/p left foot I&D on 11/26/24.    A-(assessment): Patient reports no bowel movement since 11/25/24, \"day before surgery.\"   Patient denies abdominal pain and endorses passing flatus.    Patient is currently taking Senokot 1 tablet daily, but took 1 tablet 2 times yesterday.    Wife Yoli states she planned to go to the pharmacy to  Colace and prune juice.    R-(recommendations/plan): Please advise.    Melissa Behl BSN, RN, PHN, CCM  RN Clinical Product Navigator  [phone]     "

## 2024-11-29 NOTE — TELEPHONE ENCOUNTER
Attempted to call, left vm asking return call.     Thanks,  DECLAN Sosa  [facility]

## 2024-12-03 ENCOUNTER — TELEPHONE (OUTPATIENT)
Dept: FAMILY MEDICINE | Facility: CLINIC | Age: 79
End: 2024-12-03
Payer: COMMERCIAL

## 2024-12-03 NOTE — TELEPHONE ENCOUNTER
Home Care is calling regarding an established patient with [facility].       Requesting orders from: Kapil Duckworth  Provider is following patient: Yes  Is this a 60-day recertification request?  No    Orders Requested    Physical Therapy  Request for resumption in care. Recently re-hospitalized to remove more bone from amputation site.    2 times a week for 2 weeks      Information was gathered and will be sent to provider for review.  RN will contact Home Care with information after provider review.  Confirmed ok to leave a detailed message with call back.  Contact information confirmed and updated as needed.    Courtney Omalley RN

## 2024-12-03 NOTE — TELEPHONE ENCOUNTER
Patient's wife calling back, consent to communicate on file.   I reviewed notes, she says he is no longer constipated, they did start miralax.    No further questions or concerns.   She notes Zurdo is going to see another provider for hosp follow up on Thursday.    Courtney DEMARCO RN  [facility] Triage

## 2024-12-04 ENCOUNTER — OFFICE VISIT (OUTPATIENT)
Dept: FAMILY MEDICINE | Facility: CLINIC | Age: 79
End: 2024-12-04
Payer: COMMERCIAL

## 2024-12-04 VITALS
DIASTOLIC BLOOD PRESSURE: 82 MMHG | OXYGEN SATURATION: 96 % | SYSTOLIC BLOOD PRESSURE: 142 MMHG | WEIGHT: 230 LBS | HEART RATE: 78 BPM | BODY MASS INDEX: 34.86 KG/M2 | RESPIRATION RATE: 20 BRPM | TEMPERATURE: 97.7 F

## 2024-12-04 DIAGNOSIS — M17.0 PRIMARY OSTEOARTHRITIS OF BOTH KNEES: ICD-10-CM

## 2024-12-04 DIAGNOSIS — Z89.429 TOE AMPUTEE (H): ICD-10-CM

## 2024-12-04 DIAGNOSIS — E11.628 DIABETIC FOOT INFECTION (H): Primary | ICD-10-CM

## 2024-12-04 DIAGNOSIS — N18.32 STAGE 3B CHRONIC KIDNEY DISEASE (H): ICD-10-CM

## 2024-12-04 DIAGNOSIS — L08.9 DIABETIC FOOT INFECTION (H): Primary | ICD-10-CM

## 2024-12-04 DIAGNOSIS — E11.42 TYPE 2 DIABETES MELLITUS WITH DIABETIC POLYNEUROPATHY, WITHOUT LONG-TERM CURRENT USE OF INSULIN (H): ICD-10-CM

## 2024-12-04 DIAGNOSIS — I73.9 PAD (PERIPHERAL ARTERY DISEASE) (H): ICD-10-CM

## 2024-12-04 PROCEDURE — 99495 TRANSJ CARE MGMT MOD F2F 14D: CPT | Performed by: FAMILY MEDICINE

## 2024-12-04 ASSESSMENT — ANXIETY QUESTIONNAIRES
6. BECOMING EASILY ANNOYED OR IRRITABLE: SEVERAL DAYS
GAD7 TOTAL SCORE: 6
GAD7 TOTAL SCORE: 6
7. FEELING AFRAID AS IF SOMETHING AWFUL MIGHT HAPPEN: SEVERAL DAYS
GAD7 TOTAL SCORE: 6
1. FEELING NERVOUS, ANXIOUS, OR ON EDGE: NOT AT ALL
4. TROUBLE RELAXING: SEVERAL DAYS
2. NOT BEING ABLE TO STOP OR CONTROL WORRYING: SEVERAL DAYS
3. WORRYING TOO MUCH ABOUT DIFFERENT THINGS: SEVERAL DAYS
5. BEING SO RESTLESS THAT IT IS HARD TO SIT STILL: SEVERAL DAYS
IF YOU CHECKED OFF ANY PROBLEMS ON THIS QUESTIONNAIRE, HOW DIFFICULT HAVE THESE PROBLEMS MADE IT FOR YOU TO DO YOUR WORK, TAKE CARE OF THINGS AT HOME, OR GET ALONG WITH OTHER PEOPLE: SOMEWHAT DIFFICULT
4. TROUBLE RELAXING: SEVERAL DAYS
7. FEELING AFRAID AS IF SOMETHING AWFUL MIGHT HAPPEN: SEVERAL DAYS
5. BEING SO RESTLESS THAT IT IS HARD TO SIT STILL: SEVERAL DAYS
1. FEELING NERVOUS, ANXIOUS, OR ON EDGE: NOT AT ALL
IF YOU CHECKED OFF ANY PROBLEMS ON THIS QUESTIONNAIRE, HOW DIFFICULT HAVE THESE PROBLEMS MADE IT FOR YOU TO DO YOUR WORK, TAKE CARE OF THINGS AT HOME, OR GET ALONG WITH OTHER PEOPLE: SOMEWHAT DIFFICULT
2. NOT BEING ABLE TO STOP OR CONTROL WORRYING: SEVERAL DAYS
7. FEELING AFRAID AS IF SOMETHING AWFUL MIGHT HAPPEN: SEVERAL DAYS
GAD7 TOTAL SCORE: 6
6. BECOMING EASILY ANNOYED OR IRRITABLE: SEVERAL DAYS
3. WORRYING TOO MUCH ABOUT DIFFERENT THINGS: SEVERAL DAYS
8. IF YOU CHECKED OFF ANY PROBLEMS, HOW DIFFICULT HAVE THESE MADE IT FOR YOU TO DO YOUR WORK, TAKE CARE OF THINGS AT HOME, OR GET ALONG WITH OTHER PEOPLE?: SOMEWHAT DIFFICULT

## 2024-12-04 ASSESSMENT — PAIN SCALES - GENERAL: PAINLEVEL_OUTOF10: NO PAIN (0)

## 2024-12-04 NOTE — PROGRESS NOTES
Assessment & Plan       ICD-10-CM    1. Diabetic foot infection (H)  E11.628     L08.9       2. Type 2 diabetes mellitus with diabetic polyneuropathy, without long-term current use of insulin (H)  E11.42       3. Toe amputee (H)  Z89.429       4. PAD (peripheral artery disease) (H)  I73.9       5. Primary osteoarthritis of both knees  M17.0       6. Stage 3b chronic kidney disease (H)  N18.32         He has had appropriate care and treatment for his left foot infection and he will finish out his Augmentin and doxycycline antibiotics  He will continue with home wound care  He will follow-up with podiatry next week as scheduled and also rheumatology as scheduled  I encouraged him to talk to Dr. Tompkins about his knee arthritis concerns, but I did review some of the different pros and cons of knee replacement surgery with him today  Continue ongoing primary care with Dr. Duckworth as appropriate        MED REC REQUIRED  Post Medication Reconciliation Status: discharge medications reconciled, continue medications without change        Gilma Renner is a 79 year old, presenting for the following health issues:  Hospital F/U      12/4/2024    10:45 AM   Additional Questions   Roomed by Kayleigh   Accompanied by Wife Yoli         12/4/2024    10:45 AM   Patient Reported Additional Medications   Patient reports taking the following new medications none     HPI        Hospital Follow-up Visit:    Hospital/Nursing Home/IP Rehab Facility:  [facility]  Date of Admission: 11/22/24  Date of Discharge: 11/27/24  Reason(s) for Admission: Left toe infection  Was the patient in the ICU or did the patient experience delirium during hospitalization?  No  Do you have any other stressors you would like to discuss with your provider? No    Problems taking medications regularly:  None  Medication changes since discharge: None  Problems adhering to non-medication therapy:  None    Summary of hospitalization:  [facility]  [facility] and reviewed  Diagnostic Tests/Treatments reviewed.  Follow up needed: none  Other Healthcare Providers Involved in Patient s Care:         None  Update since discharge: improved.         Plan of care communicated with patient and family           He has been dealing with left foot infections related to his underlying diabetes and PAD and neuropathy for many months.  He was just hospitalized for that again a couple of weeks ago and was discharged 1 week ago.  He has had all 5 toes removed from his left foot now and related to diabetic foot ulcers and infection.  He is still finishing Augmentin and doxycycline.  He has a follow-up scheduled with his podiatrist next week.  He is receiving home wound care.  He remains on numerous other baseline medications as below.  He had numerous lab tests done in the hospital setting including a hemoglobin A1c done on November 22 which came back at 6.3.  He normally sees Dr. Duckworth for primary care.  He will be seeing Dr. Tompkins of rheumatology next week and has an appointment to see our [place] pharmacist the following week.  His main ongoing concern and complaint now is bilateral knee pain secondary to arthritis.  He has had cortisone injections in the past, but his blood sugar shot way up with those.  He also has had hyaluronic acid injections.  He is thinking he may want/need to have knee replacement surgery.    Patient Active Problem List   Diagnosis    Pain in limb    Hyperlipidemia LDL goal <100    Hypertension goal BP (blood pressure) < 140/90    Hypogonadism    Type 2 diabetes mellitus with diabetic polyneuropathy, without long-term current use of insulin (H)    RBBB (right bundle branch block)    Ex-smoker    Family history of esophageal cancer    Gastroesophageal reflux disease, esophagitis presence not specified    Rheumatoid arthritis involving multiple sites with positive rheumatoid factor (H)    Pulmonary nodule    High risk medication use    Spondylosis of  cervical region without myelopathy or radiculopathy    Erectile dysfunction, unspecified erectile dysfunction type    Type 2 diabetes mellitus without retinopathy (H)    Tubulovillous adenoma of colon    Diabetic polyneuropathy associated with type 2 diabetes mellitus (H)    Chronic bilateral low back pain without sciatica    Migraine equivalent    Posterior vitreous detachment of left eye    Pseudophakia, ou    Eyelid lesion, LLL    Bradycardia    Primary osteoarthritis of both knees    Syncope    Trigger finger, acquired    Stage 3b chronic kidney disease (H)    Abdominal pain, generalized    PAD (peripheral artery disease) (H)    Immunosuppression (H)    Skin ulcer of toe of left foot, limited to breakdown of skin (H)    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)    Pneumothorax    SOBOE (shortness of breath on exertion)    Pacemaker    Ulcer of left foot, unspecified ulcer stage (H)    Hammer toe of left foot    Anemia, unspecified type    Closed nondisplaced fracture of right pubis (H)    Amputation of toe (H)    Adenocarcinoma, lung (H)    Urge incontinence of urine    Thrombocytopenia (H)    Severe sepsis (H)    History of blepharoplasty    Mild protein-calorie malnutrition (H)    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)    Autoimmune hemolytic anemia (H)    Pressure ulcer of other site, stage 3 (H)    Chronic systolic (congestive) heart failure (H)    Uncomplicated opioid dependence (H)    Major depressive disorder, recurrent, mild (H)    NSVT (nonsustained ventricular tachycardia) (H)    Diabetic ulcer of toe of left foot associated with type 2 diabetes mellitus, with necrosis of muscle (H)    Toe amputee (H)    Class 2 severe obesity due to excess calories with serious comorbidity in adult (H)    Medial epicondylitis of left elbow    Chronic pain syndrome     Current Outpatient Medications   Medication Sig Dispense Refill    acetaminophen (TYLENOL) 500 MG tablet Take 1-2  tablets (500-1,000 mg) by mouth 3 times daily as needed for mild pain 180 tablet 11    Alcohol Swabs PADS Uses four times daily 120 each 11    amoxicillin-clavulanate (AUGMENTIN) 875-125 MG tablet Take 1 tablet by mouth 2 times daily.      bumetanide (BUMEX) 1 MG tablet TAKE ONE TABLET BY MOUTH ONCE DAILY 90 tablet 1    cetirizine (ZYRTEC) 10 MG tablet Take 1 tablet (10 mg) by mouth At Bedtime 30 tablet 0    Continuous Blood Gluc Sensor (FREESTYLE YENNY 2 SENSOR) MISC 1 each every 14 days Use 1 sensor every 14 days. Use to read blood sugars per 's instructions. 2 each 5    cyanocobalamin (VITAMIN B-12) 1000 MCG tablet TAKE 1 TABLET (1,000 MCG) BY MOUTH ONCE A WEEK 30 tablet 0    doxycycline hyclate (VIBRAMYCIN) 100 MG capsule Take 1 capsule (100 mg) by mouth 2 times daily. 14 capsule 0    Dulaglutide (TRULICITY) 3 MG/0.5ML SOPN Inject 3 mg Subcutaneous every 7 days      ELIQUIS ANTICOAGULANT 2.5 MG tablet TAKE ONE TABLET BY MOUTH TWICE A  tablet 1    ferrous gluconate (FERGON) 324 (38 Fe) MG tablet TAKE 1 TABLET (324 MG) BY MOUTH DAILY (WITH BREAKFAST) 90 tablet 3    fluticasone (FLONASE) 50 MCG/ACT nasal spray Spray 1-2 sprays into both nostrils daily as needed for rhinitis or allergies      gabapentin (NEURONTIN) 300 MG capsule TAKE THREE CAPSULES BY MOUTH THREE TIMES A  capsule 1    gabapentin (NEURONTIN) 600 MG tablet Take 1 tablet (600 mg) by mouth 3 times daily 270 tablet 1    HYDROmorphone (DILAUDID) 2 MG tablet Take 1 tablet (2 mg) by mouth every 6 hours as needed for pain. 90 tablet 0    HYDROmorphone (DILAUDID) 2 MG tablet Take 1 tablet (2 mg) by mouth 2 times daily as needed for pain. For urgent pain relief with rheumatoid arthritis flare-up 10 tablet 0    hydroxychloroquine (PLAQUENIL) 200 MG tablet Take 1 tablet (200 mg) by mouth 2 times daily 180 tablet 2    insulin lispro (HUMALOG KWIKPEN) 100 UNIT/ML (1 unit dial) KWIKPEN INJECT 14 UNITS SUBCUTANEOUSLY 2 TIMES DAILY BEFORE  MEALS 30 mL 0    insulin pen needle (31G X 5 MM) 31G X 5 MM miscellaneous Use 3 pen needles daily or as directed. 300 each 1    LANTUS SOLOSTAR 100 UNIT/ML soln INJECT 47 UNITS SUBCUTANEOUSLY DAILY 45 mL 0    medical cannabis (Patient's own supply) See Admin Instructions (The purpose of this order is to document that the patient reports taking medical cannabis.  This is not a prescription, and is not used to certify that the patient has a qualifying medical condition.)      metoprolol succinate ER (TOPROL XL) 50 MG 24 hr tablet TAKE 1 TABLET BY MOUTH ONCE DAILY 90 tablet 1    omeprazole (PRILOSEC) 20 MG DR capsule TAKE ONE CAPSULE BY MOUTH ONCE DAILY 90 capsule 2    oxyCODONE (ROXICODONE) 5 MG tablet Take 5 mg by mouth every 6 hours as needed      PERMETHRIN EX Wheelchair: Standard  with leg rests: (Swing away Length of need: 99 months      polyethylene glycol (MIRALAX) 17 GM/Dose powder Take 1 Scoop by mouth as needed for constipation      polyethylene glycol-propylene glycol (SYSTANE) 0.4-0.3 % SOLN ophthalmic solution Place 1 drop into both eyes 2 times daily as needed for dry eyes 3 mL 0    pravastatin (PRAVACHOL) 10 MG tablet TAKE ONE TABLET BY MOUTH ONCE DAILY 90 tablet 3    senna (SENOKOT) 8.6 MG tablet Take 1 tablet by mouth daily.      sertraline (ZOLOFT) 50 MG tablet TAKE ONE TABLET BY MOUTH ONCE DAILY 90 tablet 0    SM PAIN RELIEVER EX  MG tablet TAKE TWO TABLETS BY MOUTH ONCE DAILY 180 tablet 1    TRULICITY 3 MG/0.5ML SOAJ INJECT 3 MG (0.5 ML) UNDER THE SKIN ONCE A WEEK 6 mL 0     No current facility-administered medications for this visit.           Review of Systems  Mainly significant for the above.      Objective    BP (!) 142/82 (BP Location: Left arm, Patient Position: Sitting, Cuff Size: Adult Large)   Pulse 78   Temp 97.7  F (36.5  C) (Temporal)   Resp 20   Wt 104.3 kg (230 lb)   SpO2 96%   BMI 34.86 kg/m    Body mass index is 34.86 kg/m .  Physical Exam   GENERAL: alert and no  distress  MS: His left foot is extensively bandaged and I did not remove the bandages today.    Hospital discharge summary and other records reviewed.  No particular lab follow-up testing was recommended.        Signed Electronically by: Jose Rowe MD    Answers submitted by the patient for this visit:  Patient Health Questionnaire (G7) (Submitted on 12/4/2024)  LINO 7 TOTAL SCORE: 6

## 2024-12-05 NOTE — TELEPHONE ENCOUNTER
Called and left detailed message regarding approval of home care orders      Elli RN    Triage Nurse  [facility]

## 2024-12-11 DIAGNOSIS — E11.42 TYPE 2 DIABETES MELLITUS WITH DIABETIC POLYNEUROPATHY, WITHOUT LONG-TERM CURRENT USE OF INSULIN (H): ICD-10-CM

## 2024-12-11 DIAGNOSIS — R79.89 LOW VITAMIN B12 LEVEL: ICD-10-CM

## 2024-12-11 RX ORDER — LANOLIN ALCOHOL/MO/W.PET/CERES
1000 CREAM (GRAM) TOPICAL WEEKLY
Qty: 30 TABLET | Refills: 0 | Status: SHIPPED | OUTPATIENT
Start: 2024-12-11

## 2024-12-11 RX ORDER — INSULIN GLARGINE 100 [IU]/ML
INJECTION, SOLUTION SUBCUTANEOUS
Qty: 45 ML | Refills: 0 | Status: SHIPPED | OUTPATIENT
Start: 2024-12-11

## 2024-12-16 ENCOUNTER — VIRTUAL VISIT (OUTPATIENT)
Dept: PHARMACY | Facility: CLINIC | Age: 79
End: 2024-12-16
Payer: COMMERCIAL

## 2024-12-16 DIAGNOSIS — E78.5 HYPERLIPIDEMIA LDL GOAL <100: ICD-10-CM

## 2024-12-16 DIAGNOSIS — M05.79 RHEUMATOID ARTHRITIS INVOLVING MULTIPLE SITES WITH POSITIVE RHEUMATOID FACTOR (H): ICD-10-CM

## 2024-12-16 DIAGNOSIS — I73.9 PAD (PERIPHERAL ARTERY DISEASE) (H): ICD-10-CM

## 2024-12-16 DIAGNOSIS — K21.9 GASTROESOPHAGEAL REFLUX DISEASE: ICD-10-CM

## 2024-12-16 DIAGNOSIS — R79.89 LOW VITAMIN B12 LEVEL: ICD-10-CM

## 2024-12-16 DIAGNOSIS — I74.2 EMBOLISM AND THROMBOSIS OF ARTERIES OF THE UPPER EXTREMITIES (H): ICD-10-CM

## 2024-12-16 DIAGNOSIS — E11.42 DIABETIC POLYNEUROPATHY ASSOCIATED WITH TYPE 2 DIABETES MELLITUS (H): ICD-10-CM

## 2024-12-16 DIAGNOSIS — N18.32 STAGE 3B CHRONIC KIDNEY DISEASE (H): ICD-10-CM

## 2024-12-16 DIAGNOSIS — Z78.9 TAKES DIETARY SUPPLEMENTS: ICD-10-CM

## 2024-12-16 DIAGNOSIS — I10 HYPERTENSION GOAL BP (BLOOD PRESSURE) < 140/90: ICD-10-CM

## 2024-12-16 DIAGNOSIS — F33.0 MAJOR DEPRESSIVE DISORDER, RECURRENT, MILD (H): ICD-10-CM

## 2024-12-16 DIAGNOSIS — F32.A DEPRESSION, UNSPECIFIED DEPRESSION TYPE: ICD-10-CM

## 2024-12-16 DIAGNOSIS — E11.42 TYPE 2 DIABETES MELLITUS WITH DIABETIC POLYNEUROPATHY, WITHOUT LONG-TERM CURRENT USE OF INSULIN (H): Primary | ICD-10-CM

## 2024-12-16 PROCEDURE — 99207 PR NO CHARGE LOS: CPT | Mod: 93 | Performed by: PHARMACIST

## 2024-12-16 RX ORDER — LANOLIN ALCOHOL/MO/W.PET/CERES
1000 CREAM (GRAM) TOPICAL WEEKLY
Qty: 100 TABLET | Refills: 3 | Status: SHIPPED | OUTPATIENT
Start: 2024-12-16

## 2024-12-16 RX ORDER — INSULIN GLARGINE 100 [IU]/ML
44 INJECTION, SOLUTION SUBCUTANEOUS AT BEDTIME
Qty: 45 ML | Refills: 1 | Status: SHIPPED | OUTPATIENT
Start: 2024-12-16

## 2024-12-16 RX ORDER — INSULIN LISPRO 100 [IU]/ML
14 INJECTION, SOLUTION INTRAVENOUS; SUBCUTANEOUS
Qty: 30 ML | Refills: 1 | Status: SHIPPED | OUTPATIENT
Start: 2024-12-16

## 2024-12-16 NOTE — Clinical Note
CLARY FORD note, thanks!  Kaur Olivo, PharmD Medication Therapy Management Pharmacist [phone]

## 2024-12-16 NOTE — PATIENT INSTRUCTIONS
"Recommendations from today's MTM visit:                                                           Decrease Lantus to 44 units at bedtime.     Follow-up: Return in about 6 months (around 6/16/2025) for Medication Therapy Management.    It was great speaking with you today.  I value your experience and would be very thankful for your time in providing feedback in our clinic survey. In the next few days, you may receive an email or text message from [facility] [employer] with a link to a survey related to your  clinical pharmacist.\"     To schedule another MTM appointment, please call the clinic directly or you may call the MTM scheduling line at [phone] or toll-free at [phone].     My Clinical Pharmacist's contact information:                                                      Please feel free to contact me with any questions or concerns you have.      Kaur Olivo, PharmD  Medication Therapy Management Pharmacist   "

## 2024-12-16 NOTE — PROGRESS NOTES
Medication Therapy Management (MTM) Encounter    ASSESSMENT:                            Medication Adherence/Access: No issues identified.    Diabetic foot infection: Improving.    Diabetes /Type 2: Due to a few low blood sugars, may benefit from reducing basal insulin.    GERD  Stable    Hypertension PAD/CKD Stage 3/HFpEF: Stable. Blood pressure at goal < 140/90.    History of DVT: Dose of Eliquis 2.5 mg twice daily is appropriate due to age 79 and creatinine > 1.5 mg/dL (1.95 on 11/25/24 through care everywhere) - in addition, lower dose of 2.5 mg twice daily is appropriate per indefinite anticoagulation to reduce DVT risk.    Hyperlipidemia Stable.    Rheumatoid arthritis/Pain/Osteoarthritis: Stable.    Depression/Anxiety: Stable.    Neuropathy:  Stable.    OTC/Supplements: Stable.    PLAN:                            Decrease Lantus to 44 units at bedtime.     Follow-up: Return in about 6 months (around 6/16/2025) for Medication Therapy Management.    SUBJECTIVE/OBJECTIVE:                          Zurdo Dash is a 79 year old male seen for a follow-up visit.       Reason for visit: med review.    Allergies/ADRs: Reviewed in chart  Past Medical History: Reviewed in chart  Tobacco: He reports that he quit smoking about 17 years ago. His smoking use included cigarettes. He started smoking about 57 years ago. He has a 40 pack-year smoking history. He has been exposed to tobacco smoke. He has never used smokeless tobacco.  Alcohol: not currently using    Medication Adherence/Access: [place] [facility] he manages all his medications on his own.   Uses pill box, no concerns.    Diabetic foot infection:    Clindamycin didn't agree with him (severe heartburn, taking Tums like it was candy, within two days of stopping this completely resolved), this was replaced with Augmentin and Doxycycline and he's done with these now. He reports things are healing up nicely, follows with podiatry.    Diabetes  /Type 2:  Trulicity 3 mg weekly (Wednesdays) - gets through [facility] program-they work directly though [email or web address]  Lantus 47 units daily   Humalog 14 units twice daily before meals-takes it with him to dining room and he takes it right before he starts eating - started after last A1C was higher.    Patient is not experiencing side effects.  Blood sugar monitoring: Continuous Glucose Monitor   Getting a low blood sugar into 60s once a week or once every other week.  Current diabetes symptoms: none  Diet/Exercise: 2 meals/day       Hypertension   PAD/CKD Stage 3/HFpEF:  bumetanide 1 mg daily   Metoprolol ER 50 mg daily     ECHO: 3/2/20: EF 55-60%  Patient reports no current medication side effects  Patient does not self-monitor blood pressure.         GERD    Omeprazole 20 mg daily     Patient reports no current symptoms.   Patient feels that current regimen is effective.  The patient does not have a history of GI bleed.       History of DVT:  Eliquis 5 mg twice daily     States this/these are effective. Denies side effects.     Hyperlipidemia   pravastatin 10 mg daily    Patient reports no significant myalgias or other side effects.     Rheumatoid arthritis/Pain/Osteoarthritis:  hydroxychloroquine 200 mg daily   Acetaminophen  mg three times daily  hydromorphone 2 mg twice daily as needed for RA flare - rare to use  Medical marijuana - THC gummies as needed for RA flare  THC beau as needed (gets this OTC)    States this/these are effective. Denies side effects.     Depression/Anxiety:  sertraline 50 mg daily    States this/these are effective. Denies side effects.     Neuropathy:    Gabapentin 900 mg three times daily      He does usually take a nap in the afternoon. Very helpful for his neuropathy.      OTC/Supplements:  Cetirizine 10 mg daily   Flonase as needed  Vitamin B-12 1000 mg weekly (Mondays)-needs refill  Ferrous gluconate 324 mg daily     States this/these are effective. Denies side effects.      Today's Vitals: There were no vitals taken for this visit.  ----------------  Post Discharge Medication Reconciliation Status: discharge medications reconciled and changed, per note/orders.    I spent 25 minutes with this patient today. All changes were made via collaborative practice agreement with Kapil Duckworth MD.     A summary of these recommendations was sent via [employer].    Issac HinesD  Medication Therapy Management Pharmacist    Start: 10:45 am  End: 11:10 AM  Mode; phone visit     Medication Therapy Recommendations  Type 2 diabetes mellitus with diabetic polyneuropathy, without long-term current use of insulin (H)   1 Current Medication: LANTUS SOLOSTAR 100 UNIT/ML soln (Discontinued)   Current Medication Sig: INJECT 47 UNITS SUBCUTANEOUSLY DAILY   Rationale: Dose too high - Dosage too high - Safety   Recommendation: Decrease Dose   Status: Accepted per CPA   Identified Date: 12/16/2024 Completed Date: 12/16/2024

## 2024-12-21 ENCOUNTER — HEALTH MAINTENANCE LETTER (OUTPATIENT)
Age: 79
End: 2024-12-21

## 2025-01-08 DIAGNOSIS — M05.79 RHEUMATOID ARTHRITIS INVOLVING MULTIPLE SITES WITH POSITIVE RHEUMATOID FACTOR (H): ICD-10-CM

## 2025-01-08 DIAGNOSIS — M79.601 PAIN OF RIGHT UPPER EXTREMITY: ICD-10-CM

## 2025-01-08 DIAGNOSIS — E11.42 DIABETIC POLYNEUROPATHY ASSOCIATED WITH TYPE 2 DIABETES MELLITUS (H): ICD-10-CM

## 2025-01-08 RX ORDER — GABAPENTIN 300 MG/1
900 CAPSULE ORAL 3 TIMES DAILY
Qty: 270 CAPSULE | Refills: 1 | OUTPATIENT
Start: 2025-01-08

## 2025-01-08 RX ORDER — GABAPENTIN 300 MG/1
900 CAPSULE ORAL 3 TIMES DAILY
Qty: 270 CAPSULE | Refills: 1 | Status: SHIPPED | OUTPATIENT
Start: 2025-01-08

## 2025-01-08 NOTE — TELEPHONE ENCOUNTER
This was just denied with reasoning that patient should still have refills on file. We do not have any extra refills on file. The last prescription I see that was sent was 09/27 for a total of 2 months? Is there someone that can look into this?  Thank You,  Bisi Teixeira, [facility]

## 2025-01-08 NOTE — TELEPHONE ENCOUNTER
Patient called the clinic regarding his refill for:  Gabapentin (NEURONTIN) 300 MG capsules  TAKE THREE CAPSULES BY MOUTH THREE TIMES A DAY     Last written on 9/27/24 for 270 capsules (1 month supply) with 1 refill.  Patient would have been due for next refill around 11/27/24.     Patient states that he sometimes misses his mid-day dose and that's why his supply has lasted longer.     Patient has less than 1 week of medication left  at his time and he is hoping to  his refill no later than Friday, January 10.     Elisabeth Vargas RN BSN  [facility]

## 2025-01-13 ENCOUNTER — OFFICE VISIT (OUTPATIENT)
Dept: INTERNAL MEDICINE | Facility: CLINIC | Age: 80
End: 2025-01-13
Payer: COMMERCIAL

## 2025-01-13 ENCOUNTER — TELEPHONE (OUTPATIENT)
Dept: INTERNAL MEDICINE | Facility: CLINIC | Age: 80
End: 2025-01-13

## 2025-01-13 ENCOUNTER — MEDICAL CORRESPONDENCE (OUTPATIENT)
Dept: HEALTH INFORMATION MANAGEMENT | Facility: CLINIC | Age: 80
End: 2025-01-13

## 2025-01-13 VITALS
HEIGHT: 68 IN | SYSTOLIC BLOOD PRESSURE: 154 MMHG | BODY MASS INDEX: 35.31 KG/M2 | HEART RATE: 78 BPM | DIASTOLIC BLOOD PRESSURE: 76 MMHG | TEMPERATURE: 98 F | WEIGHT: 233 LBS | OXYGEN SATURATION: 97 % | RESPIRATION RATE: 16 BRPM

## 2025-01-13 DIAGNOSIS — I74.2 EMBOLISM AND THROMBOSIS OF ARTERIES OF THE UPPER EXTREMITIES (H): ICD-10-CM

## 2025-01-13 DIAGNOSIS — E66.01 CLASS 2 SEVERE OBESITY DUE TO EXCESS CALORIES WITH SERIOUS COMORBIDITY AND BODY MASS INDEX (BMI) OF 35.0 TO 35.9 IN ADULT (H): ICD-10-CM

## 2025-01-13 DIAGNOSIS — R23.2 HOT FLASH IN MALE: ICD-10-CM

## 2025-01-13 DIAGNOSIS — N18.32 STAGE 3B CHRONIC KIDNEY DISEASE (H): ICD-10-CM

## 2025-01-13 DIAGNOSIS — L97.523 DIABETIC ULCER OF TOE OF LEFT FOOT ASSOCIATED WITH TYPE 2 DIABETES MELLITUS, WITH NECROSIS OF MUSCLE (H): ICD-10-CM

## 2025-01-13 DIAGNOSIS — I50.22 CHRONIC SYSTOLIC (CONGESTIVE) HEART FAILURE (H): ICD-10-CM

## 2025-01-13 DIAGNOSIS — I47.29 NSVT (NONSUSTAINED VENTRICULAR TACHYCARDIA) (H): ICD-10-CM

## 2025-01-13 DIAGNOSIS — E11.621 DIABETIC ULCER OF TOE OF LEFT FOOT ASSOCIATED WITH TYPE 2 DIABETES MELLITUS, WITH NECROSIS OF MUSCLE (H): ICD-10-CM

## 2025-01-13 DIAGNOSIS — D59.10 AUTOIMMUNE HEMOLYTIC ANEMIA (H): ICD-10-CM

## 2025-01-13 DIAGNOSIS — Z23 NEED FOR VACCINATION AGAINST HEPATITIS A: ICD-10-CM

## 2025-01-13 DIAGNOSIS — E78.5 HYPERLIPIDEMIA LDL GOAL <100: ICD-10-CM

## 2025-01-13 DIAGNOSIS — F11.20 UNCOMPLICATED OPIOID DEPENDENCE (H): ICD-10-CM

## 2025-01-13 DIAGNOSIS — M06.9 RHEUMATOID ARTHRITIS, INVOLVING UNSPECIFIED SITE, UNSPECIFIED WHETHER RHEUMATOID FACTOR PRESENT (H): ICD-10-CM

## 2025-01-13 DIAGNOSIS — E66.812 CLASS 2 SEVERE OBESITY DUE TO EXCESS CALORIES WITH SERIOUS COMORBIDITY AND BODY MASS INDEX (BMI) OF 35.0 TO 35.9 IN ADULT (H): ICD-10-CM

## 2025-01-13 DIAGNOSIS — E11.42 TYPE 2 DIABETES MELLITUS WITH DIABETIC POLYNEUROPATHY, WITHOUT LONG-TERM CURRENT USE OF INSULIN (H): Primary | ICD-10-CM

## 2025-01-13 DIAGNOSIS — C34.91 MALIGNANT NEOPLASM OF RIGHT LUNG, UNSPECIFIED PART OF LUNG (H): ICD-10-CM

## 2025-01-13 LAB
ERYTHROCYTE [DISTWIDTH] IN BLOOD BY AUTOMATED COUNT: 13.9 % (ref 10–15)
EST. AVERAGE GLUCOSE BLD GHB EST-MCNC: 143 MG/DL
HBA1C MFR BLD: 6.6 % (ref 0–5.6)
HCT VFR BLD AUTO: 32.8 % (ref 40–53)
HGB BLD-MCNC: 11 G/DL (ref 13.3–17.7)
MCH RBC QN AUTO: 29.9 PG (ref 26.5–33)
MCHC RBC AUTO-ENTMCNC: 33.5 G/DL (ref 31.5–36.5)
MCV RBC AUTO: 89 FL (ref 78–100)
PLATELET # BLD AUTO: 217 10E3/UL (ref 150–450)
RBC # BLD AUTO: 3.68 10E6/UL (ref 4.4–5.9)
WBC # BLD AUTO: 5.8 10E3/UL (ref 4–11)

## 2025-01-13 PROCEDURE — 84460 ALANINE AMINO (ALT) (SGPT): CPT | Performed by: INTERNAL MEDICINE

## 2025-01-13 PROCEDURE — 83036 HEMOGLOBIN GLYCOSYLATED A1C: CPT | Performed by: INTERNAL MEDICINE

## 2025-01-13 PROCEDURE — 80048 BASIC METABOLIC PNL TOTAL CA: CPT | Performed by: INTERNAL MEDICINE

## 2025-01-13 PROCEDURE — 80061 LIPID PANEL: CPT | Performed by: INTERNAL MEDICINE

## 2025-01-13 PROCEDURE — 99214 OFFICE O/P EST MOD 30 MIN: CPT | Performed by: INTERNAL MEDICINE

## 2025-01-13 PROCEDURE — 85027 COMPLETE CBC AUTOMATED: CPT | Performed by: INTERNAL MEDICINE

## 2025-01-13 PROCEDURE — 36415 COLL VENOUS BLD VENIPUNCTURE: CPT | Performed by: INTERNAL MEDICINE

## 2025-01-13 NOTE — TELEPHONE ENCOUNTER
[facility] received and given to Dr. Duckworth to sign.  Caren Telles,

## 2025-01-13 NOTE — PROGRESS NOTES
Assessment & Plan     Type 2 diabetes mellitus with diabetic polyneuropathy, without long-term current use of insulin (H)  This is a patient well known to me.. he's in tough shape with his diabetes mellitus sounding to be doing worse over the last month and we are due for a recheck hemoglobin a1c  [ diabetes test ] today . 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 His DIABETIC FOOT EXAM is positive for diabetes neuropathy and significant vascular disease now with all the toes on his left foot having been amputated. But since he lost the last toe, on exam today the wound seems well healed and he continues in close follow up with his Podiatrist associated with [facility] in [place]   - Lipid panel reflex to direct LDL Non-fasting; Future  - HEMOGLOBIN A1C; Future  - Lipid panel reflex to direct LDL Non-fasting  - HEMOGLOBIN A1C    Hyperlipidemia LDL goal <100  Problem is stable and ongoing monitoring    Fasting lipid panel ordered today   - ALT; Future  - ALT    Chronic systolic (congestive) heart failure (H)  Problem is stable and ongoing monitoring    - CBC with Platelets; Future  - CBC with Platelets    Stage 3b chronic kidney disease (H)  Problem is stable and ongoing monitoring    - Basic metabolic panel  (Ca, Cl, CO2, Creat, Gluc, K, Na, BUN); Future  - Basic metabolic panel  (Ca, Cl, CO2, Creat, Gluc, K, Na, BUN)    Need for prophylactic vaccination against hepatitis A  Administered today     Autoimmune hemolytic anemia  (H)  Problem is stable and ongoing monitoring    - CBC with Platelets; Future  - CBC with Platelets    Malignant neoplasm of right lung, unspecified part of lung (H)  Apparently this is not a cancer. Has stabilized and is no longer being followed so closely    Rheumatoid arthritis, involving unspecified site, unspecified whether rheumatoid factor present (H)  Continues in care with Dr. 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)  Problem is stable and ongoing monitoring    - CBC with Platelets; Future  - Basic metabolic panel  (Ca, Cl, CO2, Creat, Gluc, K, Na, BUN); Future  - CBC with Platelets  - Basic metabolic panel  (Ca, Cl, CO2, Creat, Gluc, K, Na, BUN)    Uncomplicated opioid dependence (H)  He has never used opioid pain medication beyond roughly 10-30 pills a year and really does not meet admission criteria for the [facility] Chronic Pain Contract    NSVT (nonsustained ventricular tachycardia) (H)  Problem is stable and ongoing monitoring      Class 2 severe obesity due to excess calories with serious comorbidity and body mass index (BMI) of 35.0 to 35.9 in adult (H)  Problem is stable and onmo, not a candidate for much in the way of exercise at this point     Hot flash in male  We discussed some odd hot flashes he's had within the last few months . He gets hot flashes around 1-3 times per month. This is technically of uncertain cause but I have seen this mostly in men over 80 and there may be a testosterone threshold that once this declines beyond a certain point I do suspect this may be implicated in hot flashes. Otherwise we discussed this and will assume a posture of observation at this point     Need for vaccination  against hepatitis A  Administered   - hepatitis A vaccine (VAQTA) 50 UNIT/ML injection; Inject 1 mL (50 Units) into the muscle once for 1 dose.        Subjective   Zurdo is a 79 year old, presenting for the following health issues:  Dme (Medical diabetic shoes /) and hot flash  (Pt states like a lighting strike across forehead then feeling really hot, jere like a hot flash )    Patient expresses some amazement about the fact that supposedly his Podiatrist can't prescribe specialized shoes for diabetic neuropathy . But he needs a new pair of diabetic shoes, patient sees his Podiatrist at [facility] in [place] and the  makes his shoes at the [employer] , [phone]    At one point this patient had been pushing for some opioid pain medication for use when his rheumatoid arthritis really gives him a big jolt from pain and eventually even though we had discussed the possibly of seeing the [facility] , he ultimately decided that his need for opioid pain medication was not so strong and major benefit 10 pills could last a year !    With diabetes mellitus he feels he is eating too any sweets, he does have a Alaris home monitor for blood glucose monitoring , had kayla pie for dessert , current blood glucose readings is 153. This that is to say was eaten around 4 hours ago. We reviewed his data set with blood glucose data and we clicked back through his blood glucose monitoring and find many many dates of blood glucose readings as high as 240-340. But these high blood glucose readings are mostly just foot MRI the last month     He also sees some low blood glucoses as low as 50-some 60's     He takes 14 units from rapid acting insulin like humalog with every meal but he acknowledges he often forgets to take his pre-meal insulin ; generally has only 2 meals per day.  Wt Readings from Last 5 Encounters:   01/13/25 105.7 kg (233 lb)   12/04/24 104.3 kg (230 lb)   09/21/24  "105.2 kg (232 lb)   08/22/24 105.2 kg (232 lb)   07/22/24 103 kg (227 lb 1.6 oz)     Body mass index is 35.43 kg/m .   Lab Results   Component Value Date    A1C 6.9 08/22/2024    A1C 6.6 06/14/2024    A1C 8.9 02/15/2024    A1C 6.4 09/28/2023    A1C 7.8 06/27/2023    A1C 6.8 01/07/2021    A1C 6.8 12/17/2020    A1C 6.7 12/11/2020    A1C 6.0 01/03/2020    A1C 6.4 08/06/2019     DIABETIC FOOT EXAM - missing all toes with left foot. Lots of coexisting condition of peripheral neuropathy . His right foot is just fine,        1/13/2025     4:48 PM   Additional Questions   Roomed by philippe   Accompanied by spouse     History of Present Illness       Reason for visit:  Check my foot   He is taking medications regularly.       BP Readings from Last 6 Encounters:   01/13/25 (!) 154/76   12/04/24 (!) 142/82   11/22/24 (!) 145/75   10/17/24 123/73   09/21/24 (!) 165/82   08/22/24 133/77     Wt Readings from Last 5 Encounters:   01/13/25 105.7 kg (233 lb)   12/04/24 104.3 kg (230 lb)   09/21/24 105.2 kg (232 lb)   08/22/24 105.2 kg (232 lb)   07/22/24 103 kg (227 lb 1.6 oz)         Review of Systems  Constitutional, HEENT, cardiovascular, pulmonary, gi and gu systems are negative, except as otherwise noted.      Objective    BP (!) 154/76   Pulse 78   Temp 98  F (36.7  C) (Temporal)   Resp 16   Ht 1.727 m (5' 8\")   Wt 105.7 kg (233 lb)   SpO2 97%   BMI 35.43 kg/m    Body mass index is 35.43 kg/m .  Physical Exam               Signed Electronically by: Kapil Duckworth MD    "

## 2025-01-13 NOTE — TELEPHONE ENCOUNTER
Forms/Letter Request    Type of form/letter: Home Health Certification      Do we have the form/letter: Yes:     Who is the form from? Home care    Where did/will the form come from? form was faxed in    When is form/letter needed by: ?    How would you like the form/letter returned: Fax : [phone]    Caren Telles,

## 2025-01-14 DIAGNOSIS — M05.79 RHEUMATOID ARTHRITIS INVOLVING MULTIPLE SITES WITH POSITIVE RHEUMATOID FACTOR (H): ICD-10-CM

## 2025-01-14 LAB
ALT SERPL W P-5'-P-CCNC: 16 U/L (ref 0–70)
ANION GAP SERPL CALCULATED.3IONS-SCNC: 11 MMOL/L (ref 7–15)
BUN SERPL-MCNC: 43.6 MG/DL (ref 8–23)
CALCIUM SERPL-MCNC: 9.3 MG/DL (ref 8.8–10.4)
CHLORIDE SERPL-SCNC: 106 MMOL/L (ref 98–107)
CHOLEST SERPL-MCNC: 207 MG/DL
CREAT SERPL-MCNC: 1.96 MG/DL (ref 0.67–1.17)
EGFRCR SERPLBLD CKD-EPI 2021: 34 ML/MIN/1.73M2
FASTING STATUS PATIENT QL REPORTED: NO
FASTING STATUS PATIENT QL REPORTED: NO
GLUCOSE SERPL-MCNC: 132 MG/DL (ref 70–99)
HCO3 SERPL-SCNC: 22 MMOL/L (ref 22–29)
HDLC SERPL-MCNC: 31 MG/DL
LDLC SERPL CALC-MCNC: 100 MG/DL
NONHDLC SERPL-MCNC: 176 MG/DL
POTASSIUM SERPL-SCNC: 4.9 MMOL/L (ref 3.4–5.3)
SODIUM SERPL-SCNC: 139 MMOL/L (ref 135–145)
TRIGL SERPL-MCNC: 378 MG/DL

## 2025-01-14 NOTE — TELEPHONE ENCOUNTER
Medication:   Hydroxychloroquine  Last written on:   02/01/2024  Quantity:   180    Refills:   2    Last office visit:   06/13/2024  No show:  12/12/2024  Next office visit:     Last labs:   09/28/2023 has had labs done by other providers

## 2025-01-15 RX ORDER — ROSUVASTATIN CALCIUM 40 MG/1
40 TABLET, COATED ORAL DAILY
Qty: 90 TABLET | Refills: 1 | Status: SHIPPED | OUTPATIENT
Start: 2025-01-15

## 2025-01-20 ENCOUNTER — TELEPHONE (OUTPATIENT)
Dept: FAMILY MEDICINE | Facility: CLINIC | Age: 80
End: 2025-01-20
Payer: COMMERCIAL

## 2025-01-20 NOTE — TELEPHONE ENCOUNTER
Company Name: [facility], Iban Lemons is the .  Fax #: [phone]    Needs Dr. Duckworth's notes sent over to [facility] from 1/13/2025 in order to get special shoes for his diabetic neuropathy as well as the Miscellaneous DME Supply order from 1/13/2025.    Zuleika Escobedo RN on 1/20/2025 at 10:21 AM

## 2025-01-21 RX ORDER — HYDROXYCHLOROQUINE SULFATE 200 MG/1
200 TABLET, FILM COATED ORAL 2 TIMES DAILY
Qty: 180 TABLET | Refills: 0 | Status: SHIPPED | OUTPATIENT
Start: 2025-01-21

## 2025-02-03 ENCOUNTER — OFFICE VISIT (OUTPATIENT)
Dept: FAMILY MEDICINE | Facility: CLINIC | Age: 80
End: 2025-02-03
Payer: COMMERCIAL

## 2025-02-03 VITALS
BODY MASS INDEX: 35.16 KG/M2 | WEIGHT: 232 LBS | HEIGHT: 68 IN | DIASTOLIC BLOOD PRESSURE: 72 MMHG | HEART RATE: 69 BPM | TEMPERATURE: 97.9 F | RESPIRATION RATE: 17 BRPM | OXYGEN SATURATION: 96 % | SYSTOLIC BLOOD PRESSURE: 123 MMHG

## 2025-02-03 DIAGNOSIS — Z89.429 TOE AMPUTEE: Primary | ICD-10-CM

## 2025-02-03 DIAGNOSIS — M10.9 GOUT OF LEFT FOOT, UNSPECIFIED CAUSE, UNSPECIFIED CHRONICITY: ICD-10-CM

## 2025-02-03 DIAGNOSIS — L08.9 LOCAL INFECTION OF SKIN AND SUBCUTANEOUS TISSUE: ICD-10-CM

## 2025-02-03 DIAGNOSIS — E11.42 TYPE 2 DIABETES MELLITUS WITH DIABETIC POLYNEUROPATHY, WITHOUT LONG-TERM CURRENT USE OF INSULIN (H): ICD-10-CM

## 2025-02-03 DIAGNOSIS — S09.90XD CLOSED HEAD INJURY, SUBSEQUENT ENCOUNTER: ICD-10-CM

## 2025-02-03 LAB
BASOPHILS # BLD AUTO: 0.1 10E3/UL (ref 0–0.2)
BASOPHILS NFR BLD AUTO: 1 %
EOSINOPHIL # BLD AUTO: 0.5 10E3/UL (ref 0–0.7)
EOSINOPHIL NFR BLD AUTO: 6 %
ERYTHROCYTE [DISTWIDTH] IN BLOOD BY AUTOMATED COUNT: 12.9 % (ref 10–15)
HCT VFR BLD AUTO: 34 % (ref 40–53)
HGB BLD-MCNC: 10.9 G/DL (ref 13.3–17.7)
IMM GRANULOCYTES # BLD: 0 10E3/UL
IMM GRANULOCYTES NFR BLD: 0 %
LYMPHOCYTES # BLD AUTO: 1.1 10E3/UL (ref 0.8–5.3)
LYMPHOCYTES NFR BLD AUTO: 14 %
MCH RBC QN AUTO: 28.9 PG (ref 26.5–33)
MCHC RBC AUTO-ENTMCNC: 32.1 G/DL (ref 31.5–36.5)
MCV RBC AUTO: 90 FL (ref 78–100)
MONOCYTES # BLD AUTO: 1.1 10E3/UL (ref 0–1.3)
MONOCYTES NFR BLD AUTO: 14 %
NEUTROPHILS # BLD AUTO: 5.4 10E3/UL (ref 1.6–8.3)
NEUTROPHILS NFR BLD AUTO: 66 %
PLATELET # BLD AUTO: 322 10E3/UL (ref 150–450)
RBC # BLD AUTO: 3.77 10E6/UL (ref 4.4–5.9)
WBC # BLD AUTO: 8.2 10E3/UL (ref 4–11)

## 2025-02-03 PROCEDURE — 36415 COLL VENOUS BLD VENIPUNCTURE: CPT | Performed by: PHYSICIAN ASSISTANT

## 2025-02-03 PROCEDURE — G2211 COMPLEX E/M VISIT ADD ON: HCPCS | Performed by: PHYSICIAN ASSISTANT

## 2025-02-03 PROCEDURE — 99214 OFFICE O/P EST MOD 30 MIN: CPT | Performed by: PHYSICIAN ASSISTANT

## 2025-02-03 PROCEDURE — 84550 ASSAY OF BLOOD/URIC ACID: CPT | Performed by: PHYSICIAN ASSISTANT

## 2025-02-03 PROCEDURE — 85025 COMPLETE CBC W/AUTO DIFF WBC: CPT | Performed by: PHYSICIAN ASSISTANT

## 2025-02-03 RX ORDER — PREDNISONE 20 MG/1
40 TABLET ORAL DAILY
Qty: 10 TABLET | Refills: 0 | Status: SHIPPED | OUTPATIENT
Start: 2025-02-03 | End: 2025-02-08

## 2025-02-03 RX ORDER — SULFAMETHOXAZOLE AND TRIMETHOPRIM 800; 160 MG/1; MG/1
1 TABLET ORAL 2 TIMES DAILY
Qty: 14 TABLET | Refills: 0 | Status: SHIPPED | OUTPATIENT
Start: 2025-02-03 | End: 2025-02-10

## 2025-02-03 NOTE — PROGRESS NOTES
Assessment & Plan     Toe amputee  Stable    Gout of left foot, unspecified cause, unspecified chronicity  RTC in ~3 days to recheck left foot. This redness and swelling was not present in the ED. Has a history of elevated uric acid levels. Intense pain and swelling seems consistent with this diagnosis.   - predniSONE (DELTASONE) 20 MG tablet; Take 2 tablets (40 mg) by mouth daily for 5 days.  - sulfamethoxazole-trimethoprim (BACTRIM DS) 800-160 MG tablet; Take 1 tablet by mouth 2 times daily for 7 days.  - CBC with Platelets & Differential  - Uric acid    Closed head injury  Resolved, ED work up negative no lingering issues.     Skin infection  Left shin- improved but still present. Finished all antibiotics.     Diabetes  Complicates clinical picture.     MED REC REQUIRED  Post Medication Reconciliation Status:             The longitudinal plan of care for the diagnosis(es)/condition(s) as documented were addressed during this visit. Due to the added complexity in care, I will continue to support Zurdo in the subsequent management and with ongoing continuity of care.    Subjective   Zurdo is a 79 year old, presenting for the following health issues:  Hospital F/U      2/3/2025     2:29 PM   Additional Questions   Roomed by Esther   Accompanied by wife     HPI       ED/UC Followup:    [facility]   Date of visit: 1-24-25   Reason for visit: fall   Current Status: Head pain and confusion resolving; New onset left foot pain    79 year old male who presents today in follow up of syncopal episode with subsequent head injury, in addition to left leg sore   evaluated in UC on 1/24/25. Sustained injury from fall on 1/23/25 where he bent over to  laundry, stood up, became   dizzy, and fell backwards. Was recommended to be evaluated in ED due to anticoagulation history on Eliquis. CT head   without evidence of acute intracranial process. CXR w/o evidence of acute rib fracture. Previously noted confusion s/p  "fall is   resolved. Conjunctival hemorrhage of right eye is resolving. ED provider prescribed Keflex x 1 week for skin lesion on left   lower anterior leg. Reports lesion is resolving, however complains of acute onset severe left foot pain x 2 days with associated   edema of the lower leg and blistering of the distal foot. Redness is not spreading up leg. Pain is constant and worsens with   walking. Notes even moving his foot in bed causes pain. Took hydromorphone this morning with mild pain relief. Denies recent   EtOH intake.          Review of Systems  CONSTITUTIONAL: NEGATIVE for fever or chills  INTEGUMENTARY/SKIN: +Left lower leg and foot edema and erythema  EYES: +Hemorrhage resolving. NEGATIVE for vision changes  NEURO: NEGATIVE for confusion or weakness      Objective    /72   Pulse 69   Temp 97.9  F (36.6  C) (Temporal)   Resp 17   Ht 1.727 m (5' 8\")   Wt 105.2 kg (232 lb)   SpO2 96%   BMI 35.28 kg/m    Body mass index is 35.28 kg/m .  Physical Exam   GENERAL: alert, visibly in pain; uses walker  EYES: Resolving right conjunctival hemorrhage   RESP: no increased work of breath  CV: Distal pulses present however decreased on left foot due to swelling. No left calf tenderness.  MS: amputated toes of left foot, left lower leg edema.   SKIN: erythema, edema, and warmth of left foot with blistering over site of MTP with sanguinous fluid expressed on palpation. Resolving ulceration of left mid anterior leg. Scaling consistent with dry skin of the left lower leg. Right lower leg normal.   NEURO: mentation intact and speech normal  PSYCH: affect normal/bright                Signed Electronically by: ELIZABETH Bianchi PA-S    The student LJ Herron acted as a scribe and the encounter documented above was completely performed by myself and the documentation reflects the work I have performed today.   Alexandra Mariscal PA-C       "

## 2025-02-03 NOTE — PROGRESS NOTES
{PROVIDER CHARTING PREFERENCE:924616}    Gilma Renner is a 79 year old, presenting for the following health issues:  No chief complaint on file.  {(!) Visit Details have not yet been documented.  Please enter Visit Details and then use this list to pull in documentation. (Optional):062454}  HPI     {MA/LPN/RN Pre-Provider Visit Orders- hCG/UA/Strep (Optional):437441}  {SUPERLIST (Optional):548455}  {additonal problems for provider to add (Optional):385875}    {ROS Picklists (Optional):539667}      Objective    There were no vitals taken for this visit.  There is no height or weight on file to calculate BMI.  Physical Exam   {Exam List (Optional):957933}    {Diagnostic Test Results (Optional):664889}        Signed Electronically by: Alexandra Mariscal PA-C  {Email feedback regarding this note to [email or web address]   :760289}

## 2025-02-04 LAB — URATE SERPL-MCNC: 8.5 MG/DL (ref 3.4–7)
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
## 2025-02-17 ENCOUNTER — TELEPHONE (OUTPATIENT)
Dept: FAMILY MEDICINE | Facility: CLINIC | Age: 80
End: 2025-02-17
Payer: COMMERCIAL

## 2025-02-17 NOTE — TELEPHONE ENCOUNTER
Home Care is calling regarding an established patient with [facility].  Requesting orders from: Kapil Duckworth RN APPROVED: RN able to provide verbal orders.  Home Care will send orders for signature.  RN will close encounter.  Is this a request for a temporary pause in the home care episode?  No    FYI: Home care nurse did state that the pt did score high on PHQ9 = 10 (moderate depression). Home care nurse states pt's responses were appropriate to recent hospitalization. No suicidal ideation. No call back needed.    Orders Requested    Skilled Nursing  Request for initial certification (first set of orders)   Frequency: 3 visits every 6 days for 6 days, 2 visits every week for 3 weeks, 1-3 PRN visits  RN gave verbal order: Yes      Physical Therapy  Request for initial certification (first set of orders)   Frequency: 1 visit in 10 days  RN gave verbal order: Yes      Occupational Therapy  Request for initial certification (first set of orders)   Frequency: 1 visit in 10 days  RN gave verbal order: Yes      Home Visit by Wound Care Specialist (WOC)    1 visit every 5 days for 5 days  RN gave verbal order: Yes        Caller: Ava  Home Care Agency: [facility]  Phone number Home Care can be reached at: [phone]   Okay to leave a detailed message?: Yes      Radha MAYFIELD RN  [facility]

## 2025-02-18 DIAGNOSIS — N18.30 CHRONIC KIDNEY DISEASE, STAGE III (MODERATE) (H): Primary | ICD-10-CM

## 2025-02-21 ENCOUNTER — ANCILLARY PROCEDURE (OUTPATIENT)
Dept: CT IMAGING | Facility: CLINIC | Age: 80
End: 2025-02-21
Attending: RADIOLOGY
Payer: COMMERCIAL

## 2025-02-21 DIAGNOSIS — C34.11 MALIGNANT NEOPLASM OF UPPER LOBE OF RIGHT LUNG (H): ICD-10-CM

## 2025-02-21 PROCEDURE — 71250 CT THORAX DX C-: CPT | Mod: GC | Performed by: RADIOLOGY

## 2025-02-26 ENCOUNTER — TELEPHONE (OUTPATIENT)
Dept: INTERNAL MEDICINE | Facility: CLINIC | Age: 80
End: 2025-02-26

## 2025-02-26 NOTE — TELEPHONE ENCOUNTER
Forms/Letter Request    Type of form/letter: Home Health Certification      Do we have the form/letter: Yes:     Who is the form from? Home care    Where did/will the form come from? form was faxed in    When is form/letter needed by: ?    How would you like the form/letter returned: Fax : [phone]    Caren Telles,

## 2025-02-27 ENCOUNTER — MEDICAL CORRESPONDENCE (OUTPATIENT)
Dept: HEALTH INFORMATION MANAGEMENT | Facility: CLINIC | Age: 80
End: 2025-02-27
Payer: COMMERCIAL

## 2025-02-27 NOTE — TELEPHONE ENCOUNTER
[facility] signed by Dr. Duckworth and faxed to [phone].  Caren Telles,

## 2025-03-24 ENCOUNTER — OFFICE VISIT (OUTPATIENT)
Dept: ORTHOPEDICS | Facility: CLINIC | Age: 80
End: 2025-03-24
Payer: COMMERCIAL

## 2025-03-24 ENCOUNTER — ANCILLARY PROCEDURE (OUTPATIENT)
Dept: GENERAL RADIOLOGY | Facility: CLINIC | Age: 80
End: 2025-03-24
Attending: ORTHOPAEDIC SURGERY
Payer: COMMERCIAL

## 2025-03-24 VITALS — BODY MASS INDEX: 34.86 KG/M2 | HEIGHT: 68 IN | WEIGHT: 230 LBS | RESPIRATION RATE: 20 BRPM

## 2025-03-24 DIAGNOSIS — M17.11 PRIMARY OSTEOARTHRITIS OF RIGHT KNEE: ICD-10-CM

## 2025-03-24 DIAGNOSIS — G89.29 CHRONIC PAIN OF RIGHT KNEE: ICD-10-CM

## 2025-03-24 DIAGNOSIS — M25.561 CHRONIC PAIN OF RIGHT KNEE: Primary | ICD-10-CM

## 2025-03-24 DIAGNOSIS — M25.561 CHRONIC PAIN OF RIGHT KNEE: ICD-10-CM

## 2025-03-24 DIAGNOSIS — G89.29 CHRONIC PAIN OF RIGHT KNEE: Primary | ICD-10-CM

## 2025-03-24 PROCEDURE — 73562 X-RAY EXAM OF KNEE 3: CPT | Mod: TC | Performed by: INTERNAL MEDICINE

## 2025-03-24 PROCEDURE — 99204 OFFICE O/P NEW MOD 45 MIN: CPT | Performed by: ORTHOPAEDIC SURGERY

## 2025-03-24 NOTE — LETTER
3/24/2025      Zurdo Dash  [address]  [place] [address]      Dear Colleague,    Thank you for referring your patient, Zurdo Dash, to the [facility]. Please see a copy of my visit note below.    Zurdo Dash is a 79 year old male who is seen as self referral for severe pain in his right knee.  He has had pain in both knees for 5 years or more.  He has known osteoarthritis especially of the patellofemoral joint on both knees.  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  He reports peripheral neuropathy, but he thinks his circulation is adequate to the right leg.  He currently has sharp shooting pains into the right knee rated 8 out of 10.  He has had steroid injections previously.  Pain is most with trying to stand.  As he recovers from the left above-knee amputation he is trying to use his right leg for ambulation more.  He is at a transitional care unit currently.    Along with his peripheral neuropathy he does have diabetes, peripheral artery disease, rheumatoid arthritis.    X-ray shows severe patellofemoral arthritis with erosions of portions of the patella creating ruts along the patellofemoral surface.  There is also moderate narrowing of the medial joint.    Past Medical History:   Diagnosis Date     Abnormal CT scan 03/2004    calcified lung granuloma     C. difficile diarrhea     H/O     Cataract 11/18/2011     Diabetic neuropathy (H)     mild, mostly soles and distal forefeet, worse on the left side.     Diverticulitis      ED (erectile dysfunction)      Ex-smoker     QUIT SMOKING FEB 2007     History of ETOH abuse     recovering, sober since 1997     Hyperlipidemia LDL goal <100      Hypertension goal BP (blood pressure) < 140/90      Hypogonadism      Obesity      PAD (peripheral artery disease)     leg cramps, with exertion, no formal diagnosis of PAD and minimal if any symptoms at all.     RA (rheumatoid  arthritis) (H)     Dr Epsteinay     Syncope        Past Surgical History:   Procedure Laterality Date     AMPUTATION Left 01/2023    Toe     BYPASS GRAFT FEMOROPOPLITEAL Left 01/07/2021    Procedure: LEFT FEMORAL TO ABOVE KNEE POPLITEAL ARTERY BYPASS WITH PTFE VASCULAR GRAFT REMOVABLE RING 6MMX 50CM;  Surgeon: Myra Lopes MD;  Location: [facility]     CATARACT IOL, RT/LT       COLONOSCOPY  03/01/2018    [facility]     COMBINED REPAIR PTOSIS WITH BLEPHAROPLASTY BILATERAL Bilateral 08/16/2019    Procedure: BILATERAL UPPER EYELID BLEPHAROPLASTY AND BILATERAL PTOSIS REPAIR;  Surgeon: Janet Garcia MD;  Location: [facility]     ENDARTERECTOMY FEMORAL Left 01/07/2021    Procedure: LEFT FEMORAL ENDARTERECTOMY WITH PATCH ANGIOPLASTY PHOTOFIX  0.8 X 8CM;  Surgeon: Myra Lopes MD;  Location: [facility]     EP PACEMAKER  01/2021     EXCISE LESION EYELID Left 08/16/2019    Procedure: LEFT LOWER EYELID BIOPSY;  Surgeon: Janet Garcia MD;  Location: [facility]     IR LOWER EXTREMITY ANGIOGRAM LEFT  12/17/2020     PHACOEMULSIFICATION WITH STANDARD INTRAOCULAR LENS IMPLANT  02/2019; 3/2019    left eye; right eye     TONSILLECTOMY       ZZHC INCISION TENDON SHEATH FINGER  04/2009    r hand ring finger       Family History   Problem Relation Age of Onset     Cerebrovascular Disease Mother      Arthritis Mother      Osteoporosis Mother      Alzheimer Disease Father      Arthritis Father      Cancer Father      Diabetes Maternal Grandmother      Cardiovascular Maternal Grandmother      Other Cancer Brother      Cancer Paternal Aunt      Hypertension No family hx of      Thyroid Disease No family hx of      Glaucoma No family hx of      Macular Degeneration No family hx of        Social History     Socioeconomic History     Marital status:      Spouse name: Not on file     Number of children: Not on file     Years of education: Not on file     Highest education level: Not on file   Occupational History     Employer:  RETIRED   Tobacco Use     Smoking status: Former     Current packs/day: 0.00     Average packs/day: 1 pack/day for 40.0 years (40.0 ttl pk-yrs)     Types: Cigarettes     Start date: 3/16/1967     Quit date: 3/16/2007     Years since quittin.0     Passive exposure: Past     Smokeless tobacco: Never   Vaping Use     Vaping status: Never Used   Substance and Sexual Activity     Alcohol use: No     Alcohol/week: 0.0 standard drinks of alcohol     Drug use: No     Sexual activity: Yes     Partners: Female   Other Topics Concern     Parent/sibling w/ CABG, MI or angioplasty before 65F 55M? Not Asked   Social History Narrative     Not on file     Social Drivers of Health     Financial Resource Strain: Low Risk  (2023)    [facility]    Financial Resource Strain      Difficulty of Paying Living Expenses: 3      Difficulty of Paying Living Expenses: Not on file   Food Insecurity: No Food Insecurity (2025)    Received from [facility]    Food Insecurity      Do you worry your food will run out before you are able to buy more?: 1   Transportation Needs: No Transportation Needs (2025)    Received from [facility]    Transportation Needs      Does lack of transportation keep you from medical appointments?: 1      Does lack of transportation keep you from work, meetings or getting things that you need?: 1   Physical Activity: Not on file   Stress: Not on file   Social Connections: Socially Integrated (2025)    Received from [facility]    Social Connections      Do you often feel lonely or isolated from those around you?: 0   Interpersonal Safety: Low Risk  (2025)    Interpersonal Safety      Do you feel physically and emotionally safe where you currently live?: Yes      Within the past 12 months, have you been  hit, slapped, kicked or otherwise physically hurt by someone?: No      Within the past 12 months, have you been humiliated or emotionally abused in other ways by your partner or ex-partner?: No   Housing Stability: Low Risk  (2/28/2025)    Received from [facility]    Housing Stability      What is your housing situation today?: 1       Current Outpatient Medications   Medication Sig Dispense Refill     acetaminophen (TYLENOL) 500 MG tablet Take 1-2 tablets (500-1,000 mg) by mouth 3 times daily as needed for mild pain 180 tablet 11     Alcohol Swabs PADS Uses four times daily 120 each 11     bumetanide (BUMEX) 1 MG tablet TAKE ONE TABLET BY MOUTH ONCE DAILY 90 tablet 1     cetirizine (ZYRTEC) 10 MG tablet Take 1 tablet (10 mg) by mouth At Bedtime 30 tablet 0     Continuous Blood Gluc Sensor (FREESTYLE YENNY 2 SENSOR) MISC 1 each every 14 days Use 1 sensor every 14 days. Use to read blood sugars per 's instructions. 2 each 5     cyanocobalamin (VITAMIN B-12) 1000 MCG tablet Take 1 tablet (1,000 mcg) by mouth once a week. 100 tablet 3     Dulaglutide (TRULICITY) 3 MG/0.5ML SOAJ INJECT 3 MG (0.5 ML) UNDER THE SKIN ONCE A WEEK 6 mL 1     ELIQUIS ANTICOAGULANT 2.5 MG tablet TAKE ONE TABLET BY MOUTH TWICE A  tablet 1     ferrous gluconate (FERGON) 324 (38 Fe) MG tablet TAKE 1 TABLET (324 MG) BY MOUTH DAILY (WITH BREAKFAST) 90 tablet 3     fluticasone (FLONASE) 50 MCG/ACT nasal spray Spray 1-2 sprays into both nostrils daily as needed for rhinitis or allergies       gabapentin (NEURONTIN) 300 MG capsule Take 3 capsules (900 mg) by mouth 3 times daily. 270 capsule 1     HYDROmorphone (DILAUDID) 2 MG tablet Take 1 tablet (2 mg) by mouth every 6 hours as needed for pain. 90 tablet 0     hydroxychloroquine (PLAQUENIL) 200 MG tablet Take 1 tablet (200 mg) by mouth 2 times daily. 180 tablet 0     insulin glargine (LANTUS SOLOSTAR) 100 UNIT/ML pen Inject 47 Units subcutaneously  at bedtime.       insulin lispro (HUMALOG KWIKPEN) 100 UNIT/ML (1 unit dial) KWIKPEN Inject 14 Units subcutaneously 2 times daily (before meals). 30 mL 1     insulin pen needle (31G X 5 MM) 31G X 5 MM miscellaneous Use 3 pen needles daily or as directed. 300 each 1     medical cannabis (Patient's own supply) See Admin Instructions (The purpose of this order is to document that the patient reports taking medical cannabis.  This is not a prescription, and is not used to certify that the patient has a qualifying medical condition.)       metoprolol succinate ER (TOPROL XL) 50 MG 24 hr tablet TAKE 1 TABLET BY MOUTH ONCE DAILY 90 tablet 1     omeprazole (PRILOSEC) 20 MG DR capsule TAKE ONE CAPSULE BY MOUTH ONCE DAILY 90 capsule 2     PERMETHRIN EX Wheelchair: Standard  with leg rests: (Swing away Length of need: 99 months       polyethylene glycol (MIRALAX) 17 GM/Dose powder Take 1 Scoop by mouth as needed for constipation.       polyethylene glycol-propylene glycol (SYSTANE) 0.4-0.3 % SOLN ophthalmic solution Place 1 drop into both eyes 2 times daily as needed for dry eyes 3 mL 0     rosuvastatin (CRESTOR) 40 MG tablet Take 1 tablet (40 mg) by mouth daily. 90 tablet 1     senna (SENOKOT) 8.6 MG tablet Take 8.6 mg by mouth daily as needed for constipation.       sertraline (ZOLOFT) 50 MG tablet Take 1 tablet (50 mg) by mouth daily. 90 tablet 0       Allergies   Allergen Reactions     Clindamycin      Severe heartburn     Seasonal Allergies      Blood-Group Specific Substance Other (See Comments)     Patient has a Non-specific antibody. Blood products may be delayed. Draw patient 24 hours prior to transfusion. For [facility] testing, draw one red top and two purple top tubes for all Type and Screen orders.  Patient has a Non-specific antibody. Blood products may be delayed. Draw patient 24 hours prior to transfusion. For [facility] testing, draw one red top and two purple top tubes for all Type and Screen orders.  "      REVIEW OF SYSTEMS:  CONSTITUTIONAL:  NEGATIVE for fever, chills, change in weight, not feeling tired  SKIN:  NEGATIVE for worrisome rashes, no skin lumps, no skin ulcers and no non-healing wounds  EYES:  NEGATIVE for vision changes or irritation.  ENT/MOUTH:  NEGATIVE.  No hearing loss, no hoarseness, no difficulty swallowing.  RESP:  NEGATIVE. No cough or shortness of breath.  CV:  NEGATIVE for chest pain, palpitations or peripheral edema  GI:  NEGATIVE for nausea, abdominal pain, heartburn, or change in bowel habits  :  Negative. No dysuria, no hematuria  MUSCULOSKELETAL:  See HPI above  NEURO:  NEGATIVE . No headaches, no dizziness,  no numbness  ENDOCRINE:  NEGATIVE for temperature intolerance, skin/hair changes  HEME/ALLERGY/IMMUNE:  NEGATIVE for bleeding problems  PSYCHIATRIC:  NEGATIVE. no anxiety, no depression.     Exam:  Vitals: Resp 20   Ht 1.727 m (5' 8\")   Wt 104.3 kg (230 lb)   BMI 34.97 kg/m    BMI= Body mass index is 34.97 kg/m .  Constitutional:  healthy, alert and no distress  Neuro: Alert and Oriented x 3, no focal defects.  Psych: Affect normal   Respiratory: Breathing not labored.  Cardiovascular: normal peripheral pulses  Lymph: no adenopathy  Skin: No rashes,worrisome lesions or skin problems  Left above-the-knee amputation he is covered.  Right knee shows motion from 0 to 110 degrees.  He does have patellofemoral crepitation and significant tenderness around the patella.  He has medial joint line tenderness also.  There is a mild effusion of the knee.  No ligamentous laxity.      Assessment:  right knee osteoarthritis - severe.  With his history of peripheral vascular disease, diabetic neuropathy, and left above-knee amputation it would be very treacherous to consider right knee replacement.  He feels he would like replacement however.  I have requested he see his vascular surgeon to determine status of the vascularity to the right leg and to see if it can be improved with any " procedure.  We would also need to see if [facility] needs [facility], as he very well could end up with amputation of his right leg if we compromised the circulation anymore.    Again, thank you for allowing me to participate in the care of your patient.        Sincerely,        Jose Henry MD    Electronically signed

## 2025-03-25 PROBLEM — M17.11 PRIMARY OSTEOARTHRITIS OF RIGHT KNEE: Status: ACTIVE | Noted: 2019-02-08

## 2025-03-25 NOTE — PROGRESS NOTES
Zurdo Dash is a 79 year old male who is seen as self referral for severe pain in his right knee.  He has had pain in both knees for 5 years or more.  He has known osteoarthritis especially of the patellofemoral joint on both knees.  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  He reports peripheral neuropathy, but he thinks his circulation is adequate to the right leg.  He currently has sharp shooting pains into the right knee rated 8 out of 10.  He has had steroid injections previously.  Pain is most with trying to stand.  As he recovers from the left above-knee amputation he is trying to use his right leg for ambulation more.  He is at a transitional care unit currently.    Along with his peripheral neuropathy he does have diabetes, peripheral artery disease, rheumatoid arthritis.    X-ray shows severe patellofemoral arthritis with erosions of portions of the patella creating ruts along the patellofemoral surface.  There is also moderate narrowing of the medial joint.    Past Medical History:   Diagnosis Date    Abnormal CT scan 03/2004    calcified lung granuloma    C. difficile diarrhea     H/O    Cataract 11/18/2011    Diabetic neuropathy (H)     mild, mostly soles and distal forefeet, worse on the left side.    Diverticulitis     ED (erectile dysfunction)     Ex-smoker     QUIT SMOKING FEB 2007    History of ETOH abuse     recovering, sober since 1997    Hyperlipidemia LDL goal <100     Hypertension goal BP (blood pressure) < 140/90     Hypogonadism     Obesity     PAD (peripheral artery disease)     leg cramps, with exertion, no formal diagnosis of PAD and minimal if any symptoms at all.    RA (rheumatoid arthritis) (H)     Dr Bailon    Syncope        Past Surgical History:   Procedure Laterality Date    AMPUTATION Left 01/2023    Toe    BYPASS GRAFT FEMOROPOPLITEAL Left 01/07/2021    Procedure: LEFT FEMORAL TO ABOVE KNEE POPLITEAL ARTERY BYPASS WITH PTFE  VASCULAR GRAFT REMOVABLE RING 6MMX 50CM;  Surgeon: Myra Lopes MD;  Location: [facility]    CATARACT IOL, RT/LT      COLONOSCOPY  2018    [facility]    COMBINED REPAIR PTOSIS WITH BLEPHAROPLASTY BILATERAL Bilateral 2019    Procedure: BILATERAL UPPER EYELID BLEPHAROPLASTY AND BILATERAL PTOSIS REPAIR;  Surgeon: Janet Garcia MD;  Location: [facility]    ENDARTERECTOMY FEMORAL Left 2021    Procedure: LEFT FEMORAL ENDARTERECTOMY WITH PATCH ANGIOPLASTY PHOTOFIX  0.8 X 8CM;  Surgeon: Myra Lopes MD;  Location: [facility]    EP PACEMAKER  2021    EXCISE LESION EYELID Left 2019    Procedure: LEFT LOWER EYELID BIOPSY;  Surgeon: Janet Garcia MD;  Location: [facility]    IR LOWER EXTREMITY ANGIOGRAM LEFT  2020    PHACOEMULSIFICATION WITH STANDARD INTRAOCULAR LENS IMPLANT  2019; 3/2019    left eye; right eye    TONSILLECTOMY      ZZHC INCISION TENDON SHEATH FINGER  2009    r hand ring finger       Family History   Problem Relation Age of Onset    Cerebrovascular Disease Mother     Arthritis Mother     Osteoporosis Mother     Alzheimer Disease Father     Arthritis Father     Cancer Father     Diabetes Maternal Grandmother     Cardiovascular Maternal Grandmother     Other Cancer Brother     Cancer Paternal Aunt     Hypertension No family hx of     Thyroid Disease No family hx of     Glaucoma No family hx of     Macular Degeneration No family hx of        Social History     Socioeconomic History    Marital status:      Spouse name: Not on file    Number of children: Not on file    Years of education: Not on file    Highest education level: Not on file   Occupational History     Employer: RETIRED   Tobacco Use    Smoking status: Former     Current packs/day: 0.00     Average packs/day: 1 pack/day for 40.0 years (40.0 ttl pk-yrs)     Types: Cigarettes     Start date: 3/16/1967     Quit date: 3/16/2007     Years since quittin.0     Passive exposure: Past    Smokeless  tobacco: Never   Vaping Use    Vaping status: Never Used   Substance and Sexual Activity    Alcohol use: No     Alcohol/week: 0.0 standard drinks of alcohol    Drug use: No    Sexual activity: Yes     Partners: Female   Other Topics Concern    Parent/sibling w/ CABG, MI or angioplasty before 65F 55M? Not Asked   Social History Narrative    Not on file     Social Drivers of Health     Financial Resource Strain: Low Risk  (11/17/2023)    Received from [facility] [employer] [facility], [facility] [employer] [facility] [employer] [facility]    Financial Resource Strain     Difficulty of Paying Living Expenses: 3     Difficulty of Paying Living Expenses: Not on file   Food Insecurity: No Food Insecurity (2/28/2025)    [facility]    Food Insecurity     Do you worry your food will run out before you are able to buy more?: 1   Transportation Needs: No Transportation Needs (2/28/2025)    Received from [facility] [employer] [facility]    Transportation Needs     Does lack of transportation keep you from medical appointments?: 1     Does lack of transportation keep you from work, meetings or getting things that you need?: 1   Physical Activity: Not on file   Stress: Not on file   Social Connections: Socially Integrated (2/28/2025)    [facility]    Social Connections     Do you often feel lonely or isolated from those around you?: 0   Interpersonal Safety: Low Risk  (1/13/2025)    Interpersonal Safety     Do you feel physically and emotionally safe where you currently live?: Yes     Within the past 12 months, have you been hit, slapped, kicked or otherwise physically hurt by someone?: No     Within the past 12 months, have you been humiliated or emotionally abused in other ways by your partner or ex-partner?: No   Housing Stability: Low Risk  (2/28/2025)    [facility]     Housing Stability     What is your housing situation today?: 1       Current Outpatient Medications   Medication Sig Dispense Refill    acetaminophen (TYLENOL) 500 MG tablet Take 1-2 tablets (500-1,000 mg) by mouth 3 times daily as needed for mild pain 180 tablet 11    Alcohol Swabs PADS Uses four times daily 120 each 11    bumetanide (BUMEX) 1 MG tablet TAKE ONE TABLET BY MOUTH ONCE DAILY 90 tablet 1    cetirizine (ZYRTEC) 10 MG tablet Take 1 tablet (10 mg) by mouth At Bedtime 30 tablet 0    Continuous Blood Gluc Sensor (FREESTYLE YENNY 2 SENSOR) [facility] 1 each every 14 days Use 1 sensor every 14 days. Use to read blood sugars per 's instructions. 2 each 5    cyanocobalamin (VITAMIN B-12) 1000 MCG tablet Take 1 tablet (1,000 mcg) by mouth once a week. 100 tablet 3    Dulaglutide (TRULICITY) 3 MG/0.5ML SOAJ INJECT 3 MG (0.5 ML) UNDER THE SKIN ONCE A WEEK 6 mL 1    ELIQUIS ANTICOAGULANT 2.5 MG tablet TAKE ONE TABLET BY MOUTH TWICE A  tablet 1    ferrous gluconate (FERGON) 324 (38 Fe) MG tablet TAKE 1 TABLET (324 MG) BY MOUTH DAILY (WITH BREAKFAST) 90 tablet 3    fluticasone (FLONASE) 50 MCG/ACT nasal spray Spray 1-2 sprays into both nostrils daily as needed for rhinitis or allergies      gabapentin (NEURONTIN) 300 MG capsule Take 3 capsules (900 mg) by mouth 3 times daily. 270 capsule 1    HYDROmorphone (DILAUDID) 2 MG tablet Take 1 tablet (2 mg) by mouth every 6 hours as needed for pain. 90 tablet 0    hydroxychloroquine (PLAQUENIL) 200 MG tablet Take 1 tablet (200 mg) by mouth 2 times daily. 180 tablet 0    insulin glargine (LANTUS SOLOSTAR) 100 UNIT/ML pen Inject 47 Units subcutaneously at bedtime.      insulin lispro (HUMALOG KWIKPEN) 100 UNIT/ML (1 unit dial) KWIKPEN Inject 14 Units subcutaneously 2 times daily (before meals). 30 mL 1    insulin pen needle (31G X 5 MM) 31G X 5 MM miscellaneous Use 3 pen needles daily or as directed. 300 each 1    medical cannabis (Patient's own supply) See Admin  Instructions (The purpose of this order is to document that the patient reports taking medical cannabis.  This is not a prescription, and is not used to certify that the patient has a qualifying medical condition.)      metoprolol succinate ER (TOPROL XL) 50 MG 24 hr tablet TAKE 1 TABLET BY MOUTH ONCE DAILY 90 tablet 1    omeprazole (PRILOSEC) 20 MG DR capsule TAKE ONE CAPSULE BY MOUTH ONCE DAILY 90 capsule 2    PERMETHRIN EX Wheelchair: Standard  with leg rests: (Swing away Length of need: 99 months      polyethylene glycol (MIRALAX) 17 GM/Dose powder Take 1 Scoop by mouth as needed for constipation.      polyethylene glycol-propylene glycol (SYSTANE) 0.4-0.3 % SOLN ophthalmic solution Place 1 drop into both eyes 2 times daily as needed for dry eyes 3 mL 0    rosuvastatin (CRESTOR) 40 MG tablet Take 1 tablet (40 mg) by mouth daily. 90 tablet 1    senna (SENOKOT) 8.6 MG tablet Take 8.6 mg by mouth daily as needed for constipation.      sertraline (ZOLOFT) 50 MG tablet Take 1 tablet (50 mg) by mouth daily. 90 tablet 0       Allergies   Allergen Reactions    Clindamycin      Severe heartburn    Seasonal Allergies     Blood-Group Specific Substance Other (See Comments)     Patient has a Non-specific antibody. Blood products may be delayed. Draw patient 24 hours prior to transfusion. For [facility] testing, draw one red top and two purple top tubes for all Type and Screen orders.  Patient has a Non-specific antibody. Blood products may be delayed. Draw patient 24 hours prior to transfusion. For [facility] testing, draw one red top and two purple top tubes for all Type and Screen orders.       REVIEW OF SYSTEMS:  CONSTITUTIONAL:  NEGATIVE for fever, chills, change in weight, not feeling tired  SKIN:  NEGATIVE for worrisome rashes, no skin lumps, no skin ulcers and no non-healing wounds  EYES:  NEGATIVE for vision changes or irritation.  ENT/MOUTH:  NEGATIVE.  No hearing loss, no hoarseness, no difficulty  "swallowing.  RESP:  NEGATIVE. No cough or shortness of breath.  CV:  NEGATIVE for chest pain, palpitations or peripheral edema  GI:  NEGATIVE for nausea, abdominal pain, heartburn, or change in bowel habits  :  Negative. No dysuria, no hematuria  MUSCULOSKELETAL:  See HPI above  NEURO:  NEGATIVE . No headaches, no dizziness,  no numbness  ENDOCRINE:  NEGATIVE for temperature intolerance, skin/hair changes  HEME/ALLERGY/IMMUNE:  NEGATIVE for bleeding problems  PSYCHIATRIC:  NEGATIVE. no anxiety, no depression.     Exam:  Vitals: Resp 20   Ht 1.727 m (5' 8\")   Wt 104.3 kg (230 lb)   BMI 34.97 kg/m    BMI= Body mass index is 34.97 kg/m .  Constitutional:  healthy, alert and no distress  Neuro: Alert and Oriented x 3, no focal defects.  Psych: Affect normal   Respiratory: Breathing not labored.  Cardiovascular: normal peripheral pulses  Lymph: no adenopathy  Skin: No rashes,worrisome lesions or skin problems  Left above-the-knee amputation he is covered.  Right knee shows motion from 0 to 110 degrees.  He does have patellofemoral crepitation and significant tenderness around the patella.  He has medial joint line tenderness also.  There is a mild effusion of the knee.  No ligamentous laxity.      Assessment:  right knee osteoarthritis - severe.  With his history of peripheral vascular disease, diabetic neuropathy, and left above-knee amputation it would be very treacherous to consider right knee replacement.  He feels he would like replacement however.  I have requested he see his vascular surgeon to determine status of the vascularity to the right leg and to see if it can be improved with any procedure.  We would also need to see if [facility], as he very well could end up with amputation of his right leg if we compromised the circulation anymore.  "

## 2025-03-31 ENCOUNTER — TELEPHONE (OUTPATIENT)
Dept: FAMILY MEDICINE | Facility: CLINIC | Age: 80
End: 2025-03-31
Payer: COMMERCIAL

## 2025-03-31 NOTE — TELEPHONE ENCOUNTER
Home Care is calling regarding an established patient with [facility].  Requesting orders from: Kapil Duckworth RN APPROVED: RN able to provide verbal orders.  Home Care will send orders for signature.  RN will close encounter.  Is this a request for a temporary pause in the home care episode?  No    Orders Requested    Skilled Nursing  Request for resumption in care.   Will evaluate frequency when they visit patient  RN gave verbal order: Yes      Physical Therapy  Request for resumption in care.   Will evaluate frequency when they visit patient  RN gave verbal order: Yes      Occupational Therapy  Request for resumption in care.   Will evaluate frequency when they visit patient  RN gave verbal order: Yes         Contacts       Contact Date/Time Type Contact Phone/Fax    03/31/2025 10:46 AM CDT Phone (Incoming) Augustina [phone]     [facility]  Confidential            Zuleika Escobedo RN

## 2025-04-01 ENCOUNTER — TELEPHONE (OUTPATIENT)
Dept: FAMILY MEDICINE | Facility: CLINIC | Age: 80
End: 2025-04-01
Payer: COMMERCIAL

## 2025-04-01 RX ORDER — MULTIVIT-MIN/IRON/FOLIC/HRB186 3.3 MG-25
2 TABLET ORAL DAILY
COMMUNITY

## 2025-04-01 RX ORDER — SENNOSIDES A AND B 8.6 MG/1
2 TABLET, FILM COATED ORAL DAILY PRN
COMMUNITY

## 2025-04-01 NOTE — TELEPHONE ENCOUNTER
Sandhya, home care RN calling for orders and med clarifications.   Orders addressed in other encounter, started new encounter for med questions for clarity.  Patient is scheduled for visit on Friday for TCU follow up.       Says patient has rosuvastatin 40 mg at home.   TCU sent him home on atorvastatin 80 mg daily.  Which one should it be?  He will need Rx sent for the atorvastatin if that is desired.    Needs Rx for vit D high dose 50,000 units weekly, per TCU orders, to end May 7th.   Needs Rx sent to pharmacy.    Lantus, patient says he uses 47 at bedtime before he was admitted.   TCU orders states 30 units at bedtime lantus.   She says patient likely has less insulin need since leg amputated.  Patient states he's been doing 47 units at bedtime since he got home on Friday and his CGM blood sugar readings have been good.    No longer on humalog per TCU.      TCU prescribed anbesol for cold sores PRN, not on Epic med list.    Glucosamine-chondrotin 250-200, 2 tabs daily on TCU list, not on Epic med list.    Dilaudid is 2 mg every 4 hours PRN per TCU orders, every 6 hours per Epic.    Senna 2 tabs daily as needed (8.6 mg tabs) per TCU, Epic lists one tab daily PRN.    Pharmacy selected, routed to PCP to address discrepancies and update Epic as appropriate, route back to RN team to update home care.    Courtney DEMARCO RN  [facility] Triage

## 2025-04-01 NOTE — TELEPHONE ENCOUNTER
Home Care is calling regarding an established patient with [facility].  Requesting orders from: Kapil Duckworth  PROVIDER AUTHORIZATION REQUIRED: RN unable to provide verbal approval for all orders.  See below for additional information.  RN will contact Home care with information after provider review.  Is this a request for a temporary pause in the home care episode?  No    Orders Requested    Skilled Nursing  Request for initial certification (first set of orders)   Frequency: 1 time in the next five days for med reconciliation and incision assessment  RN gave verbal order: Yes      Physical Therapy  Request for initial evaluation and treatment (one time)   RN gave verbal order: Yes      Occupational Therapy  Request for initial evaluation and treatment (one time)   RN gave verbal order: Yes      Wants one wound ostomy nurse visit in the next 5 days to assess above the knee amputation site.  Routed to Dr. Duckworth for approval of that.      Phone number Home Care can be reached at: [phone]  Okay to leave a detailed message?: Yes    Contacts       Contact Date/Time Type Contact Phone/Fax    04/01/2025 01:17 PM CDT Phone (Incoming) DECLAN Arthur with [facility] [phone]          Courtney Omalley RN

## 2025-04-01 NOTE — TELEPHONE ENCOUNTER
My replies to these questions are in bold red font    Kapil Arthur, home care RN calling for orders and med clarifications.   Orders addressed in other encounter, started new encounter for med questions for clarity.  Patient is scheduled for visit on Friday for TCU follow up.   That's good to hear    Says patient has rosuvastatin 40 mg at home.   TCU sent him home on atorvastatin 80 mg daily.  Which one should it be?  He will need Rx sent for the atorvastatin if that is desired. Discontinue atorvastatin [ lipitor ]  from medication list , Crestor [ rosuvastatin ] 40 milligrams is the one to take    Needs Rx for vit D high dose 50,000 units weekly, per TCU orders, to end May 7th.   Needs Rx sent to pharmacy. Where did this recommended action come from ? Why did the provider making this recommend action not place these orders themselves? This method of vitamin D supplementation is not the way to go in my opinion. A healthier way to treat vitamin D deficiency is with a daily vitamin D supplement, over the counter nonprescription, at 2000 international units daily. This is what I prefer to order for treating vitamin D deficiency     Lantus, patient says he uses 47 at bedtime before he was admitted.   TCU orders states 30 units at bedtime lantus.   She says patient likely has less insulin need since leg amputated.  Patient states he's been doing 47 units at bedtime since he got home on Friday and his CGM blood sugar readings have been good. Ok lets change his prescription back to 47 units     No longer on humalog per TCU.  Ok, please adjust medication list     TCU prescribed anbesol for cold sores PRN, not on Epic med list. Ok to add    Glucosamine-chondrotin 250-200, 2 tabs daily on TCU list, not on Epic med list. Ok to add    Dilaudid is 2 mg every 4 hours PRN per TCU orders, every 6 hours per Epic.I am not refilling this prescription. Need a face to face encounter and discussion. I am back in the office  in 2 days [ Thursday 4-3] at the very least need provided for me to review medical records explaining the recommended duration / need for opioid pain medication. I  have never filled such a prescription for this patient before . I reviewed the discharge summary from 2-. This is a month later. Dilaudid [ hydromorphone ] was appropriate in the immediate post operative period. Not now. We can discuss at the appointment scheduled for this coming Friday , Reroute if additional input requested from me     Senna 2 tabs daily as needed (8.6 mg tabs) per TCU, Epic lists one tab daily PRN. Ok to change     Kapil Duckworth MD      Pharmacy selected, routed to PCP to address discrepancies and update Epic as appropriate, route back to RN team to update home care.    Courtney DEMARCO RN  [facility] Triage

## 2025-04-01 NOTE — TELEPHONE ENCOUNTER
Spoke with Sandhya with Home Care. Relayed provider's message as written. She verbalized understanding and has no further questions at this time.    MANJU GordonN RN  [facility]

## 2025-04-04 PROBLEM — G54.6 PHANTOM LIMB PAIN (H): Status: ACTIVE | Noted: 2025-04-04

## 2025-04-04 PROBLEM — Z89.612 HX OF AKA (ABOVE KNEE AMPUTATION), LEFT (H): Status: ACTIVE | Noted: 2025-04-04

## 2025-04-07 ENCOUNTER — TELEPHONE (OUTPATIENT)
Dept: FAMILY MEDICINE | Facility: CLINIC | Age: 80
End: 2025-04-07

## 2025-04-07 ENCOUNTER — VIRTUAL VISIT (OUTPATIENT)
Dept: RADIATION ONCOLOGY | Facility: CLINIC | Age: 80
End: 2025-04-07
Payer: COMMERCIAL

## 2025-04-07 DIAGNOSIS — G89.4 CHRONIC PAIN SYNDROME: ICD-10-CM

## 2025-04-07 DIAGNOSIS — C34.11 MALIGNANT NEOPLASM OF UPPER LOBE OF RIGHT LUNG (H): Primary | ICD-10-CM

## 2025-04-07 DIAGNOSIS — F11.90 CHRONIC, CONTINUOUS USE OF OPIOIDS: ICD-10-CM

## 2025-04-07 DIAGNOSIS — G54.6 PHANTOM LIMB PAIN (H): Primary | ICD-10-CM

## 2025-04-07 PROBLEM — A49.02 METHICILLIN RESISTANT STAPHYLOCOCCUS AUREUS INFECTION: Status: ACTIVE | Noted: 2024-08-29

## 2025-04-07 PROBLEM — M86.8X7: Status: ACTIVE | Noted: 2024-06-11

## 2025-04-07 PROCEDURE — 98013 SYNCH AUDIO-ONLY EST LOW 20: CPT | Performed by: RADIOLOGY

## 2025-04-07 RX ORDER — HYDROMORPHONE HYDROCHLORIDE 2 MG/1
2 TABLET ORAL 2 TIMES DAILY PRN
Qty: 60 TABLET | Refills: 0 | Status: SHIPPED | OUTPATIENT
Start: 2025-04-07

## 2025-04-07 RX ORDER — FERROUS SULFATE 325(65) MG
325 TABLET, DELAYED RELEASE (ENTERIC COATED) ORAL DAILY
COMMUNITY
Start: 2025-03-04

## 2025-04-07 RX ORDER — ALLANTION, BENZOCAINE, CAMPHOR, PETROLATUM 10; 200; 30; 649 MG/G; MG/G; MG/G; MG/G
OINTMENT TOPICAL PRN
COMMUNITY
Start: 2025-03-07

## 2025-04-07 NOTE — PROGRESS NOTES
Dear Colleagues,  Today Zurdo Dash was called today in follow up.  He was unable to access video or come in person.      IDENTIFICATION: 80 year old gentleman with multiple comorbidities and a pacemaker diagnosed with [id number], stage 1 lung cancer of the RUL status post stereotactic body radiation therapy completed on August 26, 2022.    INTERVAL HISTORY: While on treatment he had no complaints.  He is called today in follow-up with no reportable late radiation-induced side effects. Specifically denies n/v/ha/sob/cp.      REVIEW OF SYSTEMS: As per HPI, a 14-point review of systems is otherwise negative.    Past Medical History:   Diagnosis Date    Abnormal CT scan 03/2004    calcified lung granuloma    C. difficile diarrhea     H/O    Cataract 11/18/2011    Diabetic neuropathy (H)     mild, mostly soles and distal forefeet, worse on the left side.    Diverticulitis     ED (erectile dysfunction)     Ex-smoker     QUIT SMOKING FEB 2007    History of ETOH abuse     recovering, sober since 1997    Hyperlipidemia LDL goal <100     Hypertension goal BP (blood pressure) < 140/90     Hypogonadism     Obesity     PAD (peripheral artery disease)     leg cramps, with exertion, no formal diagnosis of PAD and minimal if any symptoms at all.    RA (rheumatoid arthritis) (H)     Dr Epsteinay    Syncope        Past Surgical History:   Procedure Laterality Date    AMPUTATION Left 01/2023    Toe    BYPASS GRAFT FEMOROPOPLITEAL Left 01/07/2021    Procedure: LEFT FEMORAL TO ABOVE KNEE POPLITEAL ARTERY BYPASS WITH PTFE VASCULAR GRAFT REMOVABLE RING 6MMX 50CM;  Surgeon: Myra Lopes MD;  Location: [facility] OR    CATARACT IOL, RT/LT      COLONOSCOPY  03/01/2018    [facility]    COMBINED REPAIR PTOSIS WITH BLEPHAROPLASTY BILATERAL Bilateral 08/16/2019    Procedure: BILATERAL UPPER EYELID BLEPHAROPLASTY AND BILATERAL PTOSIS REPAIR;  Surgeon: Janet Garcia MD;  Location: [facility] OR    ENDARTERECTOMY FEMORAL Left 01/07/2021    Procedure:  LEFT FEMORAL ENDARTERECTOMY WITH PATCH ANGIOPLASTY PHOTOFIX  0.8 X 8CM;  Surgeon: Myra Lopes MD;  Location: [facility] OR    EP PACEMAKER  2021    EXCISE LESION EYELID Left 2019    Procedure: LEFT LOWER EYELID BIOPSY;  Surgeon: Janet Garcia MD;  Location: [facility] OR    IR LOWER EXTREMITY ANGIOGRAM LEFT  2020    PHACOEMULSIFICATION WITH STANDARD INTRAOCULAR LENS IMPLANT  2019; 3/2019    left eye; right eye    TONSILLECTOMY      ZZHC INCISION TENDON SHEATH FINGER  2009    r hand ring finger       Family History   Problem Relation Age of Onset    Cerebrovascular Disease Mother     Arthritis Mother     Osteoporosis Mother     Alzheimer Disease Father     Arthritis Father     Cancer Father     Diabetes Maternal Grandmother     Cardiovascular Maternal Grandmother     Other Cancer Brother     Cancer Paternal Aunt     Hypertension No family hx of     Thyroid Disease No family hx of     Glaucoma No family hx of     Macular Degeneration No family hx of        Social History     Tobacco Use    Smoking status: Former     Current packs/day: 0.00     Average packs/day: 1 pack/day for 40.0 years (40.0 ttl pk-yrs)     Types: Cigarettes     Start date: 3/16/1967     Quit date: 3/16/2007     Years since quittin.0     Passive exposure: Past    Smokeless tobacco: Never   Substance Use Topics    Alcohol use: No     Alcohol/week: 0.0 standard drinks of alcohol       Current Outpatient Medications   Medication Sig Dispense Refill    Cold Sore Products (ANBESOL COLD SORE THERAPY) OINT Apply topically as needed.      ferrous sulfate (FE TABS) 325 (65 Fe) MG EC tablet Take 325 mg by mouth daily.      acetaminophen (TYLENOL) 500 MG tablet Take 1-2 tablets (500-1,000 mg) by mouth 3 times daily as needed for mild pain 180 tablet 11    Alcohol Swabs PADS Uses four times daily 120 each 11    benzocaine (ANBESOL) 10 % gel Take by mouth as needed for mouth sores.      bumetanide (BUMEX) 1 MG tablet TAKE ONE TABLET  BY MOUTH ONCE DAILY 90 tablet 1    cetirizine (ZYRTEC) 10 MG tablet Take 1 tablet (10 mg) by mouth At Bedtime 30 tablet 0    Continuous Blood Gluc Sensor (FREESTYLE YENNY 2 SENSOR) [facility] 1 each every 14 days Use 1 sensor every 14 days. Use to read blood sugars per 's instructions. 2 each 5    cyanocobalamin (VITAMIN B-12) 1000 MCG tablet Take 1 tablet (1,000 mcg) by mouth once a week. 100 tablet 3    Dulaglutide (TRULICITY) 3 MG/0.5ML SOAJ INJECT 3 MG (0.5 ML) UNDER THE SKIN ONCE A WEEK 6 mL 1    ELIQUIS ANTICOAGULANT 2.5 MG tablet TAKE ONE TABLET BY MOUTH TWICE A  tablet 1    Ergocalciferol 50 MCG (2000 UT) TABS Take 1 tablet by mouth daily.      ferrous gluconate (FERGON) 324 (38 Fe) MG tablet TAKE 1 TABLET (324 MG) BY MOUTH DAILY (WITH BREAKFAST) 90 tablet 3    fluticasone (FLONASE) 50 MCG/ACT nasal spray Spray 1-2 sprays into both nostrils daily as needed for rhinitis or allergies      gabapentin (NEURONTIN) 300 MG capsule Take 3 capsules (900 mg) by mouth 3 times daily. 270 capsule 1    Glucosamine-Chondroitin 250-200 MG CAPS Take 2 tablets by mouth daily.      HYDROmorphone (DILAUDID) 2 MG tablet Take 1 tablet (2 mg) by mouth every 6 hours as needed for pain. 90 tablet 0    hydroxychloroquine (PLAQUENIL) 200 MG tablet Take 1 tablet (200 mg) by mouth 2 times daily. 180 tablet 0    insulin glargine (LANTUS SOLOSTAR) 100 UNIT/ML pen Inject 30 Units subcutaneously at bedtime.      insulin lispro (HUMALOG KWIKPEN) 100 UNIT/ML (1 unit dial) KWIKPEN Inject 14 Units subcutaneously 2 times daily (before meals). 30 mL 1    insulin pen needle (31G X 5 MM) 31G X 5 MM miscellaneous Use 3 pen needles daily or as directed. 300 each 1    medical cannabis (Patient's own supply) See Admin Instructions (The purpose of this order is to document that the patient reports taking medical cannabis.  This is not a prescription, and is not used to certify that the patient has a qualifying medical condition.)       metoprolol succinate ER (TOPROL XL) 50 MG 24 hr tablet TAKE 1 TABLET BY MOUTH ONCE DAILY 90 tablet 1    omeprazole (PRILOSEC) 20 MG DR capsule TAKE ONE CAPSULE BY MOUTH ONCE DAILY 90 capsule 2    PERMETHRIN EX Wheelchair: Standard  with leg rests: (Swing away Length of need: 99 months      polyethylene glycol (MIRALAX) 17 GM/Dose powder Take 1 Scoop by mouth as needed for constipation.      polyethylene glycol-propylene glycol (SYSTANE) 0.4-0.3 % SOLN ophthalmic solution Place 1 drop into both eyes 2 times daily as needed for dry eyes 3 mL 0    rosuvastatin (CRESTOR) 40 MG tablet Take 1 tablet (40 mg) by mouth daily. 90 tablet 1    senna (SENOKOT) 8.6 MG tablet Take 2 tablets by mouth daily as needed for constipation.      senna (SENOKOT) 8.6 MG tablet Take 8.6 mg by mouth daily as needed for constipation.      sertraline (ZOLOFT) 50 MG tablet Take 1 tablet (50 mg) by mouth daily. 90 tablet 0     No current facility-administered medications for this visit.          Allergies   Allergen Reactions    Clindamycin Other (See Comments)     Severe heartburn    Acid reflux      Severe heartburn  Acid reflux      Severe heartburn    Severe heartburn      Acid reflux    Seasonal Allergies     Blood-Group Specific Substance Other (See Comments)     Patient has a Non-specific antibody. Blood products may be delayed. Draw patient 24 hours prior to transfusion. For [facility] testing, draw one red top and two purple top tubes for all Type and Screen orders.    Patient has a Non-specific antibody. Blood products may be delayed. Draw patient 24 hours prior to transfusion. For [facility] testing, draw one red top and two purple top tubes for all Type and Screen orders.      Patient has a Non-specific antibody. Blood products may be delayed. Draw patient 24 hours prior to transfusion. For [facility] testing, draw one red top and two purple top tubes for all Type and Screen orders. Patient has a Non-specific antibody. Blood  products may be delayed. Draw patient 24 hours prior to transfusion. For [facility] testing, draw one red top and two purple top tubes for all Type and Screen orders.         PHYSICAL EXAM:  deferred    All pertinent laboratory, imaging, and pathology findings have been reviewed.     IMAGING: Chest CT without contrast completed on 2/21/25 shows stability no new or concerning lesions.    IMPRESSION/RECOMMENDATION: 80 year old gentleman with multiple comorbidities and a pacemaker diagnosed with [id number], stage 1 lung cancer of the RUL status post stereotactic body radiation therapy completed on August 26, 2022. He is doing well with no significant radiation toxicity.  His most recent scan shows no definitive evidence of recurrence or new disease.  We will continue with every 6 month chest CTs with in person or video follow up.       Surveillance is typically noncontrast chest CT every 3-6 months x3 years, then Q6 months x2 years and then low dose noncontrast enhanced chest CT annually (per NCCN version 10.2024).  He is now 2.5 years out and I prefer to continue with noncontrast chest CT Q6 months until year 5 then annually thereafter.  He might start to have these scans completed by PCP as he sees them regularly and lives very close to their facility.  If so, we can discharge from clinic.    Thank you for allowing me to participate in the care of this pleasant patient. If you have any questions, please do not hesitate to contact my office.    Lalita Funez MD  Attending Physician  Radiation Oncology      Phone call duration 6 minutes

## 2025-04-07 NOTE — TELEPHONE ENCOUNTER
Patient calling to report he moved into a new apartment this weekend and during the move he misplaced his HYDROmorphone (DILAUDID) 2 MG tablet during the move. Patient asking if new prescription can be sent to [facility].

## 2025-04-07 NOTE — NURSING NOTE
"Zurdo is a 80 year old who is being evaluated via a billable telephone visit.    What phone number would you like to be contacted at? [phone]  How would you like to obtain your AVS? Omarharjonah  Originating Location (pt. Location): Home    Distant Location (provider location):  On-site  Telephone visit completed due to the patient did not consent to a video visit.  Phone call duration: 5 minutes     Oncology Rooming Note    April 7, 2025 3:17 PM   Zurdo Dash is a 80 year old male who presents for:    Chief Complaint   Patient presents with    Radiation Therapy     Telephone Return with Dr. Funez      Initial Vitals: There were no vitals taken for this visit. Estimated body mass index is 34.97 kg/m  as calculated from the following:    Height as of 3/24/25: 1.727 m (5' 8\").    Weight as of 3/24/25: 104.3 kg (230 lb). There is no height or weight on file to calculate BSA.  Data Unavailable Comment: Data Unavailable   No LMP for male patient.  Allergies reviewed: Yes  Medications reviewed: Yes    Medications: Medication refills not needed today.  Pharmacy name entered into [employer]:    [facility] [address] [facility] [address]  [facility] [facility] [address] [facility] [address]  [facility] [place] [address] - [place], [place] - [address]  [facility] [address] [id number] [address] AT [facility] [address]    Frailty Screening:   Is the patient here for a new oncology consult visit in cancer care? 2. No    PHQ9:  Did this patient require a PHQ9?: No      Clinical concerns: Return visit, review CT chest results from 2/28/25. Patient denies cough, SOB, energy good. 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 Yoli present for " visit today.  Dr. Funez was notified.      Heidi Villar RN

## 2025-04-07 NOTE — TELEPHONE ENCOUNTER
"It needs to be discussed with patient that while I am sympathetic to his predicament and I want to help him out, he has to understand something. HYDROmorphone (DILAUDID) is a different kind of medication then anything else he takes, it is a powerful addictive medication with major abuse potential. Every single time I prescribe this kind of medication there at least 5 or 6 safeguards and so on.    In short form he has to understand , I will never replace lost opioid pain medication again. It's not allowed. At our next appointment he will sign a Chronic Pain Contract and so on. We can leave the \" lecture\" at this.    I agreed to provide dilaudid [ hydromorphone ] at 2 times per day. He cannot lose this prescription or , well, it won't be replaced.    See prescription.     Kapil Duckworth MD      "

## 2025-04-08 ENCOUNTER — TELEPHONE (OUTPATIENT)
Dept: INTERNAL MEDICINE | Facility: CLINIC | Age: 80
End: 2025-04-08
Payer: COMMERCIAL

## 2025-04-08 NOTE — TELEPHONE ENCOUNTER
Forms/Letter Request    Type of form/letter: Home Health Certification      Do we have the form/letter: Yes:     Who is the form from? Home care    Where did/will the form come from? form was faxed in    When is form/letter needed by: ?    How would you like the form/letter returned: Fax : [phone]    Caren eTlles,

## 2025-04-08 NOTE — TELEPHONE ENCOUNTER
Relayed provider message. Pt and wife very understanding.    Zuleika Escobedo RN on 4/8/2025 at 10:28 AM

## 2025-04-08 NOTE — TELEPHONE ENCOUNTER
[facility] received and given to Dr. Duckworth to sign when he returns to the office on Thursday, April 10th, 2025.  Caren Telles,

## 2025-04-12 ENCOUNTER — HEALTH MAINTENANCE LETTER (OUTPATIENT)
Age: 80
End: 2025-04-12

## 2025-04-17 ENCOUNTER — TELEPHONE (OUTPATIENT)
Dept: INTERNAL MEDICINE | Facility: CLINIC | Age: 80
End: 2025-04-17
Payer: COMMERCIAL

## 2025-04-17 NOTE — TELEPHONE ENCOUNTER
Home Care is calling regarding an established patient with [facility].  Requesting orders from: Kapil Duckworth RN APPROVED: RN able to provide verbal orders.  Home Care will send orders for signature.  RN will close encounter.  Is this a request for a temporary pause in the home care episode?  No    Orders Requested    Physical Therapy  Request for initial evaluation and treatment (one time)   RN gave verbal order: Yes  Wheelchair assessment for power mobility effective 4/18/25, 1 visit every 20 days for 20 days      Occupational Therapy  Request for continuation of care with increase in frequency  Frequency: Effective 4/18 - 1 visit every 9 days for 9 days, effective 4/27 1 visit every week for 2 weeks through 5/10  RN gave verbal order: Yes      Phone number Home Care can be reached at: [phone]  Okay to leave a detailed message?: Yes      Katina Gutierrez RN

## 2025-04-18 ENCOUNTER — MEDICAL CORRESPONDENCE (OUTPATIENT)
Dept: HEALTH INFORMATION MANAGEMENT | Facility: CLINIC | Age: 80
End: 2025-04-18
Payer: COMMERCIAL

## 2025-04-22 DIAGNOSIS — M05.79 RHEUMATOID ARTHRITIS INVOLVING MULTIPLE SITES WITH POSITIVE RHEUMATOID FACTOR (H): ICD-10-CM

## 2025-04-22 DIAGNOSIS — I10 HYPERTENSION GOAL BP (BLOOD PRESSURE) < 140/90: ICD-10-CM

## 2025-04-22 DIAGNOSIS — E11.42 DIABETIC POLYNEUROPATHY ASSOCIATED WITH TYPE 2 DIABETES MELLITUS (H): ICD-10-CM

## 2025-04-22 DIAGNOSIS — M79.601 PAIN OF RIGHT UPPER EXTREMITY: ICD-10-CM

## 2025-04-22 RX ORDER — GABAPENTIN 300 MG/1
900 CAPSULE ORAL 3 TIMES DAILY
Qty: 270 CAPSULE | Refills: 1 | Status: SHIPPED | OUTPATIENT
Start: 2025-04-22

## 2025-04-22 RX ORDER — METOPROLOL SUCCINATE 50 MG/1
50 TABLET, EXTENDED RELEASE ORAL DAILY
Qty: 90 TABLET | Refills: 0 | Status: SHIPPED | OUTPATIENT
Start: 2025-04-22

## 2025-04-29 ENCOUNTER — VIRTUAL VISIT (OUTPATIENT)
Dept: FAMILY MEDICINE | Facility: CLINIC | Age: 80
End: 2025-04-29
Payer: COMMERCIAL

## 2025-04-29 ENCOUNTER — TELEPHONE (OUTPATIENT)
Dept: FAMILY MEDICINE | Facility: CLINIC | Age: 80
End: 2025-04-29

## 2025-04-29 DIAGNOSIS — D84.9 IMMUNOSUPPRESSION: ICD-10-CM

## 2025-04-29 DIAGNOSIS — N18.32 STAGE 3B CHRONIC KIDNEY DISEASE (H): ICD-10-CM

## 2025-04-29 DIAGNOSIS — U07.1 INFECTION DUE TO 2019 NOVEL CORONAVIRUS: Primary | ICD-10-CM

## 2025-04-29 PROBLEM — R10.84 ABDOMINAL PAIN, GENERALIZED: Status: RESOLVED | Noted: 2020-07-09 | Resolved: 2025-04-29

## 2025-04-29 PROBLEM — J93.9 PNEUMOTHORAX: Status: RESOLVED | Noted: 2021-01-10 | Resolved: 2025-04-29

## 2025-04-29 PROCEDURE — 98013 SYNCH AUDIO-ONLY EST LOW 20: CPT | Performed by: FAMILY MEDICINE

## 2025-04-29 NOTE — PROGRESS NOTES
"Zurdo is a 80 year old who is being evaluated via a billable telephone visit.    What phone number would you like to be contacted at? [phone]  How would you like to obtain your AVS? Jarrett  Originating Location (pt. Location): Home    Distant Location (provider location):  Off-site  Telephone visit completed due to the patient did not consent to a video visit.    Assessment & Plan     Infection due to 2019 novel coronavirus    - nirmatrelvir and ritonavir (PAXLOVID) 150 mg/100 mg therapy pack; Take 2 tablets by mouth 2 times daily for 5 days.    Immunosuppression  On plaquinal     Stage 3b chronic kidney disease (H)  Lower dose          BMI  Estimated body mass index is 34.97 kg/m  as calculated from the following:    Height as of 3/24/25: 1.727 m (5' 8\").    Weight as of 3/24/25: 104.3 kg (230 lb).           Subjective   Zurdo is a 80 year old, presenting for the following health issues:  Covid Concern        4/29/2025     1:21 PM   Additional Questions   Roomed by Self check in     History of Present Illness       Reason for visit:  Positive covid- talk about treatment  Symptom onset:  3-7 days ago   He is taking medications regularly.          COVID-19 Symptom Review  How many days ago did these symptoms start? Over the weekend     Are any of the following symptoms significant for you?  New or worsening difficulty breathing? Yes  Please describe what kind of difficulty you are having breathing:Mild dyspnea (able to do ADLs without difficulty, mild shortness of breath with activities such as climbing one or two flights of stairs or walking briskly)  Worsening cough? Yes, I am coughing up mucus.  Fever or chills? No  Headache: No  Sore throat: No  Chest pain: No  Diarrhea: No  Body aches? YES    What treatments has patient tried? Acetaminophen   Does patient live in a nursing home, group home, or shelter? YES- assisted living   Does patient have a way to get food/medications during quarantined? Yes, I have a " friend or family member who can help me.              Review of Systems  Constitutional, HEENT, cardiovascular, pulmonary, gi and gu systems are negative, except as otherwise noted.      Objective           Vitals:  No vitals were obtained today due to virtual visit.    Physical Exam   General: Alert and no distress //Respiratory: No audible wheeze, cough, or shortness of breath // Psychiatric:  Appropriate affect, tone, and pace of words            Phone call duration: 14 minutes  Signed Electronically by: Carly Palomino DO

## 2025-04-30 ENCOUNTER — TELEPHONE (OUTPATIENT)
Dept: INTERNAL MEDICINE | Facility: CLINIC | Age: 80
End: 2025-04-30
Payer: COMMERCIAL

## 2025-04-30 ENCOUNTER — TELEPHONE (OUTPATIENT)
Dept: FAMILY MEDICINE | Facility: CLINIC | Age: 80
End: 2025-04-30
Payer: COMMERCIAL

## 2025-04-30 NOTE — TELEPHONE ENCOUNTER
Noted.  The patient can decide if he wants the paxlovid.  He is high risk due to help conditions.     Carly Palomino D.O.

## 2025-04-30 NOTE — TELEPHONE ENCOUNTER
Routing to Dr Palomino    Patient may not  Paxlovid        RN received call from Pita with [facility].    Pita wanting to update that patient was diagnosed with Covid.    RN advised patient had had virtual visit 4/29 with Provider.    RN advised patient had been prescribed Paxlovid, however RN notes drug to Drug interaction.    Pita stated patient is doing better today. better today.  Unsure if he is needing Paxlovid.    RN advised if patient did not want Paxlovid patient could cancel prescription.    Pita verbalized understanding    Yash Reynaga, RN, BSN, PHN  [facility]

## 2025-04-30 NOTE — TELEPHONE ENCOUNTER
[facility] received and given to Dr. Duckworth to sign when he returns to the office tomorrow, Thursday, May 1st, 2025.  Caren Telles,

## 2025-04-30 NOTE — TELEPHONE ENCOUNTER
Forms/Letter Request    Type of form/letter: Home Health Certification      Do we have the form/letter: Yes:     Who is the form from? Home care    Where did/will the form come from? form was faxed in    When is form/letter needed by: ?    How would you like the form/letter returned: Fax : [phone]    Caren Telles,

## 2025-04-30 NOTE — TELEPHONE ENCOUNTER
Called and spoke with patient.    He is not going to take paxlovid as he feels better.    Will call back if he changes his mind.    Christine M Klisch, RN    
Hello,   I am trying to check Paxlovid DD interaction and it looks like Paxlovid can increase Eliquis conc.  It is recommended to decrease Eliquis by 50%. If you please can take a look at this at let us know what you think.    Thank you  Lama Oviedo [facility]     
Okay to reduce the eliquis by 50%.     Carly Palomino D.O.      
Detail Level: None
Add Option For Additional Mediation: No
Consent: The risks of atrophy were reviewed with the patient.
Kenalog Preparation: kenalog
Total Volume (Ccs): 1
Concentration (Mg/Ml): 40.0
Concentration (Mg/Ml) Of Additional Medication: 2.5

## 2025-05-01 NOTE — TELEPHONE ENCOUNTER
[facility] signed by Dr. Duckworth and faxed to [phone].  Carne Telles,

## 2025-05-03 ENCOUNTER — HEALTH MAINTENANCE LETTER (OUTPATIENT)
Age: 80
End: 2025-05-03

## 2025-05-16 ENCOUNTER — MEDICAL CORRESPONDENCE (OUTPATIENT)
Dept: HEALTH INFORMATION MANAGEMENT | Facility: CLINIC | Age: 80
End: 2025-05-16
Payer: COMMERCIAL

## 2025-05-19 DIAGNOSIS — M79.601 PAIN OF RIGHT UPPER EXTREMITY: ICD-10-CM

## 2025-05-19 DIAGNOSIS — E11.42 TYPE 2 DIABETES MELLITUS WITH DIABETIC POLYNEUROPATHY, WITHOUT LONG-TERM CURRENT USE OF INSULIN (H): ICD-10-CM

## 2025-05-19 DIAGNOSIS — M05.79 RHEUMATOID ARTHRITIS INVOLVING MULTIPLE SITES WITH POSITIVE RHEUMATOID FACTOR (H): ICD-10-CM

## 2025-05-19 DIAGNOSIS — E11.42 DIABETIC POLYNEUROPATHY ASSOCIATED WITH TYPE 2 DIABETES MELLITUS (H): ICD-10-CM

## 2025-05-19 RX ORDER — INSULIN GLARGINE 100 [IU]/ML
30 INJECTION, SOLUTION SUBCUTANEOUS AT BEDTIME
Qty: 15 ML | Refills: 1 | Status: SHIPPED | OUTPATIENT
Start: 2025-05-19

## 2025-05-19 RX ORDER — GABAPENTIN 300 MG/1
900 CAPSULE ORAL 3 TIMES DAILY
Qty: 270 CAPSULE | Refills: 1 | Status: SHIPPED | OUTPATIENT
Start: 2025-05-19

## 2025-05-19 RX ORDER — HYDROXYCHLOROQUINE SULFATE 200 MG/1
200 TABLET, FILM COATED ORAL 2 TIMES DAILY
Qty: 180 TABLET | Refills: 0 | Status: SHIPPED | OUTPATIENT
Start: 2025-05-19

## 2025-05-23 ENCOUNTER — MEDICAL CORRESPONDENCE (OUTPATIENT)
Dept: HEALTH INFORMATION MANAGEMENT | Facility: CLINIC | Age: 80
End: 2025-05-23
Payer: COMMERCIAL

## 2025-06-19 PROBLEM — Z89.429 TOE AMPUTEE: Status: RESOLVED | Noted: 2024-02-15 | Resolved: 2025-06-19

## 2025-06-19 PROBLEM — Z98.890 HISTORY OF BLEPHAROPLASTY: Status: RESOLVED | Noted: 2023-07-23 | Resolved: 2025-06-19

## 2025-06-19 PROBLEM — Z79.899 HIGH RISK MEDICATION USE: Status: RESOLVED | Noted: 2017-03-03 | Resolved: 2025-06-19

## 2025-06-19 PROBLEM — R65.20 SEVERE SEPSIS (H): Status: RESOLVED | Noted: 2022-06-06 | Resolved: 2025-06-19

## 2025-06-19 PROBLEM — D64.9 ANEMIA, UNSPECIFIED TYPE: Status: RESOLVED | Noted: 2021-07-20 | Resolved: 2025-06-19

## 2025-06-19 PROBLEM — A41.9 SEVERE SEPSIS (H): Status: RESOLVED | Noted: 2022-06-06 | Resolved: 2025-06-19

## 2025-06-19 PROBLEM — E11.9 TYPE 2 DIABETES MELLITUS WITHOUT RETINOPATHY (H): Status: RESOLVED | Noted: 2018-01-12 | Resolved: 2025-06-19

## 2025-06-19 PROBLEM — A49.02 METHICILLIN RESISTANT STAPHYLOCOCCUS AUREUS INFECTION: Status: RESOLVED | Noted: 2024-08-29 | Resolved: 2025-06-19

## 2025-06-19 PROBLEM — R06.02 SOBOE (SHORTNESS OF BREATH ON EXERTION): Status: RESOLVED | Noted: 2021-05-12 | Resolved: 2025-06-19

## 2025-06-19 PROBLEM — D12.6 TUBULOVILLOUS ADENOMA OF COLON: Status: RESOLVED | Noted: 2018-02-20 | Resolved: 2025-06-19

## 2025-06-19 PROBLEM — M17.11 PRIMARY OSTEOARTHRITIS OF RIGHT KNEE: Status: RESOLVED | Noted: 2019-02-08 | Resolved: 2025-06-19

## 2025-06-26 DIAGNOSIS — E11.42 TYPE 2 DIABETES MELLITUS WITH DIABETIC POLYNEUROPATHY, WITHOUT LONG-TERM CURRENT USE OF INSULIN (H): ICD-10-CM

## 2025-06-26 RX ORDER — DULAGLUTIDE 3 MG/.5ML
3 INJECTION, SOLUTION SUBCUTANEOUS WEEKLY
Qty: 6 ML | Refills: 1 | Status: SHIPPED | OUTPATIENT
Start: 2025-06-26

## 2025-06-26 NOTE — TELEPHONE ENCOUNTER
Medication Question or Refill    Contacts       Contact Date/Time Type Contact Phone/Fax    06/26/2025 11:34 AM CDT Phone (Incoming) Zurdo Dash (Self) [phone] (M)            What medication are you calling about (include dose and sig)?: Dulaglutide (TRULICITY) 3 MG/0.5ML SOAJ     Preferred Pharmacy:     [facility] [address] [place] [address]  [address] [facility]  [address]  [facility] [address]  Phone: [phone] Fax: [phone]    Controlled Substance Agreement on file:   CSA -- Patient Level:     [Media Unavailable] Controlled Substance Agreement - Opioid - Scan on 10/18/2024  1:14 PM       Who prescribed the medication?: Dr. Duckworth    Do you need a refill? Yes    When did you use the medication last? Last night    Patient offered an appointment? No    Do you have any questions or concerns?  No    Could we send this information to you in [facility] or would you prefer to receive a phone call?:   Patient would prefer a phone call   Okay to leave a detailed message?: Yes at Home number on file [phone] (home)    Caren Telles,

## 2025-07-02 ENCOUNTER — TELEPHONE (OUTPATIENT)
Dept: PHARMACY | Facility: CLINIC | Age: 80
End: 2025-07-02
Payer: COMMERCIAL

## 2025-07-02 NOTE — TELEPHONE ENCOUNTER
Received voicemail from patient that he no longer qualifies to get Trulicity through the [facility] prescription assistance program and it will now be $400 from the pharmacy.    Attempted to call back, no answer so left voicemail for patient to return call. May be able to switch to Ozempic and apply for [employer] patient assistance program as this has a higher income limit for eligibility.    Alexandra Farfan, PharmD  Medication Therapy Management Pharmacist  [facility]  [phone]

## 2025-07-03 ENCOUNTER — OFFICE VISIT (OUTPATIENT)
Dept: FAMILY MEDICINE | Facility: CLINIC | Age: 80
End: 2025-07-03
Payer: COMMERCIAL

## 2025-07-03 ENCOUNTER — TELEPHONE (OUTPATIENT)
Dept: FAMILY MEDICINE | Facility: CLINIC | Age: 80
End: 2025-07-03

## 2025-07-03 ENCOUNTER — RESULTS FOLLOW-UP (OUTPATIENT)
Dept: FAMILY MEDICINE | Facility: CLINIC | Age: 80
End: 2025-07-03

## 2025-07-03 VITALS
HEART RATE: 63 BPM | DIASTOLIC BLOOD PRESSURE: 75 MMHG | WEIGHT: 210 LBS | RESPIRATION RATE: 18 BRPM | SYSTOLIC BLOOD PRESSURE: 129 MMHG | TEMPERATURE: 97.5 F | OXYGEN SATURATION: 96 % | BODY MASS INDEX: 31.93 KG/M2

## 2025-07-03 DIAGNOSIS — H61.23 BILATERAL IMPACTED CERUMEN: Primary | ICD-10-CM

## 2025-07-03 DIAGNOSIS — E11.42 TYPE 2 DIABETES MELLITUS WITH DIABETIC POLYNEUROPATHY, WITH LONG-TERM CURRENT USE OF INSULIN (H): ICD-10-CM

## 2025-07-03 DIAGNOSIS — J34.89 RHINORRHEA: ICD-10-CM

## 2025-07-03 DIAGNOSIS — Z79.4 TYPE 2 DIABETES MELLITUS WITH DIABETIC POLYNEUROPATHY, WITH LONG-TERM CURRENT USE OF INSULIN (H): ICD-10-CM

## 2025-07-03 LAB
EST. AVERAGE GLUCOSE BLD GHB EST-MCNC: 146 MG/DL
HBA1C MFR BLD: 6.7 % (ref 0–5.6)

## 2025-07-03 RX ORDER — FLUTICASONE PROPIONATE 50 MCG
1 SPRAY, SUSPENSION (ML) NASAL DAILY
Qty: 11.1 ML | Refills: 0 | Status: SHIPPED | OUTPATIENT
Start: 2025-07-03

## 2025-07-03 ASSESSMENT — ENCOUNTER SYMPTOMS
FEVER: 0
COUGH: 0
RHINORRHEA: 1

## 2025-07-03 ASSESSMENT — PAIN SCALES - GENERAL: PAINLEVEL_OUTOF10: NO PAIN (0)

## 2025-07-03 NOTE — TELEPHONE ENCOUNTER
Writer called patients wife Yoli (C2C on file) and relayed provider's message. Patient verbalized understanding.     Wife gave phone to pt and writer relayed message to pt. Pt verbalized understanding. Pt did stated that he has two more Trulicity injection, so it will get him to the appt.       Aixa Mason RN  [facility]

## 2025-07-03 NOTE — TELEPHONE ENCOUNTER
These are treatment decisions and can be tackled at the virtual office visit . We can find a change to more affordable anti-diabetic medications  , can he wait a week to changing medications ? If not please reroute     Kapil Duckworth MD

## 2025-07-03 NOTE — PROGRESS NOTES
"  Assessment & Plan     1. Bilateral impacted cerumen (Primary)    TM WNL post lavage.  Pt reports hearing improved bilaterally.    Discussed debrox prn, pt agreeable.  Offered audiology referral, patient declined at this time.    - REMOVE IMPACTED CERUMEN  - carbamide peroxide (DEBROX) 6.5 % otic solution; Place 5 drops into both ears daily as needed for other (ear wax).  Dispense: 15 mL; Refill: 0    2. Rhinorrhea    Admits to runny nose when he eats though this has been \"forever\" and he admits to adding pepper to his meals.      He has bilateral enlargement of turbinates and clear rhinorrhea. Afebrile and with clear lungs.    Restart flonase. Follow-up if symptoms worsen/fail to improve.      - fluticasone (FLONASE) 50 MCG/ACT nasal spray; Spray 1 spray into both nostrils daily.  Dispense: 11.1 mL; Refill: 0    3. Type 2 diabetes mellitus with diabetic polyneuropathy, with long-term current use of insulin (H)  - Adult Eye  Referral; Future  - Hemoglobin A1c; Future      I discussed with the patient risks and benefits of the new medications prescribed including potential side effects.  The patient had opportunity to ask questions and is comfortable with and interested in medications as prescribed.       Follow-up if symptoms worsen/fail to improve.    All questions/concerns addressed. Patient stated understanding/agreement to plan of care.        Note: Chart documentation was done in part with Dragon Voice Recognition software.  Although reviewed after completion, some word and grammatical errors may remain. Please contact author for any clarification or concerns.            Gilma Renner is a 80 year old, presenting for the following health issues:  Ear Problem (Ear lavage - R side worse than L)      7/3/2025     9:48 AM   Additional Questions   Roomed by Chastity     Ear Problem  Associated symptoms include rhinorrhea. Pertinent negatives include no cough.   History of Present Illness       Reason for " "visit:  Ear flush   He is taking medications regularly.            Patient has been having difficulty hearing out of both ears, R>L.  No other significant URI symptoms. Admits to runny nose when he eats though this has been \"forever\" and he admits to adding pepper to his meals.  No other acute concerns/symptoms at time of exam.      Review of Systems   Constitutional:  Negative for fever.   HENT:  Positive for rhinorrhea. Negative for sneezing.    Respiratory:  Negative for cough.    Constitutional, HEENT, cardiovascular, pulmonary, gi and gu systems are negative, except as otherwise noted.      Current Outpatient Medications   Medication Sig Dispense Refill    acetaminophen (TYLENOL) 500 MG tablet Take 1-2 tablets (500-1,000 mg) by mouth 3 times daily as needed for mild pain 180 tablet 11    bumetanide (BUMEX) 1 MG tablet TAKE ONE TABLET BY MOUTH ONCE DAILY 90 tablet 1           cyanocobalamin (VITAMIN B-12) 1000 MCG tablet Take 1 tablet (1,000 mcg) by mouth once a week. 100 tablet 3    Dulaglutide (TRULICITY) 3 MG/0.5ML SOAJ Inject 3 mg subcutaneously once a week. 6 mL 1    ELIQUIS ANTICOAGULANT 2.5 MG tablet TAKE ONE TABLET BY MOUTH TWICE A  tablet 1    Ergocalciferol 50 MCG (2000 UT) TABS Take 1 tablet by mouth daily.      ferrous gluconate (FERGON) 324 (38 Fe) MG tablet TAKE 1 TABLET (324 MG) BY MOUTH DAILY (WITH BREAKFAST) 90 tablet 3    ferrous sulfate (FE TABS) 325 (65 Fe) MG EC tablet Take 325 mg by mouth daily.             gabapentin (NEURONTIN) 300 MG capsule Take 3 capsules (900 mg) by mouth 3 times daily. 270 capsule 1    Glucosamine-Chondroitin 250-200 MG CAPS Take 2 tablets by mouth daily.      HYDROmorphone (DILAUDID) 2 MG tablet Take 1 tablet (2 mg) by mouth 2 times daily as needed for pain. 60 tablet 0    hydroxychloroquine (PLAQUENIL) 200 MG tablet Take 1 tablet (200 mg) by mouth 2 times daily. 180 tablet 0    insulin glargine (LANTUS SOLOSTAR) 100 UNIT/ML pen Inject 30 Units subcutaneously " at bedtime. 15 mL 1    insulin lispro (HUMALOG KWIKPEN) 100 UNIT/ML (1 unit dial) KWIKPEN Inject 14 Units subcutaneously 2 times daily (before meals). 30 mL 1    insulin pen needle (31G X 5 MM) 31G X 5 MM miscellaneous Use 3 pen needles daily or as directed. 300 each 1    medical cannabis (Patient's own supply) See Admin Instructions (The purpose of this order is to document that the patient reports taking medical cannabis.  This is not a prescription, and is not used to certify that the patient has a qualifying medical condition.)      metoprolol succinate ER (TOPROL XL) 50 MG 24 hr tablet TAKE ONE TABLET BY MOUTH ONCE DAILY 90 tablet 0    omeprazole (PRILOSEC) 20 MG DR capsule TAKE ONE CAPSULE BY MOUTH ONCE DAILY 90 capsule 2    PERMETHRIN EX Wheelchair: Standard  with leg rests: (Swing away Length of need: 99 months      polyethylene glycol-propylene glycol (SYSTANE) 0.4-0.3 % SOLN ophthalmic solution Place 1 drop into both eyes 2 times daily as needed for dry eyes 3 mL 0    rosuvastatin (CRESTOR) 40 MG tablet Take 1 tablet (40 mg) by mouth daily. 90 tablet 1    senna (SENOKOT) 8.6 MG tablet Take 2 tablets by mouth daily as needed for constipation.      sertraline (ZOLOFT) 50 MG tablet Take 1 tablet (50 mg) by mouth daily. 90 tablet 0    Alcohol Swabs PADS Uses four times daily (Patient not taking: Reported on 7/3/2025) 120 each 11    benzocaine (ANBESOL) 10 % gel Take by mouth as needed for mouth sores.      cetirizine (ZYRTEC) 10 MG tablet Take 1 tablet (10 mg) by mouth At Bedtime 30 tablet 0    Cold Sore Products (ANBESOL COLD SORE THERAPY) OINT Apply topically as needed.      Continuous Blood Gluc Sensor (FREESTYLE YENNY 2 SENSOR) [facility] 1 each every 14 days Use 1 sensor every 14 days. Use to read blood sugars per 's instructions. 2 each 5    polyethylene glycol (MIRALAX) 17 GM/Dose powder Take 1 Scoop by mouth as needed for constipation. (Patient not taking: Reported on 7/3/2025)       No current  facility-administered medications for this visit.       Past Medical History:   Diagnosis Date    Abnormal CT scan 03/2004    calcified lung granuloma    C. difficile diarrhea     H/O    Cataract 11/18/2011    Diabetic neuropathy (H)     mild, mostly soles and distal forefeet, worse on the left side.    Diverticulitis     ED (erectile dysfunction)     Ex-smoker     QUIT SMOKING FEB 2007    History of ETOH abuse     recovering, sober since 1997    Hyperlipidemia LDL goal <100     Hypertension goal BP (blood pressure) < 140/90     Hypogonadism     Obesity     PAD (peripheral artery disease)     leg cramps, with exertion, no formal diagnosis of PAD and minimal if any symptoms at all.    RA (rheumatoid arthritis) (H)     Dr Bailon    Rheumatoid arthritis involving multiple sites with positive rheumatoid factor (H) 03/18/2016    Stage 3b chronic kidney disease (H) 02/27/2020    Syncope        Past Surgical History:   Procedure Laterality Date    AMPUTATION Left 01/2023    Toe    AMPUTATION ABOVE KNEE RT/LT Left 02/28/2025    BYPASS GRAFT FEMOROPOPLITEAL Left 01/07/2021    Procedure: LEFT FEMORAL TO ABOVE KNEE POPLITEAL ARTERY BYPASS WITH PTFE VASCULAR GRAFT REMOVABLE RING 6MMX 50CM;  Surgeon: Myra Lopes MD;  Location: [facility] OR    CATARACT IOL, RT/LT      COLONOSCOPY  03/01/2018    [facility]    COMBINED REPAIR PTOSIS WITH BLEPHAROPLASTY BILATERAL Bilateral 08/16/2019    Procedure: BILATERAL UPPER EYELID BLEPHAROPLASTY AND BILATERAL PTOSIS REPAIR;  Surgeon: Janet Garcia MD;  Location:  OR    ENDARTERECTOMY FEMORAL Left 01/07/2021    Procedure: LEFT FEMORAL ENDARTERECTOMY WITH PATCH ANGIOPLASTY PHOTOFIX  0.8 X 8CM;  Surgeon: Myra Lopes MD;  Location:  OR    EP PACEMAKER  01/2021    EXCISE LESION EYELID Left 08/16/2019    Procedure: LEFT LOWER EYELID BIOPSY;  Surgeon: Janet Garcia MD;  Location:  OR    IR LOWER EXTREMITY ANGIOGRAM LEFT  12/17/2020    PHACOEMULSIFICATION WITH  STANDARD INTRAOCULAR LENS IMPLANT  2019; 3/2019    left eye; right eye    TONSILLECTOMY      ZZHC INCISION TENDON SHEATH FINGER  2009    r hand ring finger       Family History   Problem Relation Age of Onset    Cerebrovascular Disease Mother     Arthritis Mother     Osteoporosis Mother     Alzheimer Disease Father     Arthritis Father     Cancer Father     Diabetes Maternal Grandmother     Cardiovascular Maternal Grandmother     Other Cancer Brother     Cancer Paternal Aunt     Hypertension No family hx of     Thyroid Disease No family hx of     Glaucoma No family hx of     Macular Degeneration No family hx of        Social History     Tobacco Use    Smoking status: Former     Current packs/day: 0.00     Average packs/day: 1 pack/day for 40.0 years (40.0 ttl pk-yrs)     Types: Cigarettes     Start date: 3/16/1967     Quit date: 3/16/2007     Years since quittin.3     Passive exposure: Past    Smokeless tobacco: Never   Substance Use Topics    Alcohol use: No     Alcohol/week: 0.0 standard drinks of alcohol             Objective        /75 (BP Location: Right arm, Patient Position: Sitting, Cuff Size: Adult Regular)   Pulse 63   Temp 97.5  F (36.4  C) (Temporal)   Resp 18   Wt 95.3 kg (210 lb)   SpO2 96%   BMI 31.93 kg/m        Physical Exam  Constitutional:       General: He is not in acute distress.     Appearance: He is not ill-appearing.      Comments: In electric wheelchair   HENT:      Right Ear: Tympanic membrane normal. There is impacted cerumen.      Left Ear: Tympanic membrane normal. There is impacted cerumen.      Ears:      Comments: TM WNL post lavage.  Pt reports hearing improved bilaterally.     Nose: Congestion and rhinorrhea present. Rhinorrhea is clear.      Right Turbinates: Enlarged.      Left Turbinates: Enlarged.      Mouth/Throat:      Pharynx: Oropharynx is clear.   Cardiovascular:      Rate and Rhythm: Normal rate and regular rhythm.      Heart sounds: Normal heart  sounds. No murmur heard.  Pulmonary:      Effort: Pulmonary effort is normal. No respiratory distress.      Breath sounds: Normal breath sounds. No wheezing or rales.   Musculoskeletal:      Cervical back: Neck supple.      Comments: L AKA   Lymphadenopathy:      Cervical: No cervical adenopathy.   Neurological:      General: No focal deficit present.      Mental Status: He is alert.   Psychiatric:         Behavior: Behavior normal.                Signed Electronically by: MANASA Bass CNP

## 2025-07-03 NOTE — TELEPHONE ENCOUNTER
Patient is unable to afford his Trulicity. I don't think any medication in the class would be any less expensive. He reports that he used to be in a program with the . Maybe we can have CLARY or Sascha Kaufman look into this?  Thank you  Bertha Lei, [facility]  [address] [place] [address]  [place] [address]  [phone]

## 2025-07-03 NOTE — TELEPHONE ENCOUNTER
Pt was in today for an appointment with another provider today, stopped me in the hallway and wanted to renew his medical cannabis with Dr. Duckworth, patient is needing a virtual appointment. Made virtual appointment for medical cannabis appointment.       Patient also said he does not qualify for the Trulicity anymore through patient assistance, costing about 750 dollars a month for this. Patient can not afford this. Please advise patient on what to do next? He can't do another one of this being expensive.         Conchita MAHONEY CMA ([facility])

## 2025-07-03 NOTE — NURSING NOTE
Patient identified using two patient identifiers.  Ear exam showing wax occlusion completed by provider.  Solution: warm water and peroxide was placed in the bilateral ear(s) via irrigation tool: elephant ear. Kayleigh Espinoza MA

## 2025-07-05 ENCOUNTER — PATIENT OUTREACH (OUTPATIENT)
Dept: CARE COORDINATION | Facility: CLINIC | Age: 80
End: 2025-07-05
Payer: COMMERCIAL

## 2025-07-07 ENCOUNTER — PATIENT OUTREACH (OUTPATIENT)
Dept: CARE COORDINATION | Facility: CLINIC | Age: 80
End: 2025-07-07
Payer: COMMERCIAL

## 2025-07-08 NOTE — TELEPHONE ENCOUNTER
Patient's spouse did  his trulicity.I don't think further review is needed at this time  Thank you  Bertha Lei, [facility]  [address] [place] [address]  [place] [id number] [phone]

## 2025-07-09 NOTE — TELEPHONE ENCOUNTER
"LVM w/callback.  If Yoli calls for Zurdo consent is on file.    When I saw this patient's spouse Yoli yesterday ,, she asked for me to provide a Medical Marijuana certification . I had thought this was a renewal and she also asked me to certify her , this patient , Zurdo Dash,     I am willing to provide this service for both of them but need your help with one or two things just to play it safe      This is a \"for the first time\" Medical Marijuana certification right ?  Do they have a valid email, one between them both or do they each have an email address ?   They are both, I think, interested to try medical cannabis products to help with chronic pain ? Please confirm which type of chronic pain diagnosis I should use for each of them. With this information I can then complete the Medical Marijuana certification for each of them     Kapil Escobedo RN on 7/9/2025 at 2:02 PM    "

## 2025-07-10 ENCOUNTER — VIRTUAL VISIT (OUTPATIENT)
Dept: INTERNAL MEDICINE | Facility: CLINIC | Age: 80
End: 2025-07-10
Payer: COMMERCIAL

## 2025-07-10 DIAGNOSIS — G54.6 PHANTOM LIMB PAIN (H): ICD-10-CM

## 2025-07-10 DIAGNOSIS — Z79.899 MEDICAL MARIJUANA USE: Primary | ICD-10-CM

## 2025-07-10 NOTE — PROGRESS NOTES
"Zurdo is a 80 year old who is being evaluated via a billable telephone visit.    What phone number would you like to be contacted at? [phone]  How would you like to obtain your AVS? Mail a copy  Originating Location (pt. Location): Home     Distant Location (provider location):  On-site  Telephone visit completed due to the patient did not consent to a video visit.    Telephone MD visit     Chronic pain and flare-ups , symptoms are helped with Medical Marijuana      Call began at 3:46 PM   Call ended at 4:05 PM      Assessment & Plan     Medical marijuana use  Today's telephone MD visit is primarily so I will provide a Medical Marijuana certification . He says he's already been previously certfified    Phantom limb pain (H)  as primary qualifying condition for Medical Marijuana certification    Rheumatoid arthritis   Also listed as primary qualifying condition for Medical Marijuana certification     BMI  Estimated body mass index is 31.93 kg/m  as calculated from the following:    Height as of 3/24/25: 1.727 m (5' 8\").    Weight as of 7/3/25: 95.3 kg (210 lb).   Weight management plan: Discussed healthy diet and exercise guidelines      Subjective    Zurdo is a 80 year old, presenting for the following health issues:  Medical Cannabis  (Renewal/) and Recheck Medication (Discuss medication change: Dulaglutide (TRULICITY) 3 MG/0.5ML SOAJ is too expensive and would like to get mounjaro)      7/10/2025     3:08 PM   Additional Questions   Roomed by An HERB.     He had been receiving the Trulicity (dulaglutide) for free. They lowered the income level so he no longer qualifies - it's 700 bucks for a 3 month supply and he can't afford this , he's going to check-in with [employer] . I explained to patient how to check-in with his health insurance  and determine what is the preferred drug of choice regarding the  (glucagon-like peptide-1) GLP-1 agonista    Lab Results   Component Value Date    A1C 6.7 " 07/03/2025    A1C 6.6 01/13/2025    A1C 6.9 08/22/2024    A1C 6.6 06/14/2024    A1C 8.9 02/15/2024    A1C 6.8 01/07/2021    A1C 6.8 12/17/2020    A1C 6.7 12/11/2020    A1C 6.0 01/03/2020    A1C 6.4 08/06/2019          GFR Estimate   Date Value Ref Range Status   01/13/2025 34 (L) >60 mL/min/1.73m2 Final     Comment:     eGFR calculated using 2021 CKD-EPI equation.   08/22/2024 35 (L) >60 mL/min/1.73m2 Final     Comment:     eGFR calculated using 2021 CKD-EPI equation.   02/15/2024 33 (L) >60 mL/min/1.73m2 Final   01/08/2024 32 (L) >60 mL/min/1.73m2 Final   11/06/2023 39 (L) >60 mL/min/1.73m2 Final   07/05/2021 20 (L) >60 mL/min/[1.73_m2] Final     Comment:     Non  GFR Calc  Starting 12/18/2018, serum creatinine based estimated GFR (eGFR) will be   calculated using the Chronic Kidney Disease Epidemiology Collaboration   (CKD-EPI) equation.     04/07/2021 44 (L) >60 mL/min/[1.73_m2] Final     Comment:     Non  GFR Calc  Starting 12/18/2018, serum creatinine based estimated GFR (eGFR) will be   calculated using the Chronic Kidney Disease Epidemiology Collaboration   (CKD-EPI) equation.     01/09/2021 49 (L) >60 mL/min/[1.73_m2] Final     Comment:     Non  GFR Calc  Starting 12/18/2018, serum creatinine based estimated GFR (eGFR) will be   calculated using the Chronic Kidney Disease Epidemiology Collaboration   (CKD-EPI) equation.     01/07/2021 41 (L) >60 mL/min/[1.73_m2] Final     Comment:     Non  GFR Calc  Starting 12/18/2018, serum creatinine based estimated GFR (eGFR) will be   calculated using the Chronic Kidney Disease Epidemiology Collaboration   (CKD-EPI) equation.     12/17/2020 35 (L) >60 mL/min/[1.73_m2] Final     Comment:     Non  GFR Calc  Starting 12/18/2018, serum creatinine based estimated GFR (eGFR) will be   calculated using the Chronic Kidney Disease Epidemiology Collaboration   (CKD-EPI) equation.                7/10/2025     3:08 PM   Patient Reported Additional Medications   Patient reports taking the following new medications none     HPI          Review of Systems  Constitutional, HEENT, cardiovascular, pulmonary, gi and gu systems are negative, except as otherwise noted.      Objective           Vitals:  No vitals were obtained today due to virtual visit.    Physical Exam   General: Alert and no distress //Respiratory: No audible wheeze, cough, or shortness of breath // Psychiatric:  Appropriate affect, tone, and pace of words      No orders of the defined types were placed in this encounter.          Phone call duration: 20 minutes  Signed Electronically by: Kapil Duckworth MD

## 2025-07-22 DIAGNOSIS — N18.32 STAGE 3B CHRONIC KIDNEY DISEASE (H): ICD-10-CM

## 2025-07-22 DIAGNOSIS — I10 HYPERTENSION GOAL BP (BLOOD PRESSURE) < 140/90: ICD-10-CM

## 2025-07-22 DIAGNOSIS — F32.A DEPRESSION, UNSPECIFIED DEPRESSION TYPE: ICD-10-CM

## 2025-07-22 RX ORDER — METOPROLOL SUCCINATE 50 MG/1
50 TABLET, EXTENDED RELEASE ORAL DAILY
Qty: 90 TABLET | Refills: 0 | Status: SHIPPED | OUTPATIENT
Start: 2025-07-22

## 2025-07-22 RX ORDER — BUMETANIDE 1 MG/1
1 TABLET ORAL DAILY
Qty: 90 TABLET | Refills: 1 | Status: SHIPPED | OUTPATIENT
Start: 2025-07-22

## 2025-07-23 NOTE — TELEPHONE ENCOUNTER
Closing encounter. Pt has already had virtual appointment to discuss with with provider on 7/10.    Bridgett Peguero RN

## 2025-07-28 ENCOUNTER — TELEPHONE (OUTPATIENT)
Dept: FAMILY MEDICINE | Facility: CLINIC | Age: 80
End: 2025-07-28
Payer: COMMERCIAL

## 2025-07-28 NOTE — TELEPHONE ENCOUNTER
General Call      Reason for Call:  Medication    What are your questions or concerns:  Patient stated he will continue with Trulicity he checked his insurance and other medications are the same price.     Date of last appointment with provider: 7/10/25    Could we send this information to you [facility] or would you prefer to receive a phone call?:   Patient would prefer a phone call   Okay to leave a detailed message?: Yes at Home number on file [phone] (home)

## 2025-08-01 ENCOUNTER — MEDICAL CORRESPONDENCE (OUTPATIENT)
Dept: HEALTH INFORMATION MANAGEMENT | Facility: CLINIC | Age: 80
End: 2025-08-01
Payer: COMMERCIAL

## 2025-08-04 ENCOUNTER — TELEPHONE (OUTPATIENT)
Dept: RHEUMATOLOGY | Facility: CLINIC | Age: 80
End: 2025-08-04
Payer: COMMERCIAL

## 2025-08-04 DIAGNOSIS — M05.79 RHEUMATOID ARTHRITIS INVOLVING MULTIPLE SITES WITH POSITIVE RHEUMATOID FACTOR (H): Primary | ICD-10-CM

## 2025-08-07 PROBLEM — L97.529 ULCER OF LEFT FOOT, UNSPECIFIED ULCER STAGE (H): Status: RESOLVED | Noted: 2021-06-10 | Resolved: 2025-08-07

## 2025-08-07 PROBLEM — C34.90 ADENOCARCINOMA, LUNG (H): Status: RESOLVED | Noted: 2022-04-08 | Resolved: 2025-08-07

## 2025-08-07 PROBLEM — Z79.899 MEDICAL MARIJUANA USE: Status: RESOLVED | Noted: 2017-03-03 | Resolved: 2025-08-07

## 2025-08-07 PROBLEM — F11.20 UNCOMPLICATED OPIOID DEPENDENCE (H): Status: ACTIVE | Noted: 2023-11-06

## 2025-08-11 ENCOUNTER — MEDICAL CORRESPONDENCE (OUTPATIENT)
Dept: HEALTH INFORMATION MANAGEMENT | Facility: CLINIC | Age: 80
End: 2025-08-11
Payer: COMMERCIAL

## 2025-08-11 ENCOUNTER — TELEPHONE (OUTPATIENT)
Dept: FAMILY MEDICINE | Facility: CLINIC | Age: 80
End: 2025-08-11
Payer: COMMERCIAL

## 2025-09-03 DIAGNOSIS — M05.79 RHEUMATOID ARTHRITIS INVOLVING MULTIPLE SITES WITH POSITIVE RHEUMATOID FACTOR (H): ICD-10-CM

## 2025-09-03 RX ORDER — HYDROXYCHLOROQUINE SULFATE 200 MG/1
200 TABLET, FILM COATED ORAL 2 TIMES DAILY
Qty: 180 TABLET | Refills: 0 | Status: SHIPPED | OUTPATIENT
Start: 2025-09-03

## 2025-09-04 DIAGNOSIS — E11.42 TYPE 2 DIABETES MELLITUS WITH DIABETIC POLYNEUROPATHY, WITHOUT LONG-TERM CURRENT USE OF INSULIN (H): ICD-10-CM

## 2025-09-04 RX ORDER — INSULIN GLARGINE 100 [IU]/ML
INJECTION, SOLUTION SUBCUTANEOUS
Qty: 15 ML | Refills: 1 | Status: SHIPPED | OUTPATIENT
Start: 2025-09-04

## (undated) DEVICE — PREP CHLORAPREP 26ML TINTED ORANGE  260815

## (undated) DEVICE — LINEN TOWEL PACK X5 5464

## (undated) DEVICE — VESSEL LOOPS RED MAXI

## (undated) DEVICE — CLIP SPRING FOGARTY SOFTJAW CSOFT6

## (undated) DEVICE — SU SILK 0 24" TIE SA76G

## (undated) DEVICE — SPONGE RAY-TEC 4X8" 7318

## (undated) DEVICE — SYR 05ML SLIP TIP W/O NDL 309647

## (undated) DEVICE — SOL WATER IRRIG 1000ML BOTTLE 2F7114

## (undated) DEVICE — DRAPE C-ARM 60X42" 1013

## (undated) DEVICE — INSERT EVERGRIP 33MM

## (undated) DEVICE — SU PLAIN 6-0 G-1DA 18" 770G

## (undated) DEVICE — LINEN LEG ROLL 5489

## (undated) DEVICE — SU SILK 2-0 TIE 24" SA75H

## (undated) DEVICE — EYE PREP BETADINE 5% SOLUTION 30ML 0065-0411-30

## (undated) DEVICE — DRSG STERI STRIP 1/2X4" R1547

## (undated) DEVICE — DRSG TEGADERM 4X4 3/4" 1626

## (undated) DEVICE — NDL 18GA 1.5" 305196

## (undated) DEVICE — ESU EYE HIGH TEMP 65410-183

## (undated) DEVICE — SU MERSILENE 5-0 S-24 18" 1764G

## (undated) DEVICE — SU MONOCRYL 4-0 PS-2 18" UND Y496G

## (undated) DEVICE — SU VICRYL 3-0 SH 27" J316H

## (undated) DEVICE — GLOVE PROTEXIS W/NEU-THERA 6.5  2D73TE65

## (undated) DEVICE — SU PROLENE 7-0 BV-1DA 24" 8702H

## (undated) DEVICE — SU SILK 4-0 TIE 12X30" A303H

## (undated) DEVICE — SU PROLENE 6-0 C-1DA 30" 8706H

## (undated) DEVICE — PACK VASCULAR SCV15VAFSB

## (undated) DEVICE — PACK OCULOPLATIC SEN15OCFSD

## (undated) DEVICE — SU PROLENE 5-0 RB-1DA 36"  8556H

## (undated) DEVICE — SOL NACL 0.9% IRRIG 1000ML BOTTLE 2F7124

## (undated) DEVICE — ADH LIQUID MASTISOL TOPICAL VIAL 2-3ML 0523-48

## (undated) DEVICE — DRSG TEGADERM 4X10" 1627

## (undated) DEVICE — SU VICRYL 2-0 CT-1 27" J339H

## (undated) DEVICE — ESU PENCIL W/SMOKE EVAC CVPLP2000

## (undated) DEVICE — DECANTER BAG 2002S

## (undated) DEVICE — CATH FOLEY COUDE 16FR 5ML LUBRICATH LATEX 0168L16

## (undated) DEVICE — GLOVE PROTEXIS BLUE W/NEU-THERA 7.0  2D73EB70

## (undated) DEVICE — GLOVE PROTEXIS BLUE W/NEU-THERA 6.5  2D73EB65

## (undated) DEVICE — SU PROLENE 6-0 C-1DA 30" M8706

## (undated) DEVICE — GLOVE PROTEXIS W/NEU-THERA 7.5  2D73TE75

## (undated) DEVICE — SOL NACL 0.9% INJ 250ML BAG 2B1322Q

## (undated) DEVICE — Device

## (undated) DEVICE — VESSEL LOOPS YELLOW MINI 31145660

## (undated) DEVICE — SU SILK 3-0 TIE 24" SA74H

## (undated) DEVICE — GOWN IMPERVIOUS ZONED LG

## (undated) DEVICE — SU SILK 3-0 SH 30" K832H

## (undated) RX ORDER — FENTANYL CITRATE 50 UG/ML
INJECTION, SOLUTION INTRAMUSCULAR; INTRAVENOUS
Status: DISPENSED
Start: 2020-12-17

## (undated) RX ORDER — FENTANYL CITRATE 50 UG/ML
INJECTION, SOLUTION INTRAMUSCULAR; INTRAVENOUS
Status: DISPENSED
Start: 2019-08-16

## (undated) RX ORDER — FENTANYL CITRATE 50 UG/ML
INJECTION, SOLUTION INTRAMUSCULAR; INTRAVENOUS
Status: DISPENSED
Start: 2021-01-07

## (undated) RX ORDER — ASPIRIN 600 MG/1
SUPPOSITORY RECTAL
Status: DISPENSED
Start: 2021-01-07

## (undated) RX ORDER — SODIUM CHLORIDE, SODIUM GLUCONATE, SODIUM ACETATE, POTASSIUM CHLORIDE AND MAGNESIUM CHLORIDE 526; 502; 368; 37; 30 MG/100ML; MG/100ML; MG/100ML; MG/100ML; MG/100ML
INJECTION, SOLUTION INTRAVENOUS
Status: DISPENSED
Start: 2021-01-07

## (undated) RX ORDER — HEPARIN SODIUM 200 [USP'U]/100ML
INJECTION, SOLUTION INTRAVENOUS
Status: DISPENSED
Start: 2020-12-17

## (undated) RX ORDER — CEFAZOLIN SODIUM 2 G/100ML
INJECTION, SOLUTION INTRAVENOUS
Status: DISPENSED
Start: 2021-01-07

## (undated) RX ORDER — LIDOCAINE HYDROCHLORIDE 20 MG/ML
INJECTION, SOLUTION EPIDURAL; INFILTRATION; INTRACAUDAL; PERINEURAL
Status: DISPENSED
Start: 2021-01-07

## (undated) RX ORDER — GLYCOPYRROLATE 0.2 MG/ML
INJECTION, SOLUTION INTRAMUSCULAR; INTRAVENOUS
Status: DISPENSED
Start: 2021-01-07

## (undated) RX ORDER — ONDANSETRON 2 MG/ML
INJECTION INTRAMUSCULAR; INTRAVENOUS
Status: DISPENSED
Start: 2021-01-07

## (undated) RX ORDER — LIDOCAINE HYDROCHLORIDE 10 MG/ML
INJECTION, SOLUTION EPIDURAL; INFILTRATION; INTRACAUDAL; PERINEURAL
Status: DISPENSED
Start: 2022-03-31

## (undated) RX ORDER — PROPOFOL 10 MG/ML
INJECTION, EMULSION INTRAVENOUS
Status: DISPENSED
Start: 2021-01-07

## (undated) RX ORDER — LIDOCAINE HYDROCHLORIDE 10 MG/ML
INJECTION, SOLUTION INFILTRATION; PERINEURAL
Status: DISPENSED
Start: 2020-12-17

## (undated) RX ORDER — PAPAVERINE HYDROCHLORIDE 30 MG/ML
INJECTION INTRAMUSCULAR; INTRAVENOUS
Status: DISPENSED
Start: 2021-01-07

## (undated) RX ORDER — SODIUM CHLORIDE 9 MG/ML
INJECTION, SOLUTION INTRAVENOUS
Status: DISPENSED
Start: 2022-03-31

## (undated) RX ORDER — DEXAMETHASONE SODIUM PHOSPHATE 4 MG/ML
INJECTION, SOLUTION INTRA-ARTICULAR; INTRALESIONAL; INTRAMUSCULAR; INTRAVENOUS; SOFT TISSUE
Status: DISPENSED
Start: 2021-01-07

## (undated) RX ORDER — FENTANYL CITRATE 50 UG/ML
INJECTION, SOLUTION INTRAMUSCULAR; INTRAVENOUS
Status: DISPENSED
Start: 2022-03-31